# Patient Record
Sex: MALE | Race: WHITE | NOT HISPANIC OR LATINO | Employment: OTHER | URBAN - METROPOLITAN AREA
[De-identification: names, ages, dates, MRNs, and addresses within clinical notes are randomized per-mention and may not be internally consistent; named-entity substitution may affect disease eponyms.]

---

## 2017-04-24 ENCOUNTER — OFFICE VISIT (OUTPATIENT)
Dept: CARDIOLOGY | Facility: CLINIC | Age: 77
End: 2017-04-24

## 2017-04-24 VITALS
BODY MASS INDEX: 30.91 KG/M2 | WEIGHT: 220.8 LBS | SYSTOLIC BLOOD PRESSURE: 140 MMHG | HEART RATE: 81 BPM | DIASTOLIC BLOOD PRESSURE: 80 MMHG | HEIGHT: 71 IN

## 2017-04-24 DIAGNOSIS — I10 ESSENTIAL HYPERTENSION: ICD-10-CM

## 2017-04-24 DIAGNOSIS — I25.10 CVD (CARDIOVASCULAR DISEASE): ICD-10-CM

## 2017-04-24 DIAGNOSIS — E78.5 DYSLIPIDEMIA: ICD-10-CM

## 2017-04-24 DIAGNOSIS — I25.810 CORONARY ARTERY DISEASE INVOLVING CORONARY BYPASS GRAFT OF NATIVE HEART WITHOUT ANGINA PECTORIS: Primary | ICD-10-CM

## 2017-04-24 PROCEDURE — 99213 OFFICE O/P EST LOW 20 MIN: CPT | Performed by: INTERNAL MEDICINE

## 2017-04-24 RX ORDER — ATORVASTATIN CALCIUM 40 MG/1
40 TABLET, FILM COATED ORAL DAILY
COMMUNITY

## 2017-04-24 RX ORDER — LOSARTAN POTASSIUM 100 MG/1
100 TABLET ORAL NIGHTLY
COMMUNITY

## 2017-04-24 RX ORDER — NITROGLYCERIN 0.4 MG/1
0.4 TABLET SUBLINGUAL
COMMUNITY

## 2017-04-24 RX ORDER — ASPIRIN 81 MG/1
81 TABLET ORAL DAILY
COMMUNITY

## 2017-04-24 RX ORDER — OMEPRAZOLE 20 MG/1
20 CAPSULE, DELAYED RELEASE ORAL DAILY
COMMUNITY
End: 2018-05-07

## 2017-04-24 RX ORDER — DILTIAZEM HYDROCHLORIDE 180 MG/1
180 CAPSULE, COATED, EXTENDED RELEASE ORAL DAILY
COMMUNITY

## 2017-04-24 NOTE — PROGRESS NOTES
Subjective:     Encounter Date:04/24/2017      Patient ID: Toño Alcala is a 76 y.o. male.    Chief Complaint: Coronary Artery Disease (follow up )      PROBLEM LIST:  1. CAD:  a. History of CABG x4, 1994 - incomplete database.   b. Cardiac catheterization 02/16/2011 showed 100% native RCA, and severe proximal LAD and left circumflex disease.  Saphenous vein graft to the right PDA is patent.  Left internal mammary artery  to the LAD is widely patent.  Saphenous vein graft to the obtuse marginal is widely patent.    2. CVD:  a. History of TIA, 1989.  3. Hypertension.  a. Normal renal angiography, 02/16/2011.  4. Right lower extremity DVT and pulmonary embolism in 06/2015, idiopathic.  5. Diabetes.  6. Dyslipidemia.  7. Arthritis.   8. GERD/hiatal hernia.   9. History of a left forearm fracture.    10. Right wrist fracture.   11. History of cervical fusion.  12. History of lumbar laminectomy.  13. Arthritis.  14. Surgical history:  a. T and A.  b. Appendectomy.       History of Present Illness  Patient returns today for follow up with a history of CAD, CVD, hypertension, and dyslipidemia. Since last visit patient has been feeling well from a cardiac standpoint. His vitamin D level has been low so he started supplements for it 2 weeks ago but has not yet seen any difference. He has not needed to take his nitroglycerin. He is staying active as is tolerable. Denies any exertional chest pain, shortness of breath, orthopnea, PND, or palpitations.    Allergies   Allergen Reactions   • Penicillins      Edema and hives         Current Outpatient Prescriptions:   •  aspirin 81 MG EC tablet, Take 81 mg by mouth Daily., Disp: , Rfl:   •  atorvastatin (LIPITOR) 40 MG tablet, Take 40 mg by mouth Daily., Disp: , Rfl:   •  diclofenac (VOLTAREN) 1 % gel gel, Apply 4 g topically Daily., Disp: , Rfl:   •  diltiaZEM CD (CARDIZEM CD) 180 MG 24 hr capsule, Take 180 mg by mouth Daily., Disp: , Rfl:   •  losartan (COZAAR) 100 MG  tablet, Take 100 mg by mouth Daily., Disp: , Rfl:   •  Multiple Vitamins-Minerals (MULTIVITAMIN ADULT PO), Take  by mouth Daily., Disp: , Rfl:   •  nitroglycerin (NITROSTAT) 0.4 MG SL tablet, Place 0.4 mg under the tongue Every 5 (Five) Minutes As Needed for Chest Pain. Take no more than 3 doses in 15 minutes., Disp: , Rfl:   •  omeprazole (priLOSEC) 20 MG capsule, Take 20 mg by mouth Daily., Disp: , Rfl:     The following portions of the patient's history were reviewed and updated as appropriate: allergies, current medications, past family history, past medical history, past social history, past surgical history and problem list.    Review of Systems   Constitution: Negative.   Cardiovascular: Negative.    Respiratory: Negative.    Hematologic/Lymphatic: Negative for bleeding problem. Does not bruise/bleed easily.   Skin: Negative for rash.   Musculoskeletal: Negative for muscle weakness and myalgias.   Gastrointestinal: Negative for heartburn, nausea and vomiting.   Neurological: Negative.           Objective:     Vitals:    04/24/17 1333   BP: 140/80   Pulse: 81         Physical Exam   Constitutional: He is oriented to person, place, and time. He appears well-developed and well-nourished.   HENT:   Mouth/Throat: Oropharynx is clear and moist.   Neck: No JVD present. Carotid bruit is not present. No thyromegaly present.   Cardiovascular: Regular rhythm, S1 normal, S2 normal, normal heart sounds and intact distal pulses.  Exam reveals no gallop, no S3 and no S4.    No murmur heard.  Pulses:       Carotid pulses are 2+ on the right side, and 2+ on the left side.       Radial pulses are 2+ on the right side, and 2+ on the left side.   Pulmonary/Chest: Breath sounds normal.   Abdominal: Soft. Bowel sounds are normal. He exhibits no mass. There is no tenderness.   Musculoskeletal: He exhibits no edema.   Neurological: He is alert and oriented to person, place, and time.   Skin: Skin is warm and dry. No rash noted.        Lab Review:    Procedures        Assessment:   Toño was seen today for coronary artery disease.    Diagnoses and all orders for this visit:    Coronary artery disease involving coronary bypass graft of native heart without angina pectoris    CVD (cardiovascular disease)    Essential hypertension    Dyslipidemia        Impression  1. Coronary artery disease, no angina  2. Hypertension well controlled  3. His lipidemia on high-dose statin  4. CVD stable but    Plan:    1. Continue current medications.  2. Revisit in 12 MO, or sooner as needed.    Scribed for Avelino Hernandez MD by Carter Pulido. 4/24/2017  2:20 PM  I, Avelino Hernandez MD, personally performed the services described in this documentation as scribed by the above individual in my presence, and it is both accurate and complete

## 2017-11-14 ENCOUNTER — HOSPITAL ENCOUNTER (OUTPATIENT)
Dept: PHYSICAL THERAPY | Facility: HOSPITAL | Age: 77
Setting detail: THERAPIES SERIES
Discharge: HOME | End: 2017-11-14

## 2017-11-14 ENCOUNTER — TRANSCRIBE ORDERS (OUTPATIENT)
Dept: PHYSICAL THERAPY | Facility: HOSPITAL | Age: 77
End: 2017-11-14

## 2017-11-14 DIAGNOSIS — H81.12 BPPV (BENIGN PAROXYSMAL POSITIONAL VERTIGO), LEFT: Primary | ICD-10-CM

## 2017-11-14 DIAGNOSIS — H81.10 BENIGN PAROXYSMAL POSITIONAL VERTIGO, UNSPECIFIED LATERALITY: Primary | ICD-10-CM

## 2017-11-14 PROCEDURE — 97161 PT EVAL LOW COMPLEX 20 MIN: CPT

## 2017-11-14 PROCEDURE — G8982 BODY POS GOAL STATUS: HCPCS

## 2017-11-14 PROCEDURE — G8981 BODY POS CURRENT STATUS: HCPCS

## 2017-11-14 NOTE — THERAPY EVALUATION
Outpatient Physical Therapy Vestibular Initial Evaluation  [facility]     Patient Name: Toño Alcala  : 1940  MRN: [id number]  Today's Date: 2017      Visit Date: 2017    Patient Active Problem List   Diagnosis   • CAD (coronary artery disease)   • CVD (cardiovascular disease)   • Hypertension   • DVT (deep venous thrombosis)   • Diabetes mellitus   • Dyslipidemia   • Arthritis   • GERD (gastroesophageal reflux disease)        Past Medical History:   Diagnosis Date   • Arthritis    • CAD (coronary artery disease)    • CVD (cardiovascular disease)     History of TIA    • Diabetes mellitus    • DVT (deep venous thrombosis)     Right lower extremity DVT and pulmonary embolism in 2015, idiopathic   • Dyslipidemia    • Fracture     H/o pf left forearm fracture   • GERD (gastroesophageal reflux disease)     with hiatal hernia   • Hypertension     Normal renal angiography 11   • Right wrist fracture         Past Surgical History:   Procedure Laterality Date   • APPENDECTOMY     • CARDIAC CATHETERIZATION      with vein graft   • CERVICAL FUSION     • CORONARY ARTERY BYPASS GRAFT     • LUMBAR LAMINECTOMY     • TONSILLECTOMY AND ADENOIDECTOMY           Visit Dx:     ICD-10-CM ICD-9-CM   1. BPPV (benign paroxysmal positional vertigo), left H81.12 386.11             Patient History       17 0900          History    Chief Complaint Dizziness;Difficulty with daily activities  -MM      Date Current Problem(s) Began 10/24/17  -MM      Brief Description of Current Complaint Client experienced severe dizziness the morning of 2017. He went to his physician and was diagnosed with vertigo. He took Meclizine and symptoms improved some. He followed up with his physician and took Prednisone for 3 days. Symptoms improved quite a bit, but he continues with spinning type vertigo that lasts less than 1 minute when he lays down at night or gets up in the morning.  -MM       Patient/Caregiver Goals Relief from dizziness  -MM      Pain     Difficulties with ADL's? laying supine and transfers  -MM      Daily Activities    Primary Language English  -MM      How does patient learn best? Listening;Reading;Demonstration  -MM      Pt Participated in POC and Goals Yes  -MM        User Key  (r) = Recorded By, (t) = Taken By, (c) = Cosigned By    Initials Name Provider Type    ALMA Day PT Physical Therapist                Vestibular Eval       11/14/17 0900          Symptom Behavior    Type of Dizziness Spinning  -MM      Frequency of Dizziness Once a day  -MM      Duration of Dizziness Seconds  -MM      VAS Intensity (0-10) 6  -MM      Aggravating Factors Lying supine;Supine to sit;Sit to stand  -MM      Relieving Factors Head stationary;Rest  -MM      Positional Testing    Won-Hallpike Left Upbeat, left rotatory nystagmus  -MM      [place]-Hallpike Left Onset Time  5 sec  -MM      Won-Hallpike Left Duration Time  12 sec  -MM        User Key  (r) = Recorded By, (t) = Taken By, (c) = Cosigned By    Initials Name Provider Type    ALMA Day PT Physical Therapist                            Therapy Education       11/14/17 0900          Therapy Education    Education Details Provided educational handout regarding BPPV.  -MM        User Key  (r) = Recorded By, (t) = Taken By, (c) = Cosigned By    Initials Name Provider Type    ALMA Day PT Physical Therapist                  Exercises       11/14/17 0900          Exercise 1    Exercise Name 1 Included modified Epley maneuver x2 for left posterior canal BPPV.   -MM      Cueing 1 Verbal  -MM      Time (Minutes) 1 10  -MM        User Key  (r) = Recorded By, (t) = Taken By, (c) = Cosigned By    Initials Name Provider Type    ALMA Day PT Physical Therapist                            PT OP Goals       11/14/17 0900       PT Short Term Goals    STG Date to Achieve 12/05/17  -MM     STG 1 Symptoms of vertigo and  dizziness will resolve.   -MM     STG 1 Progress New  -MM     STG 2 Client will return to laying in bed without dizziness.  -MM     STG 2 Progress New  -MM     STG 3 Client will improve to transfering sit to stand without dizziness or vertigo.  -MM     STG 3 Progress New  -MM     Long Term Goals    LTG Date to Achieve 12/12/17  -MM     LTG 1 DHI score will improve to 15% or better.  -MM     LTG 1 Progress New  -MM     LTG 2 Left Won Hallpike test will be negative.  -MM     LTG 2 Progress New  -MM     Time Calculation    PT Goal Re-Cert Due Date 02/12/18  -MM       User Key  (r) = Recorded By, (t) = Taken By, (c) = Cosigned By    Initials Name Provider Type    MM Jose Day, PT Physical Therapist                PT Assessment/Plan       11/14/17 0900       PT Assessment    Functional Limitations Limitation in home management;Limitations in community activities;Performance in self-care ADL  -MM     Impairments Coordination;Balance   Dizziness  -MM     Assessment Comments Client presents with evolving symptoms of low complexity.  Signs and symptoms are consistent with left posterior canal BPPV.  Symptoms seem to improve with Epley maneuver.  Further evaluation will be needed next visit.  -MM     Please refer to paper survey for additional self-reported information Yes  -MM     Rehab Potential Good  -MM     Patient/caregiver participated in establishment of treatment plan and goals Yes  -MM     Patient would benefit from skilled therapy intervention Yes  -MM     PT Plan    PT Frequency 1x/week  -MM     Predicted Duration of Therapy Intervention (days/wks) 6 visits  -MM     Planned CPT's? PT EVAL LOW COMPLEXITY: 31653;PT NEUROMUSC RE-EDUCATION EA 15 MIN: 60409   Canalith repositioning  -MM     PT Plan Comments Continue per plan of treatment.  Recheck for BPPV next visit.  Also check for vestibular hypofunction.  -MM       User Key  (r) = Recorded By, (t) = Taken By, (c) = Cosigned By    Initials Name Provider Type     MM Jose Day, PT Physical Therapist                 Outcome Measures       11/14/17 0900          Functional Assessment    Outcome Measure Options Dizziness Handicap Inventory   48%  -MM        User Key  (r) = Recorded By, (t) = Taken By, (c) = Cosigned By    Initials Name Provider Type    MM Jose Day, PT Physical Therapist            Time Calculation:   Start Time: 0900     Therapy Charges for Today     Code Description Service Date Service Provider Modifiers Qty    31502394654  PT EVAL LOW COMPLEXITY 4 11/14/2017 Jose Day, PT GP 1          PT G-Codes  Outcome Measure Options: Dizziness Handicap Inventory (48%)         Jose Day, PT  11/14/2017

## 2017-11-22 ENCOUNTER — HOSPITAL ENCOUNTER (OUTPATIENT)
Dept: PHYSICAL THERAPY | Facility: HOSPITAL | Age: 77
Setting detail: THERAPIES SERIES
Discharge: HOME | End: 2017-11-22

## 2017-11-22 DIAGNOSIS — H81.12 BPPV (BENIGN PAROXYSMAL POSITIONAL VERTIGO), LEFT: Primary | ICD-10-CM

## 2017-11-22 PROCEDURE — 95992 CANALITH REPOSITIONING PROC: CPT

## 2017-11-22 NOTE — THERAPY TREATMENT NOTE
Outpatient Physical Therapy Vestibular Treatment Note  [facility]     Patient Name: Toño Alcala  : 1940  MRN: [id number]  Today's Date: 2017      Visit Date: 2017    Visit Dx:     ICD-10-CM ICD-9-CM   1. BPPV (benign paroxysmal positional vertigo), left H81.12 386.11       Patient Active Problem List   Diagnosis   • CAD (coronary artery disease)   • CVD (cardiovascular disease)   • Hypertension   • DVT (deep venous thrombosis)   • Diabetes mellitus   • Dyslipidemia   • Arthritis   • GERD (gastroesophageal reflux disease)             Vestibular Eval       17 1345          Positional Testing    Won-Hallpike Left Downbeat, left rotatory nystagmus  -MM      [place]-Hallpike Left Onset Time  5  -MM      [place]-Hallpike Left Duration Time  1 minute  -MM        User Key  (r) = Recorded By, (t) = Taken By, (c) = Cosigned By    Initials Name Provider Type    ALMA Day, PT Physical Therapist                PT Assessment/Plan       17 1345       PT Assessment    Assessment Comments Client's history suggests that the posterior canal was cleared for 4 days. He had a return of dizziness this past Saturday. On exam he had signs consistent with left anterior canal BPPV. Deep neck extension maneuver was difficult to perform due to limited neck extension ROM. Modified reverse Semont maneuver was performed and tolerated very well.   -MM     PT Plan    PT Plan Comments Continue per plan of treatment. Check again for BPPV and treat as needed.  -MM       User Key  (r) = Recorded By, (t) = Taken By, (c) = Cosigned By    Initials Name Provider Type    ALMA Day, PT Physical Therapist                     Exercises       17 1345          Subjective Comments    Subjective Comments Client reports that dizziness resolved for 4 days after last treatment. He awoke on Saturday and experienced quite a bit of dizziness again. He has experienced dizziness throughout the day for the past few  days.  -MM      Subjective Pain    Able to rate subjective pain? yes  -MM      Pre-Treatment Pain Level 0  -MM      Post-Treatment Pain Level 0  -MM      Subjective Pain Comment Moderate dizziness  -MM      Exercise 1    Exercise Name 1 Included the deep neck extension maneuver by using a tilted table to clear the anterior canal. Performed modified reverse Semont maneuver x3 to clear the anterior canal. On the last 2 maneuvers he had less nystagmus and less dizziness.  Also included education.  -MM      Cueing 1 Verbal  -MM      Time (Minutes) 1 45  -MM      Additional Comments canalith repositioning  -MM        User Key  (r) = Recorded By, (t) = Taken By, (c) = Cosigned By    Initials Name Provider Type    MM Jose Day, PT Physical Therapist        Time Calculation:   Start Time: 1345  Total Timed Code Minutes- PT: 45 minute(s)     Therapy Charges for Today     Code Description Service Date Service Provider Modifiers Qty    73256084644  PT CANALITH REPOSITIONING PER DAY 11/22/2017 Jose Day, PT GP 1                   Jose Day, PT  11/22/2017

## 2017-11-29 ENCOUNTER — HOSPITAL ENCOUNTER (OUTPATIENT)
Dept: PHYSICAL THERAPY | Facility: HOSPITAL | Age: 77
Setting detail: THERAPIES SERIES
Discharge: HOME | End: 2017-11-29

## 2017-11-29 DIAGNOSIS — H81.12 BPPV (BENIGN PAROXYSMAL POSITIONAL VERTIGO), LEFT: Primary | ICD-10-CM

## 2017-11-29 PROCEDURE — 95992 CANALITH REPOSITIONING PROC: CPT

## 2017-11-29 NOTE — THERAPY TREATMENT NOTE
Outpatient Physical Therapy Vestibular Treatment Note  [facility]     Patient Name: Toño Alcala  : 1940  MRN: [id number]  Today's Date: 2017      Visit Date: 2017    Visit Dx:     ICD-10-CM ICD-9-CM   1. BPPV (benign paroxysmal positional vertigo), left H81.12 386.11       Patient Active Problem List   Diagnosis   • CAD (coronary artery disease)   • CVD (cardiovascular disease)   • Hypertension   • DVT (deep venous thrombosis)   • Diabetes mellitus   • Dyslipidemia   • Arthritis   • GERD (gastroesophageal reflux disease)             PT Assessment/Plan       17 1145 17 1115    PT Assessment    Assessment Comments On Modified Won Hallpike testing today he had left upbeating and rotary nystagmus consitent with left posterior canal BPPV that resolved in less than 30 seconds. Epley maneuver was performed 2x. On the second Won Hallpike test no nystagmus was noted. Client has signs that BPPV is clearing and then returning. Deficiency in vitamin D may be playing a role with the otoliths not reabsorbing well after the maneuvers.   -MM --  -MM    PT Plan    PT Plan Comments Continue to re-exam and treat as needed.  -MM --  -MM      User Key  (r) = Recorded By, (t) = Taken By, (c) = Cosigned By    Initials Name Provider Type    MM Jose Day, PT Physical Therapist                     Exercises       17 1145 17 1115       Subjective Comments    Subjective Comments Client felt okay for a couple of days and then noticed symptoms again when he layed supine.   -MM --  -MM     Subjective Pain    Able to rate subjective pain? yes  -MM --  -MM     Pre-Treatment Pain Level 0  -MM --  -MM     Post-Treatment Pain Level 0  -MM --  -MM     Subjective Pain Comment brief moderate dizziness when laying supine or transferring sit to stand.  -MM --  -MM     Exercise 1    Exercise Name 1 Included epley maneuver x2 for left posterior canal.   -MM --  -MM     Cueing 1 Verbal  -MM --  -MM      Time (Minutes) 1 30  -MM --  -MM     Additional Comments canalith repositioning  -MM --  -MM       User Key  (r) = Recorded By, (t) = Taken By, (c) = Cosigned By    Initials Name Provider Type    MM Jose Day, PT Physical Therapist                                    Time Calculation:   Start Time: 1145  Total Timed Code Minutes- PT: 30 minute(s)     Therapy Charges for Today     Code Description Service Date Service Provider Modifiers Qty    54796642502  PT CANALITH REPOSITIONING PER DAY 11/29/2017 Jose Day, PT GP 1                   Jose Day, PT  11/29/2017

## 2017-12-06 ENCOUNTER — HOSPITAL ENCOUNTER (OUTPATIENT)
Dept: PHYSICAL THERAPY | Facility: HOSPITAL | Age: 77
Setting detail: THERAPIES SERIES
Discharge: HOME | End: 2017-12-06

## 2017-12-06 DIAGNOSIS — H81.12 BPPV (BENIGN PAROXYSMAL POSITIONAL VERTIGO), LEFT: Primary | ICD-10-CM

## 2017-12-06 PROCEDURE — 95992 CANALITH REPOSITIONING PROC: CPT

## 2017-12-13 ENCOUNTER — HOSPITAL ENCOUNTER (OUTPATIENT)
Dept: PHYSICAL THERAPY | Facility: HOSPITAL | Age: 77
Setting detail: THERAPIES SERIES
Discharge: HOME | End: 2017-12-13

## 2017-12-13 DIAGNOSIS — H81.12 BPPV (BENIGN PAROXYSMAL POSITIONAL VERTIGO), LEFT: Primary | ICD-10-CM

## 2017-12-13 DIAGNOSIS — H83.2X2 VESTIBULAR HYPOFUNCTION, LEFT: ICD-10-CM

## 2017-12-13 PROCEDURE — 95992 CANALITH REPOSITIONING PROC: CPT

## 2017-12-13 PROCEDURE — G8981 BODY POS CURRENT STATUS: HCPCS

## 2017-12-13 PROCEDURE — G8982 BODY POS GOAL STATUS: HCPCS

## 2017-12-13 PROCEDURE — 97112 NEUROMUSCULAR REEDUCATION: CPT

## 2017-12-13 NOTE — THERAPY PROGRESS REPORT/RE-CERT
Outpatient Physical Therapy Vestibular Re-Assessment  [facility]     Patient Name: Toño Alcala  : 1940  MRN: [id number]  Today's Date: 2017      Visit Date: 2017    Patient Active Problem List   Diagnosis   • CAD (coronary artery disease)   • CVD (cardiovascular disease)   • Hypertension   • DVT (deep venous thrombosis)   • Diabetes mellitus   • Dyslipidemia   • Arthritis   • GERD (gastroesophageal reflux disease)        Past Medical History:   Diagnosis Date   • Arthritis    • CAD (coronary artery disease)    • CVD (cardiovascular disease)     History of TIA    • Diabetes mellitus    • DVT (deep venous thrombosis)     Right lower extremity DVT and pulmonary embolism in 2015, idiopathic   • Dyslipidemia    • Fracture     H/o pf left forearm fracture   • GERD (gastroesophageal reflux disease)     with hiatal hernia   • Hypertension     Normal renal angiography 11   • Right wrist fracture         Past Surgical History:   Procedure Laterality Date   • APPENDECTOMY     • CARDIAC CATHETERIZATION      with vein graft   • CERVICAL FUSION     • CORONARY ARTERY BYPASS GRAFT     • LUMBAR LAMINECTOMY     • TONSILLECTOMY AND ADENOIDECTOMY           Visit Dx:     ICD-10-CM ICD-9-CM   1. BPPV (benign paroxysmal positional vertigo), left H81.12 386.11   2. Vestibular hypofunction, left H83.2X2 386.50                 Vestibular Eval       17 1130          Vestibulo-Occular Reflex (VOR)    VOR 1 Head Only little to no difficulty with slow movement   More noticeable impairment with fast movement left  -MM      VOR to Fast Head Movement/Head Thrust Test Positive bilaterally   Difficulty performing test, but at times positive each way  -MM      Positional Testing    [place]-Hallpike Left No nystagmus   symptomatic vertigo, onset and duration 1-2 seconds each  -MM      Horizontal Roll Test Right No nystagmus  -MM      Horizontal Roll Test Left No nystagmus  -MM        User Key  (r) = Recorded  By, (t) = Taken By, (c) = Cosigned By    Initials Name Provider Type    ALMA Day, PT Physical Therapist                      Therapy Education  Education Details: Updated home program this visit.            Exercises       12/13/17 1130          Subjective Comments    Subjective Comments Client reports feeling good for a week until yesterday. He noticed some dizziness yesterday and unsteadiness throughout the day. This morning he also noticed some dizziness and unsteadiness lingering.   -MM      Subjective Pain    Able to rate subjective pain? yes  -MM      Pre-Treatment Pain Level 0  -MM      Post-Treatment Pain Level 0  -MM      Subjective Pain Comment Mild dizziness/unsteadiness at the time of treatment.  -MM      Exercise 1    Exercise Name 1 Included Epley maneuver x2 for left posterior canal BPPV.   -MM      Cueing 1 Verbal  -MM      Time (Minutes) 1 30  -MM      Additional Comments canalith repositioning.  -MM      Exercise 2    Exercise Name 2 Included vestibular rehab program with exercises to improve coordination of eye and head movement. EXercises included: VOR-1 training, locating targets, and sweeping head movement left.   -MM      Cueing 2 Verbal;Tactile;Demo  -MM      Time (Minutes) 2 15  -MM      Additional Comments Neuromuscular re-education  -MM        User Key  (r) = Recorded By, (t) = Taken By, (c) = Cosigned By    Initials Name Provider Type    ALMA Day, PT Physical Therapist                PT OP Goals       12/13/17 1130       PT Short Term Goals    STG Date to Achieve 12/05/17  -MM     STG 1 Symptoms of vertigo and dizziness will resolve.   -MM     STG 1 Progress Ongoing  -MM     STG 2 Client will return to laying in bed without dizziness.  -MM     STG 2 Progress Ongoing  -MM     STG 3 Client will improve to transfering sit to stand without dizziness or vertigo.  -MM     STG 3 Progress Ongoing  -MM     Long Term Goals    LTG Date to Achieve 12/12/17  -MM     LTG 1 DHI  score will improve to 15% or better.  -MM     LTG 1 Progress Ongoing  -MM     LTG 1 Progress Comments some progress  -MM     LTG 2 Left [place] Hallpike test will be negative.  -MM     LTG 2 Progress Ongoing  -MM     LTG 2 Progress Comments No nystagmus today, but some symptoms of dizziness.  -MM     Time Calculation    PT Goal Re-Cert Due Date 02/12/18  -MM       User Key  (r) = Recorded By, (t) = Taken By, (c) = Cosigned By    Initials Name Provider Type    ALMA Day, PT Physical Therapist                PT Assessment/Plan       12/13/17 1130       PT Assessment    Assessment Comments Client continues to notice improvement, but this past week symptoms again returned. They did not return this time until 5 days after the last maneuver. On exam he had symptomatic vertigo with Left [place] Hallpike, but no nystagmus. Epley maveuver was tolerated well and negative on follow-up testing. Other positional testing for BPPV in horizontal canals was all negative. Further testing was performed today to look at VOR function. It was difficult to test because he is very guarded with neck movement and has history of cervical fusion. Some impairment was noted with Head impulse testing left and right. Symptoms were more noticeable to the client when quickly moving his head left. Positional vertigo has likely resolved based on testing today. Remaining symptoms are likely related to left vestibular hypofunction. Vestibular rehab exercises were started today and he should notice improvement with practice on his own.  -MM     PT Plan    PT Plan Comments Re-check for BPPV and vestibular hypofunction next visit. Consider testing for head shaking nystagmus with fixation absent.  Continue to update vestibular exercises as needed.  -MM       User Key  (r) = Recorded By, (t) = Taken By, (c) = Cosigned By    Initials Name Provider Type    ALMA Day, PT Physical Therapist           Outcome Measure Options: Dizziness Handicap  Inventory (32%)         Time Calculation:   Start Time: 1130  Total Timed Code Minutes- PT: 45 minute(s)     Therapy Charges for Today     Code Description Service Date Service Provider Modifiers Qty    22594226844 HC PT CHNG MAIN POS CURRENT 12/13/2017 Jose Day, PT GP, CJ 1    64802627115 HC PT CHNG MAIN POS PROJECTED 12/13/2017 Jose Day, PT GP, CI 1    [id number] HC PT CANALITH REPOSITIONING PER DAY 12/13/2017 Jose Day, PT GP 1    14862248000 HC PT NEUROMUSC RE EDUCATION EA 15 MIN 12/13/2017 Jose Day, PT GP 1          PT G-Codes  PT Professional Judgement Used?: Yes  Outcome Measure Options: Dizziness Handicap Inventory (32%)  Score: 32%  Functional Limitation: Changing and maintaining body position  Changing and Maintaining Body Position Current Status (): At least 20 percent but less than 40 percent impaired, limited or restricted  Changing and Maintaining Body Position Goal Status (): At least 1 percent but less than 20 percent impaired, limited or restricted         Jose Day, PT  12/13/2017

## 2018-01-05 ENCOUNTER — DOCUMENTATION (OUTPATIENT)
Dept: PHYSICAL THERAPY | Facility: HOSPITAL | Age: 78
End: 2018-01-05

## 2018-01-05 DIAGNOSIS — H81.12 BPPV (BENIGN PAROXYSMAL POSITIONAL VERTIGO), LEFT: Primary | ICD-10-CM

## 2018-01-05 DIAGNOSIS — H83.2X2 VESTIBULAR HYPOFUNCTION, LEFT: ICD-10-CM

## 2018-05-07 ENCOUNTER — OFFICE VISIT (OUTPATIENT)
Dept: CARDIOLOGY | Facility: CLINIC | Age: 78
End: 2018-05-07

## 2018-05-07 VITALS
HEIGHT: 71 IN | SYSTOLIC BLOOD PRESSURE: 126 MMHG | HEART RATE: 74 BPM | BODY MASS INDEX: 28.56 KG/M2 | DIASTOLIC BLOOD PRESSURE: 64 MMHG | WEIGHT: 204 LBS

## 2018-05-07 DIAGNOSIS — I25.10 CORONARY ARTERY DISEASE INVOLVING NATIVE CORONARY ARTERY OF NATIVE HEART WITHOUT ANGINA PECTORIS: Primary | ICD-10-CM

## 2018-05-07 DIAGNOSIS — E78.5 DYSLIPIDEMIA: ICD-10-CM

## 2018-05-07 DIAGNOSIS — I25.10 CVD (CARDIOVASCULAR DISEASE): ICD-10-CM

## 2018-05-07 DIAGNOSIS — I10 ESSENTIAL HYPERTENSION: ICD-10-CM

## 2018-05-07 PROCEDURE — 99213 OFFICE O/P EST LOW 20 MIN: CPT | Performed by: NURSE PRACTITIONER

## 2018-05-07 RX ORDER — WARFARIN SODIUM 5 MG/1
5 TABLET ORAL
COMMUNITY
End: 2019-05-13

## 2018-05-07 RX ORDER — CALCIPOTRIENE, BETAMETHASONE DIPROPIONATE 50; .643 UG/G; MG/G
1 OINTMENT TOPICAL AS NEEDED
COMMUNITY

## 2018-05-07 RX ORDER — OMEPRAZOLE 20 MG/1
20 CAPSULE, DELAYED RELEASE ORAL DAILY
COMMUNITY

## 2018-05-07 RX ORDER — WARFARIN SODIUM 2.5 MG/1
2.5 TABLET ORAL
COMMUNITY
End: 2019-05-13

## 2018-05-07 RX ORDER — ACETAMINOPHEN 325 MG/1
650 TABLET ORAL AS NEEDED
COMMUNITY

## 2018-05-07 NOTE — PROGRESS NOTES
Subjective:     Encounter Date:05/07/2018      Patient ID: Toño Alcala is a 77 y.o. male.  PCP:Radu Francis MD    Chief Complaint: Coronary Artery Disease; CVD (cardiovascular disease); and Hypertension      PROBLEM LIST:  1. CAD:  a. History of CABG x4, 1994 - incomplete database.   b. Cardiac catheterization 02/16/2011 showed 100% native RCA, and severe proximal LAD and left circumflex disease.  Saphenous vein graft to the right PDA is patent.  Left internal mammary artery  to the LAD is widely patent.  Saphenous vein graft to the obtuse marginal is widely patent.    2. CVD:  a. History of TIA, 1989.  3. Hypertension.  a. Normal renal angiography, 02/16/2011.  4. Right lower extremity DVT and pulmonary embolism in 06/2015, idiopathic.  a. Recurrent idiopathic DVT 2017  b. Started on warfarin per Dr. Salinas and Tito  5. Diabetes.  6. Dyslipidemia.  7. Arthritis.   8. GERD/hiatal hernia.   9. History of a left forearm fracture.    10. Right wrist fracture.   11. History of cervical fusion.  12. History of lumbar laminectomy.  13. Arthritis.  14. BPPV  15. Surgical history:  a. T and A.  b. Appendectomy.     History of Present Illness  Patient presents today for annual follow-up of coronary artery disease.  Since last being seen he notes overall be doing fairly well.  He was diagnosed with a another idiopathic right lower extremity DVT.  He is now on lifelong warfarin.  He notes that he has discussed anticoagulation with Dr. Francis as well as Dr. Andrade.  At this time for further he be on warfarin.  He denies any chest pain, pressure, tightness.  Denies any increasing shortness of breath.  No syncope, near-syncope, or edema.  He did note an irregular heartbeat at one point in time and was noted to have PVCs.  Patient denies any palpitations.  States that he is compliant with his medications.    Allergies   Allergen Reactions   • Penicillins      Edema and hives         Current  Outpatient Prescriptions:   •  acetaminophen (TYLENOL) 325 MG tablet, Take 650 mg by mouth As Needed for Mild Pain ., Disp: , Rfl:   •  aspirin 81 MG EC tablet, Take 81 mg by mouth Daily., Disp: , Rfl:   •  atorvastatin (LIPITOR) 40 MG tablet, Take 40 mg by mouth Daily., Disp: , Rfl:   •  calcipotriene-betamethasone (TACLONEX) 0.005-0.064 % ointment, Apply 1 application topically As Needed., Disp: , Rfl:   •  diclofenac (VOLTAREN) 1 % gel gel, Apply 4 g topically As Needed., Disp: , Rfl:   •  diltiaZEM CD (CARDIZEM CD) 180 MG 24 hr capsule, Take 180 mg by mouth Daily., Disp: , Rfl:   •  losartan (COZAAR) 100 MG tablet, Take 100 mg by mouth Daily., Disp: , Rfl:   •  Multiple Vitamins-Minerals (MULTIVITAMIN ADULT PO), Take 1 tablet by mouth As Needed., Disp: , Rfl:   •  nitroglycerin (NITROSTAT) 0.4 MG SL tablet, Place 0.4 mg under the tongue Every 5 (Five) Minutes As Needed for Chest Pain. Take no more than 3 doses in 15 minutes., Disp: , Rfl:   •  omeprazole (priLOSEC) 40 MG capsule, Take 40 mg by mouth Daily., Disp: , Rfl:   •  warfarin (COUMADIN) 2.5 MG tablet, Take 2.5 mg by mouth Daily. Alternating between 5 mg, Disp: , Rfl:   •  warfarin (COUMADIN) 5 MG tablet, Take 5 mg by mouth Daily. Alternating between 2.5 mg, Disp: , Rfl:     The following portions of the patient's history were reviewed and updated as appropriate: allergies, current medications, past family history, past medical history, past social history, past surgical history and problem list.    Review of Systems   Constitution: Negative.   HENT: Positive for tinnitus.    Cardiovascular: Positive for irregular heartbeat.   Respiratory: Negative.    Hematologic/Lymphatic: Bruises/bleeds easily.   Skin: Negative for rash.   Musculoskeletal: Positive for neck pain. Negative for muscle weakness and myalgias.   Gastrointestinal: Negative for heartburn, nausea and vomiting.   Neurological: Negative.           Objective:   /64 (BP Location: Right arm,  "Patient Position: Sitting)   Pulse 74   Ht 180.3 cm (71\")   Wt 92.5 kg (204 lb)   BMI 28.45 kg/m²        Physical Exam   Constitutional: He is oriented to person, place, and time. He appears well-developed and well-nourished. No distress.   Neck: No JVD present. No tracheal deviation present.   Cardiovascular: Normal rate, regular rhythm and normal heart sounds.  Exam reveals no friction rub.    No murmur heard.  Pulmonary/Chest: Effort normal and breath sounds normal. No respiratory distress.   Abdominal: Soft. Bowel sounds are normal. There is no tenderness.   Musculoskeletal: He exhibits no edema or deformity.   Neurological: He is alert and oriented to person, place, and time.   Skin: Skin is warm and dry.       Lab Review:    Procedures        Assessment:   Toño was seen today for coronary artery disease, cvd (cardiovascular disease) and hypertension.    Diagnoses and all orders for this visit:    Coronary artery disease involving native coronary artery of native heart without angina pectoris    CVD (cardiovascular disease), Stable.    Essential hypertension, controlled.    Dyslipidemia, on statin therapy.      Plan:    1. Continue current medications.  2. Revisit in 12 MO, or sooner as needed.    YARED Mueller     Dictated with Charito.                  "

## 2018-05-23 ENCOUNTER — TRANSCRIBE ORDERS (OUTPATIENT)
Dept: ADMINISTRATIVE | Facility: HOSPITAL | Age: 78
End: 2018-05-23

## 2018-05-23 DIAGNOSIS — N18.30 CKD (CHRONIC KIDNEY DISEASE), STAGE III (HCC): Primary | ICD-10-CM

## 2018-05-25 ENCOUNTER — HOSPITAL ENCOUNTER (OUTPATIENT)
Dept: ULTRASOUND IMAGING | Facility: HOSPITAL | Age: 78
Discharge: HOME | End: 2018-05-25
Attending: INTERNAL MEDICINE | Admitting: INTERNAL MEDICINE

## 2018-05-25 DIAGNOSIS — N18.30 CKD (CHRONIC KIDNEY DISEASE), STAGE III (HCC): ICD-10-CM

## 2018-05-25 PROCEDURE — 76775 US EXAM ABDO BACK WALL LIM: CPT

## 2018-07-10 ENCOUNTER — TRANSCRIBE ORDERS (OUTPATIENT)
Dept: LAB | Facility: HOSPITAL | Age: 78
End: 2018-07-10

## 2018-07-10 ENCOUNTER — LAB (OUTPATIENT)
Dept: LAB | Facility: HOSPITAL | Age: 78
End: 2018-07-10

## 2018-07-10 DIAGNOSIS — I13.10 BENIGN HYPERTENSIVE HEART AND RENAL DISEASE: ICD-10-CM

## 2018-07-10 DIAGNOSIS — I13.10 BENIGN HYPERTENSIVE HEART AND RENAL DISEASE: Primary | ICD-10-CM

## 2018-07-10 LAB
ALBUMIN SERPL-MCNC: 4.28 G/DL (ref 3.2–4.8)
ANION GAP SERPL CALCULATED.3IONS-SCNC: 9 MMOL/L (ref 3–11)
BACTERIA UR QL AUTO: NORMAL /HPF
BASOPHILS # BLD AUTO: 0.04 10*3/MM3 (ref 0–0.2)
BASOPHILS NFR BLD AUTO: 0.6 % (ref 0–1)
BILIRUB UR QL STRIP: NEGATIVE
BUN BLD-MCNC: 16 MG/DL (ref 9–23)
BUN/CREAT SERPL: 9.4 (ref 7–25)
CALCIUM SPEC-SCNC: 9.1 MG/DL (ref 8.7–10.4)
CHLORIDE SERPL-SCNC: 105 MMOL/L (ref 99–109)
CLARITY UR: CLEAR
CO2 SERPL-SCNC: 28 MMOL/L (ref 20–31)
COLOR UR: YELLOW
CREAT BLD-MCNC: 1.71 MG/DL (ref 0.6–1.3)
CREAT UR-MCNC: 88 MG/DL
DEPRECATED RDW RBC AUTO: 51.5 FL (ref 37–54)
EOSINOPHIL # BLD AUTO: 0.54 10*3/MM3 (ref 0–0.3)
EOSINOPHIL NFR BLD AUTO: 7.6 % (ref 0–3)
ERYTHROCYTE [DISTWIDTH] IN BLOOD BY AUTOMATED COUNT: 16.1 % (ref 11.3–14.5)
GFR SERPL CREATININE-BSD FRML MDRD: 39 ML/MIN/1.73
GLUCOSE BLD-MCNC: 114 MG/DL (ref 70–100)
GLUCOSE UR STRIP-MCNC: NEGATIVE MG/DL
HCT VFR BLD AUTO: 42.5 % (ref 38.9–50.9)
HGB BLD-MCNC: 13.9 G/DL (ref 13.1–17.5)
HGB UR QL STRIP.AUTO: NEGATIVE
HYALINE CASTS UR QL AUTO: NORMAL /LPF
IMM GRANULOCYTES # BLD: 0.01 10*3/MM3 (ref 0–0.03)
IMM GRANULOCYTES NFR BLD: 0.1 % (ref 0–0.6)
KETONES UR QL STRIP: NEGATIVE
LEUKOCYTE ESTERASE UR QL STRIP.AUTO: ABNORMAL
LYMPHOCYTES # BLD AUTO: 2.03 10*3/MM3 (ref 0.6–4.8)
LYMPHOCYTES NFR BLD AUTO: 28.7 % (ref 24–44)
MCH RBC QN AUTO: 28.7 PG (ref 27–31)
MCHC RBC AUTO-ENTMCNC: 32.7 G/DL (ref 32–36)
MCV RBC AUTO: 87.6 FL (ref 80–99)
MONOCYTES # BLD AUTO: 0.66 10*3/MM3 (ref 0–1)
MONOCYTES NFR BLD AUTO: 9.3 % (ref 0–12)
NEUTROPHILS # BLD AUTO: 3.8 10*3/MM3 (ref 1.5–8.3)
NEUTROPHILS NFR BLD AUTO: 53.7 % (ref 41–71)
NITRITE UR QL STRIP: NEGATIVE
PH UR STRIP.AUTO: 6.5 [PH] (ref 5–8)
PHOSPHATE SERPL-MCNC: 3.5 MG/DL (ref 2.4–5.1)
PLATELET # BLD AUTO: 217 10*3/MM3 (ref 150–450)
PMV BLD AUTO: 10.6 FL (ref 6–12)
POTASSIUM BLD-SCNC: 4.6 MMOL/L (ref 3.5–5.5)
PROT UR QL STRIP: NEGATIVE
PROT UR-MCNC: 7 MG/DL (ref 1–14)
RBC # BLD AUTO: 4.85 10*6/MM3 (ref 4.2–5.76)
RBC # UR: NORMAL /HPF
REF LAB TEST METHOD: NORMAL
SODIUM BLD-SCNC: 142 MMOL/L (ref 132–146)
SP GR UR STRIP: 1.01 (ref 1–1.03)
SQUAMOUS #/AREA URNS HPF: NORMAL /HPF
UROBILINOGEN UR QL STRIP: ABNORMAL
WBC NRBC COR # BLD: 7.08 10*3/MM3 (ref 3.5–10.8)
WBC UR QL AUTO: NORMAL /HPF

## 2018-07-10 PROCEDURE — 84156 ASSAY OF PROTEIN URINE: CPT

## 2018-07-10 PROCEDURE — 85025 COMPLETE CBC W/AUTO DIFF WBC: CPT

## 2018-07-10 PROCEDURE — 82570 ASSAY OF URINE CREATININE: CPT

## 2018-07-10 PROCEDURE — 36415 COLL VENOUS BLD VENIPUNCTURE: CPT

## 2018-07-10 PROCEDURE — 80069 RENAL FUNCTION PANEL: CPT

## 2018-07-10 PROCEDURE — 81001 URINALYSIS AUTO W/SCOPE: CPT

## 2018-08-01 NOTE — THERAPY TREATMENT NOTE
Outpatient Physical Therapy Vestibular Treatment Note  [facility]     Patient Name: Toño Alcala  : 1940  MRN: [id number]  Today's Date: 2017      Visit Date: 2017    Visit Dx:     ICD-10-CM ICD-9-CM   1. BPPV (benign paroxysmal positional vertigo), left H81.12 386.11       Patient Active Problem List   Diagnosis   • CAD (coronary artery disease)   • CVD (cardiovascular disease)   • Hypertension   • DVT (deep venous thrombosis)   • Diabetes mellitus   • Dyslipidemia   • Arthritis   • GERD (gastroesophageal reflux disease)           PT Assessment/Plan       17 1000       PT Assessment    Assessment Comments BPPV returned again after about 3 days of being clear. He responded well to the maneuvers again. He may need to follow-up with his doctor for further recommendations on vitamin D levels.   -MM     PT Plan    PT Plan Comments Continue per plan of treatment with re-examination and treatment as needed.   -MM       User Key  (r) = Recorded By, (t) = Taken By, (c) = Cosigned By    Initials Name Provider Type    MM Jose Day, PT Physical Therapist                     Exercises       17 1000          Subjective Comments    Subjective Comments Client reports improvement for 3 days and then he had a reoccurance of symptoms again when laying down. He also noticed room spinning vertigo when laying down to put up his [place] tree.  -MM      Subjective Pain    Able to rate subjective pain? yes  -MM      Pre-Treatment Pain Level 0  -MM      Post-Treatment Pain Level 0  -MM      Subjective Pain Comment Moderate positional dizziness reported.  -MM      Exercise 1    Exercise Name 1 Included Epley maneuver x2 for left posterior canal BPPV.  On second modified Won-Hallpike test there wasn't any nystagmus present.  -MM      Cueing 1 Verbal  -MM      Time (Minutes) 1 30  -MM      Additional Comments canalith repositioning  -MM        User Key  (r) = Recorded By, (t) = Taken By, (c) =  Form found. Will complete section 1B and refax.   Cosigned By    Initials Name Provider Type    MM Jose Day, PT Physical Therapist        Time Calculation:   Start Time: 1000  Total Timed Code Minutes- PT: 30 minute(s)     Therapy Charges for Today     Code Description Service Date Service Provider Modifiers Qty    [id number]  PT CANALITH REPOSITIONING PER DAY 12/6/2017 Jose Day, PT GP 1                   Jose Day, PT  12/6/2017

## 2018-08-08 ENCOUNTER — LAB (OUTPATIENT)
Dept: LAB | Facility: HOSPITAL | Age: 78
End: 2018-08-08

## 2018-08-08 ENCOUNTER — TRANSCRIBE ORDERS (OUTPATIENT)
Dept: LAB | Facility: HOSPITAL | Age: 78
End: 2018-08-08

## 2018-08-08 DIAGNOSIS — N18.30 CHRONIC KIDNEY DISEASE, STAGE III (MODERATE) (HCC): Primary | ICD-10-CM

## 2018-08-08 DIAGNOSIS — N18.30 CHRONIC KIDNEY DISEASE, STAGE III (MODERATE) (HCC): ICD-10-CM

## 2018-08-08 LAB
ALBUMIN SERPL-MCNC: 4.35 G/DL (ref 3.2–4.8)
ANION GAP SERPL CALCULATED.3IONS-SCNC: 14 MMOL/L (ref 3–11)
BUN BLD-MCNC: 22 MG/DL (ref 9–23)
BUN/CREAT SERPL: 12.6 (ref 7–25)
CALCIUM SPEC-SCNC: 9.4 MG/DL (ref 8.7–10.4)
CHLORIDE SERPL-SCNC: 103 MMOL/L (ref 99–109)
CO2 SERPL-SCNC: 27 MMOL/L (ref 20–31)
CREAT BLD-MCNC: 1.75 MG/DL (ref 0.6–1.3)
EOSINOPHIL SPEC QL MICRO: 0 % EOS/100 CELLS (ref 0–0)
GFR SERPL CREATININE-BSD FRML MDRD: 38 ML/MIN/1.73
GLUCOSE BLD-MCNC: 110 MG/DL (ref 70–100)
PHOSPHATE SERPL-MCNC: 3.5 MG/DL (ref 2.4–5.1)
POTASSIUM BLD-SCNC: 4.2 MMOL/L (ref 3.5–5.5)
SODIUM BLD-SCNC: 144 MMOL/L (ref 132–146)

## 2018-08-08 PROCEDURE — 36415 COLL VENOUS BLD VENIPUNCTURE: CPT

## 2018-08-08 PROCEDURE — 87205 SMEAR GRAM STAIN: CPT

## 2018-08-08 PROCEDURE — 80069 RENAL FUNCTION PANEL: CPT

## 2018-11-08 ENCOUNTER — LAB (OUTPATIENT)
Dept: LAB | Facility: HOSPITAL | Age: 78
End: 2018-11-08

## 2018-11-08 ENCOUNTER — TRANSCRIBE ORDERS (OUTPATIENT)
Dept: LAB | Facility: HOSPITAL | Age: 78
End: 2018-11-08

## 2018-11-08 DIAGNOSIS — N18.30 CHRONIC KIDNEY DISEASE, STAGE III (MODERATE) (HCC): ICD-10-CM

## 2018-11-08 DIAGNOSIS — N18.30 CHRONIC KIDNEY DISEASE, STAGE III (MODERATE) (HCC): Primary | ICD-10-CM

## 2018-11-08 LAB
ALBUMIN SERPL-MCNC: 4.25 G/DL (ref 3.2–4.8)
ANION GAP SERPL CALCULATED.3IONS-SCNC: 6 MMOL/L (ref 3–11)
BACTERIA UR QL AUTO: NORMAL /HPF
BILIRUB UR QL STRIP: NEGATIVE
BUN BLD-MCNC: 19 MG/DL (ref 9–23)
BUN/CREAT SERPL: 10.9 (ref 7–25)
CALCIUM SPEC-SCNC: 9.4 MG/DL (ref 8.7–10.4)
CHLORIDE SERPL-SCNC: 104 MMOL/L (ref 99–109)
CLARITY UR: CLEAR
CO2 SERPL-SCNC: 29 MMOL/L (ref 20–31)
COLOR UR: YELLOW
CREAT BLD-MCNC: 1.75 MG/DL (ref 0.6–1.3)
GFR SERPL CREATININE-BSD FRML MDRD: 38 ML/MIN/1.73
GLUCOSE BLD-MCNC: 106 MG/DL (ref 70–100)
GLUCOSE UR STRIP-MCNC: NEGATIVE MG/DL
HGB UR QL STRIP.AUTO: NEGATIVE
HYALINE CASTS UR QL AUTO: NORMAL /LPF
KETONES UR QL STRIP: NEGATIVE
LEUKOCYTE ESTERASE UR QL STRIP.AUTO: NEGATIVE
NITRITE UR QL STRIP: NEGATIVE
PH UR STRIP.AUTO: 8 [PH] (ref 5–8)
PHOSPHATE SERPL-MCNC: 3.2 MG/DL (ref 2.4–5.1)
POTASSIUM BLD-SCNC: 4.3 MMOL/L (ref 3.5–5.5)
PROT UR QL STRIP: NEGATIVE
RBC # UR: NORMAL /HPF
REF LAB TEST METHOD: NORMAL
SODIUM BLD-SCNC: 139 MMOL/L (ref 132–146)
SP GR UR STRIP: 1.01 (ref 1–1.03)
SQUAMOUS #/AREA URNS HPF: NORMAL /HPF
UROBILINOGEN UR QL STRIP: NORMAL
WBC UR QL AUTO: NORMAL /HPF

## 2018-11-08 PROCEDURE — 80069 RENAL FUNCTION PANEL: CPT

## 2018-11-08 PROCEDURE — 81001 URINALYSIS AUTO W/SCOPE: CPT | Performed by: INTERNAL MEDICINE

## 2018-11-08 PROCEDURE — 36415 COLL VENOUS BLD VENIPUNCTURE: CPT

## 2018-11-08 PROCEDURE — 86225 DNA ANTIBODY NATIVE: CPT

## 2018-11-08 PROCEDURE — 86038 ANTINUCLEAR ANTIBODIES: CPT

## 2018-11-09 LAB
ANA SER QL IA: NEGATIVE
DSDNA AB SER-ACNC: 1 IU/ML (ref 0–9)

## 2019-03-13 ENCOUNTER — TELEPHONE (OUTPATIENT)
Dept: INTERNAL MEDICINE | Facility: CLINIC | Age: 79
End: 2019-03-13

## 2019-05-09 ENCOUNTER — LAB (OUTPATIENT)
Dept: LAB | Facility: HOSPITAL | Age: 79
End: 2019-05-09

## 2019-05-09 ENCOUNTER — TRANSCRIBE ORDERS (OUTPATIENT)
Dept: LAB | Facility: HOSPITAL | Age: 79
End: 2019-05-09

## 2019-05-09 DIAGNOSIS — N18.30 CHRONIC KIDNEY DISEASE, STAGE III (MODERATE) (HCC): ICD-10-CM

## 2019-05-09 DIAGNOSIS — N18.30 CHRONIC KIDNEY DISEASE, STAGE III (MODERATE) (HCC): Primary | ICD-10-CM

## 2019-05-09 LAB
25(OH)D3 SERPL-MCNC: 33.1 NG/ML (ref 30–100)
ALBUMIN SERPL-MCNC: 4.1 G/DL (ref 3.5–5.2)
ANION GAP SERPL CALCULATED.3IONS-SCNC: 12.7 MMOL/L
BACTERIA UR QL AUTO: NORMAL /HPF
BILIRUB UR QL STRIP: NEGATIVE
BUN BLD-MCNC: 23 MG/DL (ref 8–23)
BUN/CREAT SERPL: 13.1 (ref 7–25)
CALCIUM SPEC-SCNC: 9.3 MG/DL (ref 8.6–10.5)
CHLORIDE SERPL-SCNC: 103 MMOL/L (ref 98–107)
CLARITY UR: CLEAR
CO2 SERPL-SCNC: 25.3 MMOL/L (ref 22–29)
COLOR UR: YELLOW
CREAT BLD-MCNC: 1.76 MG/DL (ref 0.76–1.27)
GFR SERPL CREATININE-BSD FRML MDRD: 38 ML/MIN/1.73
GLUCOSE BLD-MCNC: 107 MG/DL (ref 65–99)
GLUCOSE UR STRIP-MCNC: NEGATIVE MG/DL
HGB UR QL STRIP.AUTO: NEGATIVE
HYALINE CASTS UR QL AUTO: NORMAL /LPF
KETONES UR QL STRIP: NEGATIVE
LEUKOCYTE ESTERASE UR QL STRIP.AUTO: NEGATIVE
NITRITE UR QL STRIP: NEGATIVE
PH UR STRIP.AUTO: 7.5 [PH] (ref 5–8)
PHOSPHATE SERPL-MCNC: 3.4 MG/DL (ref 2.5–4.5)
POTASSIUM BLD-SCNC: 4.7 MMOL/L (ref 3.5–5.2)
PROT UR QL STRIP: NEGATIVE
PTH-INTACT SERPL-MCNC: 24.5 PG/ML (ref 15–65)
RBC # UR: NORMAL /HPF
REF LAB TEST METHOD: NORMAL
SODIUM BLD-SCNC: 141 MMOL/L (ref 136–145)
SP GR UR STRIP: 1.02 (ref 1–1.03)
SQUAMOUS #/AREA URNS HPF: NORMAL /HPF
UROBILINOGEN UR QL STRIP: NORMAL
WBC UR QL AUTO: NORMAL /HPF

## 2019-05-09 PROCEDURE — 83970 ASSAY OF PARATHORMONE: CPT

## 2019-05-09 PROCEDURE — 82306 VITAMIN D 25 HYDROXY: CPT

## 2019-05-09 PROCEDURE — 80069 RENAL FUNCTION PANEL: CPT

## 2019-05-09 PROCEDURE — 36415 COLL VENOUS BLD VENIPUNCTURE: CPT

## 2019-05-09 PROCEDURE — 81001 URINALYSIS AUTO W/SCOPE: CPT | Performed by: INTERNAL MEDICINE

## 2019-05-13 ENCOUNTER — OFFICE VISIT (OUTPATIENT)
Dept: CARDIOLOGY | Facility: CLINIC | Age: 79
End: 2019-05-13

## 2019-05-13 VITALS
SYSTOLIC BLOOD PRESSURE: 140 MMHG | WEIGHT: 207.6 LBS | BODY MASS INDEX: 29.06 KG/M2 | HEART RATE: 87 BPM | OXYGEN SATURATION: 96 % | DIASTOLIC BLOOD PRESSURE: 74 MMHG | HEIGHT: 71 IN

## 2019-05-13 DIAGNOSIS — E78.5 DYSLIPIDEMIA: ICD-10-CM

## 2019-05-13 DIAGNOSIS — I25.10 CVD (CARDIOVASCULAR DISEASE): ICD-10-CM

## 2019-05-13 DIAGNOSIS — I10 ESSENTIAL HYPERTENSION: ICD-10-CM

## 2019-05-13 DIAGNOSIS — I25.10 CORONARY ARTERY DISEASE INVOLVING NATIVE CORONARY ARTERY OF NATIVE HEART WITHOUT ANGINA PECTORIS: Primary | ICD-10-CM

## 2019-05-13 PROCEDURE — 99213 OFFICE O/P EST LOW 20 MIN: CPT | Performed by: INTERNAL MEDICINE

## 2019-05-13 NOTE — PROGRESS NOTES
Subjective:     Encounter Date:05/13/2019      Patient ID: Toño Alcala is a 78 y.o. male.    Chief Complaint: Coronary Artery Disease; Hypertension; and Dyslipidemia      PROBLEM LIST:  1. CAD:  a. History of CABG x4, 1994 - incomplete database.   b. Cardiac catheterization 02/16/2011 showed 100% native RCA, and severe proximal LAD and left circumflex disease.  Saphenous vein graft to the right PDA is patent.  Left internal mammary artery  to the LAD is widely patent.  Saphenous vein graft to the obtuse marginal is widely patent.    2. CVD:  a. History of TIA, 1989.  3. Hypertension.  a. Normal renal angiography, 02/16/2011.  4. Right lower extremity DVT and pulmonary embolism in 06/2015, idiopathic.  a. Recurrent idiopathic DVT 2017  b. Started on warfarin per Dr. Salinas and Tito  5. Diabetes.  6. Dyslipidemia.  7. Arthritis.   8. GERD/hiatal hernia.   9. History of a left forearm fracture.    10. Right wrist fracture.   11. History of cervical fusion.  12. History of lumbar laminectomy.  13. Arthritis.  14. BPPV  15. Surgical history:  a. T and A.  b. Appendectomy.     History of Present Illness  Toño Alcala returns today for a 12 month follow up with a history of coronary artery disease, CVD, hypertension, RLE DVT, and dyslipidemia. Since last visit he has been doing well from a cardiovascular standpoint. Otherwise, he has had his knee drained and injected. Inquired about whether that steroid injection could influence his sugar levels. Had basal cell carcinoma removal from his face. Will be seeing a nephrologist to keep an eye on his kidney function. Denies any exertional chest pain, shortness of breath, orthopnea, PND, or palpitations. Mr. Alcala remains active by walking 1 mile daily and doing yard work when the weather is good.    Dr. Hernandez reviewed labs with pt for 2 minutes.    Allergies   Allergen Reactions   • Penicillins      Edema and hives         Current Outpatient Medications:   •   acetaminophen (TYLENOL) 325 MG tablet, Take 650 mg by mouth As Needed for Mild Pain ., Disp: , Rfl:   •  aspirin 81 MG EC tablet, Take 81 mg by mouth Daily., Disp: , Rfl:   •  atorvastatin (LIPITOR) 40 MG tablet, Take 40 mg by mouth Daily., Disp: , Rfl:   •  calcipotriene-betamethasone (TACLONEX) 0.005-0.064 % ointment, Apply 1 application topically As Needed., Disp: , Rfl:   •  diclofenac (VOLTAREN) 1 % gel gel, Apply 4 g topically As Needed., Disp: , Rfl:   •  diltiaZEM CD (CARDIZEM CD) 180 MG 24 hr capsule, Take 180 mg by mouth Daily., Disp: , Rfl:   •  losartan (COZAAR) 100 MG tablet, Take 100 mg by mouth Daily., Disp: , Rfl:   •  nitroglycerin (NITROSTAT) 0.4 MG SL tablet, Place 0.4 mg under the tongue Every 5 (Five) Minutes As Needed for Chest Pain. Take no more than 3 doses in 15 minutes., Disp: , Rfl:   •  omeprazole (priLOSEC) 40 MG capsule, Take 40 mg by mouth Daily., Disp: , Rfl:     The following portions of the patient's history were reviewed and updated as appropriate: allergies, current medications, past family history, past medical history, past social history, past surgical history and problem list.    Review of Systems   Constitution: Positive for weight gain.   Cardiovascular: Negative.  Negative for chest pain, dyspnea on exertion, irregular heartbeat, leg swelling, near-syncope, orthopnea, palpitations, paroxysmal nocturnal dyspnea and syncope.   Respiratory: Negative.  Negative for cough, shortness of breath and wheezing.    Hematologic/Lymphatic: Negative for bleeding problem. Does not bruise/bleed easily.   Skin: Negative for rash.   Musculoskeletal: Negative for muscle weakness and myalgias.   Gastrointestinal: Negative for heartburn, nausea and vomiting.   Neurological: Negative.  Negative for dizziness, headaches, light-headedness, loss of balance, numbness, tremors and vertigo.   Psychiatric/Behavioral: The patient is not nervous/anxious.           Objective:     Vitals:    05/13/19 1324  "  BP: 140/74   BP Location: Right arm   Patient Position: Sitting   Pulse: 87   SpO2: 96%   Weight: 94.2 kg (207 lb 9.6 oz)   Height: 180.3 cm (71\")         Physical Exam   Constitutional: He is oriented to person, place, and time. He appears well-developed and well-nourished.   HENT:   Mouth/Throat: Oropharynx is clear and moist.   Neck: No JVD present. Carotid bruit is not present. No thyromegaly present.   Cardiovascular: Regular rhythm, S1 normal, S2 normal, normal heart sounds and intact distal pulses. Exam reveals no gallop, no S3 and no S4.   No murmur heard.  Pulses:       Carotid pulses are 2+ on the right side, and 2+ on the left side.       Radial pulses are 2+ on the right side, and 2+ on the left side.   Pulmonary/Chest: Breath sounds normal.   Abdominal: Soft. Bowel sounds are normal. He exhibits no mass. There is no tenderness.   Musculoskeletal: He exhibits no edema.   Neurological: He is alert and oriented to person, place, and time.   Skin: Skin is warm and dry. No rash noted.       Lab Review:    Procedures        Assessment:   Toño was seen today for coronary artery disease, hypertension and dyslipidemia.    Diagnoses and all orders for this visit:    Coronary artery disease involving native coronary artery of native heart without angina pectoris    CVD (cardiovascular disease)    Essential hypertension    Dyslipidemia        Impression:  1. Continue aspirin 81 mg and warfarin for anticoagulation status post CABG in CAD.  2. Continue atorvastatin for dyslipidemia.  3. Continue losartan for hypertension.    Plan:  1. Continue current medications.  2. Revisit in 12 MO, or sooner as needed.    Scribed for Avelino Hernandez MD by Brandon Carbone. 5/13/2019  1:40 PM    I, Avelino Hernandez MD, personally performed the services described in this documentation as scribed by the above individual in my presence, and it is both accurate and complete      Please note that portions of this note may have been " completed with a voice recognition program. Efforts were made to edit the dictations, but occasionally words are mistranscribed.

## 2019-06-03 ENCOUNTER — LAB (OUTPATIENT)
Dept: LAB | Facility: HOSPITAL | Age: 79
End: 2019-06-03

## 2019-06-03 ENCOUNTER — OFFICE VISIT (OUTPATIENT)
Dept: INTERNAL MEDICINE | Facility: CLINIC | Age: 79
End: 2019-06-03

## 2019-06-03 VITALS
DIASTOLIC BLOOD PRESSURE: 82 MMHG | BODY MASS INDEX: 28.7 KG/M2 | HEART RATE: 76 BPM | WEIGHT: 205 LBS | HEIGHT: 71 IN | SYSTOLIC BLOOD PRESSURE: 166 MMHG

## 2019-06-03 DIAGNOSIS — E11.21 DIABETIC NEPHROPATHY ASSOCIATED WITH TYPE 2 DIABETES MELLITUS (HCC): Primary | ICD-10-CM

## 2019-06-03 DIAGNOSIS — I25.10 CORONARY ARTERY DISEASE INVOLVING NATIVE CORONARY ARTERY OF NATIVE HEART WITHOUT ANGINA PECTORIS: ICD-10-CM

## 2019-06-03 DIAGNOSIS — E11.21 DIABETIC NEPHROPATHY ASSOCIATED WITH TYPE 2 DIABETES MELLITUS (HCC): ICD-10-CM

## 2019-06-03 DIAGNOSIS — K21.9 GASTROESOPHAGEAL REFLUX DISEASE WITHOUT ESOPHAGITIS: ICD-10-CM

## 2019-06-03 DIAGNOSIS — N18.30 CHRONIC KIDNEY DISEASE, STAGE III (MODERATE) (HCC): ICD-10-CM

## 2019-06-03 DIAGNOSIS — I10 ESSENTIAL HYPERTENSION: ICD-10-CM

## 2019-06-03 DIAGNOSIS — E78.2 MIXED HYPERLIPIDEMIA: ICD-10-CM

## 2019-06-03 DIAGNOSIS — E11.42 DIABETIC POLYNEUROPATHY ASSOCIATED WITH TYPE 2 DIABETES MELLITUS (HCC): ICD-10-CM

## 2019-06-03 PROBLEM — R73.09 ABNORMAL GLUCOSE LEVEL: Status: ACTIVE | Noted: 2019-06-03

## 2019-06-03 PROBLEM — M54.50 ACUTE RIGHT-SIDED LOW BACK PAIN WITHOUT SCIATICA: Status: ACTIVE | Noted: 2019-06-03

## 2019-06-03 PROBLEM — I20.9 ANGINA PECTORIS: Status: ACTIVE | Noted: 2019-06-03

## 2019-06-03 PROBLEM — I87.009 POSTTHROMBOTIC SYNDROME: Status: ACTIVE | Noted: 2019-06-03

## 2019-06-03 PROBLEM — L98.9 SKIN LESION OF FACE: Status: ACTIVE | Noted: 2019-06-03

## 2019-06-03 PROBLEM — R42 VERTIGO: Status: ACTIVE | Noted: 2019-06-03

## 2019-06-03 PROBLEM — E55.9 VITAMIN D DEFICIENCY: Status: ACTIVE | Noted: 2019-06-03

## 2019-06-03 PROBLEM — M50.90 DISC DISORDER OF CERVICAL REGION: Status: ACTIVE | Noted: 2019-06-03

## 2019-06-03 PROBLEM — M47.817 LUMBOSACRAL SPONDYLOSIS WITHOUT MYELOPATHY: Status: ACTIVE | Noted: 2019-06-03

## 2019-06-03 PROBLEM — I49.3 UNIFOCAL PVCS: Status: ACTIVE | Noted: 2019-06-03

## 2019-06-03 PROBLEM — L40.9 PSORIASIS: Status: ACTIVE | Noted: 2019-06-03

## 2019-06-03 PROBLEM — R49.0 HOARSENESS: Status: ACTIVE | Noted: 2019-06-03

## 2019-06-03 PROBLEM — M79.89 SWELLING OF RIGHT LOWER EXTREMITY: Status: ACTIVE | Noted: 2019-06-03

## 2019-06-03 PROBLEM — M19.029 ARTHRITIS OF ELBOW: Status: ACTIVE | Noted: 2019-06-03

## 2019-06-03 LAB
CHOLEST SERPL-MCNC: 164 MG/DL (ref 0–200)
HBA1C MFR BLD: 6.56 % (ref 4.8–5.6)
HDLC SERPL-MCNC: 58 MG/DL (ref 40–60)
LDLC SERPL CALC-MCNC: 93 MG/DL (ref 0–100)
LDLC/HDLC SERPL: 1.6 {RATIO}
TRIGL SERPL-MCNC: 66 MG/DL (ref 0–150)
VLDLC SERPL-MCNC: 13.2 MG/DL (ref 5–40)

## 2019-06-03 PROCEDURE — 36415 COLL VENOUS BLD VENIPUNCTURE: CPT

## 2019-06-03 PROCEDURE — 99214 OFFICE O/P EST MOD 30 MIN: CPT | Performed by: INTERNAL MEDICINE

## 2019-06-03 PROCEDURE — 80061 LIPID PANEL: CPT

## 2019-06-03 PROCEDURE — 83036 HEMOGLOBIN GLYCOSYLATED A1C: CPT

## 2019-06-03 NOTE — PROGRESS NOTES
[facility]     Toño Alcala  1940   [id number]      Patient Care Team:  Radu Francis MD as PCP - General    Chief Complaint::   Chief Complaint   Patient presents with   • Coronary Artery Disease   • Hypertension   • Diabetes        HPI  Mr. Alcala is now 78.  He comes in for follow-up of his hypertension, diabetes, coronary artery disease, chronic kidney disease, hyperlipidemia.  Overall he feels well.  He was recently seen by nephrology, and his GFR was stable.  He also recently saw cardiology,no changes were made.  He has no chest pain or dyspnea, rare episodes of skipped heartbeats which are well defined PVCs.  Occasionally he has joint pain, but not every day.  He has a log of his blood glucoses and blood pressures over the past month.  His blood pressures range 118/ 9/63 with a mean for the systolic right at 130.  His glucoses range from 96-1 23 but generally around 110.  He admits that he has numbness in both feet.    Chronic Conditions:      Patient Active Problem List   Diagnosis   • CAD (coronary artery disease)   • CVD (cardiovascular disease)   • Hypertension   • DVT (deep venous thrombosis) (CMS/[facility])   • Diabetes mellitus (CMS/[facility])   • Dyslipidemia   • Arthritis   • GERD (gastroesophageal reflux disease)   • Mixed hyperlipidemia   • Diabetic nephropathy associated with type 2 diabetes mellitus (CMS/[facility])   • Diabetic polyneuropathy associated with type 2 diabetes mellitus (CMS/[facility])   • Chronic kidney disease, stage III (moderate) (CMS/[facility])        Past Medical History:   Diagnosis Date   • Arthritis    • CAD (coronary artery disease)    • CVD (cardiovascular disease)     History of TIA 1989   • Diabetes mellitus (CMS/[facility])    • DVT (deep venous thrombosis) (CMS/[facility])     Right lower extremity DVT and pulmonary embolism in 06/2015, idiopathic   • Dyslipidemia    • Fracture     H/o pf left forearm fracture   • GERD (gastroesophageal reflux disease)     with hiatal hernia    • Hypertension     Normal renal angiography 11   • Right wrist fracture    • Vertigo        Past Surgical History:   Procedure Laterality Date   • APPENDECTOMY     • CARDIAC CATHETERIZATION      with vein graft   • CERVICAL FUSION     • CORONARY ARTERY BYPASS GRAFT     • LUMBAR LAMINECTOMY     • TONSILLECTOMY AND ADENOIDECTOMY         Family History   Problem Relation Age of Onset   • Heart disease Father    • Heart disease Brother        Social History     Socioeconomic History   • Marital status:      Spouse name: Not on file   • Number of children: Not on file   • Years of education: Not on file   • Highest education level: Not on file   Tobacco Use   • Smoking status: Former Smoker     Types: Cigarettes     Last attempt to quit: 1997     Years since quittin.1   • Smokeless tobacco: Never Used   Substance and Sexual Activity   • Alcohol use: Yes     Alcohol/week: 0.6 oz     Types: 1 Glasses of wine per week     Comment: rarely   • Drug use: No   • Sexual activity: Defer       Allergies   Allergen Reactions   • Penicillins      Edema and hives         Current Outpatient Medications:   •  acetaminophen (TYLENOL) 325 MG tablet, Take 650 mg by mouth As Needed for Mild Pain ., Disp: , Rfl:   •  aspirin 81 MG EC tablet, Take 81 mg by mouth Daily., Disp: , Rfl:   •  atorvastatin (LIPITOR) 40 MG tablet, Take 40 mg by mouth Daily., Disp: , Rfl:   •  calcipotriene-betamethasone (TACLONEX) 0.005-0.064 % ointment, Apply 1 application topically As Needed., Disp: , Rfl:   •  diclofenac (VOLTAREN) 1 % gel gel, Apply 4 g topically As Needed., Disp: , Rfl:   •  diltiaZEM CD (CARDIZEM CD) 180 MG 24 hr capsule, Take 180 mg by mouth Daily., Disp: , Rfl:   •  losartan (COZAAR) 100 MG tablet, Take 100 mg by mouth Daily., Disp: , Rfl:   •  nitroglycerin (NITROSTAT) 0.4 MG SL tablet, Place 0.4 mg under the tongue Every 5 (Five) Minutes As Needed for Chest Pain. Take no more than 3 doses in 15 minutes., Disp: ,  "Rfl:   •  omeprazole (priLOSEC) 40 MG capsule, Take 20 mg by mouth Daily., Disp: , Rfl:     Review of Systems   Constitutional: Negative for chills, fatigue and fever.   HENT: Negative for congestion, ear pain and sinus pressure.    Respiratory: Negative for cough, chest tightness, shortness of breath and wheezing.    Cardiovascular: Negative for chest pain and palpitations.   Gastrointestinal: Negative for abdominal pain, blood in stool and constipation.   Skin: Negative for color change.   Allergic/Immunologic: Negative for environmental allergies.   Neurological: Negative for dizziness, speech difficulty and headache.   Psychiatric/Behavioral: Negative for decreased concentration. The patient is not nervous/anxious.         Vital Signs  Vitals:    06/03/19 0914   BP: 166/82   BP Location: Left arm   Patient Position: Sitting   Cuff Size: Adult   Pulse: 76   Weight: 93 kg (205 lb)   Height: 180.3 cm (70.98\")       Physical Exam   Constitutional: He is oriented to person, place, and time. He appears well-developed and well-nourished.   HENT:   Head: Normocephalic and atraumatic.   Cardiovascular: Normal rate, regular rhythm and normal heart sounds.   No murmur heard.  Pulmonary/Chest: Effort normal and breath sounds normal.   Neurological: He is alert and oriented to person, place, and time.   Psychiatric: He has a normal mood and affect.   Vitals reviewed.     Procedures    ACE III MINI             Assessment/Plan:    Toño was seen today for coronary artery disease, hypertension and diabetes.    Diagnoses and all orders for this visit:    Diabetic nephropathy associated with type 2 diabetes mellitus (CMS/HCC)  -     Hemoglobin A1c; Future    Mixed hyperlipidemia  -     Lipid Panel; Future    Essential hypertension    Diabetic polyneuropathy associated with type 2 diabetes mellitus (CMS/HCC)    Chronic kidney disease, stage III (moderate) (CMS/[facility])    Coronary artery disease involving native coronary artery of " native heart without angina pectoris    Gastroesophageal reflux disease without esophagitis    Plan    A1c is pending.  Fasting glucoses appear to be well controlled.  Last foot exam was 11/28/2018, he is up-to-date on eye exams.  This remains well controlled with diet.    Lipid panel is pending, continue atorvastatin and healthy diet.    Blood pressure is elevated in the office today but well controlled as documented by his daily log.  He will continue losartan and diltiazem.    GFR was recently 38 which has remained stable for the past 2 years.  He will continue follow-up with nephrology, control of blood pressure and blood glucose, and avoidance of NSAIDs.    Coronary artery disease is asymptomatic, he will continue follow-up with cardiology.    GERD is controlled with once daily omeprazole.  He was unable to reduce the dose further without significant recurrence of symptoms.          Plan of care reviewed with patient at the conclusion of today's visit. Education was provided regarding diagnosis, management, and any prescribed or recommended OTC medications.Patient verbalizes understanding of and agreement with management plan.         Radu Francis MD

## 2019-06-06 PROBLEM — I12.9 STAGE 3 CHRONIC KIDNEY DISEASE DUE TO BENIGN HYPERTENSION (HCC): Status: ACTIVE | Noted: 2019-06-06

## 2019-06-06 PROBLEM — I10 BENIGN ESSENTIAL HYPERTENSION: Status: ACTIVE | Noted: 2019-06-06

## 2019-06-06 PROBLEM — E11.9 TYPE 2 DIABETES MELLITUS WITHOUT COMPLICATION, WITHOUT LONG-TERM CURRENT USE OF INSULIN: Status: ACTIVE | Noted: 2019-06-06

## 2019-06-06 PROBLEM — Z00.00 MEDICARE ANNUAL WELLNESS VISIT, SUBSEQUENT: Status: ACTIVE | Noted: 2019-06-06

## 2019-06-06 PROBLEM — N18.30 STAGE 3 CHRONIC KIDNEY DISEASE DUE TO BENIGN HYPERTENSION: Status: ACTIVE | Noted: 2019-06-06

## 2019-08-06 NOTE — TELEPHONE ENCOUNTER
----- Message from Alvarez Antonio MD sent at 8/6/2019  2:34 PM CDT -----  Regarding: fax labs to her PCP  Fax her recent labs to her PCP.     Dr. Lauren Chen Pt states [employer] [phone] told him they have been trying to send us a form for his lancets but we have not returned it. Advised pt we did send it to be faxed. Called and spoke to pharmacist and they do not have form. They will send it again

## 2019-10-21 ENCOUNTER — TELEPHONE (OUTPATIENT)
Dept: INTERNAL MEDICINE | Facility: CLINIC | Age: 79
End: 2019-10-21

## 2019-10-21 NOTE — TELEPHONE ENCOUNTER
"PT CALLED STATING THAT BACK IN 2011 HE HAD A HERNIA AND HE FEELS THAT ANOTHER ONE HAS \"POPPED UP\" IN THE LEFT SIDE. HE STATES THAT IT IS EXTREMELY PAINFUL. HE STATED THAT HE WANTS TO GO SOMEWHERE TO DIAGNOSE HIM AND TAKE CARE OF THE PROBLEM  "

## 2019-10-23 ENCOUNTER — OFFICE VISIT (OUTPATIENT)
Dept: INTERNAL MEDICINE | Facility: CLINIC | Age: 79
End: 2019-10-23

## 2019-10-23 VITALS
BODY MASS INDEX: 29.12 KG/M2 | DIASTOLIC BLOOD PRESSURE: 76 MMHG | WEIGHT: 208 LBS | HEART RATE: 80 BPM | SYSTOLIC BLOOD PRESSURE: 120 MMHG | HEIGHT: 71 IN

## 2019-10-23 DIAGNOSIS — K40.90 LEFT INGUINAL HERNIA: Primary | ICD-10-CM

## 2019-10-23 PROCEDURE — 99213 OFFICE O/P EST LOW 20 MIN: CPT | Performed by: INTERNAL MEDICINE

## 2019-10-23 RX ORDER — MELATONIN
1000 DAILY
COMMUNITY

## 2019-10-23 NOTE — PROGRESS NOTES
[facility]     Toño Alcala  1940   [id number]      Patient Care Team:  Radu Francis MD as PCP - General    Chief Complaint::   Chief Complaint   Patient presents with   • Hernia     Patient states hes not sure if it is one. Hurts when standing and walking        HPI  Mr. Alcala comes in complaining of pain and fullness in the left groin area.  He has had a hernia on the right and this feels similar.  This time he has much more pain in the left groin.  He did not have pain on the right.    Chronic Conditions:      Patient Active Problem List   Diagnosis   • CAD (coronary artery disease)   • CVD (cardiovascular disease)   • Hypertension   • DVT (deep venous thrombosis) (CMS/[facility])   • Diabetes mellitus (CMS/[facility])   • Dyslipidemia   • Arthritis   • GERD (gastroesophageal reflux disease)   • Mixed hyperlipidemia   • Diabetic nephropathy associated with type 2 diabetes mellitus (CMS/[facility])   • Diabetic polyneuropathy associated with type 2 diabetes mellitus (CMS/[facility])   • Chronic kidney disease, stage III (moderate) (CMS/[facility])   • Vitamin D deficiency   • Disc disorder of cervical region   • Postthrombotic syndrome   • Vertigo   • Angina pectoris (CMS/[facility])   • Lumbosacral spondylosis without myelopathy   • Psoriasis   • Arthritis of elbow   • Swelling of right lower extremity   • Abnormal glucose level   • Acute right-sided low back pain without sciatica   • Hoarseness   • Unifocal PVCs   • Skin lesion of face   • Type 2 diabetes mellitus without complication, without long-term current use of insulin (CMS/[facility])   • Benign essential hypertension   • Medicare annual wellness visit, subsequent   • Stage 3 chronic kidney disease due to benign hypertension (CMS/[facility])   • BMI 30.0-30.9,adult        Past Medical History:   Diagnosis Date   • Arthritis    • Bilateral radial fractures 1962    fracture bilateral radial heads   • CAD (coronary artery disease)    • CVD (cardiovascular disease)     History of  TIA    • Diabetes mellitus (CMS/[facility])    • DVT (deep venous thrombosis) (CMS/[facility])     Right lower extremity DVT and pulmonary embolism in 2015, idiopathic   • DVT of proximal lower limb (CMS/[facility]) 2015    proximal DVT right leg, small PE- anticoagulation   • Dyslipidemia    • Fracture     H/o pf left forearm fracture   • Fracture of right hand    • GERD (gastroesophageal reflux disease)     with hiatal hernia   • Hx of angina pectoris    • Hypertension     Normal renal angiography 11   • Left wrist fracture    • Osteoarthritis    • Right wrist fracture    • Vertigo        Past Surgical History:   Procedure Laterality Date   • APPENDECTOMY     • CARDIAC CATHETERIZATION      with vein graft   • CERVICAL FUSION     • CERVICAL FUSION      C3-4   • CORONARY ARTERY BYPASS GRAFT     • HERNIA REPAIR Right 2011    inguinal   • LUMBAR LAMINECTOMY      laminectomy, lumbar, L3   • TONSILLECTOMY AND ADENOIDECTOMY         Family History   Problem Relation Age of Onset   • Arthritis Mother         OA   • Heart disease Father    • Diabetes Father    • Heart attack Father    • Heart disease Brother    • Heart attack Brother    • Diabetes Brother    • Diabetes Son    • Hypertension Other    • Cancer Other        Social History     Socioeconomic History   • Marital status:      Spouse name: Not on file   • Number of children: Not on file   • Years of education: Not on file   • Highest education level: Not on file   Tobacco Use   • Smoking status: Former Smoker     Packs/day: 1.00     Years: 20.00     Pack years: 20.00     Types: Cigarettes     Last attempt to quit: 1997     Years since quittin.5   • Smokeless tobacco: Never Used   • Tobacco comment: QUIT DATE 1992   Substance and Sexual Activity   • Alcohol use: Yes     Alcohol/week: 0.6 oz     Types: 1 Glasses of wine per week     Comment: rarely   • Drug use: No   • Sexual activity: Defer       Allergies    Allergen Reactions   • Penicillins      Edema and hives         Current Outpatient Medications:   •  acetaminophen (TYLENOL) 325 MG tablet, Take 650 mg by mouth As Needed for Mild Pain ., Disp: , Rfl:   •  aspirin 81 MG EC tablet, Take 81 mg by mouth Daily., Disp: , Rfl:   •  atorvastatin (LIPITOR) 40 MG tablet, Take 40 mg by mouth Daily., Disp: , Rfl:   •  calcipotriene-betamethasone (TACLONEX) 0.005-0.064 % ointment, Apply 1 application topically As Needed., Disp: , Rfl:   •  cholecalciferol (VITAMIN D3) 25 MCG (1000 UT) tablet, Take 1,000 Units by mouth Daily., Disp: , Rfl:   •  diclofenac (VOLTAREN) 1 % gel gel, Apply 4 g topically As Needed., Disp: , Rfl:   •  diltiaZEM CD (CARDIZEM CD) 180 MG 24 hr capsule, Take 180 mg by mouth Daily., Disp: , Rfl:   •  losartan (COZAAR) 100 MG tablet, Take 100 mg by mouth Daily., Disp: , Rfl:   •  nitroglycerin (NITROSTAT) 0.4 MG SL tablet, Place 0.4 mg under the tongue Every 5 (Five) Minutes As Needed for Chest Pain. Take no more than 3 doses in 15 minutes., Disp: , Rfl:   •  omeprazole (priLOSEC) 40 MG capsule, Take 20 mg by mouth Daily., Disp: , Rfl:     Review of Systems   Constitutional: Negative for chills, fatigue and fever.   HENT: Negative for congestion, ear pain and sinus pressure.    Respiratory: Negative for cough, chest tightness, shortness of breath and wheezing.    Cardiovascular: Negative for chest pain and palpitations.   Gastrointestinal: Negative for abdominal pain, blood in stool and constipation.   Skin: Negative for color change.   Allergic/Immunologic: Negative for environmental allergies.   Neurological: Negative for dizziness, speech difficulty and headache.   Psychiatric/Behavioral: Negative for decreased concentration. The patient is not nervous/anxious.         Vital Signs  Vitals:    10/23/19 0752   BP: 120/76   BP Location: Left arm   Patient Position: Sitting   Cuff Size: Adult   Pulse: 80   Weight: 94.3 kg (208 lb)   Height: 180.3 cm  "(70.98\")   PainSc: 0-No pain       Physical Exam   Constitutional: He appears well-developed and well-nourished.   Abdominal: A hernia is present. Hernia confirmed positive in the left inguinal area.   Genitourinary:   Genitourinary Comments: Hernia is reducible but there is considerable discomfort in the left inguinal area.      Procedures    ACE III MINI             Assessment/Plan:      Left inguinal hernia, refer to general surgery.      Plan of care reviewed with patient at the conclusion of today's visit. Education was provided regarding diagnosis, management, and any prescribed or recommended OTC medications.Patient verbalizes understanding of and agreement with management plan.         Radu Francis MD           "

## 2019-10-24 ENCOUNTER — APPOINTMENT (OUTPATIENT)
Dept: PREADMISSION TESTING | Facility: HOSPITAL | Age: 79
End: 2019-10-24

## 2019-10-24 ENCOUNTER — TELEPHONE (OUTPATIENT)
Dept: CARDIOLOGY | Facility: CLINIC | Age: 79
End: 2019-10-24

## 2019-10-24 VITALS — BODY MASS INDEX: 29.57 KG/M2 | WEIGHT: 211.25 LBS | HEIGHT: 71 IN

## 2019-10-24 LAB
ANION GAP SERPL CALCULATED.3IONS-SCNC: 11 MMOL/L (ref 5–15)
BUN BLD-MCNC: 22 MG/DL (ref 8–23)
BUN/CREAT SERPL: 13.8 (ref 7–25)
CALCIUM SPEC-SCNC: 9.6 MG/DL (ref 8.6–10.5)
CHLORIDE SERPL-SCNC: 100 MMOL/L (ref 98–107)
CO2 SERPL-SCNC: 28 MMOL/L (ref 22–29)
CREAT BLD-MCNC: 1.6 MG/DL (ref 0.76–1.27)
DEPRECATED RDW RBC AUTO: 47.5 FL (ref 37–54)
ERYTHROCYTE [DISTWIDTH] IN BLOOD BY AUTOMATED COUNT: 14.1 % (ref 12.3–15.4)
GFR SERPL CREATININE-BSD FRML MDRD: 42 ML/MIN/1.73
GLUCOSE BLD-MCNC: 105 MG/DL (ref 65–99)
HCT VFR BLD AUTO: 43.8 % (ref 37.5–51)
HGB BLD-MCNC: 14.3 G/DL (ref 13–17.7)
MCH RBC QN AUTO: 29.9 PG (ref 26.6–33)
MCHC RBC AUTO-ENTMCNC: 32.6 G/DL (ref 31.5–35.7)
MCV RBC AUTO: 91.4 FL (ref 79–97)
PLATELET # BLD AUTO: 258 10*3/MM3 (ref 140–450)
PMV BLD AUTO: 9.9 FL (ref 6–12)
POTASSIUM BLD-SCNC: 4.8 MMOL/L (ref 3.5–5.2)
RBC # BLD AUTO: 4.79 10*6/MM3 (ref 4.14–5.8)
SODIUM BLD-SCNC: 139 MMOL/L (ref 136–145)
WBC NRBC COR # BLD: 7.62 10*3/MM3 (ref 3.4–10.8)

## 2019-10-24 PROCEDURE — 36415 COLL VENOUS BLD VENIPUNCTURE: CPT

## 2019-10-24 PROCEDURE — 80048 BASIC METABOLIC PNL TOTAL CA: CPT | Performed by: SURGERY

## 2019-10-24 PROCEDURE — 85027 COMPLETE CBC AUTOMATED: CPT | Performed by: SURGERY

## 2019-10-24 PROCEDURE — 93010 ELECTROCARDIOGRAM REPORT: CPT | Performed by: INTERNAL MEDICINE

## 2019-10-24 PROCEDURE — 93005 ELECTROCARDIOGRAM TRACING: CPT

## 2019-10-28 ENCOUNTER — ANESTHESIA EVENT (OUTPATIENT)
Dept: PERIOP | Facility: HOSPITAL | Age: 79
End: 2019-10-28

## 2019-10-28 RX ORDER — FAMOTIDINE 10 MG/ML
20 INJECTION, SOLUTION INTRAVENOUS ONCE
Status: CANCELLED | OUTPATIENT
Start: 2019-10-28 | End: 2019-10-28

## 2019-10-29 ENCOUNTER — HOSPITAL ENCOUNTER (OUTPATIENT)
Facility: HOSPITAL | Age: 79
Setting detail: HOSPITAL OUTPATIENT SURGERY
Discharge: HOME | End: 2019-10-29
Attending: SURGERY | Admitting: SURGERY

## 2019-10-29 ENCOUNTER — ANESTHESIA (OUTPATIENT)
Dept: PERIOP | Facility: HOSPITAL | Age: 79
End: 2019-10-29

## 2019-10-29 VITALS
WEIGHT: 211 LBS | RESPIRATION RATE: 16 BRPM | DIASTOLIC BLOOD PRESSURE: 88 MMHG | HEIGHT: 71 IN | BODY MASS INDEX: 29.54 KG/M2 | OXYGEN SATURATION: 92 % | TEMPERATURE: 98.5 F | HEART RATE: 68 BPM | SYSTOLIC BLOOD PRESSURE: 151 MMHG

## 2019-10-29 DIAGNOSIS — K40.90 INGUINAL HERNIA: ICD-10-CM

## 2019-10-29 LAB — GLUCOSE BLDC GLUCOMTR-MCNC: 128 MG/DL (ref 70–130)

## 2019-10-29 PROCEDURE — 25010000002 DEXAMETHASONE SODIUM PHOSPHATE 10 MG/ML SOLUTION: Performed by: ANESTHESIOLOGY

## 2019-10-29 PROCEDURE — 25010000002 HYDROMORPHONE PER 4 MG: Performed by: NURSE ANESTHETIST, CERTIFIED REGISTERED

## 2019-10-29 PROCEDURE — C1781 MESH (IMPLANTABLE): HCPCS | Performed by: SURGERY

## 2019-10-29 PROCEDURE — 25010000002 PROMETHAZINE PER 50 MG: Performed by: SURGERY

## 2019-10-29 PROCEDURE — 25010000002 PROPOFOL 10 MG/ML EMULSION: Performed by: NURSE ANESTHETIST, CERTIFIED REGISTERED

## 2019-10-29 PROCEDURE — 25010000003 CEFAZOLIN IN DEXTROSE 2-4 GM/100ML-% SOLUTION: Performed by: SURGERY

## 2019-10-29 PROCEDURE — 25010000002 ONDANSETRON PER 1 MG: Performed by: NURSE ANESTHETIST, CERTIFIED REGISTERED

## 2019-10-29 PROCEDURE — 25010000002 BUPRENORPHINE PER 0.1 MG: Performed by: ANESTHESIOLOGY

## 2019-10-29 PROCEDURE — 25010000002 FENTANYL CITRATE (PF) 100 MCG/2ML SOLUTION: Performed by: NURSE ANESTHETIST, CERTIFIED REGISTERED

## 2019-10-29 PROCEDURE — 25010000002 NEOSTIGMINE 10 MG/10ML SOLUTION: Performed by: NURSE ANESTHETIST, CERTIFIED REGISTERED

## 2019-10-29 PROCEDURE — 88302 TISSUE EXAM BY PATHOLOGIST: CPT | Performed by: SURGERY

## 2019-10-29 PROCEDURE — 82962 GLUCOSE BLOOD TEST: CPT

## 2019-10-29 DEVICE — MESH ONFLEX W/PCKT ONLAY 8.6X14.2CM MD: Type: IMPLANTABLE DEVICE | Site: INGUINAL | Status: FUNCTIONAL

## 2019-10-29 RX ORDER — SODIUM CHLORIDE, SODIUM LACTATE, POTASSIUM CHLORIDE, CALCIUM CHLORIDE 600; 310; 30; 20 MG/100ML; MG/100ML; MG/100ML; MG/100ML
9 INJECTION, SOLUTION INTRAVENOUS CONTINUOUS
Status: DISCONTINUED | OUTPATIENT
Start: 2019-10-29 | End: 2019-10-29 | Stop reason: HOSPADM

## 2019-10-29 RX ORDER — BUPRENORPHINE HYDROCHLORIDE 0.32 MG/ML
INJECTION INTRAMUSCULAR; INTRAVENOUS
Status: COMPLETED | OUTPATIENT
Start: 2019-10-29 | End: 2019-10-29

## 2019-10-29 RX ORDER — CEFAZOLIN SODIUM 2 G/100ML
2 INJECTION, SOLUTION INTRAVENOUS ONCE
Status: COMPLETED | OUTPATIENT
Start: 2019-10-29 | End: 2019-10-29

## 2019-10-29 RX ORDER — NEOSTIGMINE METHYLSULFATE 1 MG/ML
INJECTION, SOLUTION INTRAVENOUS AS NEEDED
Status: DISCONTINUED | OUTPATIENT
Start: 2019-10-29 | End: 2019-10-29 | Stop reason: SURG

## 2019-10-29 RX ORDER — ROCURONIUM BROMIDE 10 MG/ML
INJECTION, SOLUTION INTRAVENOUS AS NEEDED
Status: DISCONTINUED | OUTPATIENT
Start: 2019-10-29 | End: 2019-10-29 | Stop reason: SURG

## 2019-10-29 RX ORDER — FENTANYL CITRATE 50 UG/ML
50 INJECTION, SOLUTION INTRAMUSCULAR; INTRAVENOUS
Status: DISCONTINUED | OUTPATIENT
Start: 2019-10-29 | End: 2019-10-29 | Stop reason: HOSPADM

## 2019-10-29 RX ORDER — PROMETHAZINE HYDROCHLORIDE 25 MG/ML
6.25 INJECTION, SOLUTION INTRAMUSCULAR; INTRAVENOUS ONCE
Status: COMPLETED | OUTPATIENT
Start: 2019-10-29 | End: 2019-10-29

## 2019-10-29 RX ORDER — LIDOCAINE HYDROCHLORIDE 10 MG/ML
0.5 INJECTION, SOLUTION EPIDURAL; INFILTRATION; INTRACAUDAL; PERINEURAL ONCE AS NEEDED
Status: COMPLETED | OUTPATIENT
Start: 2019-10-29 | End: 2019-10-29

## 2019-10-29 RX ORDER — HYDROMORPHONE HYDROCHLORIDE 1 MG/ML
0.5 INJECTION, SOLUTION INTRAMUSCULAR; INTRAVENOUS; SUBCUTANEOUS
Status: DISCONTINUED | OUTPATIENT
Start: 2019-10-29 | End: 2019-10-29 | Stop reason: HOSPADM

## 2019-10-29 RX ORDER — FAMOTIDINE 20 MG/1
20 TABLET, FILM COATED ORAL ONCE
Status: COMPLETED | OUTPATIENT
Start: 2019-10-29 | End: 2019-10-29

## 2019-10-29 RX ORDER — GLYCOPYRROLATE 0.2 MG/ML
INJECTION INTRAMUSCULAR; INTRAVENOUS AS NEEDED
Status: DISCONTINUED | OUTPATIENT
Start: 2019-10-29 | End: 2019-10-29 | Stop reason: SURG

## 2019-10-29 RX ORDER — BUPIVACAINE HYDROCHLORIDE 2.5 MG/ML
INJECTION, SOLUTION EPIDURAL; INFILTRATION; INTRACAUDAL
Status: COMPLETED | OUTPATIENT
Start: 2019-10-29 | End: 2019-10-29

## 2019-10-29 RX ORDER — ONDANSETRON 2 MG/ML
INJECTION INTRAMUSCULAR; INTRAVENOUS AS NEEDED
Status: DISCONTINUED | OUTPATIENT
Start: 2019-10-29 | End: 2019-10-29 | Stop reason: SURG

## 2019-10-29 RX ORDER — FENTANYL CITRATE 50 UG/ML
INJECTION, SOLUTION INTRAMUSCULAR; INTRAVENOUS AS NEEDED
Status: DISCONTINUED | OUTPATIENT
Start: 2019-10-29 | End: 2019-10-29 | Stop reason: SURG

## 2019-10-29 RX ORDER — SODIUM CHLORIDE 0.9 % (FLUSH) 0.9 %
3-10 SYRINGE (ML) INJECTION AS NEEDED
Status: DISCONTINUED | OUTPATIENT
Start: 2019-10-29 | End: 2019-10-29 | Stop reason: HOSPADM

## 2019-10-29 RX ORDER — DEXAMETHASONE SODIUM PHOSPHATE 10 MG/ML
INJECTION, SOLUTION INTRAMUSCULAR; INTRAVENOUS
Status: COMPLETED | OUTPATIENT
Start: 2019-10-29 | End: 2019-10-29

## 2019-10-29 RX ORDER — LIDOCAINE HYDROCHLORIDE 10 MG/ML
INJECTION, SOLUTION EPIDURAL; INFILTRATION; INTRACAUDAL; PERINEURAL AS NEEDED
Status: DISCONTINUED | OUTPATIENT
Start: 2019-10-29 | End: 2019-10-29 | Stop reason: SURG

## 2019-10-29 RX ORDER — PROPOFOL 10 MG/ML
VIAL (ML) INTRAVENOUS AS NEEDED
Status: DISCONTINUED | OUTPATIENT
Start: 2019-10-29 | End: 2019-10-29 | Stop reason: SURG

## 2019-10-29 RX ORDER — HYDROCODONE BITARTRATE AND ACETAMINOPHEN 5; 325 MG/1; MG/1
1 TABLET ORAL ONCE AS NEEDED
Status: COMPLETED | OUTPATIENT
Start: 2019-10-29 | End: 2019-10-29

## 2019-10-29 RX ORDER — SODIUM CHLORIDE 0.9 % (FLUSH) 0.9 %
3 SYRINGE (ML) INJECTION EVERY 12 HOURS SCHEDULED
Status: DISCONTINUED | OUTPATIENT
Start: 2019-10-29 | End: 2019-10-29 | Stop reason: HOSPADM

## 2019-10-29 RX ORDER — MAGNESIUM HYDROXIDE 1200 MG/15ML
LIQUID ORAL AS NEEDED
Status: DISCONTINUED | OUTPATIENT
Start: 2019-10-29 | End: 2019-10-29 | Stop reason: HOSPADM

## 2019-10-29 RX ADMIN — HYDROMORPHONE HYDROCHLORIDE 0.5 MG: 1 INJECTION, SOLUTION INTRAMUSCULAR; INTRAVENOUS; SUBCUTANEOUS at 15:36

## 2019-10-29 RX ADMIN — HYDROCODONE BITARTRATE AND ACETAMINOPHEN 1 TABLET: 5; 325 TABLET ORAL at 16:27

## 2019-10-29 RX ADMIN — PROPOFOL 200 MG: 10 INJECTION, EMULSION INTRAVENOUS at 13:53

## 2019-10-29 RX ADMIN — PROMETHAZINE HYDROCHLORIDE 6.25 MG: 25 INJECTION INTRAMUSCULAR; INTRAVENOUS at 17:14

## 2019-10-29 RX ADMIN — FENTANYL CITRATE 50 MCG: 0.05 INJECTION, SOLUTION INTRAMUSCULAR; INTRAVENOUS at 15:30

## 2019-10-29 RX ADMIN — FENTANYL CITRATE 50 MCG: 0.05 INJECTION, SOLUTION INTRAMUSCULAR; INTRAVENOUS at 16:06

## 2019-10-29 RX ADMIN — SODIUM CHLORIDE, POTASSIUM CHLORIDE, SODIUM LACTATE AND CALCIUM CHLORIDE 9 ML/HR: 600; 310; 30; 20 INJECTION, SOLUTION INTRAVENOUS at 10:58

## 2019-10-29 RX ADMIN — ONDANSETRON 4 MG: 2 INJECTION INTRAMUSCULAR; INTRAVENOUS at 14:54

## 2019-10-29 RX ADMIN — FAMOTIDINE 20 MG: 20 TABLET, FILM COATED ORAL at 11:08

## 2019-10-29 RX ADMIN — ROCURONIUM BROMIDE 50 MG: 10 INJECTION INTRAVENOUS at 13:53

## 2019-10-29 RX ADMIN — BUPRENORPHINE HYDROCHLORIDE 0.3 MG: 0.32 INJECTION INTRAMUSCULAR; INTRAVENOUS at 14:13

## 2019-10-29 RX ADMIN — GLYCOPYRROLATE 0.4 MG: 0.2 INJECTION, SOLUTION INTRAMUSCULAR; INTRAVENOUS at 14:54

## 2019-10-29 RX ADMIN — FENTANYL CITRATE 50 MCG: 50 INJECTION, SOLUTION INTRAMUSCULAR; INTRAVENOUS at 13:53

## 2019-10-29 RX ADMIN — FENTANYL CITRATE 50 MCG: 0.05 INJECTION, SOLUTION INTRAMUSCULAR; INTRAVENOUS at 15:51

## 2019-10-29 RX ADMIN — LIDOCAINE HYDROCHLORIDE 0.5 ML: 10 INJECTION, SOLUTION EPIDURAL; INFILTRATION; INTRACAUDAL; PERINEURAL at 10:58

## 2019-10-29 RX ADMIN — DEXAMETHASONE SODIUM PHOSPHATE 8 MG: 10 INJECTION INTRAMUSCULAR; INTRAVENOUS at 13:59

## 2019-10-29 RX ADMIN — DEXAMETHASONE SODIUM PHOSPHATE 2 MG: 10 INJECTION INTRAMUSCULAR; INTRAVENOUS at 14:13

## 2019-10-29 RX ADMIN — FENTANYL CITRATE 50 MCG: 50 INJECTION, SOLUTION INTRAMUSCULAR; INTRAVENOUS at 14:00

## 2019-10-29 RX ADMIN — NEOSTIGMINE METHYLSULFATE 3 MG: 1 INJECTION, SOLUTION INTRAVENOUS at 14:54

## 2019-10-29 RX ADMIN — LIDOCAINE HYDROCHLORIDE 50 MG: 10 INJECTION, SOLUTION EPIDURAL; INFILTRATION; INTRACAUDAL; PERINEURAL at 13:53

## 2019-10-29 RX ADMIN — FENTANYL CITRATE 50 MCG: 0.05 INJECTION, SOLUTION INTRAMUSCULAR; INTRAVENOUS at 15:44

## 2019-10-29 RX ADMIN — CEFAZOLIN SODIUM 2 G: 2 INJECTION, SOLUTION INTRAVENOUS at 13:46

## 2019-10-29 RX ADMIN — BUPIVACAINE HYDROCHLORIDE 30 ML: 2.5 INJECTION, SOLUTION EPIDURAL; INFILTRATION; INTRACAUDAL; PERINEURAL at 14:13

## 2019-10-31 LAB
CYTO UR: NORMAL
LAB AP CASE REPORT: NORMAL
LAB AP CLINICAL INFORMATION: NORMAL
PATH REPORT.FINAL DX SPEC: NORMAL
PATH REPORT.GROSS SPEC: NORMAL

## 2019-11-30 NOTE — ANESTHESIA POSTPROCEDURE EVALUATION
Patient: Toño Alacla    Procedure Summary     Date:  10/29/19 Room / Location:   [facility]    Anesthesia Start:  1345 Anesthesia Stop:  1520    Procedure:  INGUINAL HERNIA REPAIR LEFT (Left Abdomen) Diagnosis:  Inguinal hernia with incarceration    Surgeon:  Charisma Raines MD Provider:  Don Elizabeth MD    Anesthesia Type:  general ASA Status:  3          Anesthesia Type: general  Last vitals  BP   151/88 (10/29/19 1615)   Temp   98.5 °F (36.9 °C) (10/29/19 1600)   Pulse   68 (10/29/19 1615)   Resp   16 (10/29/19 1615)     SpO2   92 % (10/29/19 1615)     Post Anesthesia Care and Evaluation    Patient location during evaluation: PACU  Patient participation: complete - patient participated  Level of consciousness: awake and alert  Pain score: 0  Pain management: adequate  Airway patency: patent  Anesthetic complications: No anesthetic complications  PONV Status: none  Cardiovascular status: hemodynamically stable and acceptable  Respiratory status: nonlabored ventilation, acceptable and nasal cannula  Hydration status: acceptable

## 2019-12-04 ENCOUNTER — OFFICE VISIT (OUTPATIENT)
Dept: INTERNAL MEDICINE | Facility: CLINIC | Age: 79
End: 2019-12-04

## 2019-12-04 ENCOUNTER — TELEPHONE (OUTPATIENT)
Dept: INTERNAL MEDICINE | Facility: CLINIC | Age: 79
End: 2019-12-04

## 2019-12-04 VITALS
HEART RATE: 68 BPM | WEIGHT: 212 LBS | DIASTOLIC BLOOD PRESSURE: 70 MMHG | BODY MASS INDEX: 29.68 KG/M2 | SYSTOLIC BLOOD PRESSURE: 138 MMHG | TEMPERATURE: 97.6 F | HEIGHT: 71 IN

## 2019-12-04 DIAGNOSIS — E11.21 DIABETIC NEPHROPATHY ASSOCIATED WITH TYPE 2 DIABETES MELLITUS (HCC): ICD-10-CM

## 2019-12-04 DIAGNOSIS — E78.2 MIXED HYPERLIPIDEMIA: ICD-10-CM

## 2019-12-04 DIAGNOSIS — Z00.00 MEDICARE ANNUAL WELLNESS VISIT, SUBSEQUENT: Primary | ICD-10-CM

## 2019-12-04 DIAGNOSIS — E55.9 VITAMIN D DEFICIENCY: ICD-10-CM

## 2019-12-04 DIAGNOSIS — I10 ESSENTIAL HYPERTENSION: ICD-10-CM

## 2019-12-04 PROCEDURE — G0439 PPPS, SUBSEQ VISIT: HCPCS | Performed by: NURSE PRACTITIONER

## 2019-12-04 NOTE — PATIENT INSTRUCTIONS
"Check with [facility] about new shingles vaccines (Shingrix).    BMI for Adults    Body mass index (BMI) is a number that is calculated from a person's weight and height. BMI may help to estimate how much of a person's weight is composed of fat. BMI can help identify those who may be at higher risk for certain medical problems.  How is BMI used with adults?  BMI is used as a screening tool to identify possible weight problems. It is used to check whether a person is obese, overweight, healthy weight, or underweight.  How is BMI calculated?  BMI measures your weight and compares it to your height. This can be done either in English ([place] or metric measurements. Note that charts are available to help you find your BMI quickly and easily without having to do these calculations yourself.  To calculate your BMI in English ([place] measurements, your health care provider will:  1. Measure your weight in pounds (lb).  2. Multiply the number of pounds by 703.  ? For example, for a person who weighs 180 lb, multiply that number by 703, which equals 126,540.  3. Measure your height in inches (in). Then multiply that number by itself to get a measurement called \"inches squared.\"  ? For example, for a person who is 70 in tall, the \"inches squared\" measurement is 70 in x 70 in, which equals 4900 inches squared.  4. Divide the total from Step 2 (number of lb x 703) by the total from Step 3 (inches squared): 126,540 ÷ 4900 = 25.8. This is your BMI.  To calculate your BMI in metric measurements, your health care provider will:  1. Measure your weight in kilograms (kg).  2. Measure your height in meters (m). Then multiply that number by itself to get a measurement called \"meters squared.\"  ? For example, for a person who is 1.75 m tall, the \"meters squared\" measurement is 1.75 m x 1.75 m, which is equal to 3.1 meters squared.  3. Divide the number of kilograms (your weight) by the meters squared number. In this example: 70 ÷ 3.1 = 22.6. This " is your BMI.  How is BMI interpreted?  To interpret your results, your health care provider will use BMI charts to identify whether you are underweight, normal weight, overweight, or obese. The following guidelines will be used:  · Underweight: BMI less than 18.5.  · Normal weight: BMI between 18.5 and 24.9.  · Overweight: BMI between 25 and 29.9.  · Obese: BMI of 30 and above.  Please note:  · Weight includes both fat and muscle, so someone with a muscular build, such as an athlete, may have a BMI that is higher than 24.9. In cases like these, BMI is not an accurate measure of body fat.  · To determine if excess body fat is the cause of a BMI of 25 or higher, further assessments may need to be done by a health care provider.  · BMI is usually interpreted in the same way for men and women.  Why is BMI a useful tool?  BMI is useful in two ways:  · Identifying a weight problem that may be related to a medical condition, or that may increase the risk for medical problems.  · Promoting lifestyle and diet changes in order to reach a healthy weight.  Summary  · Body mass index (BMI) is a number that is calculated from a person's weight and height.  · BMI may help to estimate how much of a person's weight is composed of fat. BMI can help identify those who may be at higher risk for certain medical problems.  · BMI can be measured using English measurements or metric measurements.  · To interpret your results, your health care provider will use BMI charts to identify whether you are underweight, normal weight, overweight, or obese.  This information is not intended to replace advice given to you by your health care provider. Make sure you discuss any questions you have with your health care provider.  Document Released: 08/29/2005 Document Revised: 10/31/2018 Document Reviewed: 10/31/2018  "[employer] Interactive Patient Education © 2019 "[employer] Inc[employer]    Medicare Wellness  Personal Prevention Plan of Service     Date of Office  Visit:  2019  Encounter Provider:  YARED Hoang  Place of Service:  [facility]  Patient Name: Toño Alcala  :  1940    As part of the Medicare Wellness portion of your visit today, we are providing you with this personalized preventive plan of services (PPPS). This plan is based upon recommendations of the [place] Preventive Services Task Force (USPSTF) and the Advisory Committee on Immunization Practices (ACIP).    This lists the preventive care services that should be considered, and provides dates of when you are due. Items listed as completed are up-to-date and do not require any further intervention.    Health Maintenance   Topic Date Due   • URINE MICROALBUMIN  1940   • TDAP/TD VACCINES (1 - Tdap) 1959   • ZOSTER VACCINE (2 of 3) 2013   • DIABETIC EYE EXAM  2017   • INFLUENZA VACCINE  2019   • MEDICARE ANNUAL WELLNESS  2019   • HEMOGLOBIN A1C  2019   • LIPID PANEL  2020   • PNEUMOCOCCAL VACCINES (65+ LOW/MEDIUM RISK)  Completed       No orders of the defined types were placed in this encounter.      Return for Annual physical, Labs this visit, Next scheduled follow up.

## 2019-12-04 NOTE — PROGRESS NOTES
QUICK REFERENCE INFORMATION:  The ABCs of the Annual Wellness Visit    Subsequent Medicare Wellness Visit    HEALTH RISK ASSESSMENT    1940    Recent Hospitalizations:  No hospitalization(s) within the last year..        Current Medical Providers:  Patient Care Team:  Radu Francis MD as PCP - General        Smoking Status:  Social History     Tobacco Use   Smoking Status Former Smoker   • Packs/day: 1.00   • Years: 20.00   • Pack years: 20.00   • Types: Cigarettes   • Last attempt to quit: 1997   • Years since quittin.6   Smokeless Tobacco Never Used   Tobacco Comment    QUIT DATE 1992       Alcohol Consumption:  Social History     Substance and Sexual Activity   Alcohol Use Yes   • Alcohol/week: 4.2 oz   • Types: 7 Glasses of wine per week    Comment: glass of wine everyday        Depression Screen:   PHQ-2/PHQ-9 Depression Screening 2019   Little interest or pleasure in doing things 0   Feeling down, depressed, or hopeless 0   Total Score 0       Health Habits and Functional and Cognitive Screening:  Functional & Cognitive Status 2019   Do you have difficulty preparing food and eating? No   Do you have difficulty bathing yourself, getting dressed or grooming yourself? No   Do you have difficulty using the toilet? No   Do you have difficulty moving around from place to place? No   Do you have trouble with steps or getting out of a bed or a chair? No   Current Diet Well Balanced Diet   Dental Exam Up to date   Eye Exam Up to date   Exercise (times per week) 3 times per week   Current Exercise Activities Include Walking   Do you need help using the phone?  No   Are you deaf or do you have serious difficulty hearing?  No   Do you need help with transportation? No   Do you need help shopping? No   Do you need help preparing meals?  No   Do you need help with housework?  No   Do you need help with laundry? No   Do you need help taking your medications? No   Do you need help  managing money? No   Do you ever drive or ride in a car without wearing a seat belt? No   Have you felt unusual stress, anger or loneliness in the last month? No   Who do you live with? Spouse   If you need help, do you have trouble finding someone available to you? No   Have you been bothered in the last four weeks by sexual problems? No   Do you have difficulty concentrating, remembering or making decisions? No       Fall Risk Screen:  RODOLFO Fall Risk Assessment was completed, and patient is at LOW risk for falls.Assessment completed on:2019    ACE III MINI   ATTENTION  What is the year: correct  What is the month of the year: correct  What is the day of the week?: correct  What is the date?: incorrect  MEMORY  Repeat address three times, only score third attempt: T[address], [place]: 7  HOW MANY ANIMALS DID THE PATIENT NAME  Verbal Fluency -- Animal Names (0-25): 14-16  CLOCK DRAWING  Clock Drawing: All Correct  MEMORY RECALL  Tell me what you remember about that name and address we were repeating at the beginnin  ACE TOTAL SCORE  Total ACE Score - <25/30 strongly suggests cognitive impairment; <21/30 almost certainly shows dementia: 25    Does the patient have evidence of cognitive impairment? No    Aspirin use counseling: Taking ASA appropriately as indicated    Recent Lab Results:  CMP:  Lab Results   Component Value Date    BUN 22 10/24/2019    CREATININE 1.60 (H) 10/24/2019    EGFRIFNONA 42 (L) 10/24/2019    BCR 13.8 10/24/2019     10/24/2019    K 4.8 10/24/2019    CO2 28.0 10/24/2019    CALCIUM 9.6 10/24/2019    ALBUMIN 4.10 2019    BILITOT 0.5 2015    ALKPHOS 174 (H) 2015    AST 30 2015    ALT 26 2015     HbA1c:  Lab Results   Component Value Date    HGBA1C 6.56 (H) 2019     Microalbumin:  No results found for: MICROALBUR, POCMALB, POCCREAT  Lipid Panel  Lab Results   Component Value Date    CHOL 164 2019    TRIG 66  06/03/2019    HDL 58 06/03/2019    LDL 93 06/03/2019    AST 30 09/04/2015    ALT 26 09/04/2015       Visual Acuity:  No exam data present    Age-appropriate Screening Schedule:  Refer to the list below for future screening recommendations based on patient's age, sex and/or medical conditions. Orders for these recommended tests are listed in the plan section. The patient has been provided with a written plan.    Health Maintenance   Topic Date Due   • URINE MICROALBUMIN  1940   • TDAP/TD VACCINES (1 - Tdap) 07/05/1959   • ZOSTER VACCINE (2 of 3) 03/21/2013   • DIABETIC EYE EXAM  04/24/2017   • INFLUENZA VACCINE  08/01/2019   • HEMOGLOBIN A1C  12/03/2019   • LIPID PANEL  06/03/2020   • PNEUMOCOCCAL VACCINES (65+ LOW/MEDIUM RISK)  Completed        Subjective   History of Present Illness    Toño Alcala is a 79 y.o. male who presents for a Subsequent Wellness Visit.    CHRONIC CONDITIONS    The following portions of the patient's history were reviewed and updated as appropriate: allergies, current medications, past family history, past medical history, past social history, past surgical history and problem list.    Outpatient Medications Prior to Visit   Medication Sig Dispense Refill   • acetaminophen (TYLENOL) 325 MG tablet Take 650 mg by mouth As Needed for Mild Pain .     • aspirin 81 MG EC tablet Take 81 mg by mouth Daily. Last dose 10-18-19 per patient     • atorvastatin (LIPITOR) 40 MG tablet Take 40 mg by mouth Daily.     • calcipotriene-betamethasone (TACLONEX) 0.005-0.064 % ointment Apply 1 application topically to the appropriate area as directed As Needed (psoriasis).     • cholecalciferol (VITAMIN D3) 25 MCG (1000 UT) tablet Take 1,000 Units by mouth Daily.     • diclofenac (VOLTAREN) 1 % gel gel Apply 4 g topically As Needed.     • diltiaZEM CD (CARDIZEM CD) 180 MG 24 hr capsule Take 180 mg by mouth Daily.     • losartan (COZAAR) 100 MG tablet Take 100 mg by mouth Every Night.     • nitroglycerin  (NITROSTAT) 0.4 MG SL tablet Place 0.4 mg under the tongue Every 5 (Five) Minutes As Needed for Chest Pain. Take no more than 3 doses in 15 minutes.     • omeprazole (priLOSEC) 40 MG capsule Take 20 mg by mouth Daily.       No facility-administered medications prior to visit.        Patient Active Problem List   Diagnosis   • CAD (coronary artery disease)   • CVD (cardiovascular disease)   • Hypertension   • DVT (deep venous thrombosis) (CMS/[facility])   • Diabetes mellitus (CMS/[facility])   • Dyslipidemia   • Arthritis   • GERD (gastroesophageal reflux disease)   • Mixed hyperlipidemia   • Diabetic nephropathy associated with type 2 diabetes mellitus (CMS/[facility])   • Diabetic polyneuropathy associated with type 2 diabetes mellitus (CMS/[facility])   • Chronic kidney disease, stage III (moderate) (CMS/[facility])   • Vitamin D deficiency   • Disc disorder of cervical region   • Postthrombotic syndrome   • Vertigo   • Angina pectoris (CMS/[facility])   • Lumbosacral spondylosis without myelopathy   • Psoriasis   • Arthritis of elbow   • Swelling of right lower extremity   • Abnormal glucose level   • Acute right-sided low back pain without sciatica   • Hoarseness   • Unifocal PVCs   • Skin lesion of face   • Type 2 diabetes mellitus without complication, without long-term current use of insulin (CMS/[facility])   • Benign essential hypertension   • Medicare annual wellness visit, subsequent   • Stage 3 chronic kidney disease due to benign hypertension (CMS/[facility])   • BMI 30.0-30.9,adult       Advance Care Planning:  Patient has an advance directive - a copy has not been provided. Have asked the patient to send this to us to add to record    Identification of Risk Factors:  Risk factors include: Immunizations Discussed/Encouraged (specific immunizations; Shingrix )  Obesity/Overweight .    Review of Systems   Constitutional: Negative for chills, fatigue and fever.   HENT: Negative for congestion, ear pain and sinus pressure.    Respiratory: Negative for cough,  "chest tightness, shortness of breath and wheezing.    Cardiovascular: Negative for chest pain and palpitations.   Gastrointestinal: Negative for abdominal pain and blood in stool.   Skin: Negative for color change.   Allergic/Immunologic: Negative for environmental allergies.   Neurological: Negative for dizziness, speech difficulty and headaches.   Psychiatric/Behavioral: Negative for decreased concentration. The patient is not nervous/anxious.        Compared to one year ago, the patient feels his physical health is the same.  Compared to one year ago, the patient feels his mental health is the same.    Objective     Physical Exam     Procedures     Vitals:    12/04/19 0844   BP: 138/70   BP Location: Left arm   Patient Position: Sitting   Cuff Size: Adult   Pulse: 68   Temp: 97.6 °F (36.4 °C)   TempSrc: Temporal   Weight: 96.2 kg (212 lb)   Height: 180.3 cm (70.98\")   PainSc: 0-No pain       Patient's Body mass index is 29.58 kg/m². BMI is above normal parameters. Recommendations include: exercise counseling and nutrition counseling.      Assessment/Plan   Problem List Items Addressed This Visit        Cardiovascular and Mediastinum    Hypertension    Overview     Normal renal angiography 02/16/11         Relevant Medications    diltiaZEM CD (CARDIZEM CD) 180 MG 24 hr capsule    losartan (COZAAR) 100 MG tablet    Mixed hyperlipidemia    Relevant Medications    atorvastatin (LIPITOR) 40 MG tablet       Digestive    Vitamin D deficiency       Endocrine    Diabetic nephropathy associated with type 2 diabetes mellitus (CMS/[facility])       Other    Medicare annual wellness visit, subsequent - Primary        Patient Self-Management and Personalized Health Advice  The patient has been provided with information about: diet and exercise and preventive services including:   · Annual Wellness Visit (AWV).    Outpatient Encounter Medications as of 12/4/2019   Medication Sig Dispense Refill   • acetaminophen (TYLENOL) 325 MG " tablet Take 650 mg by mouth As Needed for Mild Pain .     • aspirin 81 MG EC tablet Take 81 mg by mouth Daily. Last dose 10-18-19 per patient     • atorvastatin (LIPITOR) 40 MG tablet Take 40 mg by mouth Daily.     • calcipotriene-betamethasone (TACLONEX) 0.005-0.064 % ointment Apply 1 application topically to the appropriate area as directed As Needed (psoriasis).     • cholecalciferol (VITAMIN D3) 25 MCG (1000 UT) tablet Take 1,000 Units by mouth Daily.     • diclofenac (VOLTAREN) 1 % gel gel Apply 4 g topically As Needed.     • diltiaZEM CD (CARDIZEM CD) 180 MG 24 hr capsule Take 180 mg by mouth Daily.     • losartan (COZAAR) 100 MG tablet Take 100 mg by mouth Every Night.     • nitroglycerin (NITROSTAT) 0.4 MG SL tablet Place 0.4 mg under the tongue Every 5 (Five) Minutes As Needed for Chest Pain. Take no more than 3 doses in 15 minutes.     • omeprazole (priLOSEC) 40 MG capsule Take 20 mg by mouth Daily.       No facility-administered encounter medications on file as of 12/4/2019.        Reviewed use of high risk medication in the elderly: yes  Reviewed for potential of harmful drug interactions in the elderly: yes    Follow Up:  Return for Annual physical, Labs this visit, Next scheduled follow up.     Patient Instructions     [facility] about [facility] (Shingrix).    BMI for Adults    Body mass index (BMI) is a number that is calculated from a person's weight and height. BMI may help to estimate how much of a person's weight is composed of fat. BMI can help identify those who may be at higher risk for certain medical problems.  How is BMI used with adults?  BMI is used as a screening tool to identify possible weight problems. It is used to check whether a person is obese, overweight, healthy weight, or underweight.  How is BMI calculated?  BMI measures your weight and compares it to your height. This can be done either in English ([place] or metric measurements. Note that charts are available to  "help you find your BMI quickly and easily without having to do these calculations yourself.  To calculate your BMI in English ([place] measurements, your health care provider will:  1. Measure your weight in pounds (lb).  2. Multiply the number of pounds by 703.  ? For example, for a person who weighs 180 lb, multiply that number by 703, which equals 126,540.  3. Measure your height in inches (in). Then multiply that number by itself to get a measurement called \"inches squared.\"  ? For example, for a person who is 70 in tall, the \"inches squared\" measurement is 70 in x 70 in, which equals 4900 inches squared.  4. Divide the total from Step 2 (number of lb x 703) by the total from Step 3 (inches squared): 126,540 ÷ 4900 = 25.8. This is your BMI.  To calculate your BMI in metric measurements, your health care provider will:  1. Measure your weight in kilograms (kg).  2. Measure your height in meters (m). Then multiply that number by itself to get a measurement called \"meters squared.\"  ? For example, for a person who is 1.75 m tall, the \"meters squared\" measurement is 1.75 m x 1.75 m, which is equal to 3.1 meters squared.  3. Divide the number of kilograms (your weight) by the meters squared number. In this example: 70 ÷ 3.1 = 22.6. This is your BMI.  How is BMI interpreted?  To interpret your results, your health care provider will use BMI charts to identify whether you are underweight, normal weight, overweight, or obese. The following guidelines will be used:  · Underweight: BMI less than 18.5.  · Normal weight: BMI between 18.5 and 24.9.  · Overweight: BMI between 25 and 29.9.  · Obese: BMI of 30 and above.  Please note:  · Weight includes both fat and muscle, so someone with a muscular build, such as an athlete, may have a BMI that is higher than 24.9. In cases like these, BMI is not an accurate measure of body fat.  · To determine if excess body fat is the cause of a BMI of 25 or higher, further assessments may " need to be done by a health care provider.  · BMI is usually interpreted in the same way for men and women.  Why is BMI a useful tool?  BMI is useful in two ways:  · Identifying a weight problem that may be related to a medical condition, or that may increase the risk for medical problems.  · Promoting lifestyle and diet changes in order to reach a healthy weight.  Summary  · Body mass index (BMI) is a number that is calculated from a person's weight and height.  · BMI may help to estimate how much of a person's weight is composed of fat. BMI can help identify those who may be at higher risk for certain medical problems.  · BMI can be measured using English measurements or metric measurements.  · To interpret your results, your health care provider will use BMI charts to identify whether you are underweight, normal weight, overweight, or obese.  This information is not intended to replace advice given to you by your health care provider. Make sure you discuss any questions you have with your health care provider.  Document Released: 2005 Document Revised: 10/31/2018 Document Reviewed: 10/31/2018  [employer] Interactive Patient Education © 2019 [employer]    Medicare Wellness  Personal Prevention Plan of Service     Date of Office Visit:  2019  Encounter Provider:  YARED Hoang  Place of Service:  [facility]  Patient Name: Toño Alcala  :  1940    As part of the Medicare Wellness portion of your visit today, we are providing you with this personalized preventive plan of services (PPPS). This plan is based upon recommendations of the [place] Preventive Services Task Force (USPSTF) and the Advisory Committee on Immunization Practices (ACIP).    This lists the preventive care services that should be considered, and provides dates of when you are due. Items listed as completed are up-to-date and do not require any further intervention.    Health  Maintenance   Topic Date Due   • URINE MICROALBUMIN  1940   • TDAP/TD VACCINES (1 - Tdap) 07/05/1959   • ZOSTER VACCINE (2 of 3) 03/21/2013   • DIABETIC EYE EXAM  04/24/2017   • INFLUENZA VACCINE  08/01/2019   • MEDICARE ANNUAL WELLNESS  11/16/2019   • HEMOGLOBIN A1C  12/03/2019   • LIPID PANEL  06/03/2020   • PNEUMOCOCCAL VACCINES (65+ LOW/MEDIUM RISK)  Completed       No orders of the defined types were placed in this encounter.      Return for Annual physical, Labs this visit, Next scheduled follow up.            An After Visit Summary and PPPS with all of these plans were given to the patient.

## 2019-12-10 ENCOUNTER — OFFICE VISIT (OUTPATIENT)
Dept: INTERNAL MEDICINE | Facility: CLINIC | Age: 79
End: 2019-12-10

## 2019-12-10 VITALS
TEMPERATURE: 98.5 F | HEART RATE: 67 BPM | HEIGHT: 71 IN | DIASTOLIC BLOOD PRESSURE: 78 MMHG | BODY MASS INDEX: 30.1 KG/M2 | SYSTOLIC BLOOD PRESSURE: 140 MMHG | WEIGHT: 215 LBS

## 2019-12-10 DIAGNOSIS — I87.009 POSTTHROMBOTIC SYNDROME: ICD-10-CM

## 2019-12-10 DIAGNOSIS — E78.2 MIXED HYPERLIPIDEMIA: ICD-10-CM

## 2019-12-10 DIAGNOSIS — M50.90 DISC DISORDER OF CERVICAL REGION: ICD-10-CM

## 2019-12-10 DIAGNOSIS — E11.21 DIABETIC NEPHROPATHY ASSOCIATED WITH TYPE 2 DIABETES MELLITUS (HCC): ICD-10-CM

## 2019-12-10 DIAGNOSIS — E11.42 DIABETIC POLYNEUROPATHY ASSOCIATED WITH TYPE 2 DIABETES MELLITUS (HCC): Primary | ICD-10-CM

## 2019-12-10 DIAGNOSIS — K21.9 GASTROESOPHAGEAL REFLUX DISEASE WITHOUT ESOPHAGITIS: ICD-10-CM

## 2019-12-10 DIAGNOSIS — I20.9 ANGINA PECTORIS (HCC): ICD-10-CM

## 2019-12-10 DIAGNOSIS — M47.817 LUMBOSACRAL SPONDYLOSIS WITHOUT MYELOPATHY: ICD-10-CM

## 2019-12-10 DIAGNOSIS — N18.30 CHRONIC KIDNEY DISEASE, STAGE III (MODERATE) (HCC): ICD-10-CM

## 2019-12-10 DIAGNOSIS — I10 BENIGN ESSENTIAL HYPERTENSION: ICD-10-CM

## 2019-12-10 DIAGNOSIS — E55.9 VITAMIN D DEFICIENCY: ICD-10-CM

## 2019-12-10 PROCEDURE — 99214 OFFICE O/P EST MOD 30 MIN: CPT | Performed by: INTERNAL MEDICINE

## 2019-12-10 NOTE — PROGRESS NOTES
[facility]     Toño Alcala  1940   [id number]      Patient Care Team:  Radu Francis MD as PCP - General    Chief Complaint::   Chief Complaint   Patient presents with   • Hypertension   • Hyperlipidemia        HPI  Mr. Alcala is now 79.  He comes in for follow-up of his diabetes with nephropathy and neuropathy, angina, hypertension, hyperlipidemia, lumbar and cervical spondylosis, and chronic kidney disease.  His main complaint today is worsening stiffness in his neck.  He would like referral for physical therapy to work on this.  He is recovering from recent hernia surgery.  He has no chest pain but mild dyspnea on exertion.    Chronic Conditions:      Patient Active Problem List   Diagnosis   • CAD (coronary artery disease)   • CVD (cardiovascular disease)   • Hypertension   • DVT (deep venous thrombosis) (CMS/[facility])   • Diabetes mellitus (CMS/[facility])   • Dyslipidemia   • Arthritis   • GERD (gastroesophageal reflux disease)   • Mixed hyperlipidemia   • Diabetic nephropathy associated with type 2 diabetes mellitus (CMS/[facility])   • Diabetic polyneuropathy associated with type 2 diabetes mellitus (CMS/[facility])   • Chronic kidney disease, stage III (moderate) (CMS/[facility])   • Vitamin D deficiency   • Disc disorder of cervical region   • Postthrombotic syndrome   • Vertigo   • Angina pectoris (CMS/[facility])   • Lumbosacral spondylosis without myelopathy   • Psoriasis   • Arthritis of elbow   • Swelling of right lower extremity   • Acute right-sided low back pain without sciatica   • Hoarseness   • Unifocal PVCs   • Skin lesion of face   • Type 2 diabetes mellitus without complication, without long-term current use of insulin (CMS/[facility])   • Benign essential hypertension   • Medicare annual wellness visit, subsequent   • Stage 3 chronic kidney disease due to benign hypertension (CMS/[facility])   • BMI 30.0-30.9,adult        Past Medical History:   Diagnosis Date   • Arthritis    • Bilateral radial fractures 1962     fracture bilateral radial heads   • CAD (coronary artery disease)    • CVD (cardiovascular disease)     History of TIA 1989   • Diabetes mellitus (CMS/[facility])    • DVT (deep venous thrombosis) (CMS/[facility])     Right lower extremity DVT and pulmonary embolism in 06/2015, idiopathic   • DVT of proximal lower limb (CMS/[facility]) 06/22/2015    proximal DVT right leg, small PE- anticoagulation   • Dyslipidemia    • Fracture 1952    H/o pf left forearm fracture   • Fracture of right hand 1980   • GERD (gastroesophageal reflux disease)     with hiatal hernia   • Hx of angina pectoris    • Hypertension     Normal renal angiography 02/16/11   • Left wrist fracture 1953   • Osteoarthritis    • Right wrist fracture    • Vertigo    • Wears glasses        Past Surgical History:   Procedure Laterality Date   • APPENDECTOMY  1957   • BACK SURGERY     • CARDIAC CATHETERIZATION  2011    with vein graft   • CERVICAL FUSION     • CERVICAL FUSION  1992    C3-4   • COLONOSCOPY  2013   • CORONARY ARTERY BYPASS GRAFT  1994   • HERNIA REPAIR Right 2011    inguinal   • INGUINAL HERNIA REPAIR Left 10/29/2019    Procedure: INGUINAL HERNIA REPAIR LEFT;  Surgeon: Charisma Raines MD;  Location: [facility];  Service: General   • LUMBAR LAMINECTOMY  1996    laminectomy, lumbar, L3   • OTHER SURGICAL HISTORY      wrist surgery   • TONSILLECTOMY AND ADENOIDECTOMY  1945       Family History   Problem Relation Age of Onset   • Arthritis Mother         OA   • Heart disease Father    • Diabetes Father    • Heart attack Father    • Heart disease Brother    • Heart attack Brother    • Diabetes Brother    • Diabetes Son    • Hypertension Other    • Cancer Other        Social History     Socioeconomic History   • Marital status:      Spouse name: Not on file   • Number of children: Not on file   • Years of education: Not on file   • Highest education level: Not on file   Tobacco Use   • Smoking status: Former Smoker     Packs/day: 1.00     Years: 20.00      Pack years: 20.00     Types: Cigarettes     Last attempt to quit: 1997     Years since quittin.6   • Smokeless tobacco: Never Used   • Tobacco comment: QUIT DATE 1992   Substance and Sexual Activity   • Alcohol use: Yes     Alcohol/week: 7.0 standard drinks     Types: 7 Glasses of wine per week     Comment: glass of wine everyday    • Drug use: No   • Sexual activity: Defer       Allergies   Allergen Reactions   • Penicillins Hives and Swelling     Per patient, has tolerated Keflex         Current Outpatient Medications:   •  acetaminophen (TYLENOL) 325 MG tablet, Take 650 mg by mouth As Needed for Mild Pain ., Disp: , Rfl:   •  aspirin 81 MG EC tablet, Take 81 mg by mouth Daily. Last dose 10-18-19 per patient, Disp: , Rfl:   •  atorvastatin (LIPITOR) 40 MG tablet, Take 40 mg by mouth Daily., Disp: , Rfl:   •  calcipotriene-betamethasone (TACLONEX) 0.005-0.064 % ointment, Apply 1 application topically to the appropriate area as directed As Needed (psoriasis)., Disp: , Rfl:   •  cholecalciferol (VITAMIN D3) 25 MCG (1000 UT) tablet, Take 1,000 Units by mouth Daily., Disp: , Rfl:   •  diclofenac (VOLTAREN) 1 % gel gel, Apply 4 g topically As Needed., Disp: , Rfl:   •  diltiaZEM CD (CARDIZEM CD) 180 MG 24 hr capsule, Take 180 mg by mouth Daily., Disp: , Rfl:   •  losartan (COZAAR) 100 MG tablet, Take 100 mg by mouth Every Night., Disp: , Rfl:   •  nitroglycerin (NITROSTAT) 0.4 MG SL tablet, Place 0.4 mg under the tongue Every 5 (Five) Minutes As Needed for Chest Pain. Take no more than 3 doses in 15 minutes., Disp: , Rfl:   •  omeprazole (priLOSEC) 40 MG capsule, Take 20 mg by mouth Daily., Disp: , Rfl:     Review of Systems   Constitutional: Negative for chills, fatigue and fever.   HENT: Negative for congestion, ear pain and sinus pressure.    Respiratory: Negative for cough, chest tightness, shortness of breath and wheezing.    Cardiovascular: Negative for chest pain and palpitations.  "  Gastrointestinal: Negative for abdominal pain, blood in stool and constipation.   Skin: Negative for color change.   Allergic/Immunologic: Negative for environmental allergies.   Neurological: Negative for dizziness, speech difficulty and headache.   Psychiatric/Behavioral: Negative for decreased concentration. The patient is not nervous/anxious.         Vital Signs  Vitals:    12/10/19 1113   BP: 140/78   BP Location: Right arm   Patient Position: Sitting   Cuff Size: Adult   Pulse: 67   Temp: 98.5 °F (36.9 °C)   TempSrc: Temporal   Weight: 97.5 kg (215 lb)   Height: 180.3 cm (70.98\")   PainSc: 0-No pain       Physical Exam   Constitutional: He is oriented to person, place, and time. He appears well-developed and well-nourished.   HENT:   Head: Normocephalic and atraumatic.   Right Ear: External ear normal.   Left Ear: External ear normal.   Nose: Nose normal.   Mouth/Throat: Oropharynx is clear and moist. No oropharyngeal exudate.   Eyes: Pupils are equal, round, and reactive to light. Conjunctivae and EOM are normal.   Neck: Normal range of motion. Neck supple. No JVD present. No thyromegaly present.   Cardiovascular: Normal rate, regular rhythm, normal heart sounds and intact distal pulses. Exam reveals no gallop and no friction rub.   No murmur heard.  Pulmonary/Chest: Effort normal and breath sounds normal. No respiratory distress. He has no wheezes. He has no rales. He exhibits no tenderness.   Abdominal: Soft. Bowel sounds are normal. He exhibits no distension and no mass. There is no tenderness. There is no rebound and no guarding. No hernia.   Musculoskeletal: Normal range of motion. He exhibits no tenderness.    Toño had a diabetic foot exam performed today.   During the foot exam he had a monofilament test performed (Slightly reduced sensation in both feet to monofilament).  Vascular Status -  His right foot exhibits normal foot vasculature  and no edema. His left foot exhibits normal foot " vasculature  and no edema.  Skin Integrity  -  His right foot skin is intact.His left foot skin is intact..  Lymphadenopathy:     He has no cervical adenopathy.   Neurological: He is alert and oriented to person, place, and time. He displays normal reflexes. No cranial nerve deficit or sensory deficit. He exhibits normal muscle tone. Coordination normal.   He has some difficulty climbing onto the exam table due to symmetrical weakness in both legs.   Skin: Skin is warm and dry. No rash noted. No erythema.   Psychiatric: He has a normal mood and affect. His behavior is normal. Judgment and thought content normal.   Nursing note and vitals reviewed.     Procedures    ACE III MINI             Assessment/Plan:    Toño was seen today for hypertension and hyperlipidemia.    Diagnoses and all orders for this visit:    Diabetic polyneuropathy associated with type 2 diabetes mellitus (CMS/HCC)    Disc disorder of cervical region  -     Ambulatory Referral to Physical Therapy Evaluate and treat    Diabetic nephropathy associated with type 2 diabetes mellitus (CMS/HCC)    Angina pectoris (CMS/HCC)    Postthrombotic syndrome    Benign essential hypertension    Mixed hyperlipidemia    Vitamin D deficiency    Gastroesophageal reflux disease without esophagitis    Lumbosacral spondylosis without myelopathy    Chronic kidney disease, stage III (moderate) (CMS/HCC)    Plan    A1c is well controlled at 6.8, treatment remains low-carb diet and avoidance of weight gain.    He is referred to physical therapy for neck pain.  He has known underlying cervical spine osteoarthritis but I think therapy will help the muscle stiffness.    Angina is currently asymptomatic.  He will continue follow-up with Dr. Hernandez.  He will continue aspirin, atorvastatin and losartan.  He will continue wearing compression for his postphlebitic syndrome.    Blood pressure is controlled on diltiazem and losartan.    Lipids are controlled with , HDL 44,  triglycerides 89.  Continue atorvastatin and healthy diet.    GERD is stable on omeprazole.    Back pain is present but not limiting.  He may continue diclofenac topical, stretching, heat and ice.    GFR is 36 which is stable.  The treatment remains control of blood glucose and blood pressure and avoidance of NSAIDs.      Plan of care reviewed with patient at the conclusion of today's visit. Education was provided regarding diagnosis, management, and any prescribed or recommended OTC medications.Patient verbalizes understanding of and agreement with management plan.         Radu Francis MD

## 2019-12-12 ENCOUNTER — HOSPITAL ENCOUNTER (OUTPATIENT)
Dept: PHYSICAL THERAPY | Facility: HOSPITAL | Age: 79
Setting detail: THERAPIES SERIES
Discharge: HOME | End: 2019-12-12

## 2019-12-12 DIAGNOSIS — M54.2 NECK PAIN: Primary | ICD-10-CM

## 2019-12-12 PROCEDURE — 97161 PT EVAL LOW COMPLEX 20 MIN: CPT

## 2019-12-12 NOTE — THERAPY EVALUATION
Outpatient Physical Therapy Ortho Initial Evaluation  [facility]     Patient Name: Toño Alcala  : 1940  MRN: [id number]  Today's Date: 2019      Visit Date: 2019    Patient Active Problem List   Diagnosis   • CAD (coronary artery disease)   • CVD (cardiovascular disease)   • Hypertension   • DVT (deep venous thrombosis) (CMS/[facility])   • Diabetes mellitus (CMS/[facility])   • Dyslipidemia   • Arthritis   • GERD (gastroesophageal reflux disease)   • Mixed hyperlipidemia   • Diabetic nephropathy associated with type 2 diabetes mellitus (CMS/HCC)   • Diabetic polyneuropathy associated with type 2 diabetes mellitus (CMS/HCC)   • Chronic kidney disease, stage III (moderate) (CMS/[facility])   • Vitamin D deficiency   • Disc disorder of cervical region   • Postthrombotic syndrome   • Vertigo   • Angina pectoris (CMS/[facility])   • Lumbosacral spondylosis without myelopathy   • Psoriasis   • Arthritis of elbow   • Swelling of right lower extremity   • Abnormal glucose level   • Acute right-sided low back pain without sciatica   • Hoarseness   • Unifocal PVCs   • Skin lesion of face   • Type 2 diabetes mellitus without complication, without long-term current use of insulin (CMS/[facility])   • Benign essential hypertension   • Medicare annual wellness visit, subsequent   • Stage 3 chronic kidney disease due to benign hypertension (CMS/[facility])   • BMI 30.0-30.9,adult        Past Medical History:   Diagnosis Date   • Arthritis    • Bilateral radial fractures     fracture bilateral radial heads   • CAD (coronary artery disease)    • CVD (cardiovascular disease)     History of TIA    • Diabetes mellitus (CMS/[facility])    • DVT (deep venous thrombosis) (CMS/[facility])     Right lower extremity DVT and pulmonary embolism in 2015, idiopathic   • DVT of proximal lower limb (CMS/[facility]) 2015    proximal DVT right leg, small PE- anticoagulation   • Dyslipidemia    • Fracture     H/o pf left forearm fracture   • Fracture of right  hand 1980   • GERD (gastroesophageal reflux disease)     with hiatal hernia   • Hx of angina pectoris    • Hypertension     Normal renal angiography 02/16/11   • Left wrist fracture 1953   • Osteoarthritis    • Right wrist fracture    • Vertigo    • Wears glasses         Past Surgical History:   Procedure Laterality Date   • APPENDECTOMY  1957   • BACK SURGERY     • CARDIAC CATHETERIZATION  2011    with vein graft   • CERVICAL FUSION     • CERVICAL FUSION  1992    C3-4   • COLONOSCOPY  2013   • CORONARY ARTERY BYPASS GRAFT  1994   • HERNIA REPAIR Right 2011    inguinal   • INGUINAL HERNIA REPAIR Left 10/29/2019    Procedure: INGUINAL HERNIA REPAIR LEFT;  Surgeon: Charisma Raines MD;  Location: [facility] OR;  Service: General   • LUMBAR LAMINECTOMY  1996    laminectomy, lumbar, L3   • OTHER SURGICAL HISTORY      wrist surgery   • TONSILLECTOMY AND ADENOIDECTOMY  1945   Visit Dx:     ICD-10-CM ICD-9-CM   1. Neck pain M54.2 723.1       Patient History     Row Name 12/12/19 1300             History    Chief Complaint  Difficulty with daily activities;Joint stiffness;Pain  -MM      Type of Pain  Neck pain  -MM      Date Current Problem(s) Began  -- Client has had neck pain/stiffness for several years.   -MM      Brief Description of Current Complaint  Client reports significant neck stiffness that has been gradually getting worse over the years. His primary concern is being able to turn his head for driving. He has a history of cervical fusion C3/C4 1992.   -MM      Patient/Caregiver Goals  Improve mobility  -MM         Pain     Pain Location  Neck  -MM      Pain at Present  0  -MM      Pain at Best  0  -MM      Pain at Worst  3  -MM      Pain Frequency  Intermittent  -MM      Pain Description  Aching;Tightness  -MM      Pain Comments  Pain is often mild with movement. Primary concern is stiffness with rotation.  -MM      Difficulties with ADL's?  turning head, looking up and down.  -MM         Fall Risk Assessment     Any falls in the past year:  No  -MM         Daily Activities    Primary Language  English  -MM      Barriers to learning  None  -MM      Pt Participated in POC and Goals  Yes  -MM        User Key  (r) = Recorded By, (t) = Taken By, (c) = Cosigned By    Initials Name Provider Type    Jose Coronel PT Physical Therapist        PT Ortho     Row Name 12/12/19 1300       Precautions and Contraindications    Precautions  cervical fusion in 1992 of C3/C4  -MM       Posture/Observations    Posture/Observations Comments  moderate forward head/thoracic kyphosis  -MM       General ROM    Head/Neck/Trunk  Neck Extension;Neck Flexion;Neck Lt Lateral Flexion;Neck Rt Lateral Flexion;Neck Lt Rotation;Neck Rt Rotation  -MM       Head/Neck/Trunk    Neck Extension AROM  7  -MM    Neck Flexion AROM  25  -MM    Neck Lt Lateral Flexion AROM  10  -MM    Neck Rt Lateral Flexion AROM  9  -MM    Neck Lt Rotation AROM  19  -MM    Neck Rt Rotation AROM  19  -MM    Head/Neck/Trunk Comments  Cervical PA/lateral glide: hypomobility throughout.  No pain with gentle manual traction or repeated movement into rotation or extension.  -MM      User Key  (r) = Recorded By, (t) = Taken By, (c) = Cosigned By    Initials Name Provider Type    Jose Coronel PT Physical Therapist      Therapy Education  Education Details: Provided and reviewed home program. Exercises included: gentle cervical extension with towel, and gentle rotation bilateral with towel assist.   Given: HEP  Program: New  How Provided: Verbal  Provided to: Patient  Level of Understanding: Teach back education performed     PT OP Goals     Row Name 12/12/19 1300          PT Short Term Goals    STG Date to Achieve  01/02/20  -MM     STG 1  Client wll report 50% improvement in neck pain with daily activity.  -MM     STG 1 Progress  New  -MM     STG 2  Neck rotation AROM will improve to 28 degrees bilateral.   -MM     STG 2 Progress  New  -MM     STG 3  Neck extension AROM will  improve to 15 degrees.   -MM     STG 3 Progress  New  -MM        Long Term Goals    LTG Date to Achieve  01/23/20  -MM     LTG 1  Neck Disability score will improve to 8% or better.   -MM     LTG 1 Progress  New  -MM     LTG 2  Client will report 50% improvement in ability to drive and rotate his head.   -MM     LTG 2 Progress  New  -MM        Time Calculation    PT Goal Re-Cert Due Date  03/11/20  -MM       User Key  (r) = Recorded By, (t) = Taken By, (c) = Cosigned By    Initials Name Provider Type    MM Jose Day, PT Physical Therapist        PT Assessment/Plan     Row Name 12/12/19 1300          PT Assessment    Functional Limitations  Limitation in home management;Performance in leisure activities;Performance in self-care ADL;Limitations in community activities  -MM     Impairments  Pain;Joint mobility;Range of motion  -MM     Assessment Comments  Client presents with evolving symptoms of low complexity. signs and symptoms are consistent with neck pain related to degenerative changes and arthritis. He has very limited motion in all cervical directions of movement. Skilled intervention is required to maximize overall mobility.   -MM     Please refer to paper survey for additional self-reported information  Yes  -MM     Rehab Potential  Fair  -MM     Patient/caregiver participated in establishment of treatment plan and goals  Yes  -MM     Patient would benefit from skilled therapy intervention  Yes  -MM        PT Plan    PT Frequency  1x/week;2x/week  -MM     Predicted Duration of Therapy Intervention (Therapy Eval)  4-8 visits  -MM     Planned CPT's?  PT EVAL LOW COMPLEXITY: 74562;PT THER PROC EA 15 MIN: 72061;PT THER ACT EA 15 MIN: 44311;PT MANUAL THERAPY EA 15 MIN: 58040;PT HOT OR COLD PACK TREAT MCARE  -MM     PT Plan Comments  Continue per plan of treatment with exercises and manual therapy.   -MM       User Key  (r) = Recorded By, (t) = Taken By, (c) = Cosigned By    Initials Name Provider Type     MM Jose Day, PT Physical Therapist        Outcome Measure Options: Neck Disability Index (NDI)  Neck Disability Index  Section 1 - Pain Intensity: I have no pain at the moment.  Section 2 - Personal Care: I can look after myself normally without causing extra pain.  Section 3 - Lifting: I can lift heavy weights, but it gives me extra pain.  Section 4 - Work: I can do as much work as I want.  Section 5 - Headaches: I have no headaches at all.  Section 6 - Concentration: I can concentrate fully without difficulty.  Section 7 - Sleeping: My sleep is slightly disturbed for less than 1 hour.  Section 8 - Driving: I can drive as long as I want with moderate neck pain.  Section 9 - Reading: I can read as much as I want with slight neck pain.  Section 10 - Recreation: I have some neck pain with all recreational activities.  Neck Disability Index Score: 6  Neck Disability Index Comments: 12%    Time Calculation:   Start Time: 1300     Therapy Charges for Today     Code Description Service Date Service Provider Modifiers Qty    [id number] HC PT EVAL LOW COMPLEXITY 3 12/12/2019 Jose Day, PT GP 1        PT G-Codes  Outcome Measure Options: Neck Disability Index (NDI)  Neck Disability Index Score: 6     Jose Day, PT  12/12/2019

## 2019-12-17 ENCOUNTER — HOSPITAL ENCOUNTER (OUTPATIENT)
Dept: PHYSICAL THERAPY | Facility: HOSPITAL | Age: 79
Setting detail: THERAPIES SERIES
Discharge: HOME | End: 2019-12-17

## 2019-12-17 DIAGNOSIS — M54.2 NECK PAIN: Primary | ICD-10-CM

## 2019-12-17 PROCEDURE — 97140 MANUAL THERAPY 1/> REGIONS: CPT

## 2019-12-17 NOTE — THERAPY TREATMENT NOTE
Outpatient Physical Therapy Ortho Treatment Note  [facility]     Patient Name: Toño Alcala  : 1940  MRN: [id number]  Today's Date: 2019      Visit Date: 2019    Visit Dx:    ICD-10-CM ICD-9-CM   1. Neck pain M54.2 723.1     Patient Active Problem List   Diagnosis   • CAD (coronary artery disease)   • CVD (cardiovascular disease)   • Hypertension   • DVT (deep venous thrombosis) (CMS/[facility])   • Diabetes mellitus (CMS/[facility])   • Dyslipidemia   • Arthritis   • GERD (gastroesophageal reflux disease)   • Mixed hyperlipidemia   • Diabetic nephropathy associated with type 2 diabetes mellitus (CMS/[facility])   • Diabetic polyneuropathy associated with type 2 diabetes mellitus (CMS/[facility])   • Chronic kidney disease, stage III (moderate) (CMS/[facility])   • Vitamin D deficiency   • Disc disorder of cervical region   • Postthrombotic syndrome   • Vertigo   • Angina pectoris (CMS/[facility])   • Lumbosacral spondylosis without myelopathy   • Psoriasis   • Arthritis of elbow   • Swelling of right lower extremity   • Acute right-sided low back pain without sciatica   • Hoarseness   • Unifocal PVCs   • Skin lesion of face   • Type 2 diabetes mellitus without complication, without long-term current use of insulin (CMS/[facility])   • Benign essential hypertension   • Medicare annual wellness visit, subsequent   • Stage 3 chronic kidney disease due to benign hypertension (CMS/[facility])   • BMI 30.0-30.9,adult     Past Medical History:   Diagnosis Date   • Arthritis    • Bilateral radial fractures     fracture bilateral radial heads   • CAD (coronary artery disease)    • CVD (cardiovascular disease)     History of TIA    • Diabetes mellitus (CMS/[facility])    • DVT (deep venous thrombosis) (CMS/[facility])     Right lower extremity DVT and pulmonary embolism in 2015, idiopathic   • DVT of proximal lower limb (CMS/[facility]) 2015    proximal DVT right leg, small PE- anticoagulation   • Dyslipidemia    • Fracture     H/o pf left forearm  fracture   • Fracture of right hand 1980   • GERD (gastroesophageal reflux disease)     with hiatal hernia   • Hx of angina pectoris    • Hypertension     Normal renal angiography 02/16/11   • Left wrist fracture 1953   • Osteoarthritis    • Right wrist fracture    • Vertigo    • Wears glasses      Past Surgical History:   Procedure Laterality Date   • APPENDECTOMY  1957   • BACK SURGERY     • CARDIAC CATHETERIZATION  2011    with vein graft   • CERVICAL FUSION     • CERVICAL FUSION  1992    C3-4   • COLONOSCOPY  2013   • CORONARY ARTERY BYPASS GRAFT  1994   • HERNIA REPAIR Right 2011    inguinal   • INGUINAL HERNIA REPAIR Left 10/29/2019    Procedure: INGUINAL HERNIA REPAIR LEFT;  Surgeon: Charisma Raines MD;  Location: [facility] OR;  Service: General   • LUMBAR LAMINECTOMY  1996    laminectomy, lumbar, L3   • OTHER SURGICAL HISTORY      wrist surgery   • TONSILLECTOMY AND ADENOIDECTOMY  1945       PT Assessment/Plan     Row Name 12/17/19 1430          PT Assessment    Assessment Comments  Client tolerates manual therapy without complaints. Overall mobility is very limited with firm end feel.  -MM        PT Plan    PT Plan Comments  Continue per plan of treatment working on range as able.   -MM       User Key  (r) = Recorded By, (t) = Taken By, (c) = Cosigned By    Initials Name Provider Type    MM Jose Day, PT Physical Therapist        OP Exercises     Row Name 12/17/19 1515 12/17/19 1425          Subjective Comments    Subjective Comments  --  No new complaints.   -MM        Subjective Pain    Able to rate subjective pain?  --  yes  -MM     Pre-Treatment Pain Level  --  1  -MM     Post-Treatment Pain Level  --  1  -MM        Total Minutes    69418 - PT Therapeutic Exercise Minutes  --  3  -MM     06762 - PT Manual Therapy Minutes  24  -MM  --        Exercise 1    Exercise Name 1  --  reviewed home program and modified to supine neck rotation with towel vs. seated.   -MM     Cueing 1  --  Verbal  -MM      Time 1  --  3  -MM     Additional Comments  --  ther ex  -MM       User Key  (r) = Recorded By, (t) = Taken By, (c) = Cosigned By    Initials Name Provider Type    Jose Coronel, PT Physical Therapist           Manual Rx (last 36 hours)      Manual Treatments     Row Name 12/17/19 1515             Total Minutes    53823 - PT Manual Therapy Minutes  24  -MM         Manual Rx 1    Manual Rx 1 Location  neck  -MM      Manual Rx 1 Type  Included mobilization with movement with client supine. Mobilizations included bilateral rotation, bilateral sidebending. Also included PA and lateral glides grade 3. Included gentle manual traction using towel.   -MM      Manual Rx 1 Duration  24 min  -MM        User Key  (r) = Recorded By, (t) = Taken By, (c) = Cosigned By    Initials Name Provider Type    Jose Coronel, PT Physical Therapist      Time Calculation:   Start Time: 1430  Therapy Charges for Today     Code Description Service Date Service Provider Modifiers Qty    87435251453 HC PT MANUAL THERAPY EA 15 MIN 12/17/2019 Jose Day, PT GP 2        Jose Day, PT  12/17/2019

## 2019-12-23 ENCOUNTER — HOSPITAL ENCOUNTER (OUTPATIENT)
Dept: PHYSICAL THERAPY | Facility: HOSPITAL | Age: 79
Setting detail: THERAPIES SERIES
Discharge: HOME | End: 2019-12-23

## 2019-12-23 DIAGNOSIS — M54.2 NECK PAIN: Primary | ICD-10-CM

## 2019-12-23 PROCEDURE — 97140 MANUAL THERAPY 1/> REGIONS: CPT

## 2019-12-23 NOTE — THERAPY TREATMENT NOTE
Outpatient Physical Therapy Ortho Treatment Note  [facility]     Patient Name: Toño Alcala  : 1940  MRN: [id number]  Today's Date: 2019      Visit Date: 2019    Visit Dx:    ICD-10-CM ICD-9-CM   1. Neck pain M54.2 723.1       Patient Active Problem List   Diagnosis   • CAD (coronary artery disease)   • CVD (cardiovascular disease)   • Hypertension   • DVT (deep venous thrombosis) (CMS/[facility])   • Diabetes mellitus (CMS/[facility])   • Dyslipidemia   • Arthritis   • GERD (gastroesophageal reflux disease)   • Mixed hyperlipidemia   • Diabetic nephropathy associated with type 2 diabetes mellitus (CMS/[facility])   • Diabetic polyneuropathy associated with type 2 diabetes mellitus (CMS/[facility])   • Chronic kidney disease, stage III (moderate) (CMS/[facility])   • Vitamin D deficiency   • Disc disorder of cervical region   • Postthrombotic syndrome   • Vertigo   • Angina pectoris (CMS/[facility])   • Lumbosacral spondylosis without myelopathy   • Psoriasis   • Arthritis of elbow   • Swelling of right lower extremity   • Acute right-sided low back pain without sciatica   • Hoarseness   • Unifocal PVCs   • Skin lesion of face   • Type 2 diabetes mellitus without complication, without long-term current use of insulin (CMS/[facility])   • Benign essential hypertension   • Medicare annual wellness visit, subsequent   • Stage 3 chronic kidney disease due to benign hypertension (CMS/[facility])   • BMI 30.0-30.9,adult        Past Medical History:   Diagnosis Date   • Arthritis    • Bilateral radial fractures     fracture bilateral radial heads   • CAD (coronary artery disease)    • CVD (cardiovascular disease)     History of TIA    • Diabetes mellitus (CMS/[facility])    • DVT (deep venous thrombosis) (CMS/[facility])     Right lower extremity DVT and pulmonary embolism in 2015, idiopathic   • DVT of proximal lower limb (CMS/[facility]) 2015    proximal DVT right leg, small PE- anticoagulation   • Dyslipidemia    • Fracture     H/o pf left forearm  fracture   • Fracture of right hand 1980   • GERD (gastroesophageal reflux disease)     with hiatal hernia   • Hx of angina pectoris    • Hypertension     Normal renal angiography 02/16/11   • Left wrist fracture 1953   • Osteoarthritis    • Right wrist fracture    • Vertigo    • Wears glasses         Past Surgical History:   Procedure Laterality Date   • APPENDECTOMY  1957   • BACK SURGERY     • CARDIAC CATHETERIZATION  2011    with vein graft   • CERVICAL FUSION     • CERVICAL FUSION  1992    C3-4   • COLONOSCOPY  2013   • CORONARY ARTERY BYPASS GRAFT  1994   • HERNIA REPAIR Right 2011    inguinal   • INGUINAL HERNIA REPAIR Left 10/29/2019    Procedure: INGUINAL HERNIA REPAIR LEFT;  Surgeon: Charisma Raines MD;  [facility]  Service: General   • LUMBAR LAMINECTOMY  1996    laminectomy, lumbar, L3   • OTHER SURGICAL HISTORY      wrist surgery   • TONSILLECTOMY AND ADENOIDECTOMY  1945       PT Ortho     Row Name 12/23/19 1544       Head/Neck/Trunk    Neck Extension AROM  20 degrees  -MM    Neck Flexion AROM  25 degrees  -MM    Neck Lt Rotation AROM  21  -MM    Neck Rt Rotation AROM  31  -MM      User Key  (r) = Recorded By, (t) = Taken By, (c) = Cosigned By    Initials Name Provider Type    Jose Coronel, PT Physical Therapist          PT Assessment/Plan     Row Name 12/23/19 154          PT Assessment    Assessment Comments  Client continues with considerable stiffness in his neck some improvement was noted with rotation to the right after manual therapy.  He is still quite limited with left rotation.  -MM        PT Plan    PT Plan Comments  Client plans to continue his home program to maximize range of motion as able.  -MM       User Key  (r) = Recorded By, (t) = Taken By, (c) = Cosigned By    Initials Name Provider Type    Jose Coronel, PT Physical Therapist        OP Exercises     Row Name 12/23/19 8684             Subjective Comments    Subjective Comments  No new complaints.   Client continues with quite a bit of stiffness.  -MM         Subjective Pain    Able to rate subjective pain?  yes  -MM      Pre-Treatment Pain Level  1  -MM      Post-Treatment Pain Level  1  -MM         Total Minutes    33629 - PT Manual Therapy Minutes  30  -MM        User Key  (r) = Recorded By, (t) = Taken By, (c) = Cosigned By    Initials Name Provider Type    Jose Coronel, PT Physical Therapist              Manual Rx (last 36 hours)      Manual Treatments     Row Name 12/23/19 1545             Total Minutes    07303 - PT Manual Therapy Minutes  30  -MM         Manual Rx 1    Manual Rx 1 Location  Neck  -MM      Manual Rx 1 Type  Included mobilization with movement for manual traction, lateral flexion bilateral, and rotation bilateral.  -MM      Manual Rx 1 Duration  30 minutes  -MM        User Key  (r) = Recorded By, (t) = Taken By, (c) = Cosigned By    Initials Name Provider Type    Jose Coronel, PT Physical Therapist        Time Calculation:   Start Time: 1545  Therapy Charges for Today     Code Description Service Date Service Provider Modifiers Qty    84127855166 HC PT MANUAL THERAPY EA 15 MIN 12/23/2019 Jose Day, PT GP 2      Jose Day, PT  12/23/2019

## 2020-01-06 ENCOUNTER — HOSPITAL ENCOUNTER (EMERGENCY)
Facility: HOSPITAL | Age: 80
Discharge: HOME | End: 2020-01-06
Attending: EMERGENCY MEDICINE | Admitting: EMERGENCY MEDICINE

## 2020-01-06 ENCOUNTER — APPOINTMENT (OUTPATIENT)
Dept: GENERAL RADIOLOGY | Facility: HOSPITAL | Age: 80
End: 2020-01-06

## 2020-01-06 VITALS
DIASTOLIC BLOOD PRESSURE: 89 MMHG | SYSTOLIC BLOOD PRESSURE: 141 MMHG | TEMPERATURE: 98 F | WEIGHT: 205 LBS | OXYGEN SATURATION: 96 % | HEIGHT: 71 IN | HEART RATE: 73 BPM | RESPIRATION RATE: 18 BRPM | BODY MASS INDEX: 28.7 KG/M2

## 2020-01-06 DIAGNOSIS — R07.89 OTHER CHEST PAIN: Primary | ICD-10-CM

## 2020-01-06 DIAGNOSIS — N18.30 CHRONIC RENAL INSUFFICIENCY, STAGE 3 (MODERATE) (HCC): ICD-10-CM

## 2020-01-06 DIAGNOSIS — B02.8 HERPES ZOSTER WITH COMPLICATION: ICD-10-CM

## 2020-01-06 LAB
ALBUMIN SERPL-MCNC: 4.7 G/DL (ref 3.5–5.2)
ALBUMIN/GLOB SERPL: 2 G/DL
ALP SERPL-CCNC: 152 U/L (ref 39–117)
ALT SERPL W P-5'-P-CCNC: 31 U/L (ref 1–41)
ANION GAP SERPL CALCULATED.3IONS-SCNC: 14 MMOL/L (ref 5–15)
AST SERPL-CCNC: 33 U/L (ref 1–40)
BASOPHILS # BLD AUTO: 0.04 10*3/MM3 (ref 0–0.2)
BASOPHILS NFR BLD AUTO: 0.5 % (ref 0–1.5)
BILIRUB SERPL-MCNC: 0.6 MG/DL (ref 0.2–1.2)
BUN BLD-MCNC: 14 MG/DL (ref 8–23)
BUN/CREAT SERPL: 8.4 (ref 7–25)
CALCIUM SPEC-SCNC: 9.7 MG/DL (ref 8.6–10.5)
CHLORIDE SERPL-SCNC: 98 MMOL/L (ref 98–107)
CO2 SERPL-SCNC: 25 MMOL/L (ref 22–29)
CREAT BLD-MCNC: 1.66 MG/DL (ref 0.76–1.27)
D DIMER PPP FEU-MCNC: 1.07 MCGFEU/ML (ref 0–0.56)
DEPRECATED RDW RBC AUTO: 50.2 FL (ref 37–54)
EOSINOPHIL # BLD AUTO: 0.11 10*3/MM3 (ref 0–0.4)
EOSINOPHIL NFR BLD AUTO: 1.5 % (ref 0.3–6.2)
ERYTHROCYTE [DISTWIDTH] IN BLOOD BY AUTOMATED COUNT: 14.7 % (ref 12.3–15.4)
GFR SERPL CREATININE-BSD FRML MDRD: 40 ML/MIN/1.73
GLOBULIN UR ELPH-MCNC: 2.4 GM/DL
GLUCOSE BLD-MCNC: 138 MG/DL (ref 65–99)
HCT VFR BLD AUTO: 46.3 % (ref 37.5–51)
HGB BLD-MCNC: 15.2 G/DL (ref 13–17.7)
HOLD SPECIMEN: NORMAL
HOLD SPECIMEN: NORMAL
IMM GRANULOCYTES # BLD AUTO: 0.03 10*3/MM3 (ref 0–0.05)
IMM GRANULOCYTES NFR BLD AUTO: 0.4 % (ref 0–0.5)
LIPASE SERPL-CCNC: 14 U/L (ref 13–60)
LYMPHOCYTES # BLD AUTO: 0.85 10*3/MM3 (ref 0.7–3.1)
LYMPHOCYTES NFR BLD AUTO: 11.6 % (ref 19.6–45.3)
MCH RBC QN AUTO: 30.3 PG (ref 26.6–33)
MCHC RBC AUTO-ENTMCNC: 32.8 G/DL (ref 31.5–35.7)
MCV RBC AUTO: 92.2 FL (ref 79–97)
MONOCYTES # BLD AUTO: 0.89 10*3/MM3 (ref 0.1–0.9)
MONOCYTES NFR BLD AUTO: 12.2 % (ref 5–12)
NEUTROPHILS # BLD AUTO: 5.39 10*3/MM3 (ref 1.7–7)
NEUTROPHILS NFR BLD AUTO: 73.8 % (ref 42.7–76)
NRBC BLD AUTO-RTO: 0 /100 WBC (ref 0–0.2)
NT-PROBNP SERPL-MCNC: 290.4 PG/ML (ref 5–1800)
PLATELET # BLD AUTO: 217 10*3/MM3 (ref 140–450)
PMV BLD AUTO: 9.9 FL (ref 6–12)
POTASSIUM BLD-SCNC: 4.1 MMOL/L (ref 3.5–5.2)
PROT SERPL-MCNC: 7.1 G/DL (ref 6–8.5)
RBC # BLD AUTO: 5.02 10*6/MM3 (ref 4.14–5.8)
SODIUM BLD-SCNC: 137 MMOL/L (ref 136–145)
TROPONIN T SERPL-MCNC: <0.01 NG/ML (ref 0–0.03)
TROPONIN T SERPL-MCNC: <0.01 NG/ML (ref 0–0.03)
WBC NRBC COR # BLD: 7.31 10*3/MM3 (ref 3.4–10.8)
WHOLE BLOOD HOLD SPECIMEN: NORMAL
WHOLE BLOOD HOLD SPECIMEN: NORMAL

## 2020-01-06 PROCEDURE — 25010000002 HYDROMORPHONE PER 4 MG: Performed by: EMERGENCY MEDICINE

## 2020-01-06 PROCEDURE — 96374 THER/PROPH/DIAG INJ IV PUSH: CPT

## 2020-01-06 PROCEDURE — 83690 ASSAY OF LIPASE: CPT | Performed by: EMERGENCY MEDICINE

## 2020-01-06 PROCEDURE — 25010000002 ONDANSETRON PER 1 MG: Performed by: EMERGENCY MEDICINE

## 2020-01-06 PROCEDURE — 85025 COMPLETE CBC W/AUTO DIFF WBC: CPT | Performed by: EMERGENCY MEDICINE

## 2020-01-06 PROCEDURE — 99284 EMERGENCY DEPT VISIT MOD MDM: CPT

## 2020-01-06 PROCEDURE — 93005 ELECTROCARDIOGRAM TRACING: CPT | Performed by: EMERGENCY MEDICINE

## 2020-01-06 PROCEDURE — 96375 TX/PRO/DX INJ NEW DRUG ADDON: CPT

## 2020-01-06 PROCEDURE — 83880 ASSAY OF NATRIURETIC PEPTIDE: CPT | Performed by: EMERGENCY MEDICINE

## 2020-01-06 PROCEDURE — 84484 ASSAY OF TROPONIN QUANT: CPT | Performed by: EMERGENCY MEDICINE

## 2020-01-06 PROCEDURE — 71045 X-RAY EXAM CHEST 1 VIEW: CPT

## 2020-01-06 PROCEDURE — 85379 FIBRIN DEGRADATION QUANT: CPT | Performed by: EMERGENCY MEDICINE

## 2020-01-06 PROCEDURE — 80053 COMPREHEN METABOLIC PANEL: CPT | Performed by: EMERGENCY MEDICINE

## 2020-01-06 RX ORDER — VALACYCLOVIR HYDROCHLORIDE 500 MG/1
1000 TABLET, FILM COATED ORAL ONCE
Status: COMPLETED | OUTPATIENT
Start: 2020-01-06 | End: 2020-01-06

## 2020-01-06 RX ORDER — VALACYCLOVIR HYDROCHLORIDE 1 G/1
1000 TABLET, FILM COATED ORAL 2 TIMES DAILY
Qty: 14 TABLET | Refills: 0 | Status: SHIPPED | OUTPATIENT
Start: 2020-01-06 | End: 2020-01-13

## 2020-01-06 RX ORDER — ONDANSETRON 2 MG/ML
4 INJECTION INTRAMUSCULAR; INTRAVENOUS ONCE
Status: COMPLETED | OUTPATIENT
Start: 2020-01-06 | End: 2020-01-06

## 2020-01-06 RX ORDER — NITROGLYCERIN 0.4 MG/1
0.4 TABLET SUBLINGUAL
Status: DISCONTINUED | OUTPATIENT
Start: 2020-01-06 | End: 2020-01-06 | Stop reason: HOSPADM

## 2020-01-06 RX ORDER — ASPIRIN 81 MG/1
324 TABLET, CHEWABLE ORAL ONCE
Status: DISCONTINUED | OUTPATIENT
Start: 2020-01-06 | End: 2020-01-06

## 2020-01-06 RX ORDER — SODIUM CHLORIDE 0.9 % (FLUSH) 0.9 %
10 SYRINGE (ML) INJECTION AS NEEDED
Status: DISCONTINUED | OUTPATIENT
Start: 2020-01-06 | End: 2020-01-06 | Stop reason: HOSPADM

## 2020-01-06 RX ORDER — GABAPENTIN 300 MG/1
300 CAPSULE ORAL
Qty: 14 CAPSULE | Refills: 0 | Status: SHIPPED | OUTPATIENT
Start: 2020-01-06 | End: 2020-01-09

## 2020-01-06 RX ORDER — HYDROMORPHONE HYDROCHLORIDE 1 MG/ML
0.5 INJECTION, SOLUTION INTRAMUSCULAR; INTRAVENOUS; SUBCUTANEOUS ONCE
Status: COMPLETED | OUTPATIENT
Start: 2020-01-06 | End: 2020-01-06

## 2020-01-06 RX ORDER — HYDROCODONE BITARTRATE AND ACETAMINOPHEN 5; 325 MG/1; MG/1
1 TABLET ORAL EVERY 6 HOURS PRN
Qty: 15 TABLET | Refills: 0 | Status: SHIPPED | OUTPATIENT
Start: 2020-01-06 | End: 2020-01-29

## 2020-01-06 RX ADMIN — ONDANSETRON 4 MG: 2 INJECTION INTRAMUSCULAR; INTRAVENOUS at 10:34

## 2020-01-06 RX ADMIN — NITROGLYCERIN 0.4 MG: 0.4 TABLET SUBLINGUAL at 08:42

## 2020-01-06 RX ADMIN — VALACYCLOVIR HYDROCHLORIDE 1000 MG: 500 TABLET, FILM COATED ORAL at 12:25

## 2020-01-06 RX ADMIN — NITROGLYCERIN 0.4 MG: 0.4 TABLET SUBLINGUAL at 09:20

## 2020-01-06 RX ADMIN — HYDROMORPHONE HYDROCHLORIDE 0.5 MG: 1 INJECTION, SOLUTION INTRAMUSCULAR; INTRAVENOUS; SUBCUTANEOUS at 10:35

## 2020-01-06 NOTE — ED PROVIDER NOTES
Subjective   Toño Alcala is a pleasant, fortunate 79 y.o. male who presents to the ED with c/o chest pain. The patient reports his intermittent right sided chest pain began 3 days ago and he describes the pain as stabbing and a dull ache. The pain is improved with rest and lying down and exacerbated by exertion but he has not tried taking Nitroglycerin to improve his pain. Currently sitting in the ED the pain is a 4/10. He was awoken this morning with his chest pain radiating to his back, prompting him to present to the ED for evaluation. The patient complains of nausea, vomiting, a raspy sore throat, leg swelling, and a rash extending from his right flank to his back but denies abnormal bowel movements, rhinorrhea, cough, and fever. He has a past medical history of CAD, hypertension, DVT, diabetes mellitus, and angina pectoris. His surgical history includes cardiac catheterization, CABG, and lumbar laminectomy. There are no other acute complaints at this time.      History provided by:  Patient and spouse  Chest Pain   Pain location:  R chest and substernal area  Pain quality: aching, dull and stabbing    Pain radiates to:  Upper back  Pain severity:  Moderate  Onset quality:  Sudden  Duration:  3 days  Timing:  Intermittent  Progression:  Worsening  Chronicity:  Recurrent  Context: at rest    Relieved by:  Rest  Worsened by:  Exertion  Ineffective treatments:  Certain positions  Associated symptoms: back pain, lower extremity edema, nausea and vomiting    Associated symptoms: no altered mental status, no cough, no fever, no near-syncope, no numbness and no syncope    Risk factors: coronary artery disease, diabetes mellitus, hypertension, male sex and prior DVT/PE    Risk factors: no smoking        Review of Systems   Constitutional: Negative for chills and fever.   HENT: Positive for sore throat (raspy). Negative for rhinorrhea.    Respiratory: Negative for cough.    Cardiovascular: Positive for chest pain and  leg swelling. Negative for syncope and near-syncope.   Gastrointestinal: Positive for nausea and vomiting. Negative for blood in stool, constipation and diarrhea.   Musculoskeletal: Positive for back pain.   Skin: Positive for rash.   Neurological: Negative for numbness.   All other systems reviewed and are negative.      Past Medical History:   Diagnosis Date   • Arthritis    • Bilateral radial fractures 1962    fracture bilateral radial heads   • CAD (coronary artery disease)    • CVD (cardiovascular disease)     History of TIA 1989   • Diabetes mellitus (CMS/[facility])    • DVT (deep venous thrombosis) (CMS/[facility])     Right lower extremity DVT and pulmonary embolism in 06/2015, idiopathic   • DVT of proximal lower limb (CMS/[facility]) 06/22/2015    proximal DVT right leg, small PE- anticoagulation   • Dyslipidemia    • Fracture 1952    H/o pf left forearm fracture   • Fracture of right hand 1980   • GERD (gastroesophageal reflux disease)     with hiatal hernia   • Hx of angina pectoris    • Hypertension     Normal renal angiography 02/16/11   • Left wrist fracture 1953   • Osteoarthritis    • Right wrist fracture    • Vertigo    • Wears glasses      Problem list from office visit with Dr. Hernandez, cardiology, on 5/13/2019:  Assessment:   Toño was seen today for coronary artery disease, hypertension and dyslipidemia.     Diagnoses and all orders for this visit:     Coronary artery disease involving native coronary artery of native heart without angina pectoris     CVD (cardiovascular disease)     Essential hypertension     Dyslipidemia        ECHO performed on 3/27/2014:  Conclusions  1. Normal stress ECHO.     Findings Rest  Left Ventricle:  Global left ventricular wall motion and contractility are within normal  limits. The estimated ejection fraction is 55-60%.       Stress:  Patient EKG is normal.   Findings Peak  Stress:  Patient followed a Dyllan protocol. The total exercise duration was:  7:00. The study was terminated  because peak was reached.No increase in  chest pain. This is a negative electrocardiographic stress test. This  was a negative echocardiographic stress test. Echocardiographic findings  are consistent with normal left ventricular function.      Electronically signed by: Avelino Hernandez MD on 2014 17:08:27      Allergies   Allergen Reactions   • Penicillins Hives and Swelling     Per patient, has tolerated Keflex       Past Surgical History:   Procedure Laterality Date   • APPENDECTOMY     • BACK SURGERY     • CARDIAC CATHETERIZATION      with vein graft   • CERVICAL FUSION     • CERVICAL FUSION      C3-4   • COLONOSCOPY     • CORONARY ARTERY BYPASS GRAFT     • HERNIA REPAIR Right     inguinal   • INGUINAL HERNIA REPAIR Left 10/29/2019    Procedure: INGUINAL HERNIA REPAIR LEFT;  Surgeon: Charisma Raines MD;  Location: [facility];  Service: General   • LUMBAR LAMINECTOMY      laminectomy, lumbar, L3   • OTHER SURGICAL HISTORY      wrist surgery   • TONSILLECTOMY AND ADENOIDECTOMY         Family History   Problem Relation Age of Onset   • Arthritis Mother         OA   • Heart disease Father    • Diabetes Father    • Heart attack Father    • Heart disease Brother    • Heart attack Brother    • Diabetes Brother    • Diabetes Son    • Hypertension Other    • Cancer Other        Social History     Socioeconomic History   • Marital status:      Spouse name: Not on file   • Number of children: Not on file   • Years of education: Not on file   • Highest education level: Not on file   Tobacco Use   • Smoking status: Former Smoker     Packs/day: 1.00     Years: 20.00     Pack years: 20.00     Types: Cigarettes     Last attempt to quit: 1997     Years since quittin.7   • Smokeless tobacco: Never Used   • Tobacco comment: QUIT DATE 1992   Substance and Sexual Activity   • Alcohol use: Yes     Alcohol/week: 7.0 standard drinks     Types: 7 Glasses of wine per week      Comment: glass of wine everyday    • Drug use: No   • Sexual activity: Defer         Objective   Physical Exam   Constitutional: He is oriented to person, place, and time. He appears well-developed and well-nourished. No distress.   The patient is in no acute distress.   HENT:   Head: Normocephalic and atraumatic.   Uvula midline, normal oropharynx and nasal pharynx.   Eyes: Conjunctivae are normal. No scleral icterus.   Neck: Normal range of motion. Neck supple.   No adenopathy, JVD, bruits or thyromegaly.   Cardiovascular: Normal rate, regular rhythm and normal heart sounds.   No murmur heard.  Pulmonary/Chest: Effort normal and breath sounds normal. No respiratory distress.   Abdominal: Soft. There is no tenderness. There is no guarding.   Positive bowel sounds, soft, non tender. No organomegaly or hepatosplenomegaly. Flanks are without masses or rashes.   Musculoskeletal: Normal range of motion. He exhibits no edema.   No rash, synovitis or edema. Axilla is without rashes or masses.   Neurological: He is alert and oriented to person, place, and time.   Face symmetric, voices strong, tongue midline.  Vision, hearing and speech all preserved.  Motor strength, DTRs are normal.  Negative for sensory deficit.   Skin: Skin is warm and dry. Rash noted. Rash is vesicular.   Right sided T-8 distribution has evidence of shingles with acute vesicles. This rash extends to the abdomen.   Psychiatric: He has a normal mood and affect. His behavior is normal.   Nursing note and vitals reviewed.      Procedures         ED Course  ED Course as of Jan 06 1712   Mon Jan 06, 2020   1028 Dr. Manrique is bedside re-evaluating the patient and updating him on the results of the studies.    [BS]   1219 CAREY query complete. Treatment plan to include limited course of prescribed  controlled substance. Risks including addiction, benefits, and alternatives presented to patient.   Request number: [id number]    [BS]      ED Course User  Index  [BS] Norberto Vazquez       Recent Results (from the past 24 hour(s))   Lavender Top    Collection Time: 01/06/20  7:53 AM   Result Value Ref Range    Extra Tube hold for add-on    CBC Auto Differential    Collection Time: 01/06/20  7:53 AM   Result Value Ref Range    WBC 7.31 3.40 - 10.80 10*3/mm3    RBC 5.02 4.14 - 5.80 10*6/mm3    Hemoglobin 15.2 13.0 - 17.7 g/dL    Hematocrit 46.3 37.5 - 51.0 %    MCV 92.2 79.0 - 97.0 fL    MCH 30.3 26.6 - 33.0 pg    MCHC 32.8 31.5 - 35.7 g/dL    RDW 14.7 12.3 - 15.4 %    RDW-SD 50.2 37.0 - 54.0 fl    MPV 9.9 6.0 - 12.0 fL    Platelets 217 140 - 450 10*3/mm3    Neutrophil % 73.8 42.7 - 76.0 %    Lymphocyte % 11.6 (L) 19.6 - 45.3 %    Monocyte % 12.2 (H) 5.0 - 12.0 %    Eosinophil % 1.5 0.3 - 6.2 %    Basophil % 0.5 0.0 - 1.5 %    Immature Grans % 0.4 0.0 - 0.5 %    Neutrophils, Absolute 5.39 1.70 - 7.00 10*3/mm3    Lymphocytes, Absolute 0.85 0.70 - 3.10 10*3/mm3    Monocytes, Absolute 0.89 0.10 - 0.90 10*3/mm3    Eosinophils, Absolute 0.11 0.00 - 0.40 10*3/mm3    Basophils, Absolute 0.04 0.00 - 0.20 10*3/mm3    Immature Grans, Absolute 0.03 0.00 - 0.05 10*3/mm3    nRBC 0.0 0.0 - 0.2 /100 WBC   Comprehensive Metabolic Panel    Collection Time: 01/06/20  7:54 AM   Result Value Ref Range    Glucose 138 (H) 65 - 99 mg/dL    BUN 14 8 - 23 mg/dL    Creatinine 1.66 (H) 0.76 - 1.27 mg/dL    Sodium 137 136 - 145 mmol/L    Potassium 4.1 3.5 - 5.2 mmol/L    Chloride 98 98 - 107 mmol/L    CO2 25.0 22.0 - 29.0 mmol/L    Calcium 9.7 8.6 - 10.5 mg/dL    Total Protein 7.1 6.0 - 8.5 g/dL    Albumin 4.70 3.50 - 5.20 g/dL    ALT (SGPT) 31 1 - 41 U/L    AST (SGOT) 33 1 - 40 U/L    Alkaline Phosphatase 152 (H) 39 - 117 U/L    Total Bilirubin 0.6 0.2 - 1.2 mg/dL    eGFR Non African Amer 40 (L) >60 mL/min/1.73    Globulin 2.4 gm/dL    A/G Ratio 2.0 g/dL    BUN/Creatinine Ratio 8.4 7.0 - 25.0    Anion Gap 14.0 5.0 - 15.0 mmol/L   Lipase    Collection Time: 01/06/20  7:54 AM   Result Value Ref Range     Lipase 14 13 - 60 U/L   BNP    Collection Time: 01/06/20  7:54 AM   Result Value Ref Range    proBNP 290.4 5.0-1,800.0 pg/mL   Light Blue Top    Collection Time: 01/06/20  7:54 AM   Result Value Ref Range    Extra Tube hold for add-on    Gold Top - SST    Collection Time: 01/06/20  7:54 AM   Result Value Ref Range    Extra Tube Hold for add-ons.    D-dimer, Quantitative    Collection Time: 01/06/20  7:54 AM   Result Value Ref Range    D-Dimer, Quantitative 1.07 (H) 0.00 - 0.56 MCGFEU/mL   Troponin    Collection Time: 01/06/20  8:49 AM   Result Value Ref Range    Troponin T <0.010 0.000 - 0.030 ng/mL   Green Top (Gel)    Collection Time: 01/06/20  8:49 AM   Result Value Ref Range    Extra Tube Hold for add-ons.    Troponin    Collection Time: 01/06/20 11:29 AM   Result Value Ref Range    Troponin T <0.010 0.000 - 0.030 ng/mL     Note: In addition to lab results from this visit, the labs listed above may include labs taken at another facility or during a different encounter within the last 24 hours. Please correlate lab times with ED admission and discharge times for further clarification of the services performed during this visit.    XR Chest 1 View   Final Result   Chronic changes are seen within the lung fields bilaterally.   The heart is enlarged. Degenerative change is seen within the spine.   Slight prominence of the pulmonary vascularity.       D:  01/06/2020   E:  01/06/2020       This report was finalized on 1/6/2020 4:28 PM by Dr. Brenna Ronquillo MD.            Vitals:    01/06/20 0900 01/06/20 0920 01/06/20 1011 01/06/20 1214   BP: 121/82 121/82 116/87 141/89   BP Location:       Patient Position:       Pulse: 80 82 82 73   Resp: 18  18 18   Temp:    98 °F (36.7 °C)   TempSrc:    Oral   SpO2: 93%  93% 96%   Weight:       Height:         Medications   ondansetron (ZOFRAN) injection 4 mg (4 mg Intravenous Given 1/6/20 1034)   HYDROmorphone (DILAUDID) injection 0.5 mg (0.5 mg Intravenous Given 1/6/20  1035)   valACYclovir (VALTREX) tablet 1,000 mg (1,000 mg Oral Given 1/6/20 1225)     ECG/EMG Results (last 24 hours)     Procedure Component Value Units Date/Time    ECG 12 Lead [[id number]] Collected:  01/06/20 0742     Updated:  01/06/20 0750    Narrative:       Test Reason : chest pain  Blood Pressure : **/** mmHG  Vent. Rate : 095 BPM     Atrial Rate : 095 BPM     P-R Int : 178 ms          QRS Dur : 096 ms      QT Int : 372 ms       P-R-T Axes : 067 035 064 degrees     QTc Int : 467 ms    Normal sinus rhythm  Nonspecific ST and T wave abnormality  Abnormal ECG  When compared with ECG of 24-OCT-2019 12:08,  No significant change was found  Confirmed by MICHELLE STYLES MD ([id number]) on 1/6/2020 7:50:18 AM    Referred By:  MICHELE GARVIN           Confirmed By:MICHELLE STYLES MD        ECG 12 Lead   Final Result   Test Reason : 2nd set   Blood Pressure : **/** mmHG   Vent. Rate : 074 BPM     Atrial Rate : 074 BPM      P-R Int : 182 ms          QRS Dur : 092 ms       QT Int : 408 ms       P-R-T Axes : 037 016 057 degrees      QTc Int : 452 ms      Normal sinus rhythm   Normal ECG   When compared with ECG of 06-JAN-2020 07:42,   ST no longer depressed in Anterolateral leads   Confirmed by MICHELLE STYLES MD ([id number]) on 1/6/2020 11:38:21 AM      Referred By:  john           Confirmed By:MICHELLE STYLES MD      ECG 12 Lead   Final Result   Test Reason : chest pain   Blood Pressure : **/** mmHG   Vent. Rate : 095 BPM     Atrial Rate : 095 BPM      P-R Int : 178 ms          QRS Dur : 096 ms       QT Int : 372 ms       P-R-T Axes : 067 035 064 degrees      QTc Int : 467 ms      Normal sinus rhythm   Nonspecific ST and T wave abnormality   Abnormal ECG   When compared with ECG of 24-OCT-2019 12:08,   No significant change was found   Confirmed by MICHELLE STYLES MD ([id number]) on 1/6/2020 7:50:18 AM      Referred By:  MICHELE GARVIN           Confirmed By:MICHELLE STYLES MD                        MDM  Number of Diagnoses or Management Options  Chronic renal  insufficiency, stage 3 (moderate) (CMS/HCC):   Herpes zoster with complication:   Other chest pain:   Diagnosis management comments:       I reviewed all available studies at the bedside with the patient and his family.  His cardiac markers and EKG are quite reassuring.  His d-dimer, that I ordered prior to seeing the patient, was a bit elevated but at this point after discussions with the patient and his wife given his renal insufficiency I would not pursue this aggressively.  I really think his pain is being caused by his outbreak of shingles.  We discussed treatment of this.  He feels much better here after some IV pain medicine and Valtrex.  We will go ahead and treat him with this and we talked about postherpetic neuralgia we will go ahead and start him on a little bit of Neurontin as well to see if this helps prevent increase in pain.  I have asked him to follow-up with his PCP for recheck.  He will return to the ER if worse in any way.    All are agreeable with the plan       Amount and/or Complexity of Data Reviewed  Clinical lab tests: reviewed  Tests in the radiology section of CPT®: reviewed  Tests in the medicine section of CPT®: reviewed  Decide to obtain previous medical records or to obtain history from someone other than the patient: yes        Final diagnoses:   Other chest pain   Herpes zoster with complication   Chronic renal insufficiency, stage 3 (moderate) (CMS/HCC)       Documentation assistance provided by danica RUSSELL [facility].  Information recorded by the danica was done at my direction and has been verified and validated by me.     TaylorNorberto mehta  01/06/20 0754       [facility]  01/06/20 0858       [facility]  01/06/20 1328       hSan Manrique MD  01/06/20 5425

## 2020-01-09 ENCOUNTER — OFFICE VISIT (OUTPATIENT)
Dept: INTERNAL MEDICINE | Facility: CLINIC | Age: 80
End: 2020-01-09

## 2020-01-09 VITALS
DIASTOLIC BLOOD PRESSURE: 72 MMHG | HEIGHT: 71 IN | HEART RATE: 76 BPM | BODY MASS INDEX: 29.68 KG/M2 | WEIGHT: 212 LBS | SYSTOLIC BLOOD PRESSURE: 136 MMHG | TEMPERATURE: 98.2 F

## 2020-01-09 DIAGNOSIS — B02.9 HERPES ZOSTER WITHOUT COMPLICATION: ICD-10-CM

## 2020-01-09 DIAGNOSIS — R11.2 NON-INTRACTABLE VOMITING WITH NAUSEA, UNSPECIFIED VOMITING TYPE: Primary | ICD-10-CM

## 2020-01-09 PROCEDURE — 99213 OFFICE O/P EST LOW 20 MIN: CPT | Performed by: INTERNAL MEDICINE

## 2020-01-09 RX ORDER — ONDANSETRON 4 MG/1
4 TABLET, ORALLY DISINTEGRATING ORAL EVERY 8 HOURS PRN
Qty: 30 TABLET | Refills: 0 | Status: SHIPPED | OUTPATIENT
Start: 2020-01-09 | End: 2020-12-03

## 2020-01-09 NOTE — PROGRESS NOTES
[facility]     Toño Alcala  1940   [id number]      Patient Care Team:  Radu Francis MD as PCP - General    Chief Complaint::   Chief Complaint   Patient presents with   • Follow-up     Patient was seen in the ER on 01/06/19    • Vomiting        HPI  Mr. Alcala presented to the emergency department on the sixth with shingles.  He has a fairly significant rash on the right at the T8 dermatome.  He was treated with hydrocodone, gabapentin and valacyclovir.  I actually saw him the day after during his wife's visit.  Since then however he has developed vomiting.  Yesterday he had one episode and then this morning after eating some crackers he had profuse vomiting then dry heaves.  He currently feels nausea.  He has had a bowel movement today and has been able to keep down fluids.  He has not taken the hydrocodone for several days.    Chronic Conditions:      Patient Active Problem List   Diagnosis   • CAD (coronary artery disease)   • CVD (cardiovascular disease)   • Hypertension   • DVT (deep venous thrombosis) (CMS/[facility])   • Diabetes mellitus (CMS/[facility])   • Dyslipidemia   • Arthritis   • GERD (gastroesophageal reflux disease)   • Mixed hyperlipidemia   • Diabetic nephropathy associated with type 2 diabetes mellitus (CMS/HCC)   • Diabetic polyneuropathy associated with type 2 diabetes mellitus (CMS/HCC)   • Chronic kidney disease, stage III (moderate) (CMS/[facility])   • Vitamin D deficiency   • Disc disorder of cervical region   • Postthrombotic syndrome   • Vertigo   • Angina pectoris (CMS/HCC)   • Lumbosacral spondylosis without myelopathy   • Psoriasis   • Arthritis of elbow   • Swelling of right lower extremity   • Acute right-sided low back pain without sciatica   • Hoarseness   • Unifocal PVCs   • Skin lesion of face   • Type 2 diabetes mellitus without complication, without long-term current use of insulin (CMS/[facility])   • Benign essential hypertension   • Medicare annual wellness visit,  subsequent   • Stage 3 chronic kidney disease due to benign hypertension (CMS/[facility])   • BMI 30.0-30.9,adult        Past Medical History:   Diagnosis Date   • Arthritis    • Bilateral radial fractures 1962    fracture bilateral radial heads   • CAD (coronary artery disease)    • CVD (cardiovascular disease)     History of TIA 1989   • Diabetes mellitus (CMS/[facility])    • DVT (deep venous thrombosis) (CMS/[facility])     Right lower extremity DVT and pulmonary embolism in 06/2015, idiopathic   • DVT of proximal lower limb (CMS/[facility]) 06/22/2015    proximal DVT right leg, small PE- anticoagulation   • Dyslipidemia    • Fracture 1952    H/o pf left forearm fracture   • Fracture of right hand 1980   • GERD (gastroesophageal reflux disease)     with hiatal hernia   • Hx of angina pectoris    • Hypertension     Normal renal angiography 02/16/11   • Left wrist fracture 1953   • Osteoarthritis    • Right wrist fracture    • Vertigo    • Wears glasses        Past Surgical History:   Procedure Laterality Date   • APPENDECTOMY  1957   • BACK SURGERY     • CARDIAC CATHETERIZATION  2011    with vein graft   • CERVICAL FUSION     • CERVICAL FUSION  1992    C3-4   • COLONOSCOPY  2013   • CORONARY ARTERY BYPASS GRAFT  1994   • HERNIA REPAIR Right 2011    inguinal   • INGUINAL HERNIA REPAIR Left 10/29/2019    Procedure: INGUINAL HERNIA REPAIR LEFT;  Surgeon: Charisma Raines MD;  Location: [facility];  Service: General   • LUMBAR LAMINECTOMY  1996    laminectomy, lumbar, L3   • OTHER SURGICAL HISTORY      wrist surgery   • TONSILLECTOMY AND ADENOIDECTOMY  1945       Family History   Problem Relation Age of Onset   • Arthritis Mother         OA   • Heart disease Father    • Diabetes Father    • Heart attack Father    • Heart disease Brother    • Heart attack Brother    • Diabetes Brother    • Diabetes Son    • Hypertension Other    • Cancer Other        Social History     Socioeconomic History   • Marital status:      Spouse name: Not on  file   • Number of children: Not on file   • Years of education: Not on file   • Highest education level: Not on file   Tobacco Use   • Smoking status: Former Smoker     Packs/day: 1.00     Years: 20.00     Pack years: 20.00     Types: Cigarettes     Last attempt to quit: 1997     Years since quittin.7   • Smokeless tobacco: Never Used   • Tobacco comment: QUIT DATE 1992   Substance and Sexual Activity   • Alcohol use: Yes     Alcohol/week: 7.0 standard drinks     Types: 7 Glasses of wine per week     Comment: glass of wine everyday    • Drug use: No   • Sexual activity: Defer       Allergies   Allergen Reactions   • Penicillins Hives and Swelling     Per patient, has tolerated Keflex         Current Outpatient Medications:   •  acetaminophen (TYLENOL) 325 MG tablet, Take 650 mg by mouth As Needed for Mild Pain ., Disp: , Rfl:   •  aspirin 81 MG EC tablet, Take 81 mg by mouth Daily. Last dose 10-18-19 per patient, Disp: , Rfl:   •  atorvastatin (LIPITOR) 40 MG tablet, Take 40 mg by mouth Daily., Disp: , Rfl:   •  calcipotriene-betamethasone (TACLONEX) 0.005-0.064 % ointment, Apply 1 application topically to the appropriate area as directed As Needed (psoriasis)., Disp: , Rfl:   •  cholecalciferol (VITAMIN D3) 25 MCG (1000 UT) tablet, Take 1,000 Units by mouth Daily., Disp: , Rfl:   •  diclofenac (VOLTAREN) 1 % gel gel, Apply 4 g topically As Needed., Disp: , Rfl:   •  diltiaZEM CD (CARDIZEM CD) 180 MG 24 hr capsule, Take 180 mg by mouth Daily., Disp: , Rfl:   •  HYDROcodone-acetaminophen (NORCO) 5-325 MG per tablet, Take 1 tablet by mouth Every 6 (Six) Hours As Needed for Moderate Pain ., Disp: 15 tablet, Rfl: 0  •  losartan (COZAAR) 100 MG tablet, Take 100 mg by mouth Every Night., Disp: , Rfl:   •  nitroglycerin (NITROSTAT) 0.4 MG SL tablet, Place 0.4 mg under the tongue Every 5 (Five) Minutes As Needed for Chest Pain. Take no more than 3 doses in 15 minutes., Disp: , Rfl:   •  omeprazole  "(priLOSEC) 40 MG capsule, Take 20 mg by mouth Daily., Disp: , Rfl:   •  valACYclovir (VALTREX) 1000 MG tablet, Take 1 tablet by mouth 2 (Two) Times a Day for 7 days., Disp: 14 tablet, Rfl: 0  •  ondansetron ODT (ZOFRAN-ODT) 4 MG disintegrating tablet, Take 1 tablet by mouth Every 8 (Eight) Hours As Needed for Nausea or Vomiting., Disp: 30 tablet, Rfl: 0    Review of Systems   Constitutional: Negative for chills, fatigue and fever.   HENT: Negative for congestion, ear pain and sinus pressure.    Respiratory: Negative for cough, chest tightness, shortness of breath and wheezing.    Cardiovascular: Negative for chest pain and palpitations.   Gastrointestinal: Positive for abdominal pain and vomiting. Negative for blood in stool and constipation.   Skin: Negative for color change.   Allergic/Immunologic: Negative for environmental allergies.   Neurological: Negative for dizziness, speech difficulty and headache.   Psychiatric/Behavioral: Negative for decreased concentration. The patient is not nervous/anxious.         Vital Signs  Vitals:    01/09/20 1300   BP: 136/72   BP Location: Left arm   Patient Position: Sitting   Cuff Size: Large Adult   Pulse: 76   Temp: 98.2 °F (36.8 °C)   TempSrc: Temporal   Weight: 96.2 kg (212 lb)   Height: 180.3 cm (70.98\")   PainSc: 0-No pain       Physical Exam   Constitutional: He is oriented to person, place, and time. He appears well-developed and well-nourished.   HENT:   Head: Normocephalic and atraumatic.   Cardiovascular: Normal rate, regular rhythm and normal heart sounds.   No murmur heard.  Pulmonary/Chest: Effort normal and breath sounds normal.   Abdominal: Soft. Bowel sounds are normal. There is no tenderness.   Neurological: He is alert and oriented to person, place, and time.   Skin:   Shingles  rash extends on the right from midline spine to midline abdomen at the T10 8 dermatome.  The rash has faded since I looked at it 2 days ago, there is no ulceration.   Psychiatric: " He has a normal mood and affect.   Vitals reviewed.     Procedures    ACE III MINI             Assessment/Plan:    Toño was seen today for follow-up and vomiting.    Diagnoses and all orders for this visit:    Non-intractable vomiting with nausea, unspecified vomiting type    Herpes zoster without complication    Other orders  -     ondansetron ODT (ZOFRAN-ODT) 4 MG disintegrating tablet; Take 1 tablet by mouth Every 8 (Eight) Hours As Needed for Nausea or Vomiting.    Plan    The most likely of his medications to cause GI symptoms is hydrocodone, which she has not taken for the past 48 hours.  That leaves valacyclovir or gabapentin.  I have suggested that he stop both medications today.  He is given ondansetron for nausea.  He is to resume valacyclovir tomorrow but if he has persistent nausea will change to Famvir.  His abdominal examination is benign, there is no evidence of an acute abdomen.          Plan of care reviewed with patient at the conclusion of today's visit. Education was provided regarding diagnosis, management, and any prescribed or recommended OTC medications.Patient verbalizes understanding of and agreement with management plan.         Radu Francis MD

## 2020-01-14 ENCOUNTER — TELEPHONE (OUTPATIENT)
Dept: INTERNAL MEDICINE | Facility: CLINIC | Age: 80
End: 2020-01-14

## 2020-01-14 DIAGNOSIS — B02.29 POSTHERPETIC NEURALGIA: Primary | ICD-10-CM

## 2020-01-14 RX ORDER — GABAPENTIN 100 MG/1
100 CAPSULE ORAL NIGHTLY
Qty: 30 CAPSULE | Refills: 2 | Status: SHIPPED | OUTPATIENT
Start: 2020-01-14 | End: 2020-01-17 | Stop reason: SDUPTHER

## 2020-01-14 RX ORDER — NORTRIPTYLINE HYDROCHLORIDE 10 MG/1
10 CAPSULE ORAL NIGHTLY
Qty: 30 CAPSULE | Refills: 2 | Status: SHIPPED | OUTPATIENT
Start: 2020-01-14 | End: 2020-12-03

## 2020-01-14 NOTE — TELEPHONE ENCOUNTER
Patient called stating that he has having constant pain from his shingles. He states that the rash is clearing up but he is in constant pain. He rates his pain as a 4. He is currently taking HYDROcodone-acetaminophen (NORCO) 5-325 MG per tablet for the pain but he has not have any relief. He is wanting to know if there is something else that could be called in for him. I confirmed his pharmacy of [facility].

## 2020-01-14 NOTE — TELEPHONE ENCOUNTER
Tell the patient I sent gabapentin 1 capsule every evening and nortriptyline 1 capsule every evening.  He should also continue the hydrocodone and Tylenol as needed.    Make him a follow-up appointment with [facility] next week.

## 2020-01-17 ENCOUNTER — TELEPHONE (OUTPATIENT)
Dept: INTERNAL MEDICINE | Facility: CLINIC | Age: 80
End: 2020-01-17

## 2020-01-17 DIAGNOSIS — B02.29 POSTHERPETIC NEURALGIA: ICD-10-CM

## 2020-01-17 RX ORDER — GABAPENTIN 100 MG/1
100 CAPSULE ORAL 3 TIMES DAILY
Qty: 90 CAPSULE | Refills: 2 | Status: SHIPPED | OUTPATIENT
Start: 2020-01-17 | End: 2020-05-19

## 2020-01-17 NOTE — TELEPHONE ENCOUNTER
Called pt he verbalized understanding and also said he has enough of the Hydrocodone but not the Gabapentin would need more sent in

## 2020-01-17 NOTE — TELEPHONE ENCOUNTER
He may increase his gabapentin to 300 hs.  If he has enough of the 100 mg, take 3 of those.  We can increase further if needed.  He can double hydrocodone to two pills every 6 hours.  Does he have enough to get through weekend?  This will likely cause constipation, so he should take a stool softener or softener stimulant to prevent that.

## 2020-01-17 NOTE — TELEPHONE ENCOUNTER
Pt was diagnosed with shingles about a week ago. He is having extreme pain and not getting any relief. His meds are Gabapentin 100 mg 1 qhs and Hydrocodone 5/325 mg 1 q 6 hrs

## 2020-01-21 ENCOUNTER — APPOINTMENT (OUTPATIENT)
Dept: LAB | Facility: HOSPITAL | Age: 80
End: 2020-01-21

## 2020-01-21 ENCOUNTER — HOSPITAL ENCOUNTER (OUTPATIENT)
Dept: CT IMAGING | Facility: HOSPITAL | Age: 80
Discharge: HOME | End: 2020-01-21
Admitting: PHYSICIAN ASSISTANT

## 2020-01-21 ENCOUNTER — OFFICE VISIT (OUTPATIENT)
Dept: INTERNAL MEDICINE | Facility: CLINIC | Age: 80
End: 2020-01-21

## 2020-01-21 VITALS
DIASTOLIC BLOOD PRESSURE: 74 MMHG | WEIGHT: 206 LBS | OXYGEN SATURATION: 98 % | HEART RATE: 97 BPM | TEMPERATURE: 98.3 F | BODY MASS INDEX: 28.84 KG/M2 | HEIGHT: 71 IN | SYSTOLIC BLOOD PRESSURE: 116 MMHG

## 2020-01-21 DIAGNOSIS — R10.11 RIGHT UPPER QUADRANT ABDOMINAL PAIN: ICD-10-CM

## 2020-01-21 DIAGNOSIS — R11.0 NAUSEA: Primary | ICD-10-CM

## 2020-01-21 DIAGNOSIS — R63.4 WEIGHT LOSS: ICD-10-CM

## 2020-01-21 DIAGNOSIS — R11.0 NAUSEA: ICD-10-CM

## 2020-01-21 PROCEDURE — 99214 OFFICE O/P EST MOD 30 MIN: CPT | Performed by: PHYSICIAN ASSISTANT

## 2020-01-21 PROCEDURE — 74176 CT ABD & PELVIS W/O CONTRAST: CPT

## 2020-01-21 PROCEDURE — 81003 URINALYSIS AUTO W/O SCOPE: CPT | Performed by: PHYSICIAN ASSISTANT

## 2020-01-21 PROCEDURE — 93000 ELECTROCARDIOGRAM COMPLETE: CPT | Performed by: PHYSICIAN ASSISTANT

## 2020-01-21 PROCEDURE — 80053 COMPREHEN METABOLIC PANEL: CPT | Performed by: PHYSICIAN ASSISTANT

## 2020-01-21 PROCEDURE — 83690 ASSAY OF LIPASE: CPT | Performed by: PHYSICIAN ASSISTANT

## 2020-01-21 PROCEDURE — 82150 ASSAY OF AMYLASE: CPT | Performed by: PHYSICIAN ASSISTANT

## 2020-01-21 PROCEDURE — 85025 COMPLETE CBC W/AUTO DIFF WBC: CPT | Performed by: PHYSICIAN ASSISTANT

## 2020-01-21 NOTE — PROGRESS NOTES
Patient Care Team:  Radu Francis MD as PCP - General    Chief Complaint::   Chief Complaint   Patient presents with   • Abdominal Pain     R side        Subjective     HPI  Diagnosed with shingles on right side of chest/abdomen 1/6/2020. Pt was treated with valayclovir and Neurontin.  He has completed course of anti virals.  He currently takes Neurontin 100 mg tid.  He continues to have deep, sharp RUQ pain that radiates to his back. He has increase in constipation.  He has increase in nausea and lost 6 pounds in the past week.  He reports the pain wakes him up from sleep.     Abdominal Pain   The current episode started 1 to 4 weeks ago. The onset quality is sudden. The problem occurs intermittently. The pain is located in the RUQ and generalized abdominal region. The pain is at a severity of 10/10. The pain is severe. The quality of the pain is sharp. The abdominal pain radiates to the right flank. Associated symptoms include anorexia, constipation, nausea and weight loss. Pertinent negatives include no belching or fever. Nothing aggravates the pain. The pain is relieved by nothing.         The following portions of the patient's history were reviewed and updated as appropriate: active problem list, medication list, allergies, family history, social history    Review of Systems:   Review of Systems   Constitutional: Positive for appetite change and unexpected weight loss. Negative for activity change, chills, fatigue and fever.   HENT: Negative for congestion, ear pain and sinus pressure.    Eyes: Negative for blurred vision, double vision and visual disturbance.   Respiratory: Negative for cough, chest tightness, shortness of breath and wheezing.    Cardiovascular: Negative for chest pain and palpitations.   Gastrointestinal: Positive for abdominal pain, anorexia, constipation and nausea. Negative for blood in stool.   Endocrine: Negative for cold intolerance and heat intolerance.   Genitourinary:  "Negative for decreased urine volume and urinary incontinence.   Skin: Negative for color change and dry skin.   Allergic/Immunologic: Negative for environmental allergies, food allergies and immunocompromised state.   Neurological: Negative for dizziness, speech difficulty and headache.   Hematological: Negative for adenopathy. Does not bruise/bleed easily.   Psychiatric/Behavioral: Negative for decreased concentration. The patient is not nervous/anxious.        Vital Signs  Vitals:    01/21/20 1344   BP: 116/74   BP Location: Left arm   Patient Position: Sitting   Cuff Size: Adult   Pulse: 97   Temp: 98.3 °F (36.8 °C)   TempSrc: Temporal   SpO2: 98%   Weight: 93.4 kg (206 lb)   Height: 180.3 cm (70.98\")   PainSc: 0-No pain  Comment: pain flucuates     Body mass index is 28.74 kg/m².    Labs  Admission on 01/06/2020, Discharged on 01/06/2020   Component Date Value Ref Range Status   • Troponin T 01/06/2020 <0.010  0.000 - 0.030 ng/mL Final   • Glucose 01/06/2020 138* 65 - 99 mg/dL Final   • BUN 01/06/2020 14  8 - 23 mg/dL Final   • Creatinine 01/06/2020 1.66* 0.76 - 1.27 mg/dL Final   • Sodium 01/06/2020 137  136 - 145 mmol/L Final   • Potassium 01/06/2020 4.1  3.5 - 5.2 mmol/L Final   • Chloride 01/06/2020 98  98 - 107 mmol/L Final   • CO2 01/06/2020 25.0  22.0 - 29.0 mmol/L Final   • Calcium 01/06/2020 9.7  8.6 - 10.5 mg/dL Final   • Total Protein 01/06/2020 7.1  6.0 - 8.5 g/dL Final   • Albumin 01/06/2020 4.70  3.50 - 5.20 g/dL Final   • ALT (SGPT) 01/06/2020 31  1 - 41 U/L Final   • AST (SGOT) 01/06/2020 33  1 - 40 U/L Final   • Alkaline Phosphatase 01/06/2020 152* 39 - 117 U/L Final   • Total Bilirubin 01/06/2020 0.6  0.2 - 1.2 mg/dL Final   • eGFR Non African Amer 01/06/2020 40* >60 mL/min/1.73 Final   • Globulin 01/06/2020 2.4  gm/dL Final   • A/G Ratio 01/06/2020 2.0  g/dL Final   • BUN/Creatinine Ratio 01/06/2020 8.4  7.0 - 25.0 Final   • Anion Gap 01/06/2020 14.0  5.0 - 15.0 mmol/L Final   • Lipase " 01/06/2020 14  13 - 60 U/L Final   • proBNP 01/06/2020 290.4  5.0-1,800.0 pg/mL Final   • Extra Tube 01/06/2020 hold for add-on   Final    Auto resulted   • Extra Tube 01/06/2020 Hold for add-ons.   Final    Auto resulted.   • Extra Tube 01/06/2020 hold for add-on   Final    Auto resulted   • Extra Tube 01/06/2020 Hold for add-ons.   Final    Auto resulted.   • WBC 01/06/2020 7.31  3.40 - 10.80 10*3/mm3 Final   • RBC 01/06/2020 5.02  4.14 - 5.80 10*6/mm3 Final   • Hemoglobin 01/06/2020 15.2  13.0 - 17.7 g/dL Final   • Hematocrit 01/06/2020 46.3  37.5 - 51.0 % Final   • MCV 01/06/2020 92.2  79.0 - 97.0 fL Final   • MCH 01/06/2020 30.3  26.6 - 33.0 pg Final   • MCHC 01/06/2020 32.8  31.5 - 35.7 g/dL Final   • RDW 01/06/2020 14.7  12.3 - 15.4 % Final   • RDW-SD 01/06/2020 50.2  37.0 - 54.0 fl Final   • MPV 01/06/2020 9.9  6.0 - 12.0 fL Final   • Platelets 01/06/2020 217  140 - 450 10*3/mm3 Final   • Neutrophil % 01/06/2020 73.8  42.7 - 76.0 % Final   • Lymphocyte % 01/06/2020 11.6* 19.6 - 45.3 % Final   • Monocyte % 01/06/2020 12.2* 5.0 - 12.0 % Final   • Eosinophil % 01/06/2020 1.5  0.3 - 6.2 % Final   • Basophil % 01/06/2020 0.5  0.0 - 1.5 % Final   • Immature Grans % 01/06/2020 0.4  0.0 - 0.5 % Final   • Neutrophils, Absolute 01/06/2020 5.39  1.70 - 7.00 10*3/mm3 Final   • Lymphocytes, Absolute 01/06/2020 0.85  0.70 - 3.10 10*3/mm3 Final   • Monocytes, Absolute 01/06/2020 0.89  0.10 - 0.90 10*3/mm3 Final   • Eosinophils, Absolute 01/06/2020 0.11  0.00 - 0.40 10*3/mm3 Final   • Basophils, Absolute 01/06/2020 0.04  0.00 - 0.20 10*3/mm3 Final   • Immature Grans, Absolute 01/06/2020 0.03  0.00 - 0.05 10*3/mm3 Final   • nRBC 01/06/2020 0.0  0.0 - 0.2 /100 WBC Final   • D-Dimer, Quantitative 01/06/2020 1.07* 0.00 - 0.56 MCGFEU/mL Final   • Troponin T 01/06/2020 <0.010  0.000 - 0.030 ng/mL Final   Admission on 10/29/2019, Discharged on 10/29/2019   Component Date Value Ref Range Status   • Glucose 10/29/2019 128  70 - 130  mg/dL Final   • Case Report 10/29/2019    Final                    Value:Surgical Pathology Report                         Case: [id number]                                  Authorizing Provider:  Charisma Raines MD   Collected:           10/29/2019 02:33 PM          Ordering Location:     [facility]   Received:            10/29/2019 03:58 PM                                 OR                                                                           Pathologist:           Stacey Meyer MD                                                        Specimen:    Hernia, Sac, Left Inguinal hernia                                                         • Clinical Information 10/29/2019    Final                    Value:This result contains rich text formatting which cannot be displayed here.   • Final Diagnosis 10/29/2019    Final                    Value:This result contains rich text formatting which cannot be displayed here.   • Gross Description 10/29/2019    Final                    Value:This result contains rich text formatting which cannot be displayed here.   • Microscopic Description 10/29/2019    Final                    Value:This result contains rich text formatting which cannot be displayed here.   Appointment on 10/24/2019   Component Date Value Ref Range Status   • WBC 10/24/2019 7.62  3.40 - 10.80 10*3/mm3 Final   • RBC 10/24/2019 4.79  4.14 - 5.80 10*6/mm3 Final   • Hemoglobin 10/24/2019 14.3  13.0 - 17.7 g/dL Final   • Hematocrit 10/24/2019 43.8  37.5 - 51.0 % Final   • MCV 10/24/2019 91.4  79.0 - 97.0 fL Final   • MCH 10/24/2019 29.9  26.6 - 33.0 pg Final   • MCHC 10/24/2019 32.6  31.5 - 35.7 g/dL Final   • RDW 10/24/2019 14.1  12.3 - 15.4 % Final   • RDW-SD 10/24/2019 47.5  37.0 - 54.0 fl Final   • MPV 10/24/2019 9.9  6.0 - 12.0 fL Final   • Platelets 10/24/2019 258  140 - 450 10*3/mm3 Final   • Glucose 10/24/2019 105* 65 - 99 mg/dL Final   • BUN 10/24/2019 22  8 - 23 mg/dL Final    • Creatinine 10/24/2019 1.60* 0.76 - 1.27 mg/dL Final   • Sodium 10/24/2019 139  136 - 145 mmol/L Final   • Potassium 10/24/2019 4.8  3.5 - 5.2 mmol/L Final   • Chloride 10/24/2019 100  98 - 107 mmol/L Final   • CO2 10/24/2019 28.0  22.0 - 29.0 mmol/L Final   • Calcium 10/24/2019 9.6  8.6 - 10.5 mg/dL Final   • eGFR Non  Amer 10/24/2019 42* >60 mL/min/1.73 Final   • BUN/Creatinine Ratio 10/24/2019 13.8  7.0 - 25.0 Final   • Anion Gap 10/24/2019 11.0  5.0 - 15.0 mmol/L Final       Imaging  Xr Chest 1 View    Result Date: 1/6/2020  Chronic changes are seen within the lung fields bilaterally. The heart is enlarged. Degenerative change is seen within the spine. Slight prominence of the pulmonary vascularity.  D:  01/06/2020 E:  01/06/2020  This report was finalized on 1/6/2020 4:28 PM by Dr. Brenna Ronquillo MD.          Current Outpatient Medications:   •  acetaminophen (TYLENOL) 325 MG tablet, Take 650 mg by mouth As Needed for Mild Pain ., Disp: , Rfl:   •  aspirin 81 MG EC tablet, Take 81 mg by mouth Daily. Last dose 10-18-19 per patient, Disp: , Rfl:   •  atorvastatin (LIPITOR) 40 MG tablet, Take 40 mg by mouth Daily., Disp: , Rfl:   •  calcipotriene-betamethasone (TACLONEX) 0.005-0.064 % ointment, Apply 1 application topically to the appropriate area as directed As Needed (psoriasis)., Disp: , Rfl:   •  cholecalciferol (VITAMIN D3) 25 MCG (1000 UT) tablet, Take 1,000 Units by mouth Daily., Disp: , Rfl:   •  diclofenac (VOLTAREN) 1 % gel gel, Apply 4 g topically As Needed., Disp: , Rfl:   •  diltiaZEM CD (CARDIZEM CD) 180 MG 24 hr capsule, Take 180 mg by mouth Daily., Disp: , Rfl:   •  gabapentin (NEURONTIN) 100 MG capsule, Take 1 capsule by mouth 3 (Three) Times a Day., Disp: 90 capsule, Rfl: 2  •  HYDROcodone-acetaminophen (NORCO) 5-325 MG per tablet, Take 1 tablet by mouth Every 6 (Six) Hours As Needed for Moderate Pain ., Disp: 15 tablet, Rfl: 0  •  losartan (COZAAR) 100 MG tablet, Take 100 mg by  mouth Every Night., Disp: , Rfl:   •  nitroglycerin (NITROSTAT) 0.4 MG SL tablet, Place 0.4 mg under the tongue Every 5 (Five) Minutes As Needed for Chest Pain. Take no more than 3 doses in 15 minutes., Disp: , Rfl:   •  nortriptyline (PAMELOR) 10 MG capsule, Take 1 capsule by mouth Every Night., Disp: 30 capsule, Rfl: 2  •  omeprazole (priLOSEC) 40 MG capsule, Take 20 mg by mouth Daily., Disp: , Rfl:   •  ondansetron ODT (ZOFRAN-ODT) 4 MG disintegrating tablet, Take 1 tablet by mouth Every 8 (Eight) Hours As Needed for Nausea or Vomiting., Disp: 30 tablet, Rfl: 0    Physical Exam:    Physical Exam   Constitutional: He is oriented to person, place, and time. He appears well-developed and well-nourished.   Eyes: Conjunctivae are normal.   Cardiovascular: Normal rate and regular rhythm.   Pulmonary/Chest: Effort normal and breath sounds normal.   Abdominal: Soft. Bowel sounds are normal. He exhibits no distension and no mass. There is tenderness in the right upper quadrant. There is no rebound and no guarding. No hernia.   Neurological: He is alert and oriented to person, place, and time.   Skin: Skin is warm and dry.        Healing vesicular rash extending from mid abdomen around right chest to mid back.     Psychiatric: He has a normal mood and affect. His behavior is normal.   Nursing note and vitals reviewed.        ECG 12 Lead  Date/Time: 1/21/2020 3:03 PM  Performed by: Laila Field PA-C  Authorized by: Laila Field PA-C   Comparison: compared with previous ECG from 1/6/2020  Rhythm: sinus rhythm and sinus arrhythmia    Clinical impression: normal ECG                Assessment/Plan   Problem List Items Addressed This Visit     None      Visit Diagnoses     Nausea    -  Primary    Relevant Orders    CBC & Differential    Weight loss        Relevant Orders    CBC & Differential    Right upper quadrant abdominal pain        Relevant Orders    CT Abdomen Pelvis Without Contrast    Comprehensive  Metabolic Panel    Amylase    Lipase    Urinalysis With Culture If Indicated -          Return for Recheck.    Plan of care reviewed with patient at the conclusion of today's visit. Education was provided regarding diagnosis, management, and any prescribed or recommended OTC medications.Patient verbalizes understanding of and agreement with management plan.       Laila Field PA-C    Please note that portions of this note were completed with a voice recognition program. Efforts were made to edit the dictations, but occasionally words are mistranscribed.

## 2020-01-22 ENCOUNTER — TELEPHONE (OUTPATIENT)
Dept: INTERNAL MEDICINE | Facility: CLINIC | Age: 80
End: 2020-01-22

## 2020-01-22 DIAGNOSIS — K57.92 ACUTE DIVERTICULITIS: Primary | ICD-10-CM

## 2020-01-22 LAB
ALBUMIN SERPL-MCNC: 4.4 G/DL (ref 3.5–5.2)
ALBUMIN/GLOB SERPL: 1.4 G/DL
ALP SERPL-CCNC: 126 U/L (ref 39–117)
ALT SERPL W P-5'-P-CCNC: 24 U/L (ref 1–41)
AMYLASE SERPL-CCNC: 31 U/L (ref 28–100)
ANION GAP SERPL CALCULATED.3IONS-SCNC: 15.8 MMOL/L (ref 5–15)
AST SERPL-CCNC: 27 U/L (ref 1–40)
BASOPHILS # BLD AUTO: 0.03 10*3/MM3 (ref 0–0.2)
BASOPHILS NFR BLD AUTO: 0.3 % (ref 0–1.5)
BILIRUB SERPL-MCNC: 0.7 MG/DL (ref 0.2–1.2)
BILIRUB UR QL STRIP: NEGATIVE
BUN BLD-MCNC: 16 MG/DL (ref 8–23)
BUN/CREAT SERPL: 9.2 (ref 7–25)
CALCIUM SPEC-SCNC: 9.8 MG/DL (ref 8.6–10.5)
CHLORIDE SERPL-SCNC: 97 MMOL/L (ref 98–107)
CLARITY UR: CLEAR
CO2 SERPL-SCNC: 26.2 MMOL/L (ref 22–29)
COLOR UR: YELLOW
CREAT BLD-MCNC: 1.73 MG/DL (ref 0.76–1.27)
DEPRECATED RDW RBC AUTO: 48.4 FL (ref 37–54)
EOSINOPHIL # BLD AUTO: 0.07 10*3/MM3 (ref 0–0.4)
EOSINOPHIL NFR BLD AUTO: 0.8 % (ref 0.3–6.2)
ERYTHROCYTE [DISTWIDTH] IN BLOOD BY AUTOMATED COUNT: 14.4 % (ref 12.3–15.4)
GFR SERPL CREATININE-BSD FRML MDRD: 38 ML/MIN/1.73
GLOBULIN UR ELPH-MCNC: 3.1 GM/DL
GLUCOSE BLD-MCNC: 113 MG/DL (ref 65–99)
GLUCOSE UR STRIP-MCNC: NEGATIVE MG/DL
HCT VFR BLD AUTO: 42.9 % (ref 37.5–51)
HGB BLD-MCNC: 14.4 G/DL (ref 13–17.7)
HGB UR QL STRIP.AUTO: NEGATIVE
IMM GRANULOCYTES # BLD AUTO: 0.03 10*3/MM3 (ref 0–0.05)
IMM GRANULOCYTES NFR BLD AUTO: 0.3 % (ref 0–0.5)
KETONES UR QL STRIP: NEGATIVE
LEUKOCYTE ESTERASE UR QL STRIP.AUTO: NEGATIVE
LIPASE SERPL-CCNC: 8 U/L (ref 13–60)
LYMPHOCYTES # BLD AUTO: 1.45 10*3/MM3 (ref 0.7–3.1)
LYMPHOCYTES NFR BLD AUTO: 16.3 % (ref 19.6–45.3)
MCH RBC QN AUTO: 30.7 PG (ref 26.6–33)
MCHC RBC AUTO-ENTMCNC: 33.6 G/DL (ref 31.5–35.7)
MCV RBC AUTO: 91.5 FL (ref 79–97)
MONOCYTES # BLD AUTO: 0.77 10*3/MM3 (ref 0.1–0.9)
MONOCYTES NFR BLD AUTO: 8.7 % (ref 5–12)
NEUTROPHILS # BLD AUTO: 6.53 10*3/MM3 (ref 1.7–7)
NEUTROPHILS NFR BLD AUTO: 73.6 % (ref 42.7–76)
NITRITE UR QL STRIP: NEGATIVE
NRBC BLD AUTO-RTO: 0 /100 WBC (ref 0–0.2)
PH UR STRIP.AUTO: 7.5 [PH] (ref 5–8)
PLATELET # BLD AUTO: 300 10*3/MM3 (ref 140–450)
PMV BLD AUTO: 10.1 FL (ref 6–12)
POTASSIUM BLD-SCNC: 4.6 MMOL/L (ref 3.5–5.2)
PROT SERPL-MCNC: 7.5 G/DL (ref 6–8.5)
PROT UR QL STRIP: NEGATIVE
RBC # BLD AUTO: 4.69 10*6/MM3 (ref 4.14–5.8)
SODIUM BLD-SCNC: 139 MMOL/L (ref 136–145)
SP GR UR STRIP: 1.02 (ref 1–1.03)
UROBILINOGEN UR QL STRIP: NORMAL
WBC NRBC COR # BLD: 8.88 10*3/MM3 (ref 3.4–10.8)

## 2020-01-22 RX ORDER — CIPROFLOXACIN 500 MG/1
500 TABLET, FILM COATED ORAL 2 TIMES DAILY
Qty: 14 TABLET | Refills: 0 | Status: SHIPPED | OUTPATIENT
Start: 2020-01-22 | End: 2020-05-19

## 2020-01-22 RX ORDER — METRONIDAZOLE 500 MG/1
500 TABLET ORAL 2 TIMES DAILY
Qty: 14 TABLET | Refills: 0 | Status: SHIPPED | OUTPATIENT
Start: 2020-01-22 | End: 2020-05-19

## 2020-01-22 NOTE — TELEPHONE ENCOUNTER
Called and discussed with pt. He states he is having issues with constipation and wants to know what to use. He thinks it is a result of the increased dose of hydrocodone

## 2020-01-22 NOTE — TELEPHONE ENCOUNTER
It is the direct result of using hydrocodone.  He needs to discontinue it.  I advised Haley last night to use Colace 200mg BID.  He used an enema yesterday.  Metamucil daily.

## 2020-01-22 NOTE — TELEPHONE ENCOUNTER
----- Message from Laila Field PA-C sent at 1/22/2020  8:59 AM EST -----  Labs are stable.  Acute diverticulitis/gall stones. Antibiotics will be sent to pharmacy.  He is to take this twice daily for 7 days. Will follow up in 1 week to recheck before referring to surgery.

## 2020-01-27 DIAGNOSIS — R10.11 RUQ PAIN: Primary | ICD-10-CM

## 2020-01-27 DIAGNOSIS — R10.11 RUQ PAIN: ICD-10-CM

## 2020-01-27 DIAGNOSIS — K80.20 CALCULUS OF GALLBLADDER WITHOUT CHOLECYSTITIS WITHOUT OBSTRUCTION: ICD-10-CM

## 2020-01-27 DIAGNOSIS — K80.20 CALCULUS OF GALLBLADDER WITHOUT CHOLECYSTITIS WITHOUT OBSTRUCTION: Primary | ICD-10-CM

## 2020-01-27 RX ORDER — TRAMADOL HYDROCHLORIDE 50 MG/1
50 TABLET ORAL EVERY 6 HOURS PRN
Qty: 60 TABLET | Refills: 0 | Status: SHIPPED | OUTPATIENT
Start: 2020-01-27 | End: 2020-03-02

## 2020-01-29 ENCOUNTER — OFFICE VISIT (OUTPATIENT)
Dept: INTERNAL MEDICINE | Facility: CLINIC | Age: 80
End: 2020-01-29

## 2020-01-29 VITALS
WEIGHT: 204 LBS | HEIGHT: 71 IN | OXYGEN SATURATION: 98 % | DIASTOLIC BLOOD PRESSURE: 48 MMHG | HEART RATE: 79 BPM | BODY MASS INDEX: 28.56 KG/M2 | SYSTOLIC BLOOD PRESSURE: 108 MMHG | TEMPERATURE: 97.7 F

## 2020-01-29 DIAGNOSIS — B02.9 HERPES ZOSTER WITHOUT COMPLICATION: Primary | ICD-10-CM

## 2020-01-29 DIAGNOSIS — K57.92 ACUTE DIVERTICULITIS: ICD-10-CM

## 2020-01-29 DIAGNOSIS — K80.20 CALCULUS OF GALLBLADDER WITHOUT CHOLECYSTITIS WITHOUT OBSTRUCTION: ICD-10-CM

## 2020-01-29 PROCEDURE — 99214 OFFICE O/P EST MOD 30 MIN: CPT | Performed by: PHYSICIAN ASSISTANT

## 2020-01-29 NOTE — PROGRESS NOTES
Patient Care Team:  Radu Francis MD as PCP - General    Chief Complaint::   Chief Complaint   Patient presents with   • Diverticulitis   • Cholelithiasis     Possible removal scheduled next week        Subjective     HPI  Patient is here for one-week follow-up regarding right upper quadrant pain.  He had currently been diagnosed and treated for shingles.  CT scan on 1/21/2020 showed cholelithiasis and acute diverticulitis.  Patient was treated with 7 days of Cipro 500 mg twice daily and Flagyl 500 mg twice daily.  He has been taking tramadol 50 mg twice a day for right upper quadrant pain.  He presents today with regular bowel movements but persistent right upper quadrant pain.  He has an appointment with Dr. MAHAN on Monday to discuss.        The following portions of the patient's history were reviewed and updated as appropriate: active problem list, medication list, allergies, family history, social history    Review of Systems:   Review of Systems   Constitutional: Positive for appetite change. Negative for activity change, chills, diaphoresis, fatigue, fever, unexpected weight gain and unexpected weight loss.   HENT: Negative for congestion, ear pain, hearing loss and sinus pressure.    Eyes: Negative for visual disturbance.   Respiratory: Negative for cough, chest tightness, shortness of breath and wheezing.    Cardiovascular: Negative for chest pain, palpitations and leg swelling.   Gastrointestinal: Positive for abdominal distention and abdominal pain. Negative for blood in stool, constipation, diarrhea, nausea, vomiting, GERD and indigestion.   Endocrine: Negative for cold intolerance and heat intolerance.   Genitourinary: Negative for difficulty urinating, dysuria and hematuria.   Musculoskeletal: Negative for arthralgias, back pain and myalgias.   Skin: Negative for color change, rash and skin lesions.   Allergic/Immunologic: Negative for environmental allergies and food allergies.   Neurological:  "Negative for dizziness, tremors, seizures, syncope, speech difficulty, weakness, headache, memory problem and confusion.   Hematological: Does not bruise/bleed easily.   Psychiatric/Behavioral: Negative for decreased concentration, sleep disturbance and depressed mood. The patient is not nervous/anxious.        Vital Signs  Vitals:    01/29/20 1102   BP: 108/48   BP Location: Left arm   Patient Position: Sitting   Cuff Size: Adult   Pulse: 79   Temp: 97.7 °F (36.5 °C)   TempSrc: Temporal   SpO2: 98%   Weight: 92.5 kg (204 lb)   Height: 180.3 cm (70.98\")   PainSc:   4     Body mass index is 28.47 kg/m².    Labs  Hospital Outpatient Visit on 01/21/2020   Component Date Value Ref Range Status   • Glucose 01/21/2020 113* 65 - 99 mg/dL Final   • BUN 01/21/2020 16  8 - 23 mg/dL Final   • Creatinine 01/21/2020 1.73* 0.76 - 1.27 mg/dL Final   • Sodium 01/21/2020 139  136 - 145 mmol/L Final   • Potassium 01/21/2020 4.6  3.5 - 5.2 mmol/L Final   • Chloride 01/21/2020 97* 98 - 107 mmol/L Final   • CO2 01/21/2020 26.2  22.0 - 29.0 mmol/L Final   • Calcium 01/21/2020 9.8  8.6 - 10.5 mg/dL Final   • Total Protein 01/21/2020 7.5  6.0 - 8.5 g/dL Final   • Albumin 01/21/2020 4.40  3.50 - 5.20 g/dL Final   • ALT (SGPT) 01/21/2020 24  1 - 41 U/L Final   • AST (SGOT) 01/21/2020 27  1 - 40 U/L Final   • Alkaline Phosphatase 01/21/2020 126* 39 - 117 U/L Final   • Total Bilirubin 01/21/2020 0.7  0.2 - 1.2 mg/dL Final   • eGFR Non African Amer 01/21/2020 38* >60 mL/min/1.73 Final   • Globulin 01/21/2020 3.1  gm/dL Final   • A/G Ratio 01/21/2020 1.4  g/dL Final   • BUN/Creatinine Ratio 01/21/2020 9.2  7.0 - 25.0 Final   • Anion Gap 01/21/2020 15.8* 5.0 - 15.0 mmol/L Final   • Amylase 01/21/2020 31  28 - 100 U/L Final   • Lipase 01/21/2020 8* 13 - 60 U/L Final   • Color, UA 01/21/2020 Yellow  Yellow, Straw Final   • Appearance,  01/21/2020 Clear  Clear Final   • pH,  01/21/2020 7.5  5.0 - 8.0 Final   • Specific Gravity, UA 01/21/2020 " 1.016  1.005 - 1.030 Final   • Glucose, UA 01/21/2020 Negative  Negative Final   • Ketones, UA 01/21/2020 Negative  Negative Final   • Bilirubin, UA 01/21/2020 Negative  Negative Final   • Blood, UA 01/21/2020 Negative  Negative Final   • Protein, UA 01/21/2020 Negative  Negative Final   • Leuk Esterase, UA 01/21/2020 Negative  Negative Final   • Nitrite, UA 01/21/2020 Negative  Negative Final   • Urobilinogen, UA 01/21/2020 1.0 E.U./dL  0.2 - 1.0 E.U./dL Final   • WBC 01/21/2020 8.88  3.40 - 10.80 10*3/mm3 Final   • RBC 01/21/2020 4.69  4.14 - 5.80 10*6/mm3 Final   • Hemoglobin 01/21/2020 14.4  13.0 - 17.7 g/dL Final   • Hematocrit 01/21/2020 42.9  37.5 - 51.0 % Final   • MCV 01/21/2020 91.5  79.0 - 97.0 fL Final   • MCH 01/21/2020 30.7  26.6 - 33.0 pg Final   • MCHC 01/21/2020 33.6  31.5 - 35.7 g/dL Final   • RDW 01/21/2020 14.4  12.3 - 15.4 % Final   • RDW-SD 01/21/2020 48.4  37.0 - 54.0 fl Final   • MPV 01/21/2020 10.1  6.0 - 12.0 fL Final   • Platelets 01/21/2020 300  140 - 450 10*3/mm3 Final   • Neutrophil % 01/21/2020 73.6  42.7 - 76.0 % Final   • Lymphocyte % 01/21/2020 16.3* 19.6 - 45.3 % Final   • Monocyte % 01/21/2020 8.7  5.0 - 12.0 % Final   • Eosinophil % 01/21/2020 0.8  0.3 - 6.2 % Final   • Basophil % 01/21/2020 0.3  0.0 - 1.5 % Final   • Immature Grans % 01/21/2020 0.3  0.0 - 0.5 % Final   • Neutrophils, Absolute 01/21/2020 6.53  1.70 - 7.00 10*3/mm3 Final   • Lymphocytes, Absolute 01/21/2020 1.45  0.70 - 3.10 10*3/mm3 Final   • Monocytes, Absolute 01/21/2020 0.77  0.10 - 0.90 10*3/mm3 Final   • Eosinophils, Absolute 01/21/2020 0.07  0.00 - 0.40 10*3/mm3 Final   • Basophils, Absolute 01/21/2020 0.03  0.00 - 0.20 10*3/mm3 Final   • Immature Grans, Absolute 01/21/2020 0.03  0.00 - 0.05 10*3/mm3 Final   • nRBC 01/21/2020 0.0  0.0 - 0.2 /100 WBC Final   Admission on 01/06/2020, Discharged on 01/06/2020   Component Date Value Ref Range Status   • Troponin T 01/06/2020 <0.010  0.000 - 0.030 ng/mL Final    • Glucose 01/06/2020 138* 65 - 99 mg/dL Final   • BUN 01/06/2020 14  8 - 23 mg/dL Final   • Creatinine 01/06/2020 1.66* 0.76 - 1.27 mg/dL Final   • Sodium 01/06/2020 137  136 - 145 mmol/L Final   • Potassium 01/06/2020 4.1  3.5 - 5.2 mmol/L Final   • Chloride 01/06/2020 98  98 - 107 mmol/L Final   • CO2 01/06/2020 25.0  22.0 - 29.0 mmol/L Final   • Calcium 01/06/2020 9.7  8.6 - 10.5 mg/dL Final   • Total Protein 01/06/2020 7.1  6.0 - 8.5 g/dL Final   • Albumin 01/06/2020 4.70  3.50 - 5.20 g/dL Final   • ALT (SGPT) 01/06/2020 31  1 - 41 U/L Final   • AST (SGOT) 01/06/2020 33  1 - 40 U/L Final   • Alkaline Phosphatase 01/06/2020 152* 39 - 117 U/L Final   • Total Bilirubin 01/06/2020 0.6  0.2 - 1.2 mg/dL Final   • eGFR Non African Amer 01/06/2020 40* >60 mL/min/1.73 Final   • Globulin 01/06/2020 2.4  gm/dL Final   • A/G Ratio 01/06/2020 2.0  g/dL Final   • BUN/Creatinine Ratio 01/06/2020 8.4  7.0 - 25.0 Final   • Anion Gap 01/06/2020 14.0  5.0 - 15.0 mmol/L Final   • Lipase 01/06/2020 14  13 - 60 U/L Final   • proBNP 01/06/2020 290.4  5.0-1,800.0 pg/mL Final   • Extra Tube 01/06/2020 hold for add-on   Final    Auto resulted   • Extra Tube 01/06/2020 Hold for add-ons.   Final    Auto resulted.   • Extra Tube 01/06/2020 hold for add-on   Final    Auto resulted   • Extra Tube 01/06/2020 Hold for add-ons.   Final    Auto resulted.   • WBC 01/06/2020 7.31  3.40 - 10.80 10*3/mm3 Final   • RBC 01/06/2020 5.02  4.14 - 5.80 10*6/mm3 Final   • Hemoglobin 01/06/2020 15.2  13.0 - 17.7 g/dL Final   • Hematocrit 01/06/2020 46.3  37.5 - 51.0 % Final   • MCV 01/06/2020 92.2  79.0 - 97.0 fL Final   • MCH 01/06/2020 30.3  26.6 - 33.0 pg Final   • MCHC 01/06/2020 32.8  31.5 - 35.7 g/dL Final   • RDW 01/06/2020 14.7  12.3 - 15.4 % Final   • RDW-SD 01/06/2020 50.2  37.0 - 54.0 fl Final   • MPV 01/06/2020 9.9  6.0 - 12.0 fL Final   • Platelets 01/06/2020 217  140 - 450 10*3/mm3 Final   • Neutrophil % 01/06/2020 73.8  42.7 - 76.0 % Final    • Lymphocyte % 01/06/2020 11.6* 19.6 - 45.3 % Final   • Monocyte % 01/06/2020 12.2* 5.0 - 12.0 % Final   • Eosinophil % 01/06/2020 1.5  0.3 - 6.2 % Final   • Basophil % 01/06/2020 0.5  0.0 - 1.5 % Final   • Immature Grans % 01/06/2020 0.4  0.0 - 0.5 % Final   • Neutrophils, Absolute 01/06/2020 5.39  1.70 - 7.00 10*3/mm3 Final   • Lymphocytes, Absolute 01/06/2020 0.85  0.70 - 3.10 10*3/mm3 Final   • Monocytes, Absolute 01/06/2020 0.89  0.10 - 0.90 10*3/mm3 Final   • Eosinophils, Absolute 01/06/2020 0.11  0.00 - 0.40 10*3/mm3 Final   • Basophils, Absolute 01/06/2020 0.04  0.00 - 0.20 10*3/mm3 Final   • Immature Grans, Absolute 01/06/2020 0.03  0.00 - 0.05 10*3/mm3 Final   • nRBC 01/06/2020 0.0  0.0 - 0.2 /100 WBC Final   • D-Dimer, Quantitative 01/06/2020 1.07* 0.00 - 0.56 MCGFEU/mL Final   • Troponin T 01/06/2020 <0.010  0.000 - 0.030 ng/mL Final       Imaging  Ct Abdomen Pelvis Without Contrast    Result Date: 1/21/2020  1. Mildly distended gallbladder with calcifications in the dependent portion of cholelithiasis. 2. Diverticular disease with pericolonic stranding involving the proximal and mid sigmoid colon concerning for acute diverticulitis without abscess or perforation identified.  D:  01/21/2020 E:  01/21/2020  This report was finalized on 1/21/2020 4:34 PM by Dr. Pete Wynn.      Xr Chest 1 View    Result Date: 1/6/2020  Chronic changes are seen within the lung fields bilaterally. The heart is enlarged. Degenerative change is seen within the spine. Slight prominence of the pulmonary vascularity.  D:  01/06/2020 E:  01/06/2020  This report was finalized on 1/6/2020 4:28 PM by Dr. Brenna Ronquillo MD.          Current Outpatient Medications:   •  acetaminophen (TYLENOL) 325 MG tablet, Take 650 mg by mouth As Needed for Mild Pain ., Disp: , Rfl:   •  aspirin 81 MG EC tablet, Take 81 mg by mouth Daily. Last dose 10-18-19 per patient, Disp: , Rfl:   •  atorvastatin (LIPITOR) 40 MG tablet, Take 40 mg by  mouth Daily., Disp: , Rfl:   •  calcipotriene-betamethasone (TACLONEX) 0.005-0.064 % ointment, Apply 1 application topically to the appropriate area as directed As Needed (psoriasis)., Disp: , Rfl:   •  cholecalciferol (VITAMIN D3) 25 MCG (1000 UT) tablet, Take 1,000 Units by mouth Daily., Disp: , Rfl:   •  ciprofloxacin (CIPRO) 500 MG tablet, Take 1 tablet by mouth 2 (Two) Times a Day., Disp: 14 tablet, Rfl: 0  •  diclofenac (VOLTAREN) 1 % gel gel, Apply 4 g topically As Needed., Disp: , Rfl:   •  diltiaZEM CD (CARDIZEM CD) 180 MG 24 hr capsule, Take 180 mg by mouth Daily., Disp: , Rfl:   •  gabapentin (NEURONTIN) 100 MG capsule, Take 1 capsule by mouth 3 (Three) Times a Day., Disp: 90 capsule, Rfl: 2  •  losartan (COZAAR) 100 MG tablet, Take 100 mg by mouth Every Night., Disp: , Rfl:   •  metroNIDAZOLE (FLAGYL) 500 MG tablet, Take 1 tablet by mouth 2 (Two) Times a Day., Disp: 14 tablet, Rfl: 0  •  nitroglycerin (NITROSTAT) 0.4 MG SL tablet, Place 0.4 mg under the tongue Every 5 (Five) Minutes As Needed for Chest Pain. Take no more than 3 doses in 15 minutes., Disp: , Rfl:   •  nortriptyline (PAMELOR) 10 MG capsule, Take 1 capsule by mouth Every Night., Disp: 30 capsule, Rfl: 2  •  omeprazole (priLOSEC) 40 MG capsule, Take 20 mg by mouth Daily., Disp: , Rfl:   •  ondansetron ODT (ZOFRAN-ODT) 4 MG disintegrating tablet, Take 1 tablet by mouth Every 8 (Eight) Hours As Needed for Nausea or Vomiting., Disp: 30 tablet, Rfl: 0  •  traMADol (ULTRAM) 50 MG tablet, Take 1 tablet by mouth Every 6 (Six) Hours As Needed for Moderate Pain ., Disp: 60 tablet, Rfl: 0    Physical Exam:    Physical Exam   Constitutional: He is oriented to person, place, and time. He appears well-developed and well-nourished.   HENT:   Head: Normocephalic and atraumatic.   Right Ear: Hearing, tympanic membrane, external ear and ear canal normal.   Left Ear: Hearing, tympanic membrane, external ear and ear canal normal.   Nose: Nose normal.    Mouth/Throat: Uvula is midline, oropharynx is clear and moist and mucous membranes are normal.   Eyes: Pupils are equal, round, and reactive to light. Conjunctivae, EOM and lids are normal.   Neck: Normal range of motion and full passive range of motion without pain. Neck supple.   Cardiovascular: Normal rate, regular rhythm, normal heart sounds and intact distal pulses.   Pulmonary/Chest: Effort normal and breath sounds normal.   Abdominal: Soft. Normal appearance and bowel sounds are normal. He exhibits no distension and no mass. There is tenderness in the right upper quadrant. There is no rebound and no guarding. No hernia.   Musculoskeletal: Normal range of motion.   Neurological: He is alert and oriented to person, place, and time. He has normal strength and normal reflexes.   Skin: Skin is warm, dry and intact. No rash noted. No erythema.        Psychiatric: He has a normal mood and affect. His speech is normal and behavior is normal. Judgment and thought content normal. Cognition and memory are normal.   Nursing note and vitals reviewed.      Procedures        Assessment/Plan   Problem List Items Addressed This Visit        Digestive    Calculus of gallbladder without cholecystitis without obstruction    Current Assessment & Plan     Patient is taking tramadol 50 mg in the evening for pain.         Relevant Medications    traMADol (ULTRAM) 50 MG tablet    Acute diverticulitis    Current Assessment & Plan     Patient has completed Cipro and Flagyl.  He reports that his symptoms have resolved.         Relevant Medications    metroNIDAZOLE (FLAGYL) 500 MG tablet    ciprofloxacin (CIPRO) 500 MG tablet       Other    Herpes zoster without complication - Primary    Current Assessment & Plan     Rash is resolving.               Return if symptoms worsen or fail to improve, for Next scheduled follow up.    Plan of care reviewed with patient at the conclusion of today's visit. Education was provided regarding  diagnosis, management, and any prescribed or recommended OTC medications.Patient verbalizes understanding of and agreement with management plan.       Laila Field PA-C    Please note that portions of this note were completed with a voice recognition program. Efforts were made to edit the dictations, but occasionally words are mistranscribed.

## 2020-02-05 ENCOUNTER — TELEPHONE (OUTPATIENT)
Dept: INTERNAL MEDICINE | Facility: CLINIC | Age: 80
End: 2020-02-05

## 2020-02-05 NOTE — TELEPHONE ENCOUNTER
If tramadol is not helping, he should go back to hydrocodone and go ahead and start miralax or a softener/stimulant ( not just a stool softener) every day to avoid constipation.  If he is out, I can call in more.  How much gabapentin is he currently taking?

## 2020-02-05 NOTE — TELEPHONE ENCOUNTER
Pt contacted Laila and told her the dose of gabapentin he is on is not controlling his pain. Please advise

## 2020-02-28 ENCOUNTER — TELEPHONE (OUTPATIENT)
Dept: INTERNAL MEDICINE | Facility: CLINIC | Age: 80
End: 2020-02-28

## 2020-02-28 NOTE — TELEPHONE ENCOUNTER
PT CALLED IN TO REQUEST A RX FOR A LIDOCAINE PATCH PLEASE ADVISE      PT CB NUMBER: [phone]  [facility]: [address]  FAX# [phone]

## 2020-02-28 NOTE — TELEPHONE ENCOUNTER
Called and discussed with pt. He said he is taking tramadol and gabapentin without any relief. A friend told him he uses lidocaine patches and it helps. Advised Dr. Francis is out of the office until Tues. OK to wait

## 2020-03-02 DIAGNOSIS — R10.11 RUQ PAIN: ICD-10-CM

## 2020-03-02 DIAGNOSIS — K80.20 CALCULUS OF GALLBLADDER WITHOUT CHOLECYSTITIS WITHOUT OBSTRUCTION: ICD-10-CM

## 2020-03-02 RX ORDER — TRAMADOL HYDROCHLORIDE 50 MG/1
TABLET ORAL
Qty: 60 TABLET | Refills: 0 | Status: SHIPPED | OUTPATIENT
Start: 2020-03-02 | End: 2020-04-16

## 2020-03-03 RX ORDER — LIDOCAINE 50 MG/G
1 PATCH TOPICAL EVERY 24 HOURS
Qty: 30 PATCH | Refills: 1 | Status: SHIPPED | OUTPATIENT
Start: 2020-03-03 | End: 2020-05-22 | Stop reason: SDUPTHER

## 2020-03-13 ENCOUNTER — TELEPHONE (OUTPATIENT)
Dept: INTERNAL MEDICINE | Facility: CLINIC | Age: 80
End: 2020-03-13

## 2020-03-24 ENCOUNTER — DOCUMENTATION (OUTPATIENT)
Dept: PHYSICAL THERAPY | Facility: HOSPITAL | Age: 80
End: 2020-03-24

## 2020-03-24 DIAGNOSIS — M54.2 NECK PAIN: Primary | ICD-10-CM

## 2020-03-24 NOTE — THERAPY DISCHARGE NOTE
Outpatient Physical Therapy Discharge Summary         Patient Name: Toño Alcala  : 1940  MRN: [id number]    Today's Date: 3/24/2020    Visit Dx:    ICD-10-CM ICD-9-CM   1. Neck pain M54.2 723.1       PT OP Goals     Row Name 20 0800          PT Short Term Goals    STG Date to Achieve  20  -CR     STG 1  Client wll report 50% improvement in neck pain with daily activity.  -CR     STG 2  Neck rotation AROM will improve to 28 degrees bilateral.   -CR     STG 3  Neck extension AROM will improve to 15 degrees.   -CR        Long Term Goals    LTG Date to Achieve  20  -CR     LTG 1  Neck Disability score will improve to 8% or better.   -CR     LTG 2  Client will report 50% improvement in ability to drive and rotate his head.   -CR       User Key  (r) = Recorded By, (t) = Taken By, (c) = Cosigned By    Initials Name Provider Type    Jose Lucas, PT Physical Therapist          OP PT Discharge Summary  Date of Discharge: 20  Discharge Destination: Home without follow-up  Discharge Instructions/Additional Comments: Client last followed for services 2019.  He is appropriate for discharge at this time.            Jose Pratt, PT  3/24/2020

## 2020-04-15 RX ORDER — LANCETS
1 EACH MISCELLANEOUS DAILY
Qty: 100 EACH | Refills: 5 | Status: SHIPPED | OUTPATIENT
Start: 2020-04-15 | End: 2020-04-20 | Stop reason: SDUPTHER

## 2020-04-16 DIAGNOSIS — K80.20 CALCULUS OF GALLBLADDER WITHOUT CHOLECYSTITIS WITHOUT OBSTRUCTION: ICD-10-CM

## 2020-04-16 DIAGNOSIS — R10.11 RUQ PAIN: ICD-10-CM

## 2020-04-16 RX ORDER — TRAMADOL HYDROCHLORIDE 50 MG/1
TABLET ORAL
Qty: 60 TABLET | Refills: 0 | Status: SHIPPED | OUTPATIENT
Start: 2020-04-16 | End: 2021-06-07

## 2020-04-16 NOTE — TELEPHONE ENCOUNTER
I will send it in.  Find out how he was doing.  Initially he had shingles, then there was a question of gallbladder disease.

## 2020-04-17 ENCOUNTER — TELEPHONE (OUTPATIENT)
Dept: INTERNAL MEDICINE | Facility: CLINIC | Age: 80
End: 2020-04-17

## 2020-04-17 NOTE — TELEPHONE ENCOUNTER
PT CALLED STATED THAT HIS PHARMACY IS WAITING FOR A SIGN OFF FROM  IN ORDER TO FILL PT RX Microlet Lancets misc AND STRIPS.    PLEASE ADVISE.  CALL BACK:[phone]       [id number] - [place], [place] - [address] [facility]

## 2020-04-20 ENCOUNTER — TELEPHONE (OUTPATIENT)
Dept: INTERNAL MEDICINE | Facility: CLINIC | Age: 80
End: 2020-04-20

## 2020-04-20 DIAGNOSIS — E11.42 DIABETIC POLYNEUROPATHY ASSOCIATED WITH TYPE 2 DIABETES MELLITUS (HCC): Primary | ICD-10-CM

## 2020-04-20 RX ORDER — LANCETS
EACH MISCELLANEOUS
Qty: 100 EACH | Refills: 5 | Status: SHIPPED | OUTPATIENT
Start: 2020-04-20 | End: 2020-04-24 | Stop reason: SDUPTHER

## 2020-04-20 NOTE — TELEPHONE ENCOUNTER
Pharmacy called and stated they received a prescription for Microlet Lancets misc and then received addiction information and it had different directions on it and now they need a new prescription and it can not say 1-2 times it has to say twice daily.

## 2020-04-24 ENCOUNTER — TELEPHONE (OUTPATIENT)
Dept: INTERNAL MEDICINE | Facility: CLINIC | Age: 80
End: 2020-04-24

## 2020-04-24 DIAGNOSIS — E11.42 DIABETIC POLYNEUROPATHY ASSOCIATED WITH TYPE 2 DIABETES MELLITUS (HCC): ICD-10-CM

## 2020-04-24 RX ORDER — LANCETS
EACH MISCELLANEOUS
Qty: 100 EACH | Refills: 3 | Status: SHIPPED | OUTPATIENT
Start: 2020-04-24 | End: 2021-01-08 | Stop reason: SDUPTHER

## 2020-04-24 NOTE — TELEPHONE ENCOUNTER
PHARMACY CALLED AND SAID THEY NEED A RX FOR THE PATIENT ON HIS STRIPS THAT MATCH TESTING 2 X DAY TO MATCH THE MEDICARE DETAILED REPORT FORM...    PLEASE ADVISE    [phone] J[facility]

## 2020-05-07 ENCOUNTER — TRANSCRIBE ORDERS (OUTPATIENT)
Dept: LAB | Facility: HOSPITAL | Age: 80
End: 2020-05-07

## 2020-05-07 ENCOUNTER — LAB (OUTPATIENT)
Dept: LAB | Facility: HOSPITAL | Age: 80
End: 2020-05-07

## 2020-05-07 DIAGNOSIS — N18.30 CHRONIC KIDNEY DISEASE, STAGE III (MODERATE) (HCC): ICD-10-CM

## 2020-05-07 DIAGNOSIS — N18.30 CHRONIC KIDNEY DISEASE, STAGE III (MODERATE) (HCC): Primary | ICD-10-CM

## 2020-05-07 LAB
ALBUMIN SERPL-MCNC: 4.2 G/DL (ref 3.5–5.2)
ANION GAP SERPL CALCULATED.3IONS-SCNC: 12.5 MMOL/L (ref 5–15)
BACTERIA UR QL AUTO: NORMAL /HPF
BASOPHILS # BLD AUTO: 0.06 10*3/MM3 (ref 0–0.2)
BASOPHILS NFR BLD AUTO: 0.8 % (ref 0–1.5)
BILIRUB UR QL STRIP: NEGATIVE
BUN BLD-MCNC: 18 MG/DL (ref 8–23)
BUN/CREAT SERPL: 10.5 (ref 7–25)
CALCIUM SPEC-SCNC: 9.8 MG/DL (ref 8.6–10.5)
CHLORIDE SERPL-SCNC: 101 MMOL/L (ref 98–107)
CLARITY UR: CLEAR
CO2 SERPL-SCNC: 25.5 MMOL/L (ref 22–29)
COLOR UR: YELLOW
CREAT BLD-MCNC: 1.71 MG/DL (ref 0.76–1.27)
CREAT UR-MCNC: 66.7 MG/DL
DEPRECATED RDW RBC AUTO: 43.3 FL (ref 37–54)
EOSINOPHIL # BLD AUTO: 0.55 10*3/MM3 (ref 0–0.4)
EOSINOPHIL NFR BLD AUTO: 7.1 % (ref 0.3–6.2)
ERYTHROCYTE [DISTWIDTH] IN BLOOD BY AUTOMATED COUNT: 13.6 % (ref 12.3–15.4)
GFR SERPL CREATININE-BSD FRML MDRD: 39 ML/MIN/1.73
GLUCOSE BLD-MCNC: 109 MG/DL (ref 65–99)
GLUCOSE UR STRIP-MCNC: NEGATIVE MG/DL
HCT VFR BLD AUTO: 42 % (ref 37.5–51)
HGB BLD-MCNC: 13.9 G/DL (ref 13–17.7)
HGB UR QL STRIP.AUTO: NEGATIVE
HYALINE CASTS UR QL AUTO: NORMAL /LPF
IMM GRANULOCYTES # BLD AUTO: 0.02 10*3/MM3 (ref 0–0.05)
IMM GRANULOCYTES NFR BLD AUTO: 0.3 % (ref 0–0.5)
KETONES UR QL STRIP: NEGATIVE
LEUKOCYTE ESTERASE UR QL STRIP.AUTO: NEGATIVE
LYMPHOCYTES # BLD AUTO: 1.96 10*3/MM3 (ref 0.7–3.1)
LYMPHOCYTES NFR BLD AUTO: 25.3 % (ref 19.6–45.3)
MCH RBC QN AUTO: 29.3 PG (ref 26.6–33)
MCHC RBC AUTO-ENTMCNC: 33.1 G/DL (ref 31.5–35.7)
MCV RBC AUTO: 88.4 FL (ref 79–97)
MONOCYTES # BLD AUTO: 0.73 10*3/MM3 (ref 0.1–0.9)
MONOCYTES NFR BLD AUTO: 9.4 % (ref 5–12)
NEUTROPHILS # BLD AUTO: 4.42 10*3/MM3 (ref 1.7–7)
NEUTROPHILS NFR BLD AUTO: 57.1 % (ref 42.7–76)
NITRITE UR QL STRIP: NEGATIVE
NRBC BLD AUTO-RTO: 0 /100 WBC (ref 0–0.2)
PH UR STRIP.AUTO: 7.5 [PH] (ref 5–8)
PHOSPHATE SERPL-MCNC: 3.3 MG/DL (ref 2.5–4.5)
PLATELET # BLD AUTO: 244 10*3/MM3 (ref 140–450)
PMV BLD AUTO: 10.6 FL (ref 6–12)
POTASSIUM BLD-SCNC: 4 MMOL/L (ref 3.5–5.2)
PROT UR QL STRIP: NEGATIVE
PROT UR-MCNC: 5 MG/DL
RBC # BLD AUTO: 4.75 10*6/MM3 (ref 4.14–5.8)
RBC # UR: NORMAL /HPF
REF LAB TEST METHOD: NORMAL
SODIUM BLD-SCNC: 139 MMOL/L (ref 136–145)
SP GR UR STRIP: 1.01 (ref 1–1.03)
SQUAMOUS #/AREA URNS HPF: NORMAL /HPF
UROBILINOGEN UR QL STRIP: NORMAL
WBC NRBC COR # BLD: 7.74 10*3/MM3 (ref 3.4–10.8)
WBC UR QL AUTO: NORMAL /HPF

## 2020-05-07 PROCEDURE — 84156 ASSAY OF PROTEIN URINE: CPT

## 2020-05-07 PROCEDURE — 36415 COLL VENOUS BLD VENIPUNCTURE: CPT

## 2020-05-07 PROCEDURE — 80069 RENAL FUNCTION PANEL: CPT

## 2020-05-07 PROCEDURE — 85025 COMPLETE CBC W/AUTO DIFF WBC: CPT

## 2020-05-07 PROCEDURE — 82570 ASSAY OF URINE CREATININE: CPT

## 2020-05-07 PROCEDURE — 81001 URINALYSIS AUTO W/SCOPE: CPT | Performed by: INTERNAL MEDICINE

## 2020-05-18 NOTE — PROGRESS NOTES
[facility] Cardiology  Subjective:     Encounter Date:05/19/2020      Patient ID: Toño Alcala is a 79 y.o. male.    Chief Complaint: Coronary Artery Disease      PROBLEM LIST:  1. Coronary artery disease:  a. History of CABG x4, 1994 - incomplete database.   b. [facility], 02/16/2011: 100% native RCA, and severe proximal LAD and LCx disease. Patent SVG to the right PDA. Patent LIMA to the LAD. Patent SVG to the OM.    c. Stress echo, 03/27/2014: Normal study.  2. Cerebrovascular disease:  a. History of TIA, 1989.  3. Hypertension.  a. Normal renal angiography, 02/16/2011.  4. Right lower extremity DVT and pulmonary embolism in 06/2015, idiopathic.  a. Recurrent idiopathic DVT 2017  b. Started on warfarin per Dr. Salinas and Tito  5. DM.  6. Dyslipidemia.  7. Arthritis.   8. GERD/hiatal hernia.   9. History of a left forearm fracture.    10. Right wrist fracture.   11. History of cervical fusion.  12. History of lumbar laminectomy.  13. Arthritis.  14. BPPV  15. Shingles  16. Surgical history:  a. T and A.  b. Appendectomy.    History of Present Illness  Toño Alcala returns today for an annual follow up with a history of coronary artery disease and cardiac risk factors. Since last visit patient denies any new cardiovascular complaints.  He has not been as active lately as he developed significant shingles and postherpetic neuralgia of his right flank in January of this year.  He has been limited since that time.  He has an intentional weight loss, but he is not been active enough to know if his dyspnea has improved.  Denies any chest pain orthopnea PND.  No peripheral edema as long as he wears his compression stockings.    Allergies   Allergen Reactions   • Penicillins Hives and Swelling     Per patient, has tolerated Keflex         Current Outpatient Medications:   •  acetaminophen (TYLENOL) 325 MG tablet, Take 650 mg by mouth As Needed for Mild Pain ., Disp: , Rfl:   •  aspirin  81 MG EC tablet, Take 81 mg by mouth Daily. Last dose 10-18-19 per patient, Disp: , Rfl:   •  atorvastatin (LIPITOR) 40 MG tablet, Take 40 mg by mouth Daily., Disp: , Rfl:   •  calcipotriene-betamethasone (TACLONEX) 0.005-0.064 % ointment, Apply 1 application topically to the appropriate area as directed As Needed (psoriasis)., Disp: , Rfl:   •  cholecalciferol (VITAMIN D3) 25 MCG (1000 UT) tablet, Take 1,000 Units by mouth Daily., Disp: , Rfl:   •  diclofenac (VOLTAREN) 1 % gel gel, Apply 4 g topically As Needed., Disp: , Rfl:   •  diltiaZEM CD (CARDIZEM CD) 180 MG 24 hr capsule, Take 180 mg by mouth Daily., Disp: , Rfl:   •  glucose blood test strip, Use to check blood sugar twice daily, Disp: 100 each, Rfl: 3  •  lidocaine (LIDODERM) 5 %, Place 1 patch on the skin as directed by provider Daily. Remove & Discard patch within 12 hours or as directed by MD, Disp: 30 patch, Rfl: 1  •  losartan (COZAAR) 100 MG tablet, Take 100 mg by mouth Every Night., Disp: , Rfl:   •  Microlet Lancets misc, Use to check blood sugar twice daily, Disp: 100 each, Rfl: 3  •  nitroglycerin (NITROSTAT) 0.4 MG SL tablet, Place 0.4 mg under the tongue Every 5 (Five) Minutes As Needed for Chest Pain. Take no more than 3 doses in 15 minutes., Disp: , Rfl:   •  nortriptyline (PAMELOR) 10 MG capsule, Take 1 capsule by mouth Every Night., Disp: 30 capsule, Rfl: 2  •  omeprazole (priLOSEC) 40 MG capsule, Take 20 mg by mouth Daily., Disp: , Rfl:   •  ondansetron ODT (ZOFRAN-ODT) 4 MG disintegrating tablet, Take 1 tablet by mouth Every 8 (Eight) Hours As Needed for Nausea or Vomiting., Disp: 30 tablet, Rfl: 0  •  traMADol (ULTRAM) 50 MG tablet, TAKE ONE TABLET BY MOUTH EVERY 6 HOURS AS NEEDED FOR MODERATE PAIN, Disp: 60 tablet, Rfl: 0    The following portions of the patient's history were reviewed and updated as appropriate: allergies, current medications, past family history, past medical history, past social history, past surgical history and  "problem list.    Review of Systems   Constitution: Positive for weight loss.   Cardiovascular: Negative.    Respiratory: Negative.    Hematologic/Lymphatic: Negative for bleeding problem. Does not bruise/bleed easily.   Skin: Negative for rash.   Musculoskeletal: Negative for muscle weakness and myalgias.   Gastrointestinal: Negative for heartburn, nausea and vomiting.   Neurological: Negative.           Objective:     Vitals:    05/19/20 1532   BP: 144/68   BP Location: Right arm   Patient Position: Sitting   Pulse: 77   SpO2: 97%   Weight: 85.7 kg (189 lb)   Height: 180.3 cm (71\")         Physical Exam   Constitutional: He is oriented to person, place, and time. He appears well-developed and well-nourished.   HENT:   Mouth/Throat: Oropharynx is clear and moist.   Neck: No JVD present. Carotid bruit is not present. No thyromegaly present.   Cardiovascular: Regular rhythm, S1 normal, S2 normal, normal heart sounds and intact distal pulses. Exam reveals no gallop, no S3 and no S4.   No murmur heard.  Pulses:       Carotid pulses are 2+ on the right side, and 2+ on the left side.       Radial pulses are 2+ on the right side, and 2+ on the left side.   Pulmonary/Chest: Breath sounds normal.   Abdominal: Soft. Bowel sounds are normal. He exhibits no mass. There is no tenderness.   Musculoskeletal: He exhibits no edema.   Neurological: He is alert and oriented to person, place, and time.   Skin: Skin is warm and dry. No rash noted.       Lab Review:  Lab Results   Component Value Date    GLUCOSE 109 (H) 05/07/2020    BUN 18 05/07/2020    CREATININE 1.71 (H) 05/07/2020    EGFRIFNONA 39 (L) 05/07/2020    BCR 10.5 05/07/2020    K 4.0 05/07/2020    CO2 25.5 05/07/2020    CALCIUM 9.8 05/07/2020    ALBUMIN 4.20 05/07/2020    ALKPHOS 126 (H) 01/21/2020    AST 27 01/21/2020    ALT 24 01/21/2020     Lab Results   Component Value Date    CHOL 164 06/03/2019    TRIG 66 06/03/2019    HDL 58 06/03/2019    LDL 93 06/03/2019      Lab " Results   Component Value Date    WBC 7.74 05/07/2020    RBC 4.75 05/07/2020    HGB 13.9 05/07/2020    HCT 42.0 05/07/2020    MCV 88.4 05/07/2020     05/07/2020       Procedures        Assessment:   Toño was seen today for coronary artery disease.    Diagnoses and all orders for this visit:    Coronary artery disease involving native coronary artery of native heart without angina pectoris    CVD (cardiovascular disease)    Essential hypertension    Mixed hyperlipidemia             Plan:  1. Coronary artery disease status post remote CABG.  No angina.  No ischemic evaluation in quite some time.  Had been active until recently due to postherpetic neuralgia which is slowed him down.  Patient to resume his normal activities over the summer, if he cannot resume to the level he was last year he will contact us for a stress test.  2. Carotid artery disease stable asymptomatic  3. Hypertension stable on current medical therapy  4. Dyslipidemia last LDL of 93.  Will recheck fasting lipids through primary care physician at next visit.  5. DVT with post DVT venous insufficiency.  Continue compression stockings  6. Continue current medications.  7. Revisit in 12 MO, or sooner as needed.      Avelino Hernandez MD      Please note that portions of this note may have been completed with a voice recognition program. Efforts were made to edit the dictations, but occasionally words are mistranscribed.

## 2020-05-19 ENCOUNTER — OFFICE VISIT (OUTPATIENT)
Dept: CARDIOLOGY | Facility: CLINIC | Age: 80
End: 2020-05-19

## 2020-05-19 VITALS
SYSTOLIC BLOOD PRESSURE: 144 MMHG | BODY MASS INDEX: 26.46 KG/M2 | HEART RATE: 77 BPM | OXYGEN SATURATION: 97 % | WEIGHT: 189 LBS | DIASTOLIC BLOOD PRESSURE: 68 MMHG | HEIGHT: 71 IN

## 2020-05-19 DIAGNOSIS — E78.2 MIXED HYPERLIPIDEMIA: ICD-10-CM

## 2020-05-19 DIAGNOSIS — I25.10 CVD (CARDIOVASCULAR DISEASE): ICD-10-CM

## 2020-05-19 DIAGNOSIS — I25.10 CORONARY ARTERY DISEASE INVOLVING NATIVE CORONARY ARTERY OF NATIVE HEART WITHOUT ANGINA PECTORIS: Primary | ICD-10-CM

## 2020-05-19 DIAGNOSIS — I10 ESSENTIAL HYPERTENSION: ICD-10-CM

## 2020-05-19 PROCEDURE — 99213 OFFICE O/P EST LOW 20 MIN: CPT | Performed by: INTERNAL MEDICINE

## 2020-05-22 RX ORDER — LIDOCAINE 50 MG/G
1 PATCH TOPICAL EVERY 24 HOURS
Qty: 30 PATCH | Refills: 1 | Status: SHIPPED | OUTPATIENT
Start: 2020-05-22 | End: 2021-05-19 | Stop reason: SDUPTHER

## 2020-06-04 DIAGNOSIS — B02.29 POST HERPETIC NEURALGIA: Primary | ICD-10-CM

## 2020-06-04 RX ORDER — GABAPENTIN 300 MG/1
300 CAPSULE ORAL 3 TIMES DAILY
Qty: 90 CAPSULE | Refills: 1 | Status: SHIPPED | OUTPATIENT
Start: 2020-06-04 | End: 2020-08-06 | Stop reason: SDUPTHER

## 2020-06-10 ENCOUNTER — OFFICE VISIT (OUTPATIENT)
Dept: INTERNAL MEDICINE | Facility: CLINIC | Age: 80
End: 2020-06-10

## 2020-06-10 VITALS
BODY MASS INDEX: 27.19 KG/M2 | HEIGHT: 71 IN | TEMPERATURE: 96.8 F | WEIGHT: 194.2 LBS | DIASTOLIC BLOOD PRESSURE: 64 MMHG | HEART RATE: 72 BPM | SYSTOLIC BLOOD PRESSURE: 142 MMHG

## 2020-06-10 DIAGNOSIS — I10 ESSENTIAL HYPERTENSION: ICD-10-CM

## 2020-06-10 DIAGNOSIS — E11.21 DIABETIC NEPHROPATHY ASSOCIATED WITH TYPE 2 DIABETES MELLITUS (HCC): Primary | ICD-10-CM

## 2020-06-10 DIAGNOSIS — B02.29 POSTHERPETIC NEURALGIA: ICD-10-CM

## 2020-06-10 DIAGNOSIS — K21.9 GASTROESOPHAGEAL REFLUX DISEASE WITHOUT ESOPHAGITIS: ICD-10-CM

## 2020-06-10 DIAGNOSIS — N18.30 CHRONIC KIDNEY DISEASE, STAGE III (MODERATE) (HCC): ICD-10-CM

## 2020-06-10 LAB — HBA1C MFR BLD: 6.2 %

## 2020-06-10 PROCEDURE — 99214 OFFICE O/P EST MOD 30 MIN: CPT | Performed by: INTERNAL MEDICINE

## 2020-06-10 PROCEDURE — 83036 HEMOGLOBIN GLYCOSYLATED A1C: CPT | Performed by: INTERNAL MEDICINE

## 2020-06-10 NOTE — PROGRESS NOTES
[facility]     Toño Alcala  1940   [id number]      Patient Care Team:  Radu Francis MD as PCP - General    Chief Complaint::   Chief Complaint   Patient presents with   • Hypertension   • Hyperlipidemia   • Coronary Artery Disease   • Diabetes        HPI  Mr. Alcala comes in for follow-up of his diabetes, hypertension, hyperlipidemia, GERD, chronic kidney disease and postherpetic neuralgia.  His main problem remains his neuralgia.  He continues to have significant daily pain.  Recently increased his gabapentin to 300 mg a day which so far has not helped.  He has had no sedation or other side effects.  He recently saw Dr. Hernandez for follow-up of his coronary artery disease, which remains asymptomatic.  He remains very stressed over his wife's memory loss.  He has lost over 20 pounds in the last few months.  He states some of this is because he stopped drinking alcohol and some due to stress but he feels well and his appetite is good.  There is no fever, cough, shortness of breath or chest pain.    Chronic Conditions:      Patient Active Problem List   Diagnosis   • CAD (coronary artery disease)   • CVD (cardiovascular disease)   • Hypertension   • DVT (deep venous thrombosis) (CMS/[facility])   • Diabetes mellitus (CMS/[facility])   • Dyslipidemia   • Arthritis   • GERD (gastroesophageal reflux disease)   • Mixed hyperlipidemia   • Diabetic nephropathy associated with type 2 diabetes mellitus (CMS/[facility])   • Diabetic polyneuropathy associated with type 2 diabetes mellitus (CMS/HCC)   • Chronic kidney disease, stage III (moderate) (CMS/[facility])   • Vitamin D deficiency   • Disc disorder of cervical region   • Postthrombotic syndrome   • Vertigo   • Angina pectoris (CMS/[facility])   • Lumbosacral spondylosis without myelopathy   • Psoriasis   • Arthritis of elbow   • Swelling of right lower extremity   • Acute right-sided low back pain without sciatica   • Hoarseness   • Unifocal PVCs   • Skin lesion of face   •  Type 2 diabetes mellitus without complication, without long-term current use of insulin (CMS/[facility])   • Benign essential hypertension   • Medicare annual wellness visit, subsequent   • Stage 3 chronic kidney disease due to benign hypertension (CMS/[facility])   • BMI 30.0-30.9,adult   • Postherpetic neuralgia   • Herpes zoster without complication   • Calculus of gallbladder without cholecystitis without obstruction   • Acute diverticulitis        Past Medical History:   Diagnosis Date   • Arthritis    • Bilateral radial fractures 1962    fracture bilateral radial heads   • CAD (coronary artery disease)    • CVD (cardiovascular disease)     History of TIA 1989   • Diabetes mellitus (CMS/[facility])    • DVT (deep venous thrombosis) (CMS/[facility])     Right lower extremity DVT and pulmonary embolism in 06/2015, idiopathic   • DVT of proximal lower limb (CMS/[facility]) 06/22/2015    proximal DVT right leg, small PE- anticoagulation   • Dyslipidemia    • Fracture 1952    H/o pf left forearm fracture   • Fracture of right hand 1980   • GERD (gastroesophageal reflux disease)     with hiatal hernia   • Hx of angina pectoris    • Hypertension     Normal renal angiography 02/16/11   • Left wrist fracture 1953   • Osteoarthritis    • Right wrist fracture    • Vertigo    • Wears glasses        Past Surgical History:   Procedure Laterality Date   • APPENDECTOMY  1957   • BACK SURGERY     • CARDIAC CATHETERIZATION  2011    with vein graft   • CERVICAL FUSION     • CERVICAL FUSION  1992    C3-4   • COLONOSCOPY  2013   • CORONARY ARTERY BYPASS GRAFT  1994   • HERNIA REPAIR Right 2011    inguinal   • INGUINAL HERNIA REPAIR Left 10/29/2019    Procedure: INGUINAL HERNIA REPAIR LEFT;  Surgeon: Charisma Raines MD;  [facility]: [facility];  Service: General   • LUMBAR LAMINECTOMY  1996    laminectomy, lumbar, L3   • OTHER SURGICAL HISTORY      wrist surgery   • TONSILLECTOMY AND ADENOIDECTOMY  1945       Family History   Problem Relation Age of Onset   •  Arthritis Mother         OA   • Heart disease Father    • Diabetes Father    • Heart attack Father    • Heart disease Brother    • Heart attack Brother    • Diabetes Brother    • Diabetes Son    • Hypertension Other    • Cancer Other        Social History     Socioeconomic History   • Marital status:      Spouse name: Not on file   • Number of children: Not on file   • Years of education: Not on file   • Highest education level: Not on file   Tobacco Use   • Smoking status: Former Smoker     Packs/day: 1.00     Years: 20.00     Pack years: 20.00     Types: Cigarettes     Last attempt to quit: 1997     Years since quittin.1   • Smokeless tobacco: Never Used   • Tobacco comment: QUIT DATE 1992   Substance and Sexual Activity   • Alcohol use: Yes     Alcohol/week: 7.0 standard drinks     Types: 7 Glasses of wine per week     Comment: glass of wine everyday    • Drug use: No   • Sexual activity: Defer       Allergies   Allergen Reactions   • Penicillins Hives and Swelling     Per patient, has tolerated Keflex         Current Outpatient Medications:   •  acetaminophen (TYLENOL) 325 MG tablet, Take 650 mg by mouth As Needed for Mild Pain ., Disp: , Rfl:   •  aspirin 81 MG EC tablet, Take 81 mg by mouth Daily. Last dose 10-18-19 per patient, Disp: , Rfl:   •  atorvastatin (LIPITOR) 40 MG tablet, Take 40 mg by mouth Daily., Disp: , Rfl:   •  calcipotriene-betamethasone (TACLONEX) 0.005-0.064 % ointment, Apply 1 application topically to the appropriate area as directed As Needed (psoriasis)., Disp: , Rfl:   •  cholecalciferol (VITAMIN D3) 25 MCG (1000 UT) tablet, Take 1,000 Units by mouth Daily., Disp: , Rfl:   •  diclofenac (VOLTAREN) 1 % gel gel, Apply 4 g topically As Needed., Disp: , Rfl:   •  diltiaZEM CD (CARDIZEM CD) 180 MG 24 hr capsule, Take 180 mg by mouth Daily., Disp: , Rfl:   •  gabapentin (NEURONTIN) 300 MG capsule, Take 1 capsule by mouth 3 (Three) Times a Day. (Patient taking  differently: Take 300 mg by mouth Daily.), Disp: 90 capsule, Rfl: 1  •  glucose blood test strip, Use to check blood sugar twice daily, Disp: 100 each, Rfl: 3  •  losartan (COZAAR) 100 MG tablet, Take 100 mg by mouth Every Night., Disp: , Rfl:   •  Microlet Lancets misc, Use to check blood sugar twice daily, Disp: 100 each, Rfl: 3  •  nitroglycerin (NITROSTAT) 0.4 MG SL tablet, Place 0.4 mg under the tongue Every 5 (Five) Minutes As Needed for Chest Pain. Take no more than 3 doses in 15 minutes., Disp: , Rfl:   •  omeprazole (priLOSEC) 40 MG capsule, Take 20 mg by mouth Daily., Disp: , Rfl:   •  traMADol (ULTRAM) 50 MG tablet, TAKE ONE TABLET BY MOUTH EVERY 6 HOURS AS NEEDED FOR MODERATE PAIN, Disp: 60 tablet, Rfl: 0  •  lidocaine (LIDODERM) 5 %, Place 1 patch on the skin as directed by provider Daily. Remove & Discard patch within 12 hours or as directed by MD, Disp: 30 patch, Rfl: 1  •  nortriptyline (PAMELOR) 10 MG capsule, Take 1 capsule by mouth Every Night., Disp: 30 capsule, Rfl: 2  •  ondansetron ODT (ZOFRAN-ODT) 4 MG disintegrating tablet, Take 1 tablet by mouth Every 8 (Eight) Hours As Needed for Nausea or Vomiting., Disp: 30 tablet, Rfl: 0    Review of Systems   Constitutional: Negative for chills, fatigue and fever.   HENT: Negative for congestion, ear pain and sinus pressure.    Respiratory: Negative for cough, chest tightness, shortness of breath and wheezing.    Cardiovascular: Negative for chest pain and palpitations.   Gastrointestinal: Negative for abdominal pain, blood in stool and constipation.   Skin: Negative for color change.   Allergic/Immunologic: Negative for environmental allergies.   Neurological: Negative for dizziness, speech difficulty and headache.   Psychiatric/Behavioral: Negative for decreased concentration. The patient is not nervous/anxious.         Vital Signs  Vitals:    06/10/20 0928   BP: 142/64   BP Location: Right arm   Patient Position: Sitting   Cuff Size: Adult   Pulse:  "72   Temp: 96.8 °F (36 °C)   Weight: 88.1 kg (194 lb 3.2 oz)   Height: 180.3 cm (70.98\")   PainSc:   3   PainLoc: Rib Cage  Comment: shingles       Physical Exam   Constitutional: He is oriented to person, place, and time. He appears well-developed and well-nourished.   HENT:   Head: Normocephalic and atraumatic.   Cardiovascular: Normal rate, regular rhythm and normal heart sounds.   No murmur heard.  Pulmonary/Chest: Effort normal and breath sounds normal.   Neurological: He is alert and oriented to person, place, and time.   Psychiatric: He has a normal mood and affect.   Vitals reviewed.     Procedures    ACE III MINI             Assessment/Plan:    Toño was seen today for hypertension, hyperlipidemia, coronary artery disease and diabetes.    Diagnoses and all orders for this visit:    Diabetic nephropathy associated with type 2 diabetes mellitus (CMS/[facility])  -     POC Glycosylated Hemoglobin (Hb A1C)    Essential hypertension    Gastroesophageal reflux disease without esophagitis    Chronic kidney disease, stage III (moderate) (CMS/[facility])    Postherpetic neuralgia    Plan    A1c remains well controlled at 6.2.  He will continue efforts at healthy diet and weight control.    Blood pressures well controlled on losartan and diltiazem.    GERD is asymptomatic on omeprazole.    Chronic kidney disease remained stable.  He is now seeing nephrology and his last GFR was stable at about 38.  He continues to work on controlling blood pressure, blood glucose and avoiding NSAIDs.    Postherpetic neuralgia at this point remains his most pressing problem.  He will increase gabapentin to 600 mg at bedtime.  I have instructed him to contact me in 1 week if there is no improvement.  At that point would increase to 300 in the morning and 600 in the evening.      Plan of care reviewed with patient at the conclusion of today's visit. Education was provided regarding diagnosis, management, and any prescribed or recommended OTC " medications.Patient verbalizes understanding of and agreement with management plan.         Radu Francis MD

## 2020-06-11 ENCOUNTER — PRIOR AUTHORIZATION (OUTPATIENT)
Dept: INTERNAL MEDICINE | Facility: CLINIC | Age: 80
End: 2020-06-11

## 2020-07-08 ENCOUNTER — TELEPHONE (OUTPATIENT)
Dept: INTERNAL MEDICINE | Facility: CLINIC | Age: 80
End: 2020-07-08

## 2020-07-08 DIAGNOSIS — E11.9 TYPE 2 DIABETES MELLITUS WITHOUT COMPLICATION, WITHOUT LONG-TERM CURRENT USE OF INSULIN (HCC): ICD-10-CM

## 2020-07-08 DIAGNOSIS — I10 ESSENTIAL HYPERTENSION: Primary | ICD-10-CM

## 2020-07-08 DIAGNOSIS — E78.2 MIXED HYPERLIPIDEMIA: ICD-10-CM

## 2020-07-08 DIAGNOSIS — E55.9 VITAMIN D DEFICIENCY: ICD-10-CM

## 2020-07-08 NOTE — TELEPHONE ENCOUNTER
Pt said his appt with the nephrologist is after his appt here, so please include any labs they would need in the lab order

## 2020-07-17 NOTE — TELEPHONE ENCOUNTER
PT CALLED LEONARDA BACK AND I TOLD PT HIS LAB ORDER WAS READY.  PT STATED THAT HIS NEPHROLOGY APPT WASN'T UNTIL MAY 2021 AND ANY LABS DR FLANAGAN WANTS DONE HE NEEDS TO DO THEM BECAUSE THERE WILL BE NO LABS FROM NEPHROLOGY  AT HIS DEC APPT WITH DR FLANAGAN.  SO PLEASE ADD ANY LABS NEED TO THE ORDER ALREADY IN CHART.

## 2020-08-06 DIAGNOSIS — B02.29 POST HERPETIC NEURALGIA: ICD-10-CM

## 2020-08-06 RX ORDER — GABAPENTIN 300 MG/1
CAPSULE ORAL
Qty: 150 CAPSULE | Refills: 1 | Status: SHIPPED | OUTPATIENT
Start: 2020-08-06 | End: 2020-10-12

## 2020-10-12 DIAGNOSIS — B02.29 POST HERPETIC NEURALGIA: ICD-10-CM

## 2020-10-12 RX ORDER — GABAPENTIN 300 MG/1
CAPSULE ORAL
Qty: 150 CAPSULE | Refills: 0 | Status: SHIPPED | OUTPATIENT
Start: 2020-10-12 | End: 2020-11-11

## 2020-11-11 DIAGNOSIS — B02.29 POST HERPETIC NEURALGIA: ICD-10-CM

## 2020-11-11 RX ORDER — GABAPENTIN 300 MG/1
CAPSULE ORAL
Qty: 150 CAPSULE | Refills: 1 | Status: SHIPPED | OUTPATIENT
Start: 2020-11-11 | End: 2020-12-14

## 2020-12-03 ENCOUNTER — TELEPHONE (OUTPATIENT)
Dept: INTERNAL MEDICINE | Facility: CLINIC | Age: 80
End: 2020-12-03

## 2020-12-03 ENCOUNTER — OFFICE VISIT (OUTPATIENT)
Dept: INTERNAL MEDICINE | Facility: CLINIC | Age: 80
End: 2020-12-03

## 2020-12-03 ENCOUNTER — LAB (OUTPATIENT)
Dept: LAB | Facility: HOSPITAL | Age: 80
End: 2020-12-03

## 2020-12-03 VITALS
TEMPERATURE: 97.8 F | BODY MASS INDEX: 31.64 KG/M2 | WEIGHT: 226 LBS | SYSTOLIC BLOOD PRESSURE: 158 MMHG | HEIGHT: 71 IN | HEART RATE: 92 BPM | DIASTOLIC BLOOD PRESSURE: 80 MMHG

## 2020-12-03 DIAGNOSIS — E55.9 VITAMIN D DEFICIENCY: ICD-10-CM

## 2020-12-03 DIAGNOSIS — E11.9 TYPE 2 DIABETES MELLITUS WITHOUT COMPLICATION, WITHOUT LONG-TERM CURRENT USE OF INSULIN (HCC): ICD-10-CM

## 2020-12-03 DIAGNOSIS — I10 BENIGN ESSENTIAL HYPERTENSION: ICD-10-CM

## 2020-12-03 DIAGNOSIS — I82.5Y1 CHRONIC DEEP VEIN THROMBOSIS (DVT) OF PROXIMAL VEIN OF RIGHT LOWER EXTREMITY (HCC): ICD-10-CM

## 2020-12-03 DIAGNOSIS — R60.0 LEG EDEMA, RIGHT: Primary | ICD-10-CM

## 2020-12-03 DIAGNOSIS — R59.0 INGUINAL LYMPHADENOPATHY: Primary | ICD-10-CM

## 2020-12-03 DIAGNOSIS — I10 ESSENTIAL HYPERTENSION: ICD-10-CM

## 2020-12-03 DIAGNOSIS — I25.10 CORONARY ARTERY DISEASE INVOLVING NATIVE CORONARY ARTERY OF NATIVE HEART WITHOUT ANGINA PECTORIS: ICD-10-CM

## 2020-12-03 DIAGNOSIS — E78.2 MIXED HYPERLIPIDEMIA: ICD-10-CM

## 2020-12-03 LAB
25(OH)D3 SERPL-MCNC: 36 NG/ML (ref 30–100)
ALBUMIN SERPL-MCNC: 4.3 G/DL (ref 3.5–5.2)
ALBUMIN/GLOB SERPL: 1.7 G/DL
ALP SERPL-CCNC: 155 U/L (ref 39–117)
ALT SERPL W P-5'-P-CCNC: 18 U/L (ref 1–41)
ANION GAP SERPL CALCULATED.3IONS-SCNC: 7.2 MMOL/L (ref 5–15)
AST SERPL-CCNC: 22 U/L (ref 1–40)
BASOPHILS # BLD AUTO: 0.04 10*3/MM3 (ref 0–0.2)
BASOPHILS NFR BLD AUTO: 0.7 % (ref 0–1.5)
BILIRUB SERPL-MCNC: 0.8 MG/DL (ref 0–1.2)
BUN SERPL-MCNC: 16 MG/DL (ref 8–23)
BUN/CREAT SERPL: 9.2 (ref 7–25)
CALCIUM SPEC-SCNC: 9.3 MG/DL (ref 8.6–10.5)
CHLORIDE SERPL-SCNC: 104 MMOL/L (ref 98–107)
CHOLEST SERPL-MCNC: 133 MG/DL (ref 0–200)
CO2 SERPL-SCNC: 29.8 MMOL/L (ref 22–29)
CREAT SERPL-MCNC: 1.74 MG/DL (ref 0.76–1.27)
DEPRECATED RDW RBC AUTO: 46.5 FL (ref 37–54)
EOSINOPHIL # BLD AUTO: 0.44 10*3/MM3 (ref 0–0.4)
EOSINOPHIL NFR BLD AUTO: 7.2 % (ref 0.3–6.2)
ERYTHROCYTE [DISTWIDTH] IN BLOOD BY AUTOMATED COUNT: 14.1 % (ref 12.3–15.4)
GFR SERPL CREATININE-BSD FRML MDRD: 38 ML/MIN/1.73
GLOBULIN UR ELPH-MCNC: 2.5 GM/DL
GLUCOSE SERPL-MCNC: 110 MG/DL (ref 65–99)
HBA1C MFR BLD: 6.48 % (ref 4.8–5.6)
HCT VFR BLD AUTO: 41.6 % (ref 37.5–51)
HDLC SERPL-MCNC: 41 MG/DL (ref 40–60)
HGB BLD-MCNC: 13.8 G/DL (ref 13–17.7)
IMM GRANULOCYTES # BLD AUTO: 0.03 10*3/MM3 (ref 0–0.05)
IMM GRANULOCYTES NFR BLD AUTO: 0.5 % (ref 0–0.5)
LDLC SERPL CALC-MCNC: 73 MG/DL (ref 0–100)
LDLC/HDLC SERPL: 1.76 {RATIO}
LYMPHOCYTES # BLD AUTO: 1.62 10*3/MM3 (ref 0.7–3.1)
LYMPHOCYTES NFR BLD AUTO: 26.6 % (ref 19.6–45.3)
MCH RBC QN AUTO: 29.9 PG (ref 26.6–33)
MCHC RBC AUTO-ENTMCNC: 33.2 G/DL (ref 31.5–35.7)
MCV RBC AUTO: 90.2 FL (ref 79–97)
MONOCYTES # BLD AUTO: 0.76 10*3/MM3 (ref 0.1–0.9)
MONOCYTES NFR BLD AUTO: 12.5 % (ref 5–12)
NEUTROPHILS NFR BLD AUTO: 3.19 10*3/MM3 (ref 1.7–7)
NEUTROPHILS NFR BLD AUTO: 52.5 % (ref 42.7–76)
NRBC BLD AUTO-RTO: 0 /100 WBC (ref 0–0.2)
PLATELET # BLD AUTO: 214 10*3/MM3 (ref 140–450)
PMV BLD AUTO: 10.2 FL (ref 6–12)
POTASSIUM SERPL-SCNC: 4.4 MMOL/L (ref 3.5–5.2)
PROT SERPL-MCNC: 6.8 G/DL (ref 6–8.5)
RBC # BLD AUTO: 4.61 10*6/MM3 (ref 4.14–5.8)
SODIUM SERPL-SCNC: 141 MMOL/L (ref 136–145)
TRIGL SERPL-MCNC: 100 MG/DL (ref 0–150)
VLDLC SERPL-MCNC: 19 MG/DL (ref 5–40)
WBC # BLD AUTO: 6.08 10*3/MM3 (ref 3.4–10.8)

## 2020-12-03 PROCEDURE — 82306 VITAMIN D 25 HYDROXY: CPT

## 2020-12-03 PROCEDURE — 80053 COMPREHEN METABOLIC PANEL: CPT

## 2020-12-03 PROCEDURE — 80061 LIPID PANEL: CPT

## 2020-12-03 PROCEDURE — 83036 HEMOGLOBIN GLYCOSYLATED A1C: CPT

## 2020-12-03 PROCEDURE — 85025 COMPLETE CBC W/AUTO DIFF WBC: CPT

## 2020-12-03 PROCEDURE — 99214 OFFICE O/P EST MOD 30 MIN: CPT | Performed by: PHYSICIAN ASSISTANT

## 2020-12-03 NOTE — PROGRESS NOTES
Patient Care Team:  Radu Francis MD as PCP - General    Chief Complaint::   Chief Complaint   Patient presents with   • Leg Pain     Right leg pain possible DVT   • Hypertension        Subjective     HPI  Adama is an 80-year-old man with history of hypertension, DVT, and coronary artery disease.  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  Has been using compression stockings regularly.  He takes 81 mg aspirin daily.  Reports waking up this morning with throbbing in his right leg, located behind the knee.  The leg is tender to the touch.  He denies pain with walking.  He does report that the pain improved once he put on his compression stocking.  He is compliant with his blood pressure medications.  He denies chest pain, shortness of breath, cough, or fever.  He denies palpitations.        The following portions of the patient's history were reviewed and updated as appropriate: active problem list, medication list, allergies, family history, social history    Review of Systems:   Review of Systems   Constitutional: Negative for chills, fatigue, fever, unexpected weight gain and unexpected weight loss.   HENT: Negative for congestion, ear pain and sinus pressure.    Respiratory: Negative for cough, chest tightness, shortness of breath and wheezing.    Cardiovascular: Positive for leg swelling. Negative for chest pain and palpitations.   Gastrointestinal: Negative for abdominal distention, abdominal pain, blood in stool, constipation and nausea.   Endocrine: Negative for cold intolerance and heat intolerance.   Skin: Negative for color change and rash.   Allergic/Immunologic: Negative for environmental allergies.   Neurological: Negative for dizziness, syncope, speech difficulty, weakness and headache.   Psychiatric/Behavioral: Negative for decreased concentration. The patient is not nervous/anxious.        Vital Signs  Vitals:    12/03/20 1352   BP: 158/80   BP Location: Left arm   Patient Position: Sitting  "  Cuff Size: Adult   Pulse: 92   Temp: 97.8 °F (36.6 °C)   TempSrc: Temporal   Weight: 103 kg (226 lb)   Height: 180.3 cm (70.98\")   PainSc:   2   PainLoc: Leg     Body mass index is 31.53 kg/m².    Labs  No visits with results within 3 Month(s) from this visit.   Latest known visit with results is:   Office Visit on 06/10/2020   Component Date Value Ref Range Status   • Hemoglobin A1C 06/10/2020 6.2  % Final       Imaging  No radiology results for the last 30 days.      Current Outpatient Medications:   •  acetaminophen (TYLENOL) 325 MG tablet, Take 650 mg by mouth As Needed for Mild Pain ., Disp: , Rfl:   •  aspirin 81 MG EC tablet, Take 81 mg by mouth Daily. Last dose 10-18-19 per patient, Disp: , Rfl:   •  atorvastatin (LIPITOR) 40 MG tablet, Take 40 mg by mouth Daily., Disp: , Rfl:   •  calcipotriene-betamethasone (TACLONEX) 0.005-0.064 % ointment, Apply 1 application topically to the appropriate area as directed As Needed (psoriasis)., Disp: , Rfl:   •  cholecalciferol (VITAMIN D3) 25 MCG (1000 UT) tablet, Take 1,000 Units by mouth Daily., Disp: , Rfl:   •  diclofenac (VOLTAREN) 1 % gel gel, Apply 4 g topically As Needed., Disp: , Rfl:   •  diltiaZEM CD (CARDIZEM CD) 180 MG 24 hr capsule, Take 180 mg by mouth Daily., Disp: , Rfl:   •  gabapentin (NEURONTIN) 300 MG capsule, TAKE TWO CAPSULES BY MOUTH EVERY MORNING AND TAKE THREE CAPSULES BY MOUTH EVERY NIGHT, Disp: 150 capsule, Rfl: 1  •  glucose blood test strip, Use to check blood sugar twice daily, Disp: 100 each, Rfl: 3  •  lidocaine (LIDODERM) 5 %, Place 1 patch on the skin as directed by provider Daily. Remove & Discard patch within 12 hours or as directed by MD, Disp: 30 patch, Rfl: 1  •  losartan (COZAAR) 100 MG tablet, Take 100 mg by mouth Every Night., Disp: , Rfl:   •  Microlet Lancets misc, Use to check blood sugar twice daily, Disp: 100 each, Rfl: 3  •  nitroglycerin (NITROSTAT) 0.4 MG SL tablet, Place 0.4 mg under the tongue Every 5 (Five) Minutes " As Needed for Chest Pain. Take no more than 3 doses in 15 minutes., Disp: , Rfl:   •  omeprazole (priLOSEC) 20 MG capsule, Take 20 mg by mouth 2 (two) times a day., Disp: , Rfl:   •  traMADol (ULTRAM) 50 MG tablet, TAKE ONE TABLET BY MOUTH EVERY 6 HOURS AS NEEDED FOR MODERATE PAIN, Disp: 60 tablet, Rfl: 0    Physical Exam:    Physical Exam  Vitals signs and nursing note reviewed.   Constitutional:       General: He is not in acute distress.     Appearance: He is well-developed. He is not diaphoretic.   Neck:      Vascular: No JVD.   Cardiovascular:      Rate and Rhythm: Normal rate and regular rhythm.      Heart sounds: Normal heart sounds. No murmur.   Pulmonary:      Effort: Pulmonary effort is normal. No respiratory distress.      Breath sounds: Normal breath sounds.   Chest:      Chest wall: No tenderness.   Abdominal:      General: There is no distension.      Palpations: Abdomen is soft.      Tenderness: There is no abdominal tenderness.   Musculoskeletal:         General: Swelling present.      Right knee: He exhibits swelling.        Legs:    Skin:     General: Skin is warm and dry.      Coloration: Skin is not pale.      Findings: No erythema.   Neurological:      General: No focal deficit present.      Mental Status: He is oriented to person, place, and time. Mental status is at baseline.   Psychiatric:         Mood and Affect: Mood normal.         Behavior: Behavior normal.         Thought Content: Thought content normal.         Judgment: Judgment normal.         Procedures        Assessment/Plan   Problem List Items Addressed This Visit        Cardiovascular and Mediastinum    CAD (coronary artery disease)    Overview     a. History of CABG x4, 1994 - incomplete database.   b. Cardiac catheterization 02/16/2011 showed 100% native RCA, and severe proximal LAD and left circumflex disease.  Saphenous vein graft to the right PDA is patent.  Left internal mammary artery to the LAD is widely patent.   Saphenous vein graft to the obtuse marginal is widely patent.         Relevant Medications    diltiaZEM CD (CARDIZEM CD) 180 MG 24 hr capsule    nitroglycerin (NITROSTAT) 0.4 MG SL tablet    DVT (deep venous thrombosis) (CMS/[facility])    Overview     Right lower extremity DVT and pulmonary embolism in 06/2015, idiopathic         Benign essential hypertension    Overview     Continue losartan 100 mg tablets daily         Relevant Medications    diltiaZEM CD (CARDIZEM CD) 180 MG 24 hr capsule    losartan (COZAAR) 100 MG tablet       Other    Leg edema, right - Primary    Relevant Orders    Duplex Venous Lower Extremity - Right CAR      Will call patient with results.  Continue compression as directed.    Return if symptoms worsen or fail to improve, for Next scheduled follow up.    Plan of care reviewed with patient at the conclusion of today's visit. Education was provided regarding diagnosis, management, and any prescribed or recommended OTC medications.Patient verbalizes understanding of and agreement with management plan.       Laila Field PA-C    Please note that portions of this note were completed with a voice recognition program. Efforts were made to edit the dictations, but occasionally words are mistranscribed.

## 2020-12-03 NOTE — TELEPHONE ENCOUNTER
Tj with [facility] and he called with report from [facility]     No DVT but enlarged lymph nodes in the right going. Verbal given to Laila.

## 2020-12-04 ENCOUNTER — HOSPITAL ENCOUNTER (OUTPATIENT)
Dept: CT IMAGING | Facility: HOSPITAL | Age: 80
Discharge: HOME | End: 2020-12-04
Admitting: PHYSICIAN ASSISTANT

## 2020-12-04 DIAGNOSIS — R60.0 LEG EDEMA, RIGHT: ICD-10-CM

## 2020-12-04 DIAGNOSIS — R59.0 INGUINAL LYMPHADENOPATHY: ICD-10-CM

## 2020-12-04 PROCEDURE — 74178 CT ABD&PLV WO CNTR FLWD CNTR: CPT

## 2020-12-04 PROCEDURE — 25010000002 IOPAMIDOL 61 % SOLUTION: Performed by: PHYSICIAN ASSISTANT

## 2020-12-04 RX ADMIN — IOPAMIDOL 85 ML: 612 INJECTION, SOLUTION INTRAVENOUS at 12:05

## 2020-12-07 ENCOUNTER — OFFICE VISIT (OUTPATIENT)
Dept: INTERNAL MEDICINE | Facility: CLINIC | Age: 80
End: 2020-12-07

## 2020-12-07 VITALS
DIASTOLIC BLOOD PRESSURE: 60 MMHG | SYSTOLIC BLOOD PRESSURE: 130 MMHG | WEIGHT: 223.8 LBS | HEART RATE: 96 BPM | HEIGHT: 71 IN | BODY MASS INDEX: 31.33 KG/M2 | TEMPERATURE: 97.1 F

## 2020-12-07 DIAGNOSIS — Z00.00 MEDICARE ANNUAL WELLNESS VISIT, SUBSEQUENT: Primary | ICD-10-CM

## 2020-12-07 PROCEDURE — G0439 PPPS, SUBSEQ VISIT: HCPCS | Performed by: NURSE PRACTITIONER

## 2020-12-07 NOTE — PROGRESS NOTES
The ABCs of the Annual Wellness Visit  Subsequent Medicare Wellness Visit    Chief Complaint   Patient presents with   • Annual Exam       Subjective   History of Present Illness:  Toño Alcala is a 80 y.o. male who presents for a Subsequent Medicare Wellness Visit.    HEALTH RISK ASSESSMENT    Recent Hospitalizations:  No hospitalization(s) within the last year.    Current Medical Providers:  Patient Care Team:  Radu Francis MD as PCP - General    Smoking Status:  Social History     Tobacco Use   Smoking Status Former Smoker   • Packs/day: 1.00   • Years: 20.00   • Pack years: 20.00   • Types: Cigarettes   • Quit date: 1997   • Years since quittin.6   Smokeless Tobacco Never Used   Tobacco Comment    QUIT DATE 1992       Alcohol Consumption:  Social History     Substance and Sexual Activity   Alcohol Use Yes   • Alcohol/week: 7.0 standard drinks   • Types: 7 Glasses of wine per week    Comment: glass of wine everyday        Depression Screen:   PHQ-2/PHQ-9 Depression Screening 2020   Little interest or pleasure in doing things 0   Feeling down, depressed, or hopeless 0   Total Score 0       Fall Risk Screen:  STEADI Fall Risk Assessment was completed, and patient is at LOW risk for falls.Assessment completed on:2020    Health Habits and Functional and Cognitive Screening:  Functional & Cognitive Status 2020   Do you have difficulty preparing food and eating? No   Do you have difficulty bathing yourself, getting dressed or grooming yourself? No   Do you have difficulty using the toilet? No   Do you have difficulty moving around from place to place? No   Do you have trouble with steps or getting out of a bed or a chair? No   Current Diet Well Balanced Diet   Dental Exam Up to date   Eye Exam Up to date   Exercise (times per week) 3 times per week   Current Exercise Activities Include Walking   Do you need help using the phone?  No   Are you deaf or do you have serious  difficulty hearing?  No   Do you need help with transportation? No   Do you need help shopping? No   Do you need help preparing meals?  No   Do you need help with housework?  No   Do you need help with laundry? No   Do you need help taking your medications? No   Do you need help managing money? No   Do you ever drive or ride in a car without wearing a seat belt? No   Have you felt unusual stress, anger or loneliness in the last month? -   Who do you live with? Spouse   If you need help, do you have trouble finding someone available to you? No   Have you been bothered in the last four weeks by sexual problems? No   Do you have difficulty concentrating, remembering or making decisions? No         Does the patient have evidence of cognitive impairment? No    Asprin use counseling:Taking ASA appropriately as indicated    Age-appropriate Screening Schedule:  Refer to the list below for future screening recommendations based on patient's age, sex and/or medical conditions. Orders for these recommended tests are listed in the plan section. The patient has been provided with a written plan.    Health Maintenance   Topic Date Due   • URINE MICROALBUMIN  1940   • TDAP/TD VACCINES (1 - Tdap) 07/05/1959   • ZOSTER VACCINE (2 of 3) 03/21/2013   • HEMOGLOBIN A1C  06/03/2021   • DIABETIC EYE EXAM  08/26/2021   • LIPID PANEL  12/03/2021   • INFLUENZA VACCINE  Completed          The following portions of the patient's history were reviewed and updated as appropriate: allergies, current medications, past family history, past medical history, past social history, past surgical history and problem list.    Outpatient Medications Prior to Visit   Medication Sig Dispense Refill   • acetaminophen (TYLENOL) 325 MG tablet Take 650 mg by mouth As Needed for Mild Pain .     • aspirin 81 MG EC tablet Take 81 mg by mouth Daily. Last dose 10-18-19 per patient     • atorvastatin (LIPITOR) 40 MG tablet Take 40 mg by mouth Daily.     •  calcipotriene-betamethasone (TACLONEX) 0.005-0.064 % ointment Apply 1 application topically to the appropriate area as directed As Needed (psoriasis).     • cholecalciferol (VITAMIN D3) 25 MCG (1000 UT) tablet Take 1,000 Units by mouth Daily.     • diclofenac (VOLTAREN) 1 % gel gel Apply 4 g topically As Needed.     • diltiaZEM CD (CARDIZEM CD) 180 MG 24 hr capsule Take 180 mg by mouth Daily.     • gabapentin (NEURONTIN) 300 MG capsule TAKE TWO CAPSULES BY MOUTH EVERY MORNING AND TAKE THREE CAPSULES BY MOUTH EVERY NIGHT 150 capsule 1   • glucose blood test strip Use to check blood sugar twice daily 100 each 3   • lidocaine (LIDODERM) 5 % Place 1 patch on the skin as directed by provider Daily. Remove & Discard patch within 12 hours or as directed by MD 30 patch 1   • losartan (COZAAR) 100 MG tablet Take 100 mg by mouth Every Night.     • Microlet Lancets misc Use to check blood sugar twice daily 100 each 3   • nitroglycerin (NITROSTAT) 0.4 MG SL tablet Place 0.4 mg under the tongue Every 5 (Five) Minutes As Needed for Chest Pain. Take no more than 3 doses in 15 minutes.     • omeprazole (priLOSEC) 20 MG capsule Take 20 mg by mouth 2 (two) times a day.     • traMADol (ULTRAM) 50 MG tablet TAKE ONE TABLET BY MOUTH EVERY 6 HOURS AS NEEDED FOR MODERATE PAIN 60 tablet 0     No facility-administered medications prior to visit.        Patient Active Problem List   Diagnosis   • CAD (coronary artery disease)   • CVD (cardiovascular disease)   • Hypertension   • DVT (deep venous thrombosis) (CMS/[facility])   • Diabetes mellitus (CMS/[facility])   • Dyslipidemia   • Arthritis   • GERD (gastroesophageal reflux disease)   • Mixed hyperlipidemia   • Diabetic nephropathy associated with type 2 diabetes mellitus (CMS/HCC)   • Diabetic polyneuropathy associated with type 2 diabetes mellitus (CMS/HCC)   • Chronic kidney disease, stage III (moderate)   • Vitamin D deficiency   • Disc disorder of cervical region   • Postthrombotic syndrome   •  Vertigo   • Angina pectoris (CMS/HCC)   • Lumbosacral spondylosis without myelopathy   • Psoriasis   • Arthritis of elbow   • Swelling of right lower extremity   • Acute right-sided low back pain without sciatica   • Hoarseness   • Unifocal PVCs   • Skin lesion of face   • Type 2 diabetes mellitus without complication, without long-term current use of insulin (CMS/HCC)   • Benign essential hypertension   • Medicare annual wellness visit, subsequent   • Stage 3 chronic kidney disease due to benign hypertension   • BMI 30.0-30.9,adult   • Postherpetic neuralgia   • Herpes zoster without complication   • Calculus of gallbladder without cholecystitis without obstruction   • Acute diverticulitis   • Leg edema, right       Advanced Care Planning:  ACP discussion was held with the patient during this visit. Patient has an advance directive (not in EMR), copy requested.    Review of Systems   Constitutional: Negative for chills, fatigue and fever.   HENT: Negative for congestion, ear pain and sinus pressure.    Respiratory: Negative for cough, chest tightness, shortness of breath and wheezing.    Cardiovascular: Negative for chest pain and palpitations.   Gastrointestinal: Positive for abdominal pain. Negative for blood in stool and constipation.   Skin: Negative for color change.   Allergic/Immunologic: Negative for environmental allergies.   Neurological: Negative for dizziness, speech difficulty and headaches.   Psychiatric/Behavioral: Negative for confusion. The patient is not nervous/anxious.        Compared to one year ago, the patient feels his physical health is worse.  Compared to one year ago, the patient feels his mental health is the same.    Reviewed chart for potential of high risk medication in the elderly: yes  Reviewed chart for potential of harmful drug interactions in the elderly:yes    Objective         Vitals:    12/07/20 0820   BP: 130/60   BP Location: Left arm   Patient Position: Sitting   Cuff Size:  "Adult   Pulse: 96   Temp: 97.1 °F (36.2 °C)   TempSrc: Infrared   Weight: 102 kg (223 lb 12.8 oz)   Height: 180.3 cm (71\")   PainSc:   2   PainLoc: Leg  Comment: right       Body mass index is 31.21 kg/m².  Discussed the patient's BMI with him. The BMI is above average; BMI management plan is completed.    Physical Exam    Lab Results   Component Value Date    TRIG 100 12/03/2020    HDL 41 12/03/2020    LDL 73 12/03/2020    VLDL 19 12/03/2020    HGBA1C 6.48 (H) 12/03/2020        Assessment/Plan   Medicare Risks and Personalized Health Plan  CMS Preventative Services Quick Reference  Immunizations Discussed/Encouraged (specific immunizations; Shingrix )    The above risks/problems have been discussed with the patient.  Pertinent information has been shared with the patient in the After Visit Summary.  Follow up plans and orders are seen below in the Assessment/Plan Section.    There are no diagnoses linked to this encounter.  Follow Up:  Return for Next scheduled follow up.     An After Visit Summary and PPPS were given to the patient.             "

## 2020-12-07 NOTE — PATIENT INSTRUCTIONS
"BMI for Adults  What is BMI?  Body mass index (BMI) is a number that is calculated from a person's weight and height. BMI can help estimate how much of a person's weight is composed of fat. BMI does not measure body fat directly. Rather, it is an alternative to procedures that directly measure body fat, which can be difficult and expensive.  BMI can help identify people who may be at higher risk for certain medical problems.  What are BMI measurements used for?  BMI is used as a screening tool to identify possible weight problems. It helps determine whether a person is obese, overweight, a healthy weight, or underweight.  BMI is useful for:  · Identifying a weight problem that may be related to a medical condition or may increase the risk for medical problems.  · Promoting changes, such as changes in diet and exercise, to help reach a healthy weight. BMI screening can be repeated to see if these changes are working.  How is BMI calculated?  BMI involves measuring your weight in relation to your height. Both height and weight are measured, and the BMI is calculated from those numbers. This can be done either in English ([place] or metric measurements. Note that charts and online BMI calculators are available to help you find your BMI quickly and easily without having to do these calculations yourself.  To calculate your BMI in English ([place] measurements:    1. Measure your weight in pounds (lb).  2. Multiply the number of pounds by 703.  ? For example, for a person who weighs 180 lb, multiply that number by 703, which equals 126,540.  3. Measure your height in inches. Then multiply that number by itself to get a measurement called \"inches squared.\"  ? For example, for a person who is 70 inches tall, the \"inches squared\" measurement is 70 inches x 70 inches, which equals 4,900 inches squared.  4. Divide the total from step 2 (number of lb x 703) by the total from step 3 (inches squared): 126,540 ÷ 4,900 = 25.8. This is " "your BMI.  To calculate your BMI in metric measurements:  1. Measure your weight in kilograms (kg).  2. Measure your height in meters (m). Then multiply that number by itself to get a measurement called \"meters squared.\"  ? For example, for a person who is 1.75 m tall, the \"meters squared\" measurement is 1.75 m x 1.75 m, which is equal to 3.1 meters squared.  3. Divide the number of kilograms (your weight) by the meters squared number. In this example: 70 ÷ 3.1 = 22.6. This is your BMI.  What do the results mean?  BMI charts are used to identify whether you are underweight, normal weight, overweight, or obese. The following guidelines will be used:  · Underweight: BMI less than 18.5.  · Normal weight: BMI between 18.5 and 24.9.  · Overweight: BMI between 25 and 29.9.  · Obese: BMI of 30 or above.  Keep these notes in mind:  · Weight includes both fat and muscle, so someone with a muscular build, such as an athlete, may have a BMI that is higher than 24.9. In cases like these, BMI is not an accurate measure of body fat.  · To determine if excess body fat is the cause of a BMI of 25 or higher, further assessments may need to be done by a health care provider.  · BMI is usually interpreted in the same way for men and women.  Where to find more information  For more information about BMI, including tools to quickly calculate your BMI, go to these websites:  · Centers for Disease Control and Prevention: www.cdc.gov  · American Heart Association: www.heart.org  · National Heart, Lung, and [employer]: www.nhlbi.nih.gov  Summary  · Body mass index (BMI) is a number that is calculated from a person's weight and height.  · BMI may help estimate how much of a person's weight is composed of fat. BMI can help identify those who may be at higher risk for certain medical problems.  · BMI can be measured using English measurements or metric measurements.  · BMI charts are used to identify whether you are underweight, normal " weight, overweight, or obese.  This information is not intended to replace advice given to you by your health care provider. Make sure you discuss any questions you have with your health care provider.  Document Revised: 09/09/2020 Document Reviewed: 07/17/2020  [employer]vier Patient Education © 2020 [employer]

## 2020-12-10 DIAGNOSIS — N43.3 HYDROCELE, UNSPECIFIED HYDROCELE TYPE: Primary | ICD-10-CM

## 2020-12-14 ENCOUNTER — OFFICE VISIT (OUTPATIENT)
Dept: INTERNAL MEDICINE | Facility: CLINIC | Age: 80
End: 2020-12-14

## 2020-12-14 VITALS
TEMPERATURE: 98.6 F | SYSTOLIC BLOOD PRESSURE: 148 MMHG | BODY MASS INDEX: 30.66 KG/M2 | WEIGHT: 219 LBS | DIASTOLIC BLOOD PRESSURE: 72 MMHG | HEART RATE: 76 BPM | HEIGHT: 71 IN

## 2020-12-14 DIAGNOSIS — B02.29 POSTHERPETIC NEURALGIA: ICD-10-CM

## 2020-12-14 DIAGNOSIS — E11.21 DIABETIC NEPHROPATHY ASSOCIATED WITH TYPE 2 DIABETES MELLITUS (HCC): ICD-10-CM

## 2020-12-14 DIAGNOSIS — E11.42 DIABETIC POLYNEUROPATHY ASSOCIATED WITH TYPE 2 DIABETES MELLITUS (HCC): ICD-10-CM

## 2020-12-14 DIAGNOSIS — I20.9 ANGINA PECTORIS (HCC): ICD-10-CM

## 2020-12-14 DIAGNOSIS — N18.32 STAGE 3B CHRONIC KIDNEY DISEASE (HCC): ICD-10-CM

## 2020-12-14 DIAGNOSIS — K21.9 GASTROESOPHAGEAL REFLUX DISEASE WITHOUT ESOPHAGITIS: ICD-10-CM

## 2020-12-14 DIAGNOSIS — E78.2 MIXED HYPERLIPIDEMIA: ICD-10-CM

## 2020-12-14 DIAGNOSIS — I10 BENIGN ESSENTIAL HYPERTENSION: Primary | ICD-10-CM

## 2020-12-14 DIAGNOSIS — E55.9 VITAMIN D DEFICIENCY: ICD-10-CM

## 2020-12-14 DIAGNOSIS — I87.009 POSTTHROMBOTIC SYNDROME: ICD-10-CM

## 2020-12-14 PROCEDURE — 99214 OFFICE O/P EST MOD 30 MIN: CPT | Performed by: INTERNAL MEDICINE

## 2020-12-14 NOTE — PROGRESS NOTES
[facility]     Toño Alcala  1940   [id number]      Patient Care Team:  Radu Francis MD as PCP - General    Chief Complaint::   Chief Complaint   Patient presents with   • Diabetes   • Hypertension        HPI  Mr. Alcala is now 80.  He comes in for follow-up of his diabetes, hypertension, hyperlipidemia, postphlebitic syndrome, GERD, vitamin D deficiency, chronic kidney disease and postherpetic neuralgia.  He continues to see Dr. Hernandez for coronary artery disease.  Recently he experienced more edema and some testicular swelling.  He decided to stop gabapentin and noticed that the swelling diminished considerably and also testicular swelling improved.  He has an appointment to see urology regarding a hydrocele that was identified on CT scan.  He still has mild right flank pain from postherpetic neuralgia but is not nearly as severe as it was and he is quite content being off gabapentin.  He also feels the gabapentin caused some short-term memory loss which he has not regained enough.  There is no fever, cough, shortness of breath or chest pain.    Chronic Conditions:      Patient Active Problem List   Diagnosis   • CAD (coronary artery disease)   • CVD (cardiovascular disease)   • Hypertension   • DVT (deep venous thrombosis) (CMS/[facility])   • Diabetes mellitus (CMS/[facility])   • Dyslipidemia   • Arthritis   • GERD (gastroesophageal reflux disease)   • Mixed hyperlipidemia   • Diabetic nephropathy associated with type 2 diabetes mellitus (CMS/[facility])   • Diabetic polyneuropathy associated with type 2 diabetes mellitus (CMS/[facility])   • Chronic kidney disease, stage III (moderate)   • Vitamin D deficiency   • Disc disorder of cervical region   • Postthrombotic syndrome   • Vertigo   • Angina pectoris (CMS/[facility])   • Lumbosacral spondylosis without myelopathy   • Psoriasis   • Arthritis of elbow   • Swelling of right lower extremity   • Acute right-sided low back pain without sciatica   • Hoarseness   •  Unifocal PVCs   • Skin lesion of face   • Type 2 diabetes mellitus without complication, without long-term current use of insulin (CMS/[facility])   • Benign essential hypertension   • Medicare annual wellness visit, subsequent   • Stage 3 chronic kidney disease due to benign hypertension   • BMI 30.0-30.9,adult   • Postherpetic neuralgia   • Herpes zoster without complication   • Calculus of gallbladder without cholecystitis without obstruction   • Acute diverticulitis   • Leg edema, right        Past Medical History:   Diagnosis Date   • Arthritis    • Bilateral radial fractures 1962    fracture bilateral radial heads   • CAD (coronary artery disease)    • CVD (cardiovascular disease)     History of TIA 1989   • Diabetes mellitus (CMS/[facility])    • DVT (deep venous thrombosis) (CMS/[facility])     Right lower extremity DVT and pulmonary embolism in 06/2015, idiopathic   • DVT of proximal lower limb (CMS/[facility]) 06/22/2015    proximal DVT right leg, small PE- anticoagulation   • Dyslipidemia    • Fracture 1952    H/o pf left forearm fracture   • Fracture of right hand 1980   • GERD (gastroesophageal reflux disease)     with hiatal hernia   • Hx of angina pectoris    • Hypertension     Normal renal angiography 02/16/11   • Left wrist fracture 1953   • Osteoarthritis    • Right wrist fracture    • Vertigo    • Wears glasses        Past Surgical History:   Procedure Laterality Date   • APPENDECTOMY  1957   • BACK SURGERY     • CARDIAC CATHETERIZATION  2011    with vein graft   • CERVICAL FUSION     • CERVICAL FUSION  1992    C3-4   • COLONOSCOPY  2013   • CORONARY ARTERY BYPASS GRAFT  1994   • HERNIA REPAIR Right 2011    inguinal   • INGUINAL HERNIA REPAIR Left 10/29/2019    Procedure: INGUINAL HERNIA REPAIR LEFT;  Surgeon: Charisma Raines MD;  Location: [facility];  Service: General   • LUMBAR LAMINECTOMY  1996    laminectomy, lumbar, L3   • OTHER SURGICAL HISTORY      wrist surgery   • TONSILLECTOMY AND ADENOIDECTOMY  1945        Family History   Problem Relation Age of Onset   • Arthritis Mother         OA   • Heart disease Father    • Diabetes Father    • Heart attack Father    • Heart disease Brother    • Heart attack Brother    • Diabetes Brother    • Diabetes Son    • Hypertension Other    • Cancer Other        Social History     Socioeconomic History   • Marital status:      Spouse name: Not on file   • Number of children: Not on file   • Years of education: Not on file   • Highest education level: Not on file   Tobacco Use   • Smoking status: Former Smoker     Packs/day: 1.00     Years: 20.00     Pack years: 20.00     Types: Cigarettes     Quit date: 1997     Years since quittin.6   • Smokeless tobacco: Never Used   • Tobacco comment: QUIT DATE 1992   Substance and Sexual Activity   • Alcohol use: Yes     Alcohol/week: 7.0 standard drinks     Types: 7 Glasses of wine per week     Comment: glass of wine everyday    • Drug use: No   • Sexual activity: Defer       Allergies   Allergen Reactions   • Penicillins Hives and Swelling     Per patient, has tolerated Keflex         Current Outpatient Medications:   •  acetaminophen (TYLENOL) 325 MG tablet, Take 650 mg by mouth As Needed for Mild Pain ., Disp: , Rfl:   •  aspirin 81 MG EC tablet, Take 81 mg by mouth Daily. Last dose 10-18-19 per patient, Disp: , Rfl:   •  atorvastatin (LIPITOR) 40 MG tablet, Take 40 mg by mouth Daily., Disp: , Rfl:   •  calcipotriene-betamethasone (TACLONEX) 0.005-0.064 % ointment, Apply 1 application topically to the appropriate area as directed As Needed (psoriasis)., Disp: , Rfl:   •  cholecalciferol (VITAMIN D3) 25 MCG (1000 UT) tablet, Take 1,000 Units by mouth Daily., Disp: , Rfl:   •  diclofenac (VOLTAREN) 1 % gel gel, Apply 4 g topically As Needed., Disp: , Rfl:   •  diltiaZEM CD (CARDIZEM CD) 180 MG 24 hr capsule, Take 180 mg by mouth Daily., Disp: , Rfl:   •  glucose blood test strip, Use to check blood sugar twice daily,  "Disp: 100 each, Rfl: 3  •  lidocaine (LIDODERM) 5 %, Place 1 patch on the skin as directed by provider Daily. Remove & Discard patch within 12 hours or as directed by MD, Disp: 30 patch, Rfl: 1  •  losartan (COZAAR) 100 MG tablet, Take 100 mg by mouth Every Night., Disp: , Rfl:   •  Microlet Lancets misc, Use to check blood sugar twice daily, Disp: 100 each, Rfl: 3  •  nitroglycerin (NITROSTAT) 0.4 MG SL tablet, Place 0.4 mg under the tongue Every 5 (Five) Minutes As Needed for Chest Pain. Take no more than 3 doses in 15 minutes., Disp: , Rfl:   •  omeprazole (priLOSEC) 20 MG capsule, Take 20 mg by mouth 2 (two) times a day., Disp: , Rfl:   •  traMADol (ULTRAM) 50 MG tablet, TAKE ONE TABLET BY MOUTH EVERY 6 HOURS AS NEEDED FOR MODERATE PAIN, Disp: 60 tablet, Rfl: 0    Review of Systems     Vital Signs  Vitals:    12/14/20 1120   BP: 148/72   BP Location: Right arm   Patient Position: Sitting   Cuff Size: Adult   Pulse: 76   Temp: 98.6 °F (37 °C)   TempSrc: Temporal   Weight: 99.3 kg (219 lb)   Height: 180.3 cm (70.98\")   PainSc:   2   PainLoc: Abdomen       Physical Exam  Vitals signs reviewed.   Constitutional:       Appearance: He is well-developed.   HENT:      Head: Normocephalic and atraumatic.   Cardiovascular:      Rate and Rhythm: Normal rate and regular rhythm.      Heart sounds: Normal heart sounds. No murmur.   Pulmonary:      Effort: Pulmonary effort is normal.      Breath sounds: Normal breath sounds.   Abdominal:      General: Bowel sounds are normal.      Palpations: Abdomen is soft.      Tenderness: There is no abdominal tenderness.   Neurological:      Mental Status: He is alert and oriented to person, place, and time.      Cranial Nerves: No cranial nerve deficit.      Sensory: No sensory deficit.      Coordination: Coordination normal.      Gait: Gait normal.   Psychiatric:         Mood and Affect: Mood normal.         Thought Content: Thought content normal.          Procedures    ACE III MINI    "          Assessment/Plan:    Diagnoses and all orders for this visit:    1. Benign essential hypertension (Primary)    2. Diabetic nephropathy associated with type 2 diabetes mellitus (CMS/HCC)    3. Mixed hyperlipidemia    4. Postthrombotic syndrome    5. Angina pectoris (CMS/HCC)    6. Gastroesophageal reflux disease without esophagitis    7. Vitamin D deficiency    8. Diabetic polyneuropathy associated with type 2 diabetes mellitus (CMS/H[facility])    9. Stage 3b chronic kidney disease    10. Postherpetic neuralgia    Plan    Blood pressure is controlled on diltiazem and losartan.    A1c is 6.48.  He will continue low-carb diet and avoidance of weight gain.    Lipids are well controlled on atorvastatin.  [facility] has substituted 10 mg of rosuvastatin.  I told him either was fine with me.    He will continue compression for posttraumatic syndrome.    Coronary artery disease is asymptomatic on aspirin, atorvastatin, and losartan.    GERD is controlled on omeprazole.    GFR is stable at 38.  Treatment remains control of blood pressure and glucose and avoidance of NSAIDs.    Postherpetic neuralgia pain is now at the point where it does not require treatment.  He notes that gabapentin did help when it was at its worst.    Plan of care reviewed with patient at the conclusion of today's visit. Education was provided regarding diagnosis, management, and any prescribed or recommended OTC medications.Patient verbalizes understanding of and agreement with management plan.         Radu Francis MD

## 2021-01-08 DIAGNOSIS — E11.42 DIABETIC POLYNEUROPATHY ASSOCIATED WITH TYPE 2 DIABETES MELLITUS (HCC): ICD-10-CM

## 2021-01-08 RX ORDER — LANCETS
EACH MISCELLANEOUS
Qty: 100 EACH | Refills: 3 | Status: SHIPPED | OUTPATIENT
Start: 2021-01-08

## 2021-05-06 ENCOUNTER — LAB (OUTPATIENT)
Dept: LAB | Facility: HOSPITAL | Age: 81
End: 2021-05-06

## 2021-05-06 ENCOUNTER — TRANSCRIBE ORDERS (OUTPATIENT)
Dept: LAB | Facility: HOSPITAL | Age: 81
End: 2021-05-06

## 2021-05-06 DIAGNOSIS — N18.30 STAGE 3 CHRONIC KIDNEY DISEASE, UNSPECIFIED WHETHER STAGE 3A OR 3B CKD (HCC): ICD-10-CM

## 2021-05-06 DIAGNOSIS — E55.9 VITAMIN D DEFICIENCY: ICD-10-CM

## 2021-05-06 DIAGNOSIS — D64.9 ANEMIA, UNSPECIFIED TYPE: ICD-10-CM

## 2021-05-06 DIAGNOSIS — N18.30 STAGE 3 CHRONIC KIDNEY DISEASE, UNSPECIFIED WHETHER STAGE 3A OR 3B CKD (HCC): Primary | ICD-10-CM

## 2021-05-06 LAB
25(OH)D3 SERPL-MCNC: 36.4 NG/ML
ALBUMIN SERPL-MCNC: 4.3 G/DL (ref 3.5–5.2)
ANION GAP SERPL CALCULATED.3IONS-SCNC: 9.8 MMOL/L (ref 5–15)
BACTERIA UR QL AUTO: NORMAL /HPF
BASOPHILS # BLD AUTO: 0.03 10*3/MM3 (ref 0–0.2)
BASOPHILS NFR BLD AUTO: 0.3 % (ref 0–1.5)
BILIRUB UR QL STRIP: NEGATIVE
BUN SERPL-MCNC: 26 MG/DL (ref 8–23)
BUN/CREAT SERPL: 17.1 (ref 7–25)
CALCIUM SPEC-SCNC: 9.3 MG/DL (ref 8.6–10.5)
CHLORIDE SERPL-SCNC: 103 MMOL/L (ref 98–107)
CLARITY UR: CLEAR
CO2 SERPL-SCNC: 25.2 MMOL/L (ref 22–29)
COLOR UR: YELLOW
CREAT SERPL-MCNC: 1.52 MG/DL (ref 0.76–1.27)
CREAT UR-MCNC: 84.7 MG/DL
DEPRECATED RDW RBC AUTO: 50.2 FL (ref 37–54)
EOSINOPHIL # BLD AUTO: 0.06 10*3/MM3 (ref 0–0.4)
EOSINOPHIL NFR BLD AUTO: 0.6 % (ref 0.3–6.2)
ERYTHROCYTE [DISTWIDTH] IN BLOOD BY AUTOMATED COUNT: 14.8 % (ref 12.3–15.4)
FERRITIN SERPL-MCNC: 102 NG/ML (ref 30–400)
GFR SERPL CREATININE-BSD FRML MDRD: 44 ML/MIN/1.73
GLUCOSE SERPL-MCNC: 115 MG/DL (ref 65–99)
GLUCOSE UR STRIP-MCNC: NEGATIVE MG/DL
HCT VFR BLD AUTO: 44.8 % (ref 37.5–51)
HGB BLD-MCNC: 14.7 G/DL (ref 13–17.7)
HGB UR QL STRIP.AUTO: NEGATIVE
HYALINE CASTS UR QL AUTO: NORMAL /LPF
IMM GRANULOCYTES # BLD AUTO: 0.1 10*3/MM3 (ref 0–0.05)
IMM GRANULOCYTES NFR BLD AUTO: 1.1 % (ref 0–0.5)
IRON 24H UR-MRATE: 80 MCG/DL (ref 59–158)
IRON SATN MFR SERPL: 21 % (ref 20–50)
KETONES UR QL STRIP: NEGATIVE
LEUKOCYTE ESTERASE UR QL STRIP.AUTO: NEGATIVE
LYMPHOCYTES # BLD AUTO: 1.51 10*3/MM3 (ref 0.7–3.1)
LYMPHOCYTES NFR BLD AUTO: 16 % (ref 19.6–45.3)
MCH RBC QN AUTO: 30.2 PG (ref 26.6–33)
MCHC RBC AUTO-ENTMCNC: 32.8 G/DL (ref 31.5–35.7)
MCV RBC AUTO: 92.2 FL (ref 79–97)
MONOCYTES # BLD AUTO: 0.79 10*3/MM3 (ref 0.1–0.9)
MONOCYTES NFR BLD AUTO: 8.4 % (ref 5–12)
NEUTROPHILS NFR BLD AUTO: 6.96 10*3/MM3 (ref 1.7–7)
NEUTROPHILS NFR BLD AUTO: 73.6 % (ref 42.7–76)
NITRITE UR QL STRIP: NEGATIVE
NRBC BLD AUTO-RTO: 0 /100 WBC (ref 0–0.2)
PH UR STRIP.AUTO: 7 [PH] (ref 5–8)
PHOSPHATE SERPL-MCNC: 4.7 MG/DL (ref 2.5–4.5)
PLATELET # BLD AUTO: 197 10*3/MM3 (ref 140–450)
PMV BLD AUTO: 11 FL (ref 6–12)
POTASSIUM SERPL-SCNC: 5 MMOL/L (ref 3.5–5.2)
PROT UR QL STRIP: NEGATIVE
PROT UR-MCNC: 9 MG/DL
RBC # BLD AUTO: 4.86 10*6/MM3 (ref 4.14–5.8)
RBC # UR: NORMAL /HPF
REF LAB TEST METHOD: NORMAL
SODIUM SERPL-SCNC: 138 MMOL/L (ref 136–145)
SP GR UR STRIP: 1.02 (ref 1–1.03)
SQUAMOUS #/AREA URNS HPF: NORMAL /HPF
TIBC SERPL-MCNC: 389 MCG/DL (ref 298–536)
TRANSFERRIN SERPL-MCNC: 261 MG/DL (ref 200–360)
UROBILINOGEN UR QL STRIP: NORMAL
WBC # BLD AUTO: 9.45 10*3/MM3 (ref 3.4–10.8)
WBC UR QL AUTO: NORMAL /HPF

## 2021-05-06 PROCEDURE — 83540 ASSAY OF IRON: CPT

## 2021-05-06 PROCEDURE — 82306 VITAMIN D 25 HYDROXY: CPT

## 2021-05-06 PROCEDURE — 84466 ASSAY OF TRANSFERRIN: CPT

## 2021-05-06 PROCEDURE — 36415 COLL VENOUS BLD VENIPUNCTURE: CPT

## 2021-05-06 PROCEDURE — 81001 URINALYSIS AUTO W/SCOPE: CPT | Performed by: INTERNAL MEDICINE

## 2021-05-06 PROCEDURE — 85025 COMPLETE CBC W/AUTO DIFF WBC: CPT

## 2021-05-06 PROCEDURE — 84156 ASSAY OF PROTEIN URINE: CPT

## 2021-05-06 PROCEDURE — 80069 RENAL FUNCTION PANEL: CPT

## 2021-05-06 PROCEDURE — 82570 ASSAY OF URINE CREATININE: CPT

## 2021-05-06 PROCEDURE — 82728 ASSAY OF FERRITIN: CPT

## 2021-05-19 ENCOUNTER — OFFICE VISIT (OUTPATIENT)
Dept: INTERNAL MEDICINE | Facility: CLINIC | Age: 81
End: 2021-05-19

## 2021-05-19 VITALS
BODY MASS INDEX: 29.62 KG/M2 | TEMPERATURE: 98.9 F | HEART RATE: 80 BPM | DIASTOLIC BLOOD PRESSURE: 62 MMHG | WEIGHT: 211.6 LBS | HEIGHT: 71 IN | SYSTOLIC BLOOD PRESSURE: 148 MMHG

## 2021-05-19 DIAGNOSIS — S40.812A ABRASION OF LEFT UPPER EXTREMITY, INITIAL ENCOUNTER: Primary | ICD-10-CM

## 2021-05-19 PROCEDURE — 99213 OFFICE O/P EST LOW 20 MIN: CPT | Performed by: INTERNAL MEDICINE

## 2021-05-19 RX ORDER — LIDOCAINE 50 MG/G
1 PATCH TOPICAL EVERY 24 HOURS
Qty: 90 PATCH | Refills: 2 | Status: SHIPPED | OUTPATIENT
Start: 2021-05-19

## 2021-05-19 NOTE — PROGRESS NOTES
[facility]     Toño Alcala  1940   [id number]      Patient Care Team:  Radu Francis MD as PCP - General    Chief Complaint::   Chief Complaint   Patient presents with   • Arm Pain     left        HPI  About a week ago Mr. Alcala lost his balance trying to get out of his lazy boy chair and call both arms on a week or basket.  He has a healing lesion on the right forearm, another on the left forearm but on the extensor surface right below the elbow there is a open skin tear that is not healing.  He has been covering it with Neosporin but has not been cleaning it.  He is concerned because there are now some raised areas within the wound and the margins are slightly erythematous.    Chronic Conditions:      Patient Active Problem List   Diagnosis   • CAD (coronary artery disease)   • CVD (cardiovascular disease)   • Hypertension   • DVT (deep venous thrombosis) (CMS/[facility])   • Diabetes mellitus (CMS/[facility])   • Dyslipidemia   • Arthritis   • GERD (gastroesophageal reflux disease)   • Mixed hyperlipidemia   • Diabetic nephropathy associated with type 2 diabetes mellitus (CMS/[facility])   • Diabetic polyneuropathy associated with type 2 diabetes mellitus (CMS/[facility])   • Chronic kidney disease, stage III (moderate) (CMS/[facility])   • Vitamin D deficiency   • Disc disorder of cervical region   • Postthrombotic syndrome   • Vertigo   • Angina pectoris (CMS/[facility])   • Lumbosacral spondylosis without myelopathy   • Psoriasis   • Arthritis of elbow   • Swelling of right lower extremity   • Acute right-sided low back pain without sciatica   • Hoarseness   • Unifocal PVCs   • Skin lesion of face   • Type 2 diabetes mellitus without complication, without long-term current use of insulin (CMS/[facility])   • Benign essential hypertension   • Medicare annual wellness visit, subsequent   • Stage 3 chronic kidney disease due to benign hypertension (CMS/[facility])   • BMI 30.0-30.9,adult   • Postherpetic neuralgia   • Herpes zoster  without complication   • Calculus of gallbladder without cholecystitis without obstruction   • Acute diverticulitis   • Leg edema, right        Past Medical History:   Diagnosis Date   • Arthritis    • Bilateral radial fractures 1962    fracture bilateral radial heads   • CAD (coronary artery disease)    • CVD (cardiovascular disease)     History of TIA 1989   • Diabetes mellitus (CMS/[facility])    • DVT (deep venous thrombosis) (CMS/[facility])     Right lower extremity DVT and pulmonary embolism in 06/2015, idiopathic   • DVT of proximal lower limb (CMS/[facility]) 06/22/2015    proximal DVT right leg, small PE- anticoagulation   • Dyslipidemia    • Fracture 1952    H/o pf left forearm fracture   • Fracture of right hand 1980   • GERD (gastroesophageal reflux disease)     with hiatal hernia   • Hx of angina pectoris    • Hypertension     Normal renal angiography 02/16/11   • Left wrist fracture 1953   • Osteoarthritis    • Right wrist fracture    • Vertigo    • Wears glasses        Past Surgical History:   Procedure Laterality Date   • APPENDECTOMY  1957   • BACK SURGERY     • CARDIAC CATHETERIZATION  2011    with vein graft   • CERVICAL FUSION     • CERVICAL FUSION  1992    C3-4   • COLONOSCOPY  2013   • CORONARY ARTERY BYPASS GRAFT  1994   • HERNIA REPAIR Right 2011    inguinal   • INGUINAL HERNIA REPAIR Left 10/29/2019    Procedure: INGUINAL HERNIA REPAIR LEFT;  Surgeon: Charisma Raines MD;  Location: [facility];  Service: General   • LUMBAR LAMINECTOMY  1996    laminectomy, lumbar, L3   • OTHER SURGICAL HISTORY      wrist surgery   • TONSILLECTOMY AND ADENOIDECTOMY  1945       Family History   Problem Relation Age of Onset   • Arthritis Mother         OA   • Heart disease Father    • Diabetes Father    • Heart attack Father    • Heart disease Brother    • Heart attack Brother    • Diabetes Brother    • Diabetes Son    • Hypertension Other    • Cancer Other        Social History     Socioeconomic History   • Marital status:       Spouse name: Not on file   • Number of children: Not on file   • Years of education: Not on file   • Highest education level: Not on file   Tobacco Use   • Smoking status: Former Smoker     Packs/day: 1.00     Years: 20.00     Pack years: 20.00     Types: Cigarettes     Quit date: 1997     Years since quittin.0   • Smokeless tobacco: Never Used   • Tobacco comment: QUIT DATE 1992   Substance and Sexual Activity   • Alcohol use: Yes     Alcohol/week: 7.0 standard drinks     Types: 7 Glasses of wine per week     Comment: glass of wine everyday    • Drug use: No   • Sexual activity: Defer       Allergies   Allergen Reactions   • Penicillins Hives and Swelling     Per patient, has tolerated Keflex         Current Outpatient Medications:   •  acetaminophen (TYLENOL) 325 MG tablet, Take 650 mg by mouth As Needed for Mild Pain ., Disp: , Rfl:   •  aspirin 81 MG EC tablet, Take 81 mg by mouth Daily. Last dose 10-18-19 per patient, Disp: , Rfl:   •  atorvastatin (LIPITOR) 40 MG tablet, Take 40 mg by mouth Daily., Disp: , Rfl:   •  calcipotriene-betamethasone (TACLONEX) 0.005-0.064 % ointment, Apply 1 application topically to the appropriate area as directed As Needed (psoriasis)., Disp: , Rfl:   •  cholecalciferol (VITAMIN D3) 25 MCG (1000 UT) tablet, Take 1,000 Units by mouth Daily., Disp: , Rfl:   •  diclofenac (VOLTAREN) 1 % gel gel, Apply 4 g topically As Needed., Disp: , Rfl:   •  diltiaZEM CD (CARDIZEM CD) 180 MG 24 hr capsule, Take 180 mg by mouth Daily., Disp: , Rfl:   •  glucose blood test strip, Use to check blood sugar twice daily, Disp: 100 each, Rfl: 3  •  lidocaine (LIDODERM) 5 %, Place 1 patch on the skin as directed by provider Daily. Remove & Discard patch within 12 hours or as directed by MD, Disp: 30 patch, Rfl: 1  •  losartan (COZAAR) 100 MG tablet, Take 100 mg by mouth Every Night., Disp: , Rfl:   •  Microlet Lancets misc, Use to check blood sugar twice daily, Disp: 100 each,  "Rfl: 3  •  nitroglycerin (NITROSTAT) 0.4 MG SL tablet, Place 0.4 mg under the tongue Every 5 (Five) Minutes As Needed for Chest Pain. Take no more than 3 doses in 15 minutes., Disp: , Rfl:   •  omeprazole (priLOSEC) 20 MG capsule, Take 20 mg by mouth Daily., Disp: , Rfl:   •  traMADol (ULTRAM) 50 MG tablet, TAKE ONE TABLET BY MOUTH EVERY 6 HOURS AS NEEDED FOR MODERATE PAIN, Disp: 60 tablet, Rfl: 0    Review of Systems   Constitutional: Negative.    Respiratory: Negative.  Negative for chest tightness and shortness of breath.    Cardiovascular: Negative.  Negative for chest pain.   Gastrointestinal: Negative for abdominal pain, blood in stool, constipation and diarrhea.        Vital Signs  Vitals:    05/19/21 1559   BP: 148/62   BP Location: Right arm   Patient Position: Sitting   Cuff Size: Adult   Pulse: 80   Temp: 98.9 °F (37.2 °C)   Weight: 96 kg (211 lb 9.6 oz)   Height: 180.3 cm (70.98\")   PainSc:   2   PainLoc: Arm       Physical Exam  Constitutional:       General: He is not in acute distress.  Skin:     Comments: There is an eschar on the right forearm distal to the elbow.  It is healing well.  There is another on the ulnar surface.  On the extensor surface there is a 4 x 3cm area where the skin has evulsed.  The surrounding margins are clean but they are erythematous.  There is no purulent drainage or smell.  There are raised lesions within the area itself.   Neurological:      Mental Status: He is alert.          Procedures    ACE III MINI             Assessment/Plan:    Diagnoses and all orders for this visit:    1. Abrasion of left upper extremity, initial encounter (Primary)    Plan    I think the problem is he is not been cleansing it daily.  We cleaned the area in the office and instructed him to cleanse once a day with soap and water and use Bactroban instead of Neosporin.  After cleansing the wound looks much better there was less erythema at the margins he will use half-strength peroxide because " there was some exudate left debride the the area.      Plan of care reviewed with patient at the conclusion of today's visit. Education was provided regarding diagnosis, management, and any prescribed or recommended OTC medications.Patient verbalizes understanding of and agreement with management plan.         Radu Francis MD

## 2021-05-21 ENCOUNTER — TELEPHONE (OUTPATIENT)
Dept: INTERNAL MEDICINE | Facility: CLINIC | Age: 81
End: 2021-05-21

## 2021-05-21 RX ORDER — DOXYCYCLINE HYCLATE 100 MG/1
100 CAPSULE ORAL 2 TIMES DAILY
Qty: 14 CAPSULE | Refills: 0 | Status: SHIPPED | OUTPATIENT
Start: 2021-05-21 | End: 2021-06-07

## 2021-05-21 NOTE — TELEPHONE ENCOUNTER
Caller: Toño Alcala    Relationship: Self    Best call back number: [phone]    What medication are you requesting: antibiotics    What are your current symptoms: sore wound,    How long have you been experiencing symptoms: a week    Have you had these symptoms before:    [] Yes  [] No    Have you been treated for these symptoms before:   [] Yes  [] No    If a prescription is needed, what is your preferred pharmacy and phone number:   [email or web address] [address] [facility] , [place], [place] [phone]     Additional notes:

## 2021-06-06 NOTE — PROGRESS NOTES
[facility] Cardiology  Subjective:     Encounter Date: 06/07/2021      Patient ID: Toño Alcala is a 80 y.o. male.    Chief Complaint: Coronary Artery Disease, Hypertension, and CVD      PROBLEM LIST:  1. Coronary artery disease:  a. History of CABG x4, 1994 - incomplete database.   b. [facility], 02/16/2011: 100% native RCA, and severe proximal LAD and LCx disease. Patent SVG to the right PDA. Patent LIMA to the LAD. Patent SVG to the OM.    c. Stress echo, 03/27/2014: Normal study.  2. Cerebrovascular disease:  a. History of TIA, 1989.  3. Hypertension.  a. Normal renal angiography, 02/16/2011.  4. Right lower extremity DVT and pulmonary embolism in 06/2015, idiopathic.  a. Recurrent idiopathic DVT 2017  b. Started on warfarin per Dr. Salinas and Bush  5. DM.  6. Dyslipidemia.  7. Arthritis.   8. GERD/hiatal hernia.   9. History of a left forearm fracture.    10. Right wrist fracture.   11. History of cervical fusion.  12. History of lumbar laminectomy.  13. Arthritis.  14. BPPV  15. Shingles  16. Surgical history:  a. T and A.  b. Appendectomy.      History of Present Illness  Toño Alcala returns today for an annual follow up with a history of coronary artery disease and cardiac risk factors. Since last visit, he's had one episode of shortness of breath while walking up a hill with a mask on but other than that he hasn't experienced any cardiovascular issues. He takes care of his wife who's health has been declining recently. He mentions if she has another episode he may find a nursing home* for her to be taken care of around the clock. Patient denies chest pain, palpitations, edema, dizziness, and syncope. Patient has had no interim ER visits, hospitalizations, serious illnesses, or injuries. He reports taking aspirin 81 mg every day, although he hasn't been on anticoagulation therapy, he bruises easily. Both he and his wife have received their Coronavirus immunizations  without experiencing any side effects.        Allergies   Allergen Reactions   • Penicillins Hives and Swelling     Per patient, has tolerated Keflex         Current Outpatient Medications:   •  acetaminophen (TYLENOL) 325 MG tablet, Take 650 mg by mouth As Needed for Mild Pain ., Disp: , Rfl:   •  aspirin 81 MG EC tablet, Take 81 mg by mouth Daily. Last dose 10-18-19 per patient, Disp: , Rfl:   •  atorvastatin (LIPITOR) 40 MG tablet, Take 40 mg by mouth Daily., Disp: , Rfl:   •  calcipotriene-betamethasone (TACLONEX) 0.005-0.064 % ointment, Apply 1 application topically to the appropriate area as directed As Needed (psoriasis)., Disp: , Rfl:   •  cholecalciferol (VITAMIN D3) 25 MCG (1000 UT) tablet, Take 1,000 Units by mouth Daily., Disp: , Rfl:   •  diclofenac (VOLTAREN) 1 % gel gel, Apply 4 g topically As Needed., Disp: , Rfl:   •  diltiaZEM CD (CARDIZEM CD) 180 MG 24 hr capsule, Take 180 mg by mouth Daily., Disp: , Rfl:   •  gabapentin (NEURONTIN) 300 MG capsule, , Disp: , Rfl:   •  glucose blood test strip, Use to check blood sugar twice daily, Disp: 100 each, Rfl: 3  •  lidocaine (LIDODERM) 5 %, Place 1 patch on the skin as directed by provider Daily. Remove & Discard patch within 12 hours or as directed by MD, Disp: 90 patch, Rfl: 2  •  losartan (COZAAR) 100 MG tablet, Take 100 mg by mouth Every Night., Disp: , Rfl:   •  Microlet Lancets misc, Use to check blood sugar twice daily, Disp: 100 each, Rfl: 3  •  nitroglycerin (NITROSTAT) 0.4 MG SL tablet, Place 0.4 mg under the tongue Every 5 (Five) Minutes As Needed for Chest Pain. Take no more than 3 doses in 15 minutes., Disp: , Rfl:   •  omeprazole (priLOSEC) 20 MG capsule, Take 20 mg by mouth Daily., Disp: , Rfl:     The following portions of the patient's history were reviewed and updated as appropriate: allergies, current medications, past family history, past medical history, past social history, past surgical history and problem list.    Review of Systems  "  Constitutional: Negative.   Cardiovascular: Positive for dyspnea on exertion (one episode). Negative for chest pain, leg swelling, palpitations and syncope.   Respiratory: Negative.  Negative for shortness of breath.    Hematologic/Lymphatic: Negative for bleeding problem. Bruises/bleeds easily.   Skin: Negative for rash.   Musculoskeletal: Negative for muscle weakness and myalgias.   Gastrointestinal: Negative for heartburn, nausea and vomiting.   Neurological: Negative for dizziness, light-headedness, loss of balance and numbness.          Objective:     Vitals:    06/07/21 1051   BP: 122/64   Pulse: 86   SpO2: 97%   Weight: 94.8 kg (209 lb)   Height: 180.3 cm (70.98\")         Constitutional:       Appearance: Well-developed.   Neck:      Thyroid: No thyromegaly.      Vascular: No carotid bruit or JVD.   Pulmonary:      Breath sounds: Normal breath sounds.   Cardiovascular:      Regular rhythm.      No gallop. No S3 and S4 gallop.   Edema:     Peripheral edema absent.   Abdominal:      General: Bowel sounds are normal.      Palpations: Abdomen is soft. There is no abdominal mass.      Tenderness: There is no abdominal tenderness.   Skin:     General: Skin is warm and dry.      Findings: No rash.   Neurological:      Mental Status: Alert and oriented to person, place, and time.         Lab Review:  Lab Results   Component Value Date    GLUCOSE 115 (H) 05/06/2021    BUN 26 (H) 05/06/2021    CREATININE 1.52 (H) 05/06/2021    EGFRIFNONA 44 (L) 05/06/2021    BCR 17.1 05/06/2021    K 5.0 05/06/2021    CO2 25.2 05/06/2021    CALCIUM 9.3 05/06/2021    ALBUMIN 4.30 05/06/2021    ALKPHOS 155 (H) 12/03/2020    AST 22 12/03/2020    ALT 18 12/03/2020     Lab Results   Component Value Date    CHOL 133 12/03/2020    TRIG 100 12/03/2020    HDL 41 12/03/2020    LDL 73 12/03/2020      Lab Results   Component Value Date    WBC 9.45 05/06/2021    RBC 4.86 05/06/2021    HGB 14.7 05/06/2021    HCT 44.8 05/06/2021    MCV 92.2 " 05/06/2021     05/06/2021     Lab Results   Component Value Date    HGBA1C 6.48 (H) 12/03/2020        Procedures     Advance Care Planning   ACP discussion was held with the patient during this visit. Patient has an advance directive (not in EMR), copy requested.          Assessment:   Diagnoses and all orders for this visit:    1. Coronary artery disease involving native coronary artery of native heart without angina pectoris (Primary)    2. CVD (cardiovascular disease)    3. Essential hypertension    4. Mixed hyperlipidemia        Impression:  1. Coronary artery disease. Stable without recurrent angina. On aspirin daily and nitroglycerin as needed for angina.  2. Carotid artery disease. Stable and asymptomatic.  3. Hypertension. Well controlled. On diltiazem and losartan.  4. Hyperlipidemia. Well controlled. On atorvastatin.  Last LDL was 70.      Plan:  1. Stable cardiac status.  2. Continue current medications.  3. Revisit in 12 MO, or sooner as needed.    Scribed for Avelino Hernandez MD by Luz Bonilla. 6/7/2021 11:06 EDT      Avelino Hernandez MD      Please note that portions of this note may have been completed with a voice recognition program. Efforts were made to edit the dictations, but occasionally words are mistranscribed.

## 2021-06-07 ENCOUNTER — OFFICE VISIT (OUTPATIENT)
Dept: CARDIOLOGY | Facility: CLINIC | Age: 81
End: 2021-06-07

## 2021-06-07 VITALS
HEIGHT: 71 IN | DIASTOLIC BLOOD PRESSURE: 64 MMHG | WEIGHT: 209 LBS | BODY MASS INDEX: 29.26 KG/M2 | HEART RATE: 86 BPM | OXYGEN SATURATION: 97 % | SYSTOLIC BLOOD PRESSURE: 122 MMHG

## 2021-06-07 DIAGNOSIS — E78.2 MIXED HYPERLIPIDEMIA: ICD-10-CM

## 2021-06-07 DIAGNOSIS — I25.10 CVD (CARDIOVASCULAR DISEASE): ICD-10-CM

## 2021-06-07 DIAGNOSIS — I25.10 CORONARY ARTERY DISEASE INVOLVING NATIVE CORONARY ARTERY OF NATIVE HEART WITHOUT ANGINA PECTORIS: Primary | ICD-10-CM

## 2021-06-07 DIAGNOSIS — I10 ESSENTIAL HYPERTENSION: ICD-10-CM

## 2021-06-07 PROCEDURE — 99214 OFFICE O/P EST MOD 30 MIN: CPT | Performed by: INTERNAL MEDICINE

## 2021-06-07 RX ORDER — GABAPENTIN 300 MG/1
300 CAPSULE ORAL 2 TIMES DAILY
COMMUNITY
Start: 2021-04-20 | End: 2021-07-15 | Stop reason: SDUPTHER

## 2021-06-14 ENCOUNTER — OFFICE VISIT (OUTPATIENT)
Dept: INTERNAL MEDICINE | Facility: CLINIC | Age: 81
End: 2021-06-14

## 2021-06-14 VITALS
DIASTOLIC BLOOD PRESSURE: 70 MMHG | WEIGHT: 210.8 LBS | BODY MASS INDEX: 29.51 KG/M2 | HEART RATE: 76 BPM | TEMPERATURE: 98.2 F | HEIGHT: 71 IN | SYSTOLIC BLOOD PRESSURE: 130 MMHG

## 2021-06-14 DIAGNOSIS — B02.29 POSTHERPETIC NEURALGIA: ICD-10-CM

## 2021-06-14 DIAGNOSIS — I10 ESSENTIAL HYPERTENSION: ICD-10-CM

## 2021-06-14 DIAGNOSIS — E11.21 DIABETIC NEPHROPATHY ASSOCIATED WITH TYPE 2 DIABETES MELLITUS (HCC): ICD-10-CM

## 2021-06-14 DIAGNOSIS — E11.9 TYPE 2 DIABETES MELLITUS WITHOUT COMPLICATION, WITHOUT LONG-TERM CURRENT USE OF INSULIN (HCC): Primary | ICD-10-CM

## 2021-06-14 DIAGNOSIS — N18.30 STAGE 3 CHRONIC KIDNEY DISEASE DUE TO BENIGN HYPERTENSION (HCC): ICD-10-CM

## 2021-06-14 DIAGNOSIS — E78.2 MIXED HYPERLIPIDEMIA: ICD-10-CM

## 2021-06-14 DIAGNOSIS — I12.9 STAGE 3 CHRONIC KIDNEY DISEASE DUE TO BENIGN HYPERTENSION (HCC): ICD-10-CM

## 2021-06-14 LAB — HBA1C MFR BLD: 7 %

## 2021-06-14 PROCEDURE — 99214 OFFICE O/P EST MOD 30 MIN: CPT | Performed by: INTERNAL MEDICINE

## 2021-06-14 PROCEDURE — 83036 HEMOGLOBIN GLYCOSYLATED A1C: CPT | Performed by: INTERNAL MEDICINE

## 2021-06-14 NOTE — PROGRESS NOTES
[facility]     Toño Alcala  1940   [id number]      Patient Care Team:  Radu Francis MD as PCP - General    Chief Complaint::   Chief Complaint   Patient presents with   • Hyperlipidemia   • Hypertension   • Diabetes        HPI  Mr. Alcala comes in for follow-up of his diabetes, hypertension, hyperlipidemia, chronic kidney disease and postherpetic neuralgia.  Overall he feels well.  He continues to have right sided chest wall discomfort.  He has taken up to 900 mg of gabapentin a day without much relief, but lidocaine patches do help.  He is stable from a cardiovascular standpoint.  Walking up a long hill with the mask on caused one brief episode of shortness of breath but that resolved promptly with rest.  He has since seen his cardiologist.  He has occasional left knee pain and has had aspirations in the past.  He will need a new orthopedic surgeon when this occurs again.    Chronic Conditions:      Patient Active Problem List   Diagnosis   • CAD (coronary artery disease)   • CVD (cardiovascular disease)   • Hypertension   • DVT (deep venous thrombosis) (CMS/HCC)   • Diabetes mellitus (CMS/HCC)   • Dyslipidemia   • Arthritis   • GERD (gastroesophageal reflux disease)   • Mixed hyperlipidemia   • Diabetic nephropathy associated with type 2 diabetes mellitus (CMS/HCC)   • Diabetic polyneuropathy associated with type 2 diabetes mellitus (CMS/HCC)   • Chronic kidney disease, stage III (moderate) (CMS/HCC)   • Vitamin D deficiency   • Disc disorder of cervical region   • Postthrombotic syndrome   • Vertigo   • Angina pectoris (CMS/HCC)   • Lumbosacral spondylosis without myelopathy   • Psoriasis   • Arthritis of elbow   • Swelling of right lower extremity   • Acute right-sided low back pain without sciatica   • Hoarseness   • Unifocal PVCs   • Skin lesion of face   • Type 2 diabetes mellitus without complication, without long-term current use of insulin (CMS/HCC)   • Benign essential  hypertension   • Medicare annual wellness visit, subsequent   • Stage 3 chronic kidney disease due to benign hypertension (CMS/[facility])   • BMI 30.0-30.9,adult   • Postherpetic neuralgia   • Herpes zoster without complication   • Calculus of gallbladder without cholecystitis without obstruction   • Acute diverticulitis   • Leg edema, right        Past Medical History:   Diagnosis Date   • Arthritis    • Bilateral radial fractures 1962    fracture bilateral radial heads   • CAD (coronary artery disease)    • CVD (cardiovascular disease)     History of TIA 1989   • Diabetes mellitus (CMS/[facility])    • DVT (deep venous thrombosis) (CMS/[facility])     Right lower extremity DVT and pulmonary embolism in 06/2015, idiopathic   • DVT of proximal lower limb (CMS/[facility]) 06/22/2015    proximal DVT right leg, small PE- anticoagulation   • Dyslipidemia    • Fracture 1952    H/o pf left forearm fracture   • Fracture of right hand 1980   • GERD (gastroesophageal reflux disease)     with hiatal hernia   • Hx of angina pectoris    • Hypertension     Normal renal angiography 02/16/11   • Left wrist fracture 1953   • Osteoarthritis    • Right wrist fracture    • Vertigo    • Wears glasses        Past Surgical History:   Procedure Laterality Date   • APPENDECTOMY  1957   • BACK SURGERY     • CARDIAC CATHETERIZATION  2011    with vein graft   • CERVICAL FUSION     • CERVICAL FUSION  1992    C3-4   • COLONOSCOPY  2013   • CORONARY ARTERY BYPASS GRAFT  1994   • HERNIA REPAIR Right 2011    inguinal   • INGUINAL HERNIA REPAIR Left 10/29/2019    Procedure: INGUINAL HERNIA REPAIR LEFT;  Surgeon: Charisma Raines MD;  [facility];  Service: General   • LUMBAR LAMINECTOMY  1996    laminectomy, lumbar, L3   • OTHER SURGICAL HISTORY      wrist surgery   • TONSILLECTOMY AND ADENOIDECTOMY  1945       Family History   Problem Relation Age of Onset   • Arthritis Mother         OA   • Heart disease Father    • Diabetes Father    • Heart attack Father     • Heart disease Brother    • Heart attack Brother    • Diabetes Brother    • Diabetes Son    • Hypertension Other    • Cancer Other        Social History     Socioeconomic History   • Marital status:      Spouse name: Not on file   • Number of children: Not on file   • Years of education: Not on file   • Highest education level: Not on file   Tobacco Use   • Smoking status: Former Smoker     Packs/day: 1.00     Years: 20.00     Pack years: 20.00     Types: Cigarettes     Quit date: 1997     Years since quittin.1   • Smokeless tobacco: Never Used   • Tobacco comment: QUIT DATE 1992   Vaping Use   • Vaping Use: Never used   Substance and Sexual Activity   • Alcohol use: Yes     Alcohol/week: 7.0 standard drinks     Types: 7 Glasses of wine per week     Comment: glass of wine everyday    • Drug use: No   • Sexual activity: Defer       Allergies   Allergen Reactions   • Penicillins Hives and Swelling     Per patient, has tolerated Keflex         Current Outpatient Medications:   •  acetaminophen (TYLENOL) 325 MG tablet, Take 650 mg by mouth As Needed for Mild Pain ., Disp: , Rfl:   •  aspirin 81 MG EC tablet, Take 81 mg by mouth Daily. Last dose 10-18- per patient, Disp: , Rfl:   •  atorvastatin (LIPITOR) 40 MG tablet, Take 40 mg by mouth Daily., Disp: , Rfl:   •  calcipotriene-betamethasone (TACLONEX) 0.005-0.064 % ointment, Apply 1 application topically to the appropriate area as directed As Needed (psoriasis)., Disp: , Rfl:   •  cholecalciferol (VITAMIN D3) 25 MCG (1000 UT) tablet, Take 1,000 Units by mouth Daily., Disp: , Rfl:   •  diclofenac (VOLTAREN) 1 % gel gel, Apply 4 g topically As Needed., Disp: , Rfl:   •  diltiaZEM CD (CARDIZEM CD) 180 MG 24 hr capsule, Take 180 mg by mouth Daily., Disp: , Rfl:   •  gabapentin (NEURONTIN) 300 MG capsule, Take 300 mg by mouth 2 (two) times a day. 2 caps q am,  3 caps qhs, Disp: , Rfl:   •  glucose blood test strip, Use to check blood sugar twice  "daily, Disp: 100 each, Rfl: 3  •  lidocaine (LIDODERM) 5 %, Place 1 patch on the skin as directed by provider Daily. Remove & Discard patch within 12 hours or as directed by MD, Disp: 90 patch, Rfl: 2  •  losartan (COZAAR) 100 MG tablet, Take 100 mg by mouth Every Night., Disp: , Rfl:   •  Microlet Lancets misc, Use to check blood sugar twice daily, Disp: 100 each, Rfl: 3  •  nitroglycerin (NITROSTAT) 0.4 MG SL tablet, Place 0.4 mg under the tongue Every 5 (Five) Minutes As Needed for Chest Pain. Take no more than 3 doses in 15 minutes., Disp: , Rfl:   •  omeprazole (priLOSEC) 20 MG capsule, Take 20 mg by mouth Daily., Disp: , Rfl:     Review of Systems   Constitutional: Negative.    Respiratory: Negative.  Negative for chest tightness and shortness of breath.    Cardiovascular: Negative.  Negative for chest pain.   Gastrointestinal: Negative for abdominal pain, blood in stool, constipation and diarrhea.        Vital Signs  Vitals:    06/14/21 1055   BP: 130/70   BP Location: Left arm   Patient Position: Sitting   Cuff Size: Adult   Pulse: 76   Temp: 98.2 °F (36.8 °C)   Weight: 95.6 kg (210 lb 12.8 oz)   Height: 180.3 cm (70.98\")   PainSc:   5   PainLoc: Abdomen  Comment: right side from shingles       Physical Exam  Vitals reviewed.   Constitutional:       Appearance: He is well-developed.   HENT:      Head: Normocephalic and atraumatic.   Cardiovascular:      Rate and Rhythm: Normal rate and regular rhythm.      Heart sounds: Normal heart sounds. No murmur heard.     Pulmonary:      Effort: Pulmonary effort is normal.      Breath sounds: Normal breath sounds.   Neurological:      Mental Status: He is alert and oriented to person, place, and time.          Procedures    ACE III MINI             Assessment/Plan:    Diagnoses and all orders for this visit:    1. Type 2 diabetes mellitus without complication, without long-term current use of insulin (CMS/[facility]) (Primary)  -     POC Glycosylated Hemoglobin (Hb A1C)    2. " Essential hypertension    3. Mixed hyperlipidemia    4. Diabetic nephropathy associated with type 2 diabetes mellitus (CMS/HCC)    5. Stage 3 chronic kidney disease due to benign hypertension (CMS/HCC)    6. Postherpetic neuralgia    Plan    A1c is up at 7.0.  Previously he had maintain this around 6 5.  He attributes this to depending mostly on food that is brought in because of his wife's dementia.  He is given some dietary information and will try to reduce carbs.  Should the A1c continue to increase would add Metformin.    Blood pressure is well controlled on losartan and diltiazem.    Lipids remain extremely well controlled on atorvastatin.  Plan to repeat this next visit.    Last GFR was improved at 44.  He will follow-up with nephrology, and continue control of blood pressure, blood glucose and avoidance of NSAIDs.    He will continue using lidocaine patches for his postherpetic neuralgia.  He is not interested in pushing the dose of gabapentin further.    Patient's Body mass index is 29.41 kg/m². indicating that he is overweight (BMI 25-29.9). Obesity-related health conditions include the following: hypertension, coronary heart disease, diabetes mellitus, dyslipidemias and osteoarthritis. Obesity is unchanged. BMI is is above average; BMI management plan is completed. We discussed portion control, increasing exercise and joining a fitness center or start home based exercise program..        Plan of care reviewed with patient at the conclusion of today's visit. Education was provided regarding diagnosis, management, and any prescribed or recommended OTC medications.Patient verbalizes understanding of and agreement with management plan.         Radu Francis MD

## 2021-07-15 DIAGNOSIS — B02.29 POSTHERPETIC NEURALGIA: Primary | ICD-10-CM

## 2021-07-15 RX ORDER — GABAPENTIN 300 MG/1
900 CAPSULE ORAL 2 TIMES DAILY
Qty: 180 CAPSULE | Refills: 1 | Status: SHIPPED | OUTPATIENT
Start: 2021-07-15 | End: 2021-07-16 | Stop reason: SDUPTHER

## 2021-07-16 DIAGNOSIS — B02.29 POSTHERPETIC NEURALGIA: ICD-10-CM

## 2021-07-16 RX ORDER — GABAPENTIN 300 MG/1
900 CAPSULE ORAL 2 TIMES DAILY
Qty: 180 CAPSULE | Refills: 1 | Status: SHIPPED | OUTPATIENT
Start: 2021-07-16 | End: 2021-10-26

## 2021-08-09 ENCOUNTER — HOSPITAL ENCOUNTER (EMERGENCY)
Facility: HOSPITAL | Age: 81
Discharge: HOME | End: 2021-08-09
Attending: EMERGENCY MEDICINE | Admitting: EMERGENCY MEDICINE

## 2021-08-09 ENCOUNTER — TELEPHONE (OUTPATIENT)
Dept: INTERNAL MEDICINE | Facility: CLINIC | Age: 81
End: 2021-08-09

## 2021-08-09 VITALS
DIASTOLIC BLOOD PRESSURE: 77 MMHG | BODY MASS INDEX: 27.77 KG/M2 | TEMPERATURE: 98.2 F | OXYGEN SATURATION: 97 % | WEIGHT: 205 LBS | SYSTOLIC BLOOD PRESSURE: 137 MMHG | HEIGHT: 72 IN | RESPIRATION RATE: 18 BRPM | HEART RATE: 76 BPM

## 2021-08-09 DIAGNOSIS — L03.114 CELLULITIS OF LEFT UPPER EXTREMITY: ICD-10-CM

## 2021-08-09 DIAGNOSIS — S51.812A SKIN TEAR OF LEFT FOREARM WITHOUT COMPLICATION, INITIAL ENCOUNTER: Primary | ICD-10-CM

## 2021-08-09 PROCEDURE — 99283 EMERGENCY DEPT VISIT LOW MDM: CPT

## 2021-08-09 RX ORDER — DOXYCYCLINE 100 MG/1
100 CAPSULE ORAL 2 TIMES DAILY
Qty: 20 CAPSULE | Refills: 0 | Status: SHIPPED | OUTPATIENT
Start: 2021-08-09 | End: 2021-12-15

## 2021-08-09 RX ORDER — DOXYCYCLINE 100 MG/1
100 CAPSULE ORAL ONCE
Status: COMPLETED | OUTPATIENT
Start: 2021-08-09 | End: 2021-08-09

## 2021-08-09 RX ADMIN — DOXYCYCLINE 100 MG: 100 CAPSULE ORAL at 16:35

## 2021-08-09 NOTE — TELEPHONE ENCOUNTER
Patient is calling to state that since he hasn't heard back from anyone   He went to [facility] to the ER.

## 2021-08-09 NOTE — ED PROVIDER NOTES
Subjective   81-year-old male that had a skin tear about 1 week ago.  He stopped reports is been try to take care of this at home but it seems to be getting a bit worse.  Skin very thin skin states is unable to get into the pulled back down.  It has been healing is, but or redness to it now.  He had no fevers no chills.  He reports that he has no other complaints.  Immunization are up-to-date      History provided by:  Patient and significant other   used: No    Laceration  Location:  Shoulder/arm  Shoulder/arm laceration location:  L upper arm  Length:  4 cm  Depth:  Cutaneous  Quality comment:  Skin tear  Bleeding: controlled    Time since incident:  7 days  Injury mechanism: Son grabbed her arm when he thought he was falling into her skin.  Pain details:     Quality:  Burning    Severity:  Mild    Timing:  Constant    Progression:  Unchanged  Foreign body present:  No foreign bodies  Relieved by:  Nothing  Worsened by:  Movement and pressure  Ineffective treatments:  None tried  Tetanus status:  Up to date  Associated symptoms: rash and redness    Associated symptoms: no fever, no focal weakness, no swelling and no streaking        Review of Systems   Constitutional: Negative for chills and fever.   Respiratory: Negative for chest tightness, shortness of breath and wheezing.    Cardiovascular: Negative for chest pain and palpitations.   Musculoskeletal: Negative for back pain and neck pain.   Skin: Positive for rash.   Neurological: Negative for focal weakness.   Psychiatric/Behavioral: Negative.    All other systems reviewed and are negative.      Past Medical History:   Diagnosis Date   • Arthritis    • Bilateral radial fractures 1962    fracture bilateral radial heads   • CAD (coronary artery disease)    • CVD (cardiovascular disease)     History of TIA 1989   • Diabetes mellitus (CMS/[facility])    • DVT (deep venous thrombosis) (CMS/[facility])     Right lower extremity DVT and pulmonary embolism in  2015, idiopathic   • DVT of proximal lower limb (CMS/HCC) 2015    proximal DVT right leg, small PE- anticoagulation   • Dyslipidemia    • Fracture     H/o pf left forearm fracture   • Fracture of right hand    • GERD (gastroesophageal reflux disease)     with hiatal hernia   • Hx of angina pectoris    • Hypertension     Normal renal angiography 11   • Left wrist fracture    • Osteoarthritis    • Right wrist fracture    • Vertigo    • Wears glasses        Allergies   Allergen Reactions   • Penicillins Hives and Swelling     Per patient, has tolerated Keflex       Past Surgical History:   Procedure Laterality Date   • APPENDECTOMY     • BACK SURGERY     • CARDIAC CATHETERIZATION      with vein graft   • CERVICAL FUSION     • CERVICAL FUSION      C3-4   • COLONOSCOPY     • CORONARY ARTERY BYPASS GRAFT     • HERNIA REPAIR Right     inguinal   • INGUINAL HERNIA REPAIR Left 10/29/2019    Procedure: INGUINAL HERNIA REPAIR LEFT;  Surgeon: Charisma Raines MD;  Location: [facility];  Service: General   • LUMBAR LAMINECTOMY      laminectomy, lumbar, L3   • OTHER SURGICAL HISTORY      wrist surgery   • TONSILLECTOMY AND ADENOIDECTOMY         Family History   Problem Relation Age of Onset   • Arthritis Mother         OA   • Heart disease Father    • Diabetes Father    • Heart attack Father    • Heart disease Brother    • Heart attack Brother    • Diabetes Brother    • Diabetes Son    • Hypertension Other    • Cancer Other        Social History     Socioeconomic History   • Marital status:      Spouse name: Not on file   • Number of children: Not on file   • Years of education: Not on file   • Highest education level: Not on file   Tobacco Use   • Smoking status: Former Smoker     Packs/day: 1.00     Years: 20.00     Pack years: 20.00     Types: Cigarettes     Quit date: 1997     Years since quittin.3   • Smokeless tobacco: Never Used   • Tobacco  comment: QUIT DATE 01/01/1992   Vaping Use   • Vaping Use: Never used   Substance and Sexual Activity   • Alcohol use: Yes     Alcohol/week: 7.0 standard drinks     Types: 7 Glasses of wine per week     Comment: glass of wine everyday    • Drug use: No   • Sexual activity: Defer           Objective   Physical Exam  Vitals and nursing note reviewed.   Constitutional:       Appearance: He is well-developed.   HENT:      Head: Normocephalic and atraumatic.      Right Ear: External ear normal.      Left Ear: External ear normal.      Nose: Nose normal.   Eyes:      General: No scleral icterus.     Conjunctiva/sclera: Conjunctivae normal.      Pupils: Pupils are equal, round, and reactive to light.   Neck:      Thyroid: No thyromegaly.   Musculoskeletal:         General: Normal range of motion.   Lymphadenopathy:      Cervical: No cervical adenopathy.   Skin:     General: Skin is warm and dry.      Comments: Superficial large skin flap that is secondarily healing in.  There are some surrounding redness no induration increased warmth to touch.   Neurological:      Mental Status: He is alert and oriented to person, place, and time.      Cranial Nerves: No cranial nerve deficit.      Coordination: Coordination normal.      Deep Tendon Reflexes: Reflexes are normal and symmetric. Reflexes normal.   Psychiatric:         Behavior: Behavior normal.         Thought Content: Thought content normal.         Judgment: Judgment normal.         Procedures           ED Course  ED Course as of Aug 10 0746   Mon Aug 09, 2021   1625 Wound was cleansed with Hibiclens.  I circled the small amount of redness that is getting a secondary cellulitis very early.  The call in some doxycycline given the first dose here.  I dressed this with Silvadene and nonadherent dressing.    [RYNE]      ED Course User Index  [RYNE] Juancho Scherer PA                                           MDM  Number of Diagnoses or Management Options  Cellulitis of left  upper extremity: new and requires workup  Skin tear of left forearm without complication, initial encounter: new and requires workup     Amount and/or Complexity of Data Reviewed  Discuss the patient with other providers: yes    Patient Progress  Patient progress: stable      Final diagnoses:   Skin tear of left forearm without complication, initial encounter   Cellulitis of left upper extremity       ED Disposition  ED Disposition     ED Disposition Condition Comment    Discharge Stable           [facility] OUTPATIENT PHYSICAL THERAPY  [address] [id number] [phone]        Radu Francis MD  [address] [place] [address] [address] [address]  [phone]      Call for appointment         Medication List      New Prescriptions    doxycycline 100 MG capsule  Commonly known as: MONODOX  Take 1 capsule by mouth 2 (Two) Times a Day.     silver sulfadiazine 1 % cream  Commonly known as: SILVADENE, SSD  Apply  topically to the appropriate area as directed 2 (Two) Times a Day.           Where to Get Your Medications      [facility] [place] [address] [facility] [address] - [phone]  - [phone] [address] [address]    Phone: [phone]   · doxycycline 100 MG capsule  · silver sulfadiazine 1 % cream          Juancho Scherer PA  08/10/21 0661

## 2021-08-09 NOTE — TELEPHONE ENCOUNTER
Caller: Toño Alcala    Relationship: Self    Best call back number: [phone]    What is the best time to reach you: ANYTIME    Who are you requesting to speak with (clinical staff, provider,  specific staff member): PROVIDER    What was the call regarding:PATIENT WOULD LIKE TO BE SEEN TODAY ASAP. PATIENT HAS THE SAME INJURY HE HAD BEFORE AND DR. FLANAGAN IS AWARE    Do you require a callback: YES

## 2021-08-17 ENCOUNTER — HOSPITAL ENCOUNTER (OUTPATIENT)
Dept: PHYSICAL THERAPY | Facility: HOSPITAL | Age: 81
Setting detail: THERAPIES SERIES
Discharge: HOME | End: 2021-08-17

## 2021-08-17 DIAGNOSIS — S51.812D SKIN TEAR OF LEFT FOREARM WITHOUT COMPLICATION, SUBSEQUENT ENCOUNTER: Primary | ICD-10-CM

## 2021-08-17 PROCEDURE — 97161 PT EVAL LOW COMPLEX 20 MIN: CPT

## 2021-08-17 PROCEDURE — 97597 DBRDMT OPN WND 1ST 20 CM/<: CPT

## 2021-08-18 NOTE — THERAPY EVALUATION
Outpatient Rehabilitation - Wound/Debridement Initial Eval  [facility]     Patient Name: Toño Alcala  : 1940  MRN: [id number]  Today's Date: 2021                  Admit Date: 2021    Visit Dx:    ICD-10-CM ICD-9-CM   1. Skin tear of left forearm without complication, subsequent encounter  S51.812D V58.89     881.00       Patient Active Problem List   Diagnosis   • CAD (coronary artery disease)   • CVD (cardiovascular disease)   • Hypertension   • DVT (deep venous thrombosis) (CMS/[facility])   • Diabetes mellitus (CMS/[facility])   • Dyslipidemia   • Arthritis   • GERD (gastroesophageal reflux disease)   • Mixed hyperlipidemia   • Diabetic nephropathy associated with type 2 diabetes mellitus (CMS/[facility])   • Diabetic polyneuropathy associated with type 2 diabetes mellitus (CMS/[facility])   • Chronic kidney disease, stage III (moderate) (CMS/[facility])   • Vitamin D deficiency   • Disc disorder of cervical region   • Postthrombotic syndrome   • Vertigo   • Angina pectoris (CMS/[facility])   • Lumbosacral spondylosis without myelopathy   • Psoriasis   • Arthritis of elbow   • Swelling of right lower extremity   • Acute right-sided low back pain without sciatica   • Hoarseness   • Unifocal PVCs   • Skin lesion of face   • Type 2 diabetes mellitus without complication, without long-term current use of insulin (CMS/[facility])   • Benign essential hypertension   • Medicare annual wellness visit, subsequent   • Stage 3 chronic kidney disease due to benign hypertension (CMS/[facility])   • BMI 30.0-30.9,adult   • Postherpetic neuralgia   • Herpes zoster without complication   • Calculus of gallbladder without cholecystitis without obstruction   • Acute diverticulitis   • Leg edema, right        Past Medical History:   Diagnosis Date   • Arthritis    • Bilateral radial fractures     fracture bilateral radial heads   • CAD (coronary artery disease)    • CVD (cardiovascular disease)     History of TIA    • Diabetes mellitus (CMS/[facility])    •  DVT (deep venous thrombosis) (CMS/HCC)     Right lower extremity DVT and pulmonary embolism in 06/2015, idiopathic   • DVT of proximal lower limb (CMS/HCC) 06/22/2015    proximal DVT right leg, small PE- anticoagulation   • Dyslipidemia    • Fracture 1952    H/o pf left forearm fracture   • Fracture of right hand 1980   • GERD (gastroesophageal reflux disease)     with hiatal hernia   • Hx of angina pectoris    • Hypertension     Normal renal angiography 02/16/11   • Left wrist fracture 1953   • Osteoarthritis    • Right wrist fracture    • Vertigo    • Wears glasses         Past Surgical History:   Procedure Laterality Date   • APPENDECTOMY  1957   • BACK SURGERY     • CARDIAC CATHETERIZATION  2011    with vein graft   • CERVICAL FUSION     • CERVICAL FUSION  1992    C3-4   • COLONOSCOPY  2013   • CORONARY ARTERY BYPASS GRAFT  1994   • HERNIA REPAIR Right 2011    inguinal   • INGUINAL HERNIA REPAIR Left 10/29/2019    Procedure: INGUINAL HERNIA REPAIR LEFT;  Surgeon: Charisma Raines MD;  Location: [facility];  Service: General   • LUMBAR LAMINECTOMY  1996    laminectomy, lumbar, L3   • OTHER SURGICAL HISTORY      wrist surgery   • TONSILLECTOMY AND ADENOIDECTOMY  1945       Patient History     Row Name 08/17/21 1400             History    Chief Complaint  Ulcer, wound or other skin conditions  -MP      Brief Description of Current Complaint  Patient presents to OP wound care with skin tear to L UE that occured at the beginning of August. Pt states he lost his balance and his son was nearby and grabbed his arm causing a skin tear.   -MP      Previous treatment for THIS PROBLEM  Other (comment) Silvadene cream  -MP      Patient/Caregiver Goals  Heal wound;Know what to do to help the symptoms  -MP      Patient's Rating of General Health  Good  -MP      Patient seeing anyone else for problem(s)?  Referred to OP wound care from ER  -MP      How has patient tried to help current problem?  Applying silvadene cream as  "instructed  -MP         Pain     Pain at Present  0  -MP         Fall Risk Assessment    Any falls in the past year:  -- Pt reports he \"stumbled\"  -MP      Factors that contributed to the fall:  Lost balance  -MP         Services    Are you currently receiving Home Health services  No  -MP      Do you plan to receive Home Health services in the near future  No  -MP         Daily Activities    Primary Language  English  -MP      How does patient learn best?  Listening;Demonstration  -MP      Pt Participated in POC and Goals  Yes  -MP        User Key  (r) = Recorded By, (t) = Taken By, (c) = Cosigned By    Initials Name Provider Type    Enrique Ledezma PT Physical Therapist          EVALUATION  PT Ortho     Row Name 08/17/21 1400       Subjective Comments    Subjective Comments  Pt reports he has been using silvadene cream on L UE. However, pt reports cream created large rash so he thinks he may be allergic to silver products. States wife can assist with home dressing management.  -MP       Subjective Pain    Able to rate subjective pain?  yes  -MP    Pre-Treatment Pain Level  0  -MP    Post-Treatment Pain Level  0  -MP       Transfers    Sit-Stand Arthur (Transfers)  supervision  -MP    Stand-Sit Arthur (Transfers)  supervision  -MP    Comment (Transfers)  seated for tx  -MP       Gait/Stairs (Locomotion)    Arthur Level (Gait)  supervision  -MP      User Key  (r) = Recorded By, (t) = Taken By, (c) = Cosigned By    Initials Name Provider Type    Enrique Ledezma PT Physical Therapist        LDA Wound     Row Name 08/17/21 1400             Wound 08/17/21 1400 Left arm Skin Tear    Wound - Properties Group Placement Date: 08/17/21  -MP Placement Time: 1400  -MP Present on Hospital Admission: Y  -MP Side: Left  -MP Location: arm  -MP Primary Wound Type: Skin tear  -MP    Wound Image  Images linked: 1  -MP      Dressing Appearance  open to air  -MP      Base  moist;pink;necrotic;other (see " comments) ridge of skin covered with necrotic tissue  -MP      Periwound  intact;pink;other (see comments) fragile, thin skin  -MP      Periwound Temperature  warm  -MP      Periwound Skin Turgor  soft  -MP      Edges  irregular  -MP      Wound Length (cm)  -- indistinct wound margins  -MP      Drainage Characteristics/Odor  serous  -MP      Drainage Amount  scant  -MP      Care, Wound  cleansed with;wound cleanser;debrided  -MP      Dressing Care  dressing applied;petroleum-based;gauze Xeroform, kerlix, spandage  -MP      Periwound Care  cleansed with pH balanced cleanser;dry periwound area maintained  -MP      Retired Wound - Properties Group Date first assessed: 08/17/21  -MP Time first assessed: 1400  -MP Present on Hospital Admission: Y  -MP Side: Left  -MP Location: arm  -MP Primary Wound Type: Skin tear  -MP      User Key  (r) = Recorded By, (t) = Taken By, (c) = Cosigned By    Initials Name Provider Type    MP Enrique Monroy, PT Physical Therapist            WOUND DEBRIDEMENT  Total area of Debridement: 1 cm2  Debridement Site 1  Location- Site 1: L UE   Selective Debridement- Site 1: Wound Surface <20cmsq  Instruments- Site 1: tweezers  Excised Tissue Description- Site 1: minimum, other (comment) (necrotic skin flap)  Bleeding- Site 1: none             Therapy Education     Row Name 08/17/21 1400             Therapy Education    Education Details  PT provided rationale for dressing choice, importance of keeping wound dressing C/D/I and PT role in POC. Instructed pt to clean wound, pat dry with gauze and apply atleast 3 strips of xeroform to open wound. Reinforced multiple times to avoid placing only one single layer of xeroform to prevent layer from becoming too dry and sticking to wound. Instructed to change dressing every 2-3 days or PRN if soiled.  -MP      Given  Symptoms/condition management;Bandaging/dressing change  -MP      Program  New  -MP      How Provided  Verbal;Demonstration  -MP      Provided  to  Patient;Other (comment) spouse  -MP        User Key  (r) = Recorded By, (t) = Taken By, (c) = Cosigned By    Initials Name Provider Type    MP Enrique Monroy, PT Physical Therapist          Recommendation and Plan  PT Assessment/Plan     Row Name 08/17/21 1400          PT Assessment    Functional Limitations  Other (comment) wound management  -MP     Impairments  Integumentary integrity  -MP     Assessment Comments  Patient presents to OP wound care with skin tear to L UE that occurred after he stumbled causing his son to grab his arm. Wound bed mostly reepithelialized with areas of fragile skin. PT able to lightly debride necrotic tissue from ridge of skin where skin flap was replaced revealing moist pink wound bed. Pt reports he has allergic reaction to Silvadene cream but unsure if related to Ag in product or other components of cream. PT dressed wound with xeroform but may consider switching to Mepilex Ag if xeroform keeps wound bed too moist or becomes adherent. PT reinforced importance of applying more than one single sheet to avoid dressing becoming too dry. Anticipate wound closure within 1-2 weeks. Patient would continue to benefit from skilled PT wound care to promote increased wound healing potential.  -MP     Rehab Potential  Good  -MP     Patient/caregiver participated in establishment of treatment plan and goals  Yes  -MP     Patient would benefit from skilled therapy intervention  Yes  -MP        PT Plan    PT Frequency  1x/week  -MP     Predicted Duration of Therapy Intervention (PT)  3 treatments  -MP     Planned CPT's?  PT EVAL LOW COMPLEXITY: 01145;PT SELF CARE/MGMT/TRAIN 15 MIN: 78447;PT NONSELECT DEBRIDE 15 MIN: 74357;PT EDWIN DEBRIDE OPEN WOUND UP TO 20 CM: 32762;PT NLFU MIST: 92843  -MP     Physical Therapy Interventions (Optional Details)  patient/family education;wound care  -MP     PT Plan Comments  debridement PRN, dressing management, education  -MP       User Key  (r) = Recorded By,  (t) = Taken By, (c) = Cosigned By    Initials Name Provider Type    Enrique Ledezma, PT Physical Therapist            Goals  PT OP Goals     Row Name 08/18/21 0700 08/17/21 1400       PT Short Term Goals    STG 1  --  Patient will present without necrotic tissue to wound bed.  -MP    STG 1 Progress  --  New  -MP    STG 2  --  Patient will verbalize s/sx of infection.  -MP    STG 2 Progress  --  New  -MP       Long Term Goals    LTG 1  --  Patient will demonstrate independence with clean home dressing management.  -MP    LTG 1 Progress  --  New  -MP    LTG 2  --  Patient will present with 100% reepithelialization as evidence of wound healing.  -MP    LTG 2 Progress  --  New  -MP       Time Calculation    PT Goal Re-Cert Due Date  08/17/21  -MP  11/16/21  -MP      User Key  (r) = Recorded By, (t) = Taken By, (c) = Cosigned By    Initials Name Provider Type    Enrique Ledezma, PT Physical Therapist          Time Calculation: Start Time: 1400  Untimed Charges  PT Eval/Re-eval Minutes: 50  Wound Care: 51360 Selective debridement  90481-Yphjnmikg debridement: 10  Total Minutes  Untimed Charges Total Minutes: 60   Total Minutes: 60  Therapy Charges for Today     Code Description Service Date Service Provider Modifiers Qty    34490788706 HC PT EVAL LOW COMPLEXITY 4 8/17/2021 Enrique Monroy, PT GP 1    90670666063 HC EDWIN DEBRIDE OPEN WOUND UP TO 20CM 8/17/2021 Enrique Monroy, PT GP 1                Enrique Monroy PT  8/18/2021

## 2021-08-25 ENCOUNTER — HOSPITAL ENCOUNTER (OUTPATIENT)
Dept: PHYSICAL THERAPY | Facility: HOSPITAL | Age: 81
Setting detail: THERAPIES SERIES
Discharge: HOME | End: 2021-08-25

## 2021-08-25 DIAGNOSIS — S51.812D SKIN TEAR OF LEFT FOREARM WITHOUT COMPLICATION, SUBSEQUENT ENCOUNTER: Primary | ICD-10-CM

## 2021-08-25 PROCEDURE — 97597 DBRDMT OPN WND 1ST 20 CM/<: CPT

## 2021-08-25 NOTE — THERAPY WOUND CARE TREATMENT
Outpatient Rehabilitation - Wound/Debridement Treatment Note   Jacob     Patient Name: Toño Alcala  : 1940  MRN: [id number]  Today's Date: 2021                 Admit Date: 2021    Visit Dx:    ICD-10-CM ICD-9-CM   1. Skin tear of left forearm without complication, subsequent encounter  S51.812D V58.89     881.00       Patient Active Problem List   Diagnosis   • CAD (coronary artery disease)   • CVD (cardiovascular disease)   • Hypertension   • DVT (deep venous thrombosis) (CMS/[facility])   • Diabetes mellitus (CMS/[facility])   • Dyslipidemia   • Arthritis   • GERD (gastroesophageal reflux disease)   • Mixed hyperlipidemia   • Diabetic nephropathy associated with type 2 diabetes mellitus (CMS/[facility])   • Diabetic polyneuropathy associated with type 2 diabetes mellitus (CMS/[facility])   • Chronic kidney disease, stage III (moderate) (CMS/[facility])   • Vitamin D deficiency   • Disc disorder of cervical region   • Postthrombotic syndrome   • Vertigo   • Angina pectoris (CMS/[facility])   • Lumbosacral spondylosis without myelopathy   • Psoriasis   • Arthritis of elbow   • Swelling of right lower extremity   • Acute right-sided low back pain without sciatica   • Hoarseness   • Unifocal PVCs   • Skin lesion of face   • Type 2 diabetes mellitus without complication, without long-term current use of insulin (CMS/[facility])   • Benign essential hypertension   • Medicare annual wellness visit, subsequent   • Stage 3 chronic kidney disease due to benign hypertension (CMS/[facility])   • BMI 30.0-30.9,adult   • Postherpetic neuralgia   • Herpes zoster without complication   • Calculus of gallbladder without cholecystitis without obstruction   • Acute diverticulitis   • Leg edema, right        Past Medical History:   Diagnosis Date   • Arthritis    • Bilateral radial fractures     fracture bilateral radial heads   • CAD (coronary artery disease)    • CVD (cardiovascular disease)     History of TIA    • Diabetes mellitus (CMS/[facility])    •  DVT (deep venous thrombosis) (CMS/[facility])     Right lower extremity DVT and pulmonary embolism in 06/2015, idiopathic   • DVT of proximal lower limb (CMS/HCC) 06/22/2015    proximal DVT right leg, small PE- anticoagulation   • Dyslipidemia    • Fracture 1952    H/o pf left forearm fracture   • Fracture of right hand 1980   • GERD (gastroesophageal reflux disease)     with hiatal hernia   • Hx of angina pectoris    • Hypertension     Normal renal angiography 02/16/11   • Left wrist fracture 1953   • Osteoarthritis    • Right wrist fracture    • Vertigo    • Wears glasses         Past Surgical History:   Procedure Laterality Date   • APPENDECTOMY  1957   • BACK SURGERY     • CARDIAC CATHETERIZATION  2011    with vein graft   • CERVICAL FUSION     • CERVICAL FUSION  1992    C3-4   • COLONOSCOPY  2013   • CORONARY ARTERY BYPASS GRAFT  1994   • HERNIA REPAIR Right 2011    inguinal   • INGUINAL HERNIA REPAIR Left 10/29/2019    Procedure: INGUINAL HERNIA REPAIR LEFT;  Surgeon: Charisma Raines MD;  Location: [facility];  Service: General   • LUMBAR LAMINECTOMY  1996    laminectomy, lumbar, L3   • OTHER SURGICAL HISTORY      wrist surgery   • TONSILLECTOMY AND ADENOIDECTOMY  1945         EVALUATION  PT Ortho     Row Name 08/25/21 1320       Subjective Comments    Subjective Comments  Pt reports the dressing became very itchy, so he removed it a couple days ago and hasn't had any issues since.  -MC       Subjective Pain    Able to rate subjective pain?  yes  -    Pre-Treatment Pain Level  0  -MC    Post-Treatment Pain Level  0  -MC       Transfers    Sit-Stand [place] (Transfers)  supervision  -MC    Stand-Sit [place] (Transfers)  supervision  -MC    Comment (Transfers)  seated for tx  -       Gait/Stairs (Locomotion)    [place] Level (Gait)  supervision  -      User Key  (r) = Recorded By, (t) = Taken By, (c) = Cosigned By    Initials Name Provider Type    Marlene Bethea, PT Physical Therapist           LDA Wound     Row Name 08/25/21 1320             Wound 08/17/21 1400 Left arm Skin Tear    Wound - Properties Group Placement Date: 08/17/21  -MP Placement Time: 1400  -MP Present on Hospital Admission: Y  -MP Side: Left  -MP Location: arm  -MP Primary Wound Type: Skin tear  -MP    Dressing Appearance  open to air  -      Base  dry;epithelialization;pink;scab scant scabbing remaining, fully epithelialized  -      Periwound  intact;pink;other (see comments) fragile, thin skin  -      Periwound Temperature  warm  -      Periwound Skin Turgor  soft  -      Drainage Amount  none  -      Care, Wound  cleansed with;wound cleanser;debrided  -      Dressing Care  open to air  -      Periwound Care  moisturizer applied  -      Retired Wound - Properties Group Date first assessed: 08/17/21  -MP Time first assessed: 1400  -MP Present on Hospital Admission: Y  -MP Side: Left  -MP Location: arm  -MP Primary Wound Type: Skin tear  -MP      User Key  (r) = Recorded By, (t) = Taken By, (c) = Cosigned By    Initials Name Provider Type    Marlene Bethea, PT Physical Therapist    MP Enrique Monroy, PT Physical Therapist            WOUND DEBRIDEMENT  Total area of Debridement: 6 cm2  Debridement Site 1  Location- Site 1: L UE   Selective Debridement- Site 1: Wound Surface <20cmsq  Instruments- Site 1: tweezers  Excised Tissue Description- Site 1: maximum, other (comment) (scabbing/crusting)  Bleeding- Site 1: none             Therapy Education     Row Name 08/25/21 1320             Therapy Education    Education Details  May leave LUE SUSAN and apply moisturizing lotion of your choice. Do not pull or scrape at remaining crust/scabs, but let them fall off naturally. Call for additional follow up within 30 days.  -MC      Given  Symptoms/condition management;Bandaging/dressing change  -MC      Program  Progressed  -MC      How Provided  Verbal;Demonstration  -MC      Provided to  Patient;Other (comment)  spouse, retired RN  -      Level of Understanding  Teach back education performed;Verbalized  -        User Key  (r) = Recorded By, (t) = Taken By, (c) = Cosigned By    Initials Name Provider Type    Marlene Bethea PT Physical Therapist          Recommendation and Plan  PT Assessment/Plan     Row Name 08/25/21 1320          PT Assessment    Functional Limitations  Other (comment) wound mgmt  -     Impairments  Integumentary integrity  -     Assessment Comments  LUE is fully closed today, with minimal crusting/scabbing remaining along the distal periwound, which PT anticipates will resolve within 1-2 weeks with regular skin care. Pt has met his goals for PT wound care and is appropriate for tentative d/c today.  -     Rehab Potential  Good  -     Patient/caregiver participated in establishment of treatment plan and goals  Yes  -     Patient would benefit from skilled therapy intervention  Yes  -        PT Plan    Physical Therapy Interventions (Optional Details)  wound care;patient/family education  -     PT Plan Comments  tentative d/c  -       User Key  (r) = Recorded By, (t) = Taken By, (c) = Cosigned By    Initials Name Provider Type    Marlene Bethea PT Physical Therapist          Goals  PT OP Goals     Row Name 08/25/21 1320          PT Short Term Goals    STG 1  Patient will present without necrotic tissue to wound bed.  -     STG 1 Progress  Met  -     STG 2  Patient will verbalize s/sx of infection.  -     STG 2 Progress  Met  -        Long Term Goals    LTG 1  Patient will demonstrate independence with clean home dressing management.  -     LTG 1 Progress  Met  -     LTG 2  Patient will present with 100% reepithelialization as evidence of wound healing.  -     LTG 2 Progress  Met  -        Time Calculation    PT Goal Re-Cert Due Date  11/17/21  -       User Key  (r) = Recorded By, (t) = Taken By, (c) = Cosigned By    Initials Name Provider Type    ELISHA  Marlene Levy, PT Physical Therapist          PT Goal Re-Cert Due Date: 11/17/21  PT Short Term Goals  STG 1: Patient will present without necrotic tissue to wound bed.  STG 1 Progress: Met  STG 2: Patient will verbalize s/sx of infection.  STG 2 Progress: Met  Long Term Goals  LTG 1: Patient will demonstrate independence with clean home dressing management.  LTG 1 Progress: Met  LTG 2: Patient will present with 100% reepithelialization as evidence of wound healing.  LTG 2 Progress: Met      Time Calculation: Start Time: 1320  Therapy Charges for Today     Code Description Service Date Service Provider Modifiers Qty    [id number] HC EDWIN DEBRIDE OPEN WOUND UP TO 20CM 8/25/2021 Marlene Levy, PT GP 1                  Marlene Levy, PT  8/25/2021

## 2021-09-28 ENCOUNTER — DOCUMENTATION (OUTPATIENT)
Dept: PHYSICAL THERAPY | Facility: HOSPITAL | Age: 81
End: 2021-09-28

## 2021-09-28 NOTE — THERAPY DISCHARGE NOTE
Outpatient Rehabilitation - Wound/Debridement D/C Summary        Patient Name: Toño Alcala  : 1940  MRN: [id number]  Today's Date: 2021                  Admit Date: (Not on file)    Visit Dx:  No diagnosis found.    Patient Active Problem List   Diagnosis   • CAD (coronary artery disease)   • CVD (cardiovascular disease)   • Hypertension   • DVT (deep venous thrombosis) (CMS/[facility])   • Diabetes mellitus (CMS/[facility])   • Dyslipidemia   • Arthritis   • GERD (gastroesophageal reflux disease)   • Mixed hyperlipidemia   • Diabetic nephropathy associated with type 2 diabetes mellitus (CMS/[facility])   • Diabetic polyneuropathy associated with type 2 diabetes mellitus (CMS/[facility])   • Chronic kidney disease, stage III (moderate) (CMS/[facility])   • Vitamin D deficiency   • Disc disorder of cervical region   • Postthrombotic syndrome   • Vertigo   • Angina pectoris (CMS/[facility])   • Lumbosacral spondylosis without myelopathy   • Psoriasis   • Arthritis of elbow   • Swelling of right lower extremity   • Acute right-sided low back pain without sciatica   • Hoarseness   • Unifocal PVCs   • Skin lesion of face   • Type 2 diabetes mellitus without complication, without long-term current use of insulin (CMS/[facility])   • Benign essential hypertension   • Medicare annual wellness visit, subsequent   • Stage 3 chronic kidney disease due to benign hypertension (CMS/[facility])   • BMI 30.0-30.9,adult   • Postherpetic neuralgia   • Herpes zoster without complication   • Calculus of gallbladder without cholecystitis without obstruction   • Acute diverticulitis   • Leg edema, right        Past Medical History:   Diagnosis Date   • Arthritis    • Bilateral radial fractures     fracture bilateral radial heads   • CAD (coronary artery disease)    • CVD (cardiovascular disease)     History of TIA    • Diabetes mellitus (CMS/[facility])    • DVT (deep venous thrombosis) (CMS/[facility])     Right lower extremity DVT and pulmonary embolism in 2015, idiopathic    • DVT of proximal lower limb (CMS/HCC) 06/22/2015    proximal DVT right leg, small PE- anticoagulation   • Dyslipidemia    • Fracture 1952    H/o pf left forearm fracture   • Fracture of right hand 1980   • GERD (gastroesophageal reflux disease)     with hiatal hernia   • Hx of angina pectoris    • Hypertension     Normal renal angiography 02/16/11   • Left wrist fracture 1953   • Osteoarthritis    • Right wrist fracture    • Vertigo    • Wears glasses         Past Surgical History:   Procedure Laterality Date   • APPENDECTOMY  1957   • BACK SURGERY     • CARDIAC CATHETERIZATION  2011    with vein graft   • CERVICAL FUSION     • CERVICAL FUSION  1992    C3-4   • COLONOSCOPY  2013   • CORONARY ARTERY BYPASS GRAFT  1994   • HERNIA REPAIR Right 2011    inguinal   • INGUINAL HERNIA REPAIR Left 10/29/2019    Procedure: INGUINAL HERNIA REPAIR LEFT;  Surgeon: Charisma Raines MD;  Location: [facility];  Service: General   • LUMBAR LAMINECTOMY  1996    laminectomy, lumbar, L3   • OTHER SURGICAL HISTORY      wrist surgery   • TONSILLECTOMY AND ADENOIDECTOMY  1945         EVALUATION                WOUND DEBRIDEMENT                            Recommendation and Plan      Goals  PT OP Goals     Row Name 09/28/21 1405          PT Short Term Goals    STG Date to Achieve  01/02/20  -     STG 1  Patient will present without necrotic tissue to wound bed.  -     STG 1 Progress  Met  -     STG 2  Patient will verbalize s/sx of infection.  -     STG 2 Progress  Met  -        Long Term Goals    LTG Date to Achieve  01/23/20  -     LTG 1  Patient will demonstrate independence with clean home dressing management.  -     LTG 1 Progress  Met  -     LTG 2  Patient will present with 100% reepithelialization as evidence of wound healing.  -     LTG 2 Progress  Met  -       User Key  (r) = Recorded By, (t) = Taken By, (c) = Cosigned By    Initials Name Provider Type    Marlene Bethea, PT Physical Therapist           Time Calculation:              OP Discharge Summary     Row Name 09/28/21 1406             OP PT Discharge Summary    Date of Discharge  09/28/21  -      Reason for Discharge  All goals achieved  -MC      Outcomes Achieved  Able to achieve all goals within established timeline  -      Discharge Destination  Home without follow-up  -      Discharge Instructions/Additional Comments  Tentative d/c over 30 days ago  -        User Key  (r) = Recorded By, (t) = Taken By, (c) = Cosigned By    Initials Name Provider Type    Marlene Bethea, PT Physical Therapist          Marlene Levy, PT  9/28/2021

## 2021-10-04 DIAGNOSIS — E11.42 DIABETIC POLYNEUROPATHY ASSOCIATED WITH TYPE 2 DIABETES MELLITUS (HCC): ICD-10-CM

## 2021-10-15 ENCOUNTER — FLU SHOT (OUTPATIENT)
Dept: INTERNAL MEDICINE | Facility: CLINIC | Age: 81
End: 2021-10-15

## 2021-10-15 DIAGNOSIS — Z23 NEED FOR INFLUENZA VACCINATION: Primary | ICD-10-CM

## 2021-10-15 PROCEDURE — G0008 ADMIN INFLUENZA VIRUS VAC: HCPCS | Performed by: INTERNAL MEDICINE

## 2021-10-15 PROCEDURE — 90662 IIV NO PRSV INCREASED AG IM: CPT | Performed by: INTERNAL MEDICINE

## 2021-10-26 DIAGNOSIS — B02.29 POSTHERPETIC NEURALGIA: ICD-10-CM

## 2021-10-26 RX ORDER — GABAPENTIN 300 MG/1
CAPSULE ORAL
Qty: 180 CAPSULE | Refills: 2 | Status: SHIPPED | OUTPATIENT
Start: 2021-10-26 | End: 2022-09-27

## 2021-12-07 ENCOUNTER — TELEPHONE (OUTPATIENT)
Dept: INTERNAL MEDICINE | Facility: CLINIC | Age: 81
End: 2021-12-07

## 2021-12-15 ENCOUNTER — OFFICE VISIT (OUTPATIENT)
Dept: INTERNAL MEDICINE | Facility: CLINIC | Age: 81
End: 2021-12-15

## 2021-12-15 VITALS
TEMPERATURE: 97.8 F | SYSTOLIC BLOOD PRESSURE: 138 MMHG | HEIGHT: 71 IN | WEIGHT: 215.8 LBS | HEART RATE: 68 BPM | BODY MASS INDEX: 30.21 KG/M2 | DIASTOLIC BLOOD PRESSURE: 72 MMHG

## 2021-12-15 DIAGNOSIS — I10 BENIGN ESSENTIAL HYPERTENSION: ICD-10-CM

## 2021-12-15 DIAGNOSIS — E78.5 DYSLIPIDEMIA: ICD-10-CM

## 2021-12-15 DIAGNOSIS — N18.32 STAGE 3B CHRONIC KIDNEY DISEASE (HCC): ICD-10-CM

## 2021-12-15 DIAGNOSIS — Z00.00 MEDICARE ANNUAL WELLNESS VISIT, SUBSEQUENT: Primary | ICD-10-CM

## 2021-12-15 DIAGNOSIS — E11.42 DIABETIC POLYNEUROPATHY ASSOCIATED WITH TYPE 2 DIABETES MELLITUS (HCC): ICD-10-CM

## 2021-12-15 DIAGNOSIS — E11.21 DIABETIC NEPHROPATHY ASSOCIATED WITH TYPE 2 DIABETES MELLITUS (HCC): ICD-10-CM

## 2021-12-15 DIAGNOSIS — I25.10 CORONARY ARTERY DISEASE INVOLVING NATIVE CORONARY ARTERY OF NATIVE HEART WITHOUT ANGINA PECTORIS: ICD-10-CM

## 2021-12-15 PROCEDURE — 1125F AMNT PAIN NOTED PAIN PRSNT: CPT | Performed by: INTERNAL MEDICINE

## 2021-12-15 PROCEDURE — 1170F FXNL STATUS ASSESSED: CPT | Performed by: INTERNAL MEDICINE

## 2021-12-15 PROCEDURE — 1159F MED LIST DOCD IN RCRD: CPT | Performed by: INTERNAL MEDICINE

## 2021-12-15 PROCEDURE — G0439 PPPS, SUBSEQ VISIT: HCPCS | Performed by: INTERNAL MEDICINE

## 2021-12-15 NOTE — PROGRESS NOTES
QUICK REFERENCE INFORMATION:  The ABCs of the Annual Wellness Visit    Subsequent Medicare Wellness Visit    HEALTH RISK ASSESSMENT    1940    Recent Hospitalizations:  No hospitalization(s) within the last year..        Current Medical Providers:  Patient Care Team:  Radu Francis MD as PCP - General        Smoking Status:  Social History     Tobacco Use   Smoking Status Former Smoker   • Packs/day: 1.00   • Years: 20.00   • Pack years: 20.00   • Types: Cigarettes   • Quit date: 1997   • Years since quittin.6   Smokeless Tobacco Never Used   Tobacco Comment    QUIT DATE 1992       Alcohol Consumption:  Social History     Substance and Sexual Activity   Alcohol Use Yes   • Alcohol/week: 7.0 standard drinks   • Types: 7 Glasses of wine per week    Comment: glass of wine everyday        Depression Screen:   PHQ-2/PHQ-9 Depression Screening 12/15/2021   Little interest or pleasure in doing things 0   Feeling down, depressed, or hopeless 0   Total Score 0       Health Habits and Functional and Cognitive Screening:  Functional & Cognitive Status 12/15/2021   Do you have difficulty preparing food and eating? No   Do you have difficulty bathing yourself, getting dressed or grooming yourself? No   Do you have difficulty using the toilet? No   Do you have difficulty moving around from place to place? No   Do you have trouble with steps or getting out of a bed or a chair? No   Current Diet Well Balanced Diet   Dental Exam Up to date   Eye Exam Up to date   Exercise (times per week) 2 times per week   Current Exercises Include Walking   Current Exercise Activities Include -   Do you need help using the phone?  No   Are you deaf or do you have serious difficulty hearing?  No   Do you need help with transportation? No   Do you need help shopping? No   Do you need help preparing meals?  No   Do you need help with housework?  No   Do you need help with laundry? No   Do you need help taking your  medications? No   Do you need help managing money? No   Do you ever drive or ride in a car without wearing a seat belt? No   Have you felt unusual stress, anger or loneliness in the last month? No   Who do you live with? Spouse   If you need help, do you have trouble finding someone available to you? No   Have you been bothered in the last four weeks by sexual problems? Yes   Do you have difficulty concentrating, remembering or making decisions? No       Fall Risk Screen:  STEADI Fall Risk Assessment was completed, and patient is at LOW risk for falls.Assessment completed on:12/15/2021    ACE III MINI        Does the patient have evidence of cognitive impairment? No    Aspirin use counseling: Taking ASA appropriately as indicated    Recent Lab Results:  CMP:  Lab Results   Component Value Date    BUN 26 (H) 05/06/2021    CREATININE 1.52 (H) 05/06/2021    EGFRIFNONA 44 (L) 05/06/2021    BCR 17.1 05/06/2021     05/06/2021    K 5.0 05/06/2021    CO2 25.2 05/06/2021    CALCIUM 9.3 05/06/2021    ALBUMIN 4.30 05/06/2021    BILITOT 0.8 12/03/2020    ALKPHOS 155 (H) 12/03/2020    AST 22 12/03/2020    ALT 18 12/03/2020     HbA1c:  Lab Results   Component Value Date    HGBA1C 7.0 06/14/2021    HGBA1C 6.48 (H) 12/03/2020     Microalbumin:  No results found for: MICROALBUR, POCMALB, POCCREAT  Lipid Panel  Lab Results   Component Value Date    CHOL 133 12/03/2020    TRIG 100 12/03/2020    HDL 41 12/03/2020    LDL 73 12/03/2020    AST 22 12/03/2020    ALT 18 12/03/2020       Visual Acuity:  No exam data present    Age-appropriate Screening Schedule:  Refer to the list below for future screening recommendations based on patient's age, sex and/or medical conditions. Orders for these recommended tests are listed in the plan section. The patient has been provided with a written plan.    Health Maintenance   Topic Date Due   • TDAP/TD VACCINES (1 - Tdap) Never done   • LIPID PANEL  12/03/2021   • HEMOGLOBIN A1C  12/14/2021   •  "ZOSTER VACCINE (3 of 3) 12/15/2021   • DIABETIC EYE EXAM  12/20/2021   • URINE MICROALBUMIN  05/06/2022   • INFLUENZA VACCINE  Completed        Subjective   History of Present Illness    Toño Alcala is a 81 y.o. male who presents for subsequent Medicare annual wellness visit and for follow-up of his diabetes, chronic kidney disease, hypertension, coronary artery disease, and dyslipidemia.     Diabetic polyneuropathy  Foot exam done regularly by DR Kan. The patient wears compression socks for edema to his bilateral lower extremities. The patient reports numbness to the plantar aspect of his feet bilaterally. The patient states he has discomfort in his bilateral lower extremities.     Postherpetic neuralgia  He continues to have discomfort to his right flank area. He states he reduced the Gabapentin from 3 tablets to 2 tablets due to dizziness. This discomfort has been ongoing for the past 2 years after having shingles.     Breathing  The patient denies chest pain or shortness of breath. He  reports not having any episodes of shortness of breath since talking to \"Sanket\" about it. He was wearing the bigger face masks which was making it hard for him to breathe. He reports having an episode of dyspnea on exertion while walking up a hill but this improved after resting.     Stress  The patient states his stress is increased as he is the main caregiver of his wife who has dementia.    CHRONIC CONDITIONS    The following portions of the patient's history were reviewed and updated as appropriate: allergies, current medications, past family history, past medical history, past social history, past surgical history and problem list.    Outpatient Medications Prior to Visit   Medication Sig Dispense Refill   • acetaminophen (TYLENOL) 325 MG tablet Take 650 mg by mouth As Needed for Mild Pain .     • aspirin 81 MG EC tablet Take 81 mg by mouth Daily. Last dose 10-18-19 per patient     • atorvastatin (LIPITOR) 40 MG tablet " Take 40 mg by mouth Daily.     • calcipotriene-betamethasone (TACLONEX) 0.005-0.064 % ointment Apply 1 application topically to the appropriate area as directed As Needed (psoriasis).     • cholecalciferol (VITAMIN D3) 25 MCG (1000 UT) tablet Take 1,000 Units by mouth Daily.     • diclofenac (VOLTAREN) 1 % gel gel Apply 4 g topically As Needed.     • diltiaZEM CD (CARDIZEM CD) 180 MG 24 hr capsule Take 180 mg by mouth Daily.     • gabapentin (NEURONTIN) 300 MG capsule TAKE THREE CAPSULES BY MOUTH TWICE A DAY (Patient taking differently: Take 600 mg by mouth 2 (Two) Times a Day.) 180 capsule 2   • glucose blood test strip Use to check blood sugar twice daily 100 each 3   • lidocaine (LIDODERM) 5 % Place 1 patch on the skin as directed by provider Daily. Remove & Discard patch within 12 hours or as directed by MD 90 patch 2   • losartan (COZAAR) 100 MG tablet Take 100 mg by mouth Every Night.     • Microlet Lancets misc Use to check blood sugar twice daily 100 each 3   • nitroglycerin (NITROSTAT) 0.4 MG SL tablet Place 0.4 mg under the tongue Every 5 (Five) Minutes As Needed for Chest Pain. Take no more than 3 doses in 15 minutes.     • omeprazole (priLOSEC) 20 MG capsule Take 20 mg by mouth Daily.     • silver sulfadiazine (SILVADENE, SSD) 1 % cream Apply  topically to the appropriate area as directed 2 (Two) Times a Day. 15 g 0   • doxycycline (MONODOX) 100 MG capsule Take 1 capsule by mouth 2 (Two) Times a Day. 20 capsule 0     No facility-administered medications prior to visit.       Patient Active Problem List   Diagnosis   • CAD (coronary artery disease)   • CVD (cardiovascular disease)   • Hypertension   • DVT (deep venous thrombosis) ([facility])   • Diabetes mellitus ([facility])   • Dyslipidemia   • Arthritis   • GERD (gastroesophageal reflux disease)   • Mixed hyperlipidemia   • Diabetic nephropathy associated with type 2 diabetes mellitus (HCC)   • Diabetic polyneuropathy associated with type 2 diabetes mellitus (HCC)    • Chronic kidney disease, stage III (moderate) ([facility])   • Vitamin D deficiency   • Disc disorder of cervical region   • Postthrombotic syndrome   • Vertigo   • Angina pectoris ([facility])   • Lumbosacral spondylosis without myelopathy   • Psoriasis   • Arthritis of elbow   • Swelling of right lower extremity   • Acute right-sided low back pain without sciatica   • Hoarseness   • Unifocal PVCs   • Skin lesion of face   • Type 2 diabetes mellitus without complication, without long-term current use of insulin ([facility])   • Benign essential hypertension   • Medicare annual wellness visit, subsequent   • Stage 3 chronic kidney disease due to benign hypertension ([facility])   • BMI 30.0-30.9,adult   • Postherpetic neuralgia   • Herpes zoster without complication   • Calculus of gallbladder without cholecystitis without obstruction   • Acute diverticulitis   • Leg edema, right       Advance Care Planning:  ACP discussion was held with the patient during this visit. Patient has an advance directive in EMR which is still valid.     Identification of Risk Factors:  Risk factors include: Advance Directive Discussion.    Review of Systems   Constitutional: Negative.    HENT: Negative.    Respiratory: Negative.    Cardiovascular: Negative.    Gastrointestinal:        Ongoing right flank pain from postherpetic neuropathy.    Genitourinary: Negative.    Musculoskeletal: Negative.    Skin: Negative.    Neurological:        Decreased sensation in both feet       Compared to one year ago, the patient feels his physical health is the same.  Compared to one year ago, the patient feels his mental health is the same.    Objective     Physical Exam  Vitals and nursing note reviewed.   Constitutional:       Appearance: Normal appearance. He is well-developed.   HENT:      Head: Normocephalic and atraumatic.      Right Ear: External ear normal.      Left Ear: External ear normal.      Mouth/Throat:      Pharynx: No oropharyngeal exudate.   Eyes:       "Conjunctiva/sclera: Conjunctivae normal.      Pupils: Pupils are equal, round, and reactive to light.   Cardiovascular:      Rate and Rhythm: Normal rate and regular rhythm.      Pulses: Normal pulses.      Heart sounds: No murmur heard.  No friction rub. No gallop.    Pulmonary:      Effort: Pulmonary effort is normal.      Breath sounds: Normal breath sounds. No wheezing or rhonchi.   Abdominal:      General: Bowel sounds are normal. There is no distension.      Palpations: Abdomen is soft.      Tenderness: There is no abdominal tenderness.   Musculoskeletal:      Right foot: No deformity.      Left foot: No deformity.      Comments: callus formation on the plantar aspect of the second and third metatarsals.   Feet:      Right foot:      Protective Sensation: 5 sites tested. 5 sites sensed.      Skin integrity: Skin integrity normal.      Left foot:      Protective Sensation: 5 sites tested. 5 sites sensed.      Skin integrity: Skin integrity normal.      Comments: Sensation in both feet reduced to monofilament.     Diabetic Foot Exam Performed and Monofilament Test Performed    Skin:     General: Skin is warm and dry.      Findings: No rash.   Neurological:      Mental Status: He is alert and oriented to person, place, and time.      Cranial Nerves: No cranial nerve deficit.      Comments: decreased sensation in both feet   Psychiatric:         Mood and Affect: Mood and affect normal.         Behavior: Behavior normal.         Thought Content: Thought content normal.         Judgment: Judgment normal.          Procedures     Vitals:    12/15/21 1425   BP: 138/72   BP Location: Left arm   Patient Position: Sitting   Cuff Size: Large Adult   Pulse: 68   Temp: 97.8 °F (36.6 °C)   Weight: 97.9 kg (215 lb 12.8 oz)   Height: 179.1 cm (70.5\")   PainSc: 2  Comment: 10 at worst   PainLoc: Abdomen  Comment: right side       Patient's Body mass index is 30.53 kg/m². indicating that he is obese (BMI >30). Obesity-related " health conditions include the following: hypertension, coronary heart disease, diabetes mellitus and dyslipidemias. Obesity is unchanged. BMI is is above average; BMI management plan is completed. We discussed portion control, increasing exercise and joining a fitness center or start home based exercise program..      Assessment/Plan   Problem List Items Addressed This Visit        Cardiac and Vasculature    CAD (coronary artery disease)  - Coronary artery disease is asymptomatic on losartan, atorvastatin and aspirin.     Overview     a. History of CABG x4, 1994 - incomplete database.   b. Cardiac catheterization 02/16/2011 showed 100% native RCA, and severe proximal LAD and left circumflex disease.  Saphenous vein graft to the right PDA is patent.  Left internal mammary artery to the LAD is widely patent.  Saphenous vein graft to the obtuse marginal is widely patent.         Relevant Medications    diltiaZEM CD (CARDIZEM CD) 180 MG 24 hr capsule    nitroglycerin (NITROSTAT) 0.4 MG SL tablet    Dyslipidemia  - Lipids are well controlled on atorvastatin and healthy diet.    Benign essential hypertension    Overview     Continue losartan 100 mg tablets daily         Relevant Medications    diltiaZEM CD (CARDIZEM CD) 180 MG 24 hr capsule    losartan (COZAAR) 100 MG tablet       Genitourinary and Reproductive     Diabetic nephropathy associated with type 2 diabetes mellitus (HCC)  - A1c was 7.0, which is well controlled.   - He will continue managing this with healthy diet and avoidance of weight gain.  - Foot exam was performed today.  - He is up to date on eye examination.    Chronic kidney disease, stage III (moderate) (HCC)  - GFR is well controlled.   - The treatment remains control of blood glucose, and blood pressure avoidance of NSAIDs.       Health Encounters    Medicare annual wellness visit, subsequent - Primary  - He is fully vaccinated against COVID-19 and influenza.   - His main source of stress is as  caregiver of his wife who has dementia. We discussed caregiver fatigue and other issues.       Neuro    Diabetic polyneuropathy associated with type 2 diabetes mellitus (HCC)    Relevant Medications    Microlet Lancets misc    glucose blood test strip        Patient Self-Management and Personalized Health Advice  The patient has been provided with information about: diet, exercise and weight management and preventive services including:   · Annual Wellness Visit ([facility]).    Outpatient Encounter Medications as of 12/15/2021   Medication Sig Dispense Refill   • acetaminophen (TYLENOL) 325 MG tablet Take 650 mg by mouth As Needed for Mild Pain .     • aspirin 81 MG EC tablet Take 81 mg by mouth Daily. Last dose 10-18-19 per patient     • atorvastatin (LIPITOR) 40 MG tablet Take 40 mg by mouth Daily.     • calcipotriene-betamethasone (TACLONEX) 0.005-0.064 % ointment Apply 1 application topically to the appropriate area as directed As Needed (psoriasis).     • cholecalciferol (VITAMIN D3) 25 MCG (1000 UT) tablet Take 1,000 Units by mouth Daily.     • diclofenac (VOLTAREN) 1 % gel gel Apply 4 g topically As Needed.     • diltiaZEM CD (CARDIZEM CD) 180 MG 24 hr capsule Take 180 mg by mouth Daily.     • gabapentin (NEURONTIN) 300 MG capsule TAKE THREE CAPSULES BY MOUTH TWICE A DAY (Patient taking differently: Take 600 mg by mouth 2 (Two) Times a Day.) 180 capsule 2   • glucose blood test strip Use to check blood sugar twice daily 100 each 3   • lidocaine (LIDODERM) 5 % Place 1 patch on the skin as directed by provider Daily. Remove & Discard patch within 12 hours or as directed by MD 90 patch 2   • losartan (COZAAR) 100 MG tablet Take 100 mg by mouth Every Night.     • Microlet Lancets misc Use to check blood sugar twice daily 100 each 3   • nitroglycerin (NITROSTAT) 0.4 MG SL tablet Place 0.4 mg under the tongue Every 5 (Five) Minutes As Needed for Chest Pain. Take no more than 3 doses in 15 minutes.     • omeprazole  (priLOSEC) 20 MG capsule Take 20 mg by mouth Daily.     • silver sulfadiazine (SILVADENE, SSD) 1 % cream Apply  topically to the appropriate area as directed 2 (Two) Times a Day. 15 g 0   • [DISCONTINUED] doxycycline (MONODOX) 100 MG capsule Take 1 capsule by mouth 2 (Two) Times a Day. 20 capsule 0     No facility-administered encounter medications on file as of 12/15/2021.       Reviewed use of high risk medication in the elderly: yes  Reviewed for potential of harmful drug interactions in the elderly: yes    Follow Up:  Return in about 6 months (around 6/15/2022) for follow up.     There are no Patient Instructions on file for this visit.    An After Visit Summary and PPPS with all of these plans were given to the patient.      Transcribed from ambient dictation for Radu Francis MD by Duncan WEISS Rep.  12/16/21   11:03 EST    Patient verbalized consent to the visit recording.

## 2021-12-16 ENCOUNTER — TELEPHONE (OUTPATIENT)
Dept: INTERNAL MEDICINE | Facility: CLINIC | Age: 81
End: 2021-12-16

## 2022-02-07 ENCOUNTER — TELEPHONE (OUTPATIENT)
Dept: INTERNAL MEDICINE | Facility: CLINIC | Age: 82
End: 2022-02-07

## 2022-02-07 ENCOUNTER — HOSPITAL ENCOUNTER (OUTPATIENT)
Dept: GENERAL RADIOLOGY | Facility: HOSPITAL | Age: 82
Discharge: HOME | End: 2022-02-07
Admitting: INTERNAL MEDICINE

## 2022-02-07 DIAGNOSIS — M25.562 ACUTE PAIN OF LEFT KNEE: ICD-10-CM

## 2022-02-07 DIAGNOSIS — M25.562 ACUTE PAIN OF LEFT KNEE: Primary | ICD-10-CM

## 2022-02-07 PROCEDURE — 73560 X-RAY EXAM OF KNEE 1 OR 2: CPT

## 2022-02-07 NOTE — TELEPHONE ENCOUNTER
Caller: Toño Alcala    Relationship: Self    Best call back number: [phone]    What orders are you requesting (i.e. lab or imaging): MRI OR XRAY     In what timeframe would the patient need to come in: ASAP     [facility]     Additional notes: PATIENT STATES ABOUT TWO WEEKS AGO HE HAD A AN INJECTION IN HIS LEFT KNEE AND HAS BEEN USING A CANE TO GET A ROUND TO KEEP PRESSURE OFF OF HIS KNEE. PATIENT STATES AROUND 7 PM LAST NIGHT HE POPPED HIS KNEE AND THE KNEE IS NOT WANTING TO TAKE ANY PRESSURE AND IS SORE AND VERY PAINFUL. PATIENT STATES HE HAS BEEN USING HIS WIFE'S WALKER TO GET AROUND.

## 2022-02-08 ENCOUNTER — TELEPHONE (OUTPATIENT)
Dept: INTERNAL MEDICINE | Facility: CLINIC | Age: 82
End: 2022-02-08

## 2022-02-10 ENCOUNTER — OFFICE VISIT (OUTPATIENT)
Dept: INTERNAL MEDICINE | Facility: CLINIC | Age: 82
End: 2022-02-10

## 2022-02-10 VITALS
BODY MASS INDEX: 29.31 KG/M2 | DIASTOLIC BLOOD PRESSURE: 68 MMHG | TEMPERATURE: 98 F | WEIGHT: 209.4 LBS | HEART RATE: 88 BPM | SYSTOLIC BLOOD PRESSURE: 150 MMHG | HEIGHT: 71 IN

## 2022-02-10 DIAGNOSIS — M17.32 POST-TRAUMATIC OSTEOARTHRITIS OF LEFT KNEE: Primary | ICD-10-CM

## 2022-02-10 PROCEDURE — 99213 OFFICE O/P EST LOW 20 MIN: CPT | Performed by: INTERNAL MEDICINE

## 2022-02-10 NOTE — PROGRESS NOTES
[facility]     Toño Alcala  1940   [id number]      Patient Care Team:  Radu Francis MD as PCP - General    Chief Complaint::   Chief Complaint   Patient presents with   • Knee Pain     left        HPI  The patient presents today complaining of left knee pain.    Left knee pain  The patient reports intermittent his left knee pain which is improved with walking, and worsened with long periods of sitting. The patient reports [facility], last injection 3 weeks ago. Once a year, he aspirates his left knee and attributes the pain to not having it aspirated. He reports walking causes his knee to pop.     Chronic Conditions: Osteoarthritis    Patient Active Problem List   Diagnosis   • CAD (coronary artery disease)   • CVD (cardiovascular disease)   • Hypertension   • DVT (deep venous thrombosis) ([facility])   • Diabetes mellitus ([facility])   • Dyslipidemia   • Arthritis   • GERD (gastroesophageal reflux disease)   • Mixed hyperlipidemia   • Diabetic nephropathy associated with type 2 diabetes mellitus ([facility])   • Diabetic polyneuropathy associated with type 2 diabetes mellitus ([facility])   • Chronic kidney disease, stage III (moderate) ([facility])   • Vitamin D deficiency   • Disc disorder of cervical region   • Postthrombotic syndrome   • Vertigo   • Angina pectoris ([facility])   • Lumbosacral spondylosis without myelopathy   • Psoriasis   • Arthritis of elbow   • Swelling of right lower extremity   • Acute right-sided low back pain without sciatica   • Hoarseness   • Unifocal PVCs   • Skin lesion of face   • Type 2 diabetes mellitus without complication, without long-term current use of insulin ([facility])   • Benign essential hypertension   • Medicare annual wellness visit, subsequent   • Stage 3 chronic kidney disease due to benign hypertension ([facility])   • BMI 30.0-30.9,adult   • Postherpetic neuralgia   • Herpes zoster without complication   •  Calculus of gallbladder without cholecystitis without obstruction   • Acute diverticulitis   • Leg edema, right        Past Medical History:   Diagnosis Date   • Arthritis    • Bilateral radial fractures 1962    fracture bilateral radial heads   • CAD (coronary artery disease)    • CVD (cardiovascular disease)     History of TIA 1989   • Diabetes mellitus ([facility])    • DVT (deep venous thrombosis) ([facility])     Right lower extremity DVT and pulmonary embolism in 06/2015, idiopathic   • DVT of proximal lower limb ([facility]) 06/22/2015    proximal DVT right leg, small PE- anticoagulation   • Dyslipidemia    • Fracture 1952    H/o pf left forearm fracture   • Fracture of right hand 1980   • GERD (gastroesophageal reflux disease)     with hiatal hernia   • Hx of angina pectoris    • Hypertension     Normal renal angiography 02/16/11   • Left wrist fracture 1953   • Osteoarthritis    • Right wrist fracture    • Vertigo    • Wears glasses        Past Surgical History:   Procedure Laterality Date   • APPENDECTOMY  1957   • BACK SURGERY     • CARDIAC CATHETERIZATION  2011    with vein graft   • CERVICAL FUSION     • CERVICAL FUSION  1992    C3-4   • COLONOSCOPY  2013   • CORONARY ARTERY BYPASS GRAFT  1994   • HERNIA REPAIR Right 2011    inguinal   • INGUINAL HERNIA REPAIR Left 10/29/2019    Procedure: INGUINAL HERNIA REPAIR LEFT;  Surgeon: Charisma Raines MD;  Location: [facility];  Service: General   • LUMBAR LAMINECTOMY  1996    laminectomy, lumbar, L3   • OTHER SURGICAL HISTORY      wrist surgery   • TONSILLECTOMY AND ADENOIDECTOMY  1945       Family History   Problem Relation Age of Onset   • Arthritis Mother         OA   • Heart disease Father    • Diabetes Father    • Heart attack Father    • Heart disease Brother    • Heart attack Brother    • Diabetes Brother    • Diabetes Son    • Hypertension Other    • Cancer Other        Social History     Socioeconomic History   • Marital status:    Tobacco Use   • Smoking  status: Former Smoker     Packs/day: 1.00     Years: 20.00     Pack years: 20.00     Types: Cigarettes     Quit date: 1997     Years since quittin.8   • Smokeless tobacco: Never Used   • Tobacco comment: QUIT DATE 1992   Vaping Use   • Vaping Use: Never used   Substance and Sexual Activity   • Alcohol use: Yes     Alcohol/week: 7.0 standard drinks     Types: 7 Glasses of wine per week     Comment: glass of wine everyday    • Drug use: No   • Sexual activity: Defer       Allergies   Allergen Reactions   • Penicillins Hives and Swelling     Per patient, has tolerated Keflex         Current Outpatient Medications:   •  acetaminophen (TYLENOL) 325 MG tablet, Take 650 mg by mouth As Needed for Mild Pain ., Disp: , Rfl:   •  aspirin 81 MG EC tablet, Take 81 mg by mouth Daily. Last dose 10-18-19 per patient, Disp: , Rfl:   •  atorvastatin (LIPITOR) 40 MG tablet, Take 40 mg by mouth Daily., Disp: , Rfl:   •  calcipotriene-betamethasone (TACLONEX) 0.005-0.064 % ointment, Apply 1 application topically to the appropriate area as directed As Needed (psoriasis)., Disp: , Rfl:   •  cholecalciferol (VITAMIN D3) 25 MCG (1000 UT) tablet, Take 1,000 Units by mouth Daily., Disp: , Rfl:   •  diclofenac (VOLTAREN) 1 % gel gel, Apply 4 g topically As Needed., Disp: , Rfl:   •  diltiaZEM CD (CARDIZEM CD) 180 MG 24 hr capsule, Take 180 mg by mouth Daily., Disp: , Rfl:   •  gabapentin (NEURONTIN) 300 MG capsule, TAKE THREE CAPSULES BY MOUTH TWICE A DAY (Patient taking differently: Take 600 mg by mouth 2 (Two) Times a Day.), Disp: 180 capsule, Rfl: 2  •  glucose blood test strip, Use to check blood sugar twice daily, Disp: 100 each, Rfl: 3  •  lidocaine (LIDODERM) 5 %, Place 1 patch on the skin as directed by provider Daily. Remove & Discard patch within 12 hours or as directed by MD, Disp: 90 patch, Rfl: 2  •  losartan (COZAAR) 100 MG tablet, Take 100 mg by mouth Every Night., Disp: , Rfl:   •  Microlet Lancets misc, Use to  "check blood sugar twice daily, Disp: 100 each, Rfl: 3  •  nitroglycerin (NITROSTAT) 0.4 MG SL tablet, Place 0.4 mg under the tongue Every 5 (Five) Minutes As Needed for Chest Pain. Take no more than 3 doses in 15 minutes., Disp: , Rfl:   •  omeprazole (priLOSEC) 20 MG capsule, Take 20 mg by mouth Daily., Disp: , Rfl:   •  silver sulfadiazine (SILVADENE, SSD) 1 % cream, Apply  topically to the appropriate area as directed 2 (Two) Times a Day., Disp: 15 g, Rfl: 0    Review of Systems   Review of systems is otherwise unremarkable.    Vital Signs  Vitals:    02/10/22 1305   BP: 150/68   BP Location: Right arm   Patient Position: Sitting   Cuff Size: Large Adult   Pulse: 88   Temp: 98 °F (36.7 °C)   Weight: 95 kg (209 lb 6.4 oz)   Height: 179.1 cm (70.51\")   PainSc:   8   PainLoc: Knee  Comment: left       Physical Exam  Musculoskeletal:      Comments: Chronic synovitis of the left knee. There is a mild effusion. No warmth or erythema.          Procedures    ACE III MINI             Assessment/Plan:    Diagnoses and all orders for this visit:    1. Post-traumatic osteoarthritis of left knee (Primary)  - He was getting monthly  injections by orthopedic PA at the [facility]. At the last visit, fluid was not removed as it usually is, but triamcinolone was injected. It is not clear what is causing the acute pain. In any case, he would like to consult with a different orthopedic surgeon. He is referred to Dr. Renteria.  -     Ambulatory Referral to Orthopedic Surgery          Plan of care reviewed with patient at the conclusion of today's visit. Education was provided regarding diagnosis, management, and any prescribed or recommended OTC medications.Patient verbalizes understanding of and agreement with management plan.         Radu Francis MD       Transcribed from ambient dictation for Radu Francis MD by Reyes Noland.  02/11/22   14:34 EST    Patient verbalized consent to the visit recording.        "

## 2022-02-14 ENCOUNTER — HOSPITAL ENCOUNTER (EMERGENCY)
Facility: HOSPITAL | Age: 82
Discharge: HOME | End: 2022-02-15
Attending: STUDENT IN AN ORGANIZED HEALTH CARE EDUCATION/TRAINING PROGRAM

## 2022-02-14 DIAGNOSIS — S00.83XA CONTUSION OF FACE, INITIAL ENCOUNTER: ICD-10-CM

## 2022-02-14 DIAGNOSIS — S00.12XA TRAUMATIC HEMATOMA OF LEFT EYEBROW, INITIAL ENCOUNTER: ICD-10-CM

## 2022-02-14 DIAGNOSIS — S41.112A SKIN TEAR OF LEFT UPPER ARM WITHOUT COMPLICATION, INITIAL ENCOUNTER: ICD-10-CM

## 2022-02-14 DIAGNOSIS — S01.112A LACERATION OF LEFT EYEBROW, INITIAL ENCOUNTER: ICD-10-CM
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
## 2022-02-14 LAB
ALBUMIN SERPL-MCNC: 4.5 G/DL (ref 3.5–5.2)
ALBUMIN/GLOB SERPL: 1.6 G/DL
ALP SERPL-CCNC: 146 U/L (ref 39–117)
ALT SERPL W P-5'-P-CCNC: 49 U/L (ref 1–41)
ANION GAP SERPL CALCULATED.3IONS-SCNC: 15 MMOL/L (ref 5–15)
AST SERPL-CCNC: 39 U/L (ref 1–40)
BASOPHILS # BLD AUTO: 0.04 10*3/MM3 (ref 0–0.2)
BASOPHILS NFR BLD AUTO: 0.4 % (ref 0–1.5)
BILIRUB SERPL-MCNC: 0.5 MG/DL (ref 0–1.2)
BUN SERPL-MCNC: 30 MG/DL (ref 8–23)
BUN/CREAT SERPL: 18.8 (ref 7–25)
CALCIUM SPEC-SCNC: 9.4 MG/DL (ref 8.6–10.5)
CHLORIDE SERPL-SCNC: 99 MMOL/L (ref 98–107)
CO2 SERPL-SCNC: 21 MMOL/L (ref 22–29)
CREAT SERPL-MCNC: 1.6 MG/DL (ref 0.76–1.27)
DEPRECATED RDW RBC AUTO: 51.5 FL (ref 37–54)
EOSINOPHIL # BLD AUTO: 0.05 10*3/MM3 (ref 0–0.4)
EOSINOPHIL NFR BLD AUTO: 0.5 % (ref 0.3–6.2)
ERYTHROCYTE [DISTWIDTH] IN BLOOD BY AUTOMATED COUNT: 15.8 % (ref 12.3–15.4)
GFR SERPL CREATININE-BSD FRML MDRD: 42 ML/MIN/1.73
GLOBULIN UR ELPH-MCNC: 2.9 GM/DL
GLUCOSE SERPL-MCNC: 190 MG/DL (ref 65–99)
HCT VFR BLD AUTO: 47.1 % (ref 37.5–51)
HGB BLD-MCNC: 15.4 G/DL (ref 13–17.7)
IMM GRANULOCYTES # BLD AUTO: 0.12 10*3/MM3 (ref 0–0.05)
IMM GRANULOCYTES NFR BLD AUTO: 1.1 % (ref 0–0.5)
LYMPHOCYTES # BLD AUTO: 1.75 10*3/MM3 (ref 0.7–3.1)
LYMPHOCYTES NFR BLD AUTO: 16.3 % (ref 19.6–45.3)
MAGNESIUM SERPL-MCNC: 2.2 MG/DL (ref 1.6–2.4)
MCH RBC QN AUTO: 29.1 PG (ref 26.6–33)
MCHC RBC AUTO-ENTMCNC: 32.7 G/DL (ref 31.5–35.7)
MCV RBC AUTO: 89 FL (ref 79–97)
MONOCYTES # BLD AUTO: 0.73 10*3/MM3 (ref 0.1–0.9)
MONOCYTES NFR BLD AUTO: 6.8 % (ref 5–12)
NEUTROPHILS NFR BLD AUTO: 74.9 % (ref 42.7–76)
NEUTROPHILS NFR BLD AUTO: 8.03 10*3/MM3 (ref 1.7–7)
NRBC BLD AUTO-RTO: 0 /100 WBC (ref 0–0.2)
PLATELET # BLD AUTO: 141 10*3/MM3 (ref 140–450)
PMV BLD AUTO: 10.1 FL (ref 6–12)
POTASSIUM SERPL-SCNC: 4.4 MMOL/L (ref 3.5–5.2)
PROT SERPL-MCNC: 7.4 G/DL (ref 6–8.5)
RBC # BLD AUTO: 5.29 10*6/MM3 (ref 4.14–5.8)
SODIUM SERPL-SCNC: 135 MMOL/L (ref 136–145)
TROPONIN T SERPL-MCNC: <0.01 NG/ML (ref 0–0.03)
WBC NRBC COR # BLD: 10.72 10*3/MM3 (ref 3.4–10.8)

## 2022-02-14 PROCEDURE — 99284 EMERGENCY DEPT VISIT MOD MDM: CPT

## 2022-02-14 PROCEDURE — 80053 COMPREHEN METABOLIC PANEL: CPT | Performed by: STUDENT IN AN ORGANIZED HEALTH CARE EDUCATION/TRAINING PROGRAM

## 2022-02-14 PROCEDURE — 84484 ASSAY OF TROPONIN QUANT: CPT | Performed by: STUDENT IN AN ORGANIZED HEALTH CARE EDUCATION/TRAINING PROGRAM

## 2022-02-14 PROCEDURE — 83735 ASSAY OF MAGNESIUM: CPT | Performed by: STUDENT IN AN ORGANIZED HEALTH CARE EDUCATION/TRAINING PROGRAM

## 2022-02-14 PROCEDURE — 93005 ELECTROCARDIOGRAM TRACING: CPT | Performed by: STUDENT IN AN ORGANIZED HEALTH CARE EDUCATION/TRAINING PROGRAM

## 2022-02-14 PROCEDURE — 93005 ELECTROCARDIOGRAM TRACING: CPT

## 2022-02-14 PROCEDURE — 85025 COMPLETE CBC W/AUTO DIFF WBC: CPT | Performed by: STUDENT IN AN ORGANIZED HEALTH CARE EDUCATION/TRAINING PROGRAM

## 2022-02-14 RX ORDER — SODIUM CHLORIDE 0.9 % (FLUSH) 0.9 %
10 SYRINGE (ML) INJECTION AS NEEDED
Status: DISCONTINUED | OUTPATIENT
Start: 2022-02-14 | End: 2022-02-15 | Stop reason: HOSPADM

## 2022-02-14 RX ORDER — LIDOCAINE HYDROCHLORIDE AND EPINEPHRINE 10; 10 MG/ML; UG/ML
10 INJECTION, SOLUTION INFILTRATION; PERINEURAL ONCE
Status: COMPLETED | OUTPATIENT
Start: 2022-02-14 | End: 2022-02-15

## 2022-02-15 ENCOUNTER — PATIENT OUTREACH (OUTPATIENT)
Dept: CASE MANAGEMENT | Facility: OTHER | Age: 82
End: 2022-02-15

## 2022-02-15 ENCOUNTER — APPOINTMENT (OUTPATIENT)
Dept: CT IMAGING | Facility: HOSPITAL | Age: 82
End: 2022-02-15

## 2022-02-15 VITALS
BODY MASS INDEX: 28.44 KG/M2 | HEART RATE: 77 BPM | OXYGEN SATURATION: 92 % | HEIGHT: 72 IN | RESPIRATION RATE: 20 BRPM | WEIGHT: 210 LBS | DIASTOLIC BLOOD PRESSURE: 87 MMHG | SYSTOLIC BLOOD PRESSURE: 136 MMHG | TEMPERATURE: 98.9 F

## 2022-02-15 LAB
HOLD SPECIMEN: NORMAL
WHOLE BLOOD HOLD SPECIMEN: NORMAL
WHOLE BLOOD HOLD SPECIMEN: NORMAL

## 2022-02-15 PROCEDURE — 90471 IMMUNIZATION ADMIN: CPT | Performed by: PHYSICIAN ASSISTANT

## 2022-02-15 PROCEDURE — 72125 CT NECK SPINE W/O DYE: CPT

## 2022-02-15 PROCEDURE — 90715 TDAP VACCINE 7 YRS/> IM: CPT | Performed by: PHYSICIAN ASSISTANT

## 2022-02-15 PROCEDURE — 70450 CT HEAD/BRAIN W/O DYE: CPT

## 2022-02-15 PROCEDURE — 25010000002 TETANUS-DIPHTH-ACELL PERTUSSIS 5-2.5-18.5 LF-MCG/0.5 SUSPENSION PREFILLED SYRINGE: Performed by: PHYSICIAN ASSISTANT

## 2022-02-15 RX ADMIN — TETANUS TOXOID, REDUCED DIPHTHERIA TOXOID AND ACELLULAR PERTUSSIS VACCINE, ADSORBED 0.5 ML: 5; 2.5; 8; 8; 2.5 SUSPENSION INTRAMUSCULAR at 01:21

## 2022-02-15 RX ADMIN — LIDOCAINE HYDROCHLORIDE AND EPINEPHRINE 10 ML: 10; 10 INJECTION, SOLUTION INFILTRATION; PERINEURAL at 01:27

## 2022-02-15 NOTE — OUTREACH NOTE
"Ambulatory Case Management Note    Patient Outreach    Pt contacted regarding [facility] ED visit 2/14-2/15/22 with chief c/o listed as fall.  Role of Ambulatory Nurse  explained and number provided.  States he is \"very sore today.\" Was not sure what caused fall, but denies any past history of lightheadedness/dizziness, but states he does have knee issue.  Fall prevention discussed.  He does have cane he uses when needed for ambulation.  He is to f/u with PCP for suture removal.  Offered to assist in scheduling f/u appt, which he accepted.  He does monitor his bp and bs at home and states both have been fine.  Care gaps reviewed and is UTD.  He reports he does have living will.  [facility] 24/7 Nurse Call Center explained and referred to his AVS for number.     Care Coordination    Contacted PCP scheduling and appt made for 2/21/22 at 10:00 with Laila Field PA-C.     Pt notified of appt.  He will either drive himself or his son will drive.  He denies any other needs and voiced his appreciation for the call.     General & Health Literacy Assessment    Questions/Answers      Most Recent Value   Assessment Completed With Patient   Living Arrangement Spouse   Type of Residence Private Residence   Equiptment Used at Home Cane  [glucometer    bp monitor]   Communication Device No   Bed or Wheelchair Confined No   Difficulty Keeping Appointments No   Health Literacy Good        Care Evaluation    Questions/Answers      Most Recent Value   Annual Wellness Visit:  Patient Has Completed   Care Gaps Addressed Diabetic A1C,  Diabetic Eye Exam,  Flu Shot,  Pneumonia Vaccine   HbA1c Status --  [6/14/21   7.0]   Diabetic Eye Exam Status --  [states had eye exam approx 3 weeks ago]   Flu Shot Status Up to Date   Pneumonia Vaccine Status Up to Date   Other Patient Education/Resources  24/7 [facility] Nurse Call Line   24/7 Nurse Call Line Education Method Verbal   Advanced Directives: Patient Has   Medication Adherence " Medications understood   Healthy Lifestyle (Self-Efficacy) self-monitors vital signs appropriately,  recognizes when to contact medical assistance        SDOH updated and reviewed with the patient during this program:     Financial Resource Strain: Low Risk    • Difficulty of Paying Living Expenses: Not hard at all       Food Insecurity: No Food Insecurity   • Worried About Running Out of Food in the Last Year: Never true   • Ran Out of Food in the Last Year: Never true       Transportation Needs: No Transportation Needs   • Lack of Transportation (Medical): No   • Lack of Transportation (Non-Medical): No       Housing Stability: Low Risk    • Unable to Pay for Housing in the Last Year: No   • Number of Places Lived in the Last Year: 1   • Unstable Housing in the Last Year: No     Manda Ivey RN  Ambulatory Case Management    2/15/2022, 13:59 EST

## 2022-02-15 NOTE — DISCHARGE INSTRUCTIONS
Symptomatic care is recommended. Take all medications as prescribed and instructed. Follow up with your primary care for suture removal in 5 to 7 days as directed or return to Emergency Department with worsening of symptoms.

## 2022-02-16 LAB
QT INTERVAL: 342 MS
QTC INTERVAL: 432 MS

## 2022-02-17 NOTE — ED PROVIDER NOTES
[place]    EMERGENCY DEPARTMENT ENCOUNTER      Pt Name: Toño Alcala  MRN: [id number]  YOB: 1940  Date of evaluation: 2/14/2022  Provider: DENYS Zaman    CHIEF COMPLAINT       Chief Complaint   Patient presents with   • Fall         HISTORY OF PRESENT ILLNESS  (Location/Symptom, Timing/Onset, Context/Setting, Quality, Duration, Modifying Factors, Severity.)   Toño Alcala is a 81 y.o. male who presents to the emergency department for evaluation following a fall. Patient reports that his family was in town for a wedding and they were all hanging out together. He reports having a few glasses of wine. While walking back into the house through the garage, patient stumbled and fell hitting his head on floor. Patient was wearing his glasses and now has an eyebrow laceration where his glasses cut him when he fell. Patient denies any symptoms proceeding the fall. He notes he was able to walk without any pain after the fall. Denies any additional associated symptoms on exam.        Fall  Mechanism of injury: fall    Injury location:  Face  Facial injury location:  L eyebrow  Incident location:  Home  Time since incident:  1 hour  Arrived directly from scene: yes    Fall:     Fall occurred:  Walking    Impact surface:  Hard floor    Point of impact:  Face    Entrapped after fall: no    Suspicion of alcohol use: yes    Suspicion of drug use: no    Tetanus status:  Out of date  Prior to arrival data:     Bystander interventions:  None    Patient ambulatory at scene: yes      Blood loss:  Minimal    Responsiveness at scene:  Alert    Orientation at scene:  Person, place, situation and time    Loss of consciousness: no      Amnesic to event: no      Airway interventions:  None    Breathing interventions:  None    IV access status:  Established    IO access:  None    Fluids administered:  None    Cardiac interventions:  None    Medications administered:  None    Immobilization:  None  Risk factors: CAD        Nursing notes were reviewed.    REVIEW OF SYSTEMS    (2-9 systems for level 4, 10 or more for level 5)   Review of Systems   Constitutional: Negative.    Respiratory: Negative.    Cardiovascular: Negative.    Gastrointestinal: Negative.    Musculoskeletal: Negative.    Skin: Positive for wound.   Neurological: Negative.         All systems reviewed and negative except for those discussed in HPI.   PAST MEDICAL HISTORY     Past Medical History:   Diagnosis Date   • Arthritis    • Bilateral radial fractures 1962    fracture bilateral radial heads   • CAD (coronary artery disease)    • CVD (cardiovascular disease)     History of TIA 1989   • Diabetes mellitus ([facility])    • DVT (deep venous thrombosis) ([facility])     Right lower extremity DVT and pulmonary embolism in 06/2015, idiopathic   • DVT of proximal lower limb ([facility]) 06/22/2015    proximal DVT right leg, small PE- anticoagulation   • Dyslipidemia    • Fracture 1952    H/o pf left forearm fracture   • Fracture of right hand 1980   • GERD (gastroesophageal reflux disease)     with hiatal hernia   • Hx of angina pectoris    • Hypertension     Normal renal angiography 02/16/11   • Left wrist fracture 1953   • Osteoarthritis    • Right wrist fracture    • Vertigo    • Wears glasses          SURGICAL HISTORY       Past Surgical History:   Procedure Laterality Date   • APPENDECTOMY  1957   • BACK SURGERY     • CARDIAC CATHETERIZATION  2011    with vein graft   • CERVICAL FUSION     • CERVICAL FUSION  1992    C3-4   • COLONOSCOPY  2013   • CORONARY ARTERY BYPASS GRAFT  1994   • HERNIA REPAIR Right 2011    inguinal   • INGUINAL HERNIA REPAIR Left 10/29/2019    Procedure: INGUINAL HERNIA REPAIR LEFT;  Surgeon: Charisma Raines MD;  Location: [facility];  Service: General   • LUMBAR LAMINECTOMY  1996    laminectomy, lumbar, L3   • OTHER SURGICAL HISTORY      wrist surgery   • TONSILLECTOMY AND ADENOIDECTOMY  1945         CURRENT MEDICATIONS     No current  facility-administered medications for this encounter.    Current Outpatient Medications:   •  acetaminophen (TYLENOL) 325 MG tablet, Take 650 mg by mouth As Needed for Mild Pain ., Disp: , Rfl:   •  aspirin 81 MG EC tablet, Take 81 mg by mouth Daily. Last dose 10-18-19 per patient, Disp: , Rfl:   •  atorvastatin (LIPITOR) 40 MG tablet, Take 40 mg by mouth Daily., Disp: , Rfl:   •  calcipotriene-betamethasone (TACLONEX) 0.005-0.064 % ointment, Apply 1 application topically to the appropriate area as directed As Needed (psoriasis)., Disp: , Rfl:   •  cholecalciferol (VITAMIN D3) 25 MCG (1000 UT) tablet, Take 1,000 Units by mouth Daily., Disp: , Rfl:   •  diclofenac (VOLTAREN) 1 % gel gel, Apply 4 g topically As Needed., Disp: , Rfl:   •  diltiaZEM CD (CARDIZEM CD) 180 MG 24 hr capsule, Take 180 mg by mouth Daily., Disp: , Rfl:   •  gabapentin (NEURONTIN) 300 MG capsule, TAKE THREE CAPSULES BY MOUTH TWICE A DAY (Patient taking differently: Take 600 mg by mouth 2 (Two) Times a Day.), Disp: 180 capsule, Rfl: 2  •  glucose blood test strip, Use to check blood sugar twice daily, Disp: 100 each, Rfl: 3  •  lidocaine (LIDODERM) 5 %, Place 1 patch on the skin as directed by provider Daily. Remove & Discard patch within 12 hours or as directed by MD, Disp: 90 patch, Rfl: 2  •  losartan (COZAAR) 100 MG tablet, Take 100 mg by mouth Every Night., Disp: , Rfl:   •  Microlet Lancets misc, Use to check blood sugar twice daily, Disp: 100 each, Rfl: 3  •  nitroglycerin (NITROSTAT) 0.4 MG SL tablet, Place 0.4 mg under the tongue Every 5 (Five) Minutes As Needed for Chest Pain. Take no more than 3 doses in 15 minutes., Disp: , Rfl:   •  omeprazole (priLOSEC) 20 MG capsule, Take 20 mg by mouth Daily., Disp: , Rfl:   •  silver sulfadiazine (SILVADENE, SSD) 1 % cream, Apply  topically to the appropriate area as directed 2 (Two) Times a Day., Disp: 15 g, Rfl: 0    ALLERGIES     Penicillins    FAMILY HISTORY       Family History   Problem  Relation Age of Onset   • Arthritis Mother         OA   • Heart disease Father    • Diabetes Father    • Heart attack Father    • Heart disease Brother    • Heart attack Brother    • Diabetes Brother    • Diabetes Son    • Hypertension Other    • Cancer Other           SOCIAL HISTORY       Social History     Socioeconomic History   • Marital status:    Tobacco Use   • Smoking status: Former Smoker     Packs/day: 1.00     Years: 20.00     Pack years: 20.00     Types: Cigarettes     Quit date: 1997     Years since quittin.8   • Smokeless tobacco: Never Used   • Tobacco comment: QUIT DATE 1992   Vaping Use   • Vaping Use: Never used   Substance and Sexual Activity   • Alcohol use: Yes     Alcohol/week: 7.0 standard drinks     Types: 7 Glasses of wine per week     Comment: glass of wine everyday    • Drug use: No   • Sexual activity: Defer         PHYSICAL EXAM    (up to 7 for level 4, 8 or more for level 5)   Physical Exam  Vitals and nursing note reviewed.   Constitutional:       General: He is not in acute distress.     Appearance: Normal appearance. He is well-developed. He is not toxic-appearing.   HENT:      Head: Contusion and laceration present.        Comments: 2 cm laceration to left eyebrow with surrounding hematoma. Bleeding controlled.      Nose: Nose normal.      Mouth/Throat:      Mouth: Mucous membranes are moist.   Eyes:      Extraocular Movements: Extraocular movements intact.   Cardiovascular:      Rate and Rhythm: Normal rate and regular rhythm.      Pulses: Normal pulses.   Pulmonary:      Effort: Pulmonary effort is normal. No respiratory distress.      Breath sounds: Normal breath sounds.   Abdominal:      General: There is no distension.   Musculoskeletal:         General: No deformity. Normal range of motion.      Cervical back: Normal range of motion and neck supple.   Skin:     General: Skin is warm and dry.             Comments: 4cm superficial skin tear to left  forearm, bleeding controlled   Neurological:      General: No focal deficit present.      Mental Status: He is alert.   Psychiatric:         Behavior: Behavior normal.         Thought Content: Thought content normal.         Judgment: Judgment normal.          DIAGNOSTIC RESULTS     EKG: All EKGs are interpreted by the Emergency Department Physician who either signs or Co-signs this chart in the absence of a cardiologist.    ECG 12 Lead   Final Result   Test Reason : Syncope triage protocol   Blood Pressure :   */*   mmHG   Vent. Rate :  96 BPM     Atrial Rate :  96 BPM      P-R Int : 160 ms          QRS Dur :  86 ms       QT Int : 342 ms       P-R-T Axes :  77  57  75 degrees      QTc Int : 432 ms      Normal sinus rhythm with sinus arrhythmia   Nonspecific ST and T wave abnormality   Abnormal ECG   When compared with ECG of 06-JAN-2020 11:13,   No significant change was found   Confirmed by IRISH ARCOS ([id number]) on 2/16/2022 7:22:58 AM      Referred By: EDMD           Confirmed By: IRISH ARCOS          RADIOLOGY:   Non-plain film images such as CT, Ultrasound and MRI are read by the radiologist. Plain radiographic images are visualized and preliminarily interpreted by the emergency physician with the below findings:      [x] Radiologist's Report Reviewed:  CT Head Without Contrast   Final Result      Head CT:       1.  No acute intracranial abnormality.    2.  Left periorbital soft tissue swelling. Negative for calvarial fracture.   3.  Age-related volume loss. Mild chronic small vessel ischemic changes. 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         Cervical spine CT:      1.  No acute fracture or malalignment in the cervical spine.    2.  Bony ankylosis of the vertebral bodies from C5 to C7 as well as multilevel bridging osteophytes suggestive of diffuse idiopathic skeletal hyperostosis. Findings place patient at overall increased fracture risk. MRI is more sensitive for evaluation of    occult  fracture or ligamentous injury.   3.  Multilevel degenerative changes of the cervical spine as discussed above.            Electronically signed by:  Alfie Jacobo     2/14/2022 10:55 PM [place] Time      CT Cervical Spine Without Contrast   Final Result      Head CT:       1.  No acute intracranial abnormality.    2.  Left periorbital soft tissue swelling. Negative for calvarial fracture.   3.  Age-related volume loss. Mild chronic small vessel ischemic changes. 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         Cervical spine CT:      1.  No acute fracture or malalignment in the cervical spine.    2.  Bony ankylosis of the vertebral bodies from C5 to C7 as well as multilevel bridging osteophytes suggestive of diffuse idiopathic skeletal hyperostosis. Findings place patient at overall increased fracture risk. MRI is more sensitive for evaluation of    occult fracture or ligamentous injury.   3.  Multilevel degenerative changes of the cervical spine as discussed above.            Electronically signed by:  Alfie Jacobo     2/14/2022 10:55 PM [place] Time            ED BEDSIDE ULTRASOUND:   Performed by ED Physician - none    LABS:    I have reviewed and interpreted all of the currently available lab results from this visit (if applicable):  Results for orders placed or performed during the hospital encounter of 02/14/22   Comprehensive Metabolic Panel    Specimen: Blood   Result Value Ref Range    Glucose 190 (H) 65 - 99 mg/dL    BUN 30 (H) 8 - 23 mg/dL    Creatinine 1.60 (H) 0.76 - 1.27 mg/dL    Sodium 135 (L) 136 - 145 mmol/L    Potassium 4.4 3.5 - 5.2 mmol/L    Chloride 99 98 - 107 mmol/L    CO2 21.0 (L) 22.0 - 29.0 mmol/L    Calcium 9.4 8.6 - 10.5 mg/dL    Total Protein 7.4 6.0 - 8.5 g/dL    Albumin 4.50 3.50 - 5.20 g/dL    ALT (SGPT) 49 (H) 1 - 41 U/L    AST (SGOT) 39 1 - 40 U/L    Alkaline Phosphatase 146 (H) 39 - 117 U/L    Total Bilirubin 0.5 0.0 - 1.2 mg/dL    eGFR Non African Amer 42 (L) >60 mL/min/1.73     Globulin 2.9 gm/dL    A/G Ratio 1.6 g/dL    BUN/Creatinine Ratio 18.8 7.0 - 25.0    Anion Gap 15.0 5.0 - 15.0 mmol/L   Magnesium    Specimen: Blood   Result Value Ref Range    Magnesium 2.2 1.6 - 2.4 mg/dL   Troponin    Specimen: Blood   Result Value Ref Range    Troponin T <0.010 0.000 - 0.030 ng/mL   CBC Auto Differential    Specimen: Blood   Result Value Ref Range    WBC 10.72 3.40 - 10.80 10*3/mm3    RBC 5.29 4.14 - 5.80 10*6/mm3    Hemoglobin 15.4 13.0 - 17.7 g/dL    Hematocrit 47.1 37.5 - 51.0 %    MCV 89.0 79.0 - 97.0 fL    MCH 29.1 26.6 - 33.0 pg    MCHC 32.7 31.5 - 35.7 g/dL    RDW 15.8 (H) 12.3 - 15.4 %    RDW-SD 51.5 37.0 - 54.0 fl    MPV 10.1 6.0 - 12.0 fL    Platelets 141 140 - 450 10*3/mm3    Neutrophil % 74.9 42.7 - 76.0 %    Lymphocyte % 16.3 (L) 19.6 - 45.3 %    Monocyte % 6.8 5.0 - 12.0 %    Eosinophil % 0.5 0.3 - 6.2 %    Basophil % 0.4 0.0 - 1.5 %    Immature Grans % 1.1 (H) 0.0 - 0.5 %    Neutrophils, Absolute 8.03 (H) 1.70 - 7.00 10*3/mm3    Lymphocytes, Absolute 1.75 0.70 - 3.10 10*3/mm3    Monocytes, Absolute 0.73 0.10 - 0.90 10*3/mm3    Eosinophils, Absolute 0.05 0.00 - 0.40 10*3/mm3    Basophils, Absolute 0.04 0.00 - 0.20 10*3/mm3    Immature Grans, Absolute 0.12 (H) 0.00 - 0.05 10*3/mm3    nRBC 0.0 0.0 - 0.2 /100 WBC   ECG 12 Lead   Result Value Ref Range    QT Interval 342 ms    QTC Interval 432 ms   Green Top (Gel)   Result Value Ref Range    Extra Tube Hold for add-ons.    Lavender Top   Result Value Ref Range    Extra Tube hold for add-on    Gold Top - SST   Result Value Ref Range    Extra Tube Hold for add-ons.    Gray Top   Result Value Ref Range    Extra Tube Hold for add-ons.    Light Blue Top   Result Value Ref Range    Extra Tube hold for add-on         All other labs were within normal range or not returned as of this dictation.      EMERGENCY DEPARTMENT COURSE and DIFFERENTIAL DIAGNOSIS/MDM:   Vitals:    Vitals:    02/14/22 2334 02/14/22 2340 02/15/22 0000 02/15/22 0120   BP:  136/92 147/83 143/82 136/87   BP Location:       Patient Position: Standing      Pulse: 101 84 76 77   Resp:       Temp:       TempSrc:       SpO2: 92% 94% 93% 92%   Weight:       Height:           ED Course as of 02/17/22 0936   Thu Feb 17, 2022   0932 Patient presents to ED for evaluation following a fall. Left eyebrow laceration and contusion with surrounding hematoma in addition to skin tear on left arm on physical exam, no additional acute or emergent findings. Labs consistent with previous results without acute abnormalities. EKG without evidence of acute ischemic changes. CT head and cervical spine with no acute bony abnormalities. Patient is afebrile, nontoxic appearing, vital signs stable and able to maintain O2 sats of 95% on room air. Laceration repaired as documented in procedural note and left arm skin tear bandaged by nursing staff. Patient instructed on sutured wound care and timing for suture removal. Patient will be discharged home with symptomatic care and outpatient follow up.  [JG]      ED Course User Index  [JG] Oumar Medina PA       MDM  Number of Diagnoses or Management Options  Contusion of face, initial encounter: new, needed workup  Fall, initial encounter: new, needed workup  Laceration of left eyebrow, initial encounter: new, needed workup  Skin tear of left upper arm without complication, initial encounter: new, needed workup  Traumatic hematoma of left eyebrow, initial encounter: new, needed workup     Amount and/or Complexity of Data Reviewed  Clinical lab tests: reviewed  Tests in the radiology section of CPT®: reviewed         I had a discussion with the patient/family regarding diagnosis, diagnostic results, treatment plan, and medications.  The patient/family indicated understanding of these instructions.  I spent adequate time at the bedside preceding discharge necessary to personally discuss the aftercare instructions, giving patient education, providing explanations of the  results of our evaluations/findings, and my decision making to assure that the patient/family understand the plan of care.  Time was allotted to answer questions at that time and throughout the ED course.  Emphasis was placed on timely follow-up after discharge.  I also discussed the potential for the development of an acute emergent condition requiring further evaluation, admission, or even surgical intervention. I discussed that we found nothing during the visit today indicating the need for further workup, admission, or the presence of an unstable medical condition.  I encouraged the patient to return to the emergency department immediately for ANY concerns, worsening, new complaints, or if symptoms persist and unable to seek follow-up in a timely fashion.  The patient/family expressed understanding and agreement with this plan.  The patient will follow-up with PCP for reevaluation and suture removal.       MEDICATIONS ADMINISTERED IN ED:  Medications   Tetanus-Diphth-Acell Pertussis (BOOSTRIX) injection 0.5 mL (0.5 mL Intramuscular Given 2/15/22 0121)   lidocaine 1% - EPINEPHrine [id number] (XYLOCAINE W/EPI) 1 %-[id number] injection 10 mL (10 mL Injection Given 2/15/22 0127)       PROCEDURES:  Laceration Repair    Date/Time: 2/17/2022 9:25 AM  Performed by: Oumar Medina PA  Authorized by: Jose Bernal MD     Consent:     Consent obtained:  Verbal    Consent given by:  Patient    Risks discussed:  Infection and poor cosmetic result  Anesthesia (see MAR for exact dosages):     Anesthesia method:  Local infiltration    Local anesthetic:  Lidocaine 1% WITH epi  Laceration details:     Location:  Face    Face location:  L eyebrow    Length (cm):  2    Depth (mm):  2  Repair type:     Repair type:  Simple  Pre-procedure details:     Preparation:  Patient was prepped and draped in usual sterile fashion  Exploration:     Hemostasis achieved with:  Direct pressure    Contaminated: no    Treatment:     Area  cleansed with:  Betty    Amount of cleaning:  Standard    Irrigation solution:  Sterile saline    Irrigation volume:  30ml    Irrigation method:  Pressure wash  Skin repair:     Repair method:  Sutures    Suture size:  5-0    Suture material:  Nylon    Suture technique:  Simple interrupted    Number of sutures:  5  Approximation:     Approximation:  Close  Post-procedure details:     Dressing:  Open (no dressing)    Patient tolerance of procedure:  Tolerated well, no immediate complications              CRITICAL CARE TIME    Total Critical Care time was 0 minutes, excluding separately reportable procedures.   There was a high probability of clinically significant/life threatening deterioration in the patient's condition which required my urgent intervention.      FINAL IMPRESSION      1. Fall, initial encounter    2. Laceration of left eyebrow, initial encounter    3. Contusion of face, initial encounter    4. Traumatic hematoma of left eyebrow, initial encounter    5. Skin tear of left upper arm without complication, initial encounter          DISPOSITION/PLAN     ED Disposition     ED Disposition Condition Comment    Discharge Stable           PATIENT REFERRED TO:  Radu Francis MD  [address] [place] [address]  [phone]      For suture removal in 5-7 days    [facility] Emergency Department  [address] [facility] [address] [phone]  Go to   If symptoms worsen      DISCHARGE MEDICATIONS:     Medication List      CHANGE how you take these medications    gabapentin 300 MG capsule  Commonly known as: NEURONTIN  TAKE THREE CAPSULES BY MOUTH TWICE A DAY  What changed: how much to take        CONTINUE taking these medications    acetaminophen 325 MG tablet  Commonly known as: TYLENOL     aspirin 81 MG EC tablet     atorvastatin 40 MG tablet  Commonly known as: LIPITOR     calcipotriene-betamethasone 0.005-0.064 % ointment  Commonly known  as: TACLONEX     cholecalciferol 25 MCG (1000 UT) tablet  Commonly known as: VITAMIN D3     diclofenac 1 % gel gel  Commonly known as: VOLTAREN     dilTIAZem  MG 24 hr capsule  Commonly known as: CARDIZEM CD     glucose blood test strip  Use to check blood sugar twice daily     lidocaine 5 %  Commonly known as: LIDODERM  Place 1 patch on the skin as directed by provider Daily. Remove & Discard patch within 12 hours or as directed by MD     losartan 100 MG tablet  Commonly known as: COZAAR     Microlet Lancets misc  Use to check blood sugar twice daily     nitroglycerin 0.4 MG SL tablet  Commonly known as: NITROSTAT     omeprazole 20 MG capsule  Commonly known as: priLOSEC     silver sulfadiazine 1 % cream  Commonly known as: SILVADENE, SSD  Apply  topically to the appropriate area as directed 2 (Two) Times a Day.                Comment: Please note this report has been produced using speech recognition software.      DENYS Zaman Jason C, PA  02/17/22 0983

## 2022-02-21 ENCOUNTER — HOSPITAL ENCOUNTER (OUTPATIENT)
Dept: GENERAL RADIOLOGY | Facility: HOSPITAL | Age: 82
Discharge: HOME | End: 2022-02-21
Admitting: PHYSICIAN ASSISTANT

## 2022-02-21 ENCOUNTER — TELEPHONE (OUTPATIENT)
Dept: ORTHOPEDIC SURGERY | Facility: CLINIC | Age: 82
End: 2022-02-21

## 2022-02-21 ENCOUNTER — OFFICE VISIT (OUTPATIENT)
Dept: INTERNAL MEDICINE | Facility: CLINIC | Age: 82
End: 2022-02-21

## 2022-02-21 VITALS
OXYGEN SATURATION: 98 % | WEIGHT: 207 LBS | HEIGHT: 72 IN | DIASTOLIC BLOOD PRESSURE: 80 MMHG | BODY MASS INDEX: 28.04 KG/M2 | HEART RATE: 113 BPM | SYSTOLIC BLOOD PRESSURE: 130 MMHG | TEMPERATURE: 98.2 F

## 2022-02-21 DIAGNOSIS — E78.2 MIXED HYPERLIPIDEMIA: ICD-10-CM

## 2022-02-21 DIAGNOSIS — E11.9 TYPE 2 DIABETES MELLITUS WITHOUT COMPLICATION, WITHOUT LONG-TERM CURRENT USE OF INSULIN: ICD-10-CM

## 2022-02-21 DIAGNOSIS — R07.9 CHEST PAIN, UNSPECIFIED TYPE: ICD-10-CM

## 2022-02-21 DIAGNOSIS — R07.9 CHEST PAIN, UNSPECIFIED TYPE: Primary | ICD-10-CM
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
## 2022-02-21 DIAGNOSIS — I10 BENIGN ESSENTIAL HYPERTENSION: ICD-10-CM

## 2022-02-21 DIAGNOSIS — Z48.02 ENCOUNTER FOR REMOVAL OF SUTURES: ICD-10-CM

## 2022-02-21 PROBLEM — G89.29 CHRONIC PAIN OF LEFT KNEE: Status: ACTIVE | Noted: 2022-02-21

## 2022-02-21 PROBLEM — M25.562 CHRONIC PAIN OF LEFT KNEE: Status: ACTIVE | Noted: 2022-02-21
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
## 2022-02-21 PROCEDURE — 71046 X-RAY EXAM CHEST 2 VIEWS: CPT

## 2022-02-21 PROCEDURE — 99214 OFFICE O/P EST MOD 30 MIN: CPT | Performed by: PHYSICIAN ASSISTANT

## 2022-02-21 NOTE — TELEPHONE ENCOUNTER
LVM to inform Pt that he would have to come by our office to sign the JOSIE in order for us to fax it to [facility]

## 2022-02-21 NOTE — PROGRESS NOTES
Transitional Care Follow Up Visit  Subjective     Patient verbalized consent to the visit recording.    Toño Alcala is a 81 y.o. male who presents for a transitional care management visit.    Toño Alcala is a 81-year-old male who presents for hospital follow-up status post ground level fall. The patient has been experiencing frequent falls in his home and he is unsure as what are the causes of the falls. The last fall was experienced during the evening in the garage. He did lose consciousness briefly after striking the left of his face for a few seconds and was able to call for help. He is unsure if she struck his head on an object or the ground. He sustained bruising along the left trunk, left upper extremity, and left knee. Prior to the fall, he admits he had a couple of glasses of wine; however, nothing more than his usual. Since the hospital visit,  he denies any headaches, double vision, or brodie chest pain. He has has been experiencing pain in the left ribcage, concerned he may have fractured a rib. He also continues to experience pain in the medial aspect of the left knee.     Within 48 business hours after discharge our office contacted him via telephone to coordinate his care and needs.      I reviewed and discussed the details of that call along with the discharge summary, hospital problems, inpatient lab results, inpatient diagnostic studies, and consultation reports with Toño.     Current outpatient and discharge medications have been reconciled for the patient.  Reviewed by: Laila Field PA-C      No flowsheet data found.  Risk for Readmission (LACE) No data recorded       The following portions of the patient's history were reviewed and updated as appropriate: allergies, current medications, past family history, past medical history, past social history, past surgical history and problem list.    Vitals:    02/21/22 1012   BP: 130/80   BP Location: Right arm   Patient Position: Sitting  "  Cuff Size: Large Adult   Pulse: 113   Temp: 98.2 °F (36.8 °C)   TempSrc: Temporal   SpO2: 98%   Weight: 93.9 kg (207 lb)   Height: 182.9 cm (72.01\")   PainSc:   5   PainLoc: Knee  Comment: right side     Body mass index is 28.07 kg/m².  Patient's Body mass index is 28.07 kg/m². indicating that he is overweight (BMI 25-29.9). Patient's (Body mass index is 28.07 kg/m².) indicates that they are overweight with health conditions that include hypertension, coronary heart disease, peripheral vascular disease and lower extremity venous stasis disease . Weight is unchanged. BMI is is above average; BMI management plan is completed. We discussed portion control and increasing exercise. .     Advance Care Planning   ACP discussion was held with the patient during this visit. Patient has an advance directive in EMR which is still valid.      Review of Systems   Constitutional: Negative for activity change, appetite change, chills, fatigue and fever.   HENT: Negative for congestion and hearing loss.    Eyes: Negative for visual disturbance.   Respiratory: Negative for cough, shortness of breath and wheezing.    Cardiovascular: Negative for chest pain, palpitations and leg swelling.   Gastrointestinal: Negative for abdominal pain, constipation, diarrhea, nausea and vomiting.   Musculoskeletal: Negative for arthralgias and myalgias.        +Left knee and left ribcage pain.   Skin: Negative for rash.   Neurological: Negative for dizziness, weakness and numbness.   Psychiatric/Behavioral: Negative for confusion, decreased concentration, dysphoric mood, sleep disturbance and suicidal ideas. The patient is not nervous/anxious.        Social History     Socioeconomic History   • Marital status:    Tobacco Use   • Smoking status: Former Smoker     Packs/day: 1.00     Years: 20.00     Pack years: 20.00     Types: Cigarettes     Quit date: 1997     Years since quittin.8   • Smokeless tobacco: Never Used   • Tobacco " comment: QUIT DATE 01/01/1992   Vaping Use   • Vaping Use: Never used   Substance and Sexual Activity   • Alcohol use: Yes     Alcohol/week: 7.0 standard drinks     Types: 7 Glasses of wine per week     Comment: glass of wine everyday    • Drug use: No   • Sexual activity: Defer       Objective   Physical Exam  Constitutional:       Appearance: He is well-developed.   HENT:      Head: Normocephalic and atraumatic.      Right Ear: Tympanic membrane, ear canal and external ear normal. No decreased hearing noted.      Left Ear: Tympanic membrane, ear canal and external ear normal. No decreased hearing noted.      Nose: Nose normal.      Mouth/Throat:      Dentition: Normal dentition.   Eyes:      General: Lids are normal.      Conjunctiva/sclera: Conjunctivae normal.      Pupils: Pupils are equal, round, and reactive to light.   Neck:      Thyroid: No thyroid mass or thyromegaly.      Vascular: No carotid bruit or JVD.   Cardiovascular:      Rate and Rhythm: Normal rate and regular rhythm.      Heart sounds: Normal heart sounds, S1 normal and S2 normal. No murmur heard.      Pulmonary:      Effort: Pulmonary effort is normal.      Breath sounds: Normal breath sounds.   Chest:   Breasts:      Right: No supraclavicular adenopathy.      Left: No supraclavicular adenopathy.       Abdominal:      General: Bowel sounds are normal. There is no abdominal bruit.      Palpations: Abdomen is soft. There is no mass.      Tenderness: There is no abdominal tenderness.   Genitourinary:     Penis: Normal and circumcised.       Testes: Normal.         Right: Mass or tenderness not present.         Left: Mass or tenderness not present.      Prostate: Normal. Not tender.      Rectum: Normal.   Musculoskeletal:         General: Tenderness present.      Cervical back: Normal range of motion and neck supple.      Left knee: Decreased range of motion. Tenderness present.   Lymphadenopathy:      Cervical: No cervical adenopathy.      Upper  Body:      Right upper body: No supraclavicular adenopathy.      Left upper body: No supraclavicular adenopathy.   Skin:     General: Skin is warm and dry.      Findings: Bruising present. No rash.      Nails: There is no clubbing.          Neurological:      Mental Status: He is alert and oriented to person, place, and time.      Cranial Nerves: No cranial nerve deficit.      Sensory: No sensory deficit.      Gait: Gait normal.      Deep Tendon Reflexes: Reflexes normal.      Comments: CN II-XII grossly intact   Psychiatric:         Mood and Affect: Mood normal.         Speech: Speech normal.         Behavior: Behavior normal.         Thought Content: Thought content normal.         Judgment: Judgment normal.     Suture Removal    Date/Time: 2/21/2022 8:30 PM  Performed by: Laila Field PA-C  Authorized by: Laila Field PA-C   Body area: head/neck  Location details: left eyebrow  Wound Appearance: clean  Sutures Removed: 5  Post-removal: dressing applied  Patient tolerance: patient tolerated the procedure well with no immediate complications          Assessment/Plan   Problem List Items Addressed This Visit        Cardiac and Vasculature    Mixed hyperlipidemia    Relevant Medications    atorvastatin (LIPITOR) 40 MG tablet    Benign essential hypertension    Overview     Continue losartan 100 mg tablets daily         Relevant Medications    diltiaZEM CD (CARDIZEM CD) 180 MG 24 hr capsule    losartan (COZAAR) 100 MG tablet    Chest pain - Primary    Current Assessment & Plan     We will obtain a chest x-ray today to rule out fractures.         Relevant Orders    XR Chest PA & Lateral (Completed)       Endocrine and Metabolic    Type 2 diabetes mellitus without complication, without long-term current use of insulin ([facility])       Health Encounters    Encounter for removal of sutures    Overview     5 sutures removed without difficulty. Hemostasis achieved with pressure.            Musculoskeletal and  Injuries    Fall    Overview     Long discussion with Adama and Haley.  Between the two, they have had multiple health issues over the past year.  Son lives nearby and checks on them every evening.  Adama does not have life Alert, but carries cell phone with him.  He is unstable walking on a cane.  Will consider referral to physical therapy, but he needs to see Dr. Renteria first.                  There are no Patient Instructions on file for this visit.    Laila Field PA-C    I spent 30 minutes caring for Toño on this date of service. This time includes time spent by me in the following activities:preparing for the visit, reviewing tests, obtaining and/or reviewing a separately obtained history, performing a medically appropriate examination and/or evaluation , counseling and educating the patient/family/caregiver, ordering medications, tests, or procedures, referring and communicating with other health care professionals  and documenting information in the medical record     Transcribed from ambient dictation for Laila Field PA-C by Gianluca Gann.  02/21/22   12:33 EST

## 2022-02-21 NOTE — TELEPHONE ENCOUNTER
Caller: MIGDALIA AREVALO    Relationship to patient: SELF    Best call back number: [phone]    Patient is needing: THE PATIENT STATED HE WAS TRYING TO GET RECORD FROM [facility] FOR HIS APPOINTMENT WITH DR KABA BUT THEY INFORMED HIM THE OFFICE WOULD NEED TO FAX OVER A JOSIE. AND THEY WOULD FAX OVER HIS RECORDS.      [facility] ORTHO  FAX: [phone]

## 2022-02-28 ENCOUNTER — OFFICE VISIT (OUTPATIENT)
Dept: ORTHOPEDIC SURGERY | Facility: CLINIC | Age: 82
End: 2022-02-28

## 2022-02-28 VITALS
WEIGHT: 207 LBS | BODY MASS INDEX: 28.04 KG/M2 | HEIGHT: 72 IN | DIASTOLIC BLOOD PRESSURE: 80 MMHG | SYSTOLIC BLOOD PRESSURE: 142 MMHG

## 2022-02-28 DIAGNOSIS — M17.12 PRIMARY OSTEOARTHRITIS OF LEFT KNEE: Primary | ICD-10-CM

## 2022-02-28 PROCEDURE — 20610 DRAIN/INJ JOINT/BURSA W/O US: CPT | Performed by: ORTHOPAEDIC SURGERY

## 2022-02-28 PROCEDURE — 99204 OFFICE O/P NEW MOD 45 MIN: CPT | Performed by: ORTHOPAEDIC SURGERY

## 2022-02-28 RX ORDER — TRIAMCINOLONE ACETONIDE 40 MG/ML
40 INJECTION, SUSPENSION INTRA-ARTICULAR; INTRAMUSCULAR
Status: COMPLETED | OUTPATIENT
Start: 2022-02-28 | End: 2022-02-28

## 2022-02-28 RX ORDER — ROPIVACAINE HYDROCHLORIDE 5 MG/ML
4 INJECTION, SOLUTION EPIDURAL; INFILTRATION; PERINEURAL
Status: COMPLETED | OUTPATIENT
Start: 2022-02-28 | End: 2022-02-28

## 2022-02-28 RX ADMIN — ROPIVACAINE HYDROCHLORIDE 4 ML: 5 INJECTION, SOLUTION EPIDURAL; INFILTRATION; PERINEURAL at 09:50

## 2022-02-28 RX ADMIN — TRIAMCINOLONE ACETONIDE 40 MG: 40 INJECTION, SUSPENSION INTRA-ARTICULAR; INTRAMUSCULAR at 09:50

## 2022-02-28 NOTE — PROGRESS NOTES
[facility] Visit - Rey Badillo MD    Office Visit       Patient Name: Toño Alcala    Chief Complaint:   Chief Complaint   Patient presents with   • Establish Care     LEFT KNEE PAIN       Referring Physician: Radu Francis, *    History of Present Illness:   Toño Alcala is a 81 y.o. male who presents with left body part: knee Reason: pain.  Onset:Onset: atraumatic and gradual in nature. The issue has been ongoing for  week(s). Pain is a 5/10 on the pain scale. Pain is described as Pain Characterization: aching, throbbing and ANY PRESSURE. Associated symptoms include Symptoms: pain. The pain is worse with walking and standing; resting and ice improve the pain. Previous treatments have included: NSAIDS, and oral steroids. . ***    ***      Subjective   Subjective      Review of Systems   Constitutional: Negative.    HENT: Negative.    Eyes: Negative.    Respiratory: Negative.    Cardiovascular: Negative.    Gastrointestinal: Negative.    Endocrine: Negative.    Genitourinary: Negative.    Musculoskeletal: Positive for arthralgias.   Skin: Negative.    Allergic/Immunologic: Negative.    Neurological: Negative.    Hematological: Negative.    Psychiatric/Behavioral: Negative.         Past Medical History:   Past Medical History:   Diagnosis Date   • Arthritis    • Bilateral radial fractures 1962    fracture bilateral radial heads   • CAD (coronary artery disease)    • CVD (cardiovascular disease)     History of TIA 1989   • Diabetes mellitus ([facility])    • DVT (deep venous thrombosis) ([facility])     Right lower extremity DVT and pulmonary embolism in 06/2015, idiopathic   • DVT of proximal lower limb ([facility]) 06/22/2015    proximal DVT right leg, small PE- anticoagulation   • Dyslipidemia    • Fracture 1952    H/o pf left forearm fracture   • Fracture of right hand 1980   • GERD (gastroesophageal reflux disease)     with  hiatal hernia   • Hx of angina pectoris    • Hypertension     Normal renal angiography 11   • Left wrist fracture    • Osteoarthritis    • Right wrist fracture    • Vertigo    • Wears glasses        Past Surgical History:   Past Surgical History:   Procedure Laterality Date   • APPENDECTOMY     • BACK SURGERY     • CARDIAC CATHETERIZATION      with vein graft   • CERVICAL FUSION     • CERVICAL FUSION      C3-4   • COLONOSCOPY     • CORONARY ARTERY BYPASS GRAFT     • HERNIA REPAIR Right     inguinal   • INGUINAL HERNIA REPAIR Left 10/29/2019    Procedure: INGUINAL HERNIA REPAIR LEFT;  Surgeon: Charisma Raines MD;  Location: [facility];  Service: General   • LUMBAR LAMINECTOMY      laminectomy, lumbar, L3   • OTHER SURGICAL HISTORY      wrist surgery   • TONSILLECTOMY AND ADENOIDECTOMY         Family History:   Family History   Problem Relation Age of Onset   • Arthritis Mother         OA   • Heart disease Father    • Diabetes Father    • Heart attack Father    • Heart disease Brother    • Heart attack Brother    • Diabetes Brother    • Diabetes Son    • Hypertension Other    • Cancer Other        Social History:   Social History     Socioeconomic History   • Marital status:    Tobacco Use   • Smoking status: Former Smoker     Packs/day: 1.00     Years: 20.00     Pack years: 20.00     Types: Cigarettes     Quit date: 1997     Years since quittin.8   • Smokeless tobacco: Never Used   • Tobacco comment: QUIT DATE 1992   Vaping Use   • Vaping Use: Never used   Substance and Sexual Activity   • Alcohol use: Yes     Alcohol/week: 7.0 standard drinks     Types: 7 Glasses of wine per week     Comment: glass of wine everyday    • Drug use: No   • Sexual activity: Defer       Medications:   Current Outpatient Medications:   •  acetaminophen (TYLENOL) 325 MG tablet, Take 650 mg by mouth As Needed for Mild Pain ., Disp: , Rfl:   •  aspirin 81 MG EC tablet,  Take 81 mg by mouth Daily. Last dose 10-18-19 per patient, Disp: , Rfl:   •  atorvastatin (LIPITOR) 40 MG tablet, Take 40 mg by mouth Daily., Disp: , Rfl:   •  calcipotriene-betamethasone (TACLONEX) 0.005-0.064 % ointment, Apply 1 application topically to the appropriate area as directed As Needed (psoriasis)., Disp: , Rfl:   •  cholecalciferol (VITAMIN D3) 25 MCG (1000 UT) tablet, Take 1,000 Units by mouth Daily., Disp: , Rfl:   •  diclofenac (VOLTAREN) 1 % gel gel, Apply 4 g topically As Needed., Disp: , Rfl:   •  diltiaZEM CD (CARDIZEM CD) 180 MG 24 hr capsule, Take 180 mg by mouth Daily., Disp: , Rfl:   •  gabapentin (NEURONTIN) 300 MG capsule, TAKE THREE CAPSULES BY MOUTH TWICE A DAY (Patient taking differently: Take 600 mg by mouth 2 (Two) Times a Day.), Disp: 180 capsule, Rfl: 2  •  glucose blood test strip, Use to check blood sugar twice daily, Disp: 100 each, Rfl: 3  •  lidocaine (LIDODERM) 5 %, Place 1 patch on the skin as directed by provider Daily. Remove & Discard patch within 12 hours or as directed by MD, Disp: 90 patch, Rfl: 2  •  losartan (COZAAR) 100 MG tablet, Take 100 mg by mouth Every Night., Disp: , Rfl:   •  Microlet Lancets misc, Use to check blood sugar twice daily, Disp: 100 each, Rfl: 3  •  nitroglycerin (NITROSTAT) 0.4 MG SL tablet, Place 0.4 mg under the tongue Every 5 (Five) Minutes As Needed for Chest Pain. Take no more than 3 doses in 15 minutes., Disp: , Rfl:   •  omeprazole (priLOSEC) 20 MG capsule, Take 20 mg by mouth Daily., Disp: , Rfl:   •  silver sulfadiazine (SILVADENE, SSD) 1 % cream, Apply  topically to the appropriate area as directed 2 (Two) Times a Day., Disp: 15 g, Rfl: 0    Allergies:   Allergies   Allergen Reactions   • Penicillins Hives and Swelling     Per patient, has tolerated Keflex       The following portions of the patient's history were reviewed and updated as appropriate: allergies, current medications, past family history, past medical history, past social  "history, past surgical history and problem list.        Objective    Objective      Vital Signs:   Vitals:    02/28/22 0842   BP: 142/80   Weight: 93.9 kg (207 lb)   Height: 182.9 cm (72.01\")       Ortho Exam:  ***    Results Review:   Imaging Results (Last 24 Hours)     ** No results found for the last 24 hours. **        XR Knee 1 or 2 View Left    Result Date: 2/7/2022  Mild degenerative changes in the anterior and lateral compartment  Small joint effusion.  This report was finalized on 2/7/2022 3:37 PM by Scot Noriega.      CT Head Without Contrast    Result Date: 2/15/2022  Head CT: 1.  No acute intracranial abnormality. 2.  Left periorbital soft tissue swelling. Negative for calvarial fracture. 3.  Age-related volume loss. Mild chronic small vessel ischemic changes. 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 Cervical spine CT: 1.  No acute fracture or malalignment in the cervical spine. 2.  Bony ankylosis of the vertebral bodies from C5 to C7 as well as multilevel bridging osteophytes suggestive of diffuse idiopathic skeletal hyperostosis. Findings place patient at overall increased fracture risk. MRI is more sensitive for evaluation of  occult fracture or ligamentous injury. 3.  Multilevel degenerative changes of the cervical spine as discussed above. Electronically signed by:  Alfie Jacobo  2/14/2022 10:55 PM Mountain Time    CT Cervical Spine Without Contrast    Result Date: 2/15/2022  Head CT: 1.  No acute intracranial abnormality. 2.  Left periorbital soft tissue swelling. Negative for calvarial fracture. 3.  Age-related volume loss. Mild chronic small vessel ischemic changes. 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 Cervical spine CT: 1.  No acute fracture or malalignment in the cervical spine. 2.  Bony ankylosis of the vertebral bodies from C5 to C7 as well as multilevel bridging osteophytes suggestive of diffuse idiopathic skeletal hyperostosis. Findings place patient at overall increased fracture " risk. MRI is more sensitive for evaluation of  occult fracture or ligamentous injury. 3.  Multilevel degenerative changes of the cervical spine as discussed above. Electronically signed by:  Alfie Jacobo  2/14/2022 10:55 PM Mountain Time    XR Chest PA & Lateral    Result Date: 2/21/2022  No evidence of acute displaced left-sided rib fracture on two-view chest x-ray. Mild chronic interstitial changes are present without evidence of acute disease in the chest.  This report was finalized on 2/21/2022 2:01 PM by Antonio Hill.        ***    Procedures     ***        Assessment / Plan      Assessment/Plan:   Problem List Items Addressed This Visit     None      Visit Diagnoses     Left knee pain, unspecified chronicity    -  Primary          ***    Follow Up: ***        Rey Badillo MD, FAAOS  Orthopedic Surgeon  Fellowship Trained Shoulder and Elbow Surgeon  [facility]  Orthopedics and Sports Medicine  [address] [place] [address], [address]  [place] [address]    02/28/22  09:03 EST

## 2022-02-28 NOTE — PROGRESS NOTES
Procedure   Large Joint Arthrocentesis: L knee  Date/Time: 2/28/2022 9:50 AM  Consent given by: patient  Site marked: site marked  Timeout: Immediately prior to procedure a time out was called to verify the correct patient, procedure, equipment, support staff and site/side marked as required   Supporting Documentation  Indications: pain   Procedure Details  Location: knee - L knee  Preparation: Patient was prepped and draped in the usual sterile fashion  Needle size: 22 G  Approach: superior  Medications administered: 4 mL ropivacaine 0.5 %; 40 mg triamcinolone acetonide 40 MG/ML  Aspirate amount: 16 mL  Aspirate: clear and yellow  Patient tolerance: patient tolerated the procedure well with no immediate complications

## 2022-02-28 NOTE — PROGRESS NOTES
[facility]    Subjective     Chief Complaint   Patient presents with   • Establish Care     LEFT KNEE PAIN        HPI    Toño Alcala is a 81 y.o. male who presents with new problem of: left knee pain.  Onset: atraumatic and gradual in nature. The issue has been ongoing for 4 week(s). Pain is a 5/10 on the pain scale. Pain is described as aching and throbbing. Associated symptoms include pain. The pain is worse with walking, standing and any pressure; resting and ice improve the pain. Previous treatments have included NSAIDS, and oral steroids.  He has had multiple injections over the years, most recently about a month ago, with no significant relief.    I have reviewed the following portions of the patient's history and agree with: History of Present Illness and Review of Systems    Patient Active Problem List   Diagnosis   • CAD (coronary artery disease)   • CVD (cardiovascular disease)   • Hypertension   • DVT (deep venous thrombosis) ([facility])   • Diabetes mellitus ([facility])   • Dyslipidemia   • Arthritis   • GERD (gastroesophageal reflux disease)   • Mixed hyperlipidemia   • Diabetic nephropathy associated with type 2 diabetes mellitus ([facility])   • Diabetic polyneuropathy associated with type 2 diabetes mellitus ([facility])   • Chronic kidney disease, stage III (moderate) ([facility])   • Vitamin D deficiency   • Disc disorder of cervical region   • Postthrombotic syndrome   • Vertigo   • Angina pectoris ([facility])   • Lumbosacral spondylosis without myelopathy   • Psoriasis   • Arthritis of elbow   • Swelling of right lower extremity   • Acute right-sided low back pain without sciatica   • Hoarseness   • Unifocal PVCs   • Skin lesion of face   • Type 2 diabetes mellitus without complication, without long-term current use of insulin ([facility])   • Benign essential hypertension   • Medicare annual wellness visit, subsequent   • Stage 3 chronic kidney disease due to benign hypertension ([facility])   • BMI  30.0-30.9,adult   • Postherpetic neuralgia   • Herpes zoster without complication   • Calculus of gallbladder without cholecystitis without obstruction   • Acute diverticulitis   • Leg edema, right   • Chest pain   • Encounter for removal of sutures   • Chronic pain of left knee   • Fall     Past Medical History:   Diagnosis Date   • Arthritis    • Bilateral radial fractures 1962    fracture bilateral radial heads   • CAD (coronary artery disease)    • CVD (cardiovascular disease)     History of TIA 1989   • Diabetes mellitus ([facility])    • DVT (deep venous thrombosis) ([facility])     Right lower extremity DVT and pulmonary embolism in 06/2015, idiopathic   • DVT of proximal lower limb ([facility]) 06/22/2015    proximal DVT right leg, small PE- anticoagulation   • Dyslipidemia    • Fracture 1952    H/o pf left forearm fracture   • Fracture of right hand 1980   • GERD (gastroesophageal reflux disease)     with hiatal hernia   • Hx of angina pectoris    • Hypertension     Normal renal angiography 02/16/11   • Left wrist fracture 1953   • Osteoarthritis    • Right wrist fracture    • Vertigo    • Wears glasses       Past Surgical History:   Procedure Laterality Date   • APPENDECTOMY  1957   • BACK SURGERY     • CARDIAC CATHETERIZATION  2011    with vein graft   • CERVICAL FUSION     • CERVICAL FUSION  1992    C3-4   • COLONOSCOPY  2013   • CORONARY ARTERY BYPASS GRAFT  1994   • HERNIA REPAIR Right 2011    inguinal   • INGUINAL HERNIA REPAIR Left 10/29/2019    Procedure: INGUINAL HERNIA REPAIR LEFT;  Surgeon: Charisma Raines MD;  [facility];  Service: General   • LUMBAR LAMINECTOMY  1996    laminectomy, lumbar, L3   • OTHER SURGICAL HISTORY      wrist surgery   • TONSILLECTOMY AND ADENOIDECTOMY  1945      Family History   Problem Relation Age of Onset   • Arthritis Mother         OA   • Heart disease Father    • Diabetes Father    • Heart attack Father    • Heart disease Brother    • Heart attack Brother    • Diabetes  Brother    • Diabetes Son    • Hypertension Other    • Cancer Other      Social History     Socioeconomic History   • Marital status:    Tobacco Use   • Smoking status: Former Smoker     Packs/day: 1.00     Years: 20.00     Pack years: 20.00     Types: Cigarettes     Quit date: 1997     Years since quittin.8   • Smokeless tobacco: Never Used   • Tobacco comment: QUIT DATE 1992   Vaping Use   • Vaping Use: Never used   Substance and Sexual Activity   • Alcohol use: Yes     Alcohol/week: 7.0 standard drinks     Types: 7 Glasses of wine per week     Comment: glass of wine everyday    • Drug use: No   • Sexual activity: Defer      Current Outpatient Medications on File Prior to Visit   Medication Sig Dispense Refill   • acetaminophen (TYLENOL) 325 MG tablet Take 650 mg by mouth As Needed for Mild Pain .     • aspirin 81 MG EC tablet Take 81 mg by mouth Daily. Last dose 10-18- per patient     • atorvastatin (LIPITOR) 40 MG tablet Take 40 mg by mouth Daily.     • calcipotriene-betamethasone (TACLONEX) 0.005-0.064 % ointment Apply 1 application topically to the appropriate area as directed As Needed (psoriasis).     • cholecalciferol (VITAMIN D3) 25 MCG (1000 UT) tablet Take 1,000 Units by mouth Daily.     • diclofenac (VOLTAREN) 1 % gel gel Apply 4 g topically As Needed.     • diltiaZEM CD (CARDIZEM CD) 180 MG 24 hr capsule Take 180 mg by mouth Daily.     • gabapentin (NEURONTIN) 300 MG capsule TAKE THREE CAPSULES BY MOUTH TWICE A DAY (Patient taking differently: Take 600 mg by mouth 2 (Two) Times a Day.) 180 capsule 2   • glucose blood test strip Use to check blood sugar twice daily 100 each 3   • lidocaine (LIDODERM) 5 % Place 1 patch on the skin as directed by provider Daily. Remove & Discard patch within 12 hours or as directed by MD 90 patch 2   • losartan (COZAAR) 100 MG tablet Take 100 mg by mouth Every Night.     • Microlet Lancets misc Use to check blood sugar twice daily 100 each 3   •  "nitroglycerin (NITROSTAT) 0.4 MG SL tablet Place 0.4 mg under the tongue Every 5 (Five) Minutes As Needed for Chest Pain. Take no more than 3 doses in 15 minutes.     • omeprazole (priLOSEC) 20 MG capsule Take 20 mg by mouth Daily.     • silver sulfadiazine (SILVADENE, SSD) 1 % cream Apply  topically to the appropriate area as directed 2 (Two) Times a Day. 15 g 0     No current facility-administered medications on file prior to visit.      Allergies   Allergen Reactions   • Penicillins Hives and Swelling     Per patient, has tolerated Keflex        Review of Systems   Constitutional: Negative.    HENT: Negative.    Eyes: Negative.    Respiratory: Negative.    Cardiovascular: Negative.    Gastrointestinal: Negative.    Endocrine: Negative.    Genitourinary: Negative.    Musculoskeletal: Positive for arthralgias.   Skin: Negative.    Allergic/Immunologic: Negative.    Neurological: Negative.    Hematological: Negative.    Psychiatric/Behavioral: Negative.         Objective      Physical Exam  /80   Ht 182.9 cm (72.01\")   Wt 93.9 kg (207 lb)   BMI 28.07 kg/m²     Body mass index is 28.07 kg/m².    General:   Mental Status:  Alert   Appearance: Cooperative, in no acute distress   Build and Nutrition: Well-nourished well-developed male   Orientation: Alert and oriented to person, place and time   Posture: Normal   Gait: Limping on the left with a cane    Integument:   Left knee: No skin lesions, no rash, no ecchymosis    Neurologic:   Sensation:    Left foot: Intact to light touch on the dorsal and plantar aspect   Motor:  Left lower extremity: 5/5 quadriceps, hamstrings, ankle dorsiflexors, and ankle plantar flexors  Vascular:   Left lower extremity: 2+ dorsalis pedis pulse    Lower Extremities:   Left Knee:    Tenderness:  Medial joint line tenderness    Effusion:  1+    Swelling:  None    Crepitus:  Positive    Atrophy:  None    Range of motion:  Extension: 0°       Flexion: 120°  Instability:  No varus " laxity, no valgus laxity, negative anterior drawer  Deformities:  None      Imaging/Studies      Imaging Results (Last 24 Hours)     Procedure Component Value Units Date/Time    XR Knee 4+ View Left [[id number]] Resulted: 02/28/22 0944     Updated: 02/28/22 0945    Narrative:      Left Knee Radiographs  Indication: left knee pain  Views: Standing AP's and skiers of both knees, with lateral and sunrise   views of the left knee    Comparison: Nonweightbearing imaging from 2/7/2022    Findings:   Patellofemoral degeneration, with medial joint space narrowing, no acute   bony abnormalities.  Diffuse osteopenia, with calcification of the   vasculature.        DATE OF EXAM:  2/7/2022 3:17 PM     PROCEDURE:  XR KNEE 1 OR 2 VW LEFT-     INDICATIONS:  pain; M25.562-Pain in left knee     COMPARISON:  No Comparisons Available     TECHNIQUE:      One to two radiologic views of left knee were obtained.     FINDINGS:  No acute fracture or dislocation noted. Mild degenerative changes in the  lateral compartment and anterior compartment. Small amount of fluid  within the joint space. Peripheral vascular calcifications noted.      IMPRESSION:  Mild degenerative changes in the anterior and lateral compartment     Small joint effusion.     This report was finalized on 2/7/2022 3:37 PM by Scot Noriega.    Above images were reviewed, and agree with findings.    Assessment and Plan     Diagnoses and all orders for this visit:    1. Primary osteoarthritis of left knee (Primary)  -     Cancel: XR Knee 4+ View Left  -     XR Knee 4+ View Left  -     Large Joint Arthrocentesis: L knee        1. Primary osteoarthritis of left knee        I reviewed my findings with the patient.  He has longstanding left knee arthritis, we discussed treatment options today.  He would like to continue with nonoperative management for now, and opted for an aspiration and injection.  I will see him back in 3 months, but sooner for any problems.  Limitations of  the injections have been discussed.    Procedure Note:  The potential benefits of performing a therapeutic left knee joint aspiration and injection, as well as potential risks (including, but not limited to infection, swelling, pain, bleeding, bruising, nerve/blood vessel damage, skin color changes, transient elevation in blood glucose levels, and fat atrophy) were discussed with the patient.  After informed consent, timeout procedure was performed, and the skin on the left knee was prepped with chlorhexidine soap and alcohol, after which ethyl chloride was applied to the skin at the injection site. Via the superior lateral approach, 16 cc of clear straw-colored fluid was aspirated, then 1ml of Kenalog 40mg/ml mixed with 4ml 0.5% ropivacaine plain was injected into the knee joint.  The patient tolerated the procedure well, experiencing 70% improvement a few minutes following the injection. There were no complications.  Band-Aid was applied to the injection site. Post-procedural instructions were given to the patient and/or their caregiver.      Return in about 3 months (around 5/28/2022).      Jaya Renteria MD  02/28/22  12:24 EST

## 2022-05-04 ENCOUNTER — LAB (OUTPATIENT)
Dept: LAB | Facility: HOSPITAL | Age: 82
End: 2022-05-04

## 2022-05-04 DIAGNOSIS — N18.30 STAGE 3 CHRONIC KIDNEY DISEASE, UNSPECIFIED WHETHER STAGE 3A OR 3B CKD: Primary | ICD-10-CM

## 2022-05-04 LAB
25(OH)D3 SERPL-MCNC: 37.3 NG/ML (ref 30–100)
ALBUMIN SERPL-MCNC: 4.4 G/DL (ref 3.5–5.2)
ANION GAP SERPL CALCULATED.3IONS-SCNC: 9.8 MMOL/L (ref 5–15)
BACTERIA UR QL AUTO: NORMAL /HPF
BASOPHILS # BLD AUTO: 0.05 10*3/MM3 (ref 0–0.2)
BASOPHILS NFR BLD AUTO: 0.8 % (ref 0–1.5)
BILIRUB UR QL STRIP: NEGATIVE
BUN SERPL-MCNC: 19 MG/DL (ref 8–23)
BUN/CREAT SERPL: 11.1 (ref 7–25)
CALCIUM SPEC-SCNC: 9.4 MG/DL (ref 8.6–10.5)
CHLORIDE SERPL-SCNC: 105 MMOL/L (ref 98–107)
CLARITY UR: CLEAR
CO2 SERPL-SCNC: 25.2 MMOL/L (ref 22–29)
COLOR UR: YELLOW
CREAT SERPL-MCNC: 1.71 MG/DL (ref 0.76–1.27)
CREAT UR-MCNC: 119.1 MG/DL
DEPRECATED RDW RBC AUTO: 53.8 FL (ref 37–54)
EGFRCR SERPLBLD CKD-EPI 2021: 39.7 ML/MIN/1.73
EOSINOPHIL # BLD AUTO: 0.17 10*3/MM3 (ref 0–0.4)
EOSINOPHIL NFR BLD AUTO: 2.6 % (ref 0.3–6.2)
ERYTHROCYTE [DISTWIDTH] IN BLOOD BY AUTOMATED COUNT: 15.8 % (ref 12.3–15.4)
GLUCOSE SERPL-MCNC: 108 MG/DL (ref 65–99)
GLUCOSE UR STRIP-MCNC: NEGATIVE MG/DL
HCT VFR BLD AUTO: 43.7 % (ref 37.5–51)
HGB BLD-MCNC: 14.4 G/DL (ref 13–17.7)
HGB UR QL STRIP.AUTO: NEGATIVE
HYALINE CASTS UR QL AUTO: NORMAL /LPF
IMM GRANULOCYTES # BLD AUTO: 0.04 10*3/MM3 (ref 0–0.05)
IMM GRANULOCYTES NFR BLD AUTO: 0.6 % (ref 0–0.5)
KETONES UR QL STRIP: NEGATIVE
LEUKOCYTE ESTERASE UR QL STRIP.AUTO: NEGATIVE
LYMPHOCYTES # BLD AUTO: 1.94 10*3/MM3 (ref 0.7–3.1)
LYMPHOCYTES NFR BLD AUTO: 30.2 % (ref 19.6–45.3)
MCH RBC QN AUTO: 30.3 PG (ref 26.6–33)
MCHC RBC AUTO-ENTMCNC: 33 G/DL (ref 31.5–35.7)
MCV RBC AUTO: 92 FL (ref 79–97)
MONOCYTES # BLD AUTO: 0.7 10*3/MM3 (ref 0.1–0.9)
MONOCYTES NFR BLD AUTO: 10.9 % (ref 5–12)
NEUTROPHILS NFR BLD AUTO: 3.53 10*3/MM3 (ref 1.7–7)
NEUTROPHILS NFR BLD AUTO: 54.9 % (ref 42.7–76)
NITRITE UR QL STRIP: NEGATIVE
NRBC BLD AUTO-RTO: 0 /100 WBC (ref 0–0.2)
PH UR STRIP.AUTO: 6 [PH] (ref 5–8)
PHOSPHATE SERPL-MCNC: 3.3 MG/DL (ref 2.5–4.5)
PLATELET # BLD AUTO: 220 10*3/MM3 (ref 140–450)
PMV BLD AUTO: 10.5 FL (ref 6–12)
POTASSIUM SERPL-SCNC: 4.6 MMOL/L (ref 3.5–5.2)
PROT ?TM UR-MCNC: 10.9 MG/DL
PROT UR QL STRIP: NEGATIVE
PTH-INTACT SERPL-MCNC: 31.1 PG/ML (ref 15–65)
RBC # BLD AUTO: 4.75 10*6/MM3 (ref 4.14–5.8)
RBC # UR STRIP: NORMAL /HPF
REF LAB TEST METHOD: NORMAL
SODIUM SERPL-SCNC: 140 MMOL/L (ref 136–145)
SP GR UR STRIP: 1.01 (ref 1–1.03)
SQUAMOUS #/AREA URNS HPF: NORMAL /HPF
UROBILINOGEN UR QL STRIP: NORMAL
WBC # UR STRIP: NORMAL /HPF
WBC NRBC COR # BLD: 6.43 10*3/MM3 (ref 3.4–10.8)

## 2022-05-04 PROCEDURE — 82306 VITAMIN D 25 HYDROXY: CPT

## 2022-05-04 PROCEDURE — 36415 COLL VENOUS BLD VENIPUNCTURE: CPT

## 2022-05-04 PROCEDURE — 80069 RENAL FUNCTION PANEL: CPT

## 2022-05-04 PROCEDURE — 82570 ASSAY OF URINE CREATININE: CPT

## 2022-05-04 PROCEDURE — 85025 COMPLETE CBC W/AUTO DIFF WBC: CPT

## 2022-05-04 PROCEDURE — 83970 ASSAY OF PARATHORMONE: CPT

## 2022-05-04 PROCEDURE — 84156 ASSAY OF PROTEIN URINE: CPT

## 2022-05-04 PROCEDURE — 81001 URINALYSIS AUTO W/SCOPE: CPT

## 2022-06-01 ENCOUNTER — OFFICE VISIT (OUTPATIENT)
Dept: ORTHOPEDIC SURGERY | Facility: CLINIC | Age: 82
End: 2022-06-01

## 2022-06-01 VITALS
BODY MASS INDEX: 28.17 KG/M2 | SYSTOLIC BLOOD PRESSURE: 128 MMHG | HEIGHT: 72 IN | DIASTOLIC BLOOD PRESSURE: 86 MMHG | WEIGHT: 208 LBS

## 2022-06-01 DIAGNOSIS — M17.12 PRIMARY OSTEOARTHRITIS OF LEFT KNEE: Primary | ICD-10-CM

## 2022-06-01 PROCEDURE — 20610 DRAIN/INJ JOINT/BURSA W/O US: CPT | Performed by: ORTHOPAEDIC SURGERY

## 2022-06-01 NOTE — PROGRESS NOTES
Procedure   Large Joint Arthrocentesis: L knee  Date/Time: 6/1/2022 2:31 PM  Consent given by: patient  Site marked: site marked  Timeout: Immediately prior to procedure a time out was called to verify the correct patient, procedure, equipment, support staff and site/side marked as required   Supporting Documentation  Indications: pain   Procedure Details  Location: knee - L knee  Preparation: Patient was prepped and draped in the usual sterile fashion  Needle size: 22 G  Approach: anterolateral  Medications administered: 30 mg Hyaluronan 30 MG/2ML  Patient tolerance: patient tolerated the procedure well with no immediate complications

## 2022-06-01 NOTE — PROGRESS NOTES
[facility] Note    Subjective     Chief Complaint   Patient presents with   • Follow-up     3 months- Primary osteoarthritis of left knee        HPI    It has been 3  month(s) since Mr. Alcala's last visit. He returns to clinic today for follow-up of left knee arthritis. The issue has been ongoing for 4 month(s). He rates his pain a 3/10 on the pain scale. Previous/current treatments: cane/walker and NSAIDS. Current symptoms: same as prior visit. The pain is worse with walking and climbing stairs; resting, sitting, ice and assistive device (cane/walker) improve the pain. Overall, he is doing better.  Steroid injection helped briefly.  He is inquiring about possible viscosupplementation injections.    I have reviewed the following portions of the patient's history and agree with: History of Present Illness and Review of Systems    Patient Active Problem List   Diagnosis   • CAD (coronary artery disease)   • CVD (cardiovascular disease)   • Hypertension   • DVT (deep venous thrombosis) ([facility])   • Diabetes mellitus ([facility])   • Dyslipidemia   • Arthritis   • GERD (gastroesophageal reflux disease)   • Mixed hyperlipidemia   • Diabetic nephropathy associated with type 2 diabetes mellitus ([facility])   • Diabetic polyneuropathy associated with type 2 diabetes mellitus ([facility])   • Chronic kidney disease, stage III (moderate) ([facility])   • Vitamin D deficiency   • Disc disorder of cervical region   • Postthrombotic syndrome   • Vertigo   • Angina pectoris ([facility])   • Lumbosacral spondylosis without myelopathy   • Psoriasis   • Arthritis of elbow   • Swelling of right lower extremity   • Acute right-sided low back pain without sciatica   • Hoarseness   • Unifocal PVCs   • Skin lesion of face   • Type 2 diabetes mellitus without complication, without long-term current use of insulin ([facility])   • Benign essential hypertension   • Medicare annual wellness visit, subsequent   • Stage 3 chronic kidney disease due to benign  hypertension ([facility])   • BMI 30.0-30.9,adult   • Postherpetic neuralgia   • Herpes zoster without complication   • Calculus of gallbladder without cholecystitis without obstruction   • Acute diverticulitis   • Leg edema, right   • Chest pain   • Encounter for removal of sutures   • Chronic pain of left knee   • Fall     Past Medical History:   Diagnosis Date   • Arthritis    • Bilateral radial fractures 1962    fracture bilateral radial heads   • CAD (coronary artery disease)    • CVD (cardiovascular disease)     History of TIA 1989   • Diabetes mellitus ([facility])    • DVT (deep venous thrombosis) ([facility])     Right lower extremity DVT and pulmonary embolism in 06/2015, idiopathic   • DVT of proximal lower limb ([facility]) 06/22/2015    proximal DVT right leg, small PE- anticoagulation   • Dyslipidemia    • Fracture 1952    H/o pf left forearm fracture   • Fracture of right hand 1980   • GERD (gastroesophageal reflux disease)     with hiatal hernia   • Hx of angina pectoris    • Hypertension     Normal renal angiography 02/16/11   • Left wrist fracture 1953   • Osteoarthritis    • Right wrist fracture    • Vertigo    • Wears glasses       Past Surgical History:   Procedure Laterality Date   • APPENDECTOMY  1957   • BACK SURGERY     • CARDIAC CATHETERIZATION  2011    with vein graft   • CERVICAL FUSION     • CERVICAL FUSION  1992    C3-4   • COLONOSCOPY  2013   • CORONARY ARTERY BYPASS GRAFT  1994   • HERNIA REPAIR Right 2011    inguinal   • INGUINAL HERNIA REPAIR Left 10/29/2019    Procedure: INGUINAL HERNIA REPAIR LEFT;  Surgeon: Charisma Raines MD;  Location: [facility];  Service: General   • LUMBAR LAMINECTOMY  1996    laminectomy, lumbar, L3   • OTHER SURGICAL HISTORY      wrist surgery   • TONSILLECTOMY AND ADENOIDECTOMY  1945      Family History   Problem Relation Age of Onset   • Arthritis Mother         OA   • Heart disease Father    • Diabetes Father    • Heart attack Father    • Heart disease Brother    • Heart  attack Brother    • Diabetes Brother    • Diabetes Son    • Hypertension Other    • Cancer Other      Social History     Socioeconomic History   • Marital status:    Tobacco Use   • Smoking status: Former Smoker     Packs/day: 1.00     Years: 20.00     Pack years: 20.00     Types: Cigarettes     Quit date: 1997     Years since quittin.1   • Smokeless tobacco: Never Used   • Tobacco comment: QUIT DATE 1992   Vaping Use   • Vaping Use: Never used   Substance and Sexual Activity   • Alcohol use: Yes     Alcohol/week: 7.0 standard drinks     Types: 7 Glasses of wine per week     Comment: glass of wine everyday    • Drug use: No   • Sexual activity: Defer      Current Outpatient Medications on File Prior to Visit   Medication Sig Dispense Refill   • acetaminophen (TYLENOL) 325 MG tablet Take 650 mg by mouth As Needed for Mild Pain .     • aspirin 81 MG EC tablet Take 81 mg by mouth Daily. Last dose 10-18-19 per patient     • atorvastatin (LIPITOR) 40 MG tablet Take 40 mg by mouth Daily.     • calcipotriene-betamethasone (TACLONEX) 0.005-0.064 % ointment Apply 1 application topically to the appropriate area as directed As Needed (psoriasis).     • cholecalciferol (VITAMIN D3) 25 MCG (1000 UT) tablet Take 1,000 Units by mouth Daily.     • diclofenac (VOLTAREN) 1 % gel gel Apply 4 g topically As Needed.     • diltiaZEM CD (CARDIZEM CD) 180 MG 24 hr capsule Take 180 mg by mouth Daily.     • gabapentin (NEURONTIN) 300 MG capsule TAKE THREE CAPSULES BY MOUTH TWICE A DAY (Patient taking differently: Take 600 mg by mouth 2 (Two) Times a Day.) 180 capsule 2   • glucose blood test strip Use to check blood sugar twice daily 100 each 3   • lidocaine (LIDODERM) 5 % Place 1 patch on the skin as directed by provider Daily. Remove & Discard patch within 12 hours or as directed by MD 90 patch 2   • losartan (COZAAR) 100 MG tablet Take 100 mg by mouth Every Night.     • Microlet Lancets misc Use to check blood sugar  twice daily 100 each 3   • nitroglycerin (NITROSTAT) 0.4 MG SL tablet Place 0.4 mg under the tongue Every 5 (Five) Minutes As Needed for Chest Pain. Take no more than 3 doses in 15 minutes.     • omeprazole (priLOSEC) 20 MG capsule Take 20 mg by mouth Daily.     • silver sulfadiazine (SILVADENE, SSD) 1 % cream Apply  topically to the appropriate area as directed 2 (Two) Times a Day. 15 g 0     No current facility-administered medications on file prior to visit.      Allergies   Allergen Reactions   • Penicillins Hives and Swelling     Per patient, has tolerated Keflex        Review of Systems   Constitutional: Negative for activity change, appetite change, chills, diaphoresis, fatigue, fever and unexpected weight change.   HENT: Negative for congestion, dental problem, drooling, ear discharge, ear pain, facial swelling, hearing loss, mouth sores, nosebleeds, postnasal drip, rhinorrhea, sinus pressure, sneezing, sore throat, tinnitus, trouble swallowing and voice change.    Eyes: Negative for photophobia, pain, discharge, redness, itching and visual disturbance.   Respiratory: Negative for apnea, cough, choking, chest tightness, shortness of breath, wheezing and stridor.    Cardiovascular: Negative for chest pain, palpitations and leg swelling.   Gastrointestinal: Negative for abdominal distention, abdominal pain, anal bleeding, blood in stool, constipation, diarrhea, nausea, rectal pain and vomiting.   Endocrine: Negative for cold intolerance, heat intolerance, polydipsia, polyphagia and polyuria.   Genitourinary: Negative for decreased urine volume, difficulty urinating, dysuria, enuresis, flank pain, frequency, genital sores, hematuria and urgency.   Musculoskeletal: Positive for arthralgias. Negative for back pain, gait problem, joint swelling, myalgias, neck pain and neck stiffness.   Skin: Negative for color change, pallor, rash and wound.   Allergic/Immunologic: Negative for environmental allergies, food  "allergies and immunocompromised state.   Neurological: Negative for dizziness, tremors, seizures, syncope, facial asymmetry, speech difficulty, weakness, light-headedness, numbness and headaches.   Hematological: Negative for adenopathy. Does not bruise/bleed easily.   Psychiatric/Behavioral: Negative for agitation, behavioral problems, confusion, decreased concentration, dysphoric mood, hallucinations, self-injury, sleep disturbance and suicidal ideas. The patient is not nervous/anxious and is not hyperactive.         Objective      Physical Exam  /86   Ht 182.9 cm (72.01\")   Wt 94.3 kg (208 lb)   BMI 28.20 kg/m²     Body mass index is 28.2 kg/m².    General:   Mental Status:  Alert   Appearance: Cooperative, in no acute distress   Build and Nutrition: Well-nourished well-developed male   Orientation: Alert and oriented to person, place and time   Posture: Normal   Gait: Limp on the left    Integument:              Left knee: No skin lesions, no rash, no ecchymosis     Lower Extremities:              Left Knee:                          Effusion:          1+                          Swelling:          None                          Crepitus:          Positive                          Atrophy:           None                          Range of motion:        Extension:       0°                                                              Flexion:           120°  Instability:        No varus laxity, no valgus laxity, negative anterior drawer  Deformities:     None    Imaging/Studies  Imaging Results (Last 24 Hours)     ** No results found for the last 24 hours. **            Assessment and Plan     Diagnoses and all orders for this visit:    1. Primary osteoarthritis of left knee (Primary)  -     Large Joint Arthrocentesis: L knee        1. Primary osteoarthritis of left knee        I reviewed my findings with the patient.  We discussed treatment options going forward, and he would like to try " viscosupplementation injection series.  He is not interested in surgical intervention.    Procedure Note:  The potential benefits of performing a therapeutic knee joint visco supplementation injection, as well as potential risks (including, but not limited to infection, swelling, pain, bleeding, bruising, nerve/blood vessel damage, and pseudoseptic reaction) have been discussed with the patient.  After informed consent, timeout procedure was performed, and the skin on the left knee was prepped with chlorhexidine soap and alcohol, after which ethyl chloride was applied to the skin at the injection site. Via the anterolateral approach, Orthovisc was injected into the knee joint.  The patient tolerated the procedure well. There were no complications.  Band-Aid was applied to the injection site. Post-procedural instructions discussed with the patient and/or their caregiver.      Return in about 1 week (around 6/8/2022) for Injection.      Jaya Renteria MD  06/01/22  14:57 EDT

## 2022-06-08 ENCOUNTER — CLINICAL SUPPORT (OUTPATIENT)
Dept: ORTHOPEDIC SURGERY | Facility: CLINIC | Age: 82
End: 2022-06-08

## 2022-06-08 DIAGNOSIS — M17.12 PRIMARY OSTEOARTHRITIS OF LEFT KNEE: Primary | ICD-10-CM

## 2022-06-08 PROCEDURE — 20610 DRAIN/INJ JOINT/BURSA W/O US: CPT | Performed by: ORTHOPAEDIC SURGERY

## 2022-06-08 NOTE — PROGRESS NOTES
[facility]    Subjective     Chief Complaint   Patient presents with   • Injections     Left knee orthovisc injection #2         HPI    Toño Alcala is a 81 y.o. male who presents for the second Orthovisc injection in the left knee.  Of note, he has seen some improvements so far.    Patient Active Problem List   Diagnosis   • CAD (coronary artery disease)   • CVD (cardiovascular disease)   • Hypertension   • DVT (deep venous thrombosis) ([facility])   • Diabetes mellitus ([facility])   • Dyslipidemia   • Arthritis   • GERD (gastroesophageal reflux disease)   • Mixed hyperlipidemia   • Diabetic nephropathy associated with type 2 diabetes mellitus ([facility])   • Diabetic polyneuropathy associated with type 2 diabetes mellitus ([facility])   • Chronic kidney disease, stage III (moderate) ([facility])   • Vitamin D deficiency   • Disc disorder of cervical region   • Postthrombotic syndrome   • Vertigo   • Angina pectoris ([facility])   • Lumbosacral spondylosis without myelopathy   • Psoriasis   • Arthritis of elbow   • Swelling of right lower extremity   • Acute right-sided low back pain without sciatica   • Hoarseness   • Unifocal PVCs   • Skin lesion of face   • Type 2 diabetes mellitus without complication, without long-term current use of insulin ([facility])   • Benign essential hypertension   • Medicare annual wellness visit, subsequent   • Stage 3 chronic kidney disease due to benign hypertension ([facility])   • BMI 30.0-30.9,adult   • Postherpetic neuralgia   • Herpes zoster without complication   • Calculus of gallbladder without cholecystitis without obstruction   • Acute diverticulitis   • Leg edema, right   • Chest pain   • Encounter for removal of sutures   • Chronic pain of left knee   • Fall     Past Medical History:   Diagnosis Date   • Arthritis    • Bilateral radial fractures 1962    fracture bilateral radial heads   • CAD (coronary artery disease)    • CVD (cardiovascular disease)     History of TIA 1989   • Diabetes  mellitus (H[facility])    • DVT (deep venous thrombosis) ([facility])     Right lower extremity DVT and pulmonary embolism in 2015, idiopathic   • DVT of proximal lower limb ([facility]) 2015    proximal DVT right leg, small PE- anticoagulation   • Dyslipidemia    • Fracture     H/o pf left forearm fracture   • Fracture of right hand    • GERD (gastroesophageal reflux disease)     with hiatal hernia   • Hx of angina pectoris    • Hypertension     Normal renal angiography 11   • Left wrist fracture    • Osteoarthritis    • Right wrist fracture    • Vertigo    • Wears glasses       Past Surgical History:   Procedure Laterality Date   • APPENDECTOMY     • BACK SURGERY     • CARDIAC CATHETERIZATION      with vein graft   • CERVICAL FUSION     • CERVICAL FUSION      C3-4   • COLONOSCOPY     • CORONARY ARTERY BYPASS GRAFT     • HERNIA REPAIR Right     inguinal   • INGUINAL HERNIA REPAIR Left 10/29/2019    Procedure: INGUINAL HERNIA REPAIR LEFT;  Surgeon: Charisma Raines MD;  Location: [facility];  Service: General   • LUMBAR LAMINECTOMY      laminectomy, lumbar, L3   • OTHER SURGICAL HISTORY      wrist surgery   • TONSILLECTOMY AND ADENOIDECTOMY        Family History   Problem Relation Age of Onset   • Arthritis Mother         OA   • Heart disease Father    • Diabetes Father    • Heart attack Father    • Heart disease Brother    • Heart attack Brother    • Diabetes Brother    • Diabetes Son    • Hypertension Other    • Cancer Other      Social History     Socioeconomic History   • Marital status:    Tobacco Use   • Smoking status: Former Smoker     Packs/day: 1.00     Years: 20.00     Pack years: 20.00     Types: Cigarettes     Quit date: 1997     Years since quittin.1   • Smokeless tobacco: Never Used   • Tobacco comment: QUIT DATE 1992   Vaping Use   • Vaping Use: Never used   Substance and Sexual Activity   • Alcohol use: Yes     Alcohol/week: 7.0  standard drinks     Types: 7 Glasses of wine per week     Comment: glass of wine everyday    • Drug use: No   • Sexual activity: Defer      Current Outpatient Medications on File Prior to Visit   Medication Sig Dispense Refill   • acetaminophen (TYLENOL) 325 MG tablet Take 650 mg by mouth As Needed for Mild Pain .     • aspirin 81 MG EC tablet Take 81 mg by mouth Daily. Last dose 10-18-19 per patient     • atorvastatin (LIPITOR) 40 MG tablet Take 40 mg by mouth Daily.     • calcipotriene-betamethasone (TACLONEX) 0.005-0.064 % ointment Apply 1 application topically to the appropriate area as directed As Needed (psoriasis).     • cholecalciferol (VITAMIN D3) 25 MCG (1000 UT) tablet Take 1,000 Units by mouth Daily.     • diclofenac (VOLTAREN) 1 % gel gel Apply 4 g topically As Needed.     • diltiaZEM CD (CARDIZEM CD) 180 MG 24 hr capsule Take 180 mg by mouth Daily.     • gabapentin (NEURONTIN) 300 MG capsule TAKE THREE CAPSULES BY MOUTH TWICE A DAY (Patient taking differently: Take 600 mg by mouth 2 (Two) Times a Day.) 180 capsule 2   • glucose blood test strip Use to check blood sugar twice daily 100 each 3   • lidocaine (LIDODERM) 5 % Place 1 patch on the skin as directed by provider Daily. Remove & Discard patch within 12 hours or as directed by MD 90 patch 2   • losartan (COZAAR) 100 MG tablet Take 100 mg by mouth Every Night.     • Microlet Lancets misc Use to check blood sugar twice daily 100 each 3   • nitroglycerin (NITROSTAT) 0.4 MG SL tablet Place 0.4 mg under the tongue Every 5 (Five) Minutes As Needed for Chest Pain. Take no more than 3 doses in 15 minutes.     • omeprazole (priLOSEC) 20 MG capsule Take 20 mg by mouth Daily.     • silver sulfadiazine (SILVADENE, SSD) 1 % cream Apply  topically to the appropriate area as directed 2 (Two) Times a Day. 15 g 0     No current facility-administered medications on file prior to visit.      Allergies   Allergen Reactions   • Penicillins Hives and Swelling     Per  patient, has tolerated Keflex        Review of Systems   Constitutional: Negative for activity change, appetite change, chills, diaphoresis, fatigue, fever and unexpected weight change.   HENT: Negative for congestion, dental problem, drooling, ear discharge, ear pain, facial swelling, hearing loss, mouth sores, nosebleeds, postnasal drip, rhinorrhea, sinus pressure, sneezing, sore throat, tinnitus, trouble swallowing and voice change.    Eyes: Negative for photophobia, pain, discharge, redness, itching and visual disturbance.   Respiratory: Negative for apnea, cough, choking, chest tightness, shortness of breath, wheezing and stridor.    Cardiovascular: Negative for chest pain, palpitations and leg swelling.   Gastrointestinal: Negative for abdominal distention, abdominal pain, anal bleeding, blood in stool, constipation, diarrhea, nausea, rectal pain and vomiting.   Endocrine: Negative for cold intolerance, heat intolerance, polydipsia, polyphagia and polyuria.   Genitourinary: Negative for decreased urine volume, difficulty urinating, dysuria, enuresis, flank pain, frequency, genital sores, hematuria and urgency.   Musculoskeletal: Positive for arthralgias. Negative for back pain, gait problem, joint swelling, myalgias, neck pain and neck stiffness.   Skin: Negative for color change, pallor, rash and wound.   Allergic/Immunologic: Negative for environmental allergies, food allergies and immunocompromised state.   Neurological: Negative for dizziness, tremors, seizures, syncope, facial asymmetry, speech difficulty, weakness, light-headedness, numbness and headaches.   Hematological: Negative for adenopathy. Does not bruise/bleed easily.   Psychiatric/Behavioral: Negative for agitation, behavioral problems, confusion, decreased concentration, dysphoric mood, hallucinations, self-injury, sleep disturbance and suicidal ideas. The patient is not nervous/anxious and is not hyperactive.         Objective      Physical  Exam  There were no vitals taken for this visit.    There is no height or weight on file to calculate BMI.    General:   Mental Status:  Alert   Appearance: Cooperative, in no acute distress   Posture: Normal    Assessment and Plan     Diagnoses and all orders for this visit:    1. Primary osteoarthritis of left knee (Primary)  -     - Large Joint Arthrocentesis: L knee  -     Hyaluronan (ORTHOVISC) injection 30 mg        1. Primary osteoarthritis of left knee        Procedure Note:  The potential benefits of performing a therapeutic knee joint visco supplementation injection, as well as potential risks (including, but not limited to infection, swelling, pain, bleeding, bruising, nerve/blood vessel damage, and pseudoseptic reaction) have been discussed with the patient.  After informed consent, timeout procedure was performed, and the skin on the left knee was prepped with chlorhexidine soap and alcohol, after which ethyl chloride was applied to the skin at the injection site. Via the anterolateral approach, Orthovisc was injected into the knee joint.  The patient tolerated the procedure well. There were no complications.  Band-Aid was applied to the injection site. Post-procedural instructions discussed with the patient and/or their caregiver.    Return in about 1 week (around 6/15/2022) for Injection.    Jaya Renteria MD

## 2022-06-08 NOTE — PROGRESS NOTES
Procedure   - Large Joint Arthrocentesis: L knee on 6/8/2022 1:06 PM  Indications: pain  Details: 22 G needle, anterolateral approach  Medications: 30 mg Hyaluronan 30 MG/2ML  Outcome: tolerated well, no immediate complications  Procedure, treatment alternatives, risks and benefits explained, specific risks discussed. Consent was given by the patient. Immediately prior to procedure a time out was called to verify the correct patient, procedure, equipment, support staff and site/side marked as required. Patient was prepped and draped in the usual sterile fashion.

## 2022-06-13 ENCOUNTER — OFFICE VISIT (OUTPATIENT)
Dept: CARDIOLOGY | Facility: CLINIC | Age: 82
End: 2022-06-13

## 2022-06-13 VITALS
HEIGHT: 71 IN | DIASTOLIC BLOOD PRESSURE: 74 MMHG | WEIGHT: 213 LBS | OXYGEN SATURATION: 98 % | HEART RATE: 84 BPM | BODY MASS INDEX: 29.82 KG/M2 | SYSTOLIC BLOOD PRESSURE: 128 MMHG

## 2022-06-13 DIAGNOSIS — I10 ESSENTIAL HYPERTENSION: ICD-10-CM

## 2022-06-13 DIAGNOSIS — I25.10 CVD (CARDIOVASCULAR DISEASE): ICD-10-CM

## 2022-06-13 DIAGNOSIS — E78.2 MIXED HYPERLIPIDEMIA: ICD-10-CM

## 2022-06-13 DIAGNOSIS — I25.10 CORONARY ARTERY DISEASE INVOLVING NATIVE CORONARY ARTERY OF NATIVE HEART WITHOUT ANGINA PECTORIS: Primary | ICD-10-CM

## 2022-06-13 PROCEDURE — 99213 OFFICE O/P EST LOW 20 MIN: CPT | Performed by: INTERNAL MEDICINE

## 2022-06-13 NOTE — PROGRESS NOTES
[facility]iology  Subjective:     Encounter Date: 06/13/2022      Patient ID: Toño Alcala is a 81 y.o. male.    Chief Complaint: Coronary Artery Disease      PROBLEM LIST:  1. Coronary artery disease:  a. History of CABG x4, 1994 - incomplete database.   b. [facility], 02/16/2011: 100% native RCA, and severe proximal LAD and LCx disease. Patent SVG to the right PDA. Patent LIMA to the LAD. Patent SVG to the OM.    c. Stress echo, 03/27/2014: Normal study.   2. Cerebrovascular disease:  a. History of TIA, 1989.  3. Hypertension:  a. Normal renal angiography, 02/16/2011.  4. Right lower extremity DVT and pulmonary embolism in 06/2015, idiopathic:  a. Recurrent idiopathic DVT, 2017.  b. Started on warfarin per Dr. Salinas and Clyman.  5. DM.  6. Chronic kidney disease, stage 3:  a. Bilateral renal US, 05/25/2018: Enlargement of the prostate with calcifications.   7. Dyslipidemia.  8. Arthritis.   9. GERD/hiatal hernia.   10. History of a left forearm fracture.    11. Right wrist fracture.   12. History of cervical fusion.  13. History of lumbar laminectomy.  14. Arthritis.  15. BPPV.  16. Shingles.  17. Surgical history:  a. T and A.  b. Appendectomy.    History of Present Illness  Toño Alcala returns today for a 1 year follow up with a history of coronary artery disease, CVD, and cardiac risk factors. Since last visit, patient has been doing well overall from a cardiovascular standpoint. Patient does not maintain a regular physical activity level because he is limited by his knee. He denied the option for a knee replacement surgery.  He had one episode of syncope in which he was taken to [facility] [place] that was potentially due to medication that has since been discontinued. He is currently living with his wife with dementia. Patient denies chest pain, shortness of breath, orthopnea, palpitations, edema, and dizziness.      Allergies   Allergen Reactions   • Penicillins Hives and  Swelling     Per patient, has tolerated Keflex         Current Outpatient Medications:   •  acetaminophen (TYLENOL) 325 MG tablet, Take 650 mg by mouth As Needed for Mild Pain ., Disp: , Rfl:   •  aspirin 81 MG EC tablet, Take 81 mg by mouth Daily. Last dose 10-18-19 per patient, Disp: , Rfl:   •  atorvastatin (LIPITOR) 40 MG tablet, Take 40 mg by mouth Daily., Disp: , Rfl:   •  calcipotriene-betamethasone (TACLONEX) 0.005-0.064 % ointment, Apply 1 application topically to the appropriate area as directed As Needed (psoriasis)., Disp: , Rfl:   •  cholecalciferol (VITAMIN D3) 25 MCG (1000 UT) tablet, Take 1,000 Units by mouth Daily., Disp: , Rfl:   •  diclofenac (VOLTAREN) 1 % gel gel, Apply 4 g topically As Needed., Disp: , Rfl:   •  diltiaZEM CD (CARDIZEM CD) 180 MG 24 hr capsule, Take 180 mg by mouth Daily., Disp: , Rfl:   •  gabapentin (NEURONTIN) 300 MG capsule, TAKE THREE CAPSULES BY MOUTH TWICE A DAY (Patient taking differently: Take 600 mg by mouth 2 (Two) Times a Day.), Disp: 180 capsule, Rfl: 2  •  glucose blood test strip, Use to check blood sugar twice daily, Disp: 100 each, Rfl: 3  •  lidocaine (LIDODERM) 5 %, Place 1 patch on the skin as directed by provider Daily. Remove & Discard patch within 12 hours or as directed by MD, Disp: 90 patch, Rfl: 2  •  losartan (COZAAR) 100 MG tablet, Take 100 mg by mouth Every Night., Disp: , Rfl:   •  Microlet Lancets misc, Use to check blood sugar twice daily, Disp: 100 each, Rfl: 3  •  nitroglycerin (NITROSTAT) 0.4 MG SL tablet, Place 0.4 mg under the tongue Every 5 (Five) Minutes As Needed for Chest Pain. Take no more than 3 doses in 15 minutes., Disp: , Rfl:   •  omeprazole (priLOSEC) 20 MG capsule, Take 20 mg by mouth Daily., Disp: , Rfl:   •  silver sulfadiazine (SILVADENE, SSD) 1 % cream, Apply  topically to the appropriate area as directed 2 (Two) Times a Day., Disp: 15 g, Rfl: 0    The following portions of the patient's history were reviewed and updated as  "appropriate: allergies, current medications, past family history, past medical history, past social history, past surgical history and problem list.    ROS       Objective:     Vitals:    06/13/22 1334   BP: 128/74   BP Location: Left arm   Patient Position: Sitting   Pulse: 84   SpO2: 98%   Weight: 96.6 kg (213 lb)   Height: 180.3 cm (71\")         Vitals reviewed.   Constitutional:       Appearance: Well-developed and not in distress.   Neck:      Thyroid: No thyromegaly.      Vascular: No carotid bruit or JVD.   Pulmonary:      Breath sounds: Normal breath sounds.   Cardiovascular:      Regular rhythm.      No gallop. No S3 and S4 gallop.   Abdominal:      General: Bowel sounds are normal.      Palpations: Abdomen is soft. There is no abdominal mass.      Tenderness: There is no abdominal tenderness.   Musculoskeletal:         General: No deformity.      Extremities: No clubbing present.Skin:     General: Skin is warm and dry.      Findings: No rash.   Neurological:      Mental Status: Alert and oriented to person, place, and time.         Lab Review:  Lab Results   Component Value Date    GLUCOSE 108 (H) 05/04/2022    BUN 19 05/04/2022    CREATININE 1.71 (H) 05/04/2022    EGFRIFNONA 42 (L) 02/14/2022    BCR 11.1 05/04/2022    K 4.6 05/04/2022    CO2 25.2 05/04/2022    CALCIUM 9.4 05/04/2022    ALBUMIN 4.40 05/04/2022    ALKPHOS 146 (H) 02/14/2022    AST 39 02/14/2022    ALT 49 (H) 02/14/2022     Lab Results   Component Value Date    CHOL 133 12/03/2020    TRIG 100 12/03/2020    HDL 41 12/03/2020    LDL 73 12/03/2020      Lab Results   Component Value Date    WBC 6.43 05/04/2022    RBC 4.75 05/04/2022    HGB 14.4 05/04/2022    HCT 43.7 05/04/2022    MCV 92.0 05/04/2022     05/04/2022     Lab Results   Component Value Date    HGBA1C 7.0 06/14/2021      Procedures        Assessment:   Diagnoses and all orders for this visit:    1. Coronary artery disease involving native coronary artery of native heart " without angina pectoris (Primary)    2. CVD (cardiovascular disease)    3. Essential hypertension    4. Mixed hyperlipidemia        Impression:  1. Coronary artery disease. Stable without angina on current activity. Continue on aspirin 81 mg daily and nitroglycerin 0.4 mg PRN.  2. CVD. Stable and asymptomatic.  3. Essential hypertension. Well controlled. Continue on diltiazem 180 mg daily and losartan 100 mg nightly.   4. Mixed hyperlipidemia. Well controlled.  Continue on atorvastatin 40 mg daily.     Plan:  1. Stable cardiac status.   2. Continue current medications.  3. Revisit in 12 MO, or sooner as needed.    Scribed for Avelino Hernandez MD by Denisse Black. 6/13/2022  16:53 EDT    Avelino Hernandez MD      Please note that portions of this note may have been completed with a voice recognition program. Efforts were made to edit the dictations, but occasionally words are mistranscribed.

## 2022-06-15 ENCOUNTER — CLINICAL SUPPORT (OUTPATIENT)
Dept: ORTHOPEDIC SURGERY | Facility: CLINIC | Age: 82
End: 2022-06-15

## 2022-06-15 DIAGNOSIS — M17.12 PRIMARY OSTEOARTHRITIS OF LEFT KNEE: Primary | ICD-10-CM

## 2022-06-15 PROCEDURE — 20610 DRAIN/INJ JOINT/BURSA W/O US: CPT | Performed by: ORTHOPAEDIC SURGERY

## 2022-06-15 NOTE — PROGRESS NOTES

## 2022-06-15 NOTE — PROGRESS NOTES
[facility] Note    Subjective     Chief Complaint   Patient presents with   • Follow-up     Left knee orthovisc injection #3        HPI    Toño Alcala is a 81 y.o. male who presents for the third Orthovisc injection into the left knee.  Of note, he has seen some improvement so far.    Patient Active Problem List   Diagnosis   • CAD (coronary artery disease)   • CVD (cardiovascular disease)   • Hypertension   • DVT (deep venous thrombosis) ([facility])   • Diabetes mellitus ([facility])   • Dyslipidemia   • Arthritis   • GERD (gastroesophageal reflux disease)   • Mixed hyperlipidemia   • Diabetic nephropathy associated with type 2 diabetes mellitus ([facility])   • Diabetic polyneuropathy associated with type 2 diabetes mellitus ([facility])   • Chronic kidney disease, stage III (moderate) ([facility])   • Vitamin D deficiency   • Disc disorder of cervical region   • Postthrombotic syndrome   • Vertigo   • Angina pectoris ([facility])   • Lumbosacral spondylosis without myelopathy   • Psoriasis   • Arthritis of elbow   • Swelling of right lower extremity   • Acute right-sided low back pain without sciatica   • Hoarseness   • Unifocal PVCs   • Skin lesion of face   • Type 2 diabetes mellitus without complication, without long-term current use of insulin ([facility])   • Benign essential hypertension   • Medicare annual wellness visit, subsequent   • Stage 3 chronic kidney disease due to benign hypertension ([facility])   • BMI 30.0-30.9,adult   • Postherpetic neuralgia   • Herpes zoster without complication   • Calculus of gallbladder without cholecystitis without obstruction   • Acute diverticulitis   • Leg edema, right   • Chest pain   • Encounter for removal of sutures   • Chronic pain of left knee   • Fall     Past Medical History:   Diagnosis Date   • Arthritis    • Bilateral radial fractures 1962    fracture bilateral radial heads   • CAD (coronary artery disease)    • CVD (cardiovascular disease)     History of TIA 1989   • Diabetes  mellitus (HCC)    • DVT (deep venous thrombosis) ([facility])     Right lower extremity DVT and pulmonary embolism in 2015, idiopathic   • DVT of proximal lower limb ([facility]) 2015    proximal DVT right leg, small PE- anticoagulation   • Dyslipidemia    • Fracture     H/o pf left forearm fracture   • Fracture of right hand    • GERD (gastroesophageal reflux disease)     with hiatal hernia   • Hx of angina pectoris    • Hypertension     Normal renal angiography 11   • Left wrist fracture    • Osteoarthritis    • Right wrist fracture    • Vertigo    • Wears glasses       Past Surgical History:   Procedure Laterality Date   • APPENDECTOMY     • BACK SURGERY     • CARDIAC CATHETERIZATION      with vein graft   • CERVICAL FUSION     • CERVICAL FUSION      C3-4   • COLONOSCOPY     • CORONARY ARTERY BYPASS GRAFT     • HERNIA REPAIR Right     inguinal   • INGUINAL HERNIA REPAIR Left 10/29/2019    Procedure: INGUINAL HERNIA REPAIR LEFT;  Surgeon: Charisma Raines MD;  Location: [facility];  Service: General   • LUMBAR LAMINECTOMY      laminectomy, lumbar, L3   • OTHER SURGICAL HISTORY      wrist surgery   • TONSILLECTOMY AND ADENOIDECTOMY        Family History   Problem Relation Age of Onset   • Arthritis Mother         OA   • Heart disease Father    • Diabetes Father    • Heart attack Father    • Heart disease Brother    • Heart attack Brother    • Diabetes Brother    • Diabetes Son    • Hypertension Other    • Cancer Other      Social History     Socioeconomic History   • Marital status:    Tobacco Use   • Smoking status: Former Smoker     Packs/day: 1.00     Years: 20.00     Pack years: 20.00     Types: Cigarettes     Quit date: 1997     Years since quittin.1   • Smokeless tobacco: Never Used   • Tobacco comment: QUIT DATE 1992   Vaping Use   • Vaping Use: Never used   Substance and Sexual Activity   • Alcohol use: Yes     Alcohol/week: 7.0  standard drinks     Types: 7 Glasses of wine per week     Comment: glass of wine everyday    • Drug use: No   • Sexual activity: Defer      Current Outpatient Medications on File Prior to Visit   Medication Sig Dispense Refill   • acetaminophen (TYLENOL) 325 MG tablet Take 650 mg by mouth As Needed for Mild Pain .     • aspirin 81 MG EC tablet Take 81 mg by mouth Daily. Last dose 10-18-19 per patient     • atorvastatin (LIPITOR) 40 MG tablet Take 40 mg by mouth Daily.     • calcipotriene-betamethasone (TACLONEX) 0.005-0.064 % ointment Apply 1 application topically to the appropriate area as directed As Needed (psoriasis).     • cholecalciferol (VITAMIN D3) 25 MCG (1000 UT) tablet Take 1,000 Units by mouth Daily.     • diclofenac (VOLTAREN) 1 % gel gel Apply 4 g topically As Needed.     • diltiaZEM CD (CARDIZEM CD) 180 MG 24 hr capsule Take 180 mg by mouth Daily.     • gabapentin (NEURONTIN) 300 MG capsule TAKE THREE CAPSULES BY MOUTH TWICE A DAY (Patient taking differently: Take 600 mg by mouth 2 (Two) Times a Day.) 180 capsule 2   • glucose blood test strip Use to check blood sugar twice daily 100 each 3   • lidocaine (LIDODERM) 5 % Place 1 patch on the skin as directed by provider Daily. Remove & Discard patch within 12 hours or as directed by MD 90 patch 2   • losartan (COZAAR) 100 MG tablet Take 100 mg by mouth Every Night.     • Microlet Lancets misc Use to check blood sugar twice daily 100 each 3   • nitroglycerin (NITROSTAT) 0.4 MG SL tablet Place 0.4 mg under the tongue Every 5 (Five) Minutes As Needed for Chest Pain. Take no more than 3 doses in 15 minutes.     • omeprazole (priLOSEC) 20 MG capsule Take 20 mg by mouth Daily.     • silver sulfadiazine (SILVADENE, SSD) 1 % cream Apply  topically to the appropriate area as directed 2 (Two) Times a Day. 15 g 0     No current facility-administered medications on file prior to visit.      Allergies   Allergen Reactions   • Penicillins Hives and Swelling     Per  patient, has tolerated Keflex        Review of Systems     Objective      Physical Exam  There were no vitals taken for this visit.    There is no height or weight on file to calculate BMI.    General:   Mental Status:  Alert   Appearance: Cooperative, in no acute distress   Posture: Normal    Assessment and Plan     Diagnoses and all orders for this visit:    1. Primary osteoarthritis of left knee (Primary)  -     Large Joint Arthrocentesis: L knee        1. Primary osteoarthritis of left knee        Procedure Note:  The potential benefits of performing a therapeutic knee joint visco supplementation injection, as well as potential risks (including, but not limited to infection, swelling, pain, bleeding, bruising, nerve/blood vessel damage, and pseudoseptic reaction) have been discussed with the patient.  After informed consent, timeout procedure was performed, and the skin on the left knee was prepped with chlorhexidine soap and alcohol, after which ethyl chloride was applied to the skin at the injection site. Via the anterolateral approach, Orthovisc was injected into the knee joint.  The patient tolerated the procedure well. There were no complications.  Band-Aid was applied to the injection site. Post-procedural instructions discussed with the patient and/or their caregiver.    Return in about 6 months (around 12/15/2022).    Jaya Renteria MD  06/15/22  13:19 EDT

## 2022-06-16 ENCOUNTER — OFFICE VISIT (OUTPATIENT)
Dept: INTERNAL MEDICINE | Facility: CLINIC | Age: 82
End: 2022-06-16

## 2022-06-16 VITALS
HEART RATE: 68 BPM | TEMPERATURE: 98.7 F | DIASTOLIC BLOOD PRESSURE: 64 MMHG | HEIGHT: 71 IN | BODY MASS INDEX: 29.73 KG/M2 | SYSTOLIC BLOOD PRESSURE: 118 MMHG | WEIGHT: 212.4 LBS

## 2022-06-16 DIAGNOSIS — N18.32 STAGE 3B CHRONIC KIDNEY DISEASE: ICD-10-CM

## 2022-06-16 DIAGNOSIS — E11.21 DIABETIC NEPHROPATHY ASSOCIATED WITH TYPE 2 DIABETES MELLITUS: ICD-10-CM

## 2022-06-16 DIAGNOSIS — I10 PRIMARY HYPERTENSION: Primary | ICD-10-CM

## 2022-06-16 DIAGNOSIS — E78.2 MIXED HYPERLIPIDEMIA: ICD-10-CM

## 2022-06-16 LAB
EXPIRATION DATE: NORMAL
HBA1C MFR BLD: 6.2 %
Lab: NORMAL

## 2022-06-16 PROCEDURE — 99214 OFFICE O/P EST MOD 30 MIN: CPT | Performed by: INTERNAL MEDICINE

## 2022-06-16 PROCEDURE — 3044F HG A1C LEVEL LT 7.0%: CPT | Performed by: INTERNAL MEDICINE

## 2022-06-16 PROCEDURE — 83036 HEMOGLOBIN GLYCOSYLATED A1C: CPT | Performed by: INTERNAL MEDICINE

## 2022-06-16 NOTE — PROGRESS NOTES
Capillary Blood Specimen Collection  Capillary blood collection performed in right hand by Bebe Sykes MA. Patient tolerated the procedure well without complications.   06/16/22   Bebe Sykes MA

## 2022-06-16 NOTE — PROGRESS NOTES
[facility]     Toño Alcala  1940   [id number]      Patient Care Team:  Radu Francis MD as PCP - General    Chief Complaint::   Chief Complaint   Patient presents with   • Hypertension   • Hyperlipidemia   • Diabetes        HPI  The patient presents today for follow up of diabetes mellitus, hypertension, hyperlipidemia, and chronic kidney disease.    Post-herpetic neuralgia.  The patient states that the post-herpetic neuralgia of his side persists. He states that he is taking gabapentin 300 mg  at night and notes that his post-herpetic neuralgia is lessened in the morning following taking this dose of gabapentin at night. The patient reports that when he was taking gabapentin 300 mg in the morning and 3 in the evening, he suffered an episode of syncope. He states that he awakened on the floor, which had never happened before. He suspected gabapentin caused this, so he lessened his dosage to gabapentin 300 mg per night. The patient has had falls in the past.     knee pain.  The patient reports that he underwent a third  injection yesterday, 06/15/2022, and is attempting to forgo use of his cane today.        Chronic Conditions:  Include diabetes mellitus, hypertension, hyperlipidemia, and chronic kidney disease.    Patient Active Problem List   Diagnosis   • CAD (coronary artery disease)   • CVD (cardiovascular disease)   • DVT (deep venous thrombosis) ([facility])   • Diabetes mellitus ([facility])   • Dyslipidemia   • Arthritis   • GERD (gastroesophageal reflux disease)   • Mixed hyperlipidemia   • Diabetic nephropathy associated with type 2 diabetes mellitus ([facility])   • Diabetic polyneuropathy associated with type 2 diabetes mellitus ([facility])   • Chronic kidney disease, stage III (moderate) ([facility])   • Vitamin D deficiency   • Disc disorder of cervical region   • Postthrombotic syndrome   • Vertigo   • Angina pectoris ([facility])   • Lumbosacral spondylosis without myelopathy   • Psoriasis   • Arthritis of  elbow   • Swelling of right lower extremity   • Acute right-sided low back pain without sciatica   • Hoarseness   • Unifocal PVCs   • Skin lesion of face   • Primary hypertension   • Medicare annual wellness visit, subsequent   • Stage 3 chronic kidney disease due to benign hypertension (HCC)   • BMI 30.0-30.9,adult   • Postherpetic neuralgia   • Herpes zoster without complication   • Calculus of gallbladder without cholecystitis without obstruction   • Acute diverticulitis   • Leg edema, right   • Chest pain   • Encounter for removal of sutures   • Chronic pain of left knee   • Fall        Past Medical History:   Diagnosis Date   • Arthritis    • Bilateral radial fractures 1962    fracture bilateral radial heads   • CAD (coronary artery disease)    • CVD (cardiovascular disease)     History of TIA 1989   • Diabetes mellitus ([facility])    • DVT (deep venous thrombosis) ([facility])     Right lower extremity DVT and pulmonary embolism in 06/2015, idiopathic   • DVT of proximal lower limb ([facility]) 06/22/2015    proximal DVT right leg, small PE- anticoagulation   • Dyslipidemia    • Fracture 1952    H/o pf left forearm fracture   • Fracture of right hand 1980   • GERD (gastroesophageal reflux disease)     with hiatal hernia   • Hx of angina pectoris    • Hypertension     Normal renal angiography 02/16/11   • Left wrist fracture 1953   • Osteoarthritis    • Right wrist fracture    • Vertigo    • Wears glasses        Past Surgical History:   Procedure Laterality Date   • APPENDECTOMY  1957   • BACK SURGERY     • CARDIAC CATHETERIZATION  2011    with vein graft   • CERVICAL FUSION     • CERVICAL FUSION  1992    C3-4   • COLONOSCOPY  2013   • CORONARY ARTERY BYPASS GRAFT  1994   • HERNIA REPAIR Right 2011    inguinal   • INGUINAL HERNIA REPAIR Left 10/29/2019    Procedure: INGUINAL HERNIA REPAIR LEFT;  Surgeon: Charisma Raines MD;  Location: [facility];  Service: General   • LUMBAR LAMINECTOMY  1996    laminectomy, lumbar, L3   •  OTHER SURGICAL HISTORY      wrist surgery   • TONSILLECTOMY AND ADENOIDECTOMY         Family History   Problem Relation Age of Onset   • Arthritis Mother         OA   • Heart disease Father    • Diabetes Father    • Heart attack Father    • Heart disease Brother    • Heart attack Brother    • Diabetes Brother    • Diabetes Son    • Hypertension Other    • Cancer Other        Social History     Socioeconomic History   • Marital status:    Tobacco Use   • Smoking status: Former Smoker     Packs/day: 1.00     Years: 20.00     Pack years: 20.00     Types: Cigarettes     Quit date: 1997     Years since quittin.1   • Smokeless tobacco: Never Used   • Tobacco comment: QUIT DATE 1992   Vaping Use   • Vaping Use: Never used   Substance and Sexual Activity   • Alcohol use: Yes     Alcohol/week: 7.0 standard drinks     Types: 7 Glasses of wine per week     Comment: glass of wine everyday    • Drug use: No   • Sexual activity: Defer       Allergies   Allergen Reactions   • Penicillins Hives and Swelling     Per patient, has tolerated Keflex         Current Outpatient Medications:   •  acetaminophen (TYLENOL) 325 MG tablet, Take 650 mg by mouth As Needed for Mild Pain ., Disp: , Rfl:   •  aspirin 81 MG EC tablet, Take 81 mg by mouth Daily., Disp: , Rfl:   •  atorvastatin (LIPITOR) 40 MG tablet, Take 40 mg by mouth Daily., Disp: , Rfl:   •  calcipotriene-betamethasone (TACLONEX) 0.005-0.064 % ointment, Apply 1 application topically to the appropriate area as directed As Needed (psoriasis)., Disp: , Rfl:   •  cholecalciferol (VITAMIN D3) 25 MCG (1000 UT) tablet, Take 1,000 Units by mouth Daily., Disp: , Rfl:   •  diclofenac (VOLTAREN) 1 % gel gel, Apply 4 g topically As Needed., Disp: , Rfl:   •  diltiaZEM CD (CARDIZEM CD) 180 MG 24 hr capsule, Take 180 mg by mouth Daily., Disp: , Rfl:   •  gabapentin (NEURONTIN) 300 MG capsule, TAKE THREE CAPSULES BY MOUTH TWICE A DAY (Patient taking differently: Take  "300 mg by mouth Daily.), Disp: 180 capsule, Rfl: 2  •  glucose blood test strip, Use to check blood sugar twice daily, Disp: 100 each, Rfl: 3  •  lidocaine (LIDODERM) 5 %, Place 1 patch on the skin as directed by provider Daily. Remove & Discard patch within 12 hours or as directed by MD, Disp: 90 patch, Rfl: 2  •  losartan (COZAAR) 100 MG tablet, Take 100 mg by mouth Every Night., Disp: , Rfl:   •  Microlet Lancets misc, Use to check blood sugar twice daily, Disp: 100 each, Rfl: 3  •  nitroglycerin (NITROSTAT) 0.4 MG SL tablet, Place 0.4 mg under the tongue Every 5 (Five) Minutes As Needed for Chest Pain. Take no more than 3 doses in 15 minutes., Disp: , Rfl:   •  omeprazole (priLOSEC) 20 MG capsule, Take 20 mg by mouth Daily., Disp: , Rfl:     Review of Systems   Constitutional: Negative.    Respiratory: Negative.  Negative for chest tightness and shortness of breath.    Cardiovascular: Negative.  Negative for chest pain.   Gastrointestinal: Negative for abdominal pain, blood in stool, constipation and diarrhea.   Musculoskeletal: Positive for arthralgias.          Vital Signs  Vitals:    06/16/22 1241   BP: 118/64   BP Location: Left arm   Patient Position: Sitting   Cuff Size: Large Adult   Pulse: 68   Temp: 98.7 °F (37.1 °C)   Weight: 96.3 kg (212 lb 6.4 oz)   Height: 180.3 cm (70.98\")   PainSc:   3   PainLoc: Abdomen  Comment: right side post shingles       Physical Exam  Vitals reviewed.   Constitutional:       Appearance: He is well-developed.   HENT:      Head: Normocephalic and atraumatic.   Cardiovascular:      Rate and Rhythm: Normal rate and regular rhythm.      Heart sounds: Normal heart sounds. No murmur heard.  Pulmonary:      Effort: Pulmonary effort is normal.      Breath sounds: Normal breath sounds.   Musculoskeletal:      Comments: Chronic synovitis of bilateral knees.   Neurological:      Mental Status: He is alert and oriented to person, place, and time.          Procedures    ACE III MINI    "   Results  The patient underwent laboratory work with a neurologist in 05/2022, including a CBC, electrolytes, and vitamin D, which was unremarkable.       Assessment/Plan:    Diagnoses and all orders for this visit:    1. Primary hypertension (Primary)    2. Mixed hyperlipidemia    3. Diabetic nephropathy associated with type 2 diabetes mellitus (HCC)  -     POC Glycosylated Hemoglobin (Hb A1C)    4. Stage 3b chronic kidney disease (HCC)      1. Diabetes mellitus.  - Hemoglobin A1c level performed today is pending. He has managed to keep his diabetes under control with diet alone.    2. Hypertension.  - Blood pressure is well controlled on diltiazem and losartan.    3. Dyslipidemia.  - He will continue efforts at a healthy diet and taking atorvastatin. We will plan to perform a lipid panel at his next visit.    4. Chronic kidney disease.  - GFR is stable at 39.7 mL/min/1.73 m2. The treatment remains control of blood pressure, blood glucose, and avoidance of NSAIDs.    5. Post-herpetic neuralgia.  - I informed that patient that he may be able to tolerate gabapentin 300 mg 2  per night but can continue with gabapentin 300 mg 1 per night if he prefers. The patient plans to continue with the current dose for the time being.    6. knee pain.    - The patient will follow up in 6 months.      Plan of care reviewed with patient at the conclusion of today's visit. Education was provided regarding diagnosis, management, and any prescribed or recommended OTC medications.Patient verbalizes understanding of and agreement with management plan.         Radu Francis MD     Transcribed from ambient dictation for Radu Francis MD by CHELY TUCKER.  06/16/22   13:33 EDT    Patient verbalized consent to the visit recording.

## 2022-07-08 ENCOUNTER — OFFICE VISIT (OUTPATIENT)
Dept: INTERNAL MEDICINE | Facility: CLINIC | Age: 82
End: 2022-07-08

## 2022-07-08 ENCOUNTER — TELEPHONE (OUTPATIENT)
Dept: INTERNAL MEDICINE | Facility: CLINIC | Age: 82
End: 2022-07-08

## 2022-07-08 ENCOUNTER — HOSPITAL ENCOUNTER (OUTPATIENT)
Dept: CARDIOLOGY | Facility: HOSPITAL | Age: 82
Discharge: HOME | End: 2022-07-08
Admitting: NURSE PRACTITIONER

## 2022-07-08 VITALS
BODY MASS INDEX: 29.96 KG/M2 | TEMPERATURE: 98.2 F | HEIGHT: 71 IN | SYSTOLIC BLOOD PRESSURE: 138 MMHG | WEIGHT: 214 LBS | HEART RATE: 76 BPM | DIASTOLIC BLOOD PRESSURE: 80 MMHG

## 2022-07-08 DIAGNOSIS — I10 PRIMARY HYPERTENSION: ICD-10-CM

## 2022-07-08 DIAGNOSIS — M79.89 LEFT LEG SWELLING: ICD-10-CM

## 2022-07-08 DIAGNOSIS — M79.89 LEFT LEG SWELLING: Primary | ICD-10-CM

## 2022-07-08 PROCEDURE — 99214 OFFICE O/P EST MOD 30 MIN: CPT | Performed by: NURSE PRACTITIONER

## 2022-07-08 PROCEDURE — 93971 EXTREMITY STUDY: CPT

## 2022-07-08 PROCEDURE — 93971 EXTREMITY STUDY: CPT | Performed by: INTERNAL MEDICINE

## 2022-07-08 NOTE — PROGRESS NOTES
"Toño Alcala  1940  [id number]  Patient Care Team:  Radu Francis MD as PCP - General    Toño Alcala is a pleasant 82 y.o. male who presents for evaluation of Leg Swelling (Left   also red spots)    Chief Complaint   Patient presents with   • Leg Swelling     Left   also red spots       HPI:   He presents for evaluation of LLE swelling.    The patient reports that on 06/15/2022, he received his third knee injection for OA. He states that shortly thereafter, he began breaking out with what looked to him like psoriasis spots. Yesterday, 07/07/2022, his leg began to swell excessively and caused contusions on the back of his leg, which has not happened before. The swelling has improved some today. He denies any pain. The patient notes it feels \"funny\" to walk on the leg but denies any pain when walking. He states that he is beginning to wonder if it is a reaction from his knee injection. He has a history of blood clot in his leg in 2015 with no known etiology. The patient states there was edema in the leg following the blood clot. He reports his blood pressure is elevated today in the office but is not usually elevated. He monitors his blood pressure at home, noting his systolic readings are 125 to 130 mmHg. Last night, his blood pressure was 138/80 mmHg. The patient was on Coumadin for a while following his blood clot. He takes low dose aspirin currently.     Past Medical History:   Diagnosis Date   • Arthritis    • Bilateral radial fractures 1962    fracture bilateral radial heads   • CAD (coronary artery disease)    • CVD (cardiovascular disease)     History of TIA 1989   • Diabetes mellitus ([facility])    • DVT (deep venous thrombosis) ([facility])     Right lower extremity DVT and pulmonary embolism in 06/2015, idiopathic   • DVT of proximal lower limb ([facility]) 06/22/2015    proximal DVT right leg, small PE- anticoagulation   • Dyslipidemia    • Fracture 1952    H/o pf left forearm fracture   • Fracture of " right hand    • GERD (gastroesophageal reflux disease)     with hiatal hernia   • Hx of angina pectoris    • Hypertension     Normal renal angiography 11   • Left wrist fracture    • Osteoarthritis    • Right wrist fracture    • Vertigo    • Wears glasses      Past Surgical History:   Procedure Laterality Date   • APPENDECTOMY     • BACK SURGERY     • CARDIAC CATHETERIZATION      with vein graft   • CERVICAL FUSION     • CERVICAL FUSION      C3-4   • COLONOSCOPY     • CORONARY ARTERY BYPASS GRAFT     • HERNIA REPAIR Right     inguinal   • INGUINAL HERNIA REPAIR Left 10/29/2019    Procedure: INGUINAL HERNIA REPAIR LEFT;  Surgeon: Charisma Raines MD;  Location: [facility] OR;  Service: General   • LUMBAR LAMINECTOMY      laminectomy, lumbar, L3   • OTHER SURGICAL HISTORY      wrist surgery   • TONSILLECTOMY AND ADENOIDECTOMY       Family History   Problem Relation Age of Onset   • Arthritis Mother         OA   • Heart disease Father    • Diabetes Father    • Heart attack Father    • Heart disease Brother    • Heart attack Brother    • Diabetes Brother    • Diabetes Son    • Hypertension Other    • Cancer Other      Social History     Tobacco Use   Smoking Status Former Smoker   • Packs/day: 1.00   • Years: 20.00   • Pack years: 20.00   • Types: Cigarettes   • Quit date: 1997   • Years since quittin.2   Smokeless Tobacco Never Used   Tobacco Comment    QUIT DATE 1992     Allergies   Allergen Reactions   • Penicillins Hives and Swelling     Per patient, has tolerated Keflex       Current Outpatient Medications:   •  acetaminophen (TYLENOL) 325 MG tablet, Take 650 mg by mouth As Needed for Mild Pain ., Disp: , Rfl:   •  aspirin 81 MG EC tablet, Take 81 mg by mouth Daily., Disp: , Rfl:   •  atorvastatin (LIPITOR) 40 MG tablet, Take 40 mg by mouth Daily., Disp: , Rfl:   •  calcipotriene-betamethasone (TACLONEX) 0.005-0.064 % ointment, Apply 1 application  "topically to the appropriate area as directed As Needed (psoriasis)., Disp: , Rfl:   •  cholecalciferol (VITAMIN D3) 25 MCG (1000 UT) tablet, Take 1,000 Units by mouth Daily., Disp: , Rfl:   •  diclofenac (VOLTAREN) 1 % gel gel, Apply 4 g topically As Needed., Disp: , Rfl:   •  diltiaZEM CD (CARDIZEM CD) 180 MG 24 hr capsule, Take 180 mg by mouth Daily., Disp: , Rfl:   •  gabapentin (NEURONTIN) 300 MG capsule, TAKE THREE CAPSULES BY MOUTH TWICE A DAY (Patient taking differently: Take 300 mg by mouth Daily.), Disp: 180 capsule, Rfl: 2  •  glucose blood test strip, Use to check blood sugar twice daily, Disp: 100 each, Rfl: 3  •  lidocaine (LIDODERM) 5 %, Place 1 patch on the skin as directed by provider Daily. Remove & Discard patch within 12 hours or as directed by MD, Disp: 90 patch, Rfl: 2  •  losartan (COZAAR) 100 MG tablet, Take 100 mg by mouth Every Night., Disp: , Rfl:   •  Microlet Lancets misc, Use to check blood sugar twice daily, Disp: 100 each, Rfl: 3  •  nitroglycerin (NITROSTAT) 0.4 MG SL tablet, Place 0.4 mg under the tongue Every 5 (Five) Minutes As Needed for Chest Pain. Take no more than 3 doses in 15 minutes., Disp: , Rfl:   •  omeprazole (priLOSEC) 20 MG capsule, Take 20 mg by mouth Daily., Disp: , Rfl:     Review of Systems  Review of systems was completed, and pertinent findings are noted in the HPI.  /80   Pulse 76   Temp 98.2 °F (36.8 °C) (Temporal)   Ht 180.3 cm (70.98\")   Wt 97.1 kg (214 lb)   BMI 29.86 kg/m²     Physical Exam  Constitutional:       Appearance: He is well-developed.   HENT:      Head: Normocephalic and atraumatic.      Comments: *wearing mask  Eyes:      Conjunctiva/sclera: Conjunctivae normal.      Pupils: Pupils are equal, round, and reactive to light.   Cardiovascular:      Rate and Rhythm: Normal rate and regular rhythm.      Pulses: Normal pulses.      Heart sounds: Normal heart sounds.   Pulmonary:      Effort: Pulmonary effort is normal.      Breath sounds: " Normal breath sounds.   Musculoskeletal:         General: Normal range of motion.      Cervical back: Normal range of motion and neck supple.      Comments: Right calf measures 33 cm.   Left calf measures 38.5 cm.    Skin:     General: Skin is warm and dry.   Neurological:      Mental Status: He is alert and oriented to person, place, and time.   Psychiatric:         Mood and Affect: Mood normal.         Behavior: Behavior normal.         Thought Content: Thought content normal.         Judgment: Judgment normal.         Procedures    PHQ-9 Total Score:      Assessment/Plan:  Diagnoses and all orders for this visit:    1. Left leg swelling (Primary)  -     Duplex Venous Lower Extremity - Left CAR; Future    2. Primary hypertension  Comments:  continue to monitor at home, continue current meds       There are no Patient Instructions on file for this visit.     1. Right calf swelling  His blood pressure recheck was 138/80 mmHg. I will order a Doppler ultrasound of the right calf. If there is no blood clot, we will make a plan on what to do about the swelling.    Plan of care reviewed with patient at the conclusion of today's visit. Education was provided regarding diagnosis, management and any prescribed or recommended OTC medications.  Patient verbalizes understanding of and agreement with management plan.    No follow-ups on file.    Dictated Utilizing Dragon Dictation.    YARED Hoang        Transcribed from ambient dictation for YARED Hoang by LIZ CHAUDHARI.  07/08/22   19:43 EDT    Patient verbalized consent to the visit recording.

## 2022-07-08 NOTE — TELEPHONE ENCOUNTER
See note below  Called patient he stated he has gotten 3 gel injections in his knee 1 weekly and the last one was 6/15/22. I scheduled him an appt for today at 2:00

## 2022-07-08 NOTE — TELEPHONE ENCOUNTER
Caller: Toño Alcala    Relationship: Self    Best call back number: [phone]    What is the best time to reach you: anytime    Who are you requesting to speak with (clinical staff, provider,  specific staff member): Dr. Francis    Do you know the name of the person who called:     What was the call regarding: patient is calling about left leg is swelling pretty good and when he took off his compression sock the swelling was down a little bit and there is some break out, patient is wondering if it is a reaction from an injection that they had    Do you require a callback: YES

## 2022-07-09 ENCOUNTER — DOCUMENTATION (OUTPATIENT)
Dept: INTERNAL MEDICINE | Facility: CLINIC | Age: 82
End: 2022-07-09

## 2022-07-09 LAB
BH CV LOW VAS RIGHT COMMON FEMORAL SPONT: 1
BH CV LOWER VASCULAR LEFT COMMON FEMORAL AUGMENT: NORMAL
BH CV LOWER VASCULAR LEFT COMMON FEMORAL COMPRESS: NORMAL
BH CV LOWER VASCULAR LEFT COMMON FEMORAL PHASIC: NORMAL
BH CV LOWER VASCULAR LEFT COMMON FEMORAL SPONT: NORMAL
BH CV LOWER VASCULAR LEFT DISTAL FEMORAL AUGMENT: NORMAL
BH CV LOWER VASCULAR LEFT DISTAL FEMORAL COMPRESS: NORMAL
BH CV LOWER VASCULAR LEFT DISTAL FEMORAL PHASIC: NORMAL
BH CV LOWER VASCULAR LEFT DISTAL FEMORAL SPONT: NORMAL
BH CV LOWER VASCULAR LEFT GASTRONEMIUS COMPRESS: NORMAL
BH CV LOWER VASCULAR LEFT GREATER SAPH AK COMPRESS: NORMAL
BH CV LOWER VASCULAR LEFT GREATER SAPH BK COMPRESS: NORMAL
BH CV LOWER VASCULAR LEFT LESSER SAPH COMPRESS: NORMAL
BH CV LOWER VASCULAR LEFT MID FEMORAL AUGMENT: NORMAL
BH CV LOWER VASCULAR LEFT MID FEMORAL COMPRESS: NORMAL
BH CV LOWER VASCULAR LEFT MID FEMORAL PHASIC: NORMAL
BH CV LOWER VASCULAR LEFT MID FEMORAL SPONT: NORMAL
BH CV LOWER VASCULAR LEFT PERONEAL AUGMENT: NORMAL
BH CV LOWER VASCULAR LEFT PERONEAL COMPRESS: NORMAL
BH CV LOWER VASCULAR LEFT POPLITEAL AUGMENT: NORMAL
BH CV LOWER VASCULAR LEFT POPLITEAL COMPRESS: NORMAL
BH CV LOWER VASCULAR LEFT POPLITEAL PHASIC: NORMAL
BH CV LOWER VASCULAR LEFT POPLITEAL SPONT: NORMAL
BH CV LOWER VASCULAR LEFT POSTERIOR TIBIAL AUGMENT: NORMAL
BH CV LOWER VASCULAR LEFT POSTERIOR TIBIAL COMPRESS: NORMAL
BH CV LOWER VASCULAR LEFT PROFUNDA FEMORAL AUGMENT: NORMAL
BH CV LOWER VASCULAR LEFT PROFUNDA FEMORAL PHASIC: NORMAL
BH CV LOWER VASCULAR LEFT PROFUNDA FEMORAL SPONT: NORMAL
BH CV LOWER VASCULAR LEFT PROXIMAL FEMORAL AUGMENT: NORMAL
BH CV LOWER VASCULAR LEFT PROXIMAL FEMORAL COMPRESS: NORMAL
BH CV LOWER VASCULAR LEFT PROXIMAL FEMORAL PHASIC: NORMAL
BH CV LOWER VASCULAR LEFT PROXIMAL FEMORAL SPONT: NORMAL
BH CV LOWER VASCULAR LEFT SAPHENOFEMORAL JUNCTION AUGMENT: NORMAL
BH CV LOWER VASCULAR LEFT SAPHENOFEMORAL JUNCTION COMPRESS: NORMAL
BH CV LOWER VASCULAR LEFT SAPHENOFEMORAL JUNCTION PHASIC: NORMAL
BH CV LOWER VASCULAR LEFT SAPHENOFEMORAL JUNCTION SPONT: NORMAL
BH CV LOWER VASCULAR RIGHT COMMON FEMORAL AUGMENT: NORMAL
BH CV LOWER VASCULAR RIGHT COMMON FEMORAL COMPRESS: NORMAL
BH CV LOWER VASCULAR RIGHT COMMON FEMORAL PHASIC: NORMAL
BH CV LOWER VASCULAR RIGHT COMMON FEMORAL SPONT: NORMAL
BH CV LOWER VASCULAR RIGHT COMMON FEMORAL THROMBUS: NORMAL
MAXIMAL PREDICTED HEART RATE: 138 BPM
STRESS TARGET HR: 117 BPM

## 2022-07-09 NOTE — PROGRESS NOTES
Called patient sat 7/9/22 at 1530 and discussed prelim findings of normal doppler.  Swelling is same or slighly improved, certainly no worse today.  He has been wearing compression socks today.  Offered furosemide for a few days but we will wait and see if this improves over the weekend with compression and elevation.  Encourage good hydration and low sodium intake.

## 2022-07-23 ENCOUNTER — HOSPITAL ENCOUNTER (EMERGENCY)
Facility: HOSPITAL | Age: 82
Discharge: HOME | End: 2022-07-23
Attending: EMERGENCY MEDICINE | Admitting: EMERGENCY MEDICINE

## 2022-07-23 VITALS
HEART RATE: 84 BPM | WEIGHT: 198 LBS | SYSTOLIC BLOOD PRESSURE: 166 MMHG | RESPIRATION RATE: 22 BRPM | DIASTOLIC BLOOD PRESSURE: 91 MMHG | TEMPERATURE: 97.6 F | OXYGEN SATURATION: 96 % | HEIGHT: 71 IN | BODY MASS INDEX: 27.72 KG/M2

## 2022-07-23 DIAGNOSIS — R60.0 EDEMA OF LEFT LOWER EXTREMITY: ICD-10-CM

## 2022-07-23 DIAGNOSIS — L03.116 CELLULITIS OF LEFT LOWER EXTREMITY: Primary | ICD-10-CM

## 2022-07-23 PROCEDURE — 99283 EMERGENCY DEPT VISIT LOW MDM: CPT

## 2022-07-23 RX ORDER — DOXYCYCLINE 100 MG/1
100 CAPSULE ORAL ONCE
Status: COMPLETED | OUTPATIENT
Start: 2022-07-23 | End: 2022-07-23

## 2022-07-23 RX ADMIN — APIXABAN 10 MG: 5 TABLET, FILM COATED ORAL at 19:21

## 2022-07-23 RX ADMIN — DOXYCYCLINE 100 MG: 100 CAPSULE ORAL at 19:22

## 2022-07-23 NOTE — ED PROVIDER NOTES
EMERGENCY DEPARTMENT ENCOUNTER    Pt Name: Toño Alcala  MRN: [id number]  Pt :   1940  Room Number:  RW1/R1  Date of encounter:  2022  PCP: Radu Francis MD  ED Provider: Abdoul Moise PA-C    Historian: Patient      HPI:  Chief Complaint: Left leg swelling and redness        Context: Toño Alcala is a 82 y.o. male who presents to the ED c/o 8-day history of pain swelling and redness to the left lower leg.  Patient states he was attempting to scoot a chair backwards to stand on 7/15.  As he did so the chair tipped backwards, causing him to fall onto his back, his left lower leg hit the bottom edge of a table he was sitting at.  Since then he has had mild pain with some swelling and redness to the lower leg.  No fevers chills or sweats.  He does have a prior history of DVT and pulmonary embolism in .  He is not anticoagulated.  He takes an 81 mg aspirin daily.  He denies shortness of breath pleuritic chest or back pain sputum or hemoptysis.  He denies other symptoms or injuries at this time.      PAST MEDICAL HISTORY  Past Medical History:   Diagnosis Date   • Arthritis    • Bilateral radial fractures     fracture bilateral radial heads   • CAD (coronary artery disease)    • CVD (cardiovascular disease)     History of TIA    • Diabetes mellitus ([facility])    • DVT (deep venous thrombosis) ([facility])     Right lower extremity DVT and pulmonary embolism in 2015, idiopathic   • DVT of proximal lower limb ([facility]) 2015    proximal DVT right leg, small PE- anticoagulation   • Dyslipidemia    • Fracture     H/o pf left forearm fracture   • Fracture of right hand    • GERD (gastroesophageal reflux disease)     with hiatal hernia   • Hx of angina pectoris    • Hypertension     Normal renal angiography 11   • Left wrist fracture    • Osteoarthritis    • Right wrist fracture    • Vertigo    • Wears glasses          PAST SURGICAL HISTORY  Past Surgical History:    Procedure Laterality Date   • APPENDECTOMY     • BACK SURGERY     • CARDIAC CATHETERIZATION      with vein graft   • CERVICAL FUSION     • CERVICAL FUSION      C3-4   • COLONOSCOPY     • CORONARY ARTERY BYPASS GRAFT     • HERNIA REPAIR Right     inguinal   • INGUINAL HERNIA REPAIR Left 10/29/2019    Procedure: INGUINAL HERNIA REPAIR LEFT;  Surgeon: Charisma Raines MD;  Location: [facility];  Service: General   • LUMBAR LAMINECTOMY      laminectomy, lumbar, L3   • OTHER SURGICAL HISTORY      wrist surgery   • TONSILLECTOMY AND ADENOIDECTOMY           FAMILY HISTORY  Family History   Problem Relation Age of Onset   • Arthritis Mother         OA   • Heart disease Father    • Diabetes Father    • Heart attack Father    • Heart disease Brother    • Heart attack Brother    • Diabetes Brother    • Diabetes Son    • Hypertension Other    • Cancer Other          SOCIAL HISTORY  Social History     Socioeconomic History   • Marital status:    Tobacco Use   • Smoking status: Former Smoker     Packs/day: 1.00     Years: 20.00     Pack years: 20.00     Types: Cigarettes     Quit date: 1997     Years since quittin.2   • Smokeless tobacco: Never Used   • Tobacco comment: QUIT DATE 1992   Vaping Use   • Vaping Use: Never used   Substance and Sexual Activity   • Alcohol use: Yes     Alcohol/week: 7.0 standard drinks     Types: 7 Glasses of wine per week     Comment: glass of wine everyday    • Drug use: No   • Sexual activity: Defer         ALLERGIES  Penicillins        REVIEW OF SYSTEMS  Review of Systems   Constitutional: Positive for activity change. Negative for chills, fatigue and fever.   HENT: Negative for congestion, rhinorrhea and sore throat.    Respiratory: Negative for cough, shortness of breath and wheezing.    Cardiovascular: Negative for chest pain and palpitations.   Gastrointestinal: Negative for abdominal pain, nausea and vomiting.   Genitourinary:  Negative for dysuria, flank pain and hematuria.   Musculoskeletal: Negative for arthralgias, back pain and myalgias.   Skin: Positive for color change. Negative for pallor, rash and wound.          All systems reviewed and negative except for those discussed in HPI.       PHYSICAL EXAM    I have reviewed the triage vital signs and nursing notes.    ED Triage Vitals [07/23/22 1714]   Temp Heart Rate Resp BP SpO2   97.6 °F (36.4 °C) 84 22 166/91 96 %      Temp src Heart Rate Source Patient Position BP Location FiO2 (%)   Oral Monitor Sitting -- --       Physical Exam  GENERAL:   Pleasant awake alert and oriented nontoxic-appearing afebrile hemodynamically stable, not in acute distress.  HENT: Nares patent.  EYES: No scleral icterus.  CV: Regular rhythm, regular rate.  RESPIRATORY: Normal effort.  No audible wheezes, rales or rhonchi.  ABDOMEN: Soft, nontender  MUSCULOSKELETAL: Left lower extremity with what appears to be chronic venous stasis changes, but he does have some erythema and induration to the lower third of his calf anteriorly that wraps around circumferentially.  No palpable tender cords, no venous distention.  Homans' sign absent.  No calf tenderness.  No popliteal tenderness.  No posterior thigh tenderness.  No lymphangitic streaking.  Neurovascular status is intact distally.  NEURO: Alert, moves all extremities, follows commands.  SKIN: Warm, dry, no rash visualized.        LAB RESULTS  Recent Results (from the past 24 hour(s))   Duplex Venous Lower Extremity - Left CAR    Collection Time: 07/24/22  9:37 AM   Result Value Ref Range    Target HR (85%) 117 bpm    Max. Pred. HR (100%) 138 bpm    Right Common Femoral Spont 1     Right Common Femoral Spont D     Right Common Femoral Phasic D     Right Common Femoral Augment D     Right Common Femoral Compress P     Right Common Femoral Thrombus C     Left Common Femoral Spont Y     Left Common Femoral Phasic Y     Left Common Femoral Augment Y     Left  Common Femoral Compress C     Left Saphenofemoral Junction Spont Y     Left Saphenofemoral Junction Phasic Y     Left Saphenofemoral Junction Augment Y     Left Saphenofemoral Junction Compress C     Left Profunda Femoral Spont Y     Left Profunda Femoral Phasic Y     Left Profunda Femoral Augment Y     Left Profunda Femoral Compress C     Left Proximal Femoral Spont Y     Left Proximal Femoral Phasic Y     Left Proximal Femoral Augment Y     Left Proximal Femoral Compress C     Left Mid Femoral Spont Y     Left Mid Femoral Phasic Y     Left Mid Femoral Augment Y     Left Mid Femoral Compress C     Left Distal Femoral Spont Y     Left Distal Femoral Phasic Y     Left Distal Femoral Augment Y     Left Distal Femoral Compress C     Left Popliteal Spont Y     Left Popliteal Phasic Y     Left Popliteal Augment Y     Left Popliteal Compress C     Left Posterior Tibial Compress C     Left Peroneal Compress C     Left Gastronemius Compress C     Left Greater Saph AK Compress C     Left Greater Saph BK Compress C     Left Lesser Saph Compress C        If labs were ordered, I independently reviewed the results.        RADIOLOGY  No Radiology Exams Resulted Within Past 24 Hours        PROCEDURES    Procedures    No orders to display       MEDICATIONS GIVEN IN ER    Medications   apixaban (ELIQUIS) tablet 10 mg (10 mg Oral Given 7/23/22 1921)   doxycycline (MONODOX) capsule 100 mg (100 mg Oral Given 7/23/22 1922)         PROGRESS, DATA ANALYSIS, CONSULTS, AND MEDICAL DECISION MAKING    All labs have been independently reviewed by me.  All radiology studies have been reviewed by me and the radiologist dictating the report.   EKG's have been independently viewed and interpreted by me.      Differential diagnoses: Cellulitis, peripheral vascular disease, DVT, SVT           Echo lab has left for the day, we will provide him with his first dose of Eliquis and have him return tomorrow morning for a duplex.  We will also cover him  for Keflex with doxycycline twice daily x10 days.  Keep leg elevated observe for any change or worsening and return if any change or worsening of symptoms.  He verbalizes understanding and is agreeable to plan.      AS OF 11:24 EDT VITALS:    BP - 166/91  HR - 84  TEMP - 97.6 °F (36.4 °C) (Oral)  O2 SATS - 96%                  DIAGNOSIS  Final diagnoses:   Cellulitis of left lower extremity   Edema of left lower extremity         DISPOSITION  DISCHARGE    Patient discharged in stable condition.    Reviewed implications of results, diagnosis, meds, responsibility to follow up, warning signs and symptoms of possible worsening, potential complications and reasons to return to ER.    Patient/Family voiced understanding of above instructions.    Discussed plan for discharge, as there is no emergent indication for admission.  Pt/family is agreeable and understands need for follow up and possible repeat testing.  Pt/family is aware that discharge does not mean that nothing is wrong but that it indicates no emergency is currently present that requires admission and they must continue care with follow-up as given below or with a physician of their choice.     FOLLOW-UP  No follow-up provider specified.       Medication List      Changed    gabapentin 300 MG capsule  Commonly known as: NEURONTIN  TAKE THREE CAPSULES BY MOUTH TWICE A DAY  What changed:   · how much to take  · when to take this                     Abdoul Moise PA-C  07/24/22 [id number]

## 2022-07-23 NOTE — DISCHARGE INSTRUCTIONS
When you return tomorrow morning at your convenience, go to registration and he will then be sent upstairs.  If no one is in registration, present to the emergency department, and we can register you here.  Return to the emergency department in the meantime if you have any change or worsening of symptoms.

## 2022-07-24 ENCOUNTER — HOSPITAL ENCOUNTER (OUTPATIENT)
Dept: CARDIOLOGY | Facility: HOSPITAL | Age: 82
Discharge: HOME | End: 2022-07-24
Admitting: PHYSICIAN ASSISTANT

## 2022-07-24 VITALS — HEIGHT: 71 IN | BODY MASS INDEX: 27.72 KG/M2 | WEIGHT: 198 LBS

## 2022-07-24 DIAGNOSIS — R60.0 EDEMA OF LEFT LOWER EXTREMITY: ICD-10-CM

## 2022-07-24 DIAGNOSIS — L03.116 CELLULITIS OF LEFT LOWER EXTREMITY: ICD-10-CM

## 2022-07-24 LAB
BH CV LOW VAS RIGHT COMMON FEMORAL SPONT: 1
BH CV LOWER VASCULAR LEFT COMMON FEMORAL AUGMENT: NORMAL
BH CV LOWER VASCULAR LEFT COMMON FEMORAL COMPRESS: NORMAL
BH CV LOWER VASCULAR LEFT COMMON FEMORAL PHASIC: NORMAL
BH CV LOWER VASCULAR LEFT COMMON FEMORAL SPONT: NORMAL
BH CV LOWER VASCULAR LEFT DISTAL FEMORAL AUGMENT: NORMAL
BH CV LOWER VASCULAR LEFT DISTAL FEMORAL COMPRESS: NORMAL
BH CV LOWER VASCULAR LEFT DISTAL FEMORAL PHASIC: NORMAL
BH CV LOWER VASCULAR LEFT DISTAL FEMORAL SPONT: NORMAL
BH CV LOWER VASCULAR LEFT GASTRONEMIUS COMPRESS: NORMAL
BH CV LOWER VASCULAR LEFT GREATER SAPH AK COMPRESS: NORMAL
BH CV LOWER VASCULAR LEFT GREATER SAPH BK COMPRESS: NORMAL
BH CV LOWER VASCULAR LEFT LESSER SAPH COMPRESS: NORMAL
BH CV LOWER VASCULAR LEFT MID FEMORAL AUGMENT: NORMAL
BH CV LOWER VASCULAR LEFT MID FEMORAL COMPRESS: NORMAL
BH CV LOWER VASCULAR LEFT MID FEMORAL PHASIC: NORMAL
BH CV LOWER VASCULAR LEFT MID FEMORAL SPONT: NORMAL
BH CV LOWER VASCULAR LEFT PERONEAL COMPRESS: NORMAL
BH CV LOWER VASCULAR LEFT POPLITEAL AUGMENT: NORMAL
BH CV LOWER VASCULAR LEFT POPLITEAL COMPRESS: NORMAL
BH CV LOWER VASCULAR LEFT POPLITEAL PHASIC: NORMAL
BH CV LOWER VASCULAR LEFT POPLITEAL SPONT: NORMAL
BH CV LOWER VASCULAR LEFT POSTERIOR TIBIAL COMPRESS: NORMAL
BH CV LOWER VASCULAR LEFT PROFUNDA FEMORAL AUGMENT: NORMAL
BH CV LOWER VASCULAR LEFT PROFUNDA FEMORAL COMPRESS: NORMAL
BH CV LOWER VASCULAR LEFT PROFUNDA FEMORAL PHASIC: NORMAL
BH CV LOWER VASCULAR LEFT PROFUNDA FEMORAL SPONT: NORMAL
BH CV LOWER VASCULAR LEFT PROXIMAL FEMORAL AUGMENT: NORMAL
BH CV LOWER VASCULAR LEFT PROXIMAL FEMORAL COMPRESS: NORMAL
BH CV LOWER VASCULAR LEFT PROXIMAL FEMORAL PHASIC: NORMAL
BH CV LOWER VASCULAR LEFT PROXIMAL FEMORAL SPONT: NORMAL
BH CV LOWER VASCULAR LEFT SAPHENOFEMORAL JUNCTION AUGMENT: NORMAL
BH CV LOWER VASCULAR LEFT SAPHENOFEMORAL JUNCTION COMPRESS: NORMAL
BH CV LOWER VASCULAR LEFT SAPHENOFEMORAL JUNCTION PHASIC: NORMAL
BH CV LOWER VASCULAR LEFT SAPHENOFEMORAL JUNCTION SPONT: NORMAL
BH CV LOWER VASCULAR RIGHT COMMON FEMORAL AUGMENT: NORMAL
BH CV LOWER VASCULAR RIGHT COMMON FEMORAL COMPRESS: NORMAL
BH CV LOWER VASCULAR RIGHT COMMON FEMORAL PHASIC: NORMAL
BH CV LOWER VASCULAR RIGHT COMMON FEMORAL SPONT: NORMAL
BH CV LOWER VASCULAR RIGHT COMMON FEMORAL THROMBUS: NORMAL
MAXIMAL PREDICTED HEART RATE: 138 BPM
STRESS TARGET HR: 117 BPM

## 2022-07-24 PROCEDURE — 93971 EXTREMITY STUDY: CPT

## 2022-07-24 PROCEDURE — 93971 EXTREMITY STUDY: CPT | Performed by: INTERNAL MEDICINE

## 2022-07-24 RX ORDER — DOXYCYCLINE 100 MG/1
100 CAPSULE ORAL 2 TIMES DAILY
Qty: 20 CAPSULE | Refills: 0 | Status: SHIPPED | OUTPATIENT
Start: 2022-07-24 | End: 2022-08-03

## 2022-07-27 ENCOUNTER — PATIENT OUTREACH (OUTPATIENT)
Dept: CASE MANAGEMENT | Facility: OTHER | Age: 82
End: 2022-07-27

## 2022-07-27 NOTE — OUTREACH NOTE
"AMBULATORY CASE MANAGEMENT NOTE    Name and Relationship of Patient/Support Person: Toño Alcala A - Self    Patient Outreach    Pt contacted regarding [facility] ED visit 7/23/22 with chief c/o listed as leg pain.  Role of Ambulatory Nurse  explained and number provided.  States he did obtain the antibiotic and knows to complete entire course as directed.  States leg is still swollen.  Encouraged to keep elevated as much as possible.  Offered to assist in scheduling ED f/u visit with PCP, however he states he will call. [facility] 24/7 Nurse Call Center explained and states he does have the number. States also, his wife is a nurse.  He does have cane that he was using b/c of knee, but after having gel injections was going to be able to ambulate without, however he will continue to use for now.  He does monitor his bp and bs at home.  States bp has been up a little \"I guess because of the leg pain.\"  Encouraged to monitor, log and to take log to PCP appt.  He denies any needs that RN-ACM could assist him with today and voiced his appreciation for the call.     Adult Patient Profile  Questions/Answers    Flowsheet Row Most Recent Value   Symptoms/Conditions Managed at Home cardiovascular   Cardiovascular Symptoms/Conditions other (see comments)  [hx DVT ]   Cardiovascular Management Strategies medication therapy   Hearing Difficulty or Deaf yes   Hearing Management has hearing aids   Wear Glasses or Blind yes   Concentrating, Remembering or Making Decisions Difficulty no   Difficulty Communicating no   Difficulty Eating/Swallowing no   Walking or Climbing Stairs Difficulty no   Dressing/Bathing Difficulty no   Doing Errands Independently Difficulty (such as shopping) no   Equipment Currently Used at Home cane, straight, bp cuff, glucometer   People in Home spouse   Current Living Arrangements home          Social Work Assessment  Questions/Answers    Flowsheet Row Most Recent Value   People in Home spouse "   Current Living Arrangements home   Primary Care Provided by self   Quality of Family Relationships supportive  [reports wife is nurse ]   Equipment Currently Used at Home cane, straight, bp cuff, glucometer          Send Education  Questions/Answers    Flowsheet Row Most Recent Value   Annual Wellness Visit:  Patient Has Completed   Other Patient Education/Resources  24/7 [facility] Nurse Call Line   24/7 Nurse Call Line Education Method Verbal   Advanced Directives: Patient Has          SDOH updated and reviewed with the patient during this program:  Financial Resource Strain: Low Risk    • Difficulty of Paying Living Expenses: Not hard at all      Food Insecurity: No Food Insecurity   • Worried About Running Out of Food in the Last Year: Never true   • Ran Out of Food in the Last Year: Never true      Transportation Needs: No Transportation Needs   • Lack of Transportation (Medical): No   • Lack of Transportation (Non-Medical): No      Housing Stability: Low Risk    • Unable to Pay for Housing in the Last Year: No   • Number of Places Lived in the Last Year: 1   • Unstable Housing in the Last Year: No      HERMANN MANN  Ambulatory Case Management    7/27/2022, 10:13 EDT

## 2022-08-03 ENCOUNTER — HOSPITAL ENCOUNTER (OUTPATIENT)
Dept: GENERAL RADIOLOGY | Facility: HOSPITAL | Age: 82
Discharge: HOME | End: 2022-08-03
Admitting: INTERNAL MEDICINE

## 2022-08-03 ENCOUNTER — OFFICE VISIT (OUTPATIENT)
Dept: INTERNAL MEDICINE | Facility: CLINIC | Age: 82
End: 2022-08-03

## 2022-08-03 VITALS
HEIGHT: 71 IN | SYSTOLIC BLOOD PRESSURE: 112 MMHG | HEART RATE: 88 BPM | DIASTOLIC BLOOD PRESSURE: 62 MMHG | BODY MASS INDEX: 29.82 KG/M2 | TEMPERATURE: 98 F | WEIGHT: 213 LBS

## 2022-08-03 DIAGNOSIS — M79.605 LEFT LEG PAIN: ICD-10-CM

## 2022-08-03 DIAGNOSIS — M79.605 LEFT LEG PAIN: Primary | ICD-10-CM

## 2022-08-03 PROCEDURE — 73590 X-RAY EXAM OF LOWER LEG: CPT

## 2022-08-03 PROCEDURE — 99213 OFFICE O/P EST LOW 20 MIN: CPT | Performed by: INTERNAL MEDICINE

## 2022-08-03 NOTE — PROGRESS NOTES
[facility]     Toño Alcala  1940   [id number]      Patient Care Team:  Radu Francis MD as PCP - General  Manda Ivey RN as Ambulatory  ([facility])    Chief Complaint::   Chief Complaint   Patient presents with   • Leg Pain     Left  - ED 7/23/22        HPI    The patient presents today with complaints of left leg pain.    HPI  The patient comes in complaining of left leg pain. He was initially seen here in the office on 07/08/2022 with swelling in the left leg. He has a history of DVT which showed a chronic right lower extremity DVT in the common femoral artery, but the left was negative. I spoke at the time to his cardiologist about this who felt that there was no need for further anticoagulation. However, he had persistent pain and presented to the emergency department on 07/23/2022. Again, venous doppler showed no evidence of DVT on the left and a chronic DVT on the right. He comes in today complaining of persistent pain in the left leg.    Left leg pain  The patient states that the swelling and pain in the left lower extremity began initially after a traumatic injury to the left pretibial area. This apparently occurred in early 07/2022 prior to his 07/07/2022 visit. He struck his left pretibial area fairly hard on a heavy chair. Apparently, there was a hematoma initially, but then after that, he has had increased swelling and pain in the pretibial. The patient reports he has been wearing compression socks. He states the socks help with the pain.    Chronic Conditions:      Patient Active Problem List   Diagnosis   • CAD (coronary artery disease)   • CVD (cardiovascular disease)   • DVT (deep venous thrombosis) ([facility])   • Diabetes mellitus (HCC)   • Dyslipidemia   • Arthritis   • GERD (gastroesophageal reflux disease)   • Mixed hyperlipidemia   • Diabetic nephropathy associated with type 2 diabetes mellitus (HCC)   • Diabetic polyneuropathy associated  with type 2 diabetes mellitus ([facility])   • Chronic kidney disease, stage III (moderate) ([facility])   • Vitamin D deficiency   • Disc disorder of cervical region   • Postthrombotic syndrome   • Vertigo   • Angina pectoris ([facility])   • Lumbosacral spondylosis without myelopathy   • Psoriasis   • Arthritis of elbow   • Swelling of right lower extremity   • Acute right-sided low back pain without sciatica   • Hoarseness   • Unifocal PVCs   • Skin lesion of face   • Primary hypertension   • Medicare annual wellness visit, subsequent   • Stage 3 chronic kidney disease due to benign hypertension ([facility])   • BMI 30.0-30.9,adult   • Postherpetic neuralgia   • Herpes zoster without complication   • Calculus of gallbladder without cholecystitis without obstruction   • Acute diverticulitis   • Leg edema, right   • Chest pain   • Encounter for removal of sutures   • Chronic pain of left knee   • Fall        Past Medical History:   Diagnosis Date   • Arthritis    • Bilateral radial fractures 1962    fracture bilateral radial heads   • CAD (coronary artery disease)    • CVD (cardiovascular disease)     History of TIA 1989   • Diabetes mellitus ([facility])    • DVT (deep venous thrombosis) ([facility])     Right lower extremity DVT and pulmonary embolism in 06/2015, idiopathic   • DVT of proximal lower limb ([facility]) 06/22/2015    proximal DVT right leg, small PE- anticoagulation   • Dyslipidemia    • Fracture 1952    H/o pf left forearm fracture   • Fracture of right hand 1980   • GERD (gastroesophageal reflux disease)     with hiatal hernia   • Hx of angina pectoris    • Hypertension     Normal renal angiography 02/16/11   • Left wrist fracture 1953   • Osteoarthritis    • Right wrist fracture    • Vertigo    • Wears glasses        Past Surgical History:   Procedure Laterality Date   • APPENDECTOMY  1957   • BACK SURGERY     • CARDIAC CATHETERIZATION  2011    with vein graft   • CERVICAL FUSION     • CERVICAL FUSION  1992    C3-4   • COLONOSCOPY  2013   •  CORONARY ARTERY BYPASS GRAFT     • HERNIA REPAIR Right     inguinal   • INGUINAL HERNIA REPAIR Left 10/29/2019    Procedure: INGUINAL HERNIA REPAIR LEFT;  Surgeon: Charisma Raines MD;  Location: [facility];  Service: General   • LUMBAR LAMINECTOMY      laminectomy, lumbar, L3   • OTHER SURGICAL HISTORY      wrist surgery   • TONSILLECTOMY AND ADENOIDECTOMY         Family History   Problem Relation Age of Onset   • Arthritis Mother         OA   • Heart disease Father    • Diabetes Father    • Heart attack Father    • Heart disease Brother    • Heart attack Brother    • Diabetes Brother    • Diabetes Son    • Hypertension Other    • Cancer Other        Social History     Socioeconomic History   • Marital status:    Tobacco Use   • Smoking status: Former Smoker     Packs/day: 1.00     Years: 20.00     Pack years: 20.00     Types: Cigarettes     Quit date: 1997     Years since quittin.2   • Smokeless tobacco: Never Used   • Tobacco comment: QUIT DATE 1992   Vaping Use   • Vaping Use: Never used   Substance and Sexual Activity   • Alcohol use: Yes     Alcohol/week: 7.0 standard drinks     Types: 7 Glasses of wine per week     Comment: glass of wine everyday    • Drug use: No   • Sexual activity: Defer       Allergies   Allergen Reactions   • Penicillins Hives and Swelling     Per patient, has tolerated Keflex         Current Outpatient Medications:   •  acetaminophen (TYLENOL) 325 MG tablet, Take 650 mg by mouth As Needed for Mild Pain ., Disp: , Rfl:   •  aspirin 81 MG EC tablet, Take 81 mg by mouth Daily., Disp: , Rfl:   •  atorvastatin (LIPITOR) 40 MG tablet, Take 40 mg by mouth Daily., Disp: , Rfl:   •  calcipotriene-betamethasone (TACLONEX) 0.005-0.064 % ointment, Apply 1 application topically to the appropriate area as directed As Needed (psoriasis)., Disp: , Rfl:   •  cholecalciferol (VITAMIN D3) 25 MCG (1000 UT) tablet, Take 1,000 Units by mouth Daily., Disp: , Rfl:   •  " diclofenac (VOLTAREN) 1 % gel gel, Apply 4 g topically As Needed., Disp: , Rfl:   •  diltiaZEM CD (CARDIZEM CD) 180 MG 24 hr capsule, Take 180 mg by mouth Daily., Disp: , Rfl:   •  gabapentin (NEURONTIN) 300 MG capsule, TAKE THREE CAPSULES BY MOUTH TWICE A DAY (Patient taking differently: Take 300 mg by mouth Daily.), Disp: 180 capsule, Rfl: 2  •  glucose blood test strip, Use to check blood sugar twice daily, Disp: 100 each, Rfl: 3  •  lidocaine (LIDODERM) 5 %, Place 1 patch on the skin as directed by provider Daily. Remove & Discard patch within 12 hours or as directed by MD, Disp: 90 patch, Rfl: 2  •  losartan (COZAAR) 100 MG tablet, Take 100 mg by mouth Every Night., Disp: , Rfl:   •  Microlet Lancets misc, Use to check blood sugar twice daily, Disp: 100 each, Rfl: 3  •  nitroglycerin (NITROSTAT) 0.4 MG SL tablet, Place 0.4 mg under the tongue Every 5 (Five) Minutes As Needed for Chest Pain. Take no more than 3 doses in 15 minutes., Disp: , Rfl:   •  omeprazole (priLOSEC) 20 MG capsule, Take 20 mg by mouth Daily., Disp: , Rfl:     Review of Systems   Constitutional: Negative for chills, fatigue and fever.   HENT: Negative for congestion, ear pain and sinus pressure.    Respiratory: Negative for cough, chest tightness, shortness of breath and wheezing.    Cardiovascular: Negative for chest pain and palpitations.   Gastrointestinal: Negative for abdominal pain, blood in stool and constipation.   Skin: Negative for color change.   Allergic/Immunologic: Negative for environmental allergies.   Neurological: Negative for dizziness, speech difficulty and headache.   Psychiatric/Behavioral: Negative for decreased concentration. The patient is not nervous/anxious.         Vital Signs  Vitals:    08/03/22 0856   BP: 112/62   BP Location: Left arm   Patient Position: Sitting   Cuff Size: Large Adult   Pulse: 88   Temp: 98 °F (36.7 °C)   Weight: 96.6 kg (213 lb)   Height: 180.3 cm (70.98\")   PainSc:   8   PainLoc: " Leg  Comment: left       Physical Exam  Vitals reviewed.   Constitutional:       Appearance: He is well-developed.   HENT:      Head: Normocephalic and atraumatic.   Cardiovascular:      Rate and Rhythm: Normal rate and regular rhythm.      Heart sounds: Normal heart sounds. No murmur heard.  Pulmonary:      Effort: Pulmonary effort is normal.      Breath sounds: Normal breath sounds.   Musculoskeletal:      Right lower leg: Edema present.      Left lower leg: Tenderness present. 2+ Pitting Edema present.      Comments: 2+ pitting edema on the left lower extremity from the knee down  venous stasis changes anteriorly from about mid calf to the ankle  significant tenderness in the soft tissue on palpation   Neurological:      Mental Status: He is alert and oriented to person, place, and time.          Procedures    ACE III MINI             Assessment/Plan:    Diagnoses and all orders for this visit:    1. Left leg pain (Primary)  -     XR Tibia Fibula 2 View Left; Future        1. Persistent left lower extremity pain and swelling following a traumatic injury       -     He states that compression helps. This does appear to be venous insufficiency, but because of the trauma, we will obtain an x-ray of the tibia to make sure there is no underlying bony abnormality. As long as the x-ray is negative, the next step would be referral to vascular surgery for further work-up.    Follow up as previously scheduled.    Transcribed from ambient dictation for Radu Francis MD by EDWIN KULKARNI.  08/03/22   10:40 EDT    Patient verbalized consent to the visit recording.      Plan of care reviewed with patient at the conclusion of today's visit. Education was provided regarding diagnosis, management, and any prescribed or recommended OTC medications.Patient verbalizes understanding of and agreement with management plan.         Radu Francis MD

## 2022-08-04 DIAGNOSIS — I87.2 VENOUS INSUFFICIENCY: Primary | ICD-10-CM

## 2022-08-12 DIAGNOSIS — M79.605 LEFT LEG PAIN: Primary | ICD-10-CM

## 2022-08-25 ENCOUNTER — TELEPHONE (OUTPATIENT)
Dept: ORTHOPEDIC SURGERY | Facility: CLINIC | Age: 82
End: 2022-08-25

## 2022-08-25 NOTE — TELEPHONE ENCOUNTER
Provider: DR KABA    Caller: MIGDALIA AREVALO    Relationship to Patient: SELF    Phone Number: [phone]    Reason for Call: PT HAS A LOT OF FLUID ON HIS LEFT KNEE AND WAS ADVISED TO ASK DR KABA IF HE COULD HAVE HIS KNEE ASPIRATED BEFORE THE APPT HE HAS IN December. PLEASE CALL HIM BACK AT THE NUMBER ABOVE.

## 2022-08-31 ENCOUNTER — TELEPHONE (OUTPATIENT)
Dept: ORTHOPEDIC SURGERY | Facility: CLINIC | Age: 82
End: 2022-08-31

## 2022-09-01 NOTE — TELEPHONE ENCOUNTER
Tried to reach patient on both numbers with no success.  If he calls back, please transfer him to the clinic.    Thank you,    Kevan ROJAS(R)

## 2022-09-02 ENCOUNTER — TELEPHONE (OUTPATIENT)
Dept: ORTHOPEDIC SURGERY | Facility: CLINIC | Age: 82
End: 2022-09-02

## 2022-09-02 NOTE — TELEPHONE ENCOUNTER
Patient's vein doctor thinks the patient has extra fluid on his knee and this is preventing his lower leg area from healing.    I scheduled patient for an evaluation on Dr. Renteria's next available 09.12.2022 at 0930.    Kevan ROJAS(R)

## 2022-09-02 NOTE — TELEPHONE ENCOUNTER
Patient called back when I was unavailable.  I left a voicemail for him to return my call.    Kevan Rodney RT(R)

## 2022-09-12 ENCOUNTER — OFFICE VISIT (OUTPATIENT)
Dept: ORTHOPEDIC SURGERY | Facility: CLINIC | Age: 82
End: 2022-09-12

## 2022-09-12 VITALS
DIASTOLIC BLOOD PRESSURE: 72 MMHG | BODY MASS INDEX: 29.34 KG/M2 | WEIGHT: 209.6 LBS | HEIGHT: 71 IN | SYSTOLIC BLOOD PRESSURE: 138 MMHG

## 2022-09-12 DIAGNOSIS — M17.12 PRIMARY OSTEOARTHRITIS OF LEFT KNEE: Primary | ICD-10-CM

## 2022-09-12 PROCEDURE — 20610 DRAIN/INJ JOINT/BURSA W/O US: CPT | Performed by: ORTHOPAEDIC SURGERY

## 2022-09-12 RX ORDER — TRIAMCINOLONE ACETONIDE 40 MG/ML
40 INJECTION, SUSPENSION INTRA-ARTICULAR; INTRAMUSCULAR
Status: COMPLETED | OUTPATIENT
Start: 2022-09-12 | End: 2022-09-12

## 2022-09-12 RX ORDER — ROPIVACAINE HYDROCHLORIDE 5 MG/ML
4 INJECTION, SOLUTION EPIDURAL; INFILTRATION; PERINEURAL
Status: COMPLETED | OUTPATIENT
Start: 2022-09-12 | End: 2022-09-12

## 2022-09-12 RX ADMIN — TRIAMCINOLONE ACETONIDE 40 MG: 40 INJECTION, SUSPENSION INTRA-ARTICULAR; INTRAMUSCULAR at 09:51

## 2022-09-12 RX ADMIN — ROPIVACAINE HYDROCHLORIDE 4 ML: 5 INJECTION, SOLUTION EPIDURAL; INFILTRATION; PERINEURAL at 09:51

## 2022-09-12 NOTE — PROGRESS NOTES
Procedure   Large Joint Arthrocentesis: L knee  Date/Time: 9/12/2022 9:51 AM  Consent given by: patient  Site marked: site marked  Timeout: Immediately prior to procedure a time out was called to verify the correct patient, procedure, equipment, support staff and site/side marked as required   Supporting Documentation  Indications: pain   Procedure Details  Location: knee - L knee  Preparation: Patient was prepped and draped in the usual sterile fashion  Needle size: 18 G  Approach: anterolateral  Medications administered: 4 mL ropivacaine 0.5 %; 40 mg triamcinolone acetonide 40 MG/ML  Aspirate amount: 50 mL  Aspirate: clear and yellow  Patient tolerance: patient tolerated the procedure well with no immediate complications

## 2022-09-12 NOTE — PROGRESS NOTES
[facility] Note    Subjective     Chief Complaint   Patient presents with   • Follow-up     3 month f/u; Primary osteoarthritis of left knee (Orthovisc series completed 6/15/22)        HPI    It has been 3  month(s) since Mr. Alcala's last visit. He returns to clinic today for follow-up of left knee arthritis. The issue has been ongoing for 2 month(s). He rates his pain a 3/10 on the pain scale. Previous/current treatments: cane/walker. Current symptoms: swelling. The pain is worse with standing and climbing stairs; resting, sitting, ice and elevating the extremity improve the pain. Overall, he is doing better.  The viscosupplementation injections provided relief.  He is having edema distally, and his vascular surgeon recommended a trial of an aspiration of his knee to see if we can help with some of the swelling in his leg.    I have reviewed the following portions of the patient's history and agree with: History of Present Illness and Review of Systems    Patient Active Problem List   Diagnosis   • CAD (coronary artery disease)   • CVD (cardiovascular disease)   • DVT (deep venous thrombosis) ([facility])   • Diabetes mellitus ([facility])   • Dyslipidemia   • Arthritis   • GERD (gastroesophageal reflux disease)   • Mixed hyperlipidemia   • Diabetic nephropathy associated with type 2 diabetes mellitus ([facility])   • Diabetic polyneuropathy associated with type 2 diabetes mellitus ([facility])   • Chronic kidney disease, stage III (moderate) ([facility])   • Vitamin D deficiency   • Disc disorder of cervical region   • Postthrombotic syndrome   • Vertigo   • Angina pectoris ([facility])   • Lumbosacral spondylosis without myelopathy   • Psoriasis   • Arthritis of elbow   • Swelling of right lower extremity   • Acute right-sided low back pain without sciatica   • Hoarseness   • Unifocal PVCs   • Skin lesion of face   • Primary hypertension   • Medicare annual wellness visit, subsequent   • Stage 3 chronic kidney disease due to  benign hypertension (HCC)   • BMI 30.0-30.9,adult   • Postherpetic neuralgia   • Herpes zoster without complication   • Calculus of gallbladder without cholecystitis without obstruction   • Acute diverticulitis   • Leg edema, right   • Chest pain   • Encounter for removal of sutures   • Chronic pain of left knee   • Fall     Past Medical History:   Diagnosis Date   • Arthritis    • Bilateral radial fractures 1962    fracture bilateral radial heads   • CAD (coronary artery disease)    • CVD (cardiovascular disease)     History of TIA 1989   • Diabetes mellitus ([facility])    • DVT (deep venous thrombosis) ([facility])     Right lower extremity DVT and pulmonary embolism in 06/2015, idiopathic   • DVT of proximal lower limb ([facility]) 06/22/2015    proximal DVT right leg, small PE- anticoagulation   • Dyslipidemia    • Fracture 1952    H/o pf left forearm fracture   • Fracture of right hand 1980   • GERD (gastroesophageal reflux disease)     with hiatal hernia   • Hx of angina pectoris    • Hypertension     Normal renal angiography 02/16/11   • Left wrist fracture 1953   • Osteoarthritis    • Right wrist fracture    • Vertigo    • Wears glasses       Past Surgical History:   Procedure Laterality Date   • APPENDECTOMY  1957   • BACK SURGERY     • CARDIAC CATHETERIZATION  2011    with vein graft   • CERVICAL FUSION     • CERVICAL FUSION  1992    C3-4   • COLONOSCOPY  2013   • CORONARY ARTERY BYPASS GRAFT  1994   • HERNIA REPAIR Right 2011    inguinal   • INGUINAL HERNIA REPAIR Left 10/29/2019    Procedure: INGUINAL HERNIA REPAIR LEFT;  Surgeon: Charisma Raines MD;  Location: [facility];  Service: General   • LUMBAR LAMINECTOMY  1996    laminectomy, lumbar, L3   • OTHER SURGICAL HISTORY      wrist surgery   • TONSILLECTOMY AND ADENOIDECTOMY  1945      Family History   Problem Relation Age of Onset   • Arthritis Mother         OA   • Heart disease Father    • Diabetes Father    • Heart attack Father    • Heart disease Brother    •  Heart attack Brother    • Diabetes Brother    • Diabetes Son    • Hypertension Other    • Cancer Other      Social History     Socioeconomic History   • Marital status:    Tobacco Use   • Smoking status: Former Smoker     Packs/day: 1.00     Years: 20.00     Pack years: 20.00     Types: Cigarettes     Quit date: 1997     Years since quittin.4   • Smokeless tobacco: Never Used   • Tobacco comment: QUIT DATE 1992   Vaping Use   • Vaping Use: Never used   Substance and Sexual Activity   • Alcohol use: Yes     Alcohol/week: 7.0 standard drinks     Types: 7 Glasses of wine per week     Comment: glass of wine everyday    • Drug use: No   • Sexual activity: Defer      Current Outpatient Medications on File Prior to Visit   Medication Sig Dispense Refill   • acetaminophen (TYLENOL) 325 MG tablet Take 650 mg by mouth As Needed for Mild Pain .     • aspirin 81 MG EC tablet Take 81 mg by mouth Daily.     • atorvastatin (LIPITOR) 40 MG tablet Take 40 mg by mouth Daily.     • calcipotriene-betamethasone (TACLONEX) 0.005-0.064 % ointment Apply 1 application topically to the appropriate area as directed As Needed (psoriasis).     • cholecalciferol (VITAMIN D3) 25 MCG (1000 UT) tablet Take 1,000 Units by mouth Daily.     • diclofenac (VOLTAREN) 1 % gel gel Apply 4 g topically As Needed.     • diltiaZEM CD (CARDIZEM CD) 180 MG 24 hr capsule Take 180 mg by mouth Daily.     • gabapentin (NEURONTIN) 300 MG capsule TAKE THREE CAPSULES BY MOUTH TWICE A DAY (Patient taking differently: Take 300 mg by mouth Daily.) 180 capsule 2   • glucose blood test strip Use to check blood sugar twice daily 100 each 3   • lidocaine (LIDODERM) 5 % Place 1 patch on the skin as directed by provider Daily. Remove & Discard patch within 12 hours or as directed by MD 90 patch 2   • losartan (COZAAR) 100 MG tablet Take 100 mg by mouth Every Night.     • Microlet Lancets misc Use to check blood sugar twice daily 100 each 3   •  nitroglycerin (NITROSTAT) 0.4 MG SL tablet Place 0.4 mg under the tongue Every 5 (Five) Minutes As Needed for Chest Pain. Take no more than 3 doses in 15 minutes.     • omeprazole (priLOSEC) 20 MG capsule Take 20 mg by mouth Daily.       No current facility-administered medications on file prior to visit.      Allergies   Allergen Reactions   • Penicillins Hives and Swelling     Per patient, has tolerated Keflex        Review of Systems   Constitutional: Negative for activity change, appetite change, chills, diaphoresis, fatigue, fever and unexpected weight change.   HENT: Positive for tinnitus. Negative for congestion, dental problem, drooling, ear discharge, ear pain, facial swelling, hearing loss, mouth sores, nosebleeds, postnasal drip, rhinorrhea, sinus pressure, sneezing, sore throat, trouble swallowing and voice change.    Eyes: Negative for photophobia, pain, discharge, redness, itching and visual disturbance.   Respiratory: Negative for apnea, cough, choking, chest tightness, shortness of breath, wheezing and stridor.    Cardiovascular: Negative for chest pain, palpitations and leg swelling.   Gastrointestinal: Negative for abdominal distention, abdominal pain, anal bleeding, blood in stool, constipation, diarrhea, nausea, rectal pain and vomiting.   Endocrine: Negative for cold intolerance, heat intolerance, polydipsia, polyphagia and polyuria.   Genitourinary: Negative for decreased urine volume, difficulty urinating, dysuria, enuresis, flank pain, frequency, genital sores, hematuria and urgency.   Musculoskeletal: Positive for arthralgias and joint swelling. Negative for back pain, gait problem, myalgias, neck pain and neck stiffness.   Skin: Negative for color change, pallor, rash and wound.   Allergic/Immunologic: Negative for environmental allergies, food allergies and immunocompromised state.   Neurological: Negative for dizziness, tremors, seizures, syncope, facial asymmetry, speech difficulty,  "weakness, light-headedness, numbness and headaches.   Hematological: Negative for adenopathy. Bruises/bleeds easily.   Psychiatric/Behavioral: Negative for agitation, behavioral problems, confusion, decreased concentration, dysphoric mood, hallucinations, self-injury, sleep disturbance and suicidal ideas. The patient is not nervous/anxious and is not hyperactive.         Objective      Physical Exam  /72   Ht 180.3 cm (70.98\")   Wt 95.1 kg (209 lb 9.6 oz)   BMI 29.25 kg/m²     Body mass index is 29.25 kg/m².    General:   Mental Status:  Alert   Appearance: Cooperative, in no acute distress   Build and Nutrition: Well-nourished well-developed male   Orientation: Alert and oriented to person, place and time   Posture: Normal    Integument:   Left knee: No skin lesions, no rash, no ecchymosis    Lower Extremities:   Left Knee:    Tenderness:  None    Effusion:  2+    Swelling:  None    Crepitus: Soft    Range of motion:  Extension: 0°       Flexion: 100°  Instability: No varus laxity, no valgus laxity, negative anterior drawer      Imaging/Studies  Imaging Results (Last 24 Hours)     ** No results found for the last 24 hours. **            Assessment and Plan     Diagnoses and all orders for this visit:    1. Primary osteoarthritis of left knee (Primary)  -     Large Joint Arthrocentesis: L knee        1. Primary osteoarthritis of left knee        I reviewed my findings with the patient.  I offered the patient an aspiration and injection, and he wished to proceed.  I will see him back in 3 months, but sooner for any problems.    Procedure Note:  The potential benefits of performing a therapeutic left knee joint aspiration and injection, as well as potential risks (including, but not limited to infection, swelling, pain, bleeding, bruising, nerve/blood vessel damage, skin color changes, transient elevation in blood glucose levels, and fat atrophy) were discussed with the patient.  After informed consent, " timeout procedure was performed, and the skin on the left knee was prepped with chlorhexidine soap and alcohol, after which ethyl chloride was applied to the skin at the injection site. Via the superior lateral approach, 55 cc of clear straw-colored fluid was aspirated then 1ml of Kenalog 40mg/ml mixed with 4ml 0.5% ropivacaine plain was injected into the knee joint.  The patient tolerated the procedure well, experiencing 100% improvement a few minutes following the injection. There were no complications.  Band-Aid was applied to the injection site. Post-procedural instructions were given to the patient and/or their caregiver.      Return for At regularly scheduled appointment.      Jaya Renteria MD  09/12/22  10:32 EDT

## 2022-09-27 DIAGNOSIS — B02.29 POSTHERPETIC NEURALGIA: ICD-10-CM

## 2022-09-27 RX ORDER — GABAPENTIN 300 MG/1
CAPSULE ORAL
Qty: 180 CAPSULE | Refills: 0 | Status: SHIPPED | OUTPATIENT
Start: 2022-09-27

## 2022-10-27 ENCOUNTER — IMMUNIZATION (OUTPATIENT)
Dept: INTERNAL MEDICINE | Facility: CLINIC | Age: 82
End: 2022-10-27

## 2022-10-27 DIAGNOSIS — Z23 NEED FOR VACCINATION: Primary | ICD-10-CM

## 2022-10-27 PROCEDURE — 91312 COVID-19 (PFIZER) BIVALENT BOOSTER 12+YRS: CPT | Performed by: INTERNAL MEDICINE

## 2022-10-27 PROCEDURE — 0124A COVID-19 (PFIZER) BIVALENT BOOSTER 12+YRS: CPT | Performed by: INTERNAL MEDICINE

## 2022-10-31 ENCOUNTER — CLINICAL SUPPORT (OUTPATIENT)
Dept: INTERNAL MEDICINE | Facility: CLINIC | Age: 82
End: 2022-10-31

## 2022-10-31 DIAGNOSIS — Z23 NEED FOR INFLUENZA VACCINATION: Primary | ICD-10-CM

## 2022-10-31 PROCEDURE — G0008 ADMIN INFLUENZA VIRUS VAC: HCPCS | Performed by: INTERNAL MEDICINE

## 2022-10-31 PROCEDURE — 90662 IIV NO PRSV INCREASED AG IM: CPT | Performed by: INTERNAL MEDICINE

## 2022-12-09 ENCOUNTER — TELEPHONE (OUTPATIENT)
Dept: INTERNAL MEDICINE | Facility: CLINIC | Age: 82
End: 2022-12-09

## 2022-12-09 NOTE — TELEPHONE ENCOUNTER
Caller: Toño Alcala    Relationship: Self    Best call back number: [phone]        What was the call regarding: PATIENT NEEDS TO KNOW HIS LAST CREATINE LEVELS BEFORE HE CAN START TAKING ELIQUIS.     Do you require a callback: PLEASE

## 2022-12-09 NOTE — TELEPHONE ENCOUNTER
I spoke to patient informed of his last Creatinine level of 1.71 in May he verbalized understanding

## 2022-12-09 NOTE — TELEPHONE ENCOUNTER
Patient returned phone call to Bebe who was unavailable.  Please call patient back as soon as possible!

## 2022-12-12 ENCOUNTER — TELEPHONE (OUTPATIENT)
Dept: INTERNAL MEDICINE | Facility: CLINIC | Age: 82
End: 2022-12-12

## 2022-12-12 DIAGNOSIS — E11.9 TYPE 2 DIABETES MELLITUS WITHOUT COMPLICATION, WITHOUT LONG-TERM CURRENT USE OF INSULIN: Primary | ICD-10-CM

## 2022-12-12 DIAGNOSIS — E78.5 DYSLIPIDEMIA: ICD-10-CM

## 2022-12-12 NOTE — TELEPHONE ENCOUNTER
Caller: Toño Alcala    Relationship: Self    Best call back number:    [phone]        What orders are you requesting (i.e. lab or imaging):LAB WORK  In what timeframe would the patient need to come in:ASAPWhere will you receive your lab/imaging services: IN OFFICE  Additional notes: CALL WHEN READY

## 2022-12-14 ENCOUNTER — LAB (OUTPATIENT)
Dept: LAB | Facility: HOSPITAL | Age: 82
End: 2022-12-14

## 2022-12-14 DIAGNOSIS — E11.9 TYPE 2 DIABETES MELLITUS WITHOUT COMPLICATION, WITHOUT LONG-TERM CURRENT USE OF INSULIN: ICD-10-CM

## 2022-12-14 DIAGNOSIS — E78.5 DYSLIPIDEMIA: ICD-10-CM

## 2022-12-14 LAB — HBA1C MFR BLD: 6.3 % (ref 4.8–5.6)

## 2022-12-14 PROCEDURE — 82043 UR ALBUMIN QUANTITATIVE: CPT

## 2022-12-14 PROCEDURE — 83036 HEMOGLOBIN GLYCOSYLATED A1C: CPT

## 2022-12-14 PROCEDURE — 80053 COMPREHEN METABOLIC PANEL: CPT

## 2022-12-14 PROCEDURE — 80061 LIPID PANEL: CPT

## 2022-12-15 DIAGNOSIS — I12.9 STAGE 3 CHRONIC KIDNEY DISEASE DUE TO BENIGN HYPERTENSION: Primary | ICD-10-CM

## 2022-12-15 DIAGNOSIS — N18.30 STAGE 3 CHRONIC KIDNEY DISEASE DUE TO BENIGN HYPERTENSION: Primary | ICD-10-CM

## 2022-12-15 LAB
ALBUMIN SERPL-MCNC: 4.6 G/DL (ref 3.5–5.2)
ALBUMIN UR-MCNC: <1.2 MG/DL
ALBUMIN/GLOB SERPL: 2 G/DL
ALP SERPL-CCNC: 150 U/L (ref 39–117)
ALT SERPL W P-5'-P-CCNC: 18 U/L (ref 1–41)
ANION GAP SERPL CALCULATED.3IONS-SCNC: 10.1 MMOL/L (ref 5–15)
AST SERPL-CCNC: 33 U/L (ref 1–40)
BILIRUB SERPL-MCNC: 0.6 MG/DL (ref 0–1.2)
BUN SERPL-MCNC: 18 MG/DL (ref 8–23)
BUN/CREAT SERPL: 9 (ref 7–25)
CALCIUM SPEC-SCNC: 9.2 MG/DL (ref 8.6–10.5)
CHLORIDE SERPL-SCNC: 102 MMOL/L (ref 98–107)
CHOLEST SERPL-MCNC: 131 MG/DL (ref 0–200)
CO2 SERPL-SCNC: 26.9 MMOL/L (ref 22–29)
CREAT SERPL-MCNC: 2 MG/DL (ref 0.76–1.27)
EGFRCR SERPLBLD CKD-EPI 2021: 32.7 ML/MIN/1.73
GLOBULIN UR ELPH-MCNC: 2.3 GM/DL
GLUCOSE SERPL-MCNC: 110 MG/DL (ref 65–99)
HDLC SERPL-MCNC: 48 MG/DL (ref 40–60)
LDLC SERPL CALC-MCNC: 70 MG/DL (ref 0–100)
LDLC/HDLC SERPL: 1.48 {RATIO}
POTASSIUM SERPL-SCNC: 4.3 MMOL/L (ref 3.5–5.2)
PROT SERPL-MCNC: 6.9 G/DL (ref 6–8.5)
SODIUM SERPL-SCNC: 139 MMOL/L (ref 136–145)
TRIGL SERPL-MCNC: 59 MG/DL (ref 0–150)
VLDLC SERPL-MCNC: 13 MG/DL (ref 5–40)

## 2022-12-19 ENCOUNTER — OFFICE VISIT (OUTPATIENT)
Dept: ORTHOPEDIC SURGERY | Facility: CLINIC | Age: 82
End: 2022-12-19

## 2022-12-19 VITALS
HEIGHT: 71 IN | DIASTOLIC BLOOD PRESSURE: 67 MMHG | WEIGHT: 203 LBS | SYSTOLIC BLOOD PRESSURE: 110 MMHG | BODY MASS INDEX: 28.42 KG/M2

## 2022-12-19 DIAGNOSIS — M17.12 PRIMARY OSTEOARTHRITIS OF LEFT KNEE: Primary | ICD-10-CM

## 2022-12-19 PROCEDURE — 99212 OFFICE O/P EST SF 10 MIN: CPT | Performed by: ORTHOPAEDIC SURGERY

## 2022-12-19 NOTE — PROGRESS NOTES
[facility] [facility] Note    Subjective     Chief Complaint   Patient presents with   • Follow-up     3 month f/u, Primary osteoarthritis of left knee, last visco 6/15/22, CSI 9/12/22        HPI    It has been 3  month(s) since Mr. Alcala's last visit. He returns to clinic today for follow-up of left knee arthritis. The issue has been ongoing for 11 month(s). He rates his pain a 0/10 on the pain scale. Previous/current treatments: viscosupplementation (last injection 6/15/22), steroid injection (last injection 9/12/22) and topical gel. Current symptoms: pain. The pain is worse with walking on hard surfaces; topical gel improve the pain. Overall, he is doing better.  Knee is doing well today.    I have reviewed the following portions of the patient's history and agree with: History of Present Illness and Review of Systems    Patient Active Problem List   Diagnosis   • CAD (coronary artery disease)   • CVD (cardiovascular disease)   • DVT (deep venous thrombosis) ([facility])   • Diabetes mellitus ([facility])   • Dyslipidemia   • Arthritis   • GERD (gastroesophageal reflux disease)   • Mixed hyperlipidemia   • Diabetic nephropathy associated with type 2 diabetes mellitus ([facility])   • Diabetic polyneuropathy associated with type 2 diabetes mellitus ([facility])   • Chronic kidney disease, stage III (moderate) ([facility])   • Vitamin D deficiency   • Disc disorder of cervical region   • Postthrombotic syndrome   • Vertigo   • Angina pectoris ([facility])   • Lumbosacral spondylosis without myelopathy   • Psoriasis   • Arthritis of elbow   • Swelling of right lower extremity   • Acute right-sided low back pain without sciatica   • Hoarseness   • Unifocal PVCs   • Skin lesion of face   • Primary hypertension   • Medicare annual wellness visit, subsequent   • Stage 3 chronic kidney disease due to benign hypertension ([facility])   • BMI 30.0-30.9,adult   • Postherpetic neuralgia   • Herpes zoster without complication   • Calculus of gallbladder  without cholecystitis without obstruction   • Acute diverticulitis   • Leg edema, right   • Chest pain   • Encounter for removal of sutures   • Chronic pain of left knee   • Fall     Past Medical History:   Diagnosis Date   • Arthritis    • Bilateral radial fractures 1962    fracture bilateral radial heads   • CAD (coronary artery disease)    • CVD (cardiovascular disease)     History of TIA 1989   • Diabetes mellitus ([facility])    • DVT (deep venous thrombosis) ([facility])     Right lower extremity DVT and pulmonary embolism in 06/2015, idiopathic   • DVT of proximal lower limb ([facility]) 06/22/2015    proximal DVT right leg, small PE- anticoagulation   • Dyslipidemia    • Fracture 1952    H/o pf left forearm fracture   • Fracture of right hand 1980   • GERD (gastroesophageal reflux disease)     with hiatal hernia   • Hx of angina pectoris    • Hypertension     Normal renal angiography 02/16/11   • Left wrist fracture 1953   • Osteoarthritis    • Right wrist fracture    • Vertigo    • Wears glasses       Past Surgical History:   Procedure Laterality Date   • APPENDECTOMY  1957   • BACK SURGERY     • CARDIAC CATHETERIZATION  2011    with vein graft   • CERVICAL FUSION     • CERVICAL FUSION  1992    C3-4   • COLONOSCOPY  2013   • CORONARY ARTERY BYPASS GRAFT  1994   • HERNIA REPAIR Right 2011    inguinal   • INGUINAL HERNIA REPAIR Left 10/29/2019    Procedure: INGUINAL HERNIA REPAIR LEFT;  Surgeon: Charisma Raines MD;  Location: [facility];  Service: General   • LUMBAR LAMINECTOMY  1996    laminectomy, lumbar, L3   • OTHER SURGICAL HISTORY      wrist surgery   • TONSILLECTOMY AND ADENOIDECTOMY  1945      Family History   Problem Relation Age of Onset   • Arthritis Mother         OA   • Heart disease Father    • Diabetes Father    • Heart attack Father    • Heart disease Brother    • Heart attack Brother    • Diabetes Brother    • Diabetes Son    • Hypertension Other    • Cancer Other      Social History     Socioeconomic  History   • Marital status:    Tobacco Use   • Smoking status: Former     Packs/day: 1.00     Years: 20.00     Pack years: 20.00     Types: Cigarettes     Quit date: 1997     Years since quittin.6   • Smokeless tobacco: Never   • Tobacco comments:     QUIT DATE 1992   Vaping Use   • Vaping Use: Never used   Substance and Sexual Activity   • Alcohol use: Yes     Alcohol/week: 7.0 standard drinks     Types: 7 Glasses of wine per week     Comment: glass of wine everyday    • Drug use: No   • Sexual activity: Defer      Current Outpatient Medications on File Prior to Visit   Medication Sig Dispense Refill   • acetaminophen (TYLENOL) 325 MG tablet Take 650 mg by mouth As Needed for Mild Pain .     • apixaban (ELIQUIS) 5 MG tablet tablet Take  by mouth Every 12 (Twelve) Hours.     • aspirin 81 MG EC tablet Take 81 mg by mouth Daily.     • atorvastatin (LIPITOR) 40 MG tablet Take 40 mg by mouth Daily.     • calcipotriene-betamethasone (TACLONEX) 0.005-0.064 % ointment Apply 1 application topically to the appropriate area as directed As Needed (psoriasis).     • cholecalciferol (VITAMIN D3) 25 MCG (1000 UT) tablet Take 1,000 Units by mouth Daily.     • diclofenac (VOLTAREN) 1 % gel gel Apply 4 g topically As Needed.     • diltiaZEM CD (CARDIZEM CD) 180 MG 24 hr capsule Take 180 mg by mouth Daily.     • gabapentin (NEURONTIN) 300 MG capsule TAKE THREE CAPSULES BY MOUTH TWICE A  capsule 0   • glucose blood test strip Use to check blood sugar twice daily 100 each 3   • lidocaine (LIDODERM) 5 % Place 1 patch on the skin as directed by provider Daily. Remove & Discard patch within 12 hours or as directed by MD 90 patch 2   • losartan (COZAAR) 100 MG tablet Take 100 mg by mouth Every Night.     • Microlet Lancets misc Use to check blood sugar twice daily 100 each 3   • nitroglycerin (NITROSTAT) 0.4 MG SL tablet Place 0.4 mg under the tongue Every 5 (Five) Minutes As Needed for Chest Pain. Take no  more than 3 doses in 15 minutes.     • omeprazole (priLOSEC) 20 MG capsule Take 20 mg by mouth Daily.     • [DISCONTINUED] Diclofenac Sodium (VOLTAREN) 1 % gel gel (1 %)       No current facility-administered medications on file prior to visit.      Allergies   Allergen Reactions   • Penicillins Hives and Swelling     Per patient, has tolerated Keflex        Review of Systems   Constitutional: Negative for activity change, appetite change, chills, diaphoresis, fatigue, fever and unexpected weight change.   HENT: Negative for congestion, dental problem, drooling, ear discharge, ear pain, facial swelling, hearing loss, mouth sores, nosebleeds, postnasal drip, rhinorrhea, sinus pressure, sneezing, sore throat, tinnitus, trouble swallowing and voice change.    Eyes: Negative for photophobia, pain, discharge, redness, itching and visual disturbance.   Respiratory: Negative for apnea, cough, choking, chest tightness, shortness of breath, wheezing and stridor.    Cardiovascular: Negative for chest pain, palpitations and leg swelling.   Gastrointestinal: Negative for abdominal distention, abdominal pain, anal bleeding, blood in stool, constipation, diarrhea, nausea, rectal pain and vomiting.   Endocrine: Negative for cold intolerance, heat intolerance, polydipsia, polyphagia and polyuria.   Genitourinary: Negative for decreased urine volume, difficulty urinating, dysuria, enuresis, flank pain, frequency, genital sores, hematuria and urgency.   Musculoskeletal: Positive for arthralgias. Negative for back pain, gait problem, joint swelling, myalgias, neck pain and neck stiffness.   Skin: Negative for color change, pallor, rash and wound.   Allergic/Immunologic: Negative for environmental allergies, food allergies and immunocompromised state.   Neurological: Negative for dizziness, tremors, seizures, syncope, facial asymmetry, speech difficulty, weakness, light-headedness, numbness and headaches.   Hematological: Negative for  "adenopathy. Does not bruise/bleed easily.   Psychiatric/Behavioral: Negative for agitation, behavioral problems, confusion, decreased concentration, dysphoric mood, hallucinations, self-injury, sleep disturbance and suicidal ideas. The patient is not nervous/anxious and is not hyperactive.         Objective      Physical Exam  /67   Ht 180.3 cm (70.98\")   Wt 92.1 kg (203 lb)   BMI 28.33 kg/m²     Body mass index is 28.33 kg/m².    General:   Mental Status:  Alert   Appearance: Cooperative, in no acute distress   Build and Nutrition: Well-nourished well-developed male   Orientation: Alert and oriented to person, place and time   Posture: Normal   Gait: Nonantalgic    Integument:              Left knee: No skin lesions, no rash, no ecchymosis     Lower Extremities:              Left Knee:                          Tenderness:    None                          Effusion:           1+                          Swelling:          None                          Crepitus:          Soft                          Range of motion:        Extension:       0°                                                              Flexion:           125°    Imaging/Studies  Imaging Results (Last 24 Hours)     ** No results found for the last 24 hours. **        No new imaging today.    Assessment and Plan     Diagnoses and all orders for this visit:    1. Primary osteoarthritis of left knee (Primary)        1. Primary osteoarthritis of left knee        I reviewed my findings with the patient.  His knee is doing well today.  I will see him back in 6 months, but sooner for any problems.    Return in about 6 months (around 6/19/2023).      Jaya Renteria MD  12/19/22  16:24 EST    "

## 2022-12-20 ENCOUNTER — OFFICE VISIT (OUTPATIENT)
Dept: INTERNAL MEDICINE | Facility: CLINIC | Age: 82
End: 2022-12-20

## 2022-12-20 VITALS
HEART RATE: 100 BPM | WEIGHT: 208.2 LBS | DIASTOLIC BLOOD PRESSURE: 60 MMHG | HEIGHT: 71 IN | SYSTOLIC BLOOD PRESSURE: 108 MMHG | TEMPERATURE: 98.4 F | BODY MASS INDEX: 29.15 KG/M2

## 2022-12-20 DIAGNOSIS — E78.2 MIXED HYPERLIPIDEMIA: ICD-10-CM

## 2022-12-20 DIAGNOSIS — I10 PRIMARY HYPERTENSION: ICD-10-CM

## 2022-12-20 DIAGNOSIS — Z00.00 PREVENTATIVE HEALTH CARE: Primary | ICD-10-CM

## 2022-12-20 DIAGNOSIS — E55.9 VITAMIN D DEFICIENCY: ICD-10-CM

## 2022-12-20 DIAGNOSIS — N18.32 STAGE 3B CHRONIC KIDNEY DISEASE: ICD-10-CM

## 2022-12-20 DIAGNOSIS — E11.21 DIABETIC NEPHROPATHY ASSOCIATED WITH TYPE 2 DIABETES MELLITUS: ICD-10-CM

## 2022-12-20 DIAGNOSIS — I87.009 POSTTHROMBOTIC SYNDROME: ICD-10-CM

## 2022-12-20 PROCEDURE — 1170F FXNL STATUS ASSESSED: CPT | Performed by: INTERNAL MEDICINE

## 2022-12-20 PROCEDURE — 1125F AMNT PAIN NOTED PAIN PRSNT: CPT | Performed by: INTERNAL MEDICINE

## 2022-12-20 PROCEDURE — 1160F RVW MEDS BY RX/DR IN RCRD: CPT | Performed by: INTERNAL MEDICINE

## 2022-12-20 PROCEDURE — G0439 PPPS, SUBSEQ VISIT: HCPCS | Performed by: INTERNAL MEDICINE

## 2022-12-20 NOTE — PROGRESS NOTES
QUICK REFERENCE INFORMATION:  The ABCs of the Annual Wellness Visit    Subsequent Medicare Wellness Visit    HEALTH RISK ASSESSMENT    1940    Recent Hospitalizations:  No hospitalization(s) within the last year..        Current Medical Providers:  Patient Care Team:  Radu Francis MD as PCP - General        Smoking Status:  Social History     Tobacco Use   Smoking Status Former   • Packs/day: 1.00   • Years: 20.00   • Pack years: 20.00   • Types: Cigarettes   • Quit date: 1997   • Years since quittin.6   Smokeless Tobacco Never   Tobacco Comments    QUIT DATE 1992       Alcohol Consumption:  Social History     Substance and Sexual Activity   Alcohol Use Yes   • Alcohol/week: 7.0 standard drinks   • Types: 7 Glasses of wine per week    Comment: glass of wine everyday        Depression Screen:   PHQ-2/PHQ-9 Depression Screening 2022   Retired PHQ-9 Total Score -   Retired Total Score -   Little Interest or Pleasure in Doing Things 0-->not at all   Feeling Down, Depressed or Hopeless 0-->not at all   PHQ-9: Brief Depression Severity Measure Score 0       Health Habits and Functional and Cognitive Screening:  Functional & Cognitive Status 2022   Do you have difficulty preparing food and eating? No   Do you have difficulty bathing yourself, getting dressed or grooming yourself? No   Do you have difficulty using the toilet? No   Do you have difficulty moving around from place to place? No   Do you have trouble with steps or getting out of a bed or a chair? Yes   Current Diet Well Balanced Diet   Dental Exam Up to date   Eye Exam Up to date   Exercise (times per week) 0 times per week   Current Exercises Include No Regular Exercise   Current Exercise Activities Include -   Do you need help using the phone?  No   Are you deaf or do you have serious difficulty hearing?  No   Do you need help with transportation? No   Do you need help shopping? No   Do you need help preparing meals?   No   Do you need help with housework?  No   Do you need help with laundry? No   Do you need help taking your medications? No   Do you need help managing money? No   Do you ever drive or ride in a car without wearing a seat belt? No   Have you felt unusual stress, anger or loneliness in the last month? No   Who do you live with? Spouse   If you need help, do you have trouble finding someone available to you? No   Have you been bothered in the last four weeks by sexual problems? No   Do you have difficulty concentrating, remembering or making decisions? No       Fall Risk Screen:  [facility], and patient is at MODERATE risk for falls. Assessment completed on:12/20/2022    ACE III MINI        Does the patient have evidence of cognitive impairment? No    Aspirin use counseling: Taking ASA appropriately as indicated    Recent Lab Results:  CMP:  Lab Results   Component Value Date    BUN 17 12/22/2022    CREATININE 1.80 (H) 12/22/2022    EGFRIFNONA 42 (L) 02/14/2022    BCR 9.4 12/22/2022     12/22/2022    K 4.9 12/22/2022    CO2 25.5 12/22/2022    CALCIUM 9.2 12/22/2022    ALBUMIN 4.60 12/14/2022    BILITOT 0.6 12/14/2022    ALKPHOS 150 (H) 12/14/2022    AST 33 12/14/2022    ALT 18 12/14/2022     HbA1c:  Lab Results   Component Value Date    HGBA1C 6.30 (H) 12/14/2022    HGBA1C 6.2 06/16/2022     Microalbumin:  Lab Results   Component Value Date    MICROALBUR <1.2 12/14/2022     Lipid Panel  Lab Results   Component Value Date    CHOL 131 12/14/2022    TRIG 59 12/14/2022    HDL 48 12/14/2022    LDL 70 12/14/2022    AST 33 12/14/2022    ALT 18 12/14/2022       Visual Acuity:  No results found.    Age-appropriate Screening Schedule:  Refer to the list below for future screening recommendations based on patient's age, sex and/or medical conditions. Orders for these recommended tests are listed in the plan section. The patient has been provided with a written plan.    Health Maintenance    Topic Date Due   • ZOSTER VACCINE (3 of 3) 12/15/2021   • DIABETIC EYE EXAM  12/17/2022   • HEMOGLOBIN A1C  06/14/2023   • LIPID PANEL  12/14/2023   • URINE MICROALBUMIN  12/14/2023   • TDAP/TD VACCINES (2 - Td or Tdap) 02/15/2032   • INFLUENZA VACCINE  Completed        Subjective   History of Present Illness    Toño Alcala is a 82 y.o. male who presents for a Subsequent Wellness Visit and for follow-up of diabetes mellitus, chronic kidney disease, hypertension, hyperlipidemia, vitamin D deficiency, and post thrombotic syndrome.    Post thrombotic syndrome  The patient states that he has been seeing Dr. Blu Douglass for the bump on his leg and was told that the only treatment would be compression stockings, which he is wearing daily. He adds that Dr. Douglass also prescribed the patient Eliquis. He states that his insurance company does not cover Eliquis at all and he would like some samples or coupons for it due to its expense.    Diabetes mellitus  The patients most recent laboratory results show him hemoglobin A1c is at 6.3 percent.    Chronic kidney disease  The patients most recent laboratory results show his glomerular filtration rate is in the 30 mL/min range.    Hypertension  The patient states that he has been doing well and he is scheduled to see Dr. Hernandez in 04/20223 or 05/2023. The patients blood pressure is within a normal range at this time.    CHRONIC CONDITIONS: Diabetes mellitus, chronic kidney disease, hypertension, hyperlipidemia, vitamin D deficiency, and post thrombotic syndrome.    The following portions of the patient's history were reviewed and updated as appropriate: allergies, current medications, past family history, past medical history, past social history, past surgical history and problem list.    Outpatient Medications Prior to Visit   Medication Sig Dispense Refill   • acetaminophen (TYLENOL) 325 MG tablet Take 650 mg by mouth As Needed for Mild Pain .     • apixaban  (ELIQUIS) 5 MG tablet tablet Take  by mouth Every 12 (Twelve) Hours.     • aspirin 81 MG EC tablet Take 81 mg by mouth Daily.     • atorvastatin (LIPITOR) 40 MG tablet Take 40 mg by mouth Daily.     • calcipotriene-betamethasone (TACLONEX) 0.005-0.064 % ointment Apply 1 application topically to the appropriate area as directed As Needed (psoriasis).     • cholecalciferol (VITAMIN D3) 25 MCG (1000 UT) tablet Take 1,000 Units by mouth Daily.     • diclofenac (VOLTAREN) 1 % gel gel Apply 4 g topically As Needed.     • diltiaZEM CD (CARDIZEM CD) 180 MG 24 hr capsule Take 180 mg by mouth Daily.     • gabapentin (NEURONTIN) 300 MG capsule TAKE THREE CAPSULES BY MOUTH TWICE A DAY (Patient taking differently: Take 900 mg by mouth Daily.) 180 capsule 0   • glucose blood test strip Use to check blood sugar twice daily 100 each 3   • lidocaine (LIDODERM) 5 % Place 1 patch on the skin as directed by provider Daily. Remove & Discard patch within 12 hours or as directed by MD 90 patch 2   • losartan (COZAAR) 100 MG tablet Take 100 mg by mouth Every Night.     • Microlet Lancets misc Use to check blood sugar twice daily 100 each 3   • nitroglycerin (NITROSTAT) 0.4 MG SL tablet Place 0.4 mg under the tongue Every 5 (Five) Minutes As Needed for Chest Pain. Take no more than 3 doses in 15 minutes.     • omeprazole (priLOSEC) 20 MG capsule Take 20 mg by mouth Daily.       No facility-administered medications prior to visit.       Patient Active Problem List   Diagnosis   • CAD (coronary artery disease)   • CVD (cardiovascular disease)   • DVT (deep venous thrombosis) ([facility])   • Diabetes mellitus ([facility])   • Dyslipidemia   • Arthritis   • GERD (gastroesophageal reflux disease)   • Mixed hyperlipidemia   • Diabetic nephropathy associated with type 2 diabetes mellitus (HCC)   • Diabetic polyneuropathy associated with type 2 diabetes mellitus ([facility])   • Chronic kidney disease, stage III (moderate) ([facility])   • Vitamin D deficiency   • Disc  disorder of cervical region   • Postthrombotic syndrome   • Vertigo   • Angina pectoris (HCC)   • Lumbosacral spondylosis without myelopathy   • Psoriasis   • Arthritis of elbow   • Swelling of right lower extremity   • Acute right-sided low back pain without sciatica   • Hoarseness   • Unifocal PVCs   • Skin lesion of face   • Primary hypertension   • Medicare annual wellness visit, subsequent   • Stage 3 chronic kidney disease due to benign hypertension (HCC)   • BMI 30.0-30.9,adult   • Postherpetic neuralgia   • Herpes zoster without complication   • Calculus of gallbladder without cholecystitis without obstruction   • Acute diverticulitis   • Leg edema, right   • Chest pain   • Encounter for removal of sutures   • Chronic pain of left knee   • Fall       Advance Care Planning:  ACP discussion was held with the patient during this visit. Patient has an advance directive in EMR which is still valid.     Identification of Risk Factors:  Risk factors include: Advance Directive Discussion.    Review of Systems   Constitutional: Negative for chills, fatigue and fever.   HENT: Negative for congestion, ear pain and sinus pressure.    Respiratory: Negative for cough, chest tightness, shortness of breath and wheezing.    Cardiovascular: Negative for chest pain and palpitations.   Gastrointestinal: Negative for abdominal pain, blood in stool and constipation.   Skin: Negative for color change.   Allergic/Immunologic: Negative for environmental allergies.   Neurological: Negative for dizziness, speech difficulty and headaches.   Psychiatric/Behavioral: Negative for confusion. The patient is not nervous/anxious.        Compared to one year ago, the patient feels his physical health is the same.  Compared to one year ago, the patient feels his mental health is the same.    Objective     Physical Exam  Vitals and nursing note reviewed.   Constitutional:       General: He is not in acute distress.     Appearance: He is  "well-developed.      Comments: Wearing compression   HENT:      Head: Normocephalic and atraumatic.      Right Ear: External ear normal.      Left Ear: External ear normal.   Eyes:      Conjunctiva/sclera: Conjunctivae normal.      Pupils: Pupils are equal, round, and reactive to light.   Neck:      Thyroid: No thyromegaly.      Vascular: No JVD.      Trachea: No tracheal deviation.   Cardiovascular:      Rate and Rhythm: Normal rate and regular rhythm.      Heart sounds: Normal heart sounds. No murmur heard.  Pulmonary:      Effort: Pulmonary effort is normal.      Breath sounds: Normal breath sounds.   Abdominal:      General: Bowel sounds are normal. There is no distension.      Palpations: Abdomen is soft. There is no mass.      Tenderness: There is no abdominal tenderness. There is no guarding or rebound.   Musculoskeletal:      Cervical back: Normal range of motion and neck supple.   Lymphadenopathy:      Cervical: No cervical adenopathy.   Skin:     General: Skin is warm and dry.      Capillary Refill: Capillary refill takes less than 2 seconds.   Neurological:      Mental Status: He is alert and oriented to person, place, and time.      Cranial Nerves: No cranial nerve deficit.   Psychiatric:         Behavior: Behavior normal.          Procedures     Vitals:    12/20/22 1133   BP: 108/60   BP Location: Left arm   Patient Position: Sitting   Cuff Size: Large Adult   Pulse: 100   Temp: 98.4 °F (36.9 °C)   Weight: 94.4 kg (208 lb 3.2 oz)   Height: 181 cm (71.26\")   PainSc:   3   PainLoc: Abdomen  Comment: side       BMI is >= 25 and <30. (Overweight) The following options were offered after discussion;: exercise counseling/recommendations and nutrition counseling/recommendations      Assessment & Plan   Problem List Items Addressed This Visit        Cardiac and Vasculature    Mixed hyperlipidemia    Relevant Medications    atorvastatin (LIPITOR) 40 MG tablet    Primary hypertension    Overview     Continue " losartan 100 mg tablets daily         Relevant Medications    diltiaZEM CD (CARDIZEM CD) 180 MG 24 hr capsule    losartan (COZAAR) 100 MG tablet       Coag and Thromboembolic    Postthrombotic syndrome       Endocrine and Metabolic    Vitamin D deficiency       Genitourinary and Reproductive     Diabetic nephropathy associated with type 2 diabetes mellitus (HCC)    Chronic kidney disease, stage III (moderate) (HCC)   Other Visit Diagnoses     Preventative health care    -  Primary        Patient Self-Management and Personalized Health Advice  The patient has been provided with information about: diet and fall prevention and preventive services including:   · Annual Wellness Visit (AWV).    Outpatient Encounter Medications as of 12/20/2022   Medication Sig Dispense Refill   • acetaminophen (TYLENOL) 325 MG tablet Take 650 mg by mouth As Needed for Mild Pain .     • apixaban (ELIQUIS) 5 MG tablet tablet Take  by mouth Every 12 (Twelve) Hours.     • aspirin 81 MG EC tablet Take 81 mg by mouth Daily.     • atorvastatin (LIPITOR) 40 MG tablet Take 40 mg by mouth Daily.     • calcipotriene-betamethasone (TACLONEX) 0.005-0.064 % ointment Apply 1 application topically to the appropriate area as directed As Needed (psoriasis).     • cholecalciferol (VITAMIN D3) 25 MCG (1000 UT) tablet Take 1,000 Units by mouth Daily.     • diclofenac (VOLTAREN) 1 % gel gel Apply 4 g topically As Needed.     • diltiaZEM CD (CARDIZEM CD) 180 MG 24 hr capsule Take 180 mg by mouth Daily.     • gabapentin (NEURONTIN) 300 MG capsule TAKE THREE CAPSULES BY MOUTH TWICE A DAY (Patient taking differently: Take 900 mg by mouth Daily.) 180 capsule 0   • glucose blood test strip Use to check blood sugar twice daily 100 each 3   • lidocaine (LIDODERM) 5 % Place 1 patch on the skin as directed by provider Daily. Remove & Discard patch within 12 hours or as directed by MD 90 patch 2   • losartan (COZAAR) 100 MG tablet Take 100 mg by mouth Every Night.     •  Microlet Lancets misc Use to check blood sugar twice daily 100 each 3   • nitroglycerin (NITROSTAT) 0.4 MG SL tablet Place 0.4 mg under the tongue Every 5 (Five) Minutes As Needed for Chest Pain. Take no more than 3 doses in 15 minutes.     • omeprazole (priLOSEC) 20 MG capsule Take 20 mg by mouth Daily.       No facility-administered encounter medications on file as of 12/20/2022.     Plan:    1. Prevention  - He is fully vaccinated against COVID-19 and influenza.    2. Diabetes  - Hemoglobin A1c is well controlled at 6.3 percent. He continues to control this with healthy diet.    3. Chronic kidney disease  - Glomerular filtration rate is down considerably. He admits he was probably dehydrated. I have asked him to repeat the kidney function, well hydrated in the next few days.    4. Vitamin D deficiency  - Vitamin D level is normal. He will continue supplemental vitamin D.    5. Hyperlipidemia  - Lipids are normal. Continue atorvastatin and healthy diet.    6. Hypertension  - Blood pressure is very well controlled on losartan and diltiazem.    7. Post thrombotic syndrome  - Now with a new clot, he is on Eliquis. Again, followed by vascular surgery. He is wearing compression stockings daily.    Follow up in 6 months.      Reviewed use of high risk medication in the elderly: yes  Reviewed for potential of harmful drug interactions in the elderly: yes    Follow Up:  No follow-ups on file.     There are no Patient Instructions on file for this visit.    An After Visit Summary and PPPS with all of these plans were given to the patient.          Transcribed from ambient dictation for Radu Francis MD by Sandrita Wilson.  12/20/22   13:49 EST    Patient or patient representative verbalized consent to the visit recording.  I have personally performed the services described in this document as transcribed by the above individual, and it is both accurate and complete.

## 2022-12-22 ENCOUNTER — LAB (OUTPATIENT)
Dept: LAB | Facility: HOSPITAL | Age: 82
End: 2022-12-22

## 2022-12-22 DIAGNOSIS — N18.30 STAGE 3 CHRONIC KIDNEY DISEASE DUE TO BENIGN HYPERTENSION: ICD-10-CM

## 2022-12-22 DIAGNOSIS — I12.9 STAGE 3 CHRONIC KIDNEY DISEASE DUE TO BENIGN HYPERTENSION: ICD-10-CM

## 2022-12-22 LAB
ANION GAP SERPL CALCULATED.3IONS-SCNC: 9.5 MMOL/L (ref 5–15)
BUN SERPL-MCNC: 17 MG/DL (ref 8–23)
BUN/CREAT SERPL: 9.4 (ref 7–25)
CALCIUM SPEC-SCNC: 9.2 MG/DL (ref 8.6–10.5)
CHLORIDE SERPL-SCNC: 104 MMOL/L (ref 98–107)
CO2 SERPL-SCNC: 25.5 MMOL/L (ref 22–29)
CREAT SERPL-MCNC: 1.8 MG/DL (ref 0.76–1.27)
EGFRCR SERPLBLD CKD-EPI 2021: 37.1 ML/MIN/1.73
GLUCOSE SERPL-MCNC: 105 MG/DL (ref 65–99)
POTASSIUM SERPL-SCNC: 4.9 MMOL/L (ref 3.5–5.2)
SODIUM SERPL-SCNC: 139 MMOL/L (ref 136–145)

## 2022-12-22 PROCEDURE — 80048 BASIC METABOLIC PNL TOTAL CA: CPT

## 2023-02-03 ENCOUNTER — TELEPHONE (OUTPATIENT)
Dept: ORTHOPEDIC SURGERY | Facility: CLINIC | Age: 83
End: 2023-02-03

## 2023-02-03 NOTE — TELEPHONE ENCOUNTER
Caller: MIGDALIA AREVALO    Relationship to patient: SELF    Best call back number: [phone]    Chief complaint: LEFT KNEE PAIN    Type of visit: INJECTION    Additional notes:  PT WOULD LIKE A CALL BACK TO DISCUSS SCHEDULING LEFT KNEE INJECTION

## 2023-02-13 ENCOUNTER — OFFICE VISIT (OUTPATIENT)
Dept: ORTHOPEDIC SURGERY | Facility: CLINIC | Age: 83
End: 2023-02-13
Payer: MEDICARE

## 2023-02-13 VITALS
HEIGHT: 71 IN | BODY MASS INDEX: 28.28 KG/M2 | DIASTOLIC BLOOD PRESSURE: 70 MMHG | SYSTOLIC BLOOD PRESSURE: 146 MMHG | WEIGHT: 202 LBS

## 2023-02-13 DIAGNOSIS — M17.12 PRIMARY OSTEOARTHRITIS OF LEFT KNEE: Primary | ICD-10-CM

## 2023-02-13 PROCEDURE — 99213 OFFICE O/P EST LOW 20 MIN: CPT | Performed by: ORTHOPAEDIC SURGERY

## 2023-02-13 PROCEDURE — 20610 DRAIN/INJ JOINT/BURSA W/O US: CPT | Performed by: ORTHOPAEDIC SURGERY

## 2023-02-13 RX ORDER — ROPIVACAINE HYDROCHLORIDE 5 MG/ML
4 INJECTION, SOLUTION EPIDURAL; INFILTRATION; PERINEURAL
Status: COMPLETED | OUTPATIENT
Start: 2023-02-13 | End: 2023-02-13

## 2023-02-13 RX ORDER — TRIAMCINOLONE ACETONIDE 40 MG/ML
40 INJECTION, SUSPENSION INTRA-ARTICULAR; INTRAMUSCULAR
Status: COMPLETED | OUTPATIENT
Start: 2023-02-13 | End: 2023-02-13

## 2023-02-13 RX ADMIN — TRIAMCINOLONE ACETONIDE 40 MG: 40 INJECTION, SUSPENSION INTRA-ARTICULAR; INTRAMUSCULAR at 16:01

## 2023-02-13 RX ADMIN — ROPIVACAINE HYDROCHLORIDE 4 ML: 5 INJECTION, SOLUTION EPIDURAL; INFILTRATION; PERINEURAL at 16:01

## 2023-02-13 NOTE — PROGRESS NOTES
Procedure   - Large Joint Arthrocentesis: L knee on 2/13/2023 4:01 PM  Indications: pain  Details: 18 G needle, anterolateral approach  Medications: 4 mL ropivacaine 0.5 %; 40 mg triamcinolone acetonide 40 MG/ML  Aspirate: 51 mL yellow  Outcome: tolerated well, no immediate complications  Procedure, treatment alternatives, risks and benefits explained, specific risks discussed. Consent was given by the patient. Immediately prior to procedure a time out was called to verify the correct patient, procedure, equipment, support staff and site/side marked as required. Patient was prepped and draped in the usual sterile fashion.

## 2023-02-13 NOTE — PROGRESS NOTES
[facility] Orthopaedic Surgery Clinic Note    Subjective     Chief Complaint   Patient presents with   • Follow-up     8 week follow up -- primary osteoarthritis of left knee        HPI    It has been 8  week(s) since Mr. Alcala's last visit. He returns to clinic today for follow-up of left knee arthritis. The issue has been ongoing for 1 year(s). He rates his pain a 2.5/10 on the pain scale. Previous/current treatments: cane/walker, NSAIDS, physical therapy, viscosupplementation (last injection 6/15/2022) and steroid injection (last injection 09/12/22). Current symptoms: stiffness. The pain is worse with climbing stairs; resting, assistive device (cane/walker) and lying down improve the pain. Overall, he is doing better.  He would like an injection for his knee today.    I have reviewed the following portions of the patient's history and agree with: History of Present Illness and Review of Systems    Patient Active Problem List   Diagnosis   • CAD (coronary artery disease)   • CVD (cardiovascular disease)   • DVT (deep venous thrombosis) ([facility])   • Diabetes mellitus ([facility])   • Dyslipidemia   • Arthritis   • GERD (gastroesophageal reflux disease)   • Mixed hyperlipidemia   • Diabetic nephropathy associated with type 2 diabetes mellitus ([facility])   • Diabetic polyneuropathy associated with type 2 diabetes mellitus ([facility])   • Chronic kidney disease, stage III (moderate) ([facility])   • Vitamin D deficiency   • Disc disorder of cervical region   • Postthrombotic syndrome   • Vertigo   • Angina pectoris ([facility])   • Lumbosacral spondylosis without myelopathy   • Psoriasis   • Arthritis of elbow   • Swelling of right lower extremity   • Acute right-sided low back pain without sciatica   • Hoarseness   • Unifocal PVCs   • Skin lesion of face   • Primary hypertension   • Medicare annual wellness visit, subsequent   • Stage 3 chronic kidney disease due to benign hypertension ([facility])   • BMI 30.0-30.9,adult   • Postherpetic neuralgia   • Herpes  zoster without complication   • Calculus of gallbladder without cholecystitis without obstruction   • Acute diverticulitis   • Leg edema, right   • Chest pain   • Encounter for removal of sutures   • Chronic pain of left knee   • Fall     Past Medical History:   Diagnosis Date   • Arthritis    • Bilateral radial fractures 1962    fracture bilateral radial heads   • CAD (coronary artery disease)    • CVD (cardiovascular disease)     History of TIA 1989   • Diabetes mellitus ([facility])    • DVT (deep venous thrombosis) ([facility])     Right lower extremity DVT and pulmonary embolism in 06/2015, idiopathic   • DVT of proximal lower limb ([facility]) 06/22/2015    proximal DVT right leg, small PE- anticoagulation   • Dyslipidemia    • Fracture 1952    H/o pf left forearm fracture   • Fracture of right hand 1980   • GERD (gastroesophageal reflux disease)     with hiatal hernia   • Hx of angina pectoris    • Hypertension     Normal renal angiography 02/16/11   • Left wrist fracture 1953   • Osteoarthritis    • Right wrist fracture    • Vertigo    • Wears glasses       Past Surgical History:   Procedure Laterality Date   • APPENDECTOMY  1957   • BACK SURGERY     • CARDIAC CATHETERIZATION  2011    with vein graft   • CERVICAL FUSION     • CERVICAL FUSION  1992    C3-4   • COLONOSCOPY  2013   • CORONARY ARTERY BYPASS GRAFT  1994   • HERNIA REPAIR Right 2011    inguinal   • INGUINAL HERNIA REPAIR Left 10/29/2019    Procedure: INGUINAL HERNIA REPAIR LEFT;  Surgeon: Charisma Raines MD;  [facility];  Service: General   • LUMBAR LAMINECTOMY  1996    laminectomy, lumbar, L3   • OTHER SURGICAL HISTORY      wrist surgery   • TONSILLECTOMY AND ADENOIDECTOMY  1945      Family History   Problem Relation Age of Onset   • Arthritis Mother         OA   • Heart disease Father    • Diabetes Father    • Heart attack Father    • Heart disease Brother    • Heart attack Brother    • Diabetes Brother    • Diabetes Son    • Hypertension Other    •  Cancer Other      Social History     Socioeconomic History   • Marital status:    Tobacco Use   • Smoking status: Former     Packs/day: 1.00     Years: 20.00     Pack years: 20.00     Types: Cigarettes     Quit date: 1997     Years since quittin.8   • Smokeless tobacco: Never   • Tobacco comments:     QUIT DATE 1992   Vaping Use   • Vaping Use: Never used   Substance and Sexual Activity   • Alcohol use: Yes     Alcohol/week: 7.0 standard drinks     Types: 7 Glasses of wine per week     Comment: glass of wine everyday    • Drug use: No   • Sexual activity: Defer      Current Outpatient Medications on File Prior to Visit   Medication Sig Dispense Refill   • acetaminophen (TYLENOL) 325 MG tablet Take 650 mg by mouth As Needed for Mild Pain .     • apixaban (ELIQUIS) 5 MG tablet tablet Take 5 mg by mouth Daily.     • aspirin 81 MG EC tablet Take 81 mg by mouth Daily.     • atorvastatin (LIPITOR) 40 MG tablet Take 40 mg by mouth Daily.     • calcipotriene-betamethasone (TACLONEX) 0.005-0.064 % ointment Apply 1 application topically to the appropriate area as directed As Needed (psoriasis).     • cholecalciferol (VITAMIN D3) 25 MCG (1000 UT) tablet Take 1,000 Units by mouth Daily.     • diclofenac (VOLTAREN) 1 % gel gel Apply 4 g topically As Needed.     • diltiaZEM CD (CARDIZEM CD) 180 MG 24 hr capsule Take 180 mg by mouth Daily.     • gabapentin (NEURONTIN) 300 MG capsule TAKE THREE CAPSULES BY MOUTH TWICE A DAY (Patient taking differently: Take 900 mg by mouth Daily.) 180 capsule 0   • glucose blood test strip Use to check blood sugar twice daily 100 each 3   • lidocaine (LIDODERM) 5 % Place 1 patch on the skin as directed by provider Daily. Remove & Discard patch within 12 hours or as directed by MD 90 patch 2   • losartan (COZAAR) 100 MG tablet Take 100 mg by mouth Every Night.     • Microlet Lancets misc Use to check blood sugar twice daily 100 each 3   • nitroglycerin (NITROSTAT) 0.4 MG SL  tablet Place 0.4 mg under the tongue Every 5 (Five) Minutes As Needed for Chest Pain. Take no more than 3 doses in 15 minutes.     • omeprazole (priLOSEC) 20 MG capsule Take 20 mg by mouth Daily.     • rivaroxaban (Xarelto) 20 MG tablet Take 1 tablet by mouth Daily. 30 tablet 5     No current facility-administered medications on file prior to visit.      Allergies   Allergen Reactions   • Penicillins Hives and Swelling     Per patient, has tolerated Keflex        Review of Systems   Constitutional: Negative for activity change, appetite change, chills, diaphoresis, fatigue, fever and unexpected weight change.   HENT: Negative for congestion, dental problem, drooling, ear discharge, ear pain, facial swelling, hearing loss, mouth sores, nosebleeds, postnasal drip, rhinorrhea, sinus pressure, sneezing, sore throat, tinnitus, trouble swallowing and voice change.    Eyes: Negative for photophobia, pain, discharge, redness, itching and visual disturbance.   Respiratory: Negative for apnea, cough, choking, chest tightness, shortness of breath, wheezing and stridor.    Cardiovascular: Negative for chest pain, palpitations and leg swelling.   Gastrointestinal: Negative for abdominal distention, abdominal pain, anal bleeding, blood in stool, constipation, diarrhea, nausea, rectal pain and vomiting.   Endocrine: Negative for cold intolerance, heat intolerance, polydipsia, polyphagia and polyuria.   Genitourinary: Negative for decreased urine volume, difficulty urinating, dysuria, enuresis, flank pain, frequency, genital sores, hematuria and urgency.   Musculoskeletal: Positive for arthralgias. Negative for back pain, gait problem, joint swelling, myalgias, neck pain and neck stiffness.   Skin: Negative for color change, pallor, rash and wound.   Allergic/Immunologic: Negative for environmental allergies, food allergies and immunocompromised state.   Neurological: Negative for dizziness, tremors, seizures, syncope, facial  "asymmetry, speech difficulty, weakness, light-headedness, numbness and headaches.   Hematological: Negative for adenopathy. Does not bruise/bleed easily.   Psychiatric/Behavioral: Negative for agitation, behavioral problems, confusion, decreased concentration, dysphoric mood, hallucinations, self-injury, sleep disturbance and suicidal ideas. The patient is not nervous/anxious and is not hyperactive.         Objective      Physical Exam  /70   Ht 181 cm (71.26\")   Wt 91.6 kg (202 lb)   BMI 27.97 kg/m²     Body mass index is 27.97 kg/m².    General:   Mental Status:  Alert   Appearance: Cooperative, in no acute distress   Build and Nutrition: Well-nourished well-developed male   Orientation: Alert and oriented to person, place and time   Posture: Normal   Gait: Nonantalgic    Integument:              Left knee: No skin lesions, no rash, no ecchymosis     Lower Extremities:              Left Knee:                          Tenderness:    None                          Effusion:           2+                          Swelling:          None                          Crepitus:          Soft                          Range of motion:        Extension:       15°                                                              Flexion:           95°    Imaging/Studies  Imaging Results (Last 24 Hours)     ** No results found for the last 24 hours. **        No new imaging today.    Assessment and Plan     Diagnoses and all orders for this visit:    1. Primary osteoarthritis of left knee (Primary)  -     - Large Joint Arthrocentesis: L knee        1. Primary osteoarthritis of left knee        I reviewed my findings with the patient.  He does have a effusion and would like an aspiration and injection today.  I will see him back in 4 months, but sooner for any problems.    Procedure Note:  The potential benefits of performing a therapeutic left knee joint aspiration and injection, as well as potential risks (including, but not " limited to infection, swelling, pain, bleeding, bruising, nerve/blood vessel damage, skin color changes, transient elevation in blood glucose levels, and fat atrophy) were discussed with the patient.  After informed consent, timeout procedure was performed, and the skin on the left knee was prepped with chlorhexidine soap and alcohol, after which ethyl chloride was applied to the skin at the injection site. Via the superior lateral approach, 51 cc of clear straw-colored fluid was aspirated then 1ml of Kenalog 40mg/ml mixed with 4ml 0.5% ropivacaine plain was injected into the knee joint.  The patient tolerated the procedure well, experiencing 65% improvement a few minutes following the injection. There were no complications.  Band-Aid was applied to the injection site. Post-procedural instructions were given to the patient and/or their caregiver.      Return in about 4 months (around 6/13/2023).      Jaya Renteria MD  02/13/23  16:25 EST

## 2023-04-27 DIAGNOSIS — L29.9 ITCHING: Primary | ICD-10-CM

## 2023-04-27 DIAGNOSIS — R74.8 ELEVATED ALKALINE PHOSPHATASE LEVEL: ICD-10-CM

## 2023-04-27 RX ORDER — HYDROXYZINE HYDROCHLORIDE 25 MG/1
25 TABLET, FILM COATED ORAL NIGHTLY
Qty: 30 TABLET | Refills: 1 | Status: SHIPPED | OUTPATIENT
Start: 2023-04-27

## 2023-05-04 ENCOUNTER — LAB (OUTPATIENT)
Dept: LAB | Facility: HOSPITAL | Age: 83
End: 2023-05-04
Payer: MEDICARE

## 2023-05-04 DIAGNOSIS — R74.8 ELEVATED ALKALINE PHOSPHATASE LEVEL: ICD-10-CM

## 2023-05-04 DIAGNOSIS — L29.9 ITCHING: ICD-10-CM

## 2023-05-04 DIAGNOSIS — N18.32 CHRONIC KIDNEY DISEASE (CKD) STAGE G3B/A1, MODERATELY DECREASED GLOMERULAR FILTRATION RATE (GFR) BETWEEN 30-44 ML/MIN/1.73 SQUARE METER AND ALBUMINURIA CREATININE RATIO LESS THAN 30 MG/G (CMS/H*: Primary | ICD-10-CM

## 2023-05-04 LAB
ALBUMIN SERPL-MCNC: 4.1 G/DL (ref 3.5–5.2)
ALBUMIN/GLOB SERPL: 1.5 G/DL
ALP SERPL-CCNC: 152 U/L (ref 39–117)
ALT SERPL W P-5'-P-CCNC: 33 U/L (ref 1–41)
ANION GAP SERPL CALCULATED.3IONS-SCNC: 10.7 MMOL/L (ref 5–15)
AST SERPL-CCNC: 35 U/L (ref 1–40)
BACTERIA UR QL AUTO: NORMAL /HPF
BASOPHILS # BLD AUTO: 0.05 10*3/MM3 (ref 0–0.2)
BASOPHILS NFR BLD AUTO: 0.6 % (ref 0–1.5)
BILIRUB SERPL-MCNC: 0.6 MG/DL (ref 0–1.2)
BILIRUB UR QL STRIP: NEGATIVE
BUN SERPL-MCNC: 33 MG/DL (ref 8–23)
BUN/CREAT SERPL: 9.5 (ref 7–25)
CALCIUM SPEC-SCNC: 9.8 MG/DL (ref 8.6–10.5)
CHLORIDE SERPL-SCNC: 102 MMOL/L (ref 98–107)
CLARITY UR: CLEAR
CO2 SERPL-SCNC: 26.3 MMOL/L (ref 22–29)
COLOR UR: YELLOW
CREAT SERPL-MCNC: 3.48 MG/DL (ref 0.76–1.27)
CREAT UR-MCNC: 57.2 MG/DL
DEPRECATED RDW RBC AUTO: 47.3 FL (ref 37–54)
EGFRCR SERPLBLD CKD-EPI 2021: 16.8 ML/MIN/1.73
EOSINOPHIL # BLD AUTO: 0.65 10*3/MM3 (ref 0–0.4)
EOSINOPHIL NFR BLD AUTO: 8.1 % (ref 0.3–6.2)
ERYTHROCYTE [DISTWIDTH] IN BLOOD BY AUTOMATED COUNT: 14.5 % (ref 12.3–15.4)
GGT SERPL-CCNC: 89 U/L (ref 8–61)
GLOBULIN UR ELPH-MCNC: 2.7 GM/DL
GLUCOSE SERPL-MCNC: 109 MG/DL (ref 65–99)
GLUCOSE UR STRIP-MCNC: NEGATIVE MG/DL
HCT VFR BLD AUTO: 38.1 % (ref 37.5–51)
HGB BLD-MCNC: 12.7 G/DL (ref 13–17.7)
HGB UR QL STRIP.AUTO: NEGATIVE
HYALINE CASTS UR QL AUTO: NORMAL /LPF
IMM GRANULOCYTES # BLD AUTO: 0.02 10*3/MM3 (ref 0–0.05)
IMM GRANULOCYTES NFR BLD AUTO: 0.3 % (ref 0–0.5)
KETONES UR QL STRIP: NEGATIVE
LEUKOCYTE ESTERASE UR QL STRIP.AUTO: NEGATIVE
LYMPHOCYTES # BLD AUTO: 2.21 10*3/MM3 (ref 0.7–3.1)
LYMPHOCYTES NFR BLD AUTO: 27.7 % (ref 19.6–45.3)
MCH RBC QN AUTO: 30 PG (ref 26.6–33)
MCHC RBC AUTO-ENTMCNC: 33.3 G/DL (ref 31.5–35.7)
MCV RBC AUTO: 90.1 FL (ref 79–97)
MONOCYTES # BLD AUTO: 0.95 10*3/MM3 (ref 0.1–0.9)
MONOCYTES NFR BLD AUTO: 11.9 % (ref 5–12)
NEUTROPHILS NFR BLD AUTO: 4.11 10*3/MM3 (ref 1.7–7)
NEUTROPHILS NFR BLD AUTO: 51.4 % (ref 42.7–76)
NITRITE UR QL STRIP: NEGATIVE
NRBC BLD AUTO-RTO: 0 /100 WBC (ref 0–0.2)
PH UR STRIP.AUTO: 7 [PH] (ref 5–8)
PHOSPHATE SERPL-MCNC: 3.7 MG/DL (ref 2.5–4.5)
PLATELET # BLD AUTO: 211 10*3/MM3 (ref 140–450)
PMV BLD AUTO: 10.4 FL (ref 6–12)
POTASSIUM SERPL-SCNC: 4.4 MMOL/L (ref 3.5–5.2)
PROT ?TM UR-MCNC: 7.8 MG/DL
PROT SERPL-MCNC: 6.8 G/DL (ref 6–8.5)
PROT UR QL STRIP: NEGATIVE
RBC # BLD AUTO: 4.23 10*6/MM3 (ref 4.14–5.8)
RBC # UR STRIP: NORMAL /HPF
REF LAB TEST METHOD: NORMAL
SODIUM SERPL-SCNC: 139 MMOL/L (ref 136–145)
SP GR UR STRIP: 1.01 (ref 1–1.03)
SQUAMOUS #/AREA URNS HPF: NORMAL /HPF
UROBILINOGEN UR QL STRIP: NORMAL
WBC # UR STRIP: NORMAL /HPF
WBC NRBC COR # BLD: 7.99 10*3/MM3 (ref 3.4–10.8)

## 2023-05-04 PROCEDURE — 82570 ASSAY OF URINE CREATININE: CPT

## 2023-05-04 PROCEDURE — 81001 URINALYSIS AUTO W/SCOPE: CPT

## 2023-05-04 PROCEDURE — 80053 COMPREHEN METABOLIC PANEL: CPT

## 2023-05-04 PROCEDURE — 82977 ASSAY OF GGT: CPT

## 2023-05-04 PROCEDURE — 84100 ASSAY OF PHOSPHORUS: CPT

## 2023-05-04 PROCEDURE — 36415 COLL VENOUS BLD VENIPUNCTURE: CPT

## 2023-05-04 PROCEDURE — 84156 ASSAY OF PROTEIN URINE: CPT

## 2023-05-04 PROCEDURE — 85025 COMPLETE CBC W/AUTO DIFF WBC: CPT

## 2023-05-08 ENCOUNTER — TELEPHONE (OUTPATIENT)
Dept: INTERNAL MEDICINE | Facility: CLINIC | Age: 83
End: 2023-05-08
Payer: MEDICARE

## 2023-05-08 NOTE — TELEPHONE ENCOUNTER
----- Message from Verito Schaffer LPN sent at 5/8/2023  1:05 PM EDT -----  Regarding: FW: Long Term Care Insurance  Contact: [phone]    ----- Message -----  From: Toño Alcala  Sent: 5/8/2023  12:38 PM EDT  To: [facility] [address] Clinical Pool  Subject: Long Term Care Insurance                         Have you received a request from [facility] on [address].  They tell me they have not received a response to their request.

## 2023-06-06 ENCOUNTER — TRANSCRIBE ORDERS (OUTPATIENT)
Dept: LAB | Facility: HOSPITAL | Age: 83
End: 2023-06-06
Payer: MEDICARE

## 2023-06-06 ENCOUNTER — LAB (OUTPATIENT)
Dept: LAB | Facility: HOSPITAL | Age: 83
End: 2023-06-06
Payer: MEDICARE

## 2023-06-06 DIAGNOSIS — N18.32 CHRONIC KIDNEY DISEASE (CKD) STAGE G3B/A1, MODERATELY DECREASED GLOMERULAR FILTRATION RATE (GFR) BETWEEN 30-44 ML/MIN/1.73 SQUARE METER AND ALBUMINURIA CREATININE RATIO LESS THAN 30 MG/G (CMS/H*: Primary | ICD-10-CM

## 2023-06-06 DIAGNOSIS — N18.32 CHRONIC KIDNEY DISEASE (CKD) STAGE G3B/A1, MODERATELY DECREASED GLOMERULAR FILTRATION RATE (GFR) BETWEEN 30-44 ML/MIN/1.73 SQUARE METER AND ALBUMINURIA CREATININE RATIO LESS THAN 30 MG/G (CMS/H*: ICD-10-CM

## 2023-06-06 LAB
ALBUMIN SERPL-MCNC: 4.3 G/DL (ref 3.5–5.2)
ANION GAP SERPL CALCULATED.3IONS-SCNC: 12.2 MMOL/L (ref 5–15)
BACTERIA UR QL AUTO: NORMAL /HPF
BASOPHILS # BLD AUTO: 0.05 10*3/MM3 (ref 0–0.2)
BASOPHILS NFR BLD AUTO: 0.7 % (ref 0–1.5)
BILIRUB UR QL STRIP: NEGATIVE
BUN SERPL-MCNC: 36 MG/DL (ref 8–23)
BUN/CREAT SERPL: 11.7 (ref 7–25)
CALCIUM SPEC-SCNC: 9.6 MG/DL (ref 8.6–10.5)
CHLORIDE SERPL-SCNC: 100 MMOL/L (ref 98–107)
CLARITY UR: CLEAR
CO2 SERPL-SCNC: 25.8 MMOL/L (ref 22–29)
COLOR UR: YELLOW
CREAT SERPL-MCNC: 3.07 MG/DL (ref 0.76–1.27)
CREAT UR-MCNC: 59.1 MG/DL
DEPRECATED RDW RBC AUTO: 44.8 FL (ref 37–54)
EGFRCR SERPLBLD CKD-EPI 2021: 19.6 ML/MIN/1.73
EOSINOPHIL # BLD AUTO: 0.59 10*3/MM3 (ref 0–0.4)
EOSINOPHIL NFR BLD AUTO: 7.7 % (ref 0.3–6.2)
ERYTHROCYTE [DISTWIDTH] IN BLOOD BY AUTOMATED COUNT: 13.5 % (ref 12.3–15.4)
GLUCOSE SERPL-MCNC: 110 MG/DL (ref 65–99)
GLUCOSE UR STRIP-MCNC: NEGATIVE MG/DL
HCT VFR BLD AUTO: 39.7 % (ref 37.5–51)
HGB BLD-MCNC: 13.1 G/DL (ref 13–17.7)
HGB UR QL STRIP.AUTO: NEGATIVE
HYALINE CASTS UR QL AUTO: NORMAL /LPF
IMM GRANULOCYTES # BLD AUTO: 0.02 10*3/MM3 (ref 0–0.05)
IMM GRANULOCYTES NFR BLD AUTO: 0.3 % (ref 0–0.5)
KETONES UR QL STRIP: NEGATIVE
LEUKOCYTE ESTERASE UR QL STRIP.AUTO: NEGATIVE
LYMPHOCYTES # BLD AUTO: 2.04 10*3/MM3 (ref 0.7–3.1)
LYMPHOCYTES NFR BLD AUTO: 26.6 % (ref 19.6–45.3)
MCH RBC QN AUTO: 29.8 PG (ref 26.6–33)
MCHC RBC AUTO-ENTMCNC: 33 G/DL (ref 31.5–35.7)
MCV RBC AUTO: 90.2 FL (ref 79–97)
MONOCYTES # BLD AUTO: 0.83 10*3/MM3 (ref 0.1–0.9)
MONOCYTES NFR BLD AUTO: 10.8 % (ref 5–12)
NEUTROPHILS NFR BLD AUTO: 4.13 10*3/MM3 (ref 1.7–7)
NEUTROPHILS NFR BLD AUTO: 53.9 % (ref 42.7–76)
NITRITE UR QL STRIP: NEGATIVE
NRBC BLD AUTO-RTO: 0 /100 WBC (ref 0–0.2)
PH UR STRIP.AUTO: 7 [PH] (ref 5–8)
PHOSPHATE SERPL-MCNC: 4.1 MG/DL (ref 2.5–4.5)
PLATELET # BLD AUTO: 219 10*3/MM3 (ref 140–450)
PMV BLD AUTO: 10.5 FL (ref 6–12)
POTASSIUM SERPL-SCNC: 4.6 MMOL/L (ref 3.5–5.2)
PROT ?TM UR-MCNC: 9.6 MG/DL
PROT UR QL STRIP: ABNORMAL
RBC # BLD AUTO: 4.4 10*6/MM3 (ref 4.14–5.8)
RBC # UR STRIP: NORMAL /HPF
REF LAB TEST METHOD: NORMAL
SODIUM SERPL-SCNC: 138 MMOL/L (ref 136–145)
SP GR UR STRIP: 1.01 (ref 1–1.03)
SQUAMOUS #/AREA URNS HPF: NORMAL /HPF
UROBILINOGEN UR QL STRIP: ABNORMAL
WBC # UR STRIP: NORMAL /HPF
WBC NRBC COR # BLD: 7.66 10*3/MM3 (ref 3.4–10.8)

## 2023-06-06 PROCEDURE — 82570 ASSAY OF URINE CREATININE: CPT

## 2023-06-06 PROCEDURE — 80069 RENAL FUNCTION PANEL: CPT

## 2023-06-06 PROCEDURE — 81001 URINALYSIS AUTO W/SCOPE: CPT

## 2023-06-06 PROCEDURE — 85025 COMPLETE CBC W/AUTO DIFF WBC: CPT

## 2023-06-06 PROCEDURE — 84156 ASSAY OF PROTEIN URINE: CPT

## 2023-06-06 PROCEDURE — 36415 COLL VENOUS BLD VENIPUNCTURE: CPT

## 2023-06-19 ENCOUNTER — OFFICE VISIT (OUTPATIENT)
Dept: ORTHOPEDIC SURGERY | Facility: CLINIC | Age: 83
End: 2023-06-19
Payer: MEDICARE

## 2023-06-19 VITALS
DIASTOLIC BLOOD PRESSURE: 70 MMHG | SYSTOLIC BLOOD PRESSURE: 130 MMHG | WEIGHT: 188 LBS | BODY MASS INDEX: 26.32 KG/M2 | HEIGHT: 71 IN

## 2023-06-19 DIAGNOSIS — M17.12 PRIMARY OSTEOARTHRITIS OF LEFT KNEE: Primary | ICD-10-CM

## 2023-06-19 RX ORDER — TRIAMCINOLONE ACETONIDE 40 MG/ML
40 INJECTION, SUSPENSION INTRA-ARTICULAR; INTRAMUSCULAR
Status: COMPLETED | OUTPATIENT
Start: 2023-06-19 | End: 2023-06-19

## 2023-06-19 RX ORDER — ROPIVACAINE HYDROCHLORIDE 5 MG/ML
4 INJECTION, SOLUTION EPIDURAL; INFILTRATION; PERINEURAL
Status: COMPLETED | OUTPATIENT
Start: 2023-06-19 | End: 2023-06-19

## 2023-06-19 RX ADMIN — ROPIVACAINE HYDROCHLORIDE 4 ML: 5 INJECTION, SOLUTION EPIDURAL; INFILTRATION; PERINEURAL at 16:20

## 2023-06-19 RX ADMIN — TRIAMCINOLONE ACETONIDE 40 MG: 40 INJECTION, SUSPENSION INTRA-ARTICULAR; INTRAMUSCULAR at 16:20

## 2023-06-19 NOTE — PROGRESS NOTES
Procedure   Large Joint Arthrocentesis: L knee  Date/Time: 6/19/2023 4:20 PM  Consent given by: patient  Site marked: site marked  Timeout: Immediately prior to procedure a time out was called to verify the correct patient, procedure, equipment, support staff and site/side marked as required   Supporting Documentation  Indications: pain   Procedure Details  Location: knee - L knee  Preparation: Patient was prepped and draped in the usual sterile fashion  Needle size: 18 G  Approach: anterolateral  Medications administered: 4 mL ropivacaine 0.5 %; 40 mg triamcinolone acetonide 40 MG/ML  Aspirate amount: 40 mL  Aspirate: clear and yellow  Patient tolerance: patient tolerated the procedure well with no immediate complications

## 2023-06-19 NOTE — PROGRESS NOTES
[facility] Orthopaedic Surgery Clinic Note    Subjective     Chief Complaint   Patient presents with   • Follow-up     4 month follow up -- Primary osteoarthritis of left knee        HPI    It has been 4  month(s) since Mr. Alcala's last visit. He returns to clinic today for follow-up of left knee arthritis. The issue has been ongoing for several year(s). He rates his pain a 3/10 on the pain scale. Previous/current treatments: NSAIDS, physical therapy, and oral steroids. Current symptoms: pain and giving way/buckling. The pain is worse with walking, climbing stairs, and rising from seated position; resting, sitting, and assistive device (cane/walker) improve the pain. Overall, he is doing better.  He would like an aspiration and injection for his knee today.  He is not interested in surgical intervention.    I have reviewed the following portions of the patient's history and agree with: History of Present Illness and Review of Systems    Patient Active Problem List   Diagnosis   • CAD (coronary artery disease)   • CVD (cardiovascular disease)   • DVT (deep venous thrombosis)   • Diabetes mellitus   • Dyslipidemia   • Arthritis   • GERD (gastroesophageal reflux disease)   • Mixed hyperlipidemia   • Diabetic nephropathy associated with type 2 diabetes mellitus   • Diabetic polyneuropathy associated with type 2 diabetes mellitus   • Chronic kidney disease, stage III (moderate)   • Vitamin D deficiency   • Disc disorder of cervical region   • Postthrombotic syndrome   • Vertigo   • Angina pectoris   • Lumbosacral spondylosis without myelopathy   • Psoriasis   • Arthritis of elbow   • Swelling of right lower extremity   • Acute right-sided low back pain without sciatica   • Hoarseness   • Unifocal PVCs   • Skin lesion of face   • Primary hypertension   • Medicare annual wellness visit, subsequent   • Stage 3 chronic kidney disease due to benign hypertension   • BMI 30.0-30.9,adult   • Postherpetic neuralgia   • Herpes  zoster without complication   • Calculus of gallbladder without cholecystitis without obstruction   • Acute diverticulitis   • Leg edema, right   • Chest pain   • Encounter for removal of sutures   • Chronic pain of left knee   • Fall     Past Medical History:   Diagnosis Date   • Arthritis    • Bilateral radial fractures 1962    fracture bilateral radial heads   • CAD (coronary artery disease)    • CVD (cardiovascular disease)     History of TIA 1989   • Diabetes mellitus    • DVT (deep venous thrombosis)     Right lower extremity DVT and pulmonary embolism in 06/2015, idiopathic   • DVT of proximal lower limb 06/22/2015    proximal DVT right leg, small PE- anticoagulation   • Dyslipidemia    • Fracture 1952    H/o pf left forearm fracture   • Fracture of right hand 1980   • GERD (gastroesophageal reflux disease)     with hiatal hernia   • Hx of angina pectoris    • Hypertension     Normal renal angiography 02/16/11   • Left wrist fracture 1953   • Osteoarthritis    • Right wrist fracture    • Vertigo    • Wears glasses       Past Surgical History:   Procedure Laterality Date   • APPENDECTOMY  1957   • BACK SURGERY     • CARDIAC CATHETERIZATION  2011    with vein graft   • CERVICAL FUSION     • CERVICAL FUSION  1992    C3-4   • COLONOSCOPY  2013   • CORONARY ARTERY BYPASS GRAFT  1994   • HERNIA REPAIR Right 2011    inguinal   • INGUINAL HERNIA REPAIR Left 10/29/2019    Procedure: INGUINAL HERNIA REPAIR LEFT;  Surgeon: Charisma Raines MD;  Location: [facility];  Service: General   • LUMBAR LAMINECTOMY  1996    laminectomy, lumbar, L3   • OTHER SURGICAL HISTORY      wrist surgery   • TONSILLECTOMY AND ADENOIDECTOMY  1945      Family History   Problem Relation Age of Onset   • Arthritis Mother         OA   • Heart disease Father    • Diabetes Father    • Heart attack Father    • Heart disease Brother    • Heart attack Brother    • Diabetes Brother    • Diabetes Son    • Hypertension Other    • Cancer Other       Social History     Socioeconomic History   • Marital status:    Tobacco Use   • Smoking status: Former     Packs/day: 1.00     Years: 20.00     Pack years: 20.00     Types: Cigarettes     Quit date: 1997     Years since quittin.1   • Smokeless tobacco: Never   • Tobacco comments:     QUIT DATE 1992   Vaping Use   • Vaping Use: Never used   Substance and Sexual Activity   • Alcohol use: Yes     Alcohol/week: 7.0 standard drinks     Types: 7 Glasses of wine per week     Comment: glass of wine everyday    • Drug use: No   • Sexual activity: Defer      Current Outpatient Medications on File Prior to Visit   Medication Sig Dispense Refill   • acetaminophen (TYLENOL) 325 MG tablet Take 2 tablets by mouth As Needed for Mild Pain.     • apixaban (ELIQUIS) 5 MG tablet tablet Take 1 tablet by mouth Daily.     • aspirin 81 MG EC tablet Take 1 tablet by mouth Daily.     • atorvastatin (LIPITOR) 40 MG tablet Take 1 tablet by mouth Daily.     • calcipotriene-betamethasone (TACLONEX) 0.005-0.064 % ointment Apply 1 application topically to the appropriate area as directed As Needed (psoriasis).     • cholecalciferol (VITAMIN D3) 25 MCG (1000 UT) tablet Take 1 tablet by mouth Daily.     • diclofenac (VOLTAREN) 1 % gel gel Apply 4 g topically As Needed.     • diltiaZEM CD (CARDIZEM CD) 180 MG 24 hr capsule Take 1 capsule by mouth Daily.     • gabapentin (NEURONTIN) 300 MG capsule TAKE THREE CAPSULES BY MOUTH TWICE A DAY (Patient taking differently: Take 3 capsules by mouth Daily.) 180 capsule 0   • glucose blood test strip Use to check blood sugar twice daily 100 each 3   • hydrOXYzine (ATARAX) 25 MG tablet Take 1 tablet by mouth Every Night. 30 tablet 1   • lidocaine (LIDODERM) 5 % Place 1 patch on the skin as directed by provider Daily. Remove & Discard patch within 12 hours or as directed by MD 90 patch 2   • losartan (COZAAR) 100 MG tablet Take 1 tablet by mouth Every Night.     • Microlet Lancets misc  Use to check blood sugar twice daily 100 each 3   • nitroglycerin (NITROSTAT) 0.4 MG SL tablet Place 1 tablet under the tongue Every 5 (Five) Minutes As Needed for Chest Pain. Take no more than 3 doses in 15 minutes.     • omeprazole (priLOSEC) 20 MG capsule Take 1 capsule by mouth Daily.     • rivaroxaban (Xarelto) 20 MG tablet Take 1 tablet by mouth Daily. 30 tablet 5     No current facility-administered medications on file prior to visit.      Allergies   Allergen Reactions   • Penicillins Hives and Swelling     Per patient, has tolerated Keflex        Review of Systems   Constitutional:  Negative for activity change, appetite change, chills, diaphoresis, fatigue, fever and unexpected weight change.   HENT:  Negative for congestion, dental problem, drooling, ear discharge, ear pain, facial swelling, hearing loss, mouth sores, nosebleeds, postnasal drip, rhinorrhea, sinus pressure, sneezing, sore throat, tinnitus, trouble swallowing and voice change.    Eyes:  Negative for photophobia, pain, discharge, redness, itching and visual disturbance.   Respiratory:  Negative for apnea, cough, choking, chest tightness, shortness of breath, wheezing and stridor.    Cardiovascular:  Negative for chest pain, palpitations and leg swelling.   Gastrointestinal:  Negative for abdominal distention, abdominal pain, anal bleeding, blood in stool, constipation, diarrhea, nausea, rectal pain and vomiting.   Endocrine: Negative for cold intolerance, heat intolerance, polydipsia, polyphagia and polyuria.   Genitourinary:  Negative for decreased urine volume, difficulty urinating, dysuria, enuresis, flank pain, frequency, genital sores, hematuria and urgency.   Musculoskeletal:  Positive for arthralgias. Negative for back pain, gait problem, joint swelling, myalgias, neck pain and neck stiffness.   Skin:  Negative for color change, pallor, rash and wound.   Allergic/Immunologic: Negative for environmental allergies, food allergies and  "immunocompromised state.   Neurological:  Negative for dizziness, tremors, seizures, syncope, facial asymmetry, speech difficulty, weakness, light-headedness, numbness and headaches.   Hematological:  Negative for adenopathy. Does not bruise/bleed easily.   Psychiatric/Behavioral:  Negative for agitation, behavioral problems, confusion, decreased concentration, dysphoric mood, hallucinations, self-injury, sleep disturbance and suicidal ideas. The patient is not nervous/anxious and is not hyperactive.       Objective      Physical Exam  /70   Ht 181 cm (71.26\")   Wt 85.3 kg (188 lb)   BMI 26.03 kg/m²     Body mass index is 26.03 kg/m².    General:   Mental Status:  Alert   Appearance: Cooperative, in no acute distress   Build and Nutrition: Well-nourished well-developed male   Orientation: Alert and oriented to person, place and time   Posture: Normal   Gait: With a cane    Integument:              Left knee: No skin lesions, no rash, no ecchymosis     Lower Extremities:              Left Knee:                          Tenderness:    None                          Effusion:          1-2+                          Swelling:          None                          Crepitus:          Soft                          Range of motion:        Extension:       10°                                                              Flexion:           100°    Imaging/Studies  Imaging Results (Last 24 Hours)     ** No results found for the last 24 hours. **        No new imaging today.    Assessment and Plan     Diagnoses and all orders for this visit:    1. Primary osteoarthritis of left knee (Primary)  -     Large Joint Arthrocentesis: L knee        1. Primary osteoarthritis of left knee        Reviewed my findings with the patient.  He would like to have an aspiration injection for his knee today, and this was performed.  He is not interested in surgical intervention.  I will see him back in 4 months, but sooner for any " problems.    Procedure Note:  The potential benefits of performing a therapeutic left knee joint aspiration and injection, as well as potential risks (including, but not limited to infection, swelling, pain, bleeding, bruising, nerve/blood vessel damage, skin color changes, transient elevation in blood glucose levels, and fat atrophy) were discussed with the patient.  After informed consent, timeout procedure was performed, and the skin on the left knee was prepped with chlorhexidine soap and alcohol, after which ethyl chloride was applied to the skin at the injection site. Via the superior lateral approach, 40 cc of clear straw-colored fluid was aspirated then 1ml of Kenalog 40mg/ml mixed with 4ml 0.5% ropivacaine plain was injected into the knee joint.  The patient tolerated the procedure well, experiencing 75% improvement a few minutes following the injection. There were no complications.  Band-Aid was applied to the injection site. Post-procedural instructions were given to the patient and/or their caregiver.      Return in about 4 months (around 10/19/2023).      Jaya Renteria MD  06/19/23  16:28 EDT

## 2023-08-04 DIAGNOSIS — I87.009 POSTTHROMBOTIC SYNDROME: Primary | ICD-10-CM

## 2023-08-04 DIAGNOSIS — D68.59 HYPERCOAGULABLE STATE: ICD-10-CM

## 2023-08-04 RX ORDER — WARFARIN SODIUM 5 MG/1
TABLET ORAL
Qty: 30 TABLET | Refills: 5 | Status: SHIPPED | OUTPATIENT
Start: 2023-08-04

## 2023-08-04 RX ORDER — NORTRIPTYLINE HYDROCHLORIDE 10 MG/1
10 CAPSULE ORAL NIGHTLY
Qty: 30 CAPSULE | Refills: 5 | Status: SHIPPED | OUTPATIENT
Start: 2023-08-04

## 2023-08-10 ENCOUNTER — LAB (OUTPATIENT)
Dept: LAB | Facility: HOSPITAL | Age: 83
End: 2023-08-10
Payer: MEDICARE

## 2023-08-10 DIAGNOSIS — D68.59 HYPERCOAGULABLE STATE: ICD-10-CM

## 2023-08-10 LAB
INR PPP: 1.93 (ref 0.89–1.12)
PROTHROMBIN TIME: 22.3 SECONDS (ref 12.2–14.5)

## 2023-08-10 PROCEDURE — 85610 PROTHROMBIN TIME: CPT

## 2023-08-24 ENCOUNTER — CLINICAL SUPPORT (OUTPATIENT)
Dept: INTERNAL MEDICINE | Facility: CLINIC | Age: 83
End: 2023-08-24
Payer: MEDICARE

## 2023-08-24 DIAGNOSIS — Z79.01 ANTICOAGULATED ON WARFARIN: ICD-10-CM

## 2023-08-24 DIAGNOSIS — Z51.81 THERAPEUTIC DRUG MONITORING: Primary | ICD-10-CM

## 2023-08-24 LAB
INR PPP: 4.8 (ref 0.9–1.1)
PROTHROMBIN TIME: 57.3 SECONDS

## 2023-08-24 PROCEDURE — 36416 COLLJ CAPILLARY BLOOD SPEC: CPT | Performed by: INTERNAL MEDICINE

## 2023-08-24 PROCEDURE — 85610 PROTHROMBIN TIME: CPT | Performed by: INTERNAL MEDICINE

## 2023-09-01 ENCOUNTER — DOCUMENTATION (OUTPATIENT)
Dept: INTERNAL MEDICINE | Facility: CLINIC | Age: 83
End: 2023-09-01
Payer: MEDICARE

## 2023-09-04 ENCOUNTER — TELEPHONE (OUTPATIENT)
Dept: INTERNAL MEDICINE | Facility: CLINIC | Age: 83
End: 2023-09-04
Payer: MEDICARE

## 2023-09-04 NOTE — TELEPHONE ENCOUNTER
Patient asked for alternative to gabapentin for persistent nerve pain along right side.  He has home prescription of tramadol. He has also started nortriptyline 10mg nightly. For now, I told him he could increase nortriptyline to 20mg nightly.

## 2023-09-05 ENCOUNTER — CLINICAL SUPPORT (OUTPATIENT)
Dept: INTERNAL MEDICINE | Facility: CLINIC | Age: 83
End: 2023-09-05
Payer: MEDICARE

## 2023-09-05 DIAGNOSIS — Z79.01 ANTICOAGULATED ON WARFARIN: ICD-10-CM

## 2023-09-05 DIAGNOSIS — Z51.81 THERAPEUTIC DRUG MONITORING: Primary | ICD-10-CM

## 2023-09-05 LAB
INR PPP: 1.4 (ref 0.9–1.1)
PROTHROMBIN TIME: 17.2 SECONDS

## 2023-09-05 PROCEDURE — 36416 COLLJ CAPILLARY BLOOD SPEC: CPT | Performed by: INTERNAL MEDICINE

## 2023-09-05 PROCEDURE — 85610 PROTHROMBIN TIME: CPT | Performed by: INTERNAL MEDICINE

## 2023-09-06 ENCOUNTER — LAB (OUTPATIENT)
Dept: LAB | Facility: HOSPITAL | Age: 83
End: 2023-09-06
Payer: MEDICARE

## 2023-09-06 ENCOUNTER — TRANSCRIBE ORDERS (OUTPATIENT)
Dept: LAB | Facility: HOSPITAL | Age: 83
End: 2023-09-06
Payer: MEDICARE

## 2023-09-06 DIAGNOSIS — N17.9 ACUTE RENAL FAILURE, UNSPECIFIED ACUTE RENAL FAILURE TYPE: ICD-10-CM

## 2023-09-06 DIAGNOSIS — N17.9 ACUTE RENAL FAILURE, UNSPECIFIED ACUTE RENAL FAILURE TYPE: Primary | ICD-10-CM

## 2023-09-06 LAB
ALBUMIN SERPL-MCNC: 4.4 G/DL (ref 3.5–5.2)
ANION GAP SERPL CALCULATED.3IONS-SCNC: 12 MMOL/L (ref 5–15)
BACTERIA UR QL AUTO: NORMAL /HPF
BILIRUB UR QL STRIP: NEGATIVE
BUN SERPL-MCNC: 30 MG/DL (ref 8–23)
BUN/CREAT SERPL: 10.9 (ref 7–25)
CALCIUM SPEC-SCNC: 9.5 MG/DL (ref 8.6–10.5)
CHLORIDE SERPL-SCNC: 103 MMOL/L (ref 98–107)
CK SERPL-CCNC: 70 U/L (ref 20–200)
CLARITY UR: CLEAR
CO2 SERPL-SCNC: 25 MMOL/L (ref 22–29)
COLOR UR: YELLOW
CREAT SERPL-MCNC: 2.75 MG/DL (ref 0.76–1.27)
CREAT UR-MCNC: 126.4 MG/DL
EGFRCR SERPLBLD CKD-EPI 2021: 22.2 ML/MIN/1.73
GLUCOSE SERPL-MCNC: 106 MG/DL (ref 65–99)
GLUCOSE UR STRIP-MCNC: NEGATIVE MG/DL
HGB UR QL STRIP.AUTO: NEGATIVE
HYALINE CASTS UR QL AUTO: NORMAL /LPF
KETONES UR QL STRIP: NEGATIVE
LEUKOCYTE ESTERASE UR QL STRIP.AUTO: NEGATIVE
NITRITE UR QL STRIP: NEGATIVE
PH UR STRIP.AUTO: 6 [PH] (ref 5–8)
PHOSPHATE SERPL-MCNC: 3.4 MG/DL (ref 2.5–4.5)
POTASSIUM SERPL-SCNC: 4.6 MMOL/L (ref 3.5–5.2)
PROT ?TM UR-MCNC: 18.9 MG/DL
PROT UR QL STRIP: ABNORMAL
RBC # UR STRIP: NORMAL /HPF
REF LAB TEST METHOD: NORMAL
SODIUM SERPL-SCNC: 140 MMOL/L (ref 136–145)
SP GR UR STRIP: 1.02 (ref 1–1.03)
SQUAMOUS #/AREA URNS HPF: NORMAL /HPF
UROBILINOGEN UR QL STRIP: ABNORMAL
WBC # UR STRIP: NORMAL /HPF

## 2023-09-06 PROCEDURE — 36415 COLL VENOUS BLD VENIPUNCTURE: CPT

## 2023-09-06 PROCEDURE — 80069 RENAL FUNCTION PANEL: CPT

## 2023-09-06 PROCEDURE — 81001 URINALYSIS AUTO W/SCOPE: CPT | Performed by: STUDENT IN AN ORGANIZED HEALTH CARE EDUCATION/TRAINING PROGRAM

## 2023-09-06 PROCEDURE — 82570 ASSAY OF URINE CREATININE: CPT

## 2023-09-06 PROCEDURE — 84156 ASSAY OF PROTEIN URINE: CPT

## 2023-09-06 PROCEDURE — 82550 ASSAY OF CK (CPK): CPT

## 2023-10-11 ENCOUNTER — CLINICAL SUPPORT (OUTPATIENT)
Dept: INTERNAL MEDICINE | Facility: CLINIC | Age: 83
End: 2023-10-11
Payer: MEDICARE

## 2023-10-11 DIAGNOSIS — Z79.01 LONG TERM (CURRENT) USE OF ANTICOAGULANTS: ICD-10-CM

## 2023-10-11 DIAGNOSIS — Z51.81 ENCOUNTER FOR THERAPEUTIC DRUG MONITORING: ICD-10-CM

## 2023-10-11 DIAGNOSIS — I82.5Y1 CHRONIC DEEP VEIN THROMBOSIS (DVT) OF PROXIMAL VEIN OF RIGHT LOWER EXTREMITY: Primary | ICD-10-CM

## 2023-10-11 LAB
INR PPP: 2.5 (ref 0.9–1.1)
PROTHROMBIN TIME: 30.4 SECONDS

## 2023-10-11 PROCEDURE — 36416 COLLJ CAPILLARY BLOOD SPEC: CPT | Performed by: INTERNAL MEDICINE

## 2023-10-11 PROCEDURE — 85610 PROTHROMBIN TIME: CPT | Performed by: INTERNAL MEDICINE

## 2023-10-12 ENCOUNTER — TELEPHONE (OUTPATIENT)
Dept: INTERNAL MEDICINE | Facility: CLINIC | Age: 83
End: 2023-10-12
Payer: MEDICARE

## 2023-10-12 NOTE — TELEPHONE ENCOUNTER
----- Message from Radu Francis MD sent at 10/12/2023 11:30 AM EDT -----  Same dose, repeat 1 month

## 2023-10-23 ENCOUNTER — OFFICE VISIT (OUTPATIENT)
Dept: ORTHOPEDIC SURGERY | Facility: CLINIC | Age: 83
End: 2023-10-23
Payer: MEDICARE

## 2023-10-23 VITALS
SYSTOLIC BLOOD PRESSURE: 158 MMHG | HEIGHT: 72 IN | BODY MASS INDEX: 26.63 KG/M2 | DIASTOLIC BLOOD PRESSURE: 82 MMHG | WEIGHT: 196.6 LBS

## 2023-10-23 DIAGNOSIS — M17.12 PRIMARY OSTEOARTHRITIS OF LEFT KNEE: Primary | ICD-10-CM

## 2023-10-23 PROCEDURE — 1159F MED LIST DOCD IN RCRD: CPT | Performed by: ORTHOPAEDIC SURGERY

## 2023-10-23 PROCEDURE — 20610 DRAIN/INJ JOINT/BURSA W/O US: CPT | Performed by: ORTHOPAEDIC SURGERY

## 2023-10-23 PROCEDURE — 3077F SYST BP >= 140 MM HG: CPT | Performed by: ORTHOPAEDIC SURGERY

## 2023-10-23 PROCEDURE — 3079F DIAST BP 80-89 MM HG: CPT | Performed by: ORTHOPAEDIC SURGERY

## 2023-10-23 PROCEDURE — 99212 OFFICE O/P EST SF 10 MIN: CPT | Performed by: ORTHOPAEDIC SURGERY

## 2023-10-23 PROCEDURE — 1160F RVW MEDS BY RX/DR IN RCRD: CPT | Performed by: ORTHOPAEDIC SURGERY

## 2023-10-23 RX ORDER — TRIAMCINOLONE ACETONIDE 40 MG/ML
40 INJECTION, SUSPENSION INTRA-ARTICULAR; INTRAMUSCULAR
Status: COMPLETED | OUTPATIENT
Start: 2023-10-23 | End: 2023-10-23

## 2023-10-23 RX ORDER — BUPIVACAINE HYDROCHLORIDE 5 MG/ML
4 INJECTION, SOLUTION EPIDURAL; INTRACAUDAL
Status: COMPLETED | OUTPATIENT
Start: 2023-10-23 | End: 2023-10-23

## 2023-10-23 RX ADMIN — TRIAMCINOLONE ACETONIDE 40 MG: 40 INJECTION, SUSPENSION INTRA-ARTICULAR; INTRAMUSCULAR at 13:58

## 2023-10-23 RX ADMIN — BUPIVACAINE HYDROCHLORIDE 4 ML: 5 INJECTION, SOLUTION EPIDURAL; INTRACAUDAL at 13:58

## 2023-10-23 NOTE — PROGRESS NOTES
Procedure   - Large Joint Arthrocentesis: L knee on 10/23/2023 1:58 PM  Indications: pain  Details: 18 G needle, superolateral approach  Medications: 40 mg triamcinolone acetonide 40 MG/ML; 4 mL bupivacaine (PF) 0.5 %  Aspirate: 32 mL yellow and clear  Outcome: tolerated well, no immediate complications  Procedure, treatment alternatives, risks and benefits explained, specific risks discussed. Consent was given by the patient. Immediately prior to procedure a time out was called to verify the correct patient, procedure, equipment, support staff and site/side marked as required. Patient was prepped and draped in the usual sterile fashion.

## 2023-10-23 NOTE — PROGRESS NOTES
[facility] [place] [facility] Note    Subjective     Chief Complaint   Patient presents with    Follow-up     4 month follow up -- primary osteoarthritis of left knee        HPI    It has been 4  month(s) since Mr. Alcala's last visit. He returns to clinic today for follow-up of left knee arthritis. The issue has been ongoing for several year(s). He rates his pain a 2/10 on the pain scale. Previous/current treatments: cane/walker, NSAIDS and steroid injection (last injection 6/19/23). Current symptoms: same as prior visit. The pain is worse with climbing stairs and rising from seated position; resting, assistive device (cane/walker), lying down, and elevating the extremity improve the pain. Overall, he is doing better.  He would like an injection today.  He is not interested in surgical intervention.    I have reviewed the following portions of the patient's history and agree with: History of Present Illness and Review of Systems    Patient Active Problem List   Diagnosis    CAD (coronary artery disease)    CVD (cardiovascular disease)    DVT (deep venous thrombosis)    Diabetes mellitus    Dyslipidemia    Arthritis    GERD (gastroesophageal reflux disease)    Mixed hyperlipidemia    Diabetic nephropathy associated with type 2 diabetes mellitus    Diabetic polyneuropathy associated with type 2 diabetes mellitus    Chronic kidney disease, stage III (moderate)    Vitamin D deficiency    Disc disorder of cervical region    Postthrombotic syndrome    Vertigo    Angina pectoris    Lumbosacral spondylosis without myelopathy    Psoriasis    Arthritis of elbow    Swelling of right lower extremity    Acute right-sided low back pain without sciatica    Hoarseness    Unifocal PVCs    Skin lesion of face    Primary hypertension    Medicare annual wellness visit, subsequent    Stage 3 chronic kidney disease due to benign hypertension    BMI 30.0-30.9,adult    Postherpetic neuralgia    Herpes zoster without complication     Calculus of gallbladder without cholecystitis without obstruction    Acute diverticulitis    Leg edema, right    Chest pain    Encounter for removal of sutures    Chronic pain of left knee    Fall     Past Medical History:   Diagnosis Date    Arthritis     Arthritis of neck Fusion 1992    Bilateral radial fractures 1962    fracture bilateral radial heads    CAD (coronary artery disease)     Cataract     Cervical disc disorder Fusion 1992    Chronic kidney disease 2020    CVD (cardiovascular disease)     History of TIA 1989    Diabetes mellitus     DVT (deep venous thrombosis)     Right lower extremity DVT and pulmonary embolism in 06/2015, idiopathic    DVT of proximal lower limb 06/22/2015    proximal DVT right leg, small PE- anticoagulation    Dyslipidemia     Fracture 1952    H/o pf left forearm fracture    Fracture of right hand 1980    Fracture of wrist 1980    GERD (gastroesophageal reflux disease)     with hiatal hernia    HL (hearing loss)     Hx of angina pectoris     Hypertension     Normal renal angiography 02/16/11    Knee swelling Several years ago    Left wrist fracture 1953    Lumbosacral disc disease 1996    Osgood-Schlatter's disease 1955    Osteoarthritis     Right wrist fracture     Vertigo     Wears glasses       Past Surgical History:   Procedure Laterality Date    APPENDECTOMY  1957    BACK SURGERY      CARDIAC CATHETERIZATION  2011    with vein graft    CERVICAL FUSION      CERVICAL FUSION  1992    C3-4    COLONOSCOPY  2013    CORONARY ARTERY BYPASS GRAFT  1994    HERNIA REPAIR Right 2011    inguinal    INGUINAL HERNIA REPAIR Left 10/29/2019    Procedure: INGUINAL HERNIA REPAIR LEFT;  Surgeon: Charisma Raines MD;  Location: [facility] OR;  Service: General    LUMBAR LAMINECTOMY  1996    laminectomy, lumbar, L3    NECK SURGERY  1992    OTHER SURGICAL HISTORY      wrist surgery    SPINE SURGERY  1996    TONSILLECTOMY AND ADENOIDECTOMY  1945    TRIGGER POINT INJECTION  2001 - 2007    VASECTOMY   1972      Family History   Problem Relation Age of Onset    Arthritis Mother         OA    Heart disease Father     Diabetes Father     Heart attack Father     Heart disease Brother     Heart attack Brother     Diabetes Brother     Diabetes Son     Hypertension Other     Cancer Other      Social History     Socioeconomic History    Marital status:    Tobacco Use    Smoking status: Former     Packs/day: 1.50     Years: 20.00     Additional pack years: 0.00     Total pack years: 30.00     Types: Cigarettes, Pipe, Cigars     Start date: 1962     Quit date: 1997     Years since quittin.5     Passive exposure: Past    Smokeless tobacco: Never    Tobacco comments:     QUIT DATE 1992   Vaping Use    Vaping Use: Never used   Substance and Sexual Activity    Alcohol use: Yes     Alcohol/week: 11.0 - 13.0 standard drinks of alcohol     Types: 7 Glasses of wine, 2 Cans of beer, 2 - 4 Shots of liquor per week     Comment: glass of wine everyday     Drug use: No    Sexual activity: Not Currently     Partners: Female     Birth control/protection: Surgical, Vasectomy      Current Outpatient Medications on File Prior to Visit   Medication Sig Dispense Refill    acetaminophen (TYLENOL) 325 MG tablet Take 2 tablets by mouth As Needed for Mild Pain.      atorvastatin (LIPITOR) 40 MG tablet Take 1 tablet by mouth Daily.      calcipotriene-betamethasone (TACLONEX) 0.005-0.064 % ointment Apply 1 application  topically to the appropriate area as directed As Needed (psoriasis).      cholecalciferol (VITAMIN D3) 25 MCG (1000 UT) tablet Take 1 tablet by mouth Daily.      diclofenac (VOLTAREN) 1 % gel gel Apply 4 g topically As Needed.      diltiaZEM CD (CARDIZEM CD) 180 MG 24 hr capsule Take 1 capsule by mouth Daily.      glucose blood test strip Use to check blood sugar twice daily 100 each 3    lidocaine (LIDODERM) 5 % Place 1 patch on the skin as directed by provider Daily. Remove & Discard patch within 12 hours  or as directed by MD 90 patch 2    losartan (COZAAR) 100 MG tablet Take 0.5 tablets by mouth Every Night.      Microlet Lancets misc Use to check blood sugar twice daily 100 each 3    nitroglycerin (NITROSTAT) 0.4 MG SL tablet Place 1 tablet under the tongue Every 5 (Five) Minutes As Needed for Chest Pain. Take no more than 3 doses in 15 minutes.      nortriptyline (PAMELOR) 10 MG capsule Take 1 capsule by mouth Every Night. 30 capsule 5    omeprazole (priLOSEC) 20 MG capsule Take 1 capsule by mouth Daily.      warfarin (Coumadin) 5 MG tablet Take 1 tablet daily 30 tablet 5     No current facility-administered medications on file prior to visit.      Allergies   Allergen Reactions    Penicillins Hives and Swelling     Per patient, has tolerated Keflex        Review of Systems   Constitutional:  Negative for activity change, appetite change, chills, diaphoresis, fatigue, fever and unexpected weight change.   HENT:  Negative for congestion, dental problem, drooling, ear discharge, ear pain, facial swelling, hearing loss, mouth sores, nosebleeds, postnasal drip, rhinorrhea, sinus pressure, sneezing, sore throat, tinnitus, trouble swallowing and voice change.    Eyes:  Negative for photophobia, pain, discharge, redness, itching and visual disturbance.   Respiratory:  Negative for apnea, cough, choking, chest tightness, shortness of breath, wheezing and stridor.    Cardiovascular:  Negative for chest pain, palpitations and leg swelling.   Gastrointestinal:  Negative for abdominal distention, abdominal pain, anal bleeding, blood in stool, constipation, diarrhea, nausea, rectal pain and vomiting.   Endocrine: Negative for cold intolerance, heat intolerance, polydipsia, polyphagia and polyuria.   Genitourinary:  Negative for decreased urine volume, difficulty urinating, dysuria, enuresis, flank pain, frequency, genital sores, hematuria and urgency.   Musculoskeletal:  Positive for arthralgias. Negative for back pain, gait  "problem, joint swelling, myalgias, neck pain and neck stiffness.   Skin:  Negative for color change, pallor, rash and wound.   Allergic/Immunologic: Negative for environmental allergies, food allergies and immunocompromised state.   Neurological:  Negative for dizziness, tremors, seizures, syncope, facial asymmetry, speech difficulty, weakness, light-headedness, numbness and headaches.   Hematological:  Negative for adenopathy. Does not bruise/bleed easily.   Psychiatric/Behavioral:  Negative for agitation, behavioral problems, confusion, decreased concentration, dysphoric mood, hallucinations, self-injury, sleep disturbance and suicidal ideas. The patient is not nervous/anxious and is not hyperactive.         Objective      Physical Exam  /82   Ht 182.9 cm (72\")   Wt 89.2 kg (196 lb 9.6 oz)   BMI 26.66 kg/m²     Body mass index is 26.66 kg/m².    General:   Mental Status:  Alert   Appearance: Cooperative, in no acute distress   Build and Nutrition: Well-nourished well-developed male   Orientation: Alert and oriented to person, place and time   Posture: Normal    Integument:              Left knee: No skin lesions, no rash, no ecchymosis     Lower Extremities:              Left Knee:                          Tenderness:    None                          Effusion:          1-2+                          Swelling:          None                          Crepitus:          Soft                          Range of motion:        Extension:       10°                                                              Flexion:           100°    Imaging/Studies  Imaging Results (Last 24 Hours)       Procedure Component Value Units Date/Time    XR Knee 4+ View Left [[id number]] Resulted: 10/23/23 1357     Updated: 10/23/23 1358    Narrative:      Left Knee Radiographs  Indication: left knee pain  Views: Standing AP's and skiers of both knees, with lateral and sunrise   views of the left knee    Comparison: " 2/28/2022    Findings:    Near bone-on-bone contact medial compartment, tricompartmental   osteophytes, varus alignment, vascular calcifications, diffuse osteopenia.    Knee arthritis.  No significant change compared to the previous imaging.                Assessment and Plan     Diagnoses and all orders for this visit:    1. Primary osteoarthritis of left knee (Primary)  -     XR Knee 4+ View Left  -     - Large Joint Arthrocentesis: L knee        1. Primary osteoarthritis of left knee        I reviewed my findings with the patient.  We discussed options again for his left knee today, and he is not interested in surgical intervention.  He prefers an aspiration and injection today, and this was provided.  I will see him back in 4 months, but sooner for any problems.    Procedure Note:  The potential benefits of performing a therapeutic left knee joint aspiration and injection, as well as potential risks (including, but not limited to infection, swelling, pain, bleeding, bruising, nerve/blood vessel damage, skin color changes, transient elevation in blood glucose levels, and fat atrophy) were discussed with the patient.  After informed consent, timeout procedure was performed, and the skin on the left knee was prepped with chlorhexidine soap and alcohol, after which ethyl chloride was applied to the skin at the injection site. Via the superior lateral approach, 32 cc of clear straw-colored fluid was aspirated then 1ml of Kenalog 40mg/ml mixed with 4ml 0.5% ropivacaine plain was injected into the knee joint.  The patient tolerated the procedure well, experiencing 80% improvement a few minutes following the injection. There were no complications.  Band-Aid was applied to the injection site. Post-procedural instructions were given to the patient and/or their caregiver.      Return in about 4 months (around 2/23/2024).      Jaya Renteria MD  10/23/23  14:10 EDT

## 2023-11-20 ENCOUNTER — TELEPHONE (OUTPATIENT)
Dept: INTERNAL MEDICINE | Facility: CLINIC | Age: 83
End: 2023-11-20
Payer: MEDICARE

## 2023-12-08 ENCOUNTER — CLINICAL SUPPORT (OUTPATIENT)
Dept: INTERNAL MEDICINE | Facility: CLINIC | Age: 83
End: 2023-12-08
Payer: MEDICARE

## 2023-12-08 DIAGNOSIS — Z51.81 THERAPEUTIC DRUG MONITORING: Primary | ICD-10-CM

## 2023-12-08 DIAGNOSIS — Z79.01 ANTICOAGULATED ON WARFARIN: ICD-10-CM

## 2023-12-08 LAB
INR PPP: 3.2 (ref 0.9–1.1)
PROTHROMBIN TIME: 38 SECONDS

## 2023-12-08 PROCEDURE — 36416 COLLJ CAPILLARY BLOOD SPEC: CPT | Performed by: INTERNAL MEDICINE

## 2023-12-08 PROCEDURE — 85610 PROTHROMBIN TIME: CPT | Performed by: INTERNAL MEDICINE

## 2023-12-11 ENCOUNTER — TELEPHONE (OUTPATIENT)
Dept: INTERNAL MEDICINE | Facility: CLINIC | Age: 83
End: 2023-12-11
Payer: MEDICARE

## 2023-12-11 ENCOUNTER — LAB (OUTPATIENT)
Dept: LAB | Facility: HOSPITAL | Age: 83
End: 2023-12-11
Payer: MEDICARE

## 2023-12-11 DIAGNOSIS — N17.9 ACUTE RENAL FAILURE, UNSPECIFIED ACUTE RENAL FAILURE TYPE: Primary | ICD-10-CM

## 2023-12-11 PROCEDURE — 84156 ASSAY OF PROTEIN URINE: CPT

## 2023-12-11 PROCEDURE — 85025 COMPLETE CBC W/AUTO DIFF WBC: CPT

## 2023-12-11 PROCEDURE — 80069 RENAL FUNCTION PANEL: CPT

## 2023-12-11 PROCEDURE — 36415 COLL VENOUS BLD VENIPUNCTURE: CPT

## 2023-12-11 PROCEDURE — 82570 ASSAY OF URINE CREATININE: CPT

## 2023-12-11 PROCEDURE — 81001 URINALYSIS AUTO W/SCOPE: CPT

## 2023-12-11 NOTE — TELEPHONE ENCOUNTER
"Called and left a voicemail asking to callback to go over the following results from Dr. Francis on his protime/inr test from Friday :    \"Continue same dose, repeat 1 month.\"  "

## 2023-12-11 NOTE — TELEPHONE ENCOUNTER
Read patient message to continue same dosage and repeat pt-inr once a month. He verbally understood.

## 2023-12-12 LAB
ALBUMIN SERPL-MCNC: 4.5 G/DL (ref 3.5–5.2)
ANION GAP SERPL CALCULATED.3IONS-SCNC: 11 MMOL/L (ref 5–15)
BACTERIA UR QL AUTO: NORMAL /HPF
BASOPHILS # BLD AUTO: 0.05 10*3/MM3 (ref 0–0.2)
BASOPHILS NFR BLD AUTO: 0.8 % (ref 0–1.5)
BILIRUB UR QL STRIP: NEGATIVE
BUN SERPL-MCNC: 22 MG/DL (ref 8–23)
BUN/CREAT SERPL: 9.4 (ref 7–25)
CALCIUM SPEC-SCNC: 9.6 MG/DL (ref 8.6–10.5)
CHLORIDE SERPL-SCNC: 101 MMOL/L (ref 98–107)
CLARITY UR: CLEAR
CO2 SERPL-SCNC: 26 MMOL/L (ref 22–29)
COLOR UR: YELLOW
CREAT SERPL-MCNC: 2.33 MG/DL (ref 0.76–1.27)
CREAT UR-MCNC: 77.5 MG/DL
DEPRECATED RDW RBC AUTO: 45.6 FL (ref 37–54)
EGFRCR SERPLBLD CKD-EPI 2021: 27.1 ML/MIN/1.73
EOSINOPHIL # BLD AUTO: 0.25 10*3/MM3 (ref 0–0.4)
EOSINOPHIL NFR BLD AUTO: 4.2 % (ref 0.3–6.2)
ERYTHROCYTE [DISTWIDTH] IN BLOOD BY AUTOMATED COUNT: 14.3 % (ref 12.3–15.4)
GLUCOSE SERPL-MCNC: 111 MG/DL (ref 65–99)
GLUCOSE UR STRIP-MCNC: NEGATIVE MG/DL
HCT VFR BLD AUTO: 40.4 % (ref 37.5–51)
HGB BLD-MCNC: 13.5 G/DL (ref 13–17.7)
HGB UR QL STRIP.AUTO: NEGATIVE
HYALINE CASTS UR QL AUTO: NORMAL /LPF
IMM GRANULOCYTES # BLD AUTO: 0.02 10*3/MM3 (ref 0–0.05)
IMM GRANULOCYTES NFR BLD AUTO: 0.3 % (ref 0–0.5)
KETONES UR QL STRIP: NEGATIVE
LEUKOCYTE ESTERASE UR QL STRIP.AUTO: NEGATIVE
LYMPHOCYTES # BLD AUTO: 1.63 10*3/MM3 (ref 0.7–3.1)
LYMPHOCYTES NFR BLD AUTO: 27.1 % (ref 19.6–45.3)
MCH RBC QN AUTO: 29.7 PG (ref 26.6–33)
MCHC RBC AUTO-ENTMCNC: 33.4 G/DL (ref 31.5–35.7)
MCV RBC AUTO: 88.8 FL (ref 79–97)
MONOCYTES # BLD AUTO: 0.73 10*3/MM3 (ref 0.1–0.9)
MONOCYTES NFR BLD AUTO: 12.1 % (ref 5–12)
NEUTROPHILS NFR BLD AUTO: 3.33 10*3/MM3 (ref 1.7–7)
NEUTROPHILS NFR BLD AUTO: 55.5 % (ref 42.7–76)
NITRITE UR QL STRIP: NEGATIVE
NRBC BLD AUTO-RTO: 0 /100 WBC (ref 0–0.2)
PH UR STRIP.AUTO: 7 [PH] (ref 5–8)
PHOSPHATE SERPL-MCNC: 2.9 MG/DL (ref 2.5–4.5)
PLATELET # BLD AUTO: 222 10*3/MM3 (ref 140–450)
PMV BLD AUTO: 10.1 FL (ref 6–12)
POTASSIUM SERPL-SCNC: 3.9 MMOL/L (ref 3.5–5.2)
PROT ?TM UR-MCNC: 31.6 MG/DL
PROT UR QL STRIP: ABNORMAL
RBC # BLD AUTO: 4.55 10*6/MM3 (ref 4.14–5.8)
RBC # UR STRIP: NORMAL /HPF
REF LAB TEST METHOD: NORMAL
SODIUM SERPL-SCNC: 138 MMOL/L (ref 136–145)
SP GR UR STRIP: 1.01 (ref 1–1.03)
SQUAMOUS #/AREA URNS HPF: NORMAL /HPF
UROBILINOGEN UR QL STRIP: ABNORMAL
WBC # UR STRIP: NORMAL /HPF
WBC NRBC COR # BLD AUTO: 6.01 10*3/MM3 (ref 3.4–10.8)

## 2024-01-09 ENCOUNTER — OFFICE VISIT (OUTPATIENT)
Dept: INTERNAL MEDICINE | Facility: CLINIC | Age: 84
End: 2024-01-09
Payer: MEDICARE

## 2024-01-09 VITALS
BODY MASS INDEX: 28.36 KG/M2 | HEART RATE: 68 BPM | WEIGHT: 202.6 LBS | DIASTOLIC BLOOD PRESSURE: 68 MMHG | HEIGHT: 71 IN | SYSTOLIC BLOOD PRESSURE: 144 MMHG | TEMPERATURE: 98.2 F

## 2024-01-09 DIAGNOSIS — I10 PRIMARY HYPERTENSION: ICD-10-CM

## 2024-01-09 DIAGNOSIS — I87.009 POSTTHROMBOTIC SYNDROME: ICD-10-CM

## 2024-01-09 DIAGNOSIS — Z00.00 MEDICARE ANNUAL WELLNESS VISIT, SUBSEQUENT: Primary | ICD-10-CM

## 2024-01-09 DIAGNOSIS — K21.9 GASTROESOPHAGEAL REFLUX DISEASE WITHOUT ESOPHAGITIS: ICD-10-CM

## 2024-01-09 DIAGNOSIS — E11.21 DIABETIC NEPHROPATHY ASSOCIATED WITH TYPE 2 DIABETES MELLITUS: ICD-10-CM

## 2024-01-09 DIAGNOSIS — E78.2 MIXED HYPERLIPIDEMIA: ICD-10-CM

## 2024-01-09 DIAGNOSIS — N18.32 STAGE 3B CHRONIC KIDNEY DISEASE: ICD-10-CM

## 2024-01-09 LAB
EXPIRATION DATE: ABNORMAL
HBA1C MFR BLD: 6.4 % (ref 4.5–5.7)
INR PPP: 3.9 (ref 0.9–1.1)
Lab: ABNORMAL
PROTHROMBIN TIME: 47.3 SECONDS

## 2024-01-09 RX ORDER — DOXAZOSIN 2 MG/1
2 TABLET ORAL 2 TIMES DAILY
COMMUNITY
Start: 2023-12-15

## 2024-01-09 RX ORDER — CARVEDILOL 6.25 MG/1
6.25 TABLET ORAL 2 TIMES DAILY WITH MEALS
COMMUNITY
Start: 2023-12-19

## 2024-01-09 NOTE — PROGRESS NOTES
QUICK REFERENCE INFORMATION:  The ABCs of the Annual Wellness Visit    Subsequent Medicare Wellness Visit    HEALTH RISK ASSESSMENT    1940    Recent Hospitalizations:  No hospitalization(s) within the last year..        Current Medical Providers:  Patient Care Team:  Radu Francis MD as PCP - General        Smoking Status:  Social History     Tobacco Use   Smoking Status Former    Packs/day: 1.50    Years: 20.00    Additional pack years: 0.00    Total pack years: 30.00    Types: Cigarettes, Pipe, Cigars    Start date: 1962    Quit date: 1997    Years since quittin.7    Passive exposure: Past   Smokeless Tobacco Never   Tobacco Comments    QUIT DATE 1992       Alcohol Consumption:  Social History     Substance and Sexual Activity   Alcohol Use Yes    Alcohol/week: 11.0 - 13.0 standard drinks of alcohol    Types: 7 Glasses of wine, 2 Cans of beer, 2 - 4 Shots of liquor per week    Comment: glass of wine everyday        Depression Screen:       2024    11:36 AM   PHQ-2/PHQ-9 Depression Screening   Little Interest or Pleasure in Doing Things 1-->several days   Feeling Down, Depressed or Hopeless 1-->several days   Trouble Falling or Staying Asleep, or Sleeping Too Much 1-->several days   Feeling Tired or Having Little Energy 1-->several days   Poor Appetite or Overeating 3-->nearly every day   Feeling Bad about Yourself - or that You are a Failure or Have Let Yourself or Your Family Down 1-->several days   Trouble Concentrating on Things, Such as Reading the Newspaper or Watching Television 0-->not at all   Moving or Speaking So Slowly that Other People Could Have Noticed? Or the Opposite - Being So Fidgety 2-->more than half the days   Thoughts that You Would be Better Off Dead or of Hurting Yourself in Some Way 0-->not at all   PHQ-9: Brief Depression Severity Measure Score 10   If You Checked Off Any Problems, How Difficult Have These Problems Made It For You to Do Your Work,  Take Care of Things at Home, or Get Along with Other People? not difficult at all       Health Habits and Functional and Cognitive Screenin/9/2024    11:31 AM   Functional & Cognitive Status   Do you have difficulty preparing food and eating? No   Do you have difficulty bathing yourself, getting dressed or grooming yourself? No   Do you have difficulty using the toilet? No   Do you have difficulty moving around from place to place? No   Do you have trouble with steps or getting out of a bed or a chair? Yes   Current Diet Well Balanced Diet   Dental Exam Up to date   Eye Exam Up to date   Exercise (times per week) 0 times per week   Current Exercises Include No Regular Exercise   Do you need help using the phone?  No   Are you deaf or do you have serious difficulty hearing?  No   Do you need help to go to places out of walking distance? No   Do you need help shopping? No   Do you need help preparing meals?  No   Do you need help with housework?  No   Do you need help with laundry? No   Do you need help taking your medications? No   Do you need help managing money? No   Do you ever drive or ride in a car without wearing a seat belt? No   Have you felt unusual stress, anger or loneliness in the last month? Yes   Who do you live with? Alone   If you need help, do you have trouble finding someone available to you? No   Have you been bothered in the last four weeks by sexual problems? Yes   Do you have difficulty concentrating, remembering or making decisions? No       Fall Risk Screen:  RODOLFO Fall Risk Assessment was completed, and patient is at MODERATE risk for falls. Assessment completed on:2024    ACE III MINI        Does the patient have evidence of cognitive impairment? No    Aspirin use counseling: Does not need ASA (and currently is not on it)    Recent Lab Results:  CMP:  Lab Results   Component Value Date    BUN 22 2023    CREATININE 2.33 (H) 2023    EGFRIFNONA 42 (L) 2022    BCR  9.4 12/11/2023     12/11/2023    K 3.9 12/11/2023    CO2 26.0 12/11/2023    CALCIUM 9.6 12/11/2023    ALBUMIN 4.5 12/11/2023    BILITOT 0.6 05/04/2023    ALKPHOS 152 (H) 05/04/2023    AST 35 05/04/2023    ALT 33 05/04/2023     HbA1c:  Lab Results   Component Value Date    HGBA1C 6.4 (A) 01/09/2024    HGBA1C 6.3 06/21/2023     Microalbumin:  Lab Results   Component Value Date    MICROALBUR <1.2 12/14/2022     Lipid Panel  Lab Results   Component Value Date    CHOL 131 12/14/2022    TRIG 59 12/14/2022    HDL 48 12/14/2022    LDL 70 12/14/2022    AST 35 05/04/2023    ALT 33 05/04/2023       Visual Acuity:  No results found.    Age-appropriate Screening Schedule:  Refer to the list below for future screening recommendations based on patient's age, sex and/or medical conditions. Orders for these recommended tests are listed in the plan section. The patient has been provided with a written plan.    Health Maintenance   Topic Date Due    ZOSTER VACCINE (3 of 3) 12/15/2021    LIPID PANEL  12/14/2023    URINE MICROALBUMIN  12/14/2023    ANNUAL WELLNESS VISIT  12/20/2023    BMI FOLLOWUP  12/20/2023    HEMOGLOBIN A1C  07/09/2024    DIABETIC EYE EXAM  11/15/2024    TDAP/TD VACCINES (2 - Td or Tdap) 02/15/2032    COVID-19 Vaccine  Completed    INFLUENZA VACCINE  Completed    Pneumococcal Vaccine 65+  Completed        Subjective   History of Present Illness    Toño Alcala is a 83 y.o. male who presents for a Subsequent Wellness Visit and for follow-up of diabetes, hypertension, hyperlipidemia, post thrombotic syndrome, and GERD. He sees nephrology for chronic kidney disease and cardiology for cardiovascular disease.    Health maintenance  He is currently grieving the loss of his wife and is having trouble falling asleep.     CHRONIC CONDITIONS: Diabetes, hypertension, hyperlipidemia, post thrombotic syndrome and GERD.    The following portions of the patient's history were reviewed and updated as appropriate: allergies,  current medications, past family history, past medical history, past social history, past surgical history, and problem list.    Outpatient Medications Prior to Visit   Medication Sig Dispense Refill    acetaminophen (TYLENOL) 325 MG tablet Take 2 tablets by mouth As Needed for Mild Pain.      atorvastatin (LIPITOR) 40 MG tablet Take 1 tablet by mouth Daily.      calcipotriene-betamethasone (TACLONEX) 0.005-0.064 % ointment Apply 1 application  topically to the appropriate area as directed As Needed (psoriasis).      carvedilol (COREG) 6.25 MG tablet Take 1 tablet by mouth 2 (Two) Times a Day With Meals.      cholecalciferol (VITAMIN D3) 25 MCG (1000 UT) tablet Take 1 tablet by mouth Daily.      diclofenac (VOLTAREN) 1 % gel gel Apply 4 g topically As Needed.      diltiaZEM CD (CARDIZEM CD) 180 MG 24 hr capsule Take 1 capsule by mouth Daily.      doxazosin (CARDURA) 2 MG tablet Take 1 tablet by mouth 2 (Two) Times a Day.      glucose blood test strip Use to check blood sugar twice daily 100 each 3    lidocaine (LIDODERM) 5 % Place 1 patch on the skin as directed by provider Daily. Remove & Discard patch within 12 hours or as directed by MD 90 patch 2    Microlet Lancets misc Use to check blood sugar twice daily 100 each 3    nitroglycerin (NITROSTAT) 0.4 MG SL tablet Place 1 tablet under the tongue Every 5 (Five) Minutes As Needed for Chest Pain. Take no more than 3 doses in 15 minutes.      nortriptyline (PAMELOR) 10 MG capsule Take 1 capsule by mouth Every Night. 30 capsule 5    omeprazole (priLOSEC) 20 MG capsule Take 1 capsule by mouth Daily.      warfarin (Coumadin) 5 MG tablet Take 1 tablet daily (Patient taking differently: Take 1 tablet by mouth Every Night. Alternates 5 mg with 2.5 mg qod) 30 tablet 5    losartan (COZAAR) 100 MG tablet Take 0.5 tablets by mouth Every Night. (Patient not taking: Reported on 1/9/2024)       No facility-administered medications prior to visit.       Patient Active Problem List    Diagnosis    CAD (coronary artery disease)    CVD (cardiovascular disease)    DVT (deep venous thrombosis)    Diabetes mellitus    Dyslipidemia    Arthritis    GERD (gastroesophageal reflux disease)    Mixed hyperlipidemia    Diabetic nephropathy associated with type 2 diabetes mellitus    Diabetic polyneuropathy associated with type 2 diabetes mellitus    Chronic kidney disease, stage III (moderate)    Vitamin D deficiency    Disc disorder of cervical region    Postthrombotic syndrome    Vertigo    Angina pectoris    Lumbosacral spondylosis without myelopathy    Psoriasis    Arthritis of elbow    Swelling of right lower extremity    Acute right-sided low back pain without sciatica    Hoarseness    Unifocal PVCs    Skin lesion of face    Primary hypertension    Medicare annual wellness visit, subsequent    Stage 3 chronic kidney disease due to benign hypertension    BMI 30.0-30.9,adult    Postherpetic neuralgia    Herpes zoster without complication    Calculus of gallbladder without cholecystitis without obstruction    Acute diverticulitis    Leg edema, right    Chest pain    Encounter for removal of sutures    Chronic pain of left knee    Fall       Advance Care Planning:  ACP discussion was held with the patient during this visit. Patient has an advance directive (not in EMR), copy requested.    Identification of Risk Factors:  Risk factors include: Advance Directive Discussion.    Review of Systems   Constitutional:  Negative for chills, fatigue and fever.   HENT:  Negative for congestion, ear pain and sinus pressure.    Respiratory:  Negative for cough, chest tightness, shortness of breath and wheezing.    Cardiovascular:  Negative for chest pain and palpitations.   Gastrointestinal:  Negative for abdominal pain, blood in stool and constipation.   Skin:  Negative for color change.   Allergic/Immunologic: Negative for environmental allergies.   Neurological:  Negative for dizziness, speech difficulty and  "headaches.   Psychiatric/Behavioral:  Negative for confusion. The patient is not nervous/anxious.        Compared to one year ago, the patient feels his physical health is the same.  Compared to one year ago, the patient feels his mental health is worse.    Objective     Physical Exam  Constitutional:       Appearance: Normal appearance.   HENT:      Head: Normocephalic and atraumatic.      Mouth/Throat:      Pharynx: Oropharynx is clear. No posterior oropharyngeal erythema.   Eyes:      Pupils: Pupils are equal, round, and reactive to light.   Neck:      Vascular: No carotid bruit.   Cardiovascular:      Rate and Rhythm: Normal rate and regular rhythm.   Pulmonary:      Effort: Pulmonary effort is normal.      Breath sounds: Normal breath sounds.   Abdominal:      General: Bowel sounds are normal.      Palpations: Abdomen is soft.      Tenderness: There is no abdominal tenderness.   Musculoskeletal:      Cervical back: Normal range of motion and neck supple.      Right lower leg: No edema.      Left lower leg: No edema.      Comments: The right lower extremity is 2 to 3+ edema from the knee down. He is wearing compression at the right lateral malleolar area. The skin is slightly crusty, but there is no skin breakdown.   Skin:     General: Skin is warm and dry.      Comments: He does have venous stasis dermatitis.   Neurological:      Mental Status: He is alert and oriented to person, place, and time.      Cranial Nerves: No cranial nerve deficit.   Psychiatric:         Mood and Affect: Mood normal.         Behavior: Behavior normal.          Procedures     Vitals:    01/09/24 1138   BP: 144/68   BP Location: Left arm   Patient Position: Sitting   Cuff Size: Adult   Pulse: 68   Temp: 98.2 °F (36.8 °C)   Weight: 91.9 kg (202 lb 9.6 oz)   Height: 181 cm (71.26\")   PainSc:   7   PainLoc: Back  Comment: residual nerve pain from shingles       BMI is >= 25 and <30. (Overweight) The following options were offered after " discussion;: exercise counseling/recommendations and nutrition counseling/recommendations      Assessment & Plan   Problem List Items Addressed This Visit       GERD (gastroesophageal reflux disease)    Overview     with hiatal hernia         Relevant Medications    omeprazole (priLOSEC) 20 MG capsule    Mixed hyperlipidemia    Relevant Medications    atorvastatin (LIPITOR) 40 MG tablet    Other Relevant Orders    Lipid Panel    Diabetic nephropathy associated with type 2 diabetes mellitus    Relevant Orders    POC Glycosylated Hemoglobin (Hb A1C) (Completed)    Chronic kidney disease, stage III (moderate)    Postthrombotic syndrome    Relevant Orders    POC Protime / INR (Completed)    Primary hypertension    Overview     Continue losartan 100 mg tablets daily         Relevant Medications    diltiaZEM CD (CARDIZEM CD) 180 MG 24 hr capsule    carvedilol (COREG) 6.25 MG tablet    doxazosin (CARDURA) 2 MG tablet    Medicare annual wellness visit, subsequent - Primary     1. Prevention  - Overall, he is doing reasonably well. He still is grieving for the loss of his wife, but appears to be functional and there is no evidence of underlying depression. He is fully vaccinated against influenza and COVID-19.    2. Diabetes  - Hemoglobin A1c is pending. He has been able to control this with healthy diet. He will continue follow-up with nephrology for chronic kidney disease.    3. Hypertension  - Blood pressure is reasonably well controlled and currently managed by nephrology on doxazosin and diltiazem.    4. Hyperlipidemia  - Lipids are well controlled on atorvastatin.    5. Post thrombotic syndrome  - He faithfully wears the compression stockings bilaterally, although the problem is on the right, the preulcerative area at the right lateral malleolus is of concern, but the treatment remains wearing compression. He will also continue to use a moisturizer.    He will follow up in 6 months.    Patient Self-Management and  Personalized Health Advice  The patient has been provided with information about: diet and exercise and preventive services including:   Annual Wellness Visit (AWV).    Outpatient Encounter Medications as of 1/9/2024   Medication Sig Dispense Refill    acetaminophen (TYLENOL) 325 MG tablet Take 2 tablets by mouth As Needed for Mild Pain.      atorvastatin (LIPITOR) 40 MG tablet Take 1 tablet by mouth Daily.      calcipotriene-betamethasone (TACLONEX) 0.005-0.064 % ointment Apply 1 application  topically to the appropriate area as directed As Needed (psoriasis).      carvedilol (COREG) 6.25 MG tablet Take 1 tablet by mouth 2 (Two) Times a Day With Meals.      cholecalciferol (VITAMIN D3) 25 MCG (1000 UT) tablet Take 1 tablet by mouth Daily.      diclofenac (VOLTAREN) 1 % gel gel Apply 4 g topically As Needed.      diltiaZEM CD (CARDIZEM CD) 180 MG 24 hr capsule Take 1 capsule by mouth Daily.      doxazosin (CARDURA) 2 MG tablet Take 1 tablet by mouth 2 (Two) Times a Day.      glucose blood test strip Use to check blood sugar twice daily 100 each 3    lidocaine (LIDODERM) 5 % Place 1 patch on the skin as directed by provider Daily. Remove & Discard patch within 12 hours or as directed by MD 90 patch 2    Microlet Lancets misc Use to check blood sugar twice daily 100 each 3    nitroglycerin (NITROSTAT) 0.4 MG SL tablet Place 1 tablet under the tongue Every 5 (Five) Minutes As Needed for Chest Pain. Take no more than 3 doses in 15 minutes.      nortriptyline (PAMELOR) 10 MG capsule Take 1 capsule by mouth Every Night. 30 capsule 5    omeprazole (priLOSEC) 20 MG capsule Take 1 capsule by mouth Daily.      warfarin (Coumadin) 5 MG tablet Take 1 tablet daily (Patient taking differently: Take 1 tablet by mouth Every Night. Alternates 5 mg with 2.5 mg qod) 30 tablet 5    [DISCONTINUED] losartan (COZAAR) 100 MG tablet Take 0.5 tablets by mouth Every Night. (Patient not taking: Reported on 1/9/2024)       No  facility-administered encounter medications on file as of 1/9/2024.       Reviewed use of high risk medication in the elderly: yes  Reviewed for potential of harmful drug interactions in the elderly: yes    Follow Up:  Return in about 6 months (around 7/9/2024) for follow up.     There are no Patient Instructions on file for this visit.    An After Visit Summary and PPPS with all of these plans were given to the patient.       Transcribed from ambient dictation for Radu Francis MD by Lucy Conrad.  01/09/24   13:38 EST    Patient or patient representative verbalized consent to the visit recording.  I have personally performed the services described in this document as transcribed by the above individual, and it is both accurate and complete.

## 2024-01-22 ENCOUNTER — TELEPHONE (OUTPATIENT)
Dept: INTERNAL MEDICINE | Facility: CLINIC | Age: 84
End: 2024-01-22

## 2024-01-22 NOTE — TELEPHONE ENCOUNTER
"Caller: Toño Alcala \"Adama\"    Relationship: Self    Best call back number: [phone]     Which medication are you concerned about:   -DOXAZOSIN (CARDURA) 2MG TABLET  -CARVEDILOL (COREG) 6.25MG TABLET    Who prescribed you this medication: PATIENT'S NEPHROLOGIST    When did you start taking this medication: IN THE LAST MONTH OR SO    What are your concerns: PATIENT STATED THAT HIS NEPHROLOGIST STARTED HIM ON CARVEDILOL AND INCREASED HIS DOXAZOSIN FROM 1MG TO 2MG IN DECEMBER.    AROUND THE TIME THAT HE STARTED THESE MEDICATIONS, HE STARTED EXPERIENCING SWELLING IN BOTH LEGS, BUT NOW HIS SYMPTOMS HAVE GOTTEN WORSE AND IS CAUSING A LOT OF DISCOMFORT.    PATIENT HAS TRIED TO REACH THE NEPHROLOGIST FOR HELP, BUT HAS NOT BEEN ABLE TO REACH THEM.    PLEASE ADVISE PATIENT  "

## 2024-01-23 NOTE — TELEPHONE ENCOUNTER
Patient understood and verbalized understanding.    He states the spot on his skin that was checked at his most recent visit is not any better as of today, to inform you. He asked if he should try another medicine?

## 2024-01-23 NOTE — TELEPHONE ENCOUNTER
Pt advised and verbalized understanding. He is going to [facility] and schedule an appt.

## 2024-01-23 NOTE — TELEPHONE ENCOUNTER
Patient understood and verbalized understanding.    He states he has the 2mg of doxazosin. Could he break it in half, or if you could please send in the 1mg strength?

## 2024-01-25 ENCOUNTER — HOSPITAL ENCOUNTER (EMERGENCY)
Facility: HOSPITAL | Age: 84
Discharge: HOME | End: 2024-01-25
Attending: STUDENT IN AN ORGANIZED HEALTH CARE EDUCATION/TRAINING PROGRAM
Payer: MEDICARE

## 2024-01-25 VITALS
HEART RATE: 86 BPM | OXYGEN SATURATION: 94 % | DIASTOLIC BLOOD PRESSURE: 91 MMHG | TEMPERATURE: 98.2 F | SYSTOLIC BLOOD PRESSURE: 158 MMHG | HEIGHT: 72 IN | RESPIRATION RATE: 17 BRPM | BODY MASS INDEX: 27.09 KG/M2 | WEIGHT: 200 LBS

## 2024-01-25 DIAGNOSIS — N18.30 STAGE 3 CHRONIC KIDNEY DISEASE, UNSPECIFIED WHETHER STAGE 3A OR 3B CKD: ICD-10-CM

## 2024-01-25 DIAGNOSIS — E11.9 TYPE 2 DIABETES MELLITUS WITHOUT COMPLICATION, WITHOUT LONG-TERM CURRENT USE OF INSULIN: ICD-10-CM

## 2024-01-25 DIAGNOSIS — M79.604 PAIN OF RIGHT LOWER EXTREMITY: ICD-10-CM

## 2024-01-25 DIAGNOSIS — L03.115 CELLULITIS OF RIGHT LOWER EXTREMITY: Primary | ICD-10-CM

## 2024-01-25 LAB
ALBUMIN SERPL-MCNC: 4.2 G/DL (ref 3.5–5.2)
ALBUMIN/GLOB SERPL: 1.6 G/DL
ALP SERPL-CCNC: 137 U/L (ref 39–117)
ALT SERPL W P-5'-P-CCNC: 19 U/L (ref 1–41)
ANION GAP SERPL CALCULATED.3IONS-SCNC: 10 MMOL/L (ref 5–15)
APTT PPP: 44.2 SECONDS (ref 60–90)
AST SERPL-CCNC: 40 U/L (ref 1–40)
BACTERIA UR QL AUTO: NORMAL /HPF
BASOPHILS # BLD AUTO: 0.03 10*3/MM3 (ref 0–0.2)
BASOPHILS NFR BLD AUTO: 0.3 % (ref 0–1.5)
BILIRUB SERPL-MCNC: 0.7 MG/DL (ref 0–1.2)
BILIRUB UR QL STRIP: NEGATIVE
BUN SERPL-MCNC: 30 MG/DL (ref 8–23)
BUN/CREAT SERPL: 10.9 (ref 7–25)
CALCIUM SPEC-SCNC: 8.9 MG/DL (ref 8.6–10.5)
CHLORIDE SERPL-SCNC: 100 MMOL/L (ref 98–107)
CLARITY UR: CLEAR
CO2 SERPL-SCNC: 26 MMOL/L (ref 22–29)
COLOR UR: YELLOW
CREAT SERPL-MCNC: 2.75 MG/DL (ref 0.76–1.27)
CRP SERPL-MCNC: 13.16 MG/DL (ref 0–0.5)
D DIMER PPP FEU-MCNC: 0.69 MCGFEU/ML (ref 0–0.83)
D-LACTATE SERPL-SCNC: 1.3 MMOL/L (ref 0.5–2)
DEPRECATED RDW RBC AUTO: 47.8 FL (ref 37–54)
EGFRCR SERPLBLD CKD-EPI 2021: 22.2 ML/MIN/1.73
EOSINOPHIL # BLD AUTO: 0.46 10*3/MM3 (ref 0–0.4)
EOSINOPHIL NFR BLD AUTO: 4.9 % (ref 0.3–6.2)
ERYTHROCYTE [DISTWIDTH] IN BLOOD BY AUTOMATED COUNT: 14.8 % (ref 12.3–15.4)
GLOBULIN UR ELPH-MCNC: 2.7 GM/DL
GLUCOSE SERPL-MCNC: 114 MG/DL (ref 65–99)
GLUCOSE UR STRIP-MCNC: NEGATIVE MG/DL
HCT VFR BLD AUTO: 37.7 % (ref 37.5–51)
HGB BLD-MCNC: 12.3 G/DL (ref 13–17.7)
HGB UR QL STRIP.AUTO: ABNORMAL
HYALINE CASTS UR QL AUTO: NORMAL /LPF
IMM GRANULOCYTES # BLD AUTO: 0.04 10*3/MM3 (ref 0–0.05)
IMM GRANULOCYTES NFR BLD AUTO: 0.4 % (ref 0–0.5)
INR PPP: 2.51 (ref 0.89–1.12)
KETONES UR QL STRIP: NEGATIVE
LEUKOCYTE ESTERASE UR QL STRIP.AUTO: NEGATIVE
LYMPHOCYTES # BLD AUTO: 1.14 10*3/MM3 (ref 0.7–3.1)
LYMPHOCYTES NFR BLD AUTO: 12.3 % (ref 19.6–45.3)
MAGNESIUM SERPL-MCNC: 2 MG/DL (ref 1.6–2.4)
MCH RBC QN AUTO: 28.7 PG (ref 26.6–33)
MCHC RBC AUTO-ENTMCNC: 32.6 G/DL (ref 31.5–35.7)
MCV RBC AUTO: 88.1 FL (ref 79–97)
MONOCYTES # BLD AUTO: 1.23 10*3/MM3 (ref 0.1–0.9)
MONOCYTES NFR BLD AUTO: 13.2 % (ref 5–12)
NEUTROPHILS NFR BLD AUTO: 6.4 10*3/MM3 (ref 1.7–7)
NEUTROPHILS NFR BLD AUTO: 68.9 % (ref 42.7–76)
NITRITE UR QL STRIP: NEGATIVE
NRBC BLD AUTO-RTO: 0 /100 WBC (ref 0–0.2)
PH UR STRIP.AUTO: 6 [PH] (ref 5–8)
PLATELET # BLD AUTO: 186 10*3/MM3 (ref 140–450)
PMV BLD AUTO: 10.6 FL (ref 6–12)
POTASSIUM SERPL-SCNC: 3.7 MMOL/L (ref 3.5–5.2)
PROCALCITONIN SERPL-MCNC: 1.22 NG/ML (ref 0–0.25)
PROT SERPL-MCNC: 6.9 G/DL (ref 6–8.5)
PROT UR QL STRIP: ABNORMAL
PROTHROMBIN TIME: 27.3 SECONDS (ref 12.2–14.5)
RBC # BLD AUTO: 4.28 10*6/MM3 (ref 4.14–5.8)
RBC # UR STRIP: NORMAL /HPF
REF LAB TEST METHOD: NORMAL
SODIUM SERPL-SCNC: 136 MMOL/L (ref 136–145)
SP GR UR STRIP: 1.01 (ref 1–1.03)
SQUAMOUS #/AREA URNS HPF: NORMAL /HPF
UROBILINOGEN UR QL STRIP: ABNORMAL
WBC # UR STRIP: NORMAL /HPF
WBC NRBC COR # BLD AUTO: 9.3 10*3/MM3 (ref 3.4–10.8)

## 2024-01-25 PROCEDURE — 83605 ASSAY OF LACTIC ACID: CPT | Performed by: STUDENT IN AN ORGANIZED HEALTH CARE EDUCATION/TRAINING PROGRAM

## 2024-01-25 PROCEDURE — 86140 C-REACTIVE PROTEIN: CPT | Performed by: STUDENT IN AN ORGANIZED HEALTH CARE EDUCATION/TRAINING PROGRAM

## 2024-01-25 PROCEDURE — 85730 THROMBOPLASTIN TIME PARTIAL: CPT | Performed by: STUDENT IN AN ORGANIZED HEALTH CARE EDUCATION/TRAINING PROGRAM

## 2024-01-25 PROCEDURE — 36415 COLL VENOUS BLD VENIPUNCTURE: CPT

## 2024-01-25 PROCEDURE — 85610 PROTHROMBIN TIME: CPT | Performed by: STUDENT IN AN ORGANIZED HEALTH CARE EDUCATION/TRAINING PROGRAM

## 2024-01-25 PROCEDURE — 99283 EMERGENCY DEPT VISIT LOW MDM: CPT

## 2024-01-25 PROCEDURE — 81001 URINALYSIS AUTO W/SCOPE: CPT | Performed by: STUDENT IN AN ORGANIZED HEALTH CARE EDUCATION/TRAINING PROGRAM

## 2024-01-25 PROCEDURE — 84145 PROCALCITONIN (PCT): CPT | Performed by: STUDENT IN AN ORGANIZED HEALTH CARE EDUCATION/TRAINING PROGRAM

## 2024-01-25 PROCEDURE — 80053 COMPREHEN METABOLIC PANEL: CPT | Performed by: STUDENT IN AN ORGANIZED HEALTH CARE EDUCATION/TRAINING PROGRAM

## 2024-01-25 PROCEDURE — 85025 COMPLETE CBC W/AUTO DIFF WBC: CPT | Performed by: STUDENT IN AN ORGANIZED HEALTH CARE EDUCATION/TRAINING PROGRAM

## 2024-01-25 PROCEDURE — 96365 THER/PROPH/DIAG IV INF INIT: CPT

## 2024-01-25 PROCEDURE — 25010000002 CEFTRIAXONE PER 250 MG: Performed by: STUDENT IN AN ORGANIZED HEALTH CARE EDUCATION/TRAINING PROGRAM

## 2024-01-25 PROCEDURE — 83735 ASSAY OF MAGNESIUM: CPT | Performed by: STUDENT IN AN ORGANIZED HEALTH CARE EDUCATION/TRAINING PROGRAM

## 2024-01-25 PROCEDURE — 87040 BLOOD CULTURE FOR BACTERIA: CPT | Performed by: STUDENT IN AN ORGANIZED HEALTH CARE EDUCATION/TRAINING PROGRAM

## 2024-01-25 PROCEDURE — 85379 FIBRIN DEGRADATION QUANT: CPT | Performed by: STUDENT IN AN ORGANIZED HEALTH CARE EDUCATION/TRAINING PROGRAM

## 2024-01-25 RX ORDER — DOXYCYCLINE 100 MG/1
100 CAPSULE ORAL 2 TIMES DAILY
Qty: 20 CAPSULE | Refills: 0 | Status: SHIPPED | OUTPATIENT
Start: 2024-01-25 | End: 2024-02-04

## 2024-01-25 RX ORDER — DOXYCYCLINE 100 MG/1
100 CAPSULE ORAL 2 TIMES DAILY
Qty: 20 CAPSULE | Refills: 0 | Status: SHIPPED | OUTPATIENT
Start: 2024-01-25 | End: 2024-01-25 | Stop reason: SDUPTHER

## 2024-01-25 RX ORDER — WARFARIN SODIUM 5 MG/1
5 TABLET ORAL NIGHTLY
Qty: 30 TABLET | Refills: 2 | Status: SHIPPED | OUTPATIENT
Start: 2024-01-25

## 2024-01-25 RX ADMIN — SODIUM CHLORIDE 1000 MG: 900 INJECTION INTRAVENOUS at 19:01

## 2024-01-25 NOTE — TELEPHONE ENCOUNTER
Rx Refill Note  Requested Prescriptions     Pending Prescriptions Disp Refills    warfarin (COUMADIN) 5 MG tablet [Pharmacy Med Name: WARFARIN SODIUM 5 MG TABLET] 90 tablet 1     Sig: TAKE 1 TAB BY MOUTH DAILY\      Last office visit with prescribing clinician: 1/9/2024   Last telemedicine visit with prescribing clinician: Visit date not found   Next office visit with prescribing clinician: 7/9/2024                         Would you like a call back once the refill request has been completed: [] Yes [] No    If the office needs to give you a call back, can they leave a voicemail: [] Yes [] No    Ramya Bateman LPN  01/25/24, 08:14 EST

## 2024-01-26 NOTE — ED PROVIDER NOTES
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  He denies fevers or systemic symptoms.  He has been trying symptomatic care with his PCP but has not tried any, of antibiotics yet.  He has history of DVT but does not feel like this is like that and he is concerned that there is infection.  No other complaints at this time.      PAST MEDICAL HISTORY  Past Medical History:   Diagnosis Date    Arthritis     Arthritis of neck Fusion     Bilateral radial fractures     fracture bilateral radial heads    CAD (coronary artery disease)     Cataract     Cervical disc disorder Fusion     Chronic kidney disease     CVD (cardiovascular disease)     History of TIA     Diabetes mellitus     DVT (deep venous thrombosis)     Right lower extremity DVT and pulmonary embolism in 2015, idiopathic    DVT of proximal lower limb 2015    proximal DVT right leg, small PE- anticoagulation    Dyslipidemia     Fracture     H/o pf left forearm fracture    Fracture of right hand     Fracture of wrist     GERD (gastroesophageal reflux disease)     with hiatal hernia    HL (hearing loss)     Hx of angina pectoris     Hypertension     Normal renal angiography 11    Knee swelling Several years ago    Left wrist fracture     Lumbosacral disc disease     Osgood-Schlatter's disease     Osteoarthritis     Right wrist fracture     Vertigo     Wears glasses          PAST SURGICAL HISTORY  Past Surgical History:   Procedure Laterality Date    APPENDECTOMY      BACK SURGERY      CARDIAC CATHETERIZATION       with vein graft    CERVICAL FUSION      CERVICAL FUSION      C3-4    COLONOSCOPY  2013    CORONARY ARTERY BYPASS GRAFT  1994    HERNIA REPAIR Right 2011    inguinal    INGUINAL HERNIA REPAIR Left 10/29/2019    Procedure: INGUINAL HERNIA REPAIR LEFT;  Surgeon: Charisma Raines MD;  Location: [facility] OR;  Service: General    LUMBAR LAMINECTOMY      laminectomy, lumbar, L3    NECK SURGERY      OTHER SURGICAL HISTORY      wrist surgery    SPINE SURGERY      TONSILLECTOMY AND ADENOIDECTOMY  194    TRIGGER POINT INJECTION   -     VASECTOMY  1972         FAMILY HISTORY  Family History   Problem Relation Age of Onset    Arthritis Mother         OA    Heart disease Father     Diabetes Father     Heart attack Father     Heart disease Brother     Heart attack Brother     Diabetes Brother     Diabetes Son     Hypertension Other     Cancer Other          SOCIAL HISTORY  Social History     Socioeconomic History    Marital status:    Tobacco Use    Smoking status: Former     Packs/day: 1.50     Years: 20.00     Additional pack years: 0.00     Total pack years: 30.00     Types: Cigarettes, Pipe, Cigars     Start date: 1962     Quit date: 1997     Years since quittin.7     Passive exposure: Past    Smokeless tobacco: Never    Tobacco comments:     QUIT DATE 1992   Vaping Use    Vaping Use: Never used   Substance and Sexual Activity    Alcohol use: Yes     Alcohol/week: 11.0 - 13.0 standard drinks of alcohol     Types: 7 Glasses of wine, 2 Cans of beer, 2 - 4 Shots of liquor per week     Comment: glass of wine everyday     Drug use: No    Sexual activity: Not Currently     Partners: Female     Birth control/protection: Surgical, Vasectomy         ALLERGIES  Penicillins        REVIEW OF SYSTEMS  Review of Systems       All systems reviewed and negative except for those discussed in HPI.       PHYSICAL EXAM    I have reviewed the triage vital signs and nursing notes.    ED  Triage Vitals [01/25/24 1704]   Temp Heart Rate Resp BP SpO2   98.2 °F (36.8 °C) 86 17 171/93 94 %      Temp src Heart Rate Source Patient Position BP Location FiO2 (%)   Oral Monitor -- -- --       Physical Exam  GENERAL:   Appears in no acute distress.   HENT: Nares patent.  EYES: No scleral icterus.  CV: Regular rhythm, regular rate.  RESPIRATORY: Normal effort.  No audible wheezes, rales or rhonchi.  ABDOMEN: Soft, nontender  MUSCULOSKELETAL: No deformities.   NEURO: Alert, moves all extremities, follows commands.  SKIN: Warm, dry, erythema and warmth consistent with cellulitis of the right foot ankle and up to about mid calf, no crepitus or areas of fluctuance.      LAB RESULTS  Recent Results (from the past 24 hour(s))   Comprehensive Metabolic Panel    Collection Time: 01/25/24  5:27 PM    Specimen: Blood   Result Value Ref Range    Glucose 114 (H) 65 - 99 mg/dL    BUN 30 (H) 8 - 23 mg/dL    Creatinine 2.75 (H) 0.76 - 1.27 mg/dL    Sodium 136 136 - 145 mmol/L    Potassium 3.7 3.5 - 5.2 mmol/L    Chloride 100 98 - 107 mmol/L    CO2 26.0 22.0 - 29.0 mmol/L    Calcium 8.9 8.6 - 10.5 mg/dL    Total Protein 6.9 6.0 - 8.5 g/dL    Albumin 4.2 3.5 - 5.2 g/dL    ALT (SGPT) 19 1 - 41 U/L    AST (SGOT) 40 1 - 40 U/L    Alkaline Phosphatase 137 (H) 39 - 117 U/L    Total Bilirubin 0.7 0.0 - 1.2 mg/dL    Globulin 2.7 gm/dL    A/G Ratio 1.6 g/dL    BUN/Creatinine Ratio 10.9 7.0 - 25.0    Anion Gap 10.0 5.0 - 15.0 mmol/L    eGFR 22.2 (L) >60.0 mL/min/1.73   Protime-INR    Collection Time: 01/25/24  5:27 PM    Specimen: Blood   Result Value Ref Range    Protime 27.3 (H) 12.2 - 14.5 Seconds    INR 2.51 (H) 0.89 - 1.12   aPTT    Collection Time: 01/25/24  5:27 PM    Specimen: Blood   Result Value Ref Range    PTT 44.2 (L) 60.0 - 90.0 seconds   Lactic Acid, Plasma    Collection Time: 01/25/24  5:27 PM    Specimen: Blood   Result Value Ref Range    Lactate 1.3 0.5 - 2.0 mmol/L   Procalcitonin    Collection Time: 01/25/24  5:27 PM     Specimen: Blood   Result Value Ref Range    Procalcitonin 1.22 (H) 0.00 - 0.25 ng/mL   C-reactive Protein    Collection Time: 01/25/24  5:27 PM    Specimen: Blood   Result Value Ref Range    C-Reactive Protein 13.16 (H) 0.00 - 0.50 mg/dL   Magnesium    Collection Time: 01/25/24  5:27 PM    Specimen: Blood   Result Value Ref Range    Magnesium 2.0 1.6 - 2.4 mg/dL   D-dimer, Quantitative    Collection Time: 01/25/24  5:27 PM    Specimen: Blood   Result Value Ref Range    D-Dimer, Quantitative 0.69 0.00 - 0.83 MCGFEU/mL   CBC Auto Differential    Collection Time: 01/25/24  5:27 PM    Specimen: Blood   Result Value Ref Range    WBC 9.30 3.40 - 10.80 10*3/mm3    RBC 4.28 4.14 - 5.80 10*6/mm3    Hemoglobin 12.3 (L) 13.0 - 17.7 g/dL    Hematocrit 37.7 37.5 - 51.0 %    MCV 88.1 79.0 - 97.0 fL    MCH 28.7 26.6 - 33.0 pg    MCHC 32.6 31.5 - 35.7 g/dL    RDW 14.8 12.3 - 15.4 %    RDW-SD 47.8 37.0 - 54.0 fl    MPV 10.6 6.0 - 12.0 fL    Platelets 186 140 - 450 10*3/mm3    Neutrophil % 68.9 42.7 - 76.0 %    Lymphocyte % 12.3 (L) 19.6 - 45.3 %    Monocyte % 13.2 (H) 5.0 - 12.0 %    Eosinophil % 4.9 0.3 - 6.2 %    Basophil % 0.3 0.0 - 1.5 %    Immature Grans % 0.4 0.0 - 0.5 %    Neutrophils, Absolute 6.40 1.70 - 7.00 10*3/mm3    Lymphocytes, Absolute 1.14 0.70 - 3.10 10*3/mm3    Monocytes, Absolute 1.23 (H) 0.10 - 0.90 10*3/mm3    Eosinophils, Absolute 0.46 (H) 0.00 - 0.40 10*3/mm3    Basophils, Absolute 0.03 0.00 - 0.20 10*3/mm3    Immature Grans, Absolute 0.04 0.00 - 0.05 10*3/mm3    nRBC 0.0 0.0 - 0.2 /100 WBC   Urinalysis With Microscopic If Indicated (No Culture) - Urine, Clean Catch    Collection Time: 01/25/24  5:30 PM    Specimen: Urine, Clean Catch   Result Value Ref Range    Color, UA Yellow Yellow, Straw    Appearance, UA Clear Clear    pH, UA 6.0 5.0 - 8.0    Specific Gravity, UA 1.013 1.001 - 1.030    Glucose, UA Negative Negative    Ketones, UA Negative Negative    Bilirubin, UA Negative Negative    Blood, UA Small  (1+) (A) Negative    Protein, UA 30 mg/dL (1+) (A) Negative    Leuk Esterase, UA Negative Negative    Nitrite, UA Negative Negative    Urobilinogen, UA 1.0 E.U./dL 0.2 - 1.0 E.U./dL   Urinalysis, Microscopic Only - Urine, Clean Catch    Collection Time: 01/25/24  5:30 PM    Specimen: Urine, Clean Catch   Result Value Ref Range    RBC, UA 0-2 None Seen, 0-2 /HPF    WBC, UA 0-2 None Seen, 0-2 /HPF    Bacteria, UA None Seen None Seen, Trace /HPF    Squamous Epithelial Cells, UA 0-2 None Seen, 0-2 /HPF    Hyaline Casts, UA None Seen 0 - 6 /LPF    Methodology Automated Microscopy        If labs were ordered, I independently reviewed the results and considered them in treating the patient.        RADIOLOGY  No Radiology Exams Resulted Within Past 24 Hours    I ordered and independently reviewed the above noted radiographic studies.        See radiologist's dictation for official interpretation.        PROCEDURES    Procedures    No orders to display       MEDICATIONS GIVEN IN ER    Medications   cefTRIAXone (ROCEPHIN) 1,000 mg in sodium chloride 0.9 % 100 mL IVPB (0 mg Intravenous Stopped 1/25/24 1925)         MEDICAL DECISION MAKING, PROGRESS, and CONSULTS    All labs, if obtained, have been independently reviewed by me.  All radiology studies, if obtained, have been reviewed by me and the radiologist dictating the report.  All EKG's, if obtained, have been independently viewed and interpreted by me/my attending physician.      Discussion below represents my analysis of pertinent findings related to patient's condition, differential diagnosis, treatment plan and final disposition.                         Differential diagnosis:    Sepsis, cellulitis, abscess, deep space infection, DVT, anemia, electrolyte abnormality, renal injury      Additional sources:    - Discussed/ obtained information from independent historians:      - External (non-ED) record review: Nephrology notes for kidney failure with Dr. Rincon, PCP notes  for postthrombotic syndrome, previous ED visits for lower extremity cellulitis    - Chronic or social conditions impacting care: CAD, diabetes, history of DVT on chronic anticoagulation, chronic kidney disease    - Shared decision making: Agreeable to discharge with PCP follow-up and strict return precautions      Orders placed during this visit:  Orders Placed This Encounter   Procedures    Blood Culture - Blood,    Blood Culture - Blood,    Comprehensive Metabolic Panel    Protime-INR    aPTT    Urinalysis With Microscopic If Indicated (No Culture) - Urine, Clean Catch    Lactic Acid, Plasma    Procalcitonin    C-reactive Protein    Magnesium    D-dimer, Quantitative    CBC Auto Differential    Urinalysis, Microscopic Only - Urine, Clean Catch    CBC & Differential         Additional orders considered but not ordered:  CT or MRI of the lower extremity right now his exam is consistent with simple cellulitis and labs are reassuring there is no crepitus or fluctuance I think right now a course of outpatient antibiotics is worth a trial prior to further unnecessary workup at this time.    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  He denies fevers or systemic symptoms.  He has been trying symptomatic care with his PCP but has not tried any, of antibiotics yet.  He has history of DVT but does not feel like this is like that and he is concerned that there is infection.  No other complaints at this time. [CC]   1857 He arrived awake and alert afebrile and vitals are within normal limits besides mild hypertension generally well-appearing.  He has mildly edematous right lower extremity with warmth and erythema extending from the foot up to mid calf consistent with cellulitis I do not appreciate any fluctuance or crepitus.  This seems like  relatively clear-cut cellulitis I am starting on Rocephin and obtaining basic laboratory workup including D-dimer because of his history of blood clots he may need vascular ultrasound in the morning obtaining coags and basic infectious labs.  Will reevaluate pending initial workup but if everything looks okay I think he can safely discharge on oral antibiotics which is his desire he says he does not want to be hospitalized. [CC]   1907 Labs generally reassuring he does not have a white count.  He does have mild anemia.  Metabolic panel shows significantly elevated creatinine however this is stable even improved from some prior values he says he is following with nephrology for this.  He does have elevated C-reactive protein and procalcitonin pointing towards this being bacterial  No elevation in lactic acid.  INR is therapeutic and his D-dimer is negative I think this is cellulitis no blood clot.  He remains well upon reevaluation I think he can safely discharge on oral antibiotics with strict return precaution and follow-up.  Follow-up with his PCP and nephrologist.  Provided prescription for doxycycline.  Counseled return precautions verbally expressed understanding of these. [CC]      ED Course User Index  [CC] Jose Bernal MD              Shared Decision Making:  After my consideration of clinical presentation and any laboratory/radiology studies obtained, I discussed the findings with the patient/patient representative who is in agreement with the treatment plan and the final disposition.   Risks and benefits of discharge and/or observation/admission were discussed.       AS OF 14:42 EST VITALS:    BP - 158/91  HR - 86  TEMP - 98.2 °F (36.8 °C) (Oral)  O2 SATS - 94%                  DIAGNOSIS  Final diagnoses:   Cellulitis of right lower extremity   Pain of right lower extremity   Type 2 diabetes mellitus without complication, without long-term current use of insulin   Stage 3 chronic kidney disease,  unspecified whether stage 3a or 3b CKD         DISPOSITION        Please note that portions of this document were completed with voice recognition software.        Jose Bernal MD  01/26/24 4021

## 2024-01-30 LAB
BACTERIA SPEC AEROBE CULT: NORMAL
BACTERIA SPEC AEROBE CULT: NORMAL

## 2024-01-31 ENCOUNTER — OFFICE VISIT (OUTPATIENT)
Dept: INTERNAL MEDICINE | Facility: CLINIC | Age: 84
End: 2024-01-31
Payer: MEDICARE

## 2024-01-31 ENCOUNTER — HOSPITAL ENCOUNTER (OUTPATIENT)
Dept: PHYSICAL THERAPY | Facility: HOSPITAL | Age: 84
Setting detail: THERAPIES SERIES
Discharge: HOME | End: 2024-01-31
Payer: MEDICARE

## 2024-01-31 VITALS
BODY MASS INDEX: 27.09 KG/M2 | DIASTOLIC BLOOD PRESSURE: 74 MMHG | OXYGEN SATURATION: 98 % | TEMPERATURE: 98 F | HEIGHT: 72 IN | HEART RATE: 76 BPM | WEIGHT: 200 LBS | SYSTOLIC BLOOD PRESSURE: 152 MMHG

## 2024-01-31 DIAGNOSIS — R60.0 EDEMA OF RIGHT LOWER LEG: ICD-10-CM

## 2024-01-31 DIAGNOSIS — L03.115 CELLULITIS OF RIGHT LOWER EXTREMITY: Primary | ICD-10-CM

## 2024-01-31 DIAGNOSIS — Z87.2 HISTORY OF CELLULITIS: ICD-10-CM

## 2024-01-31 DIAGNOSIS — S81.801D OPEN WOUND OF RIGHT LOWER LEG, SUBSEQUENT ENCOUNTER: Primary | ICD-10-CM

## 2024-01-31 PROCEDURE — 97161 PT EVAL LOW COMPLEX 20 MIN: CPT

## 2024-01-31 PROCEDURE — 29580 STRAPPING UNNA BOOT: CPT

## 2024-01-31 PROCEDURE — 99213 OFFICE O/P EST LOW 20 MIN: CPT

## 2024-01-31 PROCEDURE — 97597 DBRDMT OPN WND 1ST 20 CM/<: CPT

## 2024-01-31 PROCEDURE — 1160F RVW MEDS BY RX/DR IN RCRD: CPT

## 2024-01-31 PROCEDURE — 3077F SYST BP >= 140 MM HG: CPT

## 2024-01-31 PROCEDURE — 1159F MED LIST DOCD IN RCRD: CPT

## 2024-01-31 PROCEDURE — 3078F DIAST BP <80 MM HG: CPT

## 2024-01-31 NOTE — THERAPY EVALUATION
Outpatient Rehabilitation - Wound/Debridement Initial Eval  [facility]     Patient Name: Toño Alcala  : 1940  MRN: [id number]  Today's Date: 2024              RLE      R lateral leg wound      Admit Date: 2024    Visit Dx:    ICD-10-CM ICD-9-CM   1. Open wound of right lower leg, subsequent encounter  S81.801D V58.89     891.0   2. Edema of right lower leg  R60.0 782.3   3. History of cellulitis  Z87.2 V13.3       Patient Active Problem List   Diagnosis    CAD (coronary artery disease)    CVD (cardiovascular disease)    DVT (deep venous thrombosis)    Diabetes mellitus    Dyslipidemia    Arthritis    GERD (gastroesophageal reflux disease)    Mixed hyperlipidemia    Diabetic nephropathy associated with type 2 diabetes mellitus    Diabetic polyneuropathy associated with type 2 diabetes mellitus    Chronic kidney disease, stage III (moderate)    Vitamin D deficiency    Disc disorder of cervical region    Postthrombotic syndrome    Vertigo    Angina pectoris    Lumbosacral spondylosis without myelopathy    Psoriasis    Arthritis of elbow    Swelling of right lower extremity    Acute right-sided low back pain without sciatica    Hoarseness    Unifocal PVCs    Skin lesion of face    Primary hypertension    Medicare annual wellness visit, subsequent    Stage 3 chronic kidney disease due to benign hypertension    BMI 30.0-30.9,adult    Postherpetic neuralgia    Herpes zoster without complication    Calculus of gallbladder without cholecystitis without obstruction    Acute diverticulitis    Leg edema, right    Chest pain    Encounter for removal of sutures    Chronic pain of left knee    Fall        Past Medical History:   Diagnosis Date    Arthritis     Arthritis of neck Fusion     Bilateral radial fractures     fracture bilateral radial heads    CAD (coronary artery disease)     Cataract     Cervical disc disorder Fusion     Chronic kidney disease     CVD (cardiovascular disease)      History of TIA 1989    Diabetes mellitus     DVT (deep venous thrombosis)     Right lower extremity DVT and pulmonary embolism in 06/2015, idiopathic    DVT of proximal lower limb 06/22/2015    proximal DVT right leg, small PE- anticoagulation    Dyslipidemia     Fracture 1952    H/o pf left forearm fracture    Fracture of right hand 1980    Fracture of wrist 1980    GERD (gastroesophageal reflux disease)     with hiatal hernia    HL (hearing loss)     Hx of angina pectoris     Hypertension     Normal renal angiography 02/16/11    Knee swelling Several years ago    Left wrist fracture 1953    Lumbosacral disc disease 1996    Osgood-Schlatter's disease 1955    Osteoarthritis     Right wrist fracture     Vertigo     Wears glasses         Past Surgical History:   Procedure Laterality Date    APPENDECTOMY  1957    BACK SURGERY      CARDIAC CATHETERIZATION  2011    with vein graft    CERVICAL FUSION      CERVICAL FUSION  1992    C3-4    COLONOSCOPY  2013    CORONARY ARTERY BYPASS GRAFT  1994    HERNIA REPAIR Right 2011    inguinal    INGUINAL HERNIA REPAIR Left 10/29/2019    Procedure: INGUINAL HERNIA REPAIR LEFT;  Surgeon: Charisma Raines MD;  Location: [facility];  Service: General    LUMBAR LAMINECTOMY  1996    laminectomy, lumbar, L3    NECK SURGERY  1992    OTHER SURGICAL HISTORY      wrist surgery    SPINE SURGERY  1996    TONSILLECTOMY AND ADENOIDECTOMY  1945    TRIGGER POINT INJECTION  2001 - 2007    VASECTOMY  1972        Patient History       Row Name 01/31/24 1200             History    Chief Complaint Ulcer, wound or other skin conditions;Swelling;Pain  -MC      Type of Pain Lower Extremity / Leg  -MC      Brief Description of Current Complaint Pt presents with ~1 month history of swelling and redness to the RLE with a small open area that has worsened over the last several weeks despite abx therapy. Due to h/o DVT, pt usually wears compression to the RLE but has not been able to for the last 2 weeks  due to increased edema in the leg. He presents here for follow up.  -      Patient/Caregiver Goals Heal wound;Decrease swelling;Know what to do to help the symptoms;Relieve pain  -      Patient's Rating of General Health Good  -      Patient seeing anyone else for problem(s)? PCP  -      How has patient tried to help current problem? telfa pads, coban  -      Related/Recent Hospitalizations No  -         Pain     Pain Location Leg  -      Pain at Present 0  -         Services    Are you currently receiving Home Health services No  -      Do you plan to receive Home Health services in the near future No  -         Daily Activities    Primary Language English  -      Are you able to read Yes  -      Are you able to write Yes  -      How does patient learn best? Listening;Demonstration  -      Teaching needs identified Management of Condition  -      Patient is concerned about/has problems with Difficulty with self care (i.e. bathing, dressing, toileting:;Other (comment)  wound mgmt, edema management  -      Barriers to learning None  -      Pt Participated in POC and Goals Yes  -         Safety    Are you being hurt, hit, or frightened by anyone at home or in your life? No  -MC      Are you being neglected by a caregiver No  -                User Key  (r) = Recorded By, (t) = Taken By, (c) = Cosigned By      Initials Name Provider Type    Marlene Bethea PT Physical Therapist                    EVALUATION   PT Ortho       Row Name 01/31/24 1200       Subjective Pain    Able to rate subjective pain? yes  -    Pre-Treatment Pain Level 0  -MC    Post-Treatment Pain Level 2  -       Transfers    Sit-Stand War[place] (Transfers) independent  -    Stand-Sit [place] (Transfers) independent  -    Comment, (Transfers) seated for tx  -       Gait/Stairs (Locomotion)    Warrick Level (Gait) modified independence  -    Assistive Device (Gait) cane, straight  -               User Key  (r) = Recorded By, (t) = Taken By, (c) = Cosigned By      Initials Name Provider Type    Marlene Bethea PT Physical Therapist                   LDA Wound       Row Name 01/31/24 1200             Wound 01/31/24 1115 Right lateral leg Venous Ulcer    Wound - Properties Group Placement Date: 01/31/24  - Placement Time: 1115  - Present on Original Admission: Y  -MC Side: Right  - Orientation: lateral  - Location: leg  - Primary Wound Type: Venous ulcer  -    Wound Image Images linked: 2  -MC      Dressing Appearance intact;moist drainage  -      Base moist;necrotic;yellow;slough;red;pink  -      Periwound redness;swelling;warm  -      Periwound Temperature warm  -      Periwound Skin Turgor firm  -      Edges irregular;open  -      Wound Length (cm) 4.2 cm  -      Wound Width (cm) 5.9 cm  -      Wound Depth (cm) --  obscured  -      Wound Surface Area (cm^2) 24.78 cm^2  -      Drainage Characteristics/Odor serous  -      Drainage Amount small  -      Care, Wound cleansed with;wound cleanser;debrided;yaa boot  -      Dressing Care dressing applied;antimicrobial agent applied;foam  HFBt, unna boot, qwick, cast padding  -      Periwound Care cleansed with pH balanced cleanser;barrier ointment applied  zguard  -      Retired Wound - Properties Group Placement Date: 01/31/24  - Placement Time: 1115  -MC Present on Original Admission: Y  - Side: Right  - Orientation: lateral  - Location: leg  -MC Primary Wound Type: Venous ulcer  -MC    Retired Wound - Properties Group Date first assessed: 01/31/24  - Time first assessed: 1115  - Present on Original Admission: Y  - Side: Right  - Location: leg  - Primary Wound Type: Venous ulcer  -              User Key  (r) = Recorded By, (t) = Taken By, (c) = Cosigned By      Initials Name Provider Type    Marlene Beteha PT Physical Therapist                   Lymphedema       Row Name  "01/31/24 1200             Lymphedema Edema Assessment    Ptting Edema Category By severity  -      Pitting Edema Moderate;Severe  -         Skin Changes/Observations    Location/Assessment Lower Extremity  -      Lower Extremity Conditions right:;clean;dry;shiny;hairless;inflamed  -      Lower Extremity Color/Pigment right:;erythema;red  -         Lymphedema Pulses/Capillary Refill    Lymphedema Pulses/Capillary Refill lower extremity pulses;capillary refill  -      Dorsalis Pedis Pulse right:;+2 normal  -      Posterior Tibialis Pulse right:;+2 normal  -      Capillary Refill lower extremity capillary refill  -      Lower Extremity Capillary Refill right:;less than 3 seconds  -         Compression/Skin Care    Compression/Skin Care skin care;wrapping location;bandaging  -      Skin Care washed/dried  -      Wrapping Location lower extremity  -      Wrapping Location LE right:;foot to knee  -      Wrapping Comments wound dressings, unna boot in clamshell application, qwick, cast padding, 4\" coban, size 5 coban  -                User Key  (r) = Recorded By, (t) = Taken By, (c) = Cosigned By      Initials Name Provider Type    Marlene Bethea, PT Physical Therapist                    WOUND DEBRIDEMENT  Total area of Debridement: 4 cm2  Debridement Site 1  Location- Site 1: R lateral leg wound  Selective Debridement- Site 1: Wound Surface <20cmsq  Instruments- Site 1: #15, scapel, tweezers  Excised Tissue Description- Site 1: moderate, slough (additional debridement limited by pain)  Bleeding- Site 1: none              Therapy Education       Row Name 01/31/24 1200             Therapy Education    Education Details Reviewed s/sx of infection and PT role in wound care. Discussed etiology of venous ulcers and importance of debridement and compression to address. Keep unna boot dry and intact between treatments  -      Given Bandaging/dressing change;Edema " management;Symptoms/condition management  -      Program New  -      How Provided Verbal;Demonstration  -      Provided to Patient  -      Level of Understanding Teach back education performed;Verbalized  -                User Key  (r) = Recorded By, (t) = Taken By, (c) = Cosigned By      Initials Name Provider Type    Marlene Bethea, PT Physical Therapist                    Recommendation and Plan   PT Assessment/Plan       Row Name 24 1200          PT Assessment    Functional Limitations Performance in self-care ADL;Other (comment)  wound, edema management  -     Impairments Integumentary integrity;Impaired venous circulation;Edema  -     Assessment Comments Pt presents with an open wound to the lateral R leg consistent with venous stasis ulceration. Pt has surrounding redness and edema with areas of hypopigmentation consistent with venous stasis. PT able to debride some slough from the area, with debridement limited by c/o pain. Pt will greatly benefit from skilled PT wound care for debridement, edema management, advanced dressings management, and other appropriate interventions to promote healing.  -     Rehab Potential Good  -     Patient/caregiver participated in establishment of treatment plan and goals Yes  -     Patient would benefit from skilled therapy intervention Yes  -        PT Plan    PT Frequency 2x/week  -     Predicted Duration of Therapy Intervention (PT) 16 visits  -     Planned CPT's? PT EVAL LOW COMPLEXITY: 55861;PT SELF CARE/MGMT/TRAIN 15 MIN: 84804;PT NONSELECT DEBRIDE 15 MIN: 76918;PT EDWIN DEBRIDE OPEN WOUND UP TO 20 CM: 59245;PT EDWIN DEBRIDE OPEN WOUND EA ADD 20 CM: 41241;PT UNNA BOOT: 12971;PT MULTI LAYER COMP SYS LE;PT THER SUPP EA 15 MIN  -     Physical Therapy Interventions (Optional Details) wound care;patient/family education  -     PT Plan Comments debridement, unna boot  -               User Key  (r) = Recorded By, (t) = Taken By,  (c) = Cosigned By      Initials Name Provider Type    Marlene Bethea, PT Physical Therapist                      Goals   PT OP Goals       Row Name 01/31/24 1200          PT Short Term Goals    STG 1 Pt will verbalize s/sx of infection and when to seek immediate medical care.  -     STG 1 Progress New  -     STG 2 Pt will demonstrate 25% reduction in wound area to indicate healing progress.  -     STG 2 Progress New  -     STG 3 Pt will demonstrate only mild RLE edema to indicate appropriate edema management.  -     STG 3 Progress New  -        Long Term Goals    LTG 1 Pt will demonstrate independence with clean home dressing changes as appropriate.  -     LTG 1 Progress New  -     LTG 2 Pt will demonstrate 90% reduction in wound area to indicate healing progress.  -     LTG 2 Progress New  -     LTG 3 Pt will demonstrate only trace edema to the RLE to allow for transition to long term compression system.  -     LTG 3 Progress New  -        Time Calculation    PT Goal Re-Cert Due Date 04/30/24  -               User Key  (r) = Recorded By, (t) = Taken By, (c) = Cosigned By      Initials Name Provider Type    Marlene Bethea, PT Physical Therapist                    Time Calculation: Start Time: 1115  Untimed Charges  PT Eval/Re-eval Minutes: 45  Wound Care: 56986 Unna boot, 47061 Selective debridement  41019-Lpcx Boot: 20  72130-Kuucopuxf debridement: 15  Total Minutes  Untimed Charges Total Minutes: 80   Total Minutes: 80            Marlene Levy PT  1/31/2024

## 2024-01-31 NOTE — PROGRESS NOTES
Acute Office Visit      Name: Toño Alcala    : 1940     MRN: [id number]   Care Team: Patient Care Team:  Radu Francis MD as PCP - General    Chief Complaint  Skin Redness (Of right leg. Pt went to ER 24 for cellulitis of right lower extremity. Has worsened)    Subjective     History of Present Illness:  Toño Alcala is an 83-year-old male who presents for an office visit related to right leg pain.    Rash  He has severe pain in his right lower extremity with erythema, swelling and mild drainage. His pain has worsened. This has been going on since he was here for his physical. He took an antibiotic after he went to the emergency room. They did an ultrasound, labs, and gave him doxycycline. He has 2 days left of the doxycycline. He had swelling prior to the visit. It was draining some yesterday, 2024. He has a history of thrombi, but this does not feel like blood clots. He has been taking 100 mg doxycycline twice a day for 7 to 8 days. However, the pain has not improved. He has difficulty ambulating and moving around. [facility] but the earliest he could get in is 2024.      Review of Systems:  Positive for pain in his right lower extremity with redness, swelling and mild drainage.    Past Medical History:   Diagnosis Date    Arthritis     Arthritis of neck Fusion     Bilateral radial fractures     fracture bilateral radial heads    CAD (coronary artery disease)     Cataract     Cervical disc disorder Fusion     Chronic kidney disease     CVD (cardiovascular disease)     History of TIA     Diabetes mellitus     DVT (deep venous thrombosis)     Right lower extremity DVT and pulmonary embolism in 2015, idiopathic    DVT of proximal lower limb 2015    proximal DVT right leg, small PE- anticoagulation    Dyslipidemia     Fracture     H/o pf left forearm fracture    Fracture of right hand     Fracture  of wrist     GERD (gastroesophageal reflux disease)     with hiatal hernia    HL (hearing loss)     Hx of angina pectoris     Hypertension     Normal renal angiography 11    Knee swelling Several years ago    Left wrist fracture     Lumbosacral disc disease     Osgood-Schlatter's disease     Osteoarthritis     Right wrist fracture     Vertigo     Wears glasses        Past Surgical History:   Procedure Laterality Date    APPENDECTOMY      BACK SURGERY      CARDIAC CATHETERIZATION      with vein graft    CERVICAL FUSION      CERVICAL FUSION  1992    C3-4    COLONOSCOPY      CORONARY ARTERY BYPASS GRAFT  1994    HERNIA REPAIR Right 2011    inguinal    INGUINAL HERNIA REPAIR Left 10/29/2019    Procedure: INGUINAL HERNIA REPAIR LEFT;  Surgeon: Charisma Raines MD;  Location: [facility];  Service: General    LUMBAR LAMINECTOMY      laminectomy, lumbar, L3    NECK SURGERY      OTHER SURGICAL HISTORY      wrist surgery    SPINE SURGERY      TONSILLECTOMY AND ADENOIDECTOMY      TRIGGER POINT INJECTION   -     VASECTOMY  1972       Social History     Socioeconomic History    Marital status:    Tobacco Use    Smoking status: Former     Packs/day: 1.50     Years: 20.00     Additional pack years: 0.00     Total pack years: 30.00     Types: Cigarettes, Pipe, Cigars     Start date: 1962     Quit date: 1997     Years since quittin.7     Passive exposure: Past    Smokeless tobacco: Never    Tobacco comments:     QUIT DATE 1992   Vaping Use    Vaping Use: Never used   Substance and Sexual Activity    Alcohol use: Yes     Alcohol/week: 11.0 - 13.0 standard drinks of alcohol     Types: 7 Glasses of wine, 2 Cans of beer, 2 - 4 Shots of liquor per week     Comment: glass of wine everyday     Drug use: No    Sexual activity: Not Currently     Partners: Female     Birth control/protection: Surgical, Vasectomy         Current Outpatient Medications:      acetaminophen (TYLENOL) 325 MG tablet, Take 2 tablets by mouth As Needed for Mild Pain., Disp: , Rfl:     atorvastatin (LIPITOR) 40 MG tablet, Take 1 tablet by mouth Daily., Disp: , Rfl:     calcipotriene-betamethasone (TACLONEX) 0.005-0.064 % ointment, Apply 1 Application topically to the appropriate area as directed As Needed (psoriasis)., Disp: , Rfl:     carvedilol (COREG) 6.25 MG tablet, Take 1 tablet by mouth 2 (Two) Times a Day With Meals., Disp: , Rfl:     cholecalciferol (VITAMIN D3) 25 MCG (1000 UT) tablet, Take 1 tablet by mouth Daily., Disp: , Rfl:     diclofenac (VOLTAREN) 1 % gel gel, Apply 4 g topically As Needed., Disp: , Rfl:     diltiaZEM CD (CARDIZEM CD) 180 MG 24 hr capsule, Take 1 capsule by mouth Daily., Disp: , Rfl:     doxazosin (CARDURA) 2 MG tablet, Take 1 tablet by mouth 2 (Two) Times a Day., Disp: , Rfl:     doxycycline (MONODOX) 100 MG capsule, Take 1 capsule by mouth 2 (Two) Times a Day for 10 days., Disp: 20 capsule, Rfl: 0    glucose blood test strip, Use to check blood sugar twice daily, Disp: 100 each, Rfl: 3    lidocaine (LIDODERM) 5 %, Place 1 patch on the skin as directed by provider Daily. Remove & Discard patch within 12 hours or as directed by MD, Disp: 90 patch, Rfl: 2    Microlet Lancets misc, Use to check blood sugar twice daily, Disp: 100 each, Rfl: 3    nitroglycerin (NITROSTAT) 0.4 MG SL tablet, Place 1 tablet under the tongue Every 5 (Five) Minutes As Needed for Chest Pain. Take no more than 3 doses in 15 minutes., Disp: , Rfl:     nortriptyline (PAMELOR) 10 MG capsule, Take 1 capsule by mouth Every Night., Disp: 30 capsule, Rfl: 5    omeprazole (priLOSEC) 20 MG capsule, Take 1 capsule by mouth Daily., Disp: , Rfl:     warfarin (COUMADIN) 5 MG tablet, Take 1 tablet by mouth Every Night. Alternates 5 mg with 2.5 mg qod, Disp: 30 tablet, Rfl: 2    Procedures    PHQ-9 Total Score:      Objective     Vital Signs  /74 (BP Location: Left arm, Patient Position: Sitting,  "Cuff Size: Adult)   Pulse 76   Temp 98 °F (36.7 °C) (Infrared)   Ht 182.9 cm (72.01\")   Wt 90.7 kg (200 lb)   SpO2 98%   BMI 27.12 kg/m²   Estimated body mass index is 27.12 kg/m² as calculated from the following:    Height as of this encounter: 182.9 cm (72.01\").    Weight as of this encounter: 90.7 kg (200 lb).            Physical Exam  Vitals and nursing note reviewed.   HENT:      Head: Normocephalic.   Cardiovascular:      Rate and Rhythm: Normal rate.   Pulmonary:      Effort: Pulmonary effort is normal.      Breath sounds: Normal breath sounds.   Musculoskeletal:         General: Swelling present.      Right lower leg: Edema present.      Comments: Erythema of right lower leg.   Skin:     General: Skin is warm and dry.   Neurological:      General: No focal deficit present.      Mental Status: He is alert and oriented to person, place, and time.   Psychiatric:         Mood and Affect: Mood normal.         Behavior: Behavior normal.         Thought Content: Thought content normal.         Judgment: Judgment normal.          No results were interpreted during this visit.  [unfilled]    Assessment and Plan     Assessment/Plan:  Diagnoses and all orders for this visit:    1. Cellulitis of right lower extremity (Primary)  -     Ambulatory Referral to Wound Clinic  -Continue doxycycline until completed.  -Schedule same-day appointment with wound clinic for evaluation of wound and treatment.  -May need to repeat RLE ultrasound.    There are no Patient Instructions on file for this visit.  Plan of care reviewed with patient at the conclusion of today's visit. Education was provided regarding diagnosis, management and any prescribed or recommended OTC medications.  Patient verbalizes understanding of and agreement with management plan.    Follow Up  Return for Next Scheduled Follow up.    YARED Huff     Transcribed from ambient dictation for YARED Huff by Hernán Leblanc.  01/31/24   10:43 " EST    Patient or patient representative verbalized consent to the visit recording.  I have personally performed the services described in this document as transcribed by the above individual, and it is both accurate and complete.

## 2024-02-05 ENCOUNTER — HOSPITAL ENCOUNTER (OUTPATIENT)
Dept: PHYSICAL THERAPY | Facility: HOSPITAL | Age: 84
Setting detail: THERAPIES SERIES
Discharge: HOME | End: 2024-02-05
Payer: MEDICARE

## 2024-02-05 DIAGNOSIS — R60.0 EDEMA OF RIGHT LOWER LEG: ICD-10-CM

## 2024-02-05 DIAGNOSIS — Z87.2 HISTORY OF CELLULITIS: ICD-10-CM

## 2024-02-05 DIAGNOSIS — S81.801D OPEN WOUND OF RIGHT LOWER LEG, SUBSEQUENT ENCOUNTER: Primary | ICD-10-CM

## 2024-02-05 PROCEDURE — 97597 DBRDMT OPN WND 1ST 20 CM/<: CPT

## 2024-02-05 PROCEDURE — 29580 STRAPPING UNNA BOOT: CPT

## 2024-02-05 NOTE — THERAPY WOUND CARE TREATMENT
Outpatient Rehabilitation - Wound/Debridement Treatment Note  [facility]     Patient Name: Toño Alcala  : 1940  MRN: [id number]  Today's Date: 2024                 Admit Date: 2024    Visit Dx:    ICD-10-CM ICD-9-CM   1. Open wound of right lower leg, subsequent encounter  S81.801D V58.89     891.0   2. Edema of right lower leg  R60.0 782.3   3. History of cellulitis  Z87.2 V13.3   Lateral R ankle:    Medial R ankle:      Patient Active Problem List   Diagnosis    CAD (coronary artery disease)    CVD (cardiovascular disease)    DVT (deep venous thrombosis)    Diabetes mellitus    Dyslipidemia    Arthritis    GERD (gastroesophageal reflux disease)    Mixed hyperlipidemia    Diabetic nephropathy associated with type 2 diabetes mellitus    Diabetic polyneuropathy associated with type 2 diabetes mellitus    Chronic kidney disease, stage III (moderate)    Vitamin D deficiency    Disc disorder of cervical region    Postthrombotic syndrome    Vertigo    Angina pectoris    Lumbosacral spondylosis without myelopathy    Psoriasis    Arthritis of elbow    Swelling of right lower extremity    Acute right-sided low back pain without sciatica    Hoarseness    Unifocal PVCs    Skin lesion of face    Primary hypertension    Medicare annual wellness visit, subsequent    Stage 3 chronic kidney disease due to benign hypertension    BMI 30.0-30.9,adult    Postherpetic neuralgia    Herpes zoster without complication    Calculus of gallbladder without cholecystitis without obstruction    Acute diverticulitis    Leg edema, right    Chest pain    Encounter for removal of sutures    Chronic pain of left knee    Fall        Past Medical History:   Diagnosis Date    Arthritis     Arthritis of neck Fusion     Bilateral radial fractures     fracture bilateral radial heads    CAD (coronary artery disease)     Cataract     Cervical disc disorder Fusion     Chronic kidney disease     CVD (cardiovascular  disease)     History of TIA 1989    Diabetes mellitus     DVT (deep venous thrombosis)     Right lower extremity DVT and pulmonary embolism in 06/2015, idiopathic    DVT of proximal lower limb 06/22/2015    proximal DVT right leg, small PE- anticoagulation    Dyslipidemia     Fracture 1952    H/o pf left forearm fracture    Fracture of right hand 1980    Fracture of wrist 1980    GERD (gastroesophageal reflux disease)     with hiatal hernia    HL (hearing loss)     Hx of angina pectoris     Hypertension     Normal renal angiography 02/16/11    Knee swelling Several years ago    Left wrist fracture 1953    Lumbosacral disc disease 1996    Osgood-Schlatter's disease 1955    Osteoarthritis     Right wrist fracture     Vertigo     Wears glasses         Past Surgical History:   Procedure Laterality Date    APPENDECTOMY  1957    BACK SURGERY      CARDIAC CATHETERIZATION  2011    with vein graft    CERVICAL FUSION      CERVICAL FUSION  1992    C3-4    COLONOSCOPY  2013    CORONARY ARTERY BYPASS GRAFT  1994    HERNIA REPAIR Right 2011    inguinal    INGUINAL HERNIA REPAIR Left 10/29/2019    Procedure: INGUINAL HERNIA REPAIR LEFT;  Surgeon: Charisma Raines MD;  Location: [facility];  Service: General    LUMBAR LAMINECTOMY  1996    laminectomy, lumbar, L3    NECK SURGERY  1992    OTHER SURGICAL HISTORY      wrist surgery    SPINE SURGERY  1996    TONSILLECTOMY AND ADENOIDECTOMY  1945    TRIGGER POINT INJECTION  2001 - 2007    VASECTOMY  1972         EVALUATION   PT Ortho       Row Name 02/05/24 1345       Subjective    Subjective Comments Reports pain is improving, c/o itching under unna boot  -       Subjective Pain    Able to rate subjective pain? yes  -    Pre-Treatment Pain Level 0  -    Post-Treatment Pain Level 2  -    Subjective Pain Comment min pain with debridement  -       Transfers    Sit-Stand [place] (Transfers) independent  -    Stand-Sit [place] (Transfers) independent  -     Comment, (Transfers) seated for tx  -       Gait/Stairs (Locomotion)    [place] Level (Gait) modified independence  -    Assistive Device (Gait) cane, straight  -              User Key  (r) = Recorded By, (t) = Taken By, (c) = Cosigned By      Initials Name Provider Type    Alesha Jason PT Physical Therapist                     LDA Wound       Row Name 02/05/24 1345             Wound 01/31/24 1115 Right lateral leg Venous Ulcer    Wound - Properties Group Placement Date: 01/31/24  [facility] Placement Time: 1115  - Present on Original Admission: Y  - Side: Right  - Orientation: lateral  - Location: leg  - Primary Wound Type: Venous ulcer  -    Wound Image Images linked: 2  -      Dressing Appearance intact;moist drainage  -      Base moist;necrotic;yellow;slough;red;pink;white;other (see comments)  white fibrous base  -      Periwound redness;swelling;warm  -      Periwound Temperature warm  -      Periwound Skin Turgor firm  -      Edges irregular;open  -      Drainage Characteristics/Odor serous  -      Drainage Amount small  min drainage from medial ankle also  -      Care, Wound cleansed with;wound cleanser;debrided;yaa boot  -      Dressing Care dressing applied;antimicrobial agent applied;foam  HFBt, unna boot, qwick, cast padding  -      Periwound Care barrier ointment applied;cleansed with pH balanced cleanser;dry periwound area maintained  zguard  -      Retired Wound - Properties Group Placement Date: 01/31/24  - Placement Time: 1115  - Present on Original Admission: Y  - Side: Right  - Orientation: lateral  - Location: leg  - Primary Wound Type: Venous ulcer  -    Retired Wound - Properties Group Date first assessed: 01/31/24  - Time first assessed: 1115  - Present on Original Admission: Y  - Side: Right  - Location: leg  - Primary Wound Type: Venous ulcer  -MC              User Key  (r) = Recorded By, (t) = Taken By, (c) = Cosigned  "By      Initials Name Provider Type    Marlene Bethea, PT Physical Therapist    Alesha Jason, PT Physical Therapist                   Lymphedema       Row Name 02/05/24 1341             Lymphedema Edema Assessment    Pitting Edema Moderate  -         Skin Changes/Observations    Lower Extremity Conditions right:;clean;dry;hairless;inflamed;fragile  -      Lower Extremity Color/Pigment right:;erythema;hypopigmented;hyperpigmented  -      Skin Observations Comment atrophie evette  -         Lymphedema Pulses/Capillary Refill    Lower Extremity Capillary Refill right:;less than 3 seconds  -         Compression/Skin Care    Skin Care washed/dried;lotion applied  -      Wrapping Location lower extremity  -      Wrapping Location LE right:;foot to knee  -      Wrapping Comments wound dressings, unna boot in clamshell application, qwick, cast padding, 4\" coban, size 5 coban  -                User Key  (r) = Recorded By, (t) = Taken By, (c) = Cosigned By      Initials Name Provider Type    Alesha Jason, PT Physical Therapist                    WOUND DEBRIDEMENT  Total area of Debridement: 3cmsq  Debridement Site 1  Location- Site 1: R lateral leg wound  Selective Debridement- Site 1: Wound Surface <20cmsq  Instruments- Site 1: tweezers  Excised Tissue Description- Site 1: minimum, slough  Bleeding- Site 1: scant              Therapy Education       Row Name 02/05/24 9954             Therapy Education    Education Details Continuation of POC, pt to elevate leg above level of heart as much as possible.  Recommended bactine or regenecare lidocaine spray for pain, can bring to tx for PT to use prior to debridement.  -JM      Given Bandaging/dressing change;Edema management;Symptoms/condition management  -      Program Reinforced  -KACIE      How Provided Verbal;Demonstration  -JM      Provided to Patient  -KACIE      Level of Understanding Teach back education performed;Verbalized  -KACIE   "              User Key  (r) = Recorded By, (t) = Taken By, (c) = Cosigned By      Initials Name Provider Type    Alesha Jason, PT Physical Therapist                    Recommendation and Plan   PT Assessment/Plan       Row Name 02/05/24 1345          PT Assessment    Functional Limitations Performance in self-care ADL;Other (comment)  wound, edema management  -     Impairments Integumentary integrity;Impaired venous circulation;Edema  -     Assessment Comments RLE with less erythema, pt reporting improved pain.  Open area lateral R ankle still with min slough requiring debridement.  Small areas of drainage noted from medial areas of atrophie evette that PT will monitor.  Pt will continue to benefit from unna boot and debridement.  -        PT Plan    PT Frequency 2x/week  -     Physical Therapy Interventions (Optional Details) patient/family education;wound care  -     PT Plan Comments debridement, unna boot  -               User Key  (r) = Recorded By, (t) = Taken By, (c) = Cosigned By      Initials Name Provider Type    Alesha Jason, PT Physical Therapist                    Goals   PT OP Goals       Row Name 02/05/24 1345          Time Calculation    PT Goal Re-Cert Due Date 04/30/24  -               User Key  (r) = Recorded By, (t) = Taken By, (c) = Cosigned By      Initials Name Provider Type    Alesha Jason, PT Physical Therapist                    PT Goal Re-Cert Due Date: 04/30/24            Time Calculation: Start Time: 1345  Untimed Charges  42991-Ervw Boot: 20  29802-Ryfpltvfm debridement: 15  Total Minutes  Untimed Charges Total Minutes: 35   Total Minutes: 35  Therapy Charges for Today       Code Description Service Date Service Provider Modifiers Qty    38983994583 HC EDWIN DEBRIDE OPEN WOUND UP TO 20CM 2/5/2024 Alesha Nunez, PT GP 1    32350359986 HC PT STAPPING UNNA BOOT 2/5/2024 Alesha Nunez, PT GP 1                    Alesha Nunez  PT  2/5/2024

## 2024-02-08 ENCOUNTER — HOSPITAL ENCOUNTER (OUTPATIENT)
Dept: PHYSICAL THERAPY | Facility: HOSPITAL | Age: 84
Setting detail: THERAPIES SERIES
Discharge: HOME | End: 2024-02-08
Payer: MEDICARE

## 2024-02-08 DIAGNOSIS — Z87.2 HISTORY OF CELLULITIS: ICD-10-CM

## 2024-02-08 DIAGNOSIS — S81.801D OPEN WOUND OF RIGHT LOWER LEG, SUBSEQUENT ENCOUNTER: Primary | ICD-10-CM

## 2024-02-08 DIAGNOSIS — R60.0 EDEMA OF RIGHT LOWER LEG: ICD-10-CM

## 2024-02-08 PROCEDURE — 97597 DBRDMT OPN WND 1ST 20 CM/<: CPT

## 2024-02-08 PROCEDURE — 29580 STRAPPING UNNA BOOT: CPT

## 2024-02-08 NOTE — THERAPY WOUND CARE TREATMENT
Outpatient Rehabilitation - Wound/Debridement Treatment Note  [facility]     Patient Name: Toño Alcala  : 1940  MRN: [id number]  Today's Date: 2024                 Admit Date: 2024    Visit Dx:    ICD-10-CM ICD-9-CM   1. Open wound of right lower leg, subsequent encounter  S81.801D V58.89     891.0   2. Edema of right lower leg  R60.0 782.3   3. History of cellulitis  Z87.2 V13.3     R lateral leg      R medial leg        Patient Active Problem List   Diagnosis    CAD (coronary artery disease)    CVD (cardiovascular disease)    DVT (deep venous thrombosis)    Diabetes mellitus    Dyslipidemia    Arthritis    GERD (gastroesophageal reflux disease)    Mixed hyperlipidemia    Diabetic nephropathy associated with type 2 diabetes mellitus    Diabetic polyneuropathy associated with type 2 diabetes mellitus    Chronic kidney disease, stage III (moderate)    Vitamin D deficiency    Disc disorder of cervical region    Postthrombotic syndrome    Vertigo    Angina pectoris    Lumbosacral spondylosis without myelopathy    Psoriasis    Arthritis of elbow    Swelling of right lower extremity    Acute right-sided low back pain without sciatica    Hoarseness    Unifocal PVCs    Skin lesion of face    Primary hypertension    Medicare annual wellness visit, subsequent    Stage 3 chronic kidney disease due to benign hypertension    BMI 30.0-30.9,adult    Postherpetic neuralgia    Herpes zoster without complication    Calculus of gallbladder without cholecystitis without obstruction    Acute diverticulitis    Leg edema, right    Chest pain    Encounter for removal of sutures    Chronic pain of left knee    Fall        Past Medical History:   Diagnosis Date    Arthritis     Arthritis of neck Fusion     Bilateral radial fractures     fracture bilateral radial heads    CAD (coronary artery disease)     Cataract     Cervical disc disorder Fusion     Chronic kidney disease     CVD (cardiovascular  disease)     History of TIA 1989    Diabetes mellitus     DVT (deep venous thrombosis)     Right lower extremity DVT and pulmonary embolism in 06/2015, idiopathic    DVT of proximal lower limb 06/22/2015    proximal DVT right leg, small PE- anticoagulation    Dyslipidemia     Fracture 1952    H/o pf left forearm fracture    Fracture of right hand 1980    Fracture of wrist 1980    GERD (gastroesophageal reflux disease)     with hiatal hernia    HL (hearing loss)     Hx of angina pectoris     Hypertension     Normal renal angiography 02/16/11    Knee swelling Several years ago    Left wrist fracture 1953    Lumbosacral disc disease 1996    Osgood-Schlatter's disease 1955    Osteoarthritis     Right wrist fracture     Vertigo     Wears glasses         Past Surgical History:   Procedure Laterality Date    APPENDECTOMY  1957    BACK SURGERY      CARDIAC CATHETERIZATION  2011    with vein graft    CERVICAL FUSION      CERVICAL FUSION  1992    C3-4    COLONOSCOPY  2013    CORONARY ARTERY BYPASS GRAFT  1994    HERNIA REPAIR Right 2011    inguinal    INGUINAL HERNIA REPAIR Left 10/29/2019    Procedure: INGUINAL HERNIA REPAIR LEFT;  Surgeon: Charisma Raines MD;  Location: [facility];  Service: General    LUMBAR LAMINECTOMY  1996    laminectomy, lumbar, L3    NECK SURGERY  1992    OTHER SURGICAL HISTORY      wrist surgery    SPINE SURGERY  1996    TONSILLECTOMY AND ADENOIDECTOMY  1945    TRIGGER POINT INJECTION  2001 - 2007    VASECTOMY  1972         EVALUATION   PT Ortho       Row Name 02/08/24 1600       Subjective    Subjective Comments No new issues/complaints. Brought OTC lidocaine spray this date.  -LH       Subjective Pain    Able to rate subjective pain? yes  -LH    Pre-Treatment Pain Level 0  -LH    Post-Treatment Pain Level 0  -LH    Subjective Pain Comment OTC lidocaine spray prior to debridement  -       Transfers    Sit-Stand [place] (Transfers) independent  -    Stand-Sit [place] (Transfers)  independent  -    Comment, (Transfers) seated for tx  -       Gait/Stairs (Locomotion)    [place] Level (Gait) modified independence  -    Assistive Device (Gait) cane, straight  -              User Key  (r) = Recorded By, (t) = Taken By, (c) = Cosigned By      Initials Name Provider Type     Dejan Zabala, PT Physical Therapist                     LDA Wound       Row Name 02/08/24 1600             Wound 01/31/24 1115 Right lateral leg Venous Ulcer    Wound - Properties Group Placement Date: 01/31/24  [facility] Placement Time: 1115  - Present on Original Admission: Y  - Side: Right  - Orientation: lateral  - Location: leg  - Primary Wound Type: Venous ulcer  -    Wound Image Images linked: 2  -LH      Dressing Appearance intact;moist drainage  -      Base moist;necrotic;yellow;slough;red;pink;white;other (see comments)  white fibrous base  -      Periwound redness;swelling;warm  -      Periwound Temperature warm  -      Periwound Skin Turgor firm  -      Edges irregular;open  -      Drainage Characteristics/Odor serous  -      Drainage Amount small  min drainage from both medial and lateral ankle  -      Care, Wound cleansed with;wound cleanser;debrided;yaa boot  -      Dressing Care dressing applied;antimicrobial agent applied;foam  HFBt, unna boot, qwick, cast padding  -      Periwound Care barrier ointment applied;cleansed with pH balanced cleanser;dry periwound area maintained  zguard  -      Retired Wound - Properties Group Placement Date: 01/31/24  - Placement Time: 1115  - Present on Original Admission: Y  - Side: Right  - Orientation: lateral  - Location: leg  - Primary Wound Type: Venous ulcer  -MC    Retired Wound - Properties Group Date first assessed: 01/31/24  - Time first assessed: 1115  - Present on Original Admission: Y  - Side: Right  - Location: leg  - Primary Wound Type: Venous ulcer  -MC              User Key  (r) = Recorded By,  "(t) = Taken By, (c) = Cosigned By      Initials Name Provider Type    Marlene Bethea, PT Physical Therapist     Dejan Zabala, PT Physical Therapist                   Lymphedema       Row Name 02/08/24 1600             Lymphedema Edema Assessment    Ptting Edema Category By severity  -      Pitting Edema Moderate  -         Skin Changes/Observations    Lower Extremity Conditions right:;clean;dry;hairless;inflamed;fragile  -      Lower Extremity Color/Pigment right:;erythema;hypopigmented;hyperpigmented  -      Skin Observations Comment atrophie evette  -         Lymphedema Pulses/Capillary Refill    Capillary Refill lower extremity capillary refill  -      Lower Extremity Capillary Refill right:;less than 3 seconds  -         Compression/Skin Care    Skin Care washed/dried;lotion applied  -      Wrapping Location lower extremity  -      Wrapping Location LE right:;foot to knee  -      Wrapping Comments wound dressings, unna boot in clamshell application, qwick, cast padding, 4\" coban, size 5 coban  -                User Key  (r) = Recorded By, (t) = Taken By, (c) = Cosigned By      Initials Name Provider Type     Dejan Zabala, PT Physical Therapist                    WOUND DEBRIDEMENT  Total area of Debridement: 2cm2  Debridement Site 1  Location- Site 1: R lateral leg wound  Selective Debridement- Site 1: Wound Surface <20cmsq  Instruments- Site 1: tweezers  Excised Tissue Description- Site 1: minimum, slough  Bleeding- Site 1: scant              Therapy Education       Row Name 02/08/24 1600             Therapy Education    Education Details Continue current POC.  -      Given Bandaging/dressing change;Edema management;Symptoms/condition management  -      Program Reinforced  -      How Provided Verbal;Demonstration  -      Provided to Patient  -      Level of Understanding Teach back education performed;Verbalized  -                User Key  (r) = Recorded By, " (t) = Taken By, (c) = Cosigned By      Initials Name Provider Type     Dejan Zabala, PT Physical Therapist                    Recommendation and Plan   PT Assessment/Plan       Row Name 02/08/24 1600          PT Assessment    Functional Limitations Performance in self-care ADL;Other (comment)  wound, edema management  -     Impairments Integumentary integrity;Impaired venous circulation;Edema  -     Assessment Comments Pt continuing with mild drainage from both the medial and lateral ankle. Pt with slighly less necrotic tissues/crusts at wound base. Remains with moderate erythema. Pt with good pain response to use of OTC lidocaine spray this date. Pt would continue to benefit from current POC to promote wound healing.  -     Rehab Potential Good  -     Patient/caregiver participated in establishment of treatment plan and goals Yes  -     Patient would benefit from skilled therapy intervention Yes  -        PT Plan    PT Frequency 2x/week  -     Physical Therapy Interventions (Optional Details) patient/family education;wound care  -     PT Plan Comments debridement, unna boot  -               User Key  (r) = Recorded By, (t) = Taken By, (c) = Cosigned By      Initials Name Provider Type     Dejan Zabala, PT Physical Therapist                    Goals   PT OP Goals       Row Name 02/08/24 1605          Time Calculation    PT Goal Re-Cert Due Date 04/30/24  -               User Key  (r) = Recorded By, (t) = Taken By, (c) = Cosigned By      Initials Name Provider Type     Dejan Zabala, PT Physical Therapist                    PT Goal Re-Cert Due Date: 04/30/24            Time Calculation: Start Time: 1300  Untimed Charges  32079-[place] Boot: 20  99299-Ghtflmzin debridement: 15  Total Minutes  Untimed Charges Total Minutes: 35   Total Minutes: 35  Therapy Charges for Today       Code Description Service Date Service Provider Modifiers Qty    [id number] [facility] DEBRIDE OPEN WOUND UP TO  20CM 2/8/2024 Dejna Zabala, PT GP 1    40799110521 HC PT STAPPING UNNA BOOT 2/8/2024 Dejan Zabala, PT GP 1                    Dejan Zabala, PT  2/8/2024

## 2024-02-12 ENCOUNTER — HOSPITAL ENCOUNTER (OUTPATIENT)
Dept: PHYSICAL THERAPY | Facility: HOSPITAL | Age: 84
Setting detail: THERAPIES SERIES
Discharge: HOME | End: 2024-02-12
Payer: MEDICARE

## 2024-02-12 DIAGNOSIS — S81.801D OPEN WOUND OF RIGHT LOWER LEG, SUBSEQUENT ENCOUNTER: Primary | ICD-10-CM

## 2024-02-12 DIAGNOSIS — Z87.2 HISTORY OF CELLULITIS: ICD-10-CM

## 2024-02-12 DIAGNOSIS — R60.0 EDEMA OF RIGHT LOWER LEG: ICD-10-CM

## 2024-02-12 PROCEDURE — 97597 DBRDMT OPN WND 1ST 20 CM/<: CPT

## 2024-02-12 PROCEDURE — 29580 STRAPPING UNNA BOOT: CPT

## 2024-02-15 ENCOUNTER — HOSPITAL ENCOUNTER (OUTPATIENT)
Dept: PHYSICAL THERAPY | Facility: HOSPITAL | Age: 84
Setting detail: THERAPIES SERIES
Discharge: HOME | End: 2024-02-15
Payer: MEDICARE

## 2024-02-15 DIAGNOSIS — R60.0 EDEMA OF RIGHT LOWER LEG: ICD-10-CM

## 2024-02-15 DIAGNOSIS — Z87.2 HISTORY OF CELLULITIS: ICD-10-CM

## 2024-02-15 DIAGNOSIS — S81.801D OPEN WOUND OF RIGHT LOWER LEG, SUBSEQUENT ENCOUNTER: Primary | ICD-10-CM

## 2024-02-15 PROCEDURE — 97597 DBRDMT OPN WND 1ST 20 CM/<: CPT

## 2024-02-15 PROCEDURE — 29580 STRAPPING UNNA BOOT: CPT

## 2024-02-19 ENCOUNTER — HOSPITAL ENCOUNTER (OUTPATIENT)
Dept: PHYSICAL THERAPY | Facility: HOSPITAL | Age: 84
Setting detail: THERAPIES SERIES
Discharge: HOME | End: 2024-02-19
Payer: MEDICARE

## 2024-02-19 DIAGNOSIS — R60.0 EDEMA OF RIGHT LOWER LEG: ICD-10-CM

## 2024-02-19 DIAGNOSIS — S81.801D OPEN WOUND OF RIGHT LOWER LEG, SUBSEQUENT ENCOUNTER: Primary | ICD-10-CM

## 2024-02-19 DIAGNOSIS — Z87.2 HISTORY OF CELLULITIS: ICD-10-CM

## 2024-02-19 PROCEDURE — 29580 STRAPPING UNNA BOOT: CPT

## 2024-02-19 PROCEDURE — 97597 DBRDMT OPN WND 1ST 20 CM/<: CPT

## 2024-02-19 NOTE — THERAPY WOUND CARE TREATMENT
Outpatient Rehabilitation - Wound/Debridement Treatment Note  [facility]     Patient Name: Toño Alcala  : 1940  MRN: [id number]  Today's Date: 2024                 Admit Date: 2024    Visit Dx:    ICD-10-CM ICD-9-CM   1. Open wound of right lower leg, subsequent encounter  S81.801D V58.89     891.0   2. Edema of right lower leg  R60.0 782.3   3. History of cellulitis  Z87.2 V13.3     R lateral leg      R anterior leg      R medial leg        Patient Active Problem List   Diagnosis    CAD (coronary artery disease)    CVD (cardiovascular disease)    DVT (deep venous thrombosis)    Diabetes mellitus    Dyslipidemia    Arthritis    GERD (gastroesophageal reflux disease)    Mixed hyperlipidemia    Diabetic nephropathy associated with type 2 diabetes mellitus    Diabetic polyneuropathy associated with type 2 diabetes mellitus    Chronic kidney disease, stage III (moderate)    Vitamin D deficiency    Disc disorder of cervical region    Postthrombotic syndrome    Vertigo    Angina pectoris    Lumbosacral spondylosis without myelopathy    Psoriasis    Arthritis of elbow    Swelling of right lower extremity    Acute right-sided low back pain without sciatica    Hoarseness    Unifocal PVCs    Skin lesion of face    Primary hypertension    Medicare annual wellness visit, subsequent    Stage 3 chronic kidney disease due to benign hypertension    BMI 30.0-30.9,adult    Postherpetic neuralgia    Herpes zoster without complication    Calculus of gallbladder without cholecystitis without obstruction    Acute diverticulitis    Leg edema, right    Chest pain    Encounter for removal of sutures    Chronic pain of left knee    Fall        Past Medical History:   Diagnosis Date    Arthritis     Arthritis of neck Fusion     Bilateral radial fractures     fracture bilateral radial heads    CAD (coronary artery disease)     Cataract     Cervical disc disorder Fusion     Chronic kidney disease 2020    CVD  (cardiovascular disease)     History of TIA 1989    Diabetes mellitus     DVT (deep venous thrombosis)     Right lower extremity DVT and pulmonary embolism in 06/2015, idiopathic    DVT of proximal lower limb 06/22/2015    proximal DVT right leg, small PE- anticoagulation    Dyslipidemia     Fracture 1952    H/o pf left forearm fracture    Fracture of right hand 1980    Fracture of wrist 1980    GERD (gastroesophageal reflux disease)     with hiatal hernia    HL (hearing loss)     Hx of angina pectoris     Hypertension     Normal renal angiography 02/16/11    Knee swelling Several years ago    Left wrist fracture 1953    Lumbosacral disc disease 1996    Osgood-Schlatter's disease 1955    Osteoarthritis     Right wrist fracture     Vertigo     Wears glasses         Past Surgical History:   Procedure Laterality Date    APPENDECTOMY  1957    BACK SURGERY      CARDIAC CATHETERIZATION  2011    with vein graft    CERVICAL FUSION      CERVICAL FUSION  1992    C3-4    COLONOSCOPY  2013    CORONARY ARTERY BYPASS GRAFT  1994    HERNIA REPAIR Right 2011    inguinal    INGUINAL HERNIA REPAIR Left 10/29/2019    Procedure: INGUINAL HERNIA REPAIR LEFT;  Surgeon: Charisma Raines MD;  Location: [facility];  Service: General    LUMBAR LAMINECTOMY  1996    laminectomy, lumbar, L3    NECK SURGERY  1992    OTHER SURGICAL HISTORY      wrist surgery    SPINE SURGERY  1996    TONSILLECTOMY AND ADENOIDECTOMY  1945    TRIGGER POINT INJECTION  2001 - 2007    VASECTOMY  1972         EVALUATION   PT Ortho       Row Name 02/19/24 1400       Subjective    Subjective Comments Pt reports episode of moderate throbbing pain on Saturday, however otherwise no new issues/complaints. Somewhat frustrated with lack of wound healing progress.  -LH       Subjective Pain    Able to rate subjective pain? yes  -LH    Pre-Treatment Pain Level 2  -LH    Post-Treatment Pain Level 2  -LH    Subjective Pain Comment OTC lidocaine spray  -LH       Transfers     Sit-Stand [place] (Transfers) independent  -    Stand-Sit [place] (Transfers) independent  -    Comment, (Transfers) seated for tx  -       Gait/Stairs (Locomotion)    [place] Level (Gait) modified independence  -    Assistive Device (Gait) cane, straight  -              User Key  (r) = Recorded By, (t) = Taken By, (c) = Cosigned By      Initials Name Provider Type     Dejan Zabala, PT Physical Therapist                     LDA Wound       Row Name 02/19/24 1400             Wound 01/31/24 1115 Right lateral leg Venous Ulcer    Wound - Properties Group Placement Date: 01/31/24  [facility] Placement Time: 1115  - Present on Original Admission: Y  - Side: Right  - Orientation: lateral  - Location: leg  -MC Primary Wound Type: Venous ulcer  -MC    Wound Image Images linked: 3  -LH      Dressing Appearance intact;moist drainage  -      Base moist;pink;red  NEW small opening at anterior leg  -      Periwound intact;dry  -      Periwound Temperature warm  -      Periwound Skin Turgor soft  -      Edges irregular  -      Drainage Characteristics/Odor serosanguineous  -      Drainage Amount small  -      Care, Wound cleansed with;soap and water;debrided  -      Dressing Care foam;low-adherent  HFBt, Unna boot, cast padding  -      Periwound Care cleansed with pH balanced cleanser;barrier ointment applied  zguard  -      Retired Wound - Properties Group Placement Date: 01/31/24  - Placement Time: 1115  -MC Present on Original Admission: Y  - Side: Right  - Orientation: lateral  - Location: leg  -MC Primary Wound Type: Venous ulcer  -MC    Retired Wound - Properties Group Date first assessed: 01/31/24  - Time first assessed: 1115  - Present on Original Admission: Y  - Side: Right  - Location: leg  - Primary Wound Type: Venous ulcer  -MC              User Key  (r) = Recorded By, (t) = Taken By, (c) = Cosigned By      Initials Name Provider Type      "Marlene Levy, PT Physical Therapist     Dejan Zabala, PT Physical Therapist                   Lymphedema       Row Name 02/19/24 1400             Lymphedema Edema Assessment    Ptting Edema Category By severity  -      Pitting Edema Moderate  -         Skin Changes/Observations    Lower Extremity Conditions right:;clean;dry;hairless;inflamed;fragile  -      Lower Extremity Color/Pigment right:;erythema;hypopigmented;hyperpigmented  -         Lymphedema Pulses/Capillary Refill    Capillary Refill lower extremity capillary refill  -      Lower Extremity Capillary Refill right:;less than 3 seconds  -         Compression/Skin Care    Compression/Skin Care skin care;wrapping location;bandaging  -      Skin Care washed/dried;lotion applied  -      Wrapping Location lower extremity  -      Wrapping Location LE right:;foot to knee  -      Wrapping Comments wound dressings, unna boot applied in clamshell pattern, cast padding, 4\" coban, size 5 spandage to secure  -                User Key  (r) = Recorded By, (t) = Taken By, (c) = Cosigned By      Initials Name Provider Type     Dejan Zabala, PT Physical Therapist                    WOUND DEBRIDEMENT  Total area of Debridement: 6cm2  Debridement Site 1  Location- Site 1: R lateral leg wound  Selective Debridement- Site 1: Wound Surface <20cmsq  Instruments- Site 1: tweezers  Excised Tissue Description- Site 1: minimum, slough, other (comment) (nonviable crusted tissue)  Bleeding- Site 1: none   Debridement Site 2  Location- Site 2: R medial ankle  Selective Debridement- Site 2: Wound Surface <20cmsq  Instruments- Site 2: tweezers  Excised Tissue Description- Site 2: minimum, slough, other (comment) (crusted, nonviable tissue)  Bleeding- Site 2: none          Therapy Education       Row Name 02/19/24 1400             Therapy Education    Education Details Continue current POC, reviewed importance of RLE elevation.  -      Given " Bandaging/dressing change;Edema management;Symptoms/condition management  -      Program Reinforced  -      How Provided Verbal;Demonstration  -      Provided to Patient  -      Level of Understanding Teach back education performed;Verbalized  -                User Key  (r) = Recorded By, (t) = Taken By, (c) = Cosigned By      Initials Name Provider Type     Dejan Zabala, PT Physical Therapist                    Recommendation and Plan   PT Assessment/Plan       Row Name 02/19/24 1400          PT Assessment    Functional Limitations Performance in self-care ADL;Other (comment)  -     Impairments Integumentary integrity;Impaired venous circulation;Edema  -     Assessment Comments Pt appears to be demonstrating small improvements in viable epithelializatoin of R medial ankle wound area however pt continuing with scattered shallow ulcerations. Several areas of nonviable crusts cover areas of yellow drainage requiring debridement. Pt with one new open area to the R anterior leg. Pt would continue to benefit from current POC to promote wound healing.  -     Rehab Potential Good  -     Patient/caregiver participated in establishment of treatment plan and goals Yes  -     Patient would benefit from skilled therapy intervention Yes  -        PT Plan    PT Frequency 2x/week  -     Physical Therapy Interventions (Optional Details) wound care;patient/family education  -     PT Plan Comments debridement, unna boot, ?wound culture?  -               User Key  (r) = Recorded By, (t) = Taken By, (c) = Cosigned By      Initials Name Provider Type     Dejan Zabala, PT Physical Therapist                    Goals   PT OP Goals       Row Name 02/19/24 1427          Time Calculation    PT Goal Re-Cert Due Date 04/30/24  -               User Key  (r) = Recorded By, (t) = Taken By, (c) = Cosigned By      Initials Name Provider Type     Dejan Zabala, PT Physical Therapist                    PT  Goal Re-Cert Due Date: 04/30/24            Time Calculation: Start Time: 1345  Untimed Charges  55675-Egjn Boot: 15  26629-Xzdyfexag debridement: 15  Total Minutes  Untimed Charges Total Minutes: 30   Total Minutes: 30  Therapy Charges for Today       Code Description Service Date Service Provider Modifiers Qty    48642857545 HC EDWIN DEBRIDE OPEN WOUND UP TO 20CM 2/19/2024 Dejan Zabala, PT GP 1    73369229255 HC PT STAPPING UNNA BOOT 2/19/2024 Dejan Zabala, PT GP 1                    Dejan Zabala, PT  2/19/2024

## 2024-02-22 ENCOUNTER — HOSPITAL ENCOUNTER (OUTPATIENT)
Dept: PHYSICAL THERAPY | Facility: HOSPITAL | Age: 84
Setting detail: THERAPIES SERIES
Discharge: HOME | End: 2024-02-22
Payer: MEDICARE

## 2024-02-22 DIAGNOSIS — R60.0 EDEMA OF RIGHT LOWER LEG: ICD-10-CM

## 2024-02-22 DIAGNOSIS — Z87.2 HISTORY OF CELLULITIS: ICD-10-CM

## 2024-02-22 DIAGNOSIS — S81.801D OPEN WOUND OF RIGHT LOWER LEG, SUBSEQUENT ENCOUNTER: Primary | ICD-10-CM

## 2024-02-22 PROCEDURE — 29580 STRAPPING UNNA BOOT: CPT

## 2024-02-22 PROCEDURE — 97597 DBRDMT OPN WND 1ST 20 CM/<: CPT

## 2024-02-22 NOTE — THERAPY WOUND CARE TREATMENT
Outpatient Rehabilitation - Wound/Debridement Treatment Note  [facility]     Patient Name: Toño Alcala  : 1940  MRN: [id number]  Today's Date: 2024                 Admit Date: 2024    Visit Dx:    ICD-10-CM ICD-9-CM   1. Open wound of right lower leg, subsequent encounter  S81.801D V58.89     891.0   2. Edema of right lower leg  R60.0 782.3   3. History of cellulitis  Z87.2 V13.3   Lat RLE:    Med RLE:      Patient Active Problem List   Diagnosis    CAD (coronary artery disease)    CVD (cardiovascular disease)    DVT (deep venous thrombosis)    Diabetes mellitus    Dyslipidemia    Arthritis    GERD (gastroesophageal reflux disease)    Mixed hyperlipidemia    Diabetic nephropathy associated with type 2 diabetes mellitus    Diabetic polyneuropathy associated with type 2 diabetes mellitus    Chronic kidney disease, stage III (moderate)    Vitamin D deficiency    Disc disorder of cervical region    Postthrombotic syndrome    Vertigo    Angina pectoris    Lumbosacral spondylosis without myelopathy    Psoriasis    Arthritis of elbow    Swelling of right lower extremity    Acute right-sided low back pain without sciatica    Hoarseness    Unifocal PVCs    Skin lesion of face    Primary hypertension    Medicare annual wellness visit, subsequent    Stage 3 chronic kidney disease due to benign hypertension    BMI 30.0-30.9,adult    Postherpetic neuralgia    Herpes zoster without complication    Calculus of gallbladder without cholecystitis without obstruction    Acute diverticulitis    Leg edema, right    Chest pain    Encounter for removal of sutures    Chronic pain of left knee    Fall        Past Medical History:   Diagnosis Date    Arthritis     Arthritis of neck Fusion     Bilateral radial fractures     fracture bilateral radial heads    CAD (coronary artery disease)     Cataract     Cervical disc disorder Fusion     Chronic kidney disease     CVD (cardiovascular disease)      History of TIA 1989    Diabetes mellitus     DVT (deep venous thrombosis)     Right lower extremity DVT and pulmonary embolism in 06/2015, idiopathic    DVT of proximal lower limb 06/22/2015    proximal DVT right leg, small PE- anticoagulation    Dyslipidemia     Fracture 1952    H/o pf left forearm fracture    Fracture of right hand 1980    Fracture of wrist 1980    GERD (gastroesophageal reflux disease)     with hiatal hernia    HL (hearing loss)     Hx of angina pectoris     Hypertension     Normal renal angiography 02/16/11    Knee swelling Several years ago    Left wrist fracture 1953    Lumbosacral disc disease 1996    Osgood-Schlatter's disease 1955    Osteoarthritis     Right wrist fracture     Vertigo     Wears glasses         Past Surgical History:   Procedure Laterality Date    APPENDECTOMY  1957    BACK SURGERY      CARDIAC CATHETERIZATION  2011    with vein graft    CERVICAL FUSION      CERVICAL FUSION  1992    C3-4    COLONOSCOPY  2013    CORONARY ARTERY BYPASS GRAFT  1994    HERNIA REPAIR Right 2011    inguinal    INGUINAL HERNIA REPAIR Left 10/29/2019    Procedure: INGUINAL HERNIA REPAIR LEFT;  Surgeon: Charisma Raines MD;  Location: [facility];  Service: General    LUMBAR LAMINECTOMY  1996    laminectomy, lumbar, L3    NECK SURGERY  1992    OTHER SURGICAL HISTORY      wrist surgery    SPINE SURGERY  1996    TONSILLECTOMY AND ADENOIDECTOMY  1945    TRIGGER POINT INJECTION  2001 - 2007    VASECTOMY  1972         EVALUATION   PT Ortho       Row Name 02/22/24 1515       Subjective    Subjective Comments Pt forgot his lidocaine at home today.  Requesting PT look at his left leg to see if he is getting another wound vs just psoriasis plaque.  -       Subjective Pain    Able to rate subjective pain? yes  -    Pre-Treatment Pain Level 2  -    Post-Treatment Pain Level 2  -    Subjective Pain Comment facial scale  -       Transfers    Sit-Stand [place] (Transfers) independent  -     Stand-Sit [place] (Transfers) independent  -JM    Comment, (Transfers) seated for tx  -       Gait/Stairs (Locomotion)    [place] Level (Gait) modified independence  -    Assistive Device (Gait) cane, straight  -JM              User Key  (r) = Recorded By, (t) = Taken By, (c) = Cosigned By      Initials Name Provider Type    Alesha Jason PT Physical Therapist                     [facility] Wound       Row Name 02/22/24 1515             Wound 01/31/24 1115 Right lateral leg Venous Ulcer    Wound - Properties Group Placement Date: 01/31/24  [facility] Placement Time: 1115  - Present on Original Admission: Y  - Side: Right  - Orientation: lateral  - Location: leg  - Primary Wound Type: Venous ulcer  -    Wound Image Images linked: 2  -JM      Dressing Appearance intact;dried drainage;other (see comments)  HFB adherent to wounds, moistened with saline to remove  -JM      Base dry;pink;red;yellow;slough  pinpoint open areas med/ant, lat RLE more dry/crusted  -      Periwound intact;dry;redness;swelling  min erythema lat distal  -      Periwound Temperature warm  -      Periwound Skin Turgor soft  -      Edges irregular  -      Drainage Characteristics/Odor serosanguineous  -      Drainage Amount small  -JM      Care, Wound cleansed with;wound cleanser;debrided;yaa boot  -      Dressing Care dressing applied;silver impregnated;foam  mepilex ag to all areas, unna boot  -      Periwound Care cleansed with pH balanced cleanser;dry periwound area maintained  -      Retired Wound - Properties Group Placement Date: 01/31/24  - Placement Time: 1115  -MC Present on Original Admission: Y  -MC Side: Right  - Orientation: lateral  - Location: leg  - Primary Wound Type: Venous ulcer  -    Retired Wound - Properties Group Date first assessed: 01/31/24  - Time first assessed: 1115  - Present on Original Admission: Y  - Side: Right  - Location: leg  - Primary Wound Type: Venous  "ulcer  -              User Key  (r) = Recorded By, (t) = Taken By, (c) = Cosigned By      Initials Name Provider Type    Marlene Bethea, PT Physical Therapist    Alesha Jason, PT Physical Therapist                   Lymphedema       Row Name 02/22/24 1515             Lymphedema Edema Assessment    Ptting Edema Category By severity  -      Pitting Edema Mild;Moderate  moderate distal and prox to unna boot  -         Skin Changes/Observations    Lower Extremity Conditions right:;clean;dry;hairless;inflamed;fragile  -      Lower Extremity Color/Pigment right:;erythema;hypopigmented;hyperpigmented  -         Lymphedema Pulses/Capillary Refill    Capillary Refill lower extremity capillary refill  -      Lower Extremity Capillary Refill right:;less than 3 seconds  -         Compression/Skin Care    Compression/Skin Care skin care;wrapping location;bandaging  -      Skin Care washed/dried;lotion applied  -      Wrapping Location lower extremity  -      Wrapping Location LE right:;foot to knee  -      Wrapping Comments unna boot in [facility], 4\" coban, size 6 spandage  -                User Key  (r) = Recorded By, (t) = Taken By, (c) = Cosigned By      Initials Name Provider Type    Alesha Jason, PT Physical Therapist                    WOUND DEBRIDEMENT  Total area of Debridement: 4cmsq  Debridement Site 1  Location- Site 1: R lateral leg wound  Selective Debridement- Site 1: Wound Surface <20cmsq  Instruments- Site 1: tweezers  Excised Tissue Description- Site 1: minimum, slough, other (comment) (crusts)  Bleeding- Site 1: none   Debridement Site 2  Location- Site 2: R medial ankle  Selective Debridement- Site 2: Wound Surface <20cmsq  Instruments- Site 2: tweezers  Excised Tissue Description- Site 2: minimum, slough, other (comment) (crusts)  Bleeding- Site 2: none          Therapy Education       Row Name 02/22/24 0370             Therapy Education    Education Details " Continue leg elevation.  Monitor lat LLE, appears to be psoriasis plaque, but PT will monitor when pt comes for tx.  -      Given Bandaging/dressing change;Edema management;Symptoms/condition management  -      Program Reinforced  -      How Provided Verbal;Demonstration  -      Provided to Patient  -      Level of Understanding Teach back education performed;Verbalized  -                User Key  (r) = Recorded By, (t) = Taken By, (c) = Cosigned By      Initials Name Provider Type    Alesha Jason, PT Physical Therapist                    Recommendation and Plan   PT Assessment/Plan       Row Name 02/22/24 1515          PT Assessment    Functional Limitations Performance in self-care ADL;Other (comment)  -     Impairments Integumentary integrity;Impaired venous circulation;Edema  -     Assessment Comments RLE edema and erythema improving.  HFB adherent to dry crusts and wounds, so PT changed to mepilex ag.  Continued unna boot to increase venous return and skin integrity.  Pt with small red crusted area to lat L ankle, appears c/w psoriasis plaque, but PT can monitor when pt returns for tx.  Continue with POC.  -     Rehab Potential Good  -     Patient/caregiver participated in establishment of treatment plan and goals Yes  -     Patient would benefit from skilled therapy intervention Yes  -        PT Plan    PT Frequency 2x/week  -     Physical Therapy Interventions (Optional Details) patient/family education;wound care  -     PT Plan Comments debridement, unna boot  -               User Key  (r) = Recorded By, (t) = Taken By, (c) = Cosigned By      Initials Name Provider Type    Alesha Jason, PT Physical Therapist                    Goals   PT OP Goals       Row Name 02/22/24 1515          Time Calculation    PT Goal Re-Cert Due Date 04/30/24  -               User Key  (r) = Recorded By, (t) = Taken By, (c) = Cosigned By      Initials Name Provider Type    KACIE  Alesha Nunez, PT Physical Therapist                    PT Goal Re-Cert Due Date: 04/30/24            Time Calculation: Start Time: 1515  Untimed Charges  67043-Ozwx Boot: 15  76992-Vhkpddfen debridement: 15  Total Minutes  Untimed Charges Total Minutes: 30   Total Minutes: 30  Therapy Charges for Today       Code Description Service Date Service Provider Modifiers Qty    95695909843 HC EDWIN DEBRIDE OPEN WOUND UP TO 20CM 2/22/2024 Alesha Nunez, PT GP 1    29172377098 HC PT STAPPING UNNA BOOT 2/22/2024 Alesha Nunez, PT GP 1                    Alesha Nunez, PT  2/22/2024

## 2024-02-26 ENCOUNTER — OFFICE VISIT (OUTPATIENT)
Dept: ORTHOPEDIC SURGERY | Facility: CLINIC | Age: 84
End: 2024-02-26
Payer: MEDICARE

## 2024-02-26 ENCOUNTER — HOSPITAL ENCOUNTER (OUTPATIENT)
Dept: PHYSICAL THERAPY | Facility: HOSPITAL | Age: 84
Setting detail: THERAPIES SERIES
Discharge: HOME | End: 2024-02-26
Payer: MEDICARE

## 2024-02-26 VITALS
SYSTOLIC BLOOD PRESSURE: 142 MMHG | WEIGHT: 195.6 LBS | DIASTOLIC BLOOD PRESSURE: 80 MMHG | HEIGHT: 70 IN | BODY MASS INDEX: 28 KG/M2

## 2024-02-26 DIAGNOSIS — M17.12 PRIMARY OSTEOARTHRITIS OF LEFT KNEE: Primary | ICD-10-CM

## 2024-02-26 DIAGNOSIS — Z87.2 HISTORY OF CELLULITIS: ICD-10-CM

## 2024-02-26 DIAGNOSIS — R60.0 EDEMA OF RIGHT LOWER LEG: ICD-10-CM

## 2024-02-26 DIAGNOSIS — S81.801D OPEN WOUND OF RIGHT LOWER LEG, SUBSEQUENT ENCOUNTER: Primary | ICD-10-CM

## 2024-02-26 PROCEDURE — 29580 STRAPPING UNNA BOOT: CPT

## 2024-02-26 PROCEDURE — 3077F SYST BP >= 140 MM HG: CPT | Performed by: ORTHOPAEDIC SURGERY

## 2024-02-26 PROCEDURE — 1160F RVW MEDS BY RX/DR IN RCRD: CPT | Performed by: ORTHOPAEDIC SURGERY

## 2024-02-26 PROCEDURE — 3079F DIAST BP 80-89 MM HG: CPT | Performed by: ORTHOPAEDIC SURGERY

## 2024-02-26 PROCEDURE — 97597 DBRDMT OPN WND 1ST 20 CM/<: CPT

## 2024-02-26 PROCEDURE — 99212 OFFICE O/P EST SF 10 MIN: CPT | Performed by: ORTHOPAEDIC SURGERY

## 2024-02-26 PROCEDURE — 1159F MED LIST DOCD IN RCRD: CPT | Performed by: ORTHOPAEDIC SURGERY

## 2024-02-26 NOTE — THERAPY WOUND CARE TREATMENT
Outpatient Rehabilitation - Wound/Debridement Treatment Note   [place]     Patient Name: Toño Alcala  : 1940  MRN: [id number]  Today's Date: 2024                 Admit Date: 2024    Visit Dx:    ICD-10-CM ICD-9-CM   1. Open wound of right lower leg, subsequent encounter  S81.801D V58.89     891.0   2. Edema of right lower leg  R60.0 782.3   3. History of cellulitis  Z87.2 V13.3       Patient Active Problem List   Diagnosis    CAD (coronary artery disease)    CVD (cardiovascular disease)    DVT (deep venous thrombosis)    Diabetes mellitus    Dyslipidemia    Arthritis    GERD (gastroesophageal reflux disease)    Mixed hyperlipidemia    Diabetic nephropathy associated with type 2 diabetes mellitus    Diabetic polyneuropathy associated with type 2 diabetes mellitus    Chronic kidney disease, stage III (moderate)    Vitamin D deficiency    Disc disorder of cervical region    Postthrombotic syndrome    Vertigo    Angina pectoris    Lumbosacral spondylosis without myelopathy    Psoriasis    Arthritis of elbow    Swelling of right lower extremity    Acute right-sided low back pain without sciatica    Hoarseness    Unifocal PVCs    Skin lesion of face    Primary hypertension    Medicare annual wellness visit, subsequent    Stage 3 chronic kidney disease due to benign hypertension    BMI 30.0-30.9,adult    Postherpetic neuralgia    Herpes zoster without complication    Calculus of gallbladder without cholecystitis without obstruction    Acute diverticulitis    Leg edema, right    Chest pain    Encounter for removal of sutures    Chronic pain of left knee    Fall        Past Medical History:   Diagnosis Date    Arthritis     Arthritis of neck Fusion     Bilateral radial fractures     fracture bilateral radial heads    CAD (coronary artery disease)     Cataract     Cervical disc disorder Fusion     Chronic kidney disease     CVD (cardiovascular disease)     History of TIA      Diabetes mellitus     DVT (deep venous thrombosis)     Right lower extremity DVT and pulmonary embolism in 06/2015, idiopathic    DVT of proximal lower limb 06/22/2015    proximal DVT right leg, small PE- anticoagulation    Dyslipidemia     Fracture 1952    H/o pf left forearm fracture    Fracture of right hand 1980    Fracture of wrist 1980    GERD (gastroesophageal reflux disease)     with hiatal hernia    HL (hearing loss)     Hx of angina pectoris     Hypertension     Normal renal angiography 02/16/11    Knee swelling Several years ago    Left wrist fracture 1953    Lumbosacral disc disease 1996    Osgood-Schlatter's disease 1955    Osteoarthritis     Right wrist fracture     Vertigo     Wears glasses         Past Surgical History:   Procedure Laterality Date    APPENDECTOMY  1957    BACK SURGERY      CARDIAC CATHETERIZATION  2011    with vein graft    CERVICAL FUSION      CERVICAL FUSION  1992    C3-4    COLONOSCOPY  2013    CORONARY ARTERY BYPASS GRAFT  1994    HERNIA REPAIR Right 2011    inguinal    INGUINAL HERNIA REPAIR Left 10/29/2019    Procedure: INGUINAL HERNIA REPAIR LEFT;  Surgeon: Charisma Raines MD;  Location: [facility];  Service: General    LUMBAR LAMINECTOMY  1996    laminectomy, lumbar, L3    NECK SURGERY  1992    OTHER SURGICAL HISTORY      wrist surgery    SPINE SURGERY  1996    TONSILLECTOMY AND ADENOIDECTOMY  1945    TRIGGER POINT INJECTION  2001 - 2007    VASECTOMY  1972         EVALUATION   PT Ortho       Row Name 02/26/24 1100       Subjective    Subjective Comments Reports the LLE area cleared up with use of his psoriasis medication. No other complaints or changes.  -MC       Subjective Pain    Able to rate subjective pain? yes  -MC    Pre-Treatment Pain Level 0  -MC    Post-Treatment Pain Level 0  -MC    Subjective Pain Comment increased with debridement, up to 6/10 FACES. OTC lidocaine spray used.  -MC       Transfers    Sit-Stand [place] (Transfers) independent  -     Stand-Sit [place] (Transfers) independent  -    Comment, (Transfers) seated for tx  -       Gait/Stairs (Locomotion)    [place] Level (Gait) modified independence  -    Assistive Device (Gait) cane, straight  -              User Key  (r) = Recorded By, (t) = Taken By, (c) = Cosigned By      Initials Name Provider Type     Marlene Levy PT Physical Therapist                     LDA Wound       Row Name 02/26/24 1100             Wound 01/31/24 1115 Right lateral leg Venous Ulcer    Wound - Properties Group Placement Date: 01/31/24  - Placement Time: 1115  - Present on Original Admission: Y  -MC Side: Right  - Orientation: lateral  -MC Location: leg  - Primary Wound Type: Venous ulcer  -    Dressing Appearance intact;moist drainage  -MC      Base dry;pink;red;yellow;slough  medial area appears closed. Lateral area now with scattered pinpoint areas  -      Periwound intact;dry;redness;swelling  min erythema lat distal  -      Periwound Temperature warm  -      Periwound Skin Turgor soft  -      Edges irregular  -      Drainage Characteristics/Odor serosanguineous  -      Drainage Amount small  -      Care, Wound cleansed with;wound cleanser;debrided;yaa boot  -      Dressing Care dressing applied;silver impregnated;low-adherent;foam  mepilex Ag, unna boot  -      Periwound Care cleansed with pH balanced cleanser;barrier ointment applied  zguard  -      Retired Wound - Properties Group Placement Date: 01/31/24  - Placement Time: 1115  - Present on Original Admission: Y  -MC Side: Right  - Orientation: lateral  - Location: leg  -MC Primary Wound Type: Venous ulcer  -MC    Retired Wound - Properties Group Date first assessed: 01/31/24  - Time first assessed: 1115  - Present on Original Admission: Y  - Side: Right  - Location: leg  -MC Primary Wound Type: Venous ulcer  -MC              User Key  (r) = Recorded By, (t) = Taken By, (c) = Cosigned By       "Initials Name Provider Type    Marlene Bethea, PT Physical Therapist                   Lymphedema       Row Name 02/26/24 1100             Lymphedema Edema Assessment    Ptting Edema Category By severity  -      Pitting Edema Mild;Moderate  moderate distal and prox to unna boot  -         Skin Changes/Observations    Lower Extremity Conditions right:;clean;dry;hairless;inflamed;fragile  -      Lower Extremity Color/Pigment right:;erythema;hypopigmented;hyperpigmented  -         Lymphedema Pulses/Capillary Refill    Capillary Refill lower extremity capillary refill  -      Lower Extremity Capillary Refill right:;less than 3 seconds  -         Compression/Skin Care    Compression/Skin Care skin care;wrapping location;bandaging  -      Skin Care washed/dried;lotion applied  -      Wrapping Location lower extremity  -      Wrapping Location LE right:;foot to knee  -      Wrapping Comments unna boot in [facility], 4\" coban, size 6 spandage  -                User Key  (r) = Recorded By, (t) = Taken By, (c) = Cosigned By      Initials Name Provider Type    Marlene Bethea, PT Physical Therapist                    WOUND DEBRIDEMENT  Total area of Debridement: 4 cm2  Debridement Site 1  Location- Site 1: R lateral leg wound  Selective Debridement- Site 1: Wound Surface <20cmsq  Instruments- Site 1: tweezers, #15, scapel  Excised Tissue Description- Site 1: moderate, slough, other (comment) (scabbing)  Bleeding- Site 1: none   Debridement Site 2  Location- Site 2: R medial ankle  Selective Debridement- Site 2: Wound Surface <20cmsq  Instruments- Site 2: tweezers, #15, scapel  Excised Tissue Description- Site 2: maximum, other (comment) (crusting)  Bleeding- Site 2: none          Therapy Education       Row Name 02/26/24 1300             Therapy Education    Education Details Continue current POC  -      Given Bandaging/dressing change;Edema management;Symptoms/condition management  -      " Program Reinforced  -      How Provided Verbal;Demonstration  -      Provided to Patient  -      Level of Understanding Teach back education performed;Verbalized  -                User Key  (r) = Recorded By, (t) = Taken By, (c) = Cosigned By      Initials Name Provider Type    Marlene Bethea, PT Physical Therapist                    Recommendation and Plan   PT Assessment/Plan       Row Name 02/26/24 1300          PT Assessment    Functional Limitations Performance in self-care ADL;Other (comment)  -     Impairments Integumentary integrity;Impaired venous circulation;Edema  -     Assessment Comments The R medial ankle areas appear closed today. PT able to debride much of the crust from the lateral ankle, revealing several scattered, pinpoint areas remaining. Pt will continue to benefit from skilled PT wound care to promote healing.  -     Rehab Potential Good  -     Patient/caregiver participated in establishment of treatment plan and goals Yes  -     Patient would benefit from skilled therapy intervention Yes  -        PT Plan    PT Frequency 2x/week  -     Physical Therapy Interventions (Optional Details) wound care;patient/family education  -     PT Plan Comments debridement, unna boot  -               User Key  (r) = Recorded By, (t) = Taken By, (c) = Cosigned By      Initials Name Provider Type    Marlene Bethea, PT Physical Therapist                    Goals   PT OP Goals       Row Name 02/26/24 1343          Time Calculation    PT Goal Re-Cert Due Date 04/30/24  -               User Key  (r) = Recorded By, (t) = Taken By, (c) = Cosigned By      Initials Name Provider Type    Marlene Bethea, PT Physical Therapist                    PT Goal Re-Cert Due Date: 04/30/24            Time Calculation: Start Time: 1115  Untimed Charges  51152-Thxv Boot: 15  95789-[place] debridement: 15  Total Minutes  Untimed Charges Total Minutes: 30   Total Minutes: 30               aMrlene Levy, PT  2/26/2024

## 2024-02-26 NOTE — PROGRESS NOTES
[facility]    Subjective     Chief Complaint   Patient presents with    Follow-up     4 month follow up -- primary osteoarthritis of left knee        HPI    It has been 4  month(s) since Mr. Alcala's last visit. He returns to clinic today for follow-up of left knee arthritis. The issue has been ongoing for several year(s). He rates his pain a 1/10 on the pain scale. Previous/current treatments: cane/walker, NSAIDS, and steroid injection (last injection 10/23/32). Current symptoms: swelling. The pain is worse with walking long distances; resting improve the pain. Overall, he is doing better.  Last injection helped, with no significant pain today.    I have reviewed the following portions of the patient's history and agree with: History of Present Illness and Review of Systems    Patient Active Problem List   Diagnosis    CAD (coronary artery disease)    CVD (cardiovascular disease)    DVT (deep venous thrombosis)    Diabetes mellitus    Dyslipidemia    Arthritis    GERD (gastroesophageal reflux disease)    Mixed hyperlipidemia    Diabetic nephropathy associated with type 2 diabetes mellitus    Diabetic polyneuropathy associated with type 2 diabetes mellitus    Chronic kidney disease, stage III (moderate)    Vitamin D deficiency    Disc disorder of cervical region    Postthrombotic syndrome    Vertigo    Angina pectoris    Lumbosacral spondylosis without myelopathy    Psoriasis    Arthritis of elbow    Swelling of right lower extremity    Acute right-sided low back pain without sciatica    Hoarseness    Unifocal PVCs    Skin lesion of face    Primary hypertension    Medicare annual wellness visit, subsequent    Stage 3 chronic kidney disease due to benign hypertension    BMI 30.0-30.9,adult    Postherpetic neuralgia    Herpes zoster without complication    Calculus of gallbladder without cholecystitis without obstruction    Acute diverticulitis    Leg edema, right    Chest pain     Encounter for removal of sutures    Chronic pain of left knee    Fall     Past Medical History:   Diagnosis Date    Arthritis     Arthritis of neck Fusion 1992    Bilateral radial fractures 1962    fracture bilateral radial heads    CAD (coronary artery disease)     Cataract     Cervical disc disorder Fusion 1992    Chronic kidney disease 2020    CVD (cardiovascular disease)     History of TIA 1989    Diabetes mellitus     DVT (deep venous thrombosis)     Right lower extremity DVT and pulmonary embolism in 06/2015, idiopathic    DVT of proximal lower limb 06/22/2015    proximal DVT right leg, small PE- anticoagulation    Dyslipidemia     Fracture 1952    H/o pf left forearm fracture    Fracture of right hand 1980    Fracture of wrist 1980    GERD (gastroesophageal reflux disease)     with hiatal hernia    HL (hearing loss)     Hx of angina pectoris     Hypertension     Normal renal angiography 02/16/11    Knee swelling Several years ago    Left wrist fracture 1953    Lumbosacral disc disease 1996    Osgood-Schlatter's disease 1955    Osteoarthritis     Right wrist fracture     Vertigo     Wears glasses       Past Surgical History:   Procedure Laterality Date    APPENDECTOMY  1957    BACK SURGERY      CARDIAC CATHETERIZATION  2011    with vein graft    CERVICAL FUSION      CERVICAL FUSION  1992    C3-4    COLONOSCOPY  2013    CORONARY ARTERY BYPASS GRAFT  1994    HERNIA REPAIR Right 2011    inguinal    INGUINAL HERNIA REPAIR Left 10/29/2019    Procedure: INGUINAL HERNIA REPAIR LEFT;  Surgeon: Charisma Raines MD;  Location: [facility] OR;  Service: General    LUMBAR LAMINECTOMY  1996    laminectomy, lumbar, L3    NECK SURGERY  1992    OTHER SURGICAL HISTORY      wrist surgery    SPINE SURGERY  1996    TONSILLECTOMY AND ADENOIDECTOMY  1945    TRIGGER POINT INJECTION  2001 - 2007    VASECTOMY  1972      Family History   Problem Relation Age of Onset    Arthritis Mother         OA    Heart disease Father     Diabetes  Father     Heart attack Father     Heart disease Brother     Heart attack Brother     Diabetes Brother     Diabetes Son     Hypertension Other     Cancer Other      Social History     Socioeconomic History    Marital status:    Tobacco Use    Smoking status: Former     Packs/day: 1.50     Years: 20.00     Additional pack years: 0.00     Total pack years: 30.00     Types: Cigarettes, Pipe, Cigars     Start date: 1962     Quit date: 1997     Years since quittin.8     Passive exposure: Past    Smokeless tobacco: Never    Tobacco comments:     QUIT DATE 1992   Vaping Use    Vaping Use: Never used   Substance and Sexual Activity    Alcohol use: Yes     Alcohol/week: 11.0 - 13.0 standard drinks of alcohol     Types: 7 Glasses of wine, 2 Cans of beer, 2 - 4 Shots of liquor per week     Comment: glass of wine everyday     Drug use: No    Sexual activity: Not Currently     Partners: Female     Birth control/protection: Surgical, Vasectomy      Current Outpatient Medications on File Prior to Visit   Medication Sig Dispense Refill    acetaminophen (TYLENOL) 325 MG tablet Take 2 tablets by mouth As Needed for Mild Pain.      atorvastatin (LIPITOR) 40 MG tablet Take 1 tablet by mouth Daily.      calcipotriene-betamethasone (TACLONEX) 0.005-0.064 % ointment Apply 1 Application topically to the appropriate area as directed As Needed (psoriasis).      carvedilol (COREG) 6.25 MG tablet Take 1 tablet by mouth 2 (Two) Times a Day With Meals.      cholecalciferol (VITAMIN D3) 25 MCG (1000 UT) tablet Take 1 tablet by mouth Daily.      diclofenac (VOLTAREN) 1 % gel gel Apply 4 g topically As Needed.      diltiaZEM CD (CARDIZEM CD) 180 MG 24 hr capsule Take 1 capsule by mouth Daily.      doxazosin (CARDURA) 2 MG tablet Take 1 tablet by mouth 2 (Two) Times a Day.      glucose blood test strip Use to check blood sugar twice daily 100 each 3    lidocaine (LIDODERM) 5 % Place 1 patch on the skin as directed by  provider Daily. Remove & Discard patch within 12 hours or as directed by MD Ocampo patch 2    Microlet Lancets misc Use to check blood sugar twice daily 100 each 3    nitroglycerin (NITROSTAT) 0.4 MG SL tablet Place 1 tablet under the tongue Every 5 (Five) Minutes As Needed for Chest Pain. Take no more than 3 doses in 15 minutes.      nortriptyline (PAMELOR) 10 MG capsule Take 1 capsule by mouth Every Night. 30 capsule 5    omeprazole (priLOSEC) 20 MG capsule Take 1 capsule by mouth Daily.      warfarin (COUMADIN) 5 MG tablet Take 1 tablet by mouth Every Night. Alternates 5 mg with 2.5 mg qod 30 tablet 2     No current facility-administered medications on file prior to visit.      Allergies   Allergen Reactions    Penicillins Hives and Swelling     Per patient, has tolerated Keflex        Review of Systems   Constitutional:  Negative for activity change, appetite change, chills, diaphoresis, fatigue, fever and unexpected weight change.   HENT:  Negative for congestion, dental problem, drooling, ear discharge, ear pain, facial swelling, hearing loss, mouth sores, nosebleeds, postnasal drip, rhinorrhea, sinus pressure, sneezing, sore throat, tinnitus, trouble swallowing and voice change.    Eyes:  Negative for photophobia, pain, discharge, redness, itching and visual disturbance.   Respiratory:  Negative for apnea, cough, choking, chest tightness, shortness of breath, wheezing and stridor.    Cardiovascular:  Negative for chest pain, palpitations and leg swelling.   Gastrointestinal:  Negative for abdominal distention, abdominal pain, anal bleeding, blood in stool, constipation, diarrhea, nausea, rectal pain and vomiting.   Endocrine: Negative for cold intolerance, heat intolerance, polydipsia, polyphagia and polyuria.   Genitourinary:  Negative for decreased urine volume, difficulty urinating, dysuria, enuresis, flank pain, frequency, genital sores, hematuria and urgency.   Musculoskeletal:  Positive for arthralgias.  "Negative for back pain, gait problem, joint swelling, myalgias, neck pain and neck stiffness.   Skin:  Negative for color change, pallor, rash and wound.   Allergic/Immunologic: Negative for environmental allergies, food allergies and immunocompromised state.   Neurological:  Negative for dizziness, tremors, seizures, syncope, facial asymmetry, speech difficulty, weakness, light-headedness, numbness and headaches.   Hematological:  Negative for adenopathy. Does not bruise/bleed easily.   Psychiatric/Behavioral:  Negative for agitation, behavioral problems, confusion, decreased concentration, dysphoric mood, hallucinations, self-injury, sleep disturbance and suicidal ideas. The patient is not nervous/anxious and is not hyperactive.         Objective      Physical Exam  /80   Ht 177.8 cm (70\")   Wt 88.7 kg (195 lb 9.6 oz)   BMI 28.07 kg/m²     Body mass index is 28.07 kg/m².    General:   Mental Status:  Alert   Appearance: Cooperative, in no acute distress   Build and Nutrition: Well-nourished well-developed male   Orientation: Alert and oriented to person, place and time   Posture: Normal   Gait: With a cane    Integument:              Left knee: No skin lesions, no rash, no ecchymosis     Lower Extremities:              Left Knee:                          Tenderness:    Mild medial joint line tenderness                          Effusion:          1-2+                          Swelling:          None                          Crepitus:          Soft                          Range of motion:        Extension:       10°                                                              Flexion:           100°    Imaging/Studies  Imaging Results (Last 24 Hours)       ** No results found for the last 24 hours. **          No new imaging today.      Assessment and Plan     Diagnoses and all orders for this visit:    1. Primary osteoarthritis of left knee (Primary)        1. Primary osteoarthritis of left knee        I " reviewed my findings with the patient.  His left knee is doing very well currently, and I will see him back for any worsening or problems in the future.  Repeat injection can be considered if he has a flareup in the future.    Return if symptoms worsen or fail to improve.      Jaya Renteria MD  02/26/24  14:00 EST    Dictated Utilizing [employer]

## 2024-02-29 ENCOUNTER — HOSPITAL ENCOUNTER (OUTPATIENT)
Dept: PHYSICAL THERAPY | Facility: HOSPITAL | Age: 84
Setting detail: THERAPIES SERIES
Discharge: HOME | End: 2024-02-29
Payer: MEDICARE

## 2024-02-29 DIAGNOSIS — Z87.2 HISTORY OF CELLULITIS: ICD-10-CM

## 2024-02-29 DIAGNOSIS — R60.0 EDEMA OF RIGHT LOWER LEG: ICD-10-CM

## 2024-02-29 DIAGNOSIS — S81.801D OPEN WOUND OF RIGHT LOWER LEG, SUBSEQUENT ENCOUNTER: Primary | ICD-10-CM

## 2024-02-29 PROCEDURE — 29580 STRAPPING UNNA BOOT: CPT

## 2024-02-29 PROCEDURE — 97597 DBRDMT OPN WND 1ST 20 CM/<: CPT

## 2024-02-29 NOTE — THERAPY PROGRESS REPORT/RE-CERT
Outpatient Rehabilitation - Wound/Debridement Progress Note  [facility]     Patient Name: Toño Alcala  : 1940  MRN: [id number]  Today's Date: 2024                 Admit Date: 2024    Visit Dx:    ICD-10-CM ICD-9-CM   1. Open wound of right lower leg, subsequent encounter  S81.801D V58.89     891.0   2. Edema of right lower leg  R60.0 782.3   3. History of cellulitis  Z87.2 V13.3     R lateral leg      R medial leg      Patient Active Problem List   Diagnosis    CAD (coronary artery disease)    CVD (cardiovascular disease)    DVT (deep venous thrombosis)    Diabetes mellitus    Dyslipidemia    Arthritis    GERD (gastroesophageal reflux disease)    Mixed hyperlipidemia    Diabetic nephropathy associated with type 2 diabetes mellitus    Diabetic polyneuropathy associated with type 2 diabetes mellitus    Chronic kidney disease, stage III (moderate)    Vitamin D deficiency    Disc disorder of cervical region    Postthrombotic syndrome    Vertigo    Angina pectoris    Lumbosacral spondylosis without myelopathy    Psoriasis    Arthritis of elbow    Swelling of right lower extremity    Acute right-sided low back pain without sciatica    Hoarseness    Unifocal PVCs    Skin lesion of face    Primary hypertension    Medicare annual wellness visit, subsequent    Stage 3 chronic kidney disease due to benign hypertension    BMI 30.0-30.9,adult    Postherpetic neuralgia    Herpes zoster without complication    Calculus of gallbladder without cholecystitis without obstruction    Acute diverticulitis    Leg edema, right    Chest pain    Encounter for removal of sutures    Chronic pain of left knee    Fall        Past Medical History:   Diagnosis Date    Arthritis     Arthritis of neck Fusion     Bilateral radial fractures     fracture bilateral radial heads    CAD (coronary artery disease)     Cataract     Cervical disc disorder Fusion     Chronic kidney disease     CVD (cardiovascular  disease)     History of TIA 1989    Diabetes mellitus     DVT (deep venous thrombosis)     Right lower extremity DVT and pulmonary embolism in 06/2015, idiopathic    DVT of proximal lower limb 06/22/2015    proximal DVT right leg, small PE- anticoagulation    Dyslipidemia     Fracture 1952    H/o pf left forearm fracture    Fracture of right hand 1980    Fracture of wrist 1980    GERD (gastroesophageal reflux disease)     with hiatal hernia    HL (hearing loss)     Hx of angina pectoris     Hypertension     Normal renal angiography 02/16/11    Knee swelling Several years ago    Left wrist fracture 1953    Lumbosacral disc disease 1996    Osgood-Schlatter's disease 1955    Osteoarthritis     Right wrist fracture     Vertigo     Wears glasses         Past Surgical History:   Procedure Laterality Date    APPENDECTOMY  1957    BACK SURGERY      CARDIAC CATHETERIZATION  2011    with vein graft    CERVICAL FUSION      CERVICAL FUSION  1992    C3-4    COLONOSCOPY  2013    CORONARY ARTERY BYPASS GRAFT  1994    HERNIA REPAIR Right 2011    inguinal    INGUINAL HERNIA REPAIR Left 10/29/2019    Procedure: INGUINAL HERNIA REPAIR LEFT;  Surgeon: Charisma Raines MD;  Location: [facility];  Service: General    LUMBAR LAMINECTOMY  1996    laminectomy, lumbar, L3    NECK SURGERY  1992    OTHER SURGICAL HISTORY      wrist surgery    SPINE SURGERY  1996    TONSILLECTOMY AND ADENOIDECTOMY  1945    TRIGGER POINT INJECTION  2001 - 2007    VASECTOMY  1972         EVALUATION   PT Ortho       Row Name 02/29/24 1500       Subjective    Subjective Comments Pt with no new issues/complaints other than some discomfort in the R popliteal area where pt reports he has an area of psoriasis.  -LH       Subjective Pain    Able to rate subjective pain? yes  -LH    Pre-Treatment Pain Level 0  -LH    Post-Treatment Pain Level 0  -LH       Transfers    Sit-Stand [place] (Transfers) independent  -LH    Stand-Sit [place] (Transfers)  independent  -    Comment, (Transfers) seated for tx  -       Gait/Stairs (Locomotion)    [place] Level (Gait) modified independence  -    Assistive Device (Gait) cane, straight  -              User Key  (r) = Recorded By, (t) = Taken By, (c) = Cosigned By      Initials Name Provider Type     Dejan Zabala, PT Physical Therapist                     LDA Wound       Row Name 02/29/24 1500             Wound 01/31/24 1115 Right lateral leg Venous Ulcer    Wound - Properties Group Placement Date: 01/31/24  [facility] [place] Time: 1115  - Present on Original Admission: Y  -MC Side: Right  - Orientation: lateral  -MC Location: leg  -MC Primary Wound Type: Venous ulcer  -MC    Wound Image Images linked: 2  -LH      Dressing Appearance intact;moist drainage  scant drainage medially, small drainage laterally  -      Base dry;pink;red;yellow;slough  medial area with scant opening. Lateral area now with scattered shallow pinpoint areas  -      Periwound intact;dry;redness;swelling  min erythema lat distal  -      Periwound Temperature warm  -      Periwound Skin Turgor soft  -      Edges irregular  -      Drainage Characteristics/Odor serosanguineous  -      Drainage Amount small  -LH      Care, Wound cleansed with;wound cleanser;debrided;yaa boot  -      Dressing Care dressing applied;silver impregnated;low-adherent;foam  mepilex Ag, unna boot  -      Periwound Care cleansed with pH balanced cleanser;barrier ointment applied  zguard  -      Retired Wound - Properties Group Placement Date: 01/31/24  - Placement Time: 1115  - Present on Original Admission: Y  -MC Side: Right  - Orientation: lateral  - Location: leg  -MC Primary Wound Type: Venous ulcer  -MC    Retired Wound - Properties Group Date first assessed: 01/31/24  - Time first assessed: 1115  - Present on Original Admission: Y  - Side: Right  - Location: leg  -MC Primary Wound Type: Venous ulcer  -MC               "User Key  (r) = Recorded By, (t) = Taken By, (c) = Cosigned By      Initials Name Provider Type    Marlene Bethea, PT Physical Therapist     Dejan Zabala, PT Physical Therapist                   Lymphedema       Row Name 02/29/24 1500             Lymphedema Edema Assessment    Ptting Edema Category By severity  -      Pitting Edema Mild;Moderate  moderate distal and prox to unna boot  -         Skin Changes/Observations    Lower Extremity Conditions right:;clean;dry;hairless;inflamed;fragile  -      Lower Extremity Color/Pigment right:;erythema;hypopigmented;hyperpigmented  -         Lymphedema Pulses/Capillary Refill    Capillary Refill lower extremity capillary refill  -      Lower Extremity Capillary Refill right:;less than 3 seconds  -         Compression/Skin Care    Compression/Skin Care skin care;wrapping location;bandaging  -      Skin Care washed/dried;lotion applied  -      Wrapping Location lower extremity  -      Wrapping Location LE right:;foot to knee  -      Wrapping Comments Wound dressings, unna boot applied in clamshell pattern, 4\" coban, size 5 spandage to secure  -                User Key  (r) = Recorded By, (t) = Taken By, (c) = Cosigned By      Initials Name Provider Type     Dejan Zabala, PT Physical Therapist                    WOUND DEBRIDEMENT  Total area of Debridement: 2cm2  Debridement Site 1  Location- Site 1: R lateral leg wound  Selective Debridement- Site 1: Wound Surface <20cmsq  Instruments- Site 1: scapel  Excised Tissue Description- Site 1: minimum, slough, other (comment) (thin crusts, scabbing)  Bleeding- Site 1: none   Debridement Site 2  Location- Site 2: R medial ankle  Selective Debridement- Site 2: Wound Surface <20cmsq  Instruments- Site 2: tweezers  Excised Tissue Description- Site 2: minimum, other (comment) (crusting)  Bleeding- Site 2: none          Therapy Education       Row Name 02/29/24 1500             Therapy Education    " Education Details Continue current POC. Will nancy remain in UB until wounds are no longer draining and appear resurfaced.  -      Given Bandaging/dressing change;Edema management;Symptoms/condition management  -      Program Reinforced  -      How Provided Verbal;Demonstration  -      Provided to Patient  -      Level of Understanding Teach back education performed;Verbalized  -                User Key  (r) = Recorded By, (t) = Taken By, (c) = Cosigned By      Initials Name Provider Type     Dejan Zabala, PT Physical Therapist                    Recommendation and Plan   PT Assessment/Plan       Row Name 24 1500          PT Assessment    Functional Limitations Performance in self-care ADL;Other (comment)  -     Impairments Integumentary integrity;Impaired venous circulation;Edema  -     Assessment Comments Pt has met 2 of his wound care STGs this date and is demonstrating slow/steady progress toward remaining goals. Pt with scant drainage from the R medial ankle area, no visible open area noted this date however 2 questionable/fragile yellow areas noted. Pt's R lateral ankle area with a few scattered/shallow open areas with minimal drainage. Pt would continue to benefit from further debridement, advanced dressing changes, and Unna boots to promote wound healing.  -     Rehab Potential Good  -     Patient/caregiver participated in establishment of treatment plan and goals Yes  -     Patient would benefit from skilled therapy intervention Yes  -        PT Plan    PT Frequency 2x/week  -     Predicted Duration of Therapy Intervention (PT) 12 visits  -     Planned CPT's? PT SELF CARE/MGMT/TRAIN 15 MIN: 59312;PT NONSELECT DEBRIDE 15 MIN: 43395;PT EDWIN DEBRIDE OPEN WOUND UP TO 20 CM: 86179;PT UNNA BOOT: 54304;PT MULTI LAYER COMP SYS LE  -     Physical Therapy Interventions (Optional Details) wound care;patient/family education  -     PT Plan Comments debridement, unna  boot  -               User Key  (r) = Recorded By, (t) = Taken By, (c) = Cosigned By      Initials Name Provider Type     Dejan Zabala, PT Physical Therapist                    Goals   PT OP Goals       Row Name 02/29/24 1520 02/29/24 1500       PT Short Term Goals    STG 1 -- Pt will verbalize s/sx of infection and when to seek immediate medical care.  -    STG 1 Progress -- Met  -    STG 2 -- Pt will demonstrate 25% reduction in wound area to indicate healing progress.  -    STG 2 Progress -- Met  -    STG 3 -- Pt will demonstrate only mild RLE edema to indicate appropriate edema management.  -    STG 3 Progress -- Ongoing;Progressing  -       Long Term Goals    LTG 1 -- Pt will demonstrate independence with clean home dressing changes as appropriate.  -    LTG 1 Progress -- Ongoing;Progressing  -    LTG 2 -- Pt will demonstrate 90% reduction in wound area to indicate healing progress.  -    LTG 2 Progress -- Ongoing;Progressing  -    LTG 3 -- Pt will demonstrate only trace edema to the RLE to allow for transition to long term compression system.  -    LTG 3 Progress -- Ongoing;Progressing  -       Time Calculation    PT Goal Re-Cert Due Date 04/30/24  - 04/30/24  -              User Key  (r) = Recorded By, (t) = Taken By, (c) = Cosigned By      Initials Name Provider Type     Dejan Zabala, PT Physical Therapist                    PT Goal Re-Cert Due Date: 04/30/24  PT Short Term Goals  STG 1: Pt will verbalize s/sx of infection and when to seek immediate medical care.  STG 1 Progress: Met  STG 2: Pt will demonstrate 25% reduction in wound area to indicate healing progress.  STG 2 Progress: Met  STG 3: Pt will demonstrate only mild RLE edema to indicate appropriate edema management.  STG 3 Progress: Ongoing, Progressing  Long Term Goals  LTG 1: Pt will demonstrate independence with clean home dressing changes as appropriate.  LTG 1 Progress: Ongoing, Progressing  LTG 2: Pt  will demonstrate 90% reduction in wound area to indicate healing progress.  LTG 2 Progress: Ongoing, Progressing  LTG 3: Pt will demonstrate only trace edema to the RLE to allow for transition to long term compression system.  LTG 3 Progress: Ongoing, Progressing      Time Calculation: Start Time: 1345  Untimed Charges  46103-Czhk Boot: 15  59015-Jpmwsuxes debridement: 15  Total Minutes  Untimed Charges Total Minutes: 30   Total Minutes: 30  Therapy Charges for Today       Code Description Service Date Service Provider Modifiers Qty    13303187791 HC EDWIN DEBRIDE OPEN WOUND UP TO 20CM 2/29/2024 Dejan Zabala, PT GP 1    32977090150 HC PT STAPPING UNNA BOOT 2/29/2024 Dejan Zabala, PT GP 1                    Dejan Zabala PT  2/29/2024

## 2024-03-04 ENCOUNTER — HOSPITAL ENCOUNTER (OUTPATIENT)
Dept: PHYSICAL THERAPY | Facility: HOSPITAL | Age: 84
Setting detail: THERAPIES SERIES
Discharge: HOME | End: 2024-03-04
Payer: MEDICARE

## 2024-03-04 DIAGNOSIS — S81.801D OPEN WOUND OF RIGHT LOWER LEG, SUBSEQUENT ENCOUNTER: Primary | ICD-10-CM

## 2024-03-04 DIAGNOSIS — R60.0 EDEMA OF RIGHT LOWER LEG: ICD-10-CM

## 2024-03-04 DIAGNOSIS — Z87.2 HISTORY OF CELLULITIS: ICD-10-CM

## 2024-03-04 PROCEDURE — 29580 STRAPPING UNNA BOOT: CPT

## 2024-03-04 PROCEDURE — 97597 DBRDMT OPN WND 1ST 20 CM/<: CPT

## 2024-03-04 NOTE — THERAPY WOUND CARE TREATMENT
Outpatient Rehabilitation - Wound/Debridement Treatment Note  [facility]     Patient Name: Toño Alcala  : 1940  MRN: [id number]  Today's Date: 3/4/2024                 Admit Date: 3/4/2024    Visit Dx:    ICD-10-CM ICD-9-CM   1. Open wound of right lower leg, subsequent encounter  S81.801D V58.89     891.0   2. Edema of right lower leg  R60.0 782.3   3. History of cellulitis  Z87.2 V13.3       Patient Active Problem List   Diagnosis    CAD (coronary artery disease)    CVD (cardiovascular disease)    DVT (deep venous thrombosis)    Diabetes mellitus    Dyslipidemia    Arthritis    GERD (gastroesophageal reflux disease)    Mixed hyperlipidemia    Diabetic nephropathy associated with type 2 diabetes mellitus    Diabetic polyneuropathy associated with type 2 diabetes mellitus    Chronic kidney disease, stage III (moderate)    Vitamin D deficiency    Disc disorder of cervical region    Postthrombotic syndrome    Vertigo    Angina pectoris    Lumbosacral spondylosis without myelopathy    Psoriasis    Arthritis of elbow    Swelling of right lower extremity    Acute right-sided low back pain without sciatica    Hoarseness    Unifocal PVCs    Skin lesion of face    Primary hypertension    Medicare annual wellness visit, subsequent    Stage 3 chronic kidney disease due to benign hypertension    BMI 30.0-30.9,adult    Postherpetic neuralgia    Herpes zoster without complication    Calculus of gallbladder without cholecystitis without obstruction    Acute diverticulitis    Leg edema, right    Chest pain    Encounter for removal of sutures    Chronic pain of left knee    Fall        Past Medical History:   Diagnosis Date    Arthritis     Arthritis of neck Fusion     Bilateral radial fractures     fracture bilateral radial heads    CAD (coronary artery disease)     Cataract     Cervical disc disorder Fusion     Chronic kidney disease     CVD (cardiovascular disease)     History of TIA      "Diabetes mellitus     DVT (deep venous thrombosis)     Right lower extremity DVT and pulmonary embolism in 06/2015, idiopathic    DVT of proximal lower limb 06/22/2015    proximal DVT right leg, small PE- anticoagulation    Dyslipidemia     Fracture 1952    H/o pf left forearm fracture    Fracture of right hand 1980    Fracture of wrist 1980    GERD (gastroesophageal reflux disease)     with hiatal hernia    HL (hearing loss)     Hx of angina pectoris     Hypertension     Normal renal angiography 02/16/11    Knee swelling Several years ago    Left wrist fracture 1953    Lumbosacral disc disease 1996    Osgood-Schlatter's disease 1955    Osteoarthritis     Right wrist fracture     Vertigo     Wears glasses         Past Surgical History:   Procedure Laterality Date    APPENDECTOMY  1957    BACK SURGERY      CARDIAC CATHETERIZATION  2011    with vein graft    CERVICAL FUSION      CERVICAL FUSION  1992    C3-4    COLONOSCOPY  2013    CORONARY ARTERY BYPASS GRAFT  1994    HERNIA REPAIR Right 2011    inguinal    INGUINAL HERNIA REPAIR Left 10/29/2019    Procedure: INGUINAL HERNIA REPAIR LEFT;  Surgeon: Charisma Raines MD;  Location: [facility];  Service: General    LUMBAR LAMINECTOMY  1996    laminectomy, lumbar, L3    NECK SURGERY  1992    OTHER SURGICAL HISTORY      wrist surgery    SPINE SURGERY  1996    TONSILLECTOMY AND ADENOIDECTOMY  1945    TRIGGER POINT INJECTION  2001 - 2007    VASECTOMY  1972         EVALUATION   PT Ortho       Row Name 03/04/24 1346       Subjective    Subjective Comments Pt with increased calf edema above unna boot, \"may have been on my feet too much\".  Otherwise, no new complaints.  -       Subjective Pain    Able to rate subjective pain? yes  -    Pre-Treatment Pain Level 0  -    Post-Treatment Pain Level 0  -    Subjective Pain Comment lidocaine spray to wounds prior to debridement  -JM       Transfers    Sit-Stand [place] (Transfers) independent  -    Stand-Sit " [place] (Transfers) independent  -    Comment, (Transfers) seated for tx  -       Gait/Stairs (Locomotion)    [place] Level (Gait) modified independence  -    Assistive Device (Gait) cane, straight  -              User Key  (r) = Recorded By, (t) = Taken By, (c) = Cosigned By      Initials Name Provider Type    Alesha Jason PT Physical Therapist                     LDA Wound       Row Name 03/04/24 1345             Wound 01/31/24 1115 Right lateral leg Venous Ulcer    Wound - Properties Group Placement Date: 01/31/24  [facility] [place] Placement Time: 1115  - Present on Original Admission: Y  - Side: Right  - Orientation: lateral  - Location: leg  - Primary Wound Type: Venous ulcer  -    Dressing Appearance intact;moist drainage  scant drainage from lateral leg only  -      Base dry;pink;red;yellow;slough  scattered shallow pinpoint areas laterally, medial dry and crusted  -      Periwound intact;dry;redness;swelling  min erythema lat distal  -      Periwound Temperature warm  -      Periwound Skin Turgor soft  -      Edges irregular  -      Drainage Characteristics/Odor serosanguineous  -      Drainage Amount scant  -      Care, Wound cleansed with;wound cleanser;debrided;yaa boot  -      Dressing Care dressing applied;silver impregnated;foam  mepilex ag, unna boot  -      Periwound Care barrier ointment applied;cleansed with pH balanced cleanser;dry periwound area maintained  zguard  -      Retired Wound - Properties Group Placement Date: 01/31/24  - Placement Time: 1115  - Present on Original Admission: Y  - Side: Right  - Orientation: lateral  - Location: leg  -MC Primary Wound Type: Venous ulcer  -MC    Retired Wound - Properties Group Date first assessed: 01/31/24  - Time first assessed: 1115  - Present on Original Admission: Y  - Side: Right  - Location: leg  - Primary Wound Type: Venous ulcer  -MC              User Key  (r) = Recorded By,  "(t) = Taken By, (c) = Cosigned By      Initials Name Provider Type    Marlene Bethea, PT Physical Therapist    Alesha Jason, PT Physical Therapist                   Lymphedema       Row Name 03/04/24 1340             Lymphedema Edema Assessment    Ptting Edema Category By severity  -      Pitting Edema Mild;Moderate  moderate prox to unna boot  -         Skin Changes/Observations    Lower Extremity Conditions right:;clean;dry;hairless;inflamed;fragile  -      Lower Extremity Color/Pigment right:;erythema;hypopigmented;hyperpigmented  -         Lymphedema Pulses/Capillary Refill    Capillary Refill lower extremity capillary refill  -      Lower Extremity Capillary Refill right:;less than 3 seconds  -         Compression/Skin Care    Compression/Skin Care skin care;wrapping location;bandaging  -      Skin Care washed/dried;lotion applied  -      Wrapping Location lower extremity  -      Wrapping Location LE right:;foot to knee  -      Wrapping Comments Wound dressings, unna boot applied in clamshell pattern, 4\" coban, size 5 spandage to secure  -                User Key  (r) = Recorded By, (t) = Taken By, (c) = Cosigned By      Initials Name Provider Type    Alesha Jason, PT Physical Therapist                    WOUND DEBRIDEMENT  Total area of Debridement: 4cmsq  Debridement Site 1  Location- Site 1: R lateral leg wound  Selective Debridement- Site 1: Wound Surface <20cmsq  Instruments- Site 1: tweezers  Excised Tissue Description- Site 1: minimum, slough, other (comment) (loose crusts)  Bleeding- Site 1: none   Debridement Site 2  Location- Site 2: R medial ankle  Selective Debridement- Site 2: Wound Surface <20cmsq  Instruments- Site 2: tweezers  Excised Tissue Description- Site 2: scant, other (comment) (loose crusts)  Bleeding- Site 2: none          Therapy Education       Row Name 03/04/24 0386             Therapy Education    Education Details Continuation of POC.  " -      Given Bandaging/dressing change;Edema management;Symptoms/condition management  -      Program Reinforced  -      How Provided Verbal;Demonstration  -      Provided to Patient  -      Level of Understanding Teach back education performed;Verbalized  -                User Key  (r) = Recorded By, (t) = Taken By, (c) = Cosigned By      Initials Name Provider Type    Alesha Jason, PT Physical Therapist                    Recommendation and Plan   PT Assessment/Plan       Row Name 03/04/24 1345          PT Assessment    Functional Limitations Performance in self-care ADL;Other (comment)  -     Impairments Integumentary integrity;Impaired venous circulation;Edema  -     Assessment Comments Pt with improving skin integrity of med/lat R ankle with only scant pinpoint areas of drainage from lateral leg today, medial leg without drainage or visible openings.  Pt continues to benefit from current POC.  -        PT Plan    PT Frequency 2x/week  -     Physical Therapy Interventions (Optional Details) patient/family education;wound care  -     PT Plan Comments debridement, unna boot  -               User Key  (r) = Recorded By, (t) = Taken By, (c) = Cosigned By      Initials Name Provider Type    Alesha Jason, PT Physical Therapist                    Goals   PT OP Goals       Row Name 03/04/24 1345          Time Calculation    PT Goal Re-Cert Due Date 04/30/24  -               User Key  (r) = Recorded By, (t) = Taken By, (c) = Cosigned By      Initials Name Provider Type    Alesha Jason, PT Physical Therapist                    PT Goal Re-Cert Due Date: 04/30/24            Time Calculation: Start Time: 1345  Untimed Charges  33144-Qesx Boot: 20  27221-Rebwnmxyo debridement: 10  Total Minutes  Untimed Charges Total Minutes: 30   Total Minutes: 30  Therapy Charges for Today       Code Description Service Date Service Provider Modifiers Qty    53918083173 HC EDWIN DEBRIDE  OPEN WOUND UP TO 20CM 3/4/2024 Alesha Nunez, PT GP 1    91523393637 HC PT STAPPING UNNA BOOT 3/4/2024 Alesha Nunez, PT GP 1                    Alesha Nunez, PT  3/4/2024

## 2024-03-07 ENCOUNTER — HOSPITAL ENCOUNTER (OUTPATIENT)
Dept: PHYSICAL THERAPY | Facility: HOSPITAL | Age: 84
Setting detail: THERAPIES SERIES
Discharge: HOME | End: 2024-03-07
Payer: MEDICARE

## 2024-03-07 ENCOUNTER — CLINICAL SUPPORT (OUTPATIENT)
Dept: INTERNAL MEDICINE | Facility: CLINIC | Age: 84
End: 2024-03-07
Payer: MEDICARE

## 2024-03-07 ENCOUNTER — LAB (OUTPATIENT)
Dept: LAB | Facility: HOSPITAL | Age: 84
End: 2024-03-07
Payer: MEDICARE

## 2024-03-07 ENCOUNTER — TRANSCRIBE ORDERS (OUTPATIENT)
Dept: LAB | Facility: HOSPITAL | Age: 84
End: 2024-03-07
Payer: MEDICARE

## 2024-03-07 DIAGNOSIS — E78.2 MIXED HYPERLIPIDEMIA: ICD-10-CM

## 2024-03-07 DIAGNOSIS — Z79.01 ANTICOAGULATED ON WARFARIN: ICD-10-CM

## 2024-03-07 DIAGNOSIS — R60.0 EDEMA OF RIGHT LOWER LEG: ICD-10-CM

## 2024-03-07 DIAGNOSIS — R80.9 PROTEINURIA, UNSPECIFIED TYPE: Primary | ICD-10-CM

## 2024-03-07 DIAGNOSIS — S81.801D OPEN WOUND OF RIGHT LOWER LEG, SUBSEQUENT ENCOUNTER: Primary | ICD-10-CM

## 2024-03-07 DIAGNOSIS — Z51.81 THERAPEUTIC DRUG MONITORING: Primary | ICD-10-CM

## 2024-03-07 DIAGNOSIS — Z87.2 HISTORY OF CELLULITIS: ICD-10-CM

## 2024-03-07 DIAGNOSIS — I87.2 VENOUS STASIS DERMATITIS OF RIGHT LOWER EXTREMITY: ICD-10-CM

## 2024-03-07 DIAGNOSIS — R80.9 PROTEINURIA, UNSPECIFIED TYPE: ICD-10-CM

## 2024-03-07 LAB
ANION GAP SERPL CALCULATED.3IONS-SCNC: 11.2 MMOL/L (ref 5–15)
BILIRUB UR QL STRIP: NEGATIVE
BUN SERPL-MCNC: 24 MG/DL (ref 8–23)
BUN/CREAT SERPL: 10 (ref 7–25)
CALCIUM SPEC-SCNC: 9.2 MG/DL (ref 8.6–10.5)
CHLORIDE SERPL-SCNC: 103 MMOL/L (ref 98–107)
CHOLEST SERPL-MCNC: 128 MG/DL (ref 0–200)
CLARITY UR: CLEAR
CO2 SERPL-SCNC: 24.8 MMOL/L (ref 22–29)
COLOR UR: YELLOW
CREAT SERPL-MCNC: 2.4 MG/DL (ref 0.76–1.27)
EGFRCR SERPLBLD CKD-EPI 2021: 26.1 ML/MIN/1.73
GLUCOSE SERPL-MCNC: 101 MG/DL (ref 65–99)
GLUCOSE UR STRIP-MCNC: NEGATIVE MG/DL
HDLC SERPL-MCNC: 41 MG/DL (ref 40–60)
HGB UR QL STRIP.AUTO: NEGATIVE
INR PPP: 3.7 (ref 0.9–1.1)
KETONES UR QL STRIP: NEGATIVE
LDLC SERPL CALC-MCNC: 70 MG/DL (ref 0–100)
LDLC/HDLC SERPL: 1.7 {RATIO}
LEUKOCYTE ESTERASE UR QL STRIP.AUTO: NEGATIVE
NITRITE UR QL STRIP: NEGATIVE
PH UR STRIP.AUTO: 6.5 [PH] (ref 5–8)
POTASSIUM SERPL-SCNC: 4.4 MMOL/L (ref 3.5–5.2)
PROT UR QL STRIP: ABNORMAL
PROTHROMBIN TIME: 44.8 SECONDS
SODIUM SERPL-SCNC: 139 MMOL/L (ref 136–145)
SP GR UR STRIP: 1.01 (ref 1–1.03)
TRIGL SERPL-MCNC: 86 MG/DL (ref 0–150)
UROBILINOGEN UR QL STRIP: ABNORMAL
VLDLC SERPL-MCNC: 17 MG/DL (ref 5–40)

## 2024-03-07 PROCEDURE — 81001 URINALYSIS AUTO W/SCOPE: CPT | Performed by: INTERNAL MEDICINE

## 2024-03-07 PROCEDURE — 80061 LIPID PANEL: CPT

## 2024-03-07 PROCEDURE — 36415 COLL VENOUS BLD VENIPUNCTURE: CPT

## 2024-03-07 PROCEDURE — 97597 DBRDMT OPN WND 1ST 20 CM/<: CPT

## 2024-03-07 PROCEDURE — 29580 STRAPPING UNNA BOOT: CPT

## 2024-03-07 PROCEDURE — 85610 PROTHROMBIN TIME: CPT | Performed by: INTERNAL MEDICINE

## 2024-03-07 PROCEDURE — 84156 ASSAY OF PROTEIN URINE: CPT

## 2024-03-07 PROCEDURE — 36416 COLLJ CAPILLARY BLOOD SPEC: CPT | Performed by: INTERNAL MEDICINE

## 2024-03-07 PROCEDURE — 82570 ASSAY OF URINE CREATININE: CPT

## 2024-03-07 PROCEDURE — 80048 BASIC METABOLIC PNL TOTAL CA: CPT

## 2024-03-07 NOTE — THERAPY WOUND CARE TREATMENT
Outpatient Rehabilitation - Wound/Debridement Treatment Note  [facility]     Patient Name: Toño Alcala  : 1940  MRN: [id number]  Today's Date: 3/7/2024                 Admit Date: 3/7/2024    Visit Dx:    ICD-10-CM ICD-9-CM   1. Open wound of right lower leg, subsequent encounter  S81.801D V58.89     891.0   2. Edema of right lower leg  R60.0 782.3   3. History of cellulitis  Z87.2 V13.3   4. Venous stasis dermatitis of right lower extremity  I87.2 454.1   Lateral RLE:    Medial RLE:      Patient Active Problem List   Diagnosis    CAD (coronary artery disease)    CVD (cardiovascular disease)    DVT (deep venous thrombosis)    Diabetes mellitus    Dyslipidemia    Arthritis    GERD (gastroesophageal reflux disease)    Mixed hyperlipidemia    Diabetic nephropathy associated with type 2 diabetes mellitus    Diabetic polyneuropathy associated with type 2 diabetes mellitus    Chronic kidney disease, stage III (moderate)    Vitamin D deficiency    Disc disorder of cervical region    Postthrombotic syndrome    Vertigo    Angina pectoris    Lumbosacral spondylosis without myelopathy    Psoriasis    Arthritis of elbow    Swelling of right lower extremity    Acute right-sided low back pain without sciatica    Hoarseness    Unifocal PVCs    Skin lesion of face    Primary hypertension    Medicare annual wellness visit, subsequent    Stage 3 chronic kidney disease due to benign hypertension    BMI 30.0-30.9,adult    Postherpetic neuralgia    Herpes zoster without complication    Calculus of gallbladder without cholecystitis without obstruction    Acute diverticulitis    Leg edema, right    Chest pain    Encounter for removal of sutures    Chronic pain of left knee    Fall        Past Medical History:   Diagnosis Date    Arthritis     Arthritis of neck Fusion     Bilateral radial fractures     fracture bilateral radial heads    CAD (coronary artery disease)     Cataract     Cervical disc disorder Fusion      Chronic kidney disease 2020    CVD (cardiovascular disease)     History of TIA 1989    Diabetes mellitus     DVT (deep venous thrombosis)     Right lower extremity DVT and pulmonary embolism in 06/2015, idiopathic    DVT of proximal lower limb 06/22/2015    proximal DVT right leg, small PE- anticoagulation    Dyslipidemia     Fracture 1952    H/o pf left forearm fracture    Fracture of right hand 1980    Fracture of wrist 1980    GERD (gastroesophageal reflux disease)     with hiatal hernia    HL (hearing loss)     Hx of angina pectoris     Hypertension     Normal renal angiography 02/16/11    Knee swelling Several years ago    Left wrist fracture 1953    Lumbosacral disc disease 1996    Osgood-Schlatter's disease 1955    Osteoarthritis     Right wrist fracture     Vertigo     Wears glasses         Past Surgical History:   Procedure Laterality Date    APPENDECTOMY  1957    BACK SURGERY      CARDIAC CATHETERIZATION  2011    with vein graft    CERVICAL FUSION      CERVICAL FUSION  1992    C3-4    COLONOSCOPY  2013    CORONARY ARTERY BYPASS GRAFT  1994    HERNIA REPAIR Right 2011    inguinal    INGUINAL HERNIA REPAIR Left 10/29/2019    Procedure: INGUINAL HERNIA REPAIR LEFT;  Surgeon: Charisma Raines MD;  Location: [facility];  Service: General    LUMBAR LAMINECTOMY  1996    laminectomy, lumbar, L3    NECK SURGERY  1992    OTHER SURGICAL HISTORY      wrist surgery    SPINE SURGERY  1996    TONSILLECTOMY AND ADENOIDECTOMY  1945    TRIGGER POINT INJECTION  2001 - 2007    VASECTOMY  1972         EVALUATION   PT Ortho       Row Name 03/07/24 1345       Subjective    Subjective Comments Pt without complaints, just wants to be able to shower again, has been taking a sink bath to keep his unna boot from getting wet.  -JM       Subjective Pain    Able to rate subjective pain? yes  -JM    Pre-Treatment Pain Level 0  -JM    Post-Treatment Pain Level 0  -JM       Transfers    Sit-Stand [place] (Transfers) independent   -    Stand-Sit [place] (Transfers) independent  -    Comment, (Transfers) seated for tx  -       Gait/Stairs (Locomotion)    [place] Level (Gait) modified independence  -    Assistive Device (Gait) cane, straight  -              User Key  (r) = Recorded By, (t) = Taken By, (c) = Cosigned By      Initials Name Provider Type    Alesha Jason PT Physical Therapist                     LDA Wound       Row Name 03/07/24 1345             Wound 01/31/24 1115 Right lateral leg Venous Ulcer    Wound - Properties Group Placement Date: 01/31/24  - Placement Time: 1115  - Present on Original Admission: Y  -MC Side: Right  - Orientation: lateral  - Location: leg  - Primary Wound Type: Venous ulcer  -    Wound Image Images linked: 2  -      Dressing Appearance intact;moist drainage  scattered drainage from lat RLE only  -      Base dry;pink;red;yellow;slough  scattered shallow pinpoint areas laterally, medial dry and crusted  -      Periwound intact;dry;redness;swelling  min erythema lat distal  -      Periwound Temperature warm  -      Periwound Skin Turgor soft  -      Edges irregular  -      Drainage Characteristics/Odor serosanguineous  -      Drainage Amount scant  -      Care, Wound cleansed with;wound cleanser;debrided;yaa boot  -      Dressing Care dressing applied;silver impregnated;foam  mepilex ag, unna boot  -      Periwound Care barrier ointment applied;cleansed with pH balanced cleanser;dry periwound area maintained  zgaurd  -      Retired Wound - Properties Group Placement Date: 01/31/24  - Placement Time: 1115  - Present on Original Admission: Y  -MC Side: Right  - Orientation: lateral  - Location: leg  -MC Primary Wound Type: Venous ulcer  -    Retired Wound - Properties Group Date first assessed: 01/31/24  - Time first assessed: 1115  - Present on Original Admission: Y  - Side: Right  - Location: leg  - Primary Wound Type:  "Venous ulcer  -              User Key  (r) = Recorded By, (t) = Taken By, (c) = Cosigned By      Initials Name Provider Type    Marlene Bethea, PT Physical Therapist    Alesha Jason, PT Physical Therapist                   Lymphedema       Row Name 03/07/24 7477             Lymphedema Edema Assessment    Ptting Edema Category By severity  -      Pitting Edema Mild;Moderate  moderate prox to unna boot  -         Skin Changes/Observations    Lower Extremity Conditions right:;clean;dry;hairless;inflamed;fragile  -      Lower Extremity Color/Pigment right:;erythema;hypopigmented;hyperpigmented  -         Lymphedema Pulses/Capillary Refill    Capillary Refill lower extremity capillary refill  -      Lower Extremity Capillary Refill right:;less than 3 seconds  -         Compression/Skin Care    Compression/Skin Care skin care;wrapping location;bandaging  -      Skin Care washed/dried;lotion applied  -      Wrapping Location lower extremity  -      Wrapping Location LE right:;foot to knee  -      Wrapping Comments Wound dressings, unna boot applied in clamshell pattern, 4\" coban, size 5 spandage to secure  -                User Key  (r) = Recorded By, (t) = Taken By, (c) = Cosigned By      Initials Name Provider Type    Alesha Jason, PT Physical Therapist                    WOUND DEBRIDEMENT  Total area of Debridement: 4cmsq  Debridement Site 1  Location- Site 1: RLE wounds  Selective Debridement- Site 1: Wound Surface <20cmsq  Instruments- Site 1: tweezers  Excised Tissue Description- Site 1: minimum, slough, moderate, other (comment) (loose hypertrohpic crust)  Bleeding- Site 1: none              Therapy Education       Row Name 03/07/24 3568             Therapy Education    Given Bandaging/dressing change;Edema management;Symptoms/condition management  -      Program Reinforced  -      How Provided Verbal;Demonstration  -JM      Provided to Patient  -      Level of " Understanding Teach back education performed;Verbalized  -                User Key  (r) = Recorded By, (t) = Taken By, (c) = Cosigned By      Initials Name Provider Type    Alesha Jason, PT Physical Therapist                    Recommendation and Plan   PT Assessment/Plan       Row Name 03/07/24 1345          PT Assessment    Functional Limitations Performance in self-care ADL;Other (comment)  -     Impairments Integumentary integrity;Impaired venous circulation;Edema  -     Assessment Comments Pt with continued improvement in skin integrity and erythema, with medial R ankle area remaining closed and crusted without drainage.  Lateral RLE with scant residual erythema and a few scattered pinpoint open ulcerations with serous drainage.  Pt continues to benefit from current tx with debridement and unna boot.  -        PT Plan    PT Frequency 2x/week  -     Physical Therapy Interventions (Optional Details) patient/family education;wound care  -     PT Plan Comments debridement, unna boot  -               User Key  (r) = Recorded By, (t) = Taken By, (c) = Cosigned By      Initials Name Provider Type    Alesha Jaosn, PT Physical Therapist                    Goals   PT OP Goals       Row Name 03/07/24 1345          Time Calculation    PT Goal Re-Cert Due Date 04/30/24  -               User Key  (r) = Recorded By, (t) = Taken By, (c) = Cosigned By      Initials Name Provider Type    Alesha Jason, PT Physical Therapist                    PT Goal Re-Cert Due Date: 04/30/24            Time Calculation: Start Time: 1345  Untimed Charges  61098-Goqd Boot: 20  78572-Xahbphaeu debridement: 10  Total Minutes  Untimed Charges Total Minutes: 30   Total Minutes: 30  Therapy Charges for Today       Code Description Service Date Service Provider Modifiers Qty    63962415490 HC EDWIN DEBRIDE OPEN WOUND UP TO 20CM 3/7/2024 Alesha Nunez, PT GP 1    49293594246 HC PT STAPPING UNNA BOOT 3/7/2024  Alesha Nunez, PT GP 1                    Alesha Nunez, PT  3/7/2024

## 2024-03-08 LAB
BACTERIA UR QL AUTO: NORMAL /HPF
CREAT UR-MCNC: 85 MG/DL
HYALINE CASTS UR QL AUTO: NORMAL /LPF
PROT ?TM UR-MCNC: 28.2 MG/DL
RBC # UR STRIP: NORMAL /HPF
REF LAB TEST METHOD: NORMAL
SQUAMOUS #/AREA URNS HPF: NORMAL /HPF
WBC # UR STRIP: NORMAL /HPF

## 2024-03-11 ENCOUNTER — HOSPITAL ENCOUNTER (OUTPATIENT)
Dept: PHYSICAL THERAPY | Facility: HOSPITAL | Age: 84
Setting detail: THERAPIES SERIES
Discharge: HOME | End: 2024-03-11
Payer: MEDICARE

## 2024-03-11 DIAGNOSIS — Z87.2 HISTORY OF CELLULITIS: ICD-10-CM

## 2024-03-11 DIAGNOSIS — I87.2 VENOUS STASIS DERMATITIS OF RIGHT LOWER EXTREMITY: ICD-10-CM

## 2024-03-11 DIAGNOSIS — R60.0 EDEMA OF RIGHT LOWER LEG: ICD-10-CM

## 2024-03-11 DIAGNOSIS — S81.801D OPEN WOUND OF RIGHT LOWER LEG, SUBSEQUENT ENCOUNTER: Primary | ICD-10-CM

## 2024-03-11 PROCEDURE — 29580 STRAPPING UNNA BOOT: CPT

## 2024-03-11 PROCEDURE — 97597 DBRDMT OPN WND 1ST 20 CM/<: CPT

## 2024-03-11 NOTE — THERAPY WOUND CARE TREATMENT
Outpatient Rehabilitation - Wound/Debridement Treatment Note  [facility]     Patient Name: Toño Alcala  : 1940  MRN: [id number]  Today's Date: 3/11/2024                 Admit Date: 3/11/2024    Visit Dx:    ICD-10-CM ICD-9-CM   1. Open wound of right lower leg, subsequent encounter  S81.801D V58.89     891.0   2. Edema of right lower leg  R60.0 782.3   3. History of cellulitis  Z87.2 V13.3   4. Venous stasis dermatitis of right lower extremity  I87.2 454.1       Patient Active Problem List   Diagnosis    CAD (coronary artery disease)    CVD (cardiovascular disease)    DVT (deep venous thrombosis)    Diabetes mellitus    Dyslipidemia    Arthritis    GERD (gastroesophageal reflux disease)    Mixed hyperlipidemia    Diabetic nephropathy associated with type 2 diabetes mellitus    Diabetic polyneuropathy associated with type 2 diabetes mellitus    Chronic kidney disease, stage III (moderate)    Vitamin D deficiency    Disc disorder of cervical region    Postthrombotic syndrome    Vertigo    Angina pectoris    Lumbosacral spondylosis without myelopathy    Psoriasis    Arthritis of elbow    Swelling of right lower extremity    Acute right-sided low back pain without sciatica    Hoarseness    Unifocal PVCs    Skin lesion of face    Primary hypertension    Medicare annual wellness visit, subsequent    Stage 3 chronic kidney disease due to benign hypertension    BMI 30.0-30.9,adult    Postherpetic neuralgia    Herpes zoster without complication    Calculus of gallbladder without cholecystitis without obstruction    Acute diverticulitis    Leg edema, right    Chest pain    Encounter for removal of sutures    Chronic pain of left knee    Fall        Past Medical History:   Diagnosis Date    Arthritis     Arthritis of neck Fusion     Bilateral radial fractures     fracture bilateral radial heads    CAD (coronary artery disease)     Cataract     Cervical disc disorder Fusion     Chronic kidney disease  2020    CVD (cardiovascular disease)     History of TIA 1989    Diabetes mellitus     DVT (deep venous thrombosis)     Right lower extremity DVT and pulmonary embolism in 06/2015, idiopathic    DVT of proximal lower limb 06/22/2015    proximal DVT right leg, small PE- anticoagulation    Dyslipidemia     Fracture 1952    H/o pf left forearm fracture    Fracture of right hand 1980    Fracture of wrist 1980    GERD (gastroesophageal reflux disease)     with hiatal hernia    HL (hearing loss)     Hx of angina pectoris     Hypertension     Normal renal angiography 02/16/11    Knee swelling Several years ago    Left wrist fracture 1953    Lumbosacral disc disease 1996    Osgood-Schlatter's disease 1955    Osteoarthritis     Right wrist fracture     Vertigo     Wears glasses         Past Surgical History:   Procedure Laterality Date    APPENDECTOMY  1957    BACK SURGERY      CARDIAC CATHETERIZATION  2011    with vein graft    CERVICAL FUSION      CERVICAL FUSION  1992    C3-4    COLONOSCOPY  2013    CORONARY ARTERY BYPASS GRAFT  1994    HERNIA REPAIR Right 2011    inguinal    INGUINAL HERNIA REPAIR Left 10/29/2019    Procedure: INGUINAL HERNIA REPAIR LEFT;  Surgeon: Charisma Raines MD;  Location: [facility];  Service: General    LUMBAR LAMINECTOMY  1996    laminectomy, lumbar, L3    NECK SURGERY  1992    OTHER SURGICAL HISTORY      wrist surgery    SPINE SURGERY  1996    TONSILLECTOMY AND ADENOIDECTOMY  1945    TRIGGER POINT INJECTION  2001 - 2007    VASECTOMY  1972         EVALUATION   PT Ortho       Row Name 03/11/24 1400       Subjective    Subjective Comments No complaints or changes since last tx.  -MC       Subjective Pain    Able to rate subjective pain? yes  -MC    Pre-Treatment Pain Level 0  -MC    Post-Treatment Pain Level 0  -MC    Subjective Pain Comment lidocaine spray prior to debridement  -MC       Transfers    Sit-Stand [place] (Transfers) independent  -MC    Stand-Sit [place] (Transfers)  independent  -    Comment, (Transfers) seated for tx  -       Gait/Stairs (Locomotion)    [place] Level (Gait) modified independence  -    Assistive Device (Gait) cane, straight  -              User Key  (r) = Recorded By, (t) = Taken By, (c) = Cosigned By      Initials Name Provider Type     Marlene Levy PT Physical Therapist                     LDA Wound       Row Name 03/11/24 1400             Wound 01/31/24 1115 Right lateral leg Venous Ulcer    Wound - Properties Group Placement Date: 01/31/24  - Placement Time: 1115  - Present on Original Admission: Y  - Side: Right  - Orientation: lateral  - Location: leg  - Primary Wound Type: Venous ulcer  -    Dressing Appearance intact;moist drainage  -      Base moist;pink;yellow;slough  medial area appears completely closed. Lateral area with one, pinpoint area with mixed slough and pink tissue  -      Periwound intact;dry;redness;swelling  min erythema lat distal  -      Periwound Temperature warm  -      Periwound Skin Turgor soft  -      Edges irregular  -      Wound Length (cm) 0.2 cm  -      Wound Width (cm) 0.2 cm  -      Wound Depth (cm) 0.1 cm  -MC      Wound Surface Area (cm^2) 0.04 cm^2  -MC      Wound Volume (cm^3) 0.004 cm^3  -      Drainage Characteristics/Odor serosanguineous  -      Drainage Amount scant  -      Care, Wound cleansed with;wound cleanser;debrided;silver agent applied;yaa boot  -      Dressing Care dressing applied;silver impregnated;low-adherent;foam  mepilex Ag, unna boot  -      Periwound Care cleansed with pH balanced cleanser;barrier ointment applied  zguard  -      Retired Wound - Properties Group Placement Date: 01/31/24  - Placement Time: 1115  - Present on Original Admission: Y  - Side: Right  - Orientation: lateral  - Location: leg  - Primary Wound Type: Venous ulcer  -MC    Retired Wound - Properties Group Date first assessed: 01/31/24  - Time first  "assessed: 1115  - Present on Original Admission: Y  - Side: Right  - Location: leg  - Primary Wound Type: Venous ulcer  -              User Key  (r) = Recorded By, (t) = Taken By, (c) = Cosigned By      Initials Name Provider Type    Marlene Bethea, PT Physical Therapist                   Lymphedema       Row Name 03/11/24 1400             Lymphedema Edema Assessment    Ptting Edema Category By severity  -      Pitting Edema Mild;Moderate  moderate prox to unna boot  -         Skin Changes/Observations    Lower Extremity Conditions right:;clean;dry;hairless;inflamed;fragile  -      Lower Extremity Color/Pigment right:;erythema;hypopigmented;hyperpigmented  -         Lymphedema Pulses/Capillary Refill    Capillary Refill lower extremity capillary refill  -      Lower Extremity Capillary Refill right:;less than 3 seconds  -         Compression/Skin Care    Compression/Skin Care skin care;wrapping location;bandaging  -      Skin Care washed/dried;lotion applied  -      Wrapping Location lower extremity  -      Wrapping Location LE right:;foot to knee  -      Wrapping Comments Wound dressings, unna boot applied in clamshell pattern, 4\" coban, size 6 spandage to secure  -                User Key  (r) = Recorded By, (t) = Taken By, (c) = Cosigned By      Initials Name Provider Type    Marlene Bethea, PT Physical Therapist                    WOUND DEBRIDEMENT  Total area of Debridement: 4 cm2  Debridement Site 1  Location- Site 1: RLE wounds  Selective Debridement- Site 1: Wound Surface <20cmsq  Instruments- Site 1: #15, scapel, tweezers  Excised Tissue Description- Site 1: moderate, other (comment), scant, slough (crust)  Bleeding- Site 1: none              Therapy Education       Row Name 03/11/24 1400             Therapy Education    Education Details Continue current POC  -      Given Bandaging/dressing change;Edema management;Symptoms/condition management  -      " Program Reinforced  -      How Provided Verbal;Demonstration  -      Provided to Patient  -      Level of Understanding Teach back education performed;Verbalized  -                User Key  (r) = Recorded By, (t) = Taken By, (c) = Cosigned By      Initials Name Provider Type    Marlene Bethea, PT Physical Therapist                    Recommendation and Plan   PT Assessment/Plan       Row Name 03/11/24 1400          PT Assessment    Functional Limitations Performance in self-care ADL;Other (comment)  -     Impairments Integumentary integrity;Impaired venous circulation;Edema  -     Assessment Comments Pt with no open areas remaining to the medial RLE. The lateral RLE has one pinpoint open area remaining after thorough debridement. Pt will continue to benefit from skilled PT wound care to promote healing.  -     Rehab Potential Good  -     Patient/caregiver participated in establishment of treatment plan and goals Yes  -     Patient would benefit from skilled therapy intervention Yes  -        PT Plan    PT Frequency 2x/week  -     Physical Therapy Interventions (Optional Details) wound care;patient/family education  -     PT Plan Comments debridement, unna boot  -               User Key  (r) = Recorded By, (t) = Taken By, (c) = Cosigned By      Initials Name Provider Type    Marlene Bethea, PT Physical Therapist                    Goals   PT OP Goals       Row Name 03/11/24 1404          Time Calculation    PT Goal Re-Cert Due Date 04/30/24  -               User Key  (r) = Recorded By, (t) = Taken By, (c) = Cosigned By      Initials Name Provider Type    Marlene Bethea, PT Physical Therapist                    PT Goal Re-Cert Due Date: 04/30/24            Time Calculation: Start Time: 1330  Untimed Charges  73389-Novt Boot: 15  88364-Fajyfclpn debridement: 15  Total Minutes  Untimed Charges Total Minutes: 30   Total Minutes: 30              Marlene Levy  PT  3/11/2024

## 2024-03-14 ENCOUNTER — CLINICAL SUPPORT (OUTPATIENT)
Dept: INTERNAL MEDICINE | Facility: CLINIC | Age: 84
End: 2024-03-14
Payer: MEDICARE

## 2024-03-14 ENCOUNTER — HOSPITAL ENCOUNTER (OUTPATIENT)
Dept: PHYSICAL THERAPY | Facility: HOSPITAL | Age: 84
Setting detail: THERAPIES SERIES
Discharge: HOME | End: 2024-03-14
Payer: MEDICARE

## 2024-03-14 DIAGNOSIS — S81.801D OPEN WOUND OF RIGHT LOWER LEG, SUBSEQUENT ENCOUNTER: Primary | ICD-10-CM

## 2024-03-14 DIAGNOSIS — Z79.01 ANTICOAGULATED ON WARFARIN: Primary | ICD-10-CM

## 2024-03-14 DIAGNOSIS — R60.0 EDEMA OF RIGHT LOWER LEG: ICD-10-CM

## 2024-03-14 DIAGNOSIS — Z51.81 THERAPEUTIC DRUG MONITORING: ICD-10-CM

## 2024-03-14 DIAGNOSIS — I87.2 VENOUS STASIS DERMATITIS OF RIGHT LOWER EXTREMITY: ICD-10-CM

## 2024-03-14 DIAGNOSIS — Z87.2 HISTORY OF CELLULITIS: ICD-10-CM

## 2024-03-14 LAB
INR PPP: 2.7 (ref 0.9–1.1)
PROTHROMBIN TIME: 32.6 SECONDS

## 2024-03-14 PROCEDURE — 85610 PROTHROMBIN TIME: CPT | Performed by: INTERNAL MEDICINE

## 2024-03-14 PROCEDURE — 97597 DBRDMT OPN WND 1ST 20 CM/<: CPT

## 2024-03-14 PROCEDURE — 29580 STRAPPING UNNA BOOT: CPT

## 2024-03-14 PROCEDURE — 36416 COLLJ CAPILLARY BLOOD SPEC: CPT | Performed by: INTERNAL MEDICINE

## 2024-03-14 NOTE — THERAPY WOUND CARE TREATMENT
Outpatient Rehabilitation - Wound/Debridement Treatment Note  [facility]     Patient Name: Toño Alcala  : 1940  MRN: [id number]  Today's Date: 3/14/2024                 Admit Date: 3/14/2024    Visit Dx:    ICD-10-CM ICD-9-CM   1. Open wound of right lower leg, subsequent encounter  S81.801D V58.89     891.0   2. Edema of right lower leg  R60.0 782.3   3. History of cellulitis  Z87.2 V13.3   4. Venous stasis dermatitis of right lower extremity  I87.2 454.1     R lateral leg        Patient Active Problem List   Diagnosis    CAD (coronary artery disease)    CVD (cardiovascular disease)    DVT (deep venous thrombosis)    Diabetes mellitus    Dyslipidemia    Arthritis    GERD (gastroesophageal reflux disease)    Mixed hyperlipidemia    Diabetic nephropathy associated with type 2 diabetes mellitus    Diabetic polyneuropathy associated with type 2 diabetes mellitus    Chronic kidney disease, stage III (moderate)    Vitamin D deficiency    Disc disorder of cervical region    Postthrombotic syndrome    Vertigo    Angina pectoris    Lumbosacral spondylosis without myelopathy    Psoriasis    Arthritis of elbow    Swelling of right lower extremity    Acute right-sided low back pain without sciatica    Hoarseness    Unifocal PVCs    Skin lesion of face    Primary hypertension    Medicare annual wellness visit, subsequent    Stage 3 chronic kidney disease due to benign hypertension    BMI 30.0-30.9,adult    Postherpetic neuralgia    Herpes zoster without complication    Calculus of gallbladder without cholecystitis without obstruction    Acute diverticulitis    Leg edema, right    Chest pain    Encounter for removal of sutures    Chronic pain of left knee    Fall        Past Medical History:   Diagnosis Date    Arthritis     Arthritis of neck Fusion     Bilateral radial fractures     fracture bilateral radial heads    CAD (coronary artery disease)     Cataract     Cervical disc disorder Fusion      Chronic kidney disease 2020    CVD (cardiovascular disease)     History of TIA 1989    Diabetes mellitus     DVT (deep venous thrombosis)     Right lower extremity DVT and pulmonary embolism in 06/2015, idiopathic    DVT of proximal lower limb 06/22/2015    proximal DVT right leg, small PE- anticoagulation    Dyslipidemia     Fracture 1952    H/o pf left forearm fracture    Fracture of right hand 1980    Fracture of wrist 1980    GERD (gastroesophageal reflux disease)     with hiatal hernia    HL (hearing loss)     Hx of angina pectoris     Hypertension     Normal renal angiography 02/16/11    Knee swelling Several years ago    Left wrist fracture 1953    Lumbosacral disc disease 1996    Osgood-Schlatter's disease 1955    Osteoarthritis     Right wrist fracture     Vertigo     Wears glasses         Past Surgical History:   Procedure Laterality Date    APPENDECTOMY  1957    BACK SURGERY      CARDIAC CATHETERIZATION  2011    with vein graft    CERVICAL FUSION      CERVICAL FUSION  1992    C3-4    COLONOSCOPY  2013    CORONARY ARTERY BYPASS GRAFT  1994    HERNIA REPAIR Right 2011    inguinal    INGUINAL HERNIA REPAIR Left 10/29/2019    Procedure: INGUINAL HERNIA REPAIR LEFT;  Surgeon: Charisma Raines MD;  [facility];  Service: General    LUMBAR LAMINECTOMY  1996    laminectomy, lumbar, L3    NECK SURGERY  1992    OTHER SURGICAL HISTORY      wrist surgery    SPINE SURGERY  1996    TONSILLECTOMY AND ADENOIDECTOMY  1945    TRIGGER POINT INJECTION  2001 - 2007    VASECTOMY  1972         EVALUATION   PT Ortho       Row Name 03/14/24 1400       Subjective    Subjective Comments No new issues/complaints. Slightly frustrated that his leg is not healed yet.  -LH       Subjective Pain    Able to rate subjective pain? yes  -LH    Pre-Treatment Pain Level 0  -LH    Post-Treatment Pain Level 0  -LH    Subjective Pain Comment lidocaine spray prior to debridement  -LH       Transfers    Sit-Stand [place]  (Transfers) independent  -    Stand-Sit [place] (Transfers) independent  -    Comment, (Transfers) seated for tx  -       Gait/Stairs (Locomotion)    [place] Level (Gait) modified independence  -    Assistive Device (Gait) cane, straight  -              User Key  (r) = Recorded By, (t) = Taken By, (c) = Cosigned By      Initials Name Provider Type     Dejan Zabala, PT Physical Therapist                     LDA Wound       Row Name 03/14/24 1400             Wound 01/31/24 1115 Right lateral leg Venous Ulcer    Wound - Properties Group Placement Date: 01/31/24  [facility] [place] Placement Time: 1115  - Present on Original Admission: Y  -MC Side: Right  - Orientation: lateral  - Location: leg  -MC Primary Wound Type: Venous ulcer  -    Wound Image Images linked: 1  -LH      Dressing Appearance intact;moist drainage  -      Base moist;pink;yellow;slough  medial area closed. Lateral area with one, sliver of open area with mixed slough and pink tissue  -      Periwound intact;dry;redness;swelling  min erythema lat distal  -      Periwound Temperature warm  -      Periwound Skin Turgor soft  -      Edges irregular  -      Drainage Characteristics/Odor serosanguineous  -      Drainage Amount scant  -      Care, Wound cleansed with;wound cleanser;debrided;silver agent applied;yaa boot  -      Dressing Care dressing applied;silver impregnated;low-adherent;foam  mepilex Ag, unna boot  -      Periwound Care cleansed with pH balanced cleanser;barrier ointment applied  zguard  -      Retired Wound - Properties Group Placement Date: 01/31/24  - Placement Time: 1115  -MC Present on Original Admission: Y  -MC Side: Right  - Orientation: lateral  - Location: leg  -MC Primary Wound Type: Venous ulcer  -MC    Retired Wound - Properties Group Date first assessed: 01/31/24  - Time first assessed: 1115  - Present on Original Admission: Y  - Side: Right  - Location: leg  -MC Primary  "Wound Type: Venous ulcer  -              User Key  (r) = Recorded By, (t) = Taken By, (c) = Cosigned By      Initials Name Provider Type    Marlene Bethea, PT Physical Therapist     Dejan Zabala, PT Physical Therapist                   Lymphedema       Row Name 03/14/24 1400             Lymphedema Edema Assessment    Ptting Edema Category By severity  -      Pitting Edema Mild;Moderate  moderate prox to unna boot  -         Skin Changes/Observations    Lower Extremity Conditions right:;clean;dry;hairless;inflamed;fragile  -      Lower Extremity Color/Pigment right:;erythema;hypopigmented;hyperpigmented  -         Lymphedema Pulses/Capillary Refill    Capillary Refill lower extremity capillary refill  -      Lower Extremity Capillary Refill right:;less than 3 seconds  -         Compression/Skin Care    Compression/Skin Care skin care;wrapping location;bandaging  -      Skin Care washed/dried;lotion applied  -      Wrapping Location lower extremity  -      Wrapping Location LE right:;foot to knee  -      Wrapping Comments Wound dressings, unna boot applied in clamshell pattern, 4\" coban, size 6 spandage to secure  -                User Key  (r) = Recorded By, (t) = Taken By, (c) = Cosigned By      Initials Name Provider Type     Dejan Zabala, PT Physical Therapist                    WOUND DEBRIDEMENT  Total area of Debridement: 2cm2  Debridement Site 1  Location- Site 1: RLE wounds  Selective Debridement- Site 1: Wound Surface <20cmsq  Instruments- Site 1: tweezers  Excised Tissue Description- Site 1: moderate, slough  Bleeding- Site 1: none              Therapy Education       Row Name 03/14/24 1400             Therapy Education    Education Details Continue current POC until wound is fully resurfaced and then likely 1-2 more treatments of Unna boot to allow for maturation of fragile new tissue. Educated pt that there is still an open, draining wound so removal of Unna boot " would not be recommended at this time as the wound would likely worsen. Reviewed techniques for showering to keep Unna boot dry.  -      Given Bandaging/dressing change;Edema management;Symptoms/condition management  -      Program Reinforced  -      How Provided Verbal;Demonstration  -      Provided to Patient  -      Level of Understanding Teach back education performed;Verbalized  -                User Key  (r) = Recorded By, (t) = Taken By, (c) = Cosigned By      Initials Name Provider Type     Dejan Zabala, PT Physical Therapist                    Recommendation and Plan   PT Assessment/Plan       Row Name 03/14/24 1400          PT Assessment    Functional Limitations Performance in self-care ADL;Other (comment)  -     Impairments Integumentary integrity;Impaired venous circulation;Edema  -     Assessment Comments Pt continuing with no open areas to the R medial leg this date. Pt continuing with one small sliver of open area to the R lateral leg with scant drainage on the mepilex ag foam pad today. Pt would continue to benefit from current POC to promote wound healing.  -     Rehab Potential Good  -     Patient/caregiver participated in establishment of treatment plan and goals Yes  -     Patient would benefit from skilled therapy intervention Yes  -        PT Plan    PT Frequency 2x/week  -     Physical Therapy Interventions (Optional Details) wound care;patient/family education  -     PT Plan Comments debridement, unna boot  -               User Key  (r) = Recorded By, (t) = Taken By, (c) = Cosigned By      Initials Name Provider Type     Dejan Zabala, PT Physical Therapist                    Goals   PT OP Goals       Row Name 03/14/24 1448          Time Calculation    PT Goal Re-Cert Due Date 04/30/24  -               User Key  (r) = Recorded By, (t) = Taken By, (c) = Cosigned By      Initials Name Provider Type     Dejan Zabala, PT Physical Therapist                     PT Goal Re-Cert Due Date: 04/30/24            Time Calculation: Start Time: 1430  Untimed Charges  69740-Rxho Boot: 15  25053-Wjyiyrsua debridement: 15  Total Minutes  Untimed Charges Total Minutes: 30   Total Minutes: 30  Therapy Charges for Today       Code Description Service Date Service Provider Modifiers Qty    37108903938 HC EDWIN DEBRIDE OPEN WOUND UP TO 20CM 3/14/2024 Dejan Zabala, PT GP 1    32801369894 HC PT STAPPING UNNA BOOT 3/14/2024 Dejan Zabala, PT GP 1                    Dejan Zabala, PT  3/14/2024

## 2024-03-18 ENCOUNTER — HOSPITAL ENCOUNTER (OUTPATIENT)
Dept: PHYSICAL THERAPY | Facility: HOSPITAL | Age: 84
Setting detail: THERAPIES SERIES
Discharge: HOME | End: 2024-03-18
Payer: MEDICARE

## 2024-03-18 DIAGNOSIS — R60.0 EDEMA OF RIGHT LOWER LEG: ICD-10-CM

## 2024-03-18 DIAGNOSIS — Z87.2 HISTORY OF CELLULITIS: ICD-10-CM

## 2024-03-18 DIAGNOSIS — S81.801D OPEN WOUND OF RIGHT LOWER LEG, SUBSEQUENT ENCOUNTER: Primary | ICD-10-CM

## 2024-03-18 DIAGNOSIS — I87.2 VENOUS STASIS DERMATITIS OF RIGHT LOWER EXTREMITY: ICD-10-CM

## 2024-03-18 PROCEDURE — 97597 DBRDMT OPN WND 1ST 20 CM/<: CPT

## 2024-03-18 PROCEDURE — 29580 STRAPPING UNNA BOOT: CPT

## 2024-03-19 NOTE — THERAPY WOUND CARE TREATMENT
Outpatient Rehabilitation - Wound/Debridement Treatment Note  [facility]     Patient Name: Toño Alcala  : 1940  MRN: [id number]  Today's Date: 3/19/2024                 Admit Date: 3/18/2024    Visit Dx:    ICD-10-CM ICD-9-CM   1. Open wound of right lower leg, subsequent encounter  S81.801D V58.89     891.0   2. Edema of right lower leg  R60.0 782.3   3. History of cellulitis  Z87.2 V13.3   4. Venous stasis dermatitis of right lower extremity  I87.2 454.1       Patient Active Problem List   Diagnosis    CAD (coronary artery disease)    CVD (cardiovascular disease)    DVT (deep venous thrombosis)    Diabetes mellitus    Dyslipidemia    Arthritis    GERD (gastroesophageal reflux disease)    Mixed hyperlipidemia    Diabetic nephropathy associated with type 2 diabetes mellitus    Diabetic polyneuropathy associated with type 2 diabetes mellitus    Chronic kidney disease, stage III (moderate)    Vitamin D deficiency    Disc disorder of cervical region    Postthrombotic syndrome    Vertigo    Angina pectoris    Lumbosacral spondylosis without myelopathy    Psoriasis    Arthritis of elbow    Swelling of right lower extremity    Acute right-sided low back pain without sciatica    Hoarseness    Unifocal PVCs    Skin lesion of face    Primary hypertension    Medicare annual wellness visit, subsequent    Stage 3 chronic kidney disease due to benign hypertension    BMI 30.0-30.9,adult    Postherpetic neuralgia    Herpes zoster without complication    Calculus of gallbladder without cholecystitis without obstruction    Acute diverticulitis    Leg edema, right    Chest pain    Encounter for removal of sutures    Chronic pain of left knee    Fall        Past Medical History:   Diagnosis Date    Arthritis     Arthritis of neck Fusion     Bilateral radial fractures     fracture bilateral radial heads    CAD (coronary artery disease)     Cataract     Cervical disc disorder Fusion     Chronic kidney disease  2020    CVD (cardiovascular disease)     History of TIA 1989    Diabetes mellitus     DVT (deep venous thrombosis)     Right lower extremity DVT and pulmonary embolism in 06/2015, idiopathic    DVT of proximal lower limb 06/22/2015    proximal DVT right leg, small PE- anticoagulation    Dyslipidemia     Fracture 1952    H/o pf left forearm fracture    Fracture of right hand 1980    Fracture of wrist 1980    GERD (gastroesophageal reflux disease)     with hiatal hernia    HL (hearing loss)     Hx of angina pectoris     Hypertension     Normal renal angiography 02/16/11    Knee swelling Several years ago    Left wrist fracture 1953    Lumbosacral disc disease 1996    Osgood-Schlatter's disease 1955    Osteoarthritis     Right wrist fracture     Vertigo     Wears glasses         Past Surgical History:   Procedure Laterality Date    APPENDECTOMY  1957    BACK SURGERY      CARDIAC CATHETERIZATION  2011    with vein graft    CERVICAL FUSION      CERVICAL FUSION  1992    C3-4    COLONOSCOPY  2013    CORONARY ARTERY BYPASS GRAFT  1994    HERNIA REPAIR Right 2011    inguinal    INGUINAL HERNIA REPAIR Left 10/29/2019    Procedure: INGUINAL HERNIA REPAIR LEFT;  Surgeon: Charisma Raines MD;  Location: [facility];  Service: General    LUMBAR LAMINECTOMY  1996    laminectomy, lumbar, L3    NECK SURGERY  1992    OTHER SURGICAL HISTORY      wrist surgery    SPINE SURGERY  1996    TONSILLECTOMY AND ADENOIDECTOMY  1945    TRIGGER POINT INJECTION  2001 - 2007    VASECTOMY  1972         EVALUATION   PT Ortho       Row Name 03/18/24 1600       Subjective    Subjective Comments No new complaints or changes.  -MC       Subjective Pain    Able to rate subjective pain? yes  -MC    Pre-Treatment Pain Level 0  -MC    Post-Treatment Pain Level 0  -MC    Subjective Pain Comment lidocaine spray prior to debridement  -MC       Transfers    Sit-Stand [place] (Transfers) independent  -MC    Stand-Sit [place] (Transfers) independent   -MC    Comment, (Transfers) seated for tx  -       Gait/Stairs (Locomotion)    [place] Level (Gait) modified independence  -    Assistive Device (Gait) cane, straight  -              User Key  (r) = Recorded By, (t) = Taken By, (c) = Cosigned By      Initials Name Provider Type    Marlene Bethea PT Physical Therapist                     LDA Wound       Row Name 03/18/24 1600             Wound 01/31/24 1115 Right lateral leg Venous Ulcer    Wound - Properties Group Placement Date: 01/31/24  - Placement Time: 1115  - Present on Original Admission: Y  -MC Side: Right  - Orientation: lateral  - Location: leg  - Primary Wound Type: Venous ulcer  -    Dressing Appearance intact;moist drainage  -      Base moist;pink;yellow;slough  very small remaining open area to lateral aspect, actively weeping  -      Periwound intact;dry;redness;swelling  min erythema lat distal  -      Periwound Temperature warm  -      Periwound Skin Turgor soft  -      Edges irregular  -      Wound Length (cm) 0.2 cm  -      Wound Width (cm) 0.1 cm  -      Wound Depth (cm) 0.1 cm  -      Wound Surface Area (cm^2) 0.02 cm^2  -      Wound Volume (cm^3) 0.002 cm^3  -      Drainage Characteristics/Odor serosanguineous  -      Drainage Amount small  active, slow weeping  -      Care, Wound cleansed with;wound cleanser;debrided;yaa boot  -      Dressing Care dressing applied  unna boot, optilock, cast padding  -      Periwound Care cleansed with pH balanced cleanser;dry periwound area maintained  -      Retired Wound - Properties Group Placement Date: 01/31/24  - Placement Time: 1115  -MC Present on Original Admission: Y  -MC Side: Right  - Orientation: lateral  - Location: leg  - Primary Wound Type: Venous ulcer  -MC    Retired Wound - Properties Group Date first assessed: 01/31/24  - Time first assessed: 1115  - Present on Original Admission: Y  - Side: Right  - Location:  "leg  - Primary Wound Type: Venous ulcer  -              User Key  (r) = Recorded By, (t) = Taken By, (c) = Cosigned By      Initials Name Provider Type    Marlene Bethea, PT Physical Therapist                   Lymphedema       Row Name 03/18/24 1600             Lymphedema Edema Assessment    Ptting Edema Category By severity  -      Pitting Edema Mild;Moderate  moderate prox to unna boot  -         Skin Changes/Observations    Lower Extremity Conditions right:;clean;dry;hairless;inflamed;fragile  -      Lower Extremity Color/Pigment right:;erythema;hypopigmented;hyperpigmented  -         Lymphedema Pulses/Capillary Refill    Capillary Refill lower extremity capillary refill  -      Lower Extremity Capillary Refill right:;less than 3 seconds  -         Compression/Skin Care    Compression/Skin Care skin care;wrapping location;bandaging  -      Skin Care washed/dried;lotion applied  -      Wrapping Location lower extremity  -      Wrapping Location LE right:;foot to knee  -      Wrapping Comments Wound dressings, unna boot applied in clamshell pattern, 4\" coban, size 6 spandage to secure  -                User Key  (r) = Recorded By, (t) = Taken By, (c) = Cosigned By      Initials Name Provider Type    Marlene Bethea, PT Physical Therapist                    WOUND DEBRIDEMENT  Total area of Debridement: 2 cm2  Debridement Site 1  Location- Site 1: RLE wounds  Selective Debridement- Site 1: Wound Surface <20cmsq  Instruments- Site 1: tweezers, #15, scapel  Excised Tissue Description- Site 1: minimum, other (comment) (crust)  Bleeding- Site 1: none              Therapy Education       Row Name 03/18/24 1600             Therapy Education    Education Details Explained use of unna boot alone to try to keep areas from crusting. Continue POC.  -      Given Bandaging/dressing change;Edema management;Symptoms/condition management  -      Program Reinforced;Progressed  -      How " Provided Verbal;Demonstration  -      Provided to Patient  -      Level of Understanding Teach back education performed;Verbalized  -                User Key  (r) = Recorded By, (t) = Taken By, (c) = Cosigned By      Initials Name Provider Type    Marlene Bethea, PT Physical Therapist                    Recommendation and Plan   PT Assessment/Plan       Row Name 03/18/24 1600          PT Assessment    Functional Limitations Performance in self-care ADL;Other (comment)  -     Impairments Integumentary integrity;Impaired venous circulation;Edema  -     Assessment Comments Pt with one very small open area remaining that is slowly weeping serous fluid today. Pt will continue to benefit from skilled PT wound care to promote healing. He remains at risk for integumentary issues due to fluid overload and need for long-term compression.  -     Rehab Potential Good  -     Patient/caregiver participated in establishment of treatment plan and goals Yes  -     Patient would benefit from skilled therapy intervention Yes  -        PT Plan    PT Frequency 2x/week  -     Physical Therapy Interventions (Optional Details) wound care;patient/family education  -     PT Plan Comments debridement, unna boot  -               User Key  (r) = Recorded By, (t) = Taken By, (c) = Cosigned By      Initials Name Provider Type    Marlene Bethea, PT Physical Therapist                    Goals   PT OP Goals       Row Name 03/18/24 1605          Time Calculation    PT Goal Re-Cert Due Date 04/30/24  -               User Key  (r) = Recorded By, (t) = Taken By, (c) = Cosigned By      Initials Name Provider Type    Marlene Bethea, PT Physical Therapist                    PT Goal Re-Cert Due Date: 04/30/24            Time Calculation: Start Time: 1515  Untimed Charges  08433-Zpmt Boot: 15  79468-Nbhaphhgv debridement: 15  Total Minutes  Untimed Charges Total Minutes: 30   Total Minutes: 30  Therapy Charges  for Today       Code Description Service Date Service Provider Modifiers Qty    90901827458 HC EDWIN DEBRIDE OPEN WOUND UP TO 20CM 3/18/2024 Marlene Levy, PT GP 1    03365853197 HC PT STAPPING UNNA BOOT 3/18/2024 Marlene Levy, PT GP 1                    Marlene Levy, PT  3/19/2024

## 2024-03-21 ENCOUNTER — HOSPITAL ENCOUNTER (OUTPATIENT)
Dept: PHYSICAL THERAPY | Facility: HOSPITAL | Age: 84
Setting detail: THERAPIES SERIES
Discharge: HOME | End: 2024-03-21
Payer: MEDICARE

## 2024-03-21 DIAGNOSIS — S81.801D OPEN WOUND OF RIGHT LOWER LEG, SUBSEQUENT ENCOUNTER: Primary | ICD-10-CM

## 2024-03-21 DIAGNOSIS — I87.2 VENOUS STASIS DERMATITIS OF RIGHT LOWER EXTREMITY: ICD-10-CM

## 2024-03-21 DIAGNOSIS — R60.0 EDEMA OF RIGHT LOWER LEG: ICD-10-CM

## 2024-03-21 DIAGNOSIS — Z87.2 HISTORY OF CELLULITIS: ICD-10-CM

## 2024-03-21 PROCEDURE — 97597 DBRDMT OPN WND 1ST 20 CM/<: CPT

## 2024-03-21 PROCEDURE — 29580 STRAPPING UNNA BOOT: CPT

## 2024-03-22 NOTE — THERAPY WOUND CARE TREATMENT
Outpatient Rehabilitation - Wound/Debridement Treatment Note  [facility]     Patient Name: Toño Alcala  : 1940  MRN: [id number]  Today's Date: 3/22/2024                 Admit Date: 3/21/2024    Visit Dx:    ICD-10-CM ICD-9-CM   1. Open wound of right lower leg, subsequent encounter  S81.801D V58.89     891.0   2. Edema of right lower leg  R60.0 782.3   3. History of cellulitis  Z87.2 V13.3   4. Venous stasis dermatitis of right lower extremity  I87.2 454.1       Patient Active Problem List   Diagnosis    CAD (coronary artery disease)    CVD (cardiovascular disease)    DVT (deep venous thrombosis)    Diabetes mellitus    Dyslipidemia    Arthritis    GERD (gastroesophageal reflux disease)    Mixed hyperlipidemia    Diabetic nephropathy associated with type 2 diabetes mellitus    Diabetic polyneuropathy associated with type 2 diabetes mellitus    Chronic kidney disease, stage III (moderate)    Vitamin D deficiency    Disc disorder of cervical region    Postthrombotic syndrome    Vertigo    Angina pectoris    Lumbosacral spondylosis without myelopathy    Psoriasis    Arthritis of elbow    Swelling of right lower extremity    Acute right-sided low back pain without sciatica    Hoarseness    Unifocal PVCs    Skin lesion of face    Primary hypertension    Medicare annual wellness visit, subsequent    Stage 3 chronic kidney disease due to benign hypertension    BMI 30.0-30.9,adult    Postherpetic neuralgia    Herpes zoster without complication    Calculus of gallbladder without cholecystitis without obstruction    Acute diverticulitis    Leg edema, right    Chest pain    Encounter for removal of sutures    Chronic pain of left knee    Fall        Past Medical History:   Diagnosis Date    Arthritis     Arthritis of neck Fusion     Bilateral radial fractures     fracture bilateral radial heads    CAD (coronary artery disease)     Cataract     Cervical disc disorder Fusion     Chronic kidney disease  2020    CVD (cardiovascular disease)     History of TIA 1989    Diabetes mellitus     DVT (deep venous thrombosis)     Right lower extremity DVT and pulmonary embolism in 06/2015, idiopathic    DVT of proximal lower limb 06/22/2015    proximal DVT right leg, small PE- anticoagulation    Dyslipidemia     Fracture 1952    H/o pf left forearm fracture    Fracture of right hand 1980    Fracture of wrist 1980    GERD (gastroesophageal reflux disease)     with hiatal hernia    HL (hearing loss)     Hx of angina pectoris     Hypertension     Normal renal angiography 02/16/11    Knee swelling Several years ago    Left wrist fracture 1953    Lumbosacral disc disease 1996    Osgood-Schlatter's disease 1955    Osteoarthritis     Right wrist fracture     Vertigo     Wears glasses         Past Surgical History:   Procedure Laterality Date    APPENDECTOMY  1957    BACK SURGERY      CARDIAC CATHETERIZATION  2011    with vein graft    CERVICAL FUSION      CERVICAL FUSION  1992    C3-4    COLONOSCOPY  2013    CORONARY ARTERY BYPASS GRAFT  1994    HERNIA REPAIR Right 2011    inguinal    INGUINAL HERNIA REPAIR Left 10/29/2019    Procedure: INGUINAL HERNIA REPAIR LEFT;  Surgeon: Charisma Raines MD;  Location: [facility];  Service: General    LUMBAR LAMINECTOMY  1996    laminectomy, lumbar, L3    NECK SURGERY  1992    OTHER SURGICAL HISTORY      wrist surgery    SPINE SURGERY  1996    TONSILLECTOMY AND ADENOIDECTOMY  1945    TRIGGER POINT INJECTION  2001 - 2007    VASECTOMY  1972         EVALUATION   PT Ortho       Row Name 03/21/24 1600       Subjective    Subjective Comments Pt reports his leg has hurt more since his last appt, and he's concerned the area might be worse.  -MC       Subjective Pain    Able to rate subjective pain? yes  -MC    Pre-Treatment Pain Level 0  -MC    Post-Treatment Pain Level 0  -MC    Subjective Pain Comment lidocaine spray prior to debridement and dressing placement  -MC       Transfers    Sit-Stand  [place] (Transfers) independent  -    Stand-Sit [place] (Transfers) independent  -    Comment, (Transfers) seated for tx  -       Gait/Stairs (Locomotion)    [place] Level (Gait) modified independence  -    Assistive Device (Gait) cane, straight  -              User Key  (r) = Recorded By, (t) = Taken By, (c) = Cosigned By      Initials Name Provider Type    Marlene Bethea PT Physical Therapist                     [facility] Wound       Row Name 03/22/24 0800             Wound 01/31/24 1115 Right lateral leg Venous Ulcer    Wound - Properties Group Placement Date: 01/31/24  - Placement Time: 1115  - Present on Original Admission: Y  - Side: Right  - Orientation: lateral  - Location: leg  - Primary Wound Type: Venous ulcer  -    Dressing Appearance intact;dried drainage  -      Base moist;pink;epithelialization  appears closed but shiny/fragile  -      Periwound intact;dry;redness;swelling  min erythema lat distal  -      Periwound Temperature warm  -      Periwound Skin Turgor soft  -      Edges irregular  -      Drainage Amount none  -      Care, Wound cleansed with;wound cleanser;debrided;yaa boot  -      Dressing Care dressing applied;gauze, antimicrobial  thick layer of zguard, sorbact, unna boot  -      Periwound Care cleansed with pH balanced cleanser;barrier ointment applied  zguard  -      Retired Wound - Properties Group Placement Date: 01/31/24  - Placement Time: 1115  -MC Present on Original Admission: Y  - Side: Right  - Orientation: lateral  - Location: leg  - Primary Wound Type: Venous ulcer  -    Retired Wound - Properties Group Date first assessed: 01/31/24  - Time first assessed: 1115  - Present on Original Admission: Y  - Side: Right  - Location: leg  - Primary Wound Type: Venous ulcer  -              User Key  (r) = Recorded By, (t) = Taken By, (c) = Cosigned By      Initials Name Provider Type    ELISHA Levy  "Marlene MANN PT Physical Therapist                   Lymphedema       Row Name 03/21/24 1600             Lymphedema Edema Assessment    Pitting Edema Mild;Moderate  -         Skin Changes/Observations    Lower Extremity Conditions right:;clean;dry;hairless;inflamed;fragile  -      Lower Extremity Color/Pigment right:;erythema;hypopigmented;hyperpigmented  -         Lymphedema Pulses/Capillary Refill    Lower Extremity Capillary Refill right:;less than 3 seconds  -         Compression/Skin Care    Compression/Skin Care skin care;wrapping location;bandaging  -      Skin Care washed/dried;lotion applied  -      Wrapping Location lower extremity  -      Wrapping Location LE right:;foot to knee  -      Wrapping Comments Wound dressings, unna boot applied in clamshell pattern, 4\" coban, size 6 spandage to secure  -                User Key  (r) = Recorded By, (t) = Taken By, (c) = Cosigned By      Initials Name Provider Type    Marlene Bethea, PT Physical Therapist                    WOUND DEBRIDEMENT  Total area of Debridement: 1 cm2  Debridement Site 1  Location- Site 1: RLE wounds  Selective Debridement- Site 1: Wound Surface <20cmsq  Instruments- Site 1: tweezers, #15, scapel  Excised Tissue Description- Site 1: minimum, other (comment) (crust)  Bleeding- Site 1: none              Therapy Education       Row Name 03/21/24 1600             Therapy Education    Education Details Explained use of thick layer of zguard and sorbact to protect the area and try to reduce discomfort.  -MC      Given Bandaging/dressing change;Edema management;Symptoms/condition management  -      Program Modified  -MC      How Provided Verbal;Demonstration  -MC      Provided to Patient  -      Level of Understanding Teach back education performed;Verbalized  -                User Key  (r) = Recorded By, (t) = Taken By, (c) = Cosigned By      Initials Name Provider Type    Marlene Bethea, MYRIAM Physical " Therapist                    Recommendation and Plan   PT Assessment/Plan       Row Name 03/21/24 1600          PT Assessment    Functional Limitations Performance in self-care ADL;Other (comment)  -     Impairments Integumentary integrity;Impaired venous circulation;Edema  -     Assessment Comments Pt with apparent closure of the lateral RLE area today. The area is shiny and somewhat fragile. Pt with increased pain since last assessment, but no s/sx of infection noted. Pt will continue to benefit from skilled PT wound care to promote healing.  -     Rehab Potential Good  -     Patient/caregiver participated in establishment of treatment plan and goals Yes  -     Patient would benefit from skilled therapy intervention Yes  -        PT Plan    PT Frequency 2x/week  -     Physical Therapy Interventions (Optional Details) wound care;patient/family education  -     PT Plan Comments debridement prn, UB vs MLW  -               User Key  (r) = Recorded By, (t) = Taken By, (c) = Cosigned By      Initials Name Provider Type     Marlene Levy, PT Physical Therapist                    Goals   PT OP Goals       Row Name 03/21/24 1608          Time Calculation    PT Goal Re-Cert Due Date 04/30/24  -               User Key  (r) = Recorded By, (t) = Taken By, (c) = Cosigned By      Initials Name Provider Type     Marlene Levy, PT Physical Therapist                    PT Goal Re-Cert Due Date: 04/30/24            Time Calculation: Start Time: 1345  Untimed Charges  15047-Jyzh Boot: 15  22843-Ujvhtiyrz debridement: 10  Total Minutes  Untimed Charges Total Minutes: 25   Total Minutes: 25  Therapy Charges for Today       Code Description Service Date Service Provider Modifiers Qty    [id number]  EDWIN DEBRIDE OPEN WOUND UP TO 20CM 3/21/2024 Marlene Levy, PT GP 1    [id number]  PT STAPPING UNNA BOOT 3/21/2024 Marlene Levy, PT GP 1                    Marlene Levy  PT  3/22/2024

## 2024-03-25 ENCOUNTER — HOSPITAL ENCOUNTER (OUTPATIENT)
Dept: PHYSICAL THERAPY | Facility: HOSPITAL | Age: 84
Setting detail: THERAPIES SERIES
Discharge: HOME | End: 2024-03-25
Payer: MEDICARE

## 2024-03-25 DIAGNOSIS — R60.0 EDEMA OF RIGHT LOWER LEG: ICD-10-CM

## 2024-03-25 DIAGNOSIS — Z87.2 HISTORY OF CELLULITIS: ICD-10-CM

## 2024-03-25 DIAGNOSIS — I87.2 VENOUS STASIS DERMATITIS OF RIGHT LOWER EXTREMITY: ICD-10-CM

## 2024-03-25 DIAGNOSIS — S81.801D OPEN WOUND OF RIGHT LOWER LEG, SUBSEQUENT ENCOUNTER: Primary | ICD-10-CM

## 2024-03-25 PROCEDURE — 97597 DBRDMT OPN WND 1ST 20 CM/<: CPT

## 2024-03-25 PROCEDURE — 29580 STRAPPING UNNA BOOT: CPT

## 2024-03-25 NOTE — THERAPY WOUND CARE TREATMENT
Outpatient Rehabilitation - Wound/Debridement Treatment Note  [facility]     Patient Name: Toño Alcala  : 1940  MRN: [id number]  Today's Date: 3/25/2024                 Admit Date: 3/25/2024    Visit Dx:    ICD-10-CM ICD-9-CM   1. Open wound of right lower leg, subsequent encounter  S81.801D V58.89     891.0   2. Edema of right lower leg  R60.0 782.3   3. History of cellulitis  Z87.2 V13.3   4. Venous stasis dermatitis of right lower extremity  I87.2 454.1       Patient Active Problem List   Diagnosis    CAD (coronary artery disease)    CVD (cardiovascular disease)    DVT (deep venous thrombosis)    Diabetes mellitus    Dyslipidemia    Arthritis    GERD (gastroesophageal reflux disease)    Mixed hyperlipidemia    Diabetic nephropathy associated with type 2 diabetes mellitus    Diabetic polyneuropathy associated with type 2 diabetes mellitus    Chronic kidney disease, stage III (moderate)    Vitamin D deficiency    Disc disorder of cervical region    Postthrombotic syndrome    Vertigo    Angina pectoris    Lumbosacral spondylosis without myelopathy    Psoriasis    Arthritis of elbow    Swelling of right lower extremity    Acute right-sided low back pain without sciatica    Hoarseness    Unifocal PVCs    Skin lesion of face    Primary hypertension    Medicare annual wellness visit, subsequent    Stage 3 chronic kidney disease due to benign hypertension    BMI 30.0-30.9,adult    Postherpetic neuralgia    Herpes zoster without complication    Calculus of gallbladder without cholecystitis without obstruction    Acute diverticulitis    Leg edema, right    Chest pain    Encounter for removal of sutures    Chronic pain of left knee    Fall        Past Medical History:   Diagnosis Date    Arthritis     Arthritis of neck Fusion     Bilateral radial fractures     fracture bilateral radial heads    CAD (coronary artery disease)     Cataract     Cervical disc disorder Fusion     Chronic kidney disease  2020    CVD (cardiovascular disease)     History of TIA 1989    Diabetes mellitus     DVT (deep venous thrombosis)     Right lower extremity DVT and pulmonary embolism in 06/2015, idiopathic    DVT of proximal lower limb 06/22/2015    proximal DVT right leg, small PE- anticoagulation    Dyslipidemia     Fracture 1952    H/o pf left forearm fracture    Fracture of right hand 1980    Fracture of wrist 1980    GERD (gastroesophageal reflux disease)     with hiatal hernia    HL (hearing loss)     Hx of angina pectoris     Hypertension     Normal renal angiography 02/16/11    Knee swelling Several years ago    Left wrist fracture 1953    Lumbosacral disc disease 1996    Osgood-Schlatter's disease 1955    Osteoarthritis     Right wrist fracture     Vertigo     Wears glasses         Past Surgical History:   Procedure Laterality Date    APPENDECTOMY  1957    BACK SURGERY      CARDIAC CATHETERIZATION  2011    with vein graft    CERVICAL FUSION      CERVICAL FUSION  1992    C3-4    COLONOSCOPY  2013    CORONARY ARTERY BYPASS GRAFT  1994    HERNIA REPAIR Right 2011    inguinal    INGUINAL HERNIA REPAIR Left 10/29/2019    Procedure: INGUINAL HERNIA REPAIR LEFT;  Surgeon: Charisma Raines MD;  Location: [facility];  Service: General    LUMBAR LAMINECTOMY  1996    laminectomy, lumbar, L3    NECK SURGERY  1992    OTHER SURGICAL HISTORY      wrist surgery    SPINE SURGERY  1996    TONSILLECTOMY AND ADENOIDECTOMY  1945    TRIGGER POINT INJECTION  2001 - 2007    VASECTOMY  1972         EVALUATION   PT Ortho       Row Name 03/25/24 1500       Subjective    Subjective Comments Pt with unrelated skin tear to his scalp after rising up and bumping the area on a marble surface. Much less pain in his LE since last assessment.  -MC       Subjective Pain    Able to rate subjective pain? yes  -MC    Pre-Treatment Pain Level 0  -MC    Post-Treatment Pain Level 0  -MC    Subjective Pain Comment lidocaine spray prior to debridement  -MC        Transfers    Sit-Stand [place] (Transfers) independent  -    Stand-Sit [place] (Transfers) independent  -    Comment, (Transfers) seated for tx  -       Gait/Stairs (Locomotion)    [place] Level (Gait) modified independence  -    Assistive Device (Gait) cane, straight  -              User Key  (r) = Recorded By, (t) = Taken By, (c) = Cosigned By      Initials Name Provider Type     Marlene Levy PT Physical Therapist                     [facility] Wound       Row Name 03/25/24 1500             Wound 01/31/24 1115 Right lateral leg Venous Ulcer    Wound - Properties Group Placement Date: 01/31/24  - Placement Time: 1115  - Present on Original Admission: Y  -MC Side: Right  - Orientation: lateral  - Location: leg  - Primary Wound Type: Venous ulcer  -MC    Wound Image Images linked: 1  -MC      Dressing Appearance intact;no drainage  -      Base moist;pink;epithelialization  appears closed but shiny/fragile, hypopigmented  -      Periwound intact;dry;redness;swelling  min erythema lat distal  -      Periwound Temperature warm  -      Periwound Skin Turgor soft  -      Edges irregular  -      Drainage Characteristics/Odor serous  -      Drainage Amount scant  difficult to tell where drainage is coming from  -      Care, Wound cleansed with;wound cleanser;debrided;yaa boot  -      Dressing Care dressing applied;gauze, antimicrobial  thick layer zguard, sorbact, unna boot  -      Periwound Care cleansed with pH balanced cleanser;barrier ointment applied  zguard  -      Retired Wound - Properties Group Placement Date: 01/31/24  - Placement Time: 1115  -MC Present on Original Admission: Y  -MC Side: Right  - Orientation: lateral  - Location: leg  - Primary Wound Type: Venous ulcer  -MC    Retired Wound - Properties Group Date first assessed: 01/31/24  - Time first assessed: 1115  -MC Present on Original Admission: Y  -MC Side: Right  - Location: leg  -  "Primary Wound Type: Venous ulcer  -              User Key  (r) = Recorded By, (t) = Taken By, (c) = Cosigned By      Initials Name Provider Type    Marlene Bethea PT Physical Therapist                   Lymphedema       Row Name 03/25/24 1500             Lymphedema Edema Assessment    Pitting Edema Mild;Moderate  -         Skin Changes/Observations    Lower Extremity Conditions right:;clean;dry;hairless;inflamed;fragile  -      Lower Extremity Color/Pigment right:;erythema;hypopigmented;hyperpigmented  -         Lymphedema Pulses/Capillary Refill    Lower Extremity Capillary Refill right:;less than 3 seconds  -         Compression/Skin Care    Compression/Skin Care skin care;wrapping location;bandaging  -      Skin Care washed/dried;lotion applied  -      Wrapping Location lower extremity  -      Wrapping Location LE right:;foot to knee  -      Wrapping Comments Wound dressings, unna boot applied in clamshell pattern, 4\" coban, size 6 spandage to secure  -                User Key  (r) = Recorded By, (t) = Taken By, (c) = Cosigned By      Initials Name Provider Type    Marlene Bethea PT Physical Therapist                    WOUND DEBRIDEMENT  Total area of Debridement: 2 cm2  Debridement Site 1  Location- Site 1: RLE wounds  Selective Debridement- Site 1: Wound Surface <20cmsq  Instruments- Site 1: tweezers, #15, scapel  Excised Tissue Description- Site 1: minimum, other (comment) (crust)  Bleeding- Site 1: none   Debridement Site 2  Location- Site 2: scalp  Selective Debridement- Site 2: Wound Surface <20cmsq  Instruments- Site 2: tweezers, scissors  Excised Tissue Description- Site 2: minimum, other (comment) (nonviable skin s/p tear)  Bleeding- Site 2: none          Therapy Education       Row Name 03/25/24 1500             Therapy Education    Education Details Continue current POC. PT can assume care of scalp skin tear if desired  -      Given Bandaging/dressing change;Edema " management;Symptoms/condition management  -      Program Reinforced;Progressed  -      How Provided Verbal;Demonstration  -      Provided to Patient  -      Level of Understanding Teach back education performed;Verbalized  -                User Key  (r) = Recorded By, (t) = Taken By, (c) = Cosigned By      Initials Name Provider Type    Marlene Bethea, PT Physical Therapist                    Recommendation and Plan   PT Assessment/Plan       Row Name 03/25/24 1500          PT Assessment    Functional Limitations Performance in self-care ADL;Other (comment)  -     Impairments Integumentary integrity;Impaired venous circulation;Edema  -     Assessment Comments Pt with no apparent openings to the R lateral ankle. Several areas are hypopigmented and appear to be very, very slowly weeping. Pt's pain is much improved since last assessment. Pt will continue to benefit from skilled PT wound care to promote healing. Pt with new skin tear to his scalp. It appears narrow and clean. PT trimmed away nonviable skin and cleaned the area. If this area fails to heal, PT can further address the wound next assessment.  -     Rehab Potential Good  -     Patient/caregiver participated in establishment of treatment plan and goals Yes  -     Patient would benefit from skilled therapy intervention Yes  -        PT Plan    PT Frequency 2x/week  -     Physical Therapy Interventions (Optional Details) wound care;patient/family education  -     PT Plan Comments debridement prn, unna boot  -               User Key  (r) = Recorded By, (t) = Taken By, (c) = Cosigned By      Initials Name Provider Type    Marlene Bethea, PT Physical Therapist                    Goals   PT OP Goals       Row Name 03/25/24 1549          Time Calculation    PT Goal Re-Cert Due Date 04/30/24  -               User Key  (r) = Recorded By, (t) = Taken By, (c) = Cosigned By      Initials Name Provider Type    ELISHA Levy  Marlene MANN PT Physical Therapist                    PT Goal Re-Cert Due Date: 04/30/24            Time Calculation: Start Time: 1515  Untimed Charges  90279-[place] Boot: 15  73287-Wdbadtzdj debridement: 10  Total Minutes  Untimed Charges Total Minutes: 25   Total Minutes: 25              Marlene Levy, PT  3/25/2024

## 2024-03-28 ENCOUNTER — HOSPITAL ENCOUNTER (OUTPATIENT)
Dept: PHYSICAL THERAPY | Facility: HOSPITAL | Age: 84
Setting detail: THERAPIES SERIES
Discharge: HOME | End: 2024-03-28
Payer: MEDICARE

## 2024-03-28 DIAGNOSIS — R60.0 EDEMA OF RIGHT LOWER LEG: ICD-10-CM

## 2024-03-28 DIAGNOSIS — Z87.2 HISTORY OF CELLULITIS: ICD-10-CM

## 2024-03-28 DIAGNOSIS — S81.801D OPEN WOUND OF RIGHT LOWER LEG, SUBSEQUENT ENCOUNTER: Primary | ICD-10-CM

## 2024-03-28 DIAGNOSIS — I87.2 VENOUS STASIS DERMATITIS OF RIGHT LOWER EXTREMITY: ICD-10-CM

## 2024-03-28 PROCEDURE — 29580 STRAPPING UNNA BOOT: CPT

## 2024-03-28 PROCEDURE — 97597 DBRDMT OPN WND 1ST 20 CM/<: CPT

## 2024-03-28 NOTE — THERAPY PROGRESS REPORT/RE-CERT
Outpatient Rehabilitation - Wound/Debridement Progress Note  [facility]     Patient Name: oTño Alcala  : 1940  MRN: [id number]  Today's Date: 3/28/2024                 Admit Date: 3/28/2024    Visit Dx:    ICD-10-CM ICD-9-CM   1. Open wound of right lower leg, subsequent encounter  S81.801D V58.89     891.0   2. Edema of right lower leg  R60.0 782.3   3. History of cellulitis  Z87.2 V13.3   4. Venous stasis dermatitis of right lower extremity  I87.2 454.1     R lateral ankle      RLE        Patient Active Problem List   Diagnosis    CAD (coronary artery disease)    CVD (cardiovascular disease)    DVT (deep venous thrombosis)    Diabetes mellitus    Dyslipidemia    Arthritis    GERD (gastroesophageal reflux disease)    Mixed hyperlipidemia    Diabetic nephropathy associated with type 2 diabetes mellitus    Diabetic polyneuropathy associated with type 2 diabetes mellitus    Chronic kidney disease, stage III (moderate)    Vitamin D deficiency    Disc disorder of cervical region    Postthrombotic syndrome    Vertigo    Angina pectoris    Lumbosacral spondylosis without myelopathy    Psoriasis    Arthritis of elbow    Swelling of right lower extremity    Acute right-sided low back pain without sciatica    Hoarseness    Unifocal PVCs    Skin lesion of face    Primary hypertension    Medicare annual wellness visit, subsequent    Stage 3 chronic kidney disease due to benign hypertension    BMI 30.0-30.9,adult    Postherpetic neuralgia    Herpes zoster without complication    Calculus of gallbladder without cholecystitis without obstruction    Acute diverticulitis    Leg edema, right    Chest pain    Encounter for removal of sutures    Chronic pain of left knee    Fall        Past Medical History:   Diagnosis Date    Arthritis     Arthritis of neck Fusion     Bilateral radial fractures     fracture bilateral radial heads    CAD (coronary artery disease)     Cataract     Cervical disc disorder Fusion  1992    Chronic kidney disease 2020    CVD (cardiovascular disease)     History of TIA 1989    Diabetes mellitus     DVT (deep venous thrombosis)     Right lower extremity DVT and pulmonary embolism in 06/2015, idiopathic    DVT of proximal lower limb 06/22/2015    proximal DVT right leg, small PE- anticoagulation    Dyslipidemia     Fracture 1952    H/o pf left forearm fracture    Fracture of right hand 1980    Fracture of wrist 1980    GERD (gastroesophageal reflux disease)     with hiatal hernia    HL (hearing loss)     Hx of angina pectoris     Hypertension     Normal renal angiography 02/16/11    Knee swelling Several years ago    Left wrist fracture 1953    Lumbosacral disc disease 1996    Osgood-Schlatter's disease 1955    Osteoarthritis     Right wrist fracture     Vertigo     Wears glasses         Past Surgical History:   Procedure Laterality Date    APPENDECTOMY  1957    BACK SURGERY      CARDIAC CATHETERIZATION  2011    with vein graft    CERVICAL FUSION      CERVICAL FUSION  1992    C3-4    COLONOSCOPY  2013    CORONARY ARTERY BYPASS GRAFT  1994    HERNIA REPAIR Right 2011    inguinal    INGUINAL HERNIA REPAIR Left 10/29/2019    Procedure: INGUINAL HERNIA REPAIR LEFT;  Surgeon: Charisma Raines MD;  Location: [facility], 1928–1983;  Service: General    LUMBAR LAMINECTOMY  1996    laminectomy, lumbar, L3    NECK SURGERY  1992    OTHER SURGICAL HISTORY      wrist surgery    SPINE SURGERY  1996    TONSILLECTOMY AND ADENOIDECTOMY  1945    TRIGGER POINT INJECTION  2001 - 2007    VASECTOMY  1972         EVALUATION   PT Ortho       Row Name 03/28/24 1500       Subjective    Subjective Comments No new issues/complaints. Reports his scalp wound is doing well.  -LH       Subjective Pain    Able to rate subjective pain? yes  -LH    Pre-Treatment Pain Level 0  -LH    Post-Treatment Pain Level 0  -LH    Subjective Pain Comment lidocaine spray prior to debridement  -LH       Transfers    Sit-Stand [place] (Transfers)  independent  -    Stand-Sit [place] (Transfers) independent  -    Comment, (Transfers) seated for tx  -       Gait/Stairs (Locomotion)    [place] Level (Gait) modified independence  -    Assistive Device (Gait) cane, straight  -              User Key  (r) = Recorded By, (t) = Taken By, (c) = Cosigned By      Initials Name Provider Type     Dejan Zabala, PT Physical Therapist                     LDA Wound       Row Name 03/28/24 1500             Wound 01/31/24 1115 Right lateral leg Venous Ulcer    Wound - Properties Group Placement Date: 01/31/24  - Placement Time: 1115  - Present on Original Admission: Y  - Side: Right  - Orientation: lateral  - Location: leg  - Primary Wound Type: Venous ulcer  -    Wound Image Images linked: 1  -      Dressing Appearance intact;dried drainage  -      Base moist;pink;epithelialization  scant open areas, difficult to discern from fragile epithelium  -      Periwound intact;dry;redness;swelling  min erythema surrounding lateral ankle wound area  -      Periwound Temperature warm  -      Periwound Skin Turgor soft  -      Edges irregular  -      Wound Length (cm) 0.2 cm  a couple potential shallow open areas  -      Wound Width (cm) 0.1 cm  -      Wound Depth (cm) 0.1 cm  -      Wound Surface Area (cm^2) 0.02 cm^2  -      Wound Volume (cm^3) 0.002 cm^3  -      Drainage Characteristics/Odor serous  -      Drainage Amount scant  -      Care, Wound cleansed with;soap and water;debrided;yaa boot  -      Dressing Care dressing applied;silver impregnated;low-adherent;foam  Mepilex Ag, UB  -      Periwound Care cleansed with pH balanced cleanser;dry periwound area maintained;barrier ointment applied  zguard  -      Retired Wound - Properties Group Placement Date: 01/31/24  - Placement Time: 1115  - Present on Original Admission: Y  - Side: Right  - Orientation: lateral  - Location: leg  -MC Primary Wound  "Type: Venous ulcer  -    Retired Wound - Properties Group Date first assessed: 01/31/24  - Time first assessed: 1115  - Present on Original Admission: Y  - Side: Right  - Location: leg  - Primary Wound Type: Venous ulcer  -              User Key  (r) = Recorded By, (t) = Taken By, (c) = Cosigned By      Initials Name Provider Type     Marlene Levy, PT Physical Therapist     Dejan Zabala, PT Physical Therapist                   Lymphedema       Row Name 03/28/24 1500             Lymphedema Edema Assessment    Pitting Edema Mild;Moderate;Severe  moderate/severe L upper calf, proximal to [facility]         Skin Changes/Observations    Lower Extremity Conditions right:;clean;dry;hairless;inflamed;fragile  -      Lower Extremity Color/Pigment right:;erythema;hypopigmented;hyperpigmented  -      Skin Observations Comment Mild erythema surrounding wound area and proximal to [facility]         Lymphedema Pulses/Capillary Refill    Capillary Refill lower extremity capillary refill  -      Lower Extremity Capillary Refill right:;less than 3 seconds  -         Compression/Skin Care    Compression/Skin Care skin care;wrapping location;bandaging  -      Skin Care washed/dried;lotion applied  -      Wrapping Location lower extremity  -      Wrapping Location LE right:;foot to knee  -      Wrapping Comments Wound dressings, unna boot applied in clamshell pattern, 4\" coban, size 6 spandage to secure  -                User Key  (r) = Recorded By, (t) = Taken By, (c) = Cosigned By      Initials Name Provider Type     Dejan Zabala, PT Physical Therapist                    WOUND DEBRIDEMENT  Total area of Debridement: 1cm2  Debridement Site 1  Location- Site 1: RLE wounds  Selective Debridement- Site 1: Wound Surface <20cmsq  Instruments- Site 1: tweezers  Excised Tissue Description- Site 1: minimum, slough, other (comment) (crusts)  Bleeding- Site 1: none              Therapy Education  "      Row Name 03/28/24 1500             Therapy Education    Education Details Continue current POC with use of mepilex Ag, will assess response next session.  -      Given Bandaging/dressing change;Edema management;Symptoms/condition management  -      Program Reinforced;Progressed  -      How Provided Verbal;Demonstration  -      Provided to Patient  -      Level of Understanding Teach back education performed;Verbalized  -                User Key  (r) = Recorded By, (t) = Taken By, (c) = Cosigned By      Initials Name Provider Type     Dejan Zabala, PT Physical Therapist                    Recommendation and Plan   PT Assessment/Plan       Row Name 03/28/24 1500          PT Assessment    Functional Limitations Performance in self-care ADL;Other (comment)  -     Impairments Integumentary integrity;Impaired venous circulation;Edema  -     Assessment Comments Pt has not met any additional wound care goals this date, however has demonstrated continued progress toward remaining goals. Pt continuing with very shallow scattered open areas to the R lateral ankle which are difficult to discern from fragile epithelium. Pt with very small amount of drainage from these areas along with mild periwound redness. PT transitioned back to Mepilex Ag over wound areas to help improve moisture management and promote wound healing. Pt would continue to benefit from further debridment and advanced wound dressing management to improve wound healing potential.  -     Rehab Potential Good  -     Patient/caregiver participated in establishment of treatment plan and goals Yes  -     Patient would benefit from skilled therapy intervention Yes  -        PT Plan    PT Frequency 2x/week  -     Predicted Duration of Therapy Intervention (PT) 12 visits  -     Planned CPT's? PT SELF CARE/MGMT/TRAIN 15 MIN: 97377;PT NONSELECT DEBRIDE 15 MIN: 26789;PT EDWIN DEBRIDE OPEN WOUND UP TO 20 CM: 90645;PT UNNA BOOT: 08793;PT  MULTI LAYER COMP SYS LE  -     Physical Therapy Interventions (Optional Details) wound care;patient/family education  -     PT Plan Comments debridement prn, unna boot  -               User Key  (r) = Recorded By, (t) = Taken By, (c) = Cosigned By      Initials Name Provider Type     Dejan Zabala, PT Physical Therapist                    Goals   PT OP Goals       Row Name 24 1516 24 1500       PT Short Term Goals    STG 1 -- Pt will verbalize s/sx of infection and when to seek immediate medical care.  -    STG 1 Progress -- Met  -    STG 2 -- Pt will demonstrate 25% reduction in wound area to indicate healing progress.  -    STG 2 Progress -- Met  -    STG 3 -- Pt will demonstrate only mild RLE edema to indicate appropriate edema management.  -    STG 3 Progress -- Ongoing;Progressing  -       Long Term Goals    LTG 1 -- Pt will demonstrate independence with clean home dressing changes as appropriate.  -    LTG 1 Progress -- Ongoing;Progressing  -    LTG 2 -- Pt will demonstrate 90% reduction in wound area to indicate healing progress.  -    LTG 2 Progress -- Ongoing;Progressing  -    LTG 3 -- Pt will demonstrate only trace edema to the RLE to allow for transition to long term compression system.  -    LTG 3 Progress -- Ongoing;Progressing  -       Time Calculation    PT Goal Re-Cert Due Date 24  - --              User Key  (r) = Recorded By, (t) = Taken By, (c) = Cosigned By      Initials Name Provider Type     Dejan Zabala, PT Physical Therapist                    PT Goal Re-Cert Due Date: 24  PT Short Term Goals  STG 1: Pt will verbalize s/sx of infection and when to seek immediate medical care.  STG 1 Progress: Met  STG 2: Pt will demonstrate 25% reduction in wound area to indicate healing progress.  STG 2 Progress: Met  STG 3: Pt will demonstrate only mild RLE edema to indicate appropriate edema management.  STG 3 Progress: Ongoing,  Progressing  Long Term Goals  LTG 1: Pt will demonstrate independence with clean home dressing changes as appropriate.  LTG 1 Progress: Ongoing, Progressing  LTG 2: Pt will demonstrate 90% reduction in wound area to indicate healing progress.  LTG 2 Progress: Ongoing, Progressing  LTG 3: Pt will demonstrate only trace edema to the RLE to allow for transition to long term compression system.  LTG 3 Progress: Ongoing, Progressing      Time Calculation: Start Time: 1345  Untimed Charges  88131-Ddfr Boot: 15  83153-Wmnrdwnzg debridement: 15  Total Minutes  Untimed Charges Total Minutes: 30   Total Minutes: 30  Therapy Charges for Today       Code Description Service Date Service Provider Modifiers Qty    13059347092 HC EDWIN DEBRIDE OPEN WOUND UP TO 20CM 3/28/2024 Dejan Zabala, PT GP 1    68600316167 HC PT STAPPING UNNA BOOT 3/28/2024 Dejan Zabala, PT GP 1                    Dejan Zabala PT  3/28/2024

## 2024-04-01 ENCOUNTER — HOSPITAL ENCOUNTER (OUTPATIENT)
Dept: PHYSICAL THERAPY | Facility: HOSPITAL | Age: 84
Setting detail: THERAPIES SERIES
Discharge: HOME | End: 2024-04-01
Payer: MEDICARE

## 2024-04-01 DIAGNOSIS — S81.801D OPEN WOUND OF RIGHT LOWER LEG, SUBSEQUENT ENCOUNTER: Primary | ICD-10-CM

## 2024-04-01 DIAGNOSIS — Z87.2 HISTORY OF CELLULITIS: ICD-10-CM

## 2024-04-01 DIAGNOSIS — I87.2 VENOUS STASIS DERMATITIS OF RIGHT LOWER EXTREMITY: ICD-10-CM

## 2024-04-01 DIAGNOSIS — R60.0 EDEMA OF RIGHT LOWER LEG: ICD-10-CM

## 2024-04-01 PROCEDURE — 97597 DBRDMT OPN WND 1ST 20 CM/<: CPT

## 2024-04-01 PROCEDURE — 29580 STRAPPING UNNA BOOT: CPT

## 2024-04-01 NOTE — THERAPY WOUND CARE TREATMENT
Outpatient Rehabilitation - Wound/Debridement Treatment Note  [facility]     Patient Name: Toño Alcala  : 1940  MRN: [id number]  Today's Date: 2024                 Admit Date: (Not on file)    Visit Dx:    ICD-10-CM ICD-9-CM   1. Open wound of right lower leg, subsequent encounter  S81.801D V58.89     891.0   2. Edema of right lower leg  R60.0 782.3   3. History of cellulitis  Z87.2 V13.3   4. Venous stasis dermatitis of right lower extremity  I87.2 454.1       Patient Active Problem List   Diagnosis    CAD (coronary artery disease)    CVD (cardiovascular disease)    DVT (deep venous thrombosis)    Diabetes mellitus    Dyslipidemia    Arthritis    GERD (gastroesophageal reflux disease)    Mixed hyperlipidemia    Diabetic nephropathy associated with type 2 diabetes mellitus    Diabetic polyneuropathy associated with type 2 diabetes mellitus    Chronic kidney disease, stage III (moderate)    Vitamin D deficiency    Disc disorder of cervical region    Postthrombotic syndrome    Vertigo    Angina pectoris    Lumbosacral spondylosis without myelopathy    Psoriasis    Arthritis of elbow    Swelling of right lower extremity    Acute right-sided low back pain without sciatica    Hoarseness    Unifocal PVCs    Skin lesion of face    Primary hypertension    Medicare annual wellness visit, subsequent    Stage 3 chronic kidney disease due to benign hypertension    BMI 30.0-30.9,adult    Postherpetic neuralgia    Herpes zoster without complication    Calculus of gallbladder without cholecystitis without obstruction    Acute diverticulitis    Leg edema, right    Chest pain    Encounter for removal of sutures    Chronic pain of left knee    Fall        Past Medical History:   Diagnosis Date    Arthritis     Arthritis of neck Fusion     Bilateral radial fractures     fracture bilateral radial heads    CAD (coronary artery disease)     Cataract     Cervical disc disorder Fusion     Chronic kidney  disease 2020    CVD (cardiovascular disease)     History of TIA 1989    Diabetes mellitus     DVT (deep venous thrombosis)     Right lower extremity DVT and pulmonary embolism in 06/2015, idiopathic    DVT of proximal lower limb 06/22/2015    proximal DVT right leg, small PE- anticoagulation    Dyslipidemia     Fracture 1952    H/o pf left forearm fracture    Fracture of right hand 1980    Fracture of wrist 1980    GERD (gastroesophageal reflux disease)     with hiatal hernia    HL (hearing loss)     Hx of angina pectoris     Hypertension     Normal renal angiography 02/16/11    Knee swelling Several years ago    Left wrist fracture 1953    Lumbosacral disc disease 1996    Osgood-Schlatter's disease 1955    Osteoarthritis     Right wrist fracture     Vertigo     Wears glasses         Past Surgical History:   Procedure Laterality Date    APPENDECTOMY  1957    BACK SURGERY      CARDIAC CATHETERIZATION  2011    with vein graft    CERVICAL FUSION      CERVICAL FUSION  1992    C3-4    COLONOSCOPY  2013    CORONARY ARTERY BYPASS GRAFT  1994    HERNIA REPAIR Right 2011    inguinal    INGUINAL HERNIA REPAIR Left 10/29/2019    Procedure: INGUINAL HERNIA REPAIR LEFT;  Surgeon: Charisma Raines MD;  Location: [facility];  Service: General    LUMBAR LAMINECTOMY  1996    laminectomy, lumbar, L3    NECK SURGERY  1992    OTHER SURGICAL HISTORY      wrist surgery    SPINE SURGERY  1996    TONSILLECTOMY AND ADENOIDECTOMY  1945    TRIGGER POINT INJECTION  2001 - 2007    VASECTOMY  1972         EVALUATION   PT Ortho       Row Name 04/01/24 1300       Subjective    Subjective Comments No new issues/complaints.  -       Subjective Pain    Able to rate subjective pain? yes  -    Pre-Treatment Pain Level 0  -LH    Post-Treatment Pain Level 0  -    Subjective Pain Comment lidocaine spray prior to debridement  -       Transfers    Sit-Stand [place] (Transfers) independent  -    Stand-Sit [place] (Transfers)  independent  -    Comment, (Transfers) seated for tx  -       Gait/Stairs (Locomotion)    [place] Level (Gait) modified independence  -    Assistive Device (Gait) cane, straight  -              User Key  (r) = Recorded By, (t) = Taken By, (c) = Cosigned By      Initials Name Provider Type     Dejan Zabala, PT Physical Therapist                     LDA Wound       Row Name 04/01/24 1300             Wound 01/31/24 1115 Right lateral leg Venous Ulcer    Wound - Properties Group Placement Date: 01/31/24  [facility] Placement Time: 1115  - Present on Original Admission: Y  - Side: Right  - Orientation: lateral  - Location: leg  -MC Primary Wound Type: Venous ulcer  -MC    Dressing Appearance intact;dried drainage  -      Base moist;pink;epithelialization  scant open areas, difficult to discern from fragile epithelium. Appears to be becoming more superficial.  -      Periwound intact;dry;redness;swelling  scant erythema surrounding lateral ankle wound area, improving  -      Periwound Temperature warm  -      Periwound Skin Turgor soft  -      Edges irregular  -      Drainage Characteristics/Odor serous  -      Drainage Amount scant  -      Care, Wound cleansed with;soap and water;debrided;yaa boot  -      Dressing Care dressing applied;silver impregnated;low-adherent;foam  Mepilex Ag, UB  -      Periwound Care cleansed with pH balanced cleanser;dry periwound area maintained;barrier ointment applied  zguard  -      Retired Wound - Properties Group Placement Date: 01/31/24  - Placement Time: 1115  -MC Present on Original Admission: Y  - Side: Right  - Orientation: lateral  - Location: leg  -MC Primary Wound Type: Venous ulcer  -MC    Retired Wound - Properties Group Date first assessed: 01/31/24  - Time first assessed: 1115  -MC Present on Original Admission: Y  - Side: Right  - Location: leg  -MC Primary Wound Type: Venous ulcer  -MC              User Key  (r) =  "Recorded By, (t) = Taken By, (c) = Cosigned By      Initials Name Provider Type     Marlene Levy, PT Physical Therapist     Dejan Zabala, PT Physical Therapist                   Lymphedema       Row Name 04/01/24 1300             Lymphedema Edema Assessment    Pitting Edema Mild;Moderate;Severe  moderate/severe L upper calf, proximal to   -         Skin Changes/Observations    Lower Extremity Conditions right:;clean;dry;hairless;inflamed;fragile  -      Lower Extremity Color/Pigment right:;erythema;hypopigmented;hyperpigmented  -      Skin Observations Comment scant erythema surrounding wound area and proximal to   -         Lymphedema Pulses/Capillary Refill    Capillary Refill lower extremity capillary refill  -      Lower Extremity Capillary Refill right:;less than 3 seconds  -         Compression/Skin Care    Compression/Skin Care skin care;wrapping location;bandaging  -      Skin Care washed/dried;lotion applied  -      Wrapping Location lower extremity  -      Wrapping Location LE right:;foot to knee  -      Wrapping Comments Wound dressings, unna boot applied in clamshell pattern, 4\" coban, size 6 spandage to secure  -                User Key  (r) = Recorded By, (t) = Taken By, (c) = Cosigned By      Initials Name Provider Type     Dejan Zabala, PT Physical Therapist                    WOUND DEBRIDEMENT  Total area of Debridement: 1cm2  Debridement Site 1  Location- Site 1: RLE wounds  Selective Debridement- Site 1: Wound Surface <20cmsq  Instruments- Site 1: tweezers  Excised Tissue Description- Site 1: scant, slough (crusts)  Bleeding- Site 1: none              Therapy Education       Row Name 04/01/24 1300             Therapy Education    Education Details Continue current POC until wound fully resurfaced and then likely 1-2 more treatments following to allow for maturation of fragile new epithelium.  -      Given Bandaging/dressing change;Edema " management;Symptoms/condition management  -      Program Reinforced;Progressed  -      How Provided Verbal;Demonstration  -      Provided to Patient  -      Level of Understanding Teach back education performed;Verbalized  -                User Key  (r) = Recorded By, (t) = Taken By, (c) = Cosigned By      Initials Name Provider Type     Dejan Zabala, PT Physical Therapist                    Recommendation and Plan   PT Assessment/Plan       Row Name 04/01/24 1300          PT Assessment    Functional Limitations Performance in self-care ADL;Other (comment)  -     Impairments Integumentary integrity;Impaired venous circulation;Edema  -     Assessment Comments Pt appears to be demonstrating good improvements in R lateral ankle wound this date in response to transition to mepilex Ag contact layer. Pt with less depth to the wound crevaces. Only scant, dried drainage on the mepilex Ag foam. Pt would continue to benefit from current POC to help promote resurfacing of wound prior to transition to more long term compression management system.  -     Rehab Potential Good  -     Patient/caregiver participated in establishment of treatment plan and goals Yes  -     Patient would benefit from skilled therapy intervention Yes  -        PT Plan    PT Frequency 2x/week  -     Physical Therapy Interventions (Optional Details) wound care;patient/family education  -     PT Plan Comments debridement prn, unna boot  -               User Key  (r) = Recorded By, (t) = Taken By, (c) = Cosigned By      Initials Name Provider Type     Dejan Zabala, PT Physical Therapist                    Goals   PT OP Goals       Row Name 04/01/24 1321          Time Calculation    PT Goal Re-Cert Due Date 04/30/24  -               User Key  (r) = Recorded By, (t) = Taken By, (c) = Cosigned By      Initials Name Provider Type    Dejan Salazar, PT Physical Therapist                    PT Goal Re-Cert Due Date:  04/30/24            Time Calculation: Start Time: 1300  Untimed Charges  16235-Xokr Boot: 15  29049-Emjrjbrht debridement: 10  Total Minutes  Untimed Charges Total Minutes: 25   Total Minutes: 25  Therapy Charges for Today       Code Description Service Date Service Provider Modifiers Qty    89380889558 HC EDWIN DEBRIDE OPEN WOUND UP TO 20CM 4/1/2024 Dejan Zabala, PT GP 1    94697261227 HC PT STAPPING UNNA BOOT 4/1/2024 Dejan Zabala, PT GP 1                    Dejan Zabala, PT  4/1/2024

## 2024-04-04 ENCOUNTER — HOSPITAL ENCOUNTER (OUTPATIENT)
Dept: PHYSICAL THERAPY | Facility: HOSPITAL | Age: 84
Setting detail: THERAPIES SERIES
Discharge: HOME | End: 2024-04-04
Payer: MEDICARE

## 2024-04-04 DIAGNOSIS — S81.801D OPEN WOUND OF RIGHT LOWER LEG, SUBSEQUENT ENCOUNTER: Primary | ICD-10-CM

## 2024-04-04 DIAGNOSIS — I87.2 VENOUS STASIS DERMATITIS OF RIGHT LOWER EXTREMITY: ICD-10-CM

## 2024-04-04 DIAGNOSIS — Z87.2 HISTORY OF CELLULITIS: ICD-10-CM

## 2024-04-04 DIAGNOSIS — R60.0 EDEMA OF RIGHT LOWER LEG: ICD-10-CM

## 2024-04-04 PROCEDURE — 97597 DBRDMT OPN WND 1ST 20 CM/<: CPT

## 2024-04-04 PROCEDURE — 29580 STRAPPING UNNA BOOT: CPT

## 2024-04-04 NOTE — THERAPY WOUND CARE TREATMENT
Outpatient Rehabilitation - Wound/Debridement Treatment Note  [facility]     Patient Name: Toño Alcala  : 1940  MRN: [id number]  Today's Date: 2024                 Admit Date: 2024    Visit Dx:    ICD-10-CM ICD-9-CM   1. Open wound of right lower leg, subsequent encounter  S81.801D V58.89     891.0   2. Edema of right lower leg  R60.0 782.3   3. History of cellulitis  Z87.2 V13.3   4. Venous stasis dermatitis of right lower extremity  I87.2 454.1       Patient Active Problem List   Diagnosis    CAD (coronary artery disease)    CVD (cardiovascular disease)    DVT (deep venous thrombosis)    Diabetes mellitus    Dyslipidemia    Arthritis    GERD (gastroesophageal reflux disease)    Mixed hyperlipidemia    Diabetic nephropathy associated with type 2 diabetes mellitus    Diabetic polyneuropathy associated with type 2 diabetes mellitus    Chronic kidney disease, stage III (moderate)    Vitamin D deficiency    Disc disorder of cervical region    Postthrombotic syndrome    Vertigo    Angina pectoris    Lumbosacral spondylosis without myelopathy    Psoriasis    Arthritis of elbow    Swelling of right lower extremity    Acute right-sided low back pain without sciatica    Hoarseness    Unifocal PVCs    Skin lesion of face    Primary hypertension    Medicare annual wellness visit, subsequent    Stage 3 chronic kidney disease due to benign hypertension    BMI 30.0-30.9,adult    Postherpetic neuralgia    Herpes zoster without complication    Calculus of gallbladder without cholecystitis without obstruction    Acute diverticulitis    Leg edema, right    Chest pain    Encounter for removal of sutures    Chronic pain of left knee    Fall        Past Medical History:   Diagnosis Date    Arthritis     Arthritis of neck Fusion     Bilateral radial fractures     fracture bilateral radial heads    CAD (coronary artery disease)     Cataract     Cervical disc disorder Fusion     Chronic kidney disease  2020    CVD (cardiovascular disease)     History of TIA 1989    Diabetes mellitus     DVT (deep venous thrombosis)     Right lower extremity DVT and pulmonary embolism in 06/2015, idiopathic    DVT of proximal lower limb 06/22/2015    proximal DVT right leg, small PE- anticoagulation    Dyslipidemia     Fracture 1952    H/o pf left forearm fracture    Fracture of right hand 1980    Fracture of wrist 1980    GERD (gastroesophageal reflux disease)     with hiatal hernia    HL (hearing loss)     Hx of angina pectoris     Hypertension     Normal renal angiography 02/16/11    Knee swelling Several years ago    Left wrist fracture 1953    Lumbosacral disc disease 1996    Osgood-Schlatter's disease 1955    Osteoarthritis     Right wrist fracture     Vertigo     Wears glasses         Past Surgical History:   Procedure Laterality Date    APPENDECTOMY  1957    BACK SURGERY      CARDIAC CATHETERIZATION  2011    with vein graft    CERVICAL FUSION      CERVICAL FUSION  1992    C3-4    COLONOSCOPY  2013    CORONARY ARTERY BYPASS GRAFT  1994    HERNIA REPAIR Right 2011    inguinal    INGUINAL HERNIA REPAIR Left 10/29/2019    Procedure: INGUINAL HERNIA REPAIR LEFT;  Surgeon: Charisma Raines MD;  Location: [facility];  Service: General    LUMBAR LAMINECTOMY  1996    laminectomy, lumbar, L3    NECK SURGERY  1992    OTHER SURGICAL HISTORY      wrist surgery    SPINE SURGERY  1996    TONSILLECTOMY AND ADENOIDECTOMY  1945    TRIGGER POINT INJECTION  2001 - 2007    VASECTOMY  1972         EVALUATION   PT Ortho       Row Name 04/04/24 1500       Subjective    Subjective Comments No complaints or changes since last tx.  -MC       Subjective Pain    Able to rate subjective pain? yes  -MC    Pre-Treatment Pain Level 0  -MC    Post-Treatment Pain Level 0  -MC    Subjective Pain Comment lidocaine spray prior to debridement  -MC       Transfers    Sit-Stand [place] (Transfers) independent  -MC    Stand-Sit [place] (Transfers)  independent  -    Comment, (Transfers) seated for tx  -       Gait/Stairs (Locomotion)    [place] Level (Gait) modified independence  -    Assistive Device (Gait) cane, straight  -              User Key  (r) = Recorded By, (t) = Taken By, (c) = Cosigned By      Initials Name Provider Type    Marlene Bethea PT Physical Therapist                     LDA Wound       Row Name 04/04/24 1500             Wound 01/31/24 1115 Right lateral leg Venous Ulcer    Wound - Properties Group Placement Date: 01/31/24  - Placement Time: 1115  - Present on Original Admission: Y  - Side: Right  - Orientation: lateral  - Location: leg  - Primary Wound Type: Venous ulcer  -    Wound Image Images linked: 1  -MC      Dressing Appearance intact;dried drainage  -      Base moist;pink;epithelialization  one small area that is intact but fragile and shiny  -      Periwound intact;dry;redness;swelling  scant erythema surrounding lateral ankle wound area, improving  -      Periwound Temperature warm  -      Periwound Skin Turgor soft  -      Drainage Amount none  -      Care, Wound cleansed with;wound cleanser;debrided;yaa boot  -      Dressing Care dressing applied;silver impregnated;low-adherent;foam  mepilex Ag, unna boot  -      Periwound Care cleansed with pH balanced cleanser;barrier ointment applied  zguard  -      Retired Wound - Properties Group Placement Date: 01/31/24  - Placement Time: 1115  -MC Present on Original Admission: Y  - Side: Right  - Orientation: lateral  - Location: leg  - Primary Wound Type: Venous ulcer  -    Retired Wound - Properties Group Date first assessed: 01/31/24  - Time first assessed: 1115  - Present on Original Admission: Y  - Side: Right  - Location: leg  - Primary Wound Type: Venous ulcer  -              User Key  (r) = Recorded By, (t) = Taken By, (c) = Cosigned By      Initials Name Provider Type    Marlene Bethea PT  "Physical Therapist                   Lymphedema       Row Name 04/04/24 1500             Lymphedema Edema Assessment    Pitting Edema Mild;Moderate;Severe  moderate/severe L upper calf, proximal to UB  -         Skin Changes/Observations    Lower Extremity Conditions right:;clean;dry;hairless;inflamed;fragile  -      Lower Extremity Color/Pigment right:;erythema;hypopigmented;hyperpigmented  -         Lymphedema Pulses/Capillary Refill    Capillary Refill lower extremity capillary refill  -      Lower Extremity Capillary Refill right:;less than 3 seconds  -         Compression/Skin Care    Compression/Skin Care skin care;wrapping location;bandaging  -      Skin Care washed/dried;lotion applied  -      Wrapping Location lower extremity  -      Wrapping Location LE right:;foot to knee  -      Wrapping Comments Wound dressings, unna boot applied in clamshell pattern, 4\" coban, size 6 spandage to secure  -                User Key  (r) = Recorded By, (t) = Taken By, (c) = Cosigned By      Initials Name Provider Type    Marlene Bethea, PT Physical Therapist                    WOUND DEBRIDEMENT  Total area of Debridement: <1 cm2  Debridement Site 1  Location- Site 1: RLE wounds  Selective Debridement- Site 1: Wound Surface <20cmsq  Instruments- Site 1: tweezers  Excised Tissue Description- Site 1: minimum, other (comment) (crust)  Bleeding- Site 1: none              Therapy Education       Row Name 04/04/24 1500             Therapy Education    Education Details Continue current POC until area is confirmed closed, then transition to unna boot only for 1-2 weeks prior to transitioning to home compression system.  -MC      Given Bandaging/dressing change;Edema management;Symptoms/condition management  -      Program Reinforced  -MC      How Provided Verbal;Demonstration  -MC      Provided to Patient  -MC      Level of Understanding Teach back education performed;Verbalized  -MC                " User Key  (r) = Recorded By, (t) = Taken By, (c) = Cosigned By      Initials Name Provider Type     Marlene Levy, PT Physical Therapist                    Recommendation and Plan   PT Assessment/Plan       Row Name 04/04/24 1500          PT Assessment    Functional Limitations Performance in self-care ADL;Other (comment)  -     Impairments Integumentary integrity;Impaired venous circulation;Edema  -     Assessment Comments The only crusted area remaining appears closed after debridement but is shiny and fragile. Pt will continue to benefit from POC to continue progress.  -     Rehab Potential Good  -     Patient/caregiver participated in establishment of treatment plan and goals Yes  -     Patient would benefit from skilled therapy intervention Yes  -        PT Plan    PT Frequency 2x/week  -     Physical Therapy Interventions (Optional Details) wound care;patient/family education  -     PT Plan Comments debridement prn, unna boot  -               User Key  (r) = Recorded By, (t) = Taken By, (c) = Cosigned By      Initials Name Provider Type     Marlene Levy, PT Physical Therapist                    Goals   PT OP Goals       Row Name 04/04/24 1548          Time Calculation    PT Goal Re-Cert Due Date 04/30/24  -               User Key  (r) = Recorded By, (t) = Taken By, (c) = Cosigned By      Initials Name Provider Type     Marlene Levy, PT Physical Therapist                    PT Goal Re-Cert Due Date: 04/30/24            Time Calculation: Start Time: 1515  Untimed Charges  03862-Ihnt Boot: 15  57603-Lx[place]dkoor debridement: 10  Total Minutes  Untimed Charges Total Minutes: 25   Total Minutes: 25              Marlene Levy PT  4/4/2024

## 2024-04-08 ENCOUNTER — HOSPITAL ENCOUNTER (OUTPATIENT)
Dept: PHYSICAL THERAPY | Facility: HOSPITAL | Age: 84
Setting detail: THERAPIES SERIES
Discharge: HOME | End: 2024-04-08
Payer: MEDICARE

## 2024-04-08 DIAGNOSIS — Z87.2 HISTORY OF CELLULITIS: ICD-10-CM

## 2024-04-08 DIAGNOSIS — S81.801D OPEN WOUND OF RIGHT LOWER LEG, SUBSEQUENT ENCOUNTER: Primary | ICD-10-CM

## 2024-04-08 DIAGNOSIS — I87.2 VENOUS STASIS DERMATITIS OF RIGHT LOWER EXTREMITY: ICD-10-CM

## 2024-04-08 DIAGNOSIS — R60.0 EDEMA OF RIGHT LOWER LEG: ICD-10-CM

## 2024-04-08 PROCEDURE — 29580 STRAPPING UNNA BOOT: CPT

## 2024-04-08 NOTE — THERAPY WOUND CARE TREATMENT
Outpatient Rehabilitation - Wound/Debridement Treatment Note  [facility]     Patient Name: Toño Alcala  : 1940  MRN: [id number]  Today's Date: 2024                 Admit Date: 2024    Visit Dx:    ICD-10-CM ICD-9-CM   1. Open wound of right lower leg, subsequent encounter  S81.801D V58.89     891.0   2. Edema of right lower leg  R60.0 782.3   3. History of cellulitis  Z87.2 V13.3   4. Venous stasis dermatitis of right lower extremity  I87.2 454.1     R lateral ankle      Patient Active Problem List   Diagnosis    CAD (coronary artery disease)    CVD (cardiovascular disease)    DVT (deep venous thrombosis)    Diabetes mellitus    Dyslipidemia    Arthritis    GERD (gastroesophageal reflux disease)    Mixed hyperlipidemia    Diabetic nephropathy associated with type 2 diabetes mellitus    Diabetic polyneuropathy associated with type 2 diabetes mellitus    Chronic kidney disease, stage III (moderate)    Vitamin D deficiency    Disc disorder of cervical region    Postthrombotic syndrome    Vertigo    Angina pectoris    Lumbosacral spondylosis without myelopathy    Psoriasis    Arthritis of elbow    Swelling of right lower extremity    Acute right-sided low back pain without sciatica    Hoarseness    Unifocal PVCs    Skin lesion of face    Primary hypertension    Medicare annual wellness visit, subsequent    Stage 3 chronic kidney disease due to benign hypertension    BMI 30.0-30.9,adult    Postherpetic neuralgia    Herpes zoster without complication    Calculus of gallbladder without cholecystitis without obstruction    Acute diverticulitis    Leg edema, right    Chest pain    Encounter for removal of sutures    Chronic pain of left knee    Fall        Past Medical History:   Diagnosis Date    Arthritis     Arthritis of neck Fusion     Bilateral radial fractures     fracture bilateral radial heads    CAD (coronary artery disease)     Cataract     Cervical disc disorder Fusion      Chronic kidney disease 2020    CVD (cardiovascular disease)     History of TIA 1989    Diabetes mellitus     DVT (deep venous thrombosis)     Right lower extremity DVT and pulmonary embolism in 06/2015, idiopathic    DVT of proximal lower limb 06/22/2015    proximal DVT right leg, small PE- anticoagulation    Dyslipidemia     Fracture 1952    H/o pf left forearm fracture    Fracture of right hand 1980    Fracture of wrist 1980    GERD (gastroesophageal reflux disease)     with hiatal hernia    HL (hearing loss)     Hx of angina pectoris     Hypertension     Normal renal angiography 02/16/11    Knee swelling Several years ago    Left wrist fracture 1953    Lumbosacral disc disease 1996    Osgood-Schlatter's disease 1955    Osteoarthritis     Right wrist fracture     Vertigo     Wears glasses         Past Surgical History:   Procedure Laterality Date    APPENDECTOMY  1957    BACK SURGERY      CARDIAC CATHETERIZATION  2011    with vein graft    CERVICAL FUSION      CERVICAL FUSION  1992    C3-4    COLONOSCOPY  2013    CORONARY ARTERY BYPASS GRAFT  1994    HERNIA REPAIR Right 2011    inguinal    INGUINAL HERNIA REPAIR Left 10/29/2019    Procedure: INGUINAL HERNIA REPAIR LEFT;  Surgeon: Charisma Raines MD;  Location: [facility];  Service: General    LUMBAR LAMINECTOMY  1996    laminectomy, lumbar, L3    NECK SURGERY  1992    OTHER SURGICAL HISTORY      wrist surgery    SPINE SURGERY  1996    TONSILLECTOMY AND ADENOIDECTOMY  1945    TRIGGER POINT INJECTION  2001 - 2007    VASECTOMY  1972         EVALUATION   PT Ortho       Row Name 04/08/24 1300       Subjective    Subjective Comments No new issues/complaints. Hoping he is almost healed today.  -       Subjective Pain    Able to rate subjective pain? yes  -    Pre-Treatment Pain Level 0  -LH    Post-Treatment Pain Level 0  -LH       Transfers    Sit-Stand [place] (Transfers) independent  -LH    Stand-Sit [place] (Transfers) independent  -    Comment,  (Transfers) seated for tx  -       Gait/Stairs (Locomotion)    [place] Level (Gait) modified independence  -    Assistive Device (Gait) cane, straight  -              User Key  (r) = Recorded By, (t) = Taken By, (c) = Cosigned By      Initials Name Provider Type     Dejan Zabala, PT Physical Therapist                     LDA Wound       Row Name 04/08/24 1300             Wound 01/31/24 1115 Right lateral leg Venous Ulcer    Wound - Properties Group Placement Date: 01/31/24  [facility] Placement Time: 1115  - Present on Original Admission: Y  -MC Side: Right  - Orientation: lateral  -MC Location: leg  -MC Primary Wound Type: Venous ulcer  -    Wound Image Images linked: 1  -      Dressing Appearance intact;dried drainage  scant drainage at R distal/posterior ankle  -      Base moist;pink;epithelialization  small scabbed/open area at distal/posterior aspect.  -      Periwound intact;dry;redness;swelling  scant erythema surrounding lateral ankle wound area, improving. Small intact blisteres at border of mepilex  -      Periwound Temperature warm  -      Periwound Skin Turgor soft  -      Drainage Amount none  -      Care, Wound cleansed with;soap and water;yaa boot  -      Dressing Care dressing applied;silver impregnated;low-adherent;foam  Mepilex Ag, UB  -      Periwound Care cleansed with pH balanced cleanser;dry periwound area maintained;barrier ointment applied;other (see comments)  zguard  -      Retired Wound - Properties Group Placement Date: 01/31/24  - Placement Time: 1115  - Present on Original Admission: Y  - Side: Right  - Orientation: lateral  - Location: leg  -MC Primary Wound Type: Venous ulcer  -MC    Retired Wound - Properties Group Date first assessed: 01/31/24  - Time first assessed: 1115  - Present on Original Admission: Y  - Side: Right  - Location: leg  -MC Primary Wound Type: Venous ulcer  -MC              User Key  (r) = Recorded By, (t) =  "Taken By, (c) = Cosigned By      Initials Name Provider Type     Marlene Levy, PT Physical Therapist     Dejan Zabala, PT Physical Therapist                   Lymphedema       Row Name 04/08/24 1300             Lymphedema Edema Assessment    Pitting Edema Mild;Moderate;Severe  moderate/severe L upper calf, proximal to UB  -         Skin Changes/Observations    Lower Extremity Conditions right:;clean;dry;hairless;inflamed;fragile  -      Lower Extremity Color/Pigment right:;erythema;hypopigmented;hyperpigmented  -      Skin Observations Comment Intact blistering at border of mepilex Ag.  -         Lymphedema Pulses/Capillary Refill    Capillary Refill lower extremity capillary refill  -      Lower Extremity Capillary Refill right:;less than 3 seconds  -         Compression/Skin Care    Compression/Skin Care skin care;wrapping location;bandaging  -      Skin Care washed/dried;lotion applied  -      Wrapping Location lower extremity  -      Wrapping Location LE right:;foot to knee  -      Wrapping Comments Wound dressings, unna boot applied in clamshell pattern, 4\" coban, size 5 spandage to secure  -                User Key  (r) = Recorded By, (t) = Taken By, (c) = Cosigned By      Initials Name Provider Type     Dejan Zabala, PT Physical Therapist                    WOUND DEBRIDEMENT                    Therapy Education       Row Name 04/08/24 1300             Therapy Education    Education Details Continue with mepilex/UB until drainage completely resolved and no open areas. Then transition to UB only for 1-2 weeks prior to return to PLOF.  -      Given Bandaging/dressing change;Edema management;Symptoms/condition management  -      Program Reinforced  -      How Provided Verbal;Demonstration  -      Provided to Patient  -      Level of Understanding Teach back education performed;Verbalized  -                User Key  (r) = Recorded By, (t) = Taken By, (c) = " Cosigned By      Initials Name Provider Type     Dejan Zabala, PT Physical Therapist                    Recommendation and Plan   PT Assessment/Plan       Row Name 04/08/24 1300          PT Assessment    Functional Limitations Performance in self-care ADL;Other (comment)  -     Impairments Integumentary integrity;Impaired venous circulation;Edema  -     Assessment Comments Pt's original R lateral leg wound still appears to remain closed with fragile epithelium and no drainage on mepilex in this area, however pt with very small open area and scabbing distally/posteriorly to orignal area with small amount of drainage. Pt also with a few small intact blisters along the border of the mepilex ag likely related to increased RLE edema. Pt would continue to benefit from current POC to help promote wound healing.  -     Rehab Potential Good  -     Patient/caregiver participated in establishment of treatment plan and goals Yes  -     Patient would benefit from skilled therapy intervention Yes  -        PT Plan    PT Frequency 2x/week  -     Physical Therapy Interventions (Optional Details) wound care;patient/family education  -     PT Plan Comments debridement prn, unna boot  -               User Key  (r) = Recorded By, (t) = Taken By, (c) = Cosigned By      Initials Name Provider Type     Dejan Zabala, PT Physical Therapist                    Goals   PT OP Goals       Row Name 04/08/24 1331          Time Calculation    PT Goal Re-Cert Due Date 04/30/24  -               User Key  (r) = Recorded By, (t) = Taken By, (c) = Cosigned By      Initials Name Provider Type     Dejan Zabala, PT Physical Therapist                    PT Goal Re-Cert Due Date: 04/30/24            Time Calculation: Start Time: 1300  Untimed Charges  70418-[place]pc[place] Boot: 20  Total Minutes  Untimed Charges Total Minutes: 20   Total Minutes: 20  Therapy Charges for Today       Code Description Service Date Service Provider  Modifiers Qty    [id number] HC PT STAPPING UNNA BOOT 4/8/2024 Dejan Zabala, PT GP 1                    Dejan Zabala PT  4/8/2024

## 2024-04-10 ENCOUNTER — OFFICE VISIT (OUTPATIENT)
Dept: ORTHOPEDIC SURGERY | Facility: CLINIC | Age: 84
End: 2024-04-10
Payer: MEDICARE

## 2024-04-10 VITALS
BODY MASS INDEX: 28.95 KG/M2 | SYSTOLIC BLOOD PRESSURE: 148 MMHG | DIASTOLIC BLOOD PRESSURE: 68 MMHG | HEIGHT: 70 IN | WEIGHT: 202.2 LBS

## 2024-04-10 DIAGNOSIS — M17.12 PRIMARY OSTEOARTHRITIS OF LEFT KNEE: Primary | ICD-10-CM

## 2024-04-10 RX ORDER — ROPIVACAINE HYDROCHLORIDE 5 MG/ML
4 INJECTION, SOLUTION EPIDURAL; INFILTRATION; PERINEURAL
Status: COMPLETED | OUTPATIENT
Start: 2024-04-10 | End: 2024-04-10

## 2024-04-10 RX ORDER — TRIAMCINOLONE ACETONIDE 40 MG/ML
40 INJECTION, SUSPENSION INTRA-ARTICULAR; INTRAMUSCULAR
Status: COMPLETED | OUTPATIENT
Start: 2024-04-10 | End: 2024-04-10

## 2024-04-10 RX ORDER — BUMETANIDE 1 MG/1
1 TABLET ORAL AS NEEDED
COMMUNITY

## 2024-04-10 RX ADMIN — TRIAMCINOLONE ACETONIDE 40 MG: 40 INJECTION, SUSPENSION INTRA-ARTICULAR; INTRAMUSCULAR at 14:49

## 2024-04-10 RX ADMIN — ROPIVACAINE HYDROCHLORIDE 4 ML: 5 INJECTION, SOLUTION EPIDURAL; INFILTRATION; PERINEURAL at 14:49

## 2024-04-10 NOTE — PROGRESS NOTES
Procedure   - Large Joint Arthrocentesis: L knee on 4/10/2024 2:49 PM  Indications: pain  Details: 18 G needle, superolateral approach  Medications: 4 mL ropivacaine 0.5 %; 40 mg triamcinolone acetonide 40 MG/ML  Aspirate: 40 mL yellow and clear  Outcome: tolerated well, no immediate complications  Procedure, treatment alternatives, risks and benefits explained, specific risks discussed. Consent was given by the patient. Immediately prior to procedure a time out was called to verify the correct patient, procedure, equipment, support staff and site/side marked as required. Patient was prepped and draped in the usual sterile fashion.

## 2024-04-10 NOTE — PROGRESS NOTES
[facility] Note    Subjective     Chief Complaint   Patient presents with    Follow-up     6 week follow up -- primary osteoarthritis of left knee        HPI    It has been 6  week(s) since Mr. Alcala's last visit. He returns to clinic today for follow-up of left knee arthritis. The issue has been ongoing for several year(s). He rates his pain a 3/10 on the pain scale. Previous/current treatments: cane/walker, NSAIDS, and steroid injection (last injection 10/23/23). Current symptoms: same as prior visit. The pain is worse with walking, standing, and climbing stairs; resting, sitting, and assistive device (cane/walker) improve the pain. Overall, he is doing better.  He would like another injection today.    I have reviewed the following portions of the patient's history and agree with: History of Present Illness and Review of Systems    Patient Active Problem List   Diagnosis    CAD (coronary artery disease)    CVD (cardiovascular disease)    DVT (deep venous thrombosis)    Diabetes mellitus    Dyslipidemia    Arthritis    GERD (gastroesophageal reflux disease)    Mixed hyperlipidemia    Diabetic nephropathy associated with type 2 diabetes mellitus    Diabetic polyneuropathy associated with type 2 diabetes mellitus    Chronic kidney disease, stage III (moderate)    Vitamin D deficiency    Disc disorder of cervical region    Postthrombotic syndrome    Vertigo    Angina pectoris    Lumbosacral spondylosis without myelopathy    Psoriasis    Arthritis of elbow    Swelling of right lower extremity    Acute right-sided low back pain without sciatica    Hoarseness    Unifocal PVCs    Skin lesion of face    Primary hypertension    Medicare annual wellness visit, subsequent    Stage 3 chronic kidney disease due to benign hypertension    BMI 30.0-30.9,adult    Postherpetic neuralgia    Herpes zoster without complication    Calculus of gallbladder without cholecystitis without obstruction    Acute  diverticulitis    Leg edema, right    Chest pain    Encounter for removal of sutures    Chronic pain of left knee    Fall     Past Medical History:   Diagnosis Date    Arthritis     Arthritis of neck Fusion 1992    Bilateral radial fractures 1962    fracture bilateral radial heads    CAD (coronary artery disease)     Cataract     Cervical disc disorder Fusion 1992    Chronic kidney disease 2020    CVD (cardiovascular disease)     History of TIA 1989    Diabetes mellitus     DVT (deep venous thrombosis)     Right lower extremity DVT and pulmonary embolism in 06/2015, idiopathic    DVT of proximal lower limb 06/22/2015    proximal DVT right leg, small PE- anticoagulation    Dyslipidemia     Fracture 1952    H/o pf left forearm fracture    Fracture of right hand 1980    Fracture of wrist 1980    GERD (gastroesophageal reflux disease)     with hiatal hernia    HL (hearing loss)     Hx of angina pectoris     Hypertension     Normal renal angiography 02/16/11    Knee swelling Several years ago    Left wrist fracture 1953    Lumbosacral disc disease 1996    Osgood-Schlatter's disease 1955    Osteoarthritis     Right wrist fracture     Vertigo     Wears glasses       Past Surgical History:   Procedure Laterality Date    APPENDECTOMY  1957    BACK SURGERY      CARDIAC CATHETERIZATION  2011    with vein graft    CERVICAL FUSION      CERVICAL FUSION  1992    C3-4    COLONOSCOPY  2013    CORONARY ARTERY BYPASS GRAFT  1994    HERNIA REPAIR Right 2011    inguinal    INGUINAL HERNIA REPAIR Left 10/29/2019    Procedure: INGUINAL HERNIA REPAIR LEFT;  Surgeon: Charisma Raines MD;  Location: [facility] OR;  Service: General    LUMBAR LAMINECTOMY  1996    laminectomy, lumbar, L3    NECK SURGERY  1992    OTHER SURGICAL HISTORY      wrist surgery    SPINE SURGERY  1996    TONSILLECTOMY AND ADENOIDECTOMY  1945    TRIGGER POINT INJECTION  2001 - 2007    VASECTOMY  1972      Family History   Problem Relation Age of Onset    Arthritis  Mother         OA    Heart disease Father     Diabetes Father     Heart attack Father     Heart disease Brother     Heart attack Brother     Diabetes Brother     Diabetes Son     Hypertension Other     Cancer Other      Social History     Socioeconomic History    Marital status:    Tobacco Use    Smoking status: Former     Current packs/day: 0.00     Average packs/day: 1.5 packs/day for 34.8 years (52.2 ttl pk-yrs)     Types: Cigarettes, Pipe, Cigars     Start date: 1962     Quit date: 1997     Years since quittin.9     Passive exposure: Past    Smokeless tobacco: Never    Tobacco comments:     QUIT DATE 1992   Vaping Use    Vaping status: Never Used   Substance and Sexual Activity    Alcohol use: Yes     Alcohol/week: 11.0 - 13.0 standard drinks of alcohol     Types: 7 Glasses of wine, 2 Cans of beer, 2 - 4 Shots of liquor per week     Comment: glass of wine everyday     Drug use: No    Sexual activity: Not Currently     Partners: Female     Birth control/protection: Surgical, Vasectomy      Current Outpatient Medications on File Prior to Visit   Medication Sig Dispense Refill    acetaminophen (TYLENOL) 325 MG tablet Take 2 tablets by mouth As Needed for Mild Pain.      atorvastatin (LIPITOR) 40 MG tablet Take 1 tablet by mouth Daily.      bumetanide (BUMEX) 1 MG tablet Take 1 tablet by mouth As Needed (Edema/kidney).      calcipotriene-betamethasone (TACLONEX) 0.005-0.064 % ointment Apply 1 Application topically to the appropriate area as directed As Needed (psoriasis).      carvedilol (COREG) 6.25 MG tablet Take 1 tablet by mouth 2 (Two) Times a Day With Meals.      cholecalciferol (VITAMIN D3) 25 MCG (1000 UT) tablet Take 1 tablet by mouth Daily.      diclofenac (VOLTAREN) 1 % gel gel Apply 4 g topically As Needed.      diltiaZEM CD (CARDIZEM CD) 180 MG 24 hr capsule Take 1 capsule by mouth Daily.      doxazosin (CARDURA) 2 MG tablet Take 1 tablet by mouth 2 (Two) Times a Day.       glucose blood test strip Use to check blood sugar twice daily 100 each 3    lidocaine (LIDODERM) 5 % Place 1 patch on the skin as directed by provider Daily. Remove & Discard patch within 12 hours or as directed by MD Ocampo patch 2    Microlet Lancets misc Use to check blood sugar twice daily 100 each 3    nitroglycerin (NITROSTAT) 0.4 MG SL tablet Place 1 tablet under the tongue Every 5 (Five) Minutes As Needed for Chest Pain. Take no more than 3 doses in 15 minutes.      nortriptyline (PAMELOR) 10 MG capsule Take 1 capsule by mouth Every Night. 30 capsule 5    omeprazole (priLOSEC) 20 MG capsule Take 1 capsule by mouth Daily.      warfarin (COUMADIN) 5 MG tablet Take 1 tablet by mouth Every Night. Alternates 5 mg with 2.5 mg qod 30 tablet 2     No current facility-administered medications on file prior to visit.      Allergies   Allergen Reactions    Penicillins Hives and Swelling     Per patient, has tolerated Keflex        Review of Systems   Constitutional:  Negative for activity change, appetite change, chills, diaphoresis, fatigue, fever and unexpected weight change.   HENT:  Negative for congestion, dental problem, drooling, ear discharge, ear pain, facial swelling, hearing loss, mouth sores, nosebleeds, postnasal drip, rhinorrhea, sinus pressure, sneezing, sore throat, tinnitus, trouble swallowing and voice change.    Eyes:  Negative for photophobia, pain, discharge, redness, itching and visual disturbance.   Respiratory:  Negative for apnea, cough, choking, chest tightness, shortness of breath, wheezing and stridor.    Cardiovascular:  Negative for chest pain, palpitations and leg swelling.   Gastrointestinal:  Negative for abdominal distention, abdominal pain, anal bleeding, blood in stool, constipation, diarrhea, nausea, rectal pain and vomiting.   Endocrine: Negative for cold intolerance, heat intolerance, polydipsia, polyphagia and polyuria.   Genitourinary:  Negative for decreased urine volume,  "difficulty urinating, dysuria, enuresis, flank pain, frequency, genital sores, hematuria and urgency.   Musculoskeletal:  Positive for arthralgias. Negative for back pain, gait problem, joint swelling, myalgias, neck pain and neck stiffness.   Skin:  Negative for color change, pallor, rash and wound.   Allergic/Immunologic: Negative for environmental allergies, food allergies and immunocompromised state.   Neurological:  Negative for dizziness, tremors, seizures, syncope, facial asymmetry, speech difficulty, weakness, light-headedness, numbness and headaches.   Hematological:  Negative for adenopathy. Does not bruise/bleed easily.   Psychiatric/Behavioral:  Negative for agitation, behavioral problems, confusion, decreased concentration, dysphoric mood, hallucinations, self-injury, sleep disturbance and suicidal ideas. The patient is not nervous/anxious and is not hyperactive.         Objective      Physical Exam  /68   Ht 177.8 cm (70\")   Wt 91.7 kg (202 lb 3.2 oz)   BMI 29.01 kg/m²     Body mass index is 29.01 kg/m².    General:   Mental Status:  Alert   Appearance: Cooperative, in no acute distress   Build and Nutrition: Well-nourished well-developed male   Orientation: Alert and oriented to person, place and time   Posture: Normal   Gait: With a cane for balance    Integument:              Left knee: No skin lesions, no rash, no ecchymosis     Lower Extremities:              Left Knee:                          Tenderness:    None                          Effusion:          1-2+                          Swelling:          None                          Crepitus:          Soft                          Range of motion:        Extension:       10°                                                              Flexion:           110°    Imaging/Studies  Imaging Results (Last 24 Hours)       ** No results found for the last 24 hours. **          No new imaging today.    Assessment and Plan     Diagnoses and all " orders for this visit:    1. Primary osteoarthritis of left knee (Primary)  -     - Large Joint Arthrocentesis: L knee        1. Primary osteoarthritis of left knee        I reviewed my findings with the patient.  His left knee is bothering him to the point where he would like to have an aspiration and injection today.  This was provided, and I will see him back in 4 months, but sooner for any problems.    Procedure Note:  The potential benefits of performing a therapeutic left knee joint aspiration and injection, as well as potential risks (including, but not limited to infection, swelling, pain, bleeding, bruising, nerve/blood vessel damage, skin color changes, transient elevation in blood glucose levels, and fat atrophy) were discussed with the patient.  After informed consent, timeout procedure was performed, and the skin on the left knee was prepped with chlorhexidine soap and alcohol, after which ethyl chloride was applied to the skin at the injection site. Via the superior lateral approach, 40 cc of clear straw-colored fluid was aspirated then 1ml of Kenalog 40mg/ml mixed with 4ml 0.5% ropivacaine plain was injected into the knee joint.  The patient tolerated the procedure well, experiencing 90% improvement a few minutes following the injection. There were no complications.  Band-Aid was applied to the injection site. Post-procedural instructions were given to the patient and/or their caregiver.      Return in about 5 months (around 9/10/2024).      Jaya Renteria MD  04/10/24  15:21 EDT    Dictated Utilizing Dragon Dictation

## 2024-04-11 ENCOUNTER — HOSPITAL ENCOUNTER (OUTPATIENT)
Dept: PHYSICAL THERAPY | Facility: HOSPITAL | Age: 84
Setting detail: THERAPIES SERIES
Discharge: HOME | End: 2024-04-11
Payer: MEDICARE

## 2024-04-11 DIAGNOSIS — I87.2 VENOUS STASIS DERMATITIS OF RIGHT LOWER EXTREMITY: ICD-10-CM

## 2024-04-11 DIAGNOSIS — Z87.2 HISTORY OF CELLULITIS: ICD-10-CM

## 2024-04-11 DIAGNOSIS — S81.801D OPEN WOUND OF RIGHT LOWER LEG, SUBSEQUENT ENCOUNTER: Primary | ICD-10-CM

## 2024-04-11 DIAGNOSIS — R60.0 EDEMA OF RIGHT LOWER LEG: ICD-10-CM

## 2024-04-11 PROCEDURE — 29580 STRAPPING UNNA BOOT: CPT

## 2024-04-11 NOTE — THERAPY WOUND CARE TREATMENT
Outpatient Rehabilitation - Wound/Debridement Treatment Note  [facility]     Patient Name: Toño Alcala  : 1940  MRN: [id number]  Today's Date: 2024                 Admit Date: 2024    Visit Dx:    ICD-10-CM ICD-9-CM   1. Open wound of right lower leg, subsequent encounter  S81.801D V58.89     891.0   2. Edema of right lower leg  R60.0 782.3   3. History of cellulitis  Z87.2 V13.3   4. Venous stasis dermatitis of right lower extremity  I87.2 454.1       Patient Active Problem List   Diagnosis    CAD (coronary artery disease)    CVD (cardiovascular disease)    DVT (deep venous thrombosis)    Diabetes mellitus    Dyslipidemia    Arthritis    GERD (gastroesophageal reflux disease)    Mixed hyperlipidemia    Diabetic nephropathy associated with type 2 diabetes mellitus    Diabetic polyneuropathy associated with type 2 diabetes mellitus    Chronic kidney disease, stage III (moderate)    Vitamin D deficiency    Disc disorder of cervical region    Postthrombotic syndrome    Vertigo    Angina pectoris    Lumbosacral spondylosis without myelopathy    Psoriasis    Arthritis of elbow    Swelling of right lower extremity    Acute right-sided low back pain without sciatica    Hoarseness    Unifocal PVCs    Skin lesion of face    Primary hypertension    Medicare annual wellness visit, subsequent    Stage 3 chronic kidney disease due to benign hypertension    BMI 30.0-30.9,adult    Postherpetic neuralgia    Herpes zoster without complication    Calculus of gallbladder without cholecystitis without obstruction    Acute diverticulitis    Leg edema, right    Chest pain    Encounter for removal of sutures    Chronic pain of left knee    Fall        Past Medical History:   Diagnosis Date    Arthritis     Arthritis of neck Fusion     Bilateral radial fractures     fracture bilateral radial heads    CAD (coronary artery disease)     Cataract     Cervical disc disorder Fusion     Chronic kidney disease  2020    CVD (cardiovascular disease)     History of TIA 1989    Diabetes mellitus     DVT (deep venous thrombosis)     Right lower extremity DVT and pulmonary embolism in 06/2015, idiopathic    DVT of proximal lower limb 06/22/2015    proximal DVT right leg, small PE- anticoagulation    Dyslipidemia     Fracture 1952    H/o pf left forearm fracture    Fracture of right hand 1980    Fracture of wrist 1980    GERD (gastroesophageal reflux disease)     with hiatal hernia    HL (hearing loss)     Hx of angina pectoris     Hypertension     Normal renal angiography 02/16/11    Knee swelling Several years ago    Left wrist fracture 1953    Lumbosacral disc disease 1996    Osgood-Schlatter's disease 1955    Osteoarthritis     Right wrist fracture     Vertigo     Wears glasses         Past Surgical History:   Procedure Laterality Date    APPENDECTOMY  1957    BACK SURGERY      CARDIAC CATHETERIZATION  2011    with vein graft    CERVICAL FUSION      CERVICAL FUSION  1992    C3-4    COLONOSCOPY  2013    CORONARY ARTERY BYPASS GRAFT  1994    HERNIA REPAIR Right 2011    inguinal    INGUINAL HERNIA REPAIR Left 10/29/2019    Procedure: INGUINAL HERNIA REPAIR LEFT;  Surgeon: Charisma Raines MD;  Location: [facility];  Service: General    LUMBAR LAMINECTOMY  1996    laminectomy, lumbar, L3    NECK SURGERY  1992    OTHER SURGICAL HISTORY      wrist surgery    SPINE SURGERY  1996    TONSILLECTOMY AND ADENOIDECTOMY  1945    TRIGGER POINT INJECTION  2001 - 2007    VASECTOMY  1972         EVALUATION   PT Ortho       Row Name 04/11/24 1600       Subjective    Subjective Comments No new issues/complaints.  -LH       Subjective Pain    Able to rate subjective pain? yes  -LH    Pre-Treatment Pain Level 0  -LH    Post-Treatment Pain Level 0  -LH       Transfers    Sit-Stand [place] (Transfers) independent  -LH    Stand-Sit [place] (Transfers) independent  -LH    Comment, (Transfers) seated for tx  -LH       Gait/Stairs  (Locomotion)    [place] Level (Gait) modified independence  -    Assistive Device (Gait) cane, straight  -              User Key  (r) = Recorded By, (t) = Taken By, (c) = Cosigned By      Initials Name Provider Type     Dejan Zabala, PT Physical Therapist                     LDA Wound       Row Name 04/11/24 1600             Wound 01/31/24 1115 Right lateral leg Venous Ulcer    Wound - Properties Group Placement Date: 01/31/24  - Placement Time: 1115  - Present on Original Admission: Y  - Side: Right  - Orientation: lateral  - Location: leg  -MC Primary Wound Type: Venous ulcer  -    Dressing Appearance intact;dried drainage  Drainage only on under surface of Mepilex, did not make it through dressing.  -      Base moist;pink;epithelialization  2 pinpoint open areas at distal/posterior aspect.  -      Periwound intact;dry;swelling  -      Periwound Temperature warm  -      Periwound Skin Turgor soft  -      Edges open;irregular  -      Drainage Characteristics/Odor serous  -      Drainage Amount scant  -      Care, Wound cleansed with;soap and water;yaa boot  -      Dressing Care dressing applied;silver impregnated;low-adherent;foam  Mepilex Ag, UB  -      Periwound Care cleansed with pH balanced cleanser;dry periwound area maintained;barrier ointment applied;other (see comments)  zguard  -      Retired Wound - Properties Group Placement Date: 01/31/24  - Placement Time: 1115  -MC Present on Original Admission: Y  - Side: Right  - Orientation: lateral  - Location: leg  - Primary Wound Type: Venous ulcer  -    Retired Wound - Properties Group Date first assessed: 01/31/24  - Time first assessed: 1115  - Present on Original Admission: Y  - Side: Right  - Location: leg  - Primary Wound Type: Venous ulcer  -              User Key  (r) = Recorded By, (t) = Taken By, (c) = Cosigned By      Initials Name Provider Type    Marlene Bethea, PT  "Physical Therapist     Dejan Zabala, PT Physical Therapist                   Lymphedema       Row Name 04/11/24 1600             Lymphedema Edema Assessment    Pitting Edema Mild;Moderate  moderate L upper calf, proximal to UB  -         Skin Changes/Observations    Lower Extremity Conditions right:;clean;dry;hairless;inflamed;fragile  -      Lower Extremity Color/Pigment right:;erythema;hypopigmented;hyperpigmented  -         Lymphedema Pulses/Capillary Refill    Capillary Refill lower extremity capillary refill  -      Lower Extremity Capillary Refill right:;less than 3 seconds  -         Compression/Skin Care    Compression/Skin Care skin care;wrapping location;bandaging  -      Skin Care washed/dried;lotion applied  -      Wrapping Location lower extremity  -      Wrapping Location LE right:;foot to knee  -      Wrapping Comments Wound dressings, unna boot applied in clamshell pattern, 4\" coban, size 5 spandage to secure  -                User Key  (r) = Recorded By, (t) = Taken By, (c) = Cosigned By      Initials Name Provider Type     Dejan Zabala, PT Physical Therapist                    WOUND DEBRIDEMENT                    Therapy Education       Row Name 04/11/24 1600             Therapy Education    Education Details Continue current POC as drainge and skin integrity improving.  -      Given Bandaging/dressing change;Edema management;Symptoms/condition management  -      Program Reinforced  -      How Provided Verbal;Demonstration  -      Provided to Patient  -      Level of Understanding Teach back education performed;Verbalized  -                User Key  (r) = Recorded By, (t) = Taken By, (c) = Cosigned By      Initials Name Provider Type    Dejan Salazar, PT Physical Therapist                    Recommendation and Plan   PT Assessment/Plan       Row Name 04/11/24 1600          PT Assessment    Functional Limitations Performance in self-care ADL;Other " (comment)  -     Impairments Integumentary integrity;Impaired venous circulation;Edema  -     Assessment Comments Pt demonstrating good improvements in skin integrity of R lateral ankle this date as pt with on remaining serous bullae and scant/no erythema. Pt still with 2 pinpoint open areas at the distal/posterior aspect of the mepilex dressing with scant draiange which had not passed through the entire mepilex.  Pt would continue to benefit from current POC to promote wound healing.  -     Rehab Potential Good  -     Patient/caregiver participated in establishment of treatment plan and goals Yes  -     Patient would benefit from skilled therapy intervention Yes  -        PT Plan    PT Frequency 2x/week  -     Physical Therapy Interventions (Optional Details) wound care;patient/family education  -     PT Plan Comments debridement prn, unna boot  -               User Key  (r) = Recorded By, (t) = Taken By, (c) = Cosigned By      Initials Name Provider Type     Dejan Zabala, PT Physical Therapist                    Goals   PT OP Goals       Row Name 04/11/24 1614          Time Calculation    PT Goal Re-Cert Due Date 04/30/24  -               User Key  (r) = Recorded By, (t) = Taken By, (c) = Cosigned By      Initials Name Provider Type     Dejan Zabala, PT Physical Therapist                    PT Goal Re-Cert Due Date: 04/30/24            Time Calculation: Start Time: 1430  Untimed Charges  [id number] Boot: 20  Total Minutes  Untimed Charges Total Minutes: 20   Total Minutes: 20  Therapy Charges for Today       Code Description Service Date Service Provider Modifiers Qty    10441184153 HC PT STAPPING UNNA BOOT 4/11/2024 Dejan Zabala, PT GP 1                    Dejan Zabala PT  4/11/2024

## 2024-04-15 ENCOUNTER — HOSPITAL ENCOUNTER (OUTPATIENT)
Dept: PHYSICAL THERAPY | Facility: HOSPITAL | Age: 84
Setting detail: THERAPIES SERIES
Discharge: HOME | End: 2024-04-15
Payer: MEDICARE

## 2024-04-15 DIAGNOSIS — R60.0 EDEMA OF RIGHT LOWER LEG: ICD-10-CM

## 2024-04-15 DIAGNOSIS — Z87.2 HISTORY OF CELLULITIS: ICD-10-CM

## 2024-04-15 DIAGNOSIS — S81.801D OPEN WOUND OF RIGHT LOWER LEG, SUBSEQUENT ENCOUNTER: Primary | ICD-10-CM

## 2024-04-15 DIAGNOSIS — I87.2 VENOUS STASIS DERMATITIS OF RIGHT LOWER EXTREMITY: ICD-10-CM

## 2024-04-15 PROCEDURE — 29580 STRAPPING UNNA BOOT: CPT

## 2024-04-15 NOTE — THERAPY WOUND CARE TREATMENT
Outpatient Rehabilitation - Wound/Debridement Treatment Note  [facility]     Patient Name: Toño Alcala  : 1940  MRN: [id number]  Today's Date: 4/15/2024                 Admit Date: 4/15/2024    Visit Dx:    ICD-10-CM ICD-9-CM   1. Open wound of right lower leg, subsequent encounter  S81.801D V58.89     891.0   2. Edema of right lower leg  R60.0 782.3   3. History of cellulitis  Z87.2 V13.3   4. Venous stasis dermatitis of right lower extremity  I87.2 454.1       Patient Active Problem List   Diagnosis    CAD (coronary artery disease)    CVD (cardiovascular disease)    DVT (deep venous thrombosis)    Diabetes mellitus    Dyslipidemia    Arthritis    GERD (gastroesophageal reflux disease)    Mixed hyperlipidemia    Diabetic nephropathy associated with type 2 diabetes mellitus    Diabetic polyneuropathy associated with type 2 diabetes mellitus    Chronic kidney disease, stage III (moderate)    Vitamin D deficiency    Disc disorder of cervical region    Postthrombotic syndrome    Vertigo    Angina pectoris    Lumbosacral spondylosis without myelopathy    Psoriasis    Arthritis of elbow    Swelling of right lower extremity    Acute right-sided low back pain without sciatica    Hoarseness    Unifocal PVCs    Skin lesion of face    Primary hypertension    Medicare annual wellness visit, subsequent    Stage 3 chronic kidney disease due to benign hypertension    BMI 30.0-30.9,adult    Postherpetic neuralgia    Herpes zoster without complication    Calculus of gallbladder without cholecystitis without obstruction    Acute diverticulitis    Leg edema, right    Chest pain    Encounter for removal of sutures    Chronic pain of left knee    Fall        Past Medical History:   Diagnosis Date    Arthritis     Arthritis of neck Fusion     Bilateral radial fractures     fracture bilateral radial heads    CAD (coronary artery disease)     Cataract     Cervical disc disorder Fusion     Chronic kidney disease  2020    CVD (cardiovascular disease)     History of TIA 1989    Diabetes mellitus     DVT (deep venous thrombosis)     Right lower extremity DVT and pulmonary embolism in 06/2015, idiopathic    DVT of proximal lower limb 06/22/2015    proximal DVT right leg, small PE- anticoagulation    Dyslipidemia     Fracture 1952    H/o pf left forearm fracture    Fracture of right hand 1980    Fracture of wrist 1980    GERD (gastroesophageal reflux disease)     with hiatal hernia    HL (hearing loss)     Hx of angina pectoris     Hypertension     Normal renal angiography 02/16/11    Knee swelling Several years ago    Left wrist fracture 1953    Lumbosacral disc disease 1996    Osgood-Schlatter's disease 1955    Osteoarthritis     Right wrist fracture     Vertigo     Wears glasses         Past Surgical History:   Procedure Laterality Date    APPENDECTOMY  1957    BACK SURGERY      CARDIAC CATHETERIZATION  2011    with vein graft    CERVICAL FUSION      CERVICAL FUSION  1992    C3-4    COLONOSCOPY  2013    CORONARY ARTERY BYPASS GRAFT  1994    HERNIA REPAIR Right 2011    inguinal    INGUINAL HERNIA REPAIR Left 10/29/2019    Procedure: INGUINAL HERNIA REPAIR LEFT;  Surgeon: Charisma Raines MD;  Location: [facility];  Service: General    LUMBAR LAMINECTOMY  1996    laminectomy, lumbar, L3    NECK SURGERY  1992    OTHER SURGICAL HISTORY      wrist surgery    SPINE SURGERY  1996    TONSILLECTOMY AND ADENOIDECTOMY  1945    TRIGGER POINT INJECTION  2001 - 2007    VASECTOMY  1972         EVALUATION   PT Ortho       Row Name 04/15/24 1300       Subjective    Subjective Comments No complaints or changes since last tx  -MC       Subjective Pain    Able to rate subjective pain? yes  -MC    Pre-Treatment Pain Level 0  -MC    Post-Treatment Pain Level 0  -MC       Transfers    Sit-Stand [place] (Transfers) independent  -MC    Stand-Sit [place] (Transfers) independent  -MC    Comment, (Transfers) seated for tx  -MC        Gait/Stairs (Locomotion)    [place] (Gait) modified independence  -    Assistive Device (Gait) cane, straight  -              User Key  (r) = Recorded By, (t) = Taken By, (c) = Cosigned By      Initials Name Provider Type    Marlene Bethea, MYRIAM Physical Therapist                     LDA Wound       Row Name 04/15/24 1300             Wound 01/31/24 1115 Right lateral leg Venous Ulcer    Wound - Properties Group Placement Date: 01/31/24  - Placement Time: 1115  - Present on Original Admission: Y  - Side: Right  - Orientation: lateral  - Location: leg  - Primary Wound Type: Venous ulcer  -    Wound Image Images linked: 1  -      Dressing Appearance intact;dried drainage  pinpoint drainage  -      Base pink;epithelialization;dry  no open areas noted today  -      Periwound intact;dry;swelling  -      Periwound Temperature warm  -      Periwound Skin Turgor soft  -      Drainage Amount none  -      Care, Wound cleansed with;wound cleanser;yaa boot  -      Dressing Care dressing applied;silver impregnated;low-adherent;foam  mepilex Ag, unna boot  -      Periwound Care cleansed with pH balanced cleanser;barrier ointment applied  zguard  -      Retired Wound - Properties Group Placement Date: 01/31/24  - Placement Time: 1115  - Present on Original Admission: Y  - Side: Right  - Orientation: lateral  - Location: leg  - Primary Wound Type: Venous ulcer  -    Retired Wound - Properties Group Date first assessed: 01/31/24  - Time first assessed: 1115  - Present on Original Admission: Y  - Side: Right  - Location: leg  - Primary Wound Type: Venous ulcer  -              User Key  (r) = Recorded By, (t) = Taken By, (c) = Cosigned By      Initials Name Provider Type    Marlene Bethea PT Physical Therapist                   Lymphedema       Row Name 04/15/24 1300             Lymphedema Edema Assessment    Pitting Edema Mild;Moderate  moderate L  "upper calf, proximal to UB  -         Skin Changes/Observations    Lower Extremity Conditions right:;clean;dry;hairless;inflamed;fragile  -      Lower Extremity Color/Pigment right:;erythema;hypopigmented;hyperpigmented  -         Lymphedema Pulses/Capillary Refill    Capillary Refill lower extremity capillary refill  -      Lower Extremity Capillary Refill right:;less than 3 seconds  -         Compression/Skin Care    Compression/Skin Care skin care;wrapping location;bandaging  -      Skin Care washed/dried;lotion applied  -      Wrapping Location lower extremity  -      Wrapping Location LE right:;foot to knee  -      Wrapping Comments Wound dressings, unna boot applied in clamshell pattern, 4\" coban, size 5 spandage to secure  -                User Key  (r) = Recorded By, (t) = Taken By, (c) = Cosigned By      Initials Name Provider Type    Marlene Bethea, PT Physical Therapist                    WOUND DEBRIDEMENT                    Therapy Education       Row Name 04/15/24 1300             Therapy Education    Education Details Continue current POC until areas confirmed closed, then do UB x1-2 more treatments to allow for transition to compression socks at home.  -      Given Bandaging/dressing change;Edema management;Symptoms/condition management  -      Program Reinforced  -      How Provided Verbal;Demonstration  -MC      Provided to Patient  -      Level of Understanding Teach back education performed;Verbalized  -                User Key  (r) = Recorded By, (t) = Taken By, (c) = Cosigned By      Initials Name Provider Type    Marlene Bethea, PT Physical Therapist                    Recommendation and Plan   PT Assessment/Plan       Row Name 04/15/24 1300          PT Assessment    Functional Limitations Performance in self-care ADL;Other (comment)  -     Impairments Integumentary integrity;Impaired venous circulation;Edema  -     Assessment Comments Pt with no " visible open areas after thorough cleaning today. PT replaced silver/unna boot to protect new skin and will assess next treatment. If the area remains closed, PT suggests 1-2 additional placements of unna boot compression to further assess skin integrity prior to discharge.  -     Rehab Potential Good  -     Patient/caregiver participated in establishment of treatment plan and goals Yes  -     Patient would benefit from skilled therapy intervention Yes  -        PT Plan    PT Frequency 2x/week  -     Physical Therapy Interventions (Optional Details) wound care;patient/family education  -     PT Plan Comments debridement, dressings, UB  -               User Key  (r) = Recorded By, (t) = Taken By, (c) = Cosigned By      Initials Name Provider Type    Marlene Bethea, PT Physical Therapist                    Goals   PT OP Goals       Row Name 04/15/24 1333          Time Calculation    PT Goal Re-Cert Due Date 04/30/24  -               User Key  (r) = Recorded By, (t) = Taken By, (c) = Cosigned By      Initials Name Provider Type     Marlene Levy, PT Physical Therapist                    PT Goal Re-Cert Due Date: 04/30/24            Time Calculation: Start Time: 1300  Untimed Charges  29580-Unna Boot: 25  Total Minutes  Untimed Charges Total Minutes: 25   Total Minutes: 25              Marlene Levy PT  4/15/2024

## 2024-04-18 ENCOUNTER — HOSPITAL ENCOUNTER (OUTPATIENT)
Dept: PHYSICAL THERAPY | Facility: HOSPITAL | Age: 84
Setting detail: THERAPIES SERIES
Discharge: HOME | End: 2024-04-18
Payer: MEDICARE

## 2024-04-18 ENCOUNTER — CLINICAL SUPPORT (OUTPATIENT)
Dept: INTERNAL MEDICINE | Facility: CLINIC | Age: 84
End: 2024-04-18
Payer: MEDICARE

## 2024-04-18 DIAGNOSIS — R60.0 EDEMA OF RIGHT LOWER LEG: ICD-10-CM

## 2024-04-18 DIAGNOSIS — Z51.81 THERAPEUTIC DRUG MONITORING: Primary | ICD-10-CM

## 2024-04-18 DIAGNOSIS — I87.2 VENOUS STASIS DERMATITIS OF RIGHT LOWER EXTREMITY: ICD-10-CM

## 2024-04-18 DIAGNOSIS — Z87.2 HISTORY OF CELLULITIS: ICD-10-CM

## 2024-04-18 DIAGNOSIS — Z79.01 ANTICOAGULATED ON WARFARIN: ICD-10-CM

## 2024-04-18 DIAGNOSIS — S81.801D OPEN WOUND OF RIGHT LOWER LEG, SUBSEQUENT ENCOUNTER: Primary | ICD-10-CM

## 2024-04-18 LAB — INR PPP: 3.9 (ref 0.9–1.1)

## 2024-04-18 PROCEDURE — 85610 PROTHROMBIN TIME: CPT | Performed by: INTERNAL MEDICINE

## 2024-04-18 PROCEDURE — 29580 STRAPPING UNNA BOOT: CPT

## 2024-04-18 PROCEDURE — 36416 COLLJ CAPILLARY BLOOD SPEC: CPT | Performed by: INTERNAL MEDICINE

## 2024-04-18 NOTE — THERAPY WOUND CARE TREATMENT
Outpatient Rehabilitation - Wound/Debridement Treatment Note  [facility]     Patient Name: Toño Alcala  : 1940  MRN: [id number]  Today's Date: 2024                 Admit Date: 2024    Visit Dx:    ICD-10-CM ICD-9-CM   1. Open wound of right lower leg, subsequent encounter  S81.801D V58.89     891.0   2. Edema of right lower leg  R60.0 782.3   3. History of cellulitis  Z87.2 V13.3   4. Venous stasis dermatitis of right lower extremity  I87.2 454.1     R lateral ankle          Patient Active Problem List   Diagnosis    CAD (coronary artery disease)    CVD (cardiovascular disease)    DVT (deep venous thrombosis)    Diabetes mellitus    Dyslipidemia    Arthritis    GERD (gastroesophageal reflux disease)    Mixed hyperlipidemia    Diabetic nephropathy associated with type 2 diabetes mellitus    Diabetic polyneuropathy associated with type 2 diabetes mellitus    Chronic kidney disease, stage III (moderate)    Vitamin D deficiency    Disc disorder of cervical region    Postthrombotic syndrome    Vertigo    Angina pectoris    Lumbosacral spondylosis without myelopathy    Psoriasis    Arthritis of elbow    Swelling of right lower extremity    Acute right-sided low back pain without sciatica    Hoarseness    Unifocal PVCs    Skin lesion of face    Primary hypertension    Medicare annual wellness visit, subsequent    Stage 3 chronic kidney disease due to benign hypertension    BMI 30.0-30.9,adult    Postherpetic neuralgia    Herpes zoster without complication    Calculus of gallbladder without cholecystitis without obstruction    Acute diverticulitis    Leg edema, right    Chest pain    Encounter for removal of sutures    Chronic pain of left knee    Fall        Past Medical History:   Diagnosis Date    Arthritis     Arthritis of neck Fusion     Bilateral radial fractures     fracture bilateral radial heads    CAD (coronary artery disease)     Cataract     Cervical disc disorder Fusion      Chronic kidney disease 2020    CVD (cardiovascular disease)     History of TIA 1989    Diabetes mellitus     DVT (deep venous thrombosis)     Right lower extremity DVT and pulmonary embolism in 06/2015, idiopathic    DVT of proximal lower limb 06/22/2015    proximal DVT right leg, small PE- anticoagulation    Dyslipidemia     Fracture 1952    H/o pf left forearm fracture    Fracture of right hand 1980    Fracture of wrist 1980    GERD (gastroesophageal reflux disease)     with hiatal hernia    HL (hearing loss)     Hx of angina pectoris     Hypertension     Normal renal angiography 02/16/11    Knee swelling Several years ago    Left wrist fracture 1953    Lumbosacral disc disease 1996    Osgood-Schlatter's disease 1955    Osteoarthritis     Right wrist fracture     Vertigo     Wears glasses         Past Surgical History:   Procedure Laterality Date    APPENDECTOMY  1957    BACK SURGERY      CARDIAC CATHETERIZATION  2011    with vein graft    CERVICAL FUSION      CERVICAL FUSION  1992    C3-4    COLONOSCOPY  2013    CORONARY ARTERY BYPASS GRAFT  1994    HERNIA REPAIR Right 2011    inguinal    INGUINAL HERNIA REPAIR Left 10/29/2019    Procedure: INGUINAL HERNIA REPAIR LEFT;  Surgeon: Charisma Raines MD;  Location: [facility];  Service: General    LUMBAR LAMINECTOMY  1996    laminectomy, lumbar, L3    NECK SURGERY  1992    OTHER SURGICAL HISTORY      wrist surgery    SPINE SURGERY  1996    TONSILLECTOMY AND ADENOIDECTOMY  1945    TRIGGER POINT INJECTION  2001 - 2007    VASECTOMY  1972         EVALUATION   PT Ortho       Row Name 04/18/24 1500       Subjective    Subjective Comments No new issuses/complaints.  -LH       Subjective Pain    Able to rate subjective pain? yes  -LH    Pre-Treatment Pain Level 0  -LH    Post-Treatment Pain Level 0  -LH       Transfers    Sit-Stand [place] (Transfers) independent  -LH    Stand-Sit [place] (Transfers) independent  -    Comment, (Transfers) seated for tx  -        Gait/Stairs (Locomotion)    [place] (Gait) modified independence  -    Assistive Device (Gait) cane, straight  -              User Key  (r) = Recorded By, (t) = Taken By, (c) = Cosigned By      Initials Name Provider Type     Dejan Zabala, PT Physical Therapist                     LDA Wound       Row Name 04/18/24 1500             Wound 01/31/24 1115 Right lateral leg Venous Ulcer    Wound - Properties Group Placement Date: 01/31/24  - Placement Time: 1115  - Present on Original Admission: Y  - Side: Right  - Orientation: lateral  - Location: leg  -MC Primary Wound Type: Venous ulcer  -    Wound Image Images linked: 1  -      Dressing Appearance dry;intact;no drainage  no drainage noted on Mepilex Ag  -      Base pink;epithelialization;dry  no open areas  -      Periwound intact;dry;swelling  -      Periwound Temperature warm  -      Periwound Skin Turgor soft  -      Drainage Amount none  -      Care, Wound cleansed with;soap and water;yaa boot  -      Dressing Care dressing applied  UB only  -      Periwound Care cleansed with pH balanced cleanser;dry periwound area maintained;barrier ointment applied;other (see comments)  Zguard  -      Retired Wound - Properties Group Placement Date: 01/31/24  - Placement Time: 1115  -MC Present on Original Admission: Y  - Side: Right  - Orientation: lateral  - Location: leg  - Primary Wound Type: Venous ulcer  -    Retired Wound - Properties Group Date first assessed: 01/31/24  - Time first assessed: 1115  - Present on Original Admission: Y  - Side: Right  - Location: leg  - Primary Wound Type: Venous ulcer  -              User Key  (r) = Recorded By, (t) = Taken By, (c) = Cosigned By      Initials Name Provider Type     Marlene Levy, PT Physical Therapist    Dejan Salazar, PT Physical Therapist                   Lymphedema       Row Name 04/18/24 1500             Lymphedema Edema  "Assessment    Pitting Edema Mild;Severe  Severe L upper calf, proximal to UB  -         Skin Changes/Observations    Lower Extremity Conditions right:;clean;dry;hairless;inflamed;fragile  -      Lower Extremity Color/Pigment right:;erythema;hypopigmented;hyperpigmented  -         Lymphedema Pulses/Capillary Refill    Capillary Refill lower extremity capillary refill  -      Lower Extremity Capillary Refill right:;less than 3 seconds  -         Compression/Skin Care    Compression/Skin Care skin care;wrapping location;bandaging  -      Skin Care washed/dried;lotion applied  -      Wrapping Location lower extremity  -      Wrapping Location LE right:;foot to knee  -      Wrapping Comments RLE Unna boot applied in clamshell pattern, 4\" coban, size 5 spandge  -                User Key  (r) = Recorded By, (t) = Taken By, (c) = Cosigned By      Initials Name Provider Type     Dejan Zabala, PT Physical Therapist                    WOUND DEBRIDEMENT                    Therapy Education       Row Name 04/18/24 1500             Therapy Education    Education Details Trial discontinuation of mepilex foam. Reviewed need for re-application of UB this date with assessment of area next session and potential transition to personal compression.  -      Given Bandaging/dressing change;Edema management;Symptoms/condition management  -      Program Reinforced  -      How Provided Verbal;Demonstration  -      Provided to Patient  -      Level of Understanding Teach back education performed;Verbalized  -                User Key  (r) = Recorded By, (t) = Taken By, (c) = Cosigned By      Initials Name Provider Type     Dejan Zabala, PT Physical Therapist                    Recommendation and Plan   PT Assessment/Plan       Row Name 04/18/24 1500          PT Assessment    Functional Limitations Performance in self-care ADL;Other (comment)  -     Impairments Integumentary integrity;Impaired " venous circulation;Edema  -     Assessment Comments Pt continuing this date with no visible open wound to the R lateral ankle. Pt also with no drainage noted on the mepilex ag foam dressing this date. Due to improvements in wound/drainage PT trialed discontinuation of the mepilex foam with continued use of Unna boot for 1-2 more treatment sessions to allow for proper maturation of area prior to potential transition to personal compression garments.  -     Rehab Potential Good  -     Patient/caregiver participated in establishment of treatment plan and goals Yes  -     Patient would benefit from skilled therapy intervention Yes  -        PT Plan    PT Frequency 2x/week  -     Physical Therapy Interventions (Optional Details) wound care;patient/family education  -     PT Plan Comments debridement, dressings, UB  -               User Key  (r) = Recorded By, (t) = Taken By, (c) = Cosigned By      Initials Name Provider Type     Dejan Zabala, PT Physical Therapist                    Goals   PT OP Goals       Row Name 04/18/24 1518          Time Calculation    PT Goal Re-Cert Due Date 04/30/24  -               User Key  (r) = Recorded By, (t) = Taken By, (c) = Cosigned By      Initials Name Provider Type     Dejan Zabala, PT Physical Therapist                    PT Goal Re-Cert Due Date: 04/30/24            Time Calculation: Start Time: 1345  Untimed Charges  73406-Xgnj Boot: 20  Total Minutes  Untimed Charges Total Minutes: 20   Total Minutes: 20  Therapy Charges for Today       Code Description Service Date Service Provider Modifiers Qty    81267278043 HC PT STAPPING UNNA BOOT 4/18/2024 Dejan Zabala, PT GP 1                    Dejan Zabala PT  4/18/2024

## 2024-04-21 ENCOUNTER — TELEPHONE (OUTPATIENT)
Dept: INTERNAL MEDICINE | Facility: CLINIC | Age: 84
End: 2024-04-21
Payer: MEDICARE

## 2024-04-22 NOTE — TELEPHONE ENCOUNTER
I called patient advised per 's note and he stated that someone had already called him stating to take 2.5 mg on M and Th and 5 mg the rest of the week. Please advise on what dose he needs to be taking.

## 2024-04-22 NOTE — TELEPHONE ENCOUNTER
INR elevated Please hold coumadin 4/22/2024 and I believe on 2.5 qod with 5 mg qod and c would suggest 2.5 mg on Tuesday and Thursday and Saturday and 5 mg Mon Wed Fri Sunday and repeat INR in 2 weeks

## 2024-04-23 ENCOUNTER — HOSPITAL ENCOUNTER (OUTPATIENT)
Dept: PHYSICAL THERAPY | Facility: HOSPITAL | Age: 84
Setting detail: THERAPIES SERIES
Discharge: HOME | End: 2024-04-23
Payer: MEDICARE

## 2024-04-23 DIAGNOSIS — Z87.2 HISTORY OF CELLULITIS: ICD-10-CM

## 2024-04-23 DIAGNOSIS — S81.801D OPEN WOUND OF RIGHT LOWER LEG, SUBSEQUENT ENCOUNTER: Primary | ICD-10-CM

## 2024-04-23 DIAGNOSIS — R60.0 EDEMA OF RIGHT LOWER LEG: ICD-10-CM

## 2024-04-23 DIAGNOSIS — I87.2 VENOUS STASIS DERMATITIS OF RIGHT LOWER EXTREMITY: ICD-10-CM

## 2024-04-23 PROCEDURE — 97535 SELF CARE MNGMENT TRAINING: CPT

## 2024-04-23 NOTE — THERAPY WOUND CARE TREATMENT
Outpatient Rehabilitation - Wound/Debridement Treatment Note  [facility]     Patient Name: Toño Alcala  : 1940  MRN: [id number]  Today's Date: 2024                 Admit Date: 2024    Visit Dx:    ICD-10-CM ICD-9-CM   1. Open wound of right lower leg, subsequent encounter  S81.801D V58.89     891.0   2. Edema of right lower leg  R60.0 782.3   3. History of cellulitis  Z87.2 V13.3   4. Venous stasis dermatitis of right lower extremity  I87.2 454.1       Patient Active Problem List   Diagnosis    CAD (coronary artery disease)    CVD (cardiovascular disease)    DVT (deep venous thrombosis)    Diabetes mellitus    Dyslipidemia    Arthritis    GERD (gastroesophageal reflux disease)    Mixed hyperlipidemia    Diabetic nephropathy associated with type 2 diabetes mellitus    Diabetic polyneuropathy associated with type 2 diabetes mellitus    Chronic kidney disease, stage III (moderate)    Vitamin D deficiency    Disc disorder of cervical region    Postthrombotic syndrome    Vertigo    Angina pectoris    Lumbosacral spondylosis without myelopathy    Psoriasis    Arthritis of elbow    Swelling of right lower extremity    Acute right-sided low back pain without sciatica    Hoarseness    Unifocal PVCs    Skin lesion of face    Primary hypertension    Medicare annual wellness visit, subsequent    Stage 3 chronic kidney disease due to benign hypertension    BMI 30.0-30.9,adult    Postherpetic neuralgia    Herpes zoster without complication    Calculus of gallbladder without cholecystitis without obstruction    Acute diverticulitis    Leg edema, right    Chest pain    Encounter for removal of sutures    Chronic pain of left knee    Fall        Past Medical History:   Diagnosis Date    Arthritis     Arthritis of neck Fusion     Bilateral radial fractures     fracture bilateral radial heads    CAD (coronary artery disease)     Cataract     Cervical disc disorder Fusion     Chronic kidney disease  2020    CVD (cardiovascular disease)     History of TIA 1989    Diabetes mellitus     DVT (deep venous thrombosis)     Right lower extremity DVT and pulmonary embolism in 06/2015, idiopathic    DVT of proximal lower limb 06/22/2015    proximal DVT right leg, small PE- anticoagulation    Dyslipidemia     Fracture 1952    H/o pf left forearm fracture    Fracture of right hand 1980    Fracture of wrist 1980    GERD (gastroesophageal reflux disease)     with hiatal hernia    HL (hearing loss)     Hx of angina pectoris     Hypertension     Normal renal angiography 02/16/11    Knee swelling Several years ago    Left wrist fracture 1953    Lumbosacral disc disease 1996    Osgood-Schlatter's disease 1955    Osteoarthritis     Right wrist fracture     Vertigo     Wears glasses         Past Surgical History:   Procedure Laterality Date    APPENDECTOMY  1957    BACK SURGERY      CARDIAC CATHETERIZATION  2011    with vein graft    CERVICAL FUSION      CERVICAL FUSION  1992    C3-4    COLONOSCOPY  2013    CORONARY ARTERY BYPASS GRAFT  1994    HERNIA REPAIR Right 2011    inguinal    INGUINAL HERNIA REPAIR Left 10/29/2019    Procedure: INGUINAL HERNIA REPAIR LEFT;  Surgeon: Charisma Raines MD;  Location: [facility];  Service: General    LUMBAR LAMINECTOMY  1996    laminectomy, lumbar, L3    NECK SURGERY  1992    OTHER SURGICAL HISTORY      wrist surgery    SPINE SURGERY  1996    TONSILLECTOMY AND ADENOIDECTOMY  1945    TRIGGER POINT INJECTION  2001 - 2007    VASECTOMY  1972         EVALUATION   PT Ortho       Row Name 04/23/24 1345       Subjective    Subjective Comments no new issues / complaints  -MF       Subjective Pain    Able to rate subjective pain? yes  -MF    Pre-Treatment Pain Level 0  -MF    Post-Treatment Pain Level 0  -MF       Transfers    Sit-Stand [place] (Transfers) independent  -MF    Stand-Sit [place] (Transfers) independent  -MF    Comment, (Transfers) seated for tx  -MF       Gait/Stairs  (Locomotion)    Gillespie Level (Gait) modified independence  -    Assistive Device (Gait) cane, straight  -              User Key  (r) = Recorded By, (t) = Taken By, (c) = Cosigned By      Initials Name Provider Type    Jaya Doshi, PT Physical Therapist                       Lymphedema       Row Name 04/23/24 2652             Lymphedema Edema Assessment    Ptting Edema Category By severity  -      Pitting Edema Mild  -         Compression/Skin Care    Compression/Skin Care skin care;wrapping location;bandaging  -MF      Skin Care washed/dried;lotion applied  -      Wrapping Location lower extremity  -MF      Wrapping Location LE right:;foot to knee  -      Wrapping Comments BLE compression stockings donned with no issues noted.  -MF                User Key  (r) = Recorded By, (t) = Taken By, (c) = Cosigned By      Initials Name Provider Type    Jaya Doshi, PT Physical Therapist                    WOUND DEBRIDEMENT                    Therapy Education       Row Name 04/23/24 7210             Therapy Education    Education Details Pt to cont now with compression stockings daily. Pt able to verb understanding.  -MF      Given Bandaging/dressing change;Edema management;Symptoms/condition management  -      Program Reinforced  -MF      How Provided Verbal;Demonstration  -MF      Provided to Patient  -MF      Level of Understanding Teach back education performed;Verbalized  -                User Key  (r) = Recorded By, (t) = Taken By, (c) = Cosigned By      Initials Name Provider Type    Jaya Doshi, PT Physical Therapist                    Recommendation and Plan   PT Assessment/Plan       Row Name 04/23/24 7060          PT Assessment    Functional Limitations Performance in self-care ADL;Other (comment)  -MF     Impairments Integumentary integrity;Impaired venous circulation;Edema  -     Assessment Comments no open wounds noted to RLE with no drainage to unna boot.  PT transtioned pt to home compression stockings to allow for d/c to independent management with compression stockings.  -     Rehab Potential Good  -     Patient/caregiver participated in establishment of treatment plan and goals Yes  -     Patient would benefit from skilled therapy intervention Yes  -        PT Plan    PT Frequency Other (comment)  d/c to home management with f/u in 1-2 weeks if any issues arise.  -     Physical Therapy Interventions (Optional Details) wound care;patient/family education  -     PT Plan Comments Pt to cont with donning / doffing home stockings with possible d/c if no issues arise in 1-2 weeks.  -               User Key  (r) = Recorded By, (t) = Taken By, (c) = Cosigned By      Initials Name Provider Type    Jaya Doshi, PT Physical Therapist                    Goals   PT OP Goals       Row Name 04/23/24 1345          Time Calculation    PT Goal Re-Cert Due Date 04/30/24  -               User Key  (r) = Recorded By, (t) = Taken By, (c) = Cosigned By      Initials Name Provider Type    Jaya Doshi, PT Physical Therapist                    PT Goal Re-Cert Due Date: 04/30/24            Time Calculation: Start Time: 1345  Timed Charges  16129 - PT Self Care/Mgmt Minutes: 15  Total Minutes  Timed Charges Total Minutes: 15   Total Minutes: 15              Jaya Bower, PT  4/23/2024

## 2024-04-25 ENCOUNTER — TELEPHONE (OUTPATIENT)
Dept: INTERNAL MEDICINE | Facility: CLINIC | Age: 84
End: 2024-04-25

## 2024-04-25 RX ORDER — HYDRALAZINE HYDROCHLORIDE 25 MG/1
25 TABLET, FILM COATED ORAL 3 TIMES DAILY PRN
Qty: 45 TABLET | Refills: 1 | Status: SHIPPED | OUTPATIENT
Start: 2024-04-25

## 2024-04-25 NOTE — TELEPHONE ENCOUNTER
Patient called and stated about a month ago [facility] discontinued his losartan 100mg daily and replaced it with bumex 1mg 2 pills per week. Patient stated his BP readings have been fluctuating. Most recent readings being 180/80, 178/70, 174/84, 163/86, 162/80 over the last few days. He feels fine overall. Please advise.

## 2024-04-25 NOTE — TELEPHONE ENCOUNTER
"Caller: Toño Alcala \"Adama\"     Relationship: SELF    Best call back number: [phone]     What is your medical concern? HIGH BLOOD PRESSURE    How long has this issue been going on? FOR MONTHS, BUT GOT /80 TODAY 04/24/    Is your provider already aware of this issue? NO    Have you been treated for this issue? LOSARTAN WAS STOPPED AND HE STARTED     BEING WARM TRANSFERRED DUE TO BLOOD PRESSURE  "

## 2024-04-25 NOTE — TELEPHONE ENCOUNTER
I presume nephrology wants him to stay off losartan and therefore recommend he reach out to them to adjust his medications. If not able to get them I will send in hydralazine as needed    [Follow-Up Visit_____] : a follow-up visit for [unfilled]

## 2024-04-26 ENCOUNTER — APPOINTMENT (OUTPATIENT)
Dept: PHYSICAL THERAPY | Facility: HOSPITAL | Age: 84
End: 2024-04-26
Payer: MEDICARE

## 2024-04-30 ENCOUNTER — APPOINTMENT (OUTPATIENT)
Dept: PHYSICAL THERAPY | Facility: HOSPITAL | Age: 84
End: 2024-04-30
Payer: MEDICARE

## 2024-05-09 ENCOUNTER — CLINICAL SUPPORT (OUTPATIENT)
Dept: INTERNAL MEDICINE | Facility: CLINIC | Age: 84
End: 2024-05-09
Payer: MEDICARE

## 2024-05-09 ENCOUNTER — TELEPHONE (OUTPATIENT)
Dept: INTERNAL MEDICINE | Facility: CLINIC | Age: 84
End: 2024-05-09

## 2024-05-09 DIAGNOSIS — Z79.01 ANTICOAGULATED ON WARFARIN: ICD-10-CM

## 2024-05-09 DIAGNOSIS — Z51.81 THERAPEUTIC DRUG MONITORING: Primary | ICD-10-CM

## 2024-05-09 LAB
INR PPP: 4.7 (ref 0.9–1.1)
PROTHROMBIN TIME: 56.6 SECONDS

## 2024-05-09 PROCEDURE — 36416 COLLJ CAPILLARY BLOOD SPEC: CPT | Performed by: INTERNAL MEDICINE

## 2024-05-09 PROCEDURE — 85610 PROTHROMBIN TIME: CPT | Performed by: INTERNAL MEDICINE

## 2024-05-14 ENCOUNTER — CLINICAL SUPPORT (OUTPATIENT)
Dept: INTERNAL MEDICINE | Facility: CLINIC | Age: 84
End: 2024-05-14
Payer: MEDICARE

## 2024-05-14 ENCOUNTER — LAB (OUTPATIENT)
Dept: LAB | Facility: HOSPITAL | Age: 84
End: 2024-05-14
Payer: MEDICARE

## 2024-05-14 ENCOUNTER — TELEPHONE (OUTPATIENT)
Dept: INTERNAL MEDICINE | Facility: CLINIC | Age: 84
End: 2024-05-14

## 2024-05-14 ENCOUNTER — TRANSCRIBE ORDERS (OUTPATIENT)
Dept: LAB | Facility: HOSPITAL | Age: 84
End: 2024-05-14
Payer: MEDICARE

## 2024-05-14 DIAGNOSIS — Z51.81 THERAPEUTIC DRUG MONITORING: Primary | ICD-10-CM

## 2024-05-14 DIAGNOSIS — N18.32 CHRONIC KIDNEY DISEASE (CKD) STAGE G3B/A1, MODERATELY DECREASED GLOMERULAR FILTRATION RATE (GFR) BETWEEN 30-44 ML/MIN/1.73 SQUARE METER AND ALBUMINURIA CREATININE RATIO LESS THAN 30 MG/G (CMS/H*: Primary | ICD-10-CM

## 2024-05-14 DIAGNOSIS — Z79.01 ANTICOAGULATED ON WARFARIN: ICD-10-CM

## 2024-05-14 DIAGNOSIS — N18.32 CHRONIC KIDNEY DISEASE (CKD) STAGE G3B/A1, MODERATELY DECREASED GLOMERULAR FILTRATION RATE (GFR) BETWEEN 30-44 ML/MIN/1.73 SQUARE METER AND ALBUMINURIA CREATININE RATIO LESS THAN 30 MG/G (CMS/H*: ICD-10-CM

## 2024-05-14 LAB
CREAT UR-MCNC: 143.6 MG/DL
INR PPP: 1.4 (ref 0.9–1.1)
PROT ?TM UR-MCNC: 54.6 MG/DL
PROTHROMBIN TIME: 16.2 SECONDS

## 2024-05-14 PROCEDURE — 82570 ASSAY OF URINE CREATININE: CPT

## 2024-05-14 PROCEDURE — 85610 PROTHROMBIN TIME: CPT | Performed by: INTERNAL MEDICINE

## 2024-05-14 PROCEDURE — 36416 COLLJ CAPILLARY BLOOD SPEC: CPT | Performed by: INTERNAL MEDICINE

## 2024-05-14 PROCEDURE — 81001 URINALYSIS AUTO W/SCOPE: CPT | Performed by: STUDENT IN AN ORGANIZED HEALTH CARE EDUCATION/TRAINING PROGRAM

## 2024-05-14 PROCEDURE — 36415 COLL VENOUS BLD VENIPUNCTURE: CPT

## 2024-05-14 PROCEDURE — 80069 RENAL FUNCTION PANEL: CPT

## 2024-05-14 PROCEDURE — 84156 ASSAY OF PROTEIN URINE: CPT

## 2024-05-14 NOTE — TELEPHONE ENCOUNTER
"Called and left a voicemail asking to callback to go over the following results from Dr. Francis :    \"Not sure why his INR is all over the place.  Tell him to resumed coumadin 5 mg a day except for Monday, take 2.5, repeat 1 week. Ask him if he has made changes in his diet recently.   \"  "

## 2024-05-15 LAB
ALBUMIN SERPL-MCNC: 4.4 G/DL (ref 3.5–5.2)
ANION GAP SERPL CALCULATED.3IONS-SCNC: 11 MMOL/L (ref 5–15)
BACTERIA UR QL AUTO: NORMAL /HPF
BILIRUB UR QL STRIP: NEGATIVE
BUN SERPL-MCNC: 28 MG/DL (ref 8–23)
BUN/CREAT SERPL: 12.4 (ref 7–25)
CALCIUM SPEC-SCNC: 9.7 MG/DL (ref 8.6–10.5)
CHLORIDE SERPL-SCNC: 101 MMOL/L (ref 98–107)
CLARITY UR: CLEAR
CO2 SERPL-SCNC: 28 MMOL/L (ref 22–29)
COLOR UR: YELLOW
CREAT SERPL-MCNC: 2.25 MG/DL (ref 0.76–1.27)
EGFRCR SERPLBLD CKD-EPI 2021: 28.2 ML/MIN/1.73
GLUCOSE SERPL-MCNC: 146 MG/DL (ref 65–99)
GLUCOSE UR STRIP-MCNC: ABNORMAL MG/DL
HGB UR QL STRIP.AUTO: NEGATIVE
HYALINE CASTS UR QL AUTO: NORMAL /LPF
KETONES UR QL STRIP: NEGATIVE
LEUKOCYTE ESTERASE UR QL STRIP.AUTO: NEGATIVE
NITRITE UR QL STRIP: NEGATIVE
PH UR STRIP.AUTO: 7 [PH] (ref 5–8)
PHOSPHATE SERPL-MCNC: 2.2 MG/DL (ref 2.5–4.5)
POTASSIUM SERPL-SCNC: 4.2 MMOL/L (ref 3.5–5.2)
PROT UR QL STRIP: ABNORMAL
RBC # UR STRIP: NORMAL /HPF
REF LAB TEST METHOD: NORMAL
SODIUM SERPL-SCNC: 140 MMOL/L (ref 136–145)
SP GR UR STRIP: 1.02 (ref 1–1.03)
SQUAMOUS #/AREA URNS HPF: NORMAL /HPF
UROBILINOGEN UR QL STRIP: ABNORMAL
WBC # UR STRIP: NORMAL /HPF

## 2024-05-21 RX ORDER — WARFARIN SODIUM 5 MG/1
5 TABLET ORAL NIGHTLY
Qty: 30 TABLET | Refills: 2 | Status: SHIPPED | OUTPATIENT
Start: 2024-05-21

## 2024-05-21 NOTE — TELEPHONE ENCOUNTER
Rx Refill Note  Requested Prescriptions     Pending Prescriptions Disp Refills    warfarin (COUMADIN) 5 MG tablet 30 tablet 2     Sig: Take 1 tablet by mouth Every Night. Alternates 5 mg with 2.5 mg qod      Last office visit with prescribing clinician: 1/9/2024   Last telemedicine visit with prescribing clinician: Visit date not found   Next office visit with prescribing clinician: 7/9/2024                         Would you like a call back once the refill request has been completed: [] Yes [] No    If the office needs to give you a call back, can they leave a voicemail: [] Yes [] No    Ramya Bateman LPN  05/21/24, 12:55 EDT

## 2024-06-18 RX ORDER — WARFARIN SODIUM 5 MG/1
TABLET ORAL
Qty: 30 TABLET | Refills: 2 | Status: SHIPPED | OUTPATIENT
Start: 2024-06-18 | End: 2024-06-19 | Stop reason: SDUPTHER

## 2024-06-18 NOTE — TELEPHONE ENCOUNTER
Rx Refill Note  Requested Prescriptions     Pending Prescriptions Disp Refills    warfarin (COUMADIN) 5 MG tablet [Pharmacy Med Name: WARFARIN SODIUM 5 MG TABLET] 30 tablet 2     Sig: TAKE 1 TABLET BY MOUTH EVERY NIGHT. ALTERNATES 5 MG WITH 2.5 MG EVERY OTHER DAY      Last office visit with prescribing clinician: 1/9/2024   Last telemedicine visit with prescribing clinician: Visit date not found   Next office visit with prescribing clinician: 7/9/2024                         Would you like a call back once the refill request has been completed: [] Yes [] No    If the office needs to give you a call back, can they leave a voicemail: [] Yes [] No    Shayna Garzon MA  06/18/24, 08:14 EDT

## 2024-06-19 ENCOUNTER — TELEPHONE (OUTPATIENT)
Dept: INTERNAL MEDICINE | Facility: CLINIC | Age: 84
End: 2024-06-19
Payer: MEDICARE

## 2024-06-19 ENCOUNTER — CLINICAL SUPPORT (OUTPATIENT)
Dept: INTERNAL MEDICINE | Facility: CLINIC | Age: 84
End: 2024-06-19
Payer: MEDICARE

## 2024-06-19 DIAGNOSIS — Z79.01 ANTICOAGULATED ON WARFARIN: ICD-10-CM

## 2024-06-19 DIAGNOSIS — Z51.81 THERAPEUTIC DRUG MONITORING: Primary | ICD-10-CM

## 2024-06-19 LAB
INR PPP: 3.4 (ref 0.9–1.1)
PROTHROMBIN TIME: 40.9 SECONDS

## 2024-06-19 PROCEDURE — 85610 PROTHROMBIN TIME: CPT | Performed by: INTERNAL MEDICINE

## 2024-06-19 PROCEDURE — 36416 COLLJ CAPILLARY BLOOD SPEC: CPT | Performed by: INTERNAL MEDICINE

## 2024-06-19 RX ORDER — WARFARIN SODIUM 5 MG/1
TABLET ORAL
Qty: 30 TABLET | Refills: 2 | Status: SHIPPED | OUTPATIENT
Start: 2024-06-19

## 2024-06-19 NOTE — TELEPHONE ENCOUNTER
His most recent instructions from last month were that he he was to take 5 mg every day except for Mondays.  On Mondays he was to take 2.5 mg, or 1/2 tablet.  He is due now to have his INR repeated.

## 2024-06-19 NOTE — TELEPHONE ENCOUNTER
Faxed received from pharmacy needing clarification regarding medication for warfarin sodium 5 mg     DIRECTIONS STATE QD BUT THEN TO USE QOD/ PLEASE CLARIFY

## 2024-07-01 ENCOUNTER — OFFICE VISIT (OUTPATIENT)
Dept: CARDIOLOGY | Facility: CLINIC | Age: 84
End: 2024-07-01
Payer: MEDICARE

## 2024-07-01 VITALS
BODY MASS INDEX: 26.63 KG/M2 | OXYGEN SATURATION: 97 % | HEIGHT: 70 IN | HEART RATE: 62 BPM | SYSTOLIC BLOOD PRESSURE: 142 MMHG | DIASTOLIC BLOOD PRESSURE: 60 MMHG | WEIGHT: 186 LBS

## 2024-07-01 DIAGNOSIS — E78.2 MIXED HYPERLIPIDEMIA: ICD-10-CM

## 2024-07-01 DIAGNOSIS — I25.810 CORONARY ARTERY DISEASE INVOLVING CORONARY BYPASS GRAFT OF NATIVE HEART WITHOUT ANGINA PECTORIS: Primary | ICD-10-CM

## 2024-07-01 DIAGNOSIS — I10 ESSENTIAL HYPERTENSION: ICD-10-CM

## 2024-07-01 PROCEDURE — 3077F SYST BP >= 140 MM HG: CPT | Performed by: INTERNAL MEDICINE

## 2024-07-01 PROCEDURE — 1160F RVW MEDS BY RX/DR IN RCRD: CPT | Performed by: INTERNAL MEDICINE

## 2024-07-01 PROCEDURE — 1159F MED LIST DOCD IN RCRD: CPT | Performed by: INTERNAL MEDICINE

## 2024-07-01 PROCEDURE — 3078F DIAST BP <80 MM HG: CPT | Performed by: INTERNAL MEDICINE

## 2024-07-01 PROCEDURE — 99213 OFFICE O/P EST LOW 20 MIN: CPT | Performed by: INTERNAL MEDICINE

## 2024-07-01 NOTE — PROGRESS NOTES
[facility]  Subjective:     Encounter Date: 07/01/2024      Patient ID: Toño Alcala is a 83 y.o. male.    Chief Complaint: Coronary artery disease involving native coronary artery of      PROBLEM LIST:  Coronary artery disease:  History of CABG x4, 1994 - incomplete database.   [facility], 02/16/2011: 100% native RCA, and severe proximal LAD and LCx disease. Patent SVG to the right PDA. Patent LIMA to the LAD. Patent SVG to the OM.    Stress echo, 03/27/2014: Normal study.   Cerebrovascular disease:  History of TIA, 1989.  Hypertension:  Normal renal angiography, 02/16/2011.  Right lower extremity DVT and pulmonary embolism in 06/2015, idiopathic:  Recurrent idiopathic DVT, 2017.  Started on warfarin per Dr. Salinas and [place].  Venous duplex, 07/08/2022: Normal left lower extremity venous duplex scan. Chronic right lower extremity deep vein thrombosis noted in the common femoral.  Venous duplex, 07/24/2022: Same as previous.   DM.  Chronic kidney disease, stage 3:  Bilateral renal US, 05/25/2018: Enlargement of the prostate with calcifications.   Dyslipidemia.  Arthritis.   GERD/hiatal hernia.   History of a left forearm fracture.    Right wrist fracture.   History of cervical fusion.  History of lumbar laminectomy.  Arthritis.  BPPV.  Shingles.  Surgical history:  T and A.  Appendectomy.      History of Present Illness  Toño Alcala returns today for a 1 year follow up with a history of CAD and cardiac risk factors. Since last visit, patient has been doing well overall from a cardiovascular standpoint. He reports an abrasion on his left arm that is having difficulty with clotting due to his warfarin. Patient stays busy and active by spending time with his family. He reports that he has been feeling more unstable with his balance and often uses a cane when walking. Patient denies chest pain, shortness of breath, orthopnea, palpitations, edema, dizziness, and syncope.      Allergies   Allergen Reactions    Penicillins Hives and Swelling     Per patient, has tolerated Keflex         Current Outpatient Medications:     acetaminophen (TYLENOL) 325 MG tablet, Take 2 tablets by mouth As Needed for Mild Pain., Disp: , Rfl:     bumetanide (BUMEX) 1 MG tablet, Take 1 tablet by mouth As Needed (Edema/kidney)., Disp: , Rfl:     calcipotriene-betamethasone (TACLONEX) 0.005-0.064 % ointment, Apply 1 Application topically to the appropriate area as directed As Needed (psoriasis)., Disp: , Rfl:     carvedilol (COREG) 6.25 MG tablet, Take 1 tablet by mouth 2 (Two) Times a Day With Meals., Disp: , Rfl:     cholecalciferol (VITAMIN D3) 25 MCG (1000 UT) tablet, Take 1 tablet by mouth Daily., Disp: , Rfl:     diclofenac (VOLTAREN) 1 % gel gel, Apply 4 g topically As Needed., Disp: , Rfl:     diltiaZEM CD (CARDIZEM CD) 180 MG 24 hr capsule, Take 1 capsule by mouth Daily., Disp: , Rfl:     doxazosin (CARDURA) 2 MG tablet, Take 1 tablet by mouth 2 (Two) Times a Day., Disp: , Rfl:     glucose blood test strip, Use to check blood sugar twice daily, Disp: 100 each, Rfl: 3    lidocaine (LIDODERM) 5 %, Place 1 patch on the skin as directed by provider Daily. Remove & Discard patch within 12 hours or as directed by MD, Disp: 90 patch, Rfl: 2    Microlet Lancets misc, Use to check blood sugar twice daily, Disp: 100 each, Rfl: 3    nitroglycerin (NITROSTAT) 0.4 MG SL tablet, Place 1 tablet under the tongue Every 5 (Five) Minutes As Needed for Chest Pain. Take no more than 3 doses in 15 minutes., Disp: , Rfl:     omeprazole (priLOSEC) 20 MG capsule, Take 1 capsule by mouth Daily., Disp: , Rfl:     warfarin (COUMADIN) 5 MG tablet, Take 1 tablet by mouth every day except for Monday take 1/2 tablet  AS OF 06/19/24, Disp: 30 tablet, Rfl: 2    atorvastatin (LIPITOR) 40 MG tablet, Take 1 tablet by mouth Daily. (Patient not taking: Reported on 7/1/2024), Disp: , Rfl:     hydrALAZINE (APRESOLINE) 25 MG tablet, Take 1  "tablet by mouth 3 (Three) Times a Day As Needed (systolic > 165 or diastolic > 95 every 8 hours). (Patient not taking: Reported on 7/1/2024), Disp: 45 tablet, Rfl: 1    nortriptyline (PAMELOR) 10 MG capsule, Take 1 capsule by mouth Every Night. (Patient not taking: Reported on 7/1/2024), Disp: 30 capsule, Rfl: 5    The following portions of the patient's history were reviewed and updated as appropriate: allergies, current medications, past family history, past medical history, past social history, past surgical history and problem list.    ROS       Objective:     Vitals:    07/01/24 1151   BP: 142/60   BP Location: Right arm   Patient Position: Sitting   Cuff Size: Adult   Pulse: 62   SpO2: 97%   Weight: 84.4 kg (186 lb)   Height: 177.8 cm (70\")         Vitals reviewed.   Constitutional:       Appearance: Well-developed and not in distress.      Comments: Walks with the assistance of a cane   Neck:      Vascular: No JVD.      Trachea: No tracheal deviation.   Pulmonary:      Effort: Pulmonary effort is normal.      Breath sounds: Normal breath sounds.   Cardiovascular:      Normal rate. Regular rhythm.   Edema:     Peripheral edema absent.   Musculoskeletal:         General: No deformity. Skin:     General: Skin is warm and dry.   Neurological:      Mental Status: Alert and oriented to person, place, and time.         Lab Review:  Lab Results   Component Value Date    GLUCOSE 146 (H) 05/14/2024    BUN 28 (H) 05/14/2024    CREATININE 2.25 (H) 05/14/2024    BCR 12.4 05/14/2024    K 4.2 05/14/2024    CO2 28.0 05/14/2024    CALCIUM 9.7 05/14/2024    ALBUMIN 4.4 05/14/2024    ALKPHOS 137 (H) 01/25/2024    AST 40 01/25/2024    ALT 19 01/25/2024     Lab Results   Component Value Date    CHOL 128 03/07/2024    TRIG 86 03/07/2024    HDL 41 03/07/2024    LDL 70 03/07/2024      Lab Results   Component Value Date    WBC 9.30 01/25/2024    RBC 4.28 01/25/2024    HGB 12.3 (L) 01/25/2024    HCT 37.7 01/25/2024    MCV 88.1 " 01/25/2024     01/25/2024     Lab Results   Component Value Date    HGBA1C 6.4 (A) 01/09/2024        Procedures      Advance Care Planning   ACP discussion was held with the patient during this visit. Patient does not have an advance directive, information provided.           Assessment:   Diagnoses and all orders for this visit:    1. Coronary artery disease involving coronary bypass graft of native heart without angina pectoris (Primary)    2. Essential hypertension    3. Mixed hyperlipidemia        Impression:  1. Coronary artery disease. Stable without angina on current activity. Continue on nitroglycerin 0.4 mg PRN for chest pain. Continue on warfarin 5 mg daily for stroke prophylaxis.  30 years post CABG.  Stable without angina    2. Essential hypertension. Well controlled. Continue on Bumex 1 mg PRN for fluid retention. Continue on carvedilol 6.25 mg BID for hypertension. Continue on diltiazem 180 mg daily for rate control and hypertension.     3. Mixed hyperlipidemia. Well controlled.  But has stopped his statin, but LDL still at 70 despite not taking it.    4.  Class IV chronic kidney disease    Plan:  Stable cardiac status.   Continue current medications.  Would not resume statin at this point.  Revisit in 12 MO, or sooner as needed.      Scribed for Avelino Hernandez MD by Judith Sherman. 7/1/2024 12:09 EDT  Avelino Hernandez MD    Please note that portions of this note may have been completed with a voice recognition program. Efforts were made to edit the dictations, but occasionally words are mistranscribed.

## 2024-07-09 ENCOUNTER — OFFICE VISIT (OUTPATIENT)
Dept: INTERNAL MEDICINE | Facility: CLINIC | Age: 84
End: 2024-07-09
Payer: MEDICARE

## 2024-07-09 VITALS
BODY MASS INDEX: 27.09 KG/M2 | HEIGHT: 70 IN | HEART RATE: 60 BPM | SYSTOLIC BLOOD PRESSURE: 136 MMHG | DIASTOLIC BLOOD PRESSURE: 50 MMHG | WEIGHT: 189.2 LBS | TEMPERATURE: 97.5 F

## 2024-07-09 DIAGNOSIS — I10 ESSENTIAL HYPERTENSION: ICD-10-CM

## 2024-07-09 DIAGNOSIS — E11.21 DIABETIC NEPHROPATHY ASSOCIATED WITH TYPE 2 DIABETES MELLITUS: Primary | ICD-10-CM

## 2024-07-09 DIAGNOSIS — K21.9 GASTROESOPHAGEAL REFLUX DISEASE WITHOUT ESOPHAGITIS: ICD-10-CM

## 2024-07-09 DIAGNOSIS — N18.4 CHRONIC KIDNEY DISEASE, STAGE IV (SEVERE): ICD-10-CM

## 2024-07-09 DIAGNOSIS — I87.009 POSTTHROMBOTIC SYNDROME: ICD-10-CM

## 2024-07-09 PROBLEM — K80.20 CALCULUS OF GALLBLADDER WITHOUT CHOLECYSTITIS WITHOUT OBSTRUCTION: Status: RESOLVED | Noted: 2020-01-29 | Resolved: 2024-07-09

## 2024-07-09 PROBLEM — K57.92 ACUTE DIVERTICULITIS: Status: RESOLVED | Noted: 2020-01-29 | Resolved: 2024-07-09

## 2024-07-09 LAB
EXPIRATION DATE: ABNORMAL
HBA1C MFR BLD: 6.5 % (ref 4.5–5.7)
INR PPP: 2.2 (ref 0.9–1.1)
Lab: ABNORMAL
PROTHROMBIN TIME: 26.7 SECONDS

## 2024-07-09 RX ORDER — SILDENAFIL 100 MG/1
100 TABLET, FILM COATED ORAL DAILY PRN
Qty: 10 TABLET | Refills: 5 | Status: SHIPPED | OUTPATIENT
Start: 2024-07-09

## 2024-07-09 NOTE — PROGRESS NOTES
[facility]     Toño Alcala  1940   [id number]      Patient Care Team:  Radu Francis MD as PCP - General    Chief Complaint::   Chief Complaint   Patient presents with    Hypertension     Follow up     Diabetes        HPI  History of Present Illness  The patient is an 84-year-old male who comes in for follow-up of diabetes, hypertension, GERD, and chronic kidney disease. He sees cardiology for coronary artery disease.    The patient reports experiencing aches, a symptom he had not previously encountered. He also notes a deterioration in his balance, particularly when standing, but denies any hand tremors. He recalls an incident where he woke up feeling unsteady, prompting him to use a cane. Since this incident, he does not utilize the cane at home. He asserts that walking on level ground does not cause him any discomfort. However, he expresses apprehension about outdoor activities. He also reports numbness in the balls of his feet.      Chronic Conditions:      Patient Active Problem List   Diagnosis    CAD (coronary artery disease)    CVD (cardiovascular disease)    DVT (deep venous thrombosis)    Dyslipidemia    Arthritis    GERD (gastroesophageal reflux disease)    Mixed hyperlipidemia    Diabetic nephropathy associated with type 2 diabetes mellitus    Diabetic polyneuropathy associated with type 2 diabetes mellitus    Chronic kidney disease, stage IV (severe)    Vitamin D deficiency    Disc disorder of cervical region    Postthrombotic syndrome    Vertigo    Angina pectoris    Lumbosacral spondylosis without myelopathy    Psoriasis    Arthritis of elbow    Swelling of right lower extremity    Acute right-sided low back pain without sciatica    Hoarseness    Unifocal PVCs    Skin lesion of face    Essential hypertension    Medicare annual wellness visit, subsequent    Stage 3 chronic kidney disease due to benign hypertension    BMI 30.0-30.9,adult    Postherpetic neuralgia     Herpes zoster without complication    Leg edema, right    Chest pain    Encounter for removal of sutures    Chronic pain of left knee    Fall        Past Medical History:   Diagnosis Date    Acute diverticulitis 01/29/2020    Allergic 60 yrs ago    Arthritis     Arthritis of neck Fusion 1992    Bilateral radial fractures 1962    fracture bilateral radial heads    CAD (coronary artery disease)     Calculus of gallbladder without cholecystitis without obstruction 01/29/2020    Cataract     Cervical disc disorder Fusion 1992    Cholelithiasis     Chronic kidney disease 2020    Clotting disorder     CVD (cardiovascular disease)     History of TIA 1989    Diabetes mellitus     DVT (deep venous thrombosis)     Right lower extremity DVT and pulmonary embolism in 06/2015, idiopathic    DVT of proximal lower limb 06/22/2015    proximal DVT right leg, small PE- anticoagulation    Dyslipidemia     Erectile dysfunction     Fracture 1952    H/o pf left forearm fracture    Fracture of right hand 1980    Fracture of wrist 1980    GERD (gastroesophageal reflux disease)     with hiatal hernia    HL (hearing loss)     Hx of angina pectoris     Hypertension     Normal renal angiography 02/16/11    Knee swelling Several years ago    Left wrist fracture 1953    Lumbosacral disc disease 1996    Osgood-Schlatter's disease 1955    Osteoarthritis     Right wrist fracture     Vertigo     Wears glasses        Past Surgical History:   Procedure Laterality Date    APPENDECTOMY  1957    BACK SURGERY      CARDIAC CATHETERIZATION  2011    with vein graft    CATARACT EXTRACTION Left     CERVICAL FUSION      CERVICAL FUSION  1992    C3-4    COLONOSCOPY  2013    CORONARY ARTERY BYPASS GRAFT  1994    HERNIA REPAIR Right 2011    inguinal    INGUINAL HERNIA REPAIR Left 10/29/2019    Procedure: INGUINAL HERNIA REPAIR LEFT;  Surgeon: Charisma Raines MD;  Location: [facility];  Service: General    LUMBAR LAMINECTOMY  1996    laminectomy, lumbar, L3     NECK SURGERY      OTHER SURGICAL HISTORY      wrist surgery    SPINE SURGERY      TONSILLECTOMY AND ADENOIDECTOMY  194    TRIGGER POINT INJECTION   -     VASECTOMY  1972       Family History   Problem Relation Age of Onset    Arthritis Mother         OA    Heart disease Father     Diabetes Father     Heart attack Father     Heart disease Brother     Heart attack Brother     Diabetes Brother     Diabetes Son     Hypertension Other     Cancer Other        Social History     Socioeconomic History    Marital status:    Tobacco Use    Smoking status: Former     Current packs/day: 0.00     Average packs/day: 1.5 packs/day for 34.8 years (52.2 ttl pk-yrs)     Types: Cigarettes, Pipe, Cigars     Start date: 1962     Quit date: 1997     Years since quittin.2     Passive exposure: Past    Smokeless tobacco: Never    Tobacco comments:     QUIT DATE 1992   Vaping Use    Vaping status: Never Used   Substance and Sexual Activity    Alcohol use: Yes     Alcohol/week: 11.0 - 13.0 standard drinks of alcohol     Types: 7 Glasses of wine, 2 Cans of beer, 2 - 4 Shots of liquor per week     Comment: glass of wine everyday     Drug use: No    Sexual activity: Not Currently     Partners: Female     Birth control/protection: Vasectomy, Surgical       Allergies   Allergen Reactions    Penicillins Hives and Swelling     Per patient, has tolerated Keflex         Current Outpatient Medications:     acetaminophen (TYLENOL) 325 MG tablet, Take 2 tablets by mouth As Needed for Mild Pain., Disp: , Rfl:     bumetanide (BUMEX) 1 MG tablet, Take 1 tablet by mouth As Needed (Edema/kidney)., Disp: , Rfl:     calcipotriene-betamethasone (TACLONEX) 0.005-0.064 % ointment, Apply 1 Application topically to the appropriate area as directed As Needed (psoriasis)., Disp: , Rfl:     carvedilol (COREG) 6.25 MG tablet, Take 1 tablet by mouth 2 (Two) Times a Day With Meals., Disp: , Rfl:     cholecalciferol (VITAMIN D3)  "25 MCG (1000 UT) tablet, Take 1 tablet by mouth Daily., Disp: , Rfl:     diclofenac (VOLTAREN) 1 % gel gel, Apply 4 g topically As Needed., Disp: , Rfl:     diltiaZEM CD (CARDIZEM CD) 180 MG 24 hr capsule, Take 1 capsule by mouth Daily., Disp: , Rfl:     doxazosin (CARDURA) 2 MG tablet, Take 1 tablet by mouth 2 (Two) Times a Day., Disp: , Rfl:     glucose blood test strip, Use to check blood sugar twice daily, Disp: 100 each, Rfl: 3    lidocaine (LIDODERM) 5 %, Place 1 patch on the skin as directed by provider Daily. Remove & Discard patch within 12 hours or as directed by MD, Disp: 90 patch, Rfl: 2    Microlet Lancets misc, Use to check blood sugar twice daily, Disp: 100 each, Rfl: 3    nitroglycerin (NITROSTAT) 0.4 MG SL tablet, Place 1 tablet under the tongue Every 5 (Five) Minutes As Needed for Chest Pain. Take no more than 3 doses in 15 minutes., Disp: , Rfl:     omeprazole (priLOSEC) 20 MG capsule, Take 1 capsule by mouth Daily., Disp: , Rfl:     warfarin (COUMADIN) 5 MG tablet, Take 1 tablet by mouth every day except for Monday take 1/2 tablet  AS OF 06/19/24, Disp: 30 tablet, Rfl: 2    sildenafil (Viagra) 100 MG tablet, Take 1 tablet by mouth Daily As Needed for Erectile Dysfunction., Disp: 10 tablet, Rfl: 5    Review of Systems   Constitutional: Negative.    Respiratory: Negative.  Negative for chest tightness and shortness of breath.    Cardiovascular: Negative.  Negative for chest pain.   Gastrointestinal:  Negative for abdominal pain, blood in stool, constipation and diarrhea.        Vital Signs  Vitals:    07/09/24 1114   BP: 136/50   BP Location: Left arm   Patient Position: Sitting   Cuff Size: Adult   Pulse: 60   Temp: 97.5 °F (36.4 °C)   TempSrc: Temporal   Weight: 85.8 kg (189 lb 3.2 oz)   Height: 177.8 cm (70\")       Physical Exam  Vitals reviewed.   Constitutional:       Appearance: Normal appearance. He is well-developed.   HENT:      Head: Normocephalic and atraumatic.   Neck:      Thyroid: No " thyromegaly.      Vascular: No carotid bruit.   Cardiovascular:      Rate and Rhythm: Normal rate and regular rhythm.      Heart sounds: Normal heart sounds. No murmur heard.     No friction rub. No gallop.   Pulmonary:      Effort: Pulmonary effort is normal.      Breath sounds: Normal breath sounds.   Musculoskeletal:      Cervical back: Normal range of motion and neck supple.      Right lower leg: No edema.      Left lower leg: No edema.   Lymphadenopathy:      Cervical: No cervical adenopathy.   Skin:     General: Skin is dry.   Neurological:      Mental Status: He is alert and oriented to person, place, and time.      Cranial Nerves: No cranial nerve deficit.   Psychiatric:         Mood and Affect: Mood normal.         Behavior: Behavior normal.        Physical Exam      Procedures    ACE III MINI        Results             Assessment/Plan:    Diagnoses and all orders for this visit:    1. Diabetic nephropathy associated with type 2 diabetes mellitus (Primary)  -     POC Glycosylated Hemoglobin (Hb A1C)    2. Essential hypertension    3. Gastroesophageal reflux disease without esophagitis    4. Chronic kidney disease, stage IV (severe)    5. Postthrombotic syndrome  -     POC Protime / INR    Other orders  -     sildenafil (Viagra) 100 MG tablet; Take 1 tablet by mouth Daily As Needed for Erectile Dysfunction.  Dispense: 10 tablet; Refill: 5      Assessment & Plan  1. Diabetes.  The patient's A1c is currently pending. He is advised to maintain a healthy diet.    2. Hypertension.  The patient's blood pressure is well-managed with doxazosin, diltiazem, and carvedilol.    3. GERD.  The GERD is well-managed with omeprazole.    4. Stage 4 chronic kidney disease.  The patient is advised to continue follow-up with nephrology, avoid NSAIDs, and control blood pressure and blood glucose.    5. Post thrombotic syndrome/hypercoagulable state.  The patient is to continue lifelong warfarin. INR is pending.    6. Erectile  dysfunction.  A trial of sildenafil has been initiated.    Follow-up  A follow-up appointment is scheduled for 6 months from now, or as required.      Plan of care reviewed with patient at the conclusion of today's visit. Education was provided regarding diagnosis, management, and any prescribed or recommended OTC medications.Patient verbalizes understanding of and agreement with management plan.     Patient or patient representative verbalized consent for the use of Ambient Listening during the visit with  Radu Francis MD for chart documentation. 7/11/2024  08:33 EDT        Radu Francis MD

## 2024-08-12 ENCOUNTER — CLINICAL SUPPORT (OUTPATIENT)
Dept: INTERNAL MEDICINE | Facility: CLINIC | Age: 84
End: 2024-08-12
Payer: MEDICARE

## 2024-08-12 DIAGNOSIS — Z79.01 ANTICOAGULATED ON WARFARIN: ICD-10-CM

## 2024-08-12 DIAGNOSIS — Z51.81 THERAPEUTIC DRUG MONITORING: Primary | ICD-10-CM

## 2024-08-12 LAB
INR PPP: 4.5 (ref 0.9–1.1)
PROTHROMBIN TIME: 53.9 SECONDS

## 2024-08-12 PROCEDURE — 85610 PROTHROMBIN TIME: CPT | Performed by: INTERNAL MEDICINE

## 2024-08-12 PROCEDURE — 36416 COLLJ CAPILLARY BLOOD SPEC: CPT | Performed by: INTERNAL MEDICINE

## 2024-08-19 ENCOUNTER — TRANSCRIBE ORDERS (OUTPATIENT)
Dept: LAB | Facility: HOSPITAL | Age: 84
End: 2024-08-19
Payer: MEDICARE

## 2024-08-19 ENCOUNTER — LAB (OUTPATIENT)
Dept: LAB | Facility: HOSPITAL | Age: 84
End: 2024-08-19
Payer: MEDICARE

## 2024-08-19 DIAGNOSIS — N18.32 CHRONIC KIDNEY DISEASE (CKD) STAGE G3B/A1, MODERATELY DECREASED GLOMERULAR FILTRATION RATE (GFR) BETWEEN 30-44 ML/MIN/1.73 SQUARE METER AND ALBUMINURIA CREATININE RATIO LESS THAN 30 MG/G (CMS/H*: ICD-10-CM

## 2024-08-19 DIAGNOSIS — N18.32 CHRONIC KIDNEY DISEASE (CKD) STAGE G3B/A1, MODERATELY DECREASED GLOMERULAR FILTRATION RATE (GFR) BETWEEN 30-44 ML/MIN/1.73 SQUARE METER AND ALBUMINURIA CREATININE RATIO LESS THAN 30 MG/G (CMS/H*: Primary | ICD-10-CM

## 2024-08-19 PROCEDURE — 82570 ASSAY OF URINE CREATININE: CPT

## 2024-08-19 PROCEDURE — 84156 ASSAY OF PROTEIN URINE: CPT

## 2024-08-19 PROCEDURE — 80069 RENAL FUNCTION PANEL: CPT

## 2024-08-19 PROCEDURE — 36415 COLL VENOUS BLD VENIPUNCTURE: CPT

## 2024-08-19 PROCEDURE — 85025 COMPLETE CBC W/AUTO DIFF WBC: CPT

## 2024-08-19 PROCEDURE — 81001 URINALYSIS AUTO W/SCOPE: CPT

## 2024-08-20 LAB
ALBUMIN SERPL-MCNC: 4.1 G/DL (ref 3.5–5.2)
ANION GAP SERPL CALCULATED.3IONS-SCNC: 9.5 MMOL/L (ref 5–15)
BACTERIA UR QL AUTO: NORMAL /HPF
BASOPHILS # BLD AUTO: 0.03 10*3/MM3 (ref 0–0.2)
BASOPHILS NFR BLD AUTO: 0.4 % (ref 0–1.5)
BILIRUB UR QL STRIP: NEGATIVE
BUN SERPL-MCNC: 22 MG/DL (ref 8–23)
BUN/CREAT SERPL: 9.4 (ref 7–25)
CALCIUM SPEC-SCNC: 9.3 MG/DL (ref 8.6–10.5)
CHLORIDE SERPL-SCNC: 102 MMOL/L (ref 98–107)
CLARITY UR: CLEAR
CO2 SERPL-SCNC: 25.5 MMOL/L (ref 22–29)
COLOR UR: YELLOW
CREAT SERPL-MCNC: 2.35 MG/DL (ref 0.76–1.27)
CREAT UR-MCNC: 106.2 MG/DL
DEPRECATED RDW RBC AUTO: 50.6 FL (ref 37–54)
EGFRCR SERPLBLD CKD-EPI 2021: 26.6 ML/MIN/1.73
EOSINOPHIL # BLD AUTO: 0.34 10*3/MM3 (ref 0–0.4)
EOSINOPHIL NFR BLD AUTO: 4.8 % (ref 0.3–6.2)
ERYTHROCYTE [DISTWIDTH] IN BLOOD BY AUTOMATED COUNT: 15.2 % (ref 12.3–15.4)
GLUCOSE SERPL-MCNC: 100 MG/DL (ref 65–99)
GLUCOSE UR STRIP-MCNC: NEGATIVE MG/DL
HCT VFR BLD AUTO: 39.7 % (ref 37.5–51)
HGB BLD-MCNC: 13.2 G/DL (ref 13–17.7)
HGB UR QL STRIP.AUTO: NEGATIVE
HYALINE CASTS UR QL AUTO: NORMAL /LPF
IMM GRANULOCYTES # BLD AUTO: 0.04 10*3/MM3 (ref 0–0.05)
IMM GRANULOCYTES NFR BLD AUTO: 0.6 % (ref 0–0.5)
KETONES UR QL STRIP: NEGATIVE
LEUKOCYTE ESTERASE UR QL STRIP.AUTO: NEGATIVE
LYMPHOCYTES # BLD AUTO: 2.14 10*3/MM3 (ref 0.7–3.1)
LYMPHOCYTES NFR BLD AUTO: 29.9 % (ref 19.6–45.3)
MCH RBC QN AUTO: 30.1 PG (ref 26.6–33)
MCHC RBC AUTO-ENTMCNC: 33.2 G/DL (ref 31.5–35.7)
MCV RBC AUTO: 90.6 FL (ref 79–97)
MONOCYTES # BLD AUTO: 0.65 10*3/MM3 (ref 0.1–0.9)
MONOCYTES NFR BLD AUTO: 9.1 % (ref 5–12)
NEUTROPHILS NFR BLD AUTO: 3.95 10*3/MM3 (ref 1.7–7)
NEUTROPHILS NFR BLD AUTO: 55.2 % (ref 42.7–76)
NITRITE UR QL STRIP: NEGATIVE
NRBC BLD AUTO-RTO: 0 /100 WBC (ref 0–0.2)
PH UR STRIP.AUTO: 6.5 [PH] (ref 5–8)
PHOSPHATE SERPL-MCNC: 3.2 MG/DL (ref 2.5–4.5)
PLATELET # BLD AUTO: 216 10*3/MM3 (ref 140–450)
PMV BLD AUTO: 10.4 FL (ref 6–12)
POTASSIUM SERPL-SCNC: 4.8 MMOL/L (ref 3.5–5.2)
PROT ?TM UR-MCNC: 18.9 MG/DL
PROT UR QL STRIP: ABNORMAL
RBC # BLD AUTO: 4.38 10*6/MM3 (ref 4.14–5.8)
RBC # UR STRIP: NORMAL /HPF
REF LAB TEST METHOD: NORMAL
SODIUM SERPL-SCNC: 137 MMOL/L (ref 136–145)
SP GR UR STRIP: 1.01 (ref 1–1.03)
SQUAMOUS #/AREA URNS HPF: NORMAL /HPF
UROBILINOGEN UR QL STRIP: ABNORMAL
WBC # UR STRIP: NORMAL /HPF
WBC NRBC COR # BLD AUTO: 7.15 10*3/MM3 (ref 3.4–10.8)

## 2024-08-31 ENCOUNTER — HOSPITAL ENCOUNTER (EMERGENCY)
Facility: HOSPITAL | Age: 84
Discharge: HOME | End: 2024-08-31
Attending: STUDENT IN AN ORGANIZED HEALTH CARE EDUCATION/TRAINING PROGRAM
Payer: MEDICARE

## 2024-08-31 VITALS
HEIGHT: 72 IN | RESPIRATION RATE: 16 BRPM | DIASTOLIC BLOOD PRESSURE: 87 MMHG | BODY MASS INDEX: 25.06 KG/M2 | TEMPERATURE: 97.7 F | WEIGHT: 185 LBS | SYSTOLIC BLOOD PRESSURE: 156 MMHG | OXYGEN SATURATION: 96 % | HEART RATE: 68 BPM

## 2024-08-31 DIAGNOSIS — S89.91XA INJURY OF RIGHT LOWER EXTREMITY, INITIAL ENCOUNTER: Primary | ICD-10-CM

## 2024-08-31 DIAGNOSIS — Z79.01 ANTICOAGULATED ON COUMADIN: ICD-10-CM

## 2024-08-31 DIAGNOSIS — S81.811A NONINFECTED SKIN TEAR OF RIGHT LOWER EXTREMITY, INITIAL ENCOUNTER: ICD-10-CM

## 2024-08-31 PROCEDURE — 99282 EMERGENCY DEPT VISIT SF MDM: CPT

## 2024-08-31 NOTE — ED PROVIDER NOTES
Subjective   History of Present Illness  Pt is a 85 yo male presenting to ED with complaints of leg injury. PMHx significant for HTN, HLD, CAD, DVT, DM, CKD and Arthritis. Pt explains he bumped his right lower leg on car door last night around 10 pm. He has swelling and oozing to leg. He denies pain with walking. He is on Coumadin. He denies any other injuries. Denies weakness or numbness to LE. Tdap UTD.     History provided by:  Patient and medical records      Review of Systems   Constitutional:  Negative for fever.   Cardiovascular:  Positive for leg swelling.   Musculoskeletal:  Positive for myalgias. Negative for arthralgias, back pain and neck pain.   Skin:  Positive for wound.   Neurological:  Negative for dizziness, weakness, numbness and headaches.       Past Medical History:   Diagnosis Date    Acute diverticulitis 01/29/2020    Allergic 60 yrs ago    Arthritis     Arthritis of neck Fusion 1992    Bilateral radial fractures 1962    fracture bilateral radial heads    CAD (coronary artery disease)     Calculus of gallbladder without cholecystitis without obstruction 01/29/2020    Cataract     Cervical disc disorder Fusion 1992    Cholelithiasis     Chronic kidney disease 2020    Clotting disorder     CVD (cardiovascular disease)     History of TIA 1989    Diabetes mellitus     DVT (deep venous thrombosis)     Right lower extremity DVT and pulmonary embolism in 06/2015, idiopathic    DVT of proximal lower limb 06/22/2015    proximal DVT right leg, small PE- anticoagulation    Dyslipidemia     Erectile dysfunction     Fracture 1952    H/o pf left forearm fracture    Fracture of right hand 1980    Fracture of wrist 1980    GERD (gastroesophageal reflux disease)     with hiatal hernia    HL (hearing loss)     Hx of angina pectoris     Hypertension     Normal renal angiography 02/16/11    Knee swelling Several years ago    Left wrist fracture 1953    Lumbosacral disc disease 1996    Osgood-Schlatter's disease  1955    Osteoarthritis     Right wrist fracture     Vertigo     Wears glasses        Allergies   Allergen Reactions    Penicillins Hives and Swelling     Per patient, has tolerated Keflex       Past Surgical History:   Procedure Laterality Date    APPENDECTOMY  195    BACK SURGERY      CARDIAC CATHETERIZATION      with vein graft    CATARACT EXTRACTION Left     CERVICAL FUSION      CERVICAL FUSION  1992    C3-4    COLONOSCOPY  2013    CORONARY ARTERY BYPASS GRAFT  1994    HERNIA REPAIR Right 2011    inguinal    INGUINAL HERNIA REPAIR Left 10/29/2019    Procedure: INGUINAL HERNIA REPAIR LEFT;  Surgeon: Charisma Raines MD;  Location: [facility];  Service: General    LUMBAR LAMINECTOMY      laminectomy, lumbar, L3    NECK SURGERY      OTHER SURGICAL HISTORY      wrist surgery    SPINE SURGERY      TONSILLECTOMY AND ADENOIDECTOMY  194    TRIGGER POINT INJECTION   -     VASECTOMY  1972       Family History   Problem Relation Age of Onset    Arthritis Mother         OA    Heart disease Father     Diabetes Father     Heart attack Father     Heart disease Brother     Heart attack Brother     Diabetes Brother     Diabetes Son     Hypertension Other     Cancer Other        Social History     Socioeconomic History    Marital status:    Tobacco Use    Smoking status: Former     Current packs/day: 0.00     Average packs/day: 1.5 packs/day for 34.8 years (52.2 ttl pk-yrs)     Types: Cigarettes, Pipe, Cigars     Start date: 1962     Quit date: 1997     Years since quittin.3     Passive exposure: Past    Smokeless tobacco: Never    Tobacco comments:     QUIT DATE 1992   Vaping Use    Vaping status: Never Used   Substance and Sexual Activity    Alcohol use: Yes     Alcohol/week: 11.0 - 13.0 standard drinks of alcohol     Types: 7 Glasses of wine, 2 Cans of beer, 2 - 4 Shots of liquor per week     Comment: glass of wine everyday     Drug use: No    Sexual activity: Not  Currently     Partners: Female     Birth control/protection: Vasectomy, Surgical           Objective   Physical Exam  Vitals and nursing note reviewed.   Constitutional:       General: He is not in acute distress.  Eyes:      Conjunctiva/sclera: Conjunctivae normal.   Cardiovascular:      Rate and Rhythm: Normal rate.      Pulses: Normal pulses.   Pulmonary:      Effort: Pulmonary effort is normal. No respiratory distress.   Musculoskeletal:         General: Normal range of motion.      Cervical back: Normal range of motion and neck supple.      Right hip: No tenderness. Normal range of motion.      Left hip: No tenderness. Normal range of motion.      Right knee: Normal range of motion. No tenderness.      Left knee: Normal range of motion. No tenderness.      Right lower leg: Swelling and tenderness (mild around contusion) present.        Legs:       Comments: Right lateral lower leg with 2 cm skin tear. Bleeding controlled with bandage. He has hematoma. He has edema to right lower leg which he reports is chronic and is supposed to wear compression stockings.    Skin:     General: Skin is warm.   Neurological:      General: No focal deficit present.      Mental Status: He is alert.      Sensory: No sensory deficit.      Motor: No weakness.   Psychiatric:         Mood and Affect: Mood normal.         Behavior: Behavior normal.         Procedures           ED Course                                             Medical Decision Making  Pt is a 83 yo male presenting to ED with complaints of right lower leg skin injury and contusion. Patient leg cleaned with soap / saline and steri strips placed. Ace bandage placed for compression and protection. He is agreeable with plan. NO bony TTP.     DDx  Fracture, Contusion, Laceration, Skin tear    Problems Addressed:  Anticoagulated on Coumadin: acute illness or injury  Injury of right lower extremity, initial encounter: acute illness or injury  Noninfected skin tear of right  lower extremity, initial encounter: acute illness or injury    Amount and/or Complexity of Data Reviewed  External Data Reviewed: notes.     Details: Reviewed previous non ED visits including prior labs, imaging, available notes, medications, allergies and surgical hx.           Final diagnoses:   Injury of right lower extremity, initial encounter   Noninfected skin tear of right lower extremity, initial encounter   Anticoagulated on Coumadin       ED Disposition  ED Disposition       ED Disposition   Discharge    Condition   Stable    Comment   --               Radu Francis MD  [address]  [place] [address]  [phone]    Schedule an appointment as soon as possible for a visit   As needed    [facility] EMERGENCY DEPARTMENT  [address]  [facility] [id number] [phone]    If symptoms worsen         Medication List      No changes were made to your prescriptions during this visit.            Fani Mercedes PA  08/31/24 1755

## 2024-09-03 ENCOUNTER — NURSE TRIAGE (OUTPATIENT)
Dept: CALL CENTER | Facility: HOSPITAL | Age: 84
End: 2024-09-03
Payer: MEDICARE

## 2024-09-03 NOTE — TELEPHONE ENCOUNTER
Reason for Disposition   [1] Fever AND [2] no signs of serious infection or localizing symptoms (all other triage questions negative)    Additional Information   Negative: Shock suspected (e.g., cold/pale/clammy skin, too weak to stand, low BP, rapid pulse)   Negative: Difficult to awaken or acting confused (e.g., disoriented, slurred speech)   Negative: [1] Difficulty breathing AND [2] bluish lips, tongue or face   Negative: New-onset rash with multiple purple (or blood-colored) spots or dots   Negative: Sounds like a life-threatening emergency to the triager   Negative: Fever in a cancer patient who is currently (or recently) receiving chemotherapy or radiation therapy, or cancer patient who has metastatic or end-stage cancer and is receiving palliative care   Negative: Pregnant   Negative: Postpartum (from 0 to 6 weeks after delivery)   Negative: Fever onset within 24 hours of receiving vaccine   Negative: [1] Fever AND [2] within 14 days of COVID-19 Exposure   Negative: Other symptom is present, see that guideline (e.g., symptoms of cough, runny nose, sore throat, earache, abdominal pain, diarrhea, vomiting)   Negative: [1] Headache AND [2] stiff neck (can't touch chin to chest)   Negative: Difficulty breathing   Negative: IV Drug Use (IVDU)   Negative: [1] Drinking very little AND [2] dehydration suspected (e.g., no urine > 12 hours, very dry mouth, very lightheaded)   Negative: Widespread rash and cause unknown   Negative: Patient sounds very sick or weak to the triager  (Exception: Mild weakness and hasn't taken fever medicine.)   Negative: Fever > 104 F (40 C)   Negative: [1] Fever > 101 F (38.3 C) AND [2] age > 60 years   Negative: [1] Fever > 100.0 F (37.8 C) AND [2] bedridden (e.g., CVA, chronic illness, recovering from surgery)   Negative: [1] Fever > 100.0 F (37.8 C) AND [2] indwelling urinary catheter (e.g., Schuler, Coude)   Negative: [1] Fever > 100.0 F (37.8 C) AND [2] has port (portacath), central  "line, or PICC line   Negative: [1] Fever > 100.0 F (37.8 C) AND [2] diabetes mellitus or weak immune system (e.g., HIV positive, cancer chemo, splenectomy, organ transplant, chronic steroids)   Negative: [1] Fever > 100.0 F (37.8 C) AND [2] surgery in the last month   Negative: Transplant patient (e.g., kidney, liver, lung, heart)   Negative: Severe chills (i.e., feeling extremely cold WITH shaking chills)   Negative: Fever present > 3 days (72 hours)   Negative: [1] Fever > 100.0 F (37.8 C) AND [2] foreign travel to a developing country in the last month   Negative: [1] Fever comes and goes (intermittent) AND [2] lasts > 3 weeks    Answer Assessment - Initial Assessment Questions  1. TEMPERATURE: \"What is the most recent temperature?\"  \"How was it measured?\"       99, oral thermometer  2. ONSET: \"When did the fever start?\"       This afternoon  3. CHILLS: \"Do you have chills?\" If yes: \"How bad are they?\"  (e.g., none, mild, moderate, severe)    - NONE: no chills    - MILD: feeling cold    - MODERATE: feeling very cold, some shivering (feels better under a thick blanket)    - SEVERE: feeling extremely cold with shaking chills (general body shaking, rigors; even under a thick blanket)       For a minute, not now  4. OTHER SYMPTOMS: \"Do you have any other symptoms besides the fever?\"  (e.g., abdomen pain, cough, diarrhea, earache, headache, sore throat, urination pain)      Skin tear is sore  5. CAUSE: If there are no symptoms, ask: \"What do you think is causing the fever?\"       Possible infection from skin tear  6. CONTACTS: \"Does anyone else in the family have an infection?\"      no  7. TREATMENT: \"What have you done so far to treat this fever?\" (e.g., medications)      no  8. IMMUNOCOMPROMISE: \"Do you have of the following: diabetes, HIV positive, splenectomy, cancer chemotherapy, chronic steroid treatment, transplant patient, etc.\"      no  9. PREGNANCY: \"Is there any chance you are pregnant?\" \"When was your last " "menstrual period?\"      na  10. TRAVEL: \"Have you traveled out of the country in the last month?\" (e.g., travel history, exposures)        no    Protocols used: Fever-ADULT-AH    "

## 2024-09-03 NOTE — TELEPHONE ENCOUNTER
Fever 09/03/2024 Caller states he was seen in ED on Monday and now has a fever.    Caller had injury to his leg on Friday night and now is running a slight fever. Reviewed guideline with him and he states the injury is warm to the touch. Advised he go to  to have this examined and see if he needs antibiotic to treat infection of this site. Agrees to follow care advice.

## 2024-09-04 ENCOUNTER — TELEPHONE (OUTPATIENT)
Dept: INTERNAL MEDICINE | Facility: CLINIC | Age: 84
End: 2024-09-04
Payer: MEDICARE

## 2024-09-04 NOTE — TELEPHONE ENCOUNTER
Please call patient and see how he is doing.  There was a message from triage yesterday that he had a fever following a leg injury last week.

## 2024-09-05 ENCOUNTER — APPOINTMENT (OUTPATIENT)
Dept: CT IMAGING | Facility: HOSPITAL | Age: 84
End: 2024-09-05
Payer: MEDICARE

## 2024-09-05 ENCOUNTER — HOSPITAL ENCOUNTER (EMERGENCY)
Facility: HOSPITAL | Age: 84
Discharge: HOME | End: 2024-09-05
Attending: EMERGENCY MEDICINE
Payer: MEDICARE

## 2024-09-05 ENCOUNTER — APPOINTMENT (OUTPATIENT)
Dept: CARDIOLOGY | Facility: HOSPITAL | Age: 84
End: 2024-09-05
Payer: MEDICARE

## 2024-09-05 ENCOUNTER — OFFICE VISIT (OUTPATIENT)
Dept: INTERNAL MEDICINE | Facility: CLINIC | Age: 84
End: 2024-09-05
Payer: MEDICARE

## 2024-09-05 VITALS
BODY MASS INDEX: 26.55 KG/M2 | HEART RATE: 79 BPM | OXYGEN SATURATION: 97 % | WEIGHT: 196 LBS | HEIGHT: 72 IN | SYSTOLIC BLOOD PRESSURE: 150 MMHG | TEMPERATURE: 98.6 F | DIASTOLIC BLOOD PRESSURE: 70 MMHG

## 2024-09-05 VITALS
WEIGHT: 185 LBS | OXYGEN SATURATION: 96 % | SYSTOLIC BLOOD PRESSURE: 199 MMHG | HEIGHT: 72 IN | RESPIRATION RATE: 16 BRPM | TEMPERATURE: 98.8 F | HEART RATE: 88 BPM | DIASTOLIC BLOOD PRESSURE: 93 MMHG | BODY MASS INDEX: 25.06 KG/M2

## 2024-09-05 DIAGNOSIS — S51.011S SKIN TEAR OF RIGHT ELBOW WITHOUT COMPLICATION, SEQUELA: ICD-10-CM

## 2024-09-05 DIAGNOSIS — Z51.81 ANTICOAGULATION GOAL OF INR 2 TO 3: ICD-10-CM

## 2024-09-05 DIAGNOSIS — I10 HYPERTENSION, UNSPECIFIED TYPE: ICD-10-CM

## 2024-09-05 DIAGNOSIS — R68.89 SUSPECTED SOFT TISSUE INFECTION: Primary | ICD-10-CM

## 2024-09-05 DIAGNOSIS — Z79.01 ANTICOAGULATION GOAL OF INR 2 TO 3: ICD-10-CM

## 2024-09-05 DIAGNOSIS — T14.8XXA HEMATOMA: ICD-10-CM

## 2024-09-05 DIAGNOSIS — L03.113 CELLULITIS OF RIGHT UPPER EXTREMITY: Primary | ICD-10-CM

## 2024-09-05 LAB
ALBUMIN SERPL-MCNC: 4.2 G/DL (ref 3.5–5.2)
ALBUMIN/GLOB SERPL: 1.6 G/DL
ALP SERPL-CCNC: 152 U/L (ref 39–117)
ALT SERPL W P-5'-P-CCNC: 22 U/L (ref 1–41)
ANION GAP SERPL CALCULATED.3IONS-SCNC: 11 MMOL/L (ref 5–15)
AST SERPL-CCNC: 52 U/L (ref 1–40)
BASOPHILS # BLD AUTO: 0.04 10*3/MM3 (ref 0–0.2)
BASOPHILS NFR BLD AUTO: 0.4 % (ref 0–1.5)
BH CV LOW VAS RIGHT DISTAL FEMORAL SPONT: 1
BH CV LOW VAS RIGHT MID FEMORAL SPONT: 1
BH CV LOW VAS RIGHT PROXIMAL FEMORAL SPONT: 1
BH CV LOWER VASCULAR LEFT COMMON FEMORAL AUGMENT: NORMAL
BH CV LOWER VASCULAR LEFT COMMON FEMORAL PHASIC: NORMAL
BH CV LOWER VASCULAR LEFT COMMON FEMORAL SPONT: NORMAL
BH CV LOWER VASCULAR RIGHT COMMON FEMORAL AUGMENT: NORMAL
BH CV LOWER VASCULAR RIGHT COMMON FEMORAL COMPRESS: NORMAL
BH CV LOWER VASCULAR RIGHT COMMON FEMORAL PHASIC: NORMAL
BH CV LOWER VASCULAR RIGHT COMMON FEMORAL SPONT: NORMAL
BH CV LOWER VASCULAR RIGHT DISTAL FEMORAL AUGMENT: NORMAL
BH CV LOWER VASCULAR RIGHT DISTAL FEMORAL COMPETENT: NORMAL
BH CV LOWER VASCULAR RIGHT DISTAL FEMORAL COMPRESS: NORMAL
BH CV LOWER VASCULAR RIGHT DISTAL FEMORAL PHASIC: NORMAL
BH CV LOWER VASCULAR RIGHT DISTAL FEMORAL SPONT: NORMAL
BH CV LOWER VASCULAR RIGHT GASTRONEMIUS COMPRESS: NORMAL
BH CV LOWER VASCULAR RIGHT GREATER SAPH AK COMPRESS: NORMAL
BH CV LOWER VASCULAR RIGHT GREATER SAPH BK COMPRESS: NORMAL
BH CV LOWER VASCULAR RIGHT LESSER SAPH COMPRESS: NORMAL
BH CV LOWER VASCULAR RIGHT MID FEMORAL COMPRESS: NORMAL
BH CV LOWER VASCULAR RIGHT MID FEMORAL PHASIC: NORMAL
BH CV LOWER VASCULAR RIGHT MID FEMORAL SPONT: NORMAL
BH CV LOWER VASCULAR RIGHT POPLITEAL AUGMENT: NORMAL
BH CV LOWER VASCULAR RIGHT POPLITEAL COMPRESS: NORMAL
BH CV LOWER VASCULAR RIGHT POPLITEAL PHASIC: NORMAL
BH CV LOWER VASCULAR RIGHT POPLITEAL SPONT: NORMAL
BH CV LOWER VASCULAR RIGHT PROFUNDA FEMORAL PHASIC: NORMAL
BH CV LOWER VASCULAR RIGHT PROFUNDA FEMORAL SPONT: NORMAL
BH CV LOWER VASCULAR RIGHT PROXIMAL FEMORAL AUGMENT: NORMAL
BH CV LOWER VASCULAR RIGHT PROXIMAL FEMORAL COMPETENT: NORMAL
BH CV LOWER VASCULAR RIGHT PROXIMAL FEMORAL COMPRESS: NORMAL
BH CV LOWER VASCULAR RIGHT PROXIMAL FEMORAL PHASIC: NORMAL
BH CV LOWER VASCULAR RIGHT PROXIMAL FEMORAL SPONT: NORMAL
BH CV LOWER VASCULAR RIGHT SAPHENOFEMORAL JUNCTION AUGMENT: NORMAL
BH CV LOWER VASCULAR RIGHT SAPHENOFEMORAL JUNCTION COMPRESS: NORMAL
BH CV LOWER VASCULAR RIGHT SAPHENOFEMORAL JUNCTION PHASIC: NORMAL
BH CV LOWER VASCULAR RIGHT SAPHENOFEMORAL JUNCTION SPONT: NORMAL
BH CV VAS PRELIMINARY FINDINGS SCRIPTING: 1
BILIRUB SERPL-MCNC: 1 MG/DL (ref 0–1.2)
BUN SERPL-MCNC: 23 MG/DL (ref 8–23)
BUN/CREAT SERPL: 8.8 (ref 7–25)
CALCIUM SPEC-SCNC: 9 MG/DL (ref 8.6–10.5)
CHLORIDE SERPL-SCNC: 100 MMOL/L (ref 98–107)
CO2 SERPL-SCNC: 24 MMOL/L (ref 22–29)
CREAT SERPL-MCNC: 2.61 MG/DL (ref 0.76–1.27)
CRP SERPL-MCNC: 7.31 MG/DL (ref 0–0.5)
D-LACTATE SERPL-SCNC: 0.8 MMOL/L (ref 0.5–2)
DEPRECATED RDW RBC AUTO: 51.9 FL (ref 37–54)
EGFRCR SERPLBLD CKD-EPI 2021: 23.5 ML/MIN/1.73
EOSINOPHIL # BLD AUTO: 0.31 10*3/MM3 (ref 0–0.4)
EOSINOPHIL NFR BLD AUTO: 3.2 % (ref 0.3–6.2)
ERYTHROCYTE [DISTWIDTH] IN BLOOD BY AUTOMATED COUNT: 15.6 % (ref 12.3–15.4)
ERYTHROCYTE [SEDIMENTATION RATE] IN BLOOD: 26 MM/HR (ref 0–20)
GLOBULIN UR ELPH-MCNC: 2.6 GM/DL
GLUCOSE SERPL-MCNC: 106 MG/DL (ref 65–99)
HCT VFR BLD AUTO: 39 % (ref 37.5–51)
HGB BLD-MCNC: 12.9 G/DL (ref 13–17.7)
IMM GRANULOCYTES # BLD AUTO: 0.03 10*3/MM3 (ref 0–0.05)
IMM GRANULOCYTES NFR BLD AUTO: 0.3 % (ref 0–0.5)
INR PPP: 2.83 (ref 0.89–1.12)
LYMPHOCYTES # BLD AUTO: 1.49 10*3/MM3 (ref 0.7–3.1)
LYMPHOCYTES NFR BLD AUTO: 15.6 % (ref 19.6–45.3)
MCH RBC QN AUTO: 30.1 PG (ref 26.6–33)
MCHC RBC AUTO-ENTMCNC: 33.1 G/DL (ref 31.5–35.7)
MCV RBC AUTO: 90.9 FL (ref 79–97)
MONOCYTES # BLD AUTO: 1.32 10*3/MM3 (ref 0.1–0.9)
MONOCYTES NFR BLD AUTO: 13.8 % (ref 5–12)
NEUTROPHILS NFR BLD AUTO: 6.37 10*3/MM3 (ref 1.7–7)
NEUTROPHILS NFR BLD AUTO: 66.7 % (ref 42.7–76)
NRBC BLD AUTO-RTO: 0 /100 WBC (ref 0–0.2)
PLATELET # BLD AUTO: 181 10*3/MM3 (ref 140–450)
PMV BLD AUTO: 10.5 FL (ref 6–12)
POTASSIUM SERPL-SCNC: 4.3 MMOL/L (ref 3.5–5.2)
PROT SERPL-MCNC: 6.8 G/DL (ref 6–8.5)
PROTHROMBIN TIME: 29.8 SECONDS (ref 12.2–14.5)
RBC # BLD AUTO: 4.29 10*6/MM3 (ref 4.14–5.8)
SODIUM SERPL-SCNC: 135 MMOL/L (ref 136–145)
WBC NRBC COR # BLD AUTO: 9.56 10*3/MM3 (ref 3.4–10.8)

## 2024-09-05 PROCEDURE — 36415 COLL VENOUS BLD VENIPUNCTURE: CPT

## 2024-09-05 PROCEDURE — 93971 EXTREMITY STUDY: CPT

## 2024-09-05 PROCEDURE — 93971 EXTREMITY STUDY: CPT | Performed by: INTERNAL MEDICINE

## 2024-09-05 PROCEDURE — 99284 EMERGENCY DEPT VISIT MOD MDM: CPT

## 2024-09-05 PROCEDURE — 86140 C-REACTIVE PROTEIN: CPT | Performed by: NURSE PRACTITIONER

## 2024-09-05 PROCEDURE — 85652 RBC SED RATE AUTOMATED: CPT | Performed by: NURSE PRACTITIONER

## 2024-09-05 PROCEDURE — 83605 ASSAY OF LACTIC ACID: CPT | Performed by: NURSE PRACTITIONER

## 2024-09-05 PROCEDURE — 80053 COMPREHEN METABOLIC PANEL: CPT | Performed by: NURSE PRACTITIONER

## 2024-09-05 PROCEDURE — 85025 COMPLETE CBC W/AUTO DIFF WBC: CPT | Performed by: NURSE PRACTITIONER

## 2024-09-05 PROCEDURE — 85610 PROTHROMBIN TIME: CPT | Performed by: NURSE PRACTITIONER

## 2024-09-05 PROCEDURE — 73700 CT LOWER EXTREMITY W/O DYE: CPT

## 2024-09-05 RX ORDER — DOXYCYCLINE 100 MG/1
100 CAPSULE ORAL 2 TIMES DAILY
Qty: 14 CAPSULE | Refills: 0 | Status: CANCELLED | OUTPATIENT
Start: 2024-09-05 | End: 2024-09-12

## 2024-09-05 RX ORDER — CEPHALEXIN 500 MG/1
500 CAPSULE ORAL 2 TIMES DAILY
Qty: 21 CAPSULE | Refills: 0 | Status: SHIPPED | OUTPATIENT
Start: 2024-09-05 | End: 2024-09-15

## 2024-09-05 RX ORDER — CEPHALEXIN 500 MG/1
500 CAPSULE ORAL 3 TIMES DAILY
Qty: 21 CAPSULE | Refills: 0 | Status: SHIPPED | OUTPATIENT
Start: 2024-09-05 | End: 2024-09-05

## 2024-09-05 RX ORDER — CEPHALEXIN 500 MG/1
500 CAPSULE ORAL 4 TIMES DAILY
Qty: 28 CAPSULE | Refills: 0 | Status: CANCELLED | OUTPATIENT
Start: 2024-09-05 | End: 2024-09-12

## 2024-09-05 RX ADMIN — CEPHALEXIN 500 MG: 250 CAPSULE ORAL at 20:08

## 2024-09-05 NOTE — TELEPHONE ENCOUNTER
Spoke with pt and he states that his leg is worse today. He scheduled with Dr. Combs this afternoon.

## 2024-09-05 NOTE — PROGRESS NOTES
Subjective   Toño Alcala is a 84 y.o. male.     History of Present Illness    Mr. Alcala is being seen today for a leg wound.  He presented to the emergency department on 8/31 after bumping his right lower leg on his car door after which he had swelling and oozing at the site of the injury.  He is on Coumadin.  In the emergency department his leg was cleaned and dressed with an Ace bandage.  About 2 days ago he contacted nursing triage and reported low-dose fever at home and acute worsening of his leg injury.     On presentation patient's right leg is significantly and diffusely swollen with 2+ pitting edema up to the knee.  His leg is diffusely erythematous.  Steri-Strips are still in place overlying what appears to be an abscess.  Upon taking off the Steri-Strips to redress the wound there is oozing.  He reports the leg swelling has progressed very rapidly.  He denies any significant fevers at home is otherwise feeling well.  However he is concerned regarding how quickly the redness and swelling are progressing.    The following portions of the patient's history were reviewed and updated as appropriate: allergies, current medications, past family history, past medical history, past social history, past surgical history, and problem list.    Review of Systems   Constitutional:  Negative for chills and fever.   Skin:  Positive for wound.       Objective   Physical Exam  Constitutional:       Appearance: Normal appearance.   HENT:      Head: Normocephalic and atraumatic.   Eyes:      Extraocular Movements: Extraocular movements intact.      Conjunctiva/sclera: Conjunctivae normal.   Musculoskeletal:      Right lower leg: Edema present.   Skin:     Findings: Erythema present.      Comments: Diffuse erythema and swelling along right lower extremity with suspected abscess at the site of injury   Neurological:      General: No focal deficit present.      Mental Status: He is alert and oriented to person, place, and  time.   Psychiatric:         Mood and Affect: Mood normal.         Behavior: Behavior normal.         Assessment & Plan   Diagnoses and all orders for this visit:    1. Suspected soft tissue infection (Primary)  - I am concerned about the fast progression of his skin and soft tissue infection.  If there is an abscess at the site of the wound he would likely need I&D for source control. Given his possible need for urgent imaging, I&D, IV antibiotics to prevent continuous spread of his infection I believe this issue would be better addressed in the emergency department and have advised the patient to present for evaluation following this visit.      Oralia Combs MD

## 2024-09-05 NOTE — ED PROVIDER NOTES
EMERGENCY DEPARTMENT ENCOUNTER    Pt Name: Toño Alcala  MRN: [id number]  Pt :   1940  Room Number:    Date of encounter:  2024  PCP: Radu Francis MD  ED Provider: YARED Alcantar    Historian: Patient      HPI:  Chief Complaint: Right lower extremity pain and swelling        Context: Toño Alcala is a 84 y.o. male who presents to the ED c/o right lower extremity pain and swelling since 6 days ago.  Patient states he injured right shin hitting on a car door last Friday, on Monday he Was seen in our ER, wound was cleaned, he was discharged home.  Reports worsening of pain and swelling to the right lower extremity.  He denies shortness of breath, fever, malaise.  Patient is currently on warfarin, reports remote history of cardiac bypass.      PAST MEDICAL HISTORY  Past Medical History:   Diagnosis Date    Acute diverticulitis 2020    Allergic 60 yrs ago    Arthritis     Arthritis of neck Fusion     Bilateral radial fractures     fracture bilateral radial heads    CAD (coronary artery disease)     Calculus of gallbladder without cholecystitis without obstruction 2020    Cataract     Cervical disc disorder Fusion     Cholelithiasis     Chronic kidney disease     Clotting disorder     CVD (cardiovascular disease)     History of TIA     Diabetes mellitus     DVT (deep venous thrombosis)     Right lower extremity DVT and pulmonary embolism in 2015, idiopathic    DVT of proximal lower limb 2015    proximal DVT right leg, small PE- anticoagulation    Dyslipidemia     Erectile dysfunction     Fracture     H/o pf left forearm fracture    Fracture of right hand     Fracture of wrist     GERD (gastroesophageal reflux disease)     with hiatal hernia    HL (hearing loss)     Hx of angina pectoris     Hypertension     Normal renal angiography 11    Knee swelling Several years ago    Left wrist fracture     Lumbosacral disc disease      Osgood-Schlatter's disease 5    Osteoarthritis     Right wrist fracture     Vertigo     Wears glasses          PAST SURGICAL HISTORY  Past Surgical History:   Procedure Laterality Date    APPENDECTOMY      BACK SURGERY      CARDIAC CATHETERIZATION      with vein graft    CATARACT EXTRACTION Left     CERVICAL FUSION      CERVICAL FUSION  1992    C3-4    COLONOSCOPY  2013    CORONARY ARTERY BYPASS GRAFT  1994    HERNIA REPAIR Right 2011    inguinal    INGUINAL HERNIA REPAIR Left 10/29/2019    Procedure: INGUINAL HERNIA REPAIR LEFT;  Surgeon: Charisma Raines MD;  Location: [facility];  Service: General    LUMBAR LAMINECTOMY      laminectomy, lumbar, L3    NECK SURGERY      OTHER SURGICAL HISTORY      wrist surgery    SPINE SURGERY      TONSILLECTOMY AND ADENOIDECTOMY  89    TRIGGER POINT INJECTION   -     VASECTOMY  1972         FAMILY HISTORY  Family History   Problem Relation Age of Onset    Arthritis Mother         OA    Heart disease Father     Diabetes Father     Heart attack Father     Heart disease Brother     Heart attack Brother     Diabetes Brother     Diabetes Son     Hypertension Other     Cancer Other          SOCIAL HISTORY  Social History     Socioeconomic History    Marital status:    Tobacco Use    Smoking status: Former     Current packs/day: 0.00     Average packs/day: 1.5 packs/day for 34.8 years (52.2 ttl pk-yrs)     Types: Cigarettes, Pipe, Cigars     Start date: 1962     Quit date: 1997     Years since quittin.3     Passive exposure: Past    Smokeless tobacco: Never    Tobacco comments:     QUIT DATE 1992   Vaping Use    Vaping status: Never Used   Substance and Sexual Activity    Alcohol use: Yes     Alcohol/week: 11.0 - 13.0 standard drinks of alcohol     Types: 7 Glasses of wine, 2 Cans of beer, 2 - 4 Shots of liquor per week     Comment: glass of wine everyday     Drug use: No    Sexual activity: Not Currently      Partners: Female     Birth control/protection: Vasectomy, Surgical         ALLERGIES  Penicillins        REVIEW OF SYSTEMS  Review of Systems       All systems reviewed and negative except for those discussed in HPI.       PHYSICAL EXAM    I have reviewed the triage vital signs and nursing notes.    ED Triage Vitals   Temp Heart Rate Resp BP SpO2   09/05/24 1505 09/05/24 1505 09/05/24 1505 09/05/24 1507 09/05/24 1505   98.8 °F (37.1 °C) 75 16 155/86 98 %      Temp src Heart Rate Source Patient Position BP Location FiO2 (%)   09/05/24 1505 09/05/24 1505 09/05/24 1505 09/05/24 1505 --   Oral Monitor Sitting Left arm        Physical Exam  GENERAL:   Appears in no acute distress.   HENT: Nares patent.  EYES: No scleral icterus.  CV: Regular rhythm, regular rate.  Edema R lower extremity,  RESPIRATORY: Normal effort.  No audible wheezes, rales or rhonchi.  ABDOMEN: Soft, nontender  MUSCULOSKELETAL: No deformities.   NEURO: Alert, moves all extremities, follows commands.  SKIN: Warm, dry, no rash visualized.  Erythema right lower extremity, hematoma with skin avulsion right lateral shin      LAB RESULTS  Recent Results (from the past 24 hour(s))   Comprehensive Metabolic Panel    Collection Time: 09/05/24  3:41 PM    Specimen: Blood   Result Value Ref Range    Glucose 106 (H) 65 - 99 mg/dL    BUN 23 8 - 23 mg/dL    Creatinine 2.61 (H) 0.76 - 1.27 mg/dL    Sodium 135 (L) 136 - 145 mmol/L    Potassium 4.3 3.5 - 5.2 mmol/L    Chloride 100 98 - 107 mmol/L    CO2 24.0 22.0 - 29.0 mmol/L    Calcium 9.0 8.6 - 10.5 mg/dL    Total Protein 6.8 6.0 - 8.5 g/dL    Albumin 4.2 3.5 - 5.2 g/dL    ALT (SGPT) 22 1 - 41 U/L    AST (SGOT) 52 (H) 1 - 40 U/L    Alkaline Phosphatase 152 (H) 39 - 117 U/L    Total Bilirubin 1.0 0.0 - 1.2 mg/dL    Globulin 2.6 gm/dL    A/G Ratio 1.6 g/dL    BUN/Creatinine Ratio 8.8 7.0 - 25.0    Anion Gap 11.0 5.0 - 15.0 mmol/L    eGFR 23.5 (L) >60.0 mL/min/1.73   Lactic Acid, Plasma    Collection Time: 09/05/24   3:41 PM    Specimen: Blood   Result Value Ref Range    Lactate 0.8 0.5 - 2.0 mmol/L   Sedimentation Rate    Collection Time: 09/05/24  3:41 PM    Specimen: Blood   Result Value Ref Range    Sed Rate 26 (H) 0 - 20 mm/hr   C-reactive Protein    Collection Time: 09/05/24  3:41 PM    Specimen: Blood   Result Value Ref Range    C-Reactive Protein 7.31 (H) 0.00 - 0.50 mg/dL   Protime-INR    Collection Time: 09/05/24  3:41 PM    Specimen: Blood   Result Value Ref Range    Protime 29.8 (H) 12.2 - 14.5 Seconds    INR 2.83 (H) 0.89 - 1.12   CBC Auto Differential    Collection Time: 09/05/24  3:41 PM    Specimen: Blood   Result Value Ref Range    WBC 9.56 3.40 - 10.80 10*3/mm3    RBC 4.29 4.14 - 5.80 10*6/mm3    Hemoglobin 12.9 (L) 13.0 - 17.7 g/dL    Hematocrit 39.0 37.5 - 51.0 %    MCV 90.9 79.0 - 97.0 fL    MCH 30.1 26.6 - 33.0 pg    MCHC 33.1 31.5 - 35.7 g/dL    RDW 15.6 (H) 12.3 - 15.4 %    RDW-SD 51.9 37.0 - 54.0 fl    MPV 10.5 6.0 - 12.0 fL    Platelets 181 140 - 450 10*3/mm3    Neutrophil % 66.7 42.7 - 76.0 %    Lymphocyte % 15.6 (L) 19.6 - 45.3 %    Monocyte % 13.8 (H) 5.0 - 12.0 %    Eosinophil % 3.2 0.3 - 6.2 %    Basophil % 0.4 0.0 - 1.5 %    Immature Grans % 0.3 0.0 - 0.5 %    Neutrophils, Absolute 6.37 1.70 - 7.00 10*3/mm3    Lymphocytes, Absolute 1.49 0.70 - 3.10 10*3/mm3    Monocytes, Absolute 1.32 (H) 0.10 - 0.90 10*3/mm3    Eosinophils, Absolute 0.31 0.00 - 0.40 10*3/mm3    Basophils, Absolute 0.04 0.00 - 0.20 10*3/mm3    Immature Grans, Absolute 0.03 0.00 - 0.05 10*3/mm3    nRBC 0.0 0.0 - 0.2 /100 WBC   Duplex Venous Lower Extremity - Right CAR    Collection Time: 09/05/24  4:54 PM   Result Value Ref Range    Right Proximal Femoral Spont 1.0     Right Mid Femoral Spont 1.0     Right Distal Femoral Spont 1.0     Right Common Femoral Spont Y     Right Common Femoral Phasic Y     Right Common Femoral Compress C     Right Common Femoral Augment Y     Right Saphenofemoral Junction Spont Y     Right Saphenofemoral  Junction Phasic Y     Right Saphenofemoral Junction Compress C     Right Saphenofemoral Junction Augment Y     Right Profunda Femoral Spont Y     Right Profunda Femoral Phasic Y     Right Proximal Femoral Spont Y     Right Proximal Femoral Competent N     Right Proximal Femoral Phasic Y     Right Proximal Femoral Compress P     Right Proximal Femoral Augment Y     Right Mid Femoral Spont Y     Right Mid Femoral Phasic Y     Right Mid Femoral Compress P     Right Distal Femoral Spont N     Right Distal Femoral Competent N     Right Distal Femoral Phasic N     Right Distal Femoral Compress P     Right Distal Femoral Augment N     Right Popliteal Spont Y     Right Popliteal Phasic Y     Right Popliteal Compress C     Right Popliteal Augment Y     Right Gastronemius Compress C     Right Greater Saph AK Compress C     Right Greater Saph BK Compress C     Right Lesser Saph Compress C     Left Common Femoral Spont Y     Left Common Femoral Phasic Y     Left Common Femoral Augment Y     BH CV VAS PRELIMINARY FINDINGS SCRIPTING 1.0        If labs were ordered, I independently reviewed the results and considered them in treating the patient.        RADIOLOGY  CT Lower Extremity Right Without Contrast    Result Date: 9/5/2024  CT LOWER EXTREMITY RIGHT WO CONTRAST Date of Exam: 9/5/2024 5:04 PM EDT Indication: leg injury. Redness mid tibia area. Comparison: None available. Technique: Axial CT images were obtained of the right lower extremity without contrast administration.  Reconstructed coronal and sagittal images were also obtained. Automated exposure control and iterative construction methods were used. Findings: Diffuse edema is observed throughout the subcutaneous soft tissues of the right lower leg. There is also evidence for a focal hematoma involving the subcutaneous soft tissues at the lateral aspect of the lower leg overlying the proximal to mid diaphysis of the tibia and fibula. This hematoma measures approximately  4.5 cm in greatest dimension. Edema also extends into the soft tissues of the right foot. No significant edema is seen within the deep underlying muscular soft tissues. There is marked fatty atrophy throughout the muscular soft tissues. The intramuscular fat planes are maintained. There is no abnormal edema or hemorrhage in the deep soft tissues. There are no signs of compartment syndrome. The cortical margins are grossly intact. No displaced fracture is observed. No focal osseous abnormalities are seen. The articulations are intact.     Impression: 1.Nonspecific diffuse soft tissue edema throughout the right lower leg and right foot. The findings may be reactive in nature related to recent injury. Nonspecific inflammatory and vascular etiologies could also be considered. Diffuse soft tissue cellulitis could also have this appearance. 2.There is a well-defined hematoma in the subcutaneous soft tissues at the lateral aspect of the proximal lower leg overlying the level of the proximal to mid diaphysis of the tibia/fibula. 3.No evidence for significant edema within the underlying deep muscular soft tissues. There are no signs of myositis or deep muscular injury. There is no evidence for compartment syndrome. 4.No evidence for fracture or dislocation. Electronically Signed: Sushil Farias MD  9/5/2024 5:28 PM EDT  Workstation ID: [id number]    Duplex Venous Lower Extremity - Right CAR    Result Date: 9/5/2024    Chronic right lower extremity deep vein thrombosis noted in the proximal femoral, mid femoral and distal femoral.   All other right sided veins appeared normal.      I ordered and independently reviewed the above noted radiographic studies.        PROCEDURES    Procedures    No orders to display       MEDICATIONS GIVEN IN ER    Medications   cephalexin (KEFLEX) capsule 500 mg (500 mg Oral Given 9/5/24 2008)         MEDICAL DECISION MAKING, PROGRESS, and CONSULTS    All labs, if obtained, have been independently  reviewed by me.  All radiology studies, if obtained, have been reviewed by me and the radiologist dictating the report.  All EKG's, if obtained, have been independently viewed and interpreted by me/my attending physician.      Discussion below represents my analysis of pertinent findings related to patient's condition, differential diagnosis, treatment plan and final disposition.  Patient is a 84-year-old male who presents for evaluation of worsening of right lower extremity pain and swelling following injury 6 days ago.  On physical exam he was nontoxic-appearing, erythema, edema noted to the right lower extremity, hematoma with skin abrasion to the right lateral shin.   Lab work was obtained and significant for normal white count, stable hemoglobin hematocrit, elevated sed rate 26, CRP 7.31, creatinine 2.61, within patient's range, ultrasound right lower extremity shows chronic femoral DVT.  CT lower extremity without contrast significant for nonspecific diffused soft tissue edema and hematoma.  Discussed findings with the patient, will initiate Keflex for cellulitis, outpatient follow-up with wound care clinic.  Patient has appointment with PCP tomorrow afternoon.  We discussed return precautions for any new or worsening symptoms.                       Differential diagnosis:    Cellulitis, abscess, gas gangrene, DVT      Additional sources:    - Discussed/ obtained information from independent historians:      - External (non-ED) record review:ER visit August 31 initial right lower extremity injury    - Chronic or social conditions impacting care: Advanced age    - Shared decision making: Patient      Orders placed during this visit:  Orders Placed This Encounter   Procedures    CT Lower Extremity Right Without Contrast    Comprehensive Metabolic Panel    Lactic Acid, Plasma    Sedimentation Rate    C-reactive Protein    Protime-INR    CBC Auto Differential    Ambulatory Referral to Wound Clinic    CBC &  Differential         Additional orders considered but not ordered:      ED Course:    Consultants:                  Shared Decision Making:  After my consideration of clinical presentation and any laboratory/radiology studies obtained, I discussed the findings with the patient/patient representative who is in agreement with the treatment plan and the final disposition.   Risks and benefits of discharge and/or observation/admission were discussed.       AS OF 22:59 EDT VITALS:    BP - (!) 199/93  HR - 88  TEMP - 98.8 °F (37.1 °C) (Oral)  O2 SATS - 96%                  DIAGNOSIS  Final diagnoses:   Cellulitis of right upper extremity   Hematoma   Skin tear of right elbow without complication, sequela   Anticoagulation goal of INR 2 to 3   Hypertension, unspecified type         DISPOSITION  DISCHARGE    Patient discharged in stable condition.    Reviewed implications of results, diagnosis, meds, responsibility to follow up, warning signs and symptoms of possible worsening, potential complications and reasons to return to ER.    Patient/Family voiced understanding of above instructions.    Discussed plan for discharge, as there is no emergent indication for admission.  Pt/family is agreeable and understands need for follow up and possible repeat testing.  Pt/family is aware that discharge does not mean that nothing is wrong but that it indicates no emergency is currently present that requires admission and they must continue care with follow-up as given below or with a physician of their choice.     FOLLOW-UP  [facility] OUTPATIENT PHYSICAL THERAPY  [address] [address] [phone]             Medication List        New Prescriptions      cephalexin 500 MG capsule  Commonly known as: KEFLEX  Take 1 capsule by mouth 2 (Two) Times a Day for 10 days.               Where to Get Your Medications        These medications were sent to [facility] [id number] - [place], [place] -  [address] [facility] [address] - [phone] PH - [phone] FX  [address] , [facility] [address]      Phone: [phone]   cephalexin 500 MG capsule            Please note that portions of this document were completed with voice recognition software.     YARED Alcantar   09/05/24   22:59 EDT        Maty Son, APRBE  09/05/24 1363

## 2024-09-06 ENCOUNTER — DOCUMENTATION (OUTPATIENT)
Dept: PHYSICAL THERAPY | Facility: HOSPITAL | Age: 84
End: 2024-09-06
Payer: MEDICARE

## 2024-09-06 ENCOUNTER — OFFICE VISIT (OUTPATIENT)
Dept: INTERNAL MEDICINE | Facility: CLINIC | Age: 84
End: 2024-09-06
Payer: MEDICARE

## 2024-09-06 VITALS
WEIGHT: 195.8 LBS | HEART RATE: 82 BPM | HEIGHT: 72 IN | TEMPERATURE: 99.1 F | DIASTOLIC BLOOD PRESSURE: 58 MMHG | OXYGEN SATURATION: 96 % | BODY MASS INDEX: 26.52 KG/M2 | SYSTOLIC BLOOD PRESSURE: 118 MMHG

## 2024-09-06 DIAGNOSIS — L03.115 CELLULITIS OF RIGHT LOWER EXTREMITY: Primary | ICD-10-CM

## 2024-09-06 NOTE — PROGRESS NOTES
[facility]     Toño Alcala  1940   [id number]      Patient Care Team:  Radu Francis MD as PCP - General    Chief Complaint::   Chief Complaint   Patient presents with    Leg Swelling     right        HPI  History of Present Illness  The patient is an 84-year-old male who comes in for follow-up of cellulitis of the right lower extremity.    He injured his leg against a car door on 12/31/2023. He initially presented to the ED where the wound was cleaned and dressed. Yesterday, he presented with a significantly swollen, warm, and indurated leg. He was referred to the ER due to concerns about an underlying abscess. In the ED, a CT scan was performed, which showed diffuse edema throughout the right lower leg and right foot, felt to be reactive. There was a hematoma, but no sign of myositis or deep muscular injury or infection. He was placed on Keflex and released.    He reports that his condition has improved, but he still experiences a sharp pain. The swelling was so severe that he was unable to put on his sock. The dressing was applied by the hospital staff. He mentions that Dr. Combs removed the Steri-Strips, revealing the hematoma. He describes the area as very sore.      Chronic Conditions:      Patient Active Problem List   Diagnosis    CAD (coronary artery disease)    CVD (cardiovascular disease)    DVT (deep venous thrombosis)    Dyslipidemia    Arthritis    GERD (gastroesophageal reflux disease)    Mixed hyperlipidemia    Diabetic nephropathy associated with type 2 diabetes mellitus    Diabetic polyneuropathy associated with type 2 diabetes mellitus    Chronic kidney disease, stage IV (severe)    Vitamin D deficiency    Disc disorder of cervical region    Postthrombotic syndrome    Vertigo    Angina pectoris    Lumbosacral spondylosis without myelopathy    Psoriasis    Arthritis of elbow    Swelling of right lower extremity    Acute right-sided low back pain without sciatica     Hoarseness    Unifocal PVCs    Skin lesion of face    Essential hypertension    Medicare annual wellness visit, subsequent    Stage 3 chronic kidney disease due to benign hypertension    BMI 30.0-30.9,adult    Postherpetic neuralgia    Herpes zoster without complication    Leg edema, right    Chest pain    Encounter for removal of sutures    Chronic pain of left knee    Fall        Past Medical History:   Diagnosis Date    Acute diverticulitis 01/29/2020    Allergic 60 yrs ago    Arthritis     Arthritis of neck Fusion 1992    Bilateral radial fractures 1962    fracture bilateral radial heads    CAD (coronary artery disease)     Calculus of gallbladder without cholecystitis without obstruction 01/29/2020    Cataract     Cervical disc disorder Fusion 1992    Cholelithiasis     Chronic kidney disease 2020    Clotting disorder     CVD (cardiovascular disease)     History of TIA 1989    Diabetes mellitus     DVT (deep venous thrombosis)     Right lower extremity DVT and pulmonary embolism in 06/2015, idiopathic    DVT of proximal lower limb 06/22/2015    proximal DVT right leg, small PE- anticoagulation    Dyslipidemia     Erectile dysfunction     Fracture 1952    H/o pf left forearm fracture    Fracture of right hand 1980    Fracture of wrist 1980    GERD (gastroesophageal reflux disease)     with hiatal hernia    HL (hearing loss)     Hx of angina pectoris     Hypertension     Normal renal angiography 02/16/11    Knee swelling Several years ago    Left wrist fracture 1953    Lumbosacral disc disease 1996    Osgood-Schlatter's disease 1955    Osteoarthritis     Right wrist fracture     Vertigo     Wears glasses        Past Surgical History:   Procedure Laterality Date    APPENDECTOMY  1957    BACK SURGERY      CARDIAC CATHETERIZATION  2011    with vein graft    CATARACT EXTRACTION Left     CERVICAL FUSION      CERVICAL FUSION  1992    C3-4    COLONOSCOPY  2013    CORONARY ARTERY BYPASS GRAFT  1994    HERNIA REPAIR  Right 2011    inguinal    INGUINAL HERNIA REPAIR Left 10/29/2019    Procedure: INGUINAL HERNIA REPAIR LEFT;  Surgeon: Charisma Raines MD;  Location: [facility];  Service: General    LUMBAR LAMINECTOMY      laminectomy, lumbar, L3    NECK SURGERY      OTHER SURGICAL HISTORY      wrist surgery    SPINE SURGERY      TONSILLECTOMY AND ADENOIDECTOMY      TRIGGER POINT INJECTION   -     VASECTOMY  1972       Family History   Problem Relation Age of Onset    Arthritis Mother         OA    Heart disease Father     Diabetes Father     Heart attack Father     Heart disease Brother     Heart attack Brother     Diabetes Brother     Diabetes Son     Hypertension Other     Cancer Other        Social History     Socioeconomic History    Marital status:    Tobacco Use    Smoking status: Former     Current packs/day: 0.00     Average packs/day: 1.5 packs/day for 34.8 years (52.2 ttl pk-yrs)     Types: Cigarettes, Pipe, Cigars     Start date: 1962     Quit date: 1997     Years since quittin.3     Passive exposure: Past    Smokeless tobacco: Never    Tobacco comments:     QUIT DATE 1992   Vaping Use    Vaping status: Never Used   Substance and Sexual Activity    Alcohol use: Yes     Alcohol/week: 11.0 - 13.0 standard drinks of alcohol     Types: 7 Glasses of wine, 2 Cans of beer, 2 - 4 Shots of liquor per week     Comment: glass of wine everyday     Drug use: No    Sexual activity: Not Currently     Partners: Female     Birth control/protection: Vasectomy, Surgical       Allergies   Allergen Reactions    Penicillins Hives and Swelling     Per patient, has tolerated Keflex         Current Outpatient Medications:     acetaminophen (TYLENOL) 325 MG tablet, Take 2 tablets by mouth As Needed for Mild Pain., Disp: , Rfl:     bumetanide (BUMEX) 1 MG tablet, Take 1 tablet by mouth As Needed (Edema/kidney)., Disp: , Rfl:     calcipotriene-betamethasone (TACLONEX) 0.005-0.064 % ointment,  Apply 1 Application topically to the appropriate area as directed As Needed (psoriasis)., Disp: , Rfl:     carvedilol (COREG) 6.25 MG tablet, Take 1 tablet by mouth 2 (Two) Times a Day With Meals., Disp: , Rfl:     cephalexin (KEFLEX) 500 MG capsule, Take 1 capsule by mouth 2 (Two) Times a Day for 10 days., Disp: 21 capsule, Rfl: 0    cholecalciferol (VITAMIN D3) 25 MCG (1000 UT) tablet, Take 1 tablet by mouth Daily., Disp: , Rfl:     diclofenac (VOLTAREN) 1 % gel gel, Apply 4 g topically As Needed., Disp: , Rfl:     diltiaZEM CD (CARDIZEM CD) 180 MG 24 hr capsule, Take 1 capsule by mouth Daily., Disp: , Rfl:     doxazosin (CARDURA) 2 MG tablet, Take 1 tablet by mouth Daily., Disp: , Rfl:     glucose blood test strip, Use to check blood sugar twice daily, Disp: 100 each, Rfl: 3    lidocaine (LIDODERM) 5 %, Place 1 patch on the skin as directed by provider Daily. Remove & Discard patch within 12 hours or as directed by MD, Disp: 90 patch, Rfl: 2    Microlet Lancets misc, Use to check blood sugar twice daily, Disp: 100 each, Rfl: 3    nitroglycerin (NITROSTAT) 0.4 MG SL tablet, Place 1 tablet under the tongue Every 5 (Five) Minutes As Needed for Chest Pain. Take no more than 3 doses in 15 minutes., Disp: , Rfl:     omeprazole (priLOSEC) 20 MG capsule, Take 1 capsule by mouth Daily., Disp: , Rfl:     sildenafil (Viagra) 100 MG tablet, Take 1 tablet by mouth Daily As Needed for Erectile Dysfunction., Disp: 10 tablet, Rfl: 5    warfarin (COUMADIN) 5 MG tablet, Take 1 tablet by mouth every day except for Monday take 1/2 tablet  AS OF 06/19/24, Disp: 30 tablet, Rfl: 2    Review of Systems   Constitutional: Negative.    Respiratory: Negative.  Negative for chest tightness and shortness of breath.    Cardiovascular: Negative.  Negative for chest pain.   Gastrointestinal:  Negative for abdominal pain, blood in stool, constipation and diarrhea.        Vital Signs  Vitals:    09/06/24 1259   BP: 118/58   BP Location: Left arm  "  Patient Position: Sitting   Cuff Size: Adult   Pulse: 82   Temp: 99.1 °F (37.3 °C)   TempSrc: Infrared   SpO2: 96%   Weight: 88.8 kg (195 lb 12.8 oz)   Height: 182.9 cm (72.01\")   PainSc: 8  Comment: when walking   PainLoc: Leg       Physical Exam  Vitals reviewed.   Constitutional:       Appearance: Normal appearance. He is well-developed.   HENT:      Head: Normocephalic and atraumatic.   Cardiovascular:      Rate and Rhythm: Normal rate and regular rhythm.      Heart sounds: Normal heart sounds. No murmur heard.  Pulmonary:      Effort: Pulmonary effort is normal.      Breath sounds: Normal breath sounds.   Musculoskeletal:      Comments: RLE still swollen and tender   Neurological:      Mental Status: He is alert and oriented to person, place, and time.        Physical Exam      Procedures    ACE III MINI        Results  Laboratory Studies  INR was within the desired range.    Imaging  CT scan showed diffuse edema throughout the right lower leg and right foot, reactive. There was a hematoma. No sign of myositis or deep muscular injury or infection.           Assessment/Plan:    There are no diagnoses linked to this encounter.  Assessment & Plan  1. Hematoma, right lower extremity.  A CT scan conducted yesterday revealed no abscess. The condition is expected to resolve over time with appropriate management. The current treatment plan includes the continuation of Keflex. It is recommended to reduce the warfarin dosage to 2.5 mg daily for the next 5 days, after which he should resume the 5 mg daily dosage. He is also advised to keep his leg elevated. Heat application can be used if it provides comfort. The leg should be wrapped, but the wrap can be removed for bathing or showering.    Follow-up  He will follow up in 1 week.      Plan of care reviewed with patient at the conclusion of today's visit. Education was provided regarding diagnosis, management, and any prescribed or recommended OTC medications.Patient " verbalizes understanding of and agreement with management plan.     Patient or patient representative verbalized consent for the use of Ambient Listening during the visit with  Radu Francis MD for chart documentation. 9/8/2024  16:47 EDT        Radu Francis MD

## 2024-09-06 NOTE — THERAPY DISCHARGE NOTE
Outpatient Rehabilitation - Wound/Debridement  D/C Summary       Patient Name: Toño Alcala  : 1940  MRN: [id number]  Today's Date: 2024                  Admit Date: (Not on file)    Visit Dx:  No diagnosis found.    Patient Active Problem List   Diagnosis    CAD (coronary artery disease)    CVD (cardiovascular disease)    DVT (deep venous thrombosis)    Dyslipidemia    Arthritis    GERD (gastroesophageal reflux disease)    Mixed hyperlipidemia    Diabetic nephropathy associated with type 2 diabetes mellitus    Diabetic polyneuropathy associated with type 2 diabetes mellitus    Chronic kidney disease, stage IV (severe)    Vitamin D deficiency    Disc disorder of cervical region    Postthrombotic syndrome    Vertigo    Angina pectoris    Lumbosacral spondylosis without myelopathy    Psoriasis    Arthritis of elbow    Swelling of right lower extremity    Acute right-sided low back pain without sciatica    Hoarseness    Unifocal PVCs    Skin lesion of face    Essential hypertension    Medicare annual wellness visit, subsequent    Stage 3 chronic kidney disease due to benign hypertension    BMI 30.0-30.9,adult    Postherpetic neuralgia    Herpes zoster without complication    Leg edema, right    Chest pain    Encounter for removal of sutures    Chronic pain of left knee    Fall        Past Medical History:   Diagnosis Date    Acute diverticulitis 2020    Allergic 60 yrs ago    Arthritis     Arthritis of neck Fusion 1992    Bilateral radial fractures     fracture bilateral radial heads    CAD (coronary artery disease)     Calculus of gallbladder without cholecystitis without obstruction 2020    Cataract     Cervical disc disorder Fusion     Cholelithiasis     Chronic kidney disease     Clotting disorder     CVD (cardiovascular disease)     History of TIA     Diabetes mellitus     DVT (deep venous thrombosis)     Right lower extremity DVT and pulmonary embolism in 2015,  idiopathic    DVT of proximal lower limb 06/22/2015    proximal DVT right leg, small PE- anticoagulation    Dyslipidemia     Erectile dysfunction     Fracture 1952    H/o pf left forearm fracture    Fracture of right hand 1980    Fracture of wrist 1980    GERD (gastroesophageal reflux disease)     with hiatal hernia    HL (hearing loss)     Hx of angina pectoris     Hypertension     Normal renal angiography 02/16/11    Knee swelling Several years ago    Left wrist fracture 1953    Lumbosacral disc disease 1996    Osgood-Schlatter's disease 1955    Osteoarthritis     Right wrist fracture     Vertigo     Wears glasses         Past Surgical History:   Procedure Laterality Date    APPENDECTOMY  1957    BACK SURGERY      CARDIAC CATHETERIZATION  2011    with vein graft    CATARACT EXTRACTION Left     CERVICAL FUSION      CERVICAL FUSION  1992    C3-4    COLONOSCOPY  2013    CORONARY ARTERY BYPASS GRAFT  1994    HERNIA REPAIR Right 2011    inguinal    INGUINAL HERNIA REPAIR Left 10/29/2019    Procedure: INGUINAL HERNIA REPAIR LEFT;  Surgeon: Charisma Raines MD;  Location: [facility];  Service: General    LUMBAR LAMINECTOMY  1996    laminectomy, lumbar, L3    NECK SURGERY  1992    OTHER SURGICAL HISTORY      wrist surgery    SPINE SURGERY  1996    TONSILLECTOMY AND ADENOIDECTOMY  1945    TRIGGER POINT INJECTION  2001 - 2007    VASECTOMY  1972         EVALUATION                WOUND DEBRIDEMENT                            Recommendation and Plan      Goals   PT OP Goals       Row Name 09/06/24 1300          PT Short Term Goals    STG Date to Achieve 01/02/20  -     STG 1 Pt will verbalize s/sx of infection and when to seek immediate medical care.  -     STG 1 Progress Met  -     STG 2 Pt will demonstrate 25% reduction in wound area to indicate healing progress.  -     STG 2 Progress Met  -     STG 3 Pt will demonstrate only mild RLE edema to indicate appropriate edema management.  -     STG 3 Progress Met   -        Long Term Goals    LTG Date to Achieve 01/23/20  -     LTG 1 Pt will demonstrate independence with clean home dressing changes as appropriate.  -     LTG 1 Progress Met  -     LTG 2 Pt will demonstrate 90% reduction in wound area to indicate healing progress.  -     LTG 2 Progress Met  -     LTG 3 Pt will demonstrate only trace edema to the RLE to allow for transition to long term compression system.  -     LTG 3 Progress Met  -               User Key  (r) = Recorded By, (t) = Taken By, (c) = Cosigned By      Initials Name Provider Type    Marlene Bethea, PT Physical Therapist                    Time Calculation:               OP Discharge Summary       Row Name 09/06/24 1352             OP PT Discharge Summary    Date of Discharge 04/30/24  -      Reason for Discharge All goals achieved  -      Outcomes Achieved Able to achieve all goals within established timeline  -      Discharge Destination Home with home program  -                User Key  (r) = Recorded By, (t) = Taken By, (c) = Cosigned By      Initials Name Provider Type    Marlene Bethea, PT Physical Therapist                    Marlene Levy, PT  9/6/2024

## 2024-09-12 ENCOUNTER — HOSPITAL ENCOUNTER (OUTPATIENT)
Dept: PHYSICAL THERAPY | Facility: HOSPITAL | Age: 84
Setting detail: THERAPIES SERIES
Discharge: HOME | End: 2024-09-12
Payer: MEDICARE

## 2024-09-12 DIAGNOSIS — S80.11XA HEMATOMA OF RIGHT LOWER LEG: ICD-10-CM

## 2024-09-12 DIAGNOSIS — S81.801D OPEN WOUND OF RIGHT LOWER LEG, SUBSEQUENT ENCOUNTER: Primary | ICD-10-CM

## 2024-09-12 DIAGNOSIS — R60.0 EDEMA OF RIGHT LOWER LEG: ICD-10-CM

## 2024-09-12 PROCEDURE — 97597 DBRDMT OPN WND 1ST 20 CM/<: CPT

## 2024-09-12 PROCEDURE — 97162 PT EVAL MOD COMPLEX 30 MIN: CPT

## 2024-09-12 PROCEDURE — 29581 APPL MULTLAYER CMPRN SYS LEG: CPT

## 2024-09-12 NOTE — THERAPY EVALUATION
Outpatient Rehabilitation - Wound/Debridement Initial Eval  [facility]     Patient Name: Toño Alcala  : 1940  MRN: [id number]  Today's Date: 2024                  Admit Date: 2024    Visit Dx:    ICD-10-CM ICD-9-CM   1. Open wound of right lower leg, subsequent encounter  S81.801D V58.89     891.0   2. Edema of right lower leg  R60.0 782.3   3. Hematoma of right lower leg  S80.11XA 924.10             Patient Active Problem List   Diagnosis    CAD (coronary artery disease)    CVD (cardiovascular disease)    DVT (deep venous thrombosis)    Dyslipidemia    Arthritis    GERD (gastroesophageal reflux disease)    Mixed hyperlipidemia    Diabetic nephropathy associated with type 2 diabetes mellitus    Diabetic polyneuropathy associated with type 2 diabetes mellitus    Chronic kidney disease, stage IV (severe)    Vitamin D deficiency    Disc disorder of cervical region    Postthrombotic syndrome    Vertigo    Angina pectoris    Lumbosacral spondylosis without myelopathy    Psoriasis    Arthritis of elbow    Swelling of right lower extremity    Acute right-sided low back pain without sciatica    Hoarseness    Unifocal PVCs    Skin lesion of face    Essential hypertension    Medicare annual wellness visit, subsequent    Stage 3 chronic kidney disease due to benign hypertension    BMI 30.0-30.9,adult    Postherpetic neuralgia    Herpes zoster without complication    Leg edema, right    Chest pain    Encounter for removal of sutures    Chronic pain of left knee    Fall        Past Medical History:   Diagnosis Date    Acute diverticulitis 2020    Allergic 60 yrs ago    Arthritis     Arthritis of neck Fusion 1992    Bilateral radial fractures 196    fracture bilateral radial heads    CAD (coronary artery disease)     Calculus of gallbladder without cholecystitis without obstruction 2020    Cataract     Cervical disc disorder Fusion     Cholelithiasis     Chronic kidney disease      Clotting disorder     CVD (cardiovascular disease)     History of TIA 1989    Diabetes mellitus     DVT (deep venous thrombosis)     Right lower extremity DVT and pulmonary embolism in 06/2015, idiopathic    DVT of proximal lower limb 06/22/2015    proximal DVT right leg, small PE- anticoagulation    Dyslipidemia     Erectile dysfunction     Fracture 1952    H/o pf left forearm fracture    Fracture of right hand 1980    Fracture of wrist 1980    GERD (gastroesophageal reflux disease)     with hiatal hernia    HL (hearing loss)     Hx of angina pectoris     Hypertension     Normal renal angiography 02/16/11    Knee swelling Several years ago    Left wrist fracture 1953    Lumbosacral disc disease 1996    Osgood-Schlatter's disease 1955    Osteoarthritis     Right wrist fracture     Vertigo     Wears glasses         Past Surgical History:   Procedure Laterality Date    APPENDECTOMY  1957    BACK SURGERY      CARDIAC CATHETERIZATION  2011    with vein graft    CATARACT EXTRACTION Left     CERVICAL FUSION      CERVICAL FUSION  1992    C3-4    COLONOSCOPY  2013    CORONARY ARTERY BYPASS GRAFT  1994    HERNIA REPAIR Right 2011    inguinal    INGUINAL HERNIA REPAIR Left 10/29/2019    Procedure: INGUINAL HERNIA REPAIR LEFT;  Surgeon: Charisma Raines MD;  Location: [facility];  Service: General    LUMBAR LAMINECTOMY  1996    laminectomy, lumbar, L3    NECK SURGERY  1992    OTHER SURGICAL HISTORY      wrist surgery    SPINE SURGERY  1996    TONSILLECTOMY AND ADENOIDECTOMY  1945    TRIGGER POINT INJECTION  2001 - 2007    VASECTOMY  1972        Patient History       Row Name 09/12/24 1300             History    Chief Complaint Pain;Ulcer, wound or other skin conditions  -MF      Type of Pain Lower Extremity / Leg  -MF      Brief Description of Current Complaint Pt hit his leg on his car door and noted a moderate amount of swelling and presented to ED. Pt then f/u with his PCP and recommended f/u with OP PT wound care for  possible hematoma.  -      Patient/Caregiver Goals Heal wound;Decrease swelling  -      Patient's Rating of General Health Fair  -         Pain     Pain Location Leg  -      Pain at Present 4  -MF      Hernandez-Ball FACES Pain Rating  4;6  6 with debridement  -         Daily Activities    Primary Language English  -      Pt Participated in POC and Goals Yes  -MF                User Key  (r) = Recorded By, (t) = Taken By, (c) = Cosigned By      Initials Name Provider Type    MF Jaya Bower, PT Physical Therapist                    EVALUATION     LDA Wound       Row Name 09/12/24 1300             Wound 09/12/24 1300 Right lateral leg Traumatic    Wound - Properties Group Placement Date: 09/12/24  - Placement Time: 1300  -MF Side: Right  - Orientation: lateral  - Location: leg  - Primary Wound Type: Traumatic  -MF    Wound Image Images linked: 2  -MF      Dressing Appearance intact;moist drainage  -      Base moist;black eschar  -MF      Periwound intact;dry  -      Periwound Temperature warm  -      Periwound Skin Turgor firm  -      Edges irregular  -MF      Wound Length (cm) 3 cm  -MF      Wound Width (cm) 3.5 cm  -      Wound Depth (cm) 1.5 cm  -      Wound Surface Area (cm^2) 10.5 cm^2  -MF      Wound Volume (cm^3) 15.75 cm^3  -MF      Undermining [Depth (cm)/Location] ~4cm from 3:00-6:00  -      Drainage Characteristics/Odor sanguineous;clots  -      Drainage Amount large  -MF      Care, Wound irrigated with;sterile normal saline;debrided;pulsatile high pressure lavage  250ml nsaline with fan tip  -      Dressing Care foam;low-adherent  HFBc to pack with mepilex Ag foam and optifoam to cover  -MF      Retired Wound - Properties Group Placement Date: 09/12/24  - Placement Time: 1300  -MF Side: Right  - Orientation: lateral  - Location: leg  - Primary Wound Type: Traumatic  -MF    Retired Wound - Properties Group Date first assessed: 09/12/24  - Time first  assessed: 1300  - Side: Right  - Location: leg  - Primary Wound Type: Traumatic  -              User Key  (r) = Recorded By, (t) = Taken By, (c) = Cosigned By      Initials Name Provider Type    Jaya Doshi, PT Physical Therapist                   Lymphedema       Row Name 09/12/24 1300             Subjective Pain    Able to rate subjective pain? yes  -MF      Pre-Treatment Pain Level 4  -MF      Post-Treatment Pain Level 5  -      Subjective Pain Comment FACES  -         Subjective    Subjective Comments mild increased c/o pain with debridement.  -         Lymphedema Edema Assessment    Ptting Edema Category By severity  -      Pitting Edema Severe  RLE only  -         Skin Changes/Observations    Location/Assessment Lower Extremity  -      Lower Extremity Conditions right:;inflamed  -      Lower Extremity Color/Pigment right:;erythema  -         Compression/Skin Care    Compression/Skin Care skin care;wrapping location;bandaging  -      Skin Care washed/dried;lotion applied  -      Wrapping Location lower extremity  -      Wrapping Location LE right:;foot to knee  -      Wrapping Comments size 4/5 compressogrip doubled and overlapping for gradient compression.  -                User Key  (r) = Recorded By, (t) = Taken By, (c) = Cosigned By      Initials Name Provider Type    Jaya Doshi, PT Physical Therapist                    WOUND DEBRIDEMENT  Total area of Debridement: ~6cm2  Debridement Site 1  Location- Site 1: R Lateral LE  Selective Debridement- Site 1: Wound Surface <20cmsq  Instruments- Site 1: tweezers, scissors  Excised Tissue Description- Site 1: moderate, eschar, other (comment) (coagulated blood)  Bleeding- Site 1: none              Therapy Education       Row Name 09/12/24 1300             Therapy Education    Education Details Pt to keep dressing dry and intact until next appointment. Pt to keep compression wrapping intact and LE elevated as  much as possible.  -MF      Given Bandaging/dressing change;Pain management;Edema management  -      Program New  -      How Provided Verbal;Demonstration  -MF      Provided to Patient  -      Level of Understanding Teach back education performed;Verbalized  -                User Key  (r) = Recorded By, (t) = Taken By, (c) = Cosigned By      Initials Name Provider Type    MF Jaya Bower, PT Physical Therapist                    Recommendation and Plan   PT Assessment/Plan       Row Name 24 1300          PT Assessment    Functional Limitations Performance in self-care ADL;Other (comment)  -     Impairments Integumentary integrity;Impaired venous circulation;Edema  -     Assessment Comments Pt presents with open wound to [facility] s/p trauma with car door.  Pt is on blood thinners and noted a moderate effusion to the lateral portion of the Leg, now with increased LE edema. PT was able to express a copious amount of coagulated blood from the wound base with debridement today and Pulse lavage utilized to help further remove as much coagulated blood as possible. PT also applied light mulitlayered compression wrapping to help reduce LE edema to improve skin integrity.  -     Rehab Potential Good  -     Patient/caregiver participated in establishment of treatment plan and goals Yes  -     Patient would benefit from skilled therapy intervention Yes  -        PT Plan    PT Frequency 2x/week;3x/week  -     Predicted Duration of Therapy Intervention (PT) 6 weeks  -     Planned CPT's? PT EVAL MOD COMPLELITY: 56741;PT NONSELECT DEBRIDE 15 MIN: 80958;PT SELF CARE/MGMT/TRAIN 15 MIN: 16497;PT UNNA BOOT: 43927;PT MULTI LAYER COMP SYS LE;PT EDWIN DEBRIDE OPEN WOUND UP TO 20 CM: 40184  -     Physical Therapy Interventions (Optional Details) wound care  -     PT Plan Comments debridement and dressing management with compression wrapping.  -               User Key  (r) = Recorded By, (t) =  Taken By, (c) = Cosigned By      Initials Name Provider Type    Jaya Doshi, PT Physical Therapist                      Goals   PT OP Goals       Row Name 09/12/24 1300          PT Short Term Goals    STG Date to Achieve 10/27/24  -     STG 1 Pt to have no hematoma material to wound base to improve healing potential.  -     STG 2 Pt to have no significant undermining to allow for faster wound healing.  -        Long Term Goals    LTG Date to Achieve 12/11/24  -     LTG 1 Pt to have no LE edema to allow for transition to compression stockings for long term edema management  -     LTG 2 Decrease RLE wound size by 75% as evidence of wound healing.  -        Time Calculation    PT Goal Re-Cert Due Date 12/11/24  -               User Key  (r) = Recorded By, (t) = Taken By, (c) = Cosigned By      Initials Name Provider Type    Jaya Doshi, PT Physical Therapist                    Time Calculation: Start Time: 1300  Untimed Charges  PT Eval/Re-eval Minutes: 35  Wound Care: 78353 Selective debridement, 50716 Multilayer comp below knee  09156-Kjpfvnylry comp below knee: 10  04145-Shdjaokdj debridement: 10  Total Minutes  Untimed Charges Total Minutes: 55   Total Minutes: 55            Jaya Bower, PT  9/12/2024

## 2024-09-13 ENCOUNTER — OFFICE VISIT (OUTPATIENT)
Dept: INTERNAL MEDICINE | Facility: CLINIC | Age: 84
End: 2024-09-13
Payer: MEDICARE

## 2024-09-13 VITALS
OXYGEN SATURATION: 97 % | TEMPERATURE: 98.4 F | HEIGHT: 72 IN | BODY MASS INDEX: 26.28 KG/M2 | DIASTOLIC BLOOD PRESSURE: 64 MMHG | WEIGHT: 194 LBS | HEART RATE: 86 BPM | SYSTOLIC BLOOD PRESSURE: 126 MMHG

## 2024-09-13 DIAGNOSIS — S80.11XD HEMATOMA OF RIGHT LOWER EXTREMITY, SUBSEQUENT ENCOUNTER: Primary | ICD-10-CM

## 2024-09-13 PROCEDURE — 3078F DIAST BP <80 MM HG: CPT | Performed by: INTERNAL MEDICINE

## 2024-09-13 PROCEDURE — 1126F AMNT PAIN NOTED NONE PRSNT: CPT | Performed by: INTERNAL MEDICINE

## 2024-09-13 PROCEDURE — 1159F MED LIST DOCD IN RCRD: CPT | Performed by: INTERNAL MEDICINE

## 2024-09-13 PROCEDURE — 1160F RVW MEDS BY RX/DR IN RCRD: CPT | Performed by: INTERNAL MEDICINE

## 2024-09-13 PROCEDURE — G2211 COMPLEX E/M VISIT ADD ON: HCPCS | Performed by: INTERNAL MEDICINE

## 2024-09-13 PROCEDURE — 3074F SYST BP LT 130 MM HG: CPT | Performed by: INTERNAL MEDICINE

## 2024-09-13 PROCEDURE — 99212 OFFICE O/P EST SF 10 MIN: CPT | Performed by: INTERNAL MEDICINE

## 2024-09-13 RX ORDER — WARFARIN SODIUM 5 MG/1
TABLET ORAL
Qty: 90 TABLET | Refills: 3 | Status: SHIPPED | OUTPATIENT
Start: 2024-09-13

## 2024-09-13 NOTE — PROGRESS NOTES
[facility]     Toño Alcala  1940   [id number]      Patient Care Team:  Radu Francis MD as PCP - General    Chief Complaint::   Chief Complaint   Patient presents with    Right Leg Wound     He isn't to sure why he is here. He just saw wound care yesterday at the hospital.        HPI  History of Present Illness  The patient is an 84-year-old male who comes in for follow-up of hematoma of the right lower extremity.    He is currently under wound care for his right lower extremity hematoma. Yesterday, a large amount of the hematoma was drained from the lesion, and it was packed. He is scheduled to see wound care for the next 4 weeks.      Chronic Conditions:      Patient Active Problem List   Diagnosis    CAD (coronary artery disease)    CVD (cardiovascular disease)    DVT (deep venous thrombosis)    Dyslipidemia    Arthritis    GERD (gastroesophageal reflux disease)    Mixed hyperlipidemia    Diabetic nephropathy associated with type 2 diabetes mellitus    Diabetic polyneuropathy associated with type 2 diabetes mellitus    Chronic kidney disease, stage IV (severe)    Vitamin D deficiency    Disc disorder of cervical region    Postthrombotic syndrome    Vertigo    Angina pectoris    Lumbosacral spondylosis without myelopathy    Psoriasis    Arthritis of elbow    Swelling of right lower extremity    Acute right-sided low back pain without sciatica    Hoarseness    Unifocal PVCs    Skin lesion of face    Essential hypertension    Medicare annual wellness visit, subsequent    Stage 3 chronic kidney disease due to benign hypertension    BMI 30.0-30.9,adult    Postherpetic neuralgia    Herpes zoster without complication    Leg edema, right    Chest pain    Encounter for removal of sutures    Chronic pain of left knee    Fall        Past Medical History:   Diagnosis Date    Acute diverticulitis 01/29/2020    Allergic 60 yrs ago    Arthritis     Arthritis of neck Fusion 1992    Bilateral  radial fractures 1962    fracture bilateral radial heads    CAD (coronary artery disease)     Calculus of gallbladder without cholecystitis without obstruction 01/29/2020    Cataract     Cervical disc disorder Fusion 1992    Cholelithiasis     Chronic kidney disease 2020    Clotting disorder     CVD (cardiovascular disease)     History of TIA 1989    Diabetes mellitus     DVT (deep venous thrombosis)     Right lower extremity DVT and pulmonary embolism in 06/2015, idiopathic    DVT of proximal lower limb 06/22/2015    proximal DVT right leg, small PE- anticoagulation    Dyslipidemia     Erectile dysfunction     Fracture 1952    H/o pf left forearm fracture    Fracture of right hand 1980    Fracture of wrist 1980    GERD (gastroesophageal reflux disease)     with hiatal hernia    HL (hearing loss)     Hx of angina pectoris     Hypertension     Normal renal angiography 02/16/11    Knee swelling Several years ago    Left wrist fracture 1953    Lumbosacral disc disease 1996    Osgood-Schlatter's disease 1955    Osteoarthritis     Right wrist fracture     Vertigo     Wears glasses        Past Surgical History:   Procedure Laterality Date    APPENDECTOMY  1957    BACK SURGERY      CARDIAC CATHETERIZATION  2011    with vein graft    CATARACT EXTRACTION Left     CERVICAL FUSION      CERVICAL FUSION  1992    C3-4    COLONOSCOPY  2013    CORONARY ARTERY BYPASS GRAFT  1994    HERNIA REPAIR Right 2011    inguinal    INGUINAL HERNIA REPAIR Left 10/29/2019    Procedure: INGUINAL HERNIA REPAIR LEFT;  Surgeon: Charisma Raines MD;  Location: [facility];  Service: General    LUMBAR LAMINECTOMY  1996    laminectomy, lumbar, L3    NECK SURGERY  1992    OTHER SURGICAL HISTORY      wrist surgery    SPINE SURGERY  1996    TONSILLECTOMY AND ADENOIDECTOMY  1945    TRIGGER POINT INJECTION  2001 - 2007    VASECTOMY  1972       Family History   Problem Relation Age of Onset    Arthritis Mother         OA    Heart disease Father      Diabetes Father     Heart attack Father     Heart disease Brother     Heart attack Brother     Diabetes Brother     Diabetes Son     Hypertension Other     Cancer Other        Social History     Socioeconomic History    Marital status:    Tobacco Use    Smoking status: Former     Current packs/day: 0.00     Average packs/day: 1.5 packs/day for 34.8 years (52.2 ttl pk-yrs)     Types: Cigarettes, Pipe, Cigars     Start date: 1962     Quit date: 1997     Years since quittin.4     Passive exposure: Past    Smokeless tobacco: Never    Tobacco comments:     QUIT DATE 1992   Vaping Use    Vaping status: Never Used   Substance and Sexual Activity    Alcohol use: Yes     Alcohol/week: 11.0 - 13.0 standard drinks of alcohol     Types: 7 Glasses of wine, 2 Cans of beer, 2 - 4 Shots of liquor per week     Comment: glass of wine everyday     Drug use: No    Sexual activity: Not Currently     Partners: Female     Birth control/protection: Vasectomy, Surgical       Allergies   Allergen Reactions    Penicillins Hives and Swelling     Per patient, has tolerated Keflex         Current Outpatient Medications:     acetaminophen (TYLENOL) 325 MG tablet, Take 2 tablets by mouth As Needed for Mild Pain., Disp: , Rfl:     bumetanide (BUMEX) 1 MG tablet, Take 1 tablet by mouth As Needed (Edema/kidney)., Disp: , Rfl:     calcipotriene-betamethasone (TACLONEX) 0.005-0.064 % ointment, Apply 1 Application topically to the appropriate area as directed As Needed (psoriasis)., Disp: , Rfl:     carvedilol (COREG) 6.25 MG tablet, Take 1 tablet by mouth 2 (Two) Times a Day With Meals., Disp: , Rfl:     cephalexin (KEFLEX) 500 MG capsule, Take 1 capsule by mouth 2 (Two) Times a Day for 10 days., Disp: 21 capsule, Rfl: 0    cholecalciferol (VITAMIN D3) 25 MCG (1000 UT) tablet, Take 1 tablet by mouth Daily., Disp: , Rfl:     diclofenac (VOLTAREN) 1 % gel gel, Apply 4 g topically As Needed., Disp: , Rfl:     diltiaZEM CD  "(CARDIZEM CD) 180 MG 24 hr capsule, Take 1 capsule by mouth Daily., Disp: , Rfl:     doxazosin (CARDURA) 2 MG tablet, Take 1 tablet by mouth Daily., Disp: , Rfl:     glucose blood test strip, Use to check blood sugar twice daily, Disp: 100 each, Rfl: 3    lidocaine (LIDODERM) 5 %, Place 1 patch on the skin as directed by provider Daily. Remove & Discard patch within 12 hours or as directed by MD, Disp: 90 patch, Rfl: 2    Microlet Lancets misc, Use to check blood sugar twice daily, Disp: 100 each, Rfl: 3    nitroglycerin (NITROSTAT) 0.4 MG SL tablet, Place 1 tablet under the tongue Every 5 (Five) Minutes As Needed for Chest Pain. Take no more than 3 doses in 15 minutes., Disp: , Rfl:     omeprazole (priLOSEC) 20 MG capsule, Take 1 capsule by mouth Daily., Disp: , Rfl:     sildenafil (Viagra) 100 MG tablet, Take 1 tablet by mouth Daily As Needed for Erectile Dysfunction., Disp: 10 tablet, Rfl: 5    warfarin (COUMADIN) 5 MG tablet, TAKE 1 TABLET BY MOUTH EVERY DAY EXCEPT FOR MONDAY TAKE 1/2 TABLET AS OF 06/19/24, Disp: 90 tablet, Rfl: 3    Review of Systems   Constitutional: Negative.    Respiratory: Negative.  Negative for chest tightness and shortness of breath.    Cardiovascular: Negative.  Negative for chest pain.   Gastrointestinal:  Negative for abdominal pain, blood in stool, constipation and diarrhea.        Vital Signs  Vitals:    09/13/24 1451   BP: 126/64   BP Location: Left arm   Patient Position: Sitting   Cuff Size: Adult   Pulse: 86   Temp: 98.4 °F (36.9 °C)   SpO2: 97%   Weight: 88 kg (194 lb)   Height: 182.9 cm (72.01\")   PainSc: 0-No pain       Physical Exam  Vitals reviewed.   Constitutional:       Appearance: He is well-developed.   HENT:      Head: Normocephalic and atraumatic.   Cardiovascular:      Rate and Rhythm: Normal rate and regular rhythm.      Heart sounds: Normal heart sounds. No murmur heard.  Pulmonary:      Effort: Pulmonary effort is normal.      Breath sounds: Normal breath sounds. "   Musculoskeletal:      Comments: RLE dressing clean and dry   Neurological:      Mental Status: He is alert and oriented to person, place, and time.        Physical Exam      Procedures    ACE III MINI        Results             Assessment/Plan:    Diagnoses and all orders for this visit:    1. Hematoma of right lower extremity, subsequent encounter (Primary)      Assessment & Plan  1. Right lower extremity hematoma.  Now under the care of wound care. He will have INR done in the first week of 10/2024 and follow up with me as scheduled.      Plan of care reviewed with patient at the conclusion of today's visit. Education was provided regarding diagnosis, management, and any prescribed or recommended OTC medications.Patient verbalizes understanding of and agreement with management plan.     Patient or patient representative verbalized consent for the use of Ambient Listening during the visit with  Radu Francis MD for chart documentation. 9/14/2024  16:40 EDT        Radu Francis MD

## 2024-09-16 ENCOUNTER — OFFICE VISIT (OUTPATIENT)
Dept: ORTHOPEDIC SURGERY | Facility: CLINIC | Age: 84
End: 2024-09-16
Payer: MEDICARE

## 2024-09-16 VITALS
WEIGHT: 194 LBS | HEIGHT: 72 IN | BODY MASS INDEX: 26.28 KG/M2 | DIASTOLIC BLOOD PRESSURE: 62 MMHG | SYSTOLIC BLOOD PRESSURE: 114 MMHG

## 2024-09-16 DIAGNOSIS — M17.12 PRIMARY OSTEOARTHRITIS OF LEFT KNEE: Primary | ICD-10-CM

## 2024-09-16 PROCEDURE — 3074F SYST BP LT 130 MM HG: CPT | Performed by: ORTHOPAEDIC SURGERY

## 2024-09-16 PROCEDURE — 99212 OFFICE O/P EST SF 10 MIN: CPT | Performed by: ORTHOPAEDIC SURGERY

## 2024-09-16 PROCEDURE — 1159F MED LIST DOCD IN RCRD: CPT | Performed by: ORTHOPAEDIC SURGERY

## 2024-09-16 PROCEDURE — 1160F RVW MEDS BY RX/DR IN RCRD: CPT | Performed by: ORTHOPAEDIC SURGERY

## 2024-09-16 PROCEDURE — 3078F DIAST BP <80 MM HG: CPT | Performed by: ORTHOPAEDIC SURGERY

## 2024-09-17 ENCOUNTER — APPOINTMENT (OUTPATIENT)
Dept: PHYSICAL THERAPY | Facility: HOSPITAL | Age: 84
End: 2024-09-17
Payer: MEDICARE

## 2024-09-18 ENCOUNTER — HOSPITAL ENCOUNTER (OUTPATIENT)
Dept: PHYSICAL THERAPY | Facility: HOSPITAL | Age: 84
Setting detail: THERAPIES SERIES
Discharge: HOME | End: 2024-09-18
Payer: MEDICARE

## 2024-09-18 DIAGNOSIS — S80.11XA HEMATOMA OF RIGHT LOWER LEG: ICD-10-CM

## 2024-09-18 DIAGNOSIS — Z87.2 HISTORY OF CELLULITIS: ICD-10-CM

## 2024-09-18 DIAGNOSIS — R60.0 EDEMA OF RIGHT LOWER LEG: ICD-10-CM

## 2024-09-18 DIAGNOSIS — S81.801D OPEN WOUND OF RIGHT LOWER LEG, SUBSEQUENT ENCOUNTER: Primary | ICD-10-CM

## 2024-09-18 PROCEDURE — 29581 APPL MULTLAYER CMPRN SYS LEG: CPT

## 2024-09-18 PROCEDURE — 97597 DBRDMT OPN WND 1ST 20 CM/<: CPT

## 2024-09-20 ENCOUNTER — HOSPITAL ENCOUNTER (OUTPATIENT)
Dept: PHYSICAL THERAPY | Facility: HOSPITAL | Age: 84
Setting detail: THERAPIES SERIES
Discharge: HOME | End: 2024-09-20
Payer: MEDICARE

## 2024-09-20 DIAGNOSIS — R60.0 EDEMA OF RIGHT LOWER LEG: ICD-10-CM

## 2024-09-20 DIAGNOSIS — S81.801D OPEN WOUND OF RIGHT LOWER LEG, SUBSEQUENT ENCOUNTER: Primary | ICD-10-CM

## 2024-09-20 DIAGNOSIS — S80.11XA HEMATOMA OF RIGHT LOWER LEG: ICD-10-CM

## 2024-09-20 DIAGNOSIS — Z87.2 HISTORY OF CELLULITIS: ICD-10-CM

## 2024-09-20 DIAGNOSIS — I87.2 VENOUS STASIS DERMATITIS OF RIGHT LOWER EXTREMITY: ICD-10-CM

## 2024-09-20 PROCEDURE — 29581 APPL MULTLAYER CMPRN SYS LEG: CPT

## 2024-09-20 PROCEDURE — 97597 DBRDMT OPN WND 1ST 20 CM/<: CPT

## 2024-09-24 ENCOUNTER — HOSPITAL ENCOUNTER (OUTPATIENT)
Dept: PHYSICAL THERAPY | Facility: HOSPITAL | Age: 84
Setting detail: THERAPIES SERIES
Discharge: HOME | End: 2024-09-24
Payer: MEDICARE

## 2024-09-24 DIAGNOSIS — Z87.2 HISTORY OF CELLULITIS: ICD-10-CM

## 2024-09-24 DIAGNOSIS — S81.801D OPEN WOUND OF RIGHT LOWER LEG, SUBSEQUENT ENCOUNTER: Primary | ICD-10-CM

## 2024-09-24 DIAGNOSIS — S80.11XA HEMATOMA OF RIGHT LOWER LEG: ICD-10-CM

## 2024-09-24 DIAGNOSIS — I87.2 VENOUS STASIS DERMATITIS OF RIGHT LOWER EXTREMITY: ICD-10-CM

## 2024-09-24 DIAGNOSIS — R60.0 EDEMA OF RIGHT LOWER LEG: ICD-10-CM

## 2024-09-24 PROCEDURE — 29580 STRAPPING UNNA BOOT: CPT

## 2024-09-24 PROCEDURE — 97597 DBRDMT OPN WND 1ST 20 CM/<: CPT

## 2024-09-27 ENCOUNTER — TELEPHONE (OUTPATIENT)
Dept: PHYSICAL THERAPY | Facility: HOSPITAL | Age: 84
End: 2024-09-27
Payer: MEDICARE

## 2024-09-27 ENCOUNTER — HOSPITAL ENCOUNTER (OUTPATIENT)
Dept: PHYSICAL THERAPY | Facility: HOSPITAL | Age: 84
Setting detail: THERAPIES SERIES
Discharge: HOME | End: 2024-09-27
Payer: MEDICARE

## 2024-09-27 DIAGNOSIS — S81.801D OPEN WOUND OF RIGHT LOWER LEG, SUBSEQUENT ENCOUNTER: Primary | ICD-10-CM

## 2024-09-27 DIAGNOSIS — S80.11XA HEMATOMA OF RIGHT LOWER LEG: ICD-10-CM

## 2024-09-27 DIAGNOSIS — R60.0 EDEMA OF RIGHT LOWER LEG: ICD-10-CM

## 2024-09-27 DIAGNOSIS — I87.2 VENOUS STASIS DERMATITIS OF RIGHT LOWER EXTREMITY: ICD-10-CM

## 2024-09-27 DIAGNOSIS — Z87.2 HISTORY OF CELLULITIS: ICD-10-CM

## 2024-09-27 PROCEDURE — 29580 STRAPPING UNNA BOOT: CPT

## 2024-09-27 PROCEDURE — 97597 DBRDMT OPN WND 1ST 20 CM/<: CPT

## 2024-09-27 RX ORDER — DOXYCYCLINE 100 MG/1
100 CAPSULE ORAL 2 TIMES DAILY
Qty: 14 CAPSULE | Refills: 0 | Status: SHIPPED | OUTPATIENT
Start: 2024-09-27

## 2024-09-27 NOTE — TELEPHONE ENCOUNTER
Patient was just seen at PT wound care today and is having increased erythema and edema of his right leg with new weeping and blistering around his ankle.  His hematoma wound is improving with increased granulation but looks like he may be starting to develop some cellulitis.  Photos of his leg will be in his treatment note shortly, but wanted to notify you in case you think he may benefit from antibiotics or need to be seen in the office.  Thank you  Alesha Nunez, PT 9/27/2024 15:29 EDT

## 2024-09-27 NOTE — TELEPHONE ENCOUNTER
See note from wound care.  I tried to call patient on his cell to see if he could come by on his way home but could only leave a message on his voicemail.  If he comes into the office I will see him, but I went ahead and sent doxycycline to his pharmacy.  Please add him to Dr. Combs's schedule next week since my schedule was full.

## 2024-09-30 ENCOUNTER — TELEPHONE (OUTPATIENT)
Dept: INTERNAL MEDICINE | Facility: CLINIC | Age: 84
End: 2024-09-30
Payer: MEDICARE

## 2024-09-30 NOTE — TELEPHONE ENCOUNTER
PATIENT STATES HE WAS RETURNING A CALL BUT WASN'T SURE IF IT WAS FROM OUR OFFICE OR ANOTHER. PLEASE CALL BACK IF THERE WAS A CALL

## 2024-10-01 ENCOUNTER — HOSPITAL ENCOUNTER (OUTPATIENT)
Dept: PHYSICAL THERAPY | Facility: HOSPITAL | Age: 84
Setting detail: THERAPIES SERIES
Discharge: HOME | End: 2024-10-01
Payer: MEDICARE

## 2024-10-01 DIAGNOSIS — R60.0 EDEMA OF RIGHT LOWER LEG: ICD-10-CM

## 2024-10-01 DIAGNOSIS — S81.801D OPEN WOUND OF RIGHT LOWER LEG, SUBSEQUENT ENCOUNTER: Primary | ICD-10-CM

## 2024-10-01 DIAGNOSIS — I87.2 VENOUS STASIS DERMATITIS OF RIGHT LOWER EXTREMITY: ICD-10-CM

## 2024-10-01 DIAGNOSIS — Z87.2 HISTORY OF CELLULITIS: ICD-10-CM

## 2024-10-01 DIAGNOSIS — S80.11XA HEMATOMA OF RIGHT LOWER LEG: ICD-10-CM

## 2024-10-01 PROCEDURE — 29580 STRAPPING UNNA BOOT: CPT

## 2024-10-01 PROCEDURE — 97597 DBRDMT OPN WND 1ST 20 CM/<: CPT

## 2024-10-01 NOTE — THERAPY WOUND CARE TREATMENT
Outpatient Rehabilitation - Wound/Debridement Treatment Note   [place]     Patient Name: Toño Alcala  : 1940  MRN: [id number]  Today's Date: 10/1/2024                 Admit Date: 10/1/2024    Visit Dx:    ICD-10-CM ICD-9-CM   1. Open wound of right lower leg, subsequent encounter  S81.801D V58.89     891.0   2. Edema of right lower leg  R60.0 782.3   3. History of cellulitis  Z87.2 V13.3   4. Hematoma of right lower leg  S80.11XA 924.10   5. Venous stasis dermatitis of right lower extremity  I87.2 454.1       Patient Active Problem List   Diagnosis    CAD (coronary artery disease)    CVD (cardiovascular disease)    DVT (deep venous thrombosis)    Dyslipidemia    Arthritis    GERD (gastroesophageal reflux disease)    Mixed hyperlipidemia    Diabetic nephropathy associated with type 2 diabetes mellitus    Diabetic polyneuropathy associated with type 2 diabetes mellitus    Chronic kidney disease, stage IV (severe)    Vitamin D deficiency    Disc disorder of cervical region    Postthrombotic syndrome    Vertigo    Angina pectoris    Lumbosacral spondylosis without myelopathy    Psoriasis    Arthritis of elbow    Swelling of right lower extremity    Acute right-sided low back pain without sciatica    Hoarseness    Unifocal PVCs    Skin lesion of face    Essential hypertension    Medicare annual wellness visit, subsequent    Stage 3 chronic kidney disease due to benign hypertension    BMI 30.0-30.9,adult    Postherpetic neuralgia    Herpes zoster without complication    Leg edema, right    Chest pain    Encounter for removal of sutures    Chronic pain of left knee    Fall        Past Medical History:   Diagnosis Date    Acute diverticulitis 2020    Allergic 60 yrs ago    Arthritis     Arthritis of neck Fusion 1992    Bilateral radial fractures     fracture bilateral radial heads    CAD (coronary artery disease)     Calculus of gallbladder without cholecystitis without obstruction 2020     Cataract     Cervical disc disorder Fusion 1992    Cholelithiasis     Chronic kidney disease 2020    Clotting disorder     CVD (cardiovascular disease)     History of TIA 1989    Diabetes mellitus     DVT (deep venous thrombosis)     Right lower extremity DVT and pulmonary embolism in 06/2015, idiopathic    DVT of proximal lower limb 06/22/2015    proximal DVT right leg, small PE- anticoagulation    Dyslipidemia     Erectile dysfunction     Fracture 1952    H/o pf left forearm fracture    Fracture of right hand 1980    Fracture of wrist 1980    GERD (gastroesophageal reflux disease)     with hiatal hernia    HL (hearing loss)     Hx of angina pectoris     Hypertension     Normal renal angiography 02/16/11    Knee swelling Several years ago    Left wrist fracture 1953    Lumbosacral disc disease 1996    Osgood-Schlatter's disease 1955    Osteoarthritis     Right wrist fracture     Vertigo     Wears glasses         Past Surgical History:   Procedure Laterality Date    APPENDECTOMY  1957    BACK SURGERY      CARDIAC CATHETERIZATION  2011    with vein graft    CATARACT EXTRACTION Left     CERVICAL FUSION      CERVICAL FUSION  1992    C3-4    COLONOSCOPY  2013    CORONARY ARTERY BYPASS GRAFT  1994    HERNIA REPAIR Right 2011    inguinal    INGUINAL HERNIA REPAIR Left 10/29/2019    Procedure: INGUINAL HERNIA REPAIR LEFT;  Surgeon: Charisma Raines MD;  Location: [facility];  Service: General    LUMBAR LAMINECTOMY  1996    laminectomy, lumbar, L3    NECK SURGERY  1992    OTHER SURGICAL HISTORY      wrist surgery    SPINE SURGERY  1996    TONSILLECTOMY AND ADENOIDECTOMY  1945    TRIGGER POINT INJECTION  2001 - 2007    VASECTOMY  1972         EVALUATION   PT Ortho       Row Name 10/01/24 1400       Subjective    Subjective Comments Pt has 2-3 days left of an oral antibiotic. He's having pain around the ankle area, especially the lateral side, where his most severe issues were during his last episode of care  -        Subjective Pain    Able to rate subjective pain? yes  -MC    Pre-Treatment Pain Level 4  -MC    Post-Treatment Pain Level 4  -    Subjective Pain Comment FACES  -       Transfers    Sit-Stand [place] (Transfers) independent  -    Stand-Sit [place] (Transfers) independent  -    Comment, (Transfers) seated for tx  -       Gait/Stairs (Locomotion)    [place] Level (Gait) modified independence  -    Assistive Device (Gait) cane, straight  -              User Key  (r) = Recorded By, (t) = Taken By, (c) = Cosigned By      Initials Name Provider Type    Marlene Bethea PT Physical Therapist                     LDA Wound       Row Name 10/01/24 1400             Wound 09/12/24 1300 Right lateral leg Traumatic    Wound - Properties Group Placement Date: 09/12/24  - Placement Time: 1300  - Side: Right  - Orientation: lateral  - Location: leg  - Primary Wound Type: Traumatic  -    Dressing Appearance intact;moist drainage  -      Base moist;granulating;necrotic;red;yellow;slough;other (see comments)  necrosis of skin overlying undermining area  -      Periwound intact;dry;redness;swelling;blistered  weeping med/lat ankle much improved, slightly deeper area lateral ankle  -      Periwound Temperature warm  -      Periwound Skin Turgor firm  -      Edges irregular  -      Drainage Characteristics/Odor serosanguineous;serous  -      Drainage Amount moderate  -      Care, Wound cleansed with;wound cleanser;debrided;yaa boot  -      Dressing Care dressing applied;silver impregnated;collagen;antimicrobial agent applied;foam;low-adherent  lateral/primary wound: jimmy, saline-moist HFBc, mepilex Ag, unna boot, cast padding. Ankle: mepilex Ag, UB  -      Periwound Care cleansed with pH balanced cleanser;barrier ointment applied  zguard  -      Retired Wound - Properties Group Placement Date: 09/12/24  - Placement Time: 1300  -MF Side: Right  - Orientation:  "lateral  -MF Location: leg  -MF Primary Wound Type: Traumatic  -MF    Retired Wound - Properties Group Date first assessed: 09/12/24  -MF Time first assessed: 1300  -MF Side: Right  -MF Location: leg  -MF Primary Wound Type: Traumatic  -MF              User Key  (r) = Recorded By, (t) = Taken By, (c) = Cosigned By      Initials Name Provider Type     ShaniquaJaya wall, PT Physical Therapist    Marlene Bethea, PT Physical Therapist                   Lymphedema       Row Name 10/01/24 1400             Lymphedema Edema Assessment    Ptting Edema Category By severity  -      Pitting Edema Moderate;Severe  RLE only  -         Skin Changes/Observations    Location/Assessment Lower Extremity  -      Lower Extremity Conditions right:;inflamed;weeping;shiny  -      Lower Extremity Color/Pigment right:;erythema;hypopigmented;hyperpigmented  -         Compression/Skin Care    Compression/Skin Care skin care;wrapping location;bandaging  -      Skin Care washed/dried;lotion applied  -      Wrapping Location lower extremity  -      Wrapping Location LE right:;foot to knee  -      Wrapping Comments mepilex ag to cover ant ankle and weeping areas, Unna boot applied in clamshell pattern, cast padding, 4\" coban, size 6 spandage  -                User Key  (r) = Recorded By, (t) = Taken By, (c) = Cosigned By      Initials Name Provider Type    Marlene Bethea, PT Physical Therapist                    WOUND DEBRIDEMENT  Total area of Debridement: 12 cm2  Debridement Site 1  Location- Site 1: R Lateral LE  Selective Debridement- Site 1: Wound Surface <20cmsq  Instruments- Site 1: tweezers, scissors, other (comment) (cotton swab to sweep undermining area)  Excised Tissue Description- Site 1: minimum, slough, eschar  Bleeding- Site 1: scant, held pressure, 1 minute   Debridement Site 2  Location- Site 2: R medial/lateral ankle  Selective Debridement- Site 2: Wound Surface <20cmsq  Instruments- Site 2: " carlos  Excised Tissue Description- Site 2: minimum, slough, moderate, other (comment) (crust)  Bleeding- Site 2: none          Therapy Education       Row Name 10/01/24 1400             Therapy Education    Education Details Continue current POC  -      Given Bandaging/dressing change;Pain management;Edema management  -      Program Reinforced  -      How Provided Verbal;Demonstration  -MC      Provided to Patient  -      Level of Understanding Teach back education performed;Verbalized  -                User Key  (r) = Recorded By, (t) = Taken By, (c) = Cosigned By      Initials Name Provider Type    Marlene Bethea PT Physical Therapist                    Recommendation and Plan   PT Assessment/Plan       Row Name 10/01/24 1400          PT Assessment    Functional Limitations Performance in self-care ADL;Other (comment)  -     Impairments Integumentary integrity;Impaired venous circulation;Edema  -     Assessment Comments PT able to debride some additional necrotic eschar/slough from the thin, decaying skin overlying the tunneled area of the lateral calf wound. Pt with slightly deeper area to the L lateral ankle, in the same area as the wound in his previous episode of care. Otherwise, the R ankle appears somewhat improved, with dry/hypopigmented skin through much of the ankle.  -     Rehab Potential Good  -     Patient/caregiver participated in establishment of treatment plan and goals Yes  -     Patient would benefit from skilled therapy intervention Yes  -        PT Plan    PT Frequency 2x/week  -     Physical Therapy Interventions (Optional Details) wound care;patient/family education  -     PT Plan Comments debridement, dressings, UB, ?add LDA for lateral ankle  -               User Key  (r) = Recorded By, (t) = Taken By, (c) = Cosigned By      Initials Name Provider Type    Marlene Bethea PT Physical Therapist                    Goals   PT OP Goals       Row Name  10/01/24 1424          Time Calculation    PT Goal Re-Cert Due Date 12/11/24  -ELISHA               User Key  (r) = Recorded By, (t) = Taken By, (c) = Cosigned By      Initials Name Provider Type    Marlene Bethea, PT Physical Therapist                    PT Goal Re-Cert Due Date: 12/11/24            Time Calculation: Start Time: 1345  Untimed Charges  34852-Yzzo Boot: 20  82672-Ahokzmnbh debridement: 15  Total Minutes  Untimed Charges Total Minutes: 35   Total Minutes: 35              Marlene Levy, PT  10/1/2024

## 2024-10-04 ENCOUNTER — HOSPITAL ENCOUNTER (OUTPATIENT)
Dept: PHYSICAL THERAPY | Facility: HOSPITAL | Age: 84
Setting detail: THERAPIES SERIES
Discharge: HOME | End: 2024-10-04
Payer: MEDICARE

## 2024-10-04 DIAGNOSIS — R60.0 EDEMA OF RIGHT LOWER LEG: ICD-10-CM

## 2024-10-04 DIAGNOSIS — S81.801D OPEN WOUND OF RIGHT LOWER LEG, SUBSEQUENT ENCOUNTER: Primary | ICD-10-CM

## 2024-10-04 DIAGNOSIS — Z87.2 HISTORY OF CELLULITIS: ICD-10-CM

## 2024-10-04 DIAGNOSIS — I87.2 VENOUS STASIS DERMATITIS OF RIGHT LOWER EXTREMITY: ICD-10-CM

## 2024-10-04 DIAGNOSIS — S80.11XA HEMATOMA OF RIGHT LOWER LEG: ICD-10-CM

## 2024-10-04 PROCEDURE — 97597 DBRDMT OPN WND 1ST 20 CM/<: CPT

## 2024-10-04 PROCEDURE — 29580 STRAPPING UNNA BOOT: CPT

## 2024-10-04 NOTE — THERAPY WOUND CARE TREATMENT
Outpatient Rehabilitation - Wound/Debridement Treatment Note   [place]     Patient Name: Toño Alcala  : 1940  MRN: [id number]  Today's Date: 10/4/2024                 Admit Date: 10/4/2024    Visit Dx:    ICD-10-CM ICD-9-CM   1. Open wound of right lower leg, subsequent encounter  S81.801D V58.89     891.0   2. Edema of right lower leg  R60.0 782.3   3. History of cellulitis  Z87.2 V13.3   4. Hematoma of right lower leg  S80.11XA 924.10   5. Venous stasis dermatitis of right lower extremity  I87.2 454.1   Lat RLE wound:      Lat R ankle:      Patient Active Problem List   Diagnosis    CAD (coronary artery disease)    CVD (cardiovascular disease)    DVT (deep venous thrombosis)    Dyslipidemia    Arthritis    GERD (gastroesophageal reflux disease)    Mixed hyperlipidemia    Diabetic nephropathy associated with type 2 diabetes mellitus    Diabetic polyneuropathy associated with type 2 diabetes mellitus    Chronic kidney disease, stage IV (severe)    Vitamin D deficiency    Disc disorder of cervical region    Postthrombotic syndrome    Vertigo    Angina pectoris    Lumbosacral spondylosis without myelopathy    Psoriasis    Arthritis of elbow    Swelling of right lower extremity    Acute right-sided low back pain without sciatica    Hoarseness    Unifocal PVCs    Skin lesion of face    Essential hypertension    Medicare annual wellness visit, subsequent    Stage 3 chronic kidney disease due to benign hypertension    BMI 30.0-30.9,adult    Postherpetic neuralgia    Herpes zoster without complication    Leg edema, right    Chest pain    Encounter for removal of sutures    Chronic pain of left knee    Fall        Past Medical History:   Diagnosis Date    Acute diverticulitis 2020    Allergic 60 yrs ago    Arthritis     Arthritis of neck Fusion 1992    Bilateral radial fractures     fracture bilateral radial heads    CAD (coronary artery disease)     Calculus of gallbladder without cholecystitis  without obstruction 01/29/2020    Cataract     Cervical disc disorder Fusion 1992    Cholelithiasis     Chronic kidney disease 2020    Clotting disorder     CVD (cardiovascular disease)     History of TIA 1989    Diabetes mellitus     DVT (deep venous thrombosis)     Right lower extremity DVT and pulmonary embolism in 06/2015, idiopathic    DVT of proximal lower limb 06/22/2015    proximal DVT right leg, small PE- anticoagulation    Dyslipidemia     Erectile dysfunction     Fracture 1952    H/o pf left forearm fracture    Fracture of right hand 1980    Fracture of wrist 1980    GERD (gastroesophageal reflux disease)     with hiatal hernia    HL (hearing loss)     Hx of angina pectoris     Hypertension     Normal renal angiography 02/16/11    Knee swelling Several years ago    Left wrist fracture 1953    Lumbosacral disc disease 1996    Osgood-Schlatter's disease 1955    Osteoarthritis     Right wrist fracture     Vertigo     Wears glasses         Past Surgical History:   Procedure Laterality Date    APPENDECTOMY  1957    BACK SURGERY      CARDIAC CATHETERIZATION  2011    with vein graft    CATARACT EXTRACTION Left     CERVICAL FUSION      CERVICAL FUSION  1992    C3-4    COLONOSCOPY  2013    CORONARY ARTERY BYPASS GRAFT  1994    HERNIA REPAIR Right 2011    inguinal    INGUINAL HERNIA REPAIR Left 10/29/2019    Procedure: INGUINAL HERNIA REPAIR LEFT;  Surgeon: Charisma Raines MD;  Location: [facility];  Service: General    LUMBAR LAMINECTOMY  1996    laminectomy, lumbar, L3    NECK SURGERY  1992    OTHER SURGICAL HISTORY      wrist surgery    SPINE SURGERY  1996    TONSILLECTOMY AND ADENOIDECTOMY  1945    TRIGGER POINT INJECTION  2001 - 2007    VASECTOMY  1972         EVALUATION   PT Ortho       Row Name 10/04/24 1430       Subjective    Subjective Comments Pt reports slightly improved pain to lat ankle, has one more day of abx.  -JM       Subjective Pain    Able to rate subjective pain? yes  -JM    Pre-Treatment  Pain Level 3  -JM    Post-Treatment Pain Level 3  -JM    Subjective Pain Comment R lat ankle  -JM       Transfers    Sit-Stand [place] (Transfers) independent  -JM    Stand-Sit [place] (Transfers) independent  -JM    Comment, (Transfers) seated for tx  -JM       Gait/Stairs (Locomotion)    [place] Level (Gait) modified independence  -JM    Assistive Device (Gait) cane, straight  -JM              User Key  (r) = Recorded By, (t) = Taken By, (c) = Cosigned By      Initials Name Provider Type    Alesha Jason PT Physical Therapist                     LDA Wound       Row Name 10/04/24 1430             Wound 10/04/24 1430 Right lateral ankle    Wound - Properties Group Placement Date: 10/04/24  - Placement Time: 1430  -JM Side: Right  - Orientation: lateral  - Location: ankle  -JM    Wound Image Images linked: 1  -JM      Dressing Appearance intact;moist drainage  -JM      Base moist;pink;white;yellow  -JM      Periwound intact;pink;moist  fragile hypopigmented skin  -JM      Periwound Temperature warm  -JM      Periwound Skin Turgor soft  -JM      Edges irregular  -JM      Wound Length (cm) 7 cm  -JM      Wound Width (cm) 7 cm  -JM      Wound Depth (cm) 0.1 cm  -JM      Wound Surface Area (cm^2) 49 cm^2  -JM      Wound Volume (cm^3) 4.9 cm^3  -JM      Drainage Characteristics/Odor serous  -JM      Drainage Amount small  -JM      Care, Wound cleansed with;wound cleanser;debrided;yaa boot  -JM      Dressing Care dressing applied;silver impregnated;foam  mepilex ag, UB  -JM      Periwound Care cleansed with pH balanced cleanser;dry periwound area maintained  -JM      Retired Wound - Properties Group Placement Date: 10/04/24  - Placement Time: 1430 -JM Side: Right  -JM Orientation: lateral  - Location: ankle  -JM    Retired Wound - Properties Group Date first assessed: 10/04/24  - Time first assessed: 1430  -JM Side: Right  - Location: ankle  -JM       Wound 09/12/24 1300 Right lateral  leg Traumatic    Wound - Properties Group Placement Date: 09/12/24  - Placement Time: 1300  -MF Side: Right  - Orientation: lateral  - Location: leg  -MF Primary Wound Type: Traumatic  -MF    Wound Image Images linked: 2  -      Dressing Appearance intact;moist drainage  -      Base moist;granulating;necrotic;red;yellow;slough;other (see comments)  necrosis of skin overlying undermining area, min granulation forming in depth  -      Periwound intact;dry;redness;swelling;blistered  -      Periwound Temperature warm  -      Periwound Skin Turgor firm  -      Edges irregular  -      Wound Length (cm) 1.1 cm  -      Wound Width (cm) 3.2 cm  -      Wound Depth (cm) 1 cm  -      Wound Surface Area (cm^2) 3.52 cm^2  -JM      Wound Volume (cm^3) 3.52 cm^3  -      Drainage Characteristics/Odor serosanguineous  -      Drainage Amount moderate  -      Care, Wound cleansed with;wound cleanser;debrided;yaa boot  -      Dressing Care dressing applied;collagen;antimicrobial agent applied;foam;silver impregnated  jimmy, saline-moist HFBc, mepilex ag, UB  -      Periwound Care cleansed with pH balanced cleanser;dry periwound area maintained  -      Retired Wound - Properties Group Placement Date: 09/12/24  - Placement Time: 1300  - Side: Right  - Orientation: lateral  - Location: leg  -MF Primary Wound Type: Traumatic  -MF    Retired Wound - Properties Group Date first assessed: 09/12/24  - Time first assessed: 1300  - Side: Right  - Location: leg  -MF Primary Wound Type: Traumatic  -MF              User Key  (r) = Recorded By, (t) = Taken By, (c) = Cosigned By      Initials Name Provider Type    Jaya Doshi, PT Physical Therapist    Alesha Jason, PT Physical Therapist                   Lymphedema       Row Name 10/04/24 0820             Lymphedema Edema Assessment    Ptting Edema Category By severity  -      Pitting Edema Moderate  -         Skin  "Changes/Observations    Location/Assessment Lower Extremity  -      Lower Extremity Conditions right:;inflamed;weeping;shiny  -      Lower Extremity Color/Pigment right:;erythema;hypopigmented;hyperpigmented  -         Compression/Skin Care    Compression/Skin Care skin care;wrapping location;bandaging  -      Skin Care washed/dried;lotion applied  -      Wrapping Location lower extremity  -      Wrapping Location LE right:;foot to knee  -      Wrapping Comments mepilex ag to cover ant ankle and weeping areas, Unna boot applied in clamshell pattern, cast padding, 4\" coban, size 6 spandage  -                User Key  (r) = Recorded By, (t) = Taken By, (c) = Cosigned By      Initials Name Provider Type    Alesha Jason, PT Physical Therapist                    WOUND DEBRIDEMENT  Total area of Debridement: 3cmsq  Debridement Site 1  Location- Site 1: R Lateral LE  Selective Debridement- Site 1: Wound Surface <20cmsq  Instruments- Site 1: tweezers, other (comment) (cotton swab to sweep undermining area)  Excised Tissue Description- Site 1: minimum, slough  Bleeding- Site 1: none   Debridement Site 2  Location- Site 2: R medial/lateral ankle  Selective Debridement- Site 2: Wound Surface <20cmsq  Instruments- Site 2: tweezers  Excised Tissue Description- Site 2: minimum, slough, other (comment) (loose macerated tissues)  Bleeding- Site 2: none          Therapy Education       Row Name 10/04/24 9353             Therapy Education    Education Details Continue current POC  -JM      Given Bandaging/dressing change;Pain management;Edema management  -      Program Reinforced  -      How Provided Verbal;Demonstration  -JM      Provided to Patient  -      Level of Understanding Teach back education performed;Verbalized  -                User Key  (r) = Recorded By, (t) = Taken By, (c) = Cosigned By      Initials Name Provider Type    Alesha Jasno, PT Physical Therapist              "       Recommendation and Plan   PT Assessment/Plan       Row Name 10/04/24 1430          PT Assessment    Functional Limitations Performance in self-care ADL;Other (comment)  -     Impairments Integumentary integrity;Impaired venous circulation;Edema  -     Assessment Comments RLE edema slightly improved with less weeping from fragile hypopigmented areas.  Hematoma wound with slowly increasing granulation in visible depth, with continued necrosis of skin overlying undermining.  Pt will continue to require skilled PT for debridement and compression.  -        PT Plan    PT Frequency 2x/week  -     Physical Therapy Interventions (Optional Details) patient/family education;wound care  -     PT Plan Comments debridement, UB  -               User Key  (r) = Recorded By, (t) = Taken By, (c) = Cosigned By      Initials Name Provider Type    Alesha Jason, PT Physical Therapist                    Goals   PT OP Goals       Row Name 10/04/24 1536          Time Calculation    PT Goal Re-Cert Due Date 12/11/24  -               User Key  (r) = Recorded By, (t) = Taken By, (c) = Cosigned By      Initials Name Provider Type    Alesha Jason, PT Physical Therapist                    PT Goal Re-Cert Due Date: 12/11/24            Time Calculation: Start Time: 1430  Untimed Charges  23186-Ycqq Boot: 20  23156-Zxpmtromc debridement: 15  Total Minutes  Untimed Charges Total Minutes: 35   Total Minutes: 35  Therapy Charges for Today       Code Description Service Date Service Provider Modifiers Qty    01166228038 HC EDWIN DEBRIDE OPEN WOUND UP TO 20CM 10/4/2024 Alesha Nunez, PT GP 1    10759689448 HC PT STAPPING UNNA BOOT 10/4/2024 Alesha Nunez, PT GP 1                    Alesha Nunez, PT  10/4/2024

## 2024-10-08 ENCOUNTER — HOSPITAL ENCOUNTER (OUTPATIENT)
Dept: PHYSICAL THERAPY | Facility: HOSPITAL | Age: 84
Setting detail: THERAPIES SERIES
Discharge: HOME | End: 2024-10-08
Payer: MEDICARE

## 2024-10-08 DIAGNOSIS — S81.801D OPEN WOUND OF RIGHT LOWER LEG, SUBSEQUENT ENCOUNTER: Primary | ICD-10-CM

## 2024-10-08 DIAGNOSIS — I87.2 VENOUS STASIS DERMATITIS OF RIGHT LOWER EXTREMITY: ICD-10-CM

## 2024-10-08 DIAGNOSIS — S80.11XA HEMATOMA OF RIGHT LOWER LEG: ICD-10-CM

## 2024-10-08 DIAGNOSIS — R60.0 EDEMA OF RIGHT LOWER LEG: ICD-10-CM

## 2024-10-08 DIAGNOSIS — Z87.2 HISTORY OF CELLULITIS: ICD-10-CM

## 2024-10-08 PROCEDURE — 97597 DBRDMT OPN WND 1ST 20 CM/<: CPT

## 2024-10-08 PROCEDURE — 29580 STRAPPING UNNA BOOT: CPT

## 2024-10-08 NOTE — THERAPY WOUND CARE TREATMENT
Outpatient Rehabilitation - Wound/Debridement Treatment Note   [facility]     Patient Name: Toño Alcala  : 1940  MRN: [id number]  Today's Date: 10/8/2024                 Admit Date: 10/8/2024    Visit Dx:    ICD-10-CM ICD-9-CM   1. Open wound of right lower leg, subsequent encounter  S81.801D V58.89     891.0   2. Edema of right lower leg  R60.0 782.3   3. History of cellulitis  Z87.2 V13.3   4. Hematoma of right lower leg  S80.11XA 924.10   5. Venous stasis dermatitis of right lower extremity  I87.2 454.1   Lat R calf:        Patient Active Problem List   Diagnosis    CAD (coronary artery disease)    CVD (cardiovascular disease)    DVT (deep venous thrombosis)    Dyslipidemia    Arthritis    GERD (gastroesophageal reflux disease)    Mixed hyperlipidemia    Diabetic nephropathy associated with type 2 diabetes mellitus    Diabetic polyneuropathy associated with type 2 diabetes mellitus    Chronic kidney disease, stage IV (severe)    Vitamin D deficiency    Disc disorder of cervical region    Postthrombotic syndrome    Vertigo    Angina pectoris    Lumbosacral spondylosis without myelopathy    Psoriasis    Arthritis of elbow    Swelling of right lower extremity    Acute right-sided low back pain without sciatica    Hoarseness    Unifocal PVCs    Skin lesion of face    Essential hypertension    Medicare annual wellness visit, subsequent    Stage 3 chronic kidney disease due to benign hypertension    BMI 30.0-30.9,adult    Postherpetic neuralgia    Herpes zoster without complication    Leg edema, right    Chest pain    Encounter for removal of sutures    Chronic pain of left knee    Fall        Past Medical History:   Diagnosis Date    Acute diverticulitis 2020    Allergic 60 yrs ago    Arthritis     Arthritis of neck Fusion 1992    Bilateral radial fractures     fracture bilateral radial heads    CAD (coronary artery disease)     Calculus of gallbladder without cholecystitis without obstruction  01/29/2020    Cataract     Cervical disc disorder Fusion 1992    Cholelithiasis     Chronic kidney disease 2020    Clotting disorder     CVD (cardiovascular disease)     History of TIA 1989    Diabetes mellitus     DVT (deep venous thrombosis)     Right lower extremity DVT and pulmonary embolism in 06/2015, idiopathic    DVT of proximal lower limb 06/22/2015    proximal DVT right leg, small PE- anticoagulation    Dyslipidemia     Erectile dysfunction     Fracture 1952    H/o pf left forearm fracture    Fracture of right hand 1980    Fracture of wrist 1980    GERD (gastroesophageal reflux disease)     with hiatal hernia    HL (hearing loss)     Hx of angina pectoris     Hypertension     Normal renal angiography 02/16/11    Knee swelling Several years ago    Left wrist fracture 1953    Lumbosacral disc disease 1996    Osgood-Schlatter's disease 1955    Osteoarthritis     Right wrist fracture     Vertigo     Wears glasses         Past Surgical History:   Procedure Laterality Date    APPENDECTOMY  1957    BACK SURGERY      CARDIAC CATHETERIZATION  2011    with vein graft    CATARACT EXTRACTION Left     CERVICAL FUSION      CERVICAL FUSION  1992    C3-4    COLONOSCOPY  2013    CORONARY ARTERY BYPASS GRAFT  1994    HERNIA REPAIR Right 2011    inguinal    INGUINAL HERNIA REPAIR Left 10/29/2019    Procedure: INGUINAL HERNIA REPAIR LEFT;  Surgeon: Charisma Raines MD;  Location: [facility];  Service: General    LUMBAR LAMINECTOMY  1996    laminectomy, lumbar, L3    NECK SURGERY  1992    OTHER SURGICAL HISTORY      wrist surgery    SPINE SURGERY  1996    TONSILLECTOMY AND ADENOIDECTOMY  1945    TRIGGER POINT INJECTION  2001 - 2007    VASECTOMY  1972         EVALUATION   PT Ortho       Row Name 10/08/24 1500       Subjective    Subjective Comments No issues except ongoing ankle pain.  -JM       Subjective Pain    Able to rate subjective pain? yes  -JM    Pre-Treatment Pain Level 3  -JM    Post-Treatment Pain Level 3  -JM        Transfers    Sit-Stand [place] (Transfers) independent  -JM    Stand-Sit [place] (Transfers) independent  -    Comment, (Transfers) seated for tx  -JM       Gait/Stairs (Locomotion)    [place] Level (Gait) modified independence  -    Assistive Device (Gait) cane, straight  -JM              User Key  (r) = Recorded By, (t) = Taken By, (c) = Cosigned By      Initials Name Provider Type    Alesha Jason PT Physical Therapist                     LDA Wound       Row Name 10/08/24 1500             Wound 10/04/24 1430 Right lateral ankle    Wound - Properties Group Placement Date: 10/04/24  - Placement Time: 1430  -JM Side: Right  -JM Orientation: lateral  -JM Location: ankle  -JM    Dressing Appearance intact;moist drainage  -JM      Base moist;pink;white;yellow  -JM      Periwound intact;pink;moist  fragile hypopigmented skin  -JM      Periwound Temperature warm  -      Periwound Skin Turgor soft  -      Edges irregular  -JM      Drainage Characteristics/Odor serous  -JM      Drainage Amount small  -JM      Care, Wound cleansed with;wound cleanser;debrided;yaa boot  -JM      Dressing Care dressing applied;silver impregnated;foam  mepilex ag, unna boot  -JM      Periwound Care barrier ointment applied;cleansed with pH balanced cleanser;dry periwound area maintained  zguard  -      Retired Wound - Properties Group Placement Date: 10/04/24  - Placement Time: 1430  -JM Side: Right  -JM Orientation: lateral  -JM Location: ankle  -JM    Retired Wound - Properties Group Date first assessed: 10/04/24  - Time first assessed: 1430  -JM Side: Right  -JM Location: ankle  -JM       Wound 09/12/24 1300 Right lateral leg Traumatic    Wound - Properties Group Placement Date: 09/12/24  -MF Placement Time: 1300  -MF Side: Right  -MF Orientation: lateral  -MF Location: leg  -MF Primary Wound Type: Traumatic  -MF    Wound Image Images linked: 1  -JM      Dressing Appearance intact;moist drainage   -      Base moist;granulating;necrotic;red;yellow;slough;other (see comments)  overlying skin almost fully deteriorated  -      Periwound intact;dry;redness;swelling  -      Periwound Temperature warm  -      Periwound Skin Turgor firm  -      Edges irregular  -      Drainage Characteristics/Odor serosanguineous;yellow;creamy  -      Drainage Amount moderate  -      Care, Wound cleansed with;wound cleanser;debrided  -      Dressing Care dressing applied;collagen;antimicrobial agent applied;foam;silver impregnated  jimmy, saline-moist HFBc, mepilex ag, UB  -      Periwound Care barrier ointment applied;cleansed with pH balanced cleanser;dry periwound area maintained  zguard  -      Retired Wound - Properties Group Placement Date: 09/12/24  - Placement Time: 1300  -MF Side: Right  - Orientation: lateral  - Location: leg  - Primary Wound Type: Traumatic  -MF    Retired Wound - Properties Group Date first assessed: 09/12/24  - Time first assessed: 1300  - Side: Right  - Location: leg  -MF Primary Wound Type: Traumatic  -MF              User Key  (r) = Recorded By, (t) = Taken By, (c) = Cosigned By      Initials Name Provider Type    MF Jaya Bower, PT Physical Therapist    Alesha Jason, PT Physical Therapist                   Lymphedema       Row Name 10/08/24 1500             Lymphedema Edema Assessment    Ptting Edema Category By severity  -      Pitting Edema Moderate  -         Skin Changes/Observations    Location/Assessment Lower Extremity  -      Lower Extremity Conditions right:;inflamed;weeping;shiny  -      Lower Extremity Color/Pigment right:;erythema;hypopigmented;hyperpigmented  -         Compression/Skin Care    Compression/Skin Care skin care;wrapping location;bandaging  -      Skin Care washed/dried;lotion applied  -      Wrapping Location lower extremity  -      Wrapping Location LE right:;foot to knee  -      Wrapping Comments  "wound dressings, unna boot in clamshell, cast padding, 3\" coban, size 6 spandage  -                User Key  (r) = Recorded By, (t) = Taken By, (c) = Cosigned By      Initials Name Provider Type    Alesha Jason, PT Physical Therapist                    WOUND DEBRIDEMENT  Total area of Debridement: 3cmsq  Debridement Site 1  Location- Site 1: R Lateral LE  Selective Debridement- Site 1: Wound Surface <20cmsq  Instruments- Site 1: tweezers, other (comment) (cotton swab to sweep undermining area)  Excised Tissue Description- Site 1: minimum, slough  Bleeding- Site 1: seeping, held pressure, 3-5 minutes   Debridement Site 2  Location- Site 2: R medial/lateral ankle  Selective Debridement- Site 2: Wound Surface <20cmsq  Instruments- Site 2: tweezers  Excised Tissue Description- Site 2: minimum, slough, other (comment) (crusts)  Bleeding- Site 2: none          Therapy Education       Row Name 10/08/24 1500             Therapy Education    Given Bandaging/dressing change;Pain management;Edema management  -      Program Reinforced  -      How Provided Verbal;Demonstration  -      Provided to Patient  -      Level of Understanding Teach back education performed;Verbalized  -                User Key  (r) = Recorded By, (t) = Taken By, (c) = Cosigned By      Initials Name Provider Type    Alesha Jason, PT Physical Therapist                    Recommendation and Plan   PT Assessment/Plan       Row Name 10/08/24 1500          PT Assessment    Functional Limitations Performance in self-care ADL;Other (comment)  -     Impairments Integumentary integrity;Impaired venous circulation;Edema  -     Assessment Comments Necrotic skin overlying R lat calf undermining is nearly fully deteriorated with moderate creamy drainage noted from area today.  Lat R ankle and distal leg weeping much improved.  PT continued with debridement and UB to faciliate wound closure and improve skin integrity.  -        PT " Plan    PT Frequency 2x/week  -KACIE     Physical Therapy Interventions (Optional Details) patient/family education;wound care  -     PT Plan Comments debridement, UB  -               User Key  (r) = Recorded By, (t) = Taken By, (c) = Cosigned By      Initials Name Provider Type    Alesha Jason, PT Physical Therapist                    Goals   PT OP Goals       Row Name 10/08/24 1538          Time Calculation    PT Goal Re-Cert Due Date 12/11/24  -               User Key  (r) = Recorded By, (t) = Taken By, (c) = Cosigned By      Initials Name Provider Type    Alesha Jason, PT Physical Therapist                    PT Goal Re-Cert Due Date: 12/11/24            Time Calculation: Start Time: 1500  Untimed Charges  07875-Eptc Boot: 20  15727-Bvqlainke debridement: 15  Total Minutes  Untimed Charges Total Minutes: 35   Total Minutes: 35  Therapy Charges for Today       Code Description Service Date Service Provider Modifiers Qty    24941794617 HC EDWIN DEBRIDE OPEN WOUND UP TO 20CM 10/8/2024 Alesha Nunez, PT GP 1    68936033114 HC PT STAPPING UNNA BOOT 10/8/2024 Alesha Nunez, PT GP 1                    Alesha Nunez, PT  10/8/2024

## 2024-10-11 ENCOUNTER — HOSPITAL ENCOUNTER (OUTPATIENT)
Dept: PHYSICAL THERAPY | Facility: HOSPITAL | Age: 84
Setting detail: THERAPIES SERIES
Discharge: HOME | End: 2024-10-11
Payer: MEDICARE

## 2024-10-11 DIAGNOSIS — R60.0 EDEMA OF RIGHT LOWER LEG: ICD-10-CM

## 2024-10-11 DIAGNOSIS — S80.11XA HEMATOMA OF RIGHT LOWER LEG: ICD-10-CM

## 2024-10-11 DIAGNOSIS — S81.801D OPEN WOUND OF RIGHT LOWER LEG, SUBSEQUENT ENCOUNTER: Primary | ICD-10-CM

## 2024-10-11 DIAGNOSIS — I87.2 VENOUS STASIS DERMATITIS OF RIGHT LOWER EXTREMITY: ICD-10-CM

## 2024-10-11 DIAGNOSIS — Z87.2 HISTORY OF CELLULITIS: ICD-10-CM

## 2024-10-11 PROCEDURE — 97597 DBRDMT OPN WND 1ST 20 CM/<: CPT

## 2024-10-11 PROCEDURE — 29580 STRAPPING UNNA BOOT: CPT

## 2024-10-11 NOTE — THERAPY PROGRESS REPORT/RE-CERT
Outpatient Rehabilitation - Wound/Debridement Progress Note   [place]     Patient Name: Toño Alcala  : 1940  MRN: [id number]  Today's Date: 10/11/2024                 Admit Date: 10/11/2024    Visit Dx:    ICD-10-CM ICD-9-CM   1. Open wound of right lower leg, subsequent encounter  S81.801D V58.89     891.0   2. Edema of right lower leg  R60.0 782.3   3. History of cellulitis  Z87.2 V13.3   4. Hematoma of right lower leg  S80.11XA 924.10   5. Venous stasis dermatitis of right lower extremity  I87.2 454.1   Lat R ankle:    Lat R calf wound:      Patient Active Problem List   Diagnosis    CAD (coronary artery disease)    CVD (cardiovascular disease)    DVT (deep venous thrombosis)    Dyslipidemia    Arthritis    GERD (gastroesophageal reflux disease)    Mixed hyperlipidemia    Diabetic nephropathy associated with type 2 diabetes mellitus    Diabetic polyneuropathy associated with type 2 diabetes mellitus    Chronic kidney disease, stage IV (severe)    Vitamin D deficiency    Disc disorder of cervical region    Postthrombotic syndrome    Vertigo    Angina pectoris    Lumbosacral spondylosis without myelopathy    Psoriasis    Arthritis of elbow    Swelling of right lower extremity    Acute right-sided low back pain without sciatica    Hoarseness    Unifocal PVCs    Skin lesion of face    Essential hypertension    Medicare annual wellness visit, subsequent    Stage 3 chronic kidney disease due to benign hypertension    BMI 30.0-30.9,adult    Postherpetic neuralgia    Herpes zoster without complication    Leg edema, right    Chest pain    Encounter for removal of sutures    Chronic pain of left knee    Fall        Past Medical History:   Diagnosis Date    Acute diverticulitis 2020    Allergic 60 yrs ago    Arthritis     Arthritis of neck Fusion 1992    Bilateral radial fractures     fracture bilateral radial heads    CAD (coronary artery disease)     Calculus of gallbladder without cholecystitis  without obstruction 01/29/2020    Cataract     Cervical disc disorder Fusion 1992    Cholelithiasis     Chronic kidney disease 2020    Clotting disorder     CVD (cardiovascular disease)     History of TIA 1989    Diabetes mellitus     DVT (deep venous thrombosis)     Right lower extremity DVT and pulmonary embolism in 06/2015, idiopathic    DVT of proximal lower limb 06/22/2015    proximal DVT right leg, small PE- anticoagulation    Dyslipidemia     Erectile dysfunction     Fracture 1952    H/o pf left forearm fracture    Fracture of right hand 1980    Fracture of wrist 1980    GERD (gastroesophageal reflux disease)     with hiatal hernia    HL (hearing loss)     Hx of angina pectoris     Hypertension     Normal renal angiography 02/16/11    Knee swelling Several years ago    Left wrist fracture 1953    Lumbosacral disc disease 1996    Osgood-Schlatter's disease 1955    Osteoarthritis     Right wrist fracture     Vertigo     Wears glasses         Past Surgical History:   Procedure Laterality Date    APPENDECTOMY  1957    BACK SURGERY      CARDIAC CATHETERIZATION  2011    with vein graft    CATARACT EXTRACTION Left     CERVICAL FUSION      CERVICAL FUSION  1992    C3-4    COLONOSCOPY  2013    CORONARY ARTERY BYPASS GRAFT  1994    HERNIA REPAIR Right 2011    inguinal    INGUINAL HERNIA REPAIR Left 10/29/2019    Procedure: INGUINAL HERNIA REPAIR LEFT;  Surgeon: Charisma Raines MD;  Location: [facility];  Service: General    LUMBAR LAMINECTOMY  1996    laminectomy, lumbar, L3    NECK SURGERY  1992    OTHER SURGICAL HISTORY      wrist surgery    SPINE SURGERY  1996    TONSILLECTOMY AND ADENOIDECTOMY  1945    TRIGGER POINT INJECTION  2001 - 2007    VASECTOMY  1972         EVALUATION   PT Ortho       Row Name 10/11/24 1430       Subjective    Subjective Comments Pt only c/o lat ankle pain ongoing.  -JM       Subjective Pain    Able to rate subjective pain? yes  -JM    Pre-Treatment Pain Level 3  -JM    Post-Treatment  Pain Level 3  -JM    Subjective Pain Comment R lat ankle  -JM       Transfers    Sit-Stand [place] (Transfers) independent  -JM    Stand-Sit [place] (Transfers) independent  -JM    Comment, (Transfers) seated for tx  -JM       Gait/Stairs (Locomotion)    [place] Level (Gait) modified independence  -JM    Assistive Device (Gait) cane, straight  -JM              User Key  (r) = Recorded By, (t) = Taken By, (c) = Cosigned By      Initials Name Provider Type    Alesha Jason PT Physical Therapist                     [facility] Wound       Row Name 10/11/24 1430             Wound 10/04/24 1430 Right lateral ankle    Wound - Properties Group Placement Date: 10/04/24  - Placement Time: 1430  -JM Side: Right  - Orientation: lateral  - Location: ankle  -    Wound Image Images linked: 1  -JM      Dressing Appearance intact;moist drainage  -JM      Base moist;pink;white;yellow  moist partial-thickness area  -      Periwound intact;pink;moist  fragile hypopigmented skin  -      Periwound Temperature warm  -      Periwound Skin Turgor soft  -JM      Edges irregular  -JM      Wound Length (cm) 1.5 cm  -JM      Wound Width (cm) 1 cm  -JM      Wound Depth (cm) 0.1 cm  -JM      Wound Surface Area (cm^2) 1.5 cm^2  -JM      Wound Volume (cm^3) 0.15 cm^3  -JM      Drainage Characteristics/Odor serous  -JM      Drainage Amount small  -JM      Care, Wound cleansed with;wound cleanser;debrided;yaa boot  -JM      Dressing Care dressing changed;silver impregnated;foam  mepilex ag, unna boot  -      Periwound Care barrier ointment applied;cleansed with pH balanced cleanser;dry periwound area maintained  zguard  -      Retired Wound - Properties Group Placement Date: 10/04/24  - Placement Time: 1430 -JM Side: Right  - Orientation: lateral  - Location: ankle  -    Retired Wound - Properties Group Date first assessed: 10/04/24  - Time first assessed: 1430  -JM Side: Right  - Location: ankle  -        Wound 09/12/24 1300 Right lateral leg Traumatic    Wound - Properties Group Placement Date: 09/12/24  - Placement Time: 1300  - Side: Right  - Orientation: lateral  -MF Location: leg  -MF Primary Wound Type: Traumatic  -MF    Wound Image Images linked: 1  -      Dressing Appearance intact;moist drainage  -      Base moist;granulating;necrotic;red;yellow;slough;other (see comments)  beefy granulation under biofilm layer, still some residual necrotic tissues in undermining area  -      Periwound intact;dry;redness;swelling  -      Periwound Temperature warm  -      Periwound Skin Turgor firm  -      Edges irregular  -      Wound Length (cm) 1.4 cm  -      Wound Width (cm) 2.8 cm  -      Wound Depth (cm) 1 cm  -      Wound Surface Area (cm^2) 3.92 cm^2  -JM      Wound Volume (cm^3) 3.92 cm^3  -      Undermining [Depth (cm)/Location] 2cm@2-4:00  -      Drainage Characteristics/Odor serosanguineous;yellow;creamy;malodorous  min odor  -      Drainage Amount moderate  -      Care, Wound cleansed with;wound cleanser;debrided;yaa boot  -      Dressing Care dressing applied;collagen;antimicrobial agent applied;foam;silver impregnated  jimmy, saline-moist HFBc, mepilex ag, UB  -      Periwound Care barrier ointment applied;cleansed with pH balanced cleanser;dry periwound area maintained  zguard  -      Retired Wound - Properties Group Placement Date: 09/12/24  - Placement Time: 1300  - Side: Right  - Orientation: lateral  - Location: leg  -MF Primary Wound Type: Traumatic  -MF    Retired Wound - Properties Group Date first assessed: 09/12/24  - Time first assessed: 1300  - Side: Right  - Location: leg  -MF Primary Wound Type: Traumatic  -MF              User Key  (r) = Recorded By, (t) = Taken By, (c) = Cosigned By      Initials Name Provider Type    Jaya Doshi, PT Physical Therapist    Alesha Jason, PT Physical Therapist                    "Lymphedema       Row Name 10/11/24 1430             Lymphedema Edema Assessment    Ptting Edema Category By severity  -      Pitting Edema Moderate  -         Skin Changes/Observations    Location/Assessment Lower Extremity  -      Lower Extremity Conditions right:;inflamed;weeping;shiny  -      Lower Extremity Color/Pigment right:;erythema;hypopigmented;hyperpigmented  -         Compression/Skin Care    Compression/Skin Care skin care;wrapping location;bandaging  -      Skin Care washed/dried;lotion applied  -      Wrapping Location lower extremity  -      Wrapping Location LE right:;foot to knee  -      Wrapping Comments wound dressings, unna boot in clamshell, cast padding, 3\" coban, size 6 spandage  -                User Key  (r) = Recorded By, (t) = Taken By, (c) = Cosigned By      Initials Name Provider Type    Alesha Jason, PT Physical Therapist                    WOUND DEBRIDEMENT  Total area of Debridement: 4cmsq  Debridement Site 1  Location- Site 1: R Lateral LE  Selective Debridement- Site 1: Wound Surface <20cmsq  Instruments- Site 1: tweezers, other (comment) (cotton swab to sweep undermining area)  Excised Tissue Description- Site 1: maximum, slough, necrotic  Bleeding- Site 1: scant              Therapy Education       Row Name 10/11/24 1430             Therapy Education    Given Bandaging/dressing change;Pain management;Edema management  -      Program Reinforced  -KACIE      How Provided Verbal;Demonstration  -      Provided to Patient  -      Level of Understanding Teach back education performed;Verbalized  -                User Key  (r) = Recorded By, (t) = Taken By, (c) = Cosigned By      Initials Name Provider Type    Alesha Jason, PT Physical Therapist                    Recommendation and Plan   PT Assessment/Plan       Row Name 10/11/24 1430          PT Assessment    Functional Limitations Performance in self-care ADL;Other (comment)  -KACIE     " Impairments Integumentary integrity;Impaired venous circulation;Edema  -     Assessment Comments PT able to debride biofilm and residual necrotic tissues from R calf wound now that overlying skin has necrosed.  Wound with beefy granulation in base, but noted undermining with residual necrotic tissues at 2-4:00.  Otherwise RLE skin integrity improving with unna boot, pt still with small moist area to lat ankle causing some pain.  Pt is progressing toward goals, will continue to require skilled PT for wound debridement and compression wrapping/UB.  -     Rehab Potential Fair  -     Patient/caregiver participated in establishment of treatment plan and goals Yes  -     Patient would benefit from skilled therapy intervention Yes  -        PT Plan    PT Frequency 2x/week  -     Predicted Duration of Therapy Intervention (PT) 20 visits  -     Planned CPT's? PT EDWIN DEBRIDE OPEN WOUND UP TO 20 CM: 29382;PT EDWIN DEBRIDE OPEN WOUND EA ADD 20 CM: 83373;PT NONSELECT DEBRIDE 15 MIN: 88384;PT UNNA BOOT: 18306;PT MULTI LAYER COMP SYS LE;PT NLFU MIST: 02683;PT SELF CARE/MGMT/TRAIN 15 MIN: 01427  -     Physical Therapy Interventions (Optional Details) patient/family education;wound care  -     PT Plan Comments debridement, UB, ?consider MIST  -               User Key  (r) = Recorded By, (t) = Taken By, (c) = Cosigned By      Initials Name Provider Type    Alesha Jason, PT Physical Therapist                    Goals   PT OP Goals       Row Name 10/11/24 1526 10/11/24 1430       PT Short Term Goals    STG Date to Achieve -- 10/27/24  -    STG 1 -- Pt to have no hematoma material to wound base to improve healing potential.  -    STG 1 Progress -- Met  -    STG 2 -- Pt to have no significant undermining to allow for faster wound healing.  -    STG 2 Progress -- Ongoing  -    STG 3 -- Pt will demonstrate only mild RLE edema to indicate appropriate edema management.  -    STG 3 Progress --  Ongoing  -       Long Term Goals    LTG Date to Achieve -- 12/11/24  -    LTG 1 -- Pt to have no RLE skin breakdown to allow for transition to compression stockings for long term edema management  -    LTG 1 Progress -- Ongoing;Goal Revised  -    LTG 2 -- Decrease RLE wound size by 75% as evidence of wound healing.  -    LT 2 Progress -- Ongoing  -       Time Calculation    PT Goal Re-Cert Due Date 12/11/24  - 12/11/24  -              User Key  (r) = Recorded By, (t) = Taken By, (c) = Cosigned By      Initials Name Provider Type    Alesha Jason, PT Physical Therapist                    PT Goal Re-Cert Due Date: 12/11/24  PT Short Term Goals  STG Date to Achieve: 10/27/24  STG 1: Pt to have no hematoma material to wound base to improve healing potential.  STG 1 Progress: Met  STG 2: Pt to have no significant undermining to allow for faster wound healing.  STG 2 Progress: Ongoing  STG 3: Pt will demonstrate only mild RLE edema to indicate appropriate edema management.  STG 3 Progress: Ongoing  Long Term Goals  LTG Date to Achieve: 12/11/24  LTG 1: Pt to have no RLE skin breakdown to allow for transition to compression stockings for long term edema management  LTG 1 Progress: Ongoing, Goal Revised  LTG 2: Decrease RLE wound size by 75% as evidence of wound healing.  LTG 2 Progress: Ongoing      Time Calculation: Start Time: 1430  Untimed Charges  57051-Sjsx Boot: 15  15536-Vubwvdufr debridement: 20  Total Minutes  Untimed Charges Total Minutes: 35   Total Minutes: 35  Therapy Charges for Today       Code Description Service Date Service Provider Modifiers Qty    01977875348 HC EDWIN DEBRIDE OPEN WOUND UP TO 20CM 10/11/2024 Alesha Nunez, PT GP 1    81812831236 HC PT STAPPING UNNA BOOT 10/11/2024 Alesha Nunez, PT GP 1                    Alesha Nunez, PT  10/11/2024

## 2024-10-15 ENCOUNTER — HOSPITAL ENCOUNTER (OUTPATIENT)
Dept: PHYSICAL THERAPY | Facility: HOSPITAL | Age: 84
Setting detail: THERAPIES SERIES
Discharge: HOME | End: 2024-10-15
Payer: MEDICARE

## 2024-10-15 DIAGNOSIS — Z87.2 HISTORY OF CELLULITIS: ICD-10-CM

## 2024-10-15 DIAGNOSIS — I87.2 VENOUS STASIS DERMATITIS OF RIGHT LOWER EXTREMITY: ICD-10-CM

## 2024-10-15 DIAGNOSIS — R60.0 EDEMA OF RIGHT LOWER LEG: ICD-10-CM

## 2024-10-15 DIAGNOSIS — S81.801D OPEN WOUND OF RIGHT LOWER LEG, SUBSEQUENT ENCOUNTER: Primary | ICD-10-CM

## 2024-10-15 DIAGNOSIS — S80.11XA HEMATOMA OF RIGHT LOWER LEG: ICD-10-CM

## 2024-10-15 PROCEDURE — 29580 STRAPPING UNNA BOOT: CPT

## 2024-10-15 PROCEDURE — 97597 DBRDMT OPN WND 1ST 20 CM/<: CPT

## 2024-10-15 NOTE — THERAPY WOUND CARE TREATMENT
Outpatient Rehabilitation - Wound/Debridement Treatment Note  [facility]     Patient Name: Toño Alcala  : 1940  MRN: [id number]  Today's Date: 10/15/2024                 Admit Date: 10/15/2024    Visit Dx:    ICD-10-CM ICD-9-CM   1. Open wound of right lower leg, subsequent encounter  S81.801D V58.89     891.0   2. Edema of right lower leg  R60.0 782.3   3. History of cellulitis  Z87.2 V13.3   4. Hematoma of right lower leg  S80.11XA 924.10   5. Venous stasis dermatitis of right lower extremity  I87.2 454.1     R lateral leg      R lateral ankle        Patient Active Problem List   Diagnosis    CAD (coronary artery disease)    CVD (cardiovascular disease)    DVT (deep venous thrombosis)    Dyslipidemia    Arthritis    GERD (gastroesophageal reflux disease)    Mixed hyperlipidemia    Diabetic nephropathy associated with type 2 diabetes mellitus    Diabetic polyneuropathy associated with type 2 diabetes mellitus    Chronic kidney disease, stage IV (severe)    Vitamin D deficiency    Disc disorder of cervical region    Postthrombotic syndrome    Vertigo    Angina pectoris    Lumbosacral spondylosis without myelopathy    Psoriasis    Arthritis of elbow    Swelling of right lower extremity    Acute right-sided low back pain without sciatica    Hoarseness    Unifocal PVCs    Skin lesion of face    Essential hypertension    Medicare annual wellness visit, subsequent    Stage 3 chronic kidney disease due to benign hypertension    BMI 30.0-30.9,adult    Postherpetic neuralgia    Herpes zoster without complication    Leg edema, right    Chest pain    Encounter for removal of sutures    Chronic pain of left knee    Fall        Past Medical History:   Diagnosis Date    Acute diverticulitis 2020    Allergic 60 yrs ago    Arthritis     Arthritis of neck Fusion 1992    Bilateral radial fractures     fracture bilateral radial heads    CAD (coronary artery disease)     Calculus of gallbladder without  cholecystitis without obstruction 01/29/2020    Cataract     Cervical disc disorder Fusion 1992    Cholelithiasis     Chronic kidney disease 2020    Clotting disorder     CVD (cardiovascular disease)     History of TIA 1989    Diabetes mellitus     DVT (deep venous thrombosis)     Right lower extremity DVT and pulmonary embolism in 06/2015, idiopathic    DVT of proximal lower limb 06/22/2015    proximal DVT right leg, small PE- anticoagulation    Dyslipidemia     Erectile dysfunction     Fracture 1952    H/o pf left forearm fracture    Fracture of right hand 1980    Fracture of wrist 1980    GERD (gastroesophageal reflux disease)     with hiatal hernia    HL (hearing loss)     Hx of angina pectoris     Hypertension     Normal renal angiography 02/16/11    Knee swelling Several years ago    Left wrist fracture 1953    Lumbosacral disc disease 1996    Osgood-Schlatter's disease 1955    Osteoarthritis     Right wrist fracture     Vertigo     Wears glasses         Past Surgical History:   Procedure Laterality Date    APPENDECTOMY  1957    BACK SURGERY      CARDIAC CATHETERIZATION  2011    with vein graft    CATARACT EXTRACTION Left     CERVICAL FUSION      CERVICAL FUSION  1992    C3-4    COLONOSCOPY  2013    CORONARY ARTERY BYPASS GRAFT  1994    HERNIA REPAIR Right 2011    inguinal    INGUINAL HERNIA REPAIR Left 10/29/2019    Procedure: INGUINAL HERNIA REPAIR LEFT;  Surgeon: Charisma Raines MD;  Location: [facility];  Service: General    LUMBAR LAMINECTOMY  1996    laminectomy, lumbar, L3    NECK SURGERY  1992    OTHER SURGICAL HISTORY      wrist surgery    SPINE SURGERY  1996    TONSILLECTOMY AND ADENOIDECTOMY  1945    TRIGGER POINT INJECTION  2001 - 2007    VASECTOMY  1972         EVALUATION   PT Ortho       Row Name 10/15/24 1300       Subjective    Subjective Comments Pt reports he experienced considerable pain in his R lateral foot following his appointment last visit. Reports he was unable to sleep at night  d/t the pain so he cut the coban at the foot which helped relieve the pain.  -       Subjective Pain    Able to rate subjective pain? yes  -LH    Pre-Treatment Pain Level 3  -LH    Post-Treatment Pain Level 3  -    Subjective Pain Comment R lateral ankle and calf  -       Transfers    Sit-Stand [place] (Transfers) independent  -    Stand-Sit [place] (Transfers) independent  -    Comment, (Transfers) seated for tx  -       Gait/Stairs (Locomotion)    [place] Level (Gait) modified independence  -    Assistive Device (Gait) cane, straight  -              User Key  (r) = Recorded By, (t) = Taken By, (c) = Cosigned By      Initials Name Provider Type     Dejan Zabala, PT Physical Therapist                     LDA Wound       Row Name 10/15/24 1300             Wound 10/04/24 1430 Right lateral ankle    Wound - Properties Group Placement Date: 10/04/24  - Placement Time: 1430 -JM Side: Right  - Orientation: lateral  - Location: ankle  -    Wound Image Images linked: 1  -      Dressing Appearance intact;moist drainage  -      Base moist;pink;white;yellow  moist partial-thickness area  -      Periwound intact;pink;moist  fragile hypopigmented skin  -      Periwound Temperature warm  -      Periwound Skin Turgor soft  -      Edges irregular  -      Drainage Characteristics/Odor serous  very slow active weeping drainage  -      Drainage Amount small  -      Care, Wound cleansed with;wound cleanser;debrided;yaa boot  -      Dressing Care dressing changed;silver impregnated;foam  mepilex ag, unna boot  -      Periwound Care barrier ointment applied;cleansed with pH balanced cleanser;dry periwound area maintained  zguard  -      Retired Wound - Properties Group Placement Date: 10/04/24  - Placement Time: 1430 -JM Side: Right  - Orientation: lateral  - Location: ankle  -    Retired Wound - Properties Group Placement Date: 10/04/24  - Placement Time:  1430  -JM Side: Right  -JM Orientation: lateral  -JM Location: ankle  -JM    Retired Wound - Properties Group Date first assessed: 10/04/24  - Time first assessed: 1430  -JM Side: Right  -JM Location: ankle  -JM       Wound 09/12/24 1300 Right lateral leg Traumatic    Wound - Properties Group Placement Date: 09/12/24  - Placement Time: 1300  -MF Side: Right  -MF Orientation: lateral  -MF Location: leg  -MF Primary Wound Type: Traumatic  -MF    Wound Image Images linked: 1  -      Dressing Appearance intact;moist drainage  -      Base moist;granulating;necrotic;red;yellow;slough;other (see comments)  beefy granulation under considerably thick biofilm layer, still some residual necrotic tissues in undermining area  -      Periwound intact;dry;redness;swelling  -      Periwound Temperature warm  -      Periwound Skin Turgor firm  -      Edges irregular  -      Drainage Characteristics/Odor serosanguineous;yellow;creamy;malodorous  min odor  -      Drainage Amount moderate  -      Care, Wound cleansed with;wound cleanser;debrided;yaa boot  vashe soak  -      Dressing Care dressing applied;antimicrobial agent applied;foam;silver impregnated  saline-moist HFBc, mepilex ag, UB  -      Periwound Care barrier ointment applied;cleansed with pH balanced cleanser;dry periwound area maintained  zguard  -      Retired Wound - Properties Group Placement Date: 09/12/24  - Placement Time: 1300  -MF Side: Right  - Orientation: lateral  -MF Location: leg  -MF Primary Wound Type: Traumatic  -MF    Retired Wound - Properties Group Placement Date: 09/12/24  - Placement Time: 1300  -MF Side: Right  -MF Orientation: lateral  -MF Location: leg  -MF Primary Wound Type: Traumatic  -MF    Retired Wound - Properties Group Date first assessed: 09/12/24  - Time first assessed: 1300  -MF Side: Right  -MF Location: leg  -MF Primary Wound Type: Traumatic  -MF              User Key  (r) = Recorded By, (t) = Taken  "By, (c) = Cosigned By      Initials Name Provider Type     Jaya Bower, PT Physical Therapist    Alesha Jason, PT Physical Therapist     Dejan Zabala, PT Physical Therapist                   Lymphedema       Row Name 10/15/24 1300             Lymphedema Edema Assessment    Ptting Edema Category By severity  -      Pitting Edema Moderate  -         Skin Changes/Observations    Location/Assessment Lower Extremity  -      Lower Extremity Conditions right:;inflamed;weeping;shiny  -      Lower Extremity Color/Pigment right:;erythema;hypopigmented;hyperpigmented  -         Compression/Skin Care    Compression/Skin Care skin care;wrapping location;bandaging  -      Skin Care washed/dried;lotion applied  -      Wrapping Location lower extremity  -      Wrapping Location LE right:;foot to knee  -      Wrapping Comments wound dressings, unna boot in clamshell, cast padding, 4\" coban, size 5 spandage  -                User Key  (r) = Recorded By, (t) = Taken By, (c) = Cosigned By      Initials Name Provider Type     Dejan Zabala, PT Physical Therapist                    WOUND DEBRIDEMENT  Total area of Debridement: 6cm2  Debridement Site 1  Location- Site 1: R Lateral LE  Selective Debridement- Site 1: Wound Surface <20cmsq  Instruments- Site 1: tweezers, other (comment) (cotton swab to sweep undermining area)  Excised Tissue Description- Site 1: maximum, slough, moderate, other (comment) (nonviable peeling periwound tissue)  Bleeding- Site 1: scant              Therapy Education       Row Name 10/15/24 1300             Therapy Education    Education Details Continue current POC. Reinforced education on importance of compression and benefits of UB. Will closely monitor periwound redness and biofilm/slough buildup.  -      Given Bandaging/dressing change;Pain management;Edema management  -      Program Reinforced  -      How Provided Verbal;Demonstration  -      Provided " to Patient  -      Level of Understanding Teach back education performed;Verbalized  -                User Key  (r) = Recorded By, (t) = Taken By, (c) = Cosigned By      Initials Name Provider Type     Dejan Zabala, PT Physical Therapist                    Recommendation and Plan   PT Assessment/Plan       Row Name 10/15/24 1300          PT Assessment    Functional Limitations Performance in self-care ADL;Other (comment)  -     Impairments Integumentary integrity;Impaired venous circulation;Edema  -     Assessment Comments Pt presenting this date again with very thick, soft biofilm/slough covering nearly the entire wound base of the R lateral calf wound requiring extensive debridement. Following debridement pt with great granulation formation, with small amount of mixed areas of slough. Pt still with a few very shallow active weeping ulcerations to the R lateral ankle. Pt would cotinue to benefit from further debridement and UB to promote wound healing.  -     Rehab Potential Fair  -     Patient/caregiver participated in establishment of treatment plan and goals Yes  -     Patient would benefit from skilled therapy intervention Yes  -        PT Plan    PT Frequency 2x/week  -     Physical Therapy Interventions (Optional Details) patient/family education;wound care  -     PT Plan Comments debridement, UB, ?consider MIST  -               User Key  (r) = Recorded By, (t) = Taken By, (c) = Cosigned By      Initials Name Provider Type     Dejan Zabala, PT Physical Therapist                    Goals   PT OP Goals       Row Name 10/15/24 1344          Time Calculation    PT Goal Re-Cert Due Date 12/11/24  -               User Key  (r) = Recorded By, (t) = Taken By, (c) = Cosigned By      Initials Name Provider Type     Dejan Zabala, PT Physical Therapist                    PT Goal Re-Cert Due Date: 12/11/24            Time Calculation: Start Time: 1300  Untimed Charges  32359-Wkog  Boot: 15  21780-Gxbxpuswn debridement: 20  Total Minutes  Untimed Charges Total Minutes: 35   Total Minutes: 35  Therapy Charges for Today       Code Description Service Date Service Provider Modifiers Qty    [id number]  EDWIN DEBRIDE OPEN WOUND UP TO 20CM 10/15/2024 Dejan Zabala, PT GP 1    [id number]  PT STAPPING UNNA BOOT 10/15/2024 Dejan Zabala, PT GP 1                    Dejan Zabala PT  10/15/2024

## 2024-10-18 ENCOUNTER — HOSPITAL ENCOUNTER (OUTPATIENT)
Dept: PHYSICAL THERAPY | Facility: HOSPITAL | Age: 84
Setting detail: THERAPIES SERIES
Discharge: HOME | End: 2024-10-18
Payer: MEDICARE

## 2024-10-18 DIAGNOSIS — S80.11XA HEMATOMA OF RIGHT LOWER LEG: ICD-10-CM

## 2024-10-18 DIAGNOSIS — S81.801D OPEN WOUND OF RIGHT LOWER LEG, SUBSEQUENT ENCOUNTER: Primary | ICD-10-CM

## 2024-10-18 DIAGNOSIS — I87.2 VENOUS STASIS DERMATITIS OF RIGHT LOWER EXTREMITY: ICD-10-CM

## 2024-10-18 DIAGNOSIS — Z87.2 HISTORY OF CELLULITIS: ICD-10-CM

## 2024-10-18 DIAGNOSIS — R60.0 EDEMA OF RIGHT LOWER LEG: ICD-10-CM

## 2024-10-18 PROCEDURE — 29580 STRAPPING UNNA BOOT: CPT

## 2024-10-18 PROCEDURE — 97597 DBRDMT OPN WND 1ST 20 CM/<: CPT

## 2024-10-18 NOTE — THERAPY WOUND CARE TREATMENT
Outpatient Rehabilitation - Wound/Debridement Treatment Note   [facility]     Patient Name: Toño Alcala  : 1940  MRN: [id number]  Today's Date: 10/18/2024                 Admit Date: 10/18/2024    Visit Dx:    ICD-10-CM ICD-9-CM   1. Open wound of right lower leg, subsequent encounter  S81.801D V58.89     891.0   2. Edema of right lower leg  R60.0 782.3   3. History of cellulitis  Z87.2 V13.3   4. Hematoma of right lower leg  S80.11XA 924.10   5. Venous stasis dermatitis of right lower extremity  I87.2 454.1       Patient Active Problem List   Diagnosis    CAD (coronary artery disease)    CVD (cardiovascular disease)    DVT (deep venous thrombosis)    Dyslipidemia    Arthritis    GERD (gastroesophageal reflux disease)    Mixed hyperlipidemia    Diabetic nephropathy associated with type 2 diabetes mellitus    Diabetic polyneuropathy associated with type 2 diabetes mellitus    Chronic kidney disease, stage IV (severe)    Vitamin D deficiency    Disc disorder of cervical region    Postthrombotic syndrome    Vertigo    Angina pectoris    Lumbosacral spondylosis without myelopathy    Psoriasis    Arthritis of elbow    Swelling of right lower extremity    Acute right-sided low back pain without sciatica    Hoarseness    Unifocal PVCs    Skin lesion of face    Essential hypertension    Medicare annual wellness visit, subsequent    Stage 3 chronic kidney disease due to benign hypertension    BMI 30.0-30.9,adult    Postherpetic neuralgia    Herpes zoster without complication    Leg edema, right    Chest pain    Encounter for removal of sutures    Chronic pain of left knee    Fall        Past Medical History:   Diagnosis Date    Acute diverticulitis 2020    Allergic 60 yrs ago    Arthritis     Arthritis of neck Fusion 1992    Bilateral radial fractures     fracture bilateral radial heads    CAD (coronary artery disease)     Calculus of gallbladder without cholecystitis without obstruction 2020     Cataract     Cervical disc disorder Fusion 1992    Cholelithiasis     Chronic kidney disease 2020    Clotting disorder     CVD (cardiovascular disease)     History of TIA 1989    Diabetes mellitus     DVT (deep venous thrombosis)     Right lower extremity DVT and pulmonary embolism in 06/2015, idiopathic    DVT of proximal lower limb 06/22/2015    proximal DVT right leg, small PE- anticoagulation    Dyslipidemia     Erectile dysfunction     Fracture 1952    H/o pf left forearm fracture    Fracture of right hand 1980    Fracture of wrist 1980    GERD (gastroesophageal reflux disease)     with hiatal hernia    HL (hearing loss)     Hx of angina pectoris     Hypertension     Normal renal angiography 02/16/11    Knee swelling Several years ago    Left wrist fracture 1953    Lumbosacral disc disease 1996    Osgood-Schlatter's disease 1955    Osteoarthritis     Right wrist fracture     Vertigo     Wears glasses         Past Surgical History:   Procedure Laterality Date    APPENDECTOMY  1957    BACK SURGERY      CARDIAC CATHETERIZATION  2011    with vein graft    CATARACT EXTRACTION Left     CERVICAL FUSION      CERVICAL FUSION  1992    C3-4    COLONOSCOPY  2013    CORONARY ARTERY BYPASS GRAFT  1994    HERNIA REPAIR Right 2011    inguinal    INGUINAL HERNIA REPAIR Left 10/29/2019    Procedure: INGUINAL HERNIA REPAIR LEFT;  Surgeon: Charisma Raines MD;  Location: [facility];  Service: General    LUMBAR LAMINECTOMY  1996    laminectomy, lumbar, L3    NECK SURGERY  1992    OTHER SURGICAL HISTORY      wrist surgery    SPINE SURGERY  1996    TONSILLECTOMY AND ADENOIDECTOMY  1945    TRIGGER POINT INJECTION  2001 - 2007    VASECTOMY  1972         EVALUATION   PT Ortho       Row Name 10/18/24 1400       Subjective    Subjective Comments Pt reports consistent pain around his R ankle. He thinks he may have strained his L wrist as well.  -MC       Subjective Pain    Able to rate subjective pain? yes  -MC    Pre-Treatment Pain  Level 3  -MC    Post-Treatment Pain Level 3  -    Subjective Pain Comment R lat ankle  -MC       Transfers    Sit-Stand [place] (Transfers) independent  -    Stand-Sit [place] (Transfers) independent  -    Comment, (Transfers) seated for tx  -       Gait/Stairs (Locomotion)    [place] Level (Gait) modified independence  -    Assistive Device (Gait) cane, straight  -              User Key  (r) = Recorded By, (t) = Taken By, (c) = Cosigned By      Initials Name Provider Type    Marlene Bethea PT Physical Therapist                     LDA Wound       Row Name 10/18/24 1400             Wound 10/04/24 1430 Right lateral ankle    Wound - Properties Group Placement Date: 10/04/24  -JM Placement Time: 1430 -JM Side: Right  - Orientation: lateral  - Location: ankle  -    Dressing Appearance intact;moist drainage  -      Base moist;pink;white;yellow  no visible open areas, but here are areas of thin, hypopigmented skin that appear fragile  -      Periwound pink;moist;blistered  fragile hypopigmented skin, small line of new bullae along the edge of the previous mepilex Ag  -      Periwound Temperature warm  -      Periwound Skin Turgor soft  -      Edges irregular  -      Drainage Characteristics/Odor serous  no weeping  -      Drainage Amount scant  -      Care, Wound cleansed with;wound cleanser;debrided;yaa boot  -      Dressing Care dressing applied;silver impregnated;low-adherent;foam  mepilex Ag (full piece)  -      Periwound Care cleansed with pH balanced cleanser;barrier ointment applied  zguard  -      Retired Wound - Properties Group Placement Date: 10/04/24  -JM Placement Time: 1430 -JM Side: Right  - Orientation: lateral  - Location: ankle  -    Retired Wound - Properties Group Placement Date: 10/04/24  - Placement Time: 1430 -JM Side: Right  - Orientation: lateral  - Location: ankle  -    Retired Wound - Properties Group Date first  assessed: 10/04/24  - Time first assessed: 1430  -JM Side: Right  - Location: ankle  -JM       Wound 09/12/24 1300 Right lateral leg Traumatic    Wound - Properties Group Placement Date: 09/12/24  - Placement Time: 1300  -MF Side: Right  -MF Orientation: lateral  -MF Location: leg  -MF Primary Wound Type: Traumatic  -MF    Dressing Appearance intact;moist drainage  -      Base moist;granulating;necrotic;red;yellow;slough;other (see comments)  beefy granulation under somewhat thick biofilm layer, some residual necrotic tissues in undermining area. Skin between primary opening and smaller opening over the undermined area is thinning and likely to break soon  -      Periwound intact;dry;redness;swelling  -      Periwound Temperature warm  -      Periwound Skin Turgor firm  -      Edges irregular  -      Drainage Characteristics/Odor serosanguineous;yellow;creamy;malodorous  min odor  -      Drainage Amount moderate  -      Care, Wound cleansed with;wound cleanser;debrided  -      Dressing Care dressing applied;silver impregnated;collagen;antimicrobial agent applied;foam;low-adherent  jimmy, saline-moist HFBc, mepilex Ag, UB  -      Periwound Care cleansed with pH balanced cleanser;barrier ointment applied  zguard  -      Retired Wound - Properties Group Placement Date: 09/12/24  - Placement Time: 1300  -MF Side: Right  - Orientation: lateral  - Location: leg  -MF Primary Wound Type: Traumatic  -MF    Retired Wound - Properties Group Placement Date: 09/12/24  - Placement Time: 1300  - Side: Right  - Orientation: lateral  - Location: leg  -MF Primary Wound Type: Traumatic  -MF    Retired Wound - Properties Group Date first assessed: 09/12/24  - Time first assessed: 1300  - Side: Right  - Location: leg  -MF Primary Wound Type: Traumatic  -MF              User Key  (r) = Recorded By, (t) = Taken By, (c) = Cosigned By      Initials Name Provider Type    Jaya Doshi,  "PT Physical Therapist    Marlene Bethea, PT Physical Therapist    Alesha Jason, PT Physical Therapist                   Lymphedema       Row Name 10/18/24 1400             Lymphedema Edema Assessment    Ptting Edema Category By severity  -      Pitting Edema Moderate  -         Skin Changes/Observations    Location/Assessment Lower Extremity  -      Lower Extremity Conditions right:;inflamed;weeping;shiny  -      Lower Extremity Color/Pigment right:;erythema;hypopigmented;hyperpigmented  -         Compression/Skin Care    Compression/Skin Care skin care;wrapping location;bandaging  -      Skin Care washed/dried;lotion applied  -      Wrapping Location lower extremity  -      Wrapping Location LE right:;foot to knee  -      Wrapping Comments wound dressings, unna boot in clamshell, cast padding, 4\" coban, size 5 spandage  -MC                User Key  (r) = Recorded By, (t) = Taken By, (c) = Cosigned By      Initials Name Provider Type    Marleen Bethea, PT Physical Therapist                    WOUND DEBRIDEMENT  Total area of Debridement: 4 cm2  Debridement Site 1  Location- Site 1: R Lateral LE  Selective Debridement- Site 1: Wound Surface <20cmsq  Instruments- Site 1: tweezers, other (comment) (cotton swab to sweep undermining area)  Excised Tissue Description- Site 1: moderate, slough  Bleeding- Site 1: none              Therapy Education       Row Name 10/18/24 1400             Therapy Education    Education Details Continue current POC  -MC      Given Bandaging/dressing change;Pain management;Edema management  -MC      Program Reinforced  -MC      How Provided Verbal;Demonstration  -MC      Provided to Patient  -MC      Level of Understanding Teach back education performed;Verbalized  -MC                User Key  (r) = Recorded By, (t) = Taken By, (c) = Cosigned By      Initials Name Provider Type    Marlene Bethea, PT Physical Therapist              "       Recommendation and Plan   PT Assessment/Plan       Row Name 10/18/24 1400          PT Assessment    Functional Limitations Performance in self-care ADL;Other (comment)  -     Impairments Integumentary integrity;Impaired venous circulation;Edema  -     Assessment Comments Pt with less biofilm and increased granulation to the lateral/proximal wound today. Pt with scant drainage on the dressing over the lateral ankle, and PT unable to see any obviously open areas apart from small area of new blistering at the edge of the previous foam dressing. However, the patient still has taut skin and areas of hypopigmentation here that are fragile. Pt will continue to benefit from skilled PT wound care to promote healing.  -     Rehab Potential Fair  -     Patient/caregiver participated in establishment of treatment plan and goals Yes  -     Patient would benefit from skilled therapy intervention Yes  -        PT Plan    PT Frequency 2x/week  -     Physical Therapy Interventions (Optional Details) wound care;patient/family education  -     PT Plan Comments debridement prn, UB  -               User Key  (r) = Recorded By, (t) = Taken By, (c) = Cosigned By      Initials Name Provider Type    Marlene Bethea, PT Physical Therapist                    Goals   PT OP Goals       Row Name 10/18/24 1452          Time Calculation    PT Goal Re-Cert Due Date 12/11/24  -               User Key  (r) = Recorded By, (t) = Taken By, (c) = Cosigned By      Initials Name Provider Type    Marlene Bethea, PT Physical Therapist                    PT Goal Re-Cert Due Date: 12/11/24            Time Calculation: Start Time: 1310  Untimed Charges  75797-Uyam Boot: 15  78273-Rtgyclydv debridement: 10  Total Minutes  Untimed Charges Total Minutes: 25   Total Minutes: 25              Marlene Levy PT  10/18/2024

## 2024-10-22 ENCOUNTER — HOSPITAL ENCOUNTER (OUTPATIENT)
Dept: PHYSICAL THERAPY | Facility: HOSPITAL | Age: 84
Setting detail: THERAPIES SERIES
Discharge: HOME | End: 2024-10-22
Payer: MEDICARE

## 2024-10-22 DIAGNOSIS — R60.0 EDEMA OF RIGHT LOWER LEG: ICD-10-CM

## 2024-10-22 DIAGNOSIS — S80.11XA HEMATOMA OF RIGHT LOWER LEG: ICD-10-CM

## 2024-10-22 DIAGNOSIS — S81.801D OPEN WOUND OF RIGHT LOWER LEG, SUBSEQUENT ENCOUNTER: Primary | ICD-10-CM

## 2024-10-22 DIAGNOSIS — Z87.2 HISTORY OF CELLULITIS: ICD-10-CM

## 2024-10-22 DIAGNOSIS — I87.2 VENOUS STASIS DERMATITIS OF RIGHT LOWER EXTREMITY: ICD-10-CM

## 2024-10-22 PROCEDURE — 97597 DBRDMT OPN WND 1ST 20 CM/<: CPT

## 2024-10-22 PROCEDURE — 29580 STRAPPING UNNA BOOT: CPT

## 2024-10-22 NOTE — THERAPY WOUND CARE TREATMENT
Outpatient Rehabilitation - Wound/Debridement Treatment Note  [facility]     Patient Name: Toño Alcala  : 1940  MRN: [id number]  Today's Date: 10/22/2024                 Admit Date: 10/22/2024    Visit Dx:    ICD-10-CM ICD-9-CM   1. Open wound of right lower leg, subsequent encounter  S81.801D V58.89     891.0   2. Edema of right lower leg  R60.0 782.3   3. History of cellulitis  Z87.2 V13.3   4. Hematoma of right lower leg  S80.11XA 924.10   5. Venous stasis dermatitis of right lower extremity  I87.2 454.1     R lateral leg      R lateral ankle      Patient Active Problem List   Diagnosis    CAD (coronary artery disease)    CVD (cardiovascular disease)    DVT (deep venous thrombosis)    Dyslipidemia    Arthritis    GERD (gastroesophageal reflux disease)    Mixed hyperlipidemia    Diabetic nephropathy associated with type 2 diabetes mellitus    Diabetic polyneuropathy associated with type 2 diabetes mellitus    Chronic kidney disease, stage IV (severe)    Vitamin D deficiency    Disc disorder of cervical region    Postthrombotic syndrome    Vertigo    Angina pectoris    Lumbosacral spondylosis without myelopathy    Psoriasis    Arthritis of elbow    Swelling of right lower extremity    Acute right-sided low back pain without sciatica    Hoarseness    Unifocal PVCs    Skin lesion of face    Essential hypertension    Medicare annual wellness visit, subsequent    Stage 3 chronic kidney disease due to benign hypertension    BMI 30.0-30.9,adult    Postherpetic neuralgia    Herpes zoster without complication    Leg edema, right    Chest pain    Encounter for removal of sutures    Chronic pain of left knee    Fall        Past Medical History:   Diagnosis Date    Acute diverticulitis 2020    Allergic 60 yrs ago    Arthritis     Arthritis of neck Fusion 1992    Bilateral radial fractures     fracture bilateral radial heads    CAD (coronary artery disease)     Calculus of gallbladder without  cholecystitis without obstruction 01/29/2020    Cataract     Cervical disc disorder Fusion 1992    Cholelithiasis     Chronic kidney disease 2020    Clotting disorder     CVD (cardiovascular disease)     History of TIA 1989    Diabetes mellitus     DVT (deep venous thrombosis)     Right lower extremity DVT and pulmonary embolism in 06/2015, idiopathic    DVT of proximal lower limb 06/22/2015    proximal DVT right leg, small PE- anticoagulation    Dyslipidemia     Erectile dysfunction     Fracture 1952    H/o pf left forearm fracture    Fracture of right hand 1980    Fracture of wrist 1980    GERD (gastroesophageal reflux disease)     with hiatal hernia    HL (hearing loss)     Hx of angina pectoris     Hypertension     Normal renal angiography 02/16/11    Knee swelling Several years ago    Left wrist fracture 1953    Lumbosacral disc disease 1996    Osgood-Schlatter's disease 1955    Osteoarthritis     Right wrist fracture     Vertigo     Wears glasses         Past Surgical History:   Procedure Laterality Date    APPENDECTOMY  1957    BACK SURGERY      CARDIAC CATHETERIZATION  2011    with vein graft    CATARACT EXTRACTION Left     CERVICAL FUSION      CERVICAL FUSION  1992    C3-4    COLONOSCOPY  2013    CORONARY ARTERY BYPASS GRAFT  1994    HERNIA REPAIR Right 2011    inguinal    INGUINAL HERNIA REPAIR Left 10/29/2019    Procedure: INGUINAL HERNIA REPAIR LEFT;  Surgeon: Charisma Raines MD;  [facility];  Service: General    LUMBAR LAMINECTOMY  1996    laminectomy, lumbar, L3    NECK SURGERY  1992    OTHER SURGICAL HISTORY      wrist surgery    SPINE SURGERY  1996    TONSILLECTOMY AND ADENOIDECTOMY  1945    TRIGGER POINT INJECTION  2001 - 2007    VASECTOMY  1972         EVALUATION   PT Ortho       Row Name 10/22/24 1300       Subjective    Subjective Comments Continued achy pain in the R lateral ankle. No new issues/complaints.  -LH       Subjective Pain    Able to rate subjective pain? yes  -LH     Pre-Treatment Pain Level 3  -LH    Post-Treatment Pain Level 3  -LH    Subjective Pain Comment R lat ankle  -LH       Transfers    Sit-Stand [place] (Transfers) independent  -LH    Stand-Sit [place] (Transfers) independent  -    Comment, (Transfers) seated for tx  -       Gait/Stairs (Locomotion)    [place] Level (Gait) modified independence  -    Assistive Device (Gait) cane, straight  -              User Key  (r) = Recorded By, (t) = Taken By, (c) = Cosigned By      Initials Name Provider Type     Dejan Zabala, PT Physical Therapist                     LDA Wound       Row Name 10/22/24 1300             Wound 10/04/24 1430 Right lateral ankle    Wound - Properties Group Placement Date: 10/04/24  -JM Placement Time: 1430 -JM Side: Right  - Orientation: lateral  - Location: ankle  -    Wound Image Images linked: 1  -      Dressing Appearance intact;moist drainage  -      Base moist;pink;white;yellow  3-4 distinct shallow open areas w/ pale yellow wound base.  -      Periwound pink;moist;blistered  fragile hypopigmented skin  -      Periwound Temperature warm  -      Periwound Skin Turgor soft  -      Edges irregular  -      Drainage Characteristics/Odor serous  -      Drainage Amount small  -      Care, Wound cleansed with;wound cleanser;debrided;yaa boot  -      Dressing Care dressing applied;silver impregnated;low-adherent;foam  mepilex Ag  -      Periwound Care cleansed with pH balanced cleanser;barrier ointment applied  zguard  -      Retired Wound - Properties Group Placement Date: 10/04/24  -JM Placement Time: 1430 -JM Side: Right  - Orientation: lateral  - Location: ankle  -    Retired Wound - Properties Group Placement Date: 10/04/24  -JM Placement Time: 1430 -JM Side: Right  - Orientation: lateral  - Location: ankle  -    Retired Wound - Properties Group Date first assessed: 10/04/24  -JM Time first assessed: 1430 -JM Side: Right  -  Location: ankle  -JM       Wound 09/12/24 1300 Right lateral leg Traumatic    Wound - Properties Group Placement Date: 09/12/24  - Placement Time: 1300  -MF Side: Right  -MF Orientation: lateral  -MF Location: leg  -MF Primary Wound Type: Traumatic  -MF    Wound Image Images linked: 1  -LH      Dressing Appearance intact;moist drainage  -LH      Base moist;granulating;necrotic;red;yellow;slough;other (see comments)  beefy granulation under thick biofilm layer, some residual necrotic tissues in undermining area. Skin between primary opening and smaller opening over the undermined nearly broken down  -      Periwound intact;dry;redness;swelling  -      Periwound Temperature warm  -      Periwound Skin Turgor firm  -      Edges irregular  -LH      Drainage Characteristics/Odor serosanguineous;yellow;creamy;malodorous  min odor  -      Drainage Amount moderate  -      Care, Wound cleansed with;wound cleanser;debrided  -      Dressing Care dressing applied;silver impregnated;collagen;antimicrobial agent applied;foam;low-adherent  jimmy, saline-moist HFBc, mepilex Ag, UB, cast padding  -      Periwound Care cleansed with pH balanced cleanser;barrier ointment applied  zguard  -      Retired Wound - Properties Group Placement Date: 09/12/24  - Placement Time: 1300  -MF Side: Right  - Orientation: lateral  - Location: leg  -MF Primary Wound Type: Traumatic  -MF    Retired Wound - Properties Group Placement Date: 09/12/24  - Placement Time: 1300  -MF Side: Right  -MF Orientation: lateral  -MF Location: leg  -MF Primary Wound Type: Traumatic  -MF    Retired Wound - Properties Group Date first assessed: 09/12/24  - Time first assessed: 1300  -MF Side: Right  -MF Location: leg  -MF Primary Wound Type: Traumatic  -MF              User Key  (r) = Recorded By, (t) = Taken By, (c) = Cosigned By      Initials Name Provider Type    Jaya Doshi, PT Physical Therapist    Alesha Jason PT  "Physical Therapist     Dejan Zabala, PT Physical Therapist                   Lymphedema       Row Name 10/22/24 1300             Lymphedema Edema Assessment    Ptting Edema Category By severity  -      Pitting Edema Moderate  -         Skin Changes/Observations    Location/Assessment Lower Extremity  -      Lower Extremity Conditions right:;inflamed;weeping;shiny  -      Lower Extremity Color/Pigment right:;erythema;hypopigmented;hyperpigmented  -         Compression/Skin Care    Compression/Skin Care skin care;wrapping location;bandaging  -      Skin Care washed/dried;lotion applied  -      Wrapping Location lower extremity  -      Wrapping Location LE right:;foot to knee  -      Wrapping Comments wound dressings, unna boot in clamshell, cast padding, 4\" coban, size 5 spandage  -                User Key  (r) = Recorded By, (t) = Taken By, (c) = Cosigned By      Initials Name Provider Type     Dejan Zabala, PT Physical Therapist                    WOUND DEBRIDEMENT  Total area of Debridement: 4cm2  Debridement Site 1  Location- Site 1: R Lateral LE  Selective Debridement- Site 1: Wound Surface <20cmsq  Instruments- Site 1: tweezers  Excised Tissue Description- Site 1: moderate, slough, other (comment) (thick biofilm)  Bleeding- Site 1: scant, held pressure, 1 minute              Therapy Education       Row Name 10/22/24 1400             Therapy Education    Education Details Continue current POC.  -      Given Bandaging/dressing change;Pain management;Edema management  -      Program Reinforced  -      How Provided Verbal;Demonstration  -      Provided to Patient  -      Level of Understanding Teach back education performed;Verbalized  -                User Key  (r) = Recorded By, (t) = Taken By, (c) = Cosigned By      Initials Name Provider Type     Dejan Zabala, PT Physical Therapist                    Recommendation and Plan   PT Assessment/Plan       Row Name " 10/22/24 1300          PT Assessment    Functional Limitations Performance in self-care ADL;Other (comment)  -     Impairments Integumentary integrity;Impaired venous circulation;Edema  -     Assessment Comments Pt's R lateral leg wound presenting with thick layer of slough/biofilm initially covering nearly the entire wound base. Following debridement, pt with good granulation formation with only small scattered areas of fibrous slough remaining. Sligthly larger percentage of slough noted at the undermined area. Pt's R lateral ankle with a few distinct open areas with small amount of drainage. Pt would continue to benefit from further debridement and UB placement to improve wound healing potential.  -     Rehab Potential Fair  -     Patient/caregiver participated in establishment of treatment plan and goals Yes  -     Patient would benefit from skilled therapy intervention Yes  -        PT Plan    PT Frequency 2x/week  -     Physical Therapy Interventions (Optional Details) wound care;patient/family education  -     PT Plan Comments Debridement , UB  -               User Key  (r) = Recorded By, (t) = Taken By, (c) = Cosigned By      Initials Name Provider Type     Dejan Zabala, PT Physical Therapist                    Goals   PT OP Goals       Row Name 10/22/24 1403          Time Calculation    PT Goal Re-Cert Due Date 12/11/24  -               User Key  (r) = Recorded By, (t) = Taken By, (c) = Cosigned By      Initials Name Provider Type     Dejan Zabala, PT Physical Therapist                    PT Goal Re-Cert Due Date: 12/11/24            Time Calculation: Start Time: 1300  Untimed Charges  74665-Nzwb Boot: 15  36572-Htijmuysg debridement: 15  Total Minutes  Untimed Charges Total Minutes: 30   Total Minutes: 30  Therapy Charges for Today       Code Description Service Date Service Provider Modifiers Qty    76075681447  EDWIN DEBRIDE OPEN WOUND UP TO 20CM 10/22/2024 Dejan Zabala  MARQUEZ, PT GP 1    [id number]  PT STAPPING UNNA BOOT 10/22/2024 Dejan Zabala, PT GP 1                    Dejan Zabala PT  10/22/2024

## 2024-10-25 ENCOUNTER — HOSPITAL ENCOUNTER (OUTPATIENT)
Dept: PHYSICAL THERAPY | Facility: HOSPITAL | Age: 84
Setting detail: THERAPIES SERIES
Discharge: HOME | End: 2024-10-25
Payer: MEDICARE

## 2024-10-25 ENCOUNTER — FLU SHOT (OUTPATIENT)
Dept: INTERNAL MEDICINE | Facility: CLINIC | Age: 84
End: 2024-10-25
Payer: MEDICARE

## 2024-10-25 ENCOUNTER — CLINICAL SUPPORT (OUTPATIENT)
Dept: INTERNAL MEDICINE | Facility: CLINIC | Age: 84
End: 2024-10-25
Payer: MEDICARE

## 2024-10-25 DIAGNOSIS — S81.801D OPEN WOUND OF RIGHT LOWER LEG, SUBSEQUENT ENCOUNTER: Primary | ICD-10-CM

## 2024-10-25 DIAGNOSIS — Z51.81 THERAPEUTIC DRUG MONITORING: Primary | ICD-10-CM

## 2024-10-25 DIAGNOSIS — R60.0 EDEMA OF RIGHT LOWER LEG: ICD-10-CM

## 2024-10-25 DIAGNOSIS — S80.11XA HEMATOMA OF RIGHT LOWER LEG: ICD-10-CM

## 2024-10-25 DIAGNOSIS — Z79.01 ANTICOAGULATED ON WARFARIN: ICD-10-CM

## 2024-10-25 DIAGNOSIS — I87.2 VENOUS STASIS DERMATITIS OF RIGHT LOWER EXTREMITY: ICD-10-CM

## 2024-10-25 DIAGNOSIS — Z23 NEED FOR INFLUENZA VACCINATION: Primary | ICD-10-CM

## 2024-10-25 DIAGNOSIS — Z87.2 HISTORY OF CELLULITIS: ICD-10-CM

## 2024-10-25 LAB
INR PPP: 8 (ref 0.9–1.1)
PROTHROMBIN TIME: 96 SECONDS

## 2024-10-25 PROCEDURE — 97597 DBRDMT OPN WND 1ST 20 CM/<: CPT

## 2024-10-25 PROCEDURE — 36416 COLLJ CAPILLARY BLOOD SPEC: CPT | Performed by: INTERNAL MEDICINE

## 2024-10-25 PROCEDURE — G0008 ADMIN INFLUENZA VIRUS VAC: HCPCS | Performed by: INTERNAL MEDICINE

## 2024-10-25 PROCEDURE — 90662 IIV NO PRSV INCREASED AG IM: CPT | Performed by: INTERNAL MEDICINE

## 2024-10-25 PROCEDURE — 29580 STRAPPING UNNA BOOT: CPT

## 2024-10-25 PROCEDURE — 85610 PROTHROMBIN TIME: CPT | Performed by: INTERNAL MEDICINE

## 2024-10-25 NOTE — THERAPY WOUND CARE TREATMENT
Outpatient Rehabilitation - Wound/Debridement Treatment Note   [place]     Patient Name: Toño Alcala  : 1940  MRN: [id number]  Today's Date: 10/25/2024                 Admit Date: 10/25/2024    Visit Dx:    ICD-10-CM ICD-9-CM   1. Open wound of right lower leg, subsequent encounter  S81.801D V58.89     891.0   2. Edema of right lower leg  R60.0 782.3   3. History of cellulitis  Z87.2 V13.3   4. Hematoma of right lower leg  S80.11XA 924.10   5. Venous stasis dermatitis of right lower extremity  I87.2 454.1       Patient Active Problem List   Diagnosis    CAD (coronary artery disease)    CVD (cardiovascular disease)    DVT (deep venous thrombosis)    Dyslipidemia    Arthritis    GERD (gastroesophageal reflux disease)    Mixed hyperlipidemia    Diabetic nephropathy associated with type 2 diabetes mellitus    Diabetic polyneuropathy associated with type 2 diabetes mellitus    Chronic kidney disease, stage IV (severe)    Vitamin D deficiency    Disc disorder of cervical region    Postthrombotic syndrome    Vertigo    Angina pectoris    Lumbosacral spondylosis without myelopathy    Psoriasis    Arthritis of elbow    Swelling of right lower extremity    Acute right-sided low back pain without sciatica    Hoarseness    Unifocal PVCs    Skin lesion of face    Essential hypertension    Medicare annual wellness visit, subsequent    Stage 3 chronic kidney disease due to benign hypertension    BMI 30.0-30.9,adult    Postherpetic neuralgia    Herpes zoster without complication    Leg edema, right    Chest pain    Encounter for removal of sutures    Chronic pain of left knee    Fall        Past Medical History:   Diagnosis Date    Acute diverticulitis 2020    Allergic 60 yrs ago    Arthritis     Arthritis of neck Fusion 1992    Bilateral radial fractures     fracture bilateral radial heads    CAD (coronary artery disease)     Calculus of gallbladder without cholecystitis without obstruction 2020     Cataract     Cervical disc disorder Fusion 1992    Cholelithiasis     Chronic kidney disease 2020    Clotting disorder     CVD (cardiovascular disease)     History of TIA 1989    Diabetes mellitus     DVT (deep venous thrombosis)     Right lower extremity DVT and pulmonary embolism in 06/2015, idiopathic    DVT of proximal lower limb 06/22/2015    proximal DVT right leg, small PE- anticoagulation    Dyslipidemia     Erectile dysfunction     Fracture 1952    H/o pf left forearm fracture    Fracture of right hand 1980    Fracture of wrist 1980    GERD (gastroesophageal reflux disease)     with hiatal hernia    HL (hearing loss)     Hx of angina pectoris     Hypertension     Normal renal angiography 02/16/11    Knee swelling Several years ago    Left wrist fracture 1953    Lumbosacral disc disease 1996    Osgood-Schlatter's disease 1955    Osteoarthritis     Right wrist fracture     Vertigo     Wears glasses         Past Surgical History:   Procedure Laterality Date    APPENDECTOMY  1957    BACK SURGERY      CARDIAC CATHETERIZATION  2011    with vein graft    CATARACT EXTRACTION Left     CERVICAL FUSION      CERVICAL FUSION  1992    C3-4    COLONOSCOPY  2013    CORONARY ARTERY BYPASS GRAFT  1994    HERNIA REPAIR Right 2011    inguinal    INGUINAL HERNIA REPAIR Left 10/29/2019    Procedure: INGUINAL HERNIA REPAIR LEFT;  Surgeon: Charisma Raines MD;  Location: [facility];  Service: General    LUMBAR LAMINECTOMY  1996    laminectomy, lumbar, L3    NECK SURGERY  1992    OTHER SURGICAL HISTORY      wrist surgery    SPINE SURGERY  1996    TONSILLECTOMY AND ADENOIDECTOMY  1945    TRIGGER POINT INJECTION  2001 - 2007    VASECTOMY  1972         EVALUATION   PT Ortho       Row Name 10/25/24 1345       Subjective    Subjective Comments No changes, just lat ankle pain, mild itching of leg.  -JM       Subjective Pain    Able to rate subjective pain? yes  -JM    Pre-Treatment Pain Level 3  -JM    Post-Treatment Pain Level 3   -JM    Subjective Pain Comment R lat ankle  -JM       Transfers    Sit-Stand [place] (Transfers) independent  -JM    Stand-Sit [place] (Transfers) independent  -JM    Comment, (Transfers) seated for tx  -JM       Gait/Stairs (Locomotion)    [place] Level (Gait) modified independence  -JM    Assistive Device (Gait) cane, straight  -JM              User Key  (r) = Recorded By, (t) = Taken By, (c) = Cosigned By      Initials Name Provider Type    Alesha Jason, PT Physical Therapist                     [facility] Wound       Row Name 10/25/24 1345             Wound 10/04/24 1430 Right lateral ankle    Wound - Properties Group Placement Date: 10/04/24  - Placement Time: 1430  -JM Side: Right  -JM Orientation: lateral  -JM Location: ankle  -JM    Dressing Appearance intact;dried drainage  -JM      Base moist;pink;white;yellow  fragile moist hypopigmented areas, indistinct  -JM      Periwound pink;moist  fragile hypopigmented skin  -JM      Periwound Temperature warm  -      Periwound Skin Turgor soft  -      Edges irregular  -JM      Drainage Characteristics/Odor serous  -JM      Drainage Amount small  -JM      Care, Wound cleansed with;wound cleanser;debrided;yaa boot  -JM      Dressing Care dressing applied;silver impregnated;foam  mepilex ag, UB  -JM      Periwound Care barrier ointment applied;cleansed with pH balanced cleanser;dry periwound area maintained  zguard  -JM      Retired Wound - Properties Group Placement Date: 10/04/24  - Placement Time: 1430  -JM Side: Right  -JM Orientation: lateral  -JM Location: ankle  -JM    Retired Wound - Properties Group Placement Date: 10/04/24  - Placement Time: 1430  -JM Side: Right  -JM Orientation: lateral  - Location: ankle  -JM    Retired Wound - Properties Group Date first assessed: 10/04/24  - Time first assessed: 1430  -JM Side: Right  -JM Location: ankle  -JM       Wound 09/12/24 1300 Right lateral leg Traumatic    Wound - Properties Group  Placement Date: 09/12/24  - Placement Time: 1300  - Side: Right  - Orientation: lateral  - Location: leg  -MF Primary Wound Type: Traumatic  -    Dressing Appearance intact;moist drainage  scant bleeding with HFB removal  -      Base moist;granulating;necrotic;red;yellow;slough;other (see comments)  increasing granulation, residual necrotic tissues in undermining area. Skin between primary opening and smaller opening over the undermined nearly broken down  -      Periwound intact;dry;redness;swelling  -      Periwound Temperature warm  -      Periwound Skin Turgor firm  -      Edges irregular  -      Drainage Characteristics/Odor serosanguineous;yellow;creamy  no odor today  -      Drainage Amount small  -      Care, Wound cleansed with;wound cleanser;debrided;yaa boot  -      Dressing Care dressing applied;antimicrobial agent applied;foam;silver impregnated;collagen  jimmy, saline-moist HFBc, mepilex ag, UB  -      Periwound Care barrier ointment applied;cleansed with pH balanced cleanser;dry periwound area maintained  zguard  -      Retired Wound - Properties Group Placement Date: 09/12/24  - Placement Time: 1300  - Side: Right  - Orientation: lateral  - Location: leg  -MF Primary Wound Type: Traumatic  -MF    Retired Wound - Properties Group Placement Date: 09/12/24  - Placement Time: 1300  - Side: Right  - Orientation: lateral  - Location: leg  -MF Primary Wound Type: Traumatic  -MF    Retired Wound - Properties Group Date first assessed: 09/12/24  - Time first assessed: 1300  - Side: Right  - Location: leg  -MF Primary Wound Type: Traumatic  -MF              User Key  (r) = Recorded By, (t) = Taken By, (c) = Cosigned By      Initials Name Provider Type    Jaya Doshi, PT Physical Therapist    JM Alesha Nunez, PT Physical Therapist                   Lymphedema       Row Name 10/25/24 4641             Lymphedema Edema Assessment    Ptting  "Edema Category By severity  -      Pitting Edema Moderate  -         Skin Changes/Observations    Location/Assessment Lower Extremity  -      Lower Extremity Conditions right:;shiny;crust;fragile  -      Lower Extremity Color/Pigment right:;red;blanchable;hypopigmented;hyperpigmented  -         Compression/Skin Care    Compression/Skin Care skin care;wrapping location;bandaging  -      Skin Care washed/dried;lotion applied  -      Wrapping Location lower extremity  -      Wrapping Location LE right:;foot to knee  -      Wrapping Comments wound dressings, unna boot in clamshell, cast padding, 4\" coban, size 5 spandage  -                User Key  (r) = Recorded By, (t) = Taken By, (c) = Cosigned By      Initials Name Provider Type    Alesha Jason, PT Physical Therapist                    WOUND DEBRIDEMENT  Total area of Debridement: 6cmsq  Debridement Site 1  Location- Site 1: R Lateral LE  Selective Debridement- Site 1: Wound Surface <20cmsq  Instruments- Site 1: tweezers  Excised Tissue Description- Site 1: moderate, slough, minimum, other (comment) (loose crusts)  Bleeding- Site 1: scant, held pressure, 1 minute              Therapy Education       Row Name 10/25/24 7022             Therapy Education    Given Bandaging/dressing change;Pain management;Edema management  -      Program Reinforced  -      How Provided Verbal;Demonstration  -      Provided to Patient  -      Level of Understanding Teach back education performed;Verbalized  -                User Key  (r) = Recorded By, (t) = Taken By, (c) = Cosigned By      Initials Name Provider Type    Alesha Jason, PT Physical Therapist                    Recommendation and Plan   PT Assessment/Plan       Row Name 10/25/24 3936          PT Assessment    Functional Limitations Performance in self-care ADL;Other (comment)  -     Impairments Integumentary integrity;Impaired venous circulation;Edema  -     Assessment " Comments RLE wound with increasing granulation, less biofilm.  Lat ankle area with less drainage, more crusted but still fragile with a few moist indistinct areas.  Continue with current POC.  -        PT Plan    PT Frequency 2x/week  -KACIE     Physical Therapy Interventions (Optional Details) patient/family education;wound care  -     PT Plan Comments debridement, UB  -               User Key  (r) = Recorded By, (t) = Taken By, (c) = Cosigned By      Initials Name Provider Type    Alesha Jason, PT Physical Therapist                    Goals   PT OP Goals       Row Name 10/25/24 1559          Time Calculation    PT Goal Re-Cert Due Date 12/11/24  -               User Key  (r) = Recorded By, (t) = Taken By, (c) = Cosigned By      Initials Name Provider Type    Alesha Jason, PT Physical Therapist                    PT Goal Re-Cert Due Date: 12/11/24            Time Calculation: Start Time: 1345  Untimed Charges  66957-Vbrc Boot: 15  65400-Stsspsqcp debridement: 15  Total Minutes  Untimed Charges Total Minutes: 30   Total Minutes: 30  Therapy Charges for Today       Code Description Service Date Service Provider Modifiers Qty    [id number] HC EDWIN DEBRIDE OPEN WOUND UP TO 20CM 10/25/2024 Alesha Nunez, PT GP 1    [id number] HC PT STAPPING UNNA BOOT 10/25/2024 Alesha Nunez, PT GP 1                    Alseha Nunez, PT  10/25/2024

## 2024-10-29 ENCOUNTER — HOSPITAL ENCOUNTER (OUTPATIENT)
Dept: PHYSICAL THERAPY | Facility: HOSPITAL | Age: 84
Setting detail: THERAPIES SERIES
Discharge: HOME | End: 2024-10-29
Payer: MEDICARE

## 2024-10-29 DIAGNOSIS — I87.2 VENOUS STASIS DERMATITIS OF RIGHT LOWER EXTREMITY: ICD-10-CM

## 2024-10-29 DIAGNOSIS — S80.11XA HEMATOMA OF RIGHT LOWER LEG: ICD-10-CM

## 2024-10-29 DIAGNOSIS — R60.0 EDEMA OF RIGHT LOWER LEG: ICD-10-CM

## 2024-10-29 DIAGNOSIS — Z87.2 HISTORY OF CELLULITIS: ICD-10-CM

## 2024-10-29 DIAGNOSIS — S81.801D OPEN WOUND OF RIGHT LOWER LEG, SUBSEQUENT ENCOUNTER: Primary | ICD-10-CM

## 2024-10-29 PROCEDURE — 97597 DBRDMT OPN WND 1ST 20 CM/<: CPT

## 2024-10-29 PROCEDURE — 29580 STRAPPING UNNA BOOT: CPT

## 2024-10-29 NOTE — THERAPY WOUND CARE TREATMENT
Outpatient Rehabilitation - Wound/Debridement Treatment Note  [facility]     Patient Name: Toño Alcala  : 1940  MRN: [id number]  Today's Date: 10/29/2024                 Admit Date: 10/29/2024    Visit Dx:    ICD-10-CM ICD-9-CM   1. Open wound of right lower leg, subsequent encounter  S81.801D V58.89     891.0   2. Edema of right lower leg  R60.0 782.3   3. History of cellulitis  Z87.2 V13.3   4. Hematoma of right lower leg  S80.11XA 924.10   5. Venous stasis dermatitis of right lower extremity  I87.2 454.1     R lateral leg      R lateral ankle        Patient Active Problem List   Diagnosis    CAD (coronary artery disease)    CVD (cardiovascular disease)    DVT (deep venous thrombosis)    Dyslipidemia    Arthritis    GERD (gastroesophageal reflux disease)    Mixed hyperlipidemia    Diabetic nephropathy associated with type 2 diabetes mellitus    Diabetic polyneuropathy associated with type 2 diabetes mellitus    Chronic kidney disease, stage IV (severe)    Vitamin D deficiency    Disc disorder of cervical region    Postthrombotic syndrome    Vertigo    Angina pectoris    Lumbosacral spondylosis without myelopathy    Psoriasis    Arthritis of elbow    Swelling of right lower extremity    Acute right-sided low back pain without sciatica    Hoarseness    Unifocal PVCs    Skin lesion of face    Essential hypertension    Medicare annual wellness visit, subsequent    Stage 3 chronic kidney disease due to benign hypertension    BMI 30.0-30.9,adult    Postherpetic neuralgia    Herpes zoster without complication    Leg edema, right    Chest pain    Encounter for removal of sutures    Chronic pain of left knee    Fall        Past Medical History:   Diagnosis Date    Acute diverticulitis 2020    Allergic 60 yrs ago    Arthritis     Arthritis of neck Fusion 1992    Bilateral radial fractures     fracture bilateral radial heads    CAD (coronary artery disease)     Calculus of gallbladder without  cholecystitis without obstruction 01/29/2020    Cataract     Cervical disc disorder Fusion 1992    Cholelithiasis     Chronic kidney disease 2020    Clotting disorder     CVD (cardiovascular disease)     History of TIA 1989    Diabetes mellitus     DVT (deep venous thrombosis)     Right lower extremity DVT and pulmonary embolism in 06/2015, idiopathic    DVT of proximal lower limb 06/22/2015    proximal DVT right leg, small PE- anticoagulation    Dyslipidemia     Erectile dysfunction     Fracture 1952    H/o pf left forearm fracture    Fracture of right hand 1980    Fracture of wrist 1980    GERD (gastroesophageal reflux disease)     with hiatal hernia    HL (hearing loss)     Hx of angina pectoris     Hypertension     Normal renal angiography 02/16/11    Knee swelling Several years ago    Left wrist fracture 1953    Lumbosacral disc disease 1996    Osgood-Schlatter's disease 1955    Osteoarthritis     Right wrist fracture     Vertigo     Wears glasses         Past Surgical History:   Procedure Laterality Date    APPENDECTOMY  1957    BACK SURGERY      CARDIAC CATHETERIZATION  2011    with vein graft    CATARACT EXTRACTION Left     CERVICAL FUSION      CERVICAL FUSION  1992    C3-4    COLONOSCOPY  2013    CORONARY ARTERY BYPASS GRAFT  1994    HERNIA REPAIR Right 2011    inguinal    INGUINAL HERNIA REPAIR Left 10/29/2019    Procedure: INGUINAL HERNIA REPAIR LEFT;  Surgeon: Charisma Raines MD;  [facility];  Service: General    LUMBAR LAMINECTOMY  1996    laminectomy, lumbar, L3    NECK SURGERY  1992    OTHER SURGICAL HISTORY      wrist surgery    SPINE SURGERY  1996    TONSILLECTOMY AND ADENOIDECTOMY  1945    TRIGGER POINT INJECTION  2001 - 2007    VASECTOMY  1972         EVALUATION   PT Ortho       Row Name 10/29/24 1400       Subjective    Subjective Comments Continued achy pain in the R lateral/superior ankle.  -LH       Subjective Pain    Able to rate subjective pain? yes  -LH    Pre-Treatment Pain  Level 3  -    Post-Treatment Pain Level 3  -    Subjective Pain Comment R lat ankle  -LH       Transfers    Sit-Stand [place] (Transfers) independent  -LH    Stand-Sit [place] (Transfers) independent  -    Comment, (Transfers) seated for tx  -LH       Gait/Stairs (Locomotion)    [place] Level (Gait) modified independence  -    Assistive Device (Gait) cane, straight  -LH              User Key  (r) = Recorded By, (t) = Taken By, (c) = Cosigned By      Initials Name Provider Type     Dejan Zabala, PT Physical Therapist                     LDA Wound       Row Name 10/29/24 1400             Wound 10/04/24 1430 Right lateral ankle    Wound - Properties Group Placement Date: 10/04/24  -JM Placement Time: 1430 -JM Side: Right  - Orientation: lateral  - Location: ankle  -    Wound Image Images linked: 1  -LH      Dressing Appearance intact;moist drainage  -      Base moist;pink;white;yellow  fragile moist hypopigmented areas, indistinct  -      Periwound pink;moist  fragile hypopigmented skin  -      Periwound Temperature warm  -      Periwound Skin Turgor soft  -      Edges irregular  -      Drainage Characteristics/Odor serous  -LH      Drainage Amount small  -LH      Care, Wound cleansed with;wound cleanser;debrided;yaa boot  -      Dressing Care dressing applied;silver impregnated;foam  mepilex ag, UB  -LH      Periwound Care barrier ointment applied;cleansed with pH balanced cleanser;dry periwound area maintained  zguard  -      Retired Wound - Properties Group Placement Date: 10/04/24  -JM Placement Time: 1430 -JM Side: Right  - Orientation: lateral  - Location: ankle  -    Retired Wound - Properties Group Placement Date: 10/04/24  -JM Placement Time: 1430 -JM Side: Right  - Orientation: lateral  - Location: ankle  -    Retired Wound - Properties Group Date first assessed: 10/04/24  -JM Time first assessed: 1430 -JM Side: Right  - Location: ankle  -        Wound 09/12/24 1300 Right lateral leg Traumatic    Wound - Properties Group Placement Date: 09/12/24  - Placement Time: 1300  -MF Side: Right  - Orientation: lateral  -MF Location: leg  -MF Primary Wound Type: Traumatic  -MF    Wound Image Images linked: 1  -LH      Dressing Appearance intact;moist drainage  -      Base moist;granulating;necrotic;red;yellow;slough;other (see comments)  increasing granulation, moderate residual necrotic tissues in undermining area. No remaining skin bridging  -      Periwound intact;dry;redness;swelling  -      Periwound Temperature warm  -      Periwound Skin Turgor firm  -      Edges irregular  -      Wound Length (cm) 1.5 cm  -      Wound Width (cm) 3.4 cm  -      Wound Depth (cm) 0.5 cm  -      Wound Surface Area (cm^2) 5.1 cm^2  -      Wound Volume (cm^3) 2.55 cm^3  -      Undermining [Depth (cm)/Location] 1.4cm @ 2-4:00  -      Drainage Characteristics/Odor serosanguineous;yellow  -      Drainage Amount small  -      Care, Wound cleansed with;wound cleanser;debrided;yaa boot  -      Dressing Care dressing applied;antimicrobial agent applied;foam;silver impregnated;collagen  jimmy, saline-moist HFBc, mepilex ag, UB  -LH      Periwound Care barrier ointment applied;cleansed with pH balanced cleanser;dry periwound area maintained  zguard  -      Retired Wound - Properties Group Placement Date: 09/12/24  - Placement Time: 1300  - Side: Right  - Orientation: lateral  - Location: leg  -MF Primary Wound Type: Traumatic  -MF    Retired Wound - Properties Group Placement Date: 09/12/24  - Placement Time: 1300  - Side: Right  - Orientation: lateral  - Location: leg  -MF Primary Wound Type: Traumatic  -MF    Retired Wound - Properties Group Date first assessed: 09/12/24  - Time first assessed: 1300  - Side: Right  -MF Location: leg  -MF Primary Wound Type: Traumatic  -MF              User Key  (r) = Recorded By, (t) = Taken  "By, (c) = Cosigned By      Initials Name Provider Type     Jaya Bower, PT Physical Therapist    Alesha Jason, PT Physical Therapist     Dejan Zabala, PT Physical Therapist                   Lymphedema       Row Name 10/29/24 1400             Lymphedema Edema Assessment    Ptting Edema Category By severity  -      Pitting Edema Moderate  -         Skin Changes/Observations    Location/Assessment Lower Extremity  -      Lower Extremity Conditions right:;shiny;crust;fragile  -      Lower Extremity Color/Pigment right:;red;blanchable;hypopigmented;hyperpigmented  -         Compression/Skin Care    Compression/Skin Care skin care;wrapping location;bandaging  -      Skin Care washed/dried;lotion applied  -      Wrapping Location lower extremity  -      Wrapping Location LE right:;foot to knee  -      Wrapping Comments wound dressings, unna boot in clamshell, cast padding, 4\" coban, size 5 spandage  -                User Key  (r) = Recorded By, (t) = Taken By, (c) = Cosigned By      Initials Name Provider Type     Dejan Zabala, PT Physical Therapist                    WOUND DEBRIDEMENT  Total area of Debridement: 6cm2  Debridement Site 1  Location- Site 1: R Lateral LE  Selective Debridement- Site 1: Wound Surface <20cmsq  Instruments- Site 1: tweezers  Excised Tissue Description- Site 1: moderate, slough, minimum, other (comment) (crusts)  Bleeding- Site 1: scant, held pressure, 2 minutes              Therapy Education       Row Name 10/29/24 1400             Therapy Education    Education Details Continue current POC.  -      Given Bandaging/dressing change;Pain management;Edema management  -      Program Reinforced  -      How Provided Verbal;Demonstration  -      Provided to Patient  -      Level of Understanding Teach back education performed;Verbalized  -                User Key  (r) = Recorded By, (t) = Taken By, (c) = Cosigned By      Initials Name " Provider Type     Dejan Zabala, PT Physical Therapist                    Recommendation and Plan   PT Assessment/Plan       Row Name 10/29/24 1400          PT Assessment    Functional Limitations Performance in self-care ADL;Other (comment)  -     Impairments Integumentary integrity;Impaired venous circulation;Edema  -     Assessment Comments Pt continuing to demonstrate slow/steady improvements in wound healing of R lateral leg wound as pt with increasing granulation formation and reduced wound dimensions. Pt initially with scattered areas of thick slough particularly beneath undermined area requiring extensive debridement, however following debridement pt with excellent granulation of wound base. Pt's R lateral ankle still with very small, indistinct, hypopigmented areas with scant drainage. Pt would continue to benefit from current POC to promote wound healing.  -     Rehab Potential Fair  -     Patient/caregiver participated in establishment of treatment plan and goals Yes  -     Patient would benefit from skilled therapy intervention Yes  -        PT Plan    PT Frequency 2x/week  -     Physical Therapy Interventions (Optional Details) patient/family education;wound care  -     PT Plan Comments debridement, UB  -               User Key  (r) = Recorded By, (t) = Taken By, (c) = Cosigned By      Initials Name Provider Type     Dejan Zabala, PT Physical Therapist                    Goals   PT OP Goals       Row Name 10/29/24 1414          Time Calculation    PT Goal Re-Cert Due Date 12/11/24  -               User Key  (r) = Recorded By, (t) = Taken By, (c) = Cosigned By      Initials Name Provider Type     Dejan Zabala, PT Physical Therapist                    PT Goal Re-Cert Due Date: 12/11/24            Time Calculation: Start Time: 1345  Untimed Charges  87133-Jgyx Boot: 15  61343-Zqcgxrdxi debridement: 20  Total Minutes  Untimed Charges Total Minutes: 35   Total Minutes:  35  Therapy Charges for Today       Code Description Service Date Service Provider Modifiers Qty    14520811685 HC EDWIN DEBRIDE OPEN WOUND UP TO 20CM 10/29/2024 Dejan Zabala, PT GP, KX 1    73804788576 HC PT STAPPING UNNA BOOT 10/29/2024 Dejan Zabala, PT GP, KX 1                    Dejan Zabala PT  10/29/2024

## 2024-10-31 ENCOUNTER — CLINICAL SUPPORT (OUTPATIENT)
Dept: INTERNAL MEDICINE | Facility: CLINIC | Age: 84
End: 2024-10-31
Payer: MEDICARE

## 2024-10-31 DIAGNOSIS — M25.552 LEFT HIP PAIN: Primary | ICD-10-CM

## 2024-10-31 DIAGNOSIS — Z79.01 ANTICOAGULATED ON WARFARIN: ICD-10-CM

## 2024-10-31 DIAGNOSIS — Z51.81 THERAPEUTIC DRUG MONITORING: Primary | ICD-10-CM

## 2024-10-31 LAB
INR PPP: 2.3 (ref 0.9–1.1)
PROTHROMBIN TIME: 27.3 SECONDS

## 2024-10-31 PROCEDURE — 85610 PROTHROMBIN TIME: CPT | Performed by: INTERNAL MEDICINE

## 2024-10-31 PROCEDURE — 36416 COLLJ CAPILLARY BLOOD SPEC: CPT | Performed by: INTERNAL MEDICINE

## 2024-10-31 RX ORDER — HYDROCODONE BITARTRATE AND ACETAMINOPHEN 5; 325 MG/1; MG/1
1 TABLET ORAL EVERY 12 HOURS PRN
Qty: 60 TABLET | Refills: 0 | Status: SHIPPED | OUTPATIENT
Start: 2024-10-31

## 2024-11-01 ENCOUNTER — HOSPITAL ENCOUNTER (OUTPATIENT)
Dept: PHYSICAL THERAPY | Facility: HOSPITAL | Age: 84
Setting detail: THERAPIES SERIES
Discharge: HOME | End: 2024-11-01
Payer: MEDICARE

## 2024-11-01 ENCOUNTER — HOSPITAL ENCOUNTER (OUTPATIENT)
Dept: GENERAL RADIOLOGY | Facility: HOSPITAL | Age: 84
Discharge: HOME | End: 2024-11-01
Payer: MEDICARE

## 2024-11-01 ENCOUNTER — TELEPHONE (OUTPATIENT)
Dept: INTERNAL MEDICINE | Facility: CLINIC | Age: 84
End: 2024-11-01
Payer: MEDICARE

## 2024-11-01 DIAGNOSIS — Z87.2 HISTORY OF CELLULITIS: ICD-10-CM

## 2024-11-01 DIAGNOSIS — S80.11XA HEMATOMA OF RIGHT LOWER LEG: ICD-10-CM

## 2024-11-01 DIAGNOSIS — I87.2 VENOUS STASIS DERMATITIS OF RIGHT LOWER EXTREMITY: ICD-10-CM

## 2024-11-01 DIAGNOSIS — S81.801D OPEN WOUND OF RIGHT LOWER LEG, SUBSEQUENT ENCOUNTER: Primary | ICD-10-CM

## 2024-11-01 DIAGNOSIS — M25.551 PAIN OF RIGHT HIP: ICD-10-CM

## 2024-11-01 DIAGNOSIS — M25.551 PAIN OF RIGHT HIP: Primary | ICD-10-CM

## 2024-11-01 DIAGNOSIS — M25.552 LEFT HIP PAIN: ICD-10-CM

## 2024-11-01 DIAGNOSIS — R60.0 EDEMA OF RIGHT LOWER LEG: ICD-10-CM

## 2024-11-01 PROCEDURE — 97597 DBRDMT OPN WND 1ST 20 CM/<: CPT

## 2024-11-01 PROCEDURE — 29580 STRAPPING UNNA BOOT: CPT

## 2024-11-01 PROCEDURE — 73522 X-RAY EXAM HIPS BI 3-4 VIEWS: CPT

## 2024-11-01 NOTE — TELEPHONE ENCOUNTER
Aby from [facility] called stating patient is there for a X-ray of his left hip and his right one is hurting to and wants to know if you would send in a x-ray order for it to?

## 2024-11-01 NOTE — THERAPY WOUND CARE TREATMENT
Outpatient Rehabilitation - Wound/Debridement Treatment Note   [place]     Patient Name: Toño Alcala  : 1940  MRN: [id number]  Today's Date: 2024                 Admit Date: 2024    Visit Dx:    ICD-10-CM ICD-9-CM   1. Open wound of right lower leg, subsequent encounter  S81.801D V58.89     891.0   2. Edema of right lower leg  R60.0 782.3   3. History of cellulitis  Z87.2 V13.3   4. Hematoma of right lower leg  S80.11XA 924.10   5. Venous stasis dermatitis of right lower extremity  I87.2 454.1       Patient Active Problem List   Diagnosis    CAD (coronary artery disease)    CVD (cardiovascular disease)    DVT (deep venous thrombosis)    Dyslipidemia    Arthritis    GERD (gastroesophageal reflux disease)    Mixed hyperlipidemia    Diabetic nephropathy associated with type 2 diabetes mellitus    Diabetic polyneuropathy associated with type 2 diabetes mellitus    Chronic kidney disease, stage IV (severe)    Vitamin D deficiency    Disc disorder of cervical region    Postthrombotic syndrome    Vertigo    Angina pectoris    Lumbosacral spondylosis without myelopathy    Psoriasis    Arthritis of elbow    Swelling of right lower extremity    Acute right-sided low back pain without sciatica    Hoarseness    Unifocal PVCs    Skin lesion of face    Essential hypertension    Medicare annual wellness visit, subsequent    Stage 3 chronic kidney disease due to benign hypertension    BMI 30.0-30.9,adult    Postherpetic neuralgia    Herpes zoster without complication    Leg edema, right    Chest pain    Encounter for removal of sutures    Chronic pain of left knee    Fall        Past Medical History:   Diagnosis Date    Acute diverticulitis 2020    Allergic 60 yrs ago    Arthritis     Arthritis of neck Fusion 1992    Bilateral radial fractures     fracture bilateral radial heads    CAD (coronary artery disease)     Calculus of gallbladder without cholecystitis without obstruction 2020     Cataract     Cervical disc disorder Fusion 1992    Cholelithiasis     Chronic kidney disease 2020    Clotting disorder     CVD (cardiovascular disease)     History of TIA 1989    Diabetes mellitus     DVT (deep venous thrombosis)     Right lower extremity DVT and pulmonary embolism in 06/2015, idiopathic    DVT of proximal lower limb 06/22/2015    proximal DVT right leg, small PE- anticoagulation    Dyslipidemia     Erectile dysfunction     Fracture 1952    H/o pf left forearm fracture    Fracture of right hand 1980    Fracture of wrist 1980    GERD (gastroesophageal reflux disease)     with hiatal hernia    HL (hearing loss)     Hx of angina pectoris     Hypertension     Normal renal angiography 02/16/11    Knee swelling Several years ago    Left wrist fracture 1953    Lumbosacral disc disease 1996    Osgood-Schlatter's disease 1955    Osteoarthritis     Right wrist fracture     Vertigo     Wears glasses         Past Surgical History:   Procedure Laterality Date    APPENDECTOMY  1957    BACK SURGERY      CARDIAC CATHETERIZATION  2011    with vein graft    CATARACT EXTRACTION Left     CERVICAL FUSION      CERVICAL FUSION  1992    C3-4    COLONOSCOPY  2013    CORONARY ARTERY BYPASS GRAFT  1994    HERNIA REPAIR Right 2011    inguinal    INGUINAL HERNIA REPAIR Left 10/29/2019    Procedure: INGUINAL HERNIA REPAIR LEFT;  Surgeon: Charisma Raines MD;  Location: [facility];  Service: General    LUMBAR LAMINECTOMY  1996    laminectomy, lumbar, L3    NECK SURGERY  1992    OTHER SURGICAL HISTORY      wrist surgery    SPINE SURGERY  1996    TONSILLECTOMY AND ADENOIDECTOMY  1945    TRIGGER POINT INJECTION  2001 - 2007    VASECTOMY  1972         EVALUATION   PT Ortho       Row Name 11/01/24 1400       Subjective    Subjective Comments Pt reports his R lateral/anterior ankle is still painful, however he is having much worse bilateral hip arthritis pain this date.  -LH       Subjective Pain    Able to rate subjective pain?  yes  -LH    Pre-Treatment Pain Level 3  -LH    Post-Treatment Pain Level 3  -LH    Subjective Pain Comment R lat ankle  -LH       Transfers    Sit-Stand [place] (Transfers) independent  -LH    Stand-Sit [place] (Transfers) independent  -    Comment, (Transfers) seated for tx  -LH       Gait/Stairs (Locomotion)    [place] Level (Gait) modified independence  -    Assistive Device (Gait) cane, straight  -LH              User Key  (r) = Recorded By, (t) = Taken By, (c) = Cosigned By      Initials Name Provider Type     Dejan Zabala, PT Physical Therapist                     LDA Wound       Row Name 11/01/24 1400             Wound 10/04/24 1430 Right lateral ankle    Wound - Properties Group Placement Date: 10/04/24  -JM Placement Time: 1430 -JM Side: Right  - Orientation: lateral  - Location: ankle  -    Dressing Appearance intact;moist drainage  -      Base moist;pink;white;yellow  fragile moist hypopigmented areas, indistinct  -      Periwound pink;moist  fragile hypopigmented skin  -      Periwound Temperature warm  -      Periwound Skin Turgor soft  -      Edges irregular  -      Drainage Characteristics/Odor serous  -LH      Drainage Amount small  -LH      Care, Wound cleansed with;wound cleanser;debrided;yaa boot  -LH      Dressing Care dressing applied;silver impregnated;foam  mepilex ag, UB  -LH      Periwound Care barrier ointment applied;cleansed with pH balanced cleanser;dry periwound area maintained  zguard  -      Retired Wound - Properties Group Placement Date: 10/04/24  -JM Placement Time: 1430 -JM Side: Right  - Orientation: lateral  - Location: ankle  -    Retired Wound - Properties Group Placement Date: 10/04/24  -JM Placement Time: 1430 - Side: Right  - Orientation: lateral  - Location: ankle  -    Retired Wound - Properties Group Date first assessed: 10/04/24  -JM Time first assessed: 1430 - Side: Right  - Location: ankle  -        Wound 09/12/24 1300 Right lateral leg Traumatic    Wound - Properties Group Placement Date: 09/12/24  - Placement Time: 1300  -MF Side: Right  - Orientation: lateral  -MF Location: leg  -MF Primary Wound Type: Traumatic  -MF    Dressing Appearance intact;moist drainage  -      Base moist;granulating;necrotic;red;yellow;slough;other (see comments)  increasing granulation, moderate residual necrotic tissues in undermining area  -      Periwound intact;dry;redness;swelling  -      Periwound Temperature warm  -      Periwound Skin Turgor firm  -      Edges irregular  -      Drainage Characteristics/Odor serosanguineous;yellow  -      Drainage Amount small  -      Care, Wound cleansed with;wound cleanser;debrided;yaa boot  -      Dressing Care dressing applied;antimicrobial agent applied;foam;silver impregnated;collagen  jimmy, saline-moist HFBc, mepilex ag, UB  -      Periwound Care barrier ointment applied;cleansed with pH balanced cleanser;dry periwound area maintained  zguard  -      Retired Wound - Properties Group Placement Date: 09/12/24  - Placement Time: 1300  - Side: Right  - Orientation: lateral  - Location: leg  -MF Primary Wound Type: Traumatic  -MF    Retired Wound - Properties Group Placement Date: 09/12/24  - Placement Time: 1300  - Side: Right  -MF Orientation: lateral  -MF Location: leg  -MF Primary Wound Type: Traumatic  -MF    Retired Wound - Properties Group Date first assessed: 09/12/24  - Time first assessed: 1300  - Side: Right  - Location: leg  -MF Primary Wound Type: Traumatic  -MF              User Key  (r) = Recorded By, (t) = Taken By, (c) = Cosigned By      Initials Name Provider Type    MF Jaya Bower, PT Physical Therapist    Alesha Jason, PT Physical Therapist    Dejan Salazar, PT Physical Therapist                   Lymphedema       Row Name 11/01/24 1400             Lymphedema Edema Assessment    Ptting Edema Category By  "severity  -      Pitting Edema Moderate  -         Skin Changes/Observations    Location/Assessment Lower Extremity  -      Lower Extremity Conditions right:;shiny;crust;fragile  -      Lower Extremity Color/Pigment right:;red;blanchable;hypopigmented;hyperpigmented  -         Compression/Skin Care    Compression/Skin Care skin care;wrapping location;bandaging  -      Skin Care washed/dried;lotion applied  -      Wrapping Location lower extremity  -      Wrapping Location LE right:;foot to knee  -      Wrapping Comments wound dressings, unna boot in clamshell, cast padding, 4\" coban, size 5 spandage  -                User Key  (r) = Recorded By, (t) = Taken By, (c) = Cosigned By      Initials Name Provider Type     Dejan Zabala, PT Physical Therapist                    WOUND DEBRIDEMENT  Total area of Debridement: 4cm2  Debridement Site 1  Location- Site 1: R Lateral LE  Selective Debridement- Site 1: Wound Surface <20cmsq  Instruments- Site 1: tweezers  Excised Tissue Description- Site 1: moderate, slough  Bleeding- Site 1: scant, held pressure, 1 minute              Therapy Education       Row Name 11/01/24 1400             Therapy Education    Education Details Continue current POC.  -      Given Bandaging/dressing change;Pain management;Edema management  -      Program Reinforced  -      How Provided Verbal;Demonstration  -      Provided to Patient  -      Level of Understanding Teach back education performed;Verbalized  -                User Key  (r) = Recorded By, (t) = Taken By, (c) = Cosigned By      Initials Name Provider Type     Dejan Zabala, PT Physical Therapist                    Recommendation and Plan   PT Assessment/Plan       Row Name 11/01/24 1400          PT Assessment    Functional Limitations Performance in self-care ADL;Other (comment)  -     Impairments Integumentary integrity;Impaired venous circulation;Edema  -     Assessment Comments Pt " presenting this date with much improved granualtion of R lateral leg wound base with less thick slough/boifilm buildup requiring debridement. Pt also appears to be demonstrating small improvements in amount of drainage from the R lateral ankle wounds. Pt would continue to benefit from current POC to promote wound healing.  -     Rehab Potential Fair  -     Patient/caregiver participated in establishment of treatment plan and goals Yes  -     Patient would benefit from skilled therapy intervention Yes  -        PT Plan    PT Frequency 2x/week  -     Physical Therapy Interventions (Optional Details) patient/family education;wound care  -     PT Plan Comments debridement, UB  -               User Key  (r) = Recorded By, (t) = Taken By, (c) = Cosigned By      Initials Name Provider Type     Dejan Zabala, PT Physical Therapist                    Goals   PT OP Goals       Row Name 11/01/24 1417          Time Calculation    PT Goal Re-Cert Due Date 12/11/24  -               User Key  (r) = Recorded By, (t) = Taken By, (c) = Cosigned By      Initials Name Provider Type     Dejan Zabala, PT Physical Therapist                    PT Goal Re-Cert Due Date: 12/11/24            Time Calculation: Start Time: 1345  Untimed Charges  50659-Kwxk Boot: 15  34220-Btsxxqzxd debridement: 15  Total Minutes  Untimed Charges Total Minutes: 30   Total Minutes: 30  Therapy Charges for Today       Code Description Service Date Service Provider Modifiers Qty    51618030925 HC EDWIN DEBRIDE OPEN WOUND UP TO 20CM 11/1/2024 Dejan Zabala, PT GP 1    26782735380 HC PT STAPPING UNNA BOOT 11/1/2024 Dejan Zabala, PT GP 1                    Dejan Zabala PT  11/1/2024

## 2024-11-05 ENCOUNTER — HOSPITAL ENCOUNTER (OUTPATIENT)
Dept: PHYSICAL THERAPY | Facility: HOSPITAL | Age: 84
Setting detail: THERAPIES SERIES
Discharge: HOME | End: 2024-11-05
Payer: MEDICARE

## 2024-11-05 DIAGNOSIS — I87.2 VENOUS STASIS DERMATITIS OF RIGHT LOWER EXTREMITY: ICD-10-CM

## 2024-11-05 DIAGNOSIS — Z87.2 HISTORY OF CELLULITIS: ICD-10-CM

## 2024-11-05 DIAGNOSIS — S80.11XA HEMATOMA OF RIGHT LOWER LEG: ICD-10-CM

## 2024-11-05 DIAGNOSIS — R60.0 EDEMA OF RIGHT LOWER LEG: ICD-10-CM

## 2024-11-05 DIAGNOSIS — S81.801D OPEN WOUND OF RIGHT LOWER LEG, SUBSEQUENT ENCOUNTER: Primary | ICD-10-CM

## 2024-11-05 PROCEDURE — 97597 DBRDMT OPN WND 1ST 20 CM/<: CPT

## 2024-11-05 PROCEDURE — 29580 STRAPPING UNNA BOOT: CPT

## 2024-11-05 NOTE — THERAPY WOUND CARE TREATMENT
Outpatient Rehabilitation - Wound/Debridement Treatment Note   [facility]     Patient Name: Toño Alcala  : 1940  MRN: [id number]  Today's Date: 2024                 Admit Date: 2024    Visit Dx:    ICD-10-CM ICD-9-CM   1. Open wound of right lower leg, subsequent encounter  S81.801D V58.89     891.0   2. Edema of right lower leg  R60.0 782.3   3. History of cellulitis  Z87.2 V13.3   4. Hematoma of right lower leg  S80.11XA 924.10   5. Venous stasis dermatitis of right lower extremity  I87.2 454.1   R lat calf wound:    Lat R ankle:      Patient Active Problem List   Diagnosis    CAD (coronary artery disease)    CVD (cardiovascular disease)    DVT (deep venous thrombosis)    Dyslipidemia    Arthritis    GERD (gastroesophageal reflux disease)    Mixed hyperlipidemia    Diabetic nephropathy associated with type 2 diabetes mellitus    Diabetic polyneuropathy associated with type 2 diabetes mellitus    Chronic kidney disease, stage IV (severe)    Vitamin D deficiency    Disc disorder of cervical region    Postthrombotic syndrome    Vertigo    Angina pectoris    Lumbosacral spondylosis without myelopathy    Psoriasis    Arthritis of elbow    Swelling of right lower extremity    Acute right-sided low back pain without sciatica    Hoarseness    Unifocal PVCs    Skin lesion of face    Essential hypertension    Medicare annual wellness visit, subsequent    Stage 3 chronic kidney disease due to benign hypertension    BMI 30.0-30.9,adult    Postherpetic neuralgia    Herpes zoster without complication    Leg edema, right    Chest pain    Encounter for removal of sutures    Chronic pain of left knee    Fall        Past Medical History:   Diagnosis Date    Acute diverticulitis 2020    Allergic 60 yrs ago    Arthritis     Arthritis of neck Fusion 1992    Bilateral radial fractures     fracture bilateral radial heads    CAD (coronary artery disease)     Calculus of gallbladder without cholecystitis  without obstruction 01/29/2020    Cataract     Cervical disc disorder Fusion 1992    Cholelithiasis     Chronic kidney disease 2020    Clotting disorder     CVD (cardiovascular disease)     History of TIA 1989    Diabetes mellitus     DVT (deep venous thrombosis)     Right lower extremity DVT and pulmonary embolism in 06/2015, idiopathic    DVT of proximal lower limb 06/22/2015    proximal DVT right leg, small PE- anticoagulation    Dyslipidemia     Erectile dysfunction     Fracture 1952    H/o pf left forearm fracture    Fracture of right hand 1980    Fracture of wrist 1980    GERD (gastroesophageal reflux disease)     with hiatal hernia    HL (hearing loss)     Hx of angina pectoris     Hypertension     Normal renal angiography 02/16/11    Knee swelling Several years ago    Left wrist fracture 1953    Lumbosacral disc disease 1996    Osgood-Schlatter's disease 1955    Osteoarthritis     Right wrist fracture     Vertigo     Wears glasses         Past Surgical History:   Procedure Laterality Date    APPENDECTOMY  1957    BACK SURGERY      CARDIAC CATHETERIZATION  2011    with vein graft    CATARACT EXTRACTION Left     CERVICAL FUSION      CERVICAL FUSION  1992    C3-4    COLONOSCOPY  2013    CORONARY ARTERY BYPASS GRAFT  1994    HERNIA REPAIR Right 2011    inguinal    INGUINAL HERNIA REPAIR Left 10/29/2019    Procedure: INGUINAL HERNIA REPAIR LEFT;  Surgeon: Charisma Raines MD;  Location: [facility];  Service: General    LUMBAR LAMINECTOMY  1996    laminectomy, lumbar, L3    NECK SURGERY  1992    OTHER SURGICAL HISTORY      wrist surgery    SPINE SURGERY  1996    TONSILLECTOMY AND ADENOIDECTOMY  1945    TRIGGER POINT INJECTION  2001 - 2007    VASECTOMY  1972         EVALUATION   PT Ortho       Row Name 11/05/24 1300       Subjective    Subjective Comments No complaints or changes.  Intermittant ankle pain  -JM       Subjective Pain    Able to rate subjective pain? yes  -JM    Pre-Treatment Pain Level 3  -JM     Post-Treatment Pain Level 3  -JM    Subjective Pain Comment R lat ankle  -JM       Transfers    Sit-Stand [place] (Transfers) independent  -JM    Stand-Sit [place] (Transfers) independent  -JM    Comment, (Transfers) seated for tx  -JM       Gait/Stairs (Locomotion)    [place] Level (Gait) modified independence  -JM    Assistive Device (Gait) cane, straight  -JM              User Key  (r) = Recorded By, (t) = Taken By, (c) = Cosigned By      Initials Name Provider Type    Alesha Jason PT Physical Therapist                     LDA Wound       Row Name 11/05/24 1300             Wound 10/04/24 1430 Right lateral ankle    Wound - Properties Group Placement Date: 10/04/24  - Placement Time: 1430  -JM Side: Right  -JM Orientation: lateral  -JM Location: ankle  -    Wound Image Images linked: 1  -JM      Dressing Appearance intact;moist drainage  -JM      Base moist;pink;white;yellow  fragile moist hypopigmented areas, indistinct  -JM      Periwound pink;moist  fragile hypopigmented skin  -JM      Periwound Temperature warm  -JM      Periwound Skin Turgor soft  -JM      Edges irregular  -JM      Drainage Characteristics/Odor serous  -JM      Drainage Amount small  -JM      Care, Wound cleansed with;wound cleanser;debrided;yaa boot  -JM      Dressing Care dressing applied;silver impregnated;foam  mepilex ag, unna boot  -JM      Periwound Care barrier ointment applied;cleansed with pH balanced cleanser;dry periwound area maintained  zguard  -JM      Retired Wound - Properties Group Placement Date: 10/04/24  - Placement Time: 1430  -JM Side: Right  -JM Orientation: lateral  -JM Location: ankle  -JM    Retired Wound - Properties Group Placement Date: 10/04/24  - Placement Time: 1430  -JM Side: Right  -JM Orientation: lateral  -JM Location: ankle  -JM    Retired Wound - Properties Group Date first assessed: 10/04/24  - Time first assessed: 1430  -JM Side: Right  -JM Location: ankle  -        Wound 09/12/24 1300 Right lateral leg Traumatic    Wound - Properties Group Placement Date: 09/12/24  - Placement Time: 1300  - Side: Right  -MF Orientation: lateral  -MF Location: leg  -MF Primary Wound Type: Traumatic  -MF    Wound Image Images linked: 1  -      Dressing Appearance intact;moist drainage  -      Base moist;granulating;red;yellow;slough;other (see comments)  increasing granulation, new epithelial growth posterior edge  -      Periwound intact;dry;redness;swelling  -      Periwound Temperature warm  -      Periwound Skin Turgor firm  -      Edges irregular  -      Wound Length (cm) 1.4 cm  -      Wound Width (cm) 3.2 cm  -      Wound Depth (cm) 0.5 cm  -      Wound Surface Area (cm^2) 4.48 cm^2  -      Wound Volume (cm^3) 2.24 cm^3  -JM      Drainage Characteristics/Odor serosanguineous;yellow  -      Drainage Amount small  -      Care, Wound cleansed with;wound cleanser;debrided;yaa boot  -      Dressing Care dressing applied;collagen;antimicrobial agent applied;foam  jimmy, HFBt, mepilex ag, unna boot  -      Periwound Care barrier ointment applied;cleansed with pH balanced cleanser;dry periwound area maintained  zguard  -      Retired Wound - Properties Group Placement Date: 09/12/24  - Placement Time: 1300  - Side: Right  - Orientation: lateral  - Location: leg  -MF Primary Wound Type: Traumatic  -MF    Retired Wound - Properties Group Placement Date: 09/12/24  - Placement Time: 1300  - Side: Right  - Orientation: lateral  -MF Location: leg  -MF Primary Wound Type: Traumatic  -MF    Retired Wound - Properties Group Date first assessed: 09/12/24  - Time first assessed: 1300  - Side: Right  - Location: leg  -MF Primary Wound Type: Traumatic  -MF              User Key  (r) = Recorded By, (t) = Taken By, (c) = Cosigned By      Initials Name Provider Type    Jaya Doshi, PT Physical Therapist    Alesha Jason, PT Physical  "Therapist                   Lymphedema       Row Name 11/05/24 1300             Lymphedema Edema Assessment    Ptting Edema Category By severity  -      Pitting Edema Moderate;Mild  -         Skin Changes/Observations    Location/Assessment Lower Extremity  -      Lower Extremity Conditions right:;shiny;crust;fragile  -      Lower Extremity Color/Pigment right:;red;blanchable;hypopigmented;hyperpigmented  -         Compression/Skin Care    Compression/Skin Care skin care;wrapping location;bandaging  -      Skin Care washed/dried;lotion applied  -      Wrapping Location lower extremity  -      Wrapping Location LE right:;foot to knee  -      Wrapping Comments wound dressings, unna boot in clamshell, cast padding, 4\" coban, size 5 spandage  -                User Key  (r) = Recorded By, (t) = Taken By, (c) = Cosigned By      Initials Name Provider Type    Alesha Jason, PT Physical Therapist                    WOUND DEBRIDEMENT  Total area of Debridement: 4cmsq  Debridement Site 1  Location- Site 1: R Lateral LE  Selective Debridement- Site 1: Wound Surface <20cmsq  Instruments- Site 1: tweezers  Excised Tissue Description- Site 1: minimum, slough  Bleeding- Site 1: scant, held pressure, 1 minute              Therapy Education       Row Name 11/05/24 1300             Therapy Education    Given Bandaging/dressing change;Pain management;Edema management  -      Program Reinforced  -      How Provided Verbal;Demonstration  -      Provided to Patient  -      Level of Understanding Teach back education performed;Verbalized  -                User Key  (r) = Recorded By, (t) = Taken By, (c) = Cosigned By      Initials Name Provider Type    Alesha Jason, PT Physical Therapist                    Recommendation and Plan   PT Assessment/Plan       Row Name 11/05/24 1300          PT Assessment    Functional Limitations Performance in self-care ADL;Other (comment)  -     Impairments " Integumentary integrity;Impaired venous circulation;Edema  -     Assessment Comments New epithelial growth from posterior edge of lat R calf wound, with increasing granulation and decreased undermining.  Lat R ankle still with several moist partial-thickness areas.  Pt improving with current tx, continue with POC.  -        PT Plan    PT Frequency 2x/week  -     Physical Therapy Interventions (Optional Details) patient/family education;wound care  -     PT Plan Comments debridement, UB  -               User Key  (r) = Recorded By, (t) = Taken By, (c) = Cosigned By      Initials Name Provider Type    Alesha Jason, PT Physical Therapist                    Goals   PT OP Goals       Row Name 11/05/24 1330          Time Calculation    PT Goal Re-Cert Due Date 12/11/24  -               User Key  (r) = Recorded By, (t) = Taken By, (c) = Cosigned By      Initials Name Provider Type    Alesha Jason, PT Physical Therapist                    PT Goal Re-Cert Due Date: 12/11/24            Time Calculation: Start Time: 1300  Untimed Charges  18674-Dxkk Boot: 15  47421-Tglwfkloz debridement: 10  Total Minutes  Untimed Charges Total Minutes: 25   Total Minutes: 25  Therapy Charges for Today       Code Description Service Date Service Provider Modifiers Qty    76803965087 HC EDWIN DEBRIDE OPEN WOUND UP TO 20CM 11/5/2024 Alesha Nunez, PT GP 1    65348954139 HC PT STAPPING UNNA BOOT 11/5/2024 Alesha Nunez, PT GP 1                    lAesha Nunez, PT  11/5/2024

## 2024-11-07 DIAGNOSIS — M87.051 AVASCULAR NECROSIS OF BONES OF BOTH HIPS: Primary | ICD-10-CM

## 2024-11-07 DIAGNOSIS — M87.052 AVASCULAR NECROSIS OF BONES OF BOTH HIPS: Primary | ICD-10-CM

## 2024-11-08 ENCOUNTER — HOSPITAL ENCOUNTER (OUTPATIENT)
Dept: PHYSICAL THERAPY | Facility: HOSPITAL | Age: 84
Setting detail: THERAPIES SERIES
Discharge: HOME | End: 2024-11-08
Payer: MEDICARE

## 2024-11-08 DIAGNOSIS — S80.11XA HEMATOMA OF RIGHT LOWER LEG: ICD-10-CM

## 2024-11-08 DIAGNOSIS — Z87.2 HISTORY OF CELLULITIS: ICD-10-CM

## 2024-11-08 DIAGNOSIS — S81.801D OPEN WOUND OF RIGHT LOWER LEG, SUBSEQUENT ENCOUNTER: Primary | ICD-10-CM

## 2024-11-08 DIAGNOSIS — R60.0 EDEMA OF RIGHT LOWER LEG: ICD-10-CM

## 2024-11-08 DIAGNOSIS — I87.2 VENOUS STASIS DERMATITIS OF RIGHT LOWER EXTREMITY: ICD-10-CM

## 2024-11-08 PROCEDURE — 97597 DBRDMT OPN WND 1ST 20 CM/<: CPT

## 2024-11-08 PROCEDURE — 29580 STRAPPING UNNA BOOT: CPT

## 2024-11-08 NOTE — THERAPY WOUND CARE TREATMENT
Outpatient Rehabilitation - Wound/Debridement Treatment Note   [place]     Patient Name: Toño Alcala  : 1940  MRN: [id number]  Today's Date: 2024                 Admit Date: 2024    Visit Dx:    ICD-10-CM ICD-9-CM   1. Open wound of right lower leg, subsequent encounter  S81.801D V58.89     891.0   2. Edema of right lower leg  R60.0 782.3   3. History of cellulitis  Z87.2 V13.3   4. Hematoma of right lower leg  S80.11XA 924.10   5. Venous stasis dermatitis of right lower extremity  I87.2 454.1       Patient Active Problem List   Diagnosis    CAD (coronary artery disease)    CVD (cardiovascular disease)    DVT (deep venous thrombosis)    Dyslipidemia    Arthritis    GERD (gastroesophageal reflux disease)    Mixed hyperlipidemia    Diabetic nephropathy associated with type 2 diabetes mellitus    Diabetic polyneuropathy associated with type 2 diabetes mellitus    Chronic kidney disease, stage IV (severe)    Vitamin D deficiency    Disc disorder of cervical region    Postthrombotic syndrome    Vertigo    Angina pectoris    Lumbosacral spondylosis without myelopathy    Psoriasis    Arthritis of elbow    Swelling of right lower extremity    Acute right-sided low back pain without sciatica    Hoarseness    Unifocal PVCs    Skin lesion of face    Essential hypertension    Medicare annual wellness visit, subsequent    Stage 3 chronic kidney disease due to benign hypertension    BMI 30.0-30.9,adult    Postherpetic neuralgia    Herpes zoster without complication    Leg edema, right    Chest pain    Encounter for removal of sutures    Chronic pain of left knee    Fall        Past Medical History:   Diagnosis Date    Acute diverticulitis 2020    Allergic 60 yrs ago    Arthritis     Arthritis of neck Fusion 1992    Bilateral radial fractures     fracture bilateral radial heads    CAD (coronary artery disease)     Calculus of gallbladder without cholecystitis without obstruction 2020     Cataract     Cervical disc disorder Fusion 1992    Cholelithiasis     Chronic kidney disease 2020    Clotting disorder     CVD (cardiovascular disease)     History of TIA 1989    Diabetes mellitus     DVT (deep venous thrombosis)     Right lower extremity DVT and pulmonary embolism in 06/2015, idiopathic    DVT of proximal lower limb 06/22/2015    proximal DVT right leg, small PE- anticoagulation    Dyslipidemia     Erectile dysfunction     Fracture 1952    H/o pf left forearm fracture    Fracture of right hand 1980    Fracture of wrist 1980    GERD (gastroesophageal reflux disease)     with hiatal hernia    HL (hearing loss)     Hx of angina pectoris     Hypertension     Normal renal angiography 02/16/11    Knee swelling Several years ago    Left wrist fracture 1953    Lumbosacral disc disease 1996    Osgood-Schlatter's disease 1955    Osteoarthritis     Right wrist fracture     Vertigo     Wears glasses         Past Surgical History:   Procedure Laterality Date    APPENDECTOMY  1957    BACK SURGERY      CARDIAC CATHETERIZATION  2011    with vein graft    CATARACT EXTRACTION Left     CERVICAL FUSION      CERVICAL FUSION  1992    C3-4    COLONOSCOPY  2013    CORONARY ARTERY BYPASS GRAFT  1994    HERNIA REPAIR Right 2011    inguinal    INGUINAL HERNIA REPAIR Left 10/29/2019    Procedure: INGUINAL HERNIA REPAIR LEFT;  Surgeon: Charisma Raines MD;  Location: [facility];  Service: General    LUMBAR LAMINECTOMY  1996    laminectomy, lumbar, L3    NECK SURGERY  1992    OTHER SURGICAL HISTORY      wrist surgery    SPINE SURGERY  1996    TONSILLECTOMY AND ADENOIDECTOMY  1945    TRIGGER POINT INJECTION  2001 - 2007    VASECTOMY  1972         EVALUATION   PT Ortho       Row Name 11/08/24 1300       Subjective    Subjective Comments Pt states unna boot started to feel tight and caused increased swelling of proximal calf, also increased itching of calf.  -JM       Subjective Pain    Able to rate subjective pain? yes  -JM     Pre-Treatment Pain Level 3  -JM    Post-Treatment Pain Level 3  -JM       Transfers    Sit-Stand [place] (Transfers) independent  -JM    Stand-Sit [place] (Transfers) independent  -JM    Comment, (Transfers) seated for tx  -JM       Gait/Stairs (Locomotion)    [place] Level (Gait) modified independence  -JM    Assistive Device (Gait) cane, straight  -JM              User Key  (r) = Recorded By, (t) = Taken By, (c) = Cosigned By      Initials Name Provider Type    Alesha Jason PT Physical Therapist                     LDA Wound       Row Name 11/08/24 1300             Wound 10/04/24 1430 Right lateral ankle    Wound - Properties Group Placement Date: 10/04/24  - Placement Time: 1430  -JM Side: Right  -JM Orientation: lateral  -JM Location: ankle  -JM    Dressing Appearance intact;dried drainage  -JM      Base moist;pink;white;yellow  fragile moist hypopigmented areas, indistinct  -JM      Periwound pink;moist;blistered  fragile hypopigmented skin, a couple small blisters at edge of ag foam dressing  -JM      Periwound Temperature warm  -      Periwound Skin Turgor soft  -      Edges irregular  -      Drainage Characteristics/Odor serous  -JM      Drainage Amount scant  -JM      Care, Wound cleansed with;wound cleanser;debrided;yaa boot  -JM      Dressing Care dressing applied;silver impregnated;foam  mepilex ag, unna boot  -JM      Periwound Care cleansed with pH balanced cleanser;dry periwound area maintained;barrier ointment applied  zguard  -      Retired Wound - Properties Group Placement Date: 10/04/24  - Placement Time: 1430  -JM Side: Right  -JM Orientation: lateral  -JM Location: ankle  -JM    Retired Wound - Properties Group Placement Date: 10/04/24  - Placement Time: 1430  -JM Side: Right  -JM Orientation: lateral  - Location: ankle  -JM    Retired Wound - Properties Group Date first assessed: 10/04/24  - Time first assessed: 1430  -JM Side: Right  - Location:  ankle  -JM       Wound 09/12/24 1300 Right lateral leg Traumatic    Wound - Properties Group Placement Date: 09/12/24  - Placement Time: 1300  - Side: Right  - Orientation: lateral  -MF Location: leg  -MF Primary Wound Type: Traumatic  -MF    Dressing Appearance intact;moist drainage  -      Base moist;granulating;red;yellow;slough;other (see comments)  increasing granulation, new epithelial growth posterior edge  -      Periwound intact;dry;redness;swelling  -      Periwound Temperature warm  -      Periwound Skin Turgor firm  -      Edges irregular  -      Drainage Characteristics/Odor serosanguineous;yellow  -      Drainage Amount small  -      Care, Wound cleansed with;wound cleanser;debrided;yaa boot  -      Dressing Care dressing applied;collagen;antimicrobial agent applied;foam;silver impregnated  jimmy, HFBt, mepilex ag, unna boot  -      Periwound Care barrier ointment applied;cleansed with pH balanced cleanser;dry periwound area maintained  zguard  -      Retired Wound - Properties Group Placement Date: 09/12/24  - Placement Time: 1300  -MF Side: Right  - Orientation: lateral  - Location: leg  -MF Primary Wound Type: Traumatic  -MF    Retired Wound - Properties Group Placement Date: 09/12/24  - Placement Time: 1300  - Side: Right  - Orientation: lateral  - Location: leg  -MF Primary Wound Type: Traumatic  -MF    Retired Wound - Properties Group Date first assessed: 09/12/24  - Time first assessed: 1300  - Side: Right  - Location: leg  -MF Primary Wound Type: Traumatic  -MF              User Key  (r) = Recorded By, (t) = Taken By, (c) = Cosigned By      Initials Name Provider Type    Jaya Doshi, PT Physical Therapist    Alesha Jason, PT Physical Therapist                   Lymphedema       Row Name 11/08/24 1300             Lymphedema Edema Assessment    Ptting Edema Category By severity  -KACIE      Pitting Edema Moderate;Mild  -KACIE       "Edema Assessment Comment increased edema prox calf above unna boot  -         Skin Changes/Observations    Location/Assessment Lower Extremity  -      Lower Extremity Conditions right:;shiny;crust;fragile  -      Lower Extremity Color/Pigment right:;red;blanchable;hypopigmented;hyperpigmented  -         Compression/Skin Care    Compression/Skin Care skin care;wrapping location;bandaging  -      Skin Care washed/dried;lotion applied  -      Wrapping Location lower extremity  -      Wrapping Location LE right:;foot to knee  -      Wrapping Comments wound dressings, unna boot in clamshell, cast padding, 4\" coban, size 5 spandage  -                User Key  (r) = Recorded By, (t) = Taken By, (c) = Cosigned By      Initials Name Provider Type    Alesha Jason, PT Physical Therapist                    WOUND DEBRIDEMENT  Total area of Debridement: 2cmsq  Debridement Site 1  Location- Site 1: R Lateral LE  Selective Debridement- Site 1: Wound Surface <20cmsq  Instruments- Site 1: tweezers  Excised Tissue Description- Site 1: minimum, slough  Bleeding- Site 1: scant              Therapy Education       Row Name 11/08/24 1300             Therapy Education    Education Details Elevate leg to reduce edema  -      Given Bandaging/dressing change;Pain management;Edema management  -      Program Reinforced  -      How Provided Verbal;Demonstration  -      Provided to Patient  -      Level of Understanding Teach back education performed;Verbalized  -                User Key  (r) = Recorded By, (t) = Taken By, (c) = Cosigned By      Initials Name Provider Type    Alesha Jason, PT Physical Therapist                    Recommendation and Plan   PT Assessment/Plan       Row Name 11/08/24 1300          PT Assessment    Functional Limitations Performance in self-care ADL;Other (comment)  -     Impairments Integumentary integrity;Impaired venous circulation;Edema  -     Assessment " Comments Pt with increased proximal calf edema, likely from PT eding unna boot at a lower point at pt's request last tx.  Pt agreeable to allow PT to wrap to tibial tuberosity as recommended for increased venous return and to reduce edema.  PT reinforced leg elevation for edema reduction.  Lat RLE wound is improving with increased granulation and epithelialization at post/inf edges, still with min slough buildup requiring debridement.  R lat ankle with improved skin integrity and less drainage.  Pt continues to benefit from current POC to faciliate wound closure.  -        PT Plan    PT Frequency 2x/week  -     Physical Therapy Interventions (Optional Details) patient/family education;wound care  -     PT Plan Comments debridement, UB  -               User Key  (r) = Recorded By, (t) = Taken By, (c) = Cosigned By      Initials Name Provider Type    Alesha Jason, PT Physical Therapist                    Goals   PT OP Goals       Row Name 11/08/24 1342          Time Calculation    PT Goal Re-Cert Due Date 12/11/24  -               User Key  (r) = Recorded By, (t) = Taken By, (c) = Cosigned By      Initials Name Provider Type    Alesha Jason, PT Physical Therapist                    PT Goal Re-Cert Due Date: 12/11/24            Time Calculation: Start Time: 1300  Untimed Charges  13765-Kwcp Boot: 20  51558-Vdijgpfeu debridement: 15  Total Minutes  Untimed Charges Total Minutes: 35   Total Minutes: 35  Therapy Charges for Today       Code Description Service Date Service Provider Modifiers Qty    33684237937 HC EDWIN DEBRIDE OPEN WOUND UP TO 20CM 11/8/2024 Alesha Nunez, PT GP, KX 1    94238166699 HC PT STAPPING UNNA BOOT 11/8/2024 Alesha Nunez, PT GP, KX 1                    Alesha Nunez, PT  11/8/2024

## 2024-11-12 ENCOUNTER — HOSPITAL ENCOUNTER (OUTPATIENT)
Dept: PHYSICAL THERAPY | Facility: HOSPITAL | Age: 84
Setting detail: THERAPIES SERIES
Discharge: HOME | End: 2024-11-12
Payer: MEDICARE

## 2024-11-12 ENCOUNTER — TRANSCRIBE ORDERS (OUTPATIENT)
Dept: LAB | Facility: HOSPITAL | Age: 84
End: 2024-11-12
Payer: MEDICARE

## 2024-11-12 ENCOUNTER — LAB (OUTPATIENT)
Dept: LAB | Facility: HOSPITAL | Age: 84
End: 2024-11-12
Payer: MEDICARE

## 2024-11-12 DIAGNOSIS — N18.32 CHRONIC KIDNEY DISEASE (CKD) STAGE G3B/A1, MODERATELY DECREASED GLOMERULAR FILTRATION RATE (GFR) BETWEEN 30-44 ML/MIN/1.73 SQUARE METER AND ALBUMINURIA CREATININE RATIO LESS THAN 30 MG/G (CMS/H*: ICD-10-CM

## 2024-11-12 DIAGNOSIS — I87.2 VENOUS STASIS DERMATITIS OF RIGHT LOWER EXTREMITY: ICD-10-CM

## 2024-11-12 DIAGNOSIS — R60.0 EDEMA OF RIGHT LOWER LEG: ICD-10-CM

## 2024-11-12 DIAGNOSIS — N18.32 CHRONIC KIDNEY DISEASE (CKD) STAGE G3B/A1, MODERATELY DECREASED GLOMERULAR FILTRATION RATE (GFR) BETWEEN 30-44 ML/MIN/1.73 SQUARE METER AND ALBUMINURIA CREATININE RATIO LESS THAN 30 MG/G (CMS/H*: Primary | ICD-10-CM

## 2024-11-12 DIAGNOSIS — Z87.2 HISTORY OF CELLULITIS: ICD-10-CM

## 2024-11-12 DIAGNOSIS — S80.11XA HEMATOMA OF RIGHT LOWER LEG: ICD-10-CM

## 2024-11-12 DIAGNOSIS — S81.801D OPEN WOUND OF RIGHT LOWER LEG, SUBSEQUENT ENCOUNTER: Primary | ICD-10-CM

## 2024-11-12 LAB
BASOPHILS # BLD AUTO: 0.05 10*3/MM3 (ref 0–0.2)
BASOPHILS NFR BLD AUTO: 0.6 % (ref 0–1.5)
DEPRECATED RDW RBC AUTO: 46.8 FL (ref 37–54)
EOSINOPHIL # BLD AUTO: 0.38 10*3/MM3 (ref 0–0.4)
EOSINOPHIL NFR BLD AUTO: 4.6 % (ref 0.3–6.2)
ERYTHROCYTE [DISTWIDTH] IN BLOOD BY AUTOMATED COUNT: 14.4 % (ref 12.3–15.4)
HCT VFR BLD AUTO: 37.8 % (ref 37.5–51)
HGB BLD-MCNC: 12.5 G/DL (ref 13–17.7)
IMM GRANULOCYTES # BLD AUTO: 0.03 10*3/MM3 (ref 0–0.05)
IMM GRANULOCYTES NFR BLD AUTO: 0.4 % (ref 0–0.5)
LYMPHOCYTES # BLD AUTO: 1.64 10*3/MM3 (ref 0.7–3.1)
LYMPHOCYTES NFR BLD AUTO: 19.7 % (ref 19.6–45.3)
MCH RBC QN AUTO: 29.8 PG (ref 26.6–33)
MCHC RBC AUTO-ENTMCNC: 33.1 G/DL (ref 31.5–35.7)
MCV RBC AUTO: 90.2 FL (ref 79–97)
MONOCYTES # BLD AUTO: 0.94 10*3/MM3 (ref 0.1–0.9)
MONOCYTES NFR BLD AUTO: 11.3 % (ref 5–12)
NEUTROPHILS NFR BLD AUTO: 5.29 10*3/MM3 (ref 1.7–7)
NEUTROPHILS NFR BLD AUTO: 63.4 % (ref 42.7–76)
NRBC BLD AUTO-RTO: 0 /100 WBC (ref 0–0.2)
PLATELET # BLD AUTO: 279 10*3/MM3 (ref 140–450)
PMV BLD AUTO: 10.7 FL (ref 6–12)
RBC # BLD AUTO: 4.19 10*6/MM3 (ref 4.14–5.8)
WBC NRBC COR # BLD AUTO: 8.33 10*3/MM3 (ref 3.4–10.8)

## 2024-11-12 PROCEDURE — 80069 RENAL FUNCTION PANEL: CPT

## 2024-11-12 PROCEDURE — 84156 ASSAY OF PROTEIN URINE: CPT

## 2024-11-12 PROCEDURE — 29580 STRAPPING UNNA BOOT: CPT

## 2024-11-12 PROCEDURE — 36415 COLL VENOUS BLD VENIPUNCTURE: CPT

## 2024-11-12 PROCEDURE — 97597 DBRDMT OPN WND 1ST 20 CM/<: CPT

## 2024-11-12 PROCEDURE — 82570 ASSAY OF URINE CREATININE: CPT

## 2024-11-12 PROCEDURE — 85025 COMPLETE CBC W/AUTO DIFF WBC: CPT

## 2024-11-12 PROCEDURE — 81001 URINALYSIS AUTO W/SCOPE: CPT | Performed by: STUDENT IN AN ORGANIZED HEALTH CARE EDUCATION/TRAINING PROGRAM

## 2024-11-12 NOTE — THERAPY PROGRESS REPORT/RE-CERT
Outpatient Rehabilitation - Wound/Debridement Progress Note   [place]     Patient Name: Toño Alcala  : 1940  MRN: [id number]  Today's Date: 2024                 Admit Date: 2024    Visit Dx:    ICD-10-CM ICD-9-CM   1. Open wound of right lower leg, subsequent encounter  S81.801D V58.89     891.0   2. Edema of right lower leg  R60.0 782.3   3. History of cellulitis  Z87.2 V13.3   4. Hematoma of right lower leg  S80.11XA 924.10   5. Venous stasis dermatitis of right lower extremity  I87.2 454.1   Lat R calf wound:    Lat R ankle:        Patient Active Problem List   Diagnosis    CAD (coronary artery disease)    CVD (cardiovascular disease)    DVT (deep venous thrombosis)    Dyslipidemia    Arthritis    GERD (gastroesophageal reflux disease)    Mixed hyperlipidemia    Diabetic nephropathy associated with type 2 diabetes mellitus    Diabetic polyneuropathy associated with type 2 diabetes mellitus    Chronic kidney disease, stage IV (severe)    Vitamin D deficiency    Disc disorder of cervical region    Postthrombotic syndrome    Vertigo    Angina pectoris    Lumbosacral spondylosis without myelopathy    Psoriasis    Arthritis of elbow    Swelling of right lower extremity    Acute right-sided low back pain without sciatica    Hoarseness    Unifocal PVCs    Skin lesion of face    Essential hypertension    Medicare annual wellness visit, subsequent    Stage 3 chronic kidney disease due to benign hypertension    BMI 30.0-30.9,adult    Postherpetic neuralgia    Herpes zoster without complication    Leg edema, right    Chest pain    Encounter for removal of sutures    Chronic pain of left knee    Fall        Past Medical History:   Diagnosis Date    Acute diverticulitis 2020    Allergic 60 yrs ago    Arthritis     Arthritis of neck Fusion 1992    Bilateral radial fractures     fracture bilateral radial heads    CAD (coronary artery disease)     Calculus of gallbladder without  cholecystitis without obstruction 01/29/2020    Cataract     Cervical disc disorder Fusion 1992    Cholelithiasis     Chronic kidney disease 2020    Clotting disorder     CVD (cardiovascular disease)     History of TIA 1989    Diabetes mellitus     DVT (deep venous thrombosis)     Right lower extremity DVT and pulmonary embolism in 06/2015, idiopathic    DVT of proximal lower limb 06/22/2015    proximal DVT right leg, small PE- anticoagulation    Dyslipidemia     Erectile dysfunction     Fracture 1952    H/o pf left forearm fracture    Fracture of right hand 1980    Fracture of wrist 1980    GERD (gastroesophageal reflux disease)     with hiatal hernia    HL (hearing loss)     Hx of angina pectoris     Hypertension     Normal renal angiography 02/16/11    Knee swelling Several years ago    Left wrist fracture 1953    Lumbosacral disc disease 1996    Osgood-Schlatter's disease 1955    Osteoarthritis     Right wrist fracture     Vertigo     Wears glasses         Past Surgical History:   Procedure Laterality Date    APPENDECTOMY  1957    BACK SURGERY      CARDIAC CATHETERIZATION  2011    with vein graft    CATARACT EXTRACTION Left     CERVICAL FUSION      CERVICAL FUSION  1992    C3-4    COLONOSCOPY  2013    CORONARY ARTERY BYPASS GRAFT  1994    HERNIA REPAIR Right 2011    inguinal    INGUINAL HERNIA REPAIR Left 10/29/2019    Procedure: INGUINAL HERNIA REPAIR LEFT;  Surgeon: Charisma Raines MD;  Location: [facility];  Service: General    LUMBAR LAMINECTOMY  1996    laminectomy, lumbar, L3    NECK SURGERY  1992    OTHER SURGICAL HISTORY      wrist surgery    SPINE SURGERY  1996    TONSILLECTOMY AND ADENOIDECTOMY  1945    TRIGGER POINT INJECTION  2001 - 2007    VASECTOMY  1972         EVALUATION   PT Ortho       Row Name 11/12/24 1300       Subjective    Subjective Comments No complaints except continued lateral ankle tenderness.  States no pain at rest but is tender to palpation and for a couple hours after tx.   -JM       Subjective Pain    Able to rate subjective pain? yes  -JM    Pre-Treatment Pain Level 3  -JM    Post-Treatment Pain Level 3  -JM    Subjective Pain Comment R ankle  -JM       Transfers    Sit-Stand [place] (Transfers) independent  -JM    Stand-Sit [place] (Transfers) independent  -JM    Comment, (Transfers) seated for tx  -JM       Gait/Stairs (Locomotion)    [place] Level (Gait) modified independence  -JM    Assistive Device (Gait) cane, straight  -JM              User Key  (r) = Recorded By, (t) = Taken By, (c) = Cosigned By      Initials Name Provider Type    Alesha Jason, PT Physical Therapist                     LDA Wound       Row Name 11/12/24 1300             Wound 10/04/24 1430 Right lateral ankle    Wound - Properties Group Placement Date: 10/04/24  - Placement Time: 1430 - Side: Right  - Orientation: lateral  - Location: ankle  -    Wound Image Images linked: 1  -JM      Dressing Appearance intact;moist drainage  -      Base moist;pink;white;yellow  fragile moist hypopigmented areas, indistinct, 2 small purple purpra noted  -      Periwound pink;moist;blistered  fragile hypopigmented skin, a couple small blisters at edge of ag foam dressing  -      Periwound Temperature warm  -      Periwound Skin Turgor soft  -      Edges irregular  -      Drainage Characteristics/Odor serous  -JM      Drainage Amount scant  -      Care, Wound cleansed with;wound cleanser;debrided;yaa boot  -JM      Dressing Care dressing changed;silver impregnated;foam  mepilex ag, UB  -      Periwound Care cleansed with pH balanced cleanser;dry periwound area maintained;barrier ointment applied  zguard  -      Retired Wound - Properties Group Placement Date: 10/04/24  - Placement Time: 1430 -JM Side: Right  - Orientation: lateral  - Location: ankle  -    Retired Wound - Properties Group Placement Date: 10/04/24  - Placement Time: 1430  - Side: Right  -  Orientation: lateral  -JM Location: ankle  -JM    Retired Wound - Properties Group Date first assessed: 10/04/24  - Time first assessed: 1430  -JM Side: Right  -JM Location: ankle  -JM       Wound 09/12/24 1300 Right lateral leg Traumatic    Wound - Properties Group Placement Date: 09/12/24  - Placement Time: 1300  -MF Side: Right  -MF Orientation: lateral  -MF Location: leg  -MF Primary Wound Type: Traumatic  -MF    Wound Image Images linked: 1  -JM      Dressing Appearance intact;moist drainage  -      Base moist;granulating;red;yellow;slough;other (see comments)  increasing granulation, new epithelial growth posterior edge  -      Periwound intact;dry;redness;swelling  -      Periwound Temperature warm  -      Periwound Skin Turgor firm  -      Edges irregular;rolled/closed  min rolling inf/med  -      Wound Length (cm) 2.6 cm  longest diagonal  -      Wound Width (cm) 1 cm  -      Wound Depth (cm) 0.5 cm  -JM      Wound Surface Area (cm^2) 2.6 cm^2  -JM      Wound Volume (cm^3) 1.3 cm^3  -JM      Drainage Characteristics/Odor serosanguineous;yellow  -JM      Drainage Amount small  -      Care, Wound cleansed with;wound cleanser;debrided;yaa boot  -      Dressing Care dressing applied;collagen;antimicrobial agent applied;foam;silver impregnated  jimmy, HFBt, mepilex ag, UB  -      Periwound Care barrier ointment applied;cleansed with pH balanced cleanser;dry periwound area maintained  -      Retired Wound - Properties Group Placement Date: 09/12/24  - Placement Time: 1300  -MF Side: Right  -MF Orientation: lateral  -MF Location: leg  -MF Primary Wound Type: Traumatic  -MF    Retired Wound - Properties Group Placement Date: 09/12/24  - Placement Time: 1300  -MF Side: Right  -MF Orientation: lateral  -MF Location: leg  -MF Primary Wound Type: Traumatic  -MF    Retired Wound - Properties Group Date first assessed: 09/12/24  - Time first assessed: 1300  -MF Side: Right  -MF  "Location: leg  - Primary Wound Type: Traumatic  -              User Key  (r) = Recorded By, (t) = Taken By, (c) = Cosigned By      Initials Name Provider Type    Jaya Doshi, PT Physical Therapist    Alesha Jason, PT Physical Therapist                   Lymphedema       Row Name 11/12/24 1300             Lymphedema Edema Assessment    Ptting Edema Category By severity  -      Pitting Edema Moderate;Mild  mod prox and distal to UB  -         Skin Changes/Observations    Location/Assessment Lower Extremity  -      Lower Extremity Conditions right:;shiny;crust;fragile  -      Lower Extremity Color/Pigment right:;red;blanchable;hypopigmented;hyperpigmented  -         Compression/Skin Care    Compression/Skin Care skin care;wrapping location;bandaging  -      Skin Care washed/dried;lotion applied  -      Wrapping Location lower extremity  -      Wrapping Location LE right:;foot to knee  -      Wrapping Comments wound dressings, [facility]na boot in [facility], 4\" coban, size 5 spandage  -                User Key  (r) = Recorded By, (t) = Taken By, (c) = Cosigned By      Initials Name Provider Type    Alesha Jason, PT Physical Therapist                    WOUND DEBRIDEMENT  Total area of Debridement: 2cmsq  Debridement Site 1  Location- Site 1: R Lateral LE  Selective Debridement- Site 1: Wound Surface <20cmsq  Instruments- Site 1: tweezers  Excised Tissue Description- Site 1: moderate, slough  Bleeding- Site 1: scant              Therapy Education       Row Name 11/12/24 1300             Therapy Education    Education Details Continuation of POC, elevate leg to reduce edema  -      Given Bandaging/dressing change;Pain management;Edema management  -      Program Reinforced  -      How Provided Verbal;Demonstration  -JM      Provided to Patient  -      Level of Understanding Teach back education performed;Verbalized  -                User Key  (r) = Recorded By, (t) = " Taken By, (c) = Cosigned By      Initials Name Provider Type    Alesha Jason, PT Physical Therapist                    Recommendation and Plan   PT Assessment/Plan       Row Name 24 1300          PT Assessment    Functional Limitations Performance in self-care ADL;Other (comment)  -     Impairments Integumentary integrity;Impaired venous circulation;Edema  -     Assessment Comments Pt is progressing toward goals with decreased R calf wound dimensions and reduction in edema.  Pt continues to require skilled PT for debridement of slough buildup between txs, with advanced dressings and compression/UB to facilitate wound closure.  Continue with current POC.  -     Rehab Potential Fair  -     Patient/caregiver participated in establishment of treatment plan and goals Yes  -     Patient would benefit from skilled therapy intervention Yes  -        PT Plan    PT Frequency 2x/week  -     Predicted Duration of Therapy Intervention (PT) 20 visits  -     Planned CPT's? PT EDWIN DEBRIDE OPEN WOUND UP TO 20 CM: 03886;PT UNNA BOOT: 28681;PT MULTI LAYER COMP SYS LE;PT NONSELECT DEBRIDE 15 MIN: 90697;PT SELF CARE/MGMT/TRAIN 15 MIN: 07292  -     Physical Therapy Interventions (Optional Details) patient/family education;wound care  -     PT Plan Comments debridement, UB vs MLW  -               User Key  (r) = Recorded By, (t) = Taken By, (c) = Cosigned By      Initials Name Provider Type    Alesha Jason, PT Physical Therapist                    Goals   PT OP Goals       Row Name 24 1344 24 1300       PT Short Term Goals    STG Date to Achieve -- 10/27/24  -    STG 1 -- Pt to have no hematoma material to wound base to improve healing potential.  -    STG 1 Progress -- Met  -    STG 2 -- Pt to have no significant undermining to allow for faster wound healing.  -    STG 2 Progress -- Ongoing;Progressing  -    STG 3 -- Pt will demonstrate only mild RLE edema to  indicate appropriate edema management.  -    STG 3 Progress -- Ongoing  -       Long Term Goals    LTG Date to Achieve -- 12/11/24  -    LTG 1 -- Pt to have no RLE skin breakdown to allow for transition to compression stockings for long term edema management  -    LTG 1 Progress -- Ongoing  -    LTG 2 -- Decrease RLE wound size by 75% as evidence of wound healing.  -    LTG 2 Progress -- Ongoing;Progressing  -    LTG 3 -- Pt will demonstrate only trace edema to the RLE to allow for transition to long term compression system.  -    LTG 3 Progress -- Ongoing  -       Time Calculation    PT Goal Re-Cert Due Date 12/11/24  - 12/11/24  -              User Key  (r) = Recorded By, (t) = Taken By, (c) = Cosigned By      Initials Name Provider Type    Alesha Jason, PT Physical Therapist                    PT Goal Re-Cert Due Date: 12/11/24  PT Short Term Goals  STG Date to Achieve: 10/27/24  STG 1: Pt to have no hematoma material to wound base to improve healing potential.  STG 1 Progress: Met  STG 2: Pt to have no significant undermining to allow for faster wound healing.  STG 2 Progress: Ongoing, Progressing  STG 3: Pt will demonstrate only mild RLE edema to indicate appropriate edema management.  STG 3 Progress: Ongoing  Long Term Goals  LTG Date to Achieve: 12/11/24  LTG 1: Pt to have no RLE skin breakdown to allow for transition to compression stockings for long term edema management  LTG 1 Progress: Ongoing  LTG 2: Decrease RLE wound size by 75% as evidence of wound healing.  LTG 2 Progress: Ongoing, Progressing  LTG 3: Pt will demonstrate only trace edema to the RLE to allow for transition to long term compression system.  LTG 3 Progress: Ongoing      Time Calculation: Start Time: 1300  Untimed Charges  15137-Erhb Boot: 20  12743-Lepccbnkq debridement: 10  Total Minutes  Untimed Charges Total Minutes: 30   Total Minutes: 30  Therapy Charges for Today       Code Description Service Date  Service Provider Modifiers Qty    67151144909 HC EDWIN DEBRIDE OPEN WOUND UP TO 20CM 11/12/2024 Alesha Nunez, PT GP, KX 1    [id number] HC PT STAPPING UNNA BOOT 11/12/2024 Alesha Nunez, PT GP, KX 1                    Alesha Nunez, PT  11/12/2024

## 2024-11-13 LAB
ALBUMIN SERPL-MCNC: 3.7 G/DL (ref 3.5–5.2)
ANION GAP SERPL CALCULATED.3IONS-SCNC: 16.4 MMOL/L (ref 5–15)
BACTERIA UR QL AUTO: NORMAL /HPF
BILIRUB UR QL STRIP: NEGATIVE
BUN SERPL-MCNC: 21 MG/DL (ref 8–23)
BUN/CREAT SERPL: 8.7 (ref 7–25)
CALCIUM SPEC-SCNC: 9.4 MG/DL (ref 8.6–10.5)
CHLORIDE SERPL-SCNC: 102 MMOL/L (ref 98–107)
CLARITY UR: CLEAR
CO2 SERPL-SCNC: 19.6 MMOL/L (ref 22–29)
COLOR UR: YELLOW
CREAT SERPL-MCNC: 2.41 MG/DL (ref 0.76–1.27)
CREAT UR-MCNC: 120.7 MG/DL
EGFRCR SERPLBLD CKD-EPI 2021: 25.8 ML/MIN/1.73
GLUCOSE SERPL-MCNC: 122 MG/DL (ref 65–99)
GLUCOSE UR STRIP-MCNC: NEGATIVE MG/DL
HGB UR QL STRIP.AUTO: NEGATIVE
HYALINE CASTS UR QL AUTO: NORMAL /LPF
KETONES UR QL STRIP: NEGATIVE
LEUKOCYTE ESTERASE UR QL STRIP.AUTO: NEGATIVE
NITRITE UR QL STRIP: NEGATIVE
PH UR STRIP.AUTO: 7 [PH] (ref 5–8)
PHOSPHATE SERPL-MCNC: 2.4 MG/DL (ref 2.5–4.5)
POTASSIUM SERPL-SCNC: 4.2 MMOL/L (ref 3.5–5.2)
PROT ?TM UR-MCNC: 20.4 MG/DL
PROT UR QL STRIP: ABNORMAL
RBC # UR STRIP: NORMAL /HPF
REF LAB TEST METHOD: NORMAL
SODIUM SERPL-SCNC: 138 MMOL/L (ref 136–145)
SP GR UR STRIP: 1.01 (ref 1–1.03)
SQUAMOUS #/AREA URNS HPF: NORMAL /HPF
UROBILINOGEN UR QL STRIP: ABNORMAL
WBC # UR STRIP: NORMAL /HPF

## 2024-11-15 ENCOUNTER — HOSPITAL ENCOUNTER (OUTPATIENT)
Dept: PHYSICAL THERAPY | Facility: HOSPITAL | Age: 84
Setting detail: THERAPIES SERIES
Discharge: HOME | End: 2024-11-15
Payer: MEDICARE

## 2024-11-15 DIAGNOSIS — R60.0 EDEMA OF RIGHT LOWER LEG: ICD-10-CM

## 2024-11-15 DIAGNOSIS — Z87.2 HISTORY OF CELLULITIS: ICD-10-CM

## 2024-11-15 DIAGNOSIS — S80.11XA HEMATOMA OF RIGHT LOWER LEG: ICD-10-CM

## 2024-11-15 DIAGNOSIS — I87.2 VENOUS STASIS DERMATITIS OF RIGHT LOWER EXTREMITY: ICD-10-CM

## 2024-11-15 DIAGNOSIS — S81.801D OPEN WOUND OF RIGHT LOWER LEG, SUBSEQUENT ENCOUNTER: Primary | ICD-10-CM

## 2024-11-15 PROCEDURE — 29580 STRAPPING UNNA BOOT: CPT

## 2024-11-15 PROCEDURE — 97597 DBRDMT OPN WND 1ST 20 CM/<: CPT

## 2024-11-15 NOTE — THERAPY WOUND CARE TREATMENT
Outpatient Rehabilitation - Wound/Debridement Treatment Note   [place]     Patient Name: Toño Alcala  : 1940  MRN: [id number]  Today's Date: 11/15/2024                 Admit Date: 11/15/2024    Visit Dx:    ICD-10-CM ICD-9-CM   1. Open wound of right lower leg, subsequent encounter  S81.801D V58.89     891.0   2. Edema of right lower leg  R60.0 782.3   3. History of cellulitis  Z87.2 V13.3   4. Hematoma of right lower leg  S80.11XA 924.10   5. Venous stasis dermatitis of right lower extremity  I87.2 454.1       Patient Active Problem List   Diagnosis    CAD (coronary artery disease)    CVD (cardiovascular disease)    DVT (deep venous thrombosis)    Dyslipidemia    Arthritis    GERD (gastroesophageal reflux disease)    Mixed hyperlipidemia    Diabetic nephropathy associated with type 2 diabetes mellitus    Diabetic polyneuropathy associated with type 2 diabetes mellitus    Chronic kidney disease, stage IV (severe)    Vitamin D deficiency    Disc disorder of cervical region    Postthrombotic syndrome    Vertigo    Angina pectoris    Lumbosacral spondylosis without myelopathy    Psoriasis    Arthritis of elbow    Swelling of right lower extremity    Acute right-sided low back pain without sciatica    Hoarseness    Unifocal PVCs    Skin lesion of face    Essential hypertension    Medicare annual wellness visit, subsequent    Stage 3 chronic kidney disease due to benign hypertension    BMI 30.0-30.9,adult    Postherpetic neuralgia    Herpes zoster without complication    Leg edema, right    Chest pain    Encounter for removal of sutures    Chronic pain of left knee    Fall        Past Medical History:   Diagnosis Date    Acute diverticulitis 2020    Allergic 60 yrs ago    Arthritis     Arthritis of neck Fusion 1992    Bilateral radial fractures     fracture bilateral radial heads    CAD (coronary artery disease)     Calculus of gallbladder without cholecystitis without obstruction 2020     Cataract     Cervical disc disorder Fusion 1992    Cholelithiasis     Chronic kidney disease 2020    Clotting disorder     CVD (cardiovascular disease)     History of TIA 1989    Diabetes mellitus     DVT (deep venous thrombosis)     Right lower extremity DVT and pulmonary embolism in 06/2015, idiopathic    DVT of proximal lower limb 06/22/2015    proximal DVT right leg, small PE- anticoagulation    Dyslipidemia     Erectile dysfunction     Fracture 1952    H/o pf left forearm fracture    Fracture of right hand 1980    Fracture of wrist 1980    GERD (gastroesophageal reflux disease)     with hiatal hernia    HL (hearing loss)     Hx of angina pectoris     Hypertension     Normal renal angiography 02/16/11    Knee swelling Several years ago    Left wrist fracture 1953    Lumbosacral disc disease 1996    Osgood-Schlatter's disease 1955    Osteoarthritis     Right wrist fracture     Vertigo     Wears glasses         Past Surgical History:   Procedure Laterality Date    APPENDECTOMY  1957    BACK SURGERY      CARDIAC CATHETERIZATION  2011    with vein graft    CATARACT EXTRACTION Left     CERVICAL FUSION      CERVICAL FUSION  1992    C3-4    COLONOSCOPY  2013    CORONARY ARTERY BYPASS GRAFT  1994    HERNIA REPAIR Right 2011    inguinal    INGUINAL HERNIA REPAIR Left 10/29/2019    Procedure: INGUINAL HERNIA REPAIR LEFT;  Surgeon: Charisma Raines MD;  Location: [facility];  Service: General    LUMBAR LAMINECTOMY  1996    laminectomy, lumbar, L3    NECK SURGERY  1992    OTHER SURGICAL HISTORY      wrist surgery    SPINE SURGERY  1996    TONSILLECTOMY AND ADENOIDECTOMY  1945    TRIGGER POINT INJECTION  2001 - 2007    VASECTOMY  1972         EVALUATION   PT Ortho       Row Name 11/15/24 1300       Subjective    Subjective Comments Pt reports pain is now closer to heel and higher up above the ankle.  -JM       Subjective Pain    Able to rate subjective pain? yes  -JM    Pre-Treatment Pain Level 3  -JM    Post-Treatment  Pain Level 3  -JM       Transfers    Sit-Stand [place] (Transfers) independent  -JM    Stand-Sit [place] (Transfers) independent  -JM    Comment, (Transfers) seated for tx  -JM       Gait/Stairs (Locomotion)    [place] Level (Gait) modified independence  -JM    Assistive Device (Gait) cane, straight  -JM              User Key  (r) = Recorded By, (t) = Taken By, (c) = Cosigned By      Initials Name Provider Type    Alesha Jason PT Physical Therapist                     [facility] Wound       Row Name 11/15/24 1300             Wound 10/04/24 1430 Right lateral ankle    Wound - Properties Group Placement Date: 10/04/24  - Placement Time: 1430  -JM Side: Right  -JM Orientation: lateral  - Location: ankle  -JM    Dressing Appearance intact;moist drainage  -JM      Base moist;pink;white;yellow  fragile moist hypopigmented areas, indistinct, 2 small moist red/purple areas inferiorly near heel, also small purpura proximally where pt reports pain  -JM      Periwound pink;moist;blistered  fragile hypopigmented skin, a couple small blisters at edge of ag foam dressing  -JM      Periwound Temperature warm  -JM      Periwound Skin Turgor soft  -      Edges irregular  -JM      Drainage Characteristics/Odor serous  -JM      Drainage Amount small  -JM      Care, Wound cleansed with;wound cleanser;debrided;yaa boot  -JM      Dressing Care dressing applied  unna boot cast padding, coban  -JM      Periwound Care barrier ointment applied;cleansed with pH balanced cleanser;dry periwound area maintained  zguard  -      Retired Wound - Properties Group Placement Date: 10/04/24  - Placement Time: 1430  -JM Side: Right  - Orientation: lateral  - Location: ankle  -JM    Retired Wound - Properties Group Placement Date: 10/04/24  - Placement Time: 1430  -JM Side: Right  - Orientation: lateral  - Location: ankle  -JM    Retired Wound - Properties Group Date first assessed: 10/04/24  - Time first assessed:  1430  - Side: Right  - Location: ankle  -JM       Wound 09/12/24 1300 Right lateral leg Traumatic    Wound - Properties Group Placement Date: 09/12/24  - Placement Time: 1300  - Side: Right  - Orientation: lateral  - Location: leg  -MF Primary Wound Type: Traumatic  -MF    Dressing Appearance intact;moist drainage  -      Base moist;granulating;red;yellow;slough;other (see comments)  increasing granulation, new epithelial growth posterior edge  -      Periwound intact;dry;redness;swelling  -      Periwound Temperature warm  -      Periwound Skin Turgor firm  -      Edges irregular;rolled/closed  min rolling inf/med  -      Drainage Characteristics/Odor serosanguineous;yellow  -      Drainage Amount small  -      Care, Wound cleansed with;wound cleanser;debrided;yaa boot  -      Dressing Care dressing applied;collagen;antimicrobial agent applied;foam  jimmy, HFBt, UB, cast padding  -      Periwound Care barrier ointment applied;cleansed with pH balanced cleanser;dry periwound area maintained  zguard  -      Retired Wound - Properties Group Placement Date: 09/12/24  - Placement Time: 1300  - Side: Right  - Orientation: lateral  - Location: leg  -MF Primary Wound Type: Traumatic  -MF    Retired Wound - Properties Group Placement Date: 09/12/24  - Placement Time: 1300  - Side: Right  - Orientation: lateral  - Location: leg  -MF Primary Wound Type: Traumatic  -MF    Retired Wound - Properties Group Date first assessed: 09/12/24  - Time first assessed: 1300  - Side: Right  - Location: leg  -MF Primary Wound Type: Traumatic  -MF              User Key  (r) = Recorded By, (t) = Taken By, (c) = Cosigned By      Initials Name Provider Type    Jaya Doshi, PT Physical Therapist    Alesha Jason, PT Physical Therapist                   Lymphedema       Row Name 11/15/24 1300             Lymphedema Edema Assessment    Ptting Edema Category By severity  -  "     Pitting Edema Moderate;Mild  mod prox and distal to UB  -         Skin Changes/Observations    Location/Assessment Lower Extremity  -      Lower Extremity Conditions right:;shiny;crust;fragile  -      Lower Extremity Color/Pigment right:;red;blanchable;hypopigmented;hyperpigmented  -         Compression/Skin Care    Compression/Skin Care skin care;wrapping location;bandaging  -      Skin Care washed/dried;lotion applied  -      Wrapping Location lower extremity  -      Wrapping Location LE right:;foot to knee  -      Wrapping Comments wound dressings, unna boot in clamshell, cast padding, 4\" coban, size 5 spandage  -                User Key  (r) = Recorded By, (t) = Taken By, (c) = Cosigned By      Initials Name Provider Type    Alesha Jason, PT Physical Therapist                    WOUND DEBRIDEMENT  Total area of Debridement: 2cmsq  Debridement Site 1  Location- Site 1: R Lateral LE  Selective Debridement- Site 1: Wound Surface <20cmsq  Instruments- Site 1: tweezers  Excised Tissue Description- Site 1: minimum, slough, other (comment) (roughened rolling edges)  Bleeding- Site 1: scant              Therapy Education       Row Name 11/15/24 1300             Therapy Education    Education Details Continue leg elevation, ongoing POC.  -      Given Bandaging/dressing change;Pain management;Edema management  -      Program Reinforced  -      How Provided Verbal;Demonstration  -      Provided to Patient  -      Level of Understanding Teach back education performed;Verbalized  -                User Key  (r) = Recorded By, (t) = Taken By, (c) = Cosigned By      Initials Name Provider Type    Alesha Jason, PT Physical Therapist                    Recommendation and Plan   PT Assessment/Plan       Row Name 11/15/24 1300          PT Assessment    Functional Limitations Performance in self-care ADL;Other (comment)  -KACIE     Impairments Integumentary integrity;Impaired venous " circulation;Edema  -     Assessment Comments Lat calf wound continuing to granulate and reepithelialize.  Lat ankle still with scattered areas of drainage and partial thickness skin breakdown.  Pt with a few new purpura noted where he c/o pain.  PT trialed d/c of mepilex ag due to small blister formation at edges, used zguard to fragile skin and unna boot directly on area with extra cast padding around ankle and heel under coban to help reduce rubbing/friction.  Will assess reponse next tx.  -        PT Plan    PT Frequency 2x/week  -     Physical Therapy Interventions (Optional Details) patient/family education;wound care  -     PT Plan Comments debridement, UB/compression  -               User Key  (r) = Recorded By, (t) = Taken By, (c) = Cosigned By      Initials Name Provider Type    Alesha Jason, PT Physical Therapist                    Goals   PT OP Goals       Row Name 11/15/24 1616          Time Calculation    PT Goal Re-Cert Due Date 12/11/24  -               User Key  (r) = Recorded By, (t) = Taken By, (c) = Cosigned By      Initials Name Provider Type    Alesha Jason, PT Physical Therapist                    PT Goal Re-Cert Due Date: 12/11/24            Time Calculation: Start Time: 1300  Untimed Charges  98418-Nqtp Boot: 20  70533-[place] debridement: 10  Total Minutes  Untimed Charges Total Minutes: 30   Total Minutes: 30  Therapy Charges for Today       Code Description Service Date Service Provider Modifiers Qty    37294614998 HC EDWIN DEBRIDE OPEN WOUND UP TO 20CM 11/15/2024 Alesha Nunez, PT GP, KX 1    [id number] HC PT STAPPING UNNA BOOT 11/15/2024 Alesha Nunez, PT GP, KX 1                    Alesha Nunez, PT  11/15/2024

## 2024-11-19 ENCOUNTER — HOSPITAL ENCOUNTER (OUTPATIENT)
Dept: PHYSICAL THERAPY | Facility: HOSPITAL | Age: 84
Setting detail: THERAPIES SERIES
Discharge: HOME | End: 2024-11-19
Payer: MEDICARE

## 2024-11-19 DIAGNOSIS — R60.0 EDEMA OF RIGHT LOWER LEG: ICD-10-CM

## 2024-11-19 DIAGNOSIS — S81.801D OPEN WOUND OF RIGHT LOWER LEG, SUBSEQUENT ENCOUNTER: Primary | ICD-10-CM

## 2024-11-19 DIAGNOSIS — S80.11XA HEMATOMA OF RIGHT LOWER LEG: ICD-10-CM

## 2024-11-19 DIAGNOSIS — I87.2 VENOUS STASIS DERMATITIS OF RIGHT LOWER EXTREMITY: ICD-10-CM

## 2024-11-19 DIAGNOSIS — Z87.2 HISTORY OF CELLULITIS: ICD-10-CM

## 2024-11-19 PROCEDURE — 97597 DBRDMT OPN WND 1ST 20 CM/<: CPT

## 2024-11-19 PROCEDURE — 29580 STRAPPING UNNA BOOT: CPT

## 2024-11-19 NOTE — THERAPY WOUND CARE TREATMENT
Outpatient Rehabilitation - Wound/Debridement Treatment Note   [place]     Patient Name: Toño Alcala  : 1940  MRN: [id number]  Today's Date: 2024                 Admit Date: 2024    Visit Dx:    ICD-10-CM ICD-9-CM   1. Open wound of right lower leg, subsequent encounter  S81.801D V58.89     891.0   2. Edema of right lower leg  R60.0 782.3   3. History of cellulitis  Z87.2 V13.3   4. Hematoma of right lower leg  S80.11XA 924.10   5. Venous stasis dermatitis of right lower extremity  I87.2 454.1   R lat ankle:    R calf:      Patient Active Problem List   Diagnosis    CAD (coronary artery disease)    CVD (cardiovascular disease)    DVT (deep venous thrombosis)    Dyslipidemia    Arthritis    GERD (gastroesophageal reflux disease)    Mixed hyperlipidemia    Diabetic nephropathy associated with type 2 diabetes mellitus    Diabetic polyneuropathy associated with type 2 diabetes mellitus    Chronic kidney disease, stage IV (severe)    Vitamin D deficiency    Disc disorder of cervical region    Postthrombotic syndrome    Vertigo    Angina pectoris    Lumbosacral spondylosis without myelopathy    Psoriasis    Arthritis of elbow    Swelling of right lower extremity    Acute right-sided low back pain without sciatica    Hoarseness    Unifocal PVCs    Skin lesion of face    Essential hypertension    Medicare annual wellness visit, subsequent    Stage 3 chronic kidney disease due to benign hypertension    BMI 30.0-30.9,adult    Postherpetic neuralgia    Herpes zoster without complication    Leg edema, right    Chest pain    Encounter for removal of sutures    Chronic pain of left knee    Fall        Past Medical History:   Diagnosis Date    Acute diverticulitis 2020    Allergic 60 yrs ago    Arthritis     Arthritis of neck Fusion 1992    Bilateral radial fractures     fracture bilateral radial heads    CAD (coronary artery disease)     Calculus of gallbladder without cholecystitis without  obstruction 01/29/2020    Cataract     Cervical disc disorder Fusion 1992    Cholelithiasis     Chronic kidney disease 2020    Clotting disorder     CVD (cardiovascular disease)     History of TIA 1989    Diabetes mellitus     DVT (deep venous thrombosis)     Right lower extremity DVT and pulmonary embolism in 06/2015, idiopathic    DVT of proximal lower limb 06/22/2015    proximal DVT right leg, small PE- anticoagulation    Dyslipidemia     Erectile dysfunction     Fracture 1952    H/o pf left forearm fracture    Fracture of right hand 1980    Fracture of wrist 1980    GERD (gastroesophageal reflux disease)     with hiatal hernia    HL (hearing loss)     Hx of angina pectoris     Hypertension     Normal renal angiography 02/16/11    Knee swelling Several years ago    Left wrist fracture 1953    Lumbosacral disc disease 1996    Osgood-Schlatter's disease 1955    Osteoarthritis     Right wrist fracture     Vertigo     Wears glasses         Past Surgical History:   Procedure Laterality Date    APPENDECTOMY  1957    BACK SURGERY      CARDIAC CATHETERIZATION  2011    with vein graft    CATARACT EXTRACTION Left     CERVICAL FUSION      CERVICAL FUSION  1992    C3-4    COLONOSCOPY  2013    CORONARY ARTERY BYPASS GRAFT  1994    HERNIA REPAIR Right 2011    inguinal    INGUINAL HERNIA REPAIR Left 10/29/2019    Procedure: INGUINAL HERNIA REPAIR LEFT;  Surgeon: Charisma Raines MD;  Location: [facility];  Service: General    LUMBAR LAMINECTOMY  1996    laminectomy, lumbar, L3    NECK SURGERY  1992    OTHER SURGICAL HISTORY      wrist surgery    SPINE SURGERY  1996    TONSILLECTOMY AND ADENOIDECTOMY  1945    TRIGGER POINT INJECTION  2001 - 2007    VASECTOMY  1972         EVALUATION   PT Ortho       Row Name 11/19/24 1518       Subjective    Subjective Comments Pt states his leg hurt for two days and kept him up at night.  States he just saw his nephrologist and was told he is retaining fluid and was prescribed diuretics.   -       Subjective Pain    Able to rate subjective pain? yes  -    Pre-Treatment Pain Level 4  -    Post-Treatment Pain Level 0  -    Subjective Pain Comment bactine spray applied  -       Transfers    Sit-Stand [place] (Transfers) independent  -    Stand-Sit [place] (Transfers) independent  -    Comment, (Transfers) seated for tx  -       Gait/Stairs (Locomotion)    [place] Level (Gait) modified independence  -    Assistive Device (Gait) cane, straight  -              User Key  (r) = Recorded By, (t) = Taken By, (c) = Cosigned By      Initials Name Provider Type    Alesha Jason PT Physical Therapist                     [facility] Wound       Row Name 11/19/24 1515             Wound 10/04/24 1430 Right lateral ankle    Wound - Properties Group Placement Date: 10/04/24  - Placement Time: 1430  - Side: Right  - Orientation: lateral  - Location: ankle  -    Wound Image Images linked: 1  -      Dressing Appearance intact;moist drainage  -      Base moist;pink;white;yellow;slough  3-4 small slough-covered areas  -      Periwound pink;moist  fragile hypopigmented skin  -      Periwound Temperature warm  -      Periwound Skin Turgor soft  -      Edges irregular  -      Wound Length (cm) 4.5 cm  several moist areas  -      Wound Width (cm) 2.5 cm  -      Wound Depth (cm) --  obscured  -      Wound Surface Area (cm^2) 11.25 cm^2  -      Drainage Characteristics/Odor serous  -      Drainage Amount small  -      Care, Wound cleansed with;wound cleanser;debrided;yaa boot  -      Dressing Care dressing applied;silver impregnated;foam  mepilex ag, UB  -      Periwound Care barrier ointment applied;cleansed with pH balanced cleanser;dry periwound area maintained  zguard mixed with bactine spray  -      Retired Wound - Properties Group Placement Date: 10/04/24  - Placement Time: 1430 -JM Side: Right  - Orientation: lateral  - Location: ankle  -     Retired Wound - Properties Group Placement Date: 10/04/24  - Placement Time: 1430  -JM Side: Right  -JM Orientation: lateral  -JM Location: ankle  -JM    Retired Wound - Properties Group Date first assessed: 10/04/24  - Time first assessed: 1430  -JM Side: Right  -JM Location: ankle  -JM       Wound 09/12/24 1300 Right lateral leg Traumatic    Wound - Properties Group Placement Date: 09/12/24  - Placement Time: 1300  -MF Side: Right  -MF Orientation: lateral  -MF Location: leg  -MF Primary Wound Type: Traumatic  -MF    Wound Image Images linked: 1  -JM      Dressing Appearance intact;moist drainage  -      Base moist;granulating;red;yellow;slough;other (see comments)  increasing granulation, new epithelial growth posterior edge, min slough buildup in inferior depth  -      Periwound intact;dry;redness;swelling  -      Periwound Temperature warm  -      Periwound Skin Turgor firm  -      Edges irregular;rolled/closed  min rolling inf/med  -      Wound Length (cm) 2.2 cm  -      Wound Width (cm) 0.6 cm  -      Wound Depth (cm) 0.3 cm  -      Wound Surface Area (cm^2) 1.32 cm^2  -JM      Wound Volume (cm^3) 0.396 cm^3  -JM      Drainage Characteristics/Odor serosanguineous;yellow  -JM      Drainage Amount small  -      Care, Wound cleansed with;wound cleanser;debrided;yaa boot  -      Dressing Care dressing applied;collagen;antimicrobial agent applied;foam  jimmy, HFBt, UB  -      Periwound Care barrier ointment applied;cleansed with pH balanced cleanser;dry periwound area maintained  zguard  -      Retired Wound - Properties Group Placement Date: 09/12/24  - Placement Time: 1300  -MF Side: Right  -MF Orientation: lateral  -MF Location: leg  -MF Primary Wound Type: Traumatic  -MF    Retired Wound - Properties Group Placement Date: 09/12/24  - Placement Time: 1300  -MF Side: Right  -MF Orientation: lateral  -MF Location: leg  -MF Primary Wound Type: Traumatic  -MF    Retired Wound  "- Properties Group Date first assessed: 09/12/24  -MF Time first assessed: 1300  -MF Side: Right  - Location: leg  - Primary Wound Type: Traumatic  -              User Key  (r) = Recorded By, (t) = Taken By, (c) = Cosigned By      Initials Name Provider Type    Jaya Doshi, PT Physical Therapist    Alesha Jason, PT Physical Therapist                   Lymphedema       Row Name 11/19/24 2812             Lymphedema Edema Assessment    Ptting Edema Category By severity  -      Pitting Edema Moderate;Mild  mod prox and distal to UB  -         Skin Changes/Observations    Location/Assessment Lower Extremity  -      Lower Extremity Conditions right:;shiny;crust;fragile  -      Lower Extremity Color/Pigment right:;red;blanchable;hypopigmented;hyperpigmented  -         Compression/Skin Care    Compression/Skin Care skin care;wrapping location;bandaging  -      Skin Care washed/dried;lotion applied  -      Wrapping Location lower extremity  -      Wrapping Location LE right:;foot to knee  -      Wrapping Comments wound dressings, unna boot in clamshell, cast padding, 4\" coban, size 5 spandage  -                User Key  (r) = Recorded By, (t) = Taken By, (c) = Cosigned By      Initials Name Provider Type    Alesha Jason, PT Physical Therapist                    WOUND DEBRIDEMENT  Total area of Debridement: 4cmsq  Debridement Site 1  Location- Site 1: R Lateral LE and ankle  Selective Debridement- Site 1: Wound Surface <20cmsq  Instruments- Site 1: tweezers  Excised Tissue Description- Site 1: moderate, slough  Bleeding- Site 1: scant              Therapy Education       Row Name 11/19/24 1661             Therapy Education    Education Details Recommended increased leg elevation to help reduce edema and drainage.  -KACIE      Given Bandaging/dressing change;Pain management;Edema management  -      Program Reinforced  -KACIE      How Provided Verbal;Demonstration  -KACIE      " Provided to Patient  -      Level of Understanding Teach back education performed;Verbalized  -                User Key  (r) = Recorded By, (t) = Taken By, (c) = Cosigned By      Initials Name Provider Type    lAesha Jason, PT Physical Therapist                    Recommendation and Plan   PT Assessment/Plan       Row Name 11/19/24 1515          PT Assessment    Functional Limitations Performance in self-care ADL;Other (comment)  -     Impairments Integumentary integrity;Impaired venous circulation;Edema  -     Assessment Comments Pt reporting significant increase in lat ankle pain since last tx, so PT resumed use of mepilex ag to lat ankle.  R calf wound continues to improve with decreased dimensions and less drainage.  Pt has been prescirbed a diuretic, which should help with RLE edema and wound healing.  Continue with current POC.  -     Patient would benefit from skilled therapy intervention Yes  -        PT Plan    PT Frequency 2x/week  -     Physical Therapy Interventions (Optional Details) patient/family education;wound care  -     PT Plan Comments debridement, UB  -               User Key  (r) = Recorded By, (t) = Taken By, (c) = Cosigned By      Initials Name Provider Type    Alesha Jason, PT Physical Therapist                    Goals   PT OP Goals       Row Name 11/19/24 1626          Time Calculation    PT Goal Re-Cert Due Date 12/11/24  -               User Key  (r) = Recorded By, (t) = Taken By, (c) = Cosigned By      Initials Name Provider Type    Alesha Jason, PT Physical Therapist                    PT Goal Re-Cert Due Date: 12/11/24            Time Calculation: Start Time: 1515  Untimed Charges  90142-[place] Boot: 20  58137-[place] debridement: 15  Total Minutes  Untimed Charges Total Minutes: 35   Total Minutes: 35  Therapy Charges for Today       Code Description Service Date Service Provider Modifiers Qty    50145751241 HC EDWIN DEBRIDE OPEN WOUND UP  TO 20CM 11/19/2024 Alesha Nunez, PT GP, KX 1    [id number]  PT STAPPING UNNA BOOT 11/19/2024 Alesha Nunez, PT GP, KX 1                    Alesha Nunez, PT  11/19/2024

## 2024-11-22 ENCOUNTER — HOSPITAL ENCOUNTER (OUTPATIENT)
Dept: PHYSICAL THERAPY | Facility: HOSPITAL | Age: 84
Setting detail: THERAPIES SERIES
Discharge: HOME | End: 2024-11-22
Payer: MEDICARE

## 2024-11-22 DIAGNOSIS — I87.2 VENOUS STASIS DERMATITIS OF RIGHT LOWER EXTREMITY: ICD-10-CM

## 2024-11-22 DIAGNOSIS — Z87.2 HISTORY OF CELLULITIS: ICD-10-CM

## 2024-11-22 DIAGNOSIS — R60.0 EDEMA OF RIGHT LOWER LEG: ICD-10-CM

## 2024-11-22 DIAGNOSIS — S80.11XA HEMATOMA OF RIGHT LOWER LEG: ICD-10-CM

## 2024-11-22 DIAGNOSIS — S81.801D OPEN WOUND OF RIGHT LOWER LEG, SUBSEQUENT ENCOUNTER: Primary | ICD-10-CM

## 2024-11-22 PROCEDURE — 97597 DBRDMT OPN WND 1ST 20 CM/<: CPT

## 2024-11-22 PROCEDURE — 29580 STRAPPING UNNA BOOT: CPT

## 2024-11-22 NOTE — THERAPY WOUND CARE TREATMENT
Outpatient Rehabilitation - Wound/Debridement Treatment Note  [facility]     Patient Name: Toño Alcala  : 1940  MRN: [id number]  Today's Date: 2024                 Admit Date: 2024    Visit Dx:    ICD-10-CM ICD-9-CM   1. Open wound of right lower leg, subsequent encounter  S81.801D V58.89     891.0   2. Edema of right lower leg  R60.0 782.3   3. History of cellulitis  Z87.2 V13.3   4. Hematoma of right lower leg  S80.11XA 924.10   5. Venous stasis dermatitis of right lower extremity  I87.2 454.1     R lateral leg      R lateral ankle      R anterior ankle        Patient Active Problem List   Diagnosis    CAD (coronary artery disease)    CVD (cardiovascular disease)    DVT (deep venous thrombosis)    Dyslipidemia    Arthritis    GERD (gastroesophageal reflux disease)    Mixed hyperlipidemia    Diabetic nephropathy associated with type 2 diabetes mellitus    Diabetic polyneuropathy associated with type 2 diabetes mellitus    Chronic kidney disease, stage IV (severe)    Vitamin D deficiency    Disc disorder of cervical region    Postthrombotic syndrome    Vertigo    Angina pectoris    Lumbosacral spondylosis without myelopathy    Psoriasis    Arthritis of elbow    Swelling of right lower extremity    Acute right-sided low back pain without sciatica    Hoarseness    Unifocal PVCs    Skin lesion of face    Essential hypertension    Medicare annual wellness visit, subsequent    Stage 3 chronic kidney disease due to benign hypertension    BMI 30.0-30.9,adult    Postherpetic neuralgia    Herpes zoster without complication    Leg edema, right    Chest pain    Encounter for removal of sutures    Chronic pain of left knee    Fall        Past Medical History:   Diagnosis Date    Acute diverticulitis 2020    Allergic 60 yrs ago    Arthritis     Arthritis of neck Fusion     Bilateral radial fractures     fracture bilateral radial heads    CAD (coronary artery disease)     Calculus of  gallbladder without cholecystitis without obstruction 01/29/2020    Cataract     Cervical disc disorder Fusion 1992    Cholelithiasis     Chronic kidney disease 2020    Clotting disorder     CVD (cardiovascular disease)     History of TIA 1989    Diabetes mellitus     DVT (deep venous thrombosis)     Right lower extremity DVT and pulmonary embolism in 06/2015, idiopathic    DVT of proximal lower limb 06/22/2015    proximal DVT right leg, small PE- anticoagulation    Dyslipidemia     Erectile dysfunction     Fracture 1952    H/o pf left forearm fracture    Fracture of right hand 1980    Fracture of wrist 1980    GERD (gastroesophageal reflux disease)     with hiatal hernia    HL (hearing loss)     Hx of angina pectoris     Hypertension     Normal renal angiography 02/16/11    Knee swelling Several years ago    Left wrist fracture 1953    Lumbosacral disc disease 1996    Osgood-Schlatter's disease 1955    Osteoarthritis     Right wrist fracture     Vertigo     Wears glasses         Past Surgical History:   Procedure Laterality Date    APPENDECTOMY  1957    BACK SURGERY      CARDIAC CATHETERIZATION  2011    with vein graft    CATARACT EXTRACTION Left     CERVICAL FUSION      CERVICAL FUSION  1992    C3-4    COLONOSCOPY  2013    CORONARY ARTERY BYPASS GRAFT  1994    HERNIA REPAIR Right 2011    inguinal    INGUINAL HERNIA REPAIR Left 10/29/2019    Procedure: INGUINAL HERNIA REPAIR LEFT;  Surgeon: Charisma Raines MD;  [facility];  Service: General    LUMBAR LAMINECTOMY  1996    laminectomy, lumbar, L3    NECK SURGERY  1992    OTHER SURGICAL HISTORY      wrist surgery    SPINE SURGERY  1996    TONSILLECTOMY AND ADENOIDECTOMY  1945    TRIGGER POINT INJECTION  2001 - 2007    VASECTOMY  1972         EVALUATION   PT Ortho       Row Name 11/22/24 1400       Subjective    Subjective Comments Pt reports he has not had any issues with his RLE since previous session. Thinks the Bactine spray worked great as his pain  was greatly improved. Reports he has been taking a half a pill of diuretic which has not helped so his doctor told him to begin taking a whole pill daily.  -       Subjective Pain    Able to rate subjective pain? yes  -    Pre-Treatment Pain Level 0  -    Post-Treatment Pain Level 2  -    Subjective Pain Comment bactine spray applied  -LH       Transfers    Sit-Stand [place] (Transfers) independent  -    Stand-Sit [place] (Transfers) independent  -    Comment, (Transfers) seated for tx  -       Gait/Stairs (Locomotion)    [place] Level (Gait) modified independence  -    Assistive Device (Gait) cane, straight  -              User Key  (r) = Recorded By, (t) = Taken By, (c) = Cosigned By      Initials Name Provider Type     Dejan Zabala, PT Physical Therapist                     LDA Wound       Row Name 11/22/24 1400             Wound 10/04/24 1430 Right lateral ankle    Wound - Properties Group Placement Date: 10/04/24  - Placement Time: 1430  - Side: Right  [facility] Orientation: lateral  - Location: ankle  -    Wound Image Images linked: 2  -      Dressing Appearance intact;moist drainage  -      Base moist;pink;white;yellow;slough  3-4 small slough-covered areas.  -LH      Periwound pink;moist  fragile hypopigmented skin. A few NEW small, serous filled bullae ant anterior darius of Mepilex.  -      Periwound Temperature warm  -      Periwound Skin Turgor soft  -      Edges irregular  -      Drainage Characteristics/Odor serous  -      Drainage Amount small  -      Care, Wound cleansed with;wound cleanser;debrided;yaa boot  -      Dressing Care dressing applied;silver impregnated;foam  mepilex ag, UB  -      Periwound Care barrier ointment applied;cleansed with pH balanced cleanser;dry periwound area maintained  zguard  -      Retired Wound - Properties Group Placement Date: 10/04/24  - Placement Time: 1430  - Side: Right  - Orientation: lateral   -JM Location: ankle  -JM    Retired Wound - Properties Group Placement Date: 10/04/24  - Placement Time: 1430  -JM Side: Right  -JM Orientation: lateral  -JM Location: ankle  -JM    Retired Wound - Properties Group Date first assessed: 10/04/24  - Time first assessed: 1430  -JM Side: Right  -JM Location: ankle  -JM       Wound 09/12/24 1300 Right lateral leg Traumatic    Wound - Properties Group Placement Date: 09/12/24  - Placement Time: 1300  -MF Side: Right  -MF Orientation: lateral  -MF Location: leg  -MF Primary Wound Type: Traumatic  -MF    Wound Image Images linked: 1  -LH      Dressing Appearance intact;moist drainage  -LH      Base moist;granulating;red;yellow;slough;other (see comments)  increasing granulation, new epithelial growth posterior edge, min slough buildup in inferior depth  -      Periwound intact;dry;redness;swelling  -      Periwound Temperature warm  -      Periwound Skin Turgor firm  -      Edges irregular;rolled/closed  min rolling inf/med  -LH      Drainage Characteristics/Odor serosanguineous;yellow  -LH      Drainage Amount small  -LH      Care, Wound cleansed with;wound cleanser;debrided;yaa boot  -LH      Dressing Care dressing applied;collagen;antimicrobial agent applied;foam  Temitope Ag, Mepilex Ag, UB  -LH      Periwound Care barrier ointment applied;cleansed with pH balanced cleanser;dry periwound area maintained  zguard  -LH      Retired Wound - Properties Group Placement Date: 09/12/24  - Placement Time: 1300  -MF Side: Right  -MF Orientation: lateral  -MF Location: leg  -MF Primary Wound Type: Traumatic  -MF    Retired Wound - Properties Group Placement Date: 09/12/24  - Placement Time: 1300  -MF Side: Right  -MF Orientation: lateral  -MF Location: leg  -MF Primary Wound Type: Traumatic  -MF    Retired Wound - Properties Group Date first assessed: 09/12/24  - Time first assessed: 1300  -MF Side: Right  -MF Location: leg  -MF Primary Wound Type: Traumatic  -MF  "             User Key  (r) = Recorded By, (t) = Taken By, (c) = Cosigned By      Initials Name Provider Type     Jaya Bower, PT Physical Therapist    Alesha Jason, PT Physical Therapist     Dejan Zabala, PT Physical Therapist                   Lymphedema       Row Name 11/22/24 1400             Lymphedema Edema Assessment    Ptting Edema Category By severity  -      Pitting Edema Moderate;Mild  mod prox and distal to UB  -         Skin Changes/Observations    Location/Assessment Lower Extremity  -      Lower Extremity Conditions right:;shiny;crust;fragile  -      Lower Extremity Color/Pigment right:;red;blanchable;hypopigmented;hyperpigmented  -         Compression/Skin Care    Compression/Skin Care skin care;wrapping location;bandaging  -      Skin Care washed/dried;lotion applied  -      Wrapping Location lower extremity  -      Wrapping Location LE right:;foot to knee  -      Wrapping Comments wound dressings, unna boot in clamshell, cast padding, 4\" coban, size 5 spandage  -                User Key  (r) = Recorded By, (t) = Taken By, (c) = Cosigned By      Initials Name Provider Type     Dejan Zabala, PT Physical Therapist                    WOUND DEBRIDEMENT  Total area of Debridement: 4cm2  Debridement Site 1  Location- Site 1: R Lateral LE and ankle  Selective Debridement- Site 1: Wound Surface <20cmsq  Instruments- Site 1: tweezers  Excised Tissue Description- Site 1: minimum, slough, moderate, other (comment) (periwound crusts)  Bleeding- Site 1: scant              Therapy Education       Row Name 11/22/24 1400             Therapy Education    Education Details Continue current POC, reviewed rationale for trial of mepilex on R lateral leg  -      Given Bandaging/dressing change;Pain management;Edema management  -      Program Reinforced;Modified  -      How Provided Verbal;Demonstration  -      Provided to Patient  -      Level of Understanding " Teach back education performed;Verbalized  -                User Key  (r) = Recorded By, (t) = Taken By, (c) = Cosigned By      Initials Name Provider Type     Dejan Zabala, PT Physical Therapist                    Recommendation and Plan   PT Assessment/Plan       Row Name 11/22/24 1400          PT Assessment    Functional Limitations Performance in self-care ADL;Other (comment)  -     Impairments Integumentary integrity;Impaired venous circulation;Edema  -     Assessment Comments Pt presenting this date with continued improvements in epithelialization of wound edges of R lateral leg wound. PT trialed transition from HFBt to Mepilex Ag to help further promote epithelialization and wound closure. Pt does, however, also present this date with a few NEW small serous filled bullae at the border of the Mepilex ag foam pad. Pt's R lateral ankle continuing with small amount of drainge and scattered shallow open ulcerations. Pt would continue to benefit from current POC to promote wound healing.  -     Rehab Potential Fair  -     Patient/caregiver participated in establishment of treatment plan and goals Yes  -     Patient would benefit from skilled therapy intervention Yes  -        PT Plan    PT Frequency 2x/week  -     Physical Therapy Interventions (Optional Details) patient/family education;wound care  -     PT Plan Comments debridement, UB  -               User Key  (r) = Recorded By, (t) = Taken By, (c) = Cosigned By      Initials Name Provider Type     Dejan Zabala, PT Physical Therapist                    Goals   PT OP Goals       Row Name 11/22/24 1407          Time Calculation    PT Goal Re-Cert Due Date 12/11/24  -               User Key  (r) = Recorded By, (t) = Taken By, (c) = Cosigned By      Initials Name Provider Type     Dejan Zabala, PT Physical Therapist                    PT Goal Re-Cert Due Date: 12/11/24            Time Calculation: Start Time: 1300  Untimed  Charges  68250-Chaz Boot: 15  59247-Wkpxnipgv debridement: 15  Total Minutes  Untimed Charges Total Minutes: 30   Total Minutes: 30  Therapy Charges for Today       Code Description Service Date Service Provider Modifiers Qty    63026772333 HC EDWIN DEBRIDE OPEN WOUND UP TO 20CM 11/22/2024 Dejan Zabala, PT GP, KX 1    22734146504 HC PT STAPPING UNNA BOOT 11/22/2024 Dejan Zabala, PT GP, KX 1                    Dejan Zabala PT  11/22/2024

## 2024-11-26 ENCOUNTER — HOSPITAL ENCOUNTER (OUTPATIENT)
Dept: PHYSICAL THERAPY | Facility: HOSPITAL | Age: 84
Setting detail: THERAPIES SERIES
Discharge: HOME | End: 2024-11-26
Payer: MEDICARE

## 2024-11-26 DIAGNOSIS — S80.11XA HEMATOMA OF RIGHT LOWER LEG: ICD-10-CM

## 2024-11-26 DIAGNOSIS — S81.801D OPEN WOUND OF RIGHT LOWER LEG, SUBSEQUENT ENCOUNTER: Primary | ICD-10-CM

## 2024-11-26 DIAGNOSIS — R60.0 EDEMA OF RIGHT LOWER LEG: ICD-10-CM

## 2024-11-26 DIAGNOSIS — Z87.2 HISTORY OF CELLULITIS: ICD-10-CM

## 2024-11-26 PROCEDURE — 29580 STRAPPING UNNA BOOT: CPT

## 2024-11-26 PROCEDURE — 97597 DBRDMT OPN WND 1ST 20 CM/<: CPT

## 2024-11-26 NOTE — THERAPY WOUND CARE TREATMENT
Outpatient Rehabilitation - Wound/Debridement Treatment Note   [facility]     Patient Name: Toño Alcala  : 1940  MRN: [id number]  Today's Date: 2024                 Admit Date: 2024    Visit Dx:    ICD-10-CM ICD-9-CM   1. Open wound of right lower leg, subsequent encounter  S81.801D V58.89     891.0   2. Edema of right lower leg  R60.0 782.3   3. History of cellulitis  Z87.2 V13.3   4. Hematoma of right lower leg  S80.11XA 924.10       Patient Active Problem List   Diagnosis    CAD (coronary artery disease)    CVD (cardiovascular disease)    DVT (deep venous thrombosis)    Dyslipidemia    Arthritis    GERD (gastroesophageal reflux disease)    Mixed hyperlipidemia    Diabetic nephropathy associated with type 2 diabetes mellitus    Diabetic polyneuropathy associated with type 2 diabetes mellitus    Chronic kidney disease, stage IV (severe)    Vitamin D deficiency    Disc disorder of cervical region    Postthrombotic syndrome    Vertigo    Angina pectoris    Lumbosacral spondylosis without myelopathy    Psoriasis    Arthritis of elbow    Swelling of right lower extremity    Acute right-sided low back pain without sciatica    Hoarseness    Unifocal PVCs    Skin lesion of face    Essential hypertension    Medicare annual wellness visit, subsequent    Stage 3 chronic kidney disease due to benign hypertension    BMI 30.0-30.9,adult    Postherpetic neuralgia    Herpes zoster without complication    Leg edema, right    Chest pain    Encounter for removal of sutures    Chronic pain of left knee    Fall        Past Medical History:   Diagnosis Date    Acute diverticulitis 2020    Allergic 60 yrs ago    Arthritis     Arthritis of neck Fusion     Bilateral radial fractures 1962    fracture bilateral radial heads    CAD (coronary artery disease)     Calculus of gallbladder without cholecystitis without obstruction 2020    Cataract     Cervical disc disorder Fusion     Cholelithiasis      Chronic kidney disease 2020    Clotting disorder     CVD (cardiovascular disease)     History of TIA 1989    Diabetes mellitus     DVT (deep venous thrombosis)     Right lower extremity DVT and pulmonary embolism in 06/2015, idiopathic    DVT of proximal lower limb 06/22/2015    proximal DVT right leg, small PE- anticoagulation    Dyslipidemia     Erectile dysfunction     Fracture 1952    H/o pf left forearm fracture    Fracture of right hand 1980    Fracture of wrist 1980    GERD (gastroesophageal reflux disease)     with hiatal hernia    HL (hearing loss)     Hx of angina pectoris     Hypertension     Normal renal angiography 02/16/11    Knee swelling Several years ago    Left wrist fracture 1953    Lumbosacral disc disease 1996    Osgood-Schlatter's disease 1955    Osteoarthritis     Right wrist fracture     Vertigo     Wears glasses         Past Surgical History:   Procedure Laterality Date    APPENDECTOMY  1957    BACK SURGERY      CARDIAC CATHETERIZATION  2011    with vein graft    CATARACT EXTRACTION Left     CERVICAL FUSION      CERVICAL FUSION  1992    C3-4    COLONOSCOPY  2013    CORONARY ARTERY BYPASS GRAFT  1994    HERNIA REPAIR Right 2011    inguinal    INGUINAL HERNIA REPAIR Left 10/29/2019    Procedure: INGUINAL HERNIA REPAIR LEFT;  Surgeon: Charimsa Raines MD;  Location: [facility];  Service: General    LUMBAR LAMINECTOMY  1996    laminectomy, lumbar, L3    NECK SURGERY  1992    OTHER SURGICAL HISTORY      wrist surgery    SPINE SURGERY  1996    TONSILLECTOMY AND ADENOIDECTOMY  1945    TRIGGER POINT INJECTION  2001 - 2007    VASECTOMY  1972         EVALUATION   PT Ortho       Row Name 11/26/24 1600       Subjective    Subjective Comments No new complaints or changes. Forgot bactine today  -MC       Subjective Pain    Able to rate subjective pain? yes  -MC    Pre-Treatment Pain Level 0  -MC    Post-Treatment Pain Level 2  -MC       Transfers    Sit-Stand [place] (Transfers) independent   -    Stand-Sit [place] (Transfers) independent  -    Comment, (Transfers) seated for tx  -       Gait/Stairs (Locomotion)    [place] Level (Gait) modified independence  -    Assistive Device (Gait) cane, straight  -              User Key  (r) = Recorded By, (t) = Taken By, (c) = Cosigned By      Initials Name Provider Type     Marlene Levy PT Physical Therapist                     LDA Wound       Row Name 11/26/24 1600             Wound 10/04/24 1430 Right lateral ankle    Wound - Properties Group Placement Date: 10/04/24  - Placement Time: 1430  -JM Side: Right  - Orientation: lateral  - Location: ankle  -    Dressing Appearance intact;dried drainage  -      Base moist;pink;white;yellow;slough  1-2 small pink/yellow areas that appear mostly epithelialized  -      Periwound pink;moist;other (see comments)  fragile hypopigmented skin. continued blistering at anterior edge of mepilex  -      Periwound Temperature warm  -      Periwound Skin Turgor soft  -      Edges irregular  -      Drainage Characteristics/Odor serous  -      Drainage Amount small  -      Care, Wound cleansed with;wound cleanser;debrided;yaa boot  -      Dressing Care dressing applied  UB only  -      Periwound Care cleansed with pH balanced cleanser;barrier ointment applied  zguard  -      Retired Wound - Properties Group Placement Date: 10/04/24  - Placement Time: 1430  -JM Side: Right  - Orientation: lateral  - Location: ankle  -    Retired Wound - Properties Group Placement Date: 10/04/24  - Placement Time: 1430  -JM Side: Right  - Orientation: lateral  - Location: ankle  -JM    Retired Wound - Properties Group Date first assessed: 10/04/24  - Time first assessed: 1430  -JM Side: Right  -JM Location: ankle  -JM       Wound 09/12/24 1300 Right lateral leg Traumatic    Wound - Properties Group Placement Date: 09/12/24  - Placement Time: 1300  -MF Side: Right  -  Orientation: lateral  - Location: leg  -MF Primary Wound Type: Traumatic  -MF    Dressing Appearance intact;moist drainage  -      Base moist;granulating;red  very small, narrow area remaining, moist/red after debridement  -      Periwound intact;dry;redness;swelling  -      Periwound Temperature warm  -      Periwound Skin Turgor firm  -      Edges irregular  -      Drainage Characteristics/Odor serosanguineous;yellow  -      Drainage Amount small  -      Care, Wound cleansed with;wound cleanser;debrided;yaa boot  -      Dressing Care dressing applied;silver impregnated;collagen;low-adherent;foam  jimmy, mepilex Ag, UB  -      Periwound Care cleansed with pH balanced cleanser;barrier ointment applied  zguard  -      Retired Wound - Properties Group Placement Date: 09/12/24  - Placement Time: 1300  -MF Side: Right  -MF Orientation: lateral  - Location: leg  -MF Primary Wound Type: Traumatic  -MF    Retired Wound - Properties Group Placement Date: 09/12/24  - Placement Time: 1300  - Side: Right  - Orientation: lateral  - Location: leg  -MF Primary Wound Type: Traumatic  -MF    Retired Wound - Properties Group Date first assessed: 09/12/24  - Time first assessed: 1300  - Side: Right  - Location: leg  -MF Primary Wound Type: Traumatic  -MF              User Key  (r) = Recorded By, (t) = Taken By, (c) = Cosigned By      Initials Name Provider Type    Jaya Doshi, PT Physical Therapist    Marlene Bethea, PT Physical Therapist    Alesha Jason, PT Physical Therapist                   Lymphedema       Row Name 11/26/24 1600             Lymphedema Edema Assessment    Ptting Edema Category By severity  -      Pitting Edema Moderate;Mild  mod prox and distal to UB  -         Skin Changes/Observations    Location/Assessment Lower Extremity  -      Lower Extremity Conditions right:;shiny;crust;fragile  -      Lower Extremity Color/Pigment  "right:;red;blanchable;hypopigmented;hyperpigmented  -         Compression/Skin Care    Compression/Skin Care skin care;wrapping location;bandaging  -      Skin Care washed/dried;lotion applied  -      Wrapping Location lower extremity  -      Wrapping Location LE right:;foot to knee  -      Wrapping Comments wound dressings, unna boot in clamshell, cast padding, 4\" coban, size 5 spandage  -                User Key  (r) = Recorded By, (t) = Taken By, (c) = Cosigned By      Initials Name Provider Type    Marlene Bethea, PT Physical Therapist                    WOUND DEBRIDEMENT  Total area of Debridement: 3 cm2  Debridement Site 1  Location- Site 1: R Lateral LE and ankle  Selective Debridement- Site 1: Wound Surface <20cmsq  Instruments- Site 1: tweezers  Excised Tissue Description- Site 1: minimum, slough, other (comment) (crust)  Bleeding- Site 1: none              Therapy Education       Row Name 11/26/24 1600             Therapy Education    Education Details Explained hold of mepilex Ag over lateral ankle on trial basis due to blistering at edge  -MC      Given Bandaging/dressing change;Pain management;Edema management  -      Program Reinforced;Modified  -      How Provided Verbal;Demonstration  -MC      Provided to Patient  -MC      Level of Understanding Teach back education performed;Verbalized  -                User Key  (r) = Recorded By, (t) = Taken By, (c) = Cosigned By      Initials Name Provider Type    Marlene Bethea, PT Physical Therapist                    Recommendation and Plan   PT Assessment/Plan       Row Name 11/26/24 1600          PT Assessment    Functional Limitations Performance in self-care ADL;Other (comment)  -     Impairments Integumentary integrity;Impaired venous circulation;Edema  -     Assessment Comments The proximal/lateral wound area is improving steadily, with a very narrow, clean open area remaining. The lateral ankle appears to be mostly " closed. Pt with deep grooves around where the foam was previously placed, with some blistering present. Due to improvement in skin integrity, PT tried holding mepilex Ag to the lateral ankle today. Pt will continue to benefit from skilled PT wound care to promote healing.  -     Rehab Potential Fair  -     Patient/caregiver participated in establishment of treatment plan and goals Yes  -     Patient would benefit from skilled therapy intervention Yes  -        PT Plan    PT Frequency 2x/week  -     Physical Therapy Interventions (Optional Details) wound care;patient/family education  -     PT Plan Comments debridement, UB  -               User Key  (r) = Recorded By, (t) = Taken By, (c) = Cosigned By      Initials Name Provider Type    Marlene Bethea, PT Physical Therapist                    Goals   PT OP Goals       Row Name 11/26/24 1613          Time Calculation    PT Goal Re-Cert Due Date 12/11/24  -               User Key  (r) = Recorded By, (t) = Taken By, (c) = Cosigned By      Initials Name Provider Type     Marlene Levy, PT Physical Therapist                    PT Goal Re-Cert Due Date: 12/11/24            Time Calculation: Start Time: 1310  Untimed Charges  06641-[place] Boot: 15  67733-Errjqrxmm debridement: 15  Total Minutes  Untimed Charges Total Minutes: 30   Total Minutes: 30              Marlene Levy PT  11/26/2024

## 2024-11-29 ENCOUNTER — HOSPITAL ENCOUNTER (OUTPATIENT)
Dept: PHYSICAL THERAPY | Facility: HOSPITAL | Age: 84
Setting detail: THERAPIES SERIES
Discharge: HOME | End: 2024-11-29
Payer: MEDICARE

## 2024-11-29 DIAGNOSIS — R60.0 EDEMA OF RIGHT LOWER LEG: ICD-10-CM

## 2024-11-29 DIAGNOSIS — S80.11XA HEMATOMA OF RIGHT LOWER LEG: ICD-10-CM

## 2024-11-29 DIAGNOSIS — S81.801D OPEN WOUND OF RIGHT LOWER LEG, SUBSEQUENT ENCOUNTER: Primary | ICD-10-CM

## 2024-11-29 DIAGNOSIS — Z87.2 HISTORY OF CELLULITIS: ICD-10-CM

## 2024-11-29 DIAGNOSIS — I87.2 VENOUS STASIS DERMATITIS OF RIGHT LOWER EXTREMITY: ICD-10-CM

## 2024-11-29 PROCEDURE — 97597 DBRDMT OPN WND 1ST 20 CM/<: CPT

## 2024-11-29 PROCEDURE — 29580 STRAPPING UNNA BOOT: CPT

## 2024-11-29 NOTE — THERAPY WOUND CARE TREATMENT
Outpatient Rehabilitation - Wound/Debridement Treatment Note   [facility]     Patient Name: Toño Alcala  : 1940  MRN: [id number]  Today's Date: 2024                 Admit Date: 2024    Visit Dx:    ICD-10-CM ICD-9-CM   1. Open wound of right lower leg, subsequent encounter  S81.801D V58.89     891.0   2. Edema of right lower leg  R60.0 782.3   3. History of cellulitis  Z87.2 V13.3   4. Hematoma of right lower leg  S80.11XA 924.10   5. Venous stasis dermatitis of right lower extremity  I87.2 454.1   Lat R calf:    Lat R ankle:      Patient Active Problem List   Diagnosis    CAD (coronary artery disease)    CVD (cardiovascular disease)    DVT (deep venous thrombosis)    Dyslipidemia    Arthritis    GERD (gastroesophageal reflux disease)    Mixed hyperlipidemia    Diabetic nephropathy associated with type 2 diabetes mellitus    Diabetic polyneuropathy associated with type 2 diabetes mellitus    Chronic kidney disease, stage IV (severe)    Vitamin D deficiency    Disc disorder of cervical region    Postthrombotic syndrome    Vertigo    Angina pectoris    Lumbosacral spondylosis without myelopathy    Psoriasis    Arthritis of elbow    Swelling of right lower extremity    Acute right-sided low back pain without sciatica    Hoarseness    Unifocal PVCs    Skin lesion of face    Essential hypertension    Medicare annual wellness visit, subsequent    Stage 3 chronic kidney disease due to benign hypertension    BMI 30.0-30.9,adult    Postherpetic neuralgia    Herpes zoster without complication    Leg edema, right    Chest pain    Encounter for removal of sutures    Chronic pain of left knee    Fall        Past Medical History:   Diagnosis Date    Acute diverticulitis 2020    Allergic 60 yrs ago    Arthritis     Arthritis of neck Fusion 1992    Bilateral radial fractures     fracture bilateral radial heads    CAD (coronary artery disease)     Calculus of gallbladder without cholecystitis  without obstruction 01/29/2020    Cataract     Cervical disc disorder Fusion 1992    Cholelithiasis     Chronic kidney disease 2020    Clotting disorder     CVD (cardiovascular disease)     History of TIA 1989    Diabetes mellitus     DVT (deep venous thrombosis)     Right lower extremity DVT and pulmonary embolism in 06/2015, idiopathic    DVT of proximal lower limb 06/22/2015    proximal DVT right leg, small PE- anticoagulation    Dyslipidemia     Erectile dysfunction     Fracture 1952    H/o pf left forearm fracture    Fracture of right hand 1980    Fracture of wrist 1980    GERD (gastroesophageal reflux disease)     with hiatal hernia    HL (hearing loss)     Hx of angina pectoris     Hypertension     Normal renal angiography 02/16/11    Knee swelling Several years ago    Left wrist fracture 1953    Lumbosacral disc disease 1996    Osgood-Schlatter's disease 1955    Osteoarthritis     Right wrist fracture     Vertigo     Wears glasses         Past Surgical History:   Procedure Laterality Date    APPENDECTOMY  1957    BACK SURGERY      CARDIAC CATHETERIZATION  2011    with vein graft    CATARACT EXTRACTION Left     CERVICAL FUSION      CERVICAL FUSION  1992    C3-4    COLONOSCOPY  2013    CORONARY ARTERY BYPASS GRAFT  1994    HERNIA REPAIR Right 2011    inguinal    INGUINAL HERNIA REPAIR Left 10/29/2019    Procedure: INGUINAL HERNIA REPAIR LEFT;  Surgeon: Charisma Raines MD;  Location: [facility];  Service: General    LUMBAR LAMINECTOMY  1996    laminectomy, lumbar, L3    NECK SURGERY  1992    OTHER SURGICAL HISTORY      wrist surgery    SPINE SURGERY  1996    TONSILLECTOMY AND ADENOIDECTOMY  1945    TRIGGER POINT INJECTION  2001 - 2007    VASECTOMY  1972         EVALUATION   PT Ortho       Row Name 11/29/24 1300       Subjective    Subjective Comments Pt c/o B hip pain today d/t arthritis.  States ankle only hurts occasionally.  -JM       Subjective Pain    Able to rate subjective pain? yes  -KACIE     Pre-Treatment Pain Level 0  -JM    Post-Treatment Pain Level 2  -JM    Subjective Pain Comment bactine spray applied  -JM       Transfers    Sit-Stand [place] (Transfers) independent  -JM    Stand-Sit [place] (Transfers) independent  -JM    Comment, (Transfers) seated for tx  -JM       Gait/Stairs (Locomotion)    [place] Level (Gait) modified independence  -JM    Assistive Device (Gait) cane, straight  -JM              User Key  (r) = Recorded By, (t) = Taken By, (c) = Cosigned By      Initials Name Provider Type    Alesha Jason, PT Physical Therapist                     LDA Wound       Row Name 11/29/24 1300             Wound 10/04/24 1430 Right lateral ankle    Wound - Properties Group Placement Date: 10/04/24  -JM Placement Time: 1430 -JM Side: Right  - Orientation: lateral  - Location: ankle  -    Wound Image Images linked: 1  -JM      Dressing Appearance intact;moist drainage  -JM      Base moist;pink;white;yellow;slough  1-2 small pink/yellow areas that appear mostly epithelialized  -JM      Periwound pink;moist;other (see comments)  fragile hypopigmented skin  -JM      Periwound Temperature warm  -      Periwound Skin Turgor soft  -      Edges irregular  -      Drainage Characteristics/Odor serous  -JM      Drainage Amount small  -JM      Care, Wound cleansed with;wound cleanser;debrided;yaa boot  -JM      Dressing Care dressing changed  UB only  -JM      Periwound Care barrier ointment applied;cleansed with pH balanced cleanser;dry periwound area maintained  zguard  -      Retired Wound - Properties Group Placement Date: 10/04/24  - Placement Time: 1430  -JM Side: Right  - Orientation: lateral  - Location: ankle  -    Retired Wound - Properties Group Placement Date: 10/04/24  - Placement Time: 1430 -JM Side: Right  - Orientation: lateral  - Location: ankle  -    Retired Wound - Properties Group Date first assessed: 10/04/24  - Time first assessed:  1430  -JM Side: Right  - Location: ankle  -JM       Wound 09/12/24 1300 Right lateral leg Traumatic    Wound - Properties Group Placement Date: 09/12/24  - Placement Time: 1300  -MF Side: Right  - Orientation: lateral  -MF Location: leg  -MF Primary Wound Type: Traumatic  -MF    Wound Image Images linked: 1  -JM      Dressing Appearance intact;moist drainage  -      Base moist;granulating;red;epithelialization  2-3 pinpoint open areas  -      Periwound intact;dry;redness;swelling;blistered  small blisters at post edge of mepilex  -      Periwound Temperature warm  -      Periwound Skin Turgor firm  -      Edges irregular  -      Drainage Characteristics/Odor serous  -      Drainage Amount small  -      Care, Wound cleansed with;wound cleanser;debrided;yaa boot  -      Dressing Care dressing applied  UB only  -      Periwound Care cleansed with pH balanced cleanser;dry periwound area maintained;barrier ointment applied  zguard  -      Retired Wound - Properties Group Placement Date: 09/12/24  - Placement Time: 1300  -MF Side: Right  - Orientation: lateral  - Location: leg  -MF Primary Wound Type: Traumatic  -MF    Retired Wound - Properties Group Placement Date: 09/12/24  - Placement Time: 1300  - Side: Right  - Orientation: lateral  - Location: leg  -MF Primary Wound Type: Traumatic  -MF    Retired Wound - Properties Group Date first assessed: 09/12/24  - Time first assessed: 1300  - Side: Right  - Location: leg  -MF Primary Wound Type: Traumatic  -MF              User Key  (r) = Recorded By, (t) = Taken By, (c) = Cosigned By      Initials Name Provider Type    Jaya Doshi, PT Physical Therapist    Alesha Jason, PT Physical Therapist                   Lymphedema       Row Name 11/29/24 1300             Lymphedema Edema Assessment    Ptting Edema Category By severity  -      Pitting Edema Moderate;Mild  mod prox and distal to UB  -         Skin  "Changes/Observations    Location/Assessment Lower Extremity  -      Lower Extremity Conditions right:;shiny;crust;fragile  -      Lower Extremity Color/Pigment right:;red;blanchable;hypopigmented;hyperpigmented  -         Compression/Skin Care    Compression/Skin Care skin care;wrapping location;bandaging  -      Skin Care washed/dried;lotion applied  -      Wrapping Location lower extremity  -JM      Wrapping Location LE right:;foot to knee  -      Wrapping Comments UB in clamshell, cast padding ankle to calf, 4\" coban, size 6 spandage  -                User Key  (r) = Recorded By, (t) = Taken By, (c) = Cosigned By      Initials Name Provider Type    Alesha Jason, PT Physical Therapist                    WOUND DEBRIDEMENT  Total area of Debridement: 1cmsq  Debridement Site 1  Location- Site 1: Lateral RLE  Selective Debridement- Site 1: Wound Surface <20cmsq  Instruments- Site 1: tweezers  Excised Tissue Description- Site 1: minimum, slough, other (comment) (crust)  Bleeding- Site 1: none              Therapy Education       Row Name 11/29/24 1300             Therapy Education    Education Details Continue leg elevation and diuretic as prescribed.  Discussed potential trial of MLW in next 1-2 txs.  -KACIE      Given Bandaging/dressing change;Pain management;Edema management  -KACIE      Program Reinforced;Modified  -KACIE      How Provided Verbal;Demonstration  -      Provided to Patient  -      Level of Understanding Teach back education performed;Verbalized  -                User Key  (r) = Recorded By, (t) = Taken By, (c) = Cosigned By      Initials Name Provider Type    Alesha Jason, PT Physical Therapist                    Recommendation and Plan   PT Assessment/Plan       Row Name 11/29/24 1300          PT Assessment    Functional Limitations Performance in self-care ADL;Other (comment)  -KACIE     Impairments Integumentary integrity;Impaired venous circulation;Edema  -KACIE     " Assessment Comments Lat R calf wound nearly closed.  Min blistering at edge of mepilex so PT held foam dressing and only applied UB to RLE with zguard to fragile open areas.  Pt may be appropriate to trial MLW in next 1-2 txs.  -     Rehab Potential Fair  -     Patient/caregiver participated in establishment of treatment plan and goals Yes  -     Patient would benefit from skilled therapy intervention Yes  -        PT Plan    PT Frequency 2x/week  -     Physical Therapy Interventions (Optional Details) patient/family education;wound care  -     PT Plan Comments debridement, UB vs MLW  -               User Key  (r) = Recorded By, (t) = Taken By, (c) = Cosigned By      Initials Name Provider Type    Alesha Jason, PT Physical Therapist                    Goals   PT OP Goals       Row Name 11/29/24 1338          Time Calculation    PT Goal Re-Cert Due Date 12/11/24  -               User Key  (r) = Recorded By, (t) = Taken By, (c) = Cosigned By      Initials Name Provider Type    Alesha Jason, PT Physical Therapist                    PT Goal Re-Cert Due Date: 12/11/24            Time Calculation: Start Time: 1300  Untimed Charges  13369-Oono Boot: 15  19768-Etadjgazs debridement: 10  Total Minutes  Untimed Charges Total Minutes: 25   Total Minutes: 25  Therapy Charges for Today       Code Description Service Date Service Provider Modifiers Qty    78802767063 HC EDWIN DEBRIDE OPEN WOUND UP TO 20CM 11/29/2024 Alesha Nunez, PT GP, KX 1    71555430498 HC PT STAPPING UNNA BOOT 11/29/2024 Alesha Nunez, PT GP, KX 1                    Alesha Nunez, PT  11/29/2024

## 2024-12-03 ENCOUNTER — HOSPITAL ENCOUNTER (OUTPATIENT)
Dept: PHYSICAL THERAPY | Facility: HOSPITAL | Age: 84
Setting detail: THERAPIES SERIES
Discharge: HOME | End: 2024-12-03
Payer: MEDICARE

## 2024-12-03 DIAGNOSIS — S81.801D OPEN WOUND OF RIGHT LOWER LEG, SUBSEQUENT ENCOUNTER: Primary | ICD-10-CM

## 2024-12-03 DIAGNOSIS — I87.2 VENOUS STASIS DERMATITIS OF RIGHT LOWER EXTREMITY: ICD-10-CM

## 2024-12-03 DIAGNOSIS — R60.0 EDEMA OF RIGHT LOWER LEG: ICD-10-CM

## 2024-12-03 DIAGNOSIS — Z87.2 HISTORY OF CELLULITIS: ICD-10-CM

## 2024-12-03 DIAGNOSIS — S80.11XA HEMATOMA OF RIGHT LOWER LEG: ICD-10-CM

## 2024-12-03 PROCEDURE — 29581 APPL MULTLAYER CMPRN SYS LEG: CPT

## 2024-12-03 PROCEDURE — 97597 DBRDMT OPN WND 1ST 20 CM/<: CPT

## 2024-12-03 NOTE — THERAPY WOUND CARE TREATMENT
Outpatient Rehabilitation - Wound/Debridement Treatment Note   [place]     Patient Name: Toño Alcala  : 1940  MRN: [id number]  Today's Date: 12/3/2024                 Admit Date: 12/3/2024    Visit Dx:    ICD-10-CM ICD-9-CM   1. Open wound of right lower leg, subsequent encounter  S81.801D V58.89     891.0   2. Edema of right lower leg  R60.0 782.3   3. History of cellulitis  Z87.2 V13.3   4. Hematoma of right lower leg  S80.11XA 924.10   5. Venous stasis dermatitis of right lower extremity  I87.2 454.1   Lat R calf wound (closed):    Lat R ankle:      Patient Active Problem List   Diagnosis    CAD (coronary artery disease)    CVD (cardiovascular disease)    DVT (deep venous thrombosis)    Dyslipidemia    Arthritis    GERD (gastroesophageal reflux disease)    Mixed hyperlipidemia    Diabetic nephropathy associated with type 2 diabetes mellitus    Diabetic polyneuropathy associated with type 2 diabetes mellitus    Chronic kidney disease, stage IV (severe)    Vitamin D deficiency    Disc disorder of cervical region    Postthrombotic syndrome    Vertigo    Angina pectoris    Lumbosacral spondylosis without myelopathy    Psoriasis    Arthritis of elbow    Swelling of right lower extremity    Acute right-sided low back pain without sciatica    Hoarseness    Unifocal PVCs    Skin lesion of face    Essential hypertension    Medicare annual wellness visit, subsequent    Stage 3 chronic kidney disease due to benign hypertension    BMI 30.0-30.9,adult    Postherpetic neuralgia    Herpes zoster without complication    Leg edema, right    Chest pain    Encounter for removal of sutures    Chronic pain of left knee    Fall        Past Medical History:   Diagnosis Date    Acute diverticulitis 2020    Allergic 60 yrs ago    Arthritis     Arthritis of neck Fusion     Bilateral radial fractures     fracture bilateral radial heads    CAD (coronary artery disease)     Calculus of gallbladder without  cholecystitis without obstruction 01/29/2020    Cataract     Cervical disc disorder Fusion 1992    Cholelithiasis     Chronic kidney disease 2020    Clotting disorder     CVD (cardiovascular disease)     History of TIA 1989    Diabetes mellitus     DVT (deep venous thrombosis)     Right lower extremity DVT and pulmonary embolism in 06/2015, idiopathic    DVT of proximal lower limb 06/22/2015    proximal DVT right leg, small PE- anticoagulation    Dyslipidemia     Erectile dysfunction     Fracture 1952    H/o pf left forearm fracture    Fracture of right hand 1980    Fracture of wrist 1980    GERD (gastroesophageal reflux disease)     with hiatal hernia    HL (hearing loss)     Hx of angina pectoris     Hypertension     Normal renal angiography 02/16/11    Knee swelling Several years ago    Left wrist fracture 1953    Lumbosacral disc disease 1996    Osgood-Schlatter's disease 1955    Osteoarthritis     Right wrist fracture     Vertigo     Wears glasses         Past Surgical History:   Procedure Laterality Date    APPENDECTOMY  1957    BACK SURGERY      CARDIAC CATHETERIZATION  2011    with vein graft    CATARACT EXTRACTION Left     CERVICAL FUSION      CERVICAL FUSION  1992    C3-4    COLONOSCOPY  2013    CORONARY ARTERY BYPASS GRAFT  1994    HERNIA REPAIR Right 2011    inguinal    INGUINAL HERNIA REPAIR Left 10/29/2019    Procedure: INGUINAL HERNIA REPAIR LEFT;  Surgeon: Charisma Raines MD;  Location: [facility];  Service: General    LUMBAR LAMINECTOMY  1996    laminectomy, lumbar, L3    NECK SURGERY  1992    OTHER SURGICAL HISTORY      wrist surgery    SPINE SURGERY  1996    TONSILLECTOMY AND ADENOIDECTOMY  1945    TRIGGER POINT INJECTION  2001 - 2007    VASECTOMY  1972         EVALUATION   PT Ortho       Row Name 12/03/24 1300       Subjective    Subjective Comments No complaints today, hoping to not have an unna boot so he can shower.  -KACIE       Subjective Pain    Able to rate subjective pain? yes  -KACIE     Pre-Treatment Pain Level 0  -JM    Post-Treatment Pain Level 0  -JM       Transfers    Sit-Stand [place] (Transfers) independent  -JM    Stand-Sit [place] (Transfers) independent  -JM    Comment, (Transfers) seated for tx  -JM       Gait/Stairs (Locomotion)    [place] Level (Gait) modified independence  -JM    Assistive Device (Gait) cane, straight  -JM              User Key  (r) = Recorded By, (t) = Taken By, (c) = Cosigned By      Initials Name Provider Type    Alesha Jason PT Physical Therapist                     LDA Wound       Row Name 12/03/24 1300             Wound 10/04/24 1430 Right lateral ankle    Wound - Properties Group Placement Date: 10/04/24  - Placement Time: 1430  -JM Side: Right  -JM Orientation: lateral  -JM Location: ankle  -    Wound Image Images linked: 1  -JM      Dressing Appearance intact;moist drainage  -JM      Base moist;pink;white;yellow;slough  2-3 small moist areas  -JM      Periwound pink;moist;redness;other (see comments)  fragile hypopigmented skin  -      Periwound Temperature warm  -      Periwound Skin Turgor soft  -      Edges irregular  -JM      Drainage Characteristics/Odor serous  -JM      Drainage Amount small  -JM      Care, Wound cleansed with;wound cleanser;debrided  -      Dressing Care dressing applied;abdominal pad;multi-layer wrap  -JM      Periwound Care barrier ointment applied;cleansed with pH balanced cleanser;dry periwound area maintained  zguard  -      Retired Wound - Properties Group Placement Date: 10/04/24  - Placement Time: 1430  -JM Side: Right  -JM Orientation: lateral  -JM Location: ankle  -JM    Retired Wound - Properties Group Placement Date: 10/04/24  - Placement Time: 1430  -JM Side: Right  -JM Orientation: lateral  -JM Location: ankle  -JM    Retired Wound - Properties Group Date first assessed: 10/04/24  - Time first assessed: 1430  -JM Side: Right  -JM Location: ankle  -JM       Wound 09/12/24 1300  Right lateral leg Traumatic    Wound - Properties Group Placement Date: 09/12/24  - Placement Time: 1300  -MF Side: Right  - Orientation: lateral  -MF Location: leg  -MF Primary Wound Type: Traumatic  -MF    Wound Image Images linked: 1  -JM      Dressing Appearance dry;intact;no drainage  -      Base epithelialization;pink  -      Periwound intact;dry;redness;swelling  small blisters at post edge of mepilex  -      Periwound Temperature warm  -      Periwound Skin Turgor firm  -      Edges irregular  -      Drainage Characteristics/Odor serous  -      Drainage Amount none  -      Care, Wound cleansed with;wound cleanser  -      Dressing Care multi-layer wrap  -      Periwound Care cleansed with pH balanced cleanser;dry periwound area maintained  -      Retired Wound - Properties Group Placement Date: 09/12/24  - Placement Time: 1300  -MF Side: Right  -MF Orientation: lateral  -MF Location: leg  -MF Primary Wound Type: Traumatic  -MF    Retired Wound - Properties Group Placement Date: 09/12/24  - Placement Time: 1300  - Side: Right  - Orientation: lateral  - Location: leg  -MF Primary Wound Type: Traumatic  -MF    Retired Wound - Properties Group Date first assessed: 09/12/24  - Time first assessed: 1300  -MF Side: Right  - Location: leg  -MF Primary Wound Type: Traumatic  -MF              User Key  (r) = Recorded By, (t) = Taken By, (c) = Cosigned By      Initials Name Provider Type    Jaya Doshi, PT Physical Therapist    Alesha Jason, PT Physical Therapist                   Lymphedema       Row Name 12/03/24 1300             Lymphedema Edema Assessment    Ptting Edema Category By severity  -      Pitting Edema Moderate;Mild  mod prox and distal to UB  -         Skin Changes/Observations    Location/Assessment Lower Extremity  -      Lower Extremity Conditions right:;shiny;crust;fragile  -      Lower Extremity Color/Pigment  right:;red;blanchable;hypopigmented;hyperpigmented  -         Compression/Skin Care    Compression/Skin Care skin care;wrapping location;bandaging  -      Skin Care washed/dried;lotion applied  -      Wrapping Location lower extremity  -      Wrapping Location LE right:;foot to knee  -      Wrapping Comments 1/2 ABD to lat ankle, cast padding to secure, size 4 compressogrip doubled distal half, 10cm comprilan  -      Bandage Layers padding/fluff layer;cotton elastic stocking- single layer (comment size);short-stretch bandages (comment size/quantity)  -                User Key  (r) = Recorded By, (t) = Taken By, (c) = Cosigned By      Initials Name Provider Type    Alesha Jason, PT Physical Therapist                    WOUND DEBRIDEMENT  Total area of Debridement: 1cmsq  Debridement Site 1  Location- Site 1: Lateral RLE  Selective Debridement- Site 1: Wound Surface <20cmsq  Instruments- Site 1: tweezers, other (comment) (wipes, gauze)  Excised Tissue Description- Site 1: minimum, slough  Bleeding- Site 1: none              Therapy Education       Row Name 12/03/24 1300             Therapy Education    Education Details OK to remove wrap and shower, then reapply zinc cream, ABD and cast padding, with compressogrip and comprilan.  May adjust comprilan as needed for more/less compression.  Pt may also trial use of personal compression stocking over dressings if able to don without rolling up bandages.  -KACIE      Given Bandaging/dressing change;Pain management;Edema management  -      Program Reinforced;Modified  -KACIE      How Provided Verbal;Demonstration  -      Provided to Patient  -KACIE      Level of Understanding Teach back education performed;Verbalized  -                User Key  (r) = Recorded By, (t) = Taken By, (c) = Cosigned By      Initials Name Provider Type    Alesha Jason, PT Physical Therapist                    Recommendation and Plan   PT Assessment/Plan       Row Name  12/03/24 1300          PT Assessment    Functional Limitations Performance in self-care ADL;Other (comment)  -     Impairments Integumentary integrity;Impaired venous circulation;Edema  -     Assessment Comments Trial of MLW with compressogrip/comprilan now that calf wound is closed.  Pt still with min serous drainage from ankle, so continued with zinc cream and ABD/absorptive layers under compression.  Pt to attempt home dressing changes and compression use.  If not improved or worsening, will plan to resume unna boot.  -     Rehab Potential Fair  -     Patient/caregiver participated in establishment of treatment plan and goals Yes  -     Patient would benefit from skilled therapy intervention Yes  -        PT Plan    PT Frequency 2x/week  -     Physical Therapy Interventions (Optional Details) patient/family education;wound care  -     PT Plan Comments debridement, MLW vs UB  -               User Key  (r) = Recorded By, (t) = Taken By, (c) = Cosigned By      Initials Name Provider Type    Alesha Jason, PT Physical Therapist                    Goals   PT OP Goals       Row Name 12/03/24 1330          Time Calculation    PT Goal Re-Cert Due Date 12/11/24  -               User Key  (r) = Recorded By, (t) = Taken By, (c) = Cosigned By      Initials Name Provider Type    Alesha Jason, PT Physical Therapist                    PT Goal Re-Cert Due Date: 12/11/24            Time Calculation: Start Time: 1300  Untimed Charges  39163-Osorxxkqwp comp below knee: 15  31039-Kwcgmmpha debridement: 10  Total Minutes  Untimed Charges Total Minutes: 25   Total Minutes: 25  Therapy Charges for Today       Code Description Service Date Service Provider Modifiers Qty    91064281201 HC EDWIN DEBRIDE OPEN WOUND UP TO 20CM 12/3/2024 Alesha Nunez, PT GP, KX 1    11977057320 HC PT MULTI LAYER COMP SYS BELOW KNEE 12/3/2024 Alesha Nunez, PT GP, KX 1                    Alesha Nunez,  PT  12/3/2024

## 2024-12-06 ENCOUNTER — HOSPITAL ENCOUNTER (OUTPATIENT)
Dept: PHYSICAL THERAPY | Facility: HOSPITAL | Age: 84
Setting detail: THERAPIES SERIES
Discharge: HOME | End: 2024-12-06
Payer: MEDICARE

## 2024-12-06 DIAGNOSIS — R60.0 EDEMA OF RIGHT LOWER LEG: ICD-10-CM

## 2024-12-06 DIAGNOSIS — I87.2 VENOUS STASIS DERMATITIS OF RIGHT LOWER EXTREMITY: ICD-10-CM

## 2024-12-06 DIAGNOSIS — S81.801D OPEN WOUND OF RIGHT LOWER LEG, SUBSEQUENT ENCOUNTER: Primary | ICD-10-CM

## 2024-12-06 DIAGNOSIS — S80.11XA HEMATOMA OF RIGHT LOWER LEG: ICD-10-CM

## 2024-12-06 DIAGNOSIS — Z87.2 HISTORY OF CELLULITIS: ICD-10-CM

## 2024-12-06 PROCEDURE — 97597 DBRDMT OPN WND 1ST 20 CM/<: CPT

## 2024-12-06 PROCEDURE — 29580 STRAPPING UNNA BOOT: CPT

## 2024-12-06 NOTE — THERAPY WOUND CARE TREATMENT
Outpatient Rehabilitation - Wound/Debridement Treatment Note   [place]     Patient Name: Toño Alcala  : 1940  MRN: [id number]  Today's Date: 2024                 Admit Date: 2024    Visit Dx:    ICD-10-CM ICD-9-CM   1. Open wound of right lower leg, subsequent encounter  S81.801D V58.89     891.0   2. Edema of right lower leg  R60.0 782.3   3. History of cellulitis  Z87.2 V13.3   4. Hematoma of right lower leg  S80.11XA 924.10   5. Venous stasis dermatitis of right lower extremity  I87.2 454.1   Lat R calf:    Lat R ankle:      Patient Active Problem List   Diagnosis    CAD (coronary artery disease)    CVD (cardiovascular disease)    DVT (deep venous thrombosis)    Dyslipidemia    Arthritis    GERD (gastroesophageal reflux disease)    Mixed hyperlipidemia    Diabetic nephropathy associated with type 2 diabetes mellitus    Diabetic polyneuropathy associated with type 2 diabetes mellitus    Chronic kidney disease, stage IV (severe)    Vitamin D deficiency    Disc disorder of cervical region    Postthrombotic syndrome    Vertigo    Angina pectoris    Lumbosacral spondylosis without myelopathy    Psoriasis    Arthritis of elbow    Swelling of right lower extremity    Acute right-sided low back pain without sciatica    Hoarseness    Unifocal PVCs    Skin lesion of face    Essential hypertension    Medicare annual wellness visit, subsequent    Stage 3 chronic kidney disease due to benign hypertension    BMI 30.0-30.9,adult    Postherpetic neuralgia    Herpes zoster without complication    Leg edema, right    Chest pain    Encounter for removal of sutures    Chronic pain of left knee    Fall        Past Medical History:   Diagnosis Date    Acute diverticulitis 2020    Allergic 60 yrs ago    Arthritis     Arthritis of neck Fusion 1992    Bilateral radial fractures     fracture bilateral radial heads    CAD (coronary artery disease)     Calculus of gallbladder without cholecystitis  without obstruction 01/29/2020    Cataract     Cervical disc disorder Fusion 1992    Cholelithiasis     Chronic kidney disease 2020    Clotting disorder     CVD (cardiovascular disease)     History of TIA 1989    Diabetes mellitus     DVT (deep venous thrombosis)     Right lower extremity DVT and pulmonary embolism in 06/2015, idiopathic    DVT of proximal lower limb 06/22/2015    proximal DVT right leg, small PE- anticoagulation    Dyslipidemia     Erectile dysfunction     Fracture 1952    H/o pf left forearm fracture    Fracture of right hand 1980    Fracture of wrist 1980    GERD (gastroesophageal reflux disease)     with hiatal hernia    HL (hearing loss)     Hx of angina pectoris     Hypertension     Normal renal angiography 02/16/11    Knee swelling Several years ago    Left wrist fracture 1953    Lumbosacral disc disease 1996    Osgood-Schlatter's disease 1955    Osteoarthritis     Right wrist fracture     Vertigo     Wears glasses         Past Surgical History:   Procedure Laterality Date    APPENDECTOMY  1957    BACK SURGERY      CARDIAC CATHETERIZATION  2011    with vein graft    CATARACT EXTRACTION Left     CERVICAL FUSION      CERVICAL FUSION  1992    C3-4    COLONOSCOPY  2013    CORONARY ARTERY BYPASS GRAFT  1994    HERNIA REPAIR Right 2011    inguinal    INGUINAL HERNIA REPAIR Left 10/29/2019    Procedure: INGUINAL HERNIA REPAIR LEFT;  Surgeon: Charisma Raines MD;  Location: [facility];  Service: General    LUMBAR LAMINECTOMY  1996    laminectomy, lumbar, L3    NECK SURGERY  1992    OTHER SURGICAL HISTORY      wrist surgery    SPINE SURGERY  1996    TONSILLECTOMY AND ADENOIDECTOMY  1945    TRIGGER POINT INJECTION  2001 - 2007    VASECTOMY  1972         EVALUATION   PT Ortho       Row Name 12/06/24 1300       Subjective    Subjective Comments Pt reports he had difficulty with using the wrap, so he used his compression sock overtop the dressings.  -JM       Subjective Pain    Able to rate subjective  pain? yes  -JM    Pre-Treatment Pain Level 0  -JM    Post-Treatment Pain Level 0  -JM    Subjective Pain Comment min pain lat ankle during tx, applied bactine spray  -JM       Transfers    Sit-Stand Forest (Transfers) independent  -JM    Stand-Sit [place] (Transfers) independent  -    Comment, (Transfers) seated for tx  -JM       Gait/Stairs (Locomotion)    Forest Level (Gait) modified independence  -    Assistive Device (Gait) cane, straight  -              User Key  (r) = Recorded By, (t) = Taken By, (c) = Cosigned By      Initials Name Provider Type    Alesha Jason, PT Physical Therapist                     LDA Wound       Row Name 12/06/24 1300             Wound 10/04/24 1430 Right lateral ankle    Wound - Properties Group Placement Date: 10/04/24  - Placement Time: 1430 - Side: Right  - Orientation: lateral  - Location: ankle  -    Wound Image Images linked: 1  -JM      Dressing Appearance intact;moist drainage  -JM      Base moist;pink;white;yellow;slough  1 pinpoint open area, mostly dry/crusted, small blister anteriorly  -      Periwound pink;moist;redness;other (see comments);blistered  fragile hypopigmented skin, small ruptured blister anteriorly  -JM      Periwound Temperature warm  -      Periwound Skin Turgor soft  -      Edges irregular  -      Drainage Characteristics/Odor serous  -JM      Drainage Amount small  -JM      Care, Wound cleansed with;wound cleanser;debrided;yaa boot  -JM      Dressing Care dressing applied  cast padding, UB  -JM      Periwound Care barrier ointment applied;cleansed with pH balanced cleanser;dry periwound area maintained  thick layer of zguard  -      Retired Wound - Properties Group Placement Date: 10/04/24  - Placement Time: 1430 -JM Side: Right  - Orientation: lateral  - Location: ankle  -    Retired Wound - Properties Group Placement Date: 10/04/24  - Placement Time: 1430 - Side: Right  - Orientation:  lateral  -JM Location: ankle  -JM    Retired Wound - Properties Group Date first assessed: 10/04/24  - Time first assessed: 1430  -JM Side: Right  -JM Location: ankle  -JM       Wound 09/12/24 1300 Right lateral leg Traumatic    Wound - Properties Group Placement Date: 09/12/24  - Placement Time: 1300  -MF Side: Right  -MF Orientation: lateral  -MF Location: leg  -MF Primary Wound Type: Traumatic  -MF    Wound Image Images linked: 1  -JM      Dressing Appearance dry;intact;no drainage  -      Base epithelialization;pink  -      Periwound intact;dry;redness;swelling  min erythema, shiny moderate edema  -      Periwound Temperature warm  -      Periwound Skin Turgor firm  -      Edges irregular  -      Drainage Amount none  -      Care, Wound cleansed with;wound cleanser;debrided;yaa boot  -      Dressing Care other (see comments)  UB only  -      Periwound Care cleansed with pH balanced cleanser;dry periwound area maintained;barrier ointment applied  zguard  -      Retired Wound - Properties Group Placement Date: 09/12/24  - Placement Time: 1300  -MF Side: Right  -MF Orientation: lateral  -MF Location: leg  -MF Primary Wound Type: Traumatic  -MF    Retired Wound - Properties Group Placement Date: 09/12/24  - Placement Time: 1300  - Side: Right  - Orientation: lateral  -MF Location: leg  -MF Primary Wound Type: Traumatic  -MF    Retired Wound - Properties Group Date first assessed: 09/12/24  - Time first assessed: 1300  -MF Side: Right  -MF Location: leg  -MF Primary Wound Type: Traumatic  -MF              User Key  (r) = Recorded By, (t) = Taken By, (c) = Cosigned By      Initials Name Provider Type    MF Jaya Bower, PT Physical Therapist    Alesha Jason, PT Physical Therapist                   Lymphedema       Row Name 12/06/24 1300             Lymphedema Edema Assessment    Ptting Edema Category By severity  -JM      Pitting Edema Moderate  mod prox and distal to  "UB  -         Skin Changes/Observations    Location/Assessment Lower Extremity  -      Lower Extremity Conditions right:;shiny;crust;inflamed;fragile  -      Lower Extremity Color/Pigment right:;erythema;purple;red;blanchable;hypopigmented;hyperpigmented  -         Compression/Skin Care    Compression/Skin Care skin care;wrapping location;bandaging  -      Skin Care washed/dried;lotion applied  -      Wrapping Location lower extremity  -      Wrapping Location LE right:;foot to knee  -      Wrapping Comments cast padding to ankle under unna boot, UB in [facility], 4\" coban, size 6 spandage  -                User Key  (r) = Recorded By, (t) = Taken By, (c) = Cosigned By      Initials Name Provider Type    Alesha Jason, PT Physical Therapist                    WOUND DEBRIDEMENT  Total area of Debridement: 2cmsq  Debridement Site 1  Location- Site 1: Lateral RLE  Selective Debridement- Site 1: Wound Surface <20cmsq  Instruments- Site 1: tweezers, other (comment) (wipes, gauze)  Excised Tissue Description- Site 1: minimum, other (comment) (crusts)  Bleeding- Site 1: none              Therapy Education       Row Name 12/06/24 1300             Therapy Education    Education Details Plan to resume unna boot due to increased edema and inflammation.  Recommended increased leg elevation.  Contact your kidney doctor about diuretics not helping with swelling/fluid retention.  -JM      Given Bandaging/dressing change;Pain management;Edema management  -      Program Reinforced;Modified  -KACIE      How Provided Verbal;Demonstration  -JM      Provided to Patient  -KACIE      Level of Understanding Teach back education performed;Verbalized  -                User Key  (r) = Recorded By, (t) = Taken By, (c) = Cosigned By      Initials Name Provider Type    Alesha Jason, PT Physical Therapist                    Recommendation and Plan   PT Assessment/Plan       Row Name 12/06/24 1300          PT " Assessment    Functional Limitations Performance in self-care ADL;Other (comment)  -     Impairments Integumentary integrity;Impaired venous circulation;Edema  -     Assessment Comments Lat R calf wound remains closed, lat ankle wound improved but pt with increased erythema and shiny edema with use of personal compression stocking, so PT resumed unna boot with use of cast padding underneath at fragile ankle area.  Will assess response next tx.  Continue with POC.  -     Rehab Potential Fair  -     Patient/caregiver participated in establishment of treatment plan and goals Yes  -     Patient would benefit from skilled therapy intervention Yes  -        PT Plan    PT Frequency 2x/week  -     Physical Therapy Interventions (Optional Details) patient/family education;wound care  -     PT Plan Comments debridement, UB/MLW  -               User Key  (r) = Recorded By, (t) = Taken By, (c) = Cosigned By      Initials Name Provider Type    Alesha Jason, PT Physical Therapist                    Goals   PT OP Goals       Row Name 12/06/24 1359          Time Calculation    PT Goal Re-Cert Due Date 12/11/24  -               User Key  (r) = Recorded By, (t) = Taken By, (c) = Cosigned By      Initials Name Provider Type    Alesha Jason, PT Physical Therapist                    PT Goal Re-Cert Due Date: 12/11/24            Time Calculation: Start Time: 1300  Untimed Charges  16992-Vely Boot: 20  78498-Pskypvzcp debridement: 10  Total Minutes  Untimed Charges Total Minutes: 30   Total Minutes: 30  Therapy Charges for Today       Code Description Service Date Service Provider Modifiers Qty    45789426082 HC EDWIN DEBRIDE OPEN WOUND UP TO 20CM 12/6/2024 Alesha Nunez, PT GP, KX 1    16501339130 HC PT STAPPING UNNA BOOT 12/6/2024 Alesha Nunez, PT GP, KX 1                    Alesha Nunez, PT  12/6/2024

## 2024-12-09 ENCOUNTER — OFFICE VISIT (OUTPATIENT)
Dept: ORTHOPEDIC SURGERY | Facility: CLINIC | Age: 84
End: 2024-12-09
Payer: MEDICARE

## 2024-12-09 VITALS
BODY MASS INDEX: 26.52 KG/M2 | HEIGHT: 72 IN | DIASTOLIC BLOOD PRESSURE: 60 MMHG | WEIGHT: 195.8 LBS | SYSTOLIC BLOOD PRESSURE: 132 MMHG

## 2024-12-09 DIAGNOSIS — M16.0 PRIMARY OSTEOARTHRITIS OF BOTH HIPS: Primary | ICD-10-CM

## 2024-12-09 DIAGNOSIS — M87.051 AVASCULAR NECROSIS OF BONE OF HIP, RIGHT: ICD-10-CM

## 2024-12-09 DIAGNOSIS — M54.50 CHRONIC BILATERAL LOW BACK PAIN WITHOUT SCIATICA: ICD-10-CM

## 2024-12-09 DIAGNOSIS — G89.29 CHRONIC BILATERAL LOW BACK PAIN WITHOUT SCIATICA: ICD-10-CM

## 2024-12-09 DIAGNOSIS — M87.052 AVASCULAR NECROSIS OF BONE OF HIP, LEFT: ICD-10-CM

## 2024-12-09 PROCEDURE — 3078F DIAST BP <80 MM HG: CPT | Performed by: ORTHOPAEDIC SURGERY

## 2024-12-09 PROCEDURE — 99213 OFFICE O/P EST LOW 20 MIN: CPT | Performed by: ORTHOPAEDIC SURGERY

## 2024-12-09 PROCEDURE — 3075F SYST BP GE 130 - 139MM HG: CPT | Performed by: ORTHOPAEDIC SURGERY

## 2024-12-09 PROCEDURE — 1159F MED LIST DOCD IN RCRD: CPT | Performed by: ORTHOPAEDIC SURGERY

## 2024-12-09 PROCEDURE — 1160F RVW MEDS BY RX/DR IN RCRD: CPT | Performed by: ORTHOPAEDIC SURGERY

## 2024-12-09 NOTE — PROGRESS NOTES
[facility] Orthopaedic Surgery Clinic Note    Subjective     Chief Complaint   Patient presents with   • Left Hip - Pain   • Right Hip - Pain        HPI    Toño Alcala is a 84 y.o. male who presents with new problem of: bilateral hip pain.  Onset: atraumatic and gradual in nature. The issue has been ongoing for 3 week(s). Pain is a 6/10 on the pain scale. Pain is described as aching. Associated symptoms include pain. The pain is worse with walking, standing, climbing stairs, and any movement of the joint; resting and sitting improve the pain. Previous treatments have included: nothing.  Pain is located in the posterior aspect in the low back and SI joint region.  No groin pain.    I have reviewed the following portions of the patient's history and agree with: History of Present Illness and Review of Systems    Patient Active Problem List   Diagnosis   • CAD (coronary artery disease)   • CVD (cardiovascular disease)   • DVT (deep venous thrombosis)   • Dyslipidemia   • Arthritis   • GERD (gastroesophageal reflux disease)   • Mixed hyperlipidemia   • Diabetic nephropathy associated with type 2 diabetes mellitus   • Diabetic polyneuropathy associated with type 2 diabetes mellitus   • Chronic kidney disease, stage IV (severe)   • Vitamin D deficiency   • Disc disorder of cervical region   • Postthrombotic syndrome   • Vertigo   • Angina pectoris   • Lumbosacral spondylosis without myelopathy   • Psoriasis   • Arthritis of elbow   • Swelling of right lower extremity   • Acute right-sided low back pain without sciatica   • Hoarseness   • Unifocal PVCs   • Skin lesion of face   • Essential hypertension   • Medicare annual wellness visit, subsequent   • Stage 3 chronic kidney disease due to benign hypertension   • BMI 30.0-30.9,adult   • Postherpetic neuralgia   • Herpes zoster without complication   • Leg edema, right   • Chest pain   • Encounter for removal of sutures   • Chronic pain of left knee   • Fall     Past  Medical History:   Diagnosis Date   • Acute diverticulitis 01/29/2020   • Allergic 60 yrs ago   • Arthritis    • Arthritis of neck Fusion 1992   • Bilateral radial fractures 1962    fracture bilateral radial heads   • CAD (coronary artery disease)    • Calculus of gallbladder without cholecystitis without obstruction 01/29/2020   • Cataract    • Cervical disc disorder Fusion 1992   • Cholelithiasis    • Chronic kidney disease 2020   • Clotting disorder    • CVD (cardiovascular disease)     History of TIA 1989   • Diabetes mellitus    • DVT (deep venous thrombosis)     Right lower extremity DVT and pulmonary embolism in 06/2015, idiopathic   • DVT of proximal lower limb 06/22/2015    proximal DVT right leg, small PE- anticoagulation   • Dyslipidemia    • Erectile dysfunction    • Fracture 1952    H/o pf left forearm fracture   • Fracture of right hand 1980   • Fracture of wrist 1980   • GERD (gastroesophageal reflux disease)     with hiatal hernia   • HL (hearing loss)    • Hx of angina pectoris    • Hypertension     Normal renal angiography 02/16/11   • Knee swelling Several years ago   • Left wrist fracture 1953   • Lumbosacral disc disease 1996   • Osgood-Schlatter's disease 1955   • Osteoarthritis    • Right wrist fracture    • Vertigo    • Wears glasses       Past Surgical History:   Procedure Laterality Date   • APPENDECTOMY  1957   • BACK SURGERY     • CARDIAC CATHETERIZATION  2011    with vein graft   • CATARACT EXTRACTION Left    • CERVICAL FUSION     • CERVICAL FUSION  1992    C3-4   • COLONOSCOPY  2013   • CORONARY ARTERY BYPASS GRAFT  1994   • HERNIA REPAIR Right 2011    inguinal   • INGUINAL HERNIA REPAIR Left 10/29/2019    Procedure: INGUINAL HERNIA REPAIR LEFT;  Surgeon: Charisma Raines MD;  Location: [facility];  Service: General   • LUMBAR LAMINECTOMY  1996    laminectomy, lumbar, L3   • NECK SURGERY  1992   • OTHER SURGICAL HISTORY      wrist surgery   • SPINE SURGERY  1996   • TONSILLECTOMY  AND ADENOIDECTOMY     • TRIGGER POINT INJECTION   -    • VASECTOMY  1972      Family History   Problem Relation Age of Onset   • Arthritis Mother         OA   • Heart disease Father    • Diabetes Father    • Heart attack Father    • Heart disease Brother    • Heart attack Brother    • Diabetes Brother    • Diabetes Son    • Hypertension Other    • Cancer Other      Social History     Socioeconomic History   • Marital status:    Tobacco Use   • Smoking status: Former     Current packs/day: 0.00     Average packs/day: 1.5 packs/day for 34.8 years (52.2 ttl pk-yrs)     Types: Cigarettes, Pipe, Cigars     Start date: 1962     Quit date: 1997     Years since quittin.6     Passive exposure: Past   • Smokeless tobacco: Never   • Tobacco comments:     QUIT DATE 1992   Vaping Use   • Vaping status: Never Used   Substance and Sexual Activity   • Alcohol use: Yes     Alcohol/week: 11.0 - 13.0 standard drinks of alcohol     Types: 7 Glasses of wine, 2 Cans of beer, 2 - 4 Shots of liquor per week     Comment: glass of wine everyday    • Drug use: No   • Sexual activity: Not Currently     Partners: Female     Birth control/protection: Vasectomy, Surgical      Current Outpatient Medications on File Prior to Visit   Medication Sig Dispense Refill   • acetaminophen (TYLENOL) 325 MG tablet Take 2 tablets by mouth As Needed for Mild Pain.     • bumetanide (BUMEX) 1 MG tablet Take 1 tablet by mouth As Needed (Edema/kidney).     • calcipotriene-betamethasone (TACLONEX) 0.005-0.064 % ointment Apply 1 Application topically to the appropriate area as directed As Needed (psoriasis).     • carvedilol (COREG) 6.25 MG tablet Take 1 tablet by mouth 2 (Two) Times a Day With Meals.     • cholecalciferol (VITAMIN D3) 25 MCG (1000 UT) tablet Take 1 tablet by mouth Daily.     • diclofenac (VOLTAREN) 1 % gel gel Apply 4 g topically As Needed.     • diltiaZEM CD (CARDIZEM CD) 180 MG 24 hr capsule Take 1 capsule  by mouth Daily.     • doxazosin (CARDURA) 2 MG tablet Take 1 tablet by mouth Daily.     • doxycycline (VIBRAMYCIN) 100 MG capsule Take 1 capsule by mouth 2 (Two) Times a Day. 14 capsule 0   • glucose blood test strip Use to check blood sugar twice daily 100 each 3   • HYDROcodone-acetaminophen (NORCO) 5-325 MG per tablet Take 1 tablet by mouth Every 12 (Twelve) Hours As Needed for Severe Pain. 60 tablet 0   • lidocaine (LIDODERM) 5 % Place 1 patch on the skin as directed by provider Daily. Remove & Discard patch within 12 hours or as directed by MD 90 patch 2   • Microlet Lancets misc Use to check blood sugar twice daily 100 each 3   • nitroglycerin (NITROSTAT) 0.4 MG SL tablet Place 1 tablet under the tongue Every 5 (Five) Minutes As Needed for Chest Pain. Take no more than 3 doses in 15 minutes.     • omeprazole (priLOSEC) 20 MG capsule Take 1 capsule by mouth Daily.     • sildenafil (Viagra) 100 MG tablet Take 1 tablet by mouth Daily As Needed for Erectile Dysfunction. 10 tablet 5   • warfarin (COUMADIN) 5 MG tablet TAKE 1 TABLET BY MOUTH EVERY DAY EXCEPT FOR MONDAY TAKE 1/2 TABLET AS OF 06/19/24 90 tablet 3     No current facility-administered medications on file prior to visit.      Allergies   Allergen Reactions   • Penicillins Hives and Swelling     Per patient, has tolerated Keflex        Review of Systems   Constitutional:  Negative for activity change, appetite change, chills, diaphoresis, fatigue, fever and unexpected weight change.   HENT:  Negative for congestion, dental problem, drooling, ear discharge, ear pain, facial swelling, hearing loss, mouth sores, nosebleeds, postnasal drip, rhinorrhea, sinus pressure, sneezing, sore throat, tinnitus, trouble swallowing and voice change.    Eyes:  Negative for photophobia, pain, discharge, redness, itching and visual disturbance.   Respiratory:  Negative for apnea, cough, choking, chest tightness, shortness of breath, wheezing and stridor.    Cardiovascular:   "Negative for chest pain, palpitations and leg swelling.   Gastrointestinal:  Negative for abdominal distention, abdominal pain, anal bleeding, blood in stool, constipation, diarrhea, nausea, rectal pain and vomiting.   Endocrine: Negative for cold intolerance, heat intolerance, polydipsia, polyphagia and polyuria.   Genitourinary:  Negative for decreased urine volume, difficulty urinating, dysuria, enuresis, flank pain, frequency, genital sores, hematuria and urgency.   Musculoskeletal:  Positive for arthralgias. Negative for back pain, gait problem, joint swelling, myalgias, neck pain and neck stiffness.   Skin:  Negative for color change, pallor, rash and wound.   Allergic/Immunologic: Negative for environmental allergies, food allergies and immunocompromised state.   Neurological:  Negative for dizziness, tremors, seizures, syncope, facial asymmetry, speech difficulty, weakness, light-headedness, numbness and headaches.   Hematological:  Negative for adenopathy. Does not bruise/bleed easily.   Psychiatric/Behavioral:  Negative for agitation, behavioral problems, confusion, decreased concentration, dysphoric mood, hallucinations, self-injury, sleep disturbance and suicidal ideas. The patient is not nervous/anxious and is not hyperactive.         Objective      Physical Exam  /60   Ht 182.9 cm (72\")   Wt 88.8 kg (195 lb 12.8 oz)   BMI 26.56 kg/m²     Body mass index is 26.56 kg/m².           General:   Mental Status:  Alert   Appearance: Cooperative, in no acute distress   Build and Nutrition: Well-nourished well-developed male   Orientation: Alert and oriented to person, place and time   Posture: Normal   Gait: Slow, cautious    Integument:   Right hip: No skin lesions, no rash, no ecchymosis   Left hip: No skin lesions, no rash, no ecchymosis    Neurologic:   Motor:  Right lower extremity: 5/5 quadriceps, hamstrings, ankle dorsiflexors, and ankle plantar flexors  Left lower extremity: 5/5 quadriceps, " hamstrings, ankle dorsiflexors, and ankle plantar flexors    Lower Extremities:   Right Hip:    Tenderness:  None    Swelling:  None    Crepitus: None    Atrophy:  None    Range of motion:  External Rotation: 20°       Internal Rotation: 20°       Flexion:  100°       Extension:  0°   Instability:  None  Deformities:  None  Functional testing: Negative Stinchfield  No leg length discrepancy   Left Hip:    Tenderness:  None    Swelling:  None    Crepitus:  None    Atrophy:  None    Range of motion:  External Rotation: 20°       Internal Rotation: 20°       Flexion:  100°       Extension:  0°   Instability:  None  Deformities:  None  Functional testing: Negative Stinchfield    No leg length discrepancy      Imaging/Studies      Imaging Results (Last 24 Hours)       ** No results found for the last 24 hours. **          XR HIPS BILATERAL W OR WO PELVIS 3-4 VIEW     Date of Exam: 11/1/2024 2:57 PM EDT     Indication: right hip pain     Comparison: None available.     Findings:  No evidence of acute fracture. No joint malalignment. Pelvic ring is intact. Curvilinear areas of sclerosis involving the bilateral femoral heads, consistent with avascular necrosis; no evidence of collapse. Moderate degenerative changes of the bilateral   hips. Degenerative changes of the lumbosacral spine.     IMPRESSION:  Impression:  Moderate degenerative changes of the bilateral hips with underlying avascular necrosis of the bilateral femoral heads without collapse. No acute fracture or joint malalignment.        Electronically Signed: Vic Braga MD    11/5/2024 11:37 AM EST    Workstation ID: [id number]    I reviewed the above imaging, and agree with findings.    Assessment and Plan     Diagnoses and all orders for this visit:    1. Primary osteoarthritis of both hips (Primary)    2. Avascular necrosis of bone of hip, right    3. Avascular necrosis of bone of hip, left    4. Chronic bilateral low back pain without sciatica  -      Ambulatory Referral to Orthopedic Surgery        1. Primary osteoarthritis of both hips    2. Avascular necrosis of bone of hip, right    3. Avascular necrosis of bone of hip, left    4. Chronic bilateral low back pain without sciatica          I reviewed my findings with the patient.  Although he has bilateral hip AVN with resultant arthritis, that is not his pain generator currently.  Pain is in the posterior aspect in the low back area and SI joint region.  I recommend a referral to a spine specialist, and I have referred him to see Dr. Barahona for an evaluation.  I will be happy to see him back in the future if it seems to be more of his hips in the future.    Return if symptoms worsen or fail to improve.      Jaya Renteria MD  12/09/24  13:16 EST      Dictated Utilizing Dragon Dictation

## 2024-12-10 ENCOUNTER — CLINICAL SUPPORT (OUTPATIENT)
Dept: INTERNAL MEDICINE | Facility: CLINIC | Age: 84
End: 2024-12-10
Payer: MEDICARE

## 2024-12-10 ENCOUNTER — HOSPITAL ENCOUNTER (OUTPATIENT)
Dept: PHYSICAL THERAPY | Facility: HOSPITAL | Age: 84
Setting detail: THERAPIES SERIES
Discharge: HOME | End: 2024-12-10
Payer: MEDICARE

## 2024-12-10 DIAGNOSIS — S80.11XA HEMATOMA OF RIGHT LOWER LEG: ICD-10-CM

## 2024-12-10 DIAGNOSIS — Z79.01 LONG TERM (CURRENT) USE OF ANTICOAGULANTS: ICD-10-CM

## 2024-12-10 DIAGNOSIS — R60.0 EDEMA OF RIGHT LOWER LEG: ICD-10-CM

## 2024-12-10 DIAGNOSIS — S81.801D OPEN WOUND OF RIGHT LOWER LEG, SUBSEQUENT ENCOUNTER: Primary | ICD-10-CM

## 2024-12-10 DIAGNOSIS — I87.2 VENOUS STASIS DERMATITIS OF RIGHT LOWER EXTREMITY: ICD-10-CM

## 2024-12-10 DIAGNOSIS — Z51.81 ENCOUNTER FOR THERAPEUTIC DRUG MONITORING: Primary | ICD-10-CM

## 2024-12-10 DIAGNOSIS — Z87.2 HISTORY OF CELLULITIS: ICD-10-CM

## 2024-12-10 LAB
INR PPP: 5.9 (ref 0.9–1.1)
PROTHROMBIN TIME: 71.1 SECONDS

## 2024-12-10 PROCEDURE — 85610 PROTHROMBIN TIME: CPT | Performed by: INTERNAL MEDICINE

## 2024-12-10 PROCEDURE — 29580 STRAPPING UNNA BOOT: CPT

## 2024-12-10 PROCEDURE — 97597 DBRDMT OPN WND 1ST 20 CM/<: CPT

## 2024-12-10 PROCEDURE — 36416 COLLJ CAPILLARY BLOOD SPEC: CPT | Performed by: INTERNAL MEDICINE

## 2024-12-10 NOTE — THERAPY PROGRESS REPORT/RE-CERT
Outpatient Rehabilitation - Wound/Debridement Progress Note   [place]     Patient Name: Toño Alcala  : 1940  MRN: [id number]  Today's Date: 12/10/2024                 Admit Date: 12/10/2024    Visit Dx:    ICD-10-CM ICD-9-CM   1. Open wound of right lower leg, subsequent encounter  S81.801D V58.89     891.0   2. Edema of right lower leg  R60.0 782.3   3. History of cellulitis  Z87.2 V13.3   4. Hematoma of right lower leg  S80.11XA 924.10   5. Venous stasis dermatitis of right lower extremity  I87.2 454.1   Lat R calf wound (closed):    Lat R ankle:      Patient Active Problem List   Diagnosis    CAD (coronary artery disease)    CVD (cardiovascular disease)    DVT (deep venous thrombosis)    Dyslipidemia    Arthritis    GERD (gastroesophageal reflux disease)    Mixed hyperlipidemia    Diabetic nephropathy associated with type 2 diabetes mellitus    Diabetic polyneuropathy associated with type 2 diabetes mellitus    Chronic kidney disease, stage IV (severe)    Vitamin D deficiency    Disc disorder of cervical region    Postthrombotic syndrome    Vertigo    Angina pectoris    Lumbosacral spondylosis without myelopathy    Psoriasis    Arthritis of elbow    Swelling of right lower extremity    Acute right-sided low back pain without sciatica    Hoarseness    Unifocal PVCs    Skin lesion of face    Essential hypertension    Medicare annual wellness visit, subsequent    Stage 3 chronic kidney disease due to benign hypertension    BMI 30.0-30.9,adult    Postherpetic neuralgia    Herpes zoster without complication    Leg edema, right    Chest pain    Encounter for removal of sutures    Chronic pain of left knee    Fall        Past Medical History:   Diagnosis Date    Acute diverticulitis 2020    Allergic 60 yrs ago    Arthritis     Arthritis of neck Fusion     Bilateral radial fractures     fracture bilateral radial heads    CAD (coronary artery disease)     Calculus of gallbladder without  cholecystitis without obstruction 01/29/2020    Cataract     Cervical disc disorder Fusion 1992    Cholelithiasis     Chronic kidney disease 2020    Clotting disorder     CVD (cardiovascular disease)     History of TIA 1989    Diabetes mellitus     DVT (deep venous thrombosis)     Right lower extremity DVT and pulmonary embolism in 06/2015, idiopathic    DVT of proximal lower limb 06/22/2015    proximal DVT right leg, small PE- anticoagulation    Dyslipidemia     Erectile dysfunction     Fracture 1952    H/o pf left forearm fracture    Fracture of right hand 1980    Fracture of wrist 1980    GERD (gastroesophageal reflux disease)     with hiatal hernia    HL (hearing loss)     Hx of angina pectoris     Hypertension     Normal renal angiography 02/16/11    Knee swelling Several years ago    Left wrist fracture 1953    Lumbosacral disc disease 1996    Osgood-Schlatter's disease 1955    Osteoarthritis     Right wrist fracture     Vertigo     Wears glasses         Past Surgical History:   Procedure Laterality Date    APPENDECTOMY  1957    BACK SURGERY      CARDIAC CATHETERIZATION  2011    with vein graft    CATARACT EXTRACTION Left     CERVICAL FUSION      CERVICAL FUSION  1992    C3-4    COLONOSCOPY  2013    CORONARY ARTERY BYPASS GRAFT  1994    HERNIA REPAIR Right 2011    inguinal    INGUINAL HERNIA REPAIR Left 10/29/2019    Procedure: INGUINAL HERNIA REPAIR LEFT;  Surgeon: Charisma Raines MD;  Location: [facility];  Service: General    LUMBAR LAMINECTOMY  1996    laminectomy, lumbar, L3    NECK SURGERY  1992    OTHER SURGICAL HISTORY      wrist surgery    SPINE SURGERY  1996    TONSILLECTOMY AND ADENOIDECTOMY  1945    TRIGGER POINT INJECTION  2001 - 2007    VASECTOMY  1972         EVALUATION   PT Ortho       Row Name 12/10/24 1300       Subjective    Subjective Comments Pt reports ankle only hurts during debridement, did not bring his bactine spray today because he thought he wouldn't need it.  Reports itching  of leg.  Has not contacted nephrologist about ongoing edema, states he is just going to wait until his follow-up in a couple weeks.  -       Subjective Pain    Able to rate subjective pain? yes  -    Pre-Treatment Pain Level 0  -    Post-Treatment Pain Level 1  -    Subjective Pain Comment min pain with debridement  -       Transfers    Sit-Stand [place] (Transfers) independent  -    Stand-Sit [place] (Transfers) independent  -    Comment, (Transfers) seated for tx  -       Gait/Stairs (Locomotion)    [place] Level (Gait) modified independence  -    Assistive Device (Gait) cane, straight  -              User Key  (r) = Recorded By, (t) = Taken By, (c) = Cosigned By      Initials Name Provider Type    Alesha Jason PT Physical Therapist                     [facility] Wound       Row Name 12/10/24 1300             Wound 10/04/24 1430 Right lateral ankle    Wound - Properties Group Placement Date: 10/04/24  - Placement Time: 1430  -JM Side: Right  - Orientation: lateral  - Location: ankle  -    Wound Image Images linked: 1  -      Dressing Appearance intact;moist drainage  -      Base moist;pink;white;yellow;slough  2-3 small open areas laterally, anterior blister sloughed off with shallow partial-thickness area undernath  -      Periwound pink;moist;redness;other (see comments);blistered  fragile hypopigmented skin, improved periwound redness, less maceration  -      Periwound Temperature warm  -      Periwound Skin Turgor soft  -      Edges irregular  -      Drainage Characteristics/Odor serous  -      Drainage Amount small  -      Care, Wound cleansed with;wound cleanser;debrided;yaa boot  -      Dressing Care dressing applied;collagen  jimmy, cast padding, UB  -      Periwound Care barrier ointment applied;cleansed with pH balanced cleanser;dry periwound area maintained  zguard  -      Retired Wound - Properties Group Placement Date: 10/04/24   -JM Placement Time: 1430  -JM Side: Right  -JM Orientation: lateral  -JM Location: ankle  -JM    Retired Wound - Properties Group Placement Date: 10/04/24  -JM Placement Time: 1430  -JM Side: Right  -JM Orientation: lateral  -JM Location: ankle  -JM    Retired Wound - Properties Group Date first assessed: 10/04/24  -JM Time first assessed: 1430  -JM Side: Right  -JM Location: ankle  -JM       Wound 09/12/24 1300 Right lateral leg Traumatic    Wound - Properties Group Placement Date: 09/12/24  - Placement Time: 1300  -MF Side: Right  -MF Orientation: lateral  -MF Location: leg  -MF Primary Wound Type: Traumatic  -MF    Wound Image Images linked: 1  -JM      Dressing Appearance dry;intact;no drainage  -      Base epithelialization;pink;scab  -      Periwound intact;dry;redness;swelling  faint redness, less shiny today  -      Periwound Temperature warm  -      Periwound Skin Turgor firm  -      Edges irregular  -      Drainage Amount none  -      Care, Wound cleansed with;wound cleanser;debrided;yaa boot  -      Dressing Care dressing applied  UB  -      Periwound Care cleansed with pH balanced cleanser;dry periwound area maintained;barrier ointment applied  zguard  -      Retired Wound - Properties Group Placement Date: 09/12/24  - Placement Time: 1300  -MF Side: Right  -MF Orientation: lateral  -MF Location: leg  -MF Primary Wound Type: Traumatic  -MF    Retired Wound - Properties Group Placement Date: 09/12/24  - Placement Time: 1300  -MF Side: Right  -MF Orientation: lateral  -MF Location: leg  -MF Primary Wound Type: Traumatic  -MF    Retired Wound - Properties Group Date first assessed: 09/12/24  - Time first assessed: 1300  -MF Side: Right  -MF Location: leg  -MF Primary Wound Type: Traumatic  -MF              User Key  (r) = Recorded By, (t) = Taken By, (c) = Cosigned By      Initials Name Provider Type    Jaya Doshi, PT Physical Therapist    Alesha Jason PT  "Physical Therapist                   Lymphedema       Row Name 12/10/24 1300             Lymphedema Edema Assessment    Ptting Edema Category By severity  -      Pitting Edema Moderate;Mild  mod prox and distal to UB  -         Skin Changes/Observations    Location/Assessment Lower Extremity  -      Lower Extremity Conditions right:;shiny;crust;inflamed;fragile  -      Lower Extremity Color/Pigment right:;erythema;red;blanchable;hypopigmented;hyperpigmented  -         Compression/Skin Care    Compression/Skin Care skin care;wrapping location;bandaging  -      Skin Care washed/dried;lotion applied  -      Wrapping Location lower extremity  -      Wrapping Location LE right:;foot to knee  -      Wrapping Comments cast padding to ankle under unna boot, UB in clamshell, 4\" coban, size 6 spandage  -                User Key  (r) = Recorded By, (t) = Taken By, (c) = Cosigned By      Initials Name Provider Type    Alesha Jason, PT Physical Therapist                    WOUND DEBRIDEMENT  Total area of Debridement: 2cmsq  Debridement Site 1  Location- Site 1: Lateral RLE  Selective Debridement- Site 1: Wound Surface <20cmsq  Instruments- Site 1: tweezers, other (comment) (wipes, gauze)  Excised Tissue Description- Site 1: minimum, slough, other (comment) (loose crusts, ruptured blister)  Bleeding- Site 1: none              Therapy Education       Row Name 12/10/24 1300             Therapy Education    Education Details Reinforced leg elevation to reduce edema.  -KACIE      Given Bandaging/dressing change;Pain management;Edema management  -      Program Reinforced;Modified  -KACIE      How Provided Verbal;Demonstration  -KACIE      Provided to Patient  -KACIE      Level of Understanding Teach back education performed;Verbalized  -                User Key  (r) = Recorded By, (t) = Taken By, (c) = Cosigned By      Initials Name Provider Type    Alesha Jason, PT Physical Therapist              "       Recommendation and Plan   PT Assessment/Plan       Row Name 12/10/24 1300          PT Assessment    Functional Limitations Performance in self-care ADL;Other (comment)  -     Impairments Integumentary integrity;Impaired venous circulation;Edema  -     Assessment Comments Pt with improved RLE edema and redness with resuming of unna boot.  Pt still with 2-3 small draining areas at lateral ankle where he has fragile hypopigmented skin.  PT continued with contact layer of cast padding over fragile areas prior to UB placement, continued to reinforce edema management and follow-up with nephrology.  Pt has met goals for RLE wound closure, ongoing and progressing toward remaining goals for edema reduction.  Continue with current POC for wound healing for transition to independent compression stocking use.  -     Rehab Potential Fair  -     Patient/caregiver participated in establishment of treatment plan and goals Yes  -     Patient would benefit from skilled therapy intervention Yes  -        PT Plan    PT Frequency 2x/week  -     Physical Therapy Interventions (Optional Details) patient/family education;wound care  -     PT Plan Comments debridement, UB  -               User Key  (r) = Recorded By, (t) = Taken By, (c) = Cosigned By      Initials Name Provider Type    Alesha Jason, PT Physical Therapist                    Goals   PT OP Goals       Row Name 12/10/24 1337          PT Short Term Goals    STG Date to Achieve 10/27/24  -     STG 1 Pt to have no hematoma material to wound base to improve healing potential.  -     STG 1 Progress Met  -     STG 2 Pt to have no significant undermining to allow for faster wound healing.  -     STG 2 Progress Met  -     STG 3 Pt will demonstrate only mild RLE edema to indicate appropriate edema management.  -     STG 3 Progress Ongoing  -        Long Term Goals    LTG Date to Achieve 12/11/24  -     LTG 1 Pt to have no RLE skin  breakdown to allow for transition to compression stockings for long term edema management  -     LTG 1 Progress Ongoing  -     LTG 2 Decrease RLE wound size by 75% as evidence of wound healing.  -     LTG 2 Progress Met  -     LTG 3 Pt will demonstrate only trace edema to the RLE to allow for transition to long term compression system.  -     LTG 3 Progress Ongoing  -        Time Calculation    PT Goal Re-Cert Due Date 03/10/25  -               User Key  (r) = Recorded By, (t) = Taken By, (c) = Cosigned By      Initials Name Provider Type    Alesha Jason, PT Physical Therapist                    PT Goal Re-Cert Due Date: 03/10/25  PT Short Term Goals  STG Date to Achieve: 10/27/24  STG 1: Pt to have no hematoma material to wound base to improve healing potential.  STG 1 Progress: Met  STG 2: Pt to have no significant undermining to allow for faster wound healing.  STG 2 Progress: Met  STG 3: Pt will demonstrate only mild RLE edema to indicate appropriate edema management.  STG 3 Progress: Ongoing  Long Term Goals  LTG Date to Achieve: 12/11/24  LTG 1: Pt to have no RLE skin breakdown to allow for transition to compression stockings for long term edema management  LTG 1 Progress: Ongoing  LTG 2: Decrease RLE wound size by 75% as evidence of wound healing.  LTG 2 Progress: Met  LTG 3: Pt will demonstrate only trace edema to the RLE to allow for transition to long term compression system.  LTG 3 Progress: Ongoing      Time Calculation: Start Time: 1300  Untimed Charges  53531-Uzzl Boot: 20  88248-Ywopqzwfp debridement: 10  Total Minutes  Untimed Charges Total Minutes: 30   Total Minutes: 30  Therapy Charges for Today       Code Description Service Date Service Provider Modifiers Qty    57051222575 HC EDWIN DEBRIDE OPEN WOUND UP TO 20CM 12/10/2024 Alesha Nunez, PT GP, KX 1    35198918088 HC PT STAPPING UNNA BOOT 12/10/2024 Alesha Nunez, PT GP, KX 1                    Alesha Nunez,  PT  12/10/2024

## 2024-12-11 ENCOUNTER — TELEPHONE (OUTPATIENT)
Dept: INTERNAL MEDICINE | Facility: CLINIC | Age: 84
End: 2024-12-11
Payer: MEDICARE

## 2024-12-11 ENCOUNTER — TELEPHONE (OUTPATIENT)
Dept: ORTHOPEDIC SURGERY | Facility: CLINIC | Age: 84
End: 2024-12-11

## 2024-12-11 NOTE — TELEPHONE ENCOUNTER
----- Message from Radu Francis sent at 12/11/2024  2:10 PM EST -----  INR is high again at 5.9.  I want him to hold warfarin for the next 3 days, then resume at 1/2 tablet a day, which is 2.5 mg a day.  He should repeat INR next Wednesday.

## 2024-12-11 NOTE — TELEPHONE ENCOUNTER
Caller: MIGDALIA AREVALO    Phone Number: [phone]    Reason for Call:   PATIENT REACH OUT TO DR. SAUNDERS'S OFFICE THEY STATED THEY DO NOT HAVE A REFERRAL FOR HIM. PLEASE RE FAX REFERRAL. PLEASE PROVIDE MOBILE NUMBER FOR PATIENT TO DR. SAUNDERS'S OFFICE AS WELL.

## 2024-12-13 ENCOUNTER — HOSPITAL ENCOUNTER (OUTPATIENT)
Dept: PHYSICAL THERAPY | Facility: HOSPITAL | Age: 84
Setting detail: THERAPIES SERIES
Discharge: HOME | End: 2024-12-13
Payer: MEDICARE

## 2024-12-13 DIAGNOSIS — S81.801D OPEN WOUND OF RIGHT LOWER LEG, SUBSEQUENT ENCOUNTER: Primary | ICD-10-CM

## 2024-12-13 DIAGNOSIS — Z87.2 HISTORY OF CELLULITIS: ICD-10-CM

## 2024-12-13 DIAGNOSIS — R60.0 EDEMA OF RIGHT LOWER LEG: ICD-10-CM

## 2024-12-13 DIAGNOSIS — S80.11XA HEMATOMA OF RIGHT LOWER LEG: ICD-10-CM

## 2024-12-13 DIAGNOSIS — I87.2 VENOUS STASIS DERMATITIS OF RIGHT LOWER EXTREMITY: ICD-10-CM

## 2024-12-13 PROCEDURE — 97597 DBRDMT OPN WND 1ST 20 CM/<: CPT

## 2024-12-13 PROCEDURE — 29580 STRAPPING UNNA BOOT: CPT

## 2024-12-13 NOTE — THERAPY WOUND CARE TREATMENT
Outpatient Rehabilitation - Wound/Debridement Treatment Note   [place]     Patient Name: Toño Alcala  : 1940  MRN: [id number]  Today's Date: 2024                 Admit Date: 2024    Visit Dx:    ICD-10-CM ICD-9-CM   1. Open wound of right lower leg, subsequent encounter  S81.801D V58.89     891.0   2. Edema of right lower leg  R60.0 782.3   3. History of cellulitis  Z87.2 V13.3   4. Hematoma of right lower leg  S80.11XA 924.10   5. Venous stasis dermatitis of right lower extremity  I87.2 454.1       Patient Active Problem List   Diagnosis    CAD (coronary artery disease)    CVD (cardiovascular disease)    DVT (deep venous thrombosis)    Dyslipidemia    Arthritis    GERD (gastroesophageal reflux disease)    Mixed hyperlipidemia    Diabetic nephropathy associated with type 2 diabetes mellitus    Diabetic polyneuropathy associated with type 2 diabetes mellitus    Chronic kidney disease, stage IV (severe)    Vitamin D deficiency    Disc disorder of cervical region    Postthrombotic syndrome    Vertigo    Angina pectoris    Lumbosacral spondylosis without myelopathy    Psoriasis    Arthritis of elbow    Swelling of right lower extremity    Acute right-sided low back pain without sciatica    Hoarseness    Unifocal PVCs    Skin lesion of face    Essential hypertension    Medicare annual wellness visit, subsequent    Stage 3 chronic kidney disease due to benign hypertension    BMI 30.0-30.9,adult    Postherpetic neuralgia    Herpes zoster without complication    Leg edema, right    Chest pain    Encounter for removal of sutures    Chronic pain of left knee    Fall        Past Medical History:   Diagnosis Date    Acute diverticulitis 2020    Allergic 60 yrs ago    Arthritis     Arthritis of neck Fusion 1992    Bilateral radial fractures     fracture bilateral radial heads    CAD (coronary artery disease)     Calculus of gallbladder without cholecystitis without obstruction 2020     Cataract     Cervical disc disorder Fusion 1992    Cholelithiasis     Chronic kidney disease 2020    Clotting disorder     CVD (cardiovascular disease)     History of TIA 1989    Diabetes mellitus     DVT (deep venous thrombosis)     Right lower extremity DVT and pulmonary embolism in 06/2015, idiopathic    DVT of proximal lower limb 06/22/2015    proximal DVT right leg, small PE- anticoagulation    Dyslipidemia     Erectile dysfunction     Fracture 1952    H/o pf left forearm fracture    Fracture of right hand 1980    Fracture of wrist 1980    GERD (gastroesophageal reflux disease)     with hiatal hernia    HL (hearing loss)     Hx of angina pectoris     Hypertension     Normal renal angiography 02/16/11    Knee swelling Several years ago    Left wrist fracture 1953    Lumbosacral disc disease 1996    Osgood-Schlatter's disease 1955    Osteoarthritis     Right wrist fracture     Vertigo     Wears glasses         Past Surgical History:   Procedure Laterality Date    APPENDECTOMY  1957    BACK SURGERY      CARDIAC CATHETERIZATION  2011    with vein graft    CATARACT EXTRACTION Left     CERVICAL FUSION      CERVICAL FUSION  1992    C3-4    COLONOSCOPY  2013    CORONARY ARTERY BYPASS GRAFT  1994    HERNIA REPAIR Right 2011    inguinal    INGUINAL HERNIA REPAIR Left 10/29/2019    Procedure: INGUINAL HERNIA REPAIR LEFT;  Surgeon: Charisma Raines MD;  Location: [facility];  Service: General    LUMBAR LAMINECTOMY  1996    laminectomy, lumbar, L3    NECK SURGERY  1992    OTHER SURGICAL HISTORY      wrist surgery    SPINE SURGERY  1996    TONSILLECTOMY AND ADENOIDECTOMY  1945    TRIGGER POINT INJECTION  2001 - 2007    VASECTOMY  1972         EVALUATION   PT Ortho       Row Name 12/13/24 1300       Subjective    Subjective Comments No changes, asking if he can just put a couple bandaids over the wounds and wear his compression stockings.  Thinks the unna boot is making his proximal leg swell.  -JM       Subjective Pain     Able to rate subjective pain? yes  -JM    Pre-Treatment Pain Level 0  -JM    Post-Treatment Pain Level 0  -JM    Subjective Pain Comment bactine spray applied to open areas  -JM       Transfers    Sit-Stand [place] (Transfers) independent  -JM    Stand-Sit [place] (Transfers) independent  -    Comment, (Transfers) seated for tx  -JM       Gait/Stairs (Locomotion)    [place] Level (Gait) modified independence  -    Assistive Device (Gait) cane, straight  -JM              User Key  (r) = Recorded By, (t) = Taken By, (c) = Cosigned By      Initials Name Provider Type    Alesha Jason, PT Physical Therapist                     LDA Wound       Row Name 12/13/24 1300             Wound 10/04/24 1430 Right lateral ankle    Wound - Properties Group Placement Date: 10/04/24  - Placement Time: 1430 - Side: Right  - Orientation: lateral  - Location: ankle  -    Dressing Appearance intact;moist drainage  3 spots of drainage on cast padding at lat ankle  -      Base moist;pink;white;yellow;slough  2-3 small open areas laterally, shallow partial-thickness area anteriorly  -      Periwound pink;moist;redness;other (see comments);blistered  fragile hypopigmented skin, improved periwound redness, less maceration  -      Periwound Temperature warm  -      Periwound Skin Turgor soft  -      Edges irregular  -      Drainage Characteristics/Odor serous  -JM      Drainage Amount small  -JM      Care, Wound cleansed with;wound cleanser;debrided;yaa boot  -JM      Dressing Care dressing applied;collagen  jimmy, UB  -JM      Periwound Care barrier ointment applied;cleansed with pH balanced cleanser;dry periwound area maintained;barrier film applied  nosting spray x2 layers, zguard  -      Retired Wound - Properties Group Placement Date: 10/04/24  - Placement Time: 1430 -JM Side: Right  - Orientation: lateral  - Location: ankle  -    Retired Wound - Properties Group Placement  Date: 10/04/24  - Placement Time: 1430  -JM Side: Right  -JM Orientation: lateral  -JM Location: ankle  -JM    Retired Wound - Properties Group Date first assessed: 10/04/24  - Time first assessed: 1430  -JM Side: Right  -JM Location: ankle  -JM       Wound 09/12/24 1300 Right lateral leg Traumatic    Wound - Properties Group Placement Date: 09/12/24  - Placement Time: 1300  -MF Side: Right  -MF Orientation: lateral  -MF Location: leg  -MF Primary Wound Type: Traumatic  -MF    Dressing Appearance dry;intact;no drainage  -      Base clean;closed/resurfaced;epithelialization;pink  fully intact after debridement of loose crust  -      Periwound intact;dry;redness;swelling  mild redness  -      Periwound Temperature warm  -      Periwound Skin Turgor firm  -      Edges irregular  -      Drainage Amount none  -      Care, Wound cleansed with;soap and water;debrided;yaa boot  -      Dressing Care dressing changed  UB  -      Periwound Care cleansed with pH balanced cleanser;dry periwound area maintained;barrier ointment applied  zguard  -      Retired Wound - Properties Group Placement Date: 09/12/24  - Placement Time: 1300  -MF Side: Right  -MF Orientation: lateral  -MF Location: leg  -MF Primary Wound Type: Traumatic  -MF    Retired Wound - Properties Group Placement Date: 09/12/24  - Placement Time: 1300  -MF Side: Right  -MF Orientation: lateral  -MF Location: leg  -MF Primary Wound Type: Traumatic  -MF    Retired Wound - Properties Group Date first assessed: 09/12/24  - Time first assessed: 1300  -MF Side: Right  -MF Location: leg  -MF Primary Wound Type: Traumatic  -MF              User Key  (r) = Recorded By, (t) = Taken By, (c) = Cosigned By      Initials Name Provider Type    Jaya Doshi, PT Physical Therapist    Alesha Jason PT Physical Therapist                   Lymphedema       Row Name 12/13/24 1300             Lymphedema Edema Assessment    Ptting Edema  "Category By severity  -      Pitting Edema Moderate;Mild  mod prox to UB  -         Skin Changes/Observations    Location/Assessment Lower Extremity  -      Lower Extremity Conditions right:;shiny;crust;inflamed;fragile  -      Lower Extremity Color/Pigment right:;erythema;red;blanchable;hypopigmented;hyperpigmented  -         Compression/Skin Care    Compression/Skin Care skin care;wrapping location;bandaging  -      Skin Care washed/dried;lotion applied  -      Wrapping Location lower extremity  -      Wrapping Location LE right:;foot to knee  -      Wrapping Comments cast padding to ankle under unna boot, UB in [facility], 4\" coban, size 6 spandage  -                User Key  (r) = Recorded By, (t) = Taken By, (c) = Cosigned By      Initials Name Provider Type    Alesha Jason, PT Physical Therapist                    WOUND DEBRIDEMENT  Total area of Debridement: 2cmsq  Debridement Site 1  Location- Site 1: Lateral RLE  Selective Debridement- Site 1: Wound Surface <20cmsq  Instruments- Site 1: tweezers, scissors, other (comment) (wipes, gauze)  Excised Tissue Description- Site 1: scant, slough, minimum, other (comment) (loose crusts)  Bleeding- Site 1: none              Therapy Education       Row Name 12/13/24 1300             Therapy Education    Education Details PT continues to reinforce follow-up with nephrology about fluid retention,  elevate leg to reduce edema.  PT does not recommend using personal compression stockings due to not enough compression.  Continuation of current POC.  -      Given Bandaging/dressing change;Pain management;Edema management  -      Program Reinforced;Modified  -      How Provided Verbal;Demonstration  -      Provided to Patient  -      Level of Understanding Teach back education performed;Verbalized  -                User Key  (r) = Recorded By, (t) = Taken By, (c) = Cosigned By      Initials Name Provider Type    Alesha Jason, PT " Physical Therapist                    Recommendation and Plan   PT Assessment/Plan       Row Name 12/13/24 1300          PT Assessment    Functional Limitations Performance in self-care ADL;Other (comment)  -     Impairments Integumentary integrity;Impaired venous circulation;Edema  -     Assessment Comments SLow improvement in lateral R ankle area, with 3 small areas of drainage.  Lat calf wound fully closed, less redness of RLE since resuming unna boot, though pt with proximal edema.  PT recommended continued unna boot to increase venous return and promote wound healing.  PT added nosting spray to lat ankle area x2 layers under zguard to protect fragile skin.  Continue with POC.  Recommended nephrology follow up due to ongoing edema.  -     Rehab Potential Fair  -     Patient/caregiver participated in establishment of treatment plan and goals Yes  -     Patient would benefit from skilled therapy intervention Yes  -        PT Plan    PT Frequency 2x/week  -     Physical Therapy Interventions (Optional Details) patient/family education;wound care  -     PT Plan Comments debridement, UB  -               User Key  (r) = Recorded By, (t) = Taken By, (c) = Cosigned By      Initials Name Provider Type    Alesha Jason, PT Physical Therapist                    Goals   PT OP Goals       Row Name 12/13/24 1352          Time Calculation    PT Goal Re-Cert Due Date 03/10/25  -               User Key  (r) = Recorded By, (t) = Taken By, (c) = Cosigned By      Initials Name Provider Type    Alesha Jason, PT Physical Therapist                    PT Goal Re-Cert Due Date: 03/10/25            Time Calculation: Start Time: 1300  Untimed Charges  10457-Ekdv Boot: 20  93082-Ybcqbfkmt debridement: 10  Total Minutes  Untimed Charges Total Minutes: 30   Total Minutes: 30  Therapy Charges for Today       Code Description Service Date Service Provider Modifiers Qty    39493554725  EDWIN DEBRIDE OPEN  WOUND UP TO 20CM 12/13/2024 Alesha Nunez, PT GP, KX 1    21594308343 HC PT STAPPING UNNA BOOT 12/13/2024 Alesha Nunez, PT GP, KX 1                    Alesha Nunez, PT  12/13/2024

## 2024-12-17 ENCOUNTER — HOSPITAL ENCOUNTER (OUTPATIENT)
Dept: PHYSICAL THERAPY | Facility: HOSPITAL | Age: 84
Setting detail: THERAPIES SERIES
Discharge: HOME | End: 2024-12-17
Payer: MEDICARE

## 2024-12-17 DIAGNOSIS — Z87.2 HISTORY OF CELLULITIS: ICD-10-CM

## 2024-12-17 DIAGNOSIS — I87.2 VENOUS STASIS DERMATITIS OF RIGHT LOWER EXTREMITY: ICD-10-CM

## 2024-12-17 DIAGNOSIS — R60.0 EDEMA OF RIGHT LOWER LEG: ICD-10-CM

## 2024-12-17 DIAGNOSIS — S81.801D OPEN WOUND OF RIGHT LOWER LEG, SUBSEQUENT ENCOUNTER: Primary | ICD-10-CM

## 2024-12-17 DIAGNOSIS — S80.11XA HEMATOMA OF RIGHT LOWER LEG: ICD-10-CM

## 2024-12-17 PROCEDURE — 97597 DBRDMT OPN WND 1ST 20 CM/<: CPT

## 2024-12-17 PROCEDURE — 29580 STRAPPING UNNA BOOT: CPT

## 2024-12-17 NOTE — THERAPY WOUND CARE TREATMENT
Outpatient Rehabilitation - Wound/Debridement Treatment Note  [facility]     Patient Name: Toño Alcala  : 1940  MRN: [id number]  Today's Date: 2024                 Admit Date: 2024    Visit Dx:    ICD-10-CM ICD-9-CM   1. Open wound of right lower leg, subsequent encounter  S81.801D V58.89     891.0   2. Edema of right lower leg  R60.0 782.3   3. History of cellulitis  Z87.2 V13.3   4. Hematoma of right lower leg  S80.11XA 924.10   5. Venous stasis dermatitis of right lower extremity  I87.2 454.1       Patient Active Problem List   Diagnosis    CAD (coronary artery disease)    CVD (cardiovascular disease)    DVT (deep venous thrombosis)    Dyslipidemia    Arthritis    GERD (gastroesophageal reflux disease)    Mixed hyperlipidemia    Diabetic nephropathy associated with type 2 diabetes mellitus    Diabetic polyneuropathy associated with type 2 diabetes mellitus    Chronic kidney disease, stage IV (severe)    Vitamin D deficiency    Disc disorder of cervical region    Postthrombotic syndrome    Vertigo    Angina pectoris    Lumbosacral spondylosis without myelopathy    Psoriasis    Arthritis of elbow    Swelling of right lower extremity    Acute right-sided low back pain without sciatica    Hoarseness    Unifocal PVCs    Skin lesion of face    Essential hypertension    Medicare annual wellness visit, subsequent    Stage 3 chronic kidney disease due to benign hypertension    BMI 30.0-30.9,adult    Postherpetic neuralgia    Herpes zoster without complication    Leg edema, right    Chest pain    Encounter for removal of sutures    Chronic pain of left knee    Fall        Past Medical History:   Diagnosis Date    Acute diverticulitis 2020    Allergic 60 yrs ago    Arthritis     Arthritis of neck Fusion 1992    Bilateral radial fractures     fracture bilateral radial heads    CAD (coronary artery disease)     Calculus of gallbladder without cholecystitis without obstruction 2020     Cataract     Cervical disc disorder Fusion 1992    Cholelithiasis     Chronic kidney disease 2020    Clotting disorder     CVD (cardiovascular disease)     History of TIA 1989    Diabetes mellitus     DVT (deep venous thrombosis)     Right lower extremity DVT and pulmonary embolism in 06/2015, idiopathic    DVT of proximal lower limb 06/22/2015    proximal DVT right leg, small PE- anticoagulation    Dyslipidemia     Erectile dysfunction     Fracture 1952    H/o pf left forearm fracture    Fracture of right hand 1980    Fracture of wrist 1980    GERD (gastroesophageal reflux disease)     with hiatal hernia    HL (hearing loss)     Hx of angina pectoris     Hypertension     Normal renal angiography 02/16/11    Knee swelling Several years ago    Left wrist fracture 1953    Lumbosacral disc disease 1996    Osgood-Schlatter's disease 1955    Osteoarthritis     Right wrist fracture     Vertigo     Wears glasses         Past Surgical History:   Procedure Laterality Date    APPENDECTOMY  1957    BACK SURGERY      CARDIAC CATHETERIZATION  2011    with vein graft    CATARACT EXTRACTION Left     CERVICAL FUSION      CERVICAL FUSION  1992    C3-4    COLONOSCOPY  2013    CORONARY ARTERY BYPASS GRAFT  1994    HERNIA REPAIR Right 2011    inguinal    INGUINAL HERNIA REPAIR Left 10/29/2019    Procedure: INGUINAL HERNIA REPAIR LEFT;  Surgeon: Charisma Raines MD;  Location: [facility];  Service: General    LUMBAR LAMINECTOMY  1996    laminectomy, lumbar, L3    NECK SURGERY  1992    OTHER SURGICAL HISTORY      wrist surgery    SPINE SURGERY  1996    TONSILLECTOMY AND ADENOIDECTOMY  1945    TRIGGER POINT INJECTION  2001 - 2007    VASECTOMY  1972         EVALUATION   PT Ortho       Row Name 12/17/24 1245       Subjective    Subjective Comments No new complaints or changes, no pain at present.  -JM       Subjective Pain    Able to rate subjective pain? yes  -JM    Pre-Treatment Pain Level 0  -JM    Post-Treatment Pain Level 0  -JM        Transfers    Sit-Stand [place] (Transfers) independent  -    Stand-Sit [place] (Transfers) independent  -    Comment, (Transfers) seated for tx  -JM       Gait/Stairs (Locomotion)    [place] Level (Gait) modified independence  -    Assistive Device (Gait) cane, straight  -JM              User Key  (r) = Recorded By, (t) = Taken By, (c) = Cosigned By      Initials Name Provider Type    Alesha Jason PT Physical Therapist                     [facility] Wound       Row Name 12/17/24 1245             Wound 10/04/24 1430 Right lateral ankle    Wound - Properties Group Placement Date: 10/04/24  - Placement Time: 1430  -JM Side: Right  - Orientation: lateral  - Location: ankle  -    Dressing Appearance intact;moist drainage  -JM      Base moist;pink;white;yellow;slough  2 shallow moist areas proximal lat ankle, remainder dry/crusted  -JM      Periwound pink;moist;redness;other (see comments);blistered  fragile hypopigmented skin, improved periwound redness, less maceration  -      Periwound Temperature warm  -      Periwound Skin Turgor soft  -      Edges irregular  -      Drainage Characteristics/Odor serous  -JM      Drainage Amount small  -JM      Care, Wound cleansed with;wound cleanser;debrided;yaa boot  -JM      Dressing Care dressing applied;collagen  jimmy, cast padding, UB  -JM      Periwound Care cleansed with pH balanced cleanser;dry periwound area maintained;barrier film applied;barrier ointment applied  nosting spray x2 layers, zguard  -JM      Retired Wound - Properties Group Placement Date: 10/04/24  - Placement Time: 1430  -JM Side: Right  - Orientation: lateral  - Location: ankle  -    Retired Wound - Properties Group Placement Date: 10/04/24  - Placement Time: 1430  -JM Side: Right  - Orientation: lateral  - Location: ankle  -    Retired Wound - Properties Group Date first assessed: 10/04/24  - Time first assessed: 1430  - Side: Right  -  Location: ankle  -JM       Wound 09/12/24 1300 Right lateral leg Traumatic    Wound - Properties Group Placement Date: 09/12/24  - Placement Time: 1300  -MF Side: Right  -MF Orientation: lateral  -MF Location: leg  -MF Primary Wound Type: Traumatic  -MF    Base clean;closed/resurfaced;epithelialization;pink  fully intact after debridement of loose crust  -      Periwound intact;dry;redness;swelling  mild redness  -      Periwound Temperature warm  -      Periwound Skin Turgor firm  -      Edges irregular  -      Drainage Amount none  -      Retired Wound - Properties Group Placement Date: 09/12/24  - Placement Time: 1300  -MF Side: Right  -MF Orientation: lateral  -MF Location: leg  -MF Primary Wound Type: Traumatic  -MF    Retired Wound - Properties Group Placement Date: 09/12/24  - Placement Time: 1300  -MF Side: Right  -MF Orientation: lateral  -MF Location: leg  -MF Primary Wound Type: Traumatic  -MF    Retired Wound - Properties Group Date first assessed: 09/12/24  - Time first assessed: 1300  -MF Side: Right  -MF Location: leg  -MF Primary Wound Type: Traumatic  -MF              User Key  (r) = Recorded By, (t) = Taken By, (c) = Cosigned By      Initials Name Provider Type     Jaya Bower, PT Physical Therapist    Alesha Jason, PT Physical Therapist                   Lymphedema       Row Name 12/17/24 1245             Lymphedema Edema Assessment    Ptting Edema Category By severity  -      Pitting Edema Moderate;Mild  mod prox to UB  -         Skin Changes/Observations    Location/Assessment Lower Extremity  -      Lower Extremity Conditions right:;shiny;crust;inflamed;fragile  -      Lower Extremity Color/Pigment right:;erythema;red;blanchable;hypopigmented;hyperpigmented  -         Compression/Skin Care    Compression/Skin Care skin care;wrapping location;bandaging  -      Skin Care washed/dried;lotion applied  -      Wrapping Location lower extremity  -   "    Wrapping Location LE right:;foot to knee  -      Wrapping Comments cast padding to ankle under unna boot, UB in clamshell, 4\" coban, size 6 spandage  -                User Key  (r) = Recorded By, (t) = Taken By, (c) = Cosigned By      Initials Name Provider Type    Alesha Jason, PT Physical Therapist                    WOUND DEBRIDEMENT  Total area of Debridement: 2cmsq  Debridement Site 1  Location- Site 1: Lateral RLE  Selective Debridement- Site 1: Wound Surface <20cmsq  Instruments- Site 1: tweezers  Excised Tissue Description- Site 1: minimum, slough, other (comment) (loose crusts)  Bleeding- Site 1: none              Therapy Education       Row Name 12/17/24 6363             Therapy Education    Education Details Continue frequent leg elevation  -      Given Bandaging/dressing change;Pain management;Edema management  -      Program Reinforced  -      How Provided Verbal;Demonstration  -      Provided to Patient  -      Level of Understanding Teach back education performed;Verbalized  -                User Key  (r) = Recorded By, (t) = Taken By, (c) = Cosigned By      Initials Name Provider Type    Alesha Jason, PT Physical Therapist                    Recommendation and Plan   PT Assessment/Plan       Row Name 12/17/24 8963          PT Assessment    Functional Limitations Performance in self-care ADL;Other (comment)  -     Impairments Integumentary integrity;Impaired venous circulation;Edema  -     Assessment Comments Lat ankle skin integrity improving with less drainage and only 2 shallow moist areas remaining, less erythema/edema.  Pt continues to require skilled PT for debridement of slough and UB to increase venous return and promote wound healing.  -     Rehab Potential Fair  -     Patient/caregiver participated in establishment of treatment plan and goals Yes  -     Patient would benefit from skilled therapy intervention Yes  -        PT Plan    PT " Frequency 2x/week  -     Physical Therapy Interventions (Optional Details) patient/family education;wound care  -     PT Plan Comments debridement, UB  -               User Key  (r) = Recorded By, (t) = Taken By, (c) = Cosigned By      Initials Name Provider Type    Alesha Jason, PT Physical Therapist                    Goals   PT OP Goals       Row Name 12/17/24 1329          Time Calculation    PT Goal Re-Cert Due Date 03/10/25  -               User Key  (r) = Recorded By, (t) = Taken By, (c) = Cosigned By      Initials Name Provider Type    Alesha Jason, PT Physical Therapist                    PT Goal Re-Cert Due Date: 03/10/25            Time Calculation: Start Time: 1245  Untimed Charges  95241-Qqui Boot: 15  32710-Pjgrhogfk debridement: 10  Total Minutes  Untimed Charges Total Minutes: 25   Total Minutes: 25  Therapy Charges for Today       Code Description Service Date Service Provider Modifiers Qty    18322783546 HC EDWIN DEBRIDE OPEN WOUND UP TO 20CM 12/17/2024 Alesha Nunez, PT GP, KX 1    [id number] HC PT STAPPING UNNA BOOT 12/17/2024 Alesha Nunez, PT GP, KX 1                    Alesha Nunez, PT  12/17/2024

## 2024-12-19 ENCOUNTER — CLINICAL SUPPORT (OUTPATIENT)
Dept: INTERNAL MEDICINE | Facility: CLINIC | Age: 84
End: 2024-12-19
Payer: MEDICARE

## 2024-12-19 DIAGNOSIS — Z51.81 THERAPEUTIC DRUG MONITORING: Primary | ICD-10-CM

## 2024-12-19 LAB
INR PPP: 2.4 (ref 0.9–1.1)
PROTHROMBIN TIME: 29.3 SECONDS

## 2024-12-19 PROCEDURE — 36416 COLLJ CAPILLARY BLOOD SPEC: CPT | Performed by: INTERNAL MEDICINE

## 2024-12-19 PROCEDURE — 85610 PROTHROMBIN TIME: CPT | Performed by: INTERNAL MEDICINE

## 2024-12-20 ENCOUNTER — TRANSCRIBE ORDERS (OUTPATIENT)
Dept: PHYSICAL THERAPY | Facility: HOSPITAL | Age: 84
End: 2024-12-20
Payer: MEDICARE

## 2024-12-20 ENCOUNTER — HOSPITAL ENCOUNTER (OUTPATIENT)
Dept: PHYSICAL THERAPY | Facility: HOSPITAL | Age: 84
Setting detail: THERAPIES SERIES
Discharge: HOME | End: 2024-12-20
Payer: MEDICARE

## 2024-12-20 DIAGNOSIS — I87.2 VENOUS STASIS DERMATITIS OF RIGHT LOWER EXTREMITY: ICD-10-CM

## 2024-12-20 DIAGNOSIS — S80.11XA HEMATOMA OF RIGHT LOWER LEG: ICD-10-CM

## 2024-12-20 DIAGNOSIS — S81.801D OPEN WOUND OF RIGHT LOWER LEG, SUBSEQUENT ENCOUNTER: Primary | ICD-10-CM

## 2024-12-20 DIAGNOSIS — R60.0 EDEMA OF RIGHT LOWER LEG: ICD-10-CM

## 2024-12-20 DIAGNOSIS — Z87.2 HISTORY OF CELLULITIS: ICD-10-CM

## 2024-12-20 PROCEDURE — 29580 STRAPPING UNNA BOOT: CPT

## 2024-12-20 PROCEDURE — 97597 DBRDMT OPN WND 1ST 20 CM/<: CPT

## 2024-12-20 NOTE — THERAPY WOUND CARE TREATMENT
Outpatient Rehabilitation - Wound/Debridement Treatment Note  [facility]     Patient Name: Toño Alcala  : 1940  MRN: [id number]  Today's Date: 2024                 Admit Date: 2024    Visit Dx:    ICD-10-CM ICD-9-CM   1. Open wound of right lower leg, subsequent encounter  S81.801D V58.89     891.0   2. Edema of right lower leg  R60.0 782.3   3. Venous stasis dermatitis of right lower extremity  I87.2 454.1   4. Hematoma of right lower leg  S80.11XA 924.10   5. History of cellulitis  Z87.2 V13.3       Patient Active Problem List   Diagnosis    CAD (coronary artery disease)    CVD (cardiovascular disease)    DVT (deep venous thrombosis)    Dyslipidemia    Arthritis    GERD (gastroesophageal reflux disease)    Mixed hyperlipidemia    Diabetic nephropathy associated with type 2 diabetes mellitus    Diabetic polyneuropathy associated with type 2 diabetes mellitus    Chronic kidney disease, stage IV (severe)    Vitamin D deficiency    Disc disorder of cervical region    Postthrombotic syndrome    Vertigo    Angina pectoris    Lumbosacral spondylosis without myelopathy    Psoriasis    Arthritis of elbow    Swelling of right lower extremity    Acute right-sided low back pain without sciatica    Hoarseness    Unifocal PVCs    Skin lesion of face    Essential hypertension    Medicare annual wellness visit, subsequent    Stage 3 chronic kidney disease due to benign hypertension    BMI 30.0-30.9,adult    Postherpetic neuralgia    Herpes zoster without complication    Leg edema, right    Chest pain    Encounter for removal of sutures    Chronic pain of left knee    Fall        Past Medical History:   Diagnosis Date    Acute diverticulitis 2020    Allergic 60 yrs ago    Arthritis     Arthritis of neck Fusion 1992    Bilateral radial fractures     fracture bilateral radial heads    CAD (coronary artery disease)     Calculus of gallbladder without cholecystitis without obstruction 2020     Cataract     Cervical disc disorder Fusion 1992    Cholelithiasis     Chronic kidney disease 2020    Clotting disorder     CVD (cardiovascular disease)     History of TIA 1989    Diabetes mellitus     DVT (deep venous thrombosis)     Right lower extremity DVT and pulmonary embolism in 06/2015, idiopathic    DVT of proximal lower limb 06/22/2015    proximal DVT right leg, small PE- anticoagulation    Dyslipidemia     Erectile dysfunction     Fracture 1952    H/o pf left forearm fracture    Fracture of right hand 1980    Fracture of wrist 1980    GERD (gastroesophageal reflux disease)     with hiatal hernia    HL (hearing loss)     Hx of angina pectoris     Hypertension     Normal renal angiography 02/16/11    Knee swelling Several years ago    Left wrist fracture 1953    Lumbosacral disc disease 1996    Osgood-Schlatter's disease 1955    Osteoarthritis     Right wrist fracture     Vertigo     Wears glasses         Past Surgical History:   Procedure Laterality Date    APPENDECTOMY  1957    BACK SURGERY      CARDIAC CATHETERIZATION  2011    with vein graft    CATARACT EXTRACTION Left     CERVICAL FUSION      CERVICAL FUSION  1992    C3-4    COLONOSCOPY  2013    CORONARY ARTERY BYPASS GRAFT  1994    HERNIA REPAIR Right 2011    inguinal    INGUINAL HERNIA REPAIR Left 10/29/2019    Procedure: INGUINAL HERNIA REPAIR LEFT;  Surgeon: Charisma Raines MD;  Location: [facility];  Service: General    LUMBAR LAMINECTOMY  1996    laminectomy, lumbar, L3    NECK SURGERY  1992    OTHER SURGICAL HISTORY      wrist surgery    SPINE SURGERY  1996    TONSILLECTOMY AND ADENOIDECTOMY  1945    TRIGGER POINT INJECTION  2001 - 2007    VASECTOMY  1972         EVALUATION   PT Ortho       Row Name 12/20/24 1300       Subjective    Subjective Comments No pain in wound area, but c/o generalized achiness today, possibly arthritis due to weather changes.  -JM       Subjective Pain    Able to rate subjective pain? yes  -JM    Pre-Treatment Pain  Level 0  -JM    Post-Treatment Pain Level 0  -JM       Transfers    Sit-Stand [place] (Transfers) independent  -JM    Stand-Sit [place] (Transfers) independent  -    Comment, (Transfers) seated for tx  -JM       Gait/Stairs (Locomotion)    [place] Level (Gait) modified independence  -    Assistive Device (Gait) cane, straight  -JM              User Key  (r) = Recorded By, (t) = Taken By, (c) = Cosigned By      Initials Name Provider Type    Alesha Jason, PT Physical Therapist                     LDA Wound       Row Name 12/20/24 1300             Wound 10/04/24 1430 Right lateral ankle    Wound - Properties Group Placement Date: 10/04/24  - Placement Time: 1430  -JM Side: Right  - Orientation: lateral  - Location: ankle  -    Wound Image Images linked: 1  -JM      Dressing Appearance intact;moist drainage  -JM      Base moist;pink;white;yellow;slough  2 shallow moist areas proximal lat ankle, remainder dry/crusted  -      Periwound pink;moist;redness;other (see comments);blistered  fragile hypopigmented skin, improved periwound redness, less maceration  -      Periwound Temperature warm  -      Periwound Skin Turgor soft  -      Edges irregular  -      Drainage Characteristics/Odor serous  -JM      Drainage Amount small  -      Care, Wound cleansed with;wound cleanser;debrided;yaa boot  -JM      Dressing Care dressing applied;collagen  jimmy ag, cast padding, UB  -JM      Periwound Care cleansed with pH balanced cleanser;dry periwound area maintained;barrier ointment applied;barrier film applied  nosting spray x2 layers, zguard  -      Retired Wound - Properties Group Placement Date: 10/04/24  - Placement Time: 1430  -JM Side: Right  - Orientation: lateral  - Location: ankle  -    Retired Wound - Properties Group Placement Date: 10/04/24  - Placement Time: 1430  -JM Side: Right  - Orientation: lateral  - Location: ankle  -    Retired Wound -  Properties Group Date first assessed: 10/04/24  - Time first assessed: 1430  -JM Side: Right  -JM Location: ankle  -JM       Wound 09/12/24 1300 Right lateral leg Traumatic    Wound - Properties Group Placement Date: 09/12/24  - Placement Time: 1300  -MF Side: Right  -MF Orientation: lateral  -MF Location: leg  -MF Primary Wound Type: Traumatic  -MF    Base clean;closed/resurfaced;epithelialization;pink  fully intact after debridement of loose crust  -      Periwound intact;dry;redness;swelling  mild redness  -      Periwound Temperature warm  -      Periwound Skin Turgor firm  -      Edges irregular  -JM      Drainage Amount none  -JM      Retired Wound - Properties Group Placement Date: 09/12/24  - Placement Time: 1300  -MF Side: Right  -MF Orientation: lateral  -MF Location: leg  -MF Primary Wound Type: Traumatic  -MF    Retired Wound - Properties Group Placement Date: 09/12/24  - Placement Time: 1300  -MF Side: Right  -MF Orientation: lateral  -MF Location: leg  -MF Primary Wound Type: Traumatic  -MF    Retired Wound - Properties Group Date first assessed: 09/12/24  - Time first assessed: 1300  -MF Side: Right  -MF Location: leg  -MF Primary Wound Type: Traumatic  -MF              User Key  (r) = Recorded By, (t) = Taken By, (c) = Cosigned By      Initials Name Provider Type    Jaya Doshi, PT Physical Therapist    Alesha Jason, PT Physical Therapist                   Lymphedema       Row Name 12/20/24 1300             Lymphedema Edema Assessment    Ptting Edema Category By severity  -      Pitting Edema Moderate;Mild  mod prox to UB  -         Skin Changes/Observations    Location/Assessment Lower Extremity  -      Lower Extremity Conditions right:;shiny;crust;inflamed;fragile  -      Lower Extremity Color/Pigment right:;erythema;red;blanchable;hypopigmented;hyperpigmented  erythema mostly resolved  -         Compression/Skin Care    Compression/Skin Care skin  "care;wrapping location;bandaging  -      Skin Care washed/dried;lotion applied  -      Wrapping Location lower extremity  -      Wrapping Location LE right:;foot to knee  -      Wrapping Comments cast padding to ankle under unna boot, UB in clamshell, 4\" coban, size 6 spandage  -                User Key  (r) = Recorded By, (t) = Taken By, (c) = Cosigned By      Initials Name Provider Type    Alesha Jason, PT Physical Therapist                    WOUND DEBRIDEMENT  Total area of Debridement: 2cmsq  Debridement Site 1  Location- Site 1: Lateral RLE  Selective Debridement- Site 1: Wound Surface <20cmsq  Instruments- Site 1: tweezers  Excised Tissue Description- Site 1: moderate, other (comment), scant, slough (crusts)  Bleeding- Site 1: none              Therapy Education       Row Name 12/20/24 1300             Therapy Education    Education Details Continue leg elevation to reduce edema and wound drainage, continuation of POC  -      Given Bandaging/dressing change;Pain management;Edema management  -      Program Reinforced  -      How Provided Verbal;Demonstration  -      Provided to Patient  -      Level of Understanding Teach back education performed;Verbalized  -                User Key  (r) = Recorded By, (t) = Taken By, (c) = Cosigned By      Initials Name Provider Type    Alesha Jason, PT Physical Therapist                    Recommendation and Plan   PT Assessment/Plan       Row Name 12/20/24 1300          PT Assessment    Functional Limitations Performance in self-care ADL;Other (comment)  -     Impairments Integumentary integrity;Impaired venous circulation;Edema  -     Assessment Comments Pt's R ankle with improving skin integrity, still with 2-3 small moist areas, some flaking of hypertrophic crusts.  RLE erythema nearly resolved with continuation of unna boot.  Pt continues to benefit from current POC to facilitate closure of remaining wounds for transition to " personal compression stocking for long-term home management.  -     Rehab Potential Fair  -     Patient/caregiver participated in establishment of treatment plan and goals Yes  -     Patient would benefit from skilled therapy intervention Yes  -        PT Plan    PT Frequency 2x/week  -     Physical Therapy Interventions (Optional Details) patient/family education;wound care  -     PT Plan Comments debridement, UB  -               User Key  (r) = Recorded By, (t) = Taken By, (c) = Cosigned By      Initials Name Provider Type    Alesha Jason, PT Physical Therapist                    Goals   PT OP Goals       Row Name 12/20/24 1344          Time Calculation    PT Goal Re-Cert Due Date 03/10/25  -               User Key  (r) = Recorded By, (t) = Taken By, (c) = Cosigned By      Initials Name Provider Type    Alesha Jason, PT Physical Therapist                    PT Goal Re-Cert Due Date: 03/10/25            Time Calculation: Start Time: 1300  Untimed Charges  18965-Lraq Boot: 15  50844-Utbrgwfxb debridement: 10  Total Minutes  Untimed Charges Total Minutes: 25   Total Minutes: 25  Therapy Charges for Today       Code Description Service Date Service Provider Modifiers Qty    27110112603 HC EDWIN DEBRIDE OPEN WOUND UP TO 20CM 12/20/2024 Alesha Nunez, PT GP, KX 1    33538726002 HC PT STAPPING UNNA BOOT 12/20/2024 Alesha Nunez, PT GP, KX 1                    Alesha Nunez, PT  12/20/2024

## 2024-12-24 ENCOUNTER — HOSPITAL ENCOUNTER (OUTPATIENT)
Dept: PHYSICAL THERAPY | Facility: HOSPITAL | Age: 84
Setting detail: THERAPIES SERIES
Discharge: HOME | End: 2024-12-24
Payer: MEDICARE

## 2024-12-24 DIAGNOSIS — S80.11XA HEMATOMA OF RIGHT LOWER LEG: ICD-10-CM

## 2024-12-24 DIAGNOSIS — R60.0 EDEMA OF RIGHT LOWER LEG: ICD-10-CM

## 2024-12-24 DIAGNOSIS — S81.801D OPEN WOUND OF RIGHT LOWER LEG, SUBSEQUENT ENCOUNTER: Primary | ICD-10-CM

## 2024-12-24 DIAGNOSIS — Z87.2 HISTORY OF CELLULITIS: ICD-10-CM

## 2024-12-24 DIAGNOSIS — I87.2 VENOUS STASIS DERMATITIS OF RIGHT LOWER EXTREMITY: ICD-10-CM

## 2024-12-24 PROCEDURE — 29580 STRAPPING UNNA BOOT: CPT

## 2024-12-24 PROCEDURE — 97597 DBRDMT OPN WND 1ST 20 CM/<: CPT

## 2024-12-24 NOTE — THERAPY WOUND CARE TREATMENT
Outpatient Rehabilitation - Wound/Debridement Treatment Note  [facility]     Patient Name: Toño Alcala  : 1940  MRN: [id number]  Today's Date: 2024                 Admit Date: 2024    Visit Dx:    ICD-10-CM ICD-9-CM   1. Open wound of right lower leg, subsequent encounter  S81.801D V58.89     891.0   2. Edema of right lower leg  R60.0 782.3   3. Venous stasis dermatitis of right lower extremity  I87.2 454.1   4. Hematoma of right lower leg  S80.11XA 924.10   5. History of cellulitis  Z87.2 V13.3   Lat R ankle:      Patient Active Problem List   Diagnosis    CAD (coronary artery disease)    CVD (cardiovascular disease)    DVT (deep venous thrombosis)    Dyslipidemia    Arthritis    GERD (gastroesophageal reflux disease)    Mixed hyperlipidemia    Diabetic nephropathy associated with type 2 diabetes mellitus    Diabetic polyneuropathy associated with type 2 diabetes mellitus    Chronic kidney disease, stage IV (severe)    Vitamin D deficiency    Disc disorder of cervical region    Postthrombotic syndrome    Vertigo    Angina pectoris    Lumbosacral spondylosis without myelopathy    Psoriasis    Arthritis of elbow    Swelling of right lower extremity    Acute right-sided low back pain without sciatica    Hoarseness    Unifocal PVCs    Skin lesion of face    Essential hypertension    Medicare annual wellness visit, subsequent    Stage 3 chronic kidney disease due to benign hypertension    BMI 30.0-30.9,adult    Postherpetic neuralgia    Herpes zoster without complication    Leg edema, right    Chest pain    Encounter for removal of sutures    Chronic pain of left knee    Fall        Past Medical History:   Diagnosis Date    Acute diverticulitis 2020    Allergic 60 yrs ago    Arthritis     Arthritis of neck Fusion 1992    Bilateral radial fractures     fracture bilateral radial heads    CAD (coronary artery disease)     Calculus of gallbladder without cholecystitis without obstruction  01/29/2020    Cataract     Cervical disc disorder Fusion 1992    Cholelithiasis     Chronic kidney disease 2020    Clotting disorder     CVD (cardiovascular disease)     History of TIA 1989    Diabetes mellitus     DVT (deep venous thrombosis)     Right lower extremity DVT and pulmonary embolism in 06/2015, idiopathic    DVT of proximal lower limb 06/22/2015    proximal DVT right leg, small PE- anticoagulation    Dyslipidemia     Erectile dysfunction     Fracture 1952    H/o pf left forearm fracture    Fracture of right hand 1980    Fracture of wrist 1980    GERD (gastroesophageal reflux disease)     with hiatal hernia    HL (hearing loss)     Hx of angina pectoris     Hypertension     Normal renal angiography 02/16/11    Knee swelling Several years ago    Left wrist fracture 1953    Lumbosacral disc disease 1996    Osgood-Schlatter's disease 1955    Osteoarthritis     Right wrist fracture     Vertigo     Wears glasses         Past Surgical History:   Procedure Laterality Date    APPENDECTOMY  1957    BACK SURGERY      CARDIAC CATHETERIZATION  2011    with vein graft    CATARACT EXTRACTION Left     CERVICAL FUSION      CERVICAL FUSION  1992    C3-4    COLONOSCOPY  2013    CORONARY ARTERY BYPASS GRAFT  1994    HERNIA REPAIR Right 2011    inguinal    INGUINAL HERNIA REPAIR Left 10/29/2019    Procedure: INGUINAL HERNIA REPAIR LEFT;  Surgeon: Charisma Raines MD;  Location: [facility];  Service: General    LUMBAR LAMINECTOMY  1996    laminectomy, lumbar, L3    NECK SURGERY  1992    OTHER SURGICAL HISTORY      wrist surgery    SPINE SURGERY  1996    TONSILLECTOMY AND ADENOIDECTOMY  1945    TRIGGER POINT INJECTION  2001 - 2007    VASECTOMY  1972         EVALUATION   PT Ortho       Row Name 12/24/24 1300       Subjective    Subjective Comments No complaints other than tape coming loose at the top of the unna boot.  -JM       Subjective Pain    Able to rate subjective pain? yes  -JM    Pre-Treatment Pain Level 0  -JM     Post-Treatment Pain Level 0  -JM       Transfers    Sit-Stand [place] (Transfers) independent  -JM    Stand-Sit [place] (Transfers) independent  -    Comment, (Transfers) seated for tx  -JM       Gait/Stairs (Locomotion)    [place] Level (Gait) modified independence  -    Assistive Device (Gait) cane, straight  -JM              User Key  (r) = Recorded By, (t) = Taken By, (c) = Cosigned By      Initials Name Provider Type    Alesha Jason, PT Physical Therapist                     [facility] Wound       Row Name 12/24/24 1300             Wound 10/04/24 1430 Right lateral ankle    Wound - Properties Group Placement Date: 10/04/24  - Placement Time: 1430  -JM Side: Right  -JM Orientation: lateral  -JM Location: ankle  -    Wound Image Images linked: 1  -JM      Dressing Appearance intact;moist drainage  -JM      Base moist;pink;white;yellow;slough  2 shallow moist areas proximal lat ankle, 1 moist area distally  -JM      Periwound pink;moist;redness;other (see comments)  fragile hypopigmented skin, improved periwound redness, less maceration  -      Periwound Temperature warm  -      Periwound Skin Turgor soft  -      Edges irregular  -      Drainage Characteristics/Odor serous  -JM      Drainage Amount small  -JM      Care, Wound cleansed with;wound cleanser;debrided;yaa boot  -JM      Dressing Care dressing applied;collagen;antimicrobial agent applied  jimmy, sorbact, cast padding, UB  -JM      Periwound Care cleansed with pH balanced cleanser;dry periwound area maintained;barrier ointment applied  zguard  -      Retired Wound - Properties Group Placement Date: 10/04/24  - Placement Time: 1430  -JM Side: Right  - Orientation: lateral  - Location: ankle  -    Retired Wound - Properties Group Placement Date: 10/04/24  - Placement Time: 1430  -JM Side: Right  - Orientation: lateral  - Location: ankle  -    Retired Wound - Properties Group Date first assessed: 10/04/24   - Time first assessed: 1430  -JM Side: Right  -JM Location: ankle  -JM       Wound 09/12/24 1300 Right lateral leg Traumatic    Wound - Properties Group Placement Date: 09/12/24  - Placement Time: 1300  -MF Side: Right  -MF Orientation: lateral  -MF Location: leg  -MF Primary Wound Type: Traumatic  -MF    Base clean;closed/resurfaced;epithelialization  -      Periwound intact;dry;redness;swelling  -      Periwound Temperature warm  -      Periwound Skin Turgor firm  -      Edges irregular  -      Drainage Amount none  -JM      Care, Wound yaa boot  -JM      Retired Wound - Properties Group Placement Date: 09/12/24  - Placement Time: 1300  -MF Side: Right  -MF Orientation: lateral  -MF Location: leg  -MF Primary Wound Type: Traumatic  -MF    Retired Wound - Properties Group Placement Date: 09/12/24  - Placement Time: 1300  -MF Side: Right  -MF Orientation: lateral  -MF Location: leg  -MF Primary Wound Type: Traumatic  -MF    Retired Wound - Properties Group Date first assessed: 09/12/24  - Time first assessed: 1300  -MF Side: Right  -MF Location: leg  -MF Primary Wound Type: Traumatic  -MF              User Key  (r) = Recorded By, (t) = Taken By, (c) = Cosigned By      Initials Name Provider Type     Jaya Bower, PT Physical Therapist    Alesha Jason, PT Physical Therapist                   Lymphedema       Row Name 12/24/24 1300             Lymphedema Edema Assessment    Ptting Edema Category By severity  -      Pitting Edema Moderate;Mild  mod prox to UB  -         Skin Changes/Observations    Location/Assessment Lower Extremity  -      Lower Extremity Conditions right:;shiny;crust;inflamed;fragile  -      Lower Extremity Color/Pigment right:;erythema;red;blanchable;hypopigmented;hyperpigmented  erythema mostly resolved  -         Compression/Skin Care    Compression/Skin Care skin care;wrapping location;bandaging  -      Skin Care washed/dried;lotion applied  -    "   Wrapping Location lower extremity  -      Wrapping Location LE right:;foot to knee  -      Wrapping Comments sorbact contact layer, cast padding to ankle under unna boot, UB in clamshell, 4\" coban, size 5 spandage  -                User Key  (r) = Recorded By, (t) = Taken By, (c) = Cosigned By      Initials Name Provider Type    Alesha Jason, PT Physical Therapist                    WOUND DEBRIDEMENT  Total area of Debridement: 1csq  Debridement Site 1  Location- Site 1: Lateral RLE  Selective Debridement- Site 1: Wound Surface <20cmsq  Instruments- Site 1: tweezers, other (comment) (green wipes, gauze)  Excised Tissue Description- Site 1: minimum, other (comment) (loose crusts)  Bleeding- Site 1: none              Therapy Education       Row Name 12/24/24 1300             Therapy Education    Given Bandaging/dressing change;Pain management;Edema management  -      Program Reinforced  -      How Provided Verbal;Demonstration  -      Provided to Patient  -      Level of Understanding Teach back education performed;Verbalized  -                User Key  (r) = Recorded By, (t) = Taken By, (c) = Cosigned By      Initials Name Provider Type    Alesha Jason, PT Physical Therapist                    Recommendation and Plan   PT Assessment/Plan       Row Name 12/24/24 1300          PT Assessment    Functional Limitations Performance in self-care ADL;Other (comment)  -KACIE     Impairments Integumentary integrity;Impaired venous circulation;Edema  -     Assessment Comments SLowly improving edema and erythema of RLE with a couple small moist areas still draining from lat R ankle, some crusts still present requiring debridement.  PT added sorbact contact layer to help reduce adherence of cast padding, continued UB for venous return to help maintin edema reduction and support wound healing.  Continue with POC.  -     Rehab Potential Fair  -     Patient/caregiver participated in " establishment of treatment plan and goals Yes  -     Patient would benefit from skilled therapy intervention Yes  -        PT Plan    PT Frequency 2x/week  -     Physical Therapy Interventions (Optional Details) patient/family education;wound care  -               User Key  (r) = Recorded By, (t) = Taken By, (c) = Cosigned By      Initials Name Provider Type    Alesha Jason, PT Physical Therapist                    Goals   PT OP Goals       Row Name 12/24/24 1339          Time Calculation    PT Goal Re-Cert Due Date 03/10/25  -               User Key  (r) = Recorded By, (t) = Taken By, (c) = Cosigned By      Initials Name Provider Type    Alesha Jason, PT Physical Therapist                    PT Goal Re-Cert Due Date: 03/10/25            Time Calculation: Start Time: 1300  Untimed Charges  12594-Vpct Boot: 15  37358-Lmnhvtzca debridement: 10  Total Minutes  Untimed Charges Total Minutes: 25   Total Minutes: 25  Therapy Charges for Today       Code Description Service Date Service Provider Modifiers Qty    87411114002 HC EDWIN DEBRIDE OPEN WOUND UP TO 20CM 12/24/2024 Alesha Nunez, PT GP 1    23808589793 HC PT STAPPING UNNA BOOT 12/24/2024 Alesha Nunez, PT GP 1                    Alesha Nunez, PT  12/24/2024

## 2024-12-27 ENCOUNTER — HOSPITAL ENCOUNTER (OUTPATIENT)
Dept: PHYSICAL THERAPY | Facility: HOSPITAL | Age: 84
Setting detail: THERAPIES SERIES
Discharge: HOME | End: 2024-12-27
Payer: MEDICARE

## 2024-12-27 DIAGNOSIS — R60.0 EDEMA OF RIGHT LOWER LEG: ICD-10-CM

## 2024-12-27 DIAGNOSIS — S80.11XA HEMATOMA OF RIGHT LOWER LEG: ICD-10-CM

## 2024-12-27 DIAGNOSIS — Z87.2 HISTORY OF CELLULITIS: ICD-10-CM

## 2024-12-27 DIAGNOSIS — I87.2 VENOUS STASIS DERMATITIS OF RIGHT LOWER EXTREMITY: ICD-10-CM

## 2024-12-27 DIAGNOSIS — S81.801D OPEN WOUND OF RIGHT LOWER LEG, SUBSEQUENT ENCOUNTER: Primary | ICD-10-CM

## 2024-12-27 PROCEDURE — 29581 APPL MULTLAYER CMPRN SYS LEG: CPT

## 2024-12-27 PROCEDURE — 97597 DBRDMT OPN WND 1ST 20 CM/<: CPT

## 2024-12-27 NOTE — THERAPY WOUND CARE TREATMENT
Outpatient Rehabilitation - Wound/Debridement Treatment Note  [facility]     Patient Name: Toño Alcala  : 1940  MRN: [id number]  Today's Date: 2024                 Admit Date: 2024    Visit Dx:    ICD-10-CM ICD-9-CM   1. Open wound of right lower leg, subsequent encounter  S81.801D V58.89     891.0   2. Edema of right lower leg  R60.0 782.3   3. Hematoma of right lower leg  S80.11XA 924.10   4. Venous stasis dermatitis of right lower extremity  I87.2 454.1   5. History of cellulitis  Z87.2 V13.3   Lat R ankle:      Patient Active Problem List   Diagnosis    CAD (coronary artery disease)    CVD (cardiovascular disease)    DVT (deep venous thrombosis)    Dyslipidemia    Arthritis    GERD (gastroesophageal reflux disease)    Mixed hyperlipidemia    Diabetic nephropathy associated with type 2 diabetes mellitus    Diabetic polyneuropathy associated with type 2 diabetes mellitus    Chronic kidney disease, stage IV (severe)    Vitamin D deficiency    Disc disorder of cervical region    Postthrombotic syndrome    Vertigo    Angina pectoris    Lumbosacral spondylosis without myelopathy    Psoriasis    Arthritis of elbow    Swelling of right lower extremity    Acute right-sided low back pain without sciatica    Hoarseness    Unifocal PVCs    Skin lesion of face    Essential hypertension    Medicare annual wellness visit, subsequent    Stage 3 chronic kidney disease due to benign hypertension    BMI 30.0-30.9,adult    Postherpetic neuralgia    Herpes zoster without complication    Leg edema, right    Chest pain    Encounter for removal of sutures    Chronic pain of left knee    Fall        Past Medical History:   Diagnosis Date    Acute diverticulitis 2020    Allergic 60 yrs ago    Arthritis     Arthritis of neck Fusion 1992    Bilateral radial fractures     fracture bilateral radial heads    CAD (coronary artery disease)     Calculus of gallbladder without cholecystitis without obstruction  01/29/2020    Cataract     Cervical disc disorder Fusion 1992    Cholelithiasis     Chronic kidney disease 2020    Clotting disorder     CVD (cardiovascular disease)     History of TIA 1989    Diabetes mellitus     DVT (deep venous thrombosis)     Right lower extremity DVT and pulmonary embolism in 06/2015, idiopathic    DVT of proximal lower limb 06/22/2015    proximal DVT right leg, small PE- anticoagulation    Dyslipidemia     Erectile dysfunction     Fracture 1952    H/o pf left forearm fracture    Fracture of right hand 1980    Fracture of wrist 1980    GERD (gastroesophageal reflux disease)     with hiatal hernia    HL (hearing loss)     Hx of angina pectoris     Hypertension     Normal renal angiography 02/16/11    Knee swelling Several years ago    Left wrist fracture 1953    Lumbosacral disc disease 1996    Osgood-Schlatter's disease 1955    Osteoarthritis     Right wrist fracture     Vertigo     Wears glasses         Past Surgical History:   Procedure Laterality Date    APPENDECTOMY  1957    BACK SURGERY      CARDIAC CATHETERIZATION  2011    with vein graft    CATARACT EXTRACTION Left     CERVICAL FUSION      CERVICAL FUSION  1992    C3-4    COLONOSCOPY  2013    CORONARY ARTERY BYPASS GRAFT  1994    HERNIA REPAIR Right 2011    inguinal    INGUINAL HERNIA REPAIR Left 10/29/2019    Procedure: INGUINAL HERNIA REPAIR LEFT;  Surgeon: Charisma Raines MD;  Location: [facility];  Service: General    LUMBAR LAMINECTOMY  1996    laminectomy, lumbar, L3    NECK SURGERY  1992    OTHER SURGICAL HISTORY      wrist surgery    SPINE SURGERY  1996    TONSILLECTOMY AND ADENOIDECTOMY  1945    TRIGGER POINT INJECTION  2001 - 2007    VASECTOMY  1972         EVALUATION   PT Ortho       Row Name 12/27/24 1300       Subjective    Subjective Comments Pt reports top of his foot has been bothering him for the last couple days, asking if he can take a break from the unna boot.  -JM       Subjective Pain    Able to rate  subjective pain? yes  -JM    Pre-Treatment Pain Level 2  -JM    Post-Treatment Pain Level 1  -JM    Subjective Pain Comment dorsal foot  -JM       Transfers    Sit-Stand [place] (Transfers) independent  -JM    Stand-Sit [place] (Transfers) independent  -    Comment, (Transfers) seated for tx  -JM       Gait/Stairs (Locomotion)    [place] Level (Gait) modified independence  -    Assistive Device (Gait) cane, straight  -JM              User Key  (r) = Recorded By, (t) = Taken By, (c) = Cosigned By      Initials Name Provider Type    Alesha Jason, PT Physical Therapist                     [facility] Wound       Row Name 12/27/24 1300             Wound 10/04/24 1430 Right lateral ankle    Wound - Properties Group Placement Date: 10/04/24  - Placement Time: 1430  - Side: Right  - Orientation: lateral  - Location: ankle  -    Wound Image Images linked: 1  -JM      Dressing Appearance intact;dried drainage  -      Base moist;pink;white;yellow;slough  areas crusted except one moist area proximally  -      Periwound pink;moist;redness;other (see comments)  fragile hypopigmented skin, improved periwound redness, less maceration  -      Periwound Temperature warm  -      Periwound Skin Turgor soft  -      Edges irregular  -      Drainage Characteristics/Odor serous  -JM      Drainage Amount scant  -      Care, Wound cleansed with;wound cleanser;debrided  -      Dressing Care dressing applied;antimicrobial agent applied;multi-layer wrap  sorbact, cast padding, MLW  -      Periwound Care cleansed with pH balanced cleanser;dry periwound area maintained;barrier ointment applied  zguard  -      Retired Wound - Properties Group Placement Date: 10/04/24  - Placement Time: 1430 -JM Side: Right  - Orientation: lateral  - Location: ankle  -    Retired Wound - Properties Group Placement Date: 10/04/24  - Placement Time: 1430  -JM Side: Right  - Orientation: lateral  -  Location: ankle  -JM    Retired Wound - Properties Group Date first assessed: 10/04/24  - Time first assessed: 1430  -JM Side: Right  -JM Location: ankle  -JM       Wound 09/12/24 1300 Right lateral leg Traumatic    Wound - Properties Group Placement Date: 09/12/24  - Placement Time: 1300  -MF Side: Right  -MF Orientation: lateral  -MF Location: leg  -MF Primary Wound Type: Traumatic  -MF    Base clean;closed/resurfaced;epithelialization  -      Drainage Amount none  -JM      Retired Wound - Properties Group Placement Date: 09/12/24  -MF Placement Time: 1300  -MF Side: Right  -MF Orientation: lateral  -MF Location: leg  -MF Primary Wound Type: Traumatic  -MF    Retired Wound - Properties Group Placement Date: 09/12/24  - Placement Time: 1300  -MF Side: Right  -MF Orientation: lateral  -MF Location: leg  -MF Primary Wound Type: Traumatic  -MF    Retired Wound - Properties Group Date first assessed: 09/12/24  - Time first assessed: 1300  -MF Side: Right  -MF Location: leg  -MF Primary Wound Type: Traumatic  -MF              User Key  (r) = Recorded By, (t) = Taken By, (c) = Cosigned By      Initials Name Provider Type     Jaya Bower, PT Physical Therapist    Alesha Jason, PT Physical Therapist                   Lymphedema       Row Name 12/27/24 1300             Lymphedema Edema Assessment    Ptting Edema Category By severity  -      Pitting Edema Moderate;Mild  mod prox to UB  -         Skin Changes/Observations    Location/Assessment Lower Extremity  -      Lower Extremity Conditions right:;shiny;crust;inflamed;fragile  -      Lower Extremity Color/Pigment right:;red;blanchable;hypopigmented;hyperpigmented  faint erythema lat ankle, red blanchable area dorsal foot  -         Compression/Skin Care    Compression/Skin Care skin care;wrapping location;bandaging  -      Skin Care washed/dried;lotion applied  -      Wrapping Location lower extremity  -      Wrapping Location  LE right:;foot to knee  -      Wrapping Comments sorbact, cast padding to ankle, size 4 compressogrip doubled distally for gradient compression  -                User Key  (r) = Recorded By, (t) = Taken By, (c) = Cosigned By      Initials Name Provider Type    Alesha Jason, PT Physical Therapist                    WOUND DEBRIDEMENT  Total area of Debridement: 1cmsq  Debridement Site 1  Location- Site 1: Lateral RLE  Selective Debridement- Site 1: Wound Surface <20cmsq  Instruments- Site 1: tweezers, other (comment) (green wipes, gauze)  Excised Tissue Description- Site 1: scant, other (comment) (crusts)  Bleeding- Site 1: none              Therapy Education       Row Name 12/27/24 1300             Therapy Education    Education Details OK to remove dressings and shower but avoid rubbing/scrubbing over wound area.  Apply zinc cream to lateral ankle, sorbact to cover and secure with cast padding prior to donning either compressogrip or personal compression stockings.  Elevate leg to reduce edema.  -JM      Given Bandaging/dressing change;Pain management;Edema management  -      Program Reinforced;Progressed;Modified  -      How Provided Verbal;Demonstration  -      Provided to Patient  -      Level of Understanding Teach back education performed;Verbalized  -                User Key  (r) = Recorded By, (t) = Taken By, (c) = Cosigned By      Initials Name Provider Type    Alesha Jason, PT Physical Therapist                    Recommendation and Plan   PT Assessment/Plan       Row Name 12/27/24 1300          PT Assessment    Functional Limitations Performance in self-care ADL;Other (comment)  -KACIE     Impairments Integumentary integrity;Impaired venous circulation;Edema  -     Assessment Comments Pt's lateral ankle improved with only one small area of drainage, remainder crusted and fragile.  Faint erythema at lat ankle only.  Pt with mild blanchable redness to dorsal foot where unna  boot had rolled and pt requesting to hold unna boot today, so PT applied MLW with compressogrips to allow for pt to shower and for skin breaks, but stated that if leg starts swelling with increasing redness then may need to resume unna boot.  -     Rehab Potential Fair  -     Patient/caregiver participated in establishment of treatment plan and goals Yes  -     Patient would benefit from skilled therapy intervention Yes  -        PT Plan    PT Frequency 2x/week  -     Physical Therapy Interventions (Optional Details) patient/family education;wound care  -     PT Plan Comments UB vs MLW, PRN debridement  -               User Key  (r) = Recorded By, (t) = Taken By, (c) = Cosigned By      Initials Name Provider Type    Alesha Jason, PT Physical Therapist                    Goals   PT OP Goals       Row Name 12/27/24 1334          Time Calculation    PT Goal Re-Cert Due Date 03/10/25  -               User Key  (r) = Recorded By, (t) = Taken By, (c) = Cosigned By      Initials Name Provider Type    Alesha Jason, PT Physical Therapist                    PT Goal Re-Cert Due Date: 03/10/25            Time Calculation: Start Time: 1300  Untimed Charges  42226-Mrdbbhkllr comp below knee: 15  01533-Dujhzconk debridement: 10  Total Minutes  Untimed Charges Total Minutes: 25   Total Minutes: 25  Therapy Charges for Today       Code Description Service Date Service Provider Modifiers Qty    39760459104 HC EDWIN DEBRIDE OPEN WOUND UP TO 20CM 12/27/2024 Alesha Nunez, PT GP, KX 1    [id number] HC PT MULTI LAYER COMP SYS BELOW KNEE 12/27/2024 Alesha Nunez, PT GP, KX 1                    Alesha Nunez, PT  12/27/2024

## 2024-12-31 ENCOUNTER — HOSPITAL ENCOUNTER (OUTPATIENT)
Dept: PHYSICAL THERAPY | Facility: HOSPITAL | Age: 84
Setting detail: THERAPIES SERIES
Discharge: HOME | End: 2024-12-31
Payer: MEDICARE

## 2024-12-31 DIAGNOSIS — S80.11XA HEMATOMA OF RIGHT LOWER LEG: ICD-10-CM

## 2024-12-31 DIAGNOSIS — R60.0 EDEMA OF RIGHT LOWER LEG: ICD-10-CM

## 2024-12-31 DIAGNOSIS — I87.2 VENOUS STASIS DERMATITIS OF RIGHT LOWER EXTREMITY: ICD-10-CM

## 2024-12-31 DIAGNOSIS — S81.801D OPEN WOUND OF RIGHT LOWER LEG, SUBSEQUENT ENCOUNTER: Primary | ICD-10-CM

## 2024-12-31 DIAGNOSIS — Z87.2 HISTORY OF CELLULITIS: ICD-10-CM

## 2024-12-31 PROCEDURE — 97597 DBRDMT OPN WND 1ST 20 CM/<: CPT

## 2024-12-31 NOTE — THERAPY WOUND CARE TREATMENT
Outpatient Rehabilitation - Wound/Debridement Treatment Note  [facility]     Patient Name: Toño Alcala  : 1940  MRN: [id number]  Today's Date: 2024                 Admit Date: 2024    Visit Dx:    ICD-10-CM ICD-9-CM   1. Open wound of right lower leg, subsequent encounter  S81.801D V58.89     891.0   2. Edema of right lower leg  R60.0 782.3   3. Hematoma of right lower leg  S80.11XA 924.10   4. Venous stasis dermatitis of right lower extremity  I87.2 454.1   5. History of cellulitis  Z87.2 V13.3             Patient Active Problem List   Diagnosis    CAD (coronary artery disease)    CVD (cardiovascular disease)    DVT (deep venous thrombosis)    Dyslipidemia    Arthritis    GERD (gastroesophageal reflux disease)    Mixed hyperlipidemia    Diabetic nephropathy associated with type 2 diabetes mellitus    Diabetic polyneuropathy associated with type 2 diabetes mellitus    Chronic kidney disease, stage IV (severe)    Vitamin D deficiency    Disc disorder of cervical region    Postthrombotic syndrome    Vertigo    Angina pectoris    Lumbosacral spondylosis without myelopathy    Psoriasis    Arthritis of elbow    Swelling of right lower extremity    Acute right-sided low back pain without sciatica    Hoarseness    Unifocal PVCs    Skin lesion of face    Essential hypertension    Medicare annual wellness visit, subsequent    Stage 3 chronic kidney disease due to benign hypertension    BMI 30.0-30.9,adult    Postherpetic neuralgia    Herpes zoster without complication    Leg edema, right    Chest pain    Encounter for removal of sutures    Chronic pain of left knee    Fall        Past Medical History:   Diagnosis Date    Acute diverticulitis 2020    Allergic 60 yrs ago    Arthritis     Arthritis of neck Fusion 1992    Bilateral radial fractures     fracture bilateral radial heads    CAD (coronary artery disease)     Calculus of gallbladder without cholecystitis without obstruction  01/29/2020    Cataract     Cervical disc disorder Fusion 1992    Cholelithiasis     Chronic kidney disease 2020    Clotting disorder     CVD (cardiovascular disease)     History of TIA 1989    Diabetes mellitus     DVT (deep venous thrombosis)     Right lower extremity DVT and pulmonary embolism in 06/2015, idiopathic    DVT of proximal lower limb 06/22/2015    proximal DVT right leg, small PE- anticoagulation    Dyslipidemia     Erectile dysfunction     Fracture 1952    H/o pf left forearm fracture    Fracture of right hand 1980    Fracture of wrist 1980    GERD (gastroesophageal reflux disease)     with hiatal hernia    HL (hearing loss)     Hx of angina pectoris     Hypertension     Normal renal angiography 02/16/11    Knee swelling Several years ago    Left wrist fracture 1953    Lumbosacral disc disease 1996    Osgood-Schlatter's disease 1955    Osteoarthritis     Right wrist fracture     Vertigo     Wears glasses         Past Surgical History:   Procedure Laterality Date    APPENDECTOMY  1957    BACK SURGERY      CARDIAC CATHETERIZATION  2011    with vein graft    CATARACT EXTRACTION Left     CERVICAL FUSION      CERVICAL FUSION  1992    C3-4    COLONOSCOPY  2013    CORONARY ARTERY BYPASS GRAFT  1994    HERNIA REPAIR Right 2011    inguinal    INGUINAL HERNIA REPAIR Left 10/29/2019    Procedure: INGUINAL HERNIA REPAIR LEFT;  Surgeon: Charisma Raines MD;  Location: [facility];  Service: General    LUMBAR LAMINECTOMY  1996    laminectomy, lumbar, L3    NECK SURGERY  1992    OTHER SURGICAL HISTORY      wrist surgery    SPINE SURGERY  1996    TONSILLECTOMY AND ADENOIDECTOMY  1945    TRIGGER POINT INJECTION  2001 - 2007    VASECTOMY  1972         EVALUATION   PT Ortho       Row Name 12/31/24 1340       Subjective    Subjective Comments Pt cont to have mild pain to dorsal foot  -MF       Subjective Pain    Able to rate subjective pain? yes  -MF    Pre-Treatment Pain Level 2  -MF    Post-Treatment Pain Level 2   -MF    Subjective Pain Comment dorsal foot  -MF       Transfers    Sit-Stand [place] (Transfers) independent  -MF    Stand-Sit [place] (Transfers) independent  -MF    Comment, (Transfers) seated for tx  -MF       Gait/Stairs (Locomotion)    [place] Level (Gait) modified independence  -MF    Assistive Device (Gait) cane, straight  -MF              User Key  (r) = Recorded By, (t) = Taken By, (c) = Cosigned By      Initials Name Provider Type     Jaya Bower, PT Physical Therapist                     [facility] Wound       Row Name 12/31/24 1340             Wound 12/31/24 1231 Right anterior leg    Wound - Properties Group Placement Date: 12/31/24  - Placement Time: 1231  -MF Side: Right  -MF Orientation: anterior  -MF Location: leg  -MF Primary Wound Type: Blisters  -MF    Wound Image Images linked: 1  -MF      Dressing Appearance open to air  -MF      Base moist;pink  -MF      Periwound intact;dry;redness  -MF      Periwound Temperature warm  -MF      Periwound Skin Turgor soft  -MF      Wound Length (cm) 0.5 cm  -MF      Wound Width (cm) 1.8 cm  -MF      Wound Depth (cm) 0.1 cm  -MF      Wound Surface Area (cm^2) 0.9 cm^2  -MF      Wound Volume (cm^3) 0.09 cm^3  -MF      Drainage Characteristics/Odor serosanguineous  -MF      Drainage Amount scant  -MF      Care, Wound irrigated with;wound cleanser  -MF      Dressing Care foam;low-adherent;silver impregnated  mepilex Ag foam  -MF      Retired Wound - Properties Group Placement Date: 12/31/24  - Placement Time: 1231  -MF Side: Right  -MF Orientation: anterior  -MF Location: leg  -MF Primary Wound Type: Blisters  -MF    Retired Wound - Properties Group Placement Date: 12/31/24  - Placement Time: 1231  -MF Side: Right  -MF Orientation: anterior  -MF Location: leg  -MF Primary Wound Type: Blisters  -MF    Retired Wound - Properties Group Date first assessed: 12/31/24  - Time first assessed: 1231  -MF Side: Right  -MF Location: leg  -MF Primary  Wound Type: Blisters  -MF       Wound 10/04/24 1430 Right lateral ankle    Wound - Properties Group Placement Date: 10/04/24  - Placement Time: 1430  -JM Side: Right  -JM Orientation: lateral  -JM Location: ankle  -JM    Wound Image Images linked: 1  -MF      Dressing Appearance intact  -MF      Base closed/resurfaced  -MF      Periwound intact;dry  -MF      Periwound Temperature warm  -MF      Periwound Skin Turgor soft  -MF      Edges irregular  -MF      Drainage Amount none  -MF      Care, Wound cleansed with;soap and water;debrided  -MF      Dressing Care open to air  -MF      Periwound Care cleansed with pH balanced cleanser  -MF      Retired Wound - Properties Group Placement Date: 10/04/24  -JM Placement Time: 1430  -JM Side: Right  -JM Orientation: lateral  -JM Location: ankle  -JM    Retired Wound - Properties Group Placement Date: 10/04/24  -JM Placement Time: 1430  -JM Side: Right  -JM Orientation: lateral  -JM Location: ankle  -JM    Retired Wound - Properties Group Date first assessed: 10/04/24  -JM Time first assessed: 1430  -JM Side: Right  -JM Location: ankle  -JM       Wound 09/12/24 1300 Right lateral leg Traumatic    Wound - Properties Group Placement Date: 09/12/24  -MF Placement Time: 1300  -MF Side: Right  -MF Orientation: lateral  -MF Location: leg  -MF Primary Wound Type: Traumatic  -MF    Base closed/resurfaced  -MF      Retired Wound - Properties Group Placement Date: 09/12/24  -MF Placement Time: 1300  -MF Side: Right  -MF Orientation: lateral  -MF Location: leg  -MF Primary Wound Type: Traumatic  -MF    Retired Wound - Properties Group Placement Date: 09/12/24  -MF Placement Time: 1300  -MF Side: Right  -MF Orientation: lateral  -MF Location: leg  -MF Primary Wound Type: Traumatic  -MF    Retired Wound - Properties Group Date first assessed: 09/12/24  -MF Time first assessed: 1300  -MF Side: Right  -MF Location: leg  -MF Primary Wound Type: Traumatic  -MF              User Key  (r) =  Recorded By, (t) = Taken By, (c) = Cosigned By      Initials Name Provider Type    Jaya Doshi, PT Physical Therapist    Alesha Jason, PT Physical Therapist                   Lymphedema       Row Name 12/31/24 1340             Lymphedema Edema Assessment    Ptting Edema Category By severity  -      Pitting Edema Mild  -         Skin Changes/Observations    Location/Assessment Lower Extremity  -      Lower Extremity Conditions right:;shiny;crust;inflamed;fragile  -      Lower Extremity Color/Pigment right:;red;blanchable;hypopigmented;hyperpigmented  -         Compression/Skin Care    Compression/Skin Care skin care;wrapping location;bandaging  -      Skin Care washed/dried;lotion applied  -      Wrapping Location lower extremity  -      Wrapping Location LE right:;foot to knee  -      Wrapping Comments mepilex Ag foam to ant shin blister with compression stockings reapplied.  -                User Key  (r) = Recorded By, (t) = Taken By, (c) = Cosigned By      Initials Name Provider Type    Jaya Doshi, PT Physical Therapist                    WOUND DEBRIDEMENT  Total area of Debridement: ~1cm2  Debridement Site 1  Location- Site 1: Lateral RLE  Selective Debridement- Site 1: Wound Surface <20cmsq  Instruments- Site 1: tweezers  Excised Tissue Description- Site 1: minimum, other (comment) (crusted nonviable skin)  Bleeding- Site 1: none                 Recommendation and Plan   PT Assessment/Plan       Row Name 12/31/24 1340          PT Assessment    Functional Limitations Performance in self-care ADL;Other (comment)  -     Impairments Integumentary integrity;Impaired venous circulation;Edema  -     Assessment Comments no open wound noted to Lateral ankle area, but pt with new small, shallow blister to ant LE. PT continued with compression stocking,but added mepilex Ag foam to ant shin blister to help manage exudate and improve healing potential.  -     Rehab  Potential Fair  -MF     Patient/caregiver participated in establishment of treatment plan and goals Yes  -MF     Patient would benefit from skilled therapy intervention Yes  -MF        PT Plan    PT Frequency 2x/week  -     Physical Therapy Interventions (Optional Details) wound care;patient/family education  -     PT Plan Comments compression stocking with dressing management  -               User Key  (r) = Recorded By, (t) = Taken By, (c) = Cosigned By      Initials Name Provider Type    Jaya Doshi, PT Physical Therapist                    Goals   PT OP Goals       Row Name 12/31/24 1340          Time Calculation    PT Goal Re-Cert Due Date 03/10/25  -               User Key  (r) = Recorded By, (t) = Taken By, (c) = Cosigned By      Initials Name Provider Type    Jaya Doshi, PT Physical Therapist                    PT Goal Re-Cert Due Date: 03/10/25            Time Calculation: Start Time: 1340  Untimed Charges  10799-Yxmyxxeny debridement: 20  Total Minutes  Untimed Charges Total Minutes: 20   Total Minutes: 20              Jaya Bower, PT  12/31/2024

## 2025-01-03 ENCOUNTER — HOSPITAL ENCOUNTER (OUTPATIENT)
Dept: PHYSICAL THERAPY | Facility: HOSPITAL | Age: 85
Setting detail: THERAPIES SERIES
Discharge: HOME | End: 2025-01-03
Payer: MEDICARE

## 2025-01-03 DIAGNOSIS — R60.0 EDEMA OF RIGHT LOWER LEG: ICD-10-CM

## 2025-01-03 DIAGNOSIS — S81.801D OPEN WOUND OF RIGHT LOWER LEG, SUBSEQUENT ENCOUNTER: Primary | ICD-10-CM

## 2025-01-03 DIAGNOSIS — S80.11XA HEMATOMA OF RIGHT LOWER LEG: ICD-10-CM

## 2025-01-03 DIAGNOSIS — Z87.2 HISTORY OF CELLULITIS: ICD-10-CM

## 2025-01-03 DIAGNOSIS — I87.2 VENOUS STASIS DERMATITIS OF RIGHT LOWER EXTREMITY: ICD-10-CM

## 2025-01-03 PROCEDURE — 97535 SELF CARE MNGMENT TRAINING: CPT

## 2025-01-03 NOTE — THERAPY WOUND CARE TREATMENT
Outpatient Rehabilitation - Wound/Debridement Treatment Note  [facility]     Patient Name: Toño Alcala  : 1940  MRN: [id number]  Today's Date: 1/3/2025                 Admit Date: 1/3/2025    Visit Dx:    ICD-10-CM ICD-9-CM   1. Open wound of right lower leg, subsequent encounter  S81.801D V58.89     891.0   2. Venous stasis dermatitis of right lower extremity  I87.2 454.1   3. History of cellulitis  Z87.2 V13.3   4. Edema of right lower leg  R60.0 782.3   5. Hematoma of right lower leg  S80.11XA 924.10     RLE      R anterior leg      R medial leg      R lateral ankle                Patient Active Problem List   Diagnosis    CAD (coronary artery disease)    CVD (cardiovascular disease)    DVT (deep venous thrombosis)    Dyslipidemia    Arthritis    GERD (gastroesophageal reflux disease)    Mixed hyperlipidemia    Diabetic nephropathy associated with type 2 diabetes mellitus    Diabetic polyneuropathy associated with type 2 diabetes mellitus    Chronic kidney disease, stage IV (severe)    Vitamin D deficiency    Disc disorder of cervical region    Postthrombotic syndrome    Vertigo    Angina pectoris    Lumbosacral spondylosis without myelopathy    Psoriasis    Arthritis of elbow    Swelling of right lower extremity    Acute right-sided low back pain without sciatica    Hoarseness    Unifocal PVCs    Skin lesion of face    Essential hypertension    Medicare annual wellness visit, subsequent    Stage 3 chronic kidney disease due to benign hypertension    BMI 30.0-30.9,adult    Postherpetic neuralgia    Herpes zoster without complication    Leg edema, right    Chest pain    Encounter for removal of sutures    Chronic pain of left knee    Fall        Past Medical History:   Diagnosis Date    Acute diverticulitis 2020    Allergic 60 yrs ago    Arthritis     Arthritis of neck Fusion     Bilateral radial fractures     fracture bilateral radial heads    CAD (coronary artery disease)      Calculus of gallbladder without cholecystitis without obstruction 01/29/2020    Cataract     Cervical disc disorder Fusion 1992    Cholelithiasis     Chronic kidney disease 2020    Clotting disorder     CVD (cardiovascular disease)     History of TIA 1989    Diabetes mellitus     DVT (deep venous thrombosis)     Right lower extremity DVT and pulmonary embolism in 06/2015, idiopathic    DVT of proximal lower limb 06/22/2015    proximal DVT right leg, small PE- anticoagulation    Dyslipidemia     Erectile dysfunction     Fracture 1952    H/o pf left forearm fracture    Fracture of right hand 1980    Fracture of wrist 1980    GERD (gastroesophageal reflux disease)     with hiatal hernia    HL (hearing loss)     Hx of angina pectoris     Hypertension     Normal renal angiography 02/16/11    Knee swelling Several years ago    Left wrist fracture 1953    Lumbosacral disc disease 1996    Osgood-Schlatter's disease 1955    Osteoarthritis     Right wrist fracture     Vertigo     Wears glasses         Past Surgical History:   Procedure Laterality Date    APPENDECTOMY  1957    BACK SURGERY      CARDIAC CATHETERIZATION  2011    with vein graft    CATARACT EXTRACTION Left     CERVICAL FUSION      CERVICAL FUSION  1992    C3-4    COLONOSCOPY  2013    CORONARY ARTERY BYPASS GRAFT  1994    HERNIA REPAIR Right 2011    inguinal    INGUINAL HERNIA REPAIR Left 10/29/2019    Procedure: INGUINAL HERNIA REPAIR LEFT;  Surgeon: Charisma Raines MD;  Location: [facility];  Service: General    LUMBAR LAMINECTOMY  1996    laminectomy, lumbar, L3    NECK SURGERY  1992    OTHER SURGICAL HISTORY      wrist surgery    SPINE SURGERY  1996    TONSILLECTOMY AND ADENOIDECTOMY  1945    TRIGGER POINT INJECTION  2001 - 2007    VASECTOMY  1972         EVALUATION   PT Ortho       Row Name 01/03/25 1400       Subjective    Subjective Comments Pt reports he thinks the area has worsened once again because he has noticed multiple areas of draiange coming  through his compression stocking. Thinks his personal compression stocking might be too tight.  -       Subjective Pain    Able to rate subjective pain? yes  -    Pre-Treatment Pain Level 1  -    Post-Treatment Pain Level 1  -    Subjective Pain Comment dorsal foot  -       Transfers    Sit-Stand [place] (Transfers) independent  -    Stand-Sit [place] (Transfers) independent  -    Comment, (Transfers) seated for tx  -       Gait/Stairs (Locomotion)    [place] Level (Gait) modified independence  -    Assistive Device (Gait) cane, straight  -              User Key  (r) = Recorded By, (t) = Taken By, (c) = Cosigned By      Initials Name Provider Type     Dejan Zabala, PT Physical Therapist                     LDA Wound       Row Name 01/03/25 1400             Wound 12/31/24 1231 Right anterior leg    Wound - Properties Group Placement Date: 12/31/24  - Placement Time: 1231  - Side: Right  - Orientation: anterior  -MF Location: leg  -MF Primary Wound Type: Blisters  -MF    Wound Image Images linked: 3  -      Dressing Appearance intact;moist drainage  -      Base moist;pink;other (see comments)  multiple small, very shallow ulcerations.  -      Periwound intact;dry;redness  mild/moderate erythema  -      Periwound Temperature warm  -      Periwound Skin Turgor soft  -      Drainage Characteristics/Odor serosanguineous  -      Drainage Amount small  -      Care, Wound cleansed with;soap and water  -      Dressing Care dressing applied;silver impregnated;low-adherent;foam  Mepilex Ag, Juxtalite velcro compression system  -      Periwound Care cleansed with pH balanced cleanser;dry periwound area maintained  -      Retired Wound - Properties Group Placement Date: 12/31/24  - Placement Time: 1231  -MF Side: Right  -MF Orientation: anterior  -MF Location: leg  -MF Primary Wound Type: Blisters  -MF    Retired Wound - Properties Group Placement Date:  12/31/24  - Placement Time: 1231  -MF Side: Right  -MF Orientation: anterior  -MF Location: leg  -MF Primary Wound Type: Blisters  -MF    Retired Wound - Properties Group Date first assessed: 12/31/24  - Time first assessed: 1231  -MF Side: Right  -MF Location: leg  -MF Primary Wound Type: Blisters  -MF       Wound 10/04/24 1430 Right lateral ankle    Wound - Properties Group Placement Date: 10/04/24  - Placement Time: 1430  -JM Side: Right  -JM Orientation: lateral  -JM Location: ankle  -JM    Wound Image Images linked: 1  -LH      Dressing Appearance dry;intact  -LH      Base closed/resurfaced  -      Periwound intact;dry  -      Periwound Temperature warm  -      Periwound Skin Turgor soft  -      Edges irregular  -      Drainage Amount none  -LH      Care, Wound cleansed with;soap and water  -      Dressing Care open to air  -LH      Periwound Care cleansed with pH balanced cleanser;dry periwound area maintained  -      Retired Wound - Properties Group Placement Date: 10/04/24  - Placement Time: 1430  -JM Side: Right  -JM Orientation: lateral  -JM Location: ankle  -JM    Retired Wound - Properties Group Placement Date: 10/04/24  - Placement Time: 1430  -JM Side: Right  -JM Orientation: lateral  -JM Location: ankle  -JM    Retired Wound - Properties Group Date first assessed: 10/04/24  - Time first assessed: 1430  -JM Side: Right  -JM Location: ankle  -JM       Wound 09/12/24 1300 Right lateral leg Traumatic    Wound - Properties Group Placement Date: 09/12/24  - Placement Time: 1300  -MF Side: Right  -MF Orientation: lateral  -MF Location: leg  -MF Primary Wound Type: Traumatic  -MF    Base --  -LH      Retired Wound - Properties Group Placement Date: 09/12/24  - Placement Time: 1300  -MF Side: Right  -MF Orientation: lateral  -MF Location: leg  -MF Primary Wound Type: Traumatic  -MF    Retired Wound - Properties Group Placement Date: 09/12/24  - Placement Time: 1300  -MF Side:  Right  -MF Orientation: lateral  -MF Location: leg  -MF Primary Wound Type: Traumatic  -MF    Retired Wound - Properties Group Date first assessed: 09/12/24  -MF Time first assessed: 1300  -MF Side: Right  -MF Location: leg  -MF Primary Wound Type: Traumatic  -MF              User Key  (r) = Recorded By, (t) = Taken By, (c) = Cosigned By      Initials Name Provider Type     Jaya Bower, PT Physical Therapist    Alesha Jason, PT Physical Therapist     Dejan Zabala, PT Physical Therapist                   Lymphedema       Row Name 01/03/25 1400             Lymphedema Edema Assessment    Ptting Edema Category By severity  -      Pitting Edema Moderate  -         Skin Changes/Observations    Location/Assessment Lower Extremity  -      Lower Extremity Conditions right:;shiny;crust;inflamed;fragile  -      Lower Extremity Color/Pigment right:;red;blanchable;hypopigmented;hyperpigmented;erythema  mild/moderate redness  -         Compression/Skin Care    Compression/Skin Care skin care;wrapping location;bandaging  -      Skin Care washed/dried;lotion applied  -      Wrapping Location lower extremity  -      Wrapping Location LE right:;foot to knee  -      Wrapping Comments Mepilex Ag to open edna, Juxtalite size short, medium velcro closure compression system.  -                User Key  (r) = Recorded By, (t) = Taken By, (c) = Cosigned By      Initials Name Provider Type     Dejan Zabala, PT Physical Therapist                    WOUND DEBRIDEMENT                    Therapy Education       Row Name 01/03/25 1400             Therapy Education    Education Details Reviewed importance of RLE elevation and covering all wounds with mepilex Ag. Closely monitor RLE edema and redness, if worsening may need to contact PCP for abx. Reviewed donning/doffing of velcro closure system. If velcro closure not managing edema properly trial use of compressogrips.  -      Given  Bandaging/dressing change;Pain management;Edema management  -      Program Reinforced;Progressed;Modified  -      How Provided Verbal;Demonstration  -      Provided to Patient  -      Level of Understanding Teach back education performed;Verbalized  -                User Key  (r) = Recorded By, (t) = Taken By, (c) = Cosigned By      Initials Name Provider Type     Dejan Zabala, PT Physical Therapist                    Recommendation and Plan   PT Assessment/Plan       Row Name 01/03/25 1400          PT Assessment    Functional Limitations Performance in self-care ADL;Other (comment)  -     Impairments Integumentary integrity;Impaired venous circulation;Edema  -     Assessment Comments Pt's R lateral ankle remains closed this date with only a few adherent crusts. Pt does, however, present today with moderate increase in RLE edema with moderate redness and multiple scattered small, shallow ulcerations to the R anterior and medial calf area. Pt's recent decline in status of edema and wounds likely related to proximal portion of pt's personal compression stocking rolling down causing very tigh narrow band of compression limiting venous return from RLE. PT covered all open areas with mepilex ag to help reduce bacterial load and manage drainage. PT trialed use of Juxtalite velcro closure compression system to help with edema management and prevent rolling of proximal portion of compression. Pt would continue to benefit from current POC to promote wound healing.  -     Rehab Potential Fair  -     Patient/caregiver participated in establishment of treatment plan and goals Yes  -     Patient would benefit from skilled therapy intervention Yes  -        PT Plan    PT Frequency 2x/week  -     Physical Therapy Interventions (Optional Details) wound care;patient/family education  -     PT Plan Comments Debridement PRN, dressings, compression  -               User Key  (r) = Recorded By, (t) =  Taken By, (c) = Cosigned By      Initials Name Provider Type     Dejan Zabala, PT Physical Therapist                    Goals   PT OP Goals       Row Name 01/03/25 1432          Time Calculation    PT Goal Re-Cert Due Date 03/10/25  -               User Key  (r) = Recorded By, (t) = Taken By, (c) = Cosigned By      Initials Name Provider Type     Dejan Zabala, PT Physical Therapist                    PT Goal Re-Cert Due Date: 03/10/25            Time Calculation: Start Time: 1345  Timed Charges  38971 - PT Self Care/Mgmt Minutes: 25  Total Minutes  Timed Charges Total Minutes: 25   Total Minutes: 25  Therapy Charges for Today       Code Description Service Date Service Provider Modifiers Qty    [id number] HC PT SELF CARE/MGMT/TRAIN EA 15 MIN 1/3/2025 Dejan Zabala, PT GP 2                    Dejan Zabala, PT  1/3/2025

## 2025-01-07 ENCOUNTER — HOSPITAL ENCOUNTER (OUTPATIENT)
Dept: PHYSICAL THERAPY | Facility: HOSPITAL | Age: 85
Setting detail: THERAPIES SERIES
Discharge: HOME | End: 2025-01-07
Payer: MEDICARE

## 2025-01-07 DIAGNOSIS — S81.801D OPEN WOUND OF RIGHT LOWER LEG, SUBSEQUENT ENCOUNTER: ICD-10-CM

## 2025-01-07 DIAGNOSIS — S80.11XA HEMATOMA OF RIGHT LOWER LEG: ICD-10-CM

## 2025-01-07 DIAGNOSIS — I87.2 VENOUS STASIS DERMATITIS OF RIGHT LOWER EXTREMITY: Primary | ICD-10-CM

## 2025-01-07 DIAGNOSIS — R60.0 EDEMA OF RIGHT LOWER LEG: ICD-10-CM

## 2025-01-07 DIAGNOSIS — Z87.2 HISTORY OF CELLULITIS: ICD-10-CM

## 2025-01-07 PROCEDURE — 97597 DBRDMT OPN WND 1ST 20 CM/<: CPT

## 2025-01-07 NOTE — THERAPY PROGRESS REPORT/RE-CERT
Outpatient Rehabilitation - Wound/Debridement Progress Note   [place]     Patient Name: Toño Alcala  : 1940  MRN: [id number]  Today's Date: 2025                 Admit Date: 2025    Visit Dx:    ICD-10-CM ICD-9-CM   1. Venous stasis dermatitis of right lower extremity  I87.2 454.1   2. Open wound of right lower leg, subsequent encounter  S81.801D V58.89     891.0   3. History of cellulitis  Z87.2 V13.3   4. Edema of right lower leg  R60.0 782.3   5. Hematoma of right lower leg  S80.11XA 924.10       Patient Active Problem List   Diagnosis    CAD (coronary artery disease)    CVD (cardiovascular disease)    DVT (deep venous thrombosis)    Dyslipidemia    Arthritis    GERD (gastroesophageal reflux disease)    Mixed hyperlipidemia    Diabetic nephropathy associated with type 2 diabetes mellitus    Diabetic polyneuropathy associated with type 2 diabetes mellitus    Chronic kidney disease, stage IV (severe)    Vitamin D deficiency    Disc disorder of cervical region    Postthrombotic syndrome    Vertigo    Angina pectoris    Lumbosacral spondylosis without myelopathy    Psoriasis    Arthritis of elbow    Swelling of right lower extremity    Acute right-sided low back pain without sciatica    Hoarseness    Unifocal PVCs    Skin lesion of face    Essential hypertension    Medicare annual wellness visit, subsequent    Stage 3 chronic kidney disease due to benign hypertension    BMI 30.0-30.9,adult    Postherpetic neuralgia    Herpes zoster without complication    Leg edema, right    Chest pain    Encounter for removal of sutures    Chronic pain of left knee    Fall        Past Medical History:   Diagnosis Date    Acute diverticulitis 2020    Allergic 60 yrs ago    Arthritis     Arthritis of neck Fusion 1992    Bilateral radial fractures     fracture bilateral radial heads    CAD (coronary artery disease)     Calculus of gallbladder without cholecystitis without obstruction 2020     Cataract     Cervical disc disorder Fusion 1992    Cholelithiasis     Chronic kidney disease 2020    Clotting disorder     CVD (cardiovascular disease)     History of TIA 1989    Diabetes mellitus     DVT (deep venous thrombosis)     Right lower extremity DVT and pulmonary embolism in 06/2015, idiopathic    DVT of proximal lower limb 06/22/2015    proximal DVT right leg, small PE- anticoagulation    Dyslipidemia     Erectile dysfunction     Fracture 1952    H/o pf left forearm fracture    Fracture of right hand 1980    Fracture of wrist 1980    GERD (gastroesophageal reflux disease)     with hiatal hernia    HL (hearing loss)     Hx of angina pectoris     Hypertension     Normal renal angiography 02/16/11    Knee swelling Several years ago    Left wrist fracture 1953    Lumbosacral disc disease 1996    Osgood-Schlatter's disease 1955    Osteoarthritis     Right wrist fracture     Vertigo     Wears glasses         Past Surgical History:   Procedure Laterality Date    APPENDECTOMY  1957    BACK SURGERY      CARDIAC CATHETERIZATION  2011    with vein graft    CATARACT EXTRACTION Left     CERVICAL FUSION      CERVICAL FUSION  1992    C3-4    COLONOSCOPY  2013    CORONARY ARTERY BYPASS GRAFT  1994    HERNIA REPAIR Right 2011    inguinal    INGUINAL HERNIA REPAIR Left 10/29/2019    Procedure: INGUINAL HERNIA REPAIR LEFT;  Surgeon: Charisma Raines MD;  Location: [facility];  Service: General    LUMBAR LAMINECTOMY  1996    laminectomy, lumbar, L3    NECK SURGERY  1992    OTHER SURGICAL HISTORY      wrist surgery    SPINE SURGERY  1996    TONSILLECTOMY AND ADENOIDECTOMY  1945    TRIGGER POINT INJECTION  2001 - 2007    VASECTOMY  1972         EVALUATION   PT Ortho       Row Name 01/07/25 1300       Subjective    Subjective Comments Pt with no new issues. stated things have been feeling better  -MF       Subjective Pain    Able to rate subjective pain? yes  -MF    Pre-Treatment Pain Level 0  -MF    Post-Treatment Pain  Level 0  -MF       Transfers    Sit-Stand [place] (Transfers) independent  -MF    Stand-Sit [place] (Transfers) independent  -MF    Comment, (Transfers) seated for tx  -MF       Gait/Stairs (Locomotion)    [place] Level (Gait) modified independence  -MF    Assistive Device (Gait) cane, straight  -MF              User Key  (r) = Recorded By, (t) = Taken By, (c) = Cosigned By      Initials Name Provider Type     Jaya Bower PT Physical Therapist                     LDA Wound       Row Name 01/07/25 1300             Wound 12/31/24 1231 Right anterior leg    Wound - Properties Group Placement Date: 12/31/24  -MF Placement Time: 1231  -MF Side: Right  -MF Orientation: anterior  -MF Location: leg  -MF Primary Wound Type: Blisters  -MF    Wound Image Images linked: 1  -MF      Dressing Appearance intact  -MF      Base closed/resurfaced;epithelialization  -MF      Periwound intact;dry;pink  -MF      Periwound Temperature warm  -MF      Drainage Amount none  -MF      Care, Wound cleansed with;soap and water;debrided  -MF      Dressing Care dressing changed  -MF      Periwound Care cleansed with pH balanced cleanser  -MF      Retired Wound - Properties Group Placement Date: 12/31/24  -MF Placement Time: 1231  -MF Side: Right  -MF Orientation: anterior  -MF Location: leg  -MF Primary Wound Type: Blisters  -MF    Retired Wound - Properties Group Placement Date: 12/31/24  -MF Placement Time: 1231  -MF Side: Right  -MF Orientation: anterior  -MF Location: leg  -MF Primary Wound Type: Blisters  -MF    Retired Wound - Properties Group Date first assessed: 12/31/24  - Time first assessed: 1231  -MF Side: Right  -MF Location: leg  -MF Primary Wound Type: Blisters  -MF       Wound 10/04/24 1430 Right lateral ankle    Wound - Properties Group Placement Date: 10/04/24  -JM Placement Time: 1430  -JM Side: Right  -JM Orientation: lateral  -JM Location: ankle  -JM    Dressing Appearance intact  -MF      Base  closed/resurfaced  -MF      Periwound intact;dry  -MF      Periwound Temperature warm  -MF      Periwound Skin Turgor soft  -MF      Edges irregular  -MF      Drainage Amount none  -MF      Care, Wound cleansed with;soap and water;debrided  -MF      Dressing Care open to air  -MF      Periwound Care cleansed with pH balanced cleanser  -MF      Retired Wound - Properties Group Placement Date: 10/04/24  -JM Placement Time: 1430  -JM Side: Right  -JM Orientation: lateral  - Location: ankle  -JM    Retired Wound - Properties Group Placement Date: 10/04/24  -JM Placement Time: 1430  -JM Side: Right  -JM Orientation: lateral  - Location: ankle  -JM    Retired Wound - Properties Group Date first assessed: 10/04/24  -JM Time first assessed: 1430 -JM Side: Right  - Location: ankle  -              User Key  (r) = Recorded By, (t) = Taken By, (c) = Cosigned By      Initials Name Provider Type    Jaya Doshi, PT Physical Therapist    Alesha Jason, PT Physical Therapist                   Lymphedema       Row Name 01/07/25 1300             Lymphedema Edema Assessment    Ptting Edema Category By severity  -MF      Pitting Edema Mild  -MF         Compression/Skin Care    Compression/Skin Care skin care;wrapping location;bandaging  -MF      Skin Care washed/dried;lotion applied  -MF      Wrapping Location lower extremity  -MF      Wrapping Location LE right:;foot to knee  -MF      Wrapping Comments pt's home compression stockings  -MF                User Key  (r) = Recorded By, (t) = Taken By, (c) = Cosigned By      Initials Name Provider Type    Jaya Doshi, PT Physical Therapist                    WOUND DEBRIDEMENT  Total area of Debridement: ~1cm2  Debridement Site 1  Location- Site 1: Lateral RLE  Selective Debridement- Site 1: Wound Surface <20cmsq  Instruments- Site 1: tweezers  Excised Tissue Description- Site 1: scant, other (comment) (crusted nonviable skin / hypertrophic  crust)  Bleeding- Site 1: none                 Recommendation and Plan   PT Assessment/Plan       Row Name 01/07/25 1300          PT Assessment    Functional Limitations Performance in self-care ADL;Other (comment)  -     Impairments Integumentary integrity;Impaired venous circulation;Edema  -     Assessment Comments Pt noted to have significant improvement in skin integrity today with only minimal erythema noted and no new blisters or ulcerations. Pt still has a few small areas of crusted / hypertrophic skin and will benefit from cont use of compression stockings to help further improve venous return to improve skin integrity. all STGs  met and only 1 LTG remaining. Pt to cont with home management and f/u in 2-3 weeks if issues noted.  -     Rehab Potential Fair  -     Patient/caregiver participated in establishment of treatment plan and goals Yes  -     Patient would benefit from skilled therapy intervention Yes  -        PT Plan    PT Frequency Other (comment)  Pt to f/u in 2-3 weeks if any further issues arise, otherwise PT will d/c pt.  -     Predicted Duration of Therapy Intervention (PT) 1-2 visits  -     Physical Therapy Interventions (Optional Details) wound care;patient/family education  -     PT Plan Comments Pt to cont with home edema management with possible d/c at this time if no further issues in the next 2 weeks.  -               User Key  (r) = Recorded By, (t) = Taken By, (c) = Cosigned By      Initials Name Provider Type    Jaya Doshi, PT Physical Therapist                    Goals   PT OP Goals       Row Name 01/07/25 1300          PT Short Term Goals    STG Date to Achieve 10/27/24  -     STG 1 Pt to have no hematoma material to wound base to improve healing potential.  -     STG 1 Progress Met  -     STG 2 Pt to have no significant undermining to allow for faster wound healing.  -     STG 2 Progress Met  -     STG 3 Pt will demonstrate only mild RLE  edema to indicate appropriate edema management.  -MF     STG 3 Progress Met  -        Long Term Goals    LTG Date to Achieve 12/11/24  -MF     LTG 1 Pt to have no RLE skin breakdown to allow for transition to compression stockings for long term edema management  -MF     LTG 1 Progress Ongoing  -     LTG 2 Decrease RLE wound size by 75% as evidence of wound healing.  -     LTG 2 Progress Met  -     LTG 3 Pt will demonstrate only trace edema to the RLE to allow for transition to long term compression system.  -     LTG 3 Progress Met  -        Time Calculation    PT Goal Re-Cert Due Date 03/10/25  -               User Key  (r) = Recorded By, (t) = Taken By, (c) = Cosigned By      Initials Name Provider Type     Jaya Bower, PT Physical Therapist                    PT Goal Re-Cert Due Date: 03/10/25  PT Short Term Goals  STG Date to Achieve: 10/27/24  STG 1: Pt to have no hematoma material to wound base to improve healing potential.  STG 1 Progress: Met  STG 2: Pt to have no significant undermining to allow for faster wound healing.  STG 2 Progress: Met  STG 3: Pt will demonstrate only mild RLE edema to indicate appropriate edema management.  STG 3 Progress: Met  Long Term Goals  LTG Date to Achieve: 12/11/24  LTG 1: Pt to have no RLE skin breakdown to allow for transition to compression stockings for long term edema management  LTG 1 Progress: Ongoing  LTG 2: Decrease RLE wound size by 75% as evidence of wound healing.  LTG 2 Progress: Met  LTG 3: Pt will demonstrate only trace edema to the RLE to allow for transition to long term compression system.  LTG 3 Progress: Met      Time Calculation: Start Time: 1300  Untimed Charges  76738-Geaxxgpeu debridement: 20  Total Minutes  Untimed Charges Total Minutes: 20   Total Minutes: 20  Therapy Charges for Today       Code Description Service Date Service Provider Modifiers Qty    [id number] HC EDWIN DEBRIDE OPEN WOUND UP TO 20CM 1/7/2025 Cheli  Jaya NORIEGA, PT GP 1                    Jaya Bower, PT  1/7/2025

## 2025-01-07 NOTE — THERAPY WOUND CARE TREATMENT
Outpatient Rehabilitation - Wound/Debridement Treatment Note   [place]     Patient Name: Toño Alcala  : 1940  MRN: [id number]  Today's Date: 2025                 Admit Date: 2025    Visit Dx:    ICD-10-CM ICD-9-CM   1. Venous stasis dermatitis of right lower extremity  I87.2 454.1   2. Open wound of right lower leg, subsequent encounter  S81.801D V58.89     891.0   3. History of cellulitis  Z87.2 V13.3   4. Edema of right lower leg  R60.0 782.3   5. Hematoma of right lower leg  S80.11XA 924.10       Patient Active Problem List   Diagnosis    CAD (coronary artery disease)    CVD (cardiovascular disease)    DVT (deep venous thrombosis)    Dyslipidemia    Arthritis    GERD (gastroesophageal reflux disease)    Mixed hyperlipidemia    Diabetic nephropathy associated with type 2 diabetes mellitus    Diabetic polyneuropathy associated with type 2 diabetes mellitus    Chronic kidney disease, stage IV (severe)    Vitamin D deficiency    Disc disorder of cervical region    Postthrombotic syndrome    Vertigo    Angina pectoris    Lumbosacral spondylosis without myelopathy    Psoriasis    Arthritis of elbow    Swelling of right lower extremity    Acute right-sided low back pain without sciatica    Hoarseness    Unifocal PVCs    Skin lesion of face    Essential hypertension    Medicare annual wellness visit, subsequent    Stage 3 chronic kidney disease due to benign hypertension    BMI 30.0-30.9,adult    Postherpetic neuralgia    Herpes zoster without complication    Leg edema, right    Chest pain    Encounter for removal of sutures    Chronic pain of left knee    Fall        Past Medical History:   Diagnosis Date    Acute diverticulitis 2020    Allergic 60 yrs ago    Arthritis     Arthritis of neck Fusion 1992    Bilateral radial fractures     fracture bilateral radial heads    CAD (coronary artery disease)     Calculus of gallbladder without cholecystitis without obstruction 2020     Cataract     Cervical disc disorder Fusion 1992    Cholelithiasis     Chronic kidney disease 2020    Clotting disorder     CVD (cardiovascular disease)     History of TIA 1989    Diabetes mellitus     DVT (deep venous thrombosis)     Right lower extremity DVT and pulmonary embolism in 06/2015, idiopathic    DVT of proximal lower limb 06/22/2015    proximal DVT right leg, small PE- anticoagulation    Dyslipidemia     Erectile dysfunction     Fracture 1952    H/o pf left forearm fracture    Fracture of right hand 1980    Fracture of wrist 1980    GERD (gastroesophageal reflux disease)     with hiatal hernia    HL (hearing loss)     Hx of angina pectoris     Hypertension     Normal renal angiography 02/16/11    Knee swelling Several years ago    Left wrist fracture 1953    Lumbosacral disc disease 1996    Osgood-Schlatter's disease 1955    Osteoarthritis     Right wrist fracture     Vertigo     Wears glasses         Past Surgical History:   Procedure Laterality Date    APPENDECTOMY  1957    BACK SURGERY      CARDIAC CATHETERIZATION  2011    with vein graft    CATARACT EXTRACTION Left     CERVICAL FUSION      CERVICAL FUSION  1992    C3-4    COLONOSCOPY  2013    CORONARY ARTERY BYPASS GRAFT  1994    HERNIA REPAIR Right 2011    inguinal    INGUINAL HERNIA REPAIR Left 10/29/2019    Procedure: INGUINAL HERNIA REPAIR LEFT;  Surgeon: Charisma Raines MD;  Location: [facility];  Service: General    LUMBAR LAMINECTOMY  1996    laminectomy, lumbar, L3    NECK SURGERY  1992    OTHER SURGICAL HISTORY      wrist surgery    SPINE SURGERY  1996    TONSILLECTOMY AND ADENOIDECTOMY  1945    TRIGGER POINT INJECTION  2001 - 2007    VASECTOMY  1972         EVALUATION   PT Ortho       Row Name 01/07/25 1300       Subjective    Subjective Comments Pt with no new issues. stated things have been feeling better  -MF       Subjective Pain    Able to rate subjective pain? yes  -MF    Pre-Treatment Pain Level 0  -MF    Post-Treatment Pain  Level 0  -MF       Transfers    Sit-Stand [place] (Transfers) independent  -MF    Stand-Sit [place] (Transfers) independent  -MF    Comment, (Transfers) seated for tx  -MF       Gait/Stairs (Locomotion)    [place] Level (Gait) modified independence  -MF    Assistive Device (Gait) cane, straight  -MF              User Key  (r) = Recorded By, (t) = Taken By, (c) = Cosigned By      Initials Name Provider Type     Jaya Bower PT Physical Therapist                     LDA Wound       Row Name 01/07/25 1300             Wound 12/31/24 1231 Right anterior leg    Wound - Properties Group Placement Date: 12/31/24  -MF Placement Time: 1231  -MF Side: Right  -MF Orientation: anterior  -MF Location: leg  -MF Primary Wound Type: Blisters  -MF    Wound Image Images linked: 1  -MF      Dressing Appearance intact  -MF      Base closed/resurfaced;epithelialization  -MF      Periwound intact;dry;pink  -MF      Periwound Temperature warm  -MF      Drainage Amount none  -MF      Care, Wound cleansed with;soap and water;debrided  -MF      Dressing Care dressing changed  -MF      Periwound Care cleansed with pH balanced cleanser  -MF      Retired Wound - Properties Group Placement Date: 12/31/24  -MF Placement Time: 1231  -MF Side: Right  -MF Orientation: anterior  -MF Location: leg  -MF Primary Wound Type: Blisters  -MF    Retired Wound - Properties Group Placement Date: 12/31/24  -MF Placement Time: 1231  -MF Side: Right  -MF Orientation: anterior  -MF Location: leg  -MF Primary Wound Type: Blisters  -MF    Retired Wound - Properties Group Date first assessed: 12/31/24  - Time first assessed: 1231  -MF Side: Right  -MF Location: leg  -MF Primary Wound Type: Blisters  -MF       Wound 10/04/24 1430 Right lateral ankle    Wound - Properties Group Placement Date: 10/04/24  -JM Placement Time: 1430  -JM Side: Right  -JM Orientation: lateral  -JM Location: ankle  -JM    Dressing Appearance intact  -MF      Base  closed/resurfaced  -MF      Periwound intact;dry  -MF      Periwound Temperature warm  -MF      Periwound Skin Turgor soft  -MF      Edges irregular  -MF      Drainage Amount none  -MF      Care, Wound cleansed with;soap and water;debrided  -MF      Dressing Care open to air  -MF      Periwound Care cleansed with pH balanced cleanser  -MF      Retired Wound - Properties Group Placement Date: 10/04/24  -JM Placement Time: 1430  -JM Side: Right  -JM Orientation: lateral  - Location: ankle  -JM    Retired Wound - Properties Group Placement Date: 10/04/24  -JM Placement Time: 1430  -JM Side: Right  -JM Orientation: lateral  - Location: ankle  -JM    Retired Wound - Properties Group Date first assessed: 10/04/24  -JM Time first assessed: 1430 -JM Side: Right  - Location: ankle  -              User Key  (r) = Recorded By, (t) = Taken By, (c) = Cosigned By      Initials Name Provider Type    Jaya Doshi, PT Physical Therapist    Alesha Jason, PT Physical Therapist                   Lymphedema       Row Name 01/07/25 1300             Lymphedema Edema Assessment    Ptting Edema Category By severity  -MF      Pitting Edema Mild  -MF         Compression/Skin Care    Compression/Skin Care skin care;wrapping location;bandaging  -MF      Skin Care washed/dried;lotion applied  -MF      Wrapping Location lower extremity  -MF      Wrapping Location LE right:;foot to knee  -MF      Wrapping Comments pt's home compression stockings  -MF                User Key  (r) = Recorded By, (t) = Taken By, (c) = Cosigned By      Initials Name Provider Type    Jaya Doshi, PT Physical Therapist                    WOUND DEBRIDEMENT  Total area of Debridement: ~1cm2  Debridement Site 1  Location- Site 1: Lateral RLE  Selective Debridement- Site 1: Wound Surface <20cmsq  Instruments- Site 1: tweezers  Excised Tissue Description- Site 1: scant, other (comment) (crusted nonviable skin / hypertrophic  crust)  Bleeding- Site 1: none                 Recommendation and Plan   PT Assessment/Plan       Row Name 01/07/25 1300          PT Assessment    Functional Limitations Performance in self-care ADL;Other (comment)  -     Impairments Integumentary integrity;Impaired venous circulation;Edema  -     Assessment Comments Pt noted to have significant improvement in skin integrity today with only minimal erythema noted and no new blisters or ulcerations. Pt still has a few small areas of crusted / hypertrophic skin and will benefit from cont use of compression stockings to help further improve venous return to improve skin integrity. all STGs  met and only 1 LTG remaining. Pt to cont with home management and f/u in 2-3 weeks if issues noted.  -     Rehab Potential Fair  -     Patient/caregiver participated in establishment of treatment plan and goals Yes  -     Patient would benefit from skilled therapy intervention Yes  -        PT Plan    PT Frequency Other (comment)  Pt to f/u in 2-3 weeks if any further issues arise, otherwise PT will d/c pt.  -     Predicted Duration of Therapy Intervention (PT) 1-2 visits  -     Physical Therapy Interventions (Optional Details) wound care;patient/family education  -     PT Plan Comments Pt to cont with home edema management with possible d/c at this time if no further issues in the next 2 weeks.  -               User Key  (r) = Recorded By, (t) = Taken By, (c) = Cosigned By      Initials Name Provider Type    Jaya Doshi, PT Physical Therapist                    Goals   PT OP Goals       Row Name 01/07/25 1300          PT Short Term Goals    STG Date to Achieve 10/27/24  -     STG 1 Pt to have no hematoma material to wound base to improve healing potential.  -     STG 1 Progress Met  -     STG 2 Pt to have no significant undermining to allow for faster wound healing.  -     STG 2 Progress Met  -     STG 3 Pt will demonstrate only mild RLE  edema to indicate appropriate edema management.  -MF     STG 3 Progress Met  -MF        Long Term Goals    LTG Date to Achieve 12/11/24  -MF     LTG 1 Pt to have no RLE skin breakdown to allow for transition to compression stockings for long term edema management  -MF     LTG 1 Progress Ongoing  -     LTG 2 Decrease RLE wound size by 75% as evidence of wound healing.  -MF     LTG 2 Progress Met  -     LTG 3 Pt will demonstrate only trace edema to the RLE to allow for transition to long term compression system.  -     LTG 3 Progress Met  -        Time Calculation    PT Goal Re-Cert Due Date 03/10/25  -               User Key  (r) = Recorded By, (t) = Taken By, (c) = Cosigned By      Initials Name Provider Type    Jaya Doshi, PT Physical Therapist                    PT Goal Re-Cert Due Date: 03/10/25  PT Short Term Goals  STG Date to Achieve: 10/27/24  STG 1: Pt to have no hematoma material to wound base to improve healing potential.  STG 1 Progress: Met  STG 2: Pt to have no significant undermining to allow for faster wound healing.  STG 2 Progress: Met  STG 3: Pt will demonstrate only mild RLE edema to indicate appropriate edema management.  STG 3 Progress: Met  Long Term Goals  LTG Date to Achieve: 12/11/24  LTG 1: Pt to have no RLE skin breakdown to allow for transition to compression stockings for long term edema management  LTG 1 Progress: Ongoing  LTG 2: Decrease RLE wound size by 75% as evidence of wound healing.  LTG 2 Progress: Met  LTG 3: Pt will demonstrate only trace edema to the RLE to allow for transition to long term compression system.  LTG 3 Progress: Met      Time Calculation: Start Time: 1300  Untimed Charges  39878-Uutjtjhvb debridement: 20  Total Minutes  Untimed Charges Total Minutes: 20   Total Minutes: 20              Jaya Bower, PT  1/7/2025

## 2025-01-10 ENCOUNTER — TELEPHONE (OUTPATIENT)
Dept: INTERNAL MEDICINE | Facility: CLINIC | Age: 85
End: 2025-01-10

## 2025-01-10 ENCOUNTER — CLINICAL SUPPORT (OUTPATIENT)
Dept: INTERNAL MEDICINE | Facility: CLINIC | Age: 85
End: 2025-01-10
Payer: MEDICARE

## 2025-01-10 ENCOUNTER — LAB (OUTPATIENT)
Dept: LAB | Facility: HOSPITAL | Age: 85
End: 2025-01-10
Payer: MEDICARE

## 2025-01-10 ENCOUNTER — APPOINTMENT (OUTPATIENT)
Dept: PHYSICAL THERAPY | Facility: HOSPITAL | Age: 85
End: 2025-01-10
Payer: MEDICARE

## 2025-01-10 DIAGNOSIS — E55.9 VITAMIN D DEFICIENCY: ICD-10-CM

## 2025-01-10 DIAGNOSIS — I10 ESSENTIAL HYPERTENSION: ICD-10-CM

## 2025-01-10 DIAGNOSIS — Z51.81 ENCOUNTER FOR THERAPEUTIC DRUG MONITORING: Primary | ICD-10-CM

## 2025-01-10 DIAGNOSIS — E78.2 MIXED HYPERLIPIDEMIA: Primary | ICD-10-CM

## 2025-01-10 DIAGNOSIS — E78.2 MIXED HYPERLIPIDEMIA: ICD-10-CM

## 2025-01-10 DIAGNOSIS — E11.21 DIABETIC NEPHROPATHY ASSOCIATED WITH TYPE 2 DIABETES MELLITUS: ICD-10-CM

## 2025-01-10 DIAGNOSIS — Z79.01 ANTICOAGULATED ON WARFARIN: ICD-10-CM

## 2025-01-10 LAB
25(OH)D3 SERPL-MCNC: 70.1 NG/ML (ref 30–100)
ALBUMIN SERPL-MCNC: 4.1 G/DL (ref 3.5–5.2)
ALBUMIN/GLOB SERPL: 1.2 G/DL
ALP SERPL-CCNC: 143 U/L (ref 39–117)
ALT SERPL W P-5'-P-CCNC: 11 U/L (ref 1–41)
ANION GAP SERPL CALCULATED.3IONS-SCNC: 13 MMOL/L (ref 5–15)
AST SERPL-CCNC: 24 U/L (ref 1–40)
BASOPHILS # BLD AUTO: 0.04 10*3/MM3 (ref 0–0.2)
BASOPHILS NFR BLD AUTO: 0.6 % (ref 0–1.5)
BILIRUB SERPL-MCNC: 0.6 MG/DL (ref 0–1.2)
BUN SERPL-MCNC: 20 MG/DL (ref 8–23)
BUN/CREAT SERPL: 8 (ref 7–25)
CALCIUM SPEC-SCNC: 9.6 MG/DL (ref 8.6–10.5)
CHLORIDE SERPL-SCNC: 101 MMOL/L (ref 98–107)
CHOLEST SERPL-MCNC: 143 MG/DL (ref 0–200)
CO2 SERPL-SCNC: 26 MMOL/L (ref 22–29)
CREAT SERPL-MCNC: 2.49 MG/DL (ref 0.76–1.27)
DEPRECATED RDW RBC AUTO: 48.8 FL (ref 37–54)
EGFRCR SERPLBLD CKD-EPI 2021: 24.8 ML/MIN/1.73
EOSINOPHIL # BLD AUTO: 0.58 10*3/MM3 (ref 0–0.4)
EOSINOPHIL NFR BLD AUTO: 8.7 % (ref 0.3–6.2)
ERYTHROCYTE [DISTWIDTH] IN BLOOD BY AUTOMATED COUNT: 15.2 % (ref 12.3–15.4)
GLOBULIN UR ELPH-MCNC: 3.4 GM/DL
GLUCOSE SERPL-MCNC: 95 MG/DL (ref 65–99)
HBA1C MFR BLD: 6.1 % (ref 4.8–5.6)
HCT VFR BLD AUTO: 41.3 % (ref 37.5–51)
HDLC SERPL-MCNC: 45 MG/DL (ref 40–60)
HGB BLD-MCNC: 13.8 G/DL (ref 13–17.7)
IMM GRANULOCYTES # BLD AUTO: 0.02 10*3/MM3 (ref 0–0.05)
IMM GRANULOCYTES NFR BLD AUTO: 0.3 % (ref 0–0.5)
INR PPP: 1.5 (ref 0.9–1.1)
LDLC SERPL CALC-MCNC: 82 MG/DL (ref 0–100)
LDLC/HDLC SERPL: 1.8 {RATIO}
LYMPHOCYTES # BLD AUTO: 1.71 10*3/MM3 (ref 0.7–3.1)
LYMPHOCYTES NFR BLD AUTO: 25.7 % (ref 19.6–45.3)
MCH RBC QN AUTO: 29.1 PG (ref 26.6–33)
MCHC RBC AUTO-ENTMCNC: 33.4 G/DL (ref 31.5–35.7)
MCV RBC AUTO: 87.1 FL (ref 79–97)
MONOCYTES # BLD AUTO: 0.74 10*3/MM3 (ref 0.1–0.9)
MONOCYTES NFR BLD AUTO: 11.1 % (ref 5–12)
NEUTROPHILS NFR BLD AUTO: 3.57 10*3/MM3 (ref 1.7–7)
NEUTROPHILS NFR BLD AUTO: 53.6 % (ref 42.7–76)
NRBC BLD AUTO-RTO: 0 /100 WBC (ref 0–0.2)
PLATELET # BLD AUTO: 240 10*3/MM3 (ref 140–450)
PMV BLD AUTO: 10.8 FL (ref 6–12)
POTASSIUM SERPL-SCNC: 4.1 MMOL/L (ref 3.5–5.2)
PROT SERPL-MCNC: 7.5 G/DL (ref 6–8.5)
PROTHROMBIN TIME: 18.1 SECONDS
RBC # BLD AUTO: 4.74 10*6/MM3 (ref 4.14–5.8)
SODIUM SERPL-SCNC: 140 MMOL/L (ref 136–145)
TRIGL SERPL-MCNC: 85 MG/DL (ref 0–150)
VLDLC SERPL-MCNC: 16 MG/DL (ref 5–40)
WBC NRBC COR # BLD AUTO: 6.66 10*3/MM3 (ref 3.4–10.8)

## 2025-01-10 PROCEDURE — 36416 COLLJ CAPILLARY BLOOD SPEC: CPT | Performed by: INTERNAL MEDICINE

## 2025-01-10 PROCEDURE — 83036 HEMOGLOBIN GLYCOSYLATED A1C: CPT

## 2025-01-10 PROCEDURE — 80061 LIPID PANEL: CPT

## 2025-01-10 PROCEDURE — 85610 PROTHROMBIN TIME: CPT | Performed by: INTERNAL MEDICINE

## 2025-01-10 PROCEDURE — 80053 COMPREHEN METABOLIC PANEL: CPT

## 2025-01-10 PROCEDURE — 82306 VITAMIN D 25 HYDROXY: CPT

## 2025-01-10 PROCEDURE — 82570 ASSAY OF URINE CREATININE: CPT

## 2025-01-10 PROCEDURE — 85025 COMPLETE CBC W/AUTO DIFF WBC: CPT

## 2025-01-10 PROCEDURE — 82043 UR ALBUMIN QUANTITATIVE: CPT

## 2025-01-10 NOTE — TELEPHONE ENCOUNTER
"Per secure chat from [facility], \"INR 1.5, which is lower than I want it, but for now, continue same dose and repeat in 2 weeks. Orders for [facility] labs, including urine test, placed.\"      Patient verbalized understanding.   "

## 2025-01-11 LAB
ALBUMIN UR-MCNC: 4.6 MG/DL
CREAT UR-MCNC: 60.8 MG/DL
MICROALBUMIN/CREAT UR: 75.7 MG/G (ref 0–29)

## 2025-01-14 ENCOUNTER — OFFICE VISIT (OUTPATIENT)
Dept: INTERNAL MEDICINE | Facility: CLINIC | Age: 85
End: 2025-01-14
Payer: MEDICARE

## 2025-01-14 ENCOUNTER — APPOINTMENT (OUTPATIENT)
Dept: PHYSICAL THERAPY | Facility: HOSPITAL | Age: 85
End: 2025-01-14
Payer: MEDICARE

## 2025-01-14 VITALS
SYSTOLIC BLOOD PRESSURE: 130 MMHG | OXYGEN SATURATION: 98 % | TEMPERATURE: 98.2 F | DIASTOLIC BLOOD PRESSURE: 60 MMHG | HEIGHT: 72 IN | HEART RATE: 60 BPM | WEIGHT: 195.4 LBS | BODY MASS INDEX: 26.47 KG/M2

## 2025-01-14 DIAGNOSIS — I10 ESSENTIAL HYPERTENSION: ICD-10-CM

## 2025-01-14 DIAGNOSIS — Z00.00 MEDICARE ANNUAL WELLNESS VISIT, SUBSEQUENT: Primary | ICD-10-CM

## 2025-01-14 DIAGNOSIS — M47.817 LUMBOSACRAL SPONDYLOSIS WITHOUT MYELOPATHY: ICD-10-CM

## 2025-01-14 DIAGNOSIS — E78.2 MIXED HYPERLIPIDEMIA: ICD-10-CM

## 2025-01-14 DIAGNOSIS — I87.009 POSTTHROMBOTIC SYNDROME: ICD-10-CM

## 2025-01-14 DIAGNOSIS — N18.4 CHRONIC KIDNEY DISEASE, STAGE IV (SEVERE): ICD-10-CM

## 2025-01-14 DIAGNOSIS — E11.21 DIABETIC NEPHROPATHY ASSOCIATED WITH TYPE 2 DIABETES MELLITUS: ICD-10-CM

## 2025-01-14 PROBLEM — Z48.02 ENCOUNTER FOR REMOVAL OF SUTURES: Status: RESOLVED | Noted: 2022-02-21 | Resolved: 2025-01-14

## 2025-01-14 LAB
INR PPP: 1.4 (ref 0.9–1.1)
PROTHROMBIN TIME: 17 SECONDS

## 2025-01-14 PROCEDURE — G0439 PPPS, SUBSEQ VISIT: HCPCS | Performed by: INTERNAL MEDICINE

## 2025-01-14 PROCEDURE — 1170F FXNL STATUS ASSESSED: CPT | Performed by: INTERNAL MEDICINE

## 2025-01-14 PROCEDURE — 85610 PROTHROMBIN TIME: CPT | Performed by: INTERNAL MEDICINE

## 2025-01-14 PROCEDURE — 3078F DIAST BP <80 MM HG: CPT | Performed by: INTERNAL MEDICINE

## 2025-01-14 PROCEDURE — 1159F MED LIST DOCD IN RCRD: CPT | Performed by: INTERNAL MEDICINE

## 2025-01-14 PROCEDURE — 1126F AMNT PAIN NOTED NONE PRSNT: CPT | Performed by: INTERNAL MEDICINE

## 2025-01-14 PROCEDURE — 3075F SYST BP GE 130 - 139MM HG: CPT | Performed by: INTERNAL MEDICINE

## 2025-01-14 PROCEDURE — 36416 COLLJ CAPILLARY BLOOD SPEC: CPT | Performed by: INTERNAL MEDICINE

## 2025-01-14 PROCEDURE — 1160F RVW MEDS BY RX/DR IN RCRD: CPT | Performed by: INTERNAL MEDICINE

## 2025-01-14 NOTE — PROGRESS NOTES
Subjective   The ABCs of the Annual Wellness Visit  Medicare Wellness Visit      Toño Alcala is a 84 y.o. patient who presents for a Medicare Wellness Visit.    The following portions of the patient's history were reviewed and   updated as appropriate: allergies, current medications, past family history, past medical history, past social history, past surgical history, and problem list.    Compared to one year ago, the patient's physical   health is the same.  Compared to one year ago, the patient's mental   health is the same.    Recent Hospitalizations:  He was not admitted to the hospital during the last year.     Current Medical Providers:  Patient Care Team:  Radu Francis MD as PCP - General    Outpatient Medications Prior to Visit   Medication Sig Dispense Refill    acetaminophen (TYLENOL) 325 MG tablet Take 2 tablets by mouth As Needed for Mild Pain.      bumetanide (BUMEX) 1 MG tablet Take 1 tablet by mouth As Needed (Edema/kidney).      calcipotriene-betamethasone (TACLONEX) 0.005-0.064 % ointment Apply 1 Application topically to the appropriate area as directed As Needed (psoriasis).      carvedilol (COREG) 6.25 MG tablet Take 1 tablet by mouth 2 (Two) Times a Day With Meals.      cholecalciferol (VITAMIN D3) 25 MCG (1000 UT) tablet Take 1 tablet by mouth Daily.      diclofenac (VOLTAREN) 1 % gel gel Apply 4 g topically As Needed.      diltiaZEM CD (CARDIZEM CD) 180 MG 24 hr capsule Take 1 capsule by mouth Daily.      doxazosin (CARDURA) 2 MG tablet Take 1 tablet by mouth Daily.      glucose blood test strip Use to check blood sugar twice daily 100 each 3    HYDROcodone-acetaminophen (NORCO) 5-325 MG per tablet Take 1 tablet by mouth Every 12 (Twelve) Hours As Needed for Severe Pain. 60 tablet 0    lidocaine (LIDODERM) 5 % Place 1 patch on the skin as directed by provider Daily. Remove & Discard patch within 12 hours or as directed by MD 90 patch 2    Microlet Lancets misc Use to check blood  sugar twice daily 100 each 3    nitroglycerin (NITROSTAT) 0.4 MG SL tablet Place 1 tablet under the tongue Every 5 (Five) Minutes As Needed for Chest Pain. Take no more than 3 doses in 15 minutes.      omeprazole (priLOSEC) 20 MG capsule Take 1 capsule by mouth Daily.      sildenafil (Viagra) 100 MG tablet Take 1 tablet by mouth Daily As Needed for Erectile Dysfunction. 10 tablet 5    warfarin (COUMADIN) 5 MG tablet TAKE 1 TABLET BY MOUTH EVERY DAY EXCEPT FOR MONDAY TAKE 1/2 TABLET AS OF 06/19/24 90 tablet 3    doxycycline (VIBRAMYCIN) 100 MG capsule Take 1 capsule by mouth 2 (Two) Times a Day. 14 capsule 0     No facility-administered medications prior to visit.     Opioid medication/s are on active medication list.  and I have evaluated his active treatment plan and pain score trends (see table).  Vitals:    01/14/25 1142   PainSc: 0-No pain     I have reviewed the chart for potential of high risk medication and harmful drug interactions in the elderly.        Aspirin is not on active medication list.  Aspirin use is not indicated based on review of current medical condition/s. Risk of harm outweighs potential benefits.  .    Patient Active Problem List   Diagnosis    CAD (coronary artery disease)    CVD (cardiovascular disease)    DVT (deep venous thrombosis)    Dyslipidemia    Arthritis    GERD (gastroesophageal reflux disease)    Mixed hyperlipidemia    Diabetic nephropathy associated with type 2 diabetes mellitus    Diabetic polyneuropathy associated with type 2 diabetes mellitus    Chronic kidney disease, stage IV (severe)    Vitamin D deficiency    Disc disorder of cervical region    Postthrombotic syndrome    Vertigo    Angina pectoris    Lumbosacral spondylosis without myelopathy    Psoriasis    Arthritis of elbow    Swelling of right lower extremity    Acute right-sided low back pain without sciatica    Hoarseness    Unifocal PVCs    Skin lesion of face    Essential hypertension    Medicare annual  "wellness visit, subsequent    Stage 3 chronic kidney disease due to benign hypertension    BMI 30.0-30.9,adult    Postherpetic neuralgia    Herpes zoster without complication    Leg edema, right    Chest pain    Chronic pain of left knee    Fall     Advance Care Planning Advance Directive is not on file.  ACP discussion was held with the patient during this visit. Patient has an advance directive (not in EMR), copy requested.            Objective   Vitals:    25 1142   BP: 130/60   BP Location: Left arm   Patient Position: Sitting   Cuff Size: Adult   Pulse: 60   Temp: 98.2 °F (36.8 °C)   TempSrc: Infrared   SpO2: 98%   Weight: 88.6 kg (195 lb 6.4 oz)   Height: 182 cm (71.65\")   PainSc: 0-No pain       Estimated body mass index is 26.76 kg/m² as calculated from the following:    Height as of this encounter: 182 cm (71.65\").    Weight as of this encounter: 88.6 kg (195 lb 6.4 oz).    BMI is >= 25 and <30. (Overweight) The following options were offered after discussion;: exercise counseling/recommendations and nutrition counseling/recommendations           Does the patient have evidence of cognitive impairment? No  Lab Results   Component Value Date    TRIG 85 01/10/2025    HDL 45 01/10/2025    LDL 82 01/10/2025    VLDL 16 01/10/2025    HGBA1C 6.10 (H) 01/10/2025                                                                                                Health  Risk Assessment    Smoking Status:  Social History     Tobacco Use   Smoking Status Former    Current packs/day: 0.00    Average packs/day: 1.5 packs/day for 34.8 years (52.2 ttl pk-yrs)    Types: Cigarettes, Pipe, Cigars    Start date: 1962    Quit date: 1997    Years since quittin.7    Passive exposure: Past   Smokeless Tobacco Never   Tobacco Comments    QUIT DATE 1992     Alcohol Consumption:  Social History     Substance and Sexual Activity   Alcohol Use Yes    Alcohol/week: 11.0 - 13.0 standard drinks of alcohol    Types: 7 " Glasses of wine, 2 Cans of beer, 2 - 4 Shots of liquor per week    Comment: glass of wine everyday        Fall Risk Screen  RODOLFO Fall Risk Assessment was completed, and patient is at LOW risk for falls.Assessment completed on:2025    Depression Screening   Little interest or pleasure in doing things? Not at all   Feeling down, depressed, or hopeless? Not at all   PHQ-2 Total Score 0      Health Habits and Functional and Cognitive Screenin/14/2025    11:46 AM   Functional & Cognitive Status   Do you have difficulty preparing food and eating? No   Do you have difficulty bathing yourself, getting dressed or grooming yourself? No   Do you have difficulty using the toilet? No   Do you have difficulty moving around from place to place? Yes   Do you have trouble with steps or getting out of a bed or a chair? Yes   Current Diet Well Balanced Diet   Dental Exam Up to date   Eye Exam Up to date   Exercise (times per week) 0 times per week   Current Exercises Include No Regular Exercise   Do you need help using the phone?  No   Are you deaf or do you have serious difficulty hearing?  No   Do you need help to go to places out of walking distance? No   Do you need help shopping? No   Do you need help preparing meals?  No   Do you need help with housework?  No   Do you need help with laundry? No   Do you need help taking your medications? No   Do you need help managing money? No   Do you ever drive or ride in a car without wearing a seat belt? No   Have you felt unusual stress, anger or loneliness in the last month? No   Who do you live with? Other   If you need help, do you have trouble finding someone available to you? No   Have you been bothered in the last four weeks by sexual problems? No   Do you have difficulty concentrating, remembering or making decisions? No           Age-appropriate Screening Schedule:  Refer to the list below for future screening recommendations based on patient's age, sex and/or  medical conditions. Orders for these recommended tests are listed in the plan section. The patient has been provided with a written plan.    Health Maintenance List  Health Maintenance   Topic Date Due    RSV Vaccine - Adults (1 - 1-dose 75+ series) Never done    ZOSTER VACCINE (3 of 3) 12/15/2021    DIABETIC FOOT EXAM  12/15/2022    COVID-19 Vaccine (7 - 2024-25 season) 09/01/2024    DIABETIC EYE EXAM  11/15/2024    ANNUAL WELLNESS VISIT  01/09/2025    BMI FOLLOWUP  01/09/2025    HEMOGLOBIN A1C  07/10/2025    LIPID PANEL  01/10/2026    URINE MICROALBUMIN  01/10/2026    TDAP/TD VACCINES (2 - Td or Tdap) 02/15/2032    INFLUENZA VACCINE  Completed    Pneumococcal Vaccine 65+  Completed    LUNG CANCER SCREENING  Discontinued                                                                                                                                                CMS Preventative Services Quick Reference  Risk Factors Identified During Encounter  None Identified    The above risks/problems have been discussed with the patient.  Pertinent information has been shared with the patient in the After Visit Summary.  An After Visit Summary and PPPS were made available to the patient.    Follow Up:   Next Medicare Wellness visit to be scheduled in 1 year.         Additional E&M Note during same encounter follows:  Patient has additional, significant, and separately identifiable condition(s)/problem(s) that require work above and beyond the Medicare Wellness Visit     Chief Complaint  Annual Exam, Hypertension, and Hyperlipidemia    Subjective    HPI         The patient is an 84-year-old male who presents for a subsequent Medicare annual wellness examination and follow-up of diabetes, hypertension, hyperlipidemia, and chronic kidney disease.    He reports experiencing back pain upon rising in the morning, which improves with movement. The pain is described as severe enough to necessitate the use of a cane for mobility within  "his home until he achieves a certain level of flexibility. He has been prescribed medication for this condition, which he takes in half doses, finding it beneficial. He has not yet consulted with the spine specialist recommended by Dr. Renteria. He has previously taken full doses of his medication but found it caused constipation, a side effect not observed with half doses. He expresses interest in exploring the use of laxatives in conjunction with his full-dose medication.    Supplemental Information  Additionally, he mentions a resolved leg injury, with the wound now healed and replaced by a scar. He also notes the presence of a scab on his ankle, which he treats with heavy lotion at night and lighter lotion during the day. This treatment regimen appears to be effective, as he reports improvement in the condition of the scab.    MEDICATIONS  Doxazosin, diltiazem, carvedilol, hydrocodone.    IMMUNIZATIONS  He is fully vaccinated against influenza.          Objective   Vital Signs:  /60 (BP Location: Left arm, Patient Position: Sitting, Cuff Size: Adult)   Pulse 60   Temp 98.2 °F (36.8 °C) (Infrared)   Ht 182 cm (71.65\")   Wt 88.6 kg (195 lb 6.4 oz)   SpO2 98%   BMI 26.76 kg/m²   Physical Exam  Vitals reviewed.   Constitutional:       Appearance: Normal appearance. He is well-developed.   HENT:      Head: Normocephalic and atraumatic.      Right Ear: Tympanic membrane and ear canal normal.      Left Ear: Tympanic membrane and ear canal normal.      Mouth/Throat:      Pharynx: Oropharynx is clear. No posterior oropharyngeal erythema.   Neck:      Thyroid: No thyromegaly.      Vascular: No carotid bruit.   Cardiovascular:      Rate and Rhythm: Normal rate and regular rhythm.      Heart sounds: Normal heart sounds. No murmur heard.     No friction rub. No gallop.   Pulmonary:      Effort: Pulmonary effort is normal.      Breath sounds: Normal breath sounds.   Abdominal:      General: Bowel sounds are normal.    "   Palpations: Abdomen is soft.      Tenderness: There is no abdominal tenderness.   Musculoskeletal:      Cervical back: Normal range of motion and neck supple.      Right lower leg: No edema.      Left lower leg: No edema.   Lymphadenopathy:      Cervical: No cervical adenopathy.   Skin:     General: Skin is warm and dry.   Neurological:      Mental Status: He is alert and oriented to person, place, and time.      Cranial Nerves: No cranial nerve deficit.   Psychiatric:         Mood and Affect: Mood normal.         Behavior: Behavior normal.           Lungs were auscultated.            Results  Laboratory Studies  A1c is 6.1. GFR is 24. Cholesterol is normal. Blood counts are normal.              Assessment and Plan        1. Health maintenance.  His primary concern at present is back pain, which is being addressed separately. He is fully vaccinated against influenza.    2. Hypertension.  His blood pressure is well-regulated with the current regimen of doxazosin, diltiazem, and carvedilol.    3. Hyperlipidemia.  His lipid levels are excellently managed through dietary control alone.    4. Diabetes.  His A1c level is commendably controlled at 6.1 percent, which he continues to manage through dietary measures.    5. Chronic kidney disease stage 4.  His GFR remains stable at 24, similar to previous readings. The management of his condition continues to be through blood pressure control, blood glucose regulation, and avoidance of NSAIDs.    6. Lumbar spondylosis.  He has been referred to orthopedic spine surgery by Dr. Renteria, with the potential consideration of an epidural injection for relief. It was suggested that he increase his hydrocodone dosage to a full tablet every morning, instead of the current half-tablet regimen. He can manage the resultant constipation with a daily stimulant/softener.    Follow-up  The patient will follow up in 6 months or as needed.            Follow Up   Return in about 6 months (around  7/14/2025) for follow up.  Patient was given instructions and counseling regarding his condition or for health maintenance advice. Please see specific information pulled into the AVS if appropriate.  Patient or patient representative verbalized consent for the use of Ambient Listening during the visit with  Radu Francis MD for chart documentation. 1/15/2025  12:42 EST

## 2025-01-17 ENCOUNTER — APPOINTMENT (OUTPATIENT)
Dept: PHYSICAL THERAPY | Facility: HOSPITAL | Age: 85
End: 2025-01-17
Payer: MEDICARE

## 2025-01-19 ENCOUNTER — HOSPITAL ENCOUNTER (INPATIENT)
Facility: HOSPITAL | Age: 85
LOS: 8 days | Discharge: NURSING, REHAB OR LONG-TERM CARE | DRG: 064 | End: 2025-01-28
Attending: EMERGENCY MEDICINE | Admitting: STUDENT IN AN ORGANIZED HEALTH CARE EDUCATION/TRAINING PROGRAM
Payer: MEDICARE

## 2025-01-19 ENCOUNTER — APPOINTMENT (OUTPATIENT)
Dept: CT IMAGING | Facility: HOSPITAL | Age: 85
DRG: 064 | End: 2025-01-19
Payer: MEDICARE

## 2025-01-19 ENCOUNTER — APPOINTMENT (OUTPATIENT)
Dept: GENERAL RADIOLOGY | Facility: HOSPITAL | Age: 85
DRG: 064 | End: 2025-01-19
Payer: MEDICARE

## 2025-01-19 DIAGNOSIS — R13.12 OROPHARYNGEAL DYSPHAGIA: ICD-10-CM

## 2025-01-19 DIAGNOSIS — R74.8 ELEVATED CK: ICD-10-CM

## 2025-01-19 DIAGNOSIS — S09.90XA INJURY OF HEAD, INITIAL ENCOUNTER: ICD-10-CM

## 2025-01-19 DIAGNOSIS — R41.841 COGNITIVE COMMUNICATION DEFICIT: ICD-10-CM

## 2025-01-19 DIAGNOSIS — I50.9 ACUTE CONGESTIVE HEART FAILURE, UNSPECIFIED HEART FAILURE TYPE: ICD-10-CM

## 2025-01-19 DIAGNOSIS — S12.401A CLOSED NONDISPLACED FRACTURE OF FIFTH CERVICAL VERTEBRA, UNSPECIFIED FRACTURE MORPHOLOGY, INITIAL ENCOUNTER: ICD-10-CM

## 2025-01-19 DIAGNOSIS — I61.8 OTHER RIGHT-SIDED NONTRAUMATIC INTRACEREBRAL HEMORRHAGE: ICD-10-CM
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
## 2025-01-19 DIAGNOSIS — R53.1 GENERALIZED WEAKNESS: Primary | ICD-10-CM

## 2025-01-19 DIAGNOSIS — S12.501A CLOSED NONDISPLACED FRACTURE OF SIXTH CERVICAL VERTEBRA, UNSPECIFIED FRACTURE MORPHOLOGY, INITIAL ENCOUNTER: ICD-10-CM

## 2025-01-19 DIAGNOSIS — R79.89 ELEVATED TROPONIN: ICD-10-CM

## 2025-01-19 PROBLEM — F10.29 ALCOHOL DEPENDENCE WITH UNSPECIFIED ALCOHOL-INDUCED DISORDER: Status: ACTIVE | Noted: 2025-01-19

## 2025-01-19 PROBLEM — S12.400A C5 CERVICAL FRACTURE: Status: ACTIVE | Noted: 2025-01-19

## 2025-01-19 PROBLEM — S12.9XXA: Status: ACTIVE | Noted: 2025-01-19

## 2025-01-19 PROBLEM — S12.500A C6 CERVICAL FRACTURE: Status: ACTIVE | Noted: 2025-01-19

## 2025-01-19 LAB
ALBUMIN SERPL-MCNC: 4 G/DL (ref 3.5–5.2)
ALBUMIN/GLOB SERPL: 1.3 G/DL
ALP SERPL-CCNC: 135 U/L (ref 39–117)
ALT SERPL W P-5'-P-CCNC: 12 U/L (ref 1–41)
ANION GAP SERPL CALCULATED.3IONS-SCNC: 12 MMOL/L (ref 5–15)
AST SERPL-CCNC: 48 U/L (ref 1–40)
BACTERIA UR QL AUTO: NORMAL /HPF
BASOPHILS # BLD AUTO: 0.02 10*3/MM3 (ref 0–0.2)
BASOPHILS NFR BLD AUTO: 0.2 % (ref 0–1.5)
BILIRUB SERPL-MCNC: 0.9 MG/DL (ref 0–1.2)
BILIRUB UR QL STRIP: NEGATIVE
BUN SERPL-MCNC: 23 MG/DL (ref 8–23)
BUN/CREAT SERPL: 10.6 (ref 7–25)
CALCIUM SPEC-SCNC: 9.1 MG/DL (ref 8.6–10.5)
CHLORIDE SERPL-SCNC: 101 MMOL/L (ref 98–107)
CK SERPL-CCNC: 507 U/L (ref 20–200)
CLARITY UR: CLEAR
CO2 SERPL-SCNC: 24 MMOL/L (ref 22–29)
COLOR UR: YELLOW
CREAT SERPL-MCNC: 2.17 MG/DL (ref 0.76–1.27)
D-LACTATE SERPL-SCNC: 1.7 MMOL/L (ref 0.5–2)
DEPRECATED RDW RBC AUTO: 51.4 FL (ref 37–54)
EGFRCR SERPLBLD CKD-EPI 2021: 29.3 ML/MIN/1.73
EOSINOPHIL # BLD AUTO: 0 10*3/MM3 (ref 0–0.4)
EOSINOPHIL NFR BLD AUTO: 0 % (ref 0.3–6.2)
ERYTHROCYTE [DISTWIDTH] IN BLOOD BY AUTOMATED COUNT: 16.2 % (ref 12.3–15.4)
ETHANOL BLD-MCNC: <10 MG/DL (ref 0–10)
GEN 5 1HR TROPONIN T REFLEX: 491 NG/L
GLOBULIN UR ELPH-MCNC: 3.2 GM/DL
GLUCOSE BLDC GLUCOMTR-MCNC: 135 MG/DL (ref 70–130)
GLUCOSE SERPL-MCNC: 153 MG/DL (ref 65–99)
GLUCOSE UR STRIP-MCNC: ABNORMAL MG/DL
HCT VFR BLD AUTO: 41.1 % (ref 37.5–51)
HGB BLD-MCNC: 13.6 G/DL (ref 13–17.7)
HGB UR QL STRIP.AUTO: ABNORMAL
HOLD SPECIMEN: NORMAL
HYALINE CASTS UR QL AUTO: NORMAL /LPF
IMM GRANULOCYTES # BLD AUTO: 0.05 10*3/MM3 (ref 0–0.05)
IMM GRANULOCYTES NFR BLD AUTO: 0.4 % (ref 0–0.5)
INR PPP: 1.83 (ref 0.89–1.12)
KETONES UR QL STRIP: ABNORMAL
LEUKOCYTE ESTERASE UR QL STRIP.AUTO: NEGATIVE
LYMPHOCYTES # BLD AUTO: 0.76 10*3/MM3 (ref 0.7–3.1)
LYMPHOCYTES NFR BLD AUTO: 6.8 % (ref 19.6–45.3)
MAGNESIUM SERPL-MCNC: 1.7 MG/DL (ref 1.6–2.4)
MCH RBC QN AUTO: 28.5 PG (ref 26.6–33)
MCHC RBC AUTO-ENTMCNC: 33.1 G/DL (ref 31.5–35.7)
MCV RBC AUTO: 86 FL (ref 79–97)
MONOCYTES # BLD AUTO: 1.11 10*3/MM3 (ref 0.1–0.9)
MONOCYTES NFR BLD AUTO: 9.9 % (ref 5–12)
NEUTROPHILS NFR BLD AUTO: 82.7 % (ref 42.7–76)
NEUTROPHILS NFR BLD AUTO: 9.29 10*3/MM3 (ref 1.7–7)
NITRITE UR QL STRIP: NEGATIVE
NRBC BLD AUTO-RTO: 0 /100 WBC (ref 0–0.2)
NT-PROBNP SERPL-MCNC: 9226 PG/ML (ref 0–1800)
PH UR STRIP.AUTO: 7.5 [PH] (ref 5–8)
PLATELET # BLD AUTO: 196 10*3/MM3 (ref 140–450)
PMV BLD AUTO: 10.2 FL (ref 6–12)
POTASSIUM SERPL-SCNC: 4.3 MMOL/L (ref 3.5–5.2)
PROCALCITONIN SERPL-MCNC: 0.13 NG/ML (ref 0–0.25)
PROT SERPL-MCNC: 7.2 G/DL (ref 6–8.5)
PROT UR QL STRIP: ABNORMAL
PROTHROMBIN TIME: 21.3 SECONDS (ref 12.2–14.5)
QT INTERVAL: 376 MS
QTC INTERVAL: 482 MS
RBC # BLD AUTO: 4.78 10*6/MM3 (ref 4.14–5.8)
RBC # UR STRIP: NORMAL /HPF
REF LAB TEST METHOD: NORMAL
SODIUM SERPL-SCNC: 137 MMOL/L (ref 136–145)
SP GR UR STRIP: 1.02 (ref 1–1.03)
SQUAMOUS #/AREA URNS HPF: NORMAL /HPF
TRANS CELLS #/AREA URNS HPF: NORMAL /HPF
TROPONIN T % DELTA: -4
TROPONIN T NUMERIC DELTA: -20 NG/L
TROPONIN T SERPL HS-MCNC: 511 NG/L
TSH SERPL DL<=0.05 MIU/L-ACNC: 1.08 UIU/ML (ref 0.27–4.2)
UROBILINOGEN UR QL STRIP: ABNORMAL
WBC # UR STRIP: NORMAL /HPF
WBC NRBC COR # BLD AUTO: 11.23 10*3/MM3 (ref 3.4–10.8)
WHOLE BLOOD HOLD COAG: NORMAL
WHOLE BLOOD HOLD SPECIMEN: NORMAL

## 2025-01-19 PROCEDURE — 83605 ASSAY OF LACTIC ACID: CPT | Performed by: EMERGENCY MEDICINE

## 2025-01-19 PROCEDURE — 99285 EMERGENCY DEPT VISIT HI MDM: CPT

## 2025-01-19 PROCEDURE — 80053 COMPREHEN METABOLIC PANEL: CPT | Performed by: EMERGENCY MEDICINE

## 2025-01-19 PROCEDURE — 83880 ASSAY OF NATRIURETIC PEPTIDE: CPT | Performed by: EMERGENCY MEDICINE

## 2025-01-19 PROCEDURE — 83735 ASSAY OF MAGNESIUM: CPT | Performed by: EMERGENCY MEDICINE

## 2025-01-19 PROCEDURE — 36415 COLL VENOUS BLD VENIPUNCTURE: CPT

## 2025-01-19 PROCEDURE — 81001 URINALYSIS AUTO W/SCOPE: CPT | Performed by: EMERGENCY MEDICINE

## 2025-01-19 PROCEDURE — 25810000003 SODIUM CHLORIDE 0.9 % SOLUTION: Performed by: EMERGENCY MEDICINE

## 2025-01-19 PROCEDURE — 70450 CT HEAD/BRAIN W/O DYE: CPT

## 2025-01-19 PROCEDURE — 72125 CT NECK SPINE W/O DYE: CPT

## 2025-01-19 PROCEDURE — 72128 CT CHEST SPINE W/O DYE: CPT

## 2025-01-19 PROCEDURE — 82077 ASSAY SPEC XCP UR&BREATH IA: CPT

## 2025-01-19 PROCEDURE — 84443 ASSAY THYROID STIM HORMONE: CPT | Performed by: EMERGENCY MEDICINE

## 2025-01-19 PROCEDURE — 71045 X-RAY EXAM CHEST 1 VIEW: CPT

## 2025-01-19 PROCEDURE — 93005 ELECTROCARDIOGRAM TRACING: CPT | Performed by: EMERGENCY MEDICINE

## 2025-01-19 PROCEDURE — 85025 COMPLETE CBC W/AUTO DIFF WBC: CPT | Performed by: EMERGENCY MEDICINE

## 2025-01-19 PROCEDURE — 72131 CT LUMBAR SPINE W/O DYE: CPT

## 2025-01-19 PROCEDURE — 85610 PROTHROMBIN TIME: CPT | Performed by: EMERGENCY MEDICINE

## 2025-01-19 PROCEDURE — 99223 1ST HOSP IP/OBS HIGH 75: CPT | Performed by: INTERNAL MEDICINE

## 2025-01-19 PROCEDURE — 82550 ASSAY OF CK (CPK): CPT | Performed by: EMERGENCY MEDICINE

## 2025-01-19 PROCEDURE — 25010000002 BUMETANIDE PER 0.5 MG: Performed by: EMERGENCY MEDICINE

## 2025-01-19 PROCEDURE — 82948 REAGENT STRIP/BLOOD GLUCOSE: CPT

## 2025-01-19 PROCEDURE — 84484 ASSAY OF TROPONIN QUANT: CPT | Performed by: EMERGENCY MEDICINE

## 2025-01-19 PROCEDURE — 84145 PROCALCITONIN (PCT): CPT | Performed by: EMERGENCY MEDICINE

## 2025-01-19 PROCEDURE — P9612 CATHETERIZE FOR URINE SPEC: HCPCS

## 2025-01-19 RX ORDER — SODIUM CHLORIDE 0.9 % (FLUSH) 0.9 %
10 SYRINGE (ML) INJECTION AS NEEDED
Status: DISCONTINUED | OUTPATIENT
Start: 2025-01-19 | End: 2025-01-21

## 2025-01-19 RX ORDER — BUMETANIDE 0.25 MG/ML
2 INJECTION, SOLUTION INTRAMUSCULAR; INTRAVENOUS ONCE
Status: COMPLETED | OUTPATIENT
Start: 2025-01-19 | End: 2025-01-19

## 2025-01-19 RX ORDER — BUMETANIDE 0.25 MG/ML
2 INJECTION, SOLUTION INTRAMUSCULAR; INTRAVENOUS DAILY
Status: CANCELLED | OUTPATIENT
Start: 2025-01-20

## 2025-01-19 RX ORDER — DIPHENOXYLATE HYDROCHLORIDE AND ATROPINE SULFATE 2.5; .025 MG/1; MG/1
1 TABLET ORAL DAILY
Status: DISCONTINUED | OUTPATIENT
Start: 2025-01-20 | End: 2025-01-28 | Stop reason: HOSPADM

## 2025-01-19 RX ORDER — ROSUVASTATIN CALCIUM 20 MG/1
20 TABLET, COATED ORAL NIGHTLY
Status: ON HOLD | COMMUNITY
End: 2025-01-21

## 2025-01-19 RX ORDER — HYDROCODONE BITARTRATE AND ACETAMINOPHEN 5; 325 MG/1; MG/1
1 TABLET ORAL EVERY 6 HOURS PRN
Status: DISCONTINUED | OUTPATIENT
Start: 2025-01-19 | End: 2025-01-28 | Stop reason: HOSPADM

## 2025-01-19 RX ORDER — FOLIC ACID 1 MG/1
1 TABLET ORAL DAILY
Status: DISCONTINUED | OUTPATIENT
Start: 2025-01-20 | End: 2025-01-28 | Stop reason: HOSPADM

## 2025-01-19 RX ORDER — ACETAMINOPHEN 500 MG
1000 TABLET ORAL ONCE
Status: COMPLETED | OUTPATIENT
Start: 2025-01-19 | End: 2025-01-19

## 2025-01-19 RX ORDER — CARVEDILOL 12.5 MG/1
12.5 TABLET ORAL 2 TIMES DAILY WITH MEALS
Status: DISCONTINUED | OUTPATIENT
Start: 2025-01-19 | End: 2025-01-22

## 2025-01-19 RX ADMIN — ACETAMINOPHEN 1000 MG: 500 TABLET, FILM COATED ORAL at 22:37

## 2025-01-19 RX ADMIN — CARVEDILOL 12.5 MG: 12.5 TABLET, FILM COATED ORAL at 23:57

## 2025-01-19 RX ADMIN — BUMETANIDE 2 MG: 0.25 INJECTION INTRAMUSCULAR; INTRAVENOUS at 20:49

## 2025-01-19 RX ADMIN — SODIUM CHLORIDE 1000 ML: 9 INJECTION, SOLUTION INTRAVENOUS at 19:00

## 2025-01-19 RX ADMIN — HYDROCODONE BITARTRATE AND ACETAMINOPHEN 1 TABLET: 5; 325 TABLET ORAL at 23:57

## 2025-01-19 NOTE — Clinical Note
Level of Care: Telemetry [5]   Diagnosis: Multiple fractures of cervical spine [[id number]]   Admitting Physician: JORGE L CASTRO III [[id number]]   Attending Physician: JORGE L CASTRO III [[id number]]   Bed Request Comments: tele obs not cdu

## 2025-01-19 NOTE — Clinical Note
Hemostasis started on the right femoral vein. Manual pressure applied to vessel. Manual pressure was held by melany. Manual pressure was held for 10 min. Hemostasis achieved successfully.

## 2025-01-19 NOTE — ED PROVIDER NOTES
EMERGENCY DEPARTMENT ENCOUNTER    Pt Name: Toño Alcala  MRN: [id number]  Pt :   1940  Room Number:  32/32  Date of encounter:  2025  PCP: Radu Francis MD  ED Provider: Michael Hernandez MD    Historian: Patient, paramedics, patient's son      HPI:  Chief Complaint: Fall        Context: Toño Alcala is a 84 y.o. male who presents to the ED c/o patient was found by his son this afternoon.  He was on the floor in the patient's son believes that he was on the floor overnight given the arrangement of his close.  The patient was unable to get upright.  He had abrasions to his face and head.  He initially complained of back pain but now states that he has no discomfort whatsoever.  The patient is unable to tell us when he fell.  He does live by himself.  He was known to be normal last evening around 7 PM when his son last saw him.  The patient denies chest pain and difficulty breathing.        PAST MEDICAL HISTORY  Past Medical History:   Diagnosis Date   • Acute diverticulitis 2020   • Allergic 60 yrs ago   • Arthritis    • Arthritis of neck Fusion    • Bilateral radial fractures     fracture bilateral radial heads   • CAD (coronary artery disease)    • Calculus of gallbladder without cholecystitis without obstruction 2020   • Cataract    • Cervical disc disorder Fusion    • Cholelithiasis    • Chronic kidney disease    • Clotting disorder    • CVD (cardiovascular disease)     History of TIA    • Diabetes mellitus    • DVT (deep venous thrombosis)     Right lower extremity DVT and pulmonary embolism in 2015, idiopathic   • DVT of proximal lower limb 2015    proximal DVT right leg, small PE- anticoagulation   • Dyslipidemia    • Erectile dysfunction    • Fracture     H/o pf left forearm fracture   • Fracture of right hand    • Fracture of wrist    • GERD (gastroesophageal reflux disease)     with hiatal hernia   • HL (hearing loss)    • Hx of  angina pectoris    • Hypertension     Normal renal angiography 11   • Knee swelling Several years ago   • Left wrist fracture    • Lumbosacral disc disease    • Osgood-Schlatter's disease    • Osteoarthritis    • Right wrist fracture    • Vertigo    • Wears glasses          PAST SURGICAL HISTORY  Past Surgical History:   Procedure Laterality Date   • APPENDECTOMY     • BACK SURGERY     • CARDIAC CATHETERIZATION      with vein graft   • CATARACT EXTRACTION Left    • CERVICAL FUSION     • CERVICAL FUSION      C3-4   • COLONOSCOPY     • CORONARY ARTERY BYPASS GRAFT     • HERNIA REPAIR Right     inguinal   • INGUINAL HERNIA REPAIR Left 10/29/2019    Procedure: INGUINAL HERNIA REPAIR LEFT;  Surgeon: Charisma Raines MD;  Location: [facility];  Service: General   • LUMBAR LAMINECTOMY      laminectomy, lumbar, L3   • NECK SURGERY     • OTHER SURGICAL HISTORY      wrist surgery   • SPINE SURGERY     • TONSILLECTOMY AND ADENOIDECTOMY     • TRIGGER POINT INJECTION   -    • VASECTOMY  1972         FAMILY HISTORY  Family History   Problem Relation Age of Onset   • Arthritis Mother         OA   • Heart disease Father    • Diabetes Father    • Heart attack Father    • Heart disease Brother    • Heart attack Brother    • Diabetes Brother    • Diabetes Son    • Hypertension Other    • Cancer Other          SOCIAL HISTORY  Social History     Socioeconomic History   • Marital status:    Tobacco Use   • Smoking status: Former     Current packs/day: 0.00     Average packs/day: 1.5 packs/day for 34.8 years (52.2 ttl pk-yrs)     Types: Cigarettes, Pipe, Cigars     Start date: 1962     Quit date: 1997     Years since quittin.7     Passive exposure: Past   • Smokeless tobacco: Never   • Tobacco comments:     QUIT DATE 1992   Vaping Use   • Vaping status: Never Used   Substance and Sexual Activity   • Alcohol use: Yes     Alcohol/week: 11.0 -  13.0 standard drinks of alcohol     Types: 7 Glasses of wine, 2 Cans of beer, 2 - 4 Shots of liquor per week     Comment: glass of wine everyday    • Drug use: No   • Sexual activity: Not Currently     Partners: Female     Birth control/protection: Vasectomy, Surgical         ALLERGIES  Penicillins        REVIEW OF SYSTEMS  Review of Systems       All systems reviewed and negative except for those discussed in HPI.       PHYSICAL EXAM    I have reviewed the triage vital signs and nursing notes.    ED Triage Vitals   Temp Pulse Resp BP SpO2   -- -- -- -- --      Temp src Heart Rate Source Patient Position BP Location FiO2 (%)   -- -- -- -- --       Physical Exam  GENERAL:   Appears weak but nontoxic   HENT: Nares patent.  Mild abrasions to face  EYES: No scleral icterus.  CV: Regular rhythm, regular rate.  No murmurs gallops rubs  RESPIRATORY: Normal effort.  No audible wheezes, rales or rhonchi.  Clear to auscultation  ABDOMEN: Soft, nontender to deep palpation  MUSCULOSKELETAL: No deformities.  Mild tenderness in his lower cervical spine and upper thoracic spine without obvious deformity or step-off.  Absolutely no tenderness throughout his lumbar spine.  Extremity exams are benign.  NEURO: Alert, moves all extremities, follows commands.  SKIN: Warm, dry, no rash visualized.  Skin tear left elbow.      LAB RESULTS  Recent Results (from the past 24 hours)   ECG 12 Lead Tachycardia    Collection Time: 01/19/25  6:04 PM   Result Value Ref Range    QT Interval 376 ms    QTC Interval 482 ms   Protime-INR    Collection Time: 01/19/25  6:46 PM    Specimen: Blood   Result Value Ref Range    Protime 21.3 (H) 12.2 - 14.5 Seconds    INR 1.83 (H) 0.89 - 1.12   Comprehensive Metabolic Panel    Collection Time: 01/19/25  6:46 PM    Specimen: Blood   Result Value Ref Range    Glucose 153 (H) 65 - 99 mg/dL    BUN 23 8 - 23 mg/dL    Creatinine 2.17 (H) 0.76 - 1.27 mg/dL    Sodium 137 136 - 145 mmol/L    Potassium 4.3 3.5 - 5.2  mmol/L    Chloride 101 98 - 107 mmol/L    CO2 24.0 22.0 - 29.0 mmol/L    Calcium 9.1 8.6 - 10.5 mg/dL    Total Protein 7.2 6.0 - 8.5 g/dL    Albumin 4.0 3.5 - 5.2 g/dL    ALT (SGPT) 12 1 - 41 U/L    AST (SGOT) 48 (H) 1 - 40 U/L    Alkaline Phosphatase 135 (H) 39 - 117 U/L    Total Bilirubin 0.9 0.0 - 1.2 mg/dL    Globulin 3.2 gm/dL    A/G Ratio 1.3 g/dL    BUN/Creatinine Ratio 10.6 7.0 - 25.0    Anion Gap 12.0 5.0 - 15.0 mmol/L    eGFR 29.3 (L) >60.0 mL/min/1.73   Green Top (Gel)    Collection Time: 01/19/25  6:46 PM   Result Value Ref Range    Extra Tube Hold for add-ons.    Lavender Top    Collection Time: 01/19/25  6:46 PM   Result Value Ref Range    Extra Tube hold for add-on    Gold Top - SST    Collection Time: 01/19/25  6:46 PM   Result Value Ref Range    Extra Tube Hold for add-ons.    Gray Top    Collection Time: 01/19/25  6:46 PM   Result Value Ref Range    Extra Tube Hold for add-ons.    Light Blue Top    Collection Time: 01/19/25  6:46 PM   Result Value Ref Range    Extra Tube Hold for add-ons.    CBC Auto Differential    Collection Time: 01/19/25  6:46 PM    Specimen: Blood   Result Value Ref Range    WBC 11.23 (H) 3.40 - 10.80 10*3/mm3    RBC 4.78 4.14 - 5.80 10*6/mm3    Hemoglobin 13.6 13.0 - 17.7 g/dL    Hematocrit 41.1 37.5 - 51.0 %    MCV 86.0 79.0 - 97.0 fL    MCH 28.5 26.6 - 33.0 pg    MCHC 33.1 31.5 - 35.7 g/dL    RDW 16.2 (H) 12.3 - 15.4 %    RDW-SD 51.4 37.0 - 54.0 fl    MPV 10.2 6.0 - 12.0 fL    Platelets 196 140 - 450 10*3/mm3    Neutrophil % 82.7 (H) 42.7 - 76.0 %    Lymphocyte % 6.8 (L) 19.6 - 45.3 %    Monocyte % 9.9 5.0 - 12.0 %    Eosinophil % 0.0 (L) 0.3 - 6.2 %    Basophil % 0.2 0.0 - 1.5 %    Immature Grans % 0.4 0.0 - 0.5 %    Neutrophils, Absolute 9.29 (H) 1.70 - 7.00 10*3/mm3    Lymphocytes, Absolute 0.76 0.70 - 3.10 10*3/mm3    Monocytes, Absolute 1.11 (H) 0.10 - 0.90 10*3/mm3    Eosinophils, Absolute 0.00 0.00 - 0.40 10*3/mm3    Basophils, Absolute 0.02 0.00 - 0.20 10*3/mm3     Immature Grans, Absolute 0.05 0.00 - 0.05 10*3/mm3    nRBC 0.0 0.0 - 0.2 /100 WBC   BNP    Collection Time: 01/19/25  6:46 PM    Specimen: Blood   Result Value Ref Range    proBNP 9,226.0 (H) 0.0 - 1,800.0 pg/mL   Lactic Acid, Plasma    Collection Time: 01/19/25  6:46 PM    Specimen: Blood   Result Value Ref Range    Lactate 1.7 0.5 - 2.0 mmol/L   Procalcitonin    Collection Time: 01/19/25  6:46 PM    Specimen: Blood   Result Value Ref Range    Procalcitonin 0.13 0.00 - 0.25 ng/mL   High Sensitivity Troponin T    Collection Time: 01/19/25  6:46 PM    Specimen: Blood   Result Value Ref Range    HS Troponin T 511 (C) <22 ng/L   POC Glucose Once    Collection Time: 01/19/25  6:51 PM    Specimen: Blood   Result Value Ref Range    Glucose 135 (H) 70 - 130 mg/dL   Urinalysis With Microscopic If Indicated (No Culture) - Straight Cath    Collection Time: 01/19/25  7:32 PM    Specimen: Straight Cath; Urine   Result Value Ref Range    Color, UA Yellow Yellow, Straw    Appearance, UA Clear Clear    pH, UA 7.5 5.0 - 8.0    Specific Gravity, UA 1.016 1.001 - 1.030    Glucose,  mg/dL (1+) (A) Negative    Ketones, UA Trace (A) Negative    Bilirubin, UA Negative Negative    Blood, UA Small (1+) (A) Negative    Protein,  mg/dL (2+) (A) Negative    Leuk Esterase, UA Negative Negative    Nitrite, UA Negative Negative    Urobilinogen, UA 0.2 E.U./dL 0.2 - 1.0 E.U./dL   Urinalysis, Microscopic Only - Straight Cath    Collection Time: 01/19/25  7:32 PM    Specimen: Straight Cath; Urine   Result Value Ref Range    RBC, UA 0-2 None Seen, 0-2 /HPF    WBC, UA 0-2 None Seen, 0-2 /HPF    Bacteria, UA Trace None Seen, Trace /HPF    Squamous Epithelial Cells, UA 0-2 None Seen, 0-2 /HPF    Transitional Epithelial Cells, UA 0-2 0 - 2 /HPF    Hyaline Casts, UA 0-6 0 - 6 /LPF    Methodology Manual Light Microscopy    High Sensitivity Troponin T 1Hr    Collection Time: 01/19/25  8:06 PM    Specimen: Blood   Result Value Ref Range    HS  Troponin T 491 (C) <22 ng/L    Troponin T Numeric Delta -20 ng/L    Troponin T % Delta -4 Abnormal if >/= 20%   Magnesium    Collection Time: 01/19/25  8:06 PM    Specimen: Blood   Result Value Ref Range    Magnesium 1.7 1.6 - 2.4 mg/dL   TSH Rfx On Abnormal To Free T4    Collection Time: 01/19/25  8:06 PM    Specimen: Blood   Result Value Ref Range    TSH 1.080 0.270 - 4.200 uIU/mL   CK    Collection Time: 01/19/25  8:06 PM    Specimen: Blood   Result Value Ref Range    Creatine Kinase 507 (H) 20 - 200 U/L       If labs were ordered, I independently reviewed the results and considered them in treating the patient.        RADIOLOGY  CT Thoracic Spine Without Contrast    Result Date: 1/19/2025  CT THORACIC SPINE WO CONTRAST Date of Exam: 1/19/2025 6:24 PM EST Indication: Back and neck pain status post fall. Comparison: CT chest from June 19, 2015 Technique: Axial CT images were obtained of the thoracic spine without contrast administration.  Reconstructed coronal and sagittal images were also obtained. Automated exposure control and iterative construction methods were used. Findings: No acute fracture is identified. There is dextroscoliosis of the lower thoracic spine. The vertebral body heights are normal. There are multiple bridging marginal osteophytes compatible with diffuse idiopathic skeletal hyperostosis. There are moderate discogenic changes throughout. The spinal canal is widely patent throughout. There are scattered areas of mild to moderate neural foraminal stenosis in the upper and mid thoracic spine secondary to prominent facet arthropathy. The posterior paravertebral  soft tissues are unremarkable.     Impression: 1.No acute fracture identified. 2.Degenerative changes as described above. Electronically Signed: Rodríguez Mckeon MD  1/19/2025 6:58 PM EST  Workstation ID: [id number]    CT Cervical Spine Without Contrast    Result Date: 1/19/2025  CT CERVICAL SPINE WO CONTRAST Date of Exam: 1/19/2025 6:24 PM  EST Indication: Upper neck and back pain status post fall. Comparison: February 15, 2022 Technique: Axial CT images were obtained of the cervical spine without contrast administration.  Reconstructed coronal and sagittal images were also obtained. Automated exposure control and iterative construction methods were used. Findings: There is a nondisplaced oblique fracture involving the C5 and C6 vertebral bodies, depicted on image 18 of series 4. The craniocervical junction appears intact. The alignment is anatomic. The vertebral body heights appear normal. There is osseous fusion across C5-6 and C6-7. Prominent anterior marginal bridging osteophytes are present. No high-grade spinal canal stenosis is identified. There is uncovertebral hypertrophy and facet arthropathy at several levels, resulting in up to severe neural foraminal stenosis on the right at C3-4 and bilaterally at C4-5. The prevertebral soft tissues are unremarkable.     Impression: Nondisplaced oblique fracture involving C5 and C6 vertebral bodies. Electronically Signed: Rodríguez Mckeon MD  1/19/2025 6:54 PM EST  Workstation ID: [id number]    CT Head Without Contrast    Result Date: 1/19/2025  CT HEAD WO CONTRAST Date of Exam: 1/19/2025 6:24 PM EST Indication: Head trauma status post fall. Comparison: February 15, 2022 Technique: Axial CT images were obtained of the head without contrast administration.  Automated exposure control and iterative construction methods were used. Findings: No acute intracranial hemorrhage or extra-axial collection is identified. The ventricles appear normal in caliber, with no evidence of mass effect or midline shift. The basal cisterns appear patent. The gray-white differentiation appears preserved. The calvarium appear intact. The paranasal sinuses are clear. The mastoid air cells are well-aerated. Scattered foci of periventricular and subcortical white matter hypodensities are nonspecific, but likely the sequela of  moderate chronic small vessel ischemic disease. There is mild generalized parenchymal atrophy.     Impression: 1.No acute intracranial process or calvarial fracture identified. 2.Findings suggestive of moderate chronic small vessel ischemic disease. Electronically Signed: Rodríguez Mckeon MD  1/19/2025 6:47 PM EST  Workstation ID: [id number]    XR Chest 1 View    Result Date: 1/19/2025  XR CHEST 1 VW Date of Exam: 1/19/2025 5:59 PM EST Indication: AMS Protocol AMS Protocol Comparison: February 21, 2022 Findings: Median sternotomy wires appear intact. There are coarse bilateral sacral markings. There is no focal consolidation. The heart and mediastinal contours appear stable.     Impression: Coarse bilateral interstitial markings, which could reflect interstitial pulmonary edema or atypical pneumonia. Electronically Signed: Rodríguez Mckeon MD  1/19/2025 6:30 PM EST  Workstation ID: [id number]     I ordered and independently reviewed the above noted radiographic studies.      I viewed images of chest x-ray which showed vascular congestion per my independent interpretation.    I viewed images of CT head and cervical spine which showed no intracranial hemorrhage however nondisplaced fractures of C5 and C6 per my independent interpretation.    See radiologist's dictation for official interpretation.        PROCEDURES    Procedures    ECG 12 Lead Tachycardia   Final Result   Test Reason : Tachycardia   Blood Pressure :   */*   mmHG   Vent. Rate :  99 BPM     Atrial Rate :  99 BPM      P-R Int : 166 ms          QRS Dur :  96 ms       QT Int : 376 ms       P-R-T Axes :  70  64  85 degrees     QTcB Int : 482 ms      Normal sinus rhythm   Minimal voltage criteria for LVH, may be normal variant   Marked ST abnormality, possible inferolateral subendocardial injury   Prolonged QT   Abnormal ECG   When compared with ECG of 14-Feb-2022 22:32,   ST now depressed in Inferior leads   ST more depressed in Anterolateral leads   T wave  inversion now evident in Inferior leads   Confirmed by DANYEL THOMPSON MD ([id number]) on 1/19/2025 7:21:53 PM      Referred By: EDMD           Confirmed By: DANYEL THOMPSON MD          MEDICATIONS GIVEN IN ER    Medications   sodium chloride 0.9 % flush 10 mL (has no administration in time range)   bumetanide (BUMEX) injection 2 mg (2 mg Intravenous Given 1/19/25 2049)         MEDICAL DECISION MAKING, PROGRESS, and CONSULTS    All labs, if obtained, have been independently reviewed by me.  All radiology studies, if obtained, have been reviewed by me and the radiologist dictating the report.  All EKG's, if obtained, have been independently viewed and interpreted by me/my attending physician.      Discussion below represents my analysis of pertinent findings related to patient's condition, differential diagnosis, treatment plan and final disposition.                                          Differential diagnosis:    Fall with concern for occult fractures, intracranial hemorrhage, rhabdomyolysis, occult infection such as UTI, etc.      Additional sources:    - Discussed/ obtained information from independent historians: I spoke with patient's son as well as paramedics    - Chronic or social conditions impacting care: Lives alone at an advanced age.  Hard of hearing.        Orders placed during this visit:  Orders Placed This Encounter   Procedures   • CT Head Without Contrast   • CT Cervical Spine Without Contrast   • CT Thoracic Spine Without Contrast   • XR Chest 1 View   • Protime-INR   • [place] Draw   • Comprehensive Metabolic Panel   • Urinalysis With Microscopic If Indicated (No Culture) - Urine, Catheter   • CBC Auto Differential   • BNP   • Lactic Acid, Plasma   • Procalcitonin   • High Sensitivity Troponin T   • High Sensitivity Troponin T 1Hr   • Urinalysis, Microscopic Only - Urine, Clean Catch   • Magnesium   • TSH Rfx On Abnormal To Free T4   • CK   • Continuous Pulse Oximetry   • Vital Signs   • Administer only  500 cc of the fluid bolus  Misc Nursing Order (Specify)   • Clean and dress skin tears left elbow.  Misc Nursing Order (Specify)   • Apply collar   • Oxygen Therapy- Nasal Cannula; Titrate 1-6 LPM Per SpO2; 90 - 95%   • POC Glucose Once   • POC Glucose Once   • ECG 12 Lead Tachycardia   • Insert Peripheral IV   • Fall Precautions   • CBC & Differential   • Green Top (Gel)   • Lavender Top   • Gold Top - SST   • Gray Top   • Light Blue Top         Additional orders considered but not ordered:  MRI cervical spine    ED Course:    Consultants: Neurosurgery.  Hospitalist.    ED Course as of 01/19/25 2142   Sun Jan 19, 2025 1808 I have ordered multiple CT scans.  I ordered bolus of normal saline.  Awaiting laboratories. [MS]   1920 proBNP(!): 9,226.0 [MS]   2007 I spoke with the patient's son at bedside.  He is unaware of previous diagnosis of congestive heart failure. [MS]   2007 Given patient's markedly elevated BNP of [MS]   2007  discontinued the 1 L normal saline bolus and requested that the patient's nurse administer only 500 mL. [MS]   2007 Catheterized urinalysis was necessary as the patient was unable to produce urine.  It appears yellow but without cloudiness to indicate infection.  Awaiting results. [MS]   2008 HS Troponin T(!!): 511 [MS]   2008 I reconfirmed that the patient has no pain at all at this point.  No chest pain whatsoever.  I repalpated his lower back as he told his son that his low back was hurting when he was laying on the floor.  He states that he has no discomfort there whatsoever. [MS]   2009 I have ordered Bumex for diuresis. [MS]   2134 I am contacting Dr. Byrd, on-call neurosurgery to discuss the cervical spine fractures. [MS]   2139 Dr. Byrd agrees with hard cervical collar.  Admitting to hospitalist now. [MS]   2142 I have contacted Dr. Joseph for admission. [MS]      ED Course User Index  [MS] Michael Heranndez MD              Shared Decision Making:  After my consideration of  clinical presentation and any laboratory/radiology studies obtained, I discussed the findings with the patient/patient representative who is in agreement with the treatment plan and the final disposition.   Risks and benefits of discharge and/or observation/admission were discussed.       AS OF 21:42 EST VITALS:    BP - 167/98  HR - 96  TEMP - 98.9 °F (37.2 °C) (Axillary)  O2 SATS - 94%                  DIAGNOSIS  Final diagnoses:   Generalized weakness   Fall, initial encounter   Injury of head, initial encounter   Acute congestive heart failure, unspecified heart failure type   Elevated troponin   Closed nondisplaced fracture of fifth cervical vertebra, unspecified fracture morphology, initial encounter   Closed nondisplaced fracture of sixth cervical vertebra, unspecified fracture morphology, initial encounter   Elevated CK         DISPOSITION  Admission      Please note that portions of this document were completed with voice recognition software.        Michael Hernandez MD  01/19/25 2146

## 2025-01-19 NOTE — Clinical Note
Pressure done being held. Hemostasis achieved, no hematoma. Distal pulses present. Good capillary refill.

## 2025-01-20 ENCOUNTER — APPOINTMENT (OUTPATIENT)
Dept: CT IMAGING | Facility: HOSPITAL | Age: 85
DRG: 064 | End: 2025-01-20
Payer: MEDICARE

## 2025-01-20 ENCOUNTER — APPOINTMENT (OUTPATIENT)
Dept: CARDIOLOGY | Facility: HOSPITAL | Age: 85
DRG: 064 | End: 2025-01-20
Payer: MEDICARE

## 2025-01-20 PROBLEM — I20.9 ANGINA PECTORIS: Status: RESOLVED | Noted: 2019-06-03 | Resolved: 2025-01-20

## 2025-01-20 PROBLEM — L98.9 SKIN LESION OF FACE: Status: RESOLVED | Noted: 2019-06-03 | Resolved: 2025-01-20

## 2025-01-20 PROBLEM — I50.33 ACUTE ON CHRONIC DIASTOLIC CHF (CONGESTIVE HEART FAILURE): Status: ACTIVE | Noted: 2025-01-20

## 2025-01-20 PROBLEM — E78.5 HYPERLIPIDEMIA LDL GOAL <70: Status: ACTIVE | Noted: 2019-06-03

## 2025-01-20 PROBLEM — B02.29 POSTHERPETIC NEURALGIA: Status: RESOLVED | Noted: 2020-01-14 | Resolved: 2025-01-20

## 2025-01-20 PROBLEM — M79.89 SWELLING OF RIGHT LOWER EXTREMITY: Status: RESOLVED | Noted: 2019-06-03 | Resolved: 2025-01-20

## 2025-01-20 PROBLEM — M25.562 CHRONIC PAIN OF LEFT KNEE: Status: RESOLVED | Noted: 2022-02-21 | Resolved: 2025-01-20

## 2025-01-20 PROBLEM — G89.29 CHRONIC PAIN OF LEFT KNEE: Status: RESOLVED | Noted: 2022-02-21 | Resolved: 2025-01-20

## 2025-01-20 PROBLEM — I21.4 NSTEMI, INITIAL EPISODE OF CARE: Status: ACTIVE | Noted: 2025-01-20

## 2025-01-20 PROBLEM — Z86.711 HISTORY OF PULMONARY EMBOLUS (PE): Status: ACTIVE | Noted: 2025-01-20

## 2025-01-20 PROBLEM — B02.9 HERPES ZOSTER WITHOUT COMPLICATION: Status: RESOLVED | Noted: 2020-01-29 | Resolved: 2025-01-20

## 2025-01-20 PROBLEM — L40.9 PSORIASIS: Status: RESOLVED | Noted: 2019-06-03 | Resolved: 2025-01-20

## 2025-01-20 PROBLEM — Z00.00 MEDICARE ANNUAL WELLNESS VISIT, SUBSEQUENT: Status: RESOLVED | Noted: 2019-06-06 | Resolved: 2025-01-20

## 2025-01-20 PROBLEM — R42 VERTIGO: Status: RESOLVED | Noted: 2019-06-03 | Resolved: 2025-01-20

## 2025-01-20 PROBLEM — I87.009 POSTTHROMBOTIC SYNDROME: Status: RESOLVED | Noted: 2019-06-03 | Resolved: 2025-01-20

## 2025-01-20 PROBLEM — R55 SYNCOPE AND COLLAPSE: Status: ACTIVE | Noted: 2025-01-20

## 2025-01-20 PROBLEM — R60.0 LEG EDEMA, RIGHT: Status: RESOLVED | Noted: 2020-12-03 | Resolved: 2025-01-20

## 2025-01-20 PROBLEM — R49.0 HOARSENESS: Status: RESOLVED | Noted: 2019-06-03 | Resolved: 2025-01-20

## 2025-01-20 PROBLEM — R07.9 CHEST PAIN: Status: RESOLVED | Noted: 2022-02-21 | Resolved: 2025-01-20

## 2025-01-20 PROBLEM — I61.9 ICH (INTRACEREBRAL HEMORRHAGE): Status: ACTIVE | Noted: 2025-01-20

## 2025-01-20 LAB
ALBUMIN SERPL-MCNC: 3.7 G/DL (ref 3.5–5.2)
ALBUMIN/GLOB SERPL: 1.3 G/DL
ALP SERPL-CCNC: 116 U/L (ref 39–117)
ALT SERPL W P-5'-P-CCNC: 12 U/L (ref 1–41)
ANION GAP SERPL CALCULATED.3IONS-SCNC: 12 MMOL/L (ref 5–15)
AST SERPL-CCNC: 53 U/L (ref 1–40)
AV MEAN PRESS GRAD SYS DOP V1V2: 6 MMHG
AV VMAX SYS DOP: 174 CM/SEC
BASOPHILS # BLD AUTO: 0.04 10*3/MM3 (ref 0–0.2)
BASOPHILS NFR BLD AUTO: 0.3 % (ref 0–1.5)
BH CV ECHO MEAS - AO MAX PG: 12.1 MMHG
BH CV ECHO MEAS - AO ROOT DIAM: 3.6 CM
BH CV ECHO MEAS - AO V2 VTI: 35.5 CM
BH CV ECHO MEAS - AVA(I,D): 2.08 CM2
BH CV ECHO MEAS - EDV(CUBED): 140.6 ML
BH CV ECHO MEAS - EDV(MOD-SP2): 124 ML
BH CV ECHO MEAS - EDV(MOD-SP4): 144 ML
BH CV ECHO MEAS - EF(MOD-SP2): 47.2 %
BH CV ECHO MEAS - EF(MOD-SP4): 49.3 %
BH CV ECHO MEAS - ESV(CUBED): 85.2 ML
BH CV ECHO MEAS - ESV(MOD-SP2): 65.5 ML
BH CV ECHO MEAS - ESV(MOD-SP4): 73 ML
BH CV ECHO MEAS - FS: 15.4 %
BH CV ECHO MEAS - IVS/LVPW: 1.1 CM
BH CV ECHO MEAS - IVSD: 1.1 CM
BH CV ECHO MEAS - LA DIMENSION: 4.7 CM
BH CV ECHO MEAS - LAT PEAK E' VEL: 8.9 CM/SEC
BH CV ECHO MEAS - LV DIASTOLIC VOL/BSA (35-75): 70.4 CM2
BH CV ECHO MEAS - LV MASS(C)D: 207.3 GRAMS
BH CV ECHO MEAS - LV MAX PG: 5.4 MMHG
BH CV ECHO MEAS - LV MEAN PG: 3 MMHG
BH CV ECHO MEAS - LV SYSTOLIC VOL/BSA (12-30): 35.7 CM2
BH CV ECHO MEAS - LV V1 MAX: 116 CM/SEC
BH CV ECHO MEAS - LV V1 VTI: 23.5 CM
BH CV ECHO MEAS - LVIDD: 5.2 CM
BH CV ECHO MEAS - LVIDS: 4.4 CM
BH CV ECHO MEAS - LVOT AREA: 3.1 CM2
BH CV ECHO MEAS - LVOT DIAM: 2 CM
BH CV ECHO MEAS - LVPWD: 1 CM
BH CV ECHO MEAS - MED PEAK E' VEL: 6.9 CM/SEC
BH CV ECHO MEAS - MV A MAX VEL: 78.6 CM/SEC
BH CV ECHO MEAS - MV DEC SLOPE: 338 CM/SEC2
BH CV ECHO MEAS - MV DEC TIME: 0.28 SEC
BH CV ECHO MEAS - MV E MAX VEL: 77 CM/SEC
BH CV ECHO MEAS - MV E/A: 0.98
BH CV ECHO MEAS - MV MAX PG: 3.4 MMHG
BH CV ECHO MEAS - MV MEAN PG: 2 MMHG
BH CV ECHO MEAS - MV P1/2T: 73.7 MSEC
BH CV ECHO MEAS - MV V2 VTI: 25.5 CM
BH CV ECHO MEAS - MVA(P1/2T): 3 CM2
BH CV ECHO MEAS - MVA(VTI): 2.9 CM2
BH CV ECHO MEAS - PA ACC TIME: 0.17 SEC
BH CV ECHO MEAS - RAP SYSTOLE: 3 MMHG
BH CV ECHO MEAS - RVSP: 29 MMHG
BH CV ECHO MEAS - SV(LVOT): 73.8 ML
BH CV ECHO MEAS - SV(MOD-SP2): 58.5 ML
BH CV ECHO MEAS - SV(MOD-SP4): 71 ML
BH CV ECHO MEAS - SVI(LVOT): 36.1 ML/M2
BH CV ECHO MEAS - SVI(MOD-SP2): 28.6 ML/M2
BH CV ECHO MEAS - SVI(MOD-SP4): 34.7 ML/M2
BH CV ECHO MEAS - TAPSE (>1.6): 1.41 CM
BH CV ECHO MEAS - TR MAX PG: 25.6 MMHG
BH CV ECHO MEAS - TR MAX VEL: 250.8 CM/SEC
BH CV ECHO MEASUREMENTS AVERAGE E/E' RATIO: 9.75
BH CV LOWER VASCULAR LEFT COMMON FEMORAL AUGMENT: NORMAL
BH CV LOWER VASCULAR LEFT COMMON FEMORAL PHASIC: NORMAL
BH CV LOWER VASCULAR LEFT COMMON FEMORAL SPONT: NORMAL
BH CV LOWER VASCULAR RIGHT COMMON FEMORAL AUGMENT: NORMAL
BH CV LOWER VASCULAR RIGHT COMMON FEMORAL COMPRESS: NORMAL
BH CV LOWER VASCULAR RIGHT COMMON FEMORAL PHASIC: NORMAL
BH CV LOWER VASCULAR RIGHT COMMON FEMORAL SPONT: NORMAL
BH CV LOWER VASCULAR RIGHT DISTAL FEMORAL COMPRESS: NORMAL
BH CV LOWER VASCULAR RIGHT GREATER SAPH AK COMPRESS: NORMAL
BH CV LOWER VASCULAR RIGHT GREATER SAPH BK COMPRESS: NORMAL
BH CV LOWER VASCULAR RIGHT MID FEMORAL COMPRESS: NORMAL
BH CV LOWER VASCULAR RIGHT MID FEMORAL PHASIC: NORMAL
BH CV LOWER VASCULAR RIGHT MID FEMORAL SPONT: NORMAL
BH CV LOWER VASCULAR RIGHT PERONEAL COMPRESS: NORMAL
BH CV LOWER VASCULAR RIGHT POPLITEAL AUGMENT: NORMAL
BH CV LOWER VASCULAR RIGHT POPLITEAL PHASIC: NORMAL
BH CV LOWER VASCULAR RIGHT POPLITEAL SPONT: NORMAL
BH CV LOWER VASCULAR RIGHT POSTERIOR TIBIAL COMPRESS: NORMAL
BH CV LOWER VASCULAR RIGHT PROFUNDA FEMORAL PHASIC: NORMAL
BH CV LOWER VASCULAR RIGHT PROFUNDA FEMORAL SPONT: NORMAL
BH CV LOWER VASCULAR RIGHT PROXIMAL FEMORAL COMPRESS: NORMAL
BH CV LOWER VASCULAR RIGHT PROXIMAL FEMORAL PHASIC: NORMAL
BH CV LOWER VASCULAR RIGHT PROXIMAL FEMORAL SPONT: NORMAL
BH CV LOWER VASCULAR RIGHT SAPHENOFEMORAL JUNCTION AUGMENT: NORMAL
BH CV LOWER VASCULAR RIGHT SAPHENOFEMORAL JUNCTION COMPRESS: NORMAL
BH CV LOWER VASCULAR RIGHT SAPHENOFEMORAL JUNCTION PHASIC: NORMAL
BH CV LOWER VASCULAR RIGHT SAPHENOFEMORAL JUNCTION SPONT: NORMAL
BH CV VAS PRELIMINARY FINDINGS SCRIPTING: 1
BH CV XLRA - RV BASE: 4.9 CM
BH CV XLRA - RV LENGTH: 6.6 CM
BH CV XLRA - RV MID: 3.4 CM
BH CV XLRA - TDI S': 11.2 CM/SEC
BILIRUB SERPL-MCNC: 0.8 MG/DL (ref 0–1.2)
BUN SERPL-MCNC: 26 MG/DL (ref 8–23)
BUN/CREAT SERPL: 11.8 (ref 7–25)
CALCIUM SPEC-SCNC: 9 MG/DL (ref 8.6–10.5)
CHLORIDE SERPL-SCNC: 102 MMOL/L (ref 98–107)
CO2 SERPL-SCNC: 25 MMOL/L (ref 22–29)
CREAT SERPL-MCNC: 2.21 MG/DL (ref 0.76–1.27)
DEPRECATED RDW RBC AUTO: 51.4 FL (ref 37–54)
EGFRCR SERPLBLD CKD-EPI 2021: 28.7 ML/MIN/1.73
EOSINOPHIL # BLD AUTO: 0.03 10*3/MM3 (ref 0–0.4)
EOSINOPHIL NFR BLD AUTO: 0.3 % (ref 0.3–6.2)
ERYTHROCYTE [DISTWIDTH] IN BLOOD BY AUTOMATED COUNT: 16.1 % (ref 12.3–15.4)
GLOBULIN UR ELPH-MCNC: 2.8 GM/DL
GLUCOSE BLDC GLUCOMTR-MCNC: 102 MG/DL (ref 70–130)
GLUCOSE BLDC GLUCOMTR-MCNC: 112 MG/DL (ref 70–130)
GLUCOSE SERPL-MCNC: 139 MG/DL (ref 65–99)
HCT VFR BLD AUTO: 38.5 % (ref 37.5–51)
HGB BLD-MCNC: 12.7 G/DL (ref 13–17.7)
IMM GRANULOCYTES # BLD AUTO: 0.02 10*3/MM3 (ref 0–0.05)
IMM GRANULOCYTES NFR BLD AUTO: 0.2 % (ref 0–0.5)
INR PPP: 1.21 (ref 0.89–1.12)
INR PPP: 2.02 (ref 0.89–1.12)
LEFT ATRIUM VOLUME INDEX: 41 ML/M2
LV EF 2D ECHO EST: 50 %
LV EF BIPLANE MOD: 47.5 %
LYMPHOCYTES # BLD AUTO: 1.27 10*3/MM3 (ref 0.7–3.1)
LYMPHOCYTES NFR BLD AUTO: 11 % (ref 19.6–45.3)
MCH RBC QN AUTO: 28.5 PG (ref 26.6–33)
MCHC RBC AUTO-ENTMCNC: 33 G/DL (ref 31.5–35.7)
MCV RBC AUTO: 86.3 FL (ref 79–97)
MONOCYTES # BLD AUTO: 1.19 10*3/MM3 (ref 0.1–0.9)
MONOCYTES NFR BLD AUTO: 10.3 % (ref 5–12)
NEUTROPHILS NFR BLD AUTO: 77.9 % (ref 42.7–76)
NEUTROPHILS NFR BLD AUTO: 9.01 10*3/MM3 (ref 1.7–7)
NRBC BLD AUTO-RTO: 0 /100 WBC (ref 0–0.2)
PLATELET # BLD AUTO: 193 10*3/MM3 (ref 140–450)
PMV BLD AUTO: 10 FL (ref 6–12)
POTASSIUM SERPL-SCNC: 4 MMOL/L (ref 3.5–5.2)
PROT SERPL-MCNC: 6.5 G/DL (ref 6–8.5)
PROTHROMBIN TIME: 15.4 SECONDS (ref 12.2–14.5)
PROTHROMBIN TIME: 23 SECONDS (ref 12.2–14.5)
RBC # BLD AUTO: 4.46 10*6/MM3 (ref 4.14–5.8)
SODIUM SERPL-SCNC: 139 MMOL/L (ref 136–145)
WBC NRBC COR # BLD AUTO: 11.56 10*3/MM3 (ref 3.4–10.8)

## 2025-01-20 PROCEDURE — 82948 REAGENT STRIP/BLOOD GLUCOSE: CPT

## 2025-01-20 PROCEDURE — 93306 TTE W/DOPPLER COMPLETE: CPT | Performed by: INTERNAL MEDICINE

## 2025-01-20 PROCEDURE — 25010000002 PROTHROMBIN COMPLEX CONC HUMAN 1000 UNITS KIT

## 2025-01-20 PROCEDURE — 25010000002 NICARDIPINE 2.5 MG/ML SOLUTION 10 ML VIAL

## 2025-01-20 PROCEDURE — 97166 OT EVAL MOD COMPLEX 45 MIN: CPT

## 2025-01-20 PROCEDURE — 25010000002 VITAMIN K1 PER 1 MG

## 2025-01-20 PROCEDURE — 85025 COMPLETE CBC W/AUTO DIFF WBC: CPT

## 2025-01-20 PROCEDURE — 99222 1ST HOSP IP/OBS MODERATE 55: CPT | Performed by: NURSE PRACTITIONER

## 2025-01-20 PROCEDURE — 93971 EXTREMITY STUDY: CPT

## 2025-01-20 PROCEDURE — 99223 1ST HOSP IP/OBS HIGH 75: CPT

## 2025-01-20 PROCEDURE — 99232 SBSQ HOSP IP/OBS MODERATE 35: CPT | Performed by: STUDENT IN AN ORGANIZED HEALTH CARE EDUCATION/TRAINING PROGRAM

## 2025-01-20 PROCEDURE — 25810000003 SODIUM CHLORIDE 0.9 % SOLUTION 250 ML FLEX CONT

## 2025-01-20 PROCEDURE — 93306 TTE W/DOPPLER COMPLETE: CPT

## 2025-01-20 PROCEDURE — 85610 PROTHROMBIN TIME: CPT

## 2025-01-20 PROCEDURE — 99291 CRITICAL CARE FIRST HOUR: CPT | Performed by: STUDENT IN AN ORGANIZED HEALTH CARE EDUCATION/TRAINING PROGRAM

## 2025-01-20 PROCEDURE — 80053 COMPREHEN METABOLIC PANEL: CPT

## 2025-01-20 PROCEDURE — 70450 CT HEAD/BRAIN W/O DYE: CPT

## 2025-01-20 PROCEDURE — 93971 EXTREMITY STUDY: CPT | Performed by: INTERNAL MEDICINE

## 2025-01-20 PROCEDURE — 25010000002 CEFTRIAXONE PER 250 MG: Performed by: INTERNAL MEDICINE

## 2025-01-20 PROCEDURE — 97162 PT EVAL MOD COMPLEX 30 MIN: CPT

## 2025-01-20 RX ORDER — LIDOCAINE 4 G/G
2 PATCH TOPICAL
Status: DISCONTINUED | OUTPATIENT
Start: 2025-01-20 | End: 2025-01-21

## 2025-01-20 RX ORDER — BISACODYL 5 MG/1
5 TABLET, DELAYED RELEASE ORAL DAILY PRN
Status: DISCONTINUED | OUTPATIENT
Start: 2025-01-20 | End: 2025-01-28 | Stop reason: HOSPADM

## 2025-01-20 RX ORDER — ACETAMINOPHEN 650 MG/1
650 SUPPOSITORY RECTAL EVERY 4 HOURS PRN
Status: DISCONTINUED | OUTPATIENT
Start: 2025-01-20 | End: 2025-01-28 | Stop reason: HOSPADM

## 2025-01-20 RX ORDER — SODIUM CHLORIDE 0.9 % (FLUSH) 0.9 %
10 SYRINGE (ML) INJECTION AS NEEDED
Status: DISCONTINUED | OUTPATIENT
Start: 2025-01-20 | End: 2025-01-28 | Stop reason: HOSPADM

## 2025-01-20 RX ORDER — PANTOPRAZOLE SODIUM 40 MG/1
40 TABLET, DELAYED RELEASE ORAL
Status: DISCONTINUED | OUTPATIENT
Start: 2025-01-20 | End: 2025-01-28 | Stop reason: HOSPADM

## 2025-01-20 RX ORDER — SODIUM CHLORIDE 9 MG/ML
40 INJECTION, SOLUTION INTRAVENOUS AS NEEDED
Status: DISCONTINUED | OUTPATIENT
Start: 2025-01-20 | End: 2025-01-21

## 2025-01-20 RX ORDER — ONDANSETRON 2 MG/ML
4 INJECTION INTRAMUSCULAR; INTRAVENOUS EVERY 8 HOURS PRN
Status: DISCONTINUED | OUTPATIENT
Start: 2025-01-20 | End: 2025-01-28 | Stop reason: HOSPADM

## 2025-01-20 RX ORDER — BISACODYL 10 MG
10 SUPPOSITORY, RECTAL RECTAL DAILY PRN
Status: DISCONTINUED | OUTPATIENT
Start: 2025-01-20 | End: 2025-01-28 | Stop reason: HOSPADM

## 2025-01-20 RX ORDER — ALPRAZOLAM 0.25 MG/1
0.25 TABLET ORAL EVERY 6 HOURS PRN
Status: DISCONTINUED | OUTPATIENT
Start: 2025-01-20 | End: 2025-01-20

## 2025-01-20 RX ORDER — ACETAMINOPHEN 160 MG/5ML
650 SOLUTION ORAL EVERY 4 HOURS PRN
Status: DISCONTINUED | OUTPATIENT
Start: 2025-01-20 | End: 2025-01-28 | Stop reason: HOSPADM

## 2025-01-20 RX ORDER — SODIUM CHLORIDE 0.9 % (FLUSH) 0.9 %
10 SYRINGE (ML) INJECTION EVERY 12 HOURS SCHEDULED
Status: DISCONTINUED | OUTPATIENT
Start: 2025-01-20 | End: 2025-01-21

## 2025-01-20 RX ORDER — WARFARIN SODIUM 5 MG/1
5 TABLET ORAL
Status: DISCONTINUED | OUTPATIENT
Start: 2025-01-20 | End: 2025-01-21

## 2025-01-20 RX ORDER — BUMETANIDE 1 MG/1
1 TABLET ORAL DAILY PRN
Status: DISCONTINUED | OUTPATIENT
Start: 2025-01-20 | End: 2025-01-28 | Stop reason: HOSPADM

## 2025-01-20 RX ORDER — ACETAMINOPHEN 325 MG/1
650 TABLET ORAL EVERY 4 HOURS PRN
Status: DISCONTINUED | OUTPATIENT
Start: 2025-01-20 | End: 2025-01-28 | Stop reason: HOSPADM

## 2025-01-20 RX ORDER — ATROPINE SULFATE 1 MG/ML
1 INJECTION, SOLUTION INTRAMUSCULAR; INTRAVENOUS; SUBCUTANEOUS ONCE
Status: DISCONTINUED | OUTPATIENT
Start: 2025-01-20 | End: 2025-01-20

## 2025-01-20 RX ORDER — POLYETHYLENE GLYCOL 3350 17 G/17G
17 POWDER, FOR SOLUTION ORAL DAILY PRN
Status: DISCONTINUED | OUTPATIENT
Start: 2025-01-20 | End: 2025-01-28 | Stop reason: HOSPADM

## 2025-01-20 RX ORDER — SODIUM CHLORIDE 0.9 % (FLUSH) 0.9 %
10 SYRINGE (ML) INJECTION AS NEEDED
Status: DISCONTINUED | OUTPATIENT
Start: 2025-01-20 | End: 2025-01-21

## 2025-01-20 RX ORDER — ROSUVASTATIN CALCIUM 20 MG/1
20 TABLET, COATED ORAL NIGHTLY
Status: DISCONTINUED | OUTPATIENT
Start: 2025-01-20 | End: 2025-01-28 | Stop reason: HOSPADM

## 2025-01-20 RX ORDER — AMOXICILLIN 250 MG
2 CAPSULE ORAL 2 TIMES DAILY PRN
Status: DISCONTINUED | OUTPATIENT
Start: 2025-01-20 | End: 2025-01-28 | Stop reason: HOSPADM

## 2025-01-20 RX ADMIN — PHYTONADIONE 10 MG: 10 INJECTION, EMULSION INTRAMUSCULAR; INTRAVENOUS; SUBCUTANEOUS at 18:57

## 2025-01-20 RX ADMIN — Medication 10 ML: at 08:52

## 2025-01-20 RX ADMIN — PROTHROMBIN, COAGULATION FACTOR VII HUMAN, COAGULATION FACTOR IX HUMAN, COAGULATION FACTOR X HUMAN, PROTEIN C, PROTEIN S HUMAN, AND WATER 2228 UNITS: KIT at 20:13

## 2025-01-20 RX ADMIN — LIDOCAINE 2 PATCH: 4 PATCH TOPICAL at 08:50

## 2025-01-20 RX ADMIN — NICARDIPINE HYDROCHLORIDE 5 MG/HR: 25 INJECTION, SOLUTION INTRAVENOUS at 18:51

## 2025-01-20 RX ADMIN — WARFARIN SODIUM 5 MG: 5 TABLET ORAL at 02:31

## 2025-01-20 RX ADMIN — ACETAMINOPHEN 650 MG: 650 SUPPOSITORY RECTAL at 20:50

## 2025-01-20 RX ADMIN — MULTIVITAMIN TABLET 1 TABLET: TABLET at 08:50

## 2025-01-20 RX ADMIN — FOLIC ACID 1 MG: 1 TABLET ORAL at 08:50

## 2025-01-20 RX ADMIN — CARVEDILOL 12.5 MG: 12.5 TABLET, FILM COATED ORAL at 08:50

## 2025-01-20 RX ADMIN — SODIUM CHLORIDE 1000 MG: 900 INJECTION INTRAVENOUS at 02:31

## 2025-01-20 RX ADMIN — WARFARIN SODIUM 5 MG: 5 TABLET ORAL at 17:44

## 2025-01-20 RX ADMIN — Medication 10 ML: at 20:13

## 2025-01-20 RX ADMIN — SODIUM CHLORIDE 1000 MG: 900 INJECTION INTRAVENOUS at 20:13

## 2025-01-20 RX ADMIN — HYDROCODONE BITARTRATE AND ACETAMINOPHEN 1 TABLET: 5; 325 TABLET ORAL at 09:09

## 2025-01-20 RX ADMIN — THIAMINE HCL TAB 100 MG 100 MG: 100 TAB at 08:50

## 2025-01-20 RX ADMIN — CARVEDILOL 12.5 MG: 12.5 TABLET, FILM COATED ORAL at 17:44

## 2025-01-20 RX ADMIN — PANTOPRAZOLE SODIUM 40 MG: 40 TABLET, DELAYED RELEASE ORAL at 06:11

## 2025-01-20 NOTE — THERAPY EVALUATION
Patient Name: Toño Alcala  : 1940    MRN: [id number]                              Today's Date: 2025       Admit Date: 2025    Visit Dx:     ICD-10-CM ICD-9-CM   1. Generalized weakness  R53.1 780.79   2. Fall, initial encounter  W19.XXXA E888.9   3. Injury of head, initial encounter  S09.90XA 959.01   4. Acute congestive heart failure, unspecified heart failure type  I50.9 428.0   5. Elevated troponin  R79.89 790.6   6. Closed nondisplaced fracture of fifth cervical vertebra, unspecified fracture morphology, initial encounter  S12.401A 805.05   7. Closed nondisplaced fracture of sixth cervical vertebra, unspecified fracture morphology, initial encounter  S12.501A 805.06   8. Elevated CK  R74.8 790.5     Patient Active Problem List   Diagnosis    CAD (coronary artery disease)    DVT (deep venous thrombosis)    Dyslipidemia    GERD (gastroesophageal reflux disease)    Hyperlipidemia LDL goal <70    Diabetic nephropathy associated with type 2 diabetes mellitus    Diabetic polyneuropathy associated with type 2 diabetes mellitus    Chronic kidney disease, stage IV (severe)    Vitamin D deficiency    Disc disorder of cervical region    Lumbosacral spondylosis without myelopathy    Arthritis of elbow    Acute right-sided low back pain without sciatica    Unifocal PVCs    Essential hypertension    Stage 3 chronic kidney disease due to benign hypertension    BMI 30.0-30.9,adult    Fall    Alcohol dependence with unspecified alcohol-induced disorder    C5 cervical fracture    C6 cervical fracture    Multiple fractures of cervical spine    NSTEMI, initial episode of care    History of pulmonary embolus (PE)/DVT    Acute on chronic diastolic CHF (congestive heart failure)    Syncope and collapse     Past Medical History:   Diagnosis Date    Acute diverticulitis 2020    Allergic 60 yrs ago    Arthritis     Arthritis of neck Fusion     Bilateral radial fractures     fracture bilateral radial  heads    CAD (coronary artery disease)     Calculus of gallbladder without cholecystitis without obstruction 01/29/2020    Cataract     Cervical disc disorder Fusion 1992    Cholelithiasis     Chronic kidney disease 2020    Clotting disorder     CVD (cardiovascular disease)     History of TIA 1989    Diabetes mellitus     DVT (deep venous thrombosis)     Right lower extremity DVT and pulmonary embolism in 06/2015, idiopathic    DVT of proximal lower limb 06/22/2015    proximal DVT right leg, small PE- anticoagulation    Dyslipidemia     Erectile dysfunction     Fracture 1952    H/o pf left forearm fracture    Fracture of right hand 1980    Fracture of wrist 1980    GERD (gastroesophageal reflux disease)     with hiatal hernia    HL (hearing loss)     Hx of angina pectoris     Hypertension     Normal renal angiography 02/16/11    Knee swelling Several years ago    Left wrist fracture 1953    Lumbosacral disc disease 1996    Osgood-Schlatter's disease 1955    Osteoarthritis     Right wrist fracture     Vertigo     Wears glasses      Past Surgical History:   Procedure Laterality Date    APPENDECTOMY  1957    BACK SURGERY      CARDIAC CATHETERIZATION  2011    with vein graft    CATARACT EXTRACTION Left     CERVICAL FUSION      CERVICAL FUSION  1992    C3-4    COLONOSCOPY  2013    CORONARY ARTERY BYPASS GRAFT  1994    HERNIA REPAIR Right 2011    inguinal    INGUINAL HERNIA REPAIR Left 10/29/2019    Procedure: INGUINAL HERNIA REPAIR LEFT;  Surgeon: Charisma Raines MD;  Location: Replaced by [facility];  Service: General    LUMBAR LAMINECTOMY  1996    laminectomy, lumbar, L3    NECK SURGERY  1992    OTHER SURGICAL HISTORY      wrist surgery    SPINE SURGERY  1996    TONSILLECTOMY AND ADENOIDECTOMY  1945    TRIGGER POINT INJECTION  2001 - 2007    VASECTOMY  1972      General Information       Row Name 01/20/25 4550          Physical Therapy Time and Intention    Document Type evaluation  -ND     Mode of Treatment physical therapy   -ND       Row Name 01/20/25 1540          General Information    Patient Profile Reviewed yes  -ND     Prior Level of Function independent:;all household mobility;community mobility;transfer;gait;bed mobility;using stairs  Independent with [facility]PC. 2 falls in last 2 months, one due to dizziness and second is reason for admission. Son checks on pt every day.  -ND     Existing Precautions/Restrictions fall;cervical collar;brace on at all times;orthostatic hypotension;other (see comments)  C5-6 fx with cervical collar on AAT; monitor BP  -ND     Barriers to Rehab medically complex;previous functional deficit  -ND       Row Name 01/20/25 1540          Living Environment    People in Home alone;other (see comments)  Son checks on pt daily  -ND       Row Name 01/20/25 1540          Home Main Entrance    Number of Stairs, Main Entrance three  -ND     Stair Railings, Main Entrance railings on both sides of stairs  -ND       Row Name 01/20/25 1540          Stairs Within Home, Primary    Stairs, Within Home, Primary Basement- does not access.  -ND       Row Name 01/20/25 1540          Cognition    Orientation Status (Cognition) oriented x 3  -ND       Row Name 01/20/25 1540          Safety Issues/Impairments Affecting Functional Mobility    Safety Issues Affecting Function (Mobility) awareness of need for assistance;insight into deficits/self-awareness;safety precaution awareness;safety precautions follow-through/compliance;sequencing abilities  -ND     Impairments Affecting Function (Mobility) balance;endurance/activity tolerance;pain;postural/trunk control;strength  -ND               User Key  (r) = Recorded By, (t) = Taken By, (c) = Cosigned By      Initials Name Provider Type    Hannah Cervantes, PT Physical Therapist                   Mobility       Row Name 01/20/25 1545          Bed Mobility    Bed Mobility supine-sit;sit-supine  -ND     Supine-Sit [place] (Bed Mobility) maximum assist (25% patient effort);2  person assist;verbal cues;nonverbal cues (demo/gesture)  -ND     Sit-Supine Smith (Bed Mobility) maximum assist (25% patient effort);2 person assist;verbal cues;nonverbal cues (demo/gesture)  -ND     Assistive Device (Bed Mobility) bed rails;head of bed elevated;repositioning sheet  -ND     Comment, (Bed Mobility) Assist at trunk and BLE due to pain and difficulty initiating/sequencing task. Denies dizziness sitting EOB.  -ND       Row Name 01/20/25 1544          Transfers    Comment, (Transfers) Dizziness in standing requiring return to sitting EOB. Attempted second stand and dizziness again resulting in return to supine. BP readings t/o, see clinical impression. RN aware.  -ND       Row Name 01/20/25 1544          Sit-Stand Transfer    Sit-Stand Smith (Transfers) minimum assist (75% patient effort);2 person assist;verbal cues;nonverbal cues (demo/gesture)  -ND     Assistive Device (Sit-Stand Transfers) walker, front-wheeled  -ND     Comment, (Sit-Stand Transfer) x2 from EOB with dizziness in standing each time. Unable to complete transfer to chair due to dizziness. Standing /76.  -ND       Row Name 01/20/25 1544          Gait/Stairs (Locomotion)    Smith Level (Gait) minimum assist (75% patient effort);1 person assist;verbal cues;nonverbal cues (demo/gesture)  -ND     Assistive Device (Gait) walker, front-wheeled  -ND     Distance in Feet (Gait) 3  -ND     Comment, (Gait/Stairs) Pt takes side steps toward HOB due to inability to finish transfer to chair due to orthostatic BP drop.  -ND               User Key  (r) = Recorded By, (t) = Taken By, (c) = Cosigned By      Initials Name Provider Type    Hannah Cervantes PT Physical Therapist                   Obj/Interventions       Row Name 01/20/25 1548          Range of Motion Comprehensive    General Range of Motion bilateral lower extremity ROM WFL  -ND       Row Name 01/20/25 1548          Strength Comprehensive (MMT)    General  Manual Muscle Testing (MMT) Assessment lower extremity strength deficits identified  -ND     Comment, General Manual Muscle Testing (MMT) Assessment BLE grossly 4/5.  -ND       Row Name 01/20/25 1548          Balance    Balance Assessment sitting static balance;sitting dynamic balance;standing static balance;standing dynamic balance  -ND     Static Sitting Balance contact guard  -ND     Dynamic Sitting Balance minimal assist  -ND     Position, Sitting Balance unsupported;sitting edge of bed  -ND     Static Standing Balance minimal assist;2-person assist;verbal cues;non-verbal cues (demo/gesture)  -ND     Dynamic Standing Balance minimal assist;2-person assist;verbal cues;non-verbal cues (demo/gesture)  -ND     Position/Device Used, Standing Balance supported;walker, front-wheeled  -ND     Balance Interventions sitting;standing;sit to stand;supported;static;dynamic  -ND       Row Name 01/20/25 1548          Sensory Assessment (Somatosensory)    Sensory Assessment (Somatosensory) LE sensation intact  -ND               User Key  (r) = Recorded By, (t) = Taken By, (c) = Cosigned By      Initials Name Provider Type    ND Hannah Blanton, PT Physical Therapist                   Goals/Plan       Row Name 01/20/25 1557          Bed Mobility Goal 1 (PT)    Activity/Assistive Device (Bed Mobility Goal 1, PT) sit to supine/supine to sit  -ND     [place] Level/Cues Needed (Bed Mobility Goal 1, PT) minimum assist (75% or more patient effort)  -ND     Time Frame (Bed Mobility Goal 1, PT) short term goal (STG);4 days  -ND       Row Name 01/20/25 9728          Transfer Goal 1 (PT)    Activity/Assistive Device (Transfer Goal 1, PT) sit-to-stand/stand-to-sit;bed-to-chair/chair-to-bed  -ND     [place] Level/Cues Needed (Transfer Goal 1, PT) modified independence  -ND     Time Frame (Transfer Goal 1, PT) long term goal (LTG);1 week  -ND       Row Name 01/20/25 0196          Gait Training Goal 1 (PT)    Activity/Assistive  Device (Gait Training Goal 1, PT) gait (walking locomotion);increase endurance/gait distance;decrease fall risk  -ND     [place] Level (Gait Training Goal 1, PT) modified independence  -ND     Distance (Gait Training Goal 1, PT) 150  -ND     Time Frame (Gait Training Goal 1, PT) long term goal (LTG);1 week  -ND       Row Name 01/20/25 1557          Balance Goal 1 (PT)    Activity/Assistive Device (Balance Goal) with functional mobility activities  -ND     [place] Level/Cues Needed (Balance Goal 1, PT) modified independence  -ND     Time Frame (Balance Goal 1, PT) long-term goal (LTG);1 week  -ND       Row Name 01/20/25 1557          Stairs Goal 1 (PT)    Activity/Assistive Device (Stairs Goal 1, PT) ascending stairs;descending stairs;using handrail, left;using handrail, right  -ND     [place] Level/Cues Needed (Stairs Goal 1, PT) modified independence  -ND     Number of Stairs (Stairs Goal 1, PT) 3  -ND     Time Frame (Stairs Goal 1, PT) long term goal (LTG);1 week  -ND       Row Name 01/20/25 7177          Therapy Assessment/Plan (PT)    Planned Therapy Interventions (PT) balance training;bed mobility training;gait training;home exercise program;patient/family education;transfer training;postural re-education;ROM (range of motion);strengthening;stair training  -ND               User Key  (r) = Recorded By, (t) = Taken By, (c) = Cosigned By      Initials Name Provider Type    ND Hannah Blanton, PT Physical Therapist                   Clinical Impression       Row Name 01/20/25 8051          Pain    Pain Location back;neck  -ND     Pain Side/Orientation generalized  -ND     Pain Management Interventions exercise or physical activity utilized;positioning techniques utilized  -ND     Response to Pain Interventions activity participation with tolerable pain  -ND     Additional Documentation Pain Scale: FACES Pre/Post-Treatment (Group)  -ND       Row Name 01/20/25 5761          Pain Scale: FACES  Pre/Post-Treatment    Pain: FACES Scale, Pretreatment 4-->hurts little more  -ND     Posttreatment Pain Rating 4-->hurts little more  -ND       Row Name 01/20/25 1557          Plan of Care Review    Plan of Care Reviewed With patient;child  -ND     Outcome Evaluation PT evaluation completed. Pt presenting below his functional baseline with generalized weakness, orthostatic BP, decreased activity tolerance, and balance impairment warranting IP PT services to address deficits and promote return to baseline. Recommend IRF following d/c for best functional outcome.  -ND       Row Name 01/20/25 1556          Therapy Assessment/Plan (PT)    Patient/Family Therapy Goals Statement (PT) Return to baseline.  -ND     Rehab Potential (PT) fair  -ND     Criteria for Skilled Interventions Met (PT) yes;meets criteria;skilled treatment is necessary  -ND     Therapy Frequency (PT) daily  -ND     Predicted Duration of Therapy Intervention (PT) 1 week.  -ND       Row Name 01/20/25 1551          Vital Signs    Pre Systolic BP Rehab 134  supine  -ND     Pre Treatment Diastolic BP 85  -ND     Intra Systolic BP Rehab 113  Standing  -ND     Intra Treatment Diastolic BP 76  -ND     Post Systolic BP Rehab 136  return to supine  -ND     Post Treatment Diastolic BP 77  -ND     Pretreatment Heart Rate (beats/min) 65  -ND     Posttreatment Heart Rate (beats/min) 72  -ND     Pre SpO2 (%) 98  -ND     O2 Delivery Pre Treatment room air  -ND     O2 Delivery Intra Treatment room air  -ND     Post SpO2 (%) 96  -ND     O2 Delivery Post Treatment room air  -ND     Pre Patient Position Supine  -ND     Intra Patient Position Standing  -ND     Post Patient Position Supine  -ND       Row Name 01/20/25 1556          Positioning and Restraints    Pre-Treatment Position in bed  -ND     Post Treatment Position bed  -ND     In Bed notified nsg;supine;call light within reach;encouraged to call for assist;exit alarm on;with family/caregiver  -ND               User  Key  (r) = Recorded By, (t) = Taken By, (c) = Cosigned By      Initials Name Provider Type    Hannah Cervantes PT Physical Therapist                   Outcome Measures       Row Name 01/20/25 1559          How much help from another person do you currently need...    Turning from your back to your side while in flat bed without using bedrails? 2  -ND     Moving from lying on back to sitting on the side of a flat bed without bedrails? 2  -ND     Moving to and from a bed to a chair (including a wheelchair)? 3  -ND     Standing up from a chair using your arms (e.g., wheelchair, bedside chair)? 3  -ND     Climbing 3-5 steps with a railing? 2  -ND     To walk in hospital room? 3  -ND     AM-PAC 6 Clicks Score (PT) 15  -ND     Highest Level of Mobility Goal 4 --> Transfer to chair/commode  -ND       Row Name 01/20/25 1559 01/20/25 1530       Functional Assessment    Outcome Measure Options AM-PAC 6 Clicks Basic Mobility (PT)  -ND AM-PAC 6 Clicks Daily Activity (OT)  -KF              User Key  (r) = Recorded By, (t) = Taken By, (c) = Cosigned By      Initials Name Provider Type    Apple Parr OT Occupational Therapist    Hannah Cervantes, MYRIAM Physical Therapist                                 Physical Therapy Education       Title: PT OT SLP Therapies (In Progress)       Topic: Physical Therapy (In Progress)       Point: Mobility training (In Progress)       Learning Progress Summary            Patient Acceptance, E, NR by ND at 1/20/2025 1600                      Point: Home exercise program (Not Started)       Learner Progress:  Not documented in this visit.              Point: Body mechanics (In Progress)       Learning Progress Summary            Patient Acceptance, E, NR by ND at 1/20/2025 1600                      Point: Precautions (In Progress)       Learning Progress Summary            Patient Acceptance, E, NR by ND at 1/20/2025 1600                                      User Key       Initials  Effective Dates Name Provider Type Discipline    ND 11/16/23 -  Hannah Blanton PT Physical Therapist PT                  PT Recommendation and Plan  Planned Therapy Interventions (PT): balance training, bed mobility training, gait training, home exercise program, patient/family education, transfer training, postural re-education, ROM (range of motion), strengthening, stair training  Outcome Evaluation: PT evaluation completed. Pt presenting below his functional baseline with generalized weakness, orthostatic BP, decreased activity tolerance, and balance impairment warranting IP PT services to address deficits and promote return to baseline. Recommend IRF following d/c for best functional outcome.     Time Calculation:   PT Evaluation Complexity  History, PT Evaluation Complexity: 3 or more personal factors and/or comorbidities  Examination of Body Systems (PT Eval Complexity): total of 4 or more elements  Clinical Presentation (PT Evaluation Complexity): evolving  Clinical Decision Making (PT Evaluation Complexity): moderate complexity  Overall Complexity (PT Evaluation Complexity): moderate complexity     PT Charges       Row Name 01/20/25 1600             Time Calculation    Start Time 1408  -ND      PT Received On 01/20/25  -ND      PT Goal Re-Cert Due Date 01/30/25  -ND         Untimed Charges    PT Eval/Re-eval Minutes 46  -ND         Total Minutes    Untimed Charges Total Minutes 46  -ND       Total Minutes 46  -ND                User Key  (r) = Recorded By, (t) = Taken By, (c) = Cosigned By      Initials Name Provider Type    ND Hannah Blanton PT Physical Therapist                  Therapy Charges for Today       Code Description Service Date Service Provider Modifiers Qty    05300161874 HC PT EVAL MOD COMPLEXITY 4 1/20/2025 Hannah Blanton PT GP 1            PT G-Codes  Outcome Measure Options: AM-PAC 6 Clicks Basic Mobility (PT)  AM-PAC 6 Clicks Score (PT): 15  AM-PAC 6 Clicks Score (OT):  14  PT Discharge Summary  Anticipated Discharge Disposition (PT): inpatient rehabilitation facility    Hannah Blanton, PT  1/20/2025

## 2025-01-20 NOTE — ED NOTES
To[facility] [place]cala    Nursing Report ED to Floor:  Mental status: A&OX4  Ambulatory status: NON AMBULATORY AT THIS TIME   Oxygen Therapy:  2LNC  Cardiac Rhythm: NSR  Admitted from: HOME   Safety Concerns:  HIGH FALL RISK   Social Issues: NA  ED Room #:  32    ED Nurse Phone Extension - [phone] or may call [phone].      HPI:   Chief Complaint   Patient presents with    Fall       Past Medical History:  Past Medical History:   Diagnosis Date    Acute diverticulitis 01/29/2020    Allergic 60 yrs ago    Arthritis     Arthritis of neck Fusion 1992    Bilateral radial fractures 1962    fracture bilateral radial heads    CAD (coronary artery disease)     Calculus of gallbladder without cholecystitis without obstruction 01/29/2020    Cataract     Cervical disc disorder Fusion 1992    Cholelithiasis     Chronic kidney disease 2020    Clotting disorder     CVD (cardiovascular disease)     History of TIA 1989    Diabetes mellitus     DVT (deep venous thrombosis)     Right lower extremity DVT and pulmonary embolism in 06/2015, idiopathic    DVT of proximal lower limb 06/22/2015    proximal DVT right leg, small PE- anticoagulation    Dyslipidemia     Erectile dysfunction     Fracture 1952    H/o pf left forearm fracture    Fracture of right hand 1980    Fracture of wrist 1980    GERD (gastroesophageal reflux disease)     with hiatal hernia    HL (hearing loss)     Hx of angina pectoris     Hypertension     Normal renal angiography 02/16/11    Knee swelling Several years ago    Left wrist fracture 1953    Lumbosacral disc disease 1996    Osgood-Schlatter's disease 1955    Osteoarthritis     Right wrist fracture     Vertigo     Wears glasses         Past Surgical History:  Past Surgical History:   Procedure Laterality Date    APPENDECTOMY  1957    BACK SURGERY      CARDIAC CATHETERIZATION  2011    with vein graft    CATARACT EXTRACTION Left     CERVICAL FUSION      CERVICAL FUSION  1992    C3-4    COLONOSCOPY  2013    CORONARY ARTERY  BYPASS GRAFT  1994    HERNIA REPAIR Right 2011    inguinal    INGUINAL HERNIA REPAIR Left 10/29/2019    Procedure: INGUINAL HERNIA REPAIR LEFT;  Surgeon: Charisma Raines MD;  [facility];  Service: General    LUMBAR LAMINECTOMY  1996    laminectomy, lumbar, L3    NECK SURGERY  1992    OTHER SURGICAL HISTORY      wrist surgery    SPINE SURGERY  1996    TONSILLECTOMY AND ADENOIDECTOMY  1945    TRIGGER POINT INJECTION  2001 - 2007    VASECTOMY  1972        Admitting Doctor:   No admitting provider for patient encounter.    Consulting Provider(s):  Consults       No orders found from 12/21/2024 to 1/20/2025.             Admitting Diagnosis:   The primary encounter diagnosis was Generalized weakness. Diagnoses of Fall, initial encounter, Injury of head, initial encounter, Acute congestive heart failure, unspecified heart failure type, Elevated troponin, Closed nondisplaced fracture of fifth cervical vertebra, unspecified fracture morphology, initial encounter, Closed nondisplaced fracture of sixth cervical vertebra, unspecified fracture morphology, initial encounter, and Elevated CK were also pertinent to this visit.    Most Recent Vitals:   Vitals:    01/19/25 2125 01/19/25 2126 01/19/25 2127 01/19/25 2128   BP:       BP Location:       Patient Position:       Pulse: 99 98 95 96   Resp:       Temp:       TempSrc:       SpO2: 94% 94% 94% 94%   Weight:       Height:           Active LDAs/IV Access:   Lines, Drains & Airways       Active LDAs       Name Placement date Placement time Site Days    Peripheral IV 01/19/25 Anterior;Left Arm 01/19/25  --  Arm  less than 1                    Labs (abnormal labs have a star):   Labs Reviewed   PROTIME-INR - Abnormal; Notable for the following components:       Result Value    Protime 21.3 (*)     INR 1.83 (*)     All other components within normal limits   COMPREHENSIVE METABOLIC PANEL - Abnormal; Notable for the following components:    Glucose 153 (*)      Creatinine 2.17 (*)     AST (SGOT) 48 (*)     Alkaline Phosphatase 135 (*)     eGFR 29.3 (*)     All other components within normal limits    Narrative:     GFR Categories in Chronic Kidney Disease (CKD)      GFR Category          GFR (mL/min/1.73)    Interpretation  G1                     90 or greater         Normal or high (1)  G2                      60-89                Mild decrease (1)  G3a                   45-59                Mild to moderate decrease  G3b                   30-44                Moderate to severe decrease  G4                    15-29                Severe decrease  G5                    14 or less           Kidney failure          (1)In the absence of evidence of kidney disease, neither GFR category G1 or G2 fulfill the criteria for CKD.    eGFR calculation 2021 CKD-EPI creatinine equation, which does not include race as a factor   URINALYSIS W/ MICROSCOPIC IF INDICATED (NO CULTURE) - Abnormal; Notable for the following components:    Glucose,  mg/dL (1+) (*)     Ketones, UA Trace (*)     Blood, UA Small (1+) (*)     Protein,  mg/dL (2+) (*)     All other components within normal limits   CBC WITH AUTO DIFFERENTIAL - Abnormal; Notable for the following components:    WBC 11.23 (*)     RDW 16.2 (*)     Neutrophil % 82.7 (*)     Lymphocyte % 6.8 (*)     Eosinophil % 0.0 (*)     Neutrophils, Absolute 9.29 (*)     Monocytes, Absolute 1.11 (*)     All other components within normal limits   BNP (IN-HOUSE) - Abnormal; Notable for the following components:    proBNP 9,226.0 (*)     All other components within normal limits    Narrative:     This assay is used as an aid in the diagnosis of individuals suspected of having heart failure. It can be used as an aid in the diagnosis of acute decompensated heart failure (ADHF) in patients presenting with signs and symptoms of ADHF to the emergency department (ED). In addition, NT-proBNP of <300 pg/mL indicates ADHF is not likely.    Age  Range Result Interpretation  NT-proBNP Concentration (pg/mL:      <50             Positive            >450                   Gray                 300-450                    Negative             <300    50-75           Positive            >900                  Gray                300-900                  Negative            <300      >75             Positive            >1800                  Gray                300-1800                  Negative            <300   TROPONIN - Abnormal; Notable for the following components:    HS Troponin T 511 (*)     All other components within normal limits    Narrative:     High Sensitive Troponin T Reference Range:  <14.0 ng/L- Negative Female for AMI  <22.0 ng/L- Negative Male for AMI  >=14 - Abnormal Female indicating possible myocardial injury.  >=22 - Abnormal Male indicating possible myocardial injury.   Clinicians would have to utilize clinical acumen, EKG, Troponin, and serial changes to determine if it is an Acute Myocardial Infarction or myocardial injury due to an underlying chronic condition.        HIGH SENSITIVITIY TROPONIN T 1HR - Abnormal; Notable for the following components:    HS Troponin T 491 (*)     All other components within normal limits    Narrative:     High Sensitive Troponin T Reference Range:  <14.0 ng/L- Negative Female for AMI  <22.0 ng/L- Negative Male for AMI  >=14 - Abnormal Female indicating possible myocardial injury.  >=22 - Abnormal Male indicating possible myocardial injury.   Clinicians would have to utilize clinical acumen, EKG, Troponin, and serial changes to determine if it is an Acute Myocardial Infarction or myocardial injury due to an underlying chronic condition.        CK - Abnormal; Notable for the following components:    Creatine Kinase 507 (*)     All other components within normal limits   POCT GLUCOSE FINGERSTICK - Abnormal; Notable for the following components:    Glucose 135 (*)     All other components within normal limits  "  LACTIC ACID, PLASMA - Normal   PROCALCITONIN - Normal    Narrative:     As a Marker for Sepsis (Non-Neonates):    1. <0.5 ng/mL represents a low risk of severe sepsis and/or septic shock.  2. >2 ng/mL represents a high risk of severe sepsis and/or septic shock.    As a Marker for Lower Respiratory Tract Infections that require antibiotic therapy:    PCT on Admission    Antibiotic Therapy       6-12 Hrs later    >0.5                Strongly Recommended  >0.25 - <0.5        Recommended   0.1 - 0.25          Discouraged              Remeasure/reassess PCT  <0.1                Strongly Discouraged     Remeasure/reassess PCT    As 28 day mortality risk marker: \"Change in Procalcitonin Result\" (>80% or <=80%) if Day 0 (or Day 1) and Day 4 values are available. Refer to [email or web address]    Change in PCT <=80%  A decrease of PCT levels below or equal to 80% defines a positive change in PCT test result representing a higher risk for 28-day all-cause mortality of patients diagnosed with severe sepsis for septic shock.    Change in PCT >80%  A decrease of PCT levels of more than 80% defines a negative change in PCT result representing a lower risk for 28-day all-cause mortality of patients diagnosed with severe sepsis or septic shock.      MAGNESIUM - Normal   TSH RFX ON ABNORMAL TO FREE T4 - Normal   RAINBOW DRAW    Narrative:     The following orders were created for panel order [place] Draw.  Procedure                               Abnormality         Status                     ---------                               -----------         ------                     Green Top (Gel)[[id number]]                                  Final result               Lavender Top[[id number]]                                     Final result               Gold Top - SST[[id number]]                                   Final result               Gray Top[[id number]]                                         Final result             "   Light Blue Top[[id number]]                                   Final result                 Please view results for these tests on the individual orders.   URINALYSIS, MICROSCOPIC ONLY   POCT GLUCOSE FINGERSTICK   CBC AND DIFFERENTIAL    Narrative:     The following orders were created for panel order CBC & Differential.  Procedure                               Abnormality         Status                     ---------                               -----------         ------                     CBC Auto Differential[[id number]]        Abnormal            Final result                 Please view results for these tests on the individual orders.   GREEN TOP   LAVENDER TOP   GOLD TOP - SST   GRAY TOP   LIGHT BLUE TOP       Meds Given in ED:   Medications   sodium chloride 0.9 % flush 10 mL (has no administration in time range)   acetaminophen (TYLENOL) tablet 1,000 mg (has no administration in time range)   bumetanide (BUMEX) injection 2 mg (2 mg Intravenous Given 1/19/25 2049)           Last NIH score:                                                          Dysphagia screening results:  Patient Factors Component (Dysphagia:Stroke or Rule-out)  Best Eye Response: 4-->(E4) spontaneous (01/19/25 1948)  Best Motor Response: 6-->(M6) obeys commands (01/19/25 1948)  Best Verbal Response: 5-->(V5) oriented (01/19/25 1948)  Jackson Coma Scale Score: 15 (01/19/25 1948)     Jackson Coma Scale:  No data recorded     CIWA:        Restraint Type:            Isolation Status:  No active isolations

## 2025-01-20 NOTE — CONSULTS
Stroke Consult Note    Patient Name: Toño Alcala   MRN: [id number]  Age: 84 y.o.  Sex: male  : 1940    Primary Care Physician: Radu Francis MD  Referring Physician:  MD Ananya    TIME STROKE TEAM CALLED:  EST     TIME PATIENT SEEN: 1800 EST    Handedness: Right  Race:      Chief Complaint/Reason for Consultation: Left visual cut    HPI: Toño Alcala is a 84-year-old male with past medical history of remote CVA (no deficits, in ), CAD, NSTEMI, HTN, CKD IV, HLD, T2DM, DVT, PE (on Coumadin) was a in-house code stroke at 1759 this evening.  Patient is admitted for a nondisplaced oblique fracture through the C5 and C6 vertebral bodies that he obtained after a fall while at home.  Patient is unsure what caused him to fall or how long he was on the ground.  Primary nurse reports she was giving him his evening Coumadin when she noticed that he did not acknowledge the drink in his left hand and seemed to have a left visual deficit.  Last known well is unclear as nursing and family tells me that patient has slept most of the day.  On initial exam, patient is in a c-collar.  He will look to the left and right however will not track my finger in any direction.  He does complain of a headache.  Patient son tells me that he has noticed patient has been slightly confused today.  Patient was taken to CT scan emergently.  CT head shows evolving right temporal and occipital lobe infarcts with new intraparenchymal hemorrhage within the right occipital lobe measuring 2.1 x 1.6 cm (volume less than 30 cc).  No mass effect or midline shift.  CTA head neck and CT perfusion were deferred as patient's EGFR was 28.7.  Spoke to Dr. Pandey (vascular neurologist) regarding case and imaging.  She recommends MRA head/neck for further vessel imaging.  Patient's Coumadin was reversed with Kcentra and vitamin K.  He was transferred immediately to the ICU for close neurological monitoring.  I did discuss  case and imaging with Dr. Cruz (neurosurgery) who recommends medical management at this time.    Of note,  patient did have CT head without contrast yesterday, 1/19/2025, on my independent review it revealed a hypodensity in the right temporal and occipital lobes.    I did extensively discuss risk/benefits of reversing Coumadin which includes AIS with patient and son who are both agreeable.    Last Known Normal Date/Time:unclear    Review of Systems   Constitutional:  Positive for fever.   Eyes:  Positive for visual disturbance.   Neurological:  Positive for weakness and headaches. Negative for dizziness, facial asymmetry and speech difficulty.   All other systems reviewed and are negative.     Past Medical History:   Diagnosis Date    Acute diverticulitis 01/29/2020    Allergic 60 yrs ago    Arthritis     Arthritis of neck Fusion 1992    Bilateral radial fractures 1962    fracture bilateral radial heads    CAD (coronary artery disease)     Calculus of gallbladder without cholecystitis without obstruction 01/29/2020    Cataract     Cervical disc disorder Fusion 1992    Cholelithiasis     Chronic kidney disease 2020    Clotting disorder     CVD (cardiovascular disease)     History of TIA 1989    Diabetes mellitus     DVT (deep venous thrombosis)     Right lower extremity DVT and pulmonary embolism in 06/2015, idiopathic    DVT of proximal lower limb 06/22/2015    proximal DVT right leg, small PE- anticoagulation    Dyslipidemia     Erectile dysfunction     Fracture 1952    H/o pf left forearm fracture    Fracture of right hand 1980    Fracture of wrist 1980    GERD (gastroesophageal reflux disease)     with hiatal hernia    HL (hearing loss)     Hx of angina pectoris     Hypertension     Normal renal angiography 02/16/11    Knee swelling Several years ago    Left wrist fracture 1953    Lumbosacral disc disease 1996    Osgood-Schlatter's disease 1955    Osteoarthritis     Right wrist fracture     Vertigo     Wears  glasses      Past Surgical History:   Procedure Laterality Date    APPENDECTOMY      BACK SURGERY      CARDIAC CATHETERIZATION      with vein graft    CATARACT EXTRACTION Left     CERVICAL FUSION      CERVICAL FUSION  1992    C3-4    COLONOSCOPY  2013    CORONARY ARTERY BYPASS GRAFT  1994    HERNIA REPAIR Right 2011    inguinal    INGUINAL HERNIA REPAIR Left 10/29/2019    Procedure: INGUINAL HERNIA REPAIR LEFT;  Surgeon: Charisma Raines MD;  Location: [facility];  Service: General    LUMBAR LAMINECTOMY      laminectomy, lumbar, L3    NECK SURGERY      OTHER SURGICAL HISTORY      wrist surgery    SPINE SURGERY      TONSILLECTOMY AND ADENOIDECTOMY      TRIGGER POINT INJECTION   -     VASECTOMY  1972     Family History   Problem Relation Age of Onset    Arthritis Mother         OA    Heart disease Father     Diabetes Father     Heart attack Father     Heart disease Brother     Heart attack Brother     Diabetes Brother     Diabetes Son     Hypertension Other     Cancer Other      Social History     Socioeconomic History    Marital status:    Tobacco Use    Smoking status: Former     Current packs/day: 0.00     Average packs/day: 1.5 packs/day for 34.8 years (52.2 ttl pk-yrs)     Types: Cigarettes, Pipe, Cigars     Start date: 1962     Quit date: 1997     Years since quittin.7     Passive exposure: Past    Smokeless tobacco: Never    Tobacco comments:     QUIT DATE 1992   Vaping Use    Vaping status: Never Used   Substance and Sexual Activity    Alcohol use: Yes     Alcohol/week: 11.0 - 13.0 standard drinks of alcohol     Types: 7 Glasses of wine, 2 Cans of beer, 2 - 4 Shots of liquor per week     Comment: glass of wine everyday     Drug use: No    Sexual activity: Not Currently     Partners: Female     Birth control/protection: Vasectomy, Surgical     Allergies   Allergen Reactions    Penicillins Hives and Swelling     Per patient, has tolerated Keflex      Prior to Admission medications    Medication Sig Start Date End Date Taking? Authorizing Provider   bumetanide (BUMEX) 1 MG tablet Take 1 tablet by mouth As Needed (Edema/kidney).   Yes Clem Jolely MD   carvedilol (COREG) 6.25 MG tablet Take 1 tablet by mouth 2 (Two) Times a Day With Meals. 12/19/23  Yes Tj Sharif MD   diltiaZEM CD (CARDIZEM CD) 180 MG 24 hr capsule Take 1 capsule by mouth Daily.   Yes Tj Sharif MD   doxazosin (CARDURA) 2 MG tablet Take 1 tablet by mouth Daily. 12/15/23  Yes Tj Sharif MD   nitroglycerin (NITROSTAT) 0.4 MG SL tablet Place 1 tablet under the tongue Every 5 (Five) Minutes As Needed for Chest Pain. Take no more than 3 doses in 15 minutes.   Yes Tj Sharif MD   omeprazole (priLOSEC) 20 MG capsule Take 1 capsule by mouth Daily.   Yes Tj Sharif MD   rosuvastatin (CRESTOR) 20 MG tablet Take 1 tablet by mouth Every Night.   Yes Tj Sharif MD   warfarin (COUMADIN) 5 MG tablet TAKE 1 TABLET BY MOUTH EVERY DAY EXCEPT FOR MONDAY TAKE 1/2 TABLET AS OF 06/19/24 9/13/24  Yes Radu Francis MD   acetaminophen (TYLENOL) 325 MG tablet Take 2 tablets by mouth As Needed for Mild Pain.    Tj Sharif MD   calcipotriene-betamethasone (TACLONEX) 0.005-0.064 % ointment Apply 1 Application topically to the appropriate area as directed As Needed (psoriasis).    Tj Sharif MD   cholecalciferol (VITAMIN D3) 25 MCG (1000 UT) tablet Take 1 tablet by mouth Daily.    Tj Sharif MD   diclofenac (VOLTAREN) 1 % gel gel Apply 4 g topically As Needed.    Tj Sharif MD   glucose blood test strip Use to check blood sugar twice daily 10/4/21   Radu Francis MD   HYDROcodone-acetaminophen (NORCO) 5-325 MG per tablet Take 1 tablet by mouth Every 12 (Twelve) Hours As Needed for Severe Pain. 10/31/24   Radu Francis MD   lidocaine (LIDODERM) 5 % Place 1 patch on the skin as  directed by provider Daily. Remove & Discard patch within 12 hours or as directed by MD 5/19/21   Radu Francis MD   Microlet Lancets misc Use to check blood sugar twice daily 1/8/21   Laila Field PA-C   sildenafil (Viagra) 100 MG tablet Take 1 tablet by mouth Daily As Needed for Erectile Dysfunction. 7/9/24   Radu Francis MD         Temp:  [97.6 °F (36.4 °C)-97.8 °F (36.6 °C)] 97.8 °F (36.6 °C)  Heart Rate:  [] 69  Resp:  [17-18] 18  BP: (107-175)/() 129/79  Neurological Exam  Mental Status  Alert. Oriented to person, place, time and situation. Recent and remote memory are intact. Speech is normal. Language is fluent with no aphasia. Attention and concentration are normal.    Cranial Nerves  CN II: Left homonymous hemianopsia.  CN III, IV, VI: Pupils equal round and reactive to light bilaterally. Will look all the way to left and right when prompted. However, does not track my finger in any direction..  CN V: Facial sensation is normal.  CN VII: Full and symmetric facial movement.  CN VIII: Hearing appears intact.  CN XII: Tongue midline without atrophy or fasciculations.    Motor  Normal muscle bulk throughout. Normal muscle tone. Strength is 5/5 throughout all four extremities.  BUE 4/5  BLE 3/5.    Sensory  Sensation is intact to light touch, pinprick, vibration and proprioception in all four extremities. No right-sided hemispatial neglect. No left-sided hemispatial neglect.    Coordination    No dysmetria out of proportion to weakness noted.    Gait    Not observed.      Physical Exam  Vitals and nursing note reviewed.   Constitutional:       Appearance: He is ill-appearing.   HENT:      Head: Normocephalic.      Comments: Bruising and laceration to left temporal area  Eyes:      Pupils: Pupils are equal, round, and reactive to light.   Neck:      Comments: In c-collar  Cardiovascular:      Pulses: Normal pulses.   Pulmonary:      Effort: Pulmonary effort is normal. No  respiratory distress.   Musculoskeletal:      Right lower leg: Edema present.      Left lower leg: Edema present.   Neurological:      Mental Status: He is alert.      Motor: Motor strength is normal.  Psychiatric:         Mood and Affect: Mood normal.         Speech: Speech normal.         Behavior: Behavior normal.         Acute Stroke Data    Thrombolytic Inclusion / Exclusion Criteria    Time: 18:28 EST  Person Administering Scale: YARED Resenidz    Inclusion Criteria  [x]   18 years of age or greater   []   Onset of symptoms < 4.5 hours before beginning treatment (stroke onset = time patient was last seen well or without symptoms).   []   Diagnosis of acute ischemic stroke causing measurable disabling deficit (Complete Hemianopia, Any Aphasia, Visual or Sensory Extinction, Any weakness limiting sustained effort against gravity)   []   Any remaining deficit considered potentially disabling in view of patient and practitioner   Exclusion criteria (Do not proceed with Alteplase if any are checked under exclusion criteria)  []   Onset unknown or GREATER than 4.5 hours   [x]   ICH on CT/MRI   []   CT demonstrates hypodensity representing acute or subacute infarct   []   Significant head trauma or prior stroke in the previous 3 months   []   Symptoms suggestive of subarachnoid hemorrhage   []   History of un-ruptured intracranial aneurysm GREATER than 10 mm   []   Recent intracranial or intraspinal surgery within the last 3 months   []   Arterial puncture at a non-compressible site in the previous 7 days   []   Active internal bleeding   []   Acute bleeding tendency   []   Platelet count LESS than 100,000 for known hematological diseases such as leukemia, thrombocytopenia or chronic cirrhosis   []   Current use of anticoagulant with INR GREATER than 1.7 or PT GREATER than 15 seconds, aPTT GREATER than 40 seconds   []   Heparin received within 48 hours, resulting in abnormally elevated aPTT GREATER than upper  limit of normal   []   Current use of direct thrombin inhibitors or direct factor Xa inhibitors in the past 48 hours   []   Elevated blood pressure refractory to treatment (systolic GREATER than 185 mm/Hg or diastolic  GREATER than 110 mm/Hg   []   Suspected infective endocarditis and aortic arch dissection   []   Current use of therapeutic treatment dose of low-molecular-weight heparin (LMWH) within the previous 24 hours   []   Structural GI malignancy or bleed   Relative exclusion for all patients  []   Only minor non-disabling symptoms   []   Pregnancy   []   Seizure at onset with postictal residual neurological impairments   []   Major surgery or previous trauma within past 14 days   []   History of previous spontaneous ICH, intracranial neoplasm, or AV malformation   []   Postpartum (within previous 14 days)   []   Recent GI or urinary tract hemorrhage (within previous 21 days)   []   Recent acute MI (within previous 3 months)   []   History of un-ruptured intracranial aneurysm LESS than 10 mm   []   History of ruptured intracranial aneurysm   []   Blood glucose LESS than 50 mg/dL (2.7 mmol/L)   []   Dural puncture within the last 7 days   []   Known GREATER than 10 cerebral microbleeds   Additional exclusions for patients with symptoms onset between 3 and 4.5 hours.  []   Age > 80.   []   On any anticoagulants regardless of INR  >>> Warfarin (Coumadin), Heparin, Enoxaparin (Lovenox), fondaparinux (Arixtra), bivalirudin (Angiomax), Argatroban, dabigatran (Pradaxa), rivaroxaban (Xarelto), or apixaban (Eliquis)   []   Severe stroke (NIHSS > 25).   []   History of BOTH diabetes and previous ischemic stroke.   []   The risks and benefits have been discussed with the patient or family related to the administration of IV thrombolytic therapy for stroke symptoms.   []   I have discussed and reviewed the patient's case and imaging with the attending prior to IV thrombolytic therapy.   N/A Time IV thrombolytic  administered       Hospital Meds:  Scheduled- carvedilol, 12.5 mg, Oral, BID With Meals  cefTRIAXone, 1,000 mg, Intravenous, Q24H  folic acid, 1 mg, Oral, Daily  Lidocaine, 2 patch, Transdermal, Q24H  multivitamin, 1 tablet, Oral, Daily  pantoprazole, 40 mg, Oral, Q AM  rosuvastatin, 20 mg, Oral, Nightly  sodium chloride, 10 mL, Intravenous, Q12H  thiamine, 100 mg, Oral, Daily  warfarin, 5 mg, Oral, Daily      Infusions- Pharmacy to dose warfarin,        PRNs-   acetaminophen **OR** acetaminophen **OR** acetaminophen    ALPRAZolam    senna-docusate sodium **AND** polyethylene glycol **AND** bisacodyl **AND** bisacodyl    bumetanide    Calcium Replacement - Follow Nurse / BPA Driven Protocol    HYDROcodone-acetaminophen    Magnesium Standard Dose Replacement - Follow Nurse / BPA Driven Protocol    melatonin    ondansetron    Pharmacy to dose warfarin    Phosphorus Replacement - Follow Nurse / BPA Driven Protocol    Potassium Replacement - Follow Nurse / BPA Driven Protocol    sodium chloride    sodium chloride    Functional Status Prior to Current Stroke/[place] Score: 1    NIH Stroke Scale  Time: 18:28 EST  Person Administering Scale: YARED Resendiz    Interval: baseline  1a. Level of Consciousness: 0-->Alert, keenly responsive  1b. LOC Questions: 0-->Answers both questions correctly  1c. LOC Commands: 0-->Performs both tasks correctly  2. Best Gaze: 0-->Normal  3. Visual: 2-->Complete hemianopia  4. Facial Palsy: 0-->Normal symmetrical movements  5a. Motor Arm, Left: 0-->No drift, limb holds 90 (or 45) degrees for full 10 secs  5b. Motor Arm, Right: 0-->No drift, limb holds 90 (or 45) degrees for full 10 secs  6a. Motor Leg, Left: 1-->Drift, leg falls by the end of the 5-sec period but does not hit bed  6b. Motor Leg, Right: 1-->Drift, leg falls by the end of the 5-sec period but does not hit bed  7. Limb Ataxia: 0-->Absent  8. Sensory: 0-->Normal, no sensory loss  9. Best Language: 0-->No aphasia, normal  10.  Dysarthria: 0-->Normal  11. Extinction and Inattention (formerly Neglect): 1-->Visual, tactile, auditory, spatial, or personal inattention or extinction to bilateral simultaneous stimulation in one of the sensory modalities    Total (NIH Stroke Scale): 5  ICH Score:  0 Points (GCS 13 to 15)  0 Points (ICH volume < 30 cm3)  0 Points (Intraventricular Extension Absent)   0 Points (Infratentorial Origin - No )  1 Point (Age =/> 80 years)  The total ICH Score for this patient is 1 at 19:30 EST on 01/20/25    Results Reviewed:  I have personally reviewed current lab, radiology, and data and agree with results.    Results for orders placed during the hospital encounter of 01/19/25    Adult Transthoracic Echo Complete W/ Cont if Necessary Per Protocol    Interpretation Summary    Left ventricular systolic function is normal. Estimated left ventricular EF = 50%    Left ventricular wall thickness is consistent with borderline concentric hypertrophy.    Mild mitral valve regurgitation is present.   CT Head Without Contrast Stroke Protocol    Result Date: 1/20/2025  Impression: 1. Evolving right temporal and occipital lobe infarcts with new intraparenchymal hemorrhage within the right occipital lobe measuring 2.1 x 1.6 cm in size with a volume of less than 30 cc. No mass effect or midline shift. No intraventricular hemorrhage. Electronically Signed: Delano Mcrae MD  1/20/2025 6:38 PM EST  Workstation ID: [id number]    CT Lumbar Spine Without Contrast    Result Date: 1/19/2025  Impression: No acute osseous abnormality. At least moderate multilevel degenerative changes are present throughout the spine with a levoscoliotic curvature. Electronically Signed: Deana Caballero MD  1/19/2025 11:11 PM EST  Workstation ID: [id number]    CT Thoracic Spine Without Contrast    Result Date: 1/19/2025  Impression: 1.No acute fracture identified. 2.Degenerative changes as described above. Electronically Signed: Rodríguez Mckeon MD  1/19/2025 6:58  PM EST  Workstation ID: [id number]    CT Cervical Spine Without Contrast    Result Date: 1/19/2025  Impression: Nondisplaced oblique fracture involving C5 and C6 vertebral bodies. Electronically Signed: Rodríguez Mckeon MD  1/19/2025 6:54 PM EST  Workstation ID: [id number]    CT Head Without Contrast    Result Date: 1/19/2025  Impression: 1.No acute intracranial process or calvarial fracture identified. 2.Findings suggestive of moderate chronic small vessel ischemic disease. Electronically Signed: Rodríguez Mckeon MD  1/19/2025 6:47 PM EST  Workstation ID: [id number]  WBC   Date Value Ref Range Status   01/20/2025 11.56 (H) 3.40 - 10.80 10*3/mm3 Final     RBC   Date Value Ref Range Status   01/20/2025 4.46 4.14 - 5.80 10*6/mm3 Final     Hemoglobin   Date Value Ref Range Status   01/20/2025 12.7 (L) 13.0 - 17.7 g/dL Final     Hematocrit   Date Value Ref Range Status   01/20/2025 38.5 37.5 - 51.0 % Final     MCV   Date Value Ref Range Status   01/20/2025 86.3 79.0 - 97.0 fL Final     MCH   Date Value Ref Range Status   01/20/2025 28.5 26.6 - 33.0 pg Final     MCHC   Date Value Ref Range Status   01/20/2025 33.0 31.5 - 35.7 g/dL Final     RDW   Date Value Ref Range Status   01/20/2025 16.1 (H) 12.3 - 15.4 % Final     RDW-SD   Date Value Ref Range Status   01/20/2025 51.4 37.0 - 54.0 fl Final     MPV   Date Value Ref Range Status   01/20/2025 10.0 6.0 - 12.0 fL Final     Platelets   Date Value Ref Range Status   01/20/2025 193 140 - 450 10*3/mm3 Final     Neutrophil %   Date Value Ref Range Status   01/20/2025 77.9 (H) 42.7 - 76.0 % Final     Lymphocyte %   Date Value Ref Range Status   01/20/2025 11.0 (L) 19.6 - 45.3 % Final     Monocyte %   Date Value Ref Range Status   01/20/2025 10.3 5.0 - 12.0 % Final     Eosinophil %   Date Value Ref Range Status   01/20/2025 0.3 0.3 - 6.2 % Final     Basophil %   Date Value Ref Range Status   01/20/2025 0.3 0.0 - 1.5 % Final     Immature Grans %   Date Value Ref Range Status    01/20/2025 0.2 0.0 - 0.5 % Final     Neutrophils, Absolute   Date Value Ref Range Status   01/20/2025 9.01 (H) 1.70 - 7.00 10*3/mm3 Final     Lymphocytes, Absolute   Date Value Ref Range Status   01/20/2025 1.27 0.70 - 3.10 10*3/mm3 Final     Monocytes, Absolute   Date Value Ref Range Status   01/20/2025 1.19 (H) 0.10 - 0.90 10*3/mm3 Final     Eosinophils, Absolute   Date Value Ref Range Status   01/20/2025 0.03 0.00 - 0.40 10*3/mm3 Final     Basophils, Absolute   Date Value Ref Range Status   01/20/2025 0.04 0.00 - 0.20 10*3/mm3 Final     Immature Grans, Absolute   Date Value Ref Range Status   01/20/2025 0.02 0.00 - 0.05 10*3/mm3 Final     nRBC   Date Value Ref Range Status   01/20/2025 0.0 0.0 - 0.2 /100 WBC Final     Lab Results   Component Value Date    GLUCOSE 139 (H) 01/20/2025    BUN 26 (H) 01/20/2025    CREATININE 2.21 (H) 01/20/2025     01/20/2025    K 4.0 01/20/2025     01/20/2025    CALCIUM 9.0 01/20/2025    PROTEINTOT 6.5 01/20/2025    ALBUMIN 3.7 01/20/2025    ALT 12 01/20/2025    AST 53 (H) 01/20/2025    ALKPHOS 116 01/20/2025    BILITOT 0.8 01/20/2025    GLOB 2.8 01/20/2025    AGRATIO 1.3 01/20/2025    BCR 11.8 01/20/2025    ANIONGAP 12.0 01/20/2025    EGFR 28.7 (L) 01/20/2025 1/20/2025 INR 2.02  1/20/2025 protime 23.0  1/10/2025 A1c 6.10  1/10/2025 LDL 82  Assessment/Plan:    This is a 84-year-old male with past medical history of remote CVA (no deficits, in 1990), CAD, NSTEMI, HTN, CKD IV, HLD, T2DM, DVT, PE (on Coumadin) was a in-house code stroke at 1759 this evening.  Patient is admitted for a nondisplaced oblique fracture through the C5 and C6 vertebral bodies that he obtained after a fall while at home.  Patient is unsure what caused him to fall or how long he was on the ground.  Primary nurse reports she was giving him his evening Coumadin when she noticed that he did not acknowledge the drink in his left hand and seemed to have a left visual deficit.  Last known well is  unclear as nursing and family tells me that patient has slept most of the day.  CT head shows evolving right temporal and occipital lobe infarcts with new intraparenchymal hemorrhage within the right occipital lobe measuring 2.1 x 1.6 cm (volume less than 30 cc).  No mass effect or midline shift.  CTA head neck and CT perfusion were deferred as patient's EGFR was 28.7. Patient's Coumadin was reversed with Kcentra and vitamin K.  He was transferred immediately to the ICU for close neurological monitoring.  I did discuss case and imaging with Dr. Cruz (neurosurgery) who recommends medical management at this time.    Antiplatelet PTA: None  Anticoagulant PTA: Warfarin        Acute right temporal and occipital lobe infarct with hemorrhagic conversion  -Hemorrhagic stroke order set has been initiated  -ICH score 1  -Discussed with Dr. Cruz, neurosurgery, patient not a candidate for neurosurgical treatment  -Repeat CT in 6 hours, midnight  -MRA head/neck pending  -MRI brain pending  -Hold warfarin  -NPO until bedside nursing dysphagia screen completed  -TTE pending  -A1c and lipid panel in AM  -Meds: HOLD all antiplatelet and anticoagulants at this time  -Activity as tolerated, fall risk precautions  -PT/OT/SLP evaluation    2.  Essential hypertension  -Strict blood pressure monitoring, please keep SBP <140  -Nicardipine as needed for SBP >140    3.  Hyperlipidemia  -Lipid panel in AM  -Atorvastatin 80mg nightly    4.  Diabetes Mellitus type 2, goal A1c<7  -A1c in AM  -Maintain euglycemia  -Management per primary team    Plan of care was discussed with Dr. Pandey (vascular neurologist), Dr. Cruz (neurosurgery), Dr. Schreiber (intensivest), primary nurse, patient, and patient's family at the bedside.  Stroke neurology will continue to follow.  Please call with any questions or concerns.  Thank you for this consult.     Gauri Moralez, APRN  January 20, 2025  18:28 EST

## 2025-01-20 NOTE — PLAN OF CARE
Goal Outcome Evaluation:  Plan of Care Reviewed With: patient, son        Progress: no change  Outcome Evaluation: OT evaluation completed. The pt presents below baseline with acute pain, generalized weakness, decreased activity tolerance, balance deficits, and orthostatic hypotension limiting mobility. Pt assisted in supine <> sit transfer with maxA x2. Pt performed STS x2 from the EOB using a RWx with Eunice x2. Pt noting increased dizziness during stands. Orthostatic vitals taken- supine /57, EOB /85, standing /76, post supine /77. Unable to progress steps due to dizziness. Recommend a d/c to IRF for best outcome.    Anticipated Discharge Disposition (OT): inpatient rehabilitation facility

## 2025-01-20 NOTE — CASE MANAGEMENT/SOCIAL WORK
Discharge Planning Assessment  [facility]     Patient Name: Toño Alcala  MRN: [id number]  Today's Date: 1/20/2025    Admit Date: 1/19/2025    Plan: Home w/HH   Discharge Needs Assessment       Row Name 01/20/25 1257       Living Environment    People in Home alone    Current Living Arrangements home    Potentially Unsafe Housing Conditions unable to assess    Primary Care Provided by self    Provides Primary Care For no one    Family Caregiver if Needed child(adalid), adult    Family Caregiver Names Maury Alcala (son) [phone] or Salo Alcala (son) [phone]    Quality of Family Relationships supportive    Able to Return to Prior Arrangements yes       Resource/Environmental Concerns    Resource/Environmental Concerns none    Transportation Concerns none       Transportation Needs    In the past 12 months, has lack of transportation kept you from medical appointments or from getting medications? no    In the past 12 months, has lack of transportation kept you from meetings, work, or from getting things needed for daily living? No       Food Insecurity    Within the past 12 months, you worried that your food would run out before you got the money to buy more. Never true    Within the past 12 months, the food you bought just didn't last and you didn't have money to get more. Never true       Transition Planning    Patient/Family Anticipates Transition to home with help/services;other (see comments)  home w/HH    Patient/Family Anticipated Services at Transition     Transportation Anticipated family or friend will provide       Discharge Needs Assessment    Readmission Within the Last 30 Days no previous admission in last 30 days    Equipment Currently Used at Home walker, standard;shower chair;grab bar;cane, quad;cane, straight;glucometer    Concerns to be Addressed discharge planning    Do you want help finding or keeping work or a job? Patient unable to answer  confused    Do you want help with  school or training? For example, starting or completing job training or getting a high school diploma, GED or equivalent No  confused    Anticipated Changes Related to Illness none    Equipment Needed After Discharge cane, quad;cane, straight;grab bar, tub/shower;glucometer;shower chair;walker, standard    Discharge Facility/Level of Care Needs home with home health    Current Discharge Risk dependent with mobility/activities of daily living;cognitively impaired    Discharge Coordination/Progress Due to Pt's confusion, I spoke with son, Maury, via telephone, to initiate discharge plan. Pt is admitted with C5 cervical fracture. He lives alone in [facility]. There is one flight of stairs to the basement of the home. He ambulates independently with a straight cane. He is dependent with housekeeping only (son assists). He has the following DME: shower chair, grab bars, straight/quad canes, and glucometer. His PCP is Dr Radu Francis. He has Medicare and [employer] insurance which has Rx coverage. He uses [facility], [place]. Prescriptions are affordable. He does not use HH/O2. Plan is home with HH. Son will provide transportation. CM will continue to follow hospital course.                   Discharge Plan       Row Name 01/20/25 1303       Plan    Plan Home w/HH    Final Discharge Disposition Code 06 - home with home health care                  Continued Care and Services - Admitted Since 1/19/2025    No active coordination exists for this encounter.       Expected Discharge Date and Time       Expected Discharge Date Expected Discharge Time    Jan 21, 2025 12:00 PM           Demographic Summary       Row Name 01/20/25 1256       General Information    Arrived From home    Reason for Consult discharge planning    Preferred Language English    General Information Comments PCP Dr Radu Francis       Contact Information    Permission Granted to Share Info With     Contact Information Obtained  for     Contact Information Comments Maury Alcala (son) [phone] or Salo Fredrick (son) [phone]                   Functional Status       Row Name 01/20/25 1257       Functional Status    Usual Activity Tolerance moderate    Current Activity Tolerance moderate       Functional Status, IADL    Medications independent    Meal Preparation independent    Housekeeping assistive person    Laundry independent    Shopping independent                   Psychosocial    No documentation.                  Abuse/Neglect    No documentation.                  Legal    No documentation.                  Substance Abuse    No documentation.                  Patient Forms    No documentation.                     Kayy Soria RN

## 2025-01-20 NOTE — PROGRESS NOTES
[facility] Medicine Services  PROGRESS NOTE    Patient Name: Toño Alcala  : 1940  MRN: [id number]    Date of Admission: 2025  Primary Care Physician: Radu Francis MD    Subjective   Subjective     CC:  S/p fall    HPI:  Patient son at bedside.  Patient reports low back pain.  No chest pain or shortness of breath.  Not eating much.      Objective   Objective     Vital Signs:   Temp:  [97.6 °F (36.4 °C)-98.9 °F (37.2 °C)] 97.6 °F (36.4 °C)  Heart Rate:  [] 89  Resp:  [17-18] 17  BP: (142-175)/() 159/89  Flow (L/min) (Oxygen Therapy):  [2] 2     Physical Exam:  Constitutional: No acute distress, sleeping but awakens to voice  HENT: NCAT, mucous membranes moist, cervical collar in place  Respiratory: Clear to auscultation bilaterally, respiratory effort normal   Cardiovascular: RRR, no murmurs, rubs, or gallops  Gastrointestinal: Positive bowel sounds, soft, nontender, nondistended  Musculoskeletal: No bilateral ankle edema  Psychiatric: Appropriate affect, cooperative  Neurologic: Alert, oriented, symmetric facies, moves all extremities, speech clear  Skin: Right lower extremity with erythema on anterior calf    Results Reviewed:  LAB RESULTS:      Lab 25  0301 25  1846 25  1213   WBC 11.56*  --  11.23*  --    HEMOGLOBIN 12.7*  --  13.6  --    HEMATOCRIT 38.5  --  41.1  --    PLATELETS 193  --  196  --    NEUTROS ABS 9.01*  --  9.29*  --    IMMATURE GRANS (ABS) 0.02  --  0.05  --    LYMPHS ABS 1.27  --  0.76  --    MONOS ABS 1.19*  --  1.11*  --    EOS ABS 0.03  --  0.00  --    MCV 86.3  --  86.0  --    PROCALCITONIN  --   --  0.13  --    LACTATE  --   --  1.7  --    PROTIME 23.0*  --  21.3* 17.0   HSTROP T  --  491* 511*  --          Lab 25  0301 25  1846   SODIUM 139  --  137   POTASSIUM 4.0  --  4.3   CHLORIDE 102  --  101   CO2 25.0  --  24.0   ANION GAP 12.0  --  12.0   BUN 26*  --  23    CREATININE 2.21*  --  2.17*   EGFR 28.7*  --  29.3*   GLUCOSE 139*  --  153*   CALCIUM 9.0  --  9.1   MAGNESIUM  --  1.7  --    TSH  --  1.080  --          Lab 01/20/25 0301 01/19/25 1846   TOTAL PROTEIN 6.5 7.2   ALBUMIN 3.7 4.0   GLOBULIN 2.8 3.2   ALT (SGPT) 12 12   AST (SGOT) 53* 48*   BILIRUBIN 0.8 0.9   ALK PHOS 116 135*         Lab 01/20/25  0301 01/19/25 2006 01/19/25  1846 01/14/25  1213   PROBNP  --   --  9,226.0*  --    HSTROP T  --  491* 511*  --    PROTIME 23.0*  --  21.3* 17.0   INR 2.02*  --  1.83* 1.4*                 Brief Urine Lab Results  (Last result in the past 365 days)        Color   Clarity   Blood   Leuk Est   Nitrite   Protein   CREAT   Urine HCG        01/19/25 1932 Yellow   Clear   Small (1+)   Negative   Negative   100 mg/dL (2+)                   Microbiology Results Abnormal       None            CT Lumbar Spine Without Contrast    Result Date: 1/19/2025  CT LUMBAR SPINE WO CONTRAST Date of Exam: 1/19/2025 10:57 PM EST Indication: Back pain status post fall. Comparison: None available. Technique: Axial CT images were obtained of the lumbar spine without contrast administration.  Reconstructed coronal and sagittal images were also obtained. Automated exposure control and iterative construction methods were used. Findings: There are 5 typical lumbar spine vertebral bodies in anatomic alignment. There is no evidence of fracture or compression deformity.  No focal lesions identified.  No evidence of spondylolysis. No significant spondylolisthesis. No significant focal soft tissue abnormalities. At least moderate multilevel degenerative changes are present throughout the spine. There is a levoscoliotic curvature present. Surrounding osseous structures appear intact. There is no evidence of bony canal stenosis. No sizable disc bulge or protrusion identified. Multilevel facet disease is present with mild to moderate multilevel neuroforaminal stenosis present bilaterally, most pronounced  on the right at L2-L3 and L3-L4 and on the left at L3-L4 and L5-S1.     Impression: Impression: No acute osseous abnormality. At least moderate multilevel degenerative changes are present throughout the spine with a levoscoliotic curvature. Electronically Signed: Deana Caballero MD  1/19/2025 11:11 PM EST  Workstation ID: [id number]    CT Thoracic Spine Without Contrast    Result Date: 1/19/2025  CT THORACIC SPINE WO CONTRAST Date of Exam: 1/19/2025 6:24 PM EST Indication: Back and neck pain status post fall. Comparison: CT chest from June 19, 2015 Technique: Axial CT images were obtained of the thoracic spine without contrast administration.  Reconstructed coronal and sagittal images were also obtained. Automated exposure control and iterative construction methods were used. Findings: No acute fracture is identified. There is dextroscoliosis of the lower thoracic spine. The vertebral body heights are normal. There are multiple bridging marginal osteophytes compatible with diffuse idiopathic skeletal hyperostosis. There are moderate discogenic changes throughout. The spinal canal is widely patent throughout. There are scattered areas of mild to moderate neural foraminal stenosis in the upper and mid thoracic spine secondary to prominent facet arthropathy. The posterior paravertebral  soft tissues are unremarkable.     Impression: Impression: 1.No acute fracture identified. 2.Degenerative changes as described above. Electronically Signed: Rodríguez Mckeon MD  1/19/2025 6:58 PM EST  Workstation ID: [id number]    CT Cervical Spine Without Contrast    Result Date: 1/19/2025  CT CERVICAL SPINE WO CONTRAST Date of Exam: 1/19/2025 6:24 PM EST Indication: Upper neck and back pain status post fall. Comparison: February 15, 2022 Technique: Axial CT images were obtained of the cervical spine without contrast administration.  Reconstructed coronal and sagittal images were also obtained. Automated exposure control and iterative  construction methods were used. Findings: There is a nondisplaced oblique fracture involving the C5 and C6 vertebral bodies, depicted on image 18 of series 4. The craniocervical junction appears intact. The alignment is anatomic. The vertebral body heights appear normal. There is osseous fusion across C5-6 and C6-7. Prominent anterior marginal bridging osteophytes are present. No high-grade spinal canal stenosis is identified. There is uncovertebral hypertrophy and facet arthropathy at several levels, resulting in up to severe neural foraminal stenosis on the right at C3-4 and bilaterally at C4-5. The prevertebral soft tissues are unremarkable.     Impression: Impression: Nondisplaced oblique fracture involving C5 and C6 vertebral bodies. Electronically Signed: Rodríguez Mckeon MD  1/19/2025 6:54 PM EST  Workstation ID: [id number]    CT Head Without Contrast    Result Date: 1/19/2025  CT HEAD WO CONTRAST Date of Exam: 1/19/2025 6:24 PM EST Indication: Head trauma status post fall. Comparison: February 15, 2022 Technique: Axial CT images were obtained of the head without contrast administration.  Automated exposure control and iterative construction methods were used. Findings: No acute intracranial hemorrhage or extra-axial collection is identified. The ventricles appear normal in caliber, with no evidence of mass effect or midline shift. The basal cisterns appear patent. The gray-white differentiation appears preserved. The calvarium appear intact. The paranasal sinuses are clear. The mastoid air cells are well-aerated. Scattered foci of periventricular and subcortical white matter hypodensities are nonspecific, but likely the sequela of moderate chronic small vessel ischemic disease. There is mild generalized parenchymal atrophy.     Impression: Impression: 1.No acute intracranial process or calvarial fracture identified. 2.Findings suggestive of moderate chronic small vessel ischemic disease. Electronically Signed:  Rodríguez Mckeon MD  1/19/2025 6:47 PM EST  Workstation ID: [id number]    XR Chest 1 View    Result Date: 1/19/2025  XR CHEST 1 VW Date of Exam: 1/19/2025 5:59 PM EST Indication: AMS Protocol AMS Protocol Comparison: February 21, 2022 Findings: Median sternotomy wires appear intact. There are coarse bilateral sacral markings. There is no focal consolidation. The heart and mediastinal contours appear stable.     Impression: Impression: Coarse bilateral interstitial markings, which could reflect interstitial pulmonary edema or atypical pneumonia. Electronically Signed: Rodríguez Mckeon MD  1/19/2025 6:30 PM EST  Workstation ID: [id number]         Current medications:  Scheduled Meds:carvedilol, 12.5 mg, Oral, BID With Meals  cefTRIAXone, 1,000 mg, Intravenous, Q24H  folic acid, 1 mg, Oral, Daily  Lidocaine, 2 patch, Transdermal, Q24H  multivitamin, 1 tablet, Oral, Daily  pantoprazole, 40 mg, Oral, Q AM  rosuvastatin, 20 mg, Oral, Nightly  sodium chloride, 10 mL, Intravenous, Q12H  thiamine, 100 mg, Oral, Daily  warfarin, 5 mg, Oral, Daily      Continuous Infusions:Pharmacy to dose warfarin,       PRN Meds:.  acetaminophen **OR** acetaminophen **OR** acetaminophen    ALPRAZolam    senna-docusate sodium **AND** polyethylene glycol **AND** bisacodyl **AND** bisacodyl    bumetanide    Calcium Replacement - Follow Nurse / BPA Driven Protocol    HYDROcodone-acetaminophen    Magnesium Standard Dose Replacement - Follow Nurse / BPA Driven Protocol    melatonin    Pharmacy to dose warfarin    Phosphorus Replacement - Follow Nurse / BPA Driven Protocol    Potassium Replacement - Follow Nurse / BPA Driven Protocol    sodium chloride    sodium chloride    Assessment & Plan   Assessment & Plan     Active Hospital Problems    Diagnosis  POA    **Multiple fractures of cervical spine [S12.9XXA]  Yes    Alcohol dependence with unspecified alcohol-induced disorder [F10.29]  Unknown    C5 cervical fracture [S12.400A]  Unknown    C6 cervical  fracture [S12.500A]  Unknown    Essential hypertension [I10]  Yes    Mixed hyperlipidemia [E78.2]  Yes    Chronic kidney disease, stage IV (severe) [N18.4]  Yes    Disc disorder of cervical region [M50.90]  Yes      Resolved Hospital Problems   No resolved problems to display.        Brief Hospital Course to date:  Toño Alcala is a 84 y.o. male with significant medical history of HTN, HLD, CAD, cervical disc disorder, CKD, clotting disorder, DM2, and Osgood-Schlatter's disease who presented to [facility] with complaint of low back pain after sustaining a fall at home. CT cervical spine without contrast revealed nondisplaced oblique fracture involving C5 and C6 vertebral bodies. CT thoracic spine without contrast revealed no acute fracture identified. CT lumbar spine with no acute fractures. CT head without acute process. CXR with PNA vs pulmonary edema. He was admitted for further management    This patient's problems and plans were partially entered by my partner and updated as appropriate by me 01/20/25.      C5 and C6 nondisplaced oblique vertebral fractures  Chronic back pain  -Continue collar placed in ER  -Neurosurgery consulted, rec continue cervical collar for the next 6 to 8 weeks, okay to remove for eating and bathing  -Follow-up with Dr. Byrd in 4 weeks with cervical x-rays  -Pain control  -Continue lidocaine patch   -PT/OT consult   -Case management consult   -If continued low back pain, can consider additional imaging     Right lower extremity cellulitis  -IV antibiotic coverage with ceftriaxone  -Has penicillin allergy listed but has taken cephalosporins before with no issues    Chronic right lower extremity DVT  -Continue warfarin, pharmacy to dose     CHF exacerbation  NSTEMI  HTN/HLD  -Initial troponin 511, trending down  -proBNP 9,226  -EKG NSR with T wave inversion  -Received IV Bumex in ED  -Continue carvedilol, statin, and warfarin  -S/p Bumex  -Echo on 1/20 showed LVEF  50%, mild MR, borderline concentric LVH  -Cards consulted      CKD IV  -Baseline creatinine 2.25-2.49  -Creatinine 2.17 on admission     Alcohol dependence  -Ethanol level <10  -multivitamin, folic acid, and thiamine  -Seizure precautions  -Consider adding IV meds, if indicated    Expected Discharge Location and Transportation: TBD  Expected Discharge   Expected Discharge Date: 1/21/2025; Expected Discharge Time: 12:00 PM     VTE Prophylaxis:  Pharmacologic & mechanical VTE prophylaxis orders are present.         AM-PAC 6 Clicks Score (PT): 17 (01/20/25 9814)    CODE STATUS:   Code Status and Medical Interventions: CPR (Attempt to Resuscitate); Full Support   Ordered at: 01/19/25 8097     Level Of Support Discussed With:    Patient     Code Status (Patient has no pulse and is not breathing):    CPR (Attempt to Resuscitate)     Medical Interventions (Patient has pulse or is breathing):    Full Support       Noreen Hare MD  01/20/25

## 2025-01-20 NOTE — THERAPY EVALUATION
Patient Name: Toño Alcala  : 1940    MRN: [id number]                              Today's Date: 2025       Admit Date: 2025    Visit Dx:     ICD-10-CM ICD-9-CM   1. Generalized weakness  R53.1 780.79   2. Fall, initial encounter  W19.XXXA E888.9   3. Injury of head, initial encounter  S09.90XA 959.01   4. Acute congestive heart failure, unspecified heart failure type  I50.9 428.0   5. Elevated troponin  R79.89 790.6   6. Closed nondisplaced fracture of fifth cervical vertebra, unspecified fracture morphology, initial encounter  S12.401A 805.05   7. Closed nondisplaced fracture of sixth cervical vertebra, unspecified fracture morphology, initial encounter  S12.501A 805.06   8. Elevated CK  R74.8 790.5     Patient Active Problem List   Diagnosis    CAD (coronary artery disease)    DVT (deep venous thrombosis)    Dyslipidemia    GERD (gastroesophageal reflux disease)    Hyperlipidemia LDL goal <70    Diabetic nephropathy associated with type 2 diabetes mellitus    Diabetic polyneuropathy associated with type 2 diabetes mellitus    Chronic kidney disease, stage IV (severe)    Vitamin D deficiency    Disc disorder of cervical region    Lumbosacral spondylosis without myelopathy    Arthritis of elbow    Acute right-sided low back pain without sciatica    Unifocal PVCs    Essential hypertension    Stage 3 chronic kidney disease due to benign hypertension    BMI 30.0-30.9,adult    Fall    Alcohol dependence with unspecified alcohol-induced disorder    C5 cervical fracture    C6 cervical fracture    Multiple fractures of cervical spine    NSTEMI, initial episode of care    History of pulmonary embolus (PE)/DVT    Acute on chronic diastolic CHF (congestive heart failure)    Syncope and collapse     Past Medical History:   Diagnosis Date    Acute diverticulitis 2020    Allergic 60 yrs ago    Arthritis     Arthritis of neck Fusion     Bilateral radial fractures     fracture bilateral radial  heads    CAD (coronary artery disease)     Calculus of gallbladder without cholecystitis without obstruction 01/29/2020    Cataract     Cervical disc disorder Fusion 1992    Cholelithiasis     Chronic kidney disease 2020    Clotting disorder     CVD (cardiovascular disease)     History of TIA 1989    Diabetes mellitus     DVT (deep venous thrombosis)     Right lower extremity DVT and pulmonary embolism in 06/2015, idiopathic    DVT of proximal lower limb 06/22/2015    proximal DVT right leg, small PE- anticoagulation    Dyslipidemia     Erectile dysfunction     Fracture 1952    H/o pf left forearm fracture    Fracture of right hand 1980    Fracture of wrist 1980    GERD (gastroesophageal reflux disease)     with hiatal hernia    HL (hearing loss)     Hx of angina pectoris     Hypertension     Normal renal angiography 02/16/11    Knee swelling Several years ago    Left wrist fracture 1953    Lumbosacral disc disease 1996    Osgood-Schlatter's disease 1955    Osteoarthritis     Right wrist fracture     Vertigo     Wears glasses      Past Surgical History:   Procedure Laterality Date    APPENDECTOMY  1957    BACK SURGERY      CARDIAC CATHETERIZATION  2011    with vein graft    CATARACT EXTRACTION Left     CERVICAL FUSION      CERVICAL FUSION  1992    C3-4    COLONOSCOPY  2013    CORONARY ARTERY BYPASS GRAFT  1994    HERNIA REPAIR Right 2011    inguinal    INGUINAL HERNIA REPAIR Left 10/29/2019    Procedure: INGUINAL HERNIA REPAIR LEFT;  Surgeon: Charisma Raines MD;  Location: [facility] OR;  Service: General    LUMBAR LAMINECTOMY  1996    laminectomy, lumbar, L3    NECK SURGERY  1992    OTHER SURGICAL HISTORY      wrist surgery    SPINE SURGERY  1996    TONSILLECTOMY AND ADENOIDECTOMY  1945    TRIGGER POINT INJECTION  2001 - 2007    VASECTOMY  1972      General Information       Row Name 01/20/25 1521          OT Time and Intention    Document Type evaluation  -KF     Mode of Treatment occupational therapy  -KF        Row Name 01/20/25 1521          General Information    Patient Profile Reviewed yes  -KF     Prior Level of Function independent:;all household mobility;community mobility;bed mobility;ADL's;driving  [place] with [employer]. History of falls.  -KF     Existing Precautions/Restrictions fall;cervical collar;brace on at all times;orthostatic hypotension;other (see comments)  C5-6 fx with cervical collar on AAT; monitor BP  -KF     Barriers to Rehab medically complex;previous functional deficit  -KF       Row Name 01/20/25 1521          Living Environment    People in Home alone;other (see comments)  son checks on pt daily  -KF       Row Name 01/20/25 1521          Home Main Entrance    Number of Stairs, Main Entrance three  -KF     Stair Railings, Main Entrance railings on both sides of stairs  -KF       Row Name 01/20/25 1521          Stairs Within Home, Primary    Stairs, Within Home, Primary Pt has a basement, but doesn't go downstairs.  -KF     Number of Stairs, Within Home, Primary none  -KF       Row Name 01/20/25 1521          Cognition    Orientation Status (Cognition) oriented x 3  -KF       Row Name 01/20/25 1521          Safety Issues/Impairments Affecting Functional Mobility    Safety Issues Affecting Function (Mobility) awareness of need for assistance;insight into deficits/self-awareness;safety precaution awareness;safety precautions follow-through/compliance;sequencing abilities  -KF     Impairments Affecting Function (Mobility) balance;endurance/activity tolerance;postural/trunk control;pain;strength;shortness of breath  -KF               User Key  (r) = Recorded By, (t) = Taken By, (c) = Cosigned By      Initials Name Provider Type    KF Apple Renteria OT Occupational Therapist                     Mobility/ADL's       Row Name 01/20/25 1523          Bed Mobility    Bed Mobility supine-sit;sit-supine  -KF     Supine-Sit [place] (Bed Mobility) maximum assist (25% patient effort);2 person assist;verbal  cues;nonverbal cues (demo/gesture)  -KF     Sit-Supine [place] (Bed Mobility) maximum assist (25% patient effort);2 person assist;verbal cues;nonverbal cues (demo/gesture)  -KF     Assistive Device (Bed Mobility) bed rails;head of bed elevated;repositioning sheet  -       Row Name 01/20/25 1523          Transfers    Transfers sit-stand transfer;stand-sit transfer  -       Row Name 01/20/25 1523          Sit-Stand Transfer    Sit-Stand [place] (Transfers) minimum assist (75% patient effort);2 person assist;verbal cues  -KF     Assistive Device (Sit-Stand Transfers) walker, front-wheeled  -KF     Comment, (Sit-Stand Transfer) STS x2 from the EOB. Pt noted increased dizziness during each stand. Pt unable to pivot to chair, but did take side steps toward the HOB. Orthostatic vitals taken and recorded below.  -       Row Name 01/20/25 1523          Stand-Sit Transfer    Stand-Sit [place] (Transfers) minimum assist (75% patient effort);2 person assist;verbal cues  -KF     Assistive Device (Stand-Sit Transfers) walker, front-wheeled  -KF       Row Name 01/20/25 1523          Functional Mobility    Patient was able to Ambulate no, other medical factors prevent ambulation  -     Reason Patient was unable to Ambulate Hypotension  -       Row Name 01/20/25 1523          Activities of Daily Living    BADL Assessment/Intervention upper body dressing;toileting  -       Row Name 01/20/25 [facility]          Upper Body Dressing Assessment/Training    [place] Level (Upper Body Dressing) don;doff;front opening garment;maximum assist (25% patient effort)  -KF     Position (Upper Body Dressing) edge of bed sitting  -       Row Name 01/20/25 1523          Toileting Assessment/Training    [place] Level (Toileting) dependent (less than 25% patient effort)  -KF     Assistive Devices (Toileting) urinal  -KF     Position (Toileting) supported standing  -               User Key  (r) = Recorded By, (t) =  Taken By, (c) = Cosigned By      Initials Name Provider Type    Apple Parr OT Occupational Therapist                   Obj/Interventions       Row Name 01/20/25 1525          Sensory Assessment (Somatosensory)    Sensory Assessment (Somatosensory) UE sensation intact  -KF       Row Name 01/20/25 1525          Range of Motion Comprehensive    General Range of Motion bilateral upper extremity ROM WFL  -KF       Row Name 01/20/25 1525          Strength Comprehensive (MMT)    General Manual Muscle Testing (MMT) Assessment upper extremity strength deficits identified  -KF     Comment, General Manual Muscle Testing (MMT) Assessment BUE grossly 3+/5  -KF       Row Name 01/20/25 1525          Balance    Balance Assessment sitting static balance;sitting dynamic balance;sit to stand dynamic balance;standing static balance;standing dynamic balance  -KF     Static Sitting Balance contact guard  -KF     Dynamic Sitting Balance minimal assist  -KF     Position, Sitting Balance unsupported;sitting edge of bed  -KF     Sit to Stand Dynamic Balance minimal assist;2-person assist;verbal cues  -KF     Static Standing Balance minimal assist;2-person assist  -KF     Dynamic Standing Balance minimal assist;2-person assist  -KF     Position/Device Used, Standing Balance supported;walker, front-wheeled  -KF     Balance Interventions sitting;standing;sit to stand;supported;static;dynamic;occupation based/functional task  -KF               User Key  (r) = Recorded By, (t) = Taken By, (c) = Cosigned By      Initials Name Provider Type    KF Apple Renteria OT Occupational Therapist                   Goals/Plan       Row Name 01/20/25 1530          Transfer Goal 1 (OT)    Activity/Assistive Device (Transfer Goal 1, OT) commode;bed-to-chair/chair-to-bed  -KF     [place] Level/Cues Needed (Transfer Goal 1, OT) standby assist  -KF     Time Frame (Transfer Goal 1, OT) long term goal (LTG);10 days  -KF     Progress/Outcome (Transfer  Goal 1, OT) goal ongoing  -KF       Row Name 01/20/25 1530          Dressing Goal 1 (OT)    Activity/Device (Dressing Goal 1, OT) upper body dressing  -KF     [place]/Cues Needed (Dressing Goal 1, OT) minimum assist (75% or more patient effort)  -KF     Time Frame (Dressing Goal 1, OT) short term goal (STG);5 days  -KF     Strategies/Barriers (Dressing Goal 1, OT) cervical collar  -KF     Progress/Outcome (Dressing Goal 1, OT) goal ongoing  -KF       Row Name 01/20/25 1530          Grooming Goal 1 (OT)    Activity/Device (Grooming Goal 1, OT) hair care;oral care;wash face, hands  -KF     [place] (Grooming Goal 1, OT) set-up required  -KF     Time Frame (Grooming Goal 1, OT) long term goal (LTG);10 days  -KF     Strategies/Barriers (Grooming Goal 1, OT) standing sink side  -KF     Progress/Outcome (Grooming Goal 1, OT) goal ongoing  -KF       Row Name 01/20/25 1530          Therapy Assessment/Plan (OT)    Planned Therapy Interventions (OT) activity tolerance training;adaptive equipment training;BADL retraining;functional balance retraining;occupation/activity based interventions;patient/caregiver education/training;ROM/therapeutic exercise;strengthening exercise;transfer/mobility retraining  -KF               User Key  (r) = Recorded By, (t) = Taken By, (c) = Cosigned By      Initials Name Provider Type    KF Apple Renteria OT Occupational Therapist                   Clinical Impression       Row Name 01/20/25 7469          Pain Assessment    Pain Location back;neck  -KF     Pain Side/Orientation generalized  -KF     Pain Management Interventions activity modification encouraged;exercise or physical activity utilized;positioning techniques utilized  -KF     Response to Pain Interventions activity participation with tolerable pain  -KF     Additional Documentation Pain Scale: FACES Pre/Post-Treatment (Group)  -KF       Row Name 01/20/25 9260          Pain Scale: FACES Pre/Post-Treatment    Pain: FACES  Scale, Pretreatment 4-->hurts little more  -KF     Posttreatment Pain Rating 4-->hurts little more  -KF       Row Name 01/20/25 1525          Plan of Care Review    Plan of Care Reviewed With patient;son  -KF     Progress no change  -KF     Outcome Evaluation OT evaluation completed. The pt presents below baseline with acute pain, generalized weakness, decreased activity tolerance, balance deficits, and orthostatic hypotension limiting mobility. Pt assisted in supine <> sit transfer with maxA x2. Pt performed STS x2 from the EOB using a RWx with [place] x2. Pt noting increased dizziness during stands. Orthostatic vitals taken- supine /57, EOB /85, standing /76, post supine /77. Unable to progress steps due to dizziness. Recommend a d/c to IRF for best outcome.  -       Row Name 01/20/25 1525          Therapy Assessment/Plan (OT)    Patient/Family Therapy Goal Statement (OT) Restore PLOF  -KF     Rehab Potential (OT) good  -KF     Criteria for Skilled Therapeutic Interventions Met (OT) yes;skilled treatment is necessary  -KF     Therapy Frequency (OT) daily  -KF     Predicted Duration of Therapy Intervention (OT) 10 days  -       Row Name 01/20/25 1525          Therapy Plan Review/Discharge Plan (OT)    Anticipated Discharge Disposition (OT) inpatient rehabilitation facility  -       Row Name 01/20/25 1525          Vital Signs    Pre Systolic BP Rehab 110  -KF     Pre Treatment Diastolic BP 57  -KF     Intra Systolic BP Rehab 134  -KF     Intra Treatment Diastolic BP 85  -KF     Post Systolic BP Rehab 113  -KF     Post Treatment Diastolic BP 76  -KF     Pretreatment Heart Rate (beats/min) 57  -KF     Posttreatment Heart Rate (beats/min) 69  -KF     Pre SpO2 (%) 98  -KF     O2 Delivery Pre Treatment room air  -KF     Post SpO2 (%) 97  -KF     O2 Delivery Post Treatment room air  -KF     Pre Patient Position Supine  -KF     Intra Patient Position Standing  -KF     Post Patient Position Supine   -KF       Row Name 01/20/25 1525          Positioning and Restraints    Pre-Treatment Position in bed  -KF     Post Treatment Position bed  -KF     In Bed notified nsg;supine;call light within reach;encouraged to call for assist;exit alarm on;with family/caregiver  -KF               User Key  (r) = Recorded By, (t) = Taken By, (c) = Cosigned By      Initials Name Provider Type    Apple Parr OT Occupational Therapist                   Outcome Measures       Row Name 01/20/25 1530          How much help from another is currently needed...    Putting on and taking off regular lower body clothing? 2  -KF     Bathing (including washing, rinsing, and drying) 2  -KF     Toileting (which includes using toilet bed pan or urinal) 2  -KF     Putting on and taking off regular upper body clothing 3  -KF     Taking care of personal grooming (such as brushing teeth) 3  -KF     Eating meals 2  -KF     AM-PAC 6 Clicks Score (OT) 14  -KF       Row Name 01/20/25 1530          Functional Assessment    Outcome Measure Options AM-PAC 6 Clicks Daily Activity (OT)  -KF               User Key  (r) = Recorded By, (t) = Taken By, (c) = Cosigned By      Initials Name Provider Type    Apple Parr OT Occupational Therapist                    Occupational Therapy Education       Title: PT OT SLP Therapies (In Progress)       Topic: Occupational Therapy (In Progress)       Point: ADL training (In Progress)       Description:   Instruct learner(s) on proper safety adaptation and remediation techniques during self care or transfers.   Instruct in proper use of assistive devices.                  Learning Progress Summary            Patient Acceptance, E,TB, NR by  at 1/20/2025 1402                      Point: Home exercise program (Not Started)       Description:   Instruct learner(s) on appropriate technique for monitoring, assisting and/or progressing therapeutic exercises/activities.                  Learner Progress:  Not  documented in this visit.              Point: Precautions (In Progress)       Description:   Instruct learner(s) on prescribed precautions during self-care and functional transfers.                  Learning Progress Summary            Patient Acceptance, E,TB, NR by KF at 1/20/2025 1402                      Point: Body mechanics (In Progress)       Description:   Instruct learner(s) on proper positioning and spine alignment during self-care, functional mobility activities and/or exercises.                  Learning Progress Summary            Patient Acceptance, E,TB, NR by KF at 1/20/2025 1402                                      User Key       Initials Effective Dates Name Provider Type Discipline    MUSHTAQ 08/09/23 -  Apple Renteria OT Occupational Therapist OT                  OT Recommendation and Plan  Planned Therapy Interventions (OT): activity tolerance training, adaptive equipment training, BADL retraining, functional balance retraining, occupation/activity based interventions, patient/caregiver education/training, ROM/therapeutic exercise, strengthening exercise, transfer/mobility retraining  Therapy Frequency (OT): daily  Plan of Care Review  Plan of Care Reviewed With: patient, son  Progress: no change  Outcome Evaluation: OT evaluation completed. The pt presents below baseline with acute pain, generalized weakness, decreased activity tolerance, balance deficits, and orthostatic hypotension limiting mobility. Pt assisted in supine <> sit transfer with maxA x2. Pt performed STS x2 from the EOB using a RWx with [place] x2. Pt noting increased dizziness during stands. Orthostatic vitals taken- supine /57, EOB /85, standing /76, post supine /77. Unable to progress steps due to dizziness. Recommend a d/c to IRF for best outcome.     Time Calculation:   Evaluation Complexity (OT)  Review Occupational Profile/Medical/Therapy History Complexity: expanded/moderate complexity  Assessment,  Occupational Performance/Identification of Deficit Complexity: 3-5 performance deficits  Clinical Decision Making Complexity (OT): detailed assessment/moderate complexity  Overall Complexity of Evaluation (OT): moderate complexity     Time Calculation- OT       Row Name 01/20/25 1531             Time Calculation- OT    OT Start Time 1402  -KF      OT Received On 01/20/25  -KF      OT Goal Re-Cert Due Date 01/30/25  -KF         Untimed Charges    OT Eval/Re-eval Minutes 48  -KF         Total Minutes    Untimed Charges Total Minutes 48  -KF       Total Minutes 48  -KF                User Key  (r) = Recorded By, (t) = Taken By, (c) = Cosigned By      Initials Name Provider Type    KF Apple Renteria OT Occupational Therapist                  Therapy Charges for Today       Code Description Service Date Service Provider Modifiers Qty    [id number] HC OT EVAL MOD COMPLEXITY 4 1/20/2025 Apple Renteria OT GO 1                 Apple Renteria OT  1/20/2025

## 2025-01-20 NOTE — PLAN OF CARE
Goal Outcome Evaluation:  Plan of Care Reviewed With: patient, child           Outcome Evaluation: PT evaluation completed. Pt presenting below his functional baseline with generalized weakness, orthostatic BP, decreased activity tolerance, and balance impairment warranting IP PT services to address deficits and promote return to baseline. Recommend IRF following d/c for best functional outcome.    Anticipated Discharge Disposition (PT): inpatient rehabilitation facility

## 2025-01-20 NOTE — CONSULTS
Inpatient Cardiology Consult  Consult performed by: Manda Childs APRN  Consult ordered by: Noreen Hare MD          [facility] at [facility]  Cardiovascular Consultation Note    Reason for consult: Elevated troponin     Patient is an 84-year-old gentleman with a history of coronary artery disease status post CABG in 1994, stage IV chronic kidney disease, type 2 diabetes mellitus, history of PE/DVT on Coumadin, alcohol use, hypertension and hyperlipidemia who we are being asked to consult for elevated troponin.  The patient presented to [facility] yesterday with low back pain that occurred after a fall at home.  The patient's son is at bedside.  According to the son he saw his father on Saturday 1/18 and his dad was doing fine.  He returned on Sunday 1/19 and the patient did not answer the door.  He found his father lying in the bedroom floor disoriented.  The son thinks he fell the night before trying to get ready for bed because he had a pajama top on but no bottoms.  The son says his father is normally oriented and does not have confusion.  According to the patient the last thing he remembers is getting ready for bed.  He is not certain whether he fell or passed out.  According to the son the patient had a syncopal episode that occurred in October for which he did not seek medical evaluation.  CT of the head showed chronic small vessel disease but otherwise benign.  CT of the thoracic and lumbar area was benign but cervical CT showed a fracture at C5 and C6.  Neurosurgery was consulted and recommends conservative treatment with c-collar for 4 weeks and no surgical intervention.  His proBNP was elevated over 9000 and troponins were elevated at 511 and 491.  EKG has ST depression in the anterior and inferior lateral leads..  His creatinine is elevated over 2.  Patient does report for the past 6 weeks he has been having shortness of breath and chest tightness.  He reports  "drinking 2 glasses of wine per day but says he does not \"overindulge\".  His ethanol level was within normal limits on admission    Cardiac risk factors: Known CAD, hypertension, hyperlipidemia, type 2 diabetes mellitus, advanced age    Past medical and surgical history, social and family history reviewed in EMR.    REVIEW OF SYSTEMS:   H&P ROS reviewed and pertinent CV ROS as noted in HPI.         Vital Sign Min/Max for last 24 hours  Temp  Min: 97.6 °F (36.4 °C)  Max: 98.9 °F (37.2 °C)   BP  Min: 142/75  Max: 175/97   Pulse  Min: 73  Max: 106   Resp  Min: 17  Max: 18   SpO2  Min: 89 %  Max: 98 %   No data recorded    No intake or output data in the 24 hours ending 25 1053        Constitutional:       Appearance: Healthy appearance. Well-developed.   Eyes:      General: Lids are normal. No scleral icterus.     Conjunctiva/sclera: Conjunctivae normal.   HENT:      Head: Normocephalic and atraumatic.   Neck:      Thyroid: No thyromegaly.      Vascular: No carotid bruit or JVD.   Pulmonary:      Effort: Pulmonary effort is normal.      Breath sounds: Normal breath sounds. No wheezing. No rhonchi. No rales.   Cardiovascular:      Normal rate. Regular rhythm.      Murmurs: There is no murmur.      No gallop.  No rub.   Pulses:     Intact distal pulses.   Edema:     Peripheral edema absent.   Abdominal:      General: There is no distension.      Palpations: Abdomen is soft. There is no abdominal mass.   Musculoskeletal:      Cervical back: Normal range of motion. Skin:     General: Skin is warm and dry.      Findings: No rash.   Neurological:      General: No focal deficit present.      Mental Status: Alert and oriented to person, place, and time.      Gait: Gait is intact.   Psychiatric:         Attention and Perception: Attention normal.         Mood and Affect: Mood normal.         Behavior: Behavior normal.          EK2025: Normal sinus rhythm, prolonged QT, ST abnormality with ST depression in the " inferior and anterior lateral leads    Lab Review:   Labs reviewed in the electronic medical record.  Pertinent findings include:  Lab Results   Component Value Date    GLUCOSE 139 (H) 01/20/2025    BUN 26 (H) 01/20/2025    CREATININE 2.21 (H) 01/20/2025     01/20/2025    K 4.0 01/20/2025     01/20/2025    CALCIUM 9.0 01/20/2025    PROTEINTOT 6.5 01/20/2025    ALBUMIN 3.7 01/20/2025    ALT 12 01/20/2025    AST 53 (H) 01/20/2025    ALKPHOS 116 01/20/2025    BILITOT 0.8 01/20/2025    GLOB 2.8 01/20/2025    AGRATIO 1.3 01/20/2025    BCR 11.8 01/20/2025    ANIONGAP 12.0 01/20/2025    EGFR 28.7 (L) 01/20/2025     Lab Results   Component Value Date    WBC 11.56 (H) 01/20/2025    HGB 12.7 (L) 01/20/2025    HCT 38.5 01/20/2025    MCV 86.3 01/20/2025     01/20/2025     Lab Results   Component Value Date    CHOL 143 01/10/2025    TRIG 85 01/10/2025    HDL 45 01/10/2025    LDL 82 01/10/2025             Active Hospital Problems    Diagnosis     **C5 cervical fracture     NSTEMI, initial episode of care     Acute on chronic diastolic CHF (congestive heart failure)     Syncope and collapse      Reported episode of syncope 10/2024  Fall with possible syncopal episode 1/18/2025      C6 cervical fracture     Multiple fractures of cervical spine     Fall     Chronic kidney disease, stage IV (severe)     Disc disorder of cervical region     History of pulmonary embolus (PE)/DVT      Recurrent idiopathic DVT, 2017.  Started on warfarin per Dr. Salinas and Tito.  Venous duplex, 07/08/2022: Normal left lower extremity venous duplex scan. Chronic right lower extremity deep vein thrombosis noted in the common femoral.  Venous duplex, 07/24/2022: Same as previous.       Alcohol dependence with unspecified alcohol-induced disorder     Essential hypertension      Continue losartan 100 mg tablets daily      Diabetic nephropathy associated with type 2 diabetes mellitus     CAD (coronary artery disease)      History of  CABG x4, 1994 - incomplete database.   [facility], 02/16/2011: 100% native RCA, and severe proximal LAD and LCx disease. Patent SVG to the right PDA. Patent LIMA to the LAD. Patent SVG to the OM.    Stress echo, 03/27/2014: Normal study.       Hyperlipidemia LDL goal <70                Cervical fracture  No surgical intervention planned.  Cervical collar for 4 weeks and follow-up with neurosurgery    Possible syncope  Unclear if patient suffered a syncopal episode leading to fall.  However did suffer syncopal episode in October.  No arrhythmias noted on telemetry but cardiogenic syncope cannot be ruled out  Echo shows normal LVEF and no significant valvular abnormality    Acute on chronic diastolic heart failure  proBNP elevated over 9000 and chest x-ray shows interstitial pulmonary edema versus atypical pneumonia  Treated with 2 mg IV Bumex x 1 on 1/19  Echo this admission shows normal LVEF and no significant valvular abnormality  Would hold off on further diuresis today and reassess in a.m.    Elevated troponin/NSTEMI  Troponins elevated and flat and trending down.  EKG does have ST depression  Patient reports chest tightness and shortness of breath over the past 6 weeks    Coronary artery disease  History of remote CABG 1994 with last cath 2011 with 3/3 grafts patent  Stress echo 2014 normal study.  No recent ischemic eval  Not on aspirin due to concomitant use of Coumadin  Patient does report shortness of breath and chest tightness over the past 6 weeks. Consider stress testing prior to d/c    Hypertension  Carvedilol 12.5 mg twice daily    Hyperlipidemia  Crestor 20 mg daily  Total cholesterol 143, triglycerides 85, HDL 45, LDL 82    Type 2 diabetes mellitus  Controlled with hemoglobin A1c 6.1    Stage IV chronic kidney disease  Creatinine elevated but at baseline at 2.21    History of PE/DVT  On Coumadin with current INR 2.02    CYN Crain MD  01/20/25 15:45 EST

## 2025-01-20 NOTE — H&P
[facility] Medicine Services  HISTORY AND PHYSICAL    Patient Name: Toño Alcala  : 1940  MRN: [id number]  Primary Care Physician: Radu Francis MD  Date of admission: 2025    Subjective   Subjective     Chief Complaint:  Fall at home    HPI:  Toño Alcala is a 84 y.o. male with significant medical history of HTN, dyslipidemia, CAD, cervical disc disorder, chronic kidney disease, clotting disorder, type 2 diabetes, and Osgood-Schlatter's disease who presents to [facility] with complaints of low back pain after sustaining a fall at home.       Patient lives at home alone approximately 3 miles from his adult son, who is present at bedside.  His son reports he did see the patient around 1900 on .  He stopped by the patient's home today about 1600 to check on him and the patient was unable to come to the door.  Upon entering his home, the patient's son found him on his bedroom floor wedged between his amina-size bed and the wall.  The patient has no recollection of this fall, and is unsure if he lost consciousness.  The son states it is his belief that the fall occurred on  based on patient wearing pajamas/same clothes as the night before, as this is out of character for him.  Patient reports 8 out of 10 back pain.  He denies taking anything at home for pain.     Patient reports a mild headache, denies any fever, lightheadedness, or dizziness.  He denies current chest pain, but does report intermittent shortness of breath.  He does not wear home O2.  Patient also reports intermittent leg swelling, but states he consistently takes his diuretic as prescribed.  Patient denies current tobacco use, but reports drinking 2 glasses of white wine per day, last drink was .    In ED    Initial troponin 511, 491  proBNP 9226    PT 21.3  INR 1.83    WBC 11.23    UA negative for UTI    CXR coarse bilateral interstitial markings, which could reflect  interstitial pulmonary edema or atypical pneumonia    CT of head without contrast reveals no acute intracranial process or calvarial fracture notified.  Findings suggestive of moderate chronic small vessel ischemic disease.     CT thoracic spine without contrast reveals no acute fracture identified.  Degenerative changes noted    CT cervical spine without contrast reveals nondisplaced oblique fracture involving C5 and C6 vertebral bodies.    Received 1 L NS IVF bolus, IV Bumex, and Tylenol in ED    Review of Systems   Constitutional:  Positive for activity change. Negative for chills, diaphoresis, fatigue and fever.   Eyes: Negative.    Respiratory:  Positive for shortness of breath. Negative for cough and chest tightness.    Cardiovascular:  Positive for leg swelling. Negative for chest pain.   Gastrointestinal:  Negative for abdominal distention, abdominal pain, diarrhea, nausea and vomiting.   Endocrine: Negative.    Genitourinary:  Negative for difficulty urinating.   Musculoskeletal:  Positive for arthralgias and back pain.   Skin:  Positive for wound (Abrasion left side of face).   Neurological:  Positive for headaches. Negative for dizziness and light-headedness. Syncope: Patient unsure if he lost consciousness.  Hematological:  Bruises/bleeds easily (Patient on warfarin).   Psychiatric/Behavioral: Negative.               Personal History     Past Medical History:   Diagnosis Date    Acute diverticulitis 01/29/2020    Allergic 60 yrs ago    Arthritis     Arthritis of neck Fusion 1992    Bilateral radial fractures 1962    fracture bilateral radial heads    CAD (coronary artery disease)     Calculus of gallbladder without cholecystitis without obstruction 01/29/2020    Cataract     Cervical disc disorder Fusion 1992    Cholelithiasis     Chronic kidney disease 2020    Clotting disorder     CVD (cardiovascular disease)     History of TIA 1989    Diabetes mellitus     DVT (deep venous thrombosis)     Right lower  extremity DVT and pulmonary embolism in 06/2015, idiopathic    DVT of proximal lower limb 06/22/2015    proximal DVT right leg, small PE- anticoagulation    Dyslipidemia     Erectile dysfunction     Fracture 1952    H/o pf left forearm fracture    Fracture of right hand 1980    Fracture of wrist 1980    GERD (gastroesophageal reflux disease)     with hiatal hernia    HL (hearing loss)     Hx of angina pectoris     Hypertension     Normal renal angiography 02/16/11    Knee swelling Several years ago    Left wrist fracture 1953    Lumbosacral disc disease 1996    Osgood-Schlatter's disease 1955    Osteoarthritis     Right wrist fracture     Vertigo     Wears glasses              Past Surgical History:   Procedure Laterality Date    APPENDECTOMY  1957    BACK SURGERY      CARDIAC CATHETERIZATION  2011    with vein graft    CATARACT EXTRACTION Left     CERVICAL FUSION      CERVICAL FUSION  1992    C3-4    COLONOSCOPY  2013    CORONARY ARTERY BYPASS GRAFT  1994    HERNIA REPAIR Right 2011    inguinal    INGUINAL HERNIA REPAIR Left 10/29/2019    Procedure: INGUINAL HERNIA REPAIR LEFT;  Surgeon: Charisma Raines MD;  Location: [facility];  Service: General    LUMBAR LAMINECTOMY  1996    laminectomy, lumbar, L3    NECK SURGERY  1992    OTHER SURGICAL HISTORY      wrist surgery    SPINE SURGERY  1996    TONSILLECTOMY AND ADENOIDECTOMY  1945    TRIGGER POINT INJECTION  2001 - 2007    VASECTOMY  1972       Family History: family history includes Arthritis in his mother; Cancer in an other family member; Diabetes in his brother, father, and son; Heart attack in his brother and father; Heart disease in his brother and father; Hypertension in an other family member.     Social History:  reports that he quit smoking about 27 years ago. His smoking use included cigarettes, pipe, and cigars. He started smoking about 62 years ago. He has a 52.2 pack-year smoking history. He has been exposed to tobacco smoke. He has never used  smokeless tobacco. He reports current alcohol use of about 11.0 - 13.0 standard drinks of alcohol per week. He reports that he does not use drugs.  Social History     Social History Narrative    Not on file       Medications:  HYDROcodone-acetaminophen, Microlet Lancets, acetaminophen, bumetanide, calcipotriene-betamethasone, carvedilol, cholecalciferol, diclofenac, dilTIAZem CD, doxazosin, glucose blood, lidocaine, nitroglycerin, omeprazole, rosuvastatin, sildenafil, and warfarin    Allergies   Allergen Reactions    Penicillins Hives and Swelling     Per patient, has tolerated Keflex       Objective   Objective     Vital Signs:   Temp:  [98.9 °F (37.2 °C)] 98.9 °F (37.2 °C)  Heart Rate:  [] 89  Resp:  [18] 18  BP: (149-175)/() 170/102    Physical Exam  Constitutional:       Appearance: Normal appearance.   HENT:      Head: Normocephalic.   Eyes:      Extraocular Movements: Extraocular movements intact.   Cardiovascular:      Rate and Rhythm: Regular rhythm. Tachycardia present.      Pulses: Normal pulses.      Heart sounds: Normal heart sounds.   Pulmonary:      Effort: Pulmonary effort is normal.      Breath sounds: Normal breath sounds.   Abdominal:      General: Bowel sounds are normal.      Palpations: Abdomen is soft.   Musculoskeletal:      Right lower leg: Edema (+1) present.      Left lower leg: Edema (+1) present.   Skin:     General: Skin is warm.      Findings: Abrasion present.             Comments: Abrasion left temple   Neurological:      General: No focal deficit present.      Mental Status: He is alert and oriented to person, place, and time.   Psychiatric:         Mood and Affect: Mood normal.         Behavior: Behavior normal.          Result Review:  I have personally reviewed the results from the time of this admission to 1/19/2025 23:21 EST and agree with these findings:  [x]  Laboratory list / accordion  [x]  Microbiology  [x]  Radiology  [x]  EKG/Telemetry   []   Cardiology/Vascular   []  Pathology  [x]  Old records  []  Other:      LAB RESULTS:      Lab 01/19/25 2006 01/19/25 1846 01/14/25  1213   WBC  --  11.23*  --    HEMOGLOBIN  --  13.6  --    HEMATOCRIT  --  41.1  --    PLATELETS  --  196  --    NEUTROS ABS  --  9.29*  --    IMMATURE GRANS (ABS)  --  0.05  --    LYMPHS ABS  --  0.76  --    MONOS ABS  --  1.11*  --    EOS ABS  --  0.00  --    MCV  --  86.0  --    PROCALCITONIN  --  0.13  --    LACTATE  --  1.7  --    PROTIME  --  21.3* 17.0   CK TOTAL 507*  --   --          Lab 01/19/25 2006 01/19/25 1846   SODIUM  --  137   POTASSIUM  --  4.3   CHLORIDE  --  101   CO2  --  24.0   ANION GAP  --  12.0   BUN  --  23   CREATININE  --  2.17*   EGFR  --  29.3*   GLUCOSE  --  153*   CALCIUM  --  9.1   MAGNESIUM 1.7  --    TSH 1.080  --          Lab 01/19/25 1846   TOTAL PROTEIN 7.2   ALBUMIN 4.0   GLOBULIN 3.2   ALT (SGPT) 12   AST (SGOT) 48*   BILIRUBIN 0.9   ALK PHOS 135*         Lab 01/19/25 2006 01/19/25 1846 01/14/25  1213   PROBNP  --  9,226.0*  --    HSTROP T 491* 511*  --    PROTIME  --  21.3* 17.0   INR  --  1.83* 1.4*                 Brief Urine Lab Results  (Last result in the past 365 days)        Color   Clarity   Blood   Leuk Est   Nitrite   Protein   CREAT   Urine HCG        01/19/25 1932 Yellow   Clear   Small (1+)   Negative   Negative   100 mg/dL (2+)                 Microbiology Results (last 10 days)       ** No results found for the last 240 hours. **            CT Lumbar Spine Without Contrast    Result Date: 1/19/2025  CT LUMBAR SPINE WO CONTRAST Date of Exam: 1/19/2025 10:57 PM EST Indication: Back pain status post fall. Comparison: None available. Technique: Axial CT images were obtained of the lumbar spine without contrast administration.  Reconstructed coronal and sagittal images were also obtained. Automated exposure control and iterative construction methods were used. Findings: There are 5 typical lumbar spine vertebral bodies in anatomic  alignment. There is no evidence of fracture or compression deformity.  No focal lesions identified.  No evidence of spondylolysis. No significant spondylolisthesis. No significant focal soft tissue abnormalities. At least moderate multilevel degenerative changes are present throughout the spine. There is a levoscoliotic curvature present. Surrounding osseous structures appear intact. There is no evidence of bony canal stenosis. No sizable disc bulge or protrusion identified. Multilevel facet disease is present with mild to moderate multilevel neuroforaminal stenosis present bilaterally, most pronounced on the right at L2-L3 and L3-L4 and on the left at L3-L4 and L5-S1.     Impression: Impression: No acute osseous abnormality. At least moderate multilevel degenerative changes are present throughout the spine with a levoscoliotic curvature. Electronically Signed: Deana Caballero MD  1/19/2025 11:11 PM EST  Workstation ID: [id number]    CT Thoracic Spine Without Contrast    Result Date: 1/19/2025  CT THORACIC SPINE WO CONTRAST Date of Exam: 1/19/2025 6:24 PM EST Indication: Back and neck pain status post fall. Comparison: CT chest from June 19, 2015 Technique: Axial CT images were obtained of the thoracic spine without contrast administration.  Reconstructed coronal and sagittal images were also obtained. Automated exposure control and iterative construction methods were used. Findings: No acute fracture is identified. There is dextroscoliosis of the lower thoracic spine. The vertebral body heights are normal. There are multiple bridging marginal osteophytes compatible with diffuse idiopathic skeletal hyperostosis. There are moderate discogenic changes throughout. The spinal canal is widely patent throughout. There are scattered areas of mild to moderate neural foraminal stenosis in the upper and mid thoracic spine secondary to prominent facet arthropathy. The posterior paravertebral  soft tissues are unremarkable.      Impression: Impression: 1.No acute fracture identified. 2.Degenerative changes as described above. Electronically Signed: Rodríguez Mckeon MD  1/19/2025 6:58 PM EST  Workstation ID: [id number]    CT Cervical Spine Without Contrast    Result Date: 1/19/2025  CT CERVICAL SPINE WO CONTRAST Date of Exam: 1/19/2025 6:24 PM EST Indication: Upper neck and back pain status post fall. Comparison: February 15, 2022 Technique: Axial CT images were obtained of the cervical spine without contrast administration.  Reconstructed coronal and sagittal images were also obtained. Automated exposure control and iterative construction methods were used. Findings: There is a nondisplaced oblique fracture involving the C5 and C6 vertebral bodies, depicted on image 18 of series 4. The craniocervical junction appears intact. The alignment is anatomic. The vertebral body heights appear normal. There is osseous fusion across C5-6 and C6-7. Prominent anterior marginal bridging osteophytes are present. No high-grade spinal canal stenosis is identified. There is uncovertebral hypertrophy and facet arthropathy at several levels, resulting in up to severe neural foraminal stenosis on the right at C3-4 and bilaterally at C4-5. The prevertebral soft tissues are unremarkable.     Impression: Impression: Nondisplaced oblique fracture involving C5 and C6 vertebral bodies. Electronically Signed: Rodríguez Mckeon MD  1/19/2025 6:54 PM EST  Workstation ID: [id number]    CT Head Without Contrast    Result Date: 1/19/2025  CT HEAD WO CONTRAST Date of Exam: 1/19/2025 6:24 PM EST Indication: Head trauma status post fall. Comparison: February 15, 2022 Technique: Axial CT images were obtained of the head without contrast administration.  Automated exposure control and iterative construction methods were used. Findings: No acute intracranial hemorrhage or extra-axial collection is identified. The ventricles appear normal in caliber, with no evidence of mass  effect or midline shift. The basal cisterns appear patent. The gray-white differentiation appears preserved. The calvarium appear intact. The paranasal sinuses are clear. The mastoid air cells are well-aerated. Scattered foci of periventricular and subcortical white matter hypodensities are nonspecific, but likely the sequela of moderate chronic small vessel ischemic disease. There is mild generalized parenchymal atrophy.     Impression: Impression: 1.No acute intracranial process or calvarial fracture identified. 2.Findings suggestive of moderate chronic small vessel ischemic disease. Electronically Signed: Rodríguez Mckeon MD  1/19/2025 6:47 PM EST  Workstation ID: [id number]    XR Chest 1 View    Result Date: 1/19/2025  XR CHEST 1 VW Date of Exam: 1/19/2025 5:59 PM EST Indication: AMS Protocol AMS Protocol Comparison: February 21, 2022 Findings: Median sternotomy wires appear intact. There are coarse bilateral sacral markings. There is no focal consolidation. The heart and mediastinal contours appear stable.     Impression: Impression: Coarse bilateral interstitial markings, which could reflect interstitial pulmonary edema or atypical pneumonia. Electronically Signed: Rodríguez Mckeon MD  1/19/2025 6:30 PM EST  Workstation ID: [id number]         Assessment & Plan   Assessment & Plan       C5 cervical fracture    C6 cervical fracture    Mixed hyperlipidemia    Chronic kidney disease, stage IV (severe)    Disc disorder of cervical region    Essential hypertension    Alcohol dependence with unspecified alcohol-induced disorder      Spine fracture  Cervical disc disorder  Chronic back pain  -CT thoracic spine without contrast reveals no acute fracture identified.  Degenerative changes noted  -CT cervical spine without contrast reveals nondisplaced oblique fracture involving C5 and C6 vertebral bodies.  -Neurosurgery consult  -Pain control  -Continue lidocaine patch   -Continue cervical collar  -PT/OT consult in  a.m.  -Case management consult in a.m.  -CBC and CMP in a.m.  -PT/INR in a.m.    Right lower extremity cellulitis  Chronic right lower extremity DVT  -IV antibiotic coverage with ceftriaxone  -Has penicillin allergy listed but has taken cephalosporins before with no issues  -Will defer repeating right lower extremity ultrasound at this time, as he had a study done in September and has history of chronic DVT there, and is already on warfarin.    CHF exacerbation  HTN/HLD  -Initial troponin 511, trending down  -proBNP 9,226  -EKG NSR with T wave inversion  -Received IV Bumex in ED  -Continue carvedilol, statin, and warfarin  -Bumex  -Echo    CKD IV  -Baseline creatinine 2.25-2.49  -Creatinine 2.17  -eGFR 29.3    Alcohol dependence  -Ethanol level  -Start multivitamin, folic acid, and thiamine  -Seizure precautions  -Consider adding IV meds, if indicated        DVT prophylaxis: Warfarin    CODE STATUS: Full code  Level Of Support Discussed With: Patient  Code Status (Patient has no pulse and is not breathing): CPR (Attempt to Resuscitate)  Medical Interventions (Patient has pulse or is breathing): Full Support      Expected Discharge  Expected Discharge Date: 1/21/2025; Expected Discharge Time: 12:00 PM      This note has been completed as part of a split-shared workflow.     Signature: Electronically signed by YARED Landers, 01/19/25, 11:06 PM EST      Attending   Admission Attestation       I have performed an independent face-to-face diagnostic evaluation including performing an independent physical examination.  I approve of the documented plan of care above that was reviewed and developed with the advanced practice clinician (APC) and take responsibility for that plan along with its associated risks.  I have updated the HPI as appropriate.    Brief HPI    84M was brought to the ED tonight after a fall at home.  The patient states he does not recall falling.  His son accompanies him in the ED and states that he  last checked on the patient at around 7 PM last night (Saturday 1/18), and the patient was in his usual state of health at that time.  He states he came back to check on the patient at around 4 PM earlier today (Sunday 1/19) and found the patient on the ground in the patient's bedroom, wedged in the gap between his bed and the wall.  Neither the patient nor the patient's son know how long the patient was on the ground.  The son comments that the patient was largely wearing the same clothes as the previous night (1/18), and therefore speculates that the fall actually happened on Saturday night.  Workup in the ED included imaging of the cervical, thoracic, and lumbar spine, with the C-spine radiology report mentioning nondisplaced oblique fracture involving C5 and C6.  The neurosurgery service was called and the patient was placed in a hard collar with that service to visit him on Monday.  ED provider also felt that the patient had an x-ray reflecting pulmonary edema, so he received diuresis in the ED and admission was requested.    Attending Physical Exam:  Temp:  [98.9 °F (37.2 °C)] 98.9 °F (37.2 °C)  Heart Rate:  [] 76  Resp:  [18] 18  BP: (142-175)/() 150/88    Constitutional: Awake, alert, NAD.  Eyes: PERRLA, sclerae anicteric, no conjunctival injection  HENT: NC, some dried blood on his face near abrasion on the left temple/cheek, mucous membranes moist  Neck: Supple, no thyromegaly, no lymphadenopathy, trachea midline  Respiratory: Clear to auscultation bilaterally, nonlabored respirations   Cardiovascular: RRR, 1/6 systolic murmur, no rubs or gallops, palpable pedal pulses bilaterally  Gastrointestinal: Positive bowel sounds, soft, nontender, nondistended  Musculoskeletal: Right sided distal lower extremity edema, no clubbing or cyanosis to extremities.  There is pain on right calf palpation consistent with history of chronic RLE DVT.  Psychiatric: Appropriate affect, cooperative  Neurologic:  Oriented x 3, strength symmetric in all extremities, Cranial Nerves grossly intact to confrontation, speech clear  Skin: Distal RLE is anterior erythema, increased warmth, no drainage, some pain on palpation of the entire RLE.  Some mild bilateral distal lower extremity venous stasis change.      Result Review:  I have personally reviewed the results from the time of this admission to 1/20/2025 01:59 EST and agree with these findings:  [x]  Laboratory list / accordion  []  Microbiology  [x]  Radiology  [x]  EKG/Telemetry   []  Cardiology/Vascular   []  Pathology  [x]  Old records  []  Other:  Most notable findings include: Reviewed radiology reports from CT C-spine, L-spine, T-spine, and CT head.  I reviewed EKG which by my read shows sinus rhythm, ventricular rate just under 100 bpm, normal axis, agree with machine read of prolonged QT, nonspecific ST/T wave changes.  I reviewed chest x-ray which is a single AP view and by my read shows slight increase in interstitial markings which could be edema versus fibrosis, difficult to discern level of acuity/chronicity, as some of these changes are present on prior films as far back as 2022.    Assessment and Plan:    See assessment and plan documented by APC above and updated/edited by me as appropriate.      Total time spent: 43 minutes  Time spent includes time reviewing chart, face-to-face time, counseling patient/family/caregiver, ordering medications/tests/procedures, communicating with other health care professionals, documenting clinical information in the electronic health record, and coordination of care.       Juan Joseph III, DO  01/20/25

## 2025-01-20 NOTE — CONSULTS
[facility]    Inpatient Neurosurgery Consult  Consult performed by: Heather Blake PA-C  Consult ordered by: Ellyn Ott APRN        Name: Toño Alcala  YOB: 1940  MRN: [id number]    Referring Provider: No ref. provider found     Patient Care Team:  Radu Francis MD as PCP - General    Chief Complaint: Fall      History of Present Illness: This is a 84 y.o. male who presented to [facility] after being found down by his son.  No family at bedside this morning.  Patient does not remember any fall or the events leading up to last night.  He denies any significant neck pain, pain radiating to his arms, weakness or numbness of his arms. CT scan of the cervical spine shows nondisplaced oblique fracture through the C5 and C6 vertebral bodies.  He was placed in a cervical collar in the ER.  He does report some numbness and tingling of the groin but denies any bowel or bladder issues, pain in his legs or weakness in his legs.    PMHX  Allergies:  Allergies   Allergen Reactions    Penicillins Hives and Swelling     Per patient, has tolerated Keflex     Medications    Current Facility-Administered Medications:     acetaminophen (TYLENOL) tablet 650 mg, 650 mg, Oral, Q4H PRN **OR** acetaminophen (TYLENOL) 160 MG/5ML oral solution 650 mg, 650 mg, Oral, Q4H PRN **OR** acetaminophen (TYLENOL) suppository 650 mg, 650 mg, Rectal, Q4H PRN, Ellyn Ott APRN    ALPRAZolam (XANAX) tablet 0.25 mg, 0.25 mg, Oral, Q6H PRN, Juan Joseph III, DO    sennosides-docusate (PERICOLACE) 8.6-50 MG per tablet 2 tablet, 2 tablet, Oral, BID PRN **AND** polyethylene glycol (MIRALAX) packet 17 g, 17 g, Oral, Daily PRN **AND** bisacodyl (DULCOLAX) EC tablet 5 mg, 5 mg, Oral, Daily PRN **AND** bisacodyl (DULCOLAX) suppository 10 mg, 10 mg, Rectal, Daily PRN, Ellyn Ott APRN    bumetanide (BUMEX) tablet 1 mg, 1 mg, Oral, Daily PRN, Ellyn Ott APRN    Calcium Replacement  - Follow Nurse / BPA Driven Protocol, , Not Applicable, PRN, Ellyn Ott, APRN    carvedilol (COREG) tablet 12.5 mg, 12.5 mg, Oral, BID With Meals, Ellyn Ott APRN, 12.5 mg at 01/20/25 0850    cefTRIAXone (ROCEPHIN) 1,000 mg in sodium chloride 0.9 % 100 mL MBP, 1,000 mg, Intravenous, Q24H, Juan Joseph III, DO, Stopped at 01/20/25 0847    folic acid (FOLVITE) tablet 1 mg, 1 mg, Oral, Daily, LetEllyn arzate APRN, 1 mg at 01/20/25 0850    HYDROcodone-acetaminophen (NORCO) 5-325 MG per tablet 1 tablet, 1 tablet, Oral, Q6H PRN, Juan Joseph III, DO, 1 tablet at 01/20/25 0909    Lidocaine 4 % 2 patch, 2 patch, Transdermal, Q24H, Ellyn Ott APRN, 2 patch at 01/20/25 0850    Magnesium Standard Dose Replacement - Follow Nurse / BPA Driven Protocol, , Not Applicable, PRN, Ellyn Ott, APRN    melatonin tablet 5 mg, 5 mg, Oral, Nightly PRN, Ellyn Ott, APRN    multivitamin (THERAGRAN) tablet 1 tablet, 1 tablet, Oral, Daily, Ellyn Ott APRN, 1 tablet at 01/20/25 0850    pantoprazole (PROTONIX) EC tablet 40 mg, 40 mg, Oral, Q AM, Ellyn Ott APRN, 40 mg at 01/20/25 0611    Pharmacy to dose warfarin, , Not Applicable, Continuous PRN, Tru Ellyn, APRN    Phosphorus Replacement - Follow Nurse / BPA Driven Protocol, , Not Applicable, PRN, Tru, Ellyn, APRN    Potassium Replacement - Follow Nurse / BPA Driven Protocol, , Not Applicable, PRN, Letz, Ellyn, APRN    rosuvastatin (CRESTOR) tablet 20 mg, 20 mg, Oral, Nightly, Letz Ellyn, APRN    sodium chloride 0.9 % flush 10 mL, 10 mL, Intravenous, PRN, Michael Hernandez MD    sodium chloride 0.9 % flush 10 mL, 10 mL, Intravenous, Q12H, Letz, Ellyn, APRN, 10 mL at 01/20/25 0852    sodium chloride 0.9 % flush 10 mL, 10 mL, Intravenous, PRN, Ellyn Ott APRN    thiamine (VITAMIN B-1) tablet 100 mg, 100 mg, Oral, Daily, Ellyn Ott APRN, 100 mg at 01/20/25 0850    warfarin (COUMADIN) tablet 5 mg, 5 mg, Oral, Daily, Ellyn Ott APRN, 5 mg at 01/20/25 0231  Past Medical  History:  Past Medical History:   Diagnosis Date    Acute diverticulitis 01/29/2020    Allergic 60 yrs ago    Arthritis     Arthritis of neck Fusion 1992    Bilateral radial fractures 1962    fracture bilateral radial heads    CAD (coronary artery disease)     Calculus of gallbladder without cholecystitis without obstruction 01/29/2020    Cataract     Cervical disc disorder Fusion 1992    Cholelithiasis     Chronic kidney disease 2020    Clotting disorder     CVD (cardiovascular disease)     History of TIA 1989    Diabetes mellitus     DVT (deep venous thrombosis)     Right lower extremity DVT and pulmonary embolism in 06/2015, idiopathic    DVT of proximal lower limb 06/22/2015    proximal DVT right leg, small PE- anticoagulation    Dyslipidemia     Erectile dysfunction     Fracture 1952    H/o pf left forearm fracture    Fracture of right hand 1980    Fracture of wrist 1980    GERD (gastroesophageal reflux disease)     with hiatal hernia    HL (hearing loss)     Hx of angina pectoris     Hypertension     Normal renal angiography 02/16/11    Knee swelling Several years ago    Left wrist fracture 1953    Lumbosacral disc disease 1996    Osgood-Schlatter's disease 1955    Osteoarthritis     Right wrist fracture     Vertigo     Wears glasses      Past Surgical History:  Past Surgical History:   Procedure Laterality Date    APPENDECTOMY  1957    BACK SURGERY      CARDIAC CATHETERIZATION  2011    with vein graft    CATARACT EXTRACTION Left     CERVICAL FUSION      CERVICAL FUSION  1992    C3-4    COLONOSCOPY  2013    CORONARY ARTERY BYPASS GRAFT  1994    HERNIA REPAIR Right 2011    inguinal    INGUINAL HERNIA REPAIR Left 10/29/2019    Procedure: INGUINAL HERNIA REPAIR LEFT;  Surgeon: Charisma Raines MD;  Location: [facility];  Service: General    LUMBAR LAMINECTOMY  1996    laminectomy, lumbar, L3    NECK SURGERY  1992    OTHER SURGICAL HISTORY      wrist surgery    SPINE SURGERY  1996    TONSILLECTOMY AND  ADENOIDECTOMY      TRIGGER POINT INJECTION   -     VASECTOMY  1972     Social Hx:  Social History     Tobacco Use    Smoking status: Former     Current packs/day: 0.00     Average packs/day: 1.5 packs/day for 34.8 years (52.2 ttl pk-yrs)     Types: Cigarettes, Pipe, Cigars     Start date: 1962     Quit date: 1997     Years since quittin.7     Passive exposure: Past    Smokeless tobacco: Never    Tobacco comments:     QUIT DATE 1992   Vaping Use    Vaping status: Never Used   Substance Use Topics    Alcohol use: Yes     Alcohol/week: 11.0 - 13.0 standard drinks of alcohol     Types: 7 Glasses of wine, 2 Cans of beer, 2 - 4 Shots of liquor per week     Comment: glass of wine everyday     Drug use: No     Family Hx:  Family History   Problem Relation Age of Onset    Arthritis Mother         OA    Heart disease Father     Diabetes Father     Heart attack Father     Heart disease Brother     Heart attack Brother     Diabetes Brother     Diabetes Son     Hypertension Other     Cancer Other      Review of Systems:        Review of Systems   Constitutional:  Negative for activity change, appetite change, chills, diaphoresis, fatigue, fever and unexpected weight change.   HENT:  Positive for hearing loss. Negative for congestion, dental problem, drooling, ear discharge, ear pain, facial swelling, mouth sores, nosebleeds, postnasal drip, rhinorrhea, sinus pressure, sinus pain, sneezing, sore throat, tinnitus, trouble swallowing and voice change.    Eyes:  Negative for photophobia, pain, discharge, redness, itching and visual disturbance.   Respiratory:  Negative for apnea, cough, choking, chest tightness, shortness of breath, wheezing and stridor.    Cardiovascular:  Negative for chest pain, palpitations and leg swelling.   Gastrointestinal:  Negative for abdominal distention, abdominal pain, anal bleeding, blood in stool, constipation, diarrhea, nausea and vomiting.   Endocrine: Negative for  "polydipsia, polyphagia and polyuria.   Genitourinary:  Negative for decreased urine volume, difficulty urinating, dysuria, enuresis, frequency, hematuria and urgency.   Musculoskeletal:  Negative for arthralgias, back pain, gait problem, joint swelling, myalgias, neck pain and neck stiffness.   Skin:  Negative for color change, pallor and wound.   Neurological:  Negative for dizziness, tremors, seizures, syncope, facial asymmetry, speech difficulty, weakness, light-headedness, numbness and headaches.   Psychiatric/Behavioral:  Negative for behavioral problems, confusion, decreased concentration, dysphoric mood, hallucinations, self-injury, sleep disturbance and suicidal ideas. The patient is not nervous/anxious and is not hyperactive.         Physical Exam:   /89 (BP Location: Left arm, Patient Position: Lying)   Pulse 89   Temp 97.6 °F (36.4 °C) (Oral)   Resp 17   Ht 182.9 cm (72.01\")   Wt 82.7 kg (182 lb 5.1 oz)   SpO2 95%   BMI 24.72 kg/m²   Patient appears comfortable, resting, cervical collar in place  Awake, alert and oriented x 3  Speech f/c  Opens eyes spontaneously  EOM intact  Face symmetric  Tongue midline  5/5 strength in all 4 extremities  Normal sensation to light touch in all 4 ext  No saddle anesthesia      Intake/Output: No intake or output data in the 24 hours ending 01/20/25 0914    Current Medications:   Current Facility-Administered Medications:     acetaminophen (TYLENOL) tablet 650 mg, 650 mg, Oral, Q4H PRN **OR** acetaminophen (TYLENOL) 160 MG/5ML oral solution 650 mg, 650 mg, Oral, Q4H PRN **OR** acetaminophen (TYLENOL) suppository 650 mg, 650 mg, Rectal, Q4H PRN, Ellyn Ott APRN    ALPRAZolam (XANAX) tablet 0.25 mg, 0.25 mg, Oral, Q6H PRN, Juan Joseph III, DO    sennosides-docusate (PERICOLACE) 8.6-50 MG per tablet 2 tablet, 2 tablet, Oral, BID PRN **AND** polyethylene glycol (MIRALAX) packet 17 g, 17 g, Oral, Daily PRN **AND** bisacodyl (DULCOLAX) EC tablet 5 mg, " 5 mg, Oral, Daily PRN **AND** bisacodyl (DULCOLAX) suppository 10 mg, 10 mg, Rectal, Daily PRN, Ellyn Ott APRN    bumetanide (BUMEX) tablet 1 mg, 1 mg, Oral, Daily PRN, Ellyn Ott APRN    Calcium Replacement - Follow Nurse / BPA Driven Protocol, , Not Applicable, PRN, Ellyn Ott, APRN    carvedilol (COREG) tablet 12.5 mg, 12.5 mg, Oral, BID With Meals, Ellyn Ott APRN, 12.5 mg at 01/20/25 0850    cefTRIAXone (ROCEPHIN) 1,000 mg in sodium chloride 0.9 % 100 mL MBP, 1,000 mg, Intravenous, Q24H, Juan Joseph III, DO, Stopped at 01/20/25 0847    folic acid (FOLVITE) tablet 1 mg, 1 mg, Oral, Daily, Ellyn Ott APRN, 1 mg at 01/20/25 0850    HYDROcodone-acetaminophen (NORCO) 5-325 MG per tablet 1 tablet, 1 tablet, Oral, Q6H PRN, Juan Joseph III, DO, 1 tablet at 01/20/25 0909    Lidocaine 4 % 2 patch, 2 patch, Transdermal, Q24H, Ellyn Ott APRN, 2 patch at 01/20/25 0850    Magnesium Standard Dose Replacement - Follow Nurse / BPA Driven Protocol, , Not Applicable, PRN, Ellyn Ott APRN    melatonin tablet 5 mg, 5 mg, Oral, Nightly PRN, Ellyn Ott APRN    multivitamin (THERAGRAN) tablet 1 tablet, 1 tablet, Oral, Daily, Ellyn Ott APRN, 1 tablet at 01/20/25 0850    pantoprazole (PROTONIX) EC tablet 40 mg, 40 mg, Oral, Q AM, Ellyn Ott APRN, 40 mg at 01/20/25 0611    Pharmacy to dose warfarin, , Not Applicable, Continuous PRN, Ellyn Ott APRN    Phosphorus Replacement - Follow Nurse / BPA Driven Protocol, , Not Applicable, PRN, Ellyn Ott, APRN    Potassium Replacement - Follow Nurse / BPA Driven Protocol, , Not Applicable, PRN, Ellyn Ott APRN    rosuvastatin (CRESTOR) tablet 20 mg, 20 mg, Oral, Nightly, Ellyn Ott APRN    sodium chloride 0.9 % flush 10 mL, 10 mL, Intravenous, PRN, Michael Hernandez MD    sodium chloride 0.9 % flush 10 mL, 10 mL, Intravenous, Q12H, Ellyn Ott APRN, 10 mL at 01/20/25 0852    sodium chloride 0.9 % flush 10 mL, 10 mL, Intravenous, PRN, Ellyn Ott APRN     thiamine (VITAMIN B-1) tablet 100 mg, 100 mg, Oral, Daily, Ellyn Ott APRN, 100 mg at 01/20/25 0850    warfarin (COUMADIN) tablet 5 mg, 5 mg, Oral, Daily, Ellyn Ott APRN, 5 mg at 01/20/25 0231     Laboratory Results:      Lab 01/20/25 0301 01/19/25 1846 01/14/25  1213   WBC 11.56* 11.23*  --    HEMOGLOBIN 12.7* 13.6  --    HEMATOCRIT 38.5 41.1  --    PLATELETS 193 196  --    NEUTROS ABS 9.01* 9.29*  --    IMMATURE GRANS (ABS) 0.02 0.05  --    LYMPHS ABS 1.27 0.76  --    MONOS ABS 1.19* 1.11*  --    EOS ABS 0.03 0.00  --    MCV 86.3 86.0  --    PROCALCITONIN  --  0.13  --    LACTATE  --  1.7  --    PROTIME 23.0* 21.3* 17.0         Lab 01/20/25 0301 01/19/25 2006 01/19/25  1846   SODIUM 139  --  137   POTASSIUM 4.0  --  4.3   CHLORIDE 102  --  101   CO2 25.0  --  24.0   ANION GAP 12.0  --  12.0   BUN 26*  --  23   CREATININE 2.21*  --  2.17*   EGFR 28.7*  --  29.3*   GLUCOSE 139*  --  153*   CALCIUM 9.0  --  9.1   MAGNESIUM  --  1.7  --    TSH  --  1.080  --          Lab 01/20/25 0301 01/19/25  1846   TOTAL PROTEIN 6.5 7.2   ALBUMIN 3.7 4.0   GLOBULIN 2.8 3.2   ALT (SGPT) 12 12   AST (SGOT) 53* 48*   BILIRUBIN 0.8 0.9   ALK PHOS 116 135*         Lab 01/20/25 0301 01/19/25 2006 01/19/25 1846 01/14/25  1213   PROBNP  --   --  9,226.0*  --    HSTROP T  --  491* 511*  --    PROTIME 23.0*  --  21.3* 17.0   INR 2.02*  --  1.83* 1.4*                 Brief Urine Lab Results  (Last result in the past 365 days)        Color   Clarity   Blood   Leuk Est   Nitrite   Protein   CREAT   Urine HCG        01/19/25 1932 Yellow   Clear   Small (1+)   Negative   Negative   100 mg/dL (2+)                 Microbiology Results (last 10 days)       ** No results found for the last 240 hours. **               Diagnostic Imaging: The patient's diagnostic imaging was independently reviewed and interpreted by myself.    MDM    Assessment and Plan:    1. Generalized weakness    2. Fall, initial encounter    3. Injury of head, initial  encounter    4. Acute congestive heart failure, unspecified heart failure type    5. Elevated troponin    6. Closed nondisplaced fracture of fifth cervical vertebra, unspecified fracture morphology, initial encounter    7. Closed nondisplaced fracture of sixth cervical vertebra, unspecified fracture morphology, initial encounter    8. Elevated CK         This is a 84 y.o. male who presented to [facility] after being found down by son.  Patient does not remember the fall whatsoever.  CT of the entire spine was obtained which showed nondisplaced oblique fracture through C5 and C6 vertebral bodies.  He was subsequently placed in a cervical collar.  Patient denies any significant neck pain nor does he have any signs or symptoms of radiculopathy or myelopathy.  He is neurologically intact on exam with full strength in bilateral upper and lower extremities with no long track signs or saddle anesthesia.  No role for surgical intervention.  Patient will continue cervical collar.  The collar is for cervical stabilization, comfort and pain control. The patient must wear the collar at all times for the next 6-8 weeks. The patient can remove the collar for eating and bathing.  He will follow-up in the neurosurgery office in 4 weeks with cervical x-rays.  Will sign off at this time.  Please call with any questions or concerns.    Heather Blake PA-C  01/20/25  09:14 EST

## 2025-01-20 NOTE — PROGRESS NOTES
Pharmacy Consult - Warfarin Dosing    Toño Alcala is a 84 y.o. male receiving warfarin therapy.    Consulting Provider: Ellyn Ott APRN   Indication: DVT (chronic)  Goal INR: 2 - 3  Home Regimen:               Warfarin 5mg daily    Bridge Therapy: No     Drug-Drug Interactions with current regimen:         Ceftriaxone - increased bleeding risk         Pantoprazole - may increase INR      Warfarin Dosing During Admission:    Date  1/19 1/20          INR  1.83 2.02          Dose  5mg 5mg              Education Provided:  Warfarin education was provided on 1/20/2025 verbally and in writing. Discussed effects of warfarin, importance of checking INR, drug-drug and drug-food interactions, and signs/symptoms of bleeding and clotting. Patient/family verbalized understanding through teach back. All pertinent questions were answered.      Discharge Follow-Up:     Outpatient Following Provider - Dr. Radu Francis      Follow-Up Recommendation - 2-3 days following discharge     Labs:  Results from last 7 days   Lab Units 01/20/25  0301 01/19/25  1846 01/14/25  1213   INR  2.02* 1.83* 1.4*   HEMOGLOBIN g/dL 12.7* 13.6  --    HEMATOCRIT % 38.5 41.1  --      Results from last 7 days   Lab Units 01/20/25  0301 01/19/25  1846   SODIUM mmol/L 139 137   POTASSIUM mmol/L 4.0 4.3   CHLORIDE mmol/L 102 101   CO2 mmol/L 25.0 24.0   BUN mg/dL 26* 23   CREATININE mg/dL 2.21* 2.17*   CALCIUM mg/dL 9.0 9.1   BILIRUBIN mg/dL 0.8 0.9   ALK PHOS U/L 116 135*   ALT (SGPT) U/L 12 12   AST (SGOT) U/L 53* 48*   GLUCOSE mg/dL 139* 153*     Current dietary intake:   Diet Order   Procedures    Diet: Cardiac, Diabetic; Healthy Heart (2-3 Na+); Consistent Carbohydrate; Fluid Consistency: Thin (IDDSI 0)       Assessment/Plan:  Warfarin dosing for hx DVT  Goal INR: 2-3  1/20 INR - 2.02, increased from 1.83  1/20 H/H 12.7/38.5    Will continue warfarin 5mg daily  Monitor s/s bleeding, clinical status, dietary intake and drug-drug  interactions  Follow daily INR and adjust dose accordingly    Thanks  James Farias [facility]  1/20/2025  08:31 EST

## 2025-01-21 ENCOUNTER — APPOINTMENT (OUTPATIENT)
Dept: PHYSICAL THERAPY | Facility: HOSPITAL | Age: 85
End: 2025-01-21
Payer: MEDICARE

## 2025-01-21 ENCOUNTER — APPOINTMENT (OUTPATIENT)
Dept: MRI IMAGING | Facility: HOSPITAL | Age: 85
DRG: 064 | End: 2025-01-21
Payer: MEDICARE

## 2025-01-21 ENCOUNTER — ANCILLARY PROCEDURE (OUTPATIENT)
Dept: SPEECH THERAPY | Facility: HOSPITAL | Age: 85
DRG: 064 | End: 2025-01-21
Payer: MEDICARE

## 2025-01-21 LAB
ALBUMIN SERPL-MCNC: 3.5 G/DL (ref 3.5–5.2)
ALBUMIN/GLOB SERPL: 1.1 G/DL
ALP SERPL-CCNC: 119 U/L (ref 39–117)
ALT SERPL W P-5'-P-CCNC: 12 U/L (ref 1–41)
ANION GAP SERPL CALCULATED.3IONS-SCNC: 10 MMOL/L (ref 5–15)
AST SERPL-CCNC: 45 U/L (ref 1–40)
BASOPHILS # BLD AUTO: 0.04 10*3/MM3 (ref 0–0.2)
BASOPHILS NFR BLD AUTO: 0.4 % (ref 0–1.5)
BILIRUB SERPL-MCNC: 0.5 MG/DL (ref 0–1.2)
BUN SERPL-MCNC: 30 MG/DL (ref 8–23)
BUN/CREAT SERPL: 13.6 (ref 7–25)
CALCIUM SPEC-SCNC: 8.9 MG/DL (ref 8.6–10.5)
CHLORIDE SERPL-SCNC: 105 MMOL/L (ref 98–107)
CHOLEST SERPL-MCNC: 111 MG/DL (ref 0–200)
CO2 SERPL-SCNC: 25 MMOL/L (ref 22–29)
CREAT SERPL-MCNC: 2.21 MG/DL (ref 0.76–1.27)
DEPRECATED RDW RBC AUTO: 51.7 FL (ref 37–54)
EGFRCR SERPLBLD CKD-EPI 2021: 28.7 ML/MIN/1.73
EOSINOPHIL # BLD AUTO: 0.47 10*3/MM3 (ref 0–0.4)
EOSINOPHIL NFR BLD AUTO: 5 % (ref 0.3–6.2)
ERYTHROCYTE [DISTWIDTH] IN BLOOD BY AUTOMATED COUNT: 16.3 % (ref 12.3–15.4)
GLOBULIN UR ELPH-MCNC: 3.1 GM/DL
GLUCOSE BLDC GLUCOMTR-MCNC: 92 MG/DL (ref 70–130)
GLUCOSE SERPL-MCNC: 99 MG/DL (ref 65–99)
HBA1C MFR BLD: 5.7 % (ref 4.8–5.6)
HCT VFR BLD AUTO: 35.8 % (ref 37.5–51)
HDLC SERPL-MCNC: 35 MG/DL (ref 40–60)
HGB BLD-MCNC: 11.9 G/DL (ref 13–17.7)
IMM GRANULOCYTES # BLD AUTO: 0.03 10*3/MM3 (ref 0–0.05)
IMM GRANULOCYTES NFR BLD AUTO: 0.3 % (ref 0–0.5)
INR PPP: 1.13 (ref 0.89–1.12)
INR PPP: 1.21 (ref 0.89–1.12)
LDLC SERPL CALC-MCNC: 59 MG/DL (ref 0–100)
LDLC/HDLC SERPL: 1.69 {RATIO}
LYMPHOCYTES # BLD AUTO: 1.7 10*3/MM3 (ref 0.7–3.1)
LYMPHOCYTES NFR BLD AUTO: 18 % (ref 19.6–45.3)
MAGNESIUM SERPL-MCNC: 2 MG/DL (ref 1.6–2.4)
MCH RBC QN AUTO: 28.5 PG (ref 26.6–33)
MCHC RBC AUTO-ENTMCNC: 33.2 G/DL (ref 31.5–35.7)
MCV RBC AUTO: 85.9 FL (ref 79–97)
MONOCYTES # BLD AUTO: 1.01 10*3/MM3 (ref 0.1–0.9)
MONOCYTES NFR BLD AUTO: 10.7 % (ref 5–12)
NEUTROPHILS NFR BLD AUTO: 6.19 10*3/MM3 (ref 1.7–7)
NEUTROPHILS NFR BLD AUTO: 65.6 % (ref 42.7–76)
NRBC BLD AUTO-RTO: 0 /100 WBC (ref 0–0.2)
PHOSPHATE SERPL-MCNC: 3.2 MG/DL (ref 2.5–4.5)
PLATELET # BLD AUTO: 186 10*3/MM3 (ref 140–450)
PMV BLD AUTO: 10.1 FL (ref 6–12)
POTASSIUM SERPL-SCNC: 3.9 MMOL/L (ref 3.5–5.2)
PROT SERPL-MCNC: 6.6 G/DL (ref 6–8.5)
PROTHROMBIN TIME: 14.6 SECONDS (ref 12.2–14.5)
PROTHROMBIN TIME: 15.4 SECONDS (ref 12.2–14.5)
QT INTERVAL: 444 MS
QTC INTERVAL: 479 MS
RBC # BLD AUTO: 4.17 10*6/MM3 (ref 4.14–5.8)
SODIUM SERPL-SCNC: 140 MMOL/L (ref 136–145)
TRIGL SERPL-MCNC: 84 MG/DL (ref 0–150)
VLDLC SERPL-MCNC: 17 MG/DL (ref 5–40)
WBC NRBC COR # BLD AUTO: 9.44 10*3/MM3 (ref 3.4–10.8)

## 2025-01-21 PROCEDURE — 84100 ASSAY OF PHOSPHORUS: CPT | Performed by: NURSE PRACTITIONER

## 2025-01-21 PROCEDURE — 85610 PROTHROMBIN TIME: CPT

## 2025-01-21 PROCEDURE — 97530 THERAPEUTIC ACTIVITIES: CPT

## 2025-01-21 PROCEDURE — 92610 EVALUATE SWALLOWING FUNCTION: CPT

## 2025-01-21 PROCEDURE — 92612 ENDOSCOPY SWALLOW (FEES) VID: CPT

## 2025-01-21 PROCEDURE — 80053 COMPREHEN METABOLIC PANEL: CPT | Performed by: STUDENT IN AN ORGANIZED HEALTH CARE EDUCATION/TRAINING PROGRAM

## 2025-01-21 PROCEDURE — 83735 ASSAY OF MAGNESIUM: CPT | Performed by: NURSE PRACTITIONER

## 2025-01-21 PROCEDURE — 83036 HEMOGLOBIN GLYCOSYLATED A1C: CPT

## 2025-01-21 PROCEDURE — 80061 LIPID PANEL: CPT

## 2025-01-21 PROCEDURE — 92523 SPEECH SOUND LANG COMPREHEN: CPT

## 2025-01-21 PROCEDURE — 85025 COMPLETE CBC W/AUTO DIFF WBC: CPT | Performed by: STUDENT IN AN ORGANIZED HEALTH CARE EDUCATION/TRAINING PROGRAM

## 2025-01-21 PROCEDURE — 97164 PT RE-EVAL EST PLAN CARE: CPT

## 2025-01-21 PROCEDURE — 82948 REAGENT STRIP/BLOOD GLUCOSE: CPT

## 2025-01-21 PROCEDURE — 70547 MR ANGIOGRAPHY NECK W/O DYE: CPT

## 2025-01-21 PROCEDURE — 93010 ELECTROCARDIOGRAM REPORT: CPT | Performed by: INTERNAL MEDICINE

## 2025-01-21 PROCEDURE — 70544 MR ANGIOGRAPHY HEAD W/O DYE: CPT

## 2025-01-21 PROCEDURE — 99232 SBSQ HOSP IP/OBS MODERATE 35: CPT | Performed by: INTERNAL MEDICINE

## 2025-01-21 PROCEDURE — 25010000002 CEFTRIAXONE PER 250 MG: Performed by: INTERNAL MEDICINE

## 2025-01-21 PROCEDURE — 99233 SBSQ HOSP IP/OBS HIGH 50: CPT | Performed by: INTERNAL MEDICINE

## 2025-01-21 PROCEDURE — 93005 ELECTROCARDIOGRAM TRACING: CPT | Performed by: STUDENT IN AN ORGANIZED HEALTH CARE EDUCATION/TRAINING PROGRAM

## 2025-01-21 PROCEDURE — 70551 MRI BRAIN STEM W/O DYE: CPT

## 2025-01-21 PROCEDURE — 97168 OT RE-EVAL EST PLAN CARE: CPT

## 2025-01-21 PROCEDURE — 99233 SBSQ HOSP IP/OBS HIGH 50: CPT | Performed by: STUDENT IN AN ORGANIZED HEALTH CARE EDUCATION/TRAINING PROGRAM

## 2025-01-21 RX ORDER — CARVEDILOL 12.5 MG/1
12.5 TABLET ORAL 2 TIMES DAILY WITH MEALS
COMMUNITY
End: 2025-01-28 | Stop reason: HOSPADM

## 2025-01-21 RX ORDER — LIDOCAINE 4 G/G
1 PATCH TOPICAL EVERY 12 HOURS SCHEDULED
Status: DISCONTINUED | OUTPATIENT
Start: 2025-01-21 | End: 2025-01-28 | Stop reason: HOSPADM

## 2025-01-21 RX ADMIN — Medication 10 ML: at 20:38

## 2025-01-21 RX ADMIN — FOLIC ACID 1 MG: 1 TABLET ORAL at 11:12

## 2025-01-21 RX ADMIN — MUPIROCIN 1 APPLICATION: 20 OINTMENT TOPICAL at 11:12

## 2025-01-21 RX ADMIN — HYDROCODONE BITARTRATE AND ACETAMINOPHEN 1 TABLET: 5; 325 TABLET ORAL at 20:38

## 2025-01-21 RX ADMIN — MULTIVITAMIN TABLET 1 TABLET: TABLET at 11:12

## 2025-01-21 RX ADMIN — THIAMINE HCL TAB 100 MG 100 MG: 100 TAB at 11:12

## 2025-01-21 RX ADMIN — ROSUVASTATIN 20 MG: 20 TABLET, FILM COATED ORAL at 20:37

## 2025-01-21 RX ADMIN — LIDOCAINE 1 PATCH: 560 PATCH PERCUTANEOUS; TOPICAL; TRANSDERMAL at 22:20

## 2025-01-21 RX ADMIN — SODIUM CHLORIDE 1000 MG: 900 INJECTION INTRAVENOUS at 20:37

## 2025-01-21 RX ADMIN — CARVEDILOL 12.5 MG: 12.5 TABLET, FILM COATED ORAL at 11:12

## 2025-01-21 RX ADMIN — LIDOCAINE 2 PATCH: 4 PATCH TOPICAL at 11:12

## 2025-01-21 RX ADMIN — MUPIROCIN 1 APPLICATION: 20 OINTMENT TOPICAL at 20:37

## 2025-01-21 RX ADMIN — ACETAMINOPHEN 650 MG: 650 SUPPOSITORY RECTAL at 03:24

## 2025-01-21 NOTE — PLAN OF CARE
Goal Outcome Evaluation:  Plan of Care Reviewed With: patient, child        Progress:  (eval)       Anticipated Discharge Disposition (SLP): inpatient rehabilitation facility    SLP Diagnosis: functional speech/language skills (01/21/25 0848)  SLP Diagnosis Comments: Pt presents with functional speech and language skills at this time. Pt was able to complete all the tasks with 100% accuracy. (01/21/25 0848)  SLP Swallowing Diagnosis: functional oral phase, suspected pharyngeal dysphagia (01/21/25 0848)           FEES on this date

## 2025-01-21 NOTE — SIGNIFICANT NOTE
I spoke with cardiology,.  They did place IVC filter send can place 1 tomorrow.  Patient is followed in clinic by Dr. Hernandez.  Will make him n.p.o. after midnight and they will plan on an IVC filter tomorrow.

## 2025-01-21 NOTE — PROGRESS NOTES
"  [facility]   Inpatient Progress Note       LOS: 1 day   Patient Care Team:  Radu Francis MD as PCP - General    Chief Complaint:  follow-up for elevated troponin    Subjective     Interval History:     Patient in chair.  Cervical collar on.  Confused, though does awaken and attempt communication.  Discussed care with son.  He was unaware of any chest pain prior to event, until patient mention in the emergency room.  Found unresponsive on floor for at least 12 hours.  New intracranial bleeding with therapeutic PT/INR.    Review of Systems:   Pertinent positives noted in history, exam, and assessment. Otherwise reviewed and negative.      Objective     Vitals:  Blood pressure 122/64, pulse 61, temperature 97.8 °F (36.6 °C), temperature source Axillary, resp. rate 18, height 182.9 cm (72.01\"), weight 82.7 kg (182 lb 5.1 oz), SpO2 95%.     Intake/Output Summary (Last 24 hours) at 1/21/2025 0810  Last data filed at 1/21/2025 0000  Gross per 24 hour   Intake 819.8 ml   Output 600 ml   Net 219.8 ml     Vitals reviewed.   Constitutional:       Appearance: Well-developed and not in distress. Frail.   Neck:      Thyroid: No thyromegaly.      Vascular: No carotid bruit or JVD.   Pulmonary:      Breath sounds: Normal breath sounds.   Cardiovascular:      Regular rhythm.      No gallop. No S3 and S4 gallop.   Pulses:     Intact distal pulses.      Carotid: 2+ bilaterally.     Radial: 2+ bilaterally.  Edema:     Peripheral edema absent.   Abdominal:      General: Bowel sounds are normal.      Palpations: Abdomen is soft. There is no abdominal mass.      Tenderness: There is no abdominal tenderness.   Musculoskeletal:         General: No deformity.      Extremities: No clubbing present.Skin:     General: Skin is warm and dry.      Findings: No rash.   Neurological:      Mental Status: Oriented to person, place, and time.      Comments: Drowsy.  Communicative mildly.  Simple command follows. "            Results Review:     I reviewed the patient's new clinical results.    Results from last 7 days   Lab Units 01/21/25  0536   WBC 10*3/mm3 9.44   HEMOGLOBIN g/dL 11.9*   HEMATOCRIT % 35.8*   PLATELETS 10*3/mm3 186     Results from last 7 days   Lab Units 01/21/25  0536   SODIUM mmol/L 140   POTASSIUM mmol/L 3.9   CHLORIDE mmol/L 105   CO2 mmol/L 25.0   BUN mg/dL 30*   CREATININE mg/dL 2.21*   CALCIUM mg/dL 8.9   BILIRUBIN mg/dL 0.5   ALK PHOS U/L 119*   ALT (SGPT) U/L 12   AST (SGOT) U/L 45*   GLUCOSE mg/dL 99     Results from last 7 days   Lab Units 01/21/25  0536   SODIUM mmol/L 140   POTASSIUM mmol/L 3.9   CHLORIDE mmol/L 105   CO2 mmol/L 25.0   BUN mg/dL 30*   CREATININE mg/dL 2.21*   GLUCOSE mg/dL 99   CALCIUM mg/dL 8.9     Results from last 7 days   Lab Units 01/21/25  0536 01/20/25  2145 01/20/25  0301   INR  1.21* 1.21* 2.02*     Lab Results   Lab Value Date/Time    TROPONINT 491 (C) 01/19/2025 2006    TROPONINT 511 (C) 01/19/2025 1846    TROPONINT <0.010 02/14/2022 2253    TROPONINT <0.010 01/06/2020 1129    TROPONINT <0.010 01/06/2020 0849     Results from last 7 days   Lab Units 01/19/25 2006   TSH uIU/mL 1.080     Results from last 7 days   Lab Units 01/21/25  0536   CHOLESTEROL mg/dL 111   TRIGLYCERIDES mg/dL 84   HDL CHOL mg/dL 35*   LDL CHOL mg/dL 59     Results from last 7 days   Lab Units 01/19/25  1846   PROBNP pg/mL 9,226.0*     Results from last 7 days   Lab Units 01/19/25 2006 01/19/25  1846   HSTROP T ng/L 491* 511*         Tele: Sinus rhythm    Assessment:      ICH (intracerebral hemorrhage)    CAD (coronary artery disease)    Diabetes mellitus    Hyperlipidemia LDL goal <70    Diabetic nephropathy associated with type 2 diabetes mellitus    Chronic kidney disease, stage IV (severe)    Disc disorder of cervical region    Essential hypertension    Alcohol dependence with unspecified alcohol-induced disorder    C5 cervical fracture    C6 cervical fracture    NSTEMI, initial episode of  care    History of pulmonary embolus (PE)/DVT    Acute on chronic diastolic CHF (congestive heart failure)    Syncope and collapse      Cervical fracture  No surgical intervention planned.  Cervical collar for 4 weeks and follow-up with neurosurgery     Possible syncope  Unclear if patient suffered a syncopal episode leading to fall.  However did suffer syncopal episode in October.  No arrhythmias noted on telemetry but cardiogenic syncope cannot be ruled out  Echo shows normal LVEF and no significant valvular abnormality     Acute on chronic diastolic heart failure  proBNP elevated over 9000 and chest x-ray shows interstitial pulmonary edema versus atypical pneumonia  Treated with 2 mg IV Bumex x 1 on 1/19  Echo this admission shows normal LVEF and no significant valvular abnormality  Would hold off on further diuresis      Elevated troponin/NSTEMI  Troponins elevated and flat and trending down.  EKG does have ST depression  Patient reports chest tightness and shortness of breath over the past 6 weeks     Coronary artery disease  History of remote CABG 1994 with last cath 2011 with 3/3 grafts patent  Stress echo 2014 normal study.  No recent ischemic eval  Not on aspirin due to concomitant use of Coumadin  Patient does report shortness of breath and chest tightness over the past 6 weeks.     Hypertension  Carvedilol 12.5 mg twice daily     Hyperlipidemia  Crestor 20 mg daily  Total cholesterol 143, triglycerides 85, HDL 45, LDL 82     Type 2 diabetes mellitus  Controlled with hemoglobin A1c 6.1     Stage IV chronic kidney disease  Creatinine elevated but at baseline at 2.21     History of PE/DVT  On Coumadin with current INR 2.02    Plan:  Patient with syncope, cervical spine fracture, stroke, with intracranial hemorrhage.  Cannot be on anticoagulation for quite some time.  History of DVT/PE in the remote past.  Has chronic right lower extremity DVT.  His positive troponin, unlikely represents an ACS.  Does not fit  the clinical history.  In addition he is unlikely to tolerate a cath/PCI given her inability to anticoagulate at this time.  Will defer and manage medically with blood pressure control, glucose control and statin.  Discussed with intensivist possible IVC filter.  Will discuss this with patient's family in more detail as well as getting guidance regarding level of care.  Patient was independent at home prior to event.  Discussed with son this is unlikely be the case moving forward.  If IVC filter necessary can place on Thursday.      YARED Mueller   Dictated utilizing Dragon dictation  I have seen and examined the patient, I have reviewed the note, discussed the case with the advance practice clinician, made necessary changes and I agree with the final note.    Avelino Hernandez MD  01/21/25  16:23 EST

## 2025-01-21 NOTE — THERAPY RE-EVALUATION
Patient Name: Toño Alcala  : 1940    MRN: [id number]                              Today's Date: 2025       Admit Date: 2025    Visit Dx:     ICD-10-CM ICD-9-CM   1. Generalized weakness  R53.1 780.79   2. Fall, initial encounter  W19.XXXA E888.9   3. Injury of head, initial encounter  S09.90XA 959.01   4. Acute congestive heart failure, unspecified heart failure type  I50.9 428.0   5. Elevated troponin  R79.89 790.6   6. Closed nondisplaced fracture of fifth cervical vertebra, unspecified fracture morphology, initial encounter  S12.401A 805.05   7. Closed nondisplaced fracture of sixth cervical vertebra, unspecified fracture morphology, initial encounter  S12.501A 805.06   8. Elevated CK  R74.8 790.5   9. Oropharyngeal dysphagia  R13.12 787.22     Patient Active Problem List   Diagnosis    CAD (coronary artery disease)    DVT (deep venous thrombosis)    Diabetes mellitus    Dyslipidemia    GERD (gastroesophageal reflux disease)    Hyperlipidemia LDL goal <70    Diabetic nephropathy associated with type 2 diabetes mellitus    Diabetic polyneuropathy associated with type 2 diabetes mellitus    Chronic kidney disease, stage IV (severe)    Vitamin D deficiency    Disc disorder of cervical region    Lumbosacral spondylosis without myelopathy    Arthritis of elbow    Acute right-sided low back pain without sciatica    Unifocal PVCs    Essential hypertension    Stage 3 chronic kidney disease due to benign hypertension    BMI 30.0-30.9,adult    Fall    Alcohol dependence with unspecified alcohol-induced disorder    C5 cervical fracture    C6 cervical fracture    Multiple fractures of cervical spine    NSTEMI, initial episode of care    History of pulmonary embolus (PE)/DVT    Acute on chronic diastolic CHF (congestive heart failure)    Syncope and collapse    ICH (intracerebral hemorrhage)     Past Medical History:   Diagnosis Date    Acute diverticulitis 2020    Allergic 60 yrs ago    Arthritis      Arthritis of neck Fusion 1992    Bilateral radial fractures 1962    fracture bilateral radial heads    CAD (coronary artery disease)     Calculus of gallbladder without cholecystitis without obstruction 01/29/2020    Cataract     Cervical disc disorder Fusion 1992    Cholelithiasis     Chronic kidney disease 2020    Clotting disorder     CVD (cardiovascular disease)     History of TIA 1989    Diabetes mellitus     DVT (deep venous thrombosis)     Right lower extremity DVT and pulmonary embolism in 06/2015, idiopathic    DVT of proximal lower limb 06/22/2015    proximal DVT right leg, small PE- anticoagulation    Dyslipidemia     Erectile dysfunction     Fracture 1952    H/o pf left forearm fracture    Fracture of right hand 1980    Fracture of wrist 1980    GERD (gastroesophageal reflux disease)     with hiatal hernia    HL (hearing loss)     Hx of angina pectoris     Hypertension     Normal renal angiography 02/16/11    Knee swelling Several years ago    Left wrist fracture 1953    Lumbosacral disc disease 1996    Osgood-Schlatter's disease 1955    Osteoarthritis     Right wrist fracture     Vertigo     Wears glasses      Past Surgical History:   Procedure Laterality Date    APPENDECTOMY  1957    BACK SURGERY      CARDIAC CATHETERIZATION  2011    with vein graft    CATARACT EXTRACTION Left     CERVICAL FUSION      CERVICAL FUSION  1992    C3-4    COLONOSCOPY  2013    CORONARY ARTERY BYPASS GRAFT  1994    HERNIA REPAIR Right 2011    inguinal    INGUINAL HERNIA REPAIR Left 10/29/2019    Procedure: INGUINAL HERNIA REPAIR LEFT;  Surgeon: Charisma Raines MD;  [facility]: [facility];  Service: General    LUMBAR LAMINECTOMY  1996    laminectomy, lumbar, L3    NECK SURGERY  1992    OTHER SURGICAL HISTORY      wrist surgery    SPINE SURGERY  1996    TONSILLECTOMY AND ADENOIDECTOMY  1945    TRIGGER POINT INJECTION  2001 - 2007    VASECTOMY  1972      General Information       Row Name 01/21/25 1313          Physical  Therapy Time and Intention    Document Type re-evaluation  -ES     Mode of Treatment physical therapy  -ES       Row Name 01/21/25 1313          General Information    Patient Profile Reviewed yes  -ES     Prior Level of Function --  see IE  -ES     Existing Precautions/Restrictions fall;cervical collar;brace on at all times;other (see comments)  C5-6 fx with cervical collar on AAT: L visual field cut  -ES     Barriers to Rehab medically complex;previous functional deficit;visual deficit  -ES       Row Name 01/21/25 1313          Living Environment    People in Home other (see comments)  see IE  -ES       Row Name 01/21/25 1313          Cognition    Orientation Status (Cognition) oriented x 3  -ES       Row Name 01/21/25 1313          Safety Issues/Impairments Affecting Functional Mobility    Safety Issues Affecting Function (Mobility) awareness of need for assistance;insight into deficits/self-awareness;safety precaution awareness;sequencing abilities;safety precautions follow-through/compliance  -ES     Impairments Affecting Function (Mobility) balance;endurance/activity tolerance;pain;postural/trunk control;strength;visual/perceptual  -ES               User Key  (r) = Recorded By, (t) = Taken By, (c) = Cosigned By      Initials Name Provider Type    ES Elvira Karimi PT Physical Therapist                   Mobility       Row Name 01/21/25 1314          Bed Mobility    Bed Mobility supine-sit  -ES     Supine-Sit [place] (Bed Mobility) minimum assist (75% patient effort);2 person assist;verbal cues;nonverbal cues (demo/gesture)  -ES     Assistive Device (Bed Mobility) bed rails;head of bed elevated;repositioning sheet  -ES     Comment, (Bed Mobility) v/c for sequencing and to improve awareness to L side  -ES       Row Name 01/21/25 1314          Bed-Chair Transfer    Bed-Chair [place] (Transfers) minimum assist (75% patient effort);2 person assist;verbal cues  -ES     Assistive Device (Bed-Chair  Transfers) walker, front-wheeled  -ES     Comment, (Bed-Chair Transfer) v/c for sequencing and to improve awareness to L side. Able to take 5 steps from EOB>chair.  -ES       Row Name 01/21/25 1314          Sit-Stand Transfer    Sit-Stand [place] (Transfers) minimum assist (75% patient effort);2 person assist;verbal cues;nonverbal cues (demo/gesture)  -ES     Assistive Device (Sit-Stand Transfers) walker, front-wheeled  -ES     Comment, (Sit-Stand Transfer) v/c for sequencing.  -ES               User Key  (r) = Recorded By, (t) = Taken By, (c) = Cosigned By      Initials Name Provider Type    ES Elvira Karimi PT Physical Therapist                   Obj/Interventions       Row Name 01/21/25 1317          Range of Motion Comprehensive    General Range of Motion bilateral lower extremity ROM WFL  -ES       Row Name 01/21/25 1317          Strength Comprehensive (MMT)    General Manual Muscle Testing (MMT) Assessment lower extremity strength deficits identified  -ES     Comment, General Manual Muscle Testing (MMT) Assessment BLE grossly 4/5  -ES       Row Name 01/21/25 1317          Balance    Balance Assessment sitting static balance;sitting dynamic balance;standing static balance;standing dynamic balance  -ES     Static Sitting Balance contact guard  -ES     Dynamic Sitting Balance minimal assist  -ES     Position, Sitting Balance supported;sitting in chair;sitting edge of bed  -ES     Static Standing Balance minimal assist;2-person assist  -ES     Dynamic Standing Balance minimal assist;2-person assist;verbal cues  -ES     Position/Device Used, Standing Balance supported;walker, front-wheeled  -ES     Balance Interventions sitting;standing;sit to stand;supported;static;dynamic;occupation based/functional task  -ES       Row Name 01/21/25 1317          Sensory Assessment (Somatosensory)    Sensory Assessment (Somatosensory) LE sensation intact  -ES               User Key  (r) = Recorded By, (t) = Taken By, (c)  = Cosigned By      Initials Name Provider Type    ES Elvira Karimi, PT Physical Therapist                   Goals/Plan       Row Name 01/21/25 1322          Bed Mobility Goal 1 (PT)    Activity/Assistive Device (Bed Mobility Goal 1, PT) sit to supine/supine to sit  -ES     Emmet Level/Cues Needed (Bed Mobility Goal 1, PT) standby assist  -ES     Time Frame (Bed Mobility Goal 1, PT) short term goal (STG);5 days  -ES     Progress/Outcomes (Bed Mobility Goal 1, PT) goal revised this date  -ES       Row Name 01/21/25 1322          Transfer Goal 1 (PT)    Activity/Assistive Device (Transfer Goal 1, PT) sit-to-stand/stand-to-sit;bed-to-chair/chair-to-bed  -ES     E[place]et Level/Cues Needed (Transfer Goal 1, PT) standby assist  -ES     Time Frame (Transfer Goal 1, PT) long term goal (LTG);10 days  -ES     Progress/Outcome (Transfer Goal 1, PT) goal revised this date  -ES       Row Name 01/21/25 1322          Gait Training Goal 1 (PT)    Activity/Assistive Device (Gait Training Goal 1, PT) gait (walking locomotion);increase endurance/gait distance;decrease fall risk  -ES     Emmet Level (Gait Training Goal 1, PT) standby assist  -ES     Distance (Gait Training Goal 1, PT) 150  -ES     Time Frame (Gait Training Goal 1, PT) long term goal (LTG);10 days  -ES     Progress/Outcome (Gait Training Goal 1, PT) goal revised this date  -ES       Row Name 01/21/25 1322          Stairs Goal 1 (PT)    Activity/Assistive Device (Stairs Goal 1, PT) ascending stairs;descending stairs;using handrail, left;using handrail, right  -ES     E[place]et Level/Cues Needed (Stairs Goal 1, PT) standby assist  -ES     Number of Stairs (Stairs Goal 1, PT) 3  -ES     Time Frame (Stairs Goal 1, PT) long term goal (LTG);10 days  -ES               User Key  (r) = Recorded By, (t) = Taken By, (c) = Cosigned By      Initials Name Provider Type    ES Elvira Karimi, PT Physical Therapist                   Clinical Impression       Row  Name 01/21/25 1318          Pain    Pretreatment Pain Rating 3/10  -ES     Posttreatment Pain Rating 3/10  -ES     Pain Location foot  -ES     Pain Side/Orientation right;generalized  -ES     Pain Management Interventions nursing notified;premedicated for activity  -ES     Response to Pain Interventions activity participation with tolerable pain  -ES       Row Name 01/21/25 1318          Plan of Care Review    Plan of Care Reviewed With patient;child  -ES     Progress no change  -ES     Outcome Evaluation PT re-eval complete with goals adjusted appropriately. Pt presents with L field cut, balance deficits, and decreased activity tolerance warranting skilled IPPT services. Cervical collar donned AAT for session. PT rec IRF at d/c when medically appropriate.  -ES       Row Name 01/21/25 1318          Therapy Assessment/Plan (PT)    Rehab Potential (PT) good  -ES     Criteria for Skilled Interventions Met (PT) yes;meets criteria;skilled treatment is necessary  -ES     Therapy Frequency (PT) daily  -ES     Predicted Duration of Therapy Intervention (PT) 10 days  -ES       Row Name 01/21/25 1318          Vital Signs    Pre Systolic BP Rehab 149  -ES     Pre Treatment Diastolic BP 76  -ES     Post Systolic BP Rehab 120  -ES     Post Treatment Diastolic BP 66  -ES     Pretreatment Heart Rate (beats/min) 64  -ES     Posttreatment Heart Rate (beats/min) 78  -ES     Pre SpO2 (%) 92  -ES     O2 Delivery Pre Treatment room air  -ES     O2 Delivery Intra Treatment room air  -ES     Post SpO2 (%) 94  -ES     O2 Delivery Post Treatment room air  -ES     Pre Patient Position Supine  -ES     Intra Patient Position Standing  -ES     Post Patient Position Sitting  -ES       Row Name 01/21/25 1318          Positioning and Restraints    Pre-Treatment Position in bed  -ES     Post Treatment Position chair  -ES     In Chair notified nsg;reclined;sitting;exit alarm on;encouraged to call for assist;call light within reach;waffle  cushion;legs elevated  -ES               User Key  (r) = Recorded By, (t) = Taken By, (c) = Cosigned By      Initials Name Provider Type    ES Elvira Karimi PT Physical Therapist                   Outcome Measures       Row Name 01/21/25 1323          How much help from another person do you currently need...    Turning from your back to your side while in flat bed without using bedrails? 3  -ES     Moving from lying on back to sitting on the side of a flat bed without bedrails? 3  -ES     Moving to and from a bed to a chair (including a wheelchair)? 2  -ES     Standing up from a chair using your arms (e.g., wheelchair, bedside chair)? 3  -ES     Climbing 3-5 steps with a railing? 2  -ES     To walk in hospital room? 2  -ES     AM-PAC 6 Clicks Score (PT) 15  -ES     Highest Level of Mobility Goal 4 --> Transfer to chair/commode  -ES       Row Name 01/21/25 1323 01/21/25 1136       Modified Janey Scale    Pre-Stroke Modified Janey Scale 6 - Unable to determine (UTD) from the medical record documentation  -ES 6 - Unable to determine (UTD) from the medical record documentation  -KF    Modified [place] Scale 4 - Moderately severe disability.  Unable to walk without assistance, and unable to attend to own bodily needs without assistance.  -ES 4 - Moderately severe disability.  Unable to walk without assistance, and unable to attend to own bodily needs without assistance.  -KF      Row Name 01/21/25 1323 01/21/25 1136       Functional Assessment    Outcome Measure Options AM-PAC 6 Clicks Basic Mobility (PT);Modified Janey  -ES AM-PAC 6 Clicks Daily Activity (OT);Modified Janey  -KF              User Key  (r) = Recorded By, (t) = Taken By, (c) = Cosigned By      Initials Name Provider Type    Elvira Muir PT Physical Therapist    Apple Parr OT Occupational Therapist                                 Physical Therapy Education       Title: PT OT SLP Therapies (In Progress)       Topic: Physical Therapy  (In Progress)       Point: Mobility training (In Progress)       Learning Progress Summary            Patient Acceptance, E, NR by ND at 1/20/2025 1600                      Point: Home exercise program (Not Started)       Learner Progress:  Not documented in this visit.              Point: Body mechanics (In Progress)       Learning Progress Summary            Patient Acceptance, E, NR by ND at 1/20/2025 1600                      Point: Precautions (In Progress)       Learning Progress Summary            Patient Acceptance, E, NR by ND at 1/20/2025 1600                                      User Key       Initials Effective Dates Name Provider Type Discipline    ND 11/16/23 -  Hannah Blanton PT Physical Therapist PT                  PT Recommendation and Plan     Progress: no change  Outcome Evaluation: PT re-eval complete with goals adjusted appropriately. Pt presents with L field cut, balance deficits, and decreased activity tolerance warranting skilled IPPT services. Cervical collar donned AAT for session. PT rec IRF at d/c when medically appropriate.     Time Calculation:         PT Charges       Row Name 01/21/25 1323             Time Calculation    Start Time 0957  -ES      PT Received On 01/21/25  -ES      PT Goal Re-Cert Due Date 01/31/25  -ES         Time Calculation- PT    Total Timed Code Minutes- PT 15 minute(s)  -ES         Timed Charges    09683 - PT Therapeutic Activity Minutes 15  -ES         Untimed Charges    PT Eval/Re-eval Minutes 23  -ES         Total Minutes    Timed Charges Total Minutes 15  -ES      Untimed Charges Total Minutes 23  -ES       Total Minutes 38  -ES                User Key  (r) = Recorded By, (t) = Taken By, (c) = Cosigned By      Initials Name Provider Type    ES Elvira Karimi PT Physical Therapist                  Therapy Charges for Today       Code Description Service Date Service Provider Modifiers Qty    [id number]  PT THERAPEUTIC ACT EA 15 MIN 1/21/2025 Sachi  Elvira PT GP 1    [id number]  PT RE-EVAL ESTABLISHED PLAN 2 1/21/2025 Elvira Karimi, PT GP 1            PT G-Codes  Outcome Measure Options: AM-PAC 6 Clicks Basic Mobility (PT), Modified Janey  AM-PAC 6 Clicks Score (PT): 15  AM-PAC 6 Clicks Score (OT): 12  Modified [place] Scale: 4 - Moderately severe disability.  Unable to walk without assistance, and unable to attend to own bodily needs without assistance.  PT Discharge Summary  Anticipated Discharge Disposition (PT): inpatient rehabilitation facility    Elvira Karimi PT  1/21/2025

## 2025-01-21 NOTE — THERAPY RE-EVALUATION
Patient Name: Toño Alcala  : 1940    MRN: [id number]                              Today's Date: 2025       Admit Date: 2025    Visit Dx:     ICD-10-CM ICD-9-CM   1. Generalized weakness  R53.1 780.79   2. Fall, initial encounter  W19.XXXA E888.9   3. Injury of head, initial encounter  S09.90XA 959.01   4. Acute congestive heart failure, unspecified heart failure type  I50.9 428.0   5. Elevated troponin  R79.89 790.6   6. Closed nondisplaced fracture of fifth cervical vertebra, unspecified fracture morphology, initial encounter  S12.401A 805.05   7. Closed nondisplaced fracture of sixth cervical vertebra, unspecified fracture morphology, initial encounter  S12.501A 805.06   8. Elevated CK  R74.8 790.5   9. Oropharyngeal dysphagia  R13.12 787.22     Patient Active Problem List   Diagnosis    CAD (coronary artery disease)    DVT (deep venous thrombosis)    Diabetes mellitus    Dyslipidemia    GERD (gastroesophageal reflux disease)    Hyperlipidemia LDL goal <70    Diabetic nephropathy associated with type 2 diabetes mellitus    Diabetic polyneuropathy associated with type 2 diabetes mellitus    Chronic kidney disease, stage IV (severe)    Vitamin D deficiency    Disc disorder of cervical region    Lumbosacral spondylosis without myelopathy    Arthritis of elbow    Acute right-sided low back pain without sciatica    Unifocal PVCs    Essential hypertension    Stage 3 chronic kidney disease due to benign hypertension    BMI 30.0-30.9,adult    Fall    Alcohol dependence with unspecified alcohol-induced disorder    C5 cervical fracture    C6 cervical fracture    Multiple fractures of cervical spine    NSTEMI, initial episode of care    History of pulmonary embolus (PE)/DVT    Acute on chronic diastolic CHF (congestive heart failure)    Syncope and collapse    ICH (intracerebral hemorrhage)     Past Medical History:   Diagnosis Date    Acute diverticulitis 2020    Allergic 60 yrs ago    Arthritis      Arthritis of neck Fusion 1992    Bilateral radial fractures 1962    fracture bilateral radial heads    CAD (coronary artery disease)     Calculus of gallbladder without cholecystitis without obstruction 01/29/2020    Cataract     Cervical disc disorder Fusion 1992    Cholelithiasis     Chronic kidney disease 2020    Clotting disorder     CVD (cardiovascular disease)     History of TIA 1989    Diabetes mellitus     DVT (deep venous thrombosis)     Right lower extremity DVT and pulmonary embolism in 06/2015, idiopathic    DVT of proximal lower limb 06/22/2015    proximal DVT right leg, small PE- anticoagulation    Dyslipidemia     Erectile dysfunction     Fracture 1952    H/o pf left forearm fracture    Fracture of right hand 1980    Fracture of wrist 1980    GERD (gastroesophageal reflux disease)     with hiatal hernia    HL (hearing loss)     Hx of angina pectoris     Hypertension     Normal renal angiography 02/16/11    Knee swelling Several years ago    Left wrist fracture 1953    Lumbosacral disc disease 1996    Osgood-Schlatter's disease 1955    Osteoarthritis     Right wrist fracture     Vertigo     Wears glasses      Past Surgical History:   Procedure Laterality Date    APPENDECTOMY  1957    BACK SURGERY      CARDIAC CATHETERIZATION  2011    with vein graft    CATARACT EXTRACTION Left     CERVICAL FUSION      CERVICAL FUSION  1992    C3-4    COLONOSCOPY  2013    CORONARY ARTERY BYPASS GRAFT  1994    HERNIA REPAIR Right 2011    inguinal    INGUINAL HERNIA REPAIR Left 10/29/2019    Procedure: INGUINAL HERNIA REPAIR LEFT;  Surgeon: Charisma Raines MD;  Location: [facility];  Service: General    LUMBAR LAMINECTOMY  1996    laminectomy, lumbar, L3    NECK SURGERY  1992    OTHER SURGICAL HISTORY      wrist surgery    SPINE SURGERY  1996    TONSILLECTOMY AND ADENOIDECTOMY  1945    TRIGGER POINT INJECTION  2001 - 2007    VASECTOMY  1972      General Information       Row Name 01/21/25 1127          OT Time and  Intention    Document Type re-evaluation  -KF     Mode of Treatment occupational therapy  -KF       Row Name 01/21/25 1127          General Information    Patient Profile Reviewed yes  -KF     Prior Level of Function --  Please see IE on (01/20)  -KF     Existing Precautions/Restrictions fall;cervical collar;brace on at all times;other (see comments)  C5-6 fx with cervical collar on AAT: L visual field cut  -KF     Barriers to Rehab medically complex;previous functional deficit;visual deficit  -KF       Row Name 01/21/25 1127          Cognition    Orientation Status (Cognition) oriented x 3  -KF       Row Name 01/21/25 1127          Safety Issues/Impairments Affecting Functional Mobility    Safety Issues Affecting Function (Mobility) awareness of need for assistance;insight into deficits/self-awareness;safety precaution awareness;safety precautions follow-through/compliance;positioning of assistive device;sequencing abilities  -KF     Impairments Affecting Function (Mobility) balance;endurance/activity tolerance;pain;postural/trunk control;strength;visual/perceptual  -KF               User Key  (r) = Recorded By, (t) = Taken By, (c) = Cosigned By      Initials Name Provider Type    KF Apple Renteria OT Occupational Therapist                     Mobility/ADL's       Row Name 01/21/25 1129          Bed Mobility    Bed Mobility supine-sit  -KF     Supine-Sit [place] (Bed Mobility) minimum assist (75% patient effort);2 person assist;verbal cues;nonverbal cues (demo/gesture)  -KF     Assistive Device (Bed Mobility) bed rails;head of bed elevated;repositioning sheet  -KF     Comment, (Bed Mobility) Increased time and effort needed  -       Row Name 01/21/25 1129          Transfers    Transfers bed-chair transfer;sit-stand transfer;stand-sit transfer  -KF     Comment, (Transfers) Assistance needed assistance with LUE positioning on RWx  -KF       Row Name 01/21/25 1129          Bed-Chair Transfer    Bed-Chair  [place] (Transfers) minimum assist (75% patient effort);2 person assist;verbal cues  -KF     Assistive Device (Bed-Chair Transfers) walker, front-wheeled  -KF     Comment, (Bed-Chair Transfer) ~4-5 steps from EOB to chair  -KF       Row Name 01/21/25 1129          Sit-Stand Transfer    Sit-Stand [place] (Transfers) minimum assist (75% patient effort);2 person assist;verbal cues;nonverbal cues (demo/gesture)  -KF     Assistive Device (Sit-Stand Transfers) walker, front-wheeled  -KF       Row Name 01/21/25 1129          Stand-Sit Transfer    Stand-Sit [place] (Transfers) minimum assist (75% patient effort);2 person assist;verbal cues;nonverbal cues (demo/gesture)  -KF     Assistive Device (Stand-Sit Transfers) walker, front-wheeled  -KF       Row Name 01/21/25 1129          Activities of Daily Living    BADL Assessment/Intervention lower body dressing  -       Row Name 01/21/25 1129          Lower Body Dressing Assessment/Training    [place] Level (Lower Body Dressing) don;socks;dependent (less than 25% patient effort)  -     Position (Lower Body Dressing) supine  -               User Key  (r) = Recorded By, (t) = Taken By, (c) = Cosigned By      Initials Name Provider Type    Apple Parr OT Occupational Therapist                   Obj/Interventions       Row Name 01/21/25 1131          Sensory Assessment (Somatosensory)    Sensory Assessment (Somatosensory) UE sensation intact  -       Row Name 01/21/25 1131          Vision Assessment/Intervention    Vision Assessment Comment L visual field cut  -       Row Name 01/21/25 1131          Range of Motion Comprehensive    General Range of Motion bilateral upper extremity ROM WFL  -       Row Name 01/21/25 1131          Strength Comprehensive (MMT)    General Manual Muscle Testing (MMT) Assessment upper extremity strength deficits identified  -     Comment, General Manual Muscle Testing (MMT) Assessment BUE grossly 4/5 with cues to  attend to left side  -KF       Row Name 01/21/25 1131          Balance    Balance Assessment sitting static balance;sitting dynamic balance;sit to stand dynamic balance;standing static balance;standing dynamic balance  -KF     Static Sitting Balance contact guard  -KF     Dynamic Sitting Balance minimal assist  -KF     Position, Sitting Balance unsupported;sitting edge of bed  -KF     Sit to Stand Dynamic Balance minimal assist;2-person assist;verbal cues;non-verbal cues (demo/gesture)  -KF     Static Standing Balance minimal assist;2-person assist  -KF     Dynamic Standing Balance minimal assist;2-person assist  -KF     Position/Device Used, Standing Balance supported;walker, front-wheeled  -KF     Balance Interventions sitting;standing;sit to stand;supported;static;dynamic;occupation based/functional task  -KF               User Key  (r) = Recorded By, (t) = Taken By, (c) = Cosigned By      Initials Name Provider Type    KF Apple Renteria OT Occupational Therapist                   Goals/Plan       Row Name 01/21/25 1136          Transfer Goal 1 (OT)    Activity/Assistive Device (Transfer Goal 1, OT) commode;bed-to-chair/chair-to-bed  -KF     [place] Level/Cues Needed (Transfer Goal 1, OT) standby assist  -KF     Time Frame (Transfer Goal 1, OT) long term goal (LTG);10 days  -KF     Progress/Outcome (Transfer Goal 1, OT) goal ongoing  -KF       Row Name 01/21/25 1136          Dressing Goal 1 (OT)    Activity/Device (Dressing Goal 1, OT) upper body dressing  -KF     [place]/Cues Needed (Dressing Goal 1, OT) minimum assist (75% or more patient effort)  -KF     Time Frame (Dressing Goal 1, OT) short term goal (STG);5 days  -KF     Strategies/Barriers (Dressing Goal 1, OT) cervical collar  -KF     Progress/Outcome (Dressing Goal 1, OT) goal ongoing  -KF       Row Name 01/21/25 1136          Grooming Goal 1 (OT)    Activity/Device (Grooming Goal 1, OT) hair care;oral care;wash face, hands  -KF      [place] (Grooming Goal 1, OT) set-up required  -KF     Time Frame (Grooming Goal 1, OT) long term goal (LTG);10 days  -KF     Strategies/Barriers (Grooming Goal 1, OT) standing sink side and attending to left side  -KF     Progress/Outcome (Grooming Goal 1, OT) goal ongoing  -KF               User Key  (r) = Recorded By, (t) = Taken By, (c) = Cosigned By      Initials Name Provider Type    KF Apple Renteria, MARY Occupational Therapist                   Clinical Impression       Row Name 01/21/25 1133          Pain Assessment    Pretreatment Pain Rating 3/10  -KF     Posttreatment Pain Rating 3/10  -KF     Pain Location foot  -KF     Pain Side/Orientation right;generalized  -KF     Pain Management Interventions activity modification encouraged;exercise or physical activity utilized;positioning techniques utilized  -KF     Response to Pain Interventions activity participation with tolerable pain  -KF       Row Name 01/21/25 1133          Plan of Care Review    Plan of Care Reviewed With patient  -KF     Progress no change  -KF     Outcome Evaluation OT re-evaluation completed. The pt presents below baseline with L visual field cut, generalized weakness, decreased activity tolerance, balance deficits, and spinal precautions with cervical collar on AAT impacting mobility. Pt was assisted in supine to sit transfer and took steps from the EOB to the bedside chair using a RWx with Eunice x2. Orthostatic vitals taken and found negative today. Pt will benefit from continued IP OT services to address deficits listed. Recommend a d/c to IRF when medically ready.  -KF       Row Name 01/21/25 1133          Therapy Assessment/Plan (OT)    Patient/Family Therapy Goal Statement (OT) Restore PLOF  -KF     Rehab Potential (OT) good  -KF     Criteria for Skilled Therapeutic Interventions Met (OT) yes;skilled treatment is necessary  -KF     Therapy Frequency (OT) daily  -KF     Predicted Duration of Therapy Intervention (OT) 10 days   -KF       Row Name 01/21/25 1133          Therapy Plan Review/Discharge Plan (OT)    Anticipated Discharge Disposition (OT) inpatient rehabilitation facility  -KF       Row Name 01/21/25 1133          Vital Signs    Pre Systolic BP Rehab 121  supine  -KF     Pre Treatment Diastolic BP 72  -KF     Intra Systolic BP Rehab 138  sitting  -KF     Intra Treatment Diastolic BP 86  -KF     Post Systolic BP Rehab 120  standing  -KF     Post Treatment Diastolic BP 66  -KF     Pretreatment Heart Rate (beats/min) 71  -KF     Posttreatment Heart Rate (beats/min) 80  -KF     Pre SpO2 (%) 94  -KF     O2 Delivery Pre Treatment room air  -KF     Post SpO2 (%) 94  -KF     O2 Delivery Post Treatment room air  -KF     Pre Patient Position Supine  -KF     Intra Patient Position Standing  -KF     Post Patient Position Sitting  -KF       Row Name 01/21/25 1133          Positioning and Restraints    Pre-Treatment Position in bed  -KF     Post Treatment Position chair  -KF     In Chair notified nsg;reclined;call light within reach;encouraged to call for assist;exit alarm on;with other staff;legs elevated;with brace  -KF               User Key  (r) = Recorded By, (t) = Taken By, (c) = Cosigned By      Initials Name Provider Type    KF Apple Renteria, MARY Occupational Therapist                   Outcome Measures       Row Name 01/21/25 1136          How much help from another is currently needed...    Putting on and taking off regular lower body clothing? 2  -KF     Bathing (including washing, rinsing, and drying) 2  -KF     Toileting (which includes using toilet bed pan or urinal) 2  -KF     Putting on and taking off regular upper body clothing 2  -KF     Taking care of personal grooming (such as brushing teeth) 2  -KF     Eating meals 2  -KF     AM-PAC 6 Clicks Score (OT) 12  -KF       Row Name 01/21/25 1136          Modified [place] Scale    Pre-Stroke Modified [place] Scale 6 - Unable to determine (UTD) from the medical record  documentation  -     Modified Webb Scale 4 - Moderately severe disability.  Unable to walk without assistance, and unable to attend to own bodily needs without assistance.  -       Row Name 01/21/25 1136          Functional Assessment    Outcome Measure Options AM-PAC 6 Clicks Daily Activity (OT);Modified [place]  -KF               User Key  (r) = Recorded By, (t) = Taken By, (c) = Cosigned By      Initials Name Provider Type    Apple Parr OT Occupational Therapist                    Occupational Therapy Education       Title: PT OT SLP Therapies (In Progress)       Topic: Occupational Therapy (In Progress)       Point: ADL training (In Progress)       Description:   Instruct learner(s) on proper safety adaptation and remediation techniques during self care or transfers.   Instruct in proper use of assistive devices.                  Learning Progress Summary            Patient Acceptance, E,TB, NR by  at 1/21/2025 1002    Acceptance, E,TB, NR by KF at 1/20/2025 1402                      Point: Home exercise program (Not Started)       Description:   Instruct learner(s) on appropriate technique for monitoring, assisting and/or progressing therapeutic exercises/activities.                  Learner Progress:  Not documented in this visit.              Point: Precautions (In Progress)       Description:   Instruct learner(s) on prescribed precautions during self-care and functional transfers.                  Learning Progress Summary            Patient Acceptance, E,TB, NR by  at 1/21/2025 1002    Acceptance, E,TB, NR by KF at 1/20/2025 1402                      Point: Body mechanics (In Progress)       Description:   Instruct learner(s) on proper positioning and spine alignment during self-care, functional mobility activities and/or exercises.                  Learning Progress Summary            Patient Acceptance, E,TB, NR by  at 1/21/2025 1002    Acceptance, E,TB, NR by KF at 1/20/2025 1402                                       User Key       Initials Effective Dates Name Provider Type Discipline     08/09/23 -  Apple Renteria, MARY Occupational Therapist OT                  OT Recommendation and Plan  Planned Therapy Interventions (OT): activity tolerance training, adaptive equipment training, BADL retraining, functional balance retraining, occupation/activity based interventions, patient/caregiver education/training, ROM/therapeutic exercise, strengthening exercise, transfer/mobility retraining  Therapy Frequency (OT): daily  Plan of Care Review  Plan of Care Reviewed With: patient  Progress: no change  Outcome Evaluation: OT re-evaluation completed. The pt presents below baseline with L visual field cut, generalized weakness, decreased activity tolerance, balance deficits, and spinal precautions with cervical collar on AAT impacting mobility. Pt was assisted in supine to sit transfer and took steps from the EOB to the bedside chair using a RWx with Eunice x2. Orthostatic vitals taken and found negative today. Pt will benefit from continued IP OT services to address deficits listed. Recommend a d/c to IRF when medically ready.     Time Calculation:   Evaluation Complexity (OT)  Review Occupational Profile/Medical/Therapy History Complexity: expanded/moderate complexity  Assessment, Occupational Performance/Identification of Deficit Complexity: 3-5 performance deficits  Clinical Decision Making Complexity (OT): detailed assessment/moderate complexity  Overall Complexity of Evaluation (OT): moderate complexity     Time Calculation- OT       Row Name 01/21/25 1137             Time Calculation- OT    OT Start Time 1002  -KF      OT Received On 01/21/25  -KF      OT Goal Re-Cert Due Date 01/31/25  -KF         Timed Charges    45400 - OT Therapeutic Activity Minutes 8  -KF      98273 - OT Self Care/Mgmt Minutes 3  -KF         Untimed Charges    OT Eval/Re-eval Minutes 25  -KF         Total Minutes    Timed  Charges Total Minutes 11  -KF      Untimed Charges Total Minutes 25  -KF       Total Minutes 36  -KF                User Key  (r) = Recorded By, (t) = Taken By, (c) = Cosigned By      Initials Name Provider Type    KF Apple Renteria OT Occupational Therapist                  Therapy Charges for Today       Code Description Service Date Service Provider Modifiers Qty    25795712839 HC OT EVAL MOD COMPLEXITY 4 1/20/2025 Apple Renteria OT GO 1    [id number]  OT RE-EVAL 2 1/21/2025 Apple Renteria OT GO 1    [id number]  OT THERAPEUTIC ACT EA 15 MIN 1/21/2025 Apple Renteria OT GO 1                 Apple Renteria OT  1/21/2025

## 2025-01-21 NOTE — PROGRESS NOTES
INTENSIVIST   PROGRESS NOTE          SUBJECTIVE     Toño 84 y.o. male is followed for:Fall       C5 cervical fracture    CAD (coronary artery disease)    Hyperlipidemia LDL goal <70    Diabetic nephropathy associated with type 2 diabetes mellitus    Chronic kidney disease, stage IV (severe)    Disc disorder of cervical region    Essential hypertension    Fall    Alcohol dependence with unspecified alcohol-induced disorder    C6 cervical fracture    Multiple fractures of cervical spine    NSTEMI, initial episode of care    History of pulmonary embolus (PE)/DVT    Acute on chronic diastolic CHF (congestive heart failure)    Syncope and collapse    ICH (intracerebral hemorrhage)    As an Intensivist, we provide an integrated approach to the ICU patient and family, medical management of comorbid conditions, including but not limited to electrolytes, glycemic control, organ dysfunction, lead interdisciplinary rounds and coordinate the care with all other services, including those from other specialists.     HPI:  Toño is a 84 y.o. male admitted by Hospital Medicine on 1/19 s/p fall.  CT scan of the cervical spine shows nondisplaced oblique fracture through the C5 and C6 vertebral bodies.  He was placed in spinal precautions and has been evaluated by NS without plan for surgical intervention.  He was also seen by cardiology for possible syncope, acute on chronic diastolic heart failure, and elevated troponin/NSTEMI.    The evening of 1/20 he was found to have a left visual defect and decreased left hand movement.  Code CVA was called and STAT CTH showed evolving right temporal and occipital lobe infarcts with new intraparenchymal hemorrhage within the right occipital lobe measuring 2.1 x 1.6 cm (volume less than 30 cc) w/o mass effect or midline shift.     Patient was transferred to ICU w/ goal SBP <140 .  Coumadin was received @ 1744 and reversal w/ 10 mg of Vitamin K & KCentra has been ordered by the stroke team.   Patient was seen at the bedside along with his son.  Patient is complaining of neck pain which has been present since the fall, intermittently relieved with pain meds.  Plan was discussed with patient and family and all questions were addressed.        PMH: He  has a past medical history of Acute diverticulitis (01/29/2020), Allergic (60 yrs ago), Arthritis, Arthritis of neck (Fusion 1992), Bilateral radial fractures (1962), CAD (coronary artery disease), Calculus of gallbladder without cholecystitis without obstruction (01/29/2020), Cataract, Cervical disc disorder (Fusion 1992), Cholelithiasis, Chronic kidney disease (2020), Clotting disorder, CVD (cardiovascular disease), Diabetes mellitus, DVT (deep venous thrombosis), DVT of proximal lower limb (06/22/2015), Dyslipidemia, Erectile dysfunction, Fracture (1952), Fracture of right hand (1980), Fracture of wrist (1980), GERD (gastroesophageal reflux disease), HL (hearing loss), angina pectoris, Hypertension, Knee swelling (Several years ago), Left wrist fracture (1953), Lumbosacral disc disease (1996), Osgood-Schlatter's disease (1955), Osteoarthritis, Right wrist fracture, Vertigo, and Wears glasses.   PSxH: He  has a past surgical history that includes Cardiac catheterization (2011); Cervical fusion; Lumbar laminectomy (1996); Tonsillectomy and adenoidectomy (1945); Cervical fusion (1992); Coronary artery bypass graft (1994); Appendectomy (1957); Hernia repair (Right, 2011); Other surgical history; Back surgery; Colonoscopy (2013); Inguinal Hernia Repair (Left, 10/29/2019); Spine surgery (1996); Vasectomy (1972); Neck surgery (1992); Trigger point injection (2001 - 2007); and Cataract extraction (Left).     Medications:  No current facility-administered medications on file prior to encounter.     Current Outpatient Medications on File Prior to Encounter   Medication Sig    bumetanide (BUMEX) 1 MG tablet Take 1 tablet by mouth As Needed (Edema/kidney).     carvedilol (COREG) 6.25 MG tablet Take 1 tablet by mouth 2 (Two) Times a Day With Meals.    diltiaZEM CD (CARDIZEM CD) 180 MG 24 hr capsule Take 1 capsule by mouth Daily.    doxazosin (CARDURA) 2 MG tablet Take 1 tablet by mouth Daily.    nitroglycerin (NITROSTAT) 0.4 MG SL tablet Place 1 tablet under the tongue Every 5 (Five) Minutes As Needed for Chest Pain. Take no more than 3 doses in 15 minutes.    omeprazole (priLOSEC) 20 MG capsule Take 1 capsule by mouth Daily.    rosuvastatin (CRESTOR) 20 MG tablet Take 1 tablet by mouth Every Night.    warfarin (COUMADIN) 5 MG tablet TAKE 1 TABLET BY MOUTH EVERY DAY EXCEPT FOR MONDAY TAKE 1/2 TABLET AS OF 06/19/24    acetaminophen (TYLENOL) 325 MG tablet Take 2 tablets by mouth As Needed for Mild Pain.    calcipotriene-betamethasone (TACLONEX) 0.005-0.064 % ointment Apply 1 Application topically to the appropriate area as directed As Needed (psoriasis).    cholecalciferol (VITAMIN D3) 25 MCG (1000 UT) tablet Take 1 tablet by mouth Daily.    diclofenac (VOLTAREN) 1 % gel gel Apply 4 g topically As Needed.    glucose blood test strip Use to check blood sugar twice daily    HYDROcodone-acetaminophen (NORCO) 5-325 MG per tablet Take 1 tablet by mouth Every 12 (Twelve) Hours As Needed for Severe Pain.    lidocaine (LIDODERM) 5 % Place 1 patch on the skin as directed by provider Daily. Remove & Discard patch within 12 hours or as directed by MD    Microlet Lancets misc Use to check blood sugar twice daily    sildenafil (Viagra) 100 MG tablet Take 1 tablet by mouth Daily As Needed for Erectile Dysfunction.        Allergies: He is allergic to penicillins.   FH: His family history includes Arthritis in his mother; Cancer in an other family member; Diabetes in his brother, father, and son; Heart attack in his brother and father; Heart disease in his brother and father; Hypertension in an other family member.   SH: He  reports that he quit smoking about 27 years ago. His smoking use  included cigarettes, pipe, and cigars. He started smoking about 62 years ago. He has a 52.2 pack-year smoking history. He has been exposed to tobacco smoke. He has never used smokeless tobacco. He reports current alcohol use of about 11.0 - 13.0 standard drinks of alcohol per week. He reports that he does not use drugs.     The patient's relevant past medical, surgical and social history were reviewed and updated in Epic as appropriate.                 Review of Systems  As described in the HPI.       OBJECTIVE     Vitals:  Temp: 97.8 °F (36.6 °C) (25 1120) Temp  Min: 97.6 °F (36.4 °C)  Max: 97.8 °F (36.6 °C)   Temp core:      BP: 122/63 (25) BP  Min: 107/57  Max: 173/101   MAP (non-invasive) Noninvasive MAP (mmHg): 83 (25) Noninvasive MAP (mmHg)  Av.4  Min: 77  Max: 138   Pulse: 77 (25) Pulse  Min: 64  Max: 106   Resp: 18 (25 1120) Resp  Min: 17  Max: 18   SpO2: 94 % (25) SpO2  Min: 91 %  Max: 98 %   Device: nasal cannula (25)    Flow Rate: 2 (25) Flow (L/min) (Oxygen Therapy)  Min: 2  Max: 2         25  0826 25  0919   Weight: 82.7 kg (182 lb 5.1 oz) 82.7 kg (182 lb 5.1 oz)        Intake/Ouptut 24 hrs (7:00AM - 6:59 AM)  Intake & Output (last 2 days)          0701   0700  0701   0700    P.O.  715    Total Intake(mL/kg)  715 (8.6)    Net  +715          Urine Unmeasured Occurrence  1 x    Stool Unmeasured Occurrence  1 x            Medications (drips):  niCARdipine, Last Rate: 5 mg/hr (25 2053)  Pharmacy to dose warfarin           Physical Examination  Telemetry:  Rhythm: normal sinus rhythm (25 1600)         Constitutional:  No acute distress.   Cardiovascular: RRR.    Respiratory: Normal breath sounds, no respiratory distress    Abdominal:  Soft with no tenderness.   Extremities: 1+ Edema bilateral lower extremities   Neurological:   Alert, Oriented, Cooperative.  Best Eye Response:  4-->(E4) spontaneous (01/20/25 1744)  Best Motor Response: 6-->(M6) obeys commands (01/20/25 1744)  Best Verbal Response: 5-->(V5) oriented (01/20/25 1744)  [place] Coma Scale Score: 15 (01/20/25 1744)     NIH Stroke Scale  Interval: baseline (01/20/25 1828)  1a. Level of Consciousness: 0-->Alert, keenly responsive (01/20/25 1828)  1b. LOC Questions: 0-->Answers both questions correctly (01/20/25 1828)  1c. LOC Commands: 0-->Performs both tasks correctly (01/20/25 1828)  2. Best Gaze: 0-->Normal (01/20/25 1828)  3. Visual: 2-->Complete hemianopia (01/20/25 1828)  4. Facial Palsy: 0-->Normal symmetrical movements (01/20/25 1828)  5a. Motor Arm, Left: 0-->No drift, limb holds 90 (or 45) degrees for full 10 secs (01/20/25 1828)  5b. Motor Arm, Right: 0-->No drift, limb holds 90 (or 45) degrees for full 10 secs (01/20/25 1828)  6a. Motor Leg, Left: 1-->Drift, leg falls by the end of the 5-sec period but does not hit bed (01/20/25 1828)  6b. Motor Leg, Right: 1-->Drift, leg falls by the end of the 5-sec period but does not hit bed (01/20/25 1828)  7. Limb Ataxia: 0-->Absent (01/20/25 1828)  8. Sensory: 0-->Normal, no sensory loss (01/20/25 1828)  9. Best Language: 0-->No aphasia, normal (01/20/25 1828)  10. Dysarthria: 0-->Normal (01/20/25 1828)  11. Extinction and Inattention (formerly Neglect): 1-->Visual, tactile, auditory, spatial, or personal inattention or extinction to bilateral simultaneous stimulation in one of the sensory modalities (01/20/25 1828)  Total (NIH Stroke Scale): 5 (01/20/25 1828)    Lines, Drains & Airways       Active LDAs       Name Placement date Placement time Site Days    Peripheral IV 01/19/25 Anterior;Left Arm 01/19/25  --  Arm  1    Peripheral IV 01/20/25 0231 Right;Posterior Forearm 01/20/25 0231  Forearm  less than 1    External Urinary Catheter 01/20/25  0220  --  less than 1                    Results Reviewed:  Laboratory  Microbiology  Radiology  Pathology    Hematology:  Results  from last 7 days   Lab Units 01/20/25 0301 01/19/25  1846   WBC 10*3/mm3 11.56* 11.23*   HEMOGLOBIN g/dL 12.7* 13.6   MCV fL 86.3 86.0   PLATELETS 10*3/mm3 193 196     Results from last 7 days   Lab Units 01/20/25 0301 01/19/25  1846   NEUTROS ABS 10*3/mm3 9.01* 9.29*   LYMPHS ABS 10*3/mm3 1.27 0.76   EOS ABS 10*3/mm3 0.03 0.00     Chemistry:  Estimated Creatinine Clearance: 29.1 mL/min (A) (by C-G formula based on SCr of 2.21 mg/dL (H)).    Results from last 7 days   Lab Units 01/20/25 0301 01/19/25  1846   SODIUM mmol/L 139 137   POTASSIUM mmol/L 4.0 4.3   CHLORIDE mmol/L 102 101   CO2 mmol/L 25.0 24.0   BUN mg/dL 26* 23   CREATININE mg/dL 2.21* 2.17*   GLUCOSE mg/dL 139* 153*     Results from last 7 days   Lab Units 01/20/25 0301 01/19/25 2006 01/19/25  1846   CALCIUM mg/dL 9.0  --  9.1   MAGNESIUM mg/dL  --  1.7  --        Hepatic Panel:  Results from last 7 days   Lab Units 01/20/25 0301 01/19/25  1846   ALBUMIN g/dL 3.7 4.0   TOTAL PROTEIN g/dL 6.5 7.2   BILIRUBIN mg/dL 0.8 0.9   AST (SGOT) U/L 53* 48*   ALT (SGPT) U/L 12 12   ALK PHOS U/L 116 135*        Coagulation Labs:  Results from last 7 days   Lab Units 01/20/25 0301 01/19/25  1846   PROTIME Seconds 23.0* 21.3*   INR  2.02* 1.83*        Cardiac Labs:  Results from last 7 days   Lab Units 01/19/25 2006 01/19/25  1846   PROBNP pg/mL  --  9,226.0*   CK TOTAL U/L 507*  --    HSTROP T ng/L 491* 511*       Biomarkers:  Results from last 7 days   Lab Units 01/19/25  1846   LACTATE mmol/L 1.7   PROCALCITONIN ng/mL 0.13       U/A  Results from last 7 days   Lab Units 01/19/25 1932   COLOR UA  Yellow   CLARITY UA  Clear   PH, URINE  7.5   SPECIFIC GRAVITY, URINE  1.016   GLUCOSE UA  250 mg/dL (1+)*   KETONES UA  Trace*   BILIRUBIN UA  Negative   PROTEIN UA  100 mg/dL (2+)*   BLOOD UA  Small (1+)*   LEUKOCYTES UA  Negative   NITRITE UA  Negative   UROBILINOGEN UA  0.2 E.U./dL       Results from last 7 days   Lab Units 01/19/25 1932   RBC UA /HPF 0-2  "  WBC UA /HPF 0-2   BACTERIA UA /HPF Trace   SQUAM EPITHEL UA /HPF 0-2   HYALINE CASTS UA /LPF 0-6       COVID-19  No results found for: \"COVID19\"    Arterial Blood Gases:        Images:  CT Head Without Contrast Stroke Protocol    Result Date: 1/20/2025  Impression: 1. Evolving right temporal and occipital lobe infarcts with new intraparenchymal hemorrhage within the right occipital lobe measuring 2.1 x 1.6 cm in size with a volume of less than 30 cc. No mass effect or midline shift. No intraventricular hemorrhage. Electronically Signed: Delano Mcrae MD  1/20/2025 6:38 PM EST  Workstation ID: [id number]    CT Lumbar Spine Without Contrast    Result Date: 1/19/2025  Impression: No acute osseous abnormality. At least moderate multilevel degenerative changes are present throughout the spine with a levoscoliotic curvature. Electronically Signed: Deana Caballero MD  1/19/2025 11:11 PM EST  Workstation ID: [id number]    CT Thoracic Spine Without Contrast    Result Date: 1/19/2025  Impression: 1.No acute fracture identified. 2.Degenerative changes as described above. Electronically Signed: Rodríguez Mckeon MD  1/19/2025 6:58 PM EST  Workstation ID: [id number]    CT Cervical Spine Without Contrast    Result Date: 1/19/2025  Impression: Nondisplaced oblique fracture involving C5 and C6 vertebral bodies. Electronically Signed: Rodríguez Mckeon MD  1/19/2025 6:54 PM EST  Workstation ID: [id number]    CT Head Without Contrast    Result Date: 1/19/2025  Impression: 1.No acute intracranial process or calvarial fracture identified. 2.Findings suggestive of moderate chronic small vessel ischemic disease. Electronically Signed: Rodríguez Mckeon MD  1/19/2025 6:47 PM EST  Workstation ID: [id number]    XR Chest 1 View    Result Date: 1/19/2025  Impression: Coarse bilateral interstitial markings, which could reflect interstitial pulmonary edema or atypical pneumonia. Electronically Signed: Rodríguez Mckeon MD  1/19/2025 6:30 PM EST  Workstation " ID: [id number]     Echo:  Results for orders placed during the hospital encounter of 01/19/25    Adult Transthoracic Echo Complete W/ Cont if Necessary Per Protocol    Interpretation Summary    Left ventricular systolic function is normal. Estimated left ventricular EF = 50%    Left ventricular wall thickness is consistent with borderline concentric hypertrophy.    Mild mitral valve regurgitation is present.      Results: Reviewed.  I reviewed the patient's new laboratory and imaging results.  I independently reviewed the patient's new images.    Medications: Reviewed.    Assessment    A/P     Hospital:  LOS: 0 days   ICU: 1h     Active Hospital Problems    Diagnosis  POA    **C5 cervical fracture [S12.400A]  Yes    NSTEMI, initial episode of care [I21.4]  Yes    History of pulmonary embolus (PE)/DVT [Z86.711]  No     Recurrent idiopathic DVT, 2017.  Started on warfarin per Dr. Salinas and Tito.  Venous duplex, 07/08/2022: Normal left lower extremity venous duplex scan. Chronic right lower extremity deep vein thrombosis noted in the common femoral.  Venous duplex, 07/24/2022: Same as previous.       Acute on chronic diastolic CHF (congestive heart failure) [I50.33]  Yes    Syncope and collapse [R55]  Unknown     Reported episode of syncope 10/2024  Fall with possible syncopal episode 1/18/2025      ICH (intracerebral hemorrhage) [I61.9]  Yes    Alcohol dependence with unspecified alcohol-induced disorder [F10.29]  Yes    C6 cervical fracture [S12.500A]  Yes    Multiple fractures of cervical spine [S12.9XXA]  Yes    Fall [W19.XXXA]  Yes    Essential hypertension [I10]  Yes     Continue losartan 100 mg tablets daily      Hyperlipidemia LDL goal <70 [E78.5]  Yes    Chronic kidney disease, stage IV (severe) [N18.4]  Yes    Disc disorder of cervical region [M50.90]  Yes    Diabetic nephropathy associated with type 2 diabetes mellitus [E11.21]  Yes    CAD (coronary artery disease) [I25.10]  Yes     History of CABG  x4, 1994 - incomplete database.   [facility], 02/16/2011: 100% native RCA, and severe proximal LAD and LCx disease. Patent SVG to the right PDA. Patent LIMA to the LAD. Patent SVG to the OM.    Stress echo, 03/27/2014: Normal study.        Toño is a 84 y.o. male admitted on 1/19/2025 with Multiple fractures of cervical spine [S12.9XXA]  ICH (intracerebral hemorrhage) [I61.9]    Assessment/Management/Treatment Plan:    //Acute right temporal and occipital lobe infarct with hemorrhagic conversion  //C5 and C6 nondisplaced oblique vertebral fractures  //Right lower extremity cellulitis  //Elevated troponin/NSTEMI  //Acute on chronic diastolic heart failure  //MH: Hypertension, hyperlipidemia, type 2 diabetes, CAD s/p CABG 1994, CKD stage IV, PE/DVT (home coumadin), alcohol use     Pulmonary   Nasal cannula 2 L, chest x-ray 1/19 with vascular congestion  Cardiovascular  Cardiology following, diuresis yesterday with 2 mg IV Bumex.  Echo on 1/20 showed LVEF 50%, mild MR, borderline concentric LVH. Troponin peaked at 551 but patient reported shortness of breath and chest tightness over past 6 weeks, considering stress test prior to discharge  Nicardipine for SBP goal less than 140.  Continue Coreg 12.5 mg twice daily  Statin nightly  Neuro  Repeat CT head at midnight.  MRA head/neck and MRI brain pending  Hold warfarin and give vitamin K and Kcentra.  Repeat labs pending  [place] [place] indication at this time.  PT/OT/SLP evaluation.  Neurochecks per protocol  GI/Hepatology  N.p.o. pending dysphagia screen  Renal  Baseline creatinine 2.2-2.4, creatinine currently 2.21.  Avoid nephrotoxic agents  Status post 2 mg IV Bumex 1/19, no current diuretics required   ID/Antibiotics  Continue ceftriaxone for RLE cellulitis  Hematology  SCDs, chemical prophy contraindicated as above  1/20 Doppler showed RLE chronic proximal femoral and mid femoral DVT.  Will need to consider IVC  filter  Endocrine  Body mass index is 24.72 kg/m². Normal: 18.5-24.9kg/m2  Type 2 diabetes.  Accu-Cheks every 6 hours    Lab Results   Lab Value Date/Time    HGBA1C 6.10 (H) 01/10/2025 1202    HGBA1C 6.5 (A) 07/09/2024 1202    HGBA1C 6.4 (A) 01/09/2024 1214    HGBA1C 6.30 (H) 12/14/2022 1103     Results from last 7 days   Lab Units 01/20/25  1801 01/19/25  1851   GLUCOSE mg/dL 112 135*       Diet: NPO Diet NPO Type: Strict NPO  No active supplement orders      Advance Directives: Code Status and Medical Interventions: CPR (Attempt to Resuscitate); Full Support   Ordered at: 01/19/25 0283     Level Of Support Discussed With:    Patient     Code Status (Patient has no pulse and is not breathing):    CPR (Attempt to Resuscitate)     Medical Interventions (Patient has pulse or is breathing):    Full Support        VTE Prophylaxis:  Pharmacologic & mechanical VTE prophylaxis orders are present.           Disposition:  Transfer to ICU    Plan of care and goals reviewed during interdisciplinary rounds.  I discussed the patient's findings and my recommendations with stroke neurology, patient, family, and nursing staff    Time: was greater than 36 minutes. This is non-concurrent time.   (This excludes time spent performing separately reportable procedures and services).    He has a high risk of imminent or life-threatening  deterioration, which requires the highest level of physician preparedness to intervene urgently. I devoted my full attention to the direct care of this patient for the amount of time indicated above.     Time spent with family or surrogate(s) is included only if the patient was incapable of providing the necessary information or participating in medical decision making.    He has the following organ/system impairments Stroke.        Kathleen Schreiber MD  Pulmonary Critical Care Medicine       9 minutes of critical care provided by YARED Quinonez in addendum accordance with split/shared billing. This  time excludes other billable procedures. Time does include preparation of documents, medical consultations, review of old records, and direct bedside care. Patient is at high risk for life-threatening deterioration due to ICH.   Electronically signed by YARED Lake, 01/20/25, 7:55 PM EST.

## 2025-01-21 NOTE — PROGRESS NOTES
Stroke Progress Note       Chief Complaint: Fall    Subjective    Subjective     Subjective:  The patient is lying down in the bed in NAD.  No family were at the bedside. The patient stated that he is doing okay this morning.  Denies having any new stroke or strokelike symptoms.  All patient's questions and concerns were answered    No other acute complains at this time    Review of Systems   Constitutional: No fatigue      Objective      Temp:  [97.8 °F (36.6 °C)-98.2 °F (36.8 °C)] 97.8 °F (36.6 °C)  Heart Rate:  [61-85] 61  Resp:  [18] 18  BP: (101-142)/(54-87) 122/64    Objective    GEN: lying in bed; in NAD  HENT: normocephalic, non-erythematous oropharynx  CV: no LE edema    NEURO:  Mental Status: A&O x 3, interactive, able to follow commands  Speech: Intact Articulation  CN 2-12:  II - PERRLA, left homonymous hemianopsia  III, IV, VI - EOMI  V - Facial sensation intact  VII -no gross facial asymmetry  VIII - Auditory acuity intact  XII - Tongue protrudes midline    Motor: The patient can move the bilateral upper extremity against gravity and can bend bilateral lower extremity at knee  Sensory: intact light touch throughout  Reflexes: negative Salcido's sign BL  Coordination: no ataxia with finger-to-nose testing  Gait/Station: deferred       Results Review:    I reviewed the patient's new clinical results.    WBC   Date Value Ref Range Status   01/21/2025 9.44 3.40 - 10.80 10*3/mm3 Final     RBC   Date Value Ref Range Status   01/21/2025 4.17 4.14 - 5.80 10*6/mm3 Final     Hemoglobin   Date Value Ref Range Status   01/21/2025 11.9 (L) 13.0 - 17.7 g/dL Final     Hematocrit   Date Value Ref Range Status   01/21/2025 35.8 (L) 37.5 - 51.0 % Final     MCV   Date Value Ref Range Status   01/21/2025 85.9 79.0 - 97.0 fL Final     MCH   Date Value Ref Range Status   01/21/2025 28.5 26.6 - 33.0 pg Final     MCHC   Date Value Ref Range Status   01/21/2025 33.2 31.5 - 35.7 g/dL Final     RDW   Date Value Ref Range Status    01/21/2025 16.3 (H) 12.3 - 15.4 % Final     RDW-SD   Date Value Ref Range Status   01/21/2025 51.7 37.0 - 54.0 fl Final     MPV   Date Value Ref Range Status   01/21/2025 10.1 6.0 - 12.0 fL Final     Platelets   Date Value Ref Range Status   01/21/2025 186 140 - 450 10*3/mm3 Final     Neutrophil %   Date Value Ref Range Status   01/21/2025 65.6 42.7 - 76.0 % Final     Lymphocyte %   Date Value Ref Range Status   01/21/2025 18.0 (L) 19.6 - 45.3 % Final     Monocyte %   Date Value Ref Range Status   01/21/2025 10.7 5.0 - 12.0 % Final     Eosinophil %   Date Value Ref Range Status   01/21/2025 5.0 0.3 - 6.2 % Final     Basophil %   Date Value Ref Range Status   01/21/2025 0.4 0.0 - 1.5 % Final     Immature Grans %   Date Value Ref Range Status   01/21/2025 0.3 0.0 - 0.5 % Final     Neutrophils, Absolute   Date Value Ref Range Status   01/21/2025 6.19 1.70 - 7.00 10*3/mm3 Final     Lymphocytes, Absolute   Date Value Ref Range Status   01/21/2025 1.70 0.70 - 3.10 10*3/mm3 Final     Monocytes, Absolute   Date Value Ref Range Status   01/21/2025 1.01 (H) 0.10 - 0.90 10*3/mm3 Final     Eosinophils, Absolute   Date Value Ref Range Status   01/21/2025 0.47 (H) 0.00 - 0.40 10*3/mm3 Final     Basophils, Absolute   Date Value Ref Range Status   01/21/2025 0.04 0.00 - 0.20 10*3/mm3 Final     Immature Grans, Absolute   Date Value Ref Range Status   01/21/2025 0.03 0.00 - 0.05 10*3/mm3 Final     nRBC   Date Value Ref Range Status   01/21/2025 0.0 0.0 - 0.2 /100 WBC Final       Lab Results   Component Value Date    GLUCOSE 99 01/21/2025    BUN 30 (H) 01/21/2025    CREATININE 2.21 (H) 01/21/2025     01/21/2025    K 3.9 01/21/2025     01/21/2025    CALCIUM 8.9 01/21/2025    PROTEINTOT 6.6 01/21/2025    ALBUMIN 3.5 01/21/2025    ALT 12 01/21/2025    AST 45 (H) 01/21/2025    ALKPHOS 119 (H) 01/21/2025    BILITOT 0.5 01/21/2025    GLOB 3.1 01/21/2025    AGRATIO 1.1 01/21/2025    BCR 13.6 01/21/2025    ANIONGAP 10.0  01/21/2025    EGFR 28.7 (L) 01/21/2025     MRI Angiogram Head Without Contrast    Result Date: 1/21/2025  Impression: Redemonstrated evolving right PCA territory infarct with stable small amount of intervening hemorrhage. Mild localized edema is present without significant associated mass effect. No additional acute findings. MR angiogram of the head and neck demonstrates some mild atherosclerotic changes without evidence of flow-limiting stenosis, large vessel occlusion or aneurysm. Electronically Signed: Indra Hill MD  1/21/2025 5:28 AM EST  Workstation ID: [id number]    MRI Angiogram Neck Without Contrast    Result Date: 1/21/2025  Impression: Redemonstrated evolving right PCA territory infarct with stable small amount of intervening hemorrhage. Mild localized edema is present without significant associated mass effect. No additional acute findings. MR angiogram of the head and neck demonstrates some mild atherosclerotic changes without evidence of flow-limiting stenosis, large vessel occlusion or aneurysm. Electronically Signed: Indra Hill MD  1/21/2025 5:28 AM EST  Workstation ID: [id number]    MRI Brain Without Contrast    Result Date: 1/21/2025  Impression: Redemonstrated evolving right PCA territory infarct with stable small amount of intervening hemorrhage. Mild localized edema is present without significant associated mass effect. No additional acute findings. MR angiogram of the head and neck demonstrates some mild atherosclerotic changes without evidence of flow-limiting stenosis, large vessel occlusion or aneurysm. Electronically Signed: Indra Hill MD  1/21/2025 5:28 AM EST  Workstation ID: [id number]    CT Head Without Contrast    Result Date: 1/21/2025  Impression: No significant change. Stable evolving infarcts within the right occipital and temporal lobes with a small amount of surrounding edema and hemorrhage. No new areas of hemorrhage identified. Electronically Signed: Deana Caballero MD   1/21/2025 12:13 AM EST  Workstation ID: [id number]    CT Head Without Contrast Stroke Protocol    Result Date: 1/20/2025  Impression: 1. Evolving right temporal and occipital lobe infarcts with new intraparenchymal hemorrhage within the right occipital lobe measuring 2.1 x 1.6 cm in size with a volume of less than 30 cc. No mass effect or midline shift. No intraventricular hemorrhage. Electronically Signed: Delano Mcrae MD  1/20/2025 6:38 PM EST  Workstation ID: [id number]    CT Lumbar Spine Without Contrast    Result Date: 1/19/2025  Impression: No acute osseous abnormality. At least moderate multilevel degenerative changes are present throughout the spine with a levoscoliotic curvature. Electronically Signed: Deana Caballero MD  1/19/2025 11:11 PM EST  Workstation ID: [id number]    CT Thoracic Spine Without Contrast    Result Date: 1/19/2025  Impression: 1.No acute fracture identified. 2.Degenerative changes as described above. Electronically Signed: Rodríguez Mckeon MD  1/19/2025 6:58 PM EST  Workstation ID: [id number]    CT Cervical Spine Without Contrast    Result Date: 1/19/2025  Impression: Nondisplaced oblique fracture involving C5 and C6 vertebral bodies. Electronically Signed: Rodríguez Mckeon MD  1/19/2025 6:54 PM EST  Workstation ID: [id number]    CT Head Without Contrast    Result Date: 1/19/2025  Impression: 1.No acute intracranial process or calvarial fracture identified. 2.Findings suggestive of moderate chronic small vessel ischemic disease. Electronically Signed: Rodríguez Mckeon MD  1/19/2025 6:47 PM EST  Workstation ID: [id number]    XR Chest 1 View    Result Date: 1/19/2025  Impression: Coarse bilateral interstitial markings, which could reflect interstitial pulmonary edema or atypical pneumonia. Electronically Signed: Rodríguez Mckeon MD  1/19/2025 6:30 PM EST  Workstation ID: [id number]   Results for orders placed during the hospital encounter of 01/19/25    Adult Transthoracic Echo Complete W/ Cont if  Necessary Per Protocol    Interpretation Summary    Left ventricular systolic function is normal. Estimated left ventricular EF = 50%    Left ventricular wall thickness is consistent with borderline concentric hypertrophy.    Mild mitral valve regurgitation is present.      -MRA of the head and neck images from 1- were personally reviewed and showed no flow limiting stenosis or LVO  -MRI brain images from 1- were personally reviewed and showed an acute ischemic stroke of the R tempro-occipital lobe with hemorrhagic conversion  -Transthoracic echocardiogram is pending   -A1c from 1- was 5.7%  -LDL from 1- was 59  -BL LE duplex on 1- showed chronic RLE DVT    Assessment/Plan   This is a 84-year-old male with past medical history of remote CVA (no deficits, in 1990), CAD, NSTEMI, HTN, CKD IV, HLD, T2DM, DVT, PE (on Coumadin) was a in-house code stroke at 1759 this evening.  Patient is admitted for a nondisplaced oblique fracture through the C5 and C6 vertebral bodies that he obtained after a fall while at home.  Patient is unsure what caused him to fall or how long he was on the ground.  Primary nurse reports she was giving him his evening Coumadin when she noticed that he did not acknowledge the drink in his left hand and seemed to have a left visual deficit.  Last known well is unclear as nursing and family tells me that patient has slept most of the day.  CT head shows evolving right temporal and occipital lobe infarcts with new intraparenchymal hemorrhage within the right occipital lobe measuring 2.1 x 1.6 cm (volume less than 30 cc).  No mass effect or midline shift.  CTA head neck and CT perfusion were deferred as patient's EGFR was 28.7. Patient's Coumadin was reversed with Kcentra and vitamin K.  He was transferred immediately to the ICU for close neurological monitoring.  I did discuss case and imaging with Dr. Cruz (neurosurgery) who recommends medical management at this  time.     Antiplatelet PTA: None  Anticoagulant PTA: Warfarin           #Acute right temporal and occipital lobe infarct with hemorrhagic conversion  #s/p Coumadin reversal with Kcentra and vitamin K  -Hemorrhagic stroke order set has been initiated  -ICH score 1  -Discussed with Dr. Cruz, neurosurgery, patient not a candidate for neurosurgical treatment  -MRA of the head and neck images from 1- were personally reviewed and showed no flow limiting stenosis or LVO  -MRI brain images from 1- were personally reviewed and showed an acute ischemic stroke of the R tempro-occipital lobe with hemorrhagic conversion  -Transthoracic echocardiogram on 1/20/2025 showed left ventricular ejection fraction of 50%, dilated left atrium  -A1c from 1- was 5.7%  -LDL from 1- was 59  -BL LE duplex on 1- showed chronic RLE DVT    Recs:  -Hold warfarin  -Target blood pressure goals of 130-150.  -Meds: HOLD all antiplatelet and anticoagulants at this time  -Activity as tolerated, fall risk precautions  -PT/OT/SLP evaluation     #Essential hypertension  -Target blood pressure goals of 120-150  -Nicardipine as needed for SBP >150     #Hyperlipidemia  -Atorvastatin 80mg nightly     #Diabetes Mellitus type 2, goal A1c<7  -Maintain euglycemia  -Management per primary team      Stroke will continue to follow. Please call for any further questions or concerns  =================================  Caleb Morrison MD, Msc, PhD  Vascular Neurologist  [facility]

## 2025-01-21 NOTE — THERAPY EVALUATION
Acute Care - Speech Language Pathology   Swallow Initial Evaluation [facility]  Clinical Swallow Evaluation  Cognitive-Communication Evaluation       Patient Name: Toño Alcala  : 1940  MRN: [id number]  Today's Date: 2025               Admit Date: 2025    Visit Dx:     ICD-10-CM ICD-9-CM   1. Generalized weakness  R53.1 780.79   2. Fall, initial encounter  W19.XXXA E888.9   3. Injury of head, initial encounter  S09.90XA 959.01   4. Acute congestive heart failure, unspecified heart failure type  I50.9 428.0   5. Elevated troponin  R79.89 790.6   6. Closed nondisplaced fracture of fifth cervical vertebra, unspecified fracture morphology, initial encounter  S12.401A 805.05   7. Closed nondisplaced fracture of sixth cervical vertebra, unspecified fracture morphology, initial encounter  S12.501A 805.06   8. Elevated CK  R74.8 790.5   9. Dysphagia, unspecified type  R13.10 787.20     Patient Active Problem List   Diagnosis    CAD (coronary artery disease)    DVT (deep venous thrombosis)    Diabetes mellitus    Dyslipidemia    GERD (gastroesophageal reflux disease)    Hyperlipidemia LDL goal <70    Diabetic nephropathy associated with type 2 diabetes mellitus    Diabetic polyneuropathy associated with type 2 diabetes mellitus    Chronic kidney disease, stage IV (severe)    Vitamin D deficiency    Disc disorder of cervical region    Lumbosacral spondylosis without myelopathy    Arthritis of elbow    Acute right-sided low back pain without sciatica    Unifocal PVCs    Essential hypertension    Stage 3 chronic kidney disease due to benign hypertension    BMI 30.0-30.9,adult    Fall    Alcohol dependence with unspecified alcohol-induced disorder    C5 cervical fracture    C6 cervical fracture    Multiple fractures of cervical spine    NSTEMI, initial episode of care    History of pulmonary embolus (PE)/DVT    Acute on chronic diastolic CHF (congestive heart failure)    Syncope and collapse    ICH  (intracerebral hemorrhage)     Past Medical History:   Diagnosis Date    Acute diverticulitis 01/29/2020    Allergic 60 yrs ago    Arthritis     Arthritis of neck Fusion 1992    Bilateral radial fractures 1962    fracture bilateral radial heads    CAD (coronary artery disease)     Calculus of gallbladder without cholecystitis without obstruction 01/29/2020    Cataract     Cervical disc disorder Fusion 1992    Cholelithiasis     Chronic kidney disease 2020    Clotting disorder     CVD (cardiovascular disease)     History of TIA 1989    Diabetes mellitus     DVT (deep venous thrombosis)     Right lower extremity DVT and pulmonary embolism in 06/2015, idiopathic    DVT of proximal lower limb 06/22/2015    proximal DVT right leg, small PE- anticoagulation    Dyslipidemia     Erectile dysfunction     Fracture 1952    H/o pf left forearm fracture    Fracture of right hand 1980    Fracture of wrist 1980    GERD (gastroesophageal reflux disease)     with hiatal hernia    HL (hearing loss)     Hx of angina pectoris     Hypertension     Normal renal angiography 02/16/11    Knee swelling Several years ago    Left wrist fracture 1953    Lumbosacral disc disease 1996    Osgood-Schlatter's disease 1955    Osteoarthritis     Right wrist fracture     Vertigo     Wears glasses      Past Surgical History:   Procedure Laterality Date    APPENDECTOMY  1957    BACK SURGERY      CARDIAC CATHETERIZATION  2011    with vein graft    CATARACT EXTRACTION Left     CERVICAL FUSION      CERVICAL FUSION  1992    C3-4    COLONOSCOPY  2013    CORONARY ARTERY BYPASS GRAFT  1994    HERNIA REPAIR Right 2011    inguinal    INGUINAL HERNIA REPAIR Left 10/29/2019    Procedure: INGUINAL HERNIA REPAIR LEFT;  Surgeon: Charisma Raines MD;  Location: [facility];  Service: General    LUMBAR LAMINECTOMY  1996    laminectomy, lumbar, L3    NECK SURGERY  1992    OTHER SURGICAL HISTORY      wrist surgery    SPINE SURGERY  1996    TONSILLECTOMY AND  "ADENOIDECTOMY  1945    TRIGGER POINT INJECTION  2001 - 2007    VASECTOMY  1972       SLP Recommendation and Plan  SLP Swallowing Diagnosis: functional oral phase, suspected pharyngeal dysphagia (01/21/25 0848)  SLP Diet Recommendation: NPO, other (see comments) (ice chips and sips of h20 pending FEES) (01/21/25 0848)  Recommended Precautions and Strategies: general aspiration precautions (01/21/25 0848)        Monitor for Signs of Aspiration: yes, notify SLP if any concerns (01/21/25 0848)  Recommended Diagnostics: reassess via FEES (01/21/25 0848)  Swallow Criteria for Skilled Therapeutic Interventions Met: demonstrates skilled criteria (01/21/25 0848)  Anticipated Discharge Disposition (SLP): inpatient rehabilitation facility (01/21/25 0848)  Rehab Potential/Prognosis, Swallowing: re-evaluate goals as necessary (01/21/25 0848)        Oral Care Recommendations: Oral Care BID/PRN, Suction toothbrush (01/21/25 0848)                                        Progress:  (eval)      SWALLOW EVALUATION (Last 72 Hours)       SLP Adult Swallow Evaluation       Row Name 01/21/25 0848                   Rehab Evaluation    Document Type evaluation  -MM        Subjective Information complains of;fatigue  -MM        Patient Observations alert;cooperative  -MM        Patient/Family/Caregiver Comments/Observations Son present  -MM        Patient Effort good  -MM        Symptoms Noted During/After Treatment none  -MM           General Information    Patient Profile Reviewed yes  -MM        Pertinent History Of Current Problem Pt presented to the facility after a fall and was found on the ground with a C5 fracture. During admission the pt had a code stroke called and was dx with an ischemic stroke. MRI revealed the following: \"evolving right PCA territory infarct with stable small amount of intervening hemorrhage.\" Pt has a history of HTN, dyslipidemia, CAD, cervical disc disorder, CKD, DM, and Osgood-Schlatter's disease.  -MM        " Current Method of Nutrition --  -MM        Precautions/Limitations, Vision WFL;for purposes of eval  -MM        Precautions/Limitations, Hearing hearing aid, bilaterally  -MM        Prior Level of Function-Communication WFL  -MM        Prior Level of Function-Swallowing no diet consistency restrictions;esophageal concerns;other (see comments)  history of hiatal hernia  -MM        Plans/Goals Discussed with patient and family;agreed upon  -MM        Barriers to Rehab medically complex  -MM        Patient's Goals for Discharge return home  -MM           Pain    Pretreatment Pain Rating 0/10 - no pain  -MM        Posttreatment Pain Rating 0/10 - no pain  -MM           Oral Motor Structure and Function    Secretion Management WNL/WFL  -MM        Mucosal Quality moist, healthy  -MM           Oral Musculature and Cranial Nerve Assessment    Oral Motor General Assessment WFL  -MM           General Eating/Swallowing Observations    Eating/Swallowing Skills fed by SLP  -MM        Positioning During Eating upright in bed  -MM        Utensils Used spoon;straw  -MM        Consistencies Trialed ice chips;thin liquids;pureed  -MM           Clinical Swallow Eval    Oral Prep Phase WFL  -MM        Oral Transit WFL  -MM        Oral Residue WFL  -MM        Pharyngeal Phase suspected pharyngeal impairment  -MM        Esophageal Phase unremarkable  -MM           Pharyngeal Phase Concerns    Pharyngeal Phase Concerns wet vocal quality;multiple swallows;throat clear  -MM        Wet Vocal Quality thin  -MM        Multiple Swallows thin;pudding  -MM        Throat Clear thin  -MM        Pharyngeal Phase Concerns, Comment Pt presents with suspected pharyngeal dysphagia characterized by repeated swallows, wet vocal quality, and intermittent throat clear. Pt also complains of globus sensation indicating UES. SLP recs NPO except ice chips and sips of water with FEES to further evaluate swallow function.  -MM           SLP Evaluation Clinical  Impression    SLP Swallowing Diagnosis functional oral phase;suspected pharyngeal dysphagia  -MM        Functional Impact risk of aspiration/pneumonia  -MM        Rehab Potential/Prognosis, Swallowing re-evaluate goals as necessary  -MM        Swallow Criteria for Skilled Therapeutic Interventions Met demonstrates skilled criteria  -MM           Recommendations    SLP Diet Recommendation NPO;other (see comments)  ice chips and sips of h20 pending FEES  -MM        Recommended Diagnostics reassess via FEES  -MM        Recommended Precautions and Strategies general aspiration precautions  -MM        Oral Care Recommendations Oral Care BID/PRN;Suction toothbrush  -MM        Monitor for Signs of Aspiration yes;notify SLP if any concerns  -MM        Anticipated Discharge Disposition (SLP) inpatient rehabilitation facility  -                  User Key  (r) = Recorded By, (t) = Taken By, (c) = Cosigned By      Initials Name Effective Dates    Adenike Perales MS CCC-SLP 08/30/24 -                     EDUCATION  The patient has been educated in the following areas:   Dysphagia (Swallowing Impairment).                Time Calculation:    Time Calculation- SLP       Row Name 01/21/25 1007             Time Calculation- SLP    SLP Start Time 0848  -MM      SLP Received On 01/21/25  -MM         Untimed Charges    84680-DB Eval Speech and Production w/ Language Minutes 25  -MM      75647-ID Eval Oral Pharyng Swallow Minutes 28  -MM         Total Minutes    Untimed Charges Total Minutes 53  -MM       Total Minutes 53  -MM                User Key  (r) = Recorded By, (t) = Taken By, (c) = Cosigned By      Initials Name Provider Type    Adenike Perales MS CCC-SLP Speech and Language Pathologist                    Therapy Charges for Today       Code Description Service Date Service Provider Modifiers Qty    14056957371 HC ST EVAL ORAL PHARYNG SWALLOW 2 1/21/2025 Adenike May MS CCC-SLP GN 1    [id number]  ST EVAL SPEECH AND  PROD W LANG  2 2025 Adenike May, MS CCC-SLP GN 1                 Adenike May, MS CCC-SLP  2025   and Acute Care - Speech Language Pathology Initial Evaluation  [facility]     Patient Name: Toño Alcala  : 1940  MRN: [id number]  Today's Date: 2025               Admit Date: 2025     Visit Dx:    ICD-10-CM ICD-9-CM   1. Generalized weakness  R53.1 780.79   2. Fall, initial encounter  W19.XXXA E888.9   3. Injury of head, initial encounter  S09.90XA 959.01   4. Acute congestive heart failure, unspecified heart failure type  I50.9 428.0   5. Elevated troponin  R79.89 790.6   6. Closed nondisplaced fracture of fifth cervical vertebra, unspecified fracture morphology, initial encounter  S12.401A 805.05   7. Closed nondisplaced fracture of sixth cervical vertebra, unspecified fracture morphology, initial encounter  S12.501A 805.06   8. Elevated CK  R74.8 790.5   9. Dysphagia, unspecified type  R13.10 787.20     Patient Active Problem List   Diagnosis    CAD (coronary artery disease)    DVT (deep venous thrombosis)    Diabetes mellitus    Dyslipidemia    GERD (gastroesophageal reflux disease)    Hyperlipidemia LDL goal <70    Diabetic nephropathy associated with type 2 diabetes mellitus    Diabetic polyneuropathy associated with type 2 diabetes mellitus    Chronic kidney disease, stage IV (severe)    Vitamin D deficiency    Disc disorder of cervical region    Lumbosacral spondylosis without myelopathy    Arthritis of elbow    Acute right-sided low back pain without sciatica    Unifocal PVCs    Essential hypertension    Stage 3 chronic kidney disease due to benign hypertension    BMI 30.0-30.9,adult    Fall    Alcohol dependence with unspecified alcohol-induced disorder    C5 cervical fracture    C6 cervical fracture    Multiple fractures of cervical spine    NSTEMI, initial episode of care    History of pulmonary embolus (PE)/DVT    Acute on chronic diastolic CHF (congestive heart failure)     Syncope and collapse    ICH (intracerebral hemorrhage)     Past Medical History:   Diagnosis Date    Acute diverticulitis 01/29/2020    Allergic 60 yrs ago    Arthritis     Arthritis of neck Fusion 1992    Bilateral radial fractures 1962    fracture bilateral radial heads    CAD (coronary artery disease)     Calculus of gallbladder without cholecystitis without obstruction 01/29/2020    Cataract     Cervical disc disorder Fusion 1992    Cholelithiasis     Chronic kidney disease 2020    Clotting disorder     CVD (cardiovascular disease)     History of TIA 1989    Diabetes mellitus     DVT (deep venous thrombosis)     Right lower extremity DVT and pulmonary embolism in 06/2015, idiopathic    DVT of proximal lower limb 06/22/2015    proximal DVT right leg, small PE- anticoagulation    Dyslipidemia     Erectile dysfunction     Fracture 1952    H/o pf left forearm fracture    Fracture of right hand 1980    Fracture of wrist 1980    GERD (gastroesophageal reflux disease)     with hiatal hernia    HL (hearing loss)     Hx of angina pectoris     Hypertension     Normal renal angiography 02/16/11    Knee swelling Several years ago    Left wrist fracture 1953    Lumbosacral disc disease 1996    Osgood-Schlatter's disease 1955    Osteoarthritis     Right wrist fracture     Vertigo     Wears glasses      Past Surgical History:   Procedure Laterality Date    APPENDECTOMY  1957    BACK SURGERY      CARDIAC CATHETERIZATION  2011    with vein graft    CATARACT EXTRACTION Left     CERVICAL FUSION      CERVICAL FUSION  1992    C3-4    COLONOSCOPY  2013    CORONARY ARTERY BYPASS GRAFT  1994    HERNIA REPAIR Right 2011    inguinal    INGUINAL HERNIA REPAIR Left 10/29/2019    Procedure: INGUINAL HERNIA REPAIR LEFT;  Surgeon: Charisma Raines MD;  Location: [facility];  Service: General    LUMBAR LAMINECTOMY  1996    laminectomy, lumbar, L3    NECK SURGERY  1992    OTHER SURGICAL HISTORY      wrist surgery    SPINE SURGERY  1996     TONSILLECTOMY AND ADENOIDECTOMY  1945    TRIGGER POINT INJECTION  2001 - 2007    VASECTOMY  1972       SLP Recommendation and Plan  SLP Diagnosis: functional speech/language skills (01/21/25 0848)  SLP Diagnosis Comments: Pt presents with functional speech and language skills at this time. Pt was able to complete all the tasks with 100% accuracy. (01/21/25 0848)     Monitor for Signs of Aspiration: yes, notify SLP if any concerns (01/21/25 0848)  Swallow Criteria for Skilled Therapeutic Interventions Met: demonstrates skilled criteria (01/21/25 0848)  SLC Criteria for Skilled Therapy Interventions Met: no problems identified which require skilled intervention (01/21/25 0848)  Anticipated Discharge Disposition (SLP): inpatient rehabilitation facility (01/21/25 0848)        Therapy Frequency (SLP SLC): evaluation only (01/21/25 0848)     Oral Care Recommendations: Oral Care BID/PRN, Suction toothbrush (01/21/25 0848)                          Progress:  (eval) (01/21/25 1006)      SLP EVALUATION (Last 72 Hours)       SLP SLC Evaluation       Row Name 01/21/25 0848                   Comprehension Assessment/Intervention    Comprehension Assessment/Intervention Auditory Comprehension;Reading Comprehension  -MM           Auditory Comprehension Assessment/Intervention    Auditory Comprehension (Communication) WFL  -MM        Narrative Discourse WFL  -MM           Reading Comprehension Assessment/Intervention    Reading Comprehension (Communication) WFL  -MM        Functional Reading Tasks WFL  -MM           Expression Assessment/Intervention    Expression Assessment/Intervention verbal expression;graphic expression  -MM           Verbal Expression Assessment/Intervention    Verbal Expression WFL  -MM        Conversational Discourse/Fluency WFL  -MM           Graphic Expression Assessment/Intervention    Graphic Expression WFL  -MM        Sentence Formulation WFL  -MM           Oral Motor Structure and Function     Dentition Assessment missing teeth  -MM           Motor Speech Assessment/Intervention    Motor Speech Function WFL  -MM        Speech intelligibility 100%;in connected speech;with unfamiliar listener  -           SLP Evaluation Clinical Impressions    SLP Diagnosis functional speech/language skills  -        SLP Diagnosis Comments Pt presents with functional speech and language skills at this time. Pt was able to complete all the tasks with 100% accuracy.  -[facility] Criteria for Skilled Therapy Interventions Met no problems identified which require skilled intervention  -           Recommendations    Therapy Frequency (SLP SLC) evaluation only  -MM                  User Key  (r) = Recorded By, (t) = Taken By, (c) = Cosigned By      Initials Name Effective Dates    Adenike Perales MS CCC-SLP 08/30/24 -                        EDUCATION  The patient has been educated in the following areas:     Evaluation results .                        Time Calculation:      Time Calculation- SLP       Row Name 01/21/25 1007             Time Calculation- SLP    SLP Start Time 0848  -MM      SLP Received On 01/21/25  -MM         Untimed Charges    34596-DL Eval Speech and Production w/ Language Minutes 25  -MM      12828-AU Eval Oral Pharyng Swallow Minutes 28  -MM         Total Minutes    Untimed Charges Total Minutes 53  -MM       Total Minutes 53  -MM                User Key  (r) = Recorded By, (t) = Taken By, (c) = Cosigned By      Initials Name Provider Type    Adenike Perales MS CCC-SLP Speech and Language Pathologist                    Therapy Charges for Today       Code Description Service Date Service Provider Modifiers Qty    [id number] HC ST EVAL ORAL PHARYNG SWALLOW 2 1/21/2025 Adenike May MS CCC-SLP GN 1    [id number] HC ST EVAL SPEECH AND PROD W LANG  2 1/21/2025 Adenike May MS CCC-SLP GN 1                       Adenike May MS CCC-SLP  1/21/2025

## 2025-01-21 NOTE — MBS/VFSS/FEES
Acute Care - Speech Language Pathology   Swallow Re-Evaluation [facility]  Fiberoptic Endoscopic Evaluation of Swallowing (FEES)       Patient Name: Toño Alcala  : 1940  MRN: [id number]  Today's Date: 2025               Admit Date: 2025    Visit Dx:     ICD-10-CM ICD-9-CM   1. Generalized weakness  R53.1 780.79   2. Fall, initial encounter  W19.XXXA E888.9   3. Injury of head, initial encounter  S09.90XA 959.01   4. Acute congestive heart failure, unspecified heart failure type  I50.9 428.0   5. Elevated troponin  R79.89 790.6   6. Closed nondisplaced fracture of fifth cervical vertebra, unspecified fracture morphology, initial encounter  S12.401A 805.05   7. Closed nondisplaced fracture of sixth cervical vertebra, unspecified fracture morphology, initial encounter  S12.501A 805.06   8. Elevated CK  R74.8 790.5   9. Oropharyngeal dysphagia  R13.12 787.22     Patient Active Problem List   Diagnosis    CAD (coronary artery disease)    DVT (deep venous thrombosis)    Diabetes mellitus    Dyslipidemia    GERD (gastroesophageal reflux disease)    Hyperlipidemia LDL goal <70    Diabetic nephropathy associated with type 2 diabetes mellitus    Diabetic polyneuropathy associated with type 2 diabetes mellitus    Chronic kidney disease, stage IV (severe)    Vitamin D deficiency    Disc disorder of cervical region    Lumbosacral spondylosis without myelopathy    Arthritis of elbow    Acute right-sided low back pain without sciatica    Unifocal PVCs    Essential hypertension    Stage 3 chronic kidney disease due to benign hypertension    BMI 30.0-30.9,adult    Fall    Alcohol dependence with unspecified alcohol-induced disorder    C5 cervical fracture    C6 cervical fracture    Multiple fractures of cervical spine    NSTEMI, initial episode of care    History of pulmonary embolus (PE)/DVT    Acute on chronic diastolic CHF (congestive heart failure)    Syncope and collapse    ICH (intracerebral hemorrhage)      Past Medical History:   Diagnosis Date    Acute diverticulitis 01/29/2020    Allergic 60 yrs ago    Arthritis     Arthritis of neck Fusion 1992    Bilateral radial fractures 1962    fracture bilateral radial heads    CAD (coronary artery disease)     Calculus of gallbladder without cholecystitis without obstruction 01/29/2020    Cataract     Cervical disc disorder Fusion 1992    Cholelithiasis     Chronic kidney disease 2020    Clotting disorder     CVD (cardiovascular disease)     History of TIA 1989    Diabetes mellitus     DVT (deep venous thrombosis)     Right lower extremity DVT and pulmonary embolism in 06/2015, idiopathic    DVT of proximal lower limb 06/22/2015    proximal DVT right leg, small PE- anticoagulation    Dyslipidemia     Erectile dysfunction     Fracture 1952    H/o pf left forearm fracture    Fracture of right hand 1980    Fracture of wrist 1980    GERD (gastroesophageal reflux disease)     with hiatal hernia    HL (hearing loss)     Hx of angina pectoris     Hypertension     Normal renal angiography 02/16/11    Knee swelling Several years ago    Left wrist fracture 1953    Lumbosacral disc disease 1996    Osgood-Schlatter's disease 1955    Osteoarthritis     Right wrist fracture     Vertigo     Wears glasses      Past Surgical History:   Procedure Laterality Date    APPENDECTOMY  1957    BACK SURGERY      CARDIAC CATHETERIZATION  2011    with vein graft    CATARACT EXTRACTION Left     CERVICAL FUSION      CERVICAL FUSION  1992    C3-4    COLONOSCOPY  2013    CORONARY ARTERY BYPASS GRAFT  1994    HERNIA REPAIR Right 2011    inguinal    INGUINAL HERNIA REPAIR Left 10/29/2019    Procedure: INGUINAL HERNIA REPAIR LEFT;  Surgeon: Charisma Raines MD;  Location: [facility];  Service: General    LUMBAR LAMINECTOMY  1996    laminectomy, lumbar, L3    NECK SURGERY  1992    OTHER SURGICAL HISTORY      wrist surgery    SPINE SURGERY  1996    TONSILLECTOMY AND ADENOIDECTOMY  1945    TRIGGER POINT  INJECTION  2001 - 2007    VASECTOMY  1972       SLP Recommendation and Plan  SLP Swallowing Diagnosis: mild-moderate, oral dysphagia, pharyngeal dysphagia (01/21/25 1032)  SLP Diet Recommendation: mechanical ground textures, nectar thick liquids, ice chips between meals after oral care, with supervision (01/21/25 1032)  Recommended Precautions and Strategies: upright posture during/after eating, small bites of food and sips of liquid, general aspiration precautions (01/21/25 1032)  SLP Rec. for Method of Medication Administration: meds whole, meds crushed, with puree, as tolerated (01/21/25 1032)     Monitor for Signs of Aspiration: yes, notify SLP if any concerns (01/21/25 1032)  Recommended Diagnostics: reassess via FEES (01/21/25 0848)  Swallow Criteria for Skilled Therapeutic Interventions Met: demonstrates skilled criteria (01/21/25 1032)  Anticipated Discharge Disposition (SLP): inpatient rehabilitation facility (01/21/25 1032)  Rehab Potential/Prognosis, Swallowing: good, to achieve stated therapy goals (01/21/25 1032)  Therapy Frequency (Swallow): 5 days per week (01/21/25 1032)  Predicted Duration Therapy Intervention (Days): 1 week (01/21/25 1032)  Oral Care Recommendations: Oral Care BID/PRN, Suction toothbrush (01/21/25 1032)                                        Progress: improving      SWALLOW EVALUATION (Last 72 Hours)       SLP Adult Swallow Evaluation       Row Name 01/21/25 1032 01/21/25 0848                Rehab Evaluation    Document Type re-evaluation;other (see comments)  FEES  -MM evaluation  -MM       Subjective Information no complaints  -MM complains of;fatigue  -MM       Patient Observations alert;cooperative  -MM alert;cooperative  -MM       Patient/Family/Caregiver Comments/Observations Son present  -MM Son present  -MM       Patient Effort good  -MM good  -MM       Symptoms Noted During/After Treatment none  -MM none  -MM          General Information    Patient Profile Reviewed yes   "-MM yes  -MM       Pertinent History Of Current Problem See initial eval  -MM Pt presented to the facility after a fall and was found on the ground with a C5 fracture. During admission the pt had a code stroke called and was dx with an ischemic stroke. MRI revealed the following: \"evolving right PCA territory infarct with stable small amount of intervening hemorrhage.\" Pt has a history of HTN, dyslipidemia, CAD, cervical disc disorder, CKD, DM, and Osgood-Schlatter's disease.  -MM       Current Method of Nutrition -- --  -MM       Precautions/Limitations, Vision WFL;for purposes of eval  -MM WFL;for purposes of eval  -MM       Precautions/Limitations, Hearing hearing impairment, bilaterally  -MM hearing aid, bilaterally  -MM       Prior Level of Function-Communication WFL  -MM WFL  -MM       Prior Level of Function-Swallowing no diet consistency restrictions;other (see comments)  history of hiatal hernia and reported soft foods at baseline  -MM no diet consistency restrictions;esophageal concerns;other (see comments)  history of hiatal hernia  -MM       Plans/Goals Discussed with patient and family;agreed upon  -MM patient and family;agreed upon  -MM       Barriers to Rehab medically complex  -MM medically complex  -MM       Patient's Goals for Discharge return home  -MM return home  -MM          Pain    Pretreatment Pain Rating 0/10 - no pain  -MM 0/10 - no pain  -MM       Posttreatment Pain Rating 0/10 - no pain  -MM 0/10 - no pain  -MM          Oral Motor Structure and Function    Secretion Management -- WNL/WFL  -MM       Mucosal Quality -- moist, healthy  -MM          Oral Musculature and Cranial Nerve Assessment    Oral Motor General Assessment -- WFL  -MM          General Eating/Swallowing Observations    Eating/Swallowing Skills -- fed by SLP  -MM       Positioning During Eating -- upright in bed  -MM       Utensils Used -- spoon;straw  -MM       Consistencies Trialed -- ice chips;thin liquids;pureed  -MM    "       Clinical Swallow Eval    Oral Prep Phase -- WFL  -MM       Oral Transit -- WFL  -MM       Oral Residue -- WFL  -MM       Pharyngeal Phase -- suspected pharyngeal impairment  -MM       Esophageal Phase -- unremarkable  -MM          Pharyngeal Phase Concerns    Pharyngeal Phase Concerns -- wet vocal quality;multiple swallows;throat clear  -MM       Wet Vocal Quality -- thin  -MM       Multiple Swallows -- thin;pudding  -MM       Throat Clear -- thin  -MM       Pharyngeal Phase Concerns, Comment -- Pt presents with suspected pharyngeal dysphagia characterized by repeated swallows, wet vocal quality, and intermittent throat clear. Pt also complains of globus sensation indicating UES. SLP recs NPO except ice chips and sips of water with FEES to further evaluate swallow function.  -MM          Fiberoptic Endoscopic Evaluation of Swallowing (FEES)    Risks/Benefits Reviewed risks/benefits explained;patient;family;agreed to eval  -MM --       Nasal Entry right:  -MM --       Scope serial number/identification [id number]  -MM --          Anatomy and Physiology    Anatomic Considerations erythema;other (see comments)  prominent posterior pharyngeal wall  -MM --       Velopharyngeal reduced movement  -MM --       Base of Tongue symmetrical  -MM --       Epiglottis WFL  -MM --       Laryngeal Function Breathing symmetrical  -MM --       Laryngeal Function Phonation symmetrical  -MM --       Laryngeal Function to Breath Hold TVF/FVF/Arytenoid;sustained closure  -MM --       Secretion Rating Scale (Russell et al. 1996) 1- secretions present around the laryngeal vestibule  -MM --       Secretion Description thick;discolored  -MM --       Ice Chips elicited swallow  -MM --       Spontaneous Swallow frequency reduced  -MM --       Sensory sensed scope  -MM --       Utensils Used Spoon;Straw  -MM --       Consistencies Trialed ice chips;thin liquids;nectar-thick liquids;pudding/puree;regular textures  -MM --          FEES  Interpretation    Oral Phase prolonged manipulation;inadequate manipulation;prespill of liquids into pharynx  -MM --       Oral Phase, Comment Pt presents with mild-moderate oral dysphagia characterized by reduced and prolonged mastication of regular consistency, prolonged manipulation of all consistencies trialed, and premature spillage of liquids into pharynx.  -MM --          Initiation of Pharyngeal Swallow    Initiation of Pharyngeal Swallow bolus in valleculae;bolus in pyriform sinuses  -MM --       Pharyngeal Phase impaired pharyngeal phase of swallowing  -MM --       Penetration Before the Swallow thin liquids;secondary to reduced back of tongue control;secondary to delayed swallow initiation or mistiming  -MM --       Aspiration During the Swallow thin liquids;secondary to delayed swallow initiation or mistiming;secondary to reduced laryngeal elevation;secondary to reduced vestibular closure  -MM --       Depth of Penetration deep  -MM --       Response to Aspiration Yes  -MM --       Responsed to aspiration with cough;effective  -MM --       Rosenbek's Scale thin:;6-->Level 6  -MM --       Residue pudding/puree;regular Textures;valleculae;pyriform sinuses;secondary to reduced base of tongue retraction;secondary to reduced posterior pharyngeal wall stripping  -MM --       Response to Residue cleared residue;with spontaneous subsequent swallow;with liquid wash  -MM --       Attempted Compensatory Maneuvers bolus size;bolus presentation style;additional subsequent swallow;alternate liquids/solids;effortful (hard swallow)  -MM --       Response to Attempted Compensatory Maneuvers did not prevent aspiration;reduced residue  -MM --       Pharyngeal Phase, Comment Pt presents with mild-moderate pharyngeal dysphagia characterized by reduced BOT retraction, reduced hyolaryngeal elevation and excursion, and reduced pharyngeal stripping wave resulting in aspiration of thin via straw. SLP recs mechanical ground and  nectar thick liquids and will continue to follow.  -MM --          SLP Evaluation Clinical Impression    SLP Swallowing Diagnosis mild-moderate;oral dysphagia;pharyngeal dysphagia  -MM functional oral phase;suspected pharyngeal dysphagia  -MM       Functional Impact risk of aspiration/pneumonia  -MM risk of aspiration/pneumonia  -MM       Rehab Potential/Prognosis, Swallowing good, to achieve stated therapy goals  -MM re-evaluate goals as necessary  -MM       Swallow Criteria for Skilled Therapeutic Interventions Met demonstrates skilled criteria  -MM demonstrates skilled criteria  -MM          Recommendations    Therapy Frequency (Swallow) 5 days per week  -MM --       Predicted Duration Therapy Intervention (Days) 1 week  -MM --       SLP Diet Recommendation mechanical ground textures;nectar thick liquids;ice chips between meals after oral care, with supervision  -MM NPO;other (see comments)  ice chips and sips of h20 pending FEES  -MM       Recommended Diagnostics -- reassess via FEES  -MM       Recommended Precautions and Strategies upright posture during/after eating;small bites of food and sips of liquid;general aspiration precautions  -MM general aspiration precautions  -MM       Oral Care Recommendations Oral Care BID/PRN;Suction toothbrush  -MM Oral Care BID/PRN;Suction toothbrush  -MM       SLP Rec. for Method of Medication Administration meds whole;meds crushed;with puree;as tolerated  -MM --       Monitor for Signs of Aspiration yes;notify SLP if any concerns  -MM yes;notify SLP if any concerns  -MM       Anticipated Discharge Disposition (SLP) inpatient rehabilitation facility  -MM inpatient rehabilitation facility  -MM                 User Key  (r) = Recorded By, (t) = Taken By, (c) = Cosigned By      Initials Name Effective Dates    MM Adenike May MS CCC-SLP 08/30/24 -                     EDUCATION  The patient has been educated in the following areas:   Dysphagia (Swallowing Impairment).         SLP GOALS       Row Name 01/21/25 1032             (LTG) Patient will demonstrate functional swallow for    Diet Texture (Demonstrate functional swallow) soft to chew (chopped) textures  -MM      Liquid viscosity (Demonstrate functional swallow) thin liquids  -MM      Glenn (Demonstrate functional swallow) independently (over 90% accuracy)  -MM      Time Frame (Demonstrate functional swallow) 1 week  -MM      Progress/Outcomes (Demonstrate functional swallow) new goal  -MM         (STG) Patient will tolerate trials of    Consistencies Trialed (Tolerate trials) mechanical ground textures;nectar/ mildly thick liquids  -MM      Desired Outcome (Tolerate trials) without signs/symptoms of aspiration;with adequate oral prep/transit/clearance  -MM      Glenn (Tolerate trials) with minimal cues (75-90% accuracy)  -MM      Time Frame (Tolerate trials) 1 week  -MM      Progress/Outcomes (Tolerate trials) new goal  -MM         (STG) Patient will tolerate therapeutic trials of    Consistencies Trialed (Tolerate therapeutic trials) soft to chew (chopped) textures;thin liquids  -MM      Desired Outcome (Tolerate therapeutic trials) without signs/symptoms of aspiration;with adequate oral prep/transit/clearance  -MM      Glenn (Tolerate therapeutic trials) with minimal cues (75-90% accuracy)  -MM      Time Frame (Tolerate therapeutic trials) 1 week  -MM      Progress/Outcomes (Tolerate therapeutic trials) new goal  -MM         (STG) Lingual Strengthening Goal 1 (SLP)    Activity (Lingual Strengthening Goal 1, SLP) increase tongue back strength  -MM      Increase Tongue Back Strength lingual resistance exercises  -MM      Glenn/Accuracy (Lingual Strengthening Goal 1, SLP) with minimal cues (75-90% accuracy)  -MM      Time Frame (Lingual Strengthening Goal 1, SLP) 1 week  -MM      Progress/Outcomes (Lingual Strengthening Goal 1, SLP) new goal  -MM         (STG) Pharyngeal Strengthening Exercise Goal 1  (SLP)    Activity (Pharyngeal Strengthening Goal 1, SLP) increase timing;increase superior movement of the hyolaryngeal complex;increase anterior movement of the hyolaryngeal complex;increase squeeze/positive pressure generation;increase tongue base retraction  -MM      Increase Timing prepping - 3 second prep or suck swallow or 3-step swallow  -MM      Increase Superior Movement of the Hyolaryngeal Complex Mendelsohn  -MM      Increase Anterior Movement of the Hyolaryngeal Complex chin tuck against resistance (CTAR)  -MM      Increase Squeeze/Positive Pressure Generation hard effortful swallow  -MM      Increase Tongue Base Retraction wang  -MM      Woodward/Accuracy (Pharyngeal Strengthening Goal 1, SLP) with minimal cues (75-90% accuracy)  -MM      Time Frame (Pharyngeal Strengthening Goal 1, SLP) 1 week  -MM      Progress/Outcomes (Pharyngeal Strengthening Goal 1, SLP) new goal  -MM                User Key  (r) = Recorded By, (t) = Taken By, (c) = Cosigned By      Initials Name Provider Type    Adenike Perales MS CCC-SLP Speech and Language Pathologist                         Time Calculation:    Time Calculation- SLP       Row Name 01/21/25 1124 01/21/25 1007          Time Calculation- SLP    SLP Start Time 1032  -MM 0848  -MM     SLP Received On 01/21/25  -MM 01/21/25  -MM        Untimed Charges    [id number] Eval Speech and Production w/ Language Minutes -- 25  -MM     [id number] Eval Oral Pharyng Swallow Minutes -- 28  -MM     [id number] Fiberoptic Endo Eval Swallow Minutes 55  -MM --        Total Minutes    Untimed Charges Total Minutes 55  -MM 53  -MM      Total Minutes 55  -MM 53  -MM               User Key  (r) = Recorded By, (t) = Taken By, (c) = Cosigned By      Initials Name Provider Type    Adenike Perales MS CCC-SLP Speech and Language Pathologist                    Therapy Charges for Today       Code Description Service Date Service Provider Modifiers Qty    62809350790 HC ST EVAL ORAL PHARYNG  SWALLOW 2 1/21/2025 Adenike May, MS CCC-SLP GN 1    58190852806 HC ST EVAL SPEECH AND PROD W LANG  2 1/21/2025 Adenike May, MS CCC-SLP GN 1    [id number] HC ST FIBEROPTIC ENDO EVAL SWALL 4 1/21/2025 Adenike May, MS CCC-SLP GN 1                 Adenike May, MS CCC-SLP  1/21/2025

## 2025-01-21 NOTE — PLAN OF CARE
Goal Outcome Evaluation:  Plan of Care Reviewed With: patient        Progress: no change  Outcome Evaluation: OT re-evaluation completed. The pt presents below baseline with L visual field cut, generalized weakness, decreased activity tolerance, balance deficits, and spinal precautions with cervical collar on AAT impacting mobility. Pt was assisted in supine to sit transfer and took steps from the EOB to the bedside chair using a RWx with Eunice x2. Orthostatic vitals taken and found negative today. Pt will benefit from continued IP OT services to address deficits listed. Recommend a d/c to IRF when medically ready.    Anticipated Discharge Disposition (OT): inpatient rehabilitation facility

## 2025-01-21 NOTE — PLAN OF CARE
Goal Outcome Evaluation:  Plan of Care Reviewed With: patient, child        Progress: improving       Anticipated Discharge Disposition (SLP): inpatient rehabilitation facility    SLP Diagnosis: functional speech/language skills (01/21/25 0848)  SLP Diagnosis Comments: Pt presents with functional speech and language skills at this time. Pt was able to complete all the tasks with 100% accuracy. (01/21/25 0869)  SLP Swallowing Diagnosis: mild-moderate, oral dysphagia, pharyngeal dysphagia (01/21/25 1032)         FEES completed; see evaluation for more information

## 2025-01-21 NOTE — PLAN OF CARE
Problem: Adult Inpatient Plan of Care  Goal: Plan of Care Review  Outcome: Not Progressing  Flowsheets (Taken 1/20/2025 1954)  Progress: declining  Outcome Evaluation: Pt AOx3, off on the date, but got the year correct. NSR on monitor, 94% 2L NC. Pt did not eat much, reduced appetite. One episode of nausea, that subsided before giving Zofran. Pt had right sided pain from Singles, lidocaine patches placed, pain then a 0. Pt moved well with PT, but did have orthostatic hypotention dropping from 130s to 110s, see vitals. Pt took meds at 1744 with sips of water after each med. Pt was asked to take another sip of water after the 2nd medication, pt was holding water cup in his left hand, but reached for the cup with his right hand. When asked what he is reaching for he said something to drink. I gave him another drink and set the cup down and did a quick neuro check. Called charge. Visual changes on left side present, charged checked pt. Provider returned call and instructed to call code stroke. Pt taken to CT and then transferred to ICU after 1828 CT scan. Continue monitoring.  Plan of Care Reviewed With:   patient   child  Goal: Patient-Specific Goal (Individualized)  Outcome: Not Progressing  Goal: Absence of Hospital-Acquired Illness or Injury  Outcome: Not Progressing  Intervention: Identify and Manage Fall Risk  Description: Perform standard risk assessment on admission using a validated tool or comprehensive approach appropriate to the patient; reassess fall risk frequently, with change in status or transfer to another level of care.  Communicate risk to interprofessional healthcare team; ensure fall risk visible cue.  Determine need for increased observation, equipment and environmental modification, as well as use of supportive, nonskid footwear.  Adjust safety measures to individual needs and identified risk factors.  Reinforce the importance of active participation with fall risk prevention, safety, and  physical activity with the patient and family.  Perform regular intentional rounding to assess need for position change, pain assessment and personal needs, including assistance with toileting.  Recent Flowsheet Documentation  Taken 1/20/2025 1600 by Renee Quinn RN  Safety Promotion/Fall Prevention:   activity supervised   assistive device/personal items within reach   clutter free environment maintained   fall prevention program maintained   nonskid shoes/slippers when out of bed   room organization consistent   safety round/check completed   toileting scheduled  Taken 1/20/2025 1400 by Renee Quinn RN  Safety Promotion/Fall Prevention:   activity supervised   assistive device/personal items within reach   clutter free environment maintained   fall prevention program maintained   nonskid shoes/slippers when out of bed   room organization consistent   safety round/check completed   toileting scheduled  Taken 1/20/2025 1210 by Renee Quinn RN  Safety Promotion/Fall Prevention:   activity supervised   assistive device/personal items within reach   clutter free environment maintained   fall prevention program maintained   nonskid shoes/slippers when out of bed   room organization consistent   safety round/check completed   toileting scheduled  Taken 1/20/2025 1010 by Renee Quinn RN  Safety Promotion/Fall Prevention:   activity supervised   assistive device/personal items within reach   clutter free environment maintained   fall prevention program maintained   nonskid shoes/slippers when out of bed   room organization consistent   safety round/check completed   toileting scheduled  Taken 1/20/2025 0850 by Renee Quinn RN  Safety Promotion/Fall Prevention:   activity supervised   assistive device/personal items within reach   clutter free environment maintained   fall prevention program maintained   nonskid shoes/slippers when out of bed   room organization consistent   safety round/check completed    toileting scheduled  Intervention: Prevent Skin Injury  Description: Perform a screening for skin injury risk, such as pressure or moisture-associated skin damage on admission and at regular intervals throughout hospital stay.  Keep all areas of skin (especially folds) clean and dry.  Maintain adequate skin hydration.  Relieve and redistribute pressure and protect bony prominences and skin at risk for injury; implement measures based on patient-specific risk factors.  Match turning and repositioning schedule to clinical condition.  Encourage weight shift frequently; assist with reposition if unable to complete independently.  Float heels off bed; avoid pressure on the Achilles tendon.  Keep skin free from extended contact with medical devices.  Optimize nutrition and hydration.  Encourage functional activity and mobility, as early as tolerated.  Use aids (e.g., slide boards, mechanical lift) during transfer.  Recent Flowsheet Documentation  Taken 1/20/2025 1600 by Renee Quinn RN  Body Position: position changed independently  Skin Protection: incontinence pads utilized  Taken 1/20/2025 1400 by Renee Quinn RN  Skin Protection: incontinence pads utilized  Taken 1/20/2025 1210 by Renee Quinn RN  Body Position: legs elevated  Skin Protection: incontinence pads utilized  Taken 1/20/2025 1010 by Renee Quinn RN  Body Position:   turned   right  Skin Protection: incontinence pads utilized  Taken 1/20/2025 0850 by Renee Quinn RN  Body Position:   turned   left  Skin Protection: incontinence pads utilized  Intervention: Prevent Infection  Description: Maintain skin and mucous membrane integrity; promote hand, oral and pulmonary hygiene.  Optimize fluid balance, nutrition, sleep and glycemic control to maximize infection resistance.  Identify potential sources of infection early to prevent or mitigate progression of infection (e.g., wound, lines, devices).  Evaluate ongoing need for invasive devices; remove  promptly when no longer indicated.  Review vaccination status.  Recent Flowsheet Documentation  Taken 1/20/2025 1600 by Renee Quinn RN  Infection Prevention:   rest/sleep promoted   single patient room provided  Taken 1/20/2025 1400 by Renee Quinn RN  Infection Prevention:   rest/sleep promoted   single patient room provided  Taken 1/20/2025 1210 by Renee Quinn RN  Infection Prevention:   rest/sleep promoted   single patient room provided  Taken 1/20/2025 1010 by Renee Quinn RN  Infection Prevention:   rest/sleep promoted   single patient room provided  Taken 1/20/2025 0850 by Renee Quinn RN  Infection Prevention:   rest/sleep promoted   single patient room provided  Goal: Optimal Comfort and Wellbeing  Outcome: Not Progressing  Intervention: Monitor Pain and Promote Comfort  Description: Assess pain level, treatment efficacy and patient response at regular intervals using a consistent pain scale.  Consider the presence and impact of preexisting chronic pain.  Encourage patient and caregiver involvement in pain assessment, interventions and safety measures.  Promote activity; balance with sleep and rest to enhance healing.  Recent Flowsheet Documentation  Taken 1/20/2025 1600 by Renee Quinn RN  Pain Management Interventions:   care clustered   pillow support provided  Taken 1/20/2025 1400 by Renee Quinn RN  Pain Management Interventions:   care clustered   pillow support provided  Taken 1/20/2025 1210 by Renee Quinn RN  Pain Management Interventions:   care clustered   pillow support provided  Taken 1/20/2025 1010 by Renee Quinn RN  Pain Management Interventions:   care clustered   pillow support provided  Taken 1/20/2025 0909 by Renee Quinn RN  Pain Management Interventions:   care clustered   pain medication given  Taken 1/20/2025 0850 by Renee Quinn RN  Pain Management Interventions:   care clustered   pillow support provided  Intervention: Provide Person-Centered  Care  Description: Use a family-focused approach to care; encourage support system presence and participation.  Develop trust and rapport by proactively providing information, encouraging questions, addressing concerns and offering reassurance.  Acknowledge emotional response to hospitalization.  Recognize and utilize personal coping strategies and strengths; develop goals via shared decision-making.  Honor spiritual and cultural preferences.  Recent Flowsheet Documentation  Taken 1/20/2025 1600 by Renee Quinn RN  Trust Relationship/Rapport:   choices provided   care explained   questions answered   questions encouraged  Taken 1/20/2025 1400 by Renee Quinn RN  Trust Relationship/Rapport:   choices provided   care explained   questions answered   questions encouraged  Taken 1/20/2025 1210 by Renee Quinn RN  Trust Relationship/Rapport:   choices provided   care explained   questions answered   questions encouraged  Taken 1/20/2025 1010 by Renee Quinn RN  Trust Relationship/Rapport:   choices provided   care explained   questions answered   questions encouraged  Taken 1/20/2025 0850 by Renee Quinn RN  Trust Relationship/Rapport:   choices provided   care explained   questions answered   questions encouraged  Goal: Readiness for Transition of Care  Outcome: Not Progressing   Goal Outcome Evaluation:  Plan of Care Reviewed With: patient, child        Progress: declining  Outcome Evaluation: Pt AOx3, off on the date, but got the year correct. NSR on monitor, 94% 2L NC. Pt did not eat much, reduced appetite. One episode of nausea, that subsided before giving Zofran. Pt had right sided pain from Singles, lidocaine patches placed, pain then a 0. Pt moved well with PT, but did have orthostatic hypotention dropping from 130s to 110s, see vitals. Pt took meds at 1744 with sips of water after each med. Pt was asked to take another sip of water after the 2nd medication, pt was holding water cup in his left  hand, but reached for the cup with his right hand. When asked what he is reaching for he said something to drink. I gave him another drink and set the cup down and did a quick neuro check. Called charge. Visual changes on left side present, charged checked pt. Provider returned call and instructed to call code stroke. Pt taken to CT and then transferred to ICU after 1828 CT scan. Continue monitoring.

## 2025-01-21 NOTE — CONSULTS
"Diabetes Education    Patient Name:  Toño Alcala  YOB: 1940  MRN: [id number]  Admit Date:  1/19/2025      Consult for diabetes education received per Stroke protocol. Chart reviewed. Pt was seen at bedside today. Permission given for visit.     Patient's current A1C is 5.7%. Per chart review patient has a history of type 2 diabetes but when asked patient states he does not, also diabetes was not listed in H&P. Patient has a meter at home but does not check his glucose. He has 1-2 meals/day. Usually purchases a [employer] meal and has a banana for breakfast. Encouraged frequent meals to prevent hypoglycemia, especially in the older population. Patient denies questions or concerns. Provided [employer] \"Stroke Prevention\" to patient's son.    Patient does not qualify for the follow up stroke class based on the exclusion criteria of  BMI 24 as well as no history of diabetes.    Thank you.    Electronically signed by:  Angeli Nj RN  01/21/25 12:08 EST  "

## 2025-01-21 NOTE — PROGRESS NOTES
Paged by nursing this evening for new neuro changes of left visual field deficits. Asked RN to call code stroke. CT found stroke w bleed. Pt transferred to the ICU.

## 2025-01-21 NOTE — PLAN OF CARE
Goal Outcome Evaluation:  Plan of Care Reviewed With: patient, child        Progress: no change  Outcome Evaluation: PT re-eval complete with goals adjusted appropriately. Pt presents with L field cut, balance deficits, and decreased activity tolerance warranting skilled IPPT services. Cervical collar donned AAT for session. PT rec IRF at d/c when medically appropriate.    Anticipated Discharge Disposition (PT): inpatient rehabilitation facility

## 2025-01-21 NOTE — PROGRESS NOTES
Critical Care Note     LOS: 1 day   Patient Care Team:  Radu Francis MD as PCP - General    Chief Complaint/Reason for visit:    Chief Complaint   Patient presents with    Fall   C5-6 vertebral body fractures  Intracranial hemorrhage  History of DVT/PE on Coumadin  Chronic right lower extremity DVT  Coronary artery disease/NSTEMI  Chronic kidney disease  Diabetes mellitus type 2 with neuropathy  Hypertension    Subjective     Interval History:     Patient was found by his son on his bedroom floor wedged between the bed and the wall.  Imaging revealed C5 and C6 vertebral body fractures without displacement and he was placed in a cervical collar.  A code stroke was called on January 20 with neglect of his left side and left visual field defect.  Imaging revealed an evolving right temporal occipital lobe infarct with intraparenchymal hemorrhage within the right occipital lobe.  Anticoagulation was reversed and he was moved to the ICU.  Today he underwent a fees examination that revealed some mild oropharyngeal dysphagia.  Mechanical ground textures with nectar thick liquids recommended.  He is on 2 L with a saturation of 95%.  He remains in sinus rhythm.  He denies shortness of air, chest pain.  He does have a slight headache but no nausea or vomiting.    Review of Systems:    All systems were reviewed and negative except as noted in subjective.    Medical history, surgical history, social history, family history reviewed    Objective     Intake/Output:    Intake/Output Summary (Last 24 hours) at 1/21/2025 1312  Last data filed at 1/21/2025 0000  Gross per 24 hour   Intake 104.8 ml   Output 600 ml   Net -495.2 ml       Nutrition:  Diet: Cardiac; Healthy Heart (2-3 Na+); No Mixed Consistencies; Texture: Mechanical Ground (NDD 2); Fluid Consistency: Nectar Thick    Infusions:  niCARdipine, 5-15 mg/hr, Last Rate: Stopped (01/20/25 2200)  [Held by provider] Pharmacy to dose warfarin,         Mechanical Ventilator  "Settings:                                                Telemetry: Sinus rhythm             Vital Signs  Blood pressure 122/64, pulse 61, temperature 97.8 °F (36.6 °C), temperature source Axillary, resp. rate 18, height 182.9 cm (72.01\"), weight 82.7 kg (182 lb 5.1 oz), SpO2 95%.    Physical Exam:  General Appearance:  Elderly gentleman in no respiratory distress   Head:  Abrasion over his left temporal area   Eyes:          Conjunctiva pink, pupils reactive to light and equal   Ears:     Throat: Oral mucosa moist   Neck: Wearing cervical collar   Back:      Lungs:   Breath sounds are bilateral, equal, clear    Heart:  Regular rhythm, S1, S2 auscultated   Abdomen:   Nondistended, bowel sounds present, soft   Rectal:   Deferred   Extremities: Lower extremity edema   Pulses: Palpable pedal pulses   Skin: Warm and dry   Lymph nodes:    Neurologic: Alert, ,  equal, speech fluent, lower extremity strength 3 out of 5, upper extremity strength 4 out of 5, neglects his left side, left visual field cut  Interval:  (returned from MRI)  1a. Level of Consciousness: 0-->Alert, keenly responsive  1b. LOC Questions: 0-->Answers both questions correctly  1c. LOC Commands: 0-->Performs both tasks correctly  2. Best Gaze: 1-->Partial gaze palsy, gaze is abnormal in one or both eyes, but forced deviation or total gaze paresis is not present  3. Visual: 2-->Complete hemianopia  4. Facial Palsy: 1-->Minor paralysis (flattened nasolabial fold, asymmetry on smiling)  5a. Motor Arm, Left: 0-->No drift, limb holds 90 (or 45) degrees for full 10 secs  5b. Motor Arm, Right: 0-->No drift, limb holds 90 (or 45) degrees for full 10 secs  6a. Motor Leg, Left: 2-->Some effort against gravity, leg falls to bed by 5 secs, but has some effort against gravity  6b. Motor Leg, Right: 1-->Drift, leg falls by the end of the 5-sec period but does not hit bed  7. Limb Ataxia: 1-->Present in one limb  8. Sensory: 1-->Mild-to-moderate sensory loss, " "patient feels pinprick is less sharp or is dull on the affected side, or there is a loss of superficial pain with pinprick, but patient is aware of being touched  9. Best Language: 0-->No aphasia, normal  10. Dysarthria: 0-->Normal  11. Extinction and Inattention (formerly Neglect): 1-->Visual, tactile, auditory, spatial, or personal inattention or extinction to bilateral simultaneous stimulation in one of the sensory modalities    Total (NIH Stroke Scale): 10       Results Review:     I reviewed the patient's new clinical results.   Results from last 7 days   Lab Units 01/21/25  0536 01/20/25  0301 01/19/25  1846   SODIUM mmol/L 140 139 137   POTASSIUM mmol/L 3.9 4.0 4.3   CHLORIDE mmol/L 105 102 101   CO2 mmol/L 25.0 25.0 24.0   BUN mg/dL 30* 26* 23   CREATININE mg/dL 2.21* 2.21* 2.17*   CALCIUM mg/dL 8.9 9.0 9.1   BILIRUBIN mg/dL 0.5 0.8 0.9   ALK PHOS U/L 119* 116 135*   ALT (SGPT) U/L 12 12 12   AST (SGOT) U/L 45* 53* 48*   GLUCOSE mg/dL 99 139* 153*     Results from last 7 days   Lab Units 01/21/25  0536 01/20/25  0301 01/19/25  1846   WBC 10*3/mm3 9.44 11.56* 11.23*   HEMOGLOBIN g/dL 11.9* 12.7* 13.6   HEMATOCRIT % 35.8* 38.5 41.1   PLATELETS 10*3/mm3 186 193 196         Lab Results   Component Value Date    BLOODCX No growth at 5 days 01/25/2024     No results found for: \"URINECX\"    I reviewed the patient's new imaging including images and reports.  MRI BRAIN WO CONTRAST, MRI ANGIOGRAM NECK WO CONTRAST, MRI ANGIOGRAM HEAD WO CONTRAST    Date of Exam: 1/21/2025 2:35 AM EST    Indication: left visual cut, hemorrhagic conversion.     Comparison: CT 1 day prior.    Technique: Multiplanar multisequence MRI of the brain performed without IV contrast. Noncontrast time-of-flight 3-dimensional MR angiogram of the head and neck with three-dimensional maximum intensity projections postprocessed      Findings:  MRI brain: Diffusion restriction is present corresponding with known acute infarct in the right PCA " territory, with predominant right occipital and mesial temporal lobe involvement. 1-2 tiny foci of additional likely small acute infarct within the left  occipital lobe are also present. There is intervening small area of acute hemorrhage with T2 prolongation and susceptibility artifact. No new or increased hemorrhage is present. There is localized edema present, some overlying sulcal effacement,  otherwise without midline shift. Age-related volume loss and periventricular leukomalacia appears similar, with associated ex vacuo prominence of the ventricles. No unexpected hemorrhage, mass or mass effect is evident. Midline structures appear normal  and the craniocervical junction is satisfactory. The orbits are normal. The paranasal sinuses are clear.    MR angiogram: The common carotid arteries demonstrate expected flow related signal. The right ICA origin appears patent without evidence of significant atherosclerotic narrowing, 0% stenosis by NASCET criteria. There is evidence of some mild  atherosclerotic narrowing at the left ICA origin, less than 50%. The vertebral arteries are normal in course and caliber bilaterally. Intracranially, the carotid siphons are patent. The anterior cerebral arteries are normal in course and caliber  bilaterally. The right and left middle cerebral arteries are normal in course and caliber bilaterally. Tortuous patent vertebrobasilar system. The posterior cerebral arteries are normal in course and caliber bilaterally.   Impression:     Impression:  Redemonstrated evolving right PCA territory infarct with stable small amount of intervening hemorrhage. Mild localized edema is present without significant associated mass effect. No additional acute findings.    MR angiogram of the head and neck demonstrates some mild atherosclerotic changes without evidence of flow-limiting stenosis, large vessel occlusion or aneurysm.        Electronically Signed: Indra Hill MD   1/21/2025 5:28 AM  EST   Workstation ID: [id number]           All medications reviewed.   carvedilol, 12.5 mg, Oral, BID With Meals  cefTRIAXone, 1,000 mg, Intravenous, Q24H  folic acid, 1 mg, Oral, Daily  Lidocaine, 2 patch, Transdermal, Q24H  multivitamin, 1 tablet, Oral, Daily  mupirocin, 1 Application, Each Nare, BID  pantoprazole, 40 mg, Oral, Q AM  rosuvastatin, 20 mg, Oral, Nightly  thiamine, 100 mg, Oral, Daily          Assessment & Plan       ICH (intracerebral hemorrhage)    CAD (coronary artery disease)    Diabetes mellitus    Hyperlipidemia LDL goal <70    Diabetic nephropathy associated with type 2 diabetes mellitus    Chronic kidney disease, stage IV (severe)    Disc disorder of cervical region    Essential hypertension    Alcohol dependence with unspecified alcohol-induced disorder    C5 cervical fracture    C6 cervical fracture    NSTEMI, initial episode of care    History of pulmonary embolus (PE)/DVT    Acute on chronic diastolic CHF (congestive heart failure)    Syncope and collapse    84-year-old gentleman with diabetes, hypertension, coronary artery disease, chronic kidney disease, history of PE, DVT on Coumadin found down at his home with C5, C6 vertebral body fractures assumed to be traumatic.  He developed left-sided neglect and visual field defect and was evaluated for stroke.  Imaging reveals right posterior cerebral artery infarct with a small amount of hemorrhage and edema.  No large vessel occlusion, aneurysm or flow-limiting stenosis.  He did pass his swallow assessment with a dysphagia diet today.  Blood pressures ranging from 100-142.  Blood glucoses are running .  His A1c was 6.1.  Lipid panel indicates a total cholesterol of 111, triglyceride of 84.  Serum creatinine today is 2.21, compared to 2.49 on admission.  This is his baseline.    EKG on admission revealed ST depression in the inferolateral leads suspicious for non-ST elevation MI.  Echocardiogram revealed an EF of 50% with some LVH and  mild MR.  Troponin T was elevated at 491, 511.  Venous duplex confirms a chronic right lower extremity DVT in the proximal and mid femoral vein.    He had trace bacteria on his UA, bilateral interstitial markings, possible atypical pneumonia and is on empiric Rocephin.  Good oxygenation on 2 L nasal cannula.        PLAN:    Monitor NIH stroke scale  Cervical collar  PT, OT, speech therapy  Dysphagia diet  Sliding scale insulin  Complete a 5-day course of Rocephin  Hold anticoagulation  Consider IVC filter, have ordered placement for tomorrow, called his son Maury and got voicemail  Hold carvedilol secondary to bradycardia  Avoid hypotension  Monitor creatinine, urine output    VTE Prophylaxis:none    Stress Ulcer Prophylaxis:protonix    Enriqueta Torres MD  01/21/25  13:12 EST      Time: Critical care 25 min  I personally provided care to this critically ill patient as documented above.  Critical care time does not include time spent on separately billed procedures.  None of my critical care time was concurrent with other critical care providers.

## 2025-01-22 LAB
GLUCOSE BLDC GLUCOMTR-MCNC: 100 MG/DL (ref 70–130)
GLUCOSE BLDC GLUCOMTR-MCNC: 105 MG/DL (ref 70–130)
GLUCOSE BLDC GLUCOMTR-MCNC: 114 MG/DL (ref 70–130)
INR PPP: 1.16 (ref 0.89–1.12)
PROTHROMBIN TIME: 15 SECONDS (ref 12.2–14.5)

## 2025-01-22 PROCEDURE — 85610 PROTHROMBIN TIME: CPT

## 2025-01-22 PROCEDURE — 99233 SBSQ HOSP IP/OBS HIGH 50: CPT | Performed by: NURSE PRACTITIONER

## 2025-01-22 PROCEDURE — 99232 SBSQ HOSP IP/OBS MODERATE 35: CPT | Performed by: INTERNAL MEDICINE

## 2025-01-22 PROCEDURE — 82948 REAGENT STRIP/BLOOD GLUCOSE: CPT

## 2025-01-22 RX ORDER — ASPIRIN 81 MG/1
81 TABLET, CHEWABLE ORAL DAILY
Status: DISCONTINUED | OUTPATIENT
Start: 2025-01-22 | End: 2025-01-28 | Stop reason: HOSPADM

## 2025-01-22 RX ORDER — AMLODIPINE BESYLATE 5 MG/1
5 TABLET ORAL
Status: DISCONTINUED | OUTPATIENT
Start: 2025-01-22 | End: 2025-01-28 | Stop reason: HOSPADM

## 2025-01-22 RX ORDER — HYDROXYZINE HYDROCHLORIDE 25 MG/1
25 TABLET, FILM COATED ORAL NIGHTLY PRN
Status: DISCONTINUED | OUTPATIENT
Start: 2025-01-22 | End: 2025-01-28 | Stop reason: HOSPADM

## 2025-01-22 RX ADMIN — MUPIROCIN 1 APPLICATION: 20 OINTMENT TOPICAL at 20:16

## 2025-01-22 RX ADMIN — LIDOCAINE 1 PATCH: 560 PATCH PERCUTANEOUS; TOPICAL; TRANSDERMAL at 10:25

## 2025-01-22 RX ADMIN — MULTIVITAMIN TABLET 1 TABLET: TABLET at 10:25

## 2025-01-22 RX ADMIN — THIAMINE HCL TAB 100 MG 100 MG: 100 TAB at 10:25

## 2025-01-22 RX ADMIN — ROSUVASTATIN 20 MG: 20 TABLET, FILM COATED ORAL at 20:16

## 2025-01-22 RX ADMIN — ASPIRIN 81 MG CHEWABLE TABLET 81 MG: 81 TABLET CHEWABLE at 17:06

## 2025-01-22 RX ADMIN — AMLODIPINE BESYLATE 5 MG: 5 TABLET ORAL at 10:25

## 2025-01-22 RX ADMIN — HYDROXYZINE HYDROCHLORIDE 25 MG: 25 TABLET ORAL at 22:18

## 2025-01-22 RX ADMIN — FOLIC ACID 1 MG: 1 TABLET ORAL at 10:25

## 2025-01-22 RX ADMIN — LIDOCAINE 1 PATCH: 560 PATCH PERCUTANEOUS; TOPICAL; TRANSDERMAL at 20:16

## 2025-01-22 NOTE — PLAN OF CARE
Goal Outcome Evaluation:      Intermittent confusion noted, restless at times. Complained of neck discomfort related to C collar at times. New order for Lidoderm patch twice daily obtained with some relief of pain. Up with assist X2. SBP within parameters of less than 150. Son remained at bedside. Possible IVC filter placement today or tomorrow.

## 2025-01-22 NOTE — PROGRESS NOTES
"Critical Care Note     LOS: 2 days   Patient Care Team:  Radu Francis MD as PCP - General    Chief Complaint/Reason for visit:    Chief Complaint   Patient presents with    Fall   C5-6 vertebral body fractures  Intracranial hemorrhage  History of DVT/PE on Coumadin  Chronic right lower extremity DVT  Coronary artery disease/NSTEMI  Chronic kidney disease  Diabetes mellitus type 2 with neuropathy  Hypertension    Subjective     Interval History:     Slight headache.  Continues to have visual field defect on the left.  Denies neck pain.  Denies chest pain or shortness of air.  On room air with a saturation of 93%.  Remains in sinus rhythm.  Voiding without a Schuler catheter.  Status swallow assessment yesterday.  He is requiring assistance to transition from supine to sit.  He did take some steps from the edge of the bed to the chair using a walker and minimal assist of 2.    Review of Systems:    All systems were reviewed and negative except as noted in subjective.    Medical history, surgical history, social history, family history reviewed    Objective     Intake/Output:    Intake/Output Summary (Last 24 hours) at 1/22/2025 1231  Last data filed at 1/22/2025 0200  Gross per 24 hour   Intake 360 ml   Output 950 ml   Net -590 ml       Nutrition:  Diet: Cardiac; Healthy Heart (2-3 Na+); No Mixed Consistencies; Texture: Mechanical Ground (NDD 2); Fluid Consistency: Nectar Thick    Infusions:  niCARdipine, 5-15 mg/hr, Last Rate: Stopped (01/20/25 2200)        Mechanical Ventilator Settings:                                                Telemetry: Sinus rhythm             Vital Signs  Blood pressure 135/70, pulse 67, temperature 97 °F (36.1 °C), temperature source Oral, resp. rate 18, height 182.9 cm (72.01\"), weight 82.7 kg (182 lb 5.1 oz), SpO2 93%.    Physical Exam:  General Appearance:  Elderly gentleman in no respiratory distress   Head:  Abrasion over his left temporal area   Eyes:          Conjunctiva " pink, pupils reactive to light and equal   Ears:     Throat: Oral mucosa moist   Neck: Wearing cervical collar   Back:      Lungs:   Breath sounds are bilateral, equal, clear    Heart:  Regular rhythm, S1, S2 auscultated   Abdomen:   Nondistended, bowel sounds present, soft   Rectal:   Deferred   Extremities: Lower extremity edema   Pulses: Palpable pedal pulses   Skin: Warm and dry   Lymph nodes:    Neurologic: Alert, ,  equal, speech fluent, lower extremity strength 3 out of 5, upper extremity strength 4 out of 5, neglects his left side, left visual field cut  Interval:  (returned from MRI)  1a. Level of Consciousness: 0-->Alert, keenly responsive  1b. LOC Questions: 0-->Answers both questions correctly  1c. LOC Commands: 0-->Performs both tasks correctly  2. Best Gaze: 1-->Partial gaze palsy, gaze is abnormal in one or both eyes, but forced deviation or total gaze paresis is not present  3. Visual: 2-->Complete hemianopia  4. Facial Palsy: 1-->Minor paralysis (flattened nasolabial fold, asymmetry on smiling)  5a. Motor Arm, Left: 0-->No drift, limb holds 90 (or 45) degrees for full 10 secs  5b. Motor Arm, Right: 0-->No drift, limb holds 90 (or 45) degrees for full 10 secs  6a. Motor Leg, Left: 2-->Some effort against gravity, leg falls to bed by 5 secs, but has some effort against gravity  6b. Motor Leg, Right: 1-->Drift, leg falls by the end of the 5-sec period but does not hit bed  7. Limb Ataxia: 1-->Present in one limb  8. Sensory: 1-->Mild-to-moderate sensory loss, patient feels pinprick is less sharp or is dull on the affected side, or there is a loss of superficial pain with pinprick, but patient is aware of being touched  9. Best Language: 0-->No aphasia, normal  10. Dysarthria: 0-->Normal  11. Extinction and Inattention (formerly Neglect): 1-->Visual, tactile, auditory, spatial, or personal inattention or extinction to bilateral simultaneous stimulation in one of the sensory modalities    Total (NIH  "Stroke Scale): 10       Results Review:     I reviewed the patient's new clinical results.   Results from last 7 days   Lab Units 01/21/25  0536 01/20/25  0301 01/19/25  1846   SODIUM mmol/L 140 139 137   POTASSIUM mmol/L 3.9 4.0 4.3   CHLORIDE mmol/L 105 102 101   CO2 mmol/L 25.0 25.0 24.0   BUN mg/dL 30* 26* 23   CREATININE mg/dL 2.21* 2.21* 2.17*   CALCIUM mg/dL 8.9 9.0 9.1   BILIRUBIN mg/dL 0.5 0.8 0.9   ALK PHOS U/L 119* 116 135*   ALT (SGPT) U/L 12 12 12   AST (SGOT) U/L 45* 53* 48*   GLUCOSE mg/dL 99 139* 153*     Results from last 7 days   Lab Units 01/21/25  0536 01/20/25  0301 01/19/25  1846   WBC 10*3/mm3 9.44 11.56* 11.23*   HEMOGLOBIN g/dL 11.9* 12.7* 13.6   HEMATOCRIT % 35.8* 38.5 41.1   PLATELETS 10*3/mm3 186 193 196         Lab Results   Component Value Date    BLOODCX No growth at 5 days 01/25/2024     No results found for: \"URINECX\"    I reviewed the patient's new imaging including images and reports.  MRI BRAIN WO CONTRAST, MRI ANGIOGRAM NECK WO CONTRAST, MRI ANGIOGRAM HEAD WO CONTRAST    Date of Exam: 1/21/2025 2:35 AM EST    Indication: left visual cut, hemorrhagic conversion.     Comparison: CT 1 day prior.    Technique: Multiplanar multisequence MRI of the brain performed without IV contrast. Noncontrast time-of-flight 3-dimensional MR angiogram of the head and neck with three-dimensional maximum intensity projections postprocessed      Findings:  MRI brain: Diffusion restriction is present corresponding with known acute infarct in the right PCA territory, with predominant right occipital and mesial temporal lobe involvement. 1-2 tiny foci of additional likely small acute infarct within the left  occipital lobe are also present. There is intervening small area of acute hemorrhage with T2 prolongation and susceptibility artifact. No new or increased hemorrhage is present. There is localized edema present, some overlying sulcal effacement,  otherwise without midline shift. Age-related volume " loss and periventricular leukomalacia appears similar, with associated ex vacuo prominence of the ventricles. No unexpected hemorrhage, mass or mass effect is evident. Midline structures appear normal  and the craniocervical junction is satisfactory. The orbits are normal. The paranasal sinuses are clear.    MR angiogram: The common carotid arteries demonstrate expected flow related signal. The right ICA origin appears patent without evidence of significant atherosclerotic narrowing, 0% stenosis by NASCET criteria. There is evidence of some mild  atherosclerotic narrowing at the left ICA origin, less than 50%. The vertebral arteries are normal in course and caliber bilaterally. Intracranially, the carotid siphons are patent. The anterior cerebral arteries are normal in course and caliber  bilaterally. The right and left middle cerebral arteries are normal in course and caliber bilaterally. Tortuous patent vertebrobasilar system. The posterior cerebral arteries are normal in course and caliber bilaterally.   Impression:     Impression:  Redemonstrated evolving right PCA territory infarct with stable small amount of intervening hemorrhage. Mild localized edema is present without significant associated mass effect. No additional acute findings.    MR angiogram of the head and neck demonstrates some mild atherosclerotic changes without evidence of flow-limiting stenosis, large vessel occlusion or aneurysm.        Electronically Signed: Indra Hill MD   1/21/2025 5:28 AM EST   Workstation ID: [id number]           All medications reviewed.   amLODIPine, 5 mg, Oral, Q24H  folic acid, 1 mg, Oral, Daily  Lidocaine, 1 patch, Transdermal, Q12H  multivitamin, 1 tablet, Oral, Daily  mupirocin, 1 Application, Each Nare, BID  pantoprazole, 40 mg, Oral, Q AM  rosuvastatin, 20 mg, Oral, Nightly  thiamine, 100 mg, Oral, Daily          Assessment & Plan       ICH (intracerebral hemorrhage)    CAD (coronary artery disease)    Diabetes  mellitus    Hyperlipidemia LDL goal <70    Diabetic nephropathy associated with type 2 diabetes mellitus    Chronic kidney disease, stage IV (severe)    Disc disorder of cervical region    Essential hypertension    Alcohol dependence with unspecified alcohol-induced disorder    C5 cervical fracture    C6 cervical fracture    NSTEMI, initial episode of care    History of pulmonary embolus (PE)/DVT    Acute on chronic diastolic CHF (congestive heart failure)    Syncope and collapse    84-year-old gentleman with diabetes, hypertension, coronary artery disease, chronic kidney disease, history of PE, DVT on Coumadin found down at his home with C5, C6 vertebral body fractures assumed to be traumatic.  He developed left-sided neglect and visual field defect and was evaluated for stroke.  Imaging reveals right posterior cerebral artery infarct with a small amount of hemorrhage and edema.  No large vessel occlusion, aneurysm or flow-limiting stenosis.  He did pass his swallow assessment with a dysphagia diet.  He did mobilize with physical therapy.  He continues to have a left visual field cut and some minor left lower extremity weakness.  Blood pressures ranging from 100-166.  Blood glucoses are running .  His A1c was 6.1.  Lipid panel indicates a total cholesterol of 111, triglyceride of 84.  Serum creatinine 2.21 yesterday.  This is his baseline.    EKG on admission revealed ST depression in the inferolateral leads suspicious for non-ST elevation MI.  Echocardiogram revealed an EF of 50% with some LVH and mild MR.  Troponin T was elevated at 491, 511.  Venous duplex confirms a chronic right lower extremity DVT in the proximal and mid femoral vein.  He will be off anticoagulation for a minimum of 2 weeks I feel we should consider an IVC filter.  He is at risk for additional falls and recurrent bleeding.    He had trace bacteria on his UA, bilateral interstitial markings, possible atypical pneumonia and is on empiric  Rocephin.  Good oxygenation on 2 L nasal cannula.        PLAN:    Monitor NIH stroke scale  Cervical collar  PT, OT, speech therapy  Dysphagia diet  Sliding scale insulin  Complete a 5-day course of Rocephin  Hold anticoagulation  Consider IVC filter, discussed with his cardiologist,  Hold carvedilol secondary to bradycardia  Avoid hypotension  Monitor creatinine, urine output    VTE Prophylaxis:none    Stress Ulcer Prophylaxis:protonix    Addendum: Stroke neurology recommends that we can resume anticoagulation in 7 days.  Therefore we will hold off on IVC filter and plan on starting anticoagulation.  I would like to see how he mobilizes with PT and OT.  He fell hard enough to fracture C5-C6 vertebral bodies.  If he continues to be a fall risk, may still want the IVC filter    Enriqueta Torres MD  01/22/25  12:31 EST      Time: Critical care 25 min  I personally provided care to this critically ill patient as documented above.  Critical care time does not include time spent on separately billed procedures.  None of my critical care time was concurrent with other critical care providers.

## 2025-01-22 NOTE — CASE MANAGEMENT/SOCIAL WORK
Continued Stay Note  [facility]     Patient Name: Toño Alcala  MRN: [id number]  Today's Date: 1/22/2025    Admit Date: 1/19/2025    Plan: [facility]   Discharge Plan       Row Name 01/22/25 1133       Plan    Plan [facility]    Patient/Family in Agreement with Plan yes    Plan Comments Pt was discussed in Medical Rounds today. Pt is scheduled for IVC filter placement today. PT/OT are recommending Inpatient Rehab.  spoke with Mr. Alcala and his son, at bedside. Pt and son would like a referral to [facility]. CM spoke with Rekha/[facility]. Referral given.  will continue to follow.    Final Discharge Disposition Code 62 - inpatient rehab facility                   Discharge Codes    No documentation.                 Expected Discharge Date and Time       Expected Discharge Date Expected Discharge Time    Jan 21, 2025 12:00 PM              Emy Hanson RN

## 2025-01-22 NOTE — PROGRESS NOTES
No plan for IVC filter placement today.  Will revisit discussion with patient and family tomorrow.  Patient does not need to be n.p.o. for procedure.    YARED Mueller

## 2025-01-22 NOTE — PROGRESS NOTES
Stroke Progress Note       Chief Complaint: Fall    Subjective    Subjective     Subjective:  Sitting up in the chair. Son is present at the bedside.   Denies any headache at this time. Following commands without issue.   No acute complaints.     Review of Systems   Eyes:  Positive for visual disturbance.   Neurological:  Positive for weakness and headaches.         Objective      Temp:  [97 °F (36.1 °C)-97.7 °F (36.5 °C)] 97 °F (36.1 °C)  Heart Rate:  [58-92] 67  Resp:  [14-18] 18  BP: (114-166)/(54-97) 135/70    Objective    GEN: lying in bed; in NAD  HENT: normocephalic, non-erythematous oropharynx  CV: no LE edema    NEURO:  Mental Status: A&O x 3, interactive, able to follow commands  Speech: Intact Articulation  CN 2-12:  II - PERRLA, left homonymous hemianopsia noted  III, IV, VI - EOMI  V - Facial sensation intact  VII -no gross facial asymmetry  VIII - Auditory acuity intact  XII - Tongue protrudes midline    Motor: The patient can move the bilateral upper extremity against gravity and can bend bilateral lower extremity at knee, BLE weakness  Sensory: left sided sensory deficit noted with closed eye testing  Reflexes: negative Salcido's sign BL  Coordination: no ataxia with finger-to-nose testing  Gait/Station: deferred     Results Review:    I reviewed the patient's new clinical results.    WBC   Date Value Ref Range Status   01/21/2025 9.44 3.40 - 10.80 10*3/mm3 Final     RBC   Date Value Ref Range Status   01/21/2025 4.17 4.14 - 5.80 10*6/mm3 Final     Hemoglobin   Date Value Ref Range Status   01/21/2025 11.9 (L) 13.0 - 17.7 g/dL Final     Hematocrit   Date Value Ref Range Status   01/21/2025 35.8 (L) 37.5 - 51.0 % Final     MCV   Date Value Ref Range Status   01/21/2025 85.9 79.0 - 97.0 fL Final     MCH   Date Value Ref Range Status   01/21/2025 28.5 26.6 - 33.0 pg Final     MCHC   Date Value Ref Range Status   01/21/2025 33.2 31.5 - 35.7 g/dL Final     RDW   Date Value Ref Range Status   01/21/2025  16.3 (H) 12.3 - 15.4 % Final     RDW-SD   Date Value Ref Range Status   01/21/2025 51.7 37.0 - 54.0 fl Final     MPV   Date Value Ref Range Status   01/21/2025 10.1 6.0 - 12.0 fL Final     Platelets   Date Value Ref Range Status   01/21/2025 186 140 - 450 10*3/mm3 Final     Neutrophil %   Date Value Ref Range Status   01/21/2025 65.6 42.7 - 76.0 % Final     Lymphocyte %   Date Value Ref Range Status   01/21/2025 18.0 (L) 19.6 - 45.3 % Final     Monocyte %   Date Value Ref Range Status   01/21/2025 10.7 5.0 - 12.0 % Final     Eosinophil %   Date Value Ref Range Status   01/21/2025 5.0 0.3 - 6.2 % Final     Basophil %   Date Value Ref Range Status   01/21/2025 0.4 0.0 - 1.5 % Final     Immature Grans %   Date Value Ref Range Status   01/21/2025 0.3 0.0 - 0.5 % Final     Neutrophils, Absolute   Date Value Ref Range Status   01/21/2025 6.19 1.70 - 7.00 10*3/mm3 Final     Lymphocytes, Absolute   Date Value Ref Range Status   01/21/2025 1.70 0.70 - 3.10 10*3/mm3 Final     Monocytes, Absolute   Date Value Ref Range Status   01/21/2025 1.01 (H) 0.10 - 0.90 10*3/mm3 Final     Eosinophils, Absolute   Date Value Ref Range Status   01/21/2025 0.47 (H) 0.00 - 0.40 10*3/mm3 Final     Basophils, Absolute   Date Value Ref Range Status   01/21/2025 0.04 0.00 - 0.20 10*3/mm3 Final     Immature Grans, Absolute   Date Value Ref Range Status   01/21/2025 0.03 0.00 - 0.05 10*3/mm3 Final     nRBC   Date Value Ref Range Status   01/21/2025 0.0 0.0 - 0.2 /100 WBC Final       Lab Results   Component Value Date    GLUCOSE 99 01/21/2025    BUN 30 (H) 01/21/2025    CREATININE 2.21 (H) 01/21/2025     01/21/2025    K 3.9 01/21/2025     01/21/2025    CALCIUM 8.9 01/21/2025    PROTEINTOT 6.6 01/21/2025    ALBUMIN 3.5 01/21/2025    ALT 12 01/21/2025    AST 45 (H) 01/21/2025    ALKPHOS 119 (H) 01/21/2025    BILITOT 0.5 01/21/2025    GLOB 3.1 01/21/2025    AGRATIO 1.1 01/21/2025    BCR 13.6 01/21/2025    ANIONGAP 10.0 01/21/2025    EGFR  28.7 (L) 01/21/2025     MRI Angiogram Head Without Contrast    Result Date: 1/21/2025  Impression: Redemonstrated evolving right PCA territory infarct with stable small amount of intervening hemorrhage. Mild localized edema is present without significant associated mass effect. No additional acute findings. MR angiogram of the head and neck demonstrates some mild atherosclerotic changes without evidence of flow-limiting stenosis, large vessel occlusion or aneurysm. Electronically Signed: Indra Hill MD  1/21/2025 5:28 AM EST  Workstation ID: [id number]    MRI Angiogram Neck Without Contrast    Result Date: 1/21/2025  Impression: Redemonstrated evolving right PCA territory infarct with stable small amount of intervening hemorrhage. Mild localized edema is present without significant associated mass effect. No additional acute findings. MR angiogram of the head and neck demonstrates some mild atherosclerotic changes without evidence of flow-limiting stenosis, large vessel occlusion or aneurysm. Electronically Signed: Indra Hill MD  1/21/2025 5:28 AM EST  Workstation ID: [id number]    MRI Brain Without Contrast    Result Date: 1/21/2025  Impression: Redemonstrated evolving right PCA territory infarct with stable small amount of intervening hemorrhage. Mild localized edema is present without significant associated mass effect. No additional acute findings. MR angiogram of the head and neck demonstrates some mild atherosclerotic changes without evidence of flow-limiting stenosis, large vessel occlusion or aneurysm. Electronically Signed: Indra Hill MD  1/21/2025 5:28 AM EST  Workstation ID: [id number]    CT Head Without Contrast    Result Date: 1/21/2025  Impression: No significant change. Stable evolving infarcts within the right occipital and temporal lobes with a small amount of surrounding edema and hemorrhage. No new areas of hemorrhage identified. Electronically Signed: Deana Caballero MD  1/21/2025 12:13 AM EST   Workstation ID: [id number]    CT Head Without Contrast Stroke Protocol    Result Date: 1/20/2025  Impression: 1. Evolving right temporal and occipital lobe infarcts with new intraparenchymal hemorrhage within the right occipital lobe measuring 2.1 x 1.6 cm in size with a volume of less than 30 cc. No mass effect or midline shift. No intraventricular hemorrhage. Electronically Signed: Delano Mcrae MD  1/20/2025 6:38 PM EST  Workstation ID: [id number]   Results for orders placed during the hospital encounter of 01/19/25    Adult Transthoracic Echo Complete W/ Cont if Necessary Per Protocol    Interpretation Summary    Left ventricular systolic function is normal. Estimated left ventricular EF = 50%    Left ventricular wall thickness is consistent with borderline concentric hypertrophy.    Mild mitral valve regurgitation is present.      -MRA of the head and neck images from 1- were personally reviewed and showed no flow limiting stenosis or LVO  -MRI brain images from 1- were personally reviewed and showed an acute ischemic stroke of the R tempro-occipital lobe with hemorrhagic conversion  -Transthoracic echocardiogram is pending   -A1c from 1- was 5.7%  -LDL from 1- was 59  -BL LE duplex on 1- showed chronic RLE DVT    Assessment/Plan   This is a 84-year-old male with past medical history of remote CVA (no deficits, in 1990), CAD, NSTEMI, HTN, CKD IV, HLD, T2DM, DVT, PE (on Coumadin) was a in-house code stroke at 1759 this evening.  Patient is admitted for a nondisplaced oblique fracture through the C5 and C6 vertebral bodies that he obtained after a fall while at home.  Patient is unsure what caused him to fall or how long he was on the ground.  Primary nurse reports she was giving him his evening Coumadin when she noticed that he did not acknowledge the drink in his left hand and seemed to have a left visual deficit.  Last known well is unclear as nursing and family tells me that  patient has slept most of the day.  CT head shows evolving right temporal and occipital lobe infarcts with new intraparenchymal hemorrhage within the right occipital lobe measuring 2.1 x 1.6 cm (volume less than 30 cc).  No mass effect or midline shift.  CTA head neck and CT perfusion were deferred as patient's EGFR was 28.7. Patient's Coumadin was reversed with Kcentra and vitamin K.  He was transferred immediately to the ICU for close neurological monitoring.  I did discuss case and imaging with Dr. Cruz (neurosurgery) who recommends medical management at this time.     Antiplatelet PTA: None  Anticoagulant PTA: Warfarin        #Acute right temporal and occipital lobe infarct with hemorrhagic conversion  #s/p Coumadin reversal with Kcentra and vitamin K  -Hemorrhagic stroke order set has been initiated  -ICH score 1  -Discussed with Dr. Cruz, neurosurgery, patient not a candidate for neurosurgical treatment  -MRA of the head and neck images from 1- were personally reviewed and showed no flow limiting stenosis or LVO  -MRI brain images from 1- were personally reviewed and showed an acute ischemic stroke of the R tempro-occipital lobe with hemorrhagic conversion  -Transthoracic echocardiogram on 1/20/2025 showed left ventricular ejection fraction of 50%, dilated left atrium  -A1c from 1- was 5.7%  -LDL from 1- was 59  -BL LE duplex on 1- showed chronic RLE DVT    Recommendations:   -Target blood pressure goals of 130-150.  -Initiate ASA 81mg daily today  -Will continue to hold Warfarin for a total of 7 days   -Activity as tolerated, fall risk precautions  -PT/OT/SLP recommend IPR at DC, lives independently   -Target blood pressure goals of 120-150, overall management per ICU Medicine team   -Atorvastatin 80mg nightly    Stroke will continue to follow. Please call for any further questions or concerns.  =================================  YARED Forrest  Stroke Neurology    [facility]

## 2025-01-23 ENCOUNTER — APPOINTMENT (OUTPATIENT)
Dept: CT IMAGING | Facility: HOSPITAL | Age: 85
DRG: 064 | End: 2025-01-23
Payer: MEDICARE

## 2025-01-23 LAB
ANION GAP SERPL CALCULATED.3IONS-SCNC: 9 MMOL/L (ref 5–15)
BUN SERPL-MCNC: 27 MG/DL (ref 8–23)
BUN/CREAT SERPL: 13.6 (ref 7–25)
CALCIUM SPEC-SCNC: 9.2 MG/DL (ref 8.6–10.5)
CHLORIDE SERPL-SCNC: 101 MMOL/L (ref 98–107)
CO2 SERPL-SCNC: 26 MMOL/L (ref 22–29)
CREAT SERPL-MCNC: 1.99 MG/DL (ref 0.76–1.27)
DEPRECATED RDW RBC AUTO: 50.8 FL (ref 37–54)
EGFRCR SERPLBLD CKD-EPI 2021: 32.5 ML/MIN/1.73
ERYTHROCYTE [DISTWIDTH] IN BLOOD BY AUTOMATED COUNT: 16.3 % (ref 12.3–15.4)
GLUCOSE BLDC GLUCOMTR-MCNC: 107 MG/DL (ref 70–130)
GLUCOSE BLDC GLUCOMTR-MCNC: 92 MG/DL (ref 70–130)
GLUCOSE BLDC GLUCOMTR-MCNC: 94 MG/DL (ref 70–130)
GLUCOSE SERPL-MCNC: 99 MG/DL (ref 65–99)
HCT VFR BLD AUTO: 37.2 % (ref 37.5–51)
HGB BLD-MCNC: 12.7 G/DL (ref 13–17.7)
INR PPP: 1.25 (ref 0.89–1.12)
MCH RBC QN AUTO: 29.1 PG (ref 26.6–33)
MCHC RBC AUTO-ENTMCNC: 34.1 G/DL (ref 31.5–35.7)
MCV RBC AUTO: 85.1 FL (ref 79–97)
PLATELET # BLD AUTO: 218 10*3/MM3 (ref 140–450)
PMV BLD AUTO: 9.9 FL (ref 6–12)
POTASSIUM SERPL-SCNC: 3.6 MMOL/L (ref 3.5–5.2)
POTASSIUM SERPL-SCNC: 4.1 MMOL/L (ref 3.5–5.2)
PROTHROMBIN TIME: 15.8 SECONDS (ref 12.2–14.5)
RBC # BLD AUTO: 4.37 10*6/MM3 (ref 4.14–5.8)
SODIUM SERPL-SCNC: 136 MMOL/L (ref 136–145)
WBC NRBC COR # BLD AUTO: 9.55 10*3/MM3 (ref 3.4–10.8)

## 2025-01-23 PROCEDURE — 25010000002 FENTANYL CITRATE (PF) 50 MCG/ML SOLUTION: Performed by: INTERNAL MEDICINE

## 2025-01-23 PROCEDURE — C1880 VENA CAVA FILTER: HCPCS | Performed by: INTERNAL MEDICINE

## 2025-01-23 PROCEDURE — C1894 INTRO/SHEATH, NON-LASER: HCPCS | Performed by: INTERNAL MEDICINE

## 2025-01-23 PROCEDURE — C1769 GUIDE WIRE: HCPCS | Performed by: INTERNAL MEDICINE

## 2025-01-23 PROCEDURE — 25010000002 MIDAZOLAM PER 1 MG: Performed by: INTERNAL MEDICINE

## 2025-01-23 PROCEDURE — 99232 SBSQ HOSP IP/OBS MODERATE 35: CPT | Performed by: INTERNAL MEDICINE

## 2025-01-23 PROCEDURE — 06H03DZ INSERTION OF INTRALUMINAL DEVICE INTO INFERIOR VENA CAVA, PERCUTANEOUS APPROACH: ICD-10-PCS | Performed by: INTERNAL MEDICINE

## 2025-01-23 PROCEDURE — 37191 INS ENDOVAS VENA CAVA FILTR: CPT | Performed by: INTERNAL MEDICINE

## 2025-01-23 PROCEDURE — 70450 CT HEAD/BRAIN W/O DYE: CPT

## 2025-01-23 PROCEDURE — 82948 REAGENT STRIP/BLOOD GLUCOSE: CPT

## 2025-01-23 PROCEDURE — 97530 THERAPEUTIC ACTIVITIES: CPT

## 2025-01-23 PROCEDURE — 25010000002 LIDOCAINE PF 1% 1 % SOLUTION: Performed by: INTERNAL MEDICINE

## 2025-01-23 PROCEDURE — 97535 SELF CARE MNGMENT TRAINING: CPT

## 2025-01-23 PROCEDURE — 25510000001 IOPAMIDOL PER 1 ML: Performed by: INTERNAL MEDICINE

## 2025-01-23 PROCEDURE — 85610 PROTHROMBIN TIME: CPT

## 2025-01-23 PROCEDURE — 80048 BASIC METABOLIC PNL TOTAL CA: CPT | Performed by: INTERNAL MEDICINE

## 2025-01-23 PROCEDURE — 85027 COMPLETE CBC AUTOMATED: CPT | Performed by: INTERNAL MEDICINE

## 2025-01-23 PROCEDURE — 99232 SBSQ HOSP IP/OBS MODERATE 35: CPT | Performed by: NURSE PRACTITIONER

## 2025-01-23 PROCEDURE — 25010000002 POTASSIUM CHLORIDE 10 MEQ/100ML SOLUTION: Performed by: NURSE PRACTITIONER

## 2025-01-23 PROCEDURE — 84132 ASSAY OF SERUM POTASSIUM: CPT | Performed by: INTERNAL MEDICINE

## 2025-01-23 DEVICE — DENALI® VENA CAVA FILTER FEMORAL
Type: IMPLANTABLE DEVICE | Status: FUNCTIONAL
Brand: DENALI® VENA CAVA FILTER

## 2025-01-23 RX ORDER — IOPAMIDOL 755 MG/ML
INJECTION, SOLUTION INTRAVASCULAR
Status: DISCONTINUED | OUTPATIENT
Start: 2025-01-23 | End: 2025-01-23 | Stop reason: HOSPADM

## 2025-01-23 RX ORDER — LABETALOL HYDROCHLORIDE 5 MG/ML
20 INJECTION, SOLUTION INTRAVENOUS EVERY 4 HOURS PRN
Status: DISCONTINUED | OUTPATIENT
Start: 2025-01-23 | End: 2025-01-23

## 2025-01-23 RX ORDER — LABETALOL HYDROCHLORIDE 5 MG/ML
20 INJECTION, SOLUTION INTRAVENOUS EVERY 4 HOURS PRN
Status: DISCONTINUED | OUTPATIENT
Start: 2025-01-23 | End: 2025-01-28 | Stop reason: HOSPADM

## 2025-01-23 RX ORDER — FENTANYL CITRATE 50 UG/ML
INJECTION, SOLUTION INTRAMUSCULAR; INTRAVENOUS
Status: DISCONTINUED | OUTPATIENT
Start: 2025-01-23 | End: 2025-01-23 | Stop reason: HOSPADM

## 2025-01-23 RX ORDER — POTASSIUM CHLORIDE 7.45 MG/ML
10 INJECTION INTRAVENOUS
Status: COMPLETED | OUTPATIENT
Start: 2025-01-23 | End: 2025-01-23

## 2025-01-23 RX ORDER — HYDRALAZINE HYDROCHLORIDE 25 MG/1
25 TABLET, FILM COATED ORAL EVERY 12 HOURS SCHEDULED
Status: DISCONTINUED | OUTPATIENT
Start: 2025-01-23 | End: 2025-01-25

## 2025-01-23 RX ORDER — LIDOCAINE HYDROCHLORIDE 10 MG/ML
INJECTION, SOLUTION EPIDURAL; INFILTRATION; INTRACAUDAL; PERINEURAL
Status: DISCONTINUED | OUTPATIENT
Start: 2025-01-23 | End: 2025-01-23 | Stop reason: HOSPADM

## 2025-01-23 RX ORDER — MIDAZOLAM HYDROCHLORIDE 1 MG/ML
INJECTION, SOLUTION INTRAMUSCULAR; INTRAVENOUS
Status: DISCONTINUED | OUTPATIENT
Start: 2025-01-23 | End: 2025-01-23 | Stop reason: HOSPADM

## 2025-01-23 RX ORDER — POTASSIUM CHLORIDE 29.8 MG/ML
20 INJECTION INTRAVENOUS
Status: DISCONTINUED | OUTPATIENT
Start: 2025-01-23 | End: 2025-01-23

## 2025-01-23 RX ADMIN — ASPIRIN 81 MG CHEWABLE TABLET 81 MG: 81 TABLET CHEWABLE at 08:57

## 2025-01-23 RX ADMIN — ROSUVASTATIN 20 MG: 20 TABLET, FILM COATED ORAL at 20:53

## 2025-01-23 RX ADMIN — PANTOPRAZOLE SODIUM 40 MG: 40 TABLET, DELAYED RELEASE ORAL at 05:14

## 2025-01-23 RX ADMIN — POTASSIUM CHLORIDE 10 MEQ: 7.45 INJECTION INTRAVENOUS at 06:06

## 2025-01-23 RX ADMIN — Medication 20 MG: at 05:14

## 2025-01-23 RX ADMIN — POTASSIUM CHLORIDE 10 MEQ: 7.45 INJECTION INTRAVENOUS at 11:10

## 2025-01-23 RX ADMIN — LIDOCAINE 1 PATCH: 560 PATCH PERCUTANEOUS; TOPICAL; TRANSDERMAL at 08:58

## 2025-01-23 RX ADMIN — HYDRALAZINE HYDROCHLORIDE 25 MG: 25 TABLET ORAL at 20:53

## 2025-01-23 RX ADMIN — MULTIVITAMIN TABLET 1 TABLET: TABLET at 08:57

## 2025-01-23 RX ADMIN — MUPIROCIN 1 APPLICATION: 20 OINTMENT TOPICAL at 08:58

## 2025-01-23 RX ADMIN — AMLODIPINE BESYLATE 5 MG: 5 TABLET ORAL at 08:57

## 2025-01-23 RX ADMIN — THIAMINE HCL TAB 100 MG 100 MG: 100 TAB at 08:57

## 2025-01-23 RX ADMIN — LIDOCAINE 1 PATCH: 560 PATCH PERCUTANEOUS; TOPICAL; TRANSDERMAL at 20:53

## 2025-01-23 RX ADMIN — HYDRALAZINE HYDROCHLORIDE 25 MG: 25 TABLET ORAL at 08:58

## 2025-01-23 RX ADMIN — POTASSIUM CHLORIDE 10 MEQ: 7.45 INJECTION INTRAVENOUS at 07:49

## 2025-01-23 RX ADMIN — MUPIROCIN 1 APPLICATION: 20 OINTMENT TOPICAL at 20:53

## 2025-01-23 RX ADMIN — POTASSIUM CHLORIDE 10 MEQ: 7.45 INJECTION INTRAVENOUS at 08:58

## 2025-01-23 RX ADMIN — FOLIC ACID 1 MG: 1 TABLET ORAL at 08:57

## 2025-01-23 NOTE — PLAN OF CARE
Goal Outcome Evaluation:  Plan of Care Reviewed With: patient        Progress: no change  Outcome Evaluation: Pt's ADL independence limited d/t generalized weakness, decreased functional endurance, increased pain, balance deficits, and pain. Will continue to progress pt as tolerated per OT POC. Rec IRF at d/c.    Anticipated Discharge Disposition (OT): inpatient rehabilitation facility

## 2025-01-23 NOTE — SIGNIFICANT NOTE
01/23/25 0053 01/23/25 0116 01/23/25 0118   Clinician Notification   Reason for Communication Other (Comment)  (pt fell) Other (Comment)  --    Clinician Name Eduar Coe  --  Ambika Flores   Clinician Role Nurse practitioner Physician assistant Physician assistant   Method of Communication Call Page Call   Response No new orders Waiting for response No new orders   Notification Time 0053  --  0011

## 2025-01-23 NOTE — PLAN OF CARE
Problem: Adult Inpatient Plan of Care  Goal: Plan of Care Review  1/22/2025 2133 by Kayy Milan RN  Outcome: Progressing  1/22/2025 2131 by Kayy Milan RN  Outcome: Progressing  Goal: Patient-Specific Goal (Individualized)  1/22/2025 2133 by Kayy Milan RN  Outcome: Progressing  1/22/2025 2131 by Kayy Milan RN  Outcome: Progressing  Goal: Absence of Hospital-Acquired Illness or Injury  1/22/2025 2133 by Kayy Milan RN  Outcome: Progressing  1/22/2025 2131 by Kayy Milan RN  Outcome: Progressing  Intervention: Identify and Manage Fall Risk  Recent Flowsheet Documentation  Taken 1/22/2025 2000 by Kayy Milan RN  Safety Promotion/Fall Prevention: safety round/check completed  Intervention: Prevent Skin Injury  Recent Flowsheet Documentation  Taken 1/22/2025 2000 by Kayy Milan RN  Body Position:   heels elevated   tilted   left  Skin Protection:   incontinence pads utilized   silicone foam dressing in place  Intervention: Prevent and Manage VTE (Venous Thromboembolism) Risk  Recent Flowsheet Documentation  Taken 1/22/2025 2000 by Kayy Milan RN  VTE Prevention/Management:   bilateral   SCDs (sequential compression devices) off   patient refused intervention  Intervention: Prevent Infection  Recent Flowsheet Documentation  Taken 1/22/2025 2000 by Kayy Milan RN  Infection Prevention: environmental surveillance performed  Goal: Optimal Comfort and Wellbeing  1/22/2025 2133 by Kayy Milan RN  Outcome: Progressing  1/22/2025 2131 by Kayy Milan RN  Outcome: Progressing  Intervention: Provide Person-Centered Care  Recent Flowsheet Documentation  Taken 1/22/2025 2000 by Kayy Milan RN  Trust Relationship/Rapport:   care explained   choices provided   emotional support provided   questions answered   reassurance provided   thoughts/feelings acknowledged  Goal: Readiness for Transition of Care  1/22/2025 2133 by Kayy Milan  RN  Outcome: Progressing  1/22/2025 2131 by Kayy Milan RN  Outcome: Progressing     Problem: Skin Injury Risk Increased  Goal: Skin Health and Integrity  1/22/2025 2133 by Kayy Milan RN  Outcome: Progressing  1/22/2025 2131 by Kayy Milan RN  Outcome: Progressing  Intervention: Optimize Skin Protection  Recent Flowsheet Documentation  Taken 1/22/2025 2000 by Kayy Milan RN  Activity Management: up in chair  Pressure Reduction Techniques:   frequent weight shift encouraged   heels elevated off bed  Head of Bed (HOB) Positioning: HOB elevated  Pressure Reduction Devices:   chair cushion utilized   feet on footrest/footstool  Skin Protection:   incontinence pads utilized   silicone foam dressing in place     Problem: Orthopaedic Fracture  Goal: Absence of Bleeding  1/22/2025 2133 by Kayy Milan RN  Outcome: Progressing  1/22/2025 2131 by Kayy Milan RN  Outcome: Progressing  Goal: Bowel Elimination  1/22/2025 2133 by Kayy Milan RN  Outcome: Progressing  1/22/2025 2131 by Kayy Milan RN  Outcome: Progressing  Goal: Absence of Embolism Signs and Symptoms  1/22/2025 2133 by Kayy Milan RN  Outcome: Progressing  1/22/2025 2131 by Kayy Milan RN  Outcome: Progressing  Intervention: Prevent or Manage Embolism Risk  Recent Flowsheet Documentation  Taken 1/22/2025 2000 by Kayy Milan RN  VTE Prevention/Management:   bilateral   SCDs (sequential compression devices) off   patient refused intervention  Goal: Fracture Stability  1/22/2025 2133 by Kayy Milan RN  Outcome: Progressing  1/22/2025 2131 by Kayy Milan RN  Outcome: Progressing  Goal: Optimal Functional Ability  1/22/2025 2133 by Kayy Milan RN  Outcome: Progressing  1/22/2025 2131 by Kayy Milan RN  Outcome: Progressing  Intervention: Optimize Functional Ability  Recent Flowsheet Documentation  Taken 1/22/2025 2000 by Kayy Milan RN  Activity Management:  up in chair  Positioning/Transfer Devices:   pillows   in use  Range of Motion: ROM (range of motion) performed  Goal: Absence of Infection Signs and Symptoms  1/22/2025 2133 by Kayy Milan RN  Outcome: Progressing  1/22/2025 2131 by Kayy Milan RN  Outcome: Progressing  Goal: Effective Tissue Perfusion  1/22/2025 2133 by Kayy Milan RN  Outcome: Progressing  1/22/2025 2131 by Kayy Milan RN  Outcome: Progressing  Goal: Optimal Pain Control and Function  1/22/2025 2133 by Kayy Milan RN  Outcome: Progressing  1/22/2025 2131 by Kayy Milan RN  Outcome: Progressing  Goal: Effective Oxygenation and Ventilation  1/22/2025 2133 by Kayy Milan RN  Outcome: Progressing  1/22/2025 2131 by Kayy Milan RN  Outcome: Progressing  Intervention: Promote Airway Secretion Clearance  Recent Flowsheet Documentation  Taken 1/22/2025 2000 by Kayy Milan RN  Activity Management: up in chair  Cough And Deep Breathing: done independently per patient  Intervention: Optimize Oxygenation and Ventilation  Recent Flowsheet Documentation  Taken 1/22/2025 2000 by Kayy Milan RN  Head of Bed (HOB) Positioning: HOB elevated     Problem: Heart Failure  Goal: Optimal Coping  1/22/2025 2133 by Kayy Milan RN  Outcome: Progressing  1/22/2025 2131 by Kayy Milan RN  Outcome: Progressing  Intervention: Support Psychosocial Response  Recent Flowsheet Documentation  Taken 1/22/2025 2000 by Kayy Milan RN  Supportive Measures:   active listening utilized   decision-making supported   positive reinforcement provided   verbalization of feelings encouraged  Goal: Optimal Cardiac Output and Blood Flow  1/22/2025 2133 by Kayy Milan RN  Outcome: Progressing  1/22/2025 2131 by Kayy Milan RN  Outcome: Progressing  Intervention: Optimize Cardiac Output  Recent Flowsheet Documentation  Taken 1/22/2025 2000 by Kayy Milan RN  Environmental Support: calm  environment promoted  Goal: Stable Heart Rate and Rhythm  1/22/2025 2133 by Kayy Milan RN  Outcome: Progressing  1/22/2025 2131 by Kayy Milan RN  Outcome: Progressing  Goal: Fluid and Electrolyte Balance  1/22/2025 2133 by Kayy Milan RN  Outcome: Progressing  1/22/2025 2131 by Kayy Milan RN  Outcome: Progressing  Goal: Optimal Functional Ability  1/22/2025 2133 by Kayy Milan RN  Outcome: Progressing  1/22/2025 2131 by Kayy Milan RN  Outcome: Progressing  Intervention: Optimize Functional Ability  Recent Flowsheet Documentation  Taken 1/22/2025 2000 by Kayy Milan RN  Activity Management: up in chair  Goal: Effective Oxygenation and Ventilation  1/22/2025 2133 by Kayy Milan RN  Outcome: Progressing  1/22/2025 2131 by Kayy Milan RN  Outcome: Progressing  Intervention: Promote Airway Secretion Clearance  Recent Flowsheet Documentation  Taken 1/22/2025 2000 by Kayy Milan RN  Activity Management: up in chair  Cough And Deep Breathing: done independently per patient  Intervention: Optimize Oxygenation and Ventilation  Recent Flowsheet Documentation  Taken 1/22/2025 2000 by Kayy Milan RN  Head of Bed (HOB) Positioning: HOB elevated  Goal: Effective Breathing Pattern During Sleep  1/22/2025 2133 by Kayy Milan RN  Outcome: Progressing  1/22/2025 2131 by Kayy Milan RN  Outcome: Progressing  Intervention: Monitor and Manage Obstructive Sleep Apnea  Recent Flowsheet Documentation  Taken 1/22/2025 2000 by Kayy Milan RN  Medication Review/Management:   medications reviewed   high-risk medications identified     Problem: Stroke, Intracerebral Hemorrhage  Goal: Optimal Coping  Outcome: Progressing  Goal: Effective Bowel Elimination  Outcome: Progressing  Goal: Optimal Cerebral Tissue Perfusion  Outcome: Progressing  Goal: Optimal Cognitive Function  Outcome: Progressing  Goal: Effective Communication Skills  Outcome:  Progressing  Goal: Optimal Functional Ability  Outcome: Progressing  Goal: Improved Oral Intake  Outcome: Progressing  Goal: Optimal Pain Control and Function  Outcome: Progressing  Goal: Effective Oxygenation and Ventilation  Outcome: Progressing  Goal: Improved Sensorimotor Function  Outcome: Progressing   Goal Outcome Evaluation:

## 2025-01-23 NOTE — SIGNIFICANT NOTE
01/23/25 0052   Clinician Notification   Reason for Communication Other (Comment)  (pt got out of chair on his own and fell, bed alarm was on. blood on Left side of head.)   Clinician Name May   Clinician Role Nurse practitioner   Method of Communication Call   Response See orders   Notification Time 0052

## 2025-01-23 NOTE — NURSING NOTE
Responded to pts chair alarm going off to find him on the floor.  Trickle of blood from the left side of head where their were already scabs.  Unsure if he hit his head or pulled a scab off.  Pt AO x 4, GCS 15.  C-Colar was on pt at time of fall.  Does not complain of new or worsening pain. Pt placed in bed, CC NP, tony NP and MAR PA all notified of fall.

## 2025-01-23 NOTE — PROGRESS NOTES
"  [facility] at [facility]   Inpatient Progress Note       LOS: 3 days   Patient Care Team:  Radu Francis MD as PCP - General    Chief Complaint:  follow-up for elevated troponin    Subjective     Interval History:     Patient sitting in bed.  No specific complaints just generally not feeling well.  Still confused.  Had fall last night when try to stand up to get out of bed on his own.  Discussed IVC filter with son who agrees to proceed.      Review of Systems:   Pertinent positives noted in history, exam, and assessment. Otherwise reviewed and negative.      Objective     Vitals:  Blood pressure 176/94, pulse 73, temperature 97.6 °F (36.4 °C), temperature source Oral, resp. rate 16, height 182.9 cm (72.01\"), weight 83.1 kg (183 lb 3.2 oz), SpO2 94%.     Intake/Output Summary (Last 24 hours) at 1/23/2025 0758  Last data filed at 1/23/2025 0700  Gross per 24 hour   Intake 710 ml   Output 950 ml   Net -240 ml     Vitals reviewed.   Constitutional:       Appearance: Well-developed and not in distress. Frail.   Neck:      Thyroid: No thyromegaly.      Vascular: No carotid bruit or JVD.   Pulmonary:      Breath sounds: Normal breath sounds.   Cardiovascular:      Regular rhythm.      No gallop. No S3 and S4 gallop.   Pulses:     Intact distal pulses.      Carotid: 2+ bilaterally.     Radial: 2+ bilaterally.  Edema:     Peripheral edema absent.   Abdominal:      General: Bowel sounds are normal.      Palpations: Abdomen is soft. There is no abdominal mass.      Tenderness: There is no abdominal tenderness.   Musculoskeletal:         General: No deformity.      Extremities: No clubbing present.Skin:     General: Skin is warm and dry.      Findings: No rash.   Neurological:      Mental Status: Oriented to person, place, and time.      Comments: Drowsy.  Communicative mildly.  Simple command follows.     I have examined the patient and agree with the above         Results Review:     I reviewed the " patient's new clinical results.    Results from last 7 days   Lab Units 01/23/25  0418   WBC 10*3/mm3 9.55   HEMOGLOBIN g/dL 12.7*   HEMATOCRIT % 37.2*   PLATELETS 10*3/mm3 218     Results from last 7 days   Lab Units 01/23/25  0418 01/21/25  0536   SODIUM mmol/L 136 140   POTASSIUM mmol/L 3.6 3.9   CHLORIDE mmol/L 101 105   CO2 mmol/L 26.0 25.0   BUN mg/dL 27* 30*   CREATININE mg/dL 1.99* 2.21*   CALCIUM mg/dL 9.2 8.9   BILIRUBIN mg/dL  --  0.5   ALK PHOS U/L  --  119*   ALT (SGPT) U/L  --  12   AST (SGOT) U/L  --  45*   GLUCOSE mg/dL 99 99     Results from last 7 days   Lab Units 01/23/25  0418   SODIUM mmol/L 136   POTASSIUM mmol/L 3.6   CHLORIDE mmol/L 101   CO2 mmol/L 26.0   BUN mg/dL 27*   CREATININE mg/dL 1.99*   GLUCOSE mg/dL 99   CALCIUM mg/dL 9.2     Results from last 7 days   Lab Units 01/23/25  0418 01/22/25  0541 01/21/25  1845   INR  1.25* 1.16* 1.13*     Lab Results   Lab Value Date/Time    TROPONINT 491 (C) 01/19/2025 2006    TROPONINT 511 (C) 01/19/2025 1846    TROPONINT <0.010 02/14/2022 2253    TROPONINT <0.010 01/06/2020 1129    TROPONINT <0.010 01/06/2020 0849     Results from last 7 days   Lab Units 01/19/25 2006   TSH uIU/mL 1.080     Results from last 7 days   Lab Units 01/21/25  0536   CHOLESTEROL mg/dL 111   TRIGLYCERIDES mg/dL 84   HDL CHOL mg/dL 35*   LDL CHOL mg/dL 59     Results from last 7 days   Lab Units 01/19/25  1846   PROBNP pg/mL 9,226.0*     Results from last 7 days   Lab Units 01/19/25 2006 01/19/25  1846   HSTROP T ng/L 491* 511*         Tele: Sinus rhythm    Assessment:      ICH (intracerebral hemorrhage)    CAD (coronary artery disease)    Diabetes mellitus    Hyperlipidemia LDL goal <70    Diabetic nephropathy associated with type 2 diabetes mellitus    Chronic kidney disease, stage IV (severe)    Disc disorder of cervical region    Essential hypertension    Alcohol dependence with unspecified alcohol-induced disorder    C5 cervical fracture    C6 cervical fracture     NSTEMI, initial episode of care    History of pulmonary embolus (PE)/DVT    Acute on chronic diastolic CHF (congestive heart failure)    Syncope and collapse      Cervical fracture  No surgical intervention planned.  Cervical collar for 4 weeks and follow-up with neurosurgery     Possible syncope  Unclear if patient suffered a syncopal episode leading to fall.  However did suffer syncopal episode in October.  No arrhythmias noted on telemetry but cardiogenic syncope cannot be ruled out  Echo shows normal LVEF and no significant valvular abnormality     Acute on chronic diastolic heart failure  proBNP elevated over 9000 and chest x-ray shows interstitial pulmonary edema versus atypical pneumonia  Treated with 2 mg IV Bumex x 1 on 1/19  Echo this admission shows normal LVEF and no significant valvular abnormality  Would hold off on further diuresis      Elevated troponin/NSTEMI  Troponins elevated and flat and trending down.  EKG does have ST depression  Patient reports chest tightness and shortness of breath over the past 6 weeks     Coronary artery disease  History of remote CABG 1994 with last cath 2011 with 3/3 grafts patent  Stress echo 2014 normal study.  No recent ischemic eval  Not on aspirin due to concomitant use of Coumadin  Patient does report shortness of breath and chest tightness over the past 6 weeks.     Hypertension  Carvedilol 12.5 mg twice daily     Hyperlipidemia  Crestor 20 mg daily  Total cholesterol 143, triglycerides 85, HDL 45, LDL 82     Type 2 diabetes mellitus  Controlled with hemoglobin A1c 6.1     Stage IV chronic kidney disease  Creatinine elevated but at baseline at 2.21     History of PE/DVT  On Coumadin with current INR 2.02    Plan:  Patient with syncope, cervical spine fracture, stroke, with intracranial hemorrhage.  Cannot be on anticoagulation for quite some time.  History of DVT/PE in the remote past.  Has chronic right lower extremity DVT.  His positive troponin, unlikely  represents an ACS.  Does not fit the clinical history.  In addition he is unlikely to tolerate a cath/PCI given her inability to anticoagulate at this time.  Will defer and manage medically with blood pressure control, glucose control and statin.  IVC filter successfully placed today.  Patient was independent at home prior to event.  Discussed with son this is unlikely be the case moving forward.        Avelino Hernandez MD     Dictated utilizing Dragon dictation

## 2025-01-23 NOTE — SIGNIFICANT NOTE
Primary notified me that patient got up by himself and fell hit his head left side, C- collar in place, there is a small swelling to left forehead , stat CT ordered , patient was seen and examen, stable neuro status. He is A&O x3, follow command, no drift Upper and lower extremities,  PERRLA, left homonymous hemianopsia noted,  EOMI, no dysarthria or aphasia,. Pt denies headache, dizzines, new stroke symptoms.    Interval:  (shift assessment)  1a. Level of Consciousness: 0-->Alert, keenly responsive  1b. LOC Questions: 0-->Answers both questions correctly  1c. LOC Commands: 0-->Performs both tasks correctly  2. Best Gaze: 1-->Partial gaze palsy, gaze is abnormal in one or both eyes, but forced deviation or total gaze paresis is not present  3. Visual: 2-->Complete hemianopia  4. Facial Palsy: 1-->Minor paralysis (flattened nasolabial fold, asymmetry on smiling)  5a. Motor Arm, Left: 0-->No drift, limb holds 90 (or 45) degrees for full 10 secs  5b. Motor Arm, Right: 0-->No drift, limb holds 90 (or 45) degrees for full 10 secs  6a. Motor Leg, Left: 1-->Drift, leg falls by the end of the 5-sec period but does not hit bed  6b. Motor Leg, Right: 1-->Drift, leg falls by the end of the 5-sec period but does not hit bed  7. Limb Ataxia: 0-->Absent  8. Sensory: 1-->Mild-to-moderate sensory loss, patient feels pinprick is less sharp or is dull on the affected side, or there is a loss of superficial pain with pinprick, but patient is aware of being touched  9. Best Language: 0-->No aphasia, normal  10. Dysarthria: 0-->Normal  11. Extinction and Inattention (formerly Neglect): 1-->Visual, tactile, auditory, spatial, or personal inattention or extinction to bilateral simultaneous stimulation in one of the sensory modalities    Total (NIH Stroke Scale): 8     CT Head Without Contrast    Result Date: 1/23/2025  Impression: Redemonstration of recent infarcts within the right occipital lobe and the right temporal lobe with a small  amount of hemorrhage which appears unchanged as compared to the previous study. No new areas of hemorrhage identified. No significant mass effect or midline shift. Electronically Signed: Deana Caballero MD  1/23/2025 1:24 AM EST  Workstation ID: [id number]    MRI Angiogram Head Without Contrast    Result Date: 1/21/2025  Impression: Redemonstrated evolving right PCA territory infarct with stable small amount of intervening hemorrhage. Mild localized edema is present without significant associated mass effect. No additional acute findings. MR angiogram of the head and neck demonstrates some mild atherosclerotic changes without evidence of flow-limiting stenosis, large vessel occlusion or aneurysm. Electronically Signed: Indra Hill MD  1/21/2025 5:28 AM EST  Workstation ID: [id number]    MRI Angiogram Neck Without Contrast    Result Date: 1/21/2025  Impression: Redemonstrated evolving right PCA territory infarct with stable small amount of intervening hemorrhage. Mild localized edema is present without significant associated mass effect. No additional acute findings. MR angiogram of the head and neck demonstrates some mild atherosclerotic changes without evidence of flow-limiting stenosis, large vessel occlusion or aneurysm. Electronically Signed: Indra Hill MD  1/21/2025 5:28 AM EST  Workstation ID: [id number]    MRI Brain Without Contrast    Result Date: 1/21/2025  Impression: Redemonstrated evolving right PCA territory infarct with stable small amount of intervening hemorrhage. Mild localized edema is present without significant associated mass effect. No additional acute findings. MR angiogram of the head and neck demonstrates some mild atherosclerotic changes without evidence of flow-limiting stenosis, large vessel occlusion or aneurysm. Electronically Signed: Indra Hill MD  1/21/2025 5:28 AM EST  Workstation ID: [id number]    Stat CT order looks R ICH occipital  stable comparing to prior image, final read is  done yet.  Continue Hemorrhagic stroke order set has been initiated  -ICH score 1  -Primary RN to notify immediately Neurosurgery on call.  We appreciate their recommendation  Continue to follow up.

## 2025-01-23 NOTE — PROGRESS NOTES
"Critical Care Note     LOS: 3 days   Patient Care Team:  Radu Francis MD as PCP - General    Chief Complaint/Reason for visit:    Chief Complaint   Patient presents with    Fall   C5-6 vertebral body fractures  Intracranial hemorrhage  History of DVT/PE on Coumadin  Chronic right lower extremity DVT  Coronary artery disease/NSTEMI  Chronic kidney disease  Diabetes mellitus type 2 with neuropathy  Hypertension    Subjective     Interval History:     Stood up last night and tried to go to the bathroom and fell.  Repeat image no change.  This morning he is resting.  His NIH stroke scale is a 6.  He continues to have the visual field cut with poor awareness of his difficulties.  Yesterday he was having bradycardia into the 40s.  Today his heart rate is improved.    Review of Systems:    All systems were reviewed and negative except as noted in subjective.    Medical history, surgical history, social history, family history reviewed    Objective     Intake/Output:    Intake/Output Summary (Last 24 hours) at 1/23/2025 1317  Last data filed at 1/23/2025 1200  Gross per 24 hour   Intake 1086.2 ml   Output 1300 ml   Net -213.8 ml       Nutrition:  Diet: Cardiac; Healthy Heart (2-3 Na+); No Mixed Consistencies; Texture: Mechanical Ground (NDD 2); Fluid Consistency: Nectar Thick    Infusions:         Mechanical Ventilator Settings:                                                Telemetry: Sinus rhythm             Vital Signs  Blood pressure 136/72, pulse 70, temperature 97.4 °F (36.3 °C), temperature source Axillary, resp. rate 14, height 182.9 cm (72.01\"), weight 83.1 kg (183 lb 3.2 oz), SpO2 91%.    Physical Exam:  General Appearance:  Elderly gentleman in no respiratory distress, resting supine in bed   Head:  Abrasion over his left temporal area   Eyes:          Conjunctiva pink, pupils reactive to light and equal   Ears:     Throat: Oral mucosa moist   Neck: Wearing cervical collar   Back:      Lungs:   Breath " sounds are bilateral, equal, clear    Heart:  Regular rhythm, S1, S2 auscultated   Abdomen:   Nondistended, bowel sounds present, soft   Rectal:   Deferred   Extremities: Lower extremity edema   Pulses: Palpable pedal pulses   Skin: Warm and dry   Lymph nodes:    Neurologic: Sleeping  Interval:  (post fall)  1a. Level of Consciousness: 0-->Alert, keenly responsive  1b. LOC Questions: 0-->Answers both questions correctly  1c. LOC Commands: 0-->Performs both tasks correctly  2. Best Gaze: 1-->Partial gaze palsy, gaze is abnormal in one or both eyes, but forced deviation or total gaze paresis is not present  3. Visual: 2-->Complete hemianopia  4. Facial Palsy: 1-->Minor paralysis (flattened nasolabial fold, asymmetry on smiling)  5a. Motor Arm, Left: 0-->No drift, limb holds 90 (or 45) degrees for full 10 secs  5b. Motor Arm, Right: 0-->No drift, limb holds 90 (or 45) degrees for full 10 secs  6a. Motor Leg, Left: 0-->No drift, leg holds 30 degree position for full 5 secs  6b. Motor Leg, Right: 0-->No drift, leg holds 30 degree position for full 5 secs  7. Limb Ataxia: 0-->Absent  8. Sensory: 1-->Mild-to-moderate sensory loss, patient feels pinprick is less sharp or is dull on the affected side, or there is a loss of superficial pain with pinprick, but patient is aware of being touched  9. Best Language: 0-->No aphasia, normal  10. Dysarthria: 0-->Normal  11. Extinction and Inattention (formerly Neglect): 1-->Visual, tactile, auditory, spatial, or personal inattention or extinction to bilateral simultaneous stimulation in one of the sensory modalities    Total (NIH Stroke Scale): 6       Results Review:     I reviewed the patient's new clinical results.   Results from last 7 days   Lab Units 01/23/25  0418 01/21/25  0536 01/20/25  0301 01/19/25  1846   SODIUM mmol/L 136 140 139 137   POTASSIUM mmol/L 3.6 3.9 4.0 4.3   CHLORIDE mmol/L 101 105 102 101   CO2 mmol/L 26.0 25.0 25.0 24.0   BUN mg/dL 27* 30* 26* 23  "  CREATININE mg/dL 1.99* 2.21* 2.21* 2.17*   CALCIUM mg/dL 9.2 8.9 9.0 9.1   BILIRUBIN mg/dL  --  0.5 0.8 0.9   ALK PHOS U/L  --  119* 116 135*   ALT (SGPT) U/L  --  12 12 12   AST (SGOT) U/L  --  45* 53* 48*   GLUCOSE mg/dL 99 99 139* 153*     Results from last 7 days   Lab Units 01/23/25  0418 01/21/25  0536 01/20/25  0301   WBC 10*3/mm3 9.55 9.44 11.56*   HEMOGLOBIN g/dL 12.7* 11.9* 12.7*   HEMATOCRIT % 37.2* 35.8* 38.5   PLATELETS 10*3/mm3 218 186 193         Lab Results   Component Value Date    BLOODCX No growth at 5 days 01/25/2024     No results found for: \"URINECX\"    I reviewed the patient's new imaging including images and reports.    CT HEAD WO CONTRAST    Date of Exam: 1/23/2025 1:14 AM EST    Indication: FALL.    Comparison: 1/21/2025.    Technique: Axial CT images were obtained of the head without contrast administration.  Automated exposure control and iterative construction methods were used.      Findings:  There is redemonstration of hypodensity within the right occipital lobe and the right temporal lobe related to recent infarcts. Patchy hyperdensity is present within the medial right temporal lobe and the right occipital lobe related to a small amount of   hemorrhage which appears unchanged as compared to the previous study. No additional areas of hemorrhage identified. No significant mass effect. No evidence of midline shift.    There is no extracerebral collection.    Ventricles are normal in size and configuration for patient's stated age.      Posterior fossa is within normal limits.    Calvarium and skull base appear intact.   Visualized sinuses show no air fluid levels. Visualized orbits are unremarkable.   Impression:     Impression:  Redemonstration of recent infarcts within the right occipital lobe and the right temporal lobe with a small amount of hemorrhage which appears unchanged as compared to the previous study. No new areas of hemorrhage identified. No significant mass effect  or " midline shift.            Electronically Signed: Deana Caballero MD   1/23/2025 1:24 AM EST       MRI BRAIN WO CONTRAST, MRI ANGIOGRAM NECK WO CONTRAST, MRI ANGIOGRAM HEAD WO CONTRAST    Date of Exam: 1/21/2025 2:35 AM EST    Indication: left visual cut, hemorrhagic conversion.     Comparison: CT 1 day prior.    Technique: Multiplanar multisequence MRI of the brain performed without IV contrast. Noncontrast time-of-flight 3-dimensional MR angiogram of the head and neck with three-dimensional maximum intensity projections postprocessed      Findings:  MRI brain: Diffusion restriction is present corresponding with known acute infarct in the right PCA territory, with predominant right occipital and mesial temporal lobe involvement. 1-2 tiny foci of additional likely small acute infarct within the left  occipital lobe are also present. There is intervening small area of acute hemorrhage with T2 prolongation and susceptibility artifact. No new or increased hemorrhage is present. There is localized edema present, some overlying sulcal effacement,  otherwise without midline shift. Age-related volume loss and periventricular leukomalacia appears similar, with associated ex vacuo prominence of the ventricles. No unexpected hemorrhage, mass or mass effect is evident. Midline structures appear normal  and the craniocervical junction is satisfactory. The orbits are normal. The paranasal sinuses are clear.    MR angiogram: The common carotid arteries demonstrate expected flow related signal. The right ICA origin appears patent without evidence of significant atherosclerotic narrowing, 0% stenosis by NASCET criteria. There is evidence of some mild  atherosclerotic narrowing at the left ICA origin, less than 50%. The vertebral arteries are normal in course and caliber bilaterally. Intracranially, the carotid siphons are patent. The anterior cerebral arteries are normal in course and caliber  bilaterally. The right and left middle  cerebral arteries are normal in course and caliber bilaterally. Tortuous patent vertebrobasilar system. The posterior cerebral arteries are normal in course and caliber bilaterally.   Impression:     Impression:  Redemonstrated evolving right PCA territory infarct with stable small amount of intervening hemorrhage. Mild localized edema is present without significant associated mass effect. No additional acute findings.    MR angiogram of the head and neck demonstrates some mild atherosclerotic changes without evidence of flow-limiting stenosis, large vessel occlusion or aneurysm.        Electronically Signed: Indra Hill MD   1/21/2025 5:28 AM EST   Workstation ID: [id number]           All medications reviewed.   amLODIPine, 5 mg, Oral, Q24H  aspirin, 81 mg, Oral, Daily  folic acid, 1 mg, Oral, Daily  hydrALAZINE, 25 mg, Oral, Q12H  Lidocaine, 1 patch, Transdermal, Q12H  multivitamin, 1 tablet, Oral, Daily  mupirocin, 1 Application, Each Nare, BID  pantoprazole, 40 mg, Oral, Q AM  rosuvastatin, 20 mg, Oral, Nightly  thiamine, 100 mg, Oral, Daily          Assessment & Plan       ICH (intracerebral hemorrhage)    CAD (coronary artery disease)    Diabetes mellitus    Hyperlipidemia LDL goal <70    Diabetic nephropathy associated with type 2 diabetes mellitus    Chronic kidney disease, stage IV (severe)    Disc disorder of cervical region    Essential hypertension    Alcohol dependence with unspecified alcohol-induced disorder    C5 cervical fracture    C6 cervical fracture    NSTEMI, initial episode of care    History of pulmonary embolus (PE)/DVT    Acute on chronic diastolic CHF (congestive heart failure)    Syncope and collapse    84-year-old gentleman with diabetes, hypertension, coronary artery disease, chronic kidney disease, history of PE, DVT on Coumadin found down at his home with C5, C6 vertebral body fractures assumed to be traumatic.  He developed left-sided neglect and visual field defect and was  evaluated for stroke.  Imaging reveals right posterior cerebral artery infarct with a small amount of hemorrhage and edema.  No large vessel occlusion, aneurysm or flow-limiting stenosis.  He did pass his swallow assessment with a dysphagia diet.  He did mobilize with physical therapy.  He continues to have a left visual field cut and some minor left lower extremity weakness.  He got up from the chair and fell yesterday.  Imaging revealed no new bleed.  Given his falls at home, impulsiveness in terms of getting up, I feel we should not anticoagulate him.     Blood pressures ranging from 120-176.  Blood glucoses are running .  His A1c was 6.1.  Lipid panel indicates a total cholesterol of 111, triglyceride of 84.  Serum creatinine 2.21 yesterday.  This is his baseline.    EKG on admission revealed ST depression in the inferolateral leads suspicious for non-ST elevation MI.  Echocardiogram revealed an EF of 50% with some LVH and mild MR.  Troponin T was elevated at 491, 511.  Venous duplex confirms a chronic right lower extremity DVT in the proximal and mid femoral vein.  Initially stroke team felt he could start anticoagulation after 7 days.  However with his fall last night and impulsive behavior, ongoing fall risk, I feel we should proceed with an IVC filter and avoid anticoagulation      He had trace bacteria on his UA, bilateral interstitial markings, possible atypical pneumonia and is on empiric Rocephin.  Good oxygenation on 2 L nasal cannula.        PLAN:    Monitor NIH stroke scale  Cervical collar  PT, OT, speech therapy  Dysphagia diet  Sliding scale insulin, he is not requiring coverage consider stopping  Complete a 5-day course of Rocephin  Hold anticoagulation   IVC filter, discussed with his cardiologist,    Hold carvedilol secondary to bradycardia  Start amlodipine 5 mg daily for hypertension  Monitor creatinine, urine output    Sitter at the bedside because of fall risk    VTE  Prophylaxis:none    Stress Ulcer Prophylaxis:protonix      Enriqueta Torres MD  01/23/25  13:17 EST      Time: Critical care 25 min  I personally provided care to this critically ill patient as documented above.  Critical care time does not include time spent on separately billed procedures.  None of my critical care time was concurrent with other critical care providers.

## 2025-01-23 NOTE — PROGRESS NOTES
Stroke Progress Note       Chief Complaint: Fall    Subjective    Subjective     Subjective:  PT had a fall overnight. Does not remember incident.   Sleeping this morning. Reports pain in the right arm/hand.     Review of Systems   Eyes:  Positive for visual disturbance.   Neurological:  Positive for weakness and headaches.         Objective      Temp:  [97.5 °F (36.4 °C)-97.8 °F (36.6 °C)] 97.6 °F (36.4 °C)  Heart Rate:  [44-85] 73  Resp:  [14-18] 16  BP: (121-176)/() 176/94    Objective    GEN: lying in bed; in NAD  HENT: normocephalic, non-erythematous oropharynx  CV: no LE edema    NEURO:  Mental Status: A&O x 3, interactive, able to follow commands  Speech: Intact Articulation  CN 2-12:  II - PERRLA, left homonymous hemianopsia noted  III, IV, VI - EOMI  V - Facial sensation intact  VII -no gross facial asymmetry  VIII - Auditory acuity intact  XII - Tongue protrudes midline    Motor: The patient can move the bilateral upper extremity against gravity and can bend bilateral lower extremity at knee, BLE weakness  Sensory: left sided sensory deficit noted with closed eye testing  Reflexes: negative Salcido's sign BL  Coordination: no ataxia with finger-to-nose testing  Gait/Station: deferred     Results Review:    I reviewed the patient's new clinical results.    WBC   Date Value Ref Range Status   01/23/2025 9.55 3.40 - 10.80 10*3/mm3 Final     RBC   Date Value Ref Range Status   01/23/2025 4.37 4.14 - 5.80 10*6/mm3 Final     Hemoglobin   Date Value Ref Range Status   01/23/2025 12.7 (L) 13.0 - 17.7 g/dL Final     Hematocrit   Date Value Ref Range Status   01/23/2025 37.2 (L) 37.5 - 51.0 % Final     MCV   Date Value Ref Range Status   01/23/2025 85.1 79.0 - 97.0 fL Final     MCH   Date Value Ref Range Status   01/23/2025 29.1 26.6 - 33.0 pg Final     MCHC   Date Value Ref Range Status   01/23/2025 34.1 31.5 - 35.7 g/dL Final     RDW   Date Value Ref Range Status   01/23/2025 16.3 (H) 12.3 - 15.4 % Final      RDW-SD   Date Value Ref Range Status   01/23/2025 50.8 37.0 - 54.0 fl Final     MPV   Date Value Ref Range Status   01/23/2025 9.9 6.0 - 12.0 fL Final     Platelets   Date Value Ref Range Status   01/23/2025 218 140 - 450 10*3/mm3 Final     Neutrophil %   Date Value Ref Range Status   01/21/2025 65.6 42.7 - 76.0 % Final     Lymphocyte %   Date Value Ref Range Status   01/21/2025 18.0 (L) 19.6 - 45.3 % Final     Monocyte %   Date Value Ref Range Status   01/21/2025 10.7 5.0 - 12.0 % Final     Eosinophil %   Date Value Ref Range Status   01/21/2025 5.0 0.3 - 6.2 % Final     Basophil %   Date Value Ref Range Status   01/21/2025 0.4 0.0 - 1.5 % Final     Immature Grans %   Date Value Ref Range Status   01/21/2025 0.3 0.0 - 0.5 % Final     Neutrophils, Absolute   Date Value Ref Range Status   01/21/2025 6.19 1.70 - 7.00 10*3/mm3 Final     Lymphocytes, Absolute   Date Value Ref Range Status   01/21/2025 1.70 0.70 - 3.10 10*3/mm3 Final     Monocytes, Absolute   Date Value Ref Range Status   01/21/2025 1.01 (H) 0.10 - 0.90 10*3/mm3 Final     Eosinophils, Absolute   Date Value Ref Range Status   01/21/2025 0.47 (H) 0.00 - 0.40 10*3/mm3 Final     Basophils, Absolute   Date Value Ref Range Status   01/21/2025 0.04 0.00 - 0.20 10*3/mm3 Final     Immature Grans, Absolute   Date Value Ref Range Status   01/21/2025 0.03 0.00 - 0.05 10*3/mm3 Final     nRBC   Date Value Ref Range Status   01/21/2025 0.0 0.0 - 0.2 /100 WBC Final       Lab Results   Component Value Date    GLUCOSE 99 01/23/2025    BUN 27 (H) 01/23/2025    CREATININE 1.99 (H) 01/23/2025     01/23/2025    K 3.6 01/23/2025     01/23/2025    CALCIUM 9.2 01/23/2025    PROTEINTOT 6.6 01/21/2025    ALBUMIN 3.5 01/21/2025    ALT 12 01/21/2025    AST 45 (H) 01/21/2025    ALKPHOS 119 (H) 01/21/2025    BILITOT 0.5 01/21/2025    GLOB 3.1 01/21/2025    AGRATIO 1.1 01/21/2025    BCR 13.6 01/23/2025    ANIONGAP 9.0 01/23/2025    EGFR 32.5 (L) 01/23/2025     CT Head  Without Contrast    Result Date: 1/23/2025  Impression: Redemonstration of recent infarcts within the right occipital lobe and the right temporal lobe with a small amount of hemorrhage which appears unchanged as compared to the previous study. No new areas of hemorrhage identified. No significant mass effect or midline shift. Electronically Signed: Deana Caballero MD  1/23/2025 1:24 AM EST  Workstation ID: [id number]   Results for orders placed during the hospital encounter of 01/19/25    Adult Transthoracic Echo Complete W/ Cont if Necessary Per Protocol    Interpretation Summary    Left ventricular systolic function is normal. Estimated left ventricular EF = 50%    Left ventricular wall thickness is consistent with borderline concentric hypertrophy.    Mild mitral valve regurgitation is present.      -MRA of the head and neck images from 1- were personally reviewed and showed no flow limiting stenosis or LVO  -MRI brain images from 1- were personally reviewed and showed an acute ischemic stroke of the R tempro-occipital lobe with hemorrhagic conversion  -Transthoracic echocardiogram is pending   -A1c from 1- was 5.7%  -LDL from 1- was 59  -BL LE duplex on 1- showed chronic RLE DVT    Assessment/Plan   This is a 84-year-old male with past medical history of remote CVA (no deficits, in 1990), CAD, NSTEMI, HTN, CKD IV, HLD, T2DM, DVT, PE (on Coumadin) was a in-house code stroke at 1759 this evening.  Patient is admitted for a nondisplaced oblique fracture through the C5 and C6 vertebral bodies that he obtained after a fall while at home.  Patient is unsure what caused him to fall or how long he was on the ground.  Primary nurse reports she was giving him his evening Coumadin when she noticed that he did not acknowledge the drink in his left hand and seemed to have a left visual deficit.  Last known well is unclear as nursing and family tells me that patient has slept most of the day.  CT  head shows evolving right temporal and occipital lobe infarcts with new intraparenchymal hemorrhage within the right occipital lobe measuring 2.1 x 1.6 cm (volume less than 30 cc).  No mass effect or midline shift.  CTA head neck and CT perfusion were deferred as patient's EGFR was 28.7. Patient's Coumadin was reversed with Kcentra and vitamin K.  He was transferred immediately to the ICU for close neurological monitoring.  I did discuss case and imaging with Dr. Cruz (neurosurgery) who recommends medical management at this time.     Antiplatelet PTA: None  Anticoagulant PTA: Warfarin        #Acute right temporal and occipital lobe infarct with hemorrhagic conversion  #s/p Coumadin reversal with Kcentra and vitamin K  -Hemorrhagic stroke order set has been initiated  -ICH score 1  -Discussed with Dr. Cruz, neurosurgery, patient not a candidate for neurosurgical treatment  -MRA of the head and neck images from 1- were personally reviewed and showed no flow limiting stenosis or LVO  -MRI brain images from 1- were personally reviewed and showed an acute ischemic stroke of the R tempro-occipital lobe with hemorrhagic conversion  -Transthoracic echocardiogram on 1/20/2025 showed left ventricular ejection fraction of 50%, dilated left atrium  -A1c from 1- was 5.7%  -LDL from 1- was 59  -BL LE duplex on 1- showed chronic RLE DVT    Recommendations:   -Normal BP goals, overall management per ICU medicine team, SBP <150.  -Review of blood pressures have not met goal, discussed with ICU medicine team.  -ASA 81mg daily   -Repeat CTH overnight appears to be stable  -Will continue to hold Warfarin for at least a total of 7 days  -Activity as tolerated, fall risk precautions  -PT/OT/SLP recommend IPR at DC  -PT was previously living independently which poses safety concerns  -Atorvastatin 80mg nightly  -Fall precautions    Stroke will continue to follow. Please call for any further questions  or concerns.  =================================  YARED Forrest  Stroke Neurology   [facility]    ADDENDUM:   1100am  Discussed risk and benefit with ICU medicine as well as Dr. Hernandez and suggest proceeding with IVC placement for the ability to discontinue long term OAC. Secondary to current small hemorrhagic conversion as well as multiple falls long term OAC wouldn't be ideal for this patient secondary to the increased risk of bleeding.

## 2025-01-23 NOTE — SIGNIFICANT NOTE
01/23/25 1528   SLP Deferred Reason   SLP Deferred Reason Routine  (Pt sleeping soundly following procedure. Unable to suffiently alert for tx. Will f/u tomorrow for dysphagia tx.)

## 2025-01-23 NOTE — THERAPY TREATMENT NOTE
Patient Name: Toño Alcala  : 1940    MRN: [id number]                              Today's Date: 2025       Admit Date: 2025    Visit Dx:     ICD-10-CM ICD-9-CM   1. Generalized weakness  R53.1 780.79   2. Fall, initial encounter  W19.XXXA E888.9   3. Injury of head, initial encounter  S09.90XA 959.01   4. Acute congestive heart failure, unspecified heart failure type  I50.9 428.0   5. Elevated troponin  R79.89 790.6   6. Closed nondisplaced fracture of fifth cervical vertebra, unspecified fracture morphology, initial encounter  S12.401A 805.05   7. Closed nondisplaced fracture of sixth cervical vertebra, unspecified fracture morphology, initial encounter  S12.501A 805.06   8. Elevated CK  R74.8 790.5   9. Oropharyngeal dysphagia  R13.12 787.22     Patient Active Problem List   Diagnosis    CAD (coronary artery disease)    DVT (deep venous thrombosis)    Diabetes mellitus    Dyslipidemia    GERD (gastroesophageal reflux disease)    Hyperlipidemia LDL goal <70    Diabetic nephropathy associated with type 2 diabetes mellitus    Diabetic polyneuropathy associated with type 2 diabetes mellitus    Chronic kidney disease, stage IV (severe)    Vitamin D deficiency    Disc disorder of cervical region    Lumbosacral spondylosis without myelopathy    Arthritis of elbow    Acute right-sided low back pain without sciatica    Unifocal PVCs    Essential hypertension    Stage 3 chronic kidney disease due to benign hypertension    BMI 30.0-30.9,adult    Fall    Alcohol dependence with unspecified alcohol-induced disorder    C5 cervical fracture    C6 cervical fracture    Multiple fractures of cervical spine    NSTEMI, initial episode of care    History of pulmonary embolus (PE)/DVT    Acute on chronic diastolic CHF (congestive heart failure)    Syncope and collapse    ICH (intracerebral hemorrhage)     Past Medical History:   Diagnosis Date    Acute diverticulitis 2020    Allergic 60 yrs ago    Arthritis      Arthritis of neck Fusion 1992    Bilateral radial fractures 1962    fracture bilateral radial heads    CAD (coronary artery disease)     Calculus of gallbladder without cholecystitis without obstruction 01/29/2020    Cataract     Cervical disc disorder Fusion 1992    Cholelithiasis     Chronic kidney disease 2020    Clotting disorder     CVD (cardiovascular disease)     History of TIA 1989    Diabetes mellitus     DVT (deep venous thrombosis)     Right lower extremity DVT and pulmonary embolism in 06/2015, idiopathic    DVT of proximal lower limb 06/22/2015    proximal DVT right leg, small PE- anticoagulation    Dyslipidemia     Erectile dysfunction     Fracture 1952    H/o pf left forearm fracture    Fracture of right hand 1980    Fracture of wrist 1980    GERD (gastroesophageal reflux disease)     with hiatal hernia    HL (hearing loss)     Hx of angina pectoris     Hypertension     Normal renal angiography 02/16/11    Knee swelling Several years ago    Left wrist fracture 1953    Lumbosacral disc disease 1996    Osgood-Schlatter's disease 1955    Osteoarthritis     Right wrist fracture     Vertigo     Wears glasses      Past Surgical History:   Procedure Laterality Date    APPENDECTOMY  1957    BACK SURGERY      CARDIAC CATHETERIZATION  2011    with vein graft    CATARACT EXTRACTION Left     CERVICAL FUSION      CERVICAL FUSION  1992    C3-4    COLONOSCOPY  2013    CORONARY ARTERY BYPASS GRAFT  1994    HERNIA REPAIR Right 2011    inguinal    INGUINAL HERNIA REPAIR Left 10/29/2019    Procedure: INGUINAL HERNIA REPAIR LEFT;  Surgeon: Charisma Raines MD;  Location: [facility];  Service: General    LUMBAR LAMINECTOMY  1996    laminectomy, lumbar, L3    NECK SURGERY  1992    OTHER SURGICAL HISTORY      wrist surgery    SPINE SURGERY  1996    TONSILLECTOMY AND ADENOIDECTOMY  1945    TRIGGER POINT INJECTION  2001 - 2007    VASECTOMY  1972      General Information       Row Name 01/23/25 1029          OT Time and  Intention    Document Type therapy note (daily note)  -     Mode of Treatment occupational therapy  -       Row Name 01/23/25 1029          General Information    Patient Profile Reviewed yes  -     Existing Precautions/Restrictions fall;cervical collar;brace on at all times;other (see comments)  C5-6 fx with cervical collar on AAT: L visual field cut, sensitive to touch BLEs  -     Barriers to Rehab medically complex;previous functional deficit;visual deficit;cognitive status  -       Row Name 01/23/25 1029          Cognition    Orientation Status (Cognition) oriented x 3  -       Row Name 01/23/25 1029          Safety Issues/Impairments Affecting Functional Mobility    Safety Issues Affecting Function (Mobility) awareness of need for assistance;insight into deficits/self-awareness;safety precaution awareness;safety precautions follow-through/compliance;sequencing abilities  -     Impairments Affecting Function (Mobility) balance;endurance/activity tolerance;pain;postural/trunk control;strength;visual/perceptual;cognition  -     Cognitive Impairments, Mobility Safety/Performance awareness, need for assistance;insight into deficits/self-awareness;safety precaution awareness;safety precaution follow-through;sequencing abilities  -               User Key  (r) = Recorded By, (t) = Taken By, (c) = Cosigned By      Initials Name Provider Type     Emy Junior OT Occupational Therapist                     Mobility/ADL's       Row Name 01/23/25 1030          Bed Mobility    Bed Mobility supine-sit  -     Supine-Sit [place] (Bed Mobility) minimum assist (75% patient effort);2 person assist;verbal cues  -     Assistive Device (Bed Mobility) bed rails;head of bed elevated;repositioning sheet  -       Row Name 01/23/25 1030          Transfers    Transfers sit-stand transfer;stand-sit transfer  -       Row Name 01/23/25 1030          Sit-Stand Transfer    Sit-Stand [place]  (Transfers) minimum assist (75% patient effort);2 person assist;verbal cues;nonverbal cues (demo/gesture)  -     Assistive Device (Sit-Stand Transfers) other (see comments)  BUE support  -       Row Name 01/23/25 1030          Stand-Sit Transfer    Stand-Sit [place] (Transfers) minimum assist (75% patient effort);2 person assist;verbal cues;nonverbal cues (demo/gesture)  -[facility] Name 01/23/25 1030          Activities of Daily Living    BADL Assessment/Intervention lower body dressing;upper body dressing;toileting  -[facility] Name 01/23/25 1030          Upper Body Dressing Assessment/Training    [place] Level (Upper Body Dressing) don;pajama/robe;maximum assist (25% patient effort)  -     Position (Upper Body Dressing) edge of bed sitting  -[facility] Name 01/23/25 1030          Toileting Assessment/Training    [place] Level (Toileting) maximum assist (25% patient effort);verbal cues  -     Assistive Devices (Toileting) urinal  -     Position (Toileting) supported standing  -[facility] Name 01/23/25 1030          Lower Body Dressing Assessment/Training    [place] Level (Lower Body Dressing) don;socks;dependent (less than 25% patient effort)  -     Position (Lower Body Dressing) supine  -               User Key  (r) = Recorded By, (t) = Taken By, (c) = Cosigned By      Initials Name Provider Type     Emy Junior OT Occupational Therapist                   Obj/Interventions       [facility] Name 01/23/25 1032          Balance    Balance Assessment sitting static balance;sitting dynamic balance;sit to stand dynamic balance;standing static balance;standing dynamic balance  -     Static Sitting Balance contact guard  -     Dynamic Sitting Balance minimal assist  -     Position, Sitting Balance unsupported;sitting edge of bed  -     Sit to Stand Dynamic Balance minimal assist;2-person assist;verbal cues  -     Static Standing Balance minimal assist;2-person  assist;verbal cues  -     Dynamic Standing Balance minimal assist;2-person assist;verbal cues  [facility] [place]     Position/Device Used, Standing Balance supported  -     Balance Interventions sitting;sit to stand;occupation based/functional task  -               User Key  (r) = Recorded By, (t) = Taken By, (c) = Cosigned By      Initials Name Provider Type     Emy Junior, MARY Occupational Therapist                   Goals/Plan    No documentation.                  Clinical Impression       [facility] Name 01/23/25 1032          Pain Assessment    Pain Side/Orientation generalized  -     Pain Management Interventions exercise or physical activity utilized;positioning techniques utilized;nursing notified  -     Response to Pain Interventions activity participation with tolerable pain  -     Pre/Posttreatment Pain Comment Pt unable to localize or rate pain on scale from 0/10  -     Additional Documentation Pain Scale: FACES Pre/Post-Treatment (Group)  -MC       Row Name 01/23/25 1032          Pain Scale: FACES Pre/Post-Treatment    Pain: FACES Scale, Pretreatment 8-->hurts whole lot  -     Posttreatment Pain Rating 8-->hurts whole lot  -MC       Row Name 01/23/25 1032          Plan of Care Review    Plan of Care Reviewed With patient  -     Progress no change  -     Outcome Evaluation Pt's ADL independence limited d/t generalized weakness, decreased functional endurance, increased pain, balance deficits, and pain. Will continue to progress pt as tolerated per OT POC. Rec IRF at d/c.  -[facility] Name 01/23/25 1032          Therapy Assessment/Plan (OT)    Rehab Potential (OT) good  -     Criteria for Skilled Therapeutic Interventions Met (OT) yes;skilled treatment is necessary  -     Therapy Frequency (OT) daily  -[facility] Name 01/23/25 1032          Therapy Plan Review/Discharge Plan (OT)    Anticipated Discharge Disposition (OT) inpatient rehabilitation facility  -[facility] Name 01/23/25 1032           Vital Signs    Pre Systolic BP Rehab 159  -MC     Pre Treatment Diastolic BP 95  -MC     Post Systolic BP Rehab 159  -MC     Post Treatment Diastolic BP 96  -MC     Pretreatment Heart Rate (beats/min) 74  -MC     Posttreatment Heart Rate (beats/min) 80  -MC     Pre SpO2 (%) 90  -MC     O2 Delivery Pre Treatment room air  -MC     O2 Delivery Intra Treatment room air  -MC     Post SpO2 (%) 91  -MC     O2 Delivery Post Treatment room air  -MC     Pre Patient Position Supine  -MC     Intra Patient Position Standing  -MC     Post Patient Position Sitting  -MC       Row Name 01/23/25 1032          Positioning and Restraints    Pre-Treatment Position in bed  -MC     Post Treatment Position bed  -MC     In Bed sitting EOB;with PT  -MC               User Key  (r) = Recorded By, (t) = Taken By, (c) = Cosigned By      Initials Name Provider Type    Emy Kilpatrick, MARY Occupational Therapist                   Outcome Measures       Row Name 01/23/25 1034          How much help from another is currently needed...    Putting on and taking off regular lower body clothing? 2  -MC     Bathing (including washing, rinsing, and drying) 2  -MC     Toileting (which includes using toilet bed pan or urinal) 2  -MC     Putting on and taking off regular upper body clothing 2  -MC     Taking care of personal grooming (such as brushing teeth) 2  -MC     Eating meals 2  -MC     AM-PAC 6 Clicks Score (OT) 12  -MC       Row Name 01/23/25 0800          How much help from another person do you currently need...    Turning from your back to your side while in flat bed without using bedrails? 3  -CD     Moving from lying on back to sitting on the side of a flat bed without bedrails? 3  -CD     Moving to and from a bed to a chair (including a wheelchair)? 2  -CD     Standing up from a chair using your arms (e.g., wheelchair, bedside chair)? 2  -CD     Climbing 3-5 steps with a railing? 2  -CD     To walk in hospital room? 2  -CD      -PAC 6 Clicks Score (PT) 14  -CD     Highest Level of Mobility Goal 4 --> Transfer to chair/commode  -CD       Row Name 01/23/25 1034          Functional Assessment    Outcome Measure Options AM-PAC 6 Clicks Daily Activity (OT)  -               User Key  (r) = Recorded By, (t) = Taken By, (c) = Cosigned By      Initials Name Provider Type    Emy Kilpatrick OT Occupational Therapist    Blayne Maher, RN Registered Nurse                    Occupational Therapy Education       Title: PT OT SLP Therapies (In Progress)       Topic: Occupational Therapy (In Progress)       Point: ADL training (In Progress)       Description:   Instruct learner(s) on proper safety adaptation and remediation techniques during self care or transfers.   Instruct in proper use of assistive devices.                  Learning Progress Summary            Patient Acceptance, E, NR by  at 1/23/2025 1035    Acceptance, E,TB, NR by KF at 1/21/2025 1002    Acceptance, E,TB, NR by KF at 1/20/2025 1402                      Point: Home exercise program (Not Started)       Description:   Instruct learner(s) on appropriate technique for monitoring, assisting and/or progressing therapeutic exercises/activities.                  Learner Progress:  Not documented in this visit.              Point: Precautions (In Progress)       Description:   Instruct learner(s) on prescribed precautions during self-care and functional transfers.                  Learning Progress Summary            Patient Acceptance, E, NR by  at 1/23/2025 1035    Acceptance, E,TB, NR by KF at 1/21/2025 1002    Acceptance, E,TB, NR by KF at 1/20/2025 1402                      Point: Body mechanics (In Progress)       Description:   Instruct learner(s) on proper positioning and spine alignment during self-care, functional mobility activities and/or exercises.                  Learning Progress Summary            Patient Acceptance, E, NR by  at 1/23/2025 1035     Acceptance, E,TB, NR by KF at 1/21/2025 1002    Acceptance, E,TB, NR by KF at 1/20/2025 1402                                      User Key       Initials Effective Dates Name Provider Type Discipline     10/14/22 -  Emy Junior OT Occupational Therapist OT     08/09/23 -  Apple Renteria OT Occupational Therapist OT                  OT Recommendation and Plan  Therapy Frequency (OT): daily  Plan of Care Review  Plan of Care Reviewed With: patient  Progress: no change  Outcome Evaluation: Pt's ADL independence limited d/t generalized weakness, decreased functional endurance, increased pain, balance deficits, and pain. Will continue to progress pt as tolerated per OT POC. Rec IRF at d/c.     Time Calculation:         Time Calculation- OT       Row Name 01/23/25 1035             Time Calculation- OT    OT Start Time 0857  -      OT Received On 01/23/25  -      OT Goal Re-Cert Due Date 01/31/25  -         Timed Charges    88254 - OT Therapeutic Activity Minutes 5  -MC      40560 - OT Self Care/Mgmt Minutes 7  -MC         Total Minutes    Timed Charges Total Minutes 12  -MC       Total Minutes 12  -MC                User Key  (r) = Recorded By, (t) = Taken By, (c) = Cosigned By      Initials Name Provider Type     Emy Junior OT Occupational Therapist                  Therapy Charges for Today       Code Description Service Date Service Provider Modifiers Qty    [id number] HC OT SELF CARE/MGMT/TRAIN EA 15 MIN 1/23/2025 Emy Junior OT GO 1                 Emy Junior OT  1/23/2025

## 2025-01-23 NOTE — PROGRESS NOTES
Head CT is reviewed.  Small amount of patchy contrast staining versus a tiny amount of hemorrhagic conversion primarily in the stroke bed of the right PCA infarction.    There obviously is no role for surgical intervention.    I will defer to the stroke neurology team, however my typical recommendation is to wait at least 7 days following demonstrated stability of the hemorrhagic conversion on CT scans before resuming anticoagulation in order to balance the risk-benefit calculus of secondary ischemic insult versus exacerbation of the intracranial hemorrhage.  Please reconsult neurosurgery if additional questions arise.

## 2025-01-23 NOTE — THERAPY TREATMENT NOTE
Patient Name: Toño Alcala  : 1940    MRN: [id number]                              Today's Date: 2025       Admit Date: 2025    Visit Dx:     ICD-10-CM ICD-9-CM   1. Generalized weakness  R53.1 780.79   2. Fall, initial encounter  W19.XXXA E888.9   3. Injury of head, initial encounter  S09.90XA 959.01   4. Acute congestive heart failure, unspecified heart failure type  I50.9 428.0   5. Elevated troponin  R79.89 790.6   6. Closed nondisplaced fracture of fifth cervical vertebra, unspecified fracture morphology, initial encounter  S12.401A 805.05   7. Closed nondisplaced fracture of sixth cervical vertebra, unspecified fracture morphology, initial encounter  S12.501A 805.06   8. Elevated CK  R74.8 790.5   9. Oropharyngeal dysphagia  R13.12 787.22     Patient Active Problem List   Diagnosis    CAD (coronary artery disease)    DVT (deep venous thrombosis)    Diabetes mellitus    Dyslipidemia    GERD (gastroesophageal reflux disease)    Hyperlipidemia LDL goal <70    Diabetic nephropathy associated with type 2 diabetes mellitus    Diabetic polyneuropathy associated with type 2 diabetes mellitus    Chronic kidney disease, stage IV (severe)    Vitamin D deficiency    Disc disorder of cervical region    Lumbosacral spondylosis without myelopathy    Arthritis of elbow    Acute right-sided low back pain without sciatica    Unifocal PVCs    Essential hypertension    Stage 3 chronic kidney disease due to benign hypertension    BMI 30.0-30.9,adult    Fall    Alcohol dependence with unspecified alcohol-induced disorder    C5 cervical fracture    C6 cervical fracture    Multiple fractures of cervical spine    NSTEMI, initial episode of care    History of pulmonary embolus (PE)/DVT    Acute on chronic diastolic CHF (congestive heart failure)    Syncope and collapse    ICH (intracerebral hemorrhage)     Past Medical History:   Diagnosis Date    Acute diverticulitis 2020    Allergic 60 yrs ago    Arthritis      Arthritis of neck Fusion 1992    Bilateral radial fractures 1962    fracture bilateral radial heads    CAD (coronary artery disease)     Calculus of gallbladder without cholecystitis without obstruction 01/29/2020    Cataract     Cervical disc disorder Fusion 1992    Cholelithiasis     Chronic kidney disease 2020    Clotting disorder     CVD (cardiovascular disease)     History of TIA 1989    Diabetes mellitus     DVT (deep venous thrombosis)     Right lower extremity DVT and pulmonary embolism in 06/2015, idiopathic    DVT of proximal lower limb 06/22/2015    proximal DVT right leg, small PE- anticoagulation    Dyslipidemia     Erectile dysfunction     Fracture 1952    H/o pf left forearm fracture    Fracture of right hand 1980    Fracture of wrist 1980    GERD (gastroesophageal reflux disease)     with hiatal hernia    HL (hearing loss)     Hx of angina pectoris     Hypertension     Normal renal angiography 02/16/11    Knee swelling Several years ago    Left wrist fracture 1953    Lumbosacral disc disease 1996    Osgood-Schlatter's disease 1955    Osteoarthritis     Right wrist fracture     Vertigo     Wears glasses      Past Surgical History:   Procedure Laterality Date    APPENDECTOMY  1957    BACK SURGERY      CARDIAC CATHETERIZATION  2011    with vein graft    CATARACT EXTRACTION Left     CERVICAL FUSION      CERVICAL FUSION  1992    C3-4    COLONOSCOPY  2013    CORONARY ARTERY BYPASS GRAFT  1994    HERNIA REPAIR Right 2011    inguinal    INGUINAL HERNIA REPAIR Left 10/29/2019    Procedure: INGUINAL HERNIA REPAIR LEFT;  Surgeon: Charisma Raines MD;  Location: [facility];  Service: General    LUMBAR LAMINECTOMY  1996    laminectomy, lumbar, L3    NECK SURGERY  1992    OTHER SURGICAL HISTORY      wrist surgery    SPINE SURGERY  1996    TONSILLECTOMY AND ADENOIDECTOMY  1945    TRIGGER POINT INJECTION  2001 - 2007    VASECTOMY  1972      General Information       Row Name 01/23/25 1141          Physical  Therapy Time and Intention    Document Type therapy note (daily note)  -KG     Mode of Treatment physical therapy  -KG       Row Name 01/23/25 1141          General Information    Existing Precautions/Restrictions fall;cervical collar;brace on at all times;other (see comments)  C5-6 fx with cervical collar AAT; recent fall; L visual field cut; confusion  -KG     Barriers to Rehab medically complex;previous functional deficit;cognitive status;visual deficit  -KG       Row Name 01/23/25 1141          Cognition    Orientation Status (Cognition) oriented x 3  -KG       Row Name 01/23/25 1141          Safety Issues/Impairments Affecting Functional Mobility    Safety Issues Affecting Function (Mobility) ability to follow commands;awareness of need for assistance;insight into deficits/self-awareness;judgment;safety precaution awareness;safety precautions follow-through/compliance;sequencing abilities  -KG     Impairments Affecting Function (Mobility) balance;cognition;coordination;endurance/activity tolerance;postural/trunk control;pain;strength  -KG     Cognitive Impairments, Mobility Safety/Performance awareness, need for assistance;insight into deficits/self-awareness;judgment;safety precaution awareness;safety precaution follow-through;sequencing abilities  -KG               User Key  (r) = Recorded By, (t) = Taken By, (c) = Cosigned By      Initials Name Provider Type    KG Emy Bates, PT Physical Therapist                   Mobility       Row Name 01/23/25 1142          Bed Mobility    Bed Mobility sit-supine  -KG     Sit-Supine [place] (Bed Mobility) maximum assist (25% patient effort);2 person assist;verbal cues  -KG     Assistive Device (Bed Mobility) head of bed elevated  -KG     Comment, (Bed Mobility) VC's for sequencing and technique. Pt required increased assistance at trunk and BLEs.  -KG       Row Name 01/23/25 1142          Transfers    Comment, (Transfers) VC's for sequencing and safe hand  placement.  -KG       Row Name 01/23/25 1142          Sit-Stand Transfer    Sit-Stand [place] (Transfers) minimum assist (75% patient effort);2 person assist;verbal cues  -KG       Row Name 01/23/25 1142          Gait/Stairs (Locomotion)    [place] Level (Gait) minimum assist (75% patient effort);2 person assist;verbal cues  -KG     Assistive Device (Gait) other (see comments)  B UE support on tele monitor  -KG     Distance in Feet (Gait) 40  -KG     Deviations/Abnormal Patterns (Gait) bilateral deviations;base of support, narrow;maddison decreased;festinating/shuffling;stride length decreased  -KG     Bilateral Gait Deviations forward flexed posture;heel strike decreased  -KG     Comment, (Gait/Stairs) Pt demonstrated step to gait pattern with slow maddison and short, shuffled steps. Pt required consistent cues for upright posture and to keep eyes open. Pt with several standing rest breaks. Required increased encouragement for continued forward ambulation. Limited by fatigue.  -KG               User Key  (r) = Recorded By, (t) = Taken By, (c) = Cosigned By      Initials Name Provider Type    KG Emy Bates, PT Physical Therapist                   Obj/Interventions       Row Name 01/23/25 1143          Balance    Balance Assessment sitting static balance;standing static balance;standing dynamic balance  -KG     Static Sitting Balance contact guard  -KG     Position, Sitting Balance unsupported;sitting edge of bed  -KG     Static Standing Balance minimal assist  -KG     Dynamic Standing Balance minimal assist;2-person assist  -KG     Position/Device Used, Standing Balance supported  -KG               User Key  (r) = Recorded By, (t) = Taken By, (c) = Cosigned By      Initials Name Provider Type    KG Emy Bates, PT Physical Therapist                   Goals/Plan    No documentation.                  Clinical Impression       Row Name 01/23/25 1143          Pain    Additional Documentation  Pain Scale: FACES Pre/Post-Treatment (Group)  -KG       Row Name 01/23/25 1143          Pain Scale: FACES Pre/Post-Treatment    Pain: FACES Scale, Pretreatment 6-->hurts even more  -KG     Posttreatment Pain Rating 6-->hurts even more  -KG       Row Name 01/23/25 1143          Plan of Care Review    Plan of Care Reviewed With patient  -KG     Progress improving  -KG     Outcome Evaluation Pt ambulated 40ft with Eunice x2 and B UE support on tele monitor. Pt required consistent cues for upright posture with eyes open. Max encouragement for continued forward ambulation. Pt's distance limited by fatigue and mild balance deficits. Continues to be limited by decreased command following. Continue to recommend PT skilled care and D/C to IP rehab facility.  -KG       Row Name 01/23/25 1143          Vital Signs    Pre Systolic BP Rehab 162  -KG     Pre Treatment Diastolic BP 93  -KG     Post Systolic BP Rehab 159  -KG     Post Treatment Diastolic BP 96  -KG     Pretreatment Heart Rate (beats/min) 72  -KG     Posttreatment Heart Rate (beats/min) 89  -KG     Pre SpO2 (%) 91  -KG     O2 Delivery Pre Treatment room air  -KG     Post SpO2 (%) 90  -KG     O2 Delivery Post Treatment room air  -KG     Pre Patient Position Sitting  -KG     Intra Patient Position Standing  -KG     Post Patient Position Supine  -KG       Row Name 01/23/25 1143          Positioning and Restraints    Pre-Treatment Position in bed  -KG     Post Treatment Position bed  -KG     In Bed notified nsg;supine;call light within reach;encouraged to call for assist;exit alarm on;side rails up x2;SCD pump applied  -KG               User Key  (r) = Recorded By, (t) = Taken By, (c) = Cosigned By      Initials Name Provider Type    KG Emy Bates, PT Physical Therapist                   Outcome Measures       Row Name 01/23/25 1145 01/23/25 0800       How much help from another person do you currently need...    Turning from your back to your side while in flat  bed without using bedrails? 3  -KG 3  -CD    Moving from lying on back to sitting on the side of a flat bed without bedrails? 3  -KG 3  -CD    Moving to and from a bed to a chair (including a wheelchair)? 2  -KG 2  -CD    Standing up from a chair using your arms (e.g., wheelchair, bedside chair)? 3  -KG 2  -CD    Climbing 3-5 steps with a railing? 2  -KG 2  -CD    To walk in hospital room? 2  -KG 2  -CD    AM-PAC 6 Clicks Score (PT) 15  -KG 14  -CD    Highest Level of Mobility Goal 4 --> Transfer to chair/commode  -KG 4 --> Transfer to chair/commode  -CD      Row Name 01/23/25 1145 01/23/25 1034       Functional Assessment    Outcome Measure Options AM-PAC 6 Clicks Basic Mobility (PT)  -KG AM-PAC 6 Clicks Daily Activity (OT)  -              User Key  (r) = Recorded By, (t) = Taken By, (c) = Cosigned By      Initials Name Provider Type    KG Emy Bates, PT Physical Therapist    Emy Kilpatrick OT Occupational Therapist    Blayne Maher, RN Registered Nurse                                 Physical Therapy Education       Title: PT OT SLP Therapies (In Progress)       Topic: Physical Therapy (In Progress)       Point: Mobility training (In Progress)       Learning Progress Summary            Patient Acceptance, E, NR by KG at 1/23/2025 0913    Acceptance, E, NR by ND at 1/20/2025 1600                      Point: Home exercise program (In Progress)       Learning Progress Summary            Patient Acceptance, E, NR by KG at 1/23/2025 0913                      Point: Body mechanics (In Progress)       Learning Progress Summary            Patient Acceptance, E, NR by KG at 1/23/2025 0913    Acceptance, E, NR by ND at 1/20/2025 1600                      Point: Precautions (In Progress)       Learning Progress Summary            Patient Acceptance, E, NR by KG at 1/23/2025 0913    Acceptance, E, NR by ND at 1/20/2025 1600                                      User Key       Initials Effective Dates  Name Provider Type Discipline    KG 05/22/20 -  Emy Bates, PT Physical Therapist PT    ND 11/16/23 -  Hannah Blanton PT Physical Therapist PT                  PT Recommendation and Plan     Progress: improving  Outcome Evaluation: Pt ambulated 40ft with Eunice x2 and B UE support on tele monitor. Pt required consistent cues for upright posture with eyes open. Max encouragement for continued forward ambulation. Pt's distance limited by fatigue and mild balance deficits. Continues to be limited by decreased command following. Continue to recommend PT skilled care and D/C to IP rehab facility.     Time Calculation:         PT Charges       Row Name 01/23/25 0913             Time Calculation    Start Time 0913  -KG      PT Received On 01/23/25  -KG      PT Goal Re-Cert Due Date 01/31/25  -KG         Time Calculation- PT    Total Timed Code Minutes- PT 12 minute(s)  -KG         Timed Charges    75340 - PT Therapeutic Activity Minutes 12  -KG         Total Minutes    Timed Charges Total Minutes 12  -KG       Total Minutes 12  -KG                User Key  (r) = Recorded By, (t) = Taken By, (c) = Cosigned By      Initials Name Provider Type    KG Emy Bates, PT Physical Therapist                  Therapy Charges for Today       Code Description Service Date Service Provider Modifiers Qty    71094727635 HC PT THERAPEUTIC ACT EA 15 MIN 1/23/2025 Emy Bates, PT GP 1            PT G-Codes  Outcome Measure Options: AM-PAC 6 Clicks Basic Mobility (PT)  AM-PAC 6 Clicks Score (PT): 15  AM-PAC 6 Clicks Score (OT): 12  Modified Janey Scale: 4 - Moderately severe disability.  Unable to walk without assistance, and unable to attend to own bodily needs without assistance.       Fina Bates, PT  1/23/2025     Good

## 2025-01-24 ENCOUNTER — APPOINTMENT (OUTPATIENT)
Dept: PHYSICAL THERAPY | Facility: HOSPITAL | Age: 85
End: 2025-01-24
Payer: MEDICARE

## 2025-01-24 ENCOUNTER — APPOINTMENT (OUTPATIENT)
Dept: CT IMAGING | Facility: HOSPITAL | Age: 85
DRG: 064 | End: 2025-01-24
Payer: MEDICARE

## 2025-01-24 LAB
GLUCOSE BLDC GLUCOMTR-MCNC: 107 MG/DL (ref 70–130)
GLUCOSE BLDC GLUCOMTR-MCNC: 110 MG/DL (ref 70–130)
GLUCOSE BLDC GLUCOMTR-MCNC: 119 MG/DL (ref 70–130)
INR PPP: 1.25 (ref 0.89–1.12)
PROTHROMBIN TIME: 15.8 SECONDS (ref 12.2–14.5)

## 2025-01-24 PROCEDURE — 97168 OT RE-EVAL EST PLAN CARE: CPT

## 2025-01-24 PROCEDURE — 97110 THERAPEUTIC EXERCISES: CPT

## 2025-01-24 PROCEDURE — 85610 PROTHROMBIN TIME: CPT | Performed by: INTERNAL MEDICINE

## 2025-01-24 PROCEDURE — 99233 SBSQ HOSP IP/OBS HIGH 50: CPT | Performed by: NURSE PRACTITIONER

## 2025-01-24 PROCEDURE — 99232 SBSQ HOSP IP/OBS MODERATE 35: CPT | Performed by: INTERNAL MEDICINE

## 2025-01-24 PROCEDURE — 70450 CT HEAD/BRAIN W/O DYE: CPT

## 2025-01-24 PROCEDURE — 97164 PT RE-EVAL EST PLAN CARE: CPT

## 2025-01-24 PROCEDURE — 82948 REAGENT STRIP/BLOOD GLUCOSE: CPT

## 2025-01-24 PROCEDURE — 92526 ORAL FUNCTION THERAPY: CPT

## 2025-01-24 PROCEDURE — 97530 THERAPEUTIC ACTIVITIES: CPT

## 2025-01-24 RX ADMIN — FOLIC ACID 1 MG: 1 TABLET ORAL at 08:05

## 2025-01-24 RX ADMIN — MUPIROCIN 1 APPLICATION: 20 OINTMENT TOPICAL at 20:36

## 2025-01-24 RX ADMIN — LIDOCAINE 1 PATCH: 560 PATCH PERCUTANEOUS; TOPICAL; TRANSDERMAL at 08:07

## 2025-01-24 RX ADMIN — LIDOCAINE 1 PATCH: 560 PATCH PERCUTANEOUS; TOPICAL; TRANSDERMAL at 20:36

## 2025-01-24 RX ADMIN — HYDRALAZINE HYDROCHLORIDE 25 MG: 25 TABLET ORAL at 08:05

## 2025-01-24 RX ADMIN — MULTIVITAMIN TABLET 1 TABLET: TABLET at 08:03

## 2025-01-24 RX ADMIN — Medication 10 ML: at 20:37

## 2025-01-24 RX ADMIN — ROSUVASTATIN 20 MG: 20 TABLET, FILM COATED ORAL at 20:36

## 2025-01-24 RX ADMIN — PANTOPRAZOLE SODIUM 40 MG: 40 TABLET, DELAYED RELEASE ORAL at 06:13

## 2025-01-24 RX ADMIN — HYDRALAZINE HYDROCHLORIDE 25 MG: 25 TABLET ORAL at 20:34

## 2025-01-24 RX ADMIN — AMLODIPINE BESYLATE 5 MG: 5 TABLET ORAL at 08:04

## 2025-01-24 RX ADMIN — ASPIRIN 81 MG CHEWABLE TABLET 81 MG: 81 TABLET CHEWABLE at 08:03

## 2025-01-24 RX ADMIN — Medication 20 MG: at 06:45

## 2025-01-24 RX ADMIN — ACETAMINOPHEN 650 MG: 325 TABLET ORAL at 08:05

## 2025-01-24 RX ADMIN — THIAMINE HCL TAB 100 MG 100 MG: 100 TAB at 08:03

## 2025-01-24 RX ADMIN — MUPIROCIN 1 APPLICATION: 20 OINTMENT TOPICAL at 08:02

## 2025-01-24 NOTE — PROGRESS NOTES
Stroke Progress Note       Chief Complaint: Fall    Subjective    Subjective     Subjective:  Sitting up in the chair this morning. Complains of headache and vision loss in left eye.   BP was in 150's this morning and is currently 102 systolic following AM medicines.    Review of Systems   Constitutional:  Positive for fatigue.   Eyes:  Positive for visual disturbance.   Neurological:  Positive for weakness and headaches.         Objective      Temp:  [97.4 °F (36.3 °C)-98.5 °F (36.9 °C)] 98.2 °F (36.8 °C)  Heart Rate:  [42-90] 84  Resp:  [14-24] 18  BP: (109-164)/(54-96) 162/87    Objective    GEN: lying in bed; in NAD  HENT: normocephalic, non-erythematous oropharynx  CV: no LE edema    NEURO:  Mental Status: A&O x 3, interactive, able to follow commands  Speech: Intact Articulation  CN 2-12:  II - PERRLA, left homonymous hemianopsia noted  III, IV, VI - EOMI  V - Facial sensation intact  VII -no gross facial asymmetry  VIII - Auditory acuity intact  XII - Tongue protrudes midline    Motor: The patient can move the bilateral upper extremity against gravity and can bend bilateral lower extremity at knee, BLE weakness  Sensory: left sided sensory deficit noted with closed eye testing  Reflexes: negative Salcido's sign BL  Coordination: no ataxia with finger-to-nose testing  Gait/Station: deferred     Results Review:    I reviewed the patient's new clinical results.    WBC   Date Value Ref Range Status   01/23/2025 9.55 3.40 - 10.80 10*3/mm3 Final     RBC   Date Value Ref Range Status   01/23/2025 4.37 4.14 - 5.80 10*6/mm3 Final     Hemoglobin   Date Value Ref Range Status   01/23/2025 12.7 (L) 13.0 - 17.7 g/dL Final     Hematocrit   Date Value Ref Range Status   01/23/2025 37.2 (L) 37.5 - 51.0 % Final     MCV   Date Value Ref Range Status   01/23/2025 85.1 79.0 - 97.0 fL Final     MCH   Date Value Ref Range Status   01/23/2025 29.1 26.6 - 33.0 pg Final     MCHC   Date Value Ref Range Status   01/23/2025 34.1 31.5 -  35.7 g/dL Final     RDW   Date Value Ref Range Status   01/23/2025 16.3 (H) 12.3 - 15.4 % Final     RDW-SD   Date Value Ref Range Status   01/23/2025 50.8 37.0 - 54.0 fl Final     MPV   Date Value Ref Range Status   01/23/2025 9.9 6.0 - 12.0 fL Final     Platelets   Date Value Ref Range Status   01/23/2025 218 140 - 450 10*3/mm3 Final       Lab Results   Component Value Date    GLUCOSE 99 01/23/2025    BUN 27 (H) 01/23/2025    CREATININE 1.99 (H) 01/23/2025     01/23/2025    K 4.1 01/23/2025     01/23/2025    CALCIUM 9.2 01/23/2025    PROTEINTOT 6.6 01/21/2025    ALBUMIN 3.5 01/21/2025    ALT 12 01/21/2025    AST 45 (H) 01/21/2025    ALKPHOS 119 (H) 01/21/2025    BILITOT 0.5 01/21/2025    GLOB 3.1 01/21/2025    AGRATIO 1.1 01/21/2025    BCR 13.6 01/23/2025    ANIONGAP 9.0 01/23/2025    EGFR 32.5 (L) 01/23/2025     CT Head Without Contrast    Result Date: 1/23/2025  Impression: Redemonstration of recent infarcts within the right occipital lobe and the right temporal lobe with a small amount of hemorrhage which appears unchanged as compared to the previous study. No new areas of hemorrhage identified. No significant mass effect or midline shift. Electronically Signed: Deana Caballero MD  1/23/2025 1:24 AM EST  Workstation ID: [id number]   Results for orders placed during the hospital encounter of 01/19/25    Adult Transthoracic Echo Complete W/ Cont if Necessary Per Protocol    Interpretation Summary    Left ventricular systolic function is normal. Estimated left ventricular EF = 50%    Left ventricular wall thickness is consistent with borderline concentric hypertrophy.    Mild mitral valve regurgitation is present.      -MRA of the head and neck images from 1- were personally reviewed and showed no flow limiting stenosis or LVO  -MRI brain images from 1- were personally reviewed and showed an acute ischemic stroke of the R tempro-occipital lobe with hemorrhagic conversion  -Transthoracic  echocardiogram is pending   -A1c from 1- was 5.7%  -LDL from 1- was 59  -BL LE duplex on 1- showed chronic RLE DVT    Assessment/Plan   This is a 84-year-old male with past medical history of remote CVA (no deficits, in 1990), CAD, NSTEMI, HTN, CKD IV, HLD, T2DM, DVT, PE (on Coumadin) was a in-house code stroke at 1759 this evening.  Patient is admitted for a nondisplaced oblique fracture through the C5 and C6 vertebral bodies that he obtained after a fall while at home.  Patient is unsure what caused him to fall or how long he was on the ground.  Primary nurse reports she was giving him his evening Coumadin when she noticed that he did not acknowledge the drink in his left hand and seemed to have a left visual deficit.  Last known well is unclear as nursing and family tells me that patient has slept most of the day.  CT head shows evolving right temporal and occipital lobe infarcts with new intraparenchymal hemorrhage within the right occipital lobe measuring 2.1 x 1.6 cm (volume less than 30 cc).  No mass effect or midline shift.  CTA head neck and CT perfusion were deferred as patient's EGFR was 28.7. Patient's Coumadin was reversed with Kcentra and vitamin K.  He was transferred immediately to the ICU for close neurological monitoring.  I did discuss case and imaging with Dr. Cruz (neurosurgery) who recommends medical management at this time.     Antiplatelet PTA: None  Anticoagulant PTA: Warfarin        #Acute right temporal and occipital lobe infarct with hemorrhagic conversion  #s/p Coumadin reversal with Kcentra and vitamin K  -Hemorrhagic stroke order set has been initiated  -ICH score 1  -Discussed with Dr. Cruz, neurosurgery, patient not a candidate for neurosurgical treatment  -MRA of the head and neck images from 1- were personally reviewed and showed no flow limiting stenosis or LVO  -MRI brain images from 1- were personally reviewed and showed an acute ischemic  stroke of the R tempro-occipital lobe with hemorrhagic conversion  -Transthoracic echocardiogram on 1/20/2025 showed left ventricular ejection fraction of 50%, dilated left atrium  -A1c from 1- was 5.7%  -LDL from 1- was 59  -BL LE duplex on 1- showed chronic RLE DVT  -IVC filter placed 1/23/2025    Recommendations:   -Normal BP goals, overall management per ICU medicine team, SBP <150.  -ASA 81mg daily   -Repeat CTH this morning, stable without increase in hemorrhage  -Activity as tolerated, fall risk precautions, sitter present at bedside   -PT/OT/SLP recommend IPR at DC  -PT was previously living independently which poses significant safety concerns  -Atorvastatin 80mg nightly  -Fall precautions    Stroke will continue to follow. Please call for any further questions or concerns.  =================================  YARED Forrest  Stroke Neurology   [facility]

## 2025-01-24 NOTE — PLAN OF CARE
Goal Outcome Evaluation:  Plan of Care Reviewed With: patient        Progress: improving       Anticipated Discharge Disposition (SLP): inpatient rehabilitation facility             Treatment Assessment (SLP): improved, mild, oral dysphagia, continued, mild-moderate, pharyngeal dysphagia, suspected (01/24/25 1430)  Treatment Assessment Comments (SLP): Upgraded diet texture. Continue no mixed consistencies and nectar-thick liquids. (01/24/25 1430)  Plan for Continued Treatment (SLP): continue treatment per plan of care, other (see comments) (will f/u for further cognitive-linguistic assessment, as appropriate) (01/24/25 1430)

## 2025-01-24 NOTE — PROGRESS NOTES
"  [facility]   Inpatient Progress Note       LOS: 4 days   Patient Care Team:  Radu Francis MD as PCP - General    Chief Complaint:  follow-up for elevated troponin    Subjective     Interval History:     Patient sitting in chair.  More alert and cooperative.  Family at bedside.  No chest pain.      Review of Systems:   Pertinent positives noted in history, exam, and assessment. Otherwise reviewed and negative.      Objective     Vitals:  Blood pressure 164/85, pulse 90, temperature 98.2 °F (36.8 °C), temperature source Axillary, resp. rate 22, height 182.9 cm (72.01\"), weight 82.5 kg (181 lb 14.1 oz), SpO2 93%.     Intake/Output Summary (Last 24 hours) at 1/24/2025 0751  Last data filed at 1/24/2025 0638  Gross per 24 hour   Intake 376.2 ml   Output 1000 ml   Net -623.8 ml     Vitals reviewed.   Constitutional:       Appearance: Well-developed and not in distress. Frail.   Neck:      Thyroid: No thyromegaly.      Vascular: No carotid bruit or JVD.   Pulmonary:      Breath sounds: Normal breath sounds.   Cardiovascular:      Regular rhythm.      No gallop. No S3 and S4 gallop.   Pulses:     Intact distal pulses.      Carotid: 2+ bilaterally.     Radial: 2+ bilaterally.  Edema:     Peripheral edema absent.   Abdominal:      General: Bowel sounds are normal.      Palpations: Abdomen is soft. There is no abdominal mass.      Tenderness: There is no abdominal tenderness.   Musculoskeletal:         General: No deformity.      Extremities: No clubbing present.Skin:     General: Skin is warm and dry.      Findings: No rash.   Neurological:      Mental Status: Oriented to person, place, and time.      Comments: Drowsy.  Communicative mildly.  Simple command follows.     I have examined the patient and agree with the above         Results Review:     I reviewed the patient's new clinical results.    Results from last 7 days   Lab Units 01/23/25  0418   WBC 10*3/mm3 9.55   HEMOGLOBIN g/dL " 12.7*   HEMATOCRIT % 37.2*   PLATELETS 10*3/mm3 218     Results from last 7 days   Lab Units 01/23/25  1846 01/23/25  0418 01/21/25  0536   SODIUM mmol/L  --  136 140   POTASSIUM mmol/L 4.1 3.6 3.9   CHLORIDE mmol/L  --  101 105   CO2 mmol/L  --  26.0 25.0   BUN mg/dL  --  27* 30*   CREATININE mg/dL  --  1.99* 2.21*   CALCIUM mg/dL  --  9.2 8.9   BILIRUBIN mg/dL  --   --  0.5   ALK PHOS U/L  --   --  119*   ALT (SGPT) U/L  --   --  12   AST (SGOT) U/L  --   --  45*   GLUCOSE mg/dL  --  99 99     Results from last 7 days   Lab Units 01/23/25 1846 01/23/25  0418   SODIUM mmol/L  --  136   POTASSIUM mmol/L 4.1 3.6   CHLORIDE mmol/L  --  101   CO2 mmol/L  --  26.0   BUN mg/dL  --  27*   CREATININE mg/dL  --  1.99*   GLUCOSE mg/dL  --  99   CALCIUM mg/dL  --  9.2     Results from last 7 days   Lab Units 01/24/25  0405 01/23/25  0418 01/22/25  0541   INR  1.25* 1.25* 1.16*     Lab Results   Lab Value Date/Time    TROPONINT 491 (C) 01/19/2025 2006    TROPONINT 511 (C) 01/19/2025 1846    TROPONINT <0.010 02/14/2022 2253    TROPONINT <0.010 01/06/2020 1129    TROPONINT <0.010 01/06/2020 0849     Results from last 7 days   Lab Units 01/19/25 2006   TSH uIU/mL 1.080     Results from last 7 days   Lab Units 01/21/25  0536   CHOLESTEROL mg/dL 111   TRIGLYCERIDES mg/dL 84   HDL CHOL mg/dL 35*   LDL CHOL mg/dL 59     Results from last 7 days   Lab Units 01/19/25  1846   PROBNP pg/mL 9,226.0*     Results from last 7 days   Lab Units 01/19/25 2006 01/19/25  1846   HSTROP T ng/L 491* 511*         Tele: Sinus rhythm    Assessment:      ICH (intracerebral hemorrhage)    CAD (coronary artery disease)    Diabetes mellitus    Hyperlipidemia LDL goal <70    Diabetic nephropathy associated with type 2 diabetes mellitus    Chronic kidney disease, stage IV (severe)    Disc disorder of cervical region    Essential hypertension    Alcohol dependence with unspecified alcohol-induced disorder    C5 cervical fracture    C6 cervical fracture     "NSTEMI, initial episode of care    History of pulmonary embolus (PE)/DVT    Acute on chronic diastolic CHF (congestive heart failure)    Syncope and collapse      Cervical fracture  No surgical intervention planned.  Cervical collar for 4 weeks and follow-up with neurosurgery     Possible syncope  Unclear if patient suffered a syncopal episode leading to fall.  However did suffer syncopal episode in October.  No arrhythmias noted on telemetry but cardiogenic syncope cannot be ruled out  Echo shows normal LVEF and no significant valvular abnormality     Acute on chronic diastolic heart failure  proBNP elevated over 9000 and chest x-ray shows interstitial pulmonary edema versus atypical pneumonia  Treated with 2 mg IV Bumex x 1 on 1/19  Echo this admission shows normal LVEF and no significant valvular abnormality  Would hold off on further diuresis      Elevated troponin/NSTEMI  Troponins elevated and flat and trending down.  EKG does have ST depression  Patient reports chest tightness and shortness of breath over the past 6 weeks     Coronary artery disease  History of remote CABG 1994 with last cath 2011 with 3/3 grafts patent  Stress echo 2014 normal study.  No recent ischemic eval  Not on aspirin due to concomitant use of Coumadin  Patient does report shortness of breath and chest tightness over the past 6 weeks.     Hypertension  Carvedilol 12.5 mg twice daily     Hyperlipidemia  Crestor 20 mg daily  Total cholesterol 143, triglycerides 85, HDL 45, LDL 82     Type 2 diabetes mellitus  Controlled with hemoglobin A1c 6.1     Stage IV chronic kidney disease  Creatinine elevated but at baseline at 2.21     History of PE/DVT  On Coumadin with current INR 2.02    Plan:  Patient with no history of A-fib, and on warfarin for DVT/PE.  Now that he has IVC filter, there is \"no hurry\" in restarting this.  May wait quite some time.  Blood pressure is increasing, continue amlodipine and hydralazine.  Elevated troponin, with no " plans for ischemic evaluation.  Okay to rehab anytime from our standpoint.  Patient was independent at home prior to event.  Discussed with son this is unlikely be the case moving forward.      Avelino Hernandez MD         Dictated utilizing Dragon dictation

## 2025-01-24 NOTE — PLAN OF CARE
Goal Outcome Evaluation:  Plan of Care Reviewed With: patient        Progress: no change  Outcome Evaluation: PT re-eval complete with goals adjusted appropriately. Pt presents with generalized weakness, balance deficits, history of falls, and L sided visual deficits/inattention warranting IPPT services. Pt mobility limited by L ankle pain, and pt is point tender at lateral malleolus. RN notified. PT rec IRF at d/c.    Anticipated Discharge Disposition (PT): inpatient rehabilitation facility

## 2025-01-24 NOTE — THERAPY RE-EVALUATION
Patient Name: Toño Alcala  : 1940    MRN: [id number]                              Today's Date: 2025       Admit Date: 2025    Visit Dx:     ICD-10-CM ICD-9-CM   1. Generalized weakness  R53.1 780.79   2. Fall, initial encounter  W19.XXXA E888.9   3. Injury of head, initial encounter  S09.90XA 959.01   4. Acute congestive heart failure, unspecified heart failure type  I50.9 428.0   5. Elevated troponin  R79.89 790.6   6. Closed nondisplaced fracture of fifth cervical vertebra, unspecified fracture morphology, initial encounter  S12.401A 805.05   7. Closed nondisplaced fracture of sixth cervical vertebra, unspecified fracture morphology, initial encounter  S12.501A 805.06   8. Elevated CK  R74.8 790.5   9. Oropharyngeal dysphagia  R13.12 787.22     Patient Active Problem List   Diagnosis    CAD (coronary artery disease)    DVT (deep venous thrombosis)    Diabetes mellitus    Dyslipidemia    GERD (gastroesophageal reflux disease)    Hyperlipidemia LDL goal <70    Diabetic nephropathy associated with type 2 diabetes mellitus    Diabetic polyneuropathy associated with type 2 diabetes mellitus    Chronic kidney disease, stage IV (severe)    Vitamin D deficiency    Disc disorder of cervical region    Lumbosacral spondylosis without myelopathy    Arthritis of elbow    Acute right-sided low back pain without sciatica    Unifocal PVCs    Essential hypertension    Stage 3 chronic kidney disease due to benign hypertension    BMI 30.0-30.9,adult    Fall    Alcohol dependence with unspecified alcohol-induced disorder    C5 cervical fracture    C6 cervical fracture    Multiple fractures of cervical spine    NSTEMI, initial episode of care    History of pulmonary embolus (PE)/DVT    Acute on chronic diastolic CHF (congestive heart failure)    Syncope and collapse    ICH (intracerebral hemorrhage)     Past Medical History:   Diagnosis Date    Acute diverticulitis 2020    Allergic 60 yrs ago    Arthritis      Arthritis of neck Fusion 1992    Bilateral radial fractures 1962    fracture bilateral radial heads    CAD (coronary artery disease)     Calculus of gallbladder without cholecystitis without obstruction 01/29/2020    Cataract     Cervical disc disorder Fusion 1992    Cholelithiasis     Chronic kidney disease 2020    Clotting disorder     CVD (cardiovascular disease)     History of TIA 1989    Diabetes mellitus     DVT (deep venous thrombosis)     Right lower extremity DVT and pulmonary embolism in 06/2015, idiopathic    DVT of proximal lower limb 06/22/2015    proximal DVT right leg, small PE- anticoagulation    Dyslipidemia     Erectile dysfunction     Fracture 1952    H/o pf left forearm fracture    Fracture of right hand 1980    Fracture of wrist 1980    GERD (gastroesophageal reflux disease)     with hiatal hernia    HL (hearing loss)     Hx of angina pectoris     Hypertension     Normal renal angiography 02/16/11    Knee swelling Several years ago    Left wrist fracture 1953    Lumbosacral disc disease 1996    Osgood-Schlatter's disease 1955    Osteoarthritis     Right wrist fracture     Vertigo     Wears glasses      Past Surgical History:   Procedure Laterality Date    APPENDECTOMY  1957    BACK SURGERY      CARDIAC CATHETERIZATION  2011    with vein graft    CATARACT EXTRACTION Left     CERVICAL FUSION      CERVICAL FUSION  1992    C3-4    COLONOSCOPY  2013    CORONARY ARTERY BYPASS GRAFT  1994    HERNIA REPAIR Right 2011    inguinal    INGUINAL HERNIA REPAIR Left 10/29/2019    Procedure: INGUINAL HERNIA REPAIR LEFT;  Surgeon: Charisma Raines MD;  Location: [facility] OR;  Service: General    INTERVENTIONAL RADIOLOGY PROCEDURE N/A 1/23/2025    Procedure: IVC Filter Placement;  Surgeon: Avelino Hernandez MD;  Location:  [facility] CATH INVASIVE LOCATION;  Service: Cardiovascular;  Laterality: N/A;    LUMBAR LAMINECTOMY  1996    laminectomy, lumbar, L3    NECK SURGERY  1992    OTHER SURGICAL HISTORY      wrist  surgery    SPINE SURGERY  1996    TONSILLECTOMY AND ADENOIDECTOMY  1945    TRIGGER POINT INJECTION  2001 - 2007    VASECTOMY  1972      General Information       Row Name 01/24/25 1133          Physical Therapy Time and Intention    Document Type re-evaluation  -ES     Mode of Treatment physical therapy  -ES       Row Name 01/24/25 1133          General Information    Patient Profile Reviewed yes  -ES     Prior Level of Function --  see IE  -ES     Existing Precautions/Restrictions fall;cervical collar;brace on at all times;other (see comments)  C5-6 fx with cervical collar AAT; hx falls/confusion; L visual field cut; s/p RLE IVC filter (1/23), L ankle pain  -ES     Barriers to Rehab medically complex;previous functional deficit;cognitive status;visual deficit  -ES       Row Name 01/24/25 1133          Living Environment    People in Home --  see IE  -ES       Row Name 01/24/25 1133          Cognition    Orientation Status (Cognition) oriented x 3  -ES       Row Name 01/24/25 1133          Safety Issues/Impairments Affecting Functional Mobility    Safety Issues Affecting Function (Mobility) awareness of need for assistance;insight into deficits/self-awareness;safety precaution awareness;safety precautions follow-through/compliance;sequencing abilities  -ES     Impairments Affecting Function (Mobility) balance;cognition;coordination;endurance/activity tolerance;postural/trunk control;pain;strength;visual/perceptual  -ES     Cognitive Impairments, Mobility Safety/Performance attention;awareness, need for assistance;insight into deficits/self-awareness;safety precaution awareness;safety precaution follow-through  -ES               User Key  (r) = Recorded By, (t) = Taken By, (c) = Cosigned By      Initials Name Provider Type    ES Elvira Karimi PT Physical Therapist                   Mobility       Row Name 01/24/25 1133          Bed Mobility    Comment, (Bed Mobility) UIC  -ES       Row Name 01/24/25 1134           Sit-Stand Transfer    Sit-Stand [place] (Transfers) moderate assist (50% patient effort);2 person assist;verbal cues;nonverbal cues (demo/gesture)  -ES     Assistive Device (Sit-Stand Transfers) other (see comments)  BUE support  -ES     Comment, (Sit-Stand Transfer) STS x chair. Performed 5x lateral weight shifting, but further mobility deferred due to L ankle pain. Of note, pt is point tender at lateral malleolus and pain increases with WB. RN notified and present throughout session.  -ES               User Key  (r) = Recorded By, (t) = Taken By, (c) = Cosigned By      Initials Name Provider Type    ES Elvira Karimi PT Physical Therapist                   Obj/Interventions       Row Name 01/24/25 1135          Range of Motion Comprehensive    General Range of Motion bilateral lower extremity ROM WFL  -ES       Row Name 01/24/25 1135          Strength Comprehensive (MMT)    General Manual Muscle Testing (MMT) Assessment lower extremity strength deficits identified  -ES     Comment, General Manual Muscle Testing (MMT) Assessment RLE 4/5, LLE 3+/5  -ES       Row Name 01/24/25 1135          Balance    Balance Assessment sitting static balance;sitting dynamic balance;standing static balance;standing dynamic balance  -ES     Static Sitting Balance minimal assist  -ES     Dynamic Sitting Balance minimal assist;verbal cues  -ES     Position, Sitting Balance supported;sitting in chair  -ES     Static Standing Balance minimal assist;2-person assist  -ES     Dynamic Standing Balance moderate assist;2-person assist  -ES     Position/Device Used, Standing Balance supported  -ES     Balance Interventions sitting;standing;sit to stand;supported;static;dynamic;occupation based/functional task  -ES               User Key  (r) = Recorded By, (t) = Taken By, (c) = Cosigned By      Initials Name Provider Type    ES Elvira Karimi PT Physical Therapist                   Goals/Plan       Row Name 01/24/25 1139          Bed  Mobility Goal 1 (PT)    Activity/Assistive Device (Bed Mobility Goal 1, PT) sit to supine/supine to sit  -ES     [place] Level/Cues Needed (Bed Mobility Goal 1, PT) standby assist  -ES     Time Frame (Bed Mobility Goal 1, PT) short term goal (STG);5 days  -ES     Progress/Outcomes (Bed Mobility Goal 1, PT) goal ongoing  -ES       Row Name 01/24/25 1139          Transfer Goal 1 (PT)    Activity/Assistive Device (Transfer Goal 1, PT) sit-to-stand/stand-to-sit;bed-to-chair/chair-to-bed  -ES     [place] Level/Cues Needed (Transfer Goal 1, PT) standby assist  -ES     Time Frame (Transfer Goal 1, PT) long term goal (LTG);10 days  -ES     Progress/Outcome (Transfer Goal 1, PT) goal ongoing  -ES       Row Name 01/24/25 1139          Gait Training Goal 1 (PT)    Activity/Assistive Device (Gait Training Goal 1, PT) gait (walking locomotion);increase endurance/gait distance;decrease fall risk  -ES     [place] Level (Gait Training Goal 1, PT) standby assist  -ES     Distance (Gait Training Goal 1, PT) 150  -ES     Time Frame (Gait Training Goal 1, PT) long term goal (LTG);10 days  -ES     Progress/Outcome (Gait Training Goal 1, PT) goal ongoing  -ES       Row Name 01/24/25 1139          Stairs Goal 1 (PT)    Activity/Assistive Device (Stairs Goal 1, PT) ascending stairs;descending stairs;using handrail, left;using handrail, right  -ES     [place] Level/Cues Needed (Stairs Goal 1, PT) standby assist  -ES     Number of Stairs (Stairs Goal 1, PT) 3  -ES     Time Frame (Stairs Goal 1, PT) long term goal (LTG);10 days  -ES     Progress/Outcome (Stairs Goal 1, PT) goal ongoing  -ES               User Key  (r) = Recorded By, (t) = Taken By, (c) = Cosigned By      Initials Name Provider Type    ES Elvira Karimi, PT Physical Therapist                   Clinical Impression       Row Name 01/24/25 1135          Pain    Pretreatment Pain Rating 5/10  -ES     Posttreatment Pain Rating 5/10  -ES     Pain Location ankle   -ES     Pain Side/Orientation left  -ES     Pain Management Interventions nursing notified  -ES     Response to Pain Interventions activity participation with tolerable pain  -ES       Row Name 01/24/25 1135          Plan of Care Review    Plan of Care Reviewed With patient  -ES     Progress no change  -ES     Outcome Evaluation PT re-eval complete with goals adjusted appropriately. Pt presents with generalized weakness, balance deficits, history of falls, and L sided visual deficits/inattention warranting IPPT services. Pt mobility limited by L ankle pain, and pt is point tender at lateral malleolus. RN notified. PT rec IRF at d/c.  -ES       Row Name 01/24/25 1135          Therapy Assessment/Plan (PT)    Rehab Potential (PT) good  -ES     Criteria for Skilled Interventions Met (PT) yes;meets criteria;skilled treatment is necessary  -ES     Therapy Frequency (PT) daily  -ES     Predicted Duration of Therapy Intervention (PT) 10 days  -ES       Row Name 01/24/25 1135          Vital Signs    Pre Systolic BP Rehab 130  -ES     Pre Treatment Diastolic BP 83  -ES     Post Systolic BP Rehab 146  -ES     Post Treatment Diastolic BP 86  -ES     Pretreatment Heart Rate (beats/min) 98  -ES     Posttreatment Heart Rate (beats/min) 105  -ES     Pre SpO2 (%) 95  -ES     O2 Delivery Pre Treatment room air  -ES     O2 Delivery Intra Treatment room air  -ES     Post SpO2 (%) 94  -ES     O2 Delivery Post Treatment room air  -ES     Pre Patient Position Sitting  -ES     Intra Patient Position Standing  -ES     Post Patient Position Sitting  -ES       Row Name 01/24/25 1135          Positioning and Restraints    Pre-Treatment Position sitting in chair/recliner  -ES     Post Treatment Position chair  -ES     In Chair notified nsg;reclined;sitting;call light within reach;encouraged to call for assist;exit alarm on;waffle cushion;with nsg;legs elevated  -ES               User Key  (r) = Recorded By, (t) = Taken By, (c) = Cosigned By       Initials Name Provider Type    Elvira Muir PT Physical Therapist                   Outcome Measures       Row Name 01/24/25 1140 01/24/25 0803       How much help from another person do you currently need...    Turning from your back to your side while in flat bed without using bedrails? 3  -ES 3  -WW (r)  CD (c)    Moving from lying on back to sitting on the side of a flat bed without bedrails? 3  -ES 3  -WW (r)  CD (c)    Moving to and from a bed to a chair (including a wheelchair)? 3  -ES 3  -WW (r)  CD (c)    Standing up from a chair using your arms (e.g., wheelchair, bedside chair)? 2  -ES 3  -WW (r)  CD (c)    Climbing 3-5 steps with a railing? 2  -ES 2  -WW (r)  CD (c)    To walk in hospital room? 2  -ES 2  -WW (r)  CD (c)    AM-PAC 6 Clicks Score (PT) 15  -ES 16  -WW    Highest Level of Mobility Goal 4 --> Transfer to chair/commode  -ES 5 --> Static standing  -WW      Row Name 01/24/25 1140 01/24/25 0937       Modified [place] Scale    Pre-Stroke Modified [place] Scale 6 - Unable to determine (UTD) from the medical record documentation  -ES 6 - Unable to determine (UTD) from the medical record documentation  -KF    Modified [place] Scale 4 - Moderately severe disability.  Unable to walk without assistance, and unable to attend to own bodily needs without assistance.  -ES 4 - Moderately severe disability.  Unable to walk without assistance, and unable to attend to own bodily needs without assistance.  -KF      Row Name 01/24/25 1140 01/24/25 0937       Functional Assessment    Outcome Measure Options AM-PAC 6 Clicks Basic Mobility (PT);Modified [place]  -ES AM-PAC 6 Clicks Daily Activity (OT);Modified [place]  -KF              User Key  (r) = Recorded By, (t) = Taken By, (c) = Cosigned By      Initials Name Provider Type    Elvira Muir, MYRIAM Physical Therapist    Blayne Maher, RN Registered Nurse    Apple Parr OT Occupational Therapist    WW Antonio Hanson RNA Registered Nurse                                  Physical Therapy Education       Title: PT OT SLP Therapies (In Progress)       Topic: Physical Therapy (In Progress)       Point: Mobility training (In Progress)       Learning Progress Summary            Patient Acceptance, E, NR by KG at 1/23/2025 0913    Acceptance, E, NR by ND at 1/20/2025 1600                      Point: Home exercise program (In Progress)       Learning Progress Summary            Patient Acceptance, E, NR by KG at 1/23/2025 0913                      Point: Body mechanics (In Progress)       Learning Progress Summary            Patient Acceptance, E, NR by KG at 1/23/2025 0913    Acceptance, E, NR by ND at 1/20/2025 1600                      Point: Precautions (In Progress)       Learning Progress Summary            Patient Acceptance, E, NR by KG at 1/23/2025 0913    Acceptance, E, NR by ND at 1/20/2025 1600                                      User Key       Initials Effective Dates Name Provider Type Discipline    KG 05/22/20 -  Emy Bates, PT Physical Therapist PT    ND 11/16/23 -  Hannah Blanton, PT Physical Therapist PT                  PT Recommendation and Plan     Progress: no change  Outcome Evaluation: PT re-eval complete with goals adjusted appropriately. Pt presents with generalized weakness, balance deficits, history of falls, and L sided visual deficits/inattention warranting IPPT services. Pt mobility limited by L ankle pain, and pt is point tender at lateral malleolus. RN notified. PT rec IRF at d/c.     Time Calculation:         PT Charges       Row Name 01/24/25 1140             Time Calculation    Start Time 1105  -ES      PT Received On 01/24/25  -ES      PT Goal Re-Cert Due Date 02/03/25  -ES         Time Calculation- PT    Total Timed Code Minutes- PT 23 minute(s)  -ES         Timed Charges    84707 - PT Therapeutic Exercise Minutes 23  -ES         Untimed Charges    PT Eval/Re-eval Minutes 23  -ES         Total Minutes     Timed Charges Total Minutes 23  -ES      Untimed Charges Total Minutes 23  -ES       Total Minutes 46  -ES                User Key  (r) = Recorded By, (t) = Taken By, (c) = Cosigned By      Initials Name Provider Type    Elvira Muir PT Physical Therapist                  Therapy Charges for Today       Code Description Service Date Service Provider Modifiers Qty    [id number]  PT RE-EVAL ESTABLISHED PLAN 2 1/24/2025 Elvira Karimi, PT GP 1    [id number]  PT THER PROC EA 15 MIN 1/24/2025 Elvira Karimi PT GP 2            PT G-Codes  Outcome Measure Options: AM-PAC 6 Clicks Basic Mobility (PT), Modified [place]  AM-PAC 6 Clicks Score (PT): 15  AM-PAC 6 Clicks Score (OT): 10  Modified [place] Scale: 4 - Moderately severe disability.  Unable to walk without assistance, and unable to attend to own bodily needs without assistance.  PT Discharge Summary  Anticipated Discharge Disposition (PT): inpatient rehabilitation facility    Elvira Karimi PT  1/24/2025

## 2025-01-24 NOTE — PLAN OF CARE
Goal Outcome Evaluation:  Plan of Care Reviewed With: patient        Progress: no change  Outcome Evaluation: OT re-evaluation completed following IVC filter placement. The pt remains below baseline with acute pain limiting mobility, generalized weakness, decreased activity tolerance, and decreased safety awareness warranting continued IP OT services. Pt was assisted in supine to sit transfer and took steps to the chair with modA x2, BUE support. Additional mobility limited by pt complaints of L ankle pain with transfer. Continue to rec a d/c to IRF when medically ready.    Anticipated Discharge Disposition (OT): inpatient rehabilitation facility

## 2025-01-24 NOTE — PROGRESS NOTES
"Critical Care Note     LOS: 4 days   Patient Care Team:  Radu Francis MD as PCP - General    Chief Complaint/Reason for visit:    Chief Complaint   Patient presents with    Fall   C5-6 vertebral body fractures  Intracranial hemorrhage  History of DVT/PE on Coumadin  Chronic right lower extremity DVT  Coronary artery disease/NSTEMI  Chronic kidney disease  Diabetes mellitus type 2 with neuropathy  Hypertension    Subjective     Interval History:     Some confusion and restlessness overnight.  He did try to stand up once last night but he was unable.  He required assist to sit at the edge of the bed and moderate assist of 2 to transfer to the chair. He continues to have left visual field defect.  Room air saturation 97%.  Containing sinus rhythm.  IVC filter placed yesterday    Review of Systems:    All systems were reviewed and negative except as noted in subjective.    Medical history, surgical history, social history, family history reviewed    Objective     Intake/Output:    Intake/Output Summary (Last 24 hours) at 1/24/2025 1202  Last data filed at 1/24/2025 0800  Gross per 24 hour   Intake 100 ml   Output 650 ml   Net -550 ml       Nutrition:  Diet: Cardiac; Healthy Heart (2-3 Na+); No Mixed Consistencies; Texture: Mechanical Ground (NDD 2); Fluid Consistency: Nectar Thick    Infusions:         Mechanical Ventilator Settings:                                                Telemetry: Sinus rhythm             Vital Signs  Blood pressure 113/56, pulse 71, temperature 98.2 °F (36.8 °C), temperature source Axillary, resp. rate 16, height 182.9 cm (72.01\"), weight 82.5 kg (181 lb 14.1 oz), SpO2 97%.    Physical Exam:  General Appearance:  Elderly gentleman in no respiratory distress, upright in bed   Head:  Abrasion over his left temporal area   Eyes:          Conjunctiva pink, pupils reactive to light and equal   Ears:     Throat: Oral mucosa moist   Neck: Wearing cervical collar   Back:      Lungs:   " Breath sounds are bilateral, equal, clear    Heart:  Regular rhythm, S1, S2 auscultated   Abdomen:   Nondistended, bowel sounds present, soft   Rectal:   Deferred   Extremities: Lower extremity edema   Pulses: Palpable pedal pulses   Skin: Warm and dry   Lymph nodes:    Neurologic: A little drowsy but does arouse and is oriented.  Persistent visual field defect.  Mild right leg weakness  Interval:  (shift)  1a. Level of Consciousness: 1-->Not alert, but arousable by minor stimulation to obey, answer, or respond  1b. LOC Questions: 0-->Answers both questions correctly  1c. LOC Commands: 0-->Performs both tasks correctly  2. Best Gaze: 1-->Partial gaze palsy, gaze is abnormal in one or both eyes, but forced deviation or total gaze paresis is not present  3. Visual: 2-->Complete hemianopia  4. Facial Palsy: 0-->Normal symmetrical movements  5a. Motor Arm, Left: 0-->No drift, limb holds 90 (or 45) degrees for full 10 secs  5b. Motor Arm, Right: 0-->No drift, limb holds 90 (or 45) degrees for full 10 secs  6a. Motor Leg, Left: 0-->No drift, leg holds 30 degree position for full 5 secs  6b. Motor Leg, Right: 1-->Drift, leg falls by the end of the 5-sec period but does not hit bed  7. Limb Ataxia: 0-->Absent  8. Sensory: 1-->Mild-to-moderate sensory loss, patient feels pinprick is less sharp or is dull on the affected side, or there is a loss of superficial pain with pinprick, but patient is aware of being touched  9. Best Language: 0-->No aphasia, normal  10. Dysarthria: 0-->Normal  11. Extinction and Inattention (formerly Neglect): 1-->Visual, tactile, auditory, spatial, or personal inattention or extinction to bilateral simultaneous stimulation in one of the sensory modalities    Total (NIH Stroke Scale): 7       Results Review:     I reviewed the patient's new clinical results.   Results from last 7 days   Lab Units 01/23/25  1846 01/23/25  0418 01/21/25  0536 01/20/25  0301 01/19/25  1846   SODIUM mmol/L  --  136 140  "139 137   POTASSIUM mmol/L 4.1 3.6 3.9 4.0 4.3   CHLORIDE mmol/L  --  101 105 102 101   CO2 mmol/L  --  26.0 25.0 25.0 24.0   BUN mg/dL  --  27* 30* 26* 23   CREATININE mg/dL  --  1.99* 2.21* 2.21* 2.17*   CALCIUM mg/dL  --  9.2 8.9 9.0 9.1   BILIRUBIN mg/dL  --   --  0.5 0.8 0.9   ALK PHOS U/L  --   --  119* 116 135*   ALT (SGPT) U/L  --   --  12 12 12   AST (SGOT) U/L  --   --  45* 53* 48*   GLUCOSE mg/dL  --  99 99 139* 153*     Results from last 7 days   Lab Units 01/23/25  0418 01/21/25  0536 01/20/25  0301   WBC 10*3/mm3 9.55 9.44 11.56*   HEMOGLOBIN g/dL 12.7* 11.9* 12.7*   HEMATOCRIT % 37.2* 35.8* 38.5   PLATELETS 10*3/mm3 218 186 193         Lab Results   Component Value Date    BLOODCX No growth at 5 days 01/25/2024     No results found for: \"URINECX\"    I reviewed the patient's new imaging including images and reports.    CT HEAD WO CONTRAST    Date of Exam: 1/24/2025 10:26 AM EST    Indication: stroke,  hemorrhage assessment.    Comparison: January 23, 2025    Technique: Axial CT images were obtained of the head without contrast administration.  Automated exposure control and iterative construction methods were used.      Findings:  There are evolving subacute infarcts within the right temporal and occipital lobes. A small amount of hemorrhage within the right occipital lobe infarct appears unchanged. No new intracranial hemorrhage is identified. The ventricles are stable in  caliber, with no midline shift. The basal cisterns appear patent. Degenerative changes appear stable.    The calvarium appears intact. The paranasal sinuses and mastoid air cells are well aerated.   Impression:     Impression:  Evolving subacute infarcts within right temporal and occipital lobes. Small amount of hemorrhage within right occipital lobe infarct appears unchanged. No new acute intracranial process identified.        Electronically Signed: Rodríguez Mckeon MD   1/24/2025 10:43 AM EST         MRI BRAIN WO CONTRAST, MRI " ANGIOGRAM NECK WO CONTRAST, MRI ANGIOGRAM HEAD WO CONTRAST    Date of Exam: 1/21/2025 2:35 AM EST    Indication: left visual cut, hemorrhagic conversion.     Comparison: CT 1 day prior.    Technique: Multiplanar multisequence MRI of the brain performed without IV contrast. Noncontrast time-of-flight 3-dimensional MR angiogram of the head and neck with three-dimensional maximum intensity projections postprocessed      Findings:  MRI brain: Diffusion restriction is present corresponding with known acute infarct in the right PCA territory, with predominant right occipital and mesial temporal lobe involvement. 1-2 tiny foci of additional likely small acute infarct within the left  occipital lobe are also present. There is intervening small area of acute hemorrhage with T2 prolongation and susceptibility artifact. No new or increased hemorrhage is present. There is localized edema present, some overlying sulcal effacement,  otherwise without midline shift. Age-related volume loss and periventricular leukomalacia appears similar, with associated ex vacuo prominence of the ventricles. No unexpected hemorrhage, mass or mass effect is evident. Midline structures appear normal  and the craniocervical junction is satisfactory. The orbits are normal. The paranasal sinuses are clear.    MR angiogram: The common carotid arteries demonstrate expected flow related signal. The right ICA origin appears patent without evidence of significant atherosclerotic narrowing, 0% stenosis by NASCET criteria. There is evidence of some mild  atherosclerotic narrowing at the left ICA origin, less than 50%. The vertebral arteries are normal in course and caliber bilaterally. Intracranially, the carotid siphons are patent. The anterior cerebral arteries are normal in course and caliber  bilaterally. The right and left middle cerebral arteries are normal in course and caliber bilaterally. Tortuous patent vertebrobasilar system. The posterior  cerebral arteries are normal in course and caliber bilaterally.   Impression:     Impression:  Redemonstrated evolving right PCA territory infarct with stable small amount of intervening hemorrhage. Mild localized edema is present without significant associated mass effect. No additional acute findings.    MR angiogram of the head and neck demonstrates some mild atherosclerotic changes without evidence of flow-limiting stenosis, large vessel occlusion or aneurysm.        Electronically Signed: Indra Hill MD   1/21/2025 5:28 AM EST   Workstation ID: [id number]           All medications reviewed.   amLODIPine, 5 mg, Oral, Q24H  aspirin, 81 mg, Oral, Daily  folic acid, 1 mg, Oral, Daily  hydrALAZINE, 25 mg, Oral, Q12H  Lidocaine, 1 patch, Transdermal, Q12H  multivitamin, 1 tablet, Oral, Daily  mupirocin, 1 Application, Each Nare, BID  pantoprazole, 40 mg, Oral, Q AM  rosuvastatin, 20 mg, Oral, Nightly  thiamine, 100 mg, Oral, Daily          Assessment & Plan       ICH (intracerebral hemorrhage)    CAD (coronary artery disease)    Diabetes mellitus    Hyperlipidemia LDL goal <70    Diabetic nephropathy associated with type 2 diabetes mellitus    Chronic kidney disease, stage IV (severe)    Disc disorder of cervical region    Essential hypertension    Alcohol dependence with unspecified alcohol-induced disorder    C5 cervical fracture    C6 cervical fracture    NSTEMI, initial episode of care    History of pulmonary embolus (PE)/DVT    Acute on chronic diastolic CHF (congestive heart failure)    Syncope and collapse    84-year-old gentleman with diabetes, hypertension, coronary artery disease, chronic kidney disease, history of PE, DVT on Coumadin found down at his home with C5, C6 vertebral body fractures assumed to be traumatic.  He developed left-sided neglect and visual field defect and was evaluated for stroke.  He passed his speech swallow assessment with a dysphagia diet.  Imaging reveals right posterior  cerebral artery infarct with a small amount of hemorrhage and edema.  No large vessel occlusion, aneurysm or flow-limiting stenosis.  He has a persistent visual field deficit, left, and some weakness and difficulty with mobilization.  He did get up and fall 2 nights ago.  Therapy notes moderate assist of 2 to mobilize from the bed to the chair.  He will need inpatient rehab.  He currently has a sitter at the bedside.  He had 1 attempt to get up last night but was too weak.    He has a known chronic DVT in the right lower extremity.  He was previously on anticoagulation.  With his fall risk, intracranial hemorrhage anticoagulation has been on hold.  Now with his fall would like to leave him off of anticoagulation.  Therefore an IVC filter was placed January 23 with no difficulties       Blood pressures ranging from 110-160.  He was on carvedilol at home but has been bradycardic here in his has remained on hold.  Blood glucoses are running .  His A1c was 6.1.  Will stop Accu-Cheks and sliding scale as he has not required coverage.  Lipid panel indicates a total cholesterol of 111, triglyceride of 84.  Serum creatinine is 1.99 today, slightly better.  He has chronic underlying kidney disease, with creatinine normally running 2.4-2.6.    EKG on admission revealed ST depression in the inferolateral leads suspicious for non-ST elevation MI.  Echocardiogram revealed an EF of 50% with some LVH and mild MR.  Troponin T was elevated at 491, 511.  He is maintaining sinus rhythm.      He had trace bacteria on his UA, bilateral interstitial markings, possible atypical pneumonia and completed empiric Rocephin, 5 doses.  Good oxygenation on room air        PLAN:    Monitor NIH stroke scale  Cervical collar  PT, OT, speech therapy  Dysphagia diet  Stop Accu-Cheks and sliding scale  IVC filter in place so anticoagulation has been stopped.    Hold carvedilol secondary to bradycardia  amlodipine 5 mg daily for  hypertension  Monitor creatinine, urine output    Sitter at the bedside because of fall risk  Telemetry    VTE Prophylaxis:none    Stress Ulcer Prophylaxis:protonix      Enriqueta Torres MD  01/24/25  12:02 EST      Time: Critical care 25 min  I personally provided care to this critically ill patient as documented above.  Critical care time does not include time spent on separately billed procedures.  None of my critical care time was concurrent with other critical care providers.

## 2025-01-24 NOTE — PROGRESS NOTES
"Multidisciplinary Rounds, LOS    Time: 20min  Patient Name: Toño Alcala  Date of Encounter: 01/24/25 10:47 EST  MRN: [id number]  Admission date: 1/19/2025      Reason for visit: MDR, LOS, Dysphagia    Additional information obtained during MDR: Report per RN patient not eating optimally, does okay at L/D but doesn't like breakfast, doesn't like dysphagia diet. Visited with pt at bedside. States he eats well at home. 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 Reviewed similar options on mechanical ground diet. When asked what he likes to drink he stated \"vodka tonic\" but does say he is joking and likes tea. Agreeable to magic cup. Pt denies further dietary needs/preferences or nutritional questions/concerns at this time, NKFA.     Current diet: Diet: Cardiac; Healthy Heart (2-3 Na+); No Mixed Consistencies; Texture: Mechanical Ground (NDD 2); Fluid Consistency: Nectar Thick    Intervention:  Follow treatment plan  Care plan reviewed  Magic cup (chocolate) BID  Provided menu to room, please assist pt with ordering preferences as feasible    Follow up:   Per protocol      Linnea Mares, RD  10:47 EST         "

## 2025-01-24 NOTE — THERAPY TREATMENT NOTE
Acute Care - Speech Language Pathology   Swallow Treatment Note [facility]     Patient Name: Toño Alcala  : 1940  MRN: [id number]  Today's Date: 2025               Admit Date: 2025    Visit Dx:     ICD-10-CM ICD-9-CM   1. Generalized weakness  R53.1 780.79   2. Fall, initial encounter  W19.XXXA E888.9   3. Injury of head, initial encounter  S09.90XA 959.01   4. Acute congestive heart failure, unspecified heart failure type  I50.9 428.0   5. Elevated troponin  R79.89 790.6   6. Closed nondisplaced fracture of fifth cervical vertebra, unspecified fracture morphology, initial encounter  S12.401A 805.05   7. Closed nondisplaced fracture of sixth cervical vertebra, unspecified fracture morphology, initial encounter  S12.501A 805.06   8. Elevated CK  R74.8 790.5   9. Oropharyngeal dysphagia  R13.12 787.22     Patient Active Problem List   Diagnosis    CAD (coronary artery disease)    DVT (deep venous thrombosis)    Diabetes mellitus    Dyslipidemia    GERD (gastroesophageal reflux disease)    Hyperlipidemia LDL goal <70    Diabetic nephropathy associated with type 2 diabetes mellitus    Diabetic polyneuropathy associated with type 2 diabetes mellitus    Chronic kidney disease, stage IV (severe)    Vitamin D deficiency    Disc disorder of cervical region    Lumbosacral spondylosis without myelopathy    Arthritis of elbow    Acute right-sided low back pain without sciatica    Unifocal PVCs    Essential hypertension    Stage 3 chronic kidney disease due to benign hypertension    BMI 30.0-30.9,adult    Fall    Alcohol dependence with unspecified alcohol-induced disorder    C5 cervical fracture    C6 cervical fracture    Multiple fractures of cervical spine    NSTEMI, initial episode of care    History of pulmonary embolus (PE)/DVT    Acute on chronic diastolic CHF (congestive heart failure)    Syncope and collapse    ICH (intracerebral hemorrhage)     Past Medical History:   Diagnosis Date    Acute  diverticulitis 01/29/2020    Allergic 60 yrs ago    Arthritis     Arthritis of neck Fusion 1992    Bilateral radial fractures 1962    fracture bilateral radial heads    CAD (coronary artery disease)     Calculus of gallbladder without cholecystitis without obstruction 01/29/2020    Cataract     Cervical disc disorder Fusion 1992    Cholelithiasis     Chronic kidney disease 2020    Clotting disorder     CVD (cardiovascular disease)     History of TIA 1989    Diabetes mellitus     DVT (deep venous thrombosis)     Right lower extremity DVT and pulmonary embolism in 06/2015, idiopathic    DVT of proximal lower limb 06/22/2015    proximal DVT right leg, small PE- anticoagulation    Dyslipidemia     Erectile dysfunction     Fracture 1952    H/o pf left forearm fracture    Fracture of right hand 1980    Fracture of wrist 1980    GERD (gastroesophageal reflux disease)     with hiatal hernia    HL (hearing loss)     Hx of angina pectoris     Hypertension     Normal renal angiography 02/16/11    Knee swelling Several years ago    Left wrist fracture 1953    Lumbosacral disc disease 1996    Osgood-Schlatter's disease 1955    Osteoarthritis     Right wrist fracture     Vertigo     Wears glasses      Past Surgical History:   Procedure Laterality Date    APPENDECTOMY  1957    BACK SURGERY      CARDIAC CATHETERIZATION  2011    with vein graft    CATARACT EXTRACTION Left     CERVICAL FUSION      CERVICAL FUSION  1992    C3-4    COLONOSCOPY  2013    CORONARY ARTERY BYPASS GRAFT  1994    HERNIA REPAIR Right 2011    inguinal    INGUINAL HERNIA REPAIR Left 10/29/2019    Procedure: INGUINAL HERNIA REPAIR LEFT;  Surgeon: Charisma Raines MD;  Location:  [facility];  Service: General    INTERVENTIONAL RADIOLOGY PROCEDURE N/A 1/23/2025    Procedure: IVC Filter Placement;  Surgeon: Avelino Hernandez MD;  Location:  [facility] CATH INVASIVE LOCATION;  Service: Cardiovascular;  Laterality: N/A;    LUMBAR LAMINECTOMY  1996    laminectomy, lumbar,  "L3    NECK SURGERY  1992    OTHER SURGICAL HISTORY      wrist surgery    SPINE SURGERY  1996    TONSILLECTOMY AND ADENOIDECTOMY  1945    TRIGGER POINT INJECTION  2001 - 2007    VASECTOMY  1972       SLP Recommendation and Plan     SLP Diet Recommendation: soft to chew textures, chopped, no mixed consistencies, nectar thick liquids (01/24/25 1430)  Recommended Precautions and Strategies: upright posture during/after eating, small bites of food and sips of liquid, general aspiration precautions (01/24/25 1430)                 Anticipated Discharge Disposition (SLP): inpatient rehabilitation facility (01/24/25 1430)                    Daily Summary of Progress (SLP): progress toward functional goals as expected (01/24/25 1430)               Treatment Assessment (SLP): improved, mild, oral dysphagia, continued, mild-moderate, pharyngeal dysphagia, suspected (01/24/25 1430)  Treatment Assessment Comments (SLP): Upgraded diet texture. Continue no mixed consistencies and nectar-thick liquids. (01/24/25 1430)  Plan for Continued Treatment (SLP): continue treatment per plan of care, other (see comments) (will f/u for further cognitive-linguistic assessment, as appropriate) (01/24/25 1430)         Progress: improving      SWALLOW EVALUATION (Last 72 Hours)       SLP Adult Swallow Evaluation       Row Name 01/24/25 1430                   Rehab Evaluation    Document Type therapy note (daily note)  -AC        Subjective Information no complaints  -AC        Patient Observations alert;cooperative  -AC        Patient/Family/Caregiver Comments/Observations Staff present.  -AC        Patient Effort good  -AC           Pain    Additional Documentation Pain Scale: FACES Pre/Post-Treatment (Group)  -AC           Pain Scale: FACES Pre/Post-Treatment    Pain: FACES Scale, Pretreatment 4-->hurts little more  -AC        Posttreatment Pain Rating 4-->hurts little more  -AC        Pre/Posttreatment Pain Comment Reported pain is \"tolerable.\" "  -AC           Swallowing Quality of Life Assessment    Education and counseling provided Risks of aspiration;Safest diet options;Aspiration precautions  -AC           SLP Treatment Clinical Impressions    Treatment Assessment (SLP) improved;mild;oral dysphagia;continued;mild-moderate;pharyngeal dysphagia;suspected  -AC        Treatment Assessment Comments (SLP) Upgraded diet texture. Continue no mixed consistencies and nectar-thick liquids.  -AC        Daily Summary of Progress (SLP) progress toward functional goals as expected  -AC        Plan for Continued Treatment (SLP) continue treatment per plan of care;other (see comments)  will f/u for further cognitive-linguistic assessment, as appropriate  -AC        Care Plan Review evaluation/treatment results reviewed;care plan/treatment goals reviewed;risks/benefits reviewed;current/potential barriers reviewed;patient/other agree to care plan  -           Recommendations    SLP Diet Recommendation soft to chew textures;chopped;no mixed consistencies;nectar thick liquids  -        Recommended Precautions and Strategies upright posture during/after eating;small bites of food and sips of liquid;general aspiration precautions  -AC        Anticipated Discharge Disposition (SLP) inpatient rehabilitation facility  -                  User Key  (r) = Recorded By, (t) = Taken By, (c) = Cosigned By      Initials Name Effective Dates    AC Lauryn Martinez, MS [facility]-SLP 02/03/23 -                     EDUCATION  The patient has been educated in the following areas:   Dysphagia (Swallowing Impairment) Modified Diet Instruction.        SLP GOALS       Row Name 01/24/25 1430             (LTG) Patient will demonstrate functional swallow for    Diet Texture (Demonstrate functional swallow) soft to chew (chopped) textures  -AC      Liquid viscosity (Demonstrate functional swallow) thin liquids  -AC      [place] (Demonstrate functional swallow) independently (over 90% accuracy)   -AC      Time Frame (Demonstrate functional swallow) 1 week  -AC      Progress/Outcomes (Demonstrate functional swallow) continuing progress toward goal  -AC      Comment (Demonstrate functional swallow) Provided exercise handout.  -AC         (STG) Patient will tolerate trials of    Consistencies Trialed (Tolerate trials) soft to chew (chopped) textures;nectar/ mildly thick liquids  -AC      Desired Outcome (Tolerate trials) without signs/symptoms of aspiration;with adequate oral prep/transit/clearance  -AC      [place] (Tolerate trials) with minimal cues (75-90% accuracy)  -AC      Time Frame (Tolerate trials) 1 week  -AC      Progress/Outcomes (Tolerate trials) goal revised this date  -AC      Comment (Tolerate trials) Trialed nectar via straw and soft solids. Increased oral prep time, but mastication adequate. Mild oral residue cleared w/ subsequent swallow/liquid wash. No overt clinical s/sxs aspiration.  -AC         (STG) Patient will tolerate therapeutic trials of    Consistencies Trialed (Tolerate therapeutic trials) regular textures;thin liquids  -AC      Desired Outcome (Tolerate therapeutic trials) without signs/symptoms of aspiration;with adequate oral prep/transit/clearance  -AC      [place] (Tolerate therapeutic trials) with minimal cues (75-90% accuracy)  -AC      Time Frame (Tolerate therapeutic trials) 1 week  -AC      Progress/Outcomes (Tolerate therapeutic trials) goal revised this date  -AC         (STG) Lingual Strengthening Goal 1 (SLP)    Increase Tongue Back Strength lingual resistance exercises  -AC      [place]/Accuracy (Lingual Strengthening Goal 1, SLP) with minimal cues (75-90% accuracy)  -AC      Time Frame (Lingual Strengthening Goal 1, SLP) 1 week  -AC      Progress/Outcomes (Lingual Strengthening Goal 1, SLP) continuing progress toward goal  -AC      Comment (Lingual Strengthening Goal 1, SLP) Back of tongue resistance x10 w/ mod cues.  -AC         (STG) Pharyngeal  Strengthening Exercise Goal 1 (SLP)    Increase Timing prepping - 3 second prep or suck swallow or 3-step swallow  -AC      Increase Superior Movement of the Hyolaryngeal Complex Mendelsohn  -AC      Increase Anterior Movement of the Hyolaryngeal Complex chin tuck against resistance (CTAR)  -AC      Increase Squeeze/Positive Pressure Generation hard effortful swallow  -AC      Increase Tongue Base Retraction wagn  -AC      [place]/Accuracy (Pharyngeal Strengthening Goal 1, SLP) with minimal cues (75-90% accuracy)  -AC      Time Frame (Pharyngeal Strengthening Goal 1, SLP) 1 week  -AC      Progress/Outcomes (Pharyngeal Strengthening Goal 1, SLP) continuing progress toward goal  -AC      Comment (Pharyngeal Strengthening Goal 1, SLP) Pt was able to demonstrate each exercise w/ min-mod cues.  -AC                User Key  (r) = Recorded By, (t) = Taken By, (c) = Cosigned By      Initials Name Provider Type    Lauryn Cazares MS CCC-SLP Speech and Language Pathologist                         Time Calculation:    Time Calculation- SLP       Row Name 01/24/25 1530             Time Calculation- SLP    SLP Start Time 1430  -AC      SLP Received On 01/24/25  -AC         Untimed Charges    02172-VB Treatment Swallow Minutes 55  -AC         Total Minutes    Untimed Charges Total Minutes 55  -AC       Total Minutes 55  -AC                User Key  (r) = Recorded By, (t) = Taken By, (c) = Cosigned By      Initials Name Provider Type    Lauryn Cazares MS CCC-SLP Speech and Language Pathologist                    Therapy Charges for Today       Code Description Service Date Service Provider Modifiers Qty    10018123051 HC ST TREATMENT SWALLOW 4 1/24/2025 Lauryn Martinez MS CCC-SLP GN 1                 Lauryn Martinez MS CCC-ELSA  1/24/2025

## 2025-01-24 NOTE — CASE MANAGEMENT/SOCIAL WORK
Continued Stay Note  [facility]     Patient Name: Toño Alcala  MRN: [id number]  Today's Date: 1/24/2025    Admit Date: 1/19/2025    Plan: [facility] acute   Discharge Plan       Row Name 01/24/25 1252       Plan    Plan [facility] acute    Patient/Family in Agreement with Plan yes    Plan Comments Discussed patient in MDR; patient had IVC filter placed yesterday.  Spoke with patient and his son, Maury, at bedside to discuss disposition.  Discharge plan is [facility] acute rehab when medically ready; April is following.  Provided Maury with information regarding private caregiver list, [facility], [facility], and [facility].  CM following.                   Discharge Codes    No documentation.                          Neha Elkins RN

## 2025-01-24 NOTE — PLAN OF CARE
Goal Outcome Evaluation:      Pt continued to have intermittent confusion and restlessness. Pt Aox3, moves all extremities and follows commands.  VSS, PRN labetalol given x1 for BP.  Pt having difficulty urinating while in bed. Pt tried one time to stand at side of bed with RN and NA, pt unstable and weak.  Pt sat on side of bed to use urinal.  Pt free from falls during shift, RN was safety sitter during shift.  Call light and belongings within reach.  Bed wheels locked, alarm set and audible.

## 2025-01-24 NOTE — THERAPY RE-EVALUATION
Patient Name: Toño Alcala  : 1940    MRN: [id number]                              Today's Date: 2025       Admit Date: 2025    Visit Dx:     ICD-10-CM ICD-9-CM   1. Generalized weakness  R53.1 780.79   2. Fall, initial encounter  W19.XXXA E888.9   3. Injury of head, initial encounter  S09.90XA 959.01   4. Acute congestive heart failure, unspecified heart failure type  I50.9 428.0   5. Elevated troponin  R79.89 790.6   6. Closed nondisplaced fracture of fifth cervical vertebra, unspecified fracture morphology, initial encounter  S12.401A 805.05   7. Closed nondisplaced fracture of sixth cervical vertebra, unspecified fracture morphology, initial encounter  S12.501A 805.06   8. Elevated CK  R74.8 790.5   9. Oropharyngeal dysphagia  R13.12 787.22     Patient Active Problem List   Diagnosis    CAD (coronary artery disease)    DVT (deep venous thrombosis)    Diabetes mellitus    Dyslipidemia    GERD (gastroesophageal reflux disease)    Hyperlipidemia LDL goal <70    Diabetic nephropathy associated with type 2 diabetes mellitus    Diabetic polyneuropathy associated with type 2 diabetes mellitus    Chronic kidney disease, stage IV (severe)    Vitamin D deficiency    Disc disorder of cervical region    Lumbosacral spondylosis without myelopathy    Arthritis of elbow    Acute right-sided low back pain without sciatica    Unifocal PVCs    Essential hypertension    Stage 3 chronic kidney disease due to benign hypertension    BMI 30.0-30.9,adult    Fall    Alcohol dependence with unspecified alcohol-induced disorder    C5 cervical fracture    C6 cervical fracture    Multiple fractures of cervical spine    NSTEMI, initial episode of care    History of pulmonary embolus (PE)/DVT    Acute on chronic diastolic CHF (congestive heart failure)    Syncope and collapse    ICH (intracerebral hemorrhage)     Past Medical History:   Diagnosis Date    Acute diverticulitis 2020    Allergic 60 yrs ago    Arthritis      Arthritis of neck Fusion 1992    Bilateral radial fractures 1962    fracture bilateral radial heads    CAD (coronary artery disease)     Calculus of gallbladder without cholecystitis without obstruction 01/29/2020    Cataract     Cervical disc disorder Fusion 1992    Cholelithiasis     Chronic kidney disease 2020    Clotting disorder     CVD (cardiovascular disease)     History of TIA 1989    Diabetes mellitus     DVT (deep venous thrombosis)     Right lower extremity DVT and pulmonary embolism in 06/2015, idiopathic    DVT of proximal lower limb 06/22/2015    proximal DVT right leg, small PE- anticoagulation    Dyslipidemia     Erectile dysfunction     Fracture 1952    H/o pf left forearm fracture    Fracture of right hand 1980    Fracture of wrist 1980    GERD (gastroesophageal reflux disease)     with hiatal hernia    HL (hearing loss)     Hx of angina pectoris     Hypertension     Normal renal angiography 02/16/11    Knee swelling Several years ago    Left wrist fracture 1953    Lumbosacral disc disease 1996    Osgood-Schlatter's disease 1955    Osteoarthritis     Right wrist fracture     Vertigo     Wears glasses      Past Surgical History:   Procedure Laterality Date    APPENDECTOMY  1957    BACK SURGERY      CARDIAC CATHETERIZATION  2011    with vein graft    CATARACT EXTRACTION Left     CERVICAL FUSION      CERVICAL FUSION  1992    C3-4    COLONOSCOPY  2013    CORONARY ARTERY BYPASS GRAFT  1994    HERNIA REPAIR Right 2011    inguinal    INGUINAL HERNIA REPAIR Left 10/29/2019    Procedure: INGUINAL HERNIA REPAIR LEFT;  Surgeon: Charisma Raines MD;  Location: [facility] OR;  Service: General    INTERVENTIONAL RADIOLOGY PROCEDURE N/A 1/23/2025    Procedure: IVC Filter Placement;  Surgeon: Avelino Hernandez MD;  Location:  [facility] CATH INVASIVE LOCATION;  Service: Cardiovascular;  Laterality: N/A;    LUMBAR LAMINECTOMY  1996    laminectomy, lumbar, L3    NECK SURGERY  1992    OTHER SURGICAL HISTORY      wrist  surgery    SPINE SURGERY  1996    TONSILLECTOMY AND ADENOIDECTOMY  1945    TRIGGER POINT INJECTION  2001 - 2007    VASECTOMY  1972      General Information       Row Name 01/24/25 0928          OT Time and Intention    Document Type re-evaluation  -KF     Mode of Treatment occupational therapy;individual therapy  -KF       Row Name 01/24/25 0928          General Information    Patient Profile Reviewed yes  -KF     Prior Level of Function --  Please see IE  -KF     Existing Precautions/Restrictions fall;cervical collar;brace on at all times;other (see comments)  C5-6 fx with cervical collar AAT; hx falls/confusion; L visual field cut; s/p RLE IVC filter (1/23)  -KF     Barriers to Rehab medically complex;previous functional deficit;cognitive status;visual deficit  -KF       Row Name 01/24/25 0928          Living Environment    People in Home other (see comments)  Please see IE  -KF       Row Name 01/24/25 0928          Cognition    Orientation Status (Cognition) oriented x 3  -KF       Row Name 01/24/25 0928          Safety Issues/Impairments Affecting Functional Mobility    Safety Issues Affecting Function (Mobility) awareness of need for assistance;insight into deficits/self-awareness;judgment;problem-solving;safety precaution awareness;safety precautions follow-through/compliance;sequencing abilities  -KF     Impairments Affecting Function (Mobility) balance;cognition;coordination;endurance/activity tolerance;postural/trunk control;pain;strength;visual/perceptual  -KF     Cognitive Impairments, Mobility Safety/Performance awareness, need for assistance;insight into deficits/self-awareness;judgment;problem-solving/reasoning;safety precaution awareness;safety precaution follow-through;sequencing abilities  -KF               User Key  (r) = Recorded By, (t) = Taken By, (c) = Cosigned By      Initials Name Provider Type    KF Apple Renteria OT Occupational Therapist                     Mobility/ADL's       Row Name  01/24/25 0932          Bed Mobility    Bed Mobility supine-sit  -KF     Supine-Sit [place] (Bed Mobility) moderate assist (50% patient effort);2 person assist;verbal cues;nonverbal cues (demo/gesture)  -     Assistive Device (Bed Mobility) bed rails;head of bed elevated;repositioning sheet  -     Comment, (Bed Mobility) Increased time and effort needed with cues for anterior weight shift  -       Row Name 01/24/25 0932          Transfers    Transfers bed-chair transfer;sit-stand transfer;stand-sit transfer  -       Row Name 01/24/25 0932          Bed-Chair Transfer    Bed-Chair [place] (Transfers) moderate assist (50% patient effort);2 person assist;verbal cues;nonverbal cues (demo/gesture)  -     Assistive Device (Bed-Chair Transfers) other (see comments)  BUE support  -     Comment, (Bed-Chair Transfer) Pt able to take ~3 steps from EOB to chair with modA x2, BUE support. Pt limited by L ankle pain this AM.  -       Row Name 01/24/25 0932          Sit-Stand Transfer    Sit-Stand [place] (Transfers) moderate assist (50% patient effort);2 person assist;verbal cues;nonverbal cues (demo/gesture)  -KF     Assistive Device (Sit-Stand Transfers) other (see comments)  BUE support  -     Comment, (Sit-Stand Transfer) STS x2 from EOB  -       Row Name 01/24/25 0932          Stand-Sit Transfer    Stand-Sit [place] (Transfers) moderate assist (50% patient effort);2 person assist;verbal cues;nonverbal cues (demo/gesture)  -KF     Assistive Device (Stand-Sit Transfers) other (see comments)  BUE support  -       Row Name 01/24/25 0932          Activities of Daily Living    BADL Assessment/Intervention lower body dressing  -       Row Name 01/24/25 0932          Lower Body Dressing Assessment/Training    [place] Level (Lower Body Dressing) don;socks;dependent (less than 25% patient effort)  -     Position (Lower Body Dressing) supine  -               User Key  (r) = Recorded By,  (t) = Taken By, (c) = Cosigned By      Initials Name Provider Type    KF Apple Renteria OT Occupational Therapist                   Obj/Interventions       Row Name 01/24/25 0933          Sensory Assessment (Somatosensory)    Sensory Assessment (Somatosensory) UE sensation intact  -       Row Name 01/24/25 0933          Vision Assessment/Intervention    Vision Assessment Comment L visual field cut  -       Row Name 01/24/25 0933          Range of Motion Comprehensive    General Range of Motion bilateral upper extremity ROM WFL  -       Row Name 01/24/25 0933          Strength Comprehensive (MMT)    General Manual Muscle Testing (MMT) Assessment upper extremity strength deficits identified  -     Comment, General Manual Muscle Testing (MMT) Assessment BUE grossly 4/5  -       Row Name 01/24/25 0933          Balance    Balance Assessment sitting static balance;sitting dynamic balance;sit to stand dynamic balance;standing static balance;standing dynamic balance  -KF     Static Sitting Balance minimal assist  -KF     Dynamic Sitting Balance minimal assist  -KF     Position, Sitting Balance unsupported;sitting edge of bed  -KF     Sit to Stand Dynamic Balance moderate assist;2-person assist;verbal cues;non-verbal cues (demo/gesture)  -KF     Static Standing Balance minimal assist;2-person assist  -KF     Dynamic Standing Balance moderate assist;2-person assist  -KF     Position/Device Used, Standing Balance supported  -KF     Balance Interventions sitting;standing;sit to stand;supported;static;dynamic;occupation based/functional task  -               User Key  (r) = Recorded By, (t) = Taken By, (c) = Cosigned By      Initials Name Provider Type    KF Apple Renteria OT Occupational Therapist                   Goals/Plan       Row Name 01/24/25 0937          Transfer Goal 1 (OT)    Activity/Assistive Device (Transfer Goal 1, OT) commode;bed-to-chair/chair-to-bed  -KF     [place] Level/Cues Needed  (Transfer Goal 1, OT) standby assist  -KF     Time Frame (Transfer Goal 1, OT) long term goal (LTG);10 days  -KF     Progress/Outcome (Transfer Goal 1, OT) goal ongoing  -KF       Row Name 01/24/25 0937          Dressing Goal 1 (OT)    Activity/Device (Dressing Goal 1, OT) upper body dressing  -KF     [place]/Cues Needed (Dressing Goal 1, OT) minimum assist (75% or more patient effort)  -KF     Time Frame (Dressing Goal 1, OT) short term goal (STG);5 days  -KF     Strategies/Barriers (Dressing Goal 1, OT) cervical collar  -KF     Progress/Outcome (Dressing Goal 1, OT) goal ongoing  -KF       Row Name 01/24/25 0937          Grooming Goal 1 (OT)    Activity/Device (Grooming Goal 1, OT) hair care;oral care;wash face, hands  -KF     [place] (Grooming Goal 1, OT) set-up required  -KF     Time Frame (Grooming Goal 1, OT) long term goal (LTG);10 days  -KF     Strategies/Barriers (Grooming Goal 1, OT) standing sink side and attending to left side  -KF     Progress/Outcome (Grooming Goal 1, OT) goal ongoing  -KF               User Key  (r) = Recorded By, (t) = Taken By, (c) = Cosigned By      Initials Name Provider Type    KF Apple Renteria, OT Occupational Therapist                   Clinical Impression       Row Name 01/24/25 0934          Pain Assessment    Pain Location flank  -KF     Pain Side/Orientation bilateral  -KF     Pre/Posttreatment Pain Comment L ankle pain with WBing  -KF     Additional Documentation Pain Scale: FACES Pre/Post-Treatment (Group)  -KF       Row Name 01/24/25 0934          Pain Scale: FACES Pre/Post-Treatment    Pain: FACES Scale, Pretreatment 4-->hurts little more  -KF     Posttreatment Pain Rating 4-->hurts little more  -KF       Row Name 01/24/25 0934          Plan of Care Review    Plan of Care Reviewed With patient  -KF     Progress no change  -KF     Outcome Evaluation OT re-evaluation completed following IVC filter placement. The pt remains below baseline with acute pain  limiting mobility, generalized weakness, decreased activity tolerance, and decreased safety awareness warranting continued IP OT services. Pt was assisted in supine to sit transfer and took steps to the chair with modA x2, BUE support. Additional mobility limited by pt complaints of L ankle pain with transfer. Continue to rec a d/c to IRF when medically ready.  -       Row Name 01/24/25 0934          Therapy Assessment/Plan (OT)    Patient/Family Therapy Goal Statement (OT) Return home  -KF     Rehab Potential (OT) good  -KF     Criteria for Skilled Therapeutic Interventions Met (OT) yes;skilled treatment is necessary  -KF     Therapy Frequency (OT) daily  -KF       Row Name 01/24/25 0934          Therapy Plan Review/Discharge Plan (OT)    Anticipated Discharge Disposition (OT) inpatient rehabilitation facility  -       Row Name 01/24/25 0934          Vital Signs    Pre Systolic BP Rehab 162  -KF     Pre Treatment Diastolic BP 87  -KF     Post Systolic BP Rehab 150  -KF     Post Treatment Diastolic BP 72  -KF     Pretreatment Heart Rate (beats/min) 93  -KF     Posttreatment Heart Rate (beats/min) 93  -KF     Pre SpO2 (%) 94  -KF     O2 Delivery Pre Treatment room air  -KF     Post SpO2 (%) 93  -KF     O2 Delivery Post Treatment room air  -KF     Pre Patient Position Supine  -KF     Intra Patient Position Standing  -KF     Post Patient Position Sitting  -KF       Row Name 01/24/25 0934          Positioning and Restraints    Pre-Treatment Position in bed  -KF     Post Treatment Position chair  -KF     In Chair notified nsg;reclined;call light within reach;encouraged to call for assist;exit alarm on;waffle cushion;legs elevated  PCT sitter  -KF               User Key  (r) = Recorded By, (t) = Taken By, (c) = Cosigned By      Initials Name Provider Type    Apple Parr OT Occupational Therapist                   Outcome Measures       Row Name 01/24/25 0937          How much help from another is currently  needed...    Putting on and taking off regular lower body clothing? 1  -KF     Bathing (including washing, rinsing, and drying) 2  -KF     Toileting (which includes using toilet bed pan or urinal) 1  -KF     Putting on and taking off regular upper body clothing 2  -KF     Taking care of personal grooming (such as brushing teeth) 2  -KF     Eating meals 2  -KF     AM-PAC 6 Clicks Score (OT) 10  -       Row Name 01/24/25 0937          Modified [place] Scale    Pre-Stroke Modified [place] Scale 6 - Unable to determine (UTD) from the medical record documentation  -KF     Modified [place] Scale 4 - Moderately severe disability.  Unable to walk without assistance, and unable to attend to own bodily needs without assistance.  -       Row Name 01/24/25 0937          Functional Assessment    Outcome Measure Options AM-PAC 6 Clicks Daily Activity (OT);Modified [place]  -KF               User Key  (r) = Recorded By, (t) = Taken By, (c) = Cosigned By      Initials Name Provider Type    Apple Parr OT Occupational Therapist                    Occupational Therapy Education       Title: PT OT SLP Therapies (In Progress)       Topic: Occupational Therapy (In Progress)       Point: ADL training (In Progress)       Description:   Instruct learner(s) on proper safety adaptation and remediation techniques during self care or transfers.   Instruct in proper use of assistive devices.                  Learning Progress Summary            Patient Acceptance, E,TB, NR by  at 1/24/2025 0856    Acceptance, E, NR by  at 1/23/2025 1035    Acceptance, E,TB, NR by  at 1/21/2025 1002    Acceptance, E,TB, NR by  at 1/20/2025 1402                      Point: Home exercise program (Not Started)       Description:   Instruct learner(s) on appropriate technique for monitoring, assisting and/or progressing therapeutic exercises/activities.                  Learner Progress:  Not documented in this visit.              Point: Precautions  (In Progress)       Description:   Instruct learner(s) on prescribed precautions during self-care and functional transfers.                  Learning Progress Summary            Patient Acceptance, E,TB, NR by  at 1/24/2025 0856    Acceptance, E, NR by  at 1/23/2025 1035    Acceptance, E,TB, NR by  at 1/21/2025 1002    Acceptance, E,TB, NR by  at 1/20/2025 1402                      Point: Body mechanics (In Progress)       Description:   Instruct learner(s) on proper positioning and spine alignment during self-care, functional mobility activities and/or exercises.                  Learning Progress Summary            Patient Acceptance, E,TB, NR by  at 1/24/2025 0856    Acceptance, E, NR by  at 1/23/2025 1035    Acceptance, E,TB, NR by  at 1/21/2025 1002    Acceptance, E,TB, NR by  at 1/20/2025 1402                                      User Key       Initials Effective Dates Name Provider Type Discipline     10/14/22 -  Emy Junior OT Occupational Therapist OT     08/09/23 -  Apple Renteria OT Occupational Therapist OT                  OT Recommendation and Plan  Planned Therapy Interventions (OT): activity tolerance training, adaptive equipment training, BADL retraining, functional balance retraining, occupation/activity based interventions, patient/caregiver education/training, ROM/therapeutic exercise, strengthening exercise, transfer/mobility retraining  Therapy Frequency (OT): daily  Plan of Care Review  Plan of Care Reviewed With: patient  Progress: no change  Outcome Evaluation: OT re-evaluation completed following IVC filter placement. The pt remains below baseline with acute pain limiting mobility, generalized weakness, decreased activity tolerance, and decreased safety awareness warranting continued IP OT services. Pt was assisted in supine to sit transfer and took steps to the chair with modA x2, BUE support. Additional mobility limited by pt complaints of L ankle pain with  transfer. Continue to rec a d/c to IRF when medically ready.     Time Calculation:   Evaluation Complexity (OT)  Review Occupational Profile/Medical/Therapy History Complexity: expanded/moderate complexity  Assessment, Occupational Performance/Identification of Deficit Complexity: 3-5 performance deficits  Clinical Decision Making Complexity (OT): detailed assessment/moderate complexity  Overall Complexity of Evaluation (OT): moderate complexity     Time Calculation- OT       Row Name 01/24/25 0939             Time Calculation- OT    OT Start Time 0856  -KF      OT Received On 01/24/25  -KF      OT Goal Re-Cert Due Date 02/03/25  -KF         Timed Charges    88613 - OT Therapeutic Activity Minutes 10  -KF         Untimed Charges    OT Eval/Re-eval Minutes 20  -KF         Total Minutes    Timed Charges Total Minutes 10  -KF      Untimed Charges Total Minutes 20  -KF       Total Minutes 30  -KF                User Key  (r) = Recorded By, (t) = Taken By, (c) = Cosigned By      Initials Name Provider Type    KF Apple Renteria OT Occupational Therapist                  Therapy Charges for Today       Code Description Service Date Service Provider Modifiers Qty    [id number]  OT RE-EVAL 2 1/24/2025 Apple Renteria OT GO 1    [id number]  OT THERAPEUTIC ACT EA 15 MIN 1/24/2025 Apple Renteria OT GO 1                 Apple Renteria OT  1/24/2025

## 2025-01-25 LAB
ANION GAP SERPL CALCULATED.3IONS-SCNC: 11 MMOL/L (ref 5–15)
BUN SERPL-MCNC: 35 MG/DL (ref 8–23)
BUN/CREAT SERPL: 15.9 (ref 7–25)
CALCIUM SPEC-SCNC: 8.9 MG/DL (ref 8.6–10.5)
CHLORIDE SERPL-SCNC: 100 MMOL/L (ref 98–107)
CO2 SERPL-SCNC: 23 MMOL/L (ref 22–29)
CREAT SERPL-MCNC: 2.2 MG/DL (ref 0.76–1.27)
DEPRECATED RDW RBC AUTO: 51.4 FL (ref 37–54)
EGFRCR SERPLBLD CKD-EPI 2021: 28.8 ML/MIN/1.73
ERYTHROCYTE [DISTWIDTH] IN BLOOD BY AUTOMATED COUNT: 16.1 % (ref 12.3–15.4)
GLUCOSE BLDC GLUCOMTR-MCNC: 129 MG/DL (ref 70–130)
GLUCOSE BLDC GLUCOMTR-MCNC: 143 MG/DL (ref 70–130)
GLUCOSE BLDC GLUCOMTR-MCNC: 235 MG/DL (ref 70–130)
GLUCOSE SERPL-MCNC: 159 MG/DL (ref 65–99)
HCT VFR BLD AUTO: 35.2 % (ref 37.5–51)
HGB BLD-MCNC: 11.6 G/DL (ref 13–17.7)
INR PPP: 1.25 (ref 0.89–1.12)
MAGNESIUM SERPL-MCNC: 2 MG/DL (ref 1.6–2.4)
MCH RBC QN AUTO: 28.7 PG (ref 26.6–33)
MCHC RBC AUTO-ENTMCNC: 33 G/DL (ref 31.5–35.7)
MCV RBC AUTO: 87.1 FL (ref 79–97)
PLATELET # BLD AUTO: 206 10*3/MM3 (ref 140–450)
PMV BLD AUTO: 10.3 FL (ref 6–12)
POTASSIUM SERPL-SCNC: 4.1 MMOL/L (ref 3.5–5.2)
PROTHROMBIN TIME: 15.8 SECONDS (ref 12.2–14.5)
RBC # BLD AUTO: 4.04 10*6/MM3 (ref 4.14–5.8)
SODIUM SERPL-SCNC: 134 MMOL/L (ref 136–145)
WBC NRBC COR # BLD AUTO: 8.34 10*3/MM3 (ref 3.4–10.8)

## 2025-01-25 PROCEDURE — 99232 SBSQ HOSP IP/OBS MODERATE 35: CPT | Performed by: INTERNAL MEDICINE

## 2025-01-25 PROCEDURE — 80048 BASIC METABOLIC PNL TOTAL CA: CPT | Performed by: INTERNAL MEDICINE

## 2025-01-25 PROCEDURE — 83735 ASSAY OF MAGNESIUM: CPT | Performed by: INTERNAL MEDICINE

## 2025-01-25 PROCEDURE — 85610 PROTHROMBIN TIME: CPT | Performed by: INTERNAL MEDICINE

## 2025-01-25 PROCEDURE — 82948 REAGENT STRIP/BLOOD GLUCOSE: CPT

## 2025-01-25 PROCEDURE — 97530 THERAPEUTIC ACTIVITIES: CPT

## 2025-01-25 PROCEDURE — 85027 COMPLETE CBC AUTOMATED: CPT | Performed by: INTERNAL MEDICINE

## 2025-01-25 PROCEDURE — 99232 SBSQ HOSP IP/OBS MODERATE 35: CPT | Performed by: NURSE PRACTITIONER

## 2025-01-25 RX ORDER — CARVEDILOL 3.12 MG/1
3.12 TABLET ORAL EVERY 12 HOURS SCHEDULED
Status: DISCONTINUED | OUTPATIENT
Start: 2025-01-25 | End: 2025-01-28 | Stop reason: HOSPADM

## 2025-01-25 RX ADMIN — MULTIVITAMIN TABLET 1 TABLET: TABLET at 07:50

## 2025-01-25 RX ADMIN — FOLIC ACID 1 MG: 1 TABLET ORAL at 07:50

## 2025-01-25 RX ADMIN — AMLODIPINE BESYLATE 5 MG: 5 TABLET ORAL at 07:49

## 2025-01-25 RX ADMIN — HYDRALAZINE HYDROCHLORIDE 25 MG: 25 TABLET ORAL at 07:50

## 2025-01-25 RX ADMIN — LIDOCAINE 1 PATCH: 560 PATCH PERCUTANEOUS; TOPICAL; TRANSDERMAL at 10:05

## 2025-01-25 RX ADMIN — LIDOCAINE 1 PATCH: 560 PATCH PERCUTANEOUS; TOPICAL; TRANSDERMAL at 20:31

## 2025-01-25 RX ADMIN — MUPIROCIN 1 APPLICATION: 20 OINTMENT TOPICAL at 07:50

## 2025-01-25 RX ADMIN — PANTOPRAZOLE SODIUM 40 MG: 40 TABLET, DELAYED RELEASE ORAL at 04:16

## 2025-01-25 RX ADMIN — HYDROCODONE BITARTRATE AND ACETAMINOPHEN 0.5 TABLET: 5; 325 TABLET ORAL at 14:00

## 2025-01-25 RX ADMIN — CARVEDILOL 3.12 MG: 3.12 TABLET, FILM COATED ORAL at 20:33

## 2025-01-25 RX ADMIN — ACETAMINOPHEN 650 MG: 325 TABLET ORAL at 18:29

## 2025-01-25 RX ADMIN — HYDROCODONE BITARTRATE AND ACETAMINOPHEN 1 TABLET: 5; 325 TABLET ORAL at 08:10

## 2025-01-25 RX ADMIN — ASPIRIN 81 MG CHEWABLE TABLET 81 MG: 81 TABLET CHEWABLE at 07:49

## 2025-01-25 RX ADMIN — ROSUVASTATIN 20 MG: 20 TABLET, FILM COATED ORAL at 20:31

## 2025-01-25 RX ADMIN — THIAMINE HCL TAB 100 MG 100 MG: 100 TAB at 07:50

## 2025-01-25 RX ADMIN — MUPIROCIN 1 APPLICATION: 20 OINTMENT TOPICAL at 20:32

## 2025-01-25 NOTE — PLAN OF CARE
Oriented x 4. SR VS S bandar on cardiac mx. On RA.  NIH 7 for partial gaze / complete hemianopia / BLE drift / mild sensory deficit. Foam dressings to posterior head / thoracic spine CDI.   BP closely monitored. Call light in reach. Sitter at bedside.

## 2025-01-25 NOTE — PLAN OF CARE
Goal Outcome Evaluation:  Plan of Care Reviewed With: patient        Progress: improving  Outcome Evaluation: PT able to increase walking distance and walk with less assistance. Needs assist to guide and maneuver RW.

## 2025-01-25 NOTE — PROGRESS NOTES
"  [facility]   Inpatient Progress Note       LOS: 5 days   Patient Care Team:  Radu Francis MD as PCP - General    Chief Complaint:  follow-up for elevated troponin    Subjective     Interval History:     Patient sitting in chair.  Much more alert and interactive.  Family at bedside.  In good spirits.  Ambulated some in hallways with walker today      Review of Systems:   Pertinent positives noted in history, exam, and assessment. Otherwise reviewed and negative.      Objective     Vitals:  Blood pressure 102/55, pulse 77, temperature 98.1 °F (36.7 °C), temperature source Oral, resp. rate 18, height 182.9 cm (72.01\"), weight 82.5 kg (181 lb 14.1 oz), SpO2 96%.     Intake/Output Summary (Last 24 hours) at 1/25/2025 1321  Last data filed at 1/25/2025 0800  Gross per 24 hour   Intake 325 ml   Output 700 ml   Net -375 ml     Vitals reviewed.   Constitutional:       Appearance: Well-developed and not in distress. Frail.   Neck:      Thyroid: No thyromegaly.      Vascular: No carotid bruit or JVD.   Pulmonary:      Breath sounds: Normal breath sounds.   Cardiovascular:      Regular rhythm.      No gallop. No S3 and S4 gallop.   Pulses:     Intact distal pulses.      Carotid: 2+ bilaterally.     Radial: 2+ bilaterally.  Edema:     Peripheral edema absent.   Abdominal:      General: Bowel sounds are normal.      Palpations: Abdomen is soft. There is no abdominal mass.      Tenderness: There is no abdominal tenderness.   Musculoskeletal:         General: No deformity.      Extremities: No clubbing present.Skin:     General: Skin is warm and dry.      Findings: No rash.   Neurological:      Mental Status: Alert and oriented to person, place, and time.     I have examined the patient and agree with the above         Results Review:     I reviewed the patient's new clinical results.    Results from last 7 days   Lab Units 01/23/25  0418   WBC 10*3/mm3 9.55   HEMOGLOBIN g/dL 12.7* "   HEMATOCRIT % 37.2*   PLATELETS 10*3/mm3 218     Results from last 7 days   Lab Units 01/23/25  1846 01/23/25  0418 01/21/25  0536   SODIUM mmol/L  --  136 140   POTASSIUM mmol/L 4.1 3.6 3.9   CHLORIDE mmol/L  --  101 105   CO2 mmol/L  --  26.0 25.0   BUN mg/dL  --  27* 30*   CREATININE mg/dL  --  1.99* 2.21*   CALCIUM mg/dL  --  9.2 8.9   BILIRUBIN mg/dL  --   --  0.5   ALK PHOS U/L  --   --  119*   ALT (SGPT) U/L  --   --  12   AST (SGOT) U/L  --   --  45*   GLUCOSE mg/dL  --  99 99     Results from last 7 days   Lab Units 01/23/25 1846 01/23/25  0418   SODIUM mmol/L  --  136   POTASSIUM mmol/L 4.1 3.6   CHLORIDE mmol/L  --  101   CO2 mmol/L  --  26.0   BUN mg/dL  --  27*   CREATININE mg/dL  --  1.99*   GLUCOSE mg/dL  --  99   CALCIUM mg/dL  --  9.2     Results from last 7 days   Lab Units 01/24/25  0405 01/23/25  0418 01/22/25  0541   INR  1.25* 1.25* 1.16*     Lab Results   Lab Value Date/Time    TROPONINT 491 (C) 01/19/2025 2006    TROPONINT 511 (C) 01/19/2025 1846    TROPONINT <0.010 02/14/2022 2253    TROPONINT <0.010 01/06/2020 1129    TROPONINT <0.010 01/06/2020 0849     Results from last 7 days   Lab Units 01/19/25 2006   TSH uIU/mL 1.080     Results from last 7 days   Lab Units 01/21/25  0536   CHOLESTEROL mg/dL 111   TRIGLYCERIDES mg/dL 84   HDL CHOL mg/dL 35*   LDL CHOL mg/dL 59     Results from last 7 days   Lab Units 01/19/25  1846   PROBNP pg/mL 9,226.0*     Results from last 7 days   Lab Units 01/19/25 2006 01/19/25  1846   HSTROP T ng/L 491* 511*         Tele: Sinus rhythm    Assessment:      ICH (intracerebral hemorrhage)    CAD (coronary artery disease)    Diabetes mellitus    Hyperlipidemia LDL goal <70    Diabetic nephropathy associated with type 2 diabetes mellitus    Chronic kidney disease, stage IV (severe)    Disc disorder of cervical region    Essential hypertension    Alcohol dependence with unspecified alcohol-induced disorder    C5 cervical fracture    C6 cervical fracture     "NSTEMI, initial episode of care    History of pulmonary embolus (PE)/DVT    Acute on chronic diastolic CHF (congestive heart failure)    Syncope and collapse      Cervical fracture  No surgical intervention planned.  Cervical collar for 4 weeks and follow-up with neurosurgery     Possible syncope  Unclear if patient suffered a syncopal episode leading to fall.  However did suffer syncopal episode in October.  No arrhythmias noted on telemetry but cardiogenic syncope cannot be ruled out  Echo shows normal LVEF and no significant valvular abnormality     Acute on chronic diastolic heart failure  proBNP elevated over 9000 and chest x-ray shows interstitial pulmonary edema versus atypical pneumonia  Treated with 2 mg IV Bumex x 1 on 1/19  Echo this admission shows normal LVEF and no significant valvular abnormality  Would hold off on further diuresis      Elevated troponin/NSTEMI  Troponins elevated and flat and trending down.  EKG does have ST depression  Patient reports chest tightness and shortness of breath over the past 6 weeks     Coronary artery disease  History of remote CABG 1994 with last cath 2011 with 3/3 grafts patent  Stress echo 2014 normal study.  No recent ischemic eval  Not on aspirin due to concomitant use of Coumadin  Patient does report shortness of breath and chest tightness over the past 6 weeks.     Hypertension  Carvedilol 12.5 mg twice daily     Hyperlipidemia  Crestor 20 mg daily  Total cholesterol 143, triglycerides 85, HDL 45, LDL 82     Type 2 diabetes mellitus  Controlled with hemoglobin A1c 6.1     Stage IV chronic kidney disease  Creatinine elevated but at baseline at 2.21     History of PE/DVT  On Coumadin with current INR 2.02    Plan:  Patient with no history of A-fib, and on warfarin for DVT/PE.  Now that he has IVC filter, there is \"no hurry\" in restarting this.  May wait quite some time.  Continue amlodipine.  Will transition hydralazine to carvedilol given his known CAD.  Elevated " troponin, with no plans for ischemic evaluation.  Okay to rehab anytime from our standpoint.  We will sign off and see on a as needed/courtesy basis.  Please call if any questions      Avelino Hernandez MD         Dictated utilizing Dragon dictation

## 2025-01-25 NOTE — PROGRESS NOTES
[facility] Medicine Services  PROGRESS NOTE    Patient Name: Toño Alcala  : 1940  MRN: [id number]    Date of Admission: 2025  Primary Care Physician: Radu Francis MD    Subjective   Subjective     CC:  Stroke w/ hemorrhagic transformation, dvt, c5,c6 fx    HPI:  Feels ok, pain stable, reasonably controlled  No dyspnea  No palpitations  No n/v/d    Objective   Objective     Vital Signs:   Temp:  [97.7 °F (36.5 °C)-98.5 °F (36.9 °C)] 97.7 °F (36.5 °C)  Heart Rate:  [] 85  Resp:  [14-16] 16  BP: (104-149)/(55-83) 134/76     Physical Exam:  Alert, nontoxic appearing, sitting up in chair  Some scrapes on scalp, oroph clear; c-collar in place  Rrr  Lungs clear  Abd soft, nontender  Face symmetric, speech clear, left homonymous hemianopsia, moves all extremities    Results Reviewed:  LAB RESULTS:      Lab 25  0405 25  0418 25  0541 25  1845 25  0536 25  2145 25  0301 25   WBC  --  9.55  --   --  9.44  --  11.56*  --  11.23*   HEMOGLOBIN  --  12.7*  --   --  11.9*  --  12.7*  --  13.6   HEMATOCRIT  --  37.2*  --   --  35.8*  --  38.5  --  41.1   PLATELETS  --  218  --   --  186  --  193  --  196   NEUTROS ABS  --   --   --   --  6.19  --  9.01*  --  9.29*   IMMATURE GRANS (ABS)  --   --   --   --  0.03  --  0.02  --  0.05   LYMPHS ABS  --   --   --   --  1.70  --  1.27  --  0.76   MONOS ABS  --   --   --   --  1.01*  --  1.19*  --  1.11*   EOS ABS  --   --   --   --  0.47*  --  0.03  --  0.00   MCV  --  85.1  --   --  85.9  --  86.3  --  86.0   PROCALCITONIN  --   --   --   --   --   --   --   --  0.13   LACTATE  --   --   --   --   --   --   --   --  1.7   PROTIME 15.8* 15.8* 15.0* 14.6* 15.4*   < > 23.0*  --  21.3*   HSTROP T  --   --   --   --   --   --   --  491* 511*    < > = values in this interval not displayed.         Lab 25  1846 25  0418 25  0536 25  0301  01/19/25 2006 01/19/25 1846   SODIUM  --  136 140 139  --  137   POTASSIUM 4.1 3.6 3.9 4.0  --  4.3   CHLORIDE  --  101 105 102  --  101   CO2  --  26.0 25.0 25.0  --  24.0   ANION GAP  --  9.0 10.0 12.0  --  12.0   BUN  --  27* 30* 26*  --  23   CREATININE  --  1.99* 2.21* 2.21*  --  2.17*   EGFR  --  32.5* 28.7* 28.7*  --  29.3*   GLUCOSE  --  99 99 139*  --  153*   CALCIUM  --  9.2 8.9 9.0  --  9.1   MAGNESIUM  --   --  2.0  --  1.7  --    PHOSPHORUS  --   --  3.2  --   --   --    HEMOGLOBIN A1C  --   --  5.70*  --   --   --    TSH  --   --   --   --  1.080  --          Lab 01/21/25  0536 01/20/25 0301 01/19/25 1846   TOTAL PROTEIN 6.6 6.5 7.2   ALBUMIN 3.5 3.7 4.0   GLOBULIN 3.1 2.8 3.2   ALT (SGPT) 12 12 12   AST (SGOT) 45* 53* 48*   BILIRUBIN 0.5 0.8 0.9   ALK PHOS 119* 116 135*         Lab 01/24/25 0405 01/23/25 0418 01/22/25 0541 01/21/25 1845 01/21/25 0536 01/20/25 0301 01/19/25 2006 01/19/25 1846   PROBNP  --   --   --   --   --   --   --  9,226.0*   HSTROP T  --   --   --   --   --   --  491* 511*   PROTIME 15.8* 15.8* 15.0* 14.6* 15.4*   < >  --  21.3*   INR 1.25* 1.25* 1.16* 1.13* 1.21*   < >  --  1.83*    < > = values in this interval not displayed.         Lab 01/21/25  0536   CHOLESTEROL 111   LDL CHOL 59   HDL CHOL 35*   TRIGLYCERIDES 84             Brief Urine Lab Results  (Last result in the past 365 days)        Color   Clarity   Blood   Leuk Est   Nitrite   Protein   CREAT   Urine HCG        01/19/25 1932 Yellow   Clear   Small (1+)   Negative   Negative   100 mg/dL (2+)                   Microbiology Results Abnormal       None            CT Head Without Contrast    Result Date: 1/24/2025  CT HEAD WO CONTRAST Date of Exam: 1/24/2025 10:26 AM EST Indication: stroke,  hemorrhage assessment. Comparison: January 23, 2025 Technique: Axial CT images were obtained of the head without contrast administration.  Automated exposure control and iterative construction methods were used.  Findings: There are evolving subacute infarcts within the right temporal and occipital lobes. A small amount of hemorrhage within the right occipital lobe infarct appears unchanged. No new intracranial hemorrhage is identified. The ventricles are stable in caliber, with no midline shift. The basal cisterns appear patent. Degenerative changes appear stable. The calvarium appears intact. The paranasal sinuses and mastoid air cells are well aerated.     Impression: Impression: Evolving subacute infarcts within right temporal and occipital lobes. Small amount of hemorrhage within right occipital lobe infarct appears unchanged. No new acute intracranial process identified. Electronically Signed: Rodríguez Mckeon MD  1/24/2025 10:43 AM EST  Workstation ID: [id number]    Invasive peripheral vascular study    Result Date: 1/23/2025    Successful IVC filter placement      Results for orders placed during the hospital encounter of 01/19/25    Adult Transthoracic Echo Complete W/ Cont if Necessary Per Protocol    Interpretation Summary    Left ventricular systolic function is normal. Estimated left ventricular EF = 50%    Left ventricular wall thickness is consistent with borderline concentric hypertrophy.    Mild mitral valve regurgitation is present.      Current medications:  Scheduled Meds:amLODIPine, 5 mg, Oral, Q24H  aspirin, 81 mg, Oral, Daily  folic acid, 1 mg, Oral, Daily  hydrALAZINE, 25 mg, Oral, Q12H  Lidocaine, 1 patch, Transdermal, Q12H  multivitamin, 1 tablet, Oral, Daily  mupirocin, 1 Application, Each Nare, BID  pantoprazole, 40 mg, Oral, Q AM  rosuvastatin, 20 mg, Oral, Nightly  thiamine, 100 mg, Oral, Daily      Continuous Infusions:   PRN Meds:.  acetaminophen **OR** acetaminophen **OR** acetaminophen    senna-docusate sodium **AND** polyethylene glycol **AND** bisacodyl **AND** bisacodyl    bumetanide    Calcium Replacement - Follow Nurse / BPA Driven Protocol    HYDROcodone-acetaminophen    hydrOXYzine     labetalol    Magnesium Standard Dose Replacement - Follow Nurse / BPA Driven Protocol    ondansetron    Phosphorus Replacement - Follow Nurse / BPA Driven Protocol    Potassium Replacement - Follow Nurse / BPA Driven Protocol    sodium chloride    Assessment & Plan   Assessment & Plan     Active Hospital Problems    Diagnosis  POA    **ICH (intracerebral hemorrhage) [I61.9]  Yes    NSTEMI, initial episode of care [I21.4]  Yes    History of pulmonary embolus (PE)/DVT [Z86.711]  No    Acute on chronic diastolic CHF (congestive heart failure) [I50.33]  Yes    Syncope and collapse [R55]  Unknown    Alcohol dependence with unspecified alcohol-induced disorder [F10.29]  Yes    C5 cervical fracture [S12.400A]  Yes    C6 cervical fracture [S12.500A]  Yes    Essential hypertension [I10]  Yes    Hyperlipidemia LDL goal <70 [E78.5]  Yes    Chronic kidney disease, stage IV (severe) [N18.4]  Yes    Disc disorder of cervical region [M50.90]  Yes    Diabetic nephropathy associated with type 2 diabetes mellitus [E11.21]  Yes    CAD (coronary artery disease) [I25.10]  Yes    Diabetes mellitus [E11.9]  Yes      Resolved Hospital Problems   No resolved problems to display.        Brief Hospital Course to date:  Toño Alcala is a 84 y.o. male w/ hx CAD (previous cabg 1994), HFpEF, HTN, HL, DM2, ckd 4, previous PE & RLE DVT (on chronic coumadin) who was found down in his home w/ a C5,C6 vertebral body fractures assumed to be traumatic. Also had some elevated troponins for which cardiology consulted. Neurosurgery evaluated and recommended non-op management, C-collar use. Patient developed left sided neglect & visual symptoms and was found to have ischemic stroke w/ hemorrhagic transformation (no large vessel occlusion, aneurysm or flow-limiting stenosis) and was transferred to ICU. Continued to have falls in the icu. Cardiology followed. Lower extremity duplex showed proximal femoral and mid-femoral chronic appearing DVT. Due to  recurrent falls (and acute ischemic stroke w/ hemorrhagic transformation) decision made to place IVC filter which was performed 1/23/25 by Dr. Hernandez; it is felt that patient should remain off anticoagulation indefinitely at this point, at least until mobility/falls improved as patient has history of recurrent falls, even while in the hospital. Furthermore, cardiology did not feel that patient would benefit from ischemic evaluation this admission. Patient placed on asa 81mg. Patient passed dyspagia evaluation and tolerating po. 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 Stopping anticoagulation indefinitely at this point (in the short term due to the ischemic CVA w/ hemorrhagic transformation, and more long term due to recurrent falls)  -normal BP goals  -therapy has recommended IPR at d/c  -tolerating modified diet    C5 & C6 fracture (oblique nondisplaced); non-operative management  -evaluated by Neurosurgery (Dr. Byrd) on 1/2/25: non-op management as no myelopathy/radiculopathy; C-collar at all times (except eating & bathing) for 6-8 weeks; follow up w/ Dr. Byrd (neurosurgery) office 4 weeks w/ cervical xrays    RLE DVT (proximal femoral and mid-femoral), s/p IVC filter placement  Hx previous PE/DVT (on chronic coumadin prior to admission)  -duplex 1/20/25 revealed chronic appearing RLE DVT proximal femoral and mid femoral  **s/p IVC FILTER PLACEMENT 1/23/25, Dr. Hernandez (as not able to anticoagulation for at least 1-2 weeks after hemorrhagic stroke, and now decision made to hold anticoagulation indefinitely due to  multiple recurrent falls)  -can follow up w/ Cardiology outpatient regarding future anticoagulation and determination whether remove the IVC filter or remain in place long-term    Elevated troponin/NSTEMI (type of NSTEMI unclear per cardiology)  Hx CAD   Acute on chronic HFpEF  HTN  HL  -remote CABG 1994, last cath 2011 w/ 3/3 grafts patent  -stress echo 2014 normal study; no recent ischemic evaluation  -EKG his admission w/ non-specific st changes  -echo 1/19/25: LV ef 50%, valves ok  -has received IV diuretic this admission  -cards follows: on asa 81mg (no longer on coumadin due to hemorrhagic stroke, falls), crestor 20mg, amlodipine 5mg, changed hydralazine back to Coreg 3.125mg bid (titrate up as tolerates). Holding on further diuresis. No plans for ischemic evaluation this admission (could not tolerate anticoagulation due to stroke w/ hemorrhagic transformation)  -depending on BP & HR, if ok w/ cards could consider trial adding low dose coreg 3.125mg in future (had previously been on 12.5mg)  -cleared for d/c to rehab from cards standpoint    CKD IV (baseline cr ~2.3-2.4)  -currently creatinine stable    DM2  -A1c 6.1 on 1/10/25  -A1c 5.7 on 1/2125  -appears was on no meds prior to admission  -accuchecks tid over next 1-2 days to trend fsbs      Am lab: bmp,mag, inr (stop inr checks if it is stable)    Expected Discharge Location and Transportation: await [facility]  Expected Discharge   Expected Discharge Date: 1/28/2025; Expected Discharge Time: 12:00 PM     VTE Prophylaxis:  Mechanical VTE prophylaxis orders are present.         AM-PAC 6 Clicks Score (PT): 15 (01/24/25 2030)    CODE STATUS:   Code Status and Medical Interventions: CPR (Attempt to Resuscitate); Full Support   Ordered at: 01/19/25 5018     Level Of Support Discussed With:    Patient     Code Status (Patient has no pulse and is not breathing):    CPR (Attempt to Resuscitate)     Medical Interventions (Patient has pulse or is breathing):    Full  Support       Jose Cota MD  01/25/25

## 2025-01-25 NOTE — PROGRESS NOTES
Stroke Progress Note       Chief Complaint: Fall    Subjective    Subjective     Subjective: Sitting up in the recliner eating lunch, his family is at bedside.  Patient and family feel like he is improving and are working towards rehab hopefully [facility] early this week.      Review of Systems   Constitutional:  Positive for fatigue. Negative for fever.   Eyes:  Positive for visual disturbance.   Respiratory:  Negative for shortness of breath.    Gastrointestinal:  Negative for nausea and vomiting.   Neurological:  Positive for weakness and headaches. Negative for dizziness and numbness.         Objective      Temp:  [97.7 °F (36.5 °C)-98.5 °F (36.9 °C)] 97.7 °F (36.5 °C)  Heart Rate:  [] 85  Resp:  [14-16] 16  BP: (102-149)/(55-83) 134/76    Objective    GEN: lying in bed; in NAD  HENT: normocephalic, non-erythematous oropharynx  CV: no LE edema    NEURO:  Mental Status: A&O x 3, interactive, able to follow commands  Speech: Intact Articulation  CN 2-12:  II - PERRLA, left homonymous hemianopsia noted  III, IV, VI - EOMI  V - Facial sensation intact  VII -no gross facial asymmetry  VIII - Auditory acuity intact  XII - Tongue protrudes midline    Motor: The patient can move the bilateral upper extremity against gravity and can bend bilateral lower extremity at knee, BLE weakness 4/5.  Has been ambulating in the [place]  Sensory: No sensory deficit  Reflexes: negative Salcido's sign BL  Coordination: no ataxia with finger-to-nose testing  Gait/Station: deferred     Results Review:    I reviewed the patient's new clinical results.    WBC   Date Value Ref Range Status   01/23/2025 9.55 3.40 - 10.80 10*3/mm3 Final     RBC   Date Value Ref Range Status   01/23/2025 4.37 4.14 - 5.80 10*6/mm3 Final     Hemoglobin   Date Value Ref Range Status   01/23/2025 12.7 (L) 13.0 - 17.7 g/dL Final     Hematocrit   Date Value Ref Range Status   01/23/2025 37.2 (L) 37.5 - 51.0 % Final     MCV   Date Value Ref Range Status    01/23/2025 85.1 79.0 - 97.0 fL Final     MCH   Date Value Ref Range Status   01/23/2025 29.1 26.6 - 33.0 pg Final     MCHC   Date Value Ref Range Status   01/23/2025 34.1 31.5 - 35.7 g/dL Final     RDW   Date Value Ref Range Status   01/23/2025 16.3 (H) 12.3 - 15.4 % Final     RDW-SD   Date Value Ref Range Status   01/23/2025 50.8 37.0 - 54.0 fl Final     MPV   Date Value Ref Range Status   01/23/2025 9.9 6.0 - 12.0 fL Final     Platelets   Date Value Ref Range Status   01/23/2025 218 140 - 450 10*3/mm3 Final       Lab Results   Component Value Date    GLUCOSE 99 01/23/2025    BUN 27 (H) 01/23/2025    CREATININE 1.99 (H) 01/23/2025     01/23/2025    K 4.1 01/23/2025     01/23/2025    CALCIUM 9.2 01/23/2025    PROTEINTOT 6.6 01/21/2025    ALBUMIN 3.5 01/21/2025    ALT 12 01/21/2025    AST 45 (H) 01/21/2025    ALKPHOS 119 (H) 01/21/2025    BILITOT 0.5 01/21/2025    GLOB 3.1 01/21/2025    AGRATIO 1.1 01/21/2025    BCR 13.6 01/23/2025    ANIONGAP 9.0 01/23/2025    EGFR 32.5 (L) 01/23/2025     CT Head Without Contrast    Result Date: 1/24/2025  Impression: Evolving subacute infarcts within right temporal and occipital lobes. Small amount of hemorrhage within right occipital lobe infarct appears unchanged. No new acute intracranial process identified. Electronically Signed: Rodríguez Mckeon MD  1/24/2025 10:43 AM EST  Workstation ID: [id number]   Results for orders placed during the hospital encounter of 01/19/25    Adult Transthoracic Echo Complete W/ Cont if Necessary Per Protocol    Interpretation Summary    Left ventricular systolic function is normal. Estimated left ventricular EF = 50%    Left ventricular wall thickness is consistent with borderline concentric hypertrophy.    Mild mitral valve regurgitation is present.      -MRA of the head and neck images from 1- were personally reviewed and showed no flow limiting stenosis or LVO  -MRI brain images from 1- were personally reviewed and  showed an acute ischemic stroke of the R tempro-occipital lobe with hemorrhagic conversion  -Transthoracic echocardiogram is pending   -A1c from 1- was 5.7%  -LDL from 1- was 59  -BL LE duplex on 1- showed chronic RLE DVT    Assessment/Plan   This is a 84-year-old male with past medical history of remote CVA (no deficits, in 1990), CAD, NSTEMI, HTN, CKD IV, HLD, T2DM, DVT, PE (on Coumadin) was a in-house code stroke at 1759 this evening.  Patient is admitted for a nondisplaced oblique fracture through the C5 and C6 vertebral bodies that he obtained after a fall while at home.  Patient is unsure what caused him to fall or how long he was on the ground.  Primary nurse reports she was giving him his evening Coumadin when she noticed that he did not acknowledge the drink in his left hand and seemed to have a left visual deficit.  Last known well is unclear as nursing and family tells me that patient has slept most of the day.  CT head shows evolving right temporal and occipital lobe infarcts with new intraparenchymal hemorrhage within the right occipital lobe measuring 2.1 x 1.6 cm (volume less than 30 cc).  No mass effect or midline shift.  CTA head neck and CT perfusion were deferred as patient's EGFR was 28.7. Patient's Coumadin was reversed with Kcentra and vitamin K.  He was transferred immediately to the ICU for close neurological monitoring.  I did discuss case and imaging with Dr. Cruz (neurosurgery) who recommends medical management at this time.     Antiplatelet PTA: None  Anticoagulant PTA: Warfarin        #Acute right temporal and occipital lobe infarct with hemorrhagic conversion  #s/p Coumadin reversal with Kcentra and vitamin K  -Hemorrhagic stroke order set  -ICH score 1  -Discussed with Dr. Cruz, neurosurgery, patient not a candidate for neurosurgical treatment  -MRA of the head and neck images from 1- were personally reviewed and showed no flow limiting stenosis or  LVO  -MRI brain images from 1- were personally reviewed and showed an acute ischemic stroke of the R tempro-occipital lobe with hemorrhagic conversion  -Transthoracic echocardiogram on 1/20/2025 showed left ventricular ejection fraction of 50%, dilated left atrium  -A1c from 1- was 5.7%  -LDL from 1- was 59  -BL LE duplex on 1- showed chronic RLE DVT, also has prior PE/DVT  -IVC filter placed 1/23/2025    Recommendations:   -Continues to show clinical improvement  -Normal blood pressure parameters, SBP <150  -Continue ASA 81mg daily   -Anticoagulation has been stopped (status post IVC filter inpatient with recent CVA status post hemorrhagic transformation and recurrent falls)  -Okay for rosuvastatin 20 mg nightly  -Activity as tolerated, fall risk precautions, sitter present at bedside   -PT/OT/SLP recommend IPR at DC  -Appreciate CM assistance with discharge planning, family is hoping for [facility] at discharge  -Fall precautions  -Follow-up in stroke clinic in 4 weeks, added to ADT     Discussed the importance of medication compliance Aspirin 81mg daily and Crestor 20mg nightly and lifestyle modifications adequate control of blood pressure, adequate control of cholesterol (goal LDL <70), adequate control of glucose (<140, A1c goal <7), increased physical activity, and implementation of healthy diet to help reduce the risk of future cerebrovascular events.  Also discussed the signs symptoms that would warrant the patient return back to the emergency department including unilateral weakness, unilateral numbness, visual disturbances, loss of balance, speech difficulties, and/or a sudden severe headache.  Patient and son verbalized understanding.      Stroke workup is complete at this time, stroke neurology will sign off and follow-up in clinic.  Please call with questions thank you      =================================  YARED Bourne  Stroke Neurology   [facility] -  [place]

## 2025-01-25 NOTE — THERAPY TREATMENT NOTE
Patient Name: Toño Alcala  : 1940    MRN: [id number]                              Today's Date: 2025       Admit Date: 2025    Visit Dx:     ICD-10-CM ICD-9-CM   1. Generalized weakness  R53.1 780.79   2. Fall, initial encounter  W19.XXXA E888.9   3. Injury of head, initial encounter  S09.90XA 959.01   4. Acute congestive heart failure, unspecified heart failure type  I50.9 428.0   5. Elevated troponin  R79.89 790.6   6. Closed nondisplaced fracture of fifth cervical vertebra, unspecified fracture morphology, initial encounter  S12.401A 805.05   7. Closed nondisplaced fracture of sixth cervical vertebra, unspecified fracture morphology, initial encounter  S12.501A 805.06   8. Elevated CK  R74.8 790.5   9. Oropharyngeal dysphagia  R13.12 787.22     Patient Active Problem List   Diagnosis    CAD (coronary artery disease)    DVT (deep venous thrombosis)    Diabetes mellitus    Dyslipidemia    GERD (gastroesophageal reflux disease)    Hyperlipidemia LDL goal <70    Diabetic nephropathy associated with type 2 diabetes mellitus    Diabetic polyneuropathy associated with type 2 diabetes mellitus    Chronic kidney disease, stage IV (severe)    Vitamin D deficiency    Disc disorder of cervical region    Lumbosacral spondylosis without myelopathy    Arthritis of elbow    Acute right-sided low back pain without sciatica    Unifocal PVCs    Essential hypertension    Stage 3 chronic kidney disease due to benign hypertension    BMI 30.0-30.9,adult    Fall    Alcohol dependence with unspecified alcohol-induced disorder    C5 cervical fracture    C6 cervical fracture    Multiple fractures of cervical spine    NSTEMI, initial episode of care    History of pulmonary embolus (PE)/DVT    Acute on chronic diastolic CHF (congestive heart failure)    Syncope and collapse    ICH (intracerebral hemorrhage)     Past Medical History:   Diagnosis Date    Acute diverticulitis 2020    Allergic 60 yrs ago    Arthritis      Arthritis of neck Fusion 1992    Bilateral radial fractures 1962    fracture bilateral radial heads    CAD (coronary artery disease)     Calculus of gallbladder without cholecystitis without obstruction 01/29/2020    Cataract     Cervical disc disorder Fusion 1992    Cholelithiasis     Chronic kidney disease 2020    Clotting disorder     CVD (cardiovascular disease)     History of TIA 1989    Diabetes mellitus     DVT (deep venous thrombosis)     Right lower extremity DVT and pulmonary embolism in 06/2015, idiopathic    DVT of proximal lower limb 06/22/2015    proximal DVT right leg, small PE- anticoagulation    Dyslipidemia     Erectile dysfunction     Fracture 1952    H/o pf left forearm fracture    Fracture of right hand 1980    Fracture of wrist 1980    GERD (gastroesophageal reflux disease)     with hiatal hernia    HL (hearing loss)     Hx of angina pectoris     Hypertension     Normal renal angiography 02/16/11    Knee swelling Several years ago    Left wrist fracture 1953    Lumbosacral disc disease 1996    Osgood-Schlatter's disease 1955    Osteoarthritis     Right wrist fracture     Vertigo     Wears glasses      Past Surgical History:   Procedure Laterality Date    APPENDECTOMY  1957    BACK SURGERY      CARDIAC CATHETERIZATION  2011    with vein graft    CATARACT EXTRACTION Left     CERVICAL FUSION      CERVICAL FUSION  1992    C3-4    COLONOSCOPY  2013    CORONARY ARTERY BYPASS GRAFT  1994    HERNIA REPAIR Right 2011    inguinal    INGUINAL HERNIA REPAIR Left 10/29/2019    Procedure: INGUINAL HERNIA REPAIR LEFT;  Surgeon: Charisma Raines MD;  Location: [facility] OR;  Service: General    INTERVENTIONAL RADIOLOGY PROCEDURE N/A 1/23/2025    Procedure: IVC Filter Placement;  Surgeon: Avelino Hernandez MD;  Location:  [facility] CATH INVASIVE LOCATION;  Service: Cardiovascular;  Laterality: N/A;    LUMBAR LAMINECTOMY  1996    laminectomy, lumbar, L3    NECK SURGERY  1992    OTHER SURGICAL HISTORY      wrist  surgery    SPINE SURGERY  1996    TONSILLECTOMY AND ADENOIDECTOMY  1945    TRIGGER POINT INJECTION  2001 - 2007    VASECTOMY  1972      General Information       Row Name 01/25/25 1005          Physical Therapy Time and Intention    Document Type therapy note (daily note)  -LS     Mode of Treatment physical therapy  -       Row Name 01/25/25 1005          General Information    Patient Profile Reviewed yes  -LS     Existing Precautions/Restrictions fall;cervical collar;brace on at all times;other (see comments)  C5-6 fx with cervical collar AAT; hx falls/confusion; L visual field cut; s/p RLE IVC filter (1/23), L ankle pain  -LS       Row Name 01/25/25 1005          Cognition    Orientation Status (Cognition) oriented x 3  -               User Key  (r) = Recorded By, (t) = Taken By, (c) = Cosigned By      Initials Name Provider Type    Marsha Najera, MYRIAM Physical Therapist                   Mobility       Row Name 01/25/25 1005          Bed Mobility    Comment, (Bed Mobility) UIC  -       Row Name 01/25/25 1005          Sit-Stand Transfer    Sit-Stand [place] (Transfers) verbal cues;minimum assist (75% patient effort)  -LS     Assistive Device (Sit-Stand Transfers) walker, front-wheeled  -LS     Comment, (Sit-Stand Transfer) vcs for hand placement  -       Row Name 01/25/25 1005          Gait/Stairs (Locomotion)    [place] Level (Gait) verbal cues  -LS     Assistive Device (Gait) walker, front-wheeled  -LS     Patient was able to Ambulate yes  -LS     Distance in Feet (Gait) 60  -LS     Deviations/Abnormal Patterns (Gait) gait speed decreased;stride length decreased  -LS     Comment, (Gait/Stairs) step-through gait pattern at a slow pace. Needs assist to guide and maneuver RW.  -LS               User Key  (r) = Recorded By, (t) = Taken By, (c) = Cosigned By      Initials Name Provider Type    Marsha Najera PT Physical Therapist                   Obj/Interventions       Row Name  01/25/25 1005          Motor Skills    Therapeutic Exercise knee;ankle  -LS       Row Name 01/25/25 1005          Knee (Therapeutic Exercise)    Knee (Therapeutic Exercise) AROM (active range of motion)  -LS     Knee AROM (Therapeutic Exercise) right;left;LAQ (long arc quad);3 repetitions  limited resps c/t pt c/o R knee pain  -LS       Row Name 01/25/25 1005          Ankle (Therapeutic Exercise)    Ankle (Therapeutic Exercise) AROM (active range of motion)  -LS     Ankle AROM (Therapeutic Exercise) bilateral;dorsiflexion;plantarflexion;10 repetitions  -       Row Name 01/25/25 1005          Balance    Position/Device Used, Standing Balance supported;walker, rolling  -LS     Balance Interventions standing;sit to stand;supported;weight shifting activity  -LS               User Key  (r) = Recorded By, (t) = Taken By, (c) = Cosigned By      Initials Name Provider Type    Marsha Najera, PT Physical Therapist                   Goals/Plan    No documentation.                  Clinical Impression       Row Name 01/25/25 1005          Pain    Pretreatment Pain Rating 5/10  -LS     Posttreatment Pain Rating 5/10  -LS     Pain Location flank  -LS     Pain Side/Orientation right  -LS     Pain Management Interventions nursing notified  -       Row Name 01/25/25 1005          Plan of Care Review    Plan of Care Reviewed With patient  -LS     Progress improving  -LS     Outcome Evaluation PT able to increase walking distance and walk with less assistance. Needs assist to guide and maneuver RW.  -LS       Row Name 01/25/25 1005          Positioning and Restraints    Pre-Treatment Position sitting in chair/recliner  -LS     Post Treatment Position chair  -LS     In Chair notified nsg;sitting;call light within reach;encouraged to call for assist;with nsg  -LS               User Key  (r) = Recorded By, (t) = Taken By, (c) = Cosigned By      Initials Name Provider Type    Marsha Najera, PT Physical Therapist                    Outcome Measures       Row Name 01/25/25 1005 01/25/25 0800       How much help from another person do you currently need...    Turning from your back to your side while in flat bed without using bedrails? 3  -LS 3  -NC    Moving from lying on back to sitting on the side of a flat bed without bedrails? 3  -LS 3  -NC    Moving to and from a bed to a chair (including a wheelchair)? 3  -LS 3  -NC    Standing up from a chair using your arms (e.g., wheelchair, bedside chair)? 3  -LS 3  -NC    Climbing 3-5 steps with a railing? 2  -LS 2  -NC    To walk in hospital room? 3  -LS 3  -NC    AM-PAC 6 Clicks Score (PT) 17  -LS 17  -NC    Highest Level of Mobility Goal 5 --> Static standing  -LS 5 --> Static standing  -NC      Row Name 01/25/25 1005          Functional Assessment    Outcome Measure Options AM-PAC 6 Clicks Basic Mobility (PT)  -LS               User Key  (r) = Recorded By, (t) = Taken By, (c) = Cosigned By      Initials Name Provider Type    Marsha Najera, PT Physical Therapist    Adelaide Quiñonez RN Registered Nurse                                 Physical Therapy Education       Title: PT OT SLP Therapies (In Progress)       Topic: Physical Therapy (In Progress)       Point: Mobility training (Done)       Learning Progress Summary            Patient Acceptance, E, VU,NR by LS at 1/25/2025 1005    Acceptance, E, NR by KG at 1/23/2025 0913    Acceptance, E, NR by ND at 1/20/2025 1600                      Point: Home exercise program (In Progress)       Learning Progress Summary            Patient Acceptance, E, NR by KG at 1/23/2025 0913                      Point: Body mechanics (In Progress)       Learning Progress Summary            Patient Acceptance, E, NR by KG at 1/23/2025 0913    Acceptance, E, NR by ND at 1/20/2025 1600                      Point: Precautions (In Progress)       Learning Progress Summary            Patient Acceptance, E, NR by KG at 1/23/2025 0913     Acceptance, E, NR by ND at 1/20/2025 1600                                      User Key       Initials Effective Dates Name Provider Type Discipline    LS 07/11/23 -  Marsha Rangel, PT Physical Therapist PT    KG 05/22/20 -  Emy Bates, PT Physical Therapist PT    ND 11/16/23 -  Hannah Blanton PT Physical Therapist PT                  PT Recommendation and Plan     Progress: improving  Outcome Evaluation: PT able to increase walking distance and walk with less assistance. Needs assist to guide and maneuver RW.     Time Calculation:         PT Charges       Row Name 01/25/25 1005             Time Calculation    Start Time 1005  -LS      PT Received On 01/25/25  -LS      PT Goal Re-Cert Due Date 01/03/25  -LS         Timed Charges    41953 - Gait Training Minutes  5  -LS      78707 - PT Therapeutic Activity Minutes 10  -LS         Total Minutes    Timed Charges Total Minutes 15  -LS       Total Minutes 15  -LS                User Key  (r) = Recorded By, (t) = Taken By, (c) = Cosigned By      Initials Name Provider Type    Marsha Najera, PT Physical Therapist                  Therapy Charges for Today       Code Description Service Date Service Provider Modifiers Qty    91889525973 HC PT THERAPEUTIC ACT EA 15 MIN 1/25/2025 Marsha Rangel, PT GP 1            PT G-Codes  Outcome Measure Options: AM-PAC 6 Clicks Basic Mobility (PT)  AM-PAC 6 Clicks Score (PT): 17  AM-PAC 6 Clicks Score (OT): 10  Modified Janey Scale: 4 - Moderately severe disability.  Unable to walk without assistance, and unable to attend to own bodily needs without assistance.       Marsha Rangel, PT  1/25/2025

## 2025-01-25 NOTE — PLAN OF CARE
Goal Outcome Evaluation:  Plan of Care Reviewed With: patient, child        Progress: improving  Outcome Evaluation: Pt VSS, Alert to self and time, throughout the shift. Disoriented to place and situation only once when asked orientation questions after being woken up from sleep. Reoriented. The rest of the shift A&Ox4. Pain controlled with PO pain medication, NIHSS-4 this shift.  Up to chair, walked in room and out in hallway with assist of 1, gait belt, and walker. Pleasent and cooperative. Has attempted to get up twice so far this shift. Utilizing urinal with assist of 1. Sons and grandson came to visit. Appetite improving. Able to feed self. Only needing tray set-up. Family says they see much improvement with alertness and ability to move. RN sitter at bedside this shift. Will cont to monitor.

## 2025-01-26 LAB
ANION GAP SERPL CALCULATED.3IONS-SCNC: 10 MMOL/L (ref 5–15)
BUN SERPL-MCNC: 33 MG/DL (ref 8–23)
BUN/CREAT SERPL: 15.8 (ref 7–25)
CALCIUM SPEC-SCNC: 9.3 MG/DL (ref 8.6–10.5)
CHLORIDE SERPL-SCNC: 101 MMOL/L (ref 98–107)
CO2 SERPL-SCNC: 26 MMOL/L (ref 22–29)
CREAT SERPL-MCNC: 2.09 MG/DL (ref 0.76–1.27)
EGFRCR SERPLBLD CKD-EPI 2021: 30.6 ML/MIN/1.73
GLUCOSE BLDC GLUCOMTR-MCNC: 106 MG/DL (ref 70–130)
GLUCOSE BLDC GLUCOMTR-MCNC: 135 MG/DL (ref 70–130)
GLUCOSE BLDC GLUCOMTR-MCNC: 154 MG/DL (ref 70–130)
GLUCOSE SERPL-MCNC: 110 MG/DL (ref 65–99)
INR PPP: 1.2 (ref 0.89–1.12)
MAGNESIUM SERPL-MCNC: 2 MG/DL (ref 1.6–2.4)
POTASSIUM SERPL-SCNC: 4 MMOL/L (ref 3.5–5.2)
PROTHROMBIN TIME: 15.3 SECONDS (ref 12.2–14.5)
SODIUM SERPL-SCNC: 137 MMOL/L (ref 136–145)

## 2025-01-26 PROCEDURE — 85610 PROTHROMBIN TIME: CPT | Performed by: INTERNAL MEDICINE

## 2025-01-26 PROCEDURE — 97530 THERAPEUTIC ACTIVITIES: CPT

## 2025-01-26 PROCEDURE — 82948 REAGENT STRIP/BLOOD GLUCOSE: CPT

## 2025-01-26 PROCEDURE — 99232 SBSQ HOSP IP/OBS MODERATE 35: CPT | Performed by: INTERNAL MEDICINE

## 2025-01-26 PROCEDURE — 83735 ASSAY OF MAGNESIUM: CPT | Performed by: INTERNAL MEDICINE

## 2025-01-26 PROCEDURE — 80048 BASIC METABOLIC PNL TOTAL CA: CPT | Performed by: INTERNAL MEDICINE

## 2025-01-26 PROCEDURE — 97116 GAIT TRAINING THERAPY: CPT

## 2025-01-26 RX ADMIN — Medication 10 ML: at 20:47

## 2025-01-26 RX ADMIN — PANTOPRAZOLE SODIUM 40 MG: 40 TABLET, DELAYED RELEASE ORAL at 04:23

## 2025-01-26 RX ADMIN — FOLIC ACID 1 MG: 1 TABLET ORAL at 10:58

## 2025-01-26 RX ADMIN — ASPIRIN 81 MG CHEWABLE TABLET 81 MG: 81 TABLET CHEWABLE at 10:57

## 2025-01-26 RX ADMIN — CARVEDILOL 3.12 MG: 3.12 TABLET, FILM COATED ORAL at 10:58

## 2025-01-26 RX ADMIN — LIDOCAINE 1 PATCH: 560 PATCH PERCUTANEOUS; TOPICAL; TRANSDERMAL at 11:01

## 2025-01-26 RX ADMIN — MULTIVITAMIN TABLET 1 TABLET: TABLET at 10:58

## 2025-01-26 RX ADMIN — LIDOCAINE 1 PATCH: 560 PATCH PERCUTANEOUS; TOPICAL; TRANSDERMAL at 20:48

## 2025-01-26 RX ADMIN — AMLODIPINE BESYLATE 5 MG: 5 TABLET ORAL at 10:57

## 2025-01-26 RX ADMIN — THIAMINE HCL TAB 100 MG 100 MG: 100 TAB at 10:57

## 2025-01-26 RX ADMIN — CARVEDILOL 3.12 MG: 3.12 TABLET, FILM COATED ORAL at 20:48

## 2025-01-26 RX ADMIN — ROSUVASTATIN 20 MG: 20 TABLET, FILM COATED ORAL at 20:48

## 2025-01-26 NOTE — PLAN OF CARE
Goal Outcome Evaluation:  Plan of Care Reviewed With: patient        Progress: improving  Outcome Evaluation: Pt able to tranfer with supervison sit to stand.  Ambulated 200 ft with RW and VC to address line of progression and avoid hallway obstacles.    Anticipated Discharge Disposition (PT): inpatient rehabilitation facility

## 2025-01-26 NOTE — PLAN OF CARE
Oriented x 4 with forgetfulness. Mood / affect appropriate. VSS on RA. SR in 70s on cardiac mx. NIH 6 for partial gaze on left / complete hemianopia / LLE drift / mild sensory deficit. Q s T&R in place. BP closely monitored. Call light in reach.

## 2025-01-26 NOTE — THERAPY TREATMENT NOTE
Patient Name: Toño Alcala  : 1940    MRN: [id number]                              Today's Date: 2025       Admit Date: 2025    Visit Dx:     ICD-10-CM ICD-9-CM   1. Generalized weakness  R53.1 780.79   2. Fall, initial encounter  W19.XXXA E888.9   3. Injury of head, initial encounter  S09.90XA 959.01   4. Acute congestive heart failure, unspecified heart failure type  I50.9 428.0   5. Elevated troponin  R79.89 790.6   6. Closed nondisplaced fracture of fifth cervical vertebra, unspecified fracture morphology, initial encounter  S12.401A 805.05   7. Closed nondisplaced fracture of sixth cervical vertebra, unspecified fracture morphology, initial encounter  S12.501A 805.06   8. Elevated CK  R74.8 790.5   9. Oropharyngeal dysphagia  R13.12 787.22   10. Other right-sided nontraumatic intracerebral hemorrhage  I61.8 431     Patient Active Problem List   Diagnosis    CAD (coronary artery disease)    DVT (deep venous thrombosis)    Diabetes mellitus    Dyslipidemia    GERD (gastroesophageal reflux disease)    Hyperlipidemia LDL goal <70    Diabetic nephropathy associated with type 2 diabetes mellitus    Diabetic polyneuropathy associated with type 2 diabetes mellitus    Chronic kidney disease, stage IV (severe)    Vitamin D deficiency    Disc disorder of cervical region    Lumbosacral spondylosis without myelopathy    Arthritis of elbow    Acute right-sided low back pain without sciatica    Unifocal PVCs    Essential hypertension    Stage 3 chronic kidney disease due to benign hypertension    BMI 30.0-30.9,adult    Fall    Alcohol dependence with unspecified alcohol-induced disorder    C5 cervical fracture    C6 cervical fracture    Multiple fractures of cervical spine    NSTEMI, initial episode of care    History of pulmonary embolus (PE)/DVT    Acute on chronic diastolic CHF (congestive heart failure)    Syncope and collapse    ICH (intracerebral hemorrhage)     Past Medical History:   Diagnosis Date     Acute diverticulitis 01/29/2020    Allergic 60 yrs ago    Arthritis     Arthritis of neck Fusion 1992    Bilateral radial fractures 1962    fracture bilateral radial heads    CAD (coronary artery disease)     Calculus of gallbladder without cholecystitis without obstruction 01/29/2020    Cataract     Cervical disc disorder Fusion 1992    Cholelithiasis     Chronic kidney disease 2020    Clotting disorder     CVD (cardiovascular disease)     History of TIA 1989    Diabetes mellitus     DVT (deep venous thrombosis)     Right lower extremity DVT and pulmonary embolism in 06/2015, idiopathic    DVT of proximal lower limb 06/22/2015    proximal DVT right leg, small PE- anticoagulation    Dyslipidemia     Erectile dysfunction     Fracture 1952    H/o pf left forearm fracture    Fracture of right hand 1980    Fracture of wrist 1980    GERD (gastroesophageal reflux disease)     with hiatal hernia    HL (hearing loss)     Hx of angina pectoris     Hypertension     Normal renal angiography 02/16/11    Knee swelling Several years ago    Left wrist fracture 1953    Lumbosacral disc disease 1996    Osgood-Schlatter's disease 1955    Osteoarthritis     Right wrist fracture     Vertigo     Wears glasses      Past Surgical History:   Procedure Laterality Date    APPENDECTOMY  1957    BACK SURGERY      CARDIAC CATHETERIZATION  2011    with vein graft    CATARACT EXTRACTION Left     CERVICAL FUSION      CERVICAL FUSION  1992    C3-4    COLONOSCOPY  2013    CORONARY ARTERY BYPASS GRAFT  1994    HERNIA REPAIR Right 2011    inguinal    INGUINAL HERNIA REPAIR Left 10/29/2019    Procedure: INGUINAL HERNIA REPAIR LEFT;  Surgeon: Charisma Raines MD;  Location: [facility];  Service: General    INTERVENTIONAL RADIOLOGY PROCEDURE N/A 1/23/2025    Procedure: IVC Filter Placement;  Surgeon: Avelino Hernandez MD;  Location:  [email or web address];  Service: Cardiovascular;  Laterality: N/A;    LUMBAR LAMINECTOMY  1996     laminectomy, lumbar, L3    NECK SURGERY  1992    OTHER SURGICAL HISTORY      wrist surgery    SPINE SURGERY  1996    TONSILLECTOMY AND ADENOIDECTOMY  1945    TRIGGER POINT INJECTION  2001 - 2007    VASECTOMY  1972      General Information       Row Name 01/26/25 1342          Physical Therapy Time and Intention    Document Type therapy note (daily note)  -CT     Mode of Treatment physical therapy  -CT       Row Name 01/26/25 1342          General Information    Patient Profile Reviewed yes  -CT     Prior Level of Function independent:;all household mobility;gait;transfer;bed mobility;ADL's  -CT     Existing Precautions/Restrictions fall;cervical collar;brace on at all times;other (see comments)  -CT     Barriers to Rehab medically complex;previous functional deficit;cognitive status  -CT       Row Name 01/26/25 1342          Living Environment    People in Home alone  -CT       Row Name 01/26/25 1342          Home Main Entrance    Number of Stairs, Main Entrance three  -CT     Stair Railings, Main Entrance railings safe and in good condition  -CT       Row Name 01/26/25 1342          Stairs Within Home, Primary    Number of Stairs, Within Home, Primary none  -CT       Row Name 01/26/25 1342          Cognition    Orientation Status (Cognition) oriented x 3  -CT       Row Name 01/26/25 1342          Safety Issues/Impairments Affecting Functional Mobility    Safety Issues Affecting Function (Mobility) insight into deficits/self-awareness;safety precaution awareness;other (see comments)   pt has a left visual field deficit and continue to have impairment to depth preception.  His line of progression will deviate left.  -CT     Impairments Affecting Function (Mobility) balance;cognition;coordination;endurance/activity tolerance;postural/trunk control;pain;strength;visual/perceptual  -CT     Cognitive Impairments, Mobility Safety/Performance attention;awareness, need for assistance;insight into  deficits/self-awareness;problem-solving/reasoning;safety precaution follow-through  -CT               User Key  (r) = Recorded By, (t) = Taken By, (c) = Cosigned By      Initials Name Provider Type    CT Paul Marin, MYRIAM Physical Therapist                   Mobility       Row Name 01/26/25 1346          Bed Mobility    Bed Mobility supine-sit  -CT     Supine-Sit [place] (Bed Mobility) standby assist  -CT     Sit-Supine [place] (Bed Mobility) not tested  -CT     Comment, (Bed Mobility) UIC  -CT       Row Name 01/26/25 1346          Transfers    Comment, (Transfers) VC for hallway navigation and avoid left side obstacles during gait.  -CT       Row Name 01/26/25 1346          Bed-Chair Transfer    Bed-Chair [place] (Transfers) supervision  -CT     Assistive Device (Bed-Chair Transfers) walker, front-wheeled  -CT       Row Name 01/26/25 1346          Sit-Stand Transfer    Sit-Stand [place] (Transfers) supervision  -CT     Assistive Device (Sit-Stand Transfers) walker, front-wheeled  -CT       Row Name 01/26/25 1346          Gait/Stairs (Locomotion)    [place] Level (Gait) verbal cues  -CT     Assistive Device (Gait) walker, front-wheeled  -CT     Patient was able to Ambulate yes  -CT     Distance in Feet (Gait) 200  -CT     Deviations/Abnormal Patterns (Gait) gait speed decreased;stride length decreased  -CT     Bilateral Gait Deviations forward flexed posture  -CT     Left Sided Gait Deviations --  deviates line of progression to left.  -CT               User Key  (r) = Recorded By, (t) = Taken By, (c) = Cosigned By      Initials Name Provider Type    CT Paul Marin PT Physical Therapist                   Obj/Interventions       Row Name 01/26/25 1344          Motor Skills    Motor Skills coordination;functional endurance;motor control/coordination interventions  -CT     Coordination gross motor deficit;minimal impairment  -CT     Therapeutic Exercise knee;hip  -CT        Row Name 01/26/25 1349          Hip (Therapeutic Exercise)    Hip (Therapeutic Exercise) AROM (active range of motion)  -CT     Hip AROM (Therapeutic Exercise) bilateral;flexion;extension  -CT       Row Name 01/26/25 1349          Knee (Therapeutic Exercise)    Knee (Therapeutic Exercise) AROM (active range of motion)  -CT     Knee AROM (Therapeutic Exercise) bilateral;flexion;extension  -CT       Row Name 01/26/25 1349          Balance    Balance Assessment sitting static balance;sitting dynamic balance;standing static balance;standing dynamic balance  -CT     Static Sitting Balance independent  -CT     Dynamic Sitting Balance supervision  -CT     Static Standing Balance modified independence;verbal cues  -CT     Dynamic Standing Balance supervision;verbal cues  -CT     Position/Device Used, Standing Balance supported;walker, rolling  -CT     Balance Interventions sitting;standing;sit to stand;dynamic;static;weight shifting activity;multisensory training  -CT               User Key  (r) = Recorded By, (t) = Taken By, (c) = Cosigned By      Initials Name Provider Type    CT Paul Marin, PT Physical Therapist                   Goals/Plan    No documentation.                  Clinical Impression       Row Name 01/26/25 1351          Pain    Pretreatment Pain Rating 2/10  -CT     Posttreatment Pain Rating 2/10  -CT     Pain Location hip  -CT     Pain Side/Orientation right;anterior  -CT     Pain Management Interventions positioning techniques utilized  -CT     Response to Pain Interventions activity level improved;activity participation with tolerable pain;mobility function improved  -CT       Row Name 01/26/25 1350          Plan of Care Review    Plan of Care Reviewed With patient  -CT     Progress improving  -CT     Outcome Evaluation Pt able to tranfer with supervison sit to stand.  Ambulated 200 ft with RW and VC to address line of progression and avoid hallway obstacles.  -CT       Row Name  01/26/25 1351          Therapy Assessment/Plan (PT)    Rehab Potential (PT) good  -CT     Criteria for Skilled Interventions Met (PT) yes;meets criteria;skilled treatment is necessary  -CT     Therapy Frequency (PT) daily  -CT       Row Name 01/26/25 1351          Positioning and Restraints    Pre-Treatment Position sitting in chair/recliner  -CT     Post Treatment Position chair  -CT     In Chair notified nsg;reclined;sitting;call light within reach;encouraged to call for assist;exit alarm on;with family/caregiver;legs elevated  -CT               User Key  (r) = Recorded By, (t) = Taken By, (c) = Cosigned By      Initials Name Provider Type    CT Paul Marin, PT Physical Therapist                   Outcome Measures       Row Name 01/26/25 6229          How much help from another person do you currently need...    Turning from your back to your side while in flat bed without using bedrails? 3  -CT     Moving from lying on back to sitting on the side of a flat bed without bedrails? 4  -CT     Moving to and from a bed to a chair (including a wheelchair)? 4  -CT     Standing up from a chair using your arms (e.g., wheelchair, bedside chair)? 4  -CT     Climbing 3-5 steps with a railing? 3  -CT     To walk in hospital room? 3  -CT     AM-PAC 6 Clicks Score (PT) 21  -CT     Highest Level of Mobility Goal 6 --> Walk 10 steps or more  -CT       Row Name 01/26/25 1355          Modified [place] Scale    Pre-Stroke Modified [place] Scale 6 - Unable to determine (UTD) from the medical record documentation  -CT     Modified [place] Scale 4 - Moderately severe disability.  Unable to walk without assistance, and unable to attend to own bodily needs without assistance.  -CT       Row Name 01/26/25 1351          Functional Assessment    Outcome Measure Options AM-PAC 6 Clicks Basic Mobility (PT)  -CT               User Key  (r) = Recorded By, (t) = Taken By, (c) = Cosigned By      Initials Name Provider Type    CT Tomas  Paul Jara, PT Physical Therapist                                 Physical Therapy Education       Title: PT OT SLP Therapies (In Progress)       Topic: Physical Therapy (In Progress)       Point: Mobility training (In Progress)       Learning Progress Summary            Patient Acceptance, E,D, NR by CT at 1/26/2025 1356    Acceptance, E, VU,NR by LS at 1/25/2025 1005    Acceptance, E, NR by KG at 1/23/2025 0913    Acceptance, E, NR by ND at 1/20/2025 1600   Family Acceptance, E,D, NR by CT at 1/26/2025 1356                      Point: Home exercise program (In Progress)       Learning Progress Summary            Patient Acceptance, E,D, NR by CT at 1/26/2025 1356    Acceptance, E, NR by KG at 1/23/2025 0913   Family Acceptance, E,D, NR by CT at 1/26/2025 1356                      Point: Body mechanics (In Progress)       Learning Progress Summary            Patient Acceptance, E,D, NR by CT at 1/26/2025 1356    Acceptance, E, NR by KG at 1/23/2025 0913    Acceptance, E, NR by ND at 1/20/2025 1600   Family Acceptance, E,D, NR by CT at 1/26/2025 1356                      Point: Precautions (In Progress)       Learning Progress Summary            Patient Acceptance, E,D, NR by CT at 1/26/2025 1356    Acceptance, E, NR by KG at 1/23/2025 0913    Acceptance, E, NR by ND at 1/20/2025 1600   Family Acceptance, E,D, NR by CT at 1/26/2025 1356                                      User Key       Initials Effective Dates Name Provider Type Discipline    LS 07/11/23 -  Marsha Rangel, PT Physical Therapist PT    CT 07/07/23 -  Paul Marin, PT Physical Therapist PT    KG 05/22/20 -  Emy Bates, PT Physical Therapist PT    ND 11/16/23 -  Hannah Blanton, PT Physical Therapist PT                  PT Recommendation and Plan     Progress: improving  Outcome Evaluation: Pt able to tranfer with supervison sit to stand.  Ambulated 200 ft with RW and VC to address line of progression and avoid  hallway obstacles.     Time Calculation:         PT Charges       Row Name 01/26/25 1356             Time Calculation    Start Time 1305  -CT      PT Received On 01/26/25  -CT         Time Calculation- PT    Total Timed Code Minutes- PT 40 minute(s)  -CT         Timed Charges    97096 - Gait Training Minutes  25  -CT      19549 - PT Therapeutic Activity Minutes 15  -CT         Total Minutes    Timed Charges Total Minutes 40  -CT       Total Minutes 40  -CT                User Key  (r) = Recorded By, (t) = Taken By, (c) = Cosigned By      Initials Name Provider Type    CT Paul Marin, PT Physical Therapist                  Therapy Charges for Today       Code Description Service Date Service Provider Modifiers Qty    36366706570 HC GAIT TRAINING EA 15 MIN 1/26/2025 Paul Marin, PT GP 2    21641250501 HC PT THERAPEUTIC ACT EA 15 MIN 1/26/2025 Paul Marin, PT GP 1            PT G-Codes  Outcome Measure Options: AM-PAC 6 Clicks Basic Mobility (PT)  AM-PAC 6 Clicks Score (PT): 21  AM-PAC 6 Clicks Score (OT): 10  Modified [place] Scale: 4 - Moderately severe disability.  Unable to walk without assistance, and unable to attend to own bodily needs without assistance.  PT Discharge Summary  Anticipated Discharge Disposition (PT): inpatient rehabilitation facility    Paul Marin PT  1/26/2025

## 2025-01-26 NOTE — PROGRESS NOTES
[facility] Medicine Services  PROGRESS NOTE    Patient Name: Toño Alcala  : 1940  MRN: [id number]    Date of Admission: 2025  Primary Care Physician: Radu Francis MD    Subjective   Subjective     CC:  Stroke w/ hemorrhagic transformation, dvt, c5,c6 fx    HPI:  Feels ok, pain reasonably controlled  No dyspnea  No chest pain  No n/v/d    Objective   Objective     Vital Signs:   Temp:  [97.7 °F (36.5 °C)-98.3 °F (36.8 °C)] 98.2 °F (36.8 °C)  Heart Rate:  [57-87] 79  Resp:  [16-18] 16  BP: (108-149)/(64-87) 144/84     Physical Exam:  Alert, nontoxic appearing, sitting up in chair  Some scrapes on scalp, oroph clear; c-collar in place  rrr  ctab  Abd soft, nontender  Face symmetric, speech clear, left homonymous hemianopsia, moves all extremities    Results Reviewed:  LAB RESULTS:      Lab 25  0456 25  2215 25  0405 25  0418 25  0541 25  1845 25  0536 25  2145 25   WBC  --  8.34  --  9.55  --   --  9.44  --  11.56*  --  11.23*   HEMOGLOBIN  --  11.6*  --  12.7*  --   --  11.9*  --  12.7*  --  13.6   HEMATOCRIT  --  35.2*  --  37.2*  --   --  35.8*  --  38.5  --  41.1   PLATELETS  --  206  --  218  --   --  186  --  193  --  196   NEUTROS ABS  --   --   --   --   --   --  6.19  --  9.01*  --  9.29*   IMMATURE GRANS (ABS)  --   --   --   --   --   --  0.03  --  0.02  --  0.05   LYMPHS ABS  --   --   --   --   --   --  1.70  --  1.27  --  0.76   MONOS ABS  --   --   --   --   --   --  1.01*  --  1.19*  --  1.11*   EOS ABS  --   --   --   --   --   --  0.47*  --  0.03  --  0.00   MCV  --  87.1  --  85.1  --   --  85.9  --  86.3  --  86.0   PROCALCITONIN  --   --   --   --   --   --   --   --   --   --  0.13   LACTATE  --   --   --   --   --   --   --   --   --   --  1.7   PROTIME 15.3* 15.8* 15.8* 15.8* 15.0*   < > 15.4*   < > 23.0*  --  21.3*   HSTROP T  --   --   --   --   --   --    --   --   --  491* 511*    < > = values in this interval not displayed.         Lab 01/26/25 0456 01/25/25 2215 01/23/25 1846 01/23/25 0418 01/21/25 0536 01/20/25 0301 01/19/25 2006   SODIUM 137 134*  --  136 140 139  --    POTASSIUM 4.0 4.1 4.1 3.6 3.9 4.0  --    CHLORIDE 101 100  --  101 105 102  --    CO2 26.0 23.0  --  26.0 25.0 25.0  --    ANION GAP 10.0 11.0  --  9.0 10.0 12.0  --    BUN 33* 35*  --  27* 30* 26*  --    CREATININE 2.09* 2.20*  --  1.99* 2.21* 2.21*  --    EGFR 30.6* 28.8*  --  32.5* 28.7* 28.7*  --    GLUCOSE 110* 159*  --  99 99 139*  --    CALCIUM 9.3 8.9  --  9.2 8.9 9.0  --    MAGNESIUM 2.0 2.0  --   --  2.0  --  1.7   PHOSPHORUS  --   --   --   --  3.2  --   --    HEMOGLOBIN A1C  --   --   --   --  5.70*  --   --    TSH  --   --   --   --   --   --  1.080         Lab 01/21/25 0536 01/20/25 0301 01/19/25  1846   TOTAL PROTEIN 6.6 6.5 7.2   ALBUMIN 3.5 3.7 4.0   GLOBULIN 3.1 2.8 3.2   ALT (SGPT) 12 12 12   AST (SGOT) 45* 53* 48*   BILIRUBIN 0.5 0.8 0.9   ALK PHOS 119* 116 135*         Lab 01/26/25 0456 01/25/25 2215 01/24/25 0405 01/23/25 0418 01/22/25 0541 01/20/25 0301 01/19/25 2006 01/19/25  1846   PROBNP  --   --   --   --   --   --   --  9,226.0*   HSTROP T  --   --   --   --   --   --  491* 511*   PROTIME 15.3* 15.8* 15.8* 15.8* 15.0*   < >  --  21.3*   INR 1.20* 1.25* 1.25* 1.25* 1.16*   < >  --  1.83*    < > = values in this interval not displayed.         Lab 01/21/25  0536   CHOLESTEROL 111   LDL CHOL 59   HDL CHOL 35*   TRIGLYCERIDES 84             Brief Urine Lab Results  (Last result in the past 365 days)        Color   Clarity   Blood   Leuk Est   Nitrite   Protein   CREAT   Urine HCG        01/19/25 1932 Yellow   Clear   Small (1+)   Negative   Negative   100 mg/dL (2+)                   Microbiology Results Abnormal       None            No radiology results from the last 24 hrs    Results for orders placed during the hospital encounter of 01/19/25    Adult  Transthoracic Echo Complete W/ Cont if Necessary Per Protocol    Interpretation Summary    Left ventricular systolic function is normal. Estimated left ventricular EF = 50%    Left ventricular wall thickness is consistent with borderline concentric hypertrophy.    Mild mitral valve regurgitation is present.      Current medications:  Scheduled Meds:amLODIPine, 5 mg, Oral, Q24H  aspirin, 81 mg, Oral, Daily  carvedilol, 3.125 mg, Oral, Q12H  folic acid, 1 mg, Oral, Daily  Lidocaine, 1 patch, Transdermal, Q12H  multivitamin, 1 tablet, Oral, Daily  pantoprazole, 40 mg, Oral, Q AM  rosuvastatin, 20 mg, Oral, Nightly  thiamine, 100 mg, Oral, Daily      Continuous Infusions:   PRN Meds:.  acetaminophen **OR** acetaminophen **OR** acetaminophen    senna-docusate sodium **AND** polyethylene glycol **AND** bisacodyl **AND** bisacodyl    bumetanide    Calcium Replacement - Follow Nurse / BPA Driven Protocol    HYDROcodone-acetaminophen    hydrOXYzine    labetalol    Magnesium Standard Dose Replacement - Follow Nurse / BPA Driven Protocol    ondansetron    Phosphorus Replacement - Follow Nurse / BPA Driven Protocol    Potassium Replacement - Follow Nurse / BPA Driven Protocol    sodium chloride    Assessment & Plan   Assessment & Plan     Active Hospital Problems    Diagnosis  POA    **ICH (intracerebral hemorrhage) [I61.9]  Yes    NSTEMI, initial episode of care [I21.4]  Yes    History of pulmonary embolus (PE)/DVT [Z86.711]  No    Acute on chronic diastolic CHF (congestive heart failure) [I50.33]  Yes    Syncope and collapse [R55]  Unknown    Alcohol dependence with unspecified alcohol-induced disorder [F10.29]  Yes    C5 cervical fracture [S12.400A]  Yes    C6 cervical fracture [S12.500A]  Yes    Essential hypertension [I10]  Yes    Hyperlipidemia LDL goal <70 [E78.5]  Yes    Chronic kidney disease, stage IV (severe) [N18.4]  Yes    Disc disorder of cervical region [M50.90]  Yes    Diabetic nephropathy associated with type 2  diabetes mellitus [E11.21]  Yes    CAD (coronary artery disease) [I25.10]  Yes    Diabetes mellitus [E11.9]  Yes      Resolved Hospital Problems   No resolved problems to display.        Brief Hospital Course to date:  Toño Alcala is a 84 y.o. male w/ hx CAD (previous cabg 1994), HFpEF, HTN, HL, DM2, ckd 4, previous PE & RLE DVT (on chronic coumadin) who was found down in his home w/ a C5,C6 vertebral body fractures assumed to be traumatic. Also had some elevated troponins for which cardiology consulted. Neurosurgery evaluated and recommended non-op management, C-collar use. Patient developed left sided neglect & visual symptoms and was found to have ischemic stroke w/ hemorrhagic transformation (no large vessel occlusion, aneurysm or flow-limiting stenosis) and was transferred to ICU. Continued to have falls in the icu. Cardiology followed. Lower extremity duplex showed proximal femoral and mid-femoral chronic appearing DVT. Due to recurrent falls (and acute ischemic stroke w/ hemorrhagic transformation) decision made to place IVC filter which was performed 1/23/25 by Dr. Hernandez; it is felt that patient should remain off anticoagulation indefinitely at this point, at least until mobility/falls improved as patient has history of recurrent falls, even while in the hospital. Furthermore, cardiology did not feel that patient would benefit from ischemic evaluation this admission. Patient placed on asa 81mg. Patient passed dyspagia evaluation and tolerating po. 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 Stopping anticoagulation  indefinitely at this point (in the short term due to the ischemic CVA w/ hemorrhagic transformation, and more long term due to recurrent falls)  -normal BP goals  -therapy has recommended IPR at d/c  -tolerating modified diet    C5 & C6 fracture (oblique nondisplaced); non-operative management  -evaluated by Neurosurgery (Dr. Byrd) on 1/2/25: non-op management as no myelopathy/radiculopathy; C-collar at all times (except eating & bathing) for 6-8 weeks; follow up w/ Dr. Byrd (neurosurgery) office 4 weeks w/ cervical xrays    RLE DVT (proximal femoral and mid-femoral), s/p IVC filter placement  Hx previous PE/DVT (on chronic coumadin prior to admission)  -duplex 1/20/25 revealed chronic appearing RLE DVT proximal femoral and mid femoral  **s/p IVC FILTER PLACEMENT 1/23/25, Dr. Hernandez (as not able to anticoagulation for at least 1-2 weeks after hemorrhagic stroke, and now decision made to hold anticoagulation indefinitely due to multiple recurrent falls)  -can follow up w/ Cardiology outpatient regarding future anticoagulation and determination whether remove the IVC filter or remain in place long-term    Elevated troponin/NSTEMI (type of NSTEMI unclear per cardiology)  Hx CAD   Acute on chronic HFpEF  HTN  HL  -remote CABG 1994, last cath 2011 w/ 3/3 grafts patent  -stress echo 2014 normal study; no recent ischemic evaluation  -EKG his admission w/ non-specific st changes  -echo 1/19/25: LV ef 50%, valves ok  -has received IV diuretic this admission  -cards follows: on asa 81mg (no longer on coumadin due to hemorrhagic stroke, falls), crestor 20mg, amlodipine 5mg, changed hydralazine back to Coreg 3.125mg bid (titrate up as tolerates). Holding on further diuresis. No plans for ischemic evaluation this admission (could not tolerate anticoagulation due to stroke w/ hemorrhagic transformation)  -depending on BP & HR, if ok w/ cards could consider trial adding low dose coreg 3.125mg in future (had previously been  on 12.5mg)  -cleared for d/c to rehab from cards standpoint    CKD IV (baseline cr ~2.3-2.4)  -currently creatinine stable    DM2  -A1c 6.1 on 1/10/25  -A1c 5.7 on 1/2125  -appears was on no meds prior to admission  -accuchecks through 1/26 quite normal; will stop accuchecks      Am labs: BMP    Expected Discharge Location and Transportation: await [facility]  Expected Discharge   Expected Discharge Date: 1/27/2025; Expected Discharge Time: 12:00 PM     VTE Prophylaxis:  Mechanical VTE prophylaxis orders are present.         AM-PAC 6 Clicks Score (PT): 21 (01/26/25 4174)    CODE STATUS:   Code Status and Medical Interventions: CPR (Attempt to Resuscitate); Full Support   Ordered at: 01/19/25 4831     Level Of Support Discussed With:    Patient     Code Status (Patient has no pulse and is not breathing):    CPR (Attempt to Resuscitate)     Medical Interventions (Patient has pulse or is breathing):    Full Support       Jose Cota MD  01/26/25

## 2025-01-27 LAB
ANION GAP SERPL CALCULATED.3IONS-SCNC: 11 MMOL/L (ref 5–15)
BUN SERPL-MCNC: 30 MG/DL (ref 8–23)
BUN/CREAT SERPL: 16.6 (ref 7–25)
CALCIUM SPEC-SCNC: 9.2 MG/DL (ref 8.6–10.5)
CHLORIDE SERPL-SCNC: 102 MMOL/L (ref 98–107)
CO2 SERPL-SCNC: 25 MMOL/L (ref 22–29)
CREAT SERPL-MCNC: 1.81 MG/DL (ref 0.76–1.27)
EGFRCR SERPLBLD CKD-EPI 2021: 36.4 ML/MIN/1.73
GLUCOSE BLDC GLUCOMTR-MCNC: 122 MG/DL (ref 70–130)
GLUCOSE BLDC GLUCOMTR-MCNC: 148 MG/DL (ref 70–130)
GLUCOSE BLDC GLUCOMTR-MCNC: 162 MG/DL (ref 70–130)
GLUCOSE SERPL-MCNC: 120 MG/DL (ref 65–99)
INR PPP: 1.25 (ref 0.89–1.12)
POTASSIUM SERPL-SCNC: 3.9 MMOL/L (ref 3.5–5.2)
PROTHROMBIN TIME: 15.8 SECONDS (ref 12.2–14.5)
SODIUM SERPL-SCNC: 138 MMOL/L (ref 136–145)

## 2025-01-27 PROCEDURE — 97110 THERAPEUTIC EXERCISES: CPT

## 2025-01-27 PROCEDURE — 92526 ORAL FUNCTION THERAPY: CPT

## 2025-01-27 PROCEDURE — 97116 GAIT TRAINING THERAPY: CPT

## 2025-01-27 PROCEDURE — 99232 SBSQ HOSP IP/OBS MODERATE 35: CPT | Performed by: INTERNAL MEDICINE

## 2025-01-27 PROCEDURE — 85610 PROTHROMBIN TIME: CPT | Performed by: INTERNAL MEDICINE

## 2025-01-27 PROCEDURE — 82948 REAGENT STRIP/BLOOD GLUCOSE: CPT

## 2025-01-27 PROCEDURE — 92523 SPEECH SOUND LANG COMPREHEN: CPT

## 2025-01-27 PROCEDURE — 80048 BASIC METABOLIC PNL TOTAL CA: CPT | Performed by: INTERNAL MEDICINE

## 2025-01-27 RX ADMIN — CARVEDILOL 3.12 MG: 3.12 TABLET, FILM COATED ORAL at 20:38

## 2025-01-27 RX ADMIN — SENNOSIDES AND DOCUSATE SODIUM 2 TABLET: 50; 8.6 TABLET ORAL at 08:55

## 2025-01-27 RX ADMIN — AMLODIPINE BESYLATE 5 MG: 5 TABLET ORAL at 08:56

## 2025-01-27 RX ADMIN — THIAMINE HCL TAB 100 MG 100 MG: 100 TAB at 08:56

## 2025-01-27 RX ADMIN — PANTOPRAZOLE SODIUM 40 MG: 40 TABLET, DELAYED RELEASE ORAL at 04:42

## 2025-01-27 RX ADMIN — ASPIRIN 81 MG CHEWABLE TABLET 81 MG: 81 TABLET CHEWABLE at 08:55

## 2025-01-27 RX ADMIN — ROSUVASTATIN 20 MG: 20 TABLET, FILM COATED ORAL at 20:38

## 2025-01-27 RX ADMIN — MULTIVITAMIN TABLET 1 TABLET: TABLET at 08:54

## 2025-01-27 RX ADMIN — FOLIC ACID 1 MG: 1 TABLET ORAL at 08:55

## 2025-01-27 RX ADMIN — LIDOCAINE 1 PATCH: 560 PATCH PERCUTANEOUS; TOPICAL; TRANSDERMAL at 20:38

## 2025-01-27 RX ADMIN — HYDROCODONE BITARTRATE AND ACETAMINOPHEN 1 TABLET: 5; 325 TABLET ORAL at 13:40

## 2025-01-27 RX ADMIN — Medication 10 ML: at 20:39

## 2025-01-27 RX ADMIN — CARVEDILOL 3.12 MG: 3.12 TABLET, FILM COATED ORAL at 08:55

## 2025-01-27 RX ADMIN — LIDOCAINE 1 PATCH: 560 PATCH PERCUTANEOUS; TOPICAL; TRANSDERMAL at 08:57

## 2025-01-27 NOTE — CASE MANAGEMENT/SOCIAL WORK
Case Management Discharge Note      Final Note:     Mr. Alcala has an inpatient rehab bed at [facility] tomorrow, Tuesday, 1/28/25, if medically ready.  Confirmed bed with Rekha from facility.      Updated Mr. Alcala's son, Maury, by phone, and he is agreeable to the DC plan and to [facility] transport.    [facility] will  the patient from the hospital room, tomorrow, at 1pm.      Call report to [facility] CVA Unit at  [phone].     will fax the DC summary to fax [phone].      Thank you.         Selected Continued Care - Admitted Since 1/19/2025       Destination Coordination complete.      Service Provider Services Address Phone Fax Patient Preferred    [facility] [address] [facility] [address] [phone] --               Transportation Services  W/C Van:  ([facility])    Final Discharge Disposition Code: 62 - inpatient rehab facility

## 2025-01-27 NOTE — THERAPY EVALUATION
Acute Care - Speech Language Pathology   Swallow Treatment Note  [place]  & Cognitive-Linguistic Evaluation     Patient Name: Toño Alcala  : 1940  MRN: [id number]  Today's Date: 2025               Admit Date: 2025    Visit Dx:     ICD-10-CM ICD-9-CM   1. Generalized weakness  R53.1 780.79   2. Fall, initial encounter  W19.XXXA E888.9   3. Injury of head, initial encounter  S09.90XA 959.01   4. Acute congestive heart failure, unspecified heart failure type  I50.9 428.0   5. Elevated troponin  R79.89 790.6   6. Closed nondisplaced fracture of fifth cervical vertebra, unspecified fracture morphology, initial encounter  S12.401A 805.05   7. Closed nondisplaced fracture of sixth cervical vertebra, unspecified fracture morphology, initial encounter  S12.501A 805.06   8. Elevated CK  R74.8 790.5   9. Oropharyngeal dysphagia  R13.12 787.22   10. Other right-sided nontraumatic intracerebral hemorrhage  I61.8 431   11. Cognitive communication deficit  R41.841 799.52     Patient Active Problem List   Diagnosis    CAD (coronary artery disease)    DVT (deep venous thrombosis)    Diabetes mellitus    Dyslipidemia    GERD (gastroesophageal reflux disease)    Hyperlipidemia LDL goal <70    Diabetic nephropathy associated with type 2 diabetes mellitus    Diabetic polyneuropathy associated with type 2 diabetes mellitus    Chronic kidney disease, stage IV (severe)    Vitamin D deficiency    Disc disorder of cervical region    Lumbosacral spondylosis without myelopathy    Arthritis of elbow    Acute right-sided low back pain without sciatica    Unifocal PVCs    Essential hypertension    Stage 3 chronic kidney disease due to benign hypertension    BMI 30.0-30.9,adult    Fall    Alcohol dependence with unspecified alcohol-induced disorder    C5 cervical fracture    C6 cervical fracture    Multiple fractures of cervical spine    NSTEMI, initial episode of care    History of pulmonary embolus (PE)/DVT    Acute on  chronic diastolic CHF (congestive heart failure)    Syncope and collapse    ICH (intracerebral hemorrhage)     Past Medical History:   Diagnosis Date    Acute diverticulitis 01/29/2020    Allergic 60 yrs ago    Arthritis     Arthritis of neck Fusion 1992    Bilateral radial fractures 1962    fracture bilateral radial heads    CAD (coronary artery disease)     Calculus of gallbladder without cholecystitis without obstruction 01/29/2020    Cataract     Cervical disc disorder Fusion 1992    Cholelithiasis     Chronic kidney disease 2020    Clotting disorder     CVD (cardiovascular disease)     History of TIA 1989    Diabetes mellitus     DVT (deep venous thrombosis)     Right lower extremity DVT and pulmonary embolism in 06/2015, idiopathic    DVT of proximal lower limb 06/22/2015    proximal DVT right leg, small PE- anticoagulation    Dyslipidemia     Erectile dysfunction     Fracture 1952    H/o pf left forearm fracture    Fracture of right hand 1980    Fracture of wrist 1980    GERD (gastroesophageal reflux disease)     with hiatal hernia    HL (hearing loss)     Hx of angina pectoris     Hypertension     Normal renal angiography 02/16/11    Knee swelling Several years ago    Left wrist fracture 1953    Lumbosacral disc disease 1996    Osgood-Schlatter's disease 1955    Osteoarthritis     Right wrist fracture     Vertigo     Wears glasses      Past Surgical History:   Procedure Laterality Date    APPENDECTOMY  1957    BACK SURGERY      CARDIAC CATHETERIZATION  2011    with vein graft    CATARACT EXTRACTION Left     CERVICAL FUSION      CERVICAL FUSION  1992    C3-4    COLONOSCOPY  2013    CORONARY ARTERY BYPASS GRAFT  1994    HERNIA REPAIR Right 2011    inguinal    INGUINAL HERNIA REPAIR Left 10/29/2019    Procedure: INGUINAL HERNIA REPAIR LEFT;  Surgeon: Charisma Raines MD;  Location: [facility];  Service: General    INTERVENTIONAL RADIOLOGY PROCEDURE N/A 1/23/2025    Procedure: IVC Filter Placement;  Surgeon:  Avelino Hernandez MD;  Location:  [facility] INVASIVE LOC[facility];  Service: Cardiovascular;  Laterality: N/A;    LUMBAR LAMINECTOMY  1996    laminectomy, lumbar, L3    NECK SURGERY  1992    OTHER SURGICAL HISTORY      wrist surgery    SPINE SURGERY  1996    TONSILLECTOMY AND ADENOIDECTOMY  1945    TRIGGER POINT INJECTION  2001 - 2007    VASECTOMY  1972       SLP Recommendation and Plan     SLP Diet Recommendation: regular textures, no mixed consistencies, nectar thick liquids (01/27/25 1445)                    Anticipated Discharge Disposition (SLP): inpatient rehabilitation facility (01/27/25 1445)        Predicted Duration Therapy Intervention (Days): 1 week (01/27/25 1430)           Daily Summary of Progress (SLP): progress toward functional goals as expected (01/27/25 1445)               Treatment Assessment (SLP): improved, oral dysphagia (01/27/25 1445)  Treatment Assessment Comments (SLP): Overall, seemingly improved. Upgraded diet texture to regular. Continue no mixed consistencies and nectar-thick liquids. Will tentatively plan for repeat FEES prior to d/c to determine if safe for further diet/liquid upgrade. (01/27/25 1445)  Plan for Continued Treatment (SLP): continue treatment per plan of care, further assessment needed (see comments) (FEES) (01/27/25 1445)         Progress: improving      SWALLOW EVALUATION (Last 72 Hours)       SLP Adult Swallow Evaluation       Row Name 01/27/25 1445                   Rehab Evaluation    Document Type therapy note (daily note)  -AC           Pain Scale: FACES Pre/Post-Treatment    Pain: FACES Scale, Pretreatment 4-->hurts little more  -AC        Posttreatment Pain Rating 2-->hurts little bit  -AC        Pre/Posttreatment Pain Comment Reported L neck pain  -AC           SLP Treatment Clinical Impressions    Treatment Assessment (SLP) improved;oral dysphagia  -AC        Treatment Assessment Comments (SLP) Overall, seemingly improved. Upgraded diet texture to regular.  Continue no mixed consistencies and nectar-thick liquids. Will tentatively plan for repeat FEES prior to d/c to determine if safe for further diet/liquid upgrade.  -AC        Daily Summary of Progress (SLP) progress toward functional goals as expected  -AC        Plan for Continued Treatment (SLP) continue treatment per plan of care;further assessment needed (see comments)  FEES  -AC        Care Plan Review evaluation/treatment results reviewed;risks/benefits reviewed;care plan/treatment goals reviewed;current/potential barriers reviewed;patient/other agree to care plan  -AC        Care Plan Review, Other Participant(s) son  -AC           Recommendations    SLP Diet Recommendation regular textures;no mixed consistencies;nectar thick liquids  -AC        Anticipated Discharge Disposition (SLP) inpatient rehabilitation facility  -AC                  User Key  (r) = Recorded By, (t) = Taken By, (c) = Cosigned By      Initials Name Effective Dates    AC Lauryn Martinez, MS CCC-SLP 02/03/23 -                     EDUCATION  The patient has been educated in the following areas:   Dysphagia (Swallowing Impairment) Modified Diet Instruction.        SLP GOALS       Row Name 01/27/25 1430             (LTG) Patient will demonstrate functional swallow for    Diet Texture (Demonstrate functional swallow) regular textures  -AC      Liquid viscosity (Demonstrate functional swallow) thin liquids  -AC      [employer] (Demonstrate functional swallow) independently (over 90% accuracy)  -AC      Time Frame (Demonstrate functional swallow) 1 week  -AC      Progress/Outcomes (Demonstrate functional swallow) goal revised this date  -AC         (STG) Patient will tolerate trials of    Consistencies Trialed (Tolerate trials) soft to chew (chopped) textures;nectar/ mildly thick liquids  -AC      Desired Outcome (Tolerate trials) without signs/symptoms of aspiration;with adequate oral prep/transit/clearance  -AC      [employer] (Tolerate  trials) with minimal cues (75-90% accuracy)  -AC      Time Frame (Tolerate trials) 1 week  -AC      Progress/Outcomes (Tolerate trials) goal met  -AC      Comment (Tolerate trials) Trialed nectar-thick liquid via straw and regular solids. Oral prep/transit/clearance seemingly WFL. No overt clinical s/sxs aspiration w/ any consistency.  -AC         (STG) Patient will tolerate therapeutic trials of    Consistencies Trialed (Tolerate therapeutic trials) thin liquids  -AC      Desired Outcome (Tolerate therapeutic trials) without signs/symptoms of aspiration;with adequate oral prep/transit/clearance  -AC      [place] (Tolerate therapeutic trials) with minimal cues (75-90% accuracy)  -AC      Time Frame (Tolerate therapeutic trials) 1 week  -AC      Progress/Outcomes (Tolerate therapeutic trials) goal revised this date  -AC      Comment (Tolerate therapeutic trials) Trialed thin liquid via ice/tsp/straw. Throat clear x1 following sequential straw drinks; otherwise, no concerns.  -AC         (STG) Lingual Strengthening Goal 1 (SLP)    Increase Tongue Back Strength lingual resistance exercises  -AC      [place]/Accuracy (Lingual Strengthening Goal 1, SLP) with minimal cues (75-90% accuracy)  -AC      Time Frame (Lingual Strengthening Goal 1, SLP) 1 week  -AC      Progress/Outcomes (Lingual Strengthening Goal 1, SLP) goal ongoing  -AC         (STG) Pharyngeal Strengthening Exercise Goal 1 (SLP)    Increase Timing prepping - 3 second prep or suck swallow or 3-step swallow  -AC      Increase Superior Movement of the Hyolaryngeal Complex Mendelsohn  -AC      Increase Anterior Movement of the Hyolaryngeal Complex chin tuck against resistance (CTAR)  -AC      Increase Squeeze/Positive Pressure Generation hard effortful swallow  -AC      Increase Tongue Base Retraction wang  -AC      [place]/Accuracy (Pharyngeal Strengthening Goal 1, SLP) with minimal cues (75-90% accuracy)  -AC      Time Frame (Pharyngeal  Strengthening Goal 1, SLP) 1 week  -AC      Progress/Outcomes (Pharyngeal Strengthening Goal 1, SLP) goal ongoing  -AC         Patient will demonstrate functional cognitive-linguistic skills for return to discharge environment    [place] with minimal cues  -AC      Time frame 1 week  -AC      Progress/Outcomes new goal  -AC         Memory Skills Goal 1 (SLP)    Improve Memory Skills Through Goal 1 (SLP) recalling unrelated word lists with an imposed delay;use memory strategies;80%;with minimal cues (75-90%)  -AC      Time Frame (Memory Skills Goal 1, SLP) 1 week  -AC      Progress/Outcomes (Memory Skills Goal 1, SLP) new goal  -AC         Organizational Skills Goal 1 (SLP)    Improve Thought Organization Through Goal 1 (SLP) completing a divergent naming task;completing mental manipulation task;80%;with minimal cues (75-90%)  -AC      Time Frame (Thought Organization Skills Goal 1, SLP) 1 week  -AC      Progress/Outcomes (Thought Organization Skills Goal 1, SLP) new goal  -AC         Right Hemisphere Function Goal 1 (SLP)    Improve Right Hemisphere Function Through Goal 1 (SLP) demonstrate awareness of communication partner in left visual field;complete visuo-spatial activities (visual closure, trail making, mazes;70%;with moderate cues (50-74%)  -AC      Time Frame (Right Hemisphere Function Goal 1, SLP) 1 week  -AC      Progress/Outcomes (Right Hemisphere Function Goal 1, SLP) new goal  -AC                User Key  (r) = Recorded By, (t) = Taken By, (c) = Cosigned By      Initials Name Provider Type    AC Lauryn Martinez, MS CCC-SLP Speech and Language Pathologist                         Time Calculation:    Time Calculation- SLP       Row Name 01/27/25 1636             Time Calculation- SLP    SLP Start Time 1430  -      SLP Received On 01/27/25  -         Untimed Charges    60807-IF Eval Speech and Production w/ Language Minutes 30  -AC      83081-MB Treatment Swallow Minutes 40  -AC         Total  Minutes    Untimed Charges Total Minutes 70  -AC       Total Minutes 70  -AC                User Key  (r) = Recorded By, (t) = Taken By, (c) = Cosigned By      Initials Name Provider Type    AC Lauryn Martinez, MS CCC-SLP Speech and Language Pathologist                    Therapy Charges for Today       Code Description Service Date Service Provider Modifiers Qty    91066450124 HC ST EVAL SPEECH AND PROD W LANG  2 2025 Lauryn Martinez, MS CCC-SLP GN 1    73104617728 HC ST TREATMENT SWALLOW 3 2025 Lauryn Martinez, MS CCC-SLP GN 1                 Lauryn STROUD Juan MS CCC-SLP  2025   and Acute Care - Speech Language Pathology Initial Evaluation  [facility]     Patient Name: Toño Alcala  : 1940  MRN: [id number]  Today's Date: 2025               Admit Date: 2025     Visit Dx:    ICD-10-CM ICD-9-CM   1. Generalized weakness  R53.1 780.79   2. Fall, initial encounter  W19.XXXA E888.9   3. Injury of head, initial encounter  S09.90XA 959.01   4. Acute congestive heart failure, unspecified heart failure type  I50.9 428.0   5. Elevated troponin  R79.89 790.6   6. Closed nondisplaced fracture of fifth cervical vertebra, unspecified fracture morphology, initial encounter  S12.401A 805.05   7. Closed nondisplaced fracture of sixth cervical vertebra, unspecified fracture morphology, initial encounter  S12.501A 805.06   8. Elevated CK  R74.8 790.5   9. Oropharyngeal dysphagia  R13.12 787.22   10. Other right-sided nontraumatic intracerebral hemorrhage  I61.8 431   11. Cognitive communication deficit  R41.841 799.52     Patient Active Problem List   Diagnosis    CAD (coronary artery disease)    DVT (deep venous thrombosis)    Diabetes mellitus    Dyslipidemia    GERD (gastroesophageal reflux disease)    Hyperlipidemia LDL goal <70    Diabetic nephropathy associated with type 2 diabetes mellitus    Diabetic polyneuropathy associated with type 2 diabetes mellitus    Chronic kidney disease, stage IV  (severe)    Vitamin D deficiency    Disc disorder of cervical region    Lumbosacral spondylosis without myelopathy    Arthritis of elbow    Acute right-sided low back pain without sciatica    Unifocal PVCs    Essential hypertension    Stage 3 chronic kidney disease due to benign hypertension    BMI 30.0-30.9,adult    Fall    Alcohol dependence with unspecified alcohol-induced disorder    C5 cervical fracture    C6 cervical fracture    Multiple fractures of cervical spine    NSTEMI, initial episode of care    History of pulmonary embolus (PE)/DVT    Acute on chronic diastolic CHF (congestive heart failure)    Syncope and collapse    ICH (intracerebral hemorrhage)     Past Medical History:   Diagnosis Date    Acute diverticulitis 01/29/2020    Allergic 60 yrs ago    Arthritis     Arthritis of neck Fusion 1992    Bilateral radial fractures 1962    fracture bilateral radial heads    CAD (coronary artery disease)     Calculus of gallbladder without cholecystitis without obstruction 01/29/2020    Cataract     Cervical disc disorder Fusion 1992    Cholelithiasis     Chronic kidney disease 2020    Clotting disorder     CVD (cardiovascular disease)     History of TIA 1989    Diabetes mellitus     DVT (deep venous thrombosis)     Right lower extremity DVT and pulmonary embolism in 06/2015, idiopathic    DVT of proximal lower limb 06/22/2015    proximal DVT right leg, small PE- anticoagulation    Dyslipidemia     Erectile dysfunction     Fracture 1952    H/o pf left forearm fracture    Fracture of right hand 1980    Fracture of wrist 1980    GERD (gastroesophageal reflux disease)     with hiatal hernia    HL (hearing loss)     Hx of angina pectoris     Hypertension     Normal renal angiography 02/16/11    Knee swelling Several years ago    Left wrist fracture 1953    Lumbosacral disc disease 1996    Osgood-Schlatter's disease 1955    Osteoarthritis     Right wrist fracture     Vertigo     Wears glasses      Past Surgical  History:   Procedure Laterality Date    APPENDECTOMY  1957    BACK SURGERY      CARDIAC CATHETERIZATION  2011    with vein graft    CATARACT EXTRACTION Left     CERVICAL FUSION      CERVICAL FUSION  1992    C3-4    COLONOSCOPY  2013    CORONARY ARTERY BYPASS GRAFT  1994    HERNIA REPAIR Right 2011    inguinal    INGUINAL HERNIA REPAIR Left 10/29/2019    Procedure: INGUINAL HERNIA REPAIR LEFT;  Surgeon: Charisma Raines MD;  Location:  [facility];  Service: General    INTERVENTIONAL RADIOLOGY PROCEDURE N/A 1/23/2025    Procedure: IVC Filter Placement;  Surgeon: Avelino Hernandez MD;  Location:  [facility] C[facility] INVASIVE LOCATION;  Service: Cardiovascular;  Laterality: N/A;    LUMBAR LAMINECTOMY  1996    laminectomy, lumbar, L3    NECK SURGERY  1992    OTHER SURGICAL HISTORY      wrist surgery    SPINE SURGERY  1996    TONSILLECTOMY AND ADENOIDECTOMY  1945    TRIGGER POINT INJECTION  2001 - 2007    VASECTOMY  1972       SLP Recommendation and Plan  SLP Diagnosis: mild-moderate, cognitive-linguistic disorder (01/27/25 1430)              [facility] Criteria for Skilled Therapy Interventions Met: yes (01/27/25 1430)  Anticipated Discharge Disposition (SLP): inpatient rehabilitation facility (01/27/25 1445)        Therapy Frequency (SLP SLC): 5 days per week (01/27/25 1430)  Predicted Duration Therapy Intervention (Days): 1 week (01/27/25 1430)        Daily Summary of Progress (SLP): progress toward functional goals as expected (01/27/25 1445)           Treatment Assessment (SLP): improved, oral dysphagia (01/27/25 1445)  Treatment Assessment Comments (SLP): Overall, seemingly improved. Upgraded diet texture to regular. Continue no mixed consistencies and nectar-thick liquids. Will tentatively plan for repeat FEES prior to d/c to determine if safe for further diet/liquid upgrade. (01/27/25 1445)  Plan for Continued Treatment (SLP): continue treatment per plan of care, further assessment needed (see comments) (FEES) (01/27/25  8555)  Progress: improving (01/27/25 1636)      SLP EVALUATION (Last 72 Hours)       SLP SLC Evaluation       Row Name 01/27/25 1430                   Communication Assessment/Intervention    Document Type evaluation  -        Subjective Information complains of;pain  -AC        Patient Observations alert;cooperative  -AC        Patient/Family/Caregiver Comments/Observations Son present.  -AC        Patient Effort good  -AC           General Information    Patient Profile Reviewed yes  -AC        Pertinent History Of Current Problem See previous eval.  -AC        Precautions/Limitations, Vision vision impairment, left;neglect, left  -AC        Prior Level of Function-Communication WFL  -AC        Plans/Goals Discussed with patient and family;agreed upon  -AC        Barriers to Rehab none identified  -AC        Patient's Goals for Discharge take care of myself at home  -AC        Family Goals for Discharge family did not state  -           Pain Scale: FACES Pre/Post-Treatment    Pain: FACES Scale, Pretreatment 4-->hurts little more  -AC        Posttreatment Pain Rating 2-->hurts little bit  -AC        Pre/Posttreatment Pain Comment Reported L neck pain.  -AC           Cognitive Assessment Intervention- SLP    Cognitive Function (Cognition) mild impairment;moderate impairment  -        Orientation Status (Cognition) WFL;person;place;time;situation  -        Memory (Cognitive) mild impairment;moderate impairment;short-term;delayed;unrelated;other (see comments)  immediate recall: 4/5 words (5/5 w/ min cue); 5-min delayed recall: 2/5 words (5/5 w/ mod cues)  -AC        Attention (Cognitive) WFL;selective;sustained  -AC        Thought Organization (Cognitive) mild impairment;concrete divergent;mental manipulation  -        Reasoning (Cognitive) WFL;deductive;analogies  -        Right Hemisphere Function moderate impairment;left neglect;visuo-spatial  -AC           SLP Evaluation Clinical Impressions    SLP  Diagnosis mild-moderate;cognitive-linguistic disorder  -AC        Rehab Potential/Prognosis good  -AC        SLC Criteria for Skilled Therapy Interventions Met yes  -AC        Functional Impact functional impact in ADLs;difficulty completing home management task  -AC           Recommendations    Therapy Frequency (SLP SLC) 5 days per week  -AC        Predicted Duration Therapy Intervention (Days) 1 week  -AC        Anticipated Discharge Disposition (SLP) inpatient rehabilitation facility  -AC                  User Key  (r) = Recorded By, (t) = Taken By, (c) = Cosigned By      Initials Name Effective Dates     Lauryn Martinez MS [facility]-SLP 02/03/23 -                        EDUCATION  The patient has been educated in the following areas:     Cognitive Impairment.           SLP GOALS       Row Name 01/27/25 1430             (LTG) Patient will demonstrate functional swallow for    Diet Texture (Demonstrate functional swallow) regular textures  -AC      Liquid viscosity (Demonstrate functional swallow) thin liquids  -AC      [place] (Demonstrate functional swallow) independently (over 90% accuracy)  -AC      Time Frame (Demonstrate functional swallow) 1 week  -AC      Progress/Outcomes (Demonstrate functional swallow) goal revised this date  -AC         (STG) Patient will tolerate trials of    Consistencies Trialed (Tolerate trials) soft to chew (chopped) textures;nectar/ mildly thick liquids  -AC      Desired Outcome (Tolerate trials) without signs/symptoms of aspiration;with adequate oral prep/transit/clearance  -AC      [place] (Tolerate trials) with minimal cues (75-90% accuracy)  -AC      Time Frame (Tolerate trials) 1 week  -AC      Progress/Outcomes (Tolerate trials) goal met  -AC      Comment (Tolerate trials) Trialed nectar-thick liquid via straw and regular solids. Oral prep/transit/clearance seemingly WFL. No overt clinical s/sxs aspiration w/ any consistency.  -AC         (STG) Patient will tolerate  therapeutic trials of    Consistencies Trialed (Tolerate therapeutic trials) thin liquids  -AC      Desired Outcome (Tolerate therapeutic trials) without signs/symptoms of aspiration;with adequate oral prep/transit/clearance  -AC      [place] (Tolerate therapeutic trials) with minimal cues (75-90% accuracy)  -AC      Time Frame (Tolerate therapeutic trials) 1 week  -AC      Progress/Outcomes (Tolerate therapeutic trials) goal revised this date  -AC      Comment (Tolerate therapeutic trials) Trialed thin liquid via ice/tsp/straw. Throat clear x1 following sequential straw drinks; otherwise, no concerns.  -AC         (STG) Lingual Strengthening Goal 1 (SLP)    Increase Tongue Back Strength lingual resistance exercises  -AC      [place]/Accuracy (Lingual Strengthening Goal 1, SLP) with minimal cues (75-90% accuracy)  -AC      Time Frame (Lingual Strengthening Goal 1, SLP) 1 week  -AC      Progress/Outcomes (Lingual Strengthening Goal 1, SLP) goal ongoing  -AC         (STG) Pharyngeal Strengthening Exercise Goal 1 (SLP)    Increase Timing prepping - 3 second prep or suck swallow or 3-step swallow  -AC      Increase Superior Movement of the Hyolaryngeal Complex Mendelsohn  -AC      Increase Anterior Movement of the Hyolaryngeal Complex chin tuck against resistance (CTAR)  -AC      Increase Squeeze/Positive Pressure Generation hard effortful swallow  -AC      Increase Tongue Base Retraction wang  -AC      [place]/Accuracy (Pharyngeal Strengthening Goal 1, SLP) with minimal cues (75-90% accuracy)  -AC      Time Frame (Pharyngeal Strengthening Goal 1, SLP) 1 week  -AC      Progress/Outcomes (Pharyngeal Strengthening Goal 1, SLP) goal ongoing  -AC         Patient will demonstrate functional cognitive-linguistic skills for return to discharge environment    [place] with minimal cues  -AC      Time frame 1 week  -AC      Progress/Outcomes new goal  -AC         Memory Skills Goal 1 (SLP)    Improve Memory  Skills Through Goal 1 (SLP) recalling unrelated word lists with an imposed delay;use memory strategies;80%;with minimal cues (75-90%)  -AC      Time Frame (Memory Skills Goal 1, SLP) 1 week  -AC      Progress/Outcomes (Memory Skills Goal 1, SLP) new goal  -AC         Organizational Skills Goal 1 (SLP)    Improve Thought Organization Through Goal 1 (SLP) completing a divergent naming task;completing mental manipulation task;80%;with minimal cues (75-90%)  -AC      Time Frame (Thought Organization Skills Goal 1, SLP) 1 week  -AC      Progress/Outcomes (Thought Organization Skills Goal 1, SLP) new goal  -AC         Right Hemisphere Function Goal 1 (SLP)    Improve Right Hemisphere Function Through Goal 1 (SLP) demonstrate awareness of communication partner in left visual field;complete visuo-spatial activities (visual closure, trail making, mazes;70%;with moderate cues (50-74%)  -AC      Time Frame (Right Hemisphere Function Goal 1, SLP) 1 week  -AC      Progress/Outcomes (Right Hemisphere Function Goal 1, SLP) new goal  -AC                User Key  (r) = Recorded By, (t) = Taken By, (c) = Cosigned By      Initials Name Provider Type    Lauryn Cazares MS CCC-SLP Speech and Language Pathologist                              Time Calculation:      Time Calculation- SLP       Row Name 01/27/25 1636             Time Calculation- SLP    SLP Start Time 1430  -AC      SLP Received On 01/27/25  -AC         Untimed Charges    [id number] Eval Speech and Production w/ Language Minutes 30  -AC      [id number] Treatment Swallow Minutes 40  -AC         Total Minutes    Untimed Charges Total Minutes 70  -AC       Total Minutes 70  -AC                User Key  (r) = Recorded By, (t) = Taken By, (c) = Cosigned By      Initials Name Provider Type    Lauryn Cazares MS CCC-SLP Speech and Language Pathologist                    Therapy Charges for Today       Code Description Service Date Service Provider Modifiers Qty    [id number]   ST EVAL SPEECH AND PROD W LANG  2 1/27/2025 Lauryn Martinez, MS CCC-SLP GN 1    [id number] HC ST TREATMENT SWALLOW 3 1/27/2025 Lauryn Martinez, MS CCC-SLP GN 1                       Lauryn Martinez, MS CCC-SLP  1/27/2025

## 2025-01-27 NOTE — PLAN OF CARE
Goal Outcome Evaluation:  Plan of Care Reviewed With: patient, child        Progress: improving  Outcome Evaluation: Patient showing improvement with ability to manage walker and ambulate 150' CGA today, but is still requiring a lot of cues to scan environment to avoid running into obstacles on his left side. IPPT remains indicated to address current deficits. Continue to recommend D/C to IPR.    Anticipated Discharge Disposition (PT): inpatient rehabilitation facility

## 2025-01-27 NOTE — PROGRESS NOTES
[facility] Medicine Services  PROGRESS NOTE    Patient Name: Toño Alcala  : 1940  MRN: [id number]    Date of Admission: 2025  Primary Care Physician: Radu Francis MD    Subjective   Subjective     CC:  Stroke w/ hemorrhagic transformation, dvt, c5,c6 fx    HPI:  Feels quite well, feels stronger each day. No dyspnea. No pain. No chest pain. No palpitations    Objective   Objective     Vital Signs:   Temp:  [97.4 °F (36.3 °C)-98.3 °F (36.8 °C)] 97.5 °F (36.4 °C)  Heart Rate:  [53-75] 53  Resp:  [18] 18  BP: (117-142)/(65-75) 117/65     Physical Exam:  Alert, nontoxic appearing, sitting up in chair eating, comfortable appearing  Some scrapes on scalp, oroph clear; c-collar in place  rrr  ctab  Abd soft, nontender  Face symmetric, speech clear, left homonymous hemianopsia stable, moves all extremities    Results Reviewed:  LAB RESULTS:      Lab 25  0712 25  0456 25  2215 25  0405 25  0418 25  1845 25  0536   WBC  --   --  8.34  --  9.55  --  9.44   HEMOGLOBIN  --   --  11.6*  --  12.7*  --  11.9*   HEMATOCRIT  --   --  35.2*  --  37.2*  --  35.8*   PLATELETS  --   --  206  --  218  --  186   NEUTROS ABS  --   --   --   --   --   --  6.19   IMMATURE GRANS (ABS)  --   --   --   --   --   --  0.03   LYMPHS ABS  --   --   --   --   --   --  1.70   MONOS ABS  --   --   --   --   --   --  1.01*   EOS ABS  --   --   --   --   --   --  0.47*   MCV  --   --  87.1  --  85.1  --  85.9   PROTIME 15.8* 15.3* 15.8* 15.8* 15.8*   < > 15.4*    < > = values in this interval not displayed.         Lab 25  0712 25  0456 25  2215 25  1846 25  0418 25  0536   SODIUM 138 137 134*  --  136 140   POTASSIUM 3.9 4.0 4.1 4.1 3.6 3.9   CHLORIDE 102 101 100  --  101 105   CO2 25.0 26.0 23.0  --  26.0 25.0   ANION GAP 11.0 10.0 11.0  --  9.0 10.0   BUN 30* 33* 35*  --  27* 30*   CREATININE 1.81* 2.09* 2.20*  --  1.99*  2.21*   EGFR 36.4* 30.6* 28.8*  --  32.5* 28.7*   GLUCOSE 120* 110* 159*  --  99 99   CALCIUM 9.2 9.3 8.9  --  9.2 8.9   MAGNESIUM  --  2.0 2.0  --   --  2.0   PHOSPHORUS  --   --   --   --   --  3.2   HEMOGLOBIN A1C  --   --   --   --   --  5.70*         Lab 01/21/25  0536   TOTAL PROTEIN 6.6   ALBUMIN 3.5   GLOBULIN 3.1   ALT (SGPT) 12   AST (SGOT) 45*   BILIRUBIN 0.5   ALK PHOS 119*         Lab 01/27/25  0712 01/26/25  0456 01/25/25  2215 01/24/25  0405 01/23/25  0418   PROTIME 15.8* 15.3* 15.8* 15.8* 15.8*   INR 1.25* 1.20* 1.25* 1.25* 1.25*         Lab 01/21/25  0536   CHOLESTEROL 111   LDL CHOL 59   HDL CHOL 35*   TRIGLYCERIDES 84             Brief Urine Lab Results  (Last result in the past 365 days)        Color   Clarity   Blood   Leuk Est   Nitrite   Protein   CREAT   Urine HCG        01/19/25 1932 Yellow   Clear   Small (1+)   Negative   Negative   100 mg/dL (2+)                   Microbiology Results Abnormal       None            No radiology results from the last 24 hrs    Results for orders placed during the hospital encounter of 01/19/25    Adult Transthoracic Echo Complete W/ Cont if Necessary Per Protocol    Interpretation Summary    Left ventricular systolic function is normal. Estimated left ventricular EF = 50%    Left ventricular wall thickness is consistent with borderline concentric hypertrophy.    Mild mitral valve regurgitation is present.      Current medications:  Scheduled Meds:amLODIPine, 5 mg, Oral, Q24H  aspirin, 81 mg, Oral, Daily  carvedilol, 3.125 mg, Oral, Q12H  folic acid, 1 mg, Oral, Daily  Lidocaine, 1 patch, Transdermal, Q12H  multivitamin, 1 tablet, Oral, Daily  pantoprazole, 40 mg, Oral, Q AM  rosuvastatin, 20 mg, Oral, Nightly  thiamine, 100 mg, Oral, Daily      Continuous Infusions:   PRN Meds:.  acetaminophen **OR** acetaminophen **OR** acetaminophen    senna-docusate sodium **AND** polyethylene glycol **AND** bisacodyl **AND** bisacodyl    bumetanide    Calcium  Replacement - Follow Nurse / BPA Driven Protocol    HYDROcodone-acetaminophen    hydrOXYzine    labetalol    Magnesium Standard Dose Replacement - Follow Nurse / BPA Driven Protocol    ondansetron    Phosphorus Replacement - Follow Nurse / BPA Driven Protocol    Potassium Replacement - Follow Nurse / BPA Driven Protocol    sodium chloride    Assessment & Plan   Assessment & Plan     Active Hospital Problems    Diagnosis  POA    **ICH (intracerebral hemorrhage) [I61.9]  Yes    NSTEMI, initial episode of care [I21.4]  Yes    History of pulmonary embolus (PE)/DVT [Z86.711]  No    Acute on chronic diastolic CHF (congestive heart failure) [I50.33]  Yes    Syncope and collapse [R55]  Unknown    Alcohol dependence with unspecified alcohol-induced disorder [F10.29]  Yes    C5 cervical fracture [S12.400A]  Yes    C6 cervical fracture [S12.500A]  Yes    Essential hypertension [I10]  Yes    Hyperlipidemia LDL goal <70 [E78.5]  Yes    Chronic kidney disease, stage IV (severe) [N18.4]  Yes    Disc disorder of cervical region [M50.90]  Yes    Diabetic nephropathy associated with type 2 diabetes mellitus [E11.21]  Yes    CAD (coronary artery disease) [I25.10]  Yes    Diabetes mellitus [E11.9]  Yes      Resolved Hospital Problems   No resolved problems to display.        Brief Hospital Course to date:  Toño Alcala is a 84 y.o. male w/ hx CAD (previous cabg 1994), HFpEF, HTN, HL, DM2, ckd 4, previous PE & RLE DVT (on chronic coumadin) who was found down in his home w/ a C5,C6 vertebral body fractures assumed to be traumatic. Also had some elevated troponins for which cardiology consulted. Neurosurgery evaluated and recommended non-op management, C-collar use. Patient developed left sided neglect & visual symptoms and was found to have ischemic stroke w/ hemorrhagic transformation (no large vessel occlusion, aneurysm or flow-limiting stenosis) and was transferred to ICU. Continued to have falls in the icu. Cardiology followed.  Lower extremity duplex showed proximal femoral and mid-femoral chronic appearing DVT. Due to recurrent falls (and acute ischemic stroke w/ hemorrhagic transformation) decision made to place IVC filter which was performed 1/23/25 by Dr. Hernandez; it is felt that patient should remain off anticoagulation indefinitely at this point, at least until mobility/falls improved as patient has history of recurrent falls, even while in the hospital. Furthermore, cardiology did not feel that patient would benefit from ischemic evaluation this admission. Patient placed on asa 81mg. Patient passed dyspagia evaluation and tolerating po. 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 Stopping anticoagulation indefinitely at this point (in the short term due to the ischemic CVA w/ hemorrhagic transformation, and more long term due to recurrent falls)  -normal BP goals  -therapy has recommended IPR at d/c  -tolerating modified diet    C5 & C6 fracture (oblique nondisplaced); non-operative management  -evaluated by Neurosurgery (Dr. Byrd) on 1/2/25: non-op management as no myelopathy/radiculopathy; C-collar at all times (except eating & bathing) for 6-8 weeks; follow up w/ Dr. Byrd (neurosurgery) office 4 weeks w/ cervical xrays    RLE DVT (proximal femoral and mid-femoral), s/p IVC filter placement  Hx previous PE/DVT (on chronic coumadin prior to admission)  -duplex 1/20/25 revealed chronic appearing RLE DVT proximal femoral and mid femoral  **s/p IVC FILTER PLACEMENT 1/23/25, Dr. Hernandez (as not able to anticoagulation for at least 1-2 weeks  after hemorrhagic stroke, and now decision made to hold anticoagulation indefinitely due to multiple recurrent falls)  -can follow up w/ Cardiology outpatient regarding future anticoagulation and determination whether remove the IVC filter or remain in place long-term    Elevated troponin/NSTEMI (type of NSTEMI unclear per cardiology)  Hx CAD   Acute on chronic HFpEF  HTN  HL  -remote CABG 1994, last cath 2011 w/ 3/3 grafts patent  -stress echo 2014 normal study; no recent ischemic evaluation  -EKG his admission w/ non-specific st changes  -echo 1/19/25: LV ef 50%, valves ok  -has received IV diuretic this admission  -cards followed: on asa 81mg (no longer on coumadin due to hemorrhagic stroke, falls), crestor 20mg, amlodipine 5mg, changed hydralazine back to Coreg 3.125mg bid (titrate up as tolerates). Holding on further diuresis. No plans for ischemic evaluation this admission (could not tolerate anticoagulation due to stroke w/ hemorrhagic transformation)  -f/u cardiology as outpatient    -cleared for d/c to rehab from cards standpoint    CKD IV (baseline cr ~2.3-2.4)  -currently creatinine stable    DM2  -A1c 6.1 on 1/10/25  -A1c 5.7 on 1/2125  -appears was on no meds prior to admission  -accuchecks through 1/26 quite normal; will stop accuchecks        Expected Discharge Location and Transportation: [facility] via medical transport  Expected Discharge  1/28/25 @ 13:00  Expected Discharge Date: 1/28/2025; Expected Discharge Time:  1:00 PM     VTE Prophylaxis:  Mechanical VTE prophylaxis orders are present.         AM-PAC 6 Clicks Score (PT): 14 (01/26/25 2045)    CODE STATUS:   Code Status and Medical Interventions: CPR (Attempt to Resuscitate); Full Support   Ordered at: 01/19/25 2255     Level Of Support Discussed With:    Patient     Code Status (Patient has no pulse and is not breathing):    CPR (Attempt to Resuscitate)     Medical Interventions (Patient has pulse or is breathing):    Full Support        Jose Cota MD  01/27/25

## 2025-01-27 NOTE — PLAN OF CARE
Goal Outcome Evaluation:  Plan of Care Reviewed With: patient, family        Progress: improving       Anticipated Discharge Disposition (SLP): inpatient rehabilitation facility    SLP Diagnosis: mild-moderate, cognitive-linguistic disorder (01/27/25 1430)        Treatment Assessment (SLP): improved, oral dysphagia (01/27/25 1445)  Treatment Assessment Comments (SLP): Overall, seemingly improved. Upgraded diet texture to regular. Continue no mixed consistencies and nectar-thick liquids. Will tentatively plan for repeat FEES prior to d/c to determine if safe for further diet/liquid upgrade. (01/27/25 1445)  Plan for Continued Treatment (SLP): continue treatment per plan of care, further assessment needed (see comments) (FEES) (01/27/25 1445)

## 2025-01-27 NOTE — PLAN OF CARE
Back to the bed, Q2T&R in place. Patient cont to deny pain / discomfort. VSS on RA. SR VS S Nithin on cardiac mx. Patient has been strongly encouraged to use call light. BP closely monitored.

## 2025-01-27 NOTE — NURSING NOTE
WOC consulted for open wound to thoracic spine, to left elbow and arm.    Patient has multiple scabs and skin tears on his extremities and top of head.  For management of skin tears, please follow [facility] standing orders for management of skin tears.  See wound care orders.    Patient has what appears to be a friction injury to his thoracic spine.  The epidermis appears to have  and sheared off.  Dermis is visible and blanchable.  Periwound skin is intact.  No signs of infection.  Continue Xeroform to wound and cover with Optifoam dressing.  Change every 3 days.    Pressure injury prevention protocol.    Turn patient every 2 hours.  Offload bony prominences.  Elevate heels off bed.  Allevyn dressing should be utilized.    WOC will sign off.    Ortega Son RN, BSN, CCRN, CWOCN  Wound, Ostomy and Continence (WOC) Department  [facility]

## 2025-01-27 NOTE — THERAPY TREATMENT NOTE
Patient Name: Toño Alcala  : 1940    MRN: [id number]                              Today's Date: 2025       Admit Date: 2025    Visit Dx:     ICD-10-CM ICD-9-CM   1. Generalized weakness  R53.1 780.79   2. Fall, initial encounter  W19.XXXA E888.9   3. Injury of head, initial encounter  S09.90XA 959.01   4. Acute congestive heart failure, unspecified heart failure type  I50.9 428.0   5. Elevated troponin  R79.89 790.6   6. Closed nondisplaced fracture of fifth cervical vertebra, unspecified fracture morphology, initial encounter  S12.401A 805.05   7. Closed nondisplaced fracture of sixth cervical vertebra, unspecified fracture morphology, initial encounter  S12.501A 805.06   8. Elevated CK  R74.8 790.5   9. Oropharyngeal dysphagia  R13.12 787.22   10. Other right-sided nontraumatic intracerebral hemorrhage  I61.8 431     Patient Active Problem List   Diagnosis    CAD (coronary artery disease)    DVT (deep venous thrombosis)    Diabetes mellitus    Dyslipidemia    GERD (gastroesophageal reflux disease)    Hyperlipidemia LDL goal <70    Diabetic nephropathy associated with type 2 diabetes mellitus    Diabetic polyneuropathy associated with type 2 diabetes mellitus    Chronic kidney disease, stage IV (severe)    Vitamin D deficiency    Disc disorder of cervical region    Lumbosacral spondylosis without myelopathy    Arthritis of elbow    Acute right-sided low back pain without sciatica    Unifocal PVCs    Essential hypertension    Stage 3 chronic kidney disease due to benign hypertension    BMI 30.0-30.9,adult    Fall    Alcohol dependence with unspecified alcohol-induced disorder    C5 cervical fracture    C6 cervical fracture    Multiple fractures of cervical spine    NSTEMI, initial episode of care    History of pulmonary embolus (PE)/DVT    Acute on chronic diastolic CHF (congestive heart failure)    Syncope and collapse    ICH (intracerebral hemorrhage)     Past Medical History:   Diagnosis Date     Acute diverticulitis 01/29/2020    Allergic 60 yrs ago    Arthritis     Arthritis of neck Fusion 1992    Bilateral radial fractures 1962    fracture bilateral radial heads    CAD (coronary artery disease)     Calculus of gallbladder without cholecystitis without obstruction 01/29/2020    Cataract     Cervical disc disorder Fusion 1992    Cholelithiasis     Chronic kidney disease 2020    Clotting disorder     CVD (cardiovascular disease)     History of TIA 1989    Diabetes mellitus     DVT (deep venous thrombosis)     Right lower extremity DVT and pulmonary embolism in 06/2015, idiopathic    DVT of proximal lower limb 06/22/2015    proximal DVT right leg, small PE- anticoagulation    Dyslipidemia     Erectile dysfunction     Fracture 1952    H/o pf left forearm fracture    Fracture of right hand 1980    Fracture of wrist 1980    GERD (gastroesophageal reflux disease)     with hiatal hernia    HL (hearing loss)     Hx of angina pectoris     Hypertension     Normal renal angiography 02/16/11    Knee swelling Several years ago    Left wrist fracture 1953    Lumbosacral disc disease 1996    Osgood-Schlatter's disease 1955    Osteoarthritis     Right wrist fracture     Vertigo     Wears glasses      Past Surgical History:   Procedure Laterality Date    APPENDECTOMY  1957    BACK SURGERY      CARDIAC CATHETERIZATION  2011    with vein graft    CATARACT EXTRACTION Left     CERVICAL FUSION      CERVICAL FUSION  1992    C3-4    COLONOSCOPY  2013    CORONARY ARTERY BYPASS GRAFT  1994    HERNIA REPAIR Right 2011    inguinal    INGUINAL HERNIA REPAIR Left 10/29/2019    Procedure: INGUINAL HERNIA REPAIR LEFT;  Surgeon: Charisma Raines MD;  Location: [facility] OR;  Service: General    INTERVENTIONAL RADIOLOGY PROCEDURE N/A 1/23/2025    Procedure: IVC Filter Placement;  Surgeon: Avelino Hernandez MD;  Location:  [facility] CATH INVASIVE LOCATION;  Service: Cardiovascular;  Laterality: N/A;    LUMBAR LAMINECTOMY  1996     laminectomy, lumbar, L3    NECK SURGERY  1992    OTHER SURGICAL HISTORY      wrist surgery    SPINE SURGERY  1996    TONSILLECTOMY AND ADENOIDECTOMY  1945    TRIGGER POINT INJECTION  2001 - 2007    VASECTOMY  1972      General Information       Row Name 01/27/25 1558          Physical Therapy Time and Intention    Document Type therapy note (daily note)  -CK     Mode of Treatment physical therapy;individual therapy  -CK       Row Name 01/27/25 1551          General Information    Patient Profile Reviewed yes  -CK     Existing Precautions/Restrictions fall;cervical collar;brace on at all times;other (see comments)  c-collar AAT, L visual field cut with inattention, RLE DVT s/p IVC filter  -CK     Barriers to Rehab medically complex;previous functional deficit;visual deficit  -CK       Row Name 01/27/25 0619          Cognition    Orientation Status (Cognition) oriented x 3  -CK       Row Name 01/27/25 5372          Safety Issues/Impairments Affecting Functional Mobility    Safety Issues Affecting Function (Mobility) awareness of need for assistance;insight into deficits/self-awareness;safety precaution awareness;safety precautions follow-through/compliance  -CK     Impairments Affecting Function (Mobility) balance;cognition;coordination;endurance/activity tolerance;postural/trunk control;pain;strength;visual/perceptual  -CK               User Key  (r) = Recorded By, (t) = Taken By, (c) = Cosigned By      Initials Name Provider Type    CK Gauri Amador, MYRIAM Physical Therapist                   Mobility       Row Name 01/27/25 1600          Bed Mobility    Comment, (Bed Mobility) [facility] pre/post treatment  -CK       Row Name 01/27/25 1600          Sit-Stand Transfer    Sit-Stand [place] (Transfers) contact guard  -CK     Assistive Device (Sit-Stand Transfers) walker, front-wheeled  -CK     Comment, (Sit-Stand Transfer) patient uses a bit of momentum safely to achieve standing, requires a lot of cues before sitting  in chair for appropriate placement in chair  -CK       Row Name 01/27/25 1600          Gait/Stairs (Locomotion)    [place] Level (Gait) contact guard;1 person assist;verbal cues  -CK     Assistive Device (Gait) walker, front-wheeled  -CK     Distance in Feet (Gait) 150  -CK     Deviations/Abnormal Patterns (Gait) bilateral deviations;m[place]ison decreased;gait speed decreased;festinating/shuffling;stride length decreased  -CK     Bilateral Gait Deviations forward flexed posture;heel strike decreased  -CK     Comment, (Gait/Stairs) Patient ambulated in [place] with step through gait pattern. Multiple cues to scan environment especially to L due to visual field cut. Patient able to find each [place] in hallway and stay in middle of hallway with cues, but has much difficulty navigating objects in room. Additional cues for improved foot clearance in moscoso.  -CK               User Key  (r) = Recorded By, (t) = Taken By, (c) = Cosigned By      Initials Name Provider Type    CK Gauri Amador PT Physical Therapist                   Obj/Interventions       Row Name 01/27/25 1604          Motor Skills    Therapeutic Exercise other (see comments)  worked on sitting in front of tray and scanning entire tray for all objects especially on L side.  -CK       Row Name 01/27/25 1604          Balance    Balance Assessment sitting static balance;standing static balance;standing dynamic balance  -CK     Static Sitting Balance standby assist  -CK     Position, Sitting Balance unsupported;sitting in chair  -CK     Static Standing Balance contact guard  -CK     Dynamic Standing Balance contact guard  -CK     Position/Device Used, Standing Balance supported;walker, front-wheeled  -CK     Comment, Balance no overt LOB, requires a lot of cues not to run into objects on his L side  -CK               User Key  (r) = Recorded By, (t) = Taken By, (c) = Cosigned By      Initials Name Provider Type    Gauri Ferrara PT Physical Therapist                    Goals/Plan    No documentation.                  Clinical Impression       Row Name 01/27/25 1605          Pain    Pain Location flank;abdomen  -CK     Pain Side/Orientation right  -CK     Pain Management Interventions premedicated for activity;nursing notified;exercise or physical activity utilized  -CK     Response to Pain Interventions activity participation with tolerable pain  -CK     Additional Documentation Pain Scale: FACES Pre/Post-Treatment (Group)  -CK       Row Name 01/27/25 1605          Pain Scale: FACES Pre/Post-Treatment    Pain: FACES Scale, Pretreatment 2-->hurts little bit  -CK     Posttreatment Pain Rating 2-->hurts little bit  -CK       Row Name 01/27/25 1605          Plan of Care Review    Plan of Care Reviewed With patient;child  -CK     Progress improving  -CK     Outcome Evaluation Patient showing improvement with ability to manage walker and ambulate 150' CGA today, but is still requiring a lot of cues to scan environment to avoid running into obstacles on his left side. IPPT remains indicated to address current deficits. Continue to recommend D/C to IPR.  -CK       Row Name 01/27/25 1605          Vital Signs    O2 Delivery Pre Treatment room air  -CK     O2 Delivery Intra Treatment room air  -CK     O2 Delivery Post Treatment room air  -CK     Pre Patient Position Sitting  -CK     Post Patient Position Sitting  -CK       Row Name 01/27/25 1605          Positioning and Restraints    Pre-Treatment Position sitting in chair/recliner  -CK     Post Treatment Position chair  -CK     In Chair reclined;call light within reach;encouraged to call for assist;exit alarm on;with family/caregiver;waffle cushion;with brace;notified nsg;heels elevated  -CK               User Key  (r) = Recorded By, (t) = Taken By, (c) = Cosigned By      Initials Name Provider Type    CK Gauri Amador PT Physical Therapist                   Outcome Measures       Row Name 01/27/25 1605          How  much help from another person do you currently need...    Turning from your back to your side while in flat bed without using bedrails? 3  -CK     Moving from lying on back to sitting on the side of a flat bed without bedrails? 3  -CK     Moving to and from a bed to a chair (including a wheelchair)? 3  -CK     Standing up from a chair using your arms (e.g., wheelchair, bedside chair)? 3  -CK     Climbing 3-5 steps with a railing? 2  -CK     To walk in hospital room? 3  -CK     AM-PAC 6 Clicks Score (PT) 17  -CK     Highest Level of Mobility Goal 5 --> Static standing  -CK       Row Name 01/27/25 1607          Functional Assessment    Outcome Measure Options AM-PAC 6 Clicks Basic Mobility (PT)  -CK               User Key  (r) = Recorded By, (t) = Taken By, (c) = Cosigned By      Initials Name Provider Type    Gauri Ferrara, PT Physical Therapist                                 Physical Therapy Education       Title: PT OT SLP Therapies (In Progress)       Topic: Physical Therapy (In Progress)       Point: Mobility training (In Progress)       Learning Progress Summary            Patient Acceptance, E, NR by CK at 1/27/2025 1607    Acceptance, E,D, NR by CT at 1/26/2025 1356    Acceptance, E, VU,NR by LS at 1/25/2025 1005    Acceptance, E, NR by KG at 1/23/2025 0913    Acceptance, E, NR by ND at 1/20/2025 1600   Family Acceptance, E, NR by CK at 1/27/2025 1607    Acceptance, E,D, NR by CT at 1/26/2025 1356                      Point: Home exercise program (In Progress)       Learning Progress Summary            Patient Acceptance, E, NR by CK at 1/27/2025 1607    Acceptance, E,D, NR by CT at 1/26/2025 1356    Acceptance, E, NR by KG at 1/23/2025 0913   Family Acceptance, E, NR by CK at 1/27/2025 1607    Acceptance, E,D, NR by CT at 1/26/2025 1356                      Point: Body mechanics (In Progress)       Learning Progress Summary            Patient Acceptance, E, NR by CK at 1/27/2025 1607    Acceptance,  E,D, NR by CT at 1/26/2025 1356    Acceptance, E, NR by KG at 1/23/2025 0913    Acceptance, E, NR by ND at 1/20/2025 1600   Family Acceptance, E, NR by CK at 1/27/2025 1607    Acceptance, E,D, NR by CT at 1/26/2025 1356                      Point: Precautions (In Progress)       Learning Progress Summary            Patient Acceptance, E, NR by CK at 1/27/2025 1607    Acceptance, E,D, NR by CT at 1/26/2025 1356    Acceptance, E, NR by KG at 1/23/2025 0913    Acceptance, E, NR by ND at 1/20/2025 1600   Family Acceptance, E, NR by CK at 1/27/2025 1607    Acceptance, E,D, NR by CT at 1/26/2025 1356                                      User Key       Initials Effective Dates Name Provider Type Discipline    LS 07/11/23 -  Marsha Rangel, PT Physical Therapist PT    CT 07/07/23 -  Paul Marin, PT Physical Therapist PT    KG 05/22/20 -  Emy Bates, PT Physical Therapist PT    CK 02/06/24 -  Gauri Amador, PT Physical Therapist PT    ND 11/16/23 -  Hannah Blanton, PT Physical Therapist PT                  PT Recommendation and Plan     Progress: improving  Outcome Evaluation: Patient showing improvement with ability to manage walker and ambulate 150' CGA today, but is still requiring a lot of cues to scan environment to avoid running into obstacles on his left side. IPPT remains indicated to address current deficits. Continue to recommend D/C to IPR.     Time Calculation:         PT Charges       Row Name 01/27/25 1607             Time Calculation    Start Time 1525  -CK      PT Received On 01/27/25  -CK         Timed Charges    49437 - PT Therapeutic Exercise Minutes 10  -CK      31291 - Gait Training Minutes  10  -CK      68184 - PT Therapeutic Activity Minutes 7  -CK         Total Minutes    Timed Charges Total Minutes 27  -CK       Total Minutes 27  -CK                User Key  (r) = Recorded By, (t) = Taken By, (c) = Cosigned By      Initials Name Provider Type    CK Marjorie  Gauri, MYRIAM Physical Therapist                  Therapy Charges for Today       Code Description Service Date Service Provider Modifiers Qty    53804390431 HC GAIT TRAINING EA 15 MIN 1/27/2025 Gauri Amador, PT GP 1    04496859976 HC PT THER PROC EA 15 MIN 1/27/2025 Gauri Amador, PT GP 1            PT G-Codes  Outcome Measure Options: AM-PAC 6 Clicks Basic Mobility (PT)  AM-PAC 6 Clicks Score (PT): 17  AM-PAC 6 Clicks Score (OT): 10  Modified [place] Scale: 4 - Moderately severe disability.  Unable to walk without assistance, and unable to attend to own bodily needs without assistance.  PT Discharge Summary  Anticipated Discharge Disposition (PT): inpatient rehabilitation facility    Gauri Amador PT  1/27/2025

## 2025-01-28 ENCOUNTER — APPOINTMENT (OUTPATIENT)
Dept: PHYSICAL THERAPY | Facility: HOSPITAL | Age: 85
End: 2025-01-28
Payer: MEDICARE

## 2025-01-28 ENCOUNTER — ANCILLARY PROCEDURE (OUTPATIENT)
Dept: SPEECH THERAPY | Facility: HOSPITAL | Age: 85
DRG: 064 | End: 2025-01-28
Payer: MEDICARE

## 2025-01-28 VITALS
DIASTOLIC BLOOD PRESSURE: 52 MMHG | OXYGEN SATURATION: 97 % | SYSTOLIC BLOOD PRESSURE: 104 MMHG | BODY MASS INDEX: 23.71 KG/M2 | TEMPERATURE: 98.4 F | RESPIRATION RATE: 18 BRPM | HEIGHT: 72 IN | HEART RATE: 54 BPM | WEIGHT: 175.04 LBS

## 2025-01-28 PROBLEM — F10.29 ALCOHOL DEPENDENCE WITH UNSPECIFIED ALCOHOL-INDUCED DISORDER: Status: RESOLVED | Noted: 2025-01-19 | Resolved: 2025-01-28

## 2025-01-28 PROBLEM — R55 SYNCOPE AND COLLAPSE: Status: RESOLVED | Noted: 2025-01-20 | Resolved: 2025-01-28

## 2025-01-28 LAB
GLUCOSE BLDC GLUCOMTR-MCNC: 119 MG/DL (ref 70–130)
GLUCOSE BLDC GLUCOMTR-MCNC: 129 MG/DL (ref 70–130)

## 2025-01-28 PROCEDURE — 92612 ENDOSCOPY SWALLOW (FEES) VID: CPT

## 2025-01-28 PROCEDURE — 99239 HOSP IP/OBS DSCHRG MGMT >30: CPT | Performed by: PHYSICIAN ASSISTANT

## 2025-01-28 PROCEDURE — 97535 SELF CARE MNGMENT TRAINING: CPT | Performed by: OCCUPATIONAL THERAPIST

## 2025-01-28 PROCEDURE — 97530 THERAPEUTIC ACTIVITIES: CPT | Performed by: OCCUPATIONAL THERAPIST

## 2025-01-28 PROCEDURE — 82948 REAGENT STRIP/BLOOD GLUCOSE: CPT

## 2025-01-28 PROCEDURE — 92507 TX SP LANG VOICE COMM INDIV: CPT

## 2025-01-28 PROCEDURE — 97110 THERAPEUTIC EXERCISES: CPT

## 2025-01-28 PROCEDURE — 97116 GAIT TRAINING THERAPY: CPT

## 2025-01-28 RX ORDER — DIPHENOXYLATE HYDROCHLORIDE AND ATROPINE SULFATE 2.5; .025 MG/1; MG/1
1 TABLET ORAL DAILY
Start: 2025-01-29

## 2025-01-28 RX ORDER — AMLODIPINE BESYLATE 5 MG/1
5 TABLET ORAL
Start: 2025-01-29

## 2025-01-28 RX ORDER — LANOLIN ALCOHOL/MO/W.PET/CERES
100 CREAM (GRAM) TOPICAL DAILY
Start: 2025-01-29

## 2025-01-28 RX ORDER — CARVEDILOL 3.12 MG/1
3.12 TABLET ORAL EVERY 12 HOURS SCHEDULED
Start: 2025-01-28

## 2025-01-28 RX ORDER — ASPIRIN 81 MG/1
81 TABLET, CHEWABLE ORAL DAILY
Start: 2025-01-29

## 2025-01-28 RX ORDER — ACETAMINOPHEN 325 MG/1
650 TABLET ORAL AS NEEDED
Start: 2025-01-28

## 2025-01-28 RX ORDER — ROSUVASTATIN CALCIUM 20 MG/1
20 TABLET, COATED ORAL NIGHTLY
Start: 2025-01-28

## 2025-01-28 RX ORDER — FOLIC ACID 1 MG/1
1 TABLET ORAL DAILY
Start: 2025-01-29

## 2025-01-28 RX ADMIN — HYDROCODONE BITARTRATE AND ACETAMINOPHEN 1 TABLET: 5; 325 TABLET ORAL at 09:10

## 2025-01-28 RX ADMIN — ASPIRIN 81 MG CHEWABLE TABLET 81 MG: 81 TABLET CHEWABLE at 09:10

## 2025-01-28 RX ADMIN — FOLIC ACID 1 MG: 1 TABLET ORAL at 09:10

## 2025-01-28 RX ADMIN — CARVEDILOL 3.12 MG: 3.12 TABLET, FILM COATED ORAL at 09:10

## 2025-01-28 RX ADMIN — SENNOSIDES AND DOCUSATE SODIUM 2 TABLET: 50; 8.6 TABLET ORAL at 09:10

## 2025-01-28 RX ADMIN — THIAMINE HCL TAB 100 MG 100 MG: 100 TAB at 09:10

## 2025-01-28 RX ADMIN — AMLODIPINE BESYLATE 5 MG: 5 TABLET ORAL at 09:10

## 2025-01-28 RX ADMIN — MULTIVITAMIN TABLET 1 TABLET: TABLET at 09:10

## 2025-01-28 RX ADMIN — PANTOPRAZOLE SODIUM 40 MG: 40 TABLET, DELAYED RELEASE ORAL at 04:30

## 2025-01-28 RX ADMIN — LIDOCAINE 1 PATCH: 560 PATCH PERCUTANEOUS; TOPICAL; TRANSDERMAL at 09:12

## 2025-01-28 NOTE — DISCHARGE SUMMARY
[facility] Hospital Medicine Services  DISCHARGE SUMMARY    Patient Name: Toño Alcala  : 1940  MRN: [id number]    Date of Admission: 2025  5:52 PM  Date of Discharge:  2025  Primary Care Physician: Radu Francis MD    Consults       Date and Time Order Name Status Description    2025  3:48 PM Inpatient Cardiology Consult Completed     2025  6:45 PM Inpatient Neurosurgery Consult Completed     2025  8:15 AM Inpatient Cardiology Consult Completed     2025  2:19 AM Inpatient Neurosurgery Consult Completed           Hospital Course     Active Hospital Problems    Diagnosis  POA    **ICH (intracerebral hemorrhage) [I61.9]  Yes    NSTEMI, initial episode of care [I21.4]  Yes    History of pulmonary embolus (PE)/DVT [Z86.711]  No    Acute on chronic diastolic CHF (congestive heart failure) [I50.33]  Yes    C5 cervical fracture [S12.400A]  Yes    C6 cervical fracture [S12.500A]  Yes    Essential hypertension [I10]  Yes    Hyperlipidemia LDL goal <70 [E78.5]  Yes    Chronic kidney disease, stage IV (severe) [N18.4]  Yes    Disc disorder of cervical region [M50.90]  Yes    Diabetic nephropathy associated with type 2 diabetes mellitus [E11.21]  Yes    CAD (coronary artery disease) [I25.10]  Yes    Diabetes mellitus [E11.9]  Yes      Resolved Hospital Problems    Diagnosis Date Resolved POA    Syncope and collapse [R55] 2025 Unknown    Alcohol dependence with unspecified alcohol-induced disorder [F10.29] 2025 Yes      Hospital Course:  Mr. Toño Alcala is an 84yoM with PMH significant for HTN, HLD, CAD (s/p CABG in ), chronic HFpEF, CKD IV, DMII and prior PE/RLE DVT on chronic Coumadin. He was brought to [facility] ED on 25 after he was found down in his home. He was found to have C5 and C6 vertebral fractures. Troponins elevated in ED. He was admitted to the hospital medicine service - neurosurgery and cardiology  services consulted to follow. On 1/20, he developed left-sided neglect and L visual deficit. He was found to have an acute ischemic stroke with hemorrhagic conversion (no LVO, aneurysm or flow-limiting stenosis). He was transferred to the ICU for higher level of care. Lower extremity duplex revealed chronic-appearing proximal femoral and mid-femoral DVTs of the RLE. Due to recurrent falls (including a fall in the hospital) as well as hemorrhagic conversion of recent stroke, a decision was made for IVC filter placement. This was done by Dr. Hernandez on 1/23/25. It is felt that patient should remain OFF of anticoagulation indefinitely, at least until his mobility improves and frequency of falls decreases. Given elevated troponins, patient was placed on ASA 81mg daily - cardiology did not feel patient would benefit from ischemic evaluation during this admission. SLP followed - patient passed dysphagia evaluation and is tolerating a PO diet.      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  -w/ left homonymous hemianopsia   - 1/20/25 MRA head & neck showed no flow limiting stenosis or LVO  - 1/20/25 MRI brain showed acute ischemic stroke right temporal-occipital lobe w/ hemorrhagic transformation  -1 /20/25 TTE with LVEF 50%  - Stroke neurology followed - recommended ASA 81mg daily, high-intensity statin  - Stopping anticoagulation indefinitely (short-term due to CVA, long-term due to recurrent falls)  - Normal BP goals  - Tolerating modified diet  - PT/OT recommend rehab - to [facility] today     C5 & C6 fracture (oblique nondisplaced); non-operative management  - Evaluated by Dr. Byrd of neurosurgery on 1/20/25   - Cervical collar on at all times (may remove for eating / bathing) x 6-8 weeks  - Follow up with Dr. Byrd in 4 weeks for repeat cervical XRs     RLE DVT (proximal femoral and mid-femoral), s/p IVC filter placement  Hx previous PE/DVT (on chronic coumadin  prior to admission)  - 1/20/25 venous duplex revealed chronic appearing RLE DVT proximal femoral and mid femoral  - He is s/p IVC filter placement 1/23/25 by Dr. Hernandez   - Follow up with cardiology outpatient regarding possible future anticoagulation and IVC filter removal vs keep in place long-term     Elevated troponin/NSTEMI (type of NSTEMI unclear per cardiology)  Hx CAD   Acute on chronic HFpEF  HTN  HL  - Remote CABG 1994, last cath 2011 w/ 3/3 grafts patent  - 2014 stress ECHO was normal. No recent ischemic evaluation  - EKG this admission w/ non-specific ST changes  - Cardiology deferred ischemic evaluation this admission  - 1/20/25 TTE with EF 50%, no significant valvular disease  - s/p IV diuresis during this admission  - Cardiology followed. Continue ASA 81mg, Rosuvastatin 20mg QHS, Amlodipine 5mg daily, Carvedilol 3.125mg BID (up-titrate as tolerated)  - Follow up with cardiology outpatient      CKD IV (baseline cr ~2.3-2.4)  - Renal function stable / at baseline     Diet-controlled DMII  - A1c 5.7% on 1/21  - On no meds prior to admission  - Stopped accuchecks / SSI due to euglycemia  Day of Discharge     HPI:   Sitting up in chair. Feeling well without new complaints. Notes chronic pain related to post-herpetic neuralgia. Denies chest pain or dyspnea. No nausea or vomiting. BM yesterday. Tolerating diet without issues. Feels ready to go to [facility] today.     Review of Systems  Gen- No fevers, chills  CV- No chest pain, palpitations  Resp- No cough, dyspnea  GI- No N/V/D, abd pain    Vital Signs:   Temp:  [97.2 °F (36.2 °C)-97.7 °F (36.5 °C)] 97.2 °F (36.2 °C)  Heart Rate:  [49-69] 69  Resp:  [16-18] 16  BP: (117-145)/(62-74) 136/68    Physical Exam:  Constitutional: No acute distress, awake, alert and conversant. Sitting upright in chair   HENT: NCAT, mucous membranes moist. Hard C-collar in place   Respiratory: Clear to auscultation bilaterally, respiratory effort normal   Cardiovascular:  RRR  Gastrointestinal: Positive bowel sounds, soft, nontender, nondistended  Musculoskeletal: No bilateral ankle edema  Psychiatric: Appropriate affect, cooperative with exam  Neurologic: Oriented x 3, moves all extremities spontaneously without focal deficits, speech clear  Skin: No rashes to exposed surfaces     Pertinent  and/or Most Recent Results     LAB RESULTS:      Lab 01/27/25  0712 01/26/25  0456 01/25/25 2215 01/24/25 0405 01/23/25 0418   WBC  --   --  8.34  --  9.55   HEMOGLOBIN  --   --  11.6*  --  12.7*   HEMATOCRIT  --   --  35.2*  --  37.2*   PLATELETS  --   --  206  --  218   MCV  --   --  87.1  --  85.1   PROTIME 15.8* 15.3* 15.8* 15.8* 15.8*         Lab 01/27/25  0712 01/26/25 0456 01/25/25 2215 01/23/25  1846 01/23/25 0418   SODIUM 138 137 134*  --  136   POTASSIUM 3.9 4.0 4.1 4.1 3.6   CHLORIDE 102 101 100  --  101   CO2 25.0 26.0 23.0  --  26.0   ANION GAP 11.0 10.0 11.0  --  9.0   BUN 30* 33* 35*  --  27*   CREATININE 1.81* 2.09* 2.20*  --  1.99*   EGFR 36.4* 30.6* 28.8*  --  32.5*   GLUCOSE 120* 110* 159*  --  99   CALCIUM 9.2 9.3 8.9  --  9.2   MAGNESIUM  --  2.0 2.0  --   --              Lab 01/27/25  0712 01/26/25 0456 01/25/25 2215 01/24/25 0405 01/23/25  0418   PROTIME 15.8* 15.3* 15.8* 15.8* 15.8*   INR 1.25* 1.20* 1.25* 1.25* 1.25*     Brief Urine Lab Results  (Last result in the past 365 days)        Color   Clarity   Blood   Leuk Est   Nitrite   Protein   CREAT   Urine HCG        01/19/25 1932 Yellow   Clear   Small (1+)   Negative   Negative   100 mg/dL (2+)                 Microbiology Results (last 10 days)       ** No results found for the last 240 hours. **          CT Head Without Contrast    Result Date: 1/24/2025  CT HEAD WO CONTRAST Date of Exam: 1/24/2025 10:26 AM EST Indication: stroke,  hemorrhage assessment. Comparison: January 23, 2025 Technique: Axial CT images were obtained of the head without contrast administration.  Automated exposure control and iterative  construction methods were used. Findings: There are evolving subacute infarcts within the right temporal and occipital lobes. A small amount of hemorrhage within the right occipital lobe infarct appears unchanged. No new intracranial hemorrhage is identified. The ventricles are stable in caliber, with no midline shift. The basal cisterns appear patent. Degenerative changes appear stable. The calvarium appears intact. The paranasal sinuses and mastoid air cells are well aerated.     Impression: Evolving subacute infarcts within right temporal and occipital lobes. Small amount of hemorrhage within right occipital lobe infarct appears unchanged. No new acute intracranial process identified. Electronically Signed: Rodríguez Mckeon MD  1/24/2025 10:43 AM EST  Workstation ID: [id number]    Invasive peripheral vascular study    Result Date: 1/23/2025    Successful IVC filter placement     CT Head Without Contrast    Result Date: 1/23/2025  CT HEAD WO CONTRAST Date of Exam: 1/23/2025 1:14 AM EST Indication: FALL. Comparison: 1/21/2025. Technique: Axial CT images were obtained of the head without contrast administration.  Automated exposure control and iterative construction methods were used. Findings: There is redemonstration of hypodensity within the right occipital lobe and the right temporal lobe related to recent infarcts. Patchy hyperdensity is present within the medial right temporal lobe and the right occipital lobe related to a small amount of  hemorrhage which appears unchanged as compared to the previous study. No additional areas of hemorrhage identified. No significant mass effect. No evidence of midline shift. There is no extracerebral collection. Ventricles are normal in size and configuration for patient's stated age.  Posterior fossa is within normal limits. Calvarium and skull base appear intact.   Visualized sinuses show no air fluid levels. Visualized orbits are unremarkable.     Impression:  Redemonstration of recent infarcts within the right occipital lobe and the right temporal lobe with a small amount of hemorrhage which appears unchanged as compared to the previous study. No new areas of hemorrhage identified. No significant mass effect or midline shift. Electronically Signed: Deana Caballero MD  1/23/2025 1:24 AM EST  Workstation ID: [id number]    SLP FEES - Fiberoptic Endo Eval Swallow    Result Date: 1/21/2025  This procedure was auto-finalized with no dictation required.    MRI Angiogram Head Without Contrast    Result Date: 1/21/2025  MRI BRAIN WO CONTRAST, MRI ANGIOGRAM NECK WO CONTRAST, MRI ANGIOGRAM HEAD WO CONTRAST Date of Exam: 1/21/2025 2:35 AM EST Indication: left visual cut, hemorrhagic conversion.  Comparison: CT 1 day prior. Technique: Multiplanar multisequence MRI of the brain performed without IV contrast. Noncontrast time-of-flight 3-dimensional MR angiogram of the head and neck with three-dimensional maximum intensity projections postprocessed Findings: MRI brain: Diffusion restriction is present corresponding with known acute infarct in the right PCA territory, with predominant right occipital and mesial temporal lobe involvement. 1-2 tiny foci of additional likely small acute infarct within the left occipital lobe are also present. There is intervening small area of acute hemorrhage with T2 prolongation and susceptibility artifact. No new or increased hemorrhage is present. There is localized edema present, some overlying sulcal effacement, otherwise without midline shift. Age-related volume loss and periventricular leukomalacia appears similar, with associated ex vacuo prominence of the ventricles. No unexpected hemorrhage, mass or mass effect is evident. Midline structures appear normal and the craniocervical junction is satisfactory. The orbits are normal. The paranasal sinuses are clear. MR angiogram: The common carotid arteries demonstrate expected flow related signal. The right  ICA origin appears patent without evidence of significant atherosclerotic narrowing, 0% stenosis by NASCET criteria. There is evidence of some mild atherosclerotic narrowing at the left ICA origin, less than 50%. The vertebral arteries are normal in course and caliber bilaterally. Intracranially, the carotid siphons are patent. The anterior cerebral arteries are normal in course and caliber bilaterally. The right and left middle cerebral arteries are normal in course and caliber bilaterally. Tortuous patent vertebrobasilar system. The posterior cerebral arteries are normal in course and caliber bilaterally.     Impression: Redemonstrated evolving right PCA territory infarct with stable small amount of intervening hemorrhage. Mild localized edema is present without significant associated mass effect. No additional acute findings. MR angiogram of the head and neck demonstrates some mild atherosclerotic changes without evidence of flow-limiting stenosis, large vessel occlusion or aneurysm. Electronically Signed: Indra Hill MD  1/21/2025 5:28 AM EST  Workstation ID: [id number]    MRI Angiogram Neck Without Contrast    Result Date: 1/21/2025  MRI BRAIN WO CONTRAST, MRI ANGIOGRAM NECK WO CONTRAST, MRI ANGIOGRAM HEAD WO CONTRAST Date of Exam: 1/21/2025 2:35 AM EST Indication: left visual cut, hemorrhagic conversion.  Comparison: CT 1 day prior. Technique: Multiplanar multisequence MRI of the brain performed without IV contrast. Noncontrast time-of-flight 3-dimensional MR angiogram of the head and neck with three-dimensional maximum intensity projections postprocessed Findings: MRI brain: Diffusion restriction is present corresponding with known acute infarct in the right PCA territory, with predominant right occipital and mesial temporal lobe involvement. 1-2 tiny foci of additional likely small acute infarct within the left occipital lobe are also present. There is intervening small area of acute hemorrhage with T2  prolongation and susceptibility artifact. No new or increased hemorrhage is present. There is localized edema present, some overlying sulcal effacement, otherwise without midline shift. Age-related volume loss and periventricular leukomalacia appears similar, with associated ex vacuo prominence of the ventricles. No unexpected hemorrhage, mass or mass effect is evident. Midline structures appear normal and the craniocervical junction is satisfactory. The orbits are normal. The paranasal sinuses are clear. MR angiogram: The common carotid arteries demonstrate expected flow related signal. The right ICA origin appears patent without evidence of significant atherosclerotic narrowing, 0% stenosis by NASCET criteria. There is evidence of some mild atherosclerotic narrowing at the left ICA origin, less than 50%. The vertebral arteries are normal in course and caliber bilaterally. Intracranially, the carotid siphons are patent. The anterior cerebral arteries are normal in course and caliber bilaterally. The right and left middle cerebral arteries are normal in course and caliber bilaterally. Tortuous patent vertebrobasilar system. The posterior cerebral arteries are normal in course and caliber bilaterally.     Impression: Redemonstrated evolving right PCA territory infarct with stable small amount of intervening hemorrhage. Mild localized edema is present without significant associated mass effect. No additional acute findings. MR angiogram of the head and neck demonstrates some mild atherosclerotic changes without evidence of flow-limiting stenosis, large vessel occlusion or aneurysm. Electronically Signed: Indra Hill MD  1/21/2025 5:28 AM EST  Workstation ID: [id number]    MRI Brain Without Contrast    Result Date: 1/21/2025  MRI BRAIN WO CONTRAST, MRI ANGIOGRAM NECK WO CONTRAST, MRI ANGIOGRAM HEAD WO CONTRAST Date of Exam: 1/21/2025 2:35 AM EST Indication: left visual cut, hemorrhagic conversion.  Comparison: CT 1  day prior. Technique: Multiplanar multisequence MRI of the brain performed without IV contrast. Noncontrast time-of-flight 3-dimensional MR angiogram of the head and neck with three-dimensional maximum intensity projections postprocessed Findings: MRI brain: Diffusion restriction is present corresponding with known acute infarct in the right PCA territory, with predominant right occipital and mesial temporal lobe involvement. 1-2 tiny foci of additional likely small acute infarct within the left occipital lobe are also present. There is intervening small area of acute hemorrhage with T2 prolongation and susceptibility artifact. No new or increased hemorrhage is present. There is localized edema present, some overlying sulcal effacement, otherwise without midline shift. Age-related volume loss and periventricular leukomalacia appears similar, with associated ex vacuo prominence of the ventricles. No unexpected hemorrhage, mass or mass effect is evident. Midline structures appear normal and the craniocervical junction is satisfactory. The orbits are normal. The paranasal sinuses are clear. MR angiogram: The common carotid arteries demonstrate expected flow related signal. The right ICA origin appears patent without evidence of significant atherosclerotic narrowing, 0% stenosis by NASCET criteria. There is evidence of some mild atherosclerotic narrowing at the left ICA origin, less than 50%. The vertebral arteries are normal in course and caliber bilaterally. Intracranially, the carotid siphons are patent. The anterior cerebral arteries are normal in course and caliber bilaterally. The right and left middle cerebral arteries are normal in course and caliber bilaterally. Tortuous patent vertebrobasilar system. The posterior cerebral arteries are normal in course and caliber bilaterally.     Impression: Redemonstrated evolving right PCA territory infarct with stable small amount of intervening hemorrhage. Mild localized  edema is present without significant associated mass effect. No additional acute findings. MR angiogram of the head and neck demonstrates some mild atherosclerotic changes without evidence of flow-limiting stenosis, large vessel occlusion or aneurysm. Electronically Signed: Indra Hill MD  1/21/2025 5:28 AM EST  Workstation ID: [id number]    CT Head Without Contrast    Result Date: 1/21/2025  CT HEAD WO CONTRAST Date of Exam: 1/21/2025 12:03 AM EST Indication: Stroke, follow up ich stability. Comparison: 1/20/2025, 1/19/2025. Technique: Axial CT images were obtained of the head without contrast administration.  Automated exposure control and iterative construction methods were used. Findings: Redemonstration of evolving infarcts within the right occipital and temporal lobes which appear unchanged as compared to the previous study. A small amount of surrounding edema is present which appears stable. Hemorrhage is present within the right occipital infarct which appears unchanged as compared to the previous study. No new areas of hemorrhage identified. Stable encephalomalacia present within the left occipital lobe. No intraventricular blood products identified. No extra-axial collection is identified. There is no extracerebral collection. Ventricles are normal in size and configuration for patient's stated age.  Posterior fossa is within normal limits. Calvarium and skull base appear intact.   Visualized sinuses show no air fluid levels. Visualized orbits are unremarkable.     Impression: No significant change. Stable evolving infarcts within the right occipital and temporal lobes with a small amount of surrounding edema and hemorrhage. No new areas of hemorrhage identified. Electronically Signed: Deana Caballero MD  1/21/2025 12:13 AM EST  Workstation ID: [id number]    CT Head Without Contrast Stroke Protocol    Result Date: 1/20/2025  CT HEAD WO CONTRAST STROKE PROTOCOL Date of Exam: 1/20/2025 6:20 PM EST Indication:  left visual deficit. Comparison: 1/19/2025 Technique: Axial CT images were obtained of the head without contrast administration.  Reconstructed coronal images were also obtained. Automated exposure control and iterative construction methods were used. Scan Time: 1823 Results discussed with Eboni of the stroke team at 6:38 p.m. on 1/20/2025. Findings: Again seen is an evolving right occipital lobe and temporal lobe infarct with a new small area of intraparenchymal hemorrhage measuring 2.1 x 1.6 cm in size with a volume of less than 30 cc. There is no evidence of new infarct. No extra-axial fluid collection identified. No mass effect or hydrocephalus. No intraventricular hemorrhage identified. There is moderate generalized parenchymal volume loss. Globes and orbits are within normal limits.     Impression: 1. Evolving right temporal and occipital lobe infarcts with new intraparenchymal hemorrhage within the right occipital lobe measuring 2.1 x 1.6 cm in size with a volume of less than 30 cc. No mass effect or midline shift. No intraventricular hemorrhage. Electronically Signed: Delano Mcrae MD  1/20/2025 6:38 PM EST  Workstation ID: [id number]    Duplex Venous Lower Extremity - Right CAR    Result Date: 1/20/2025    Chronic right lower extremity deep vein thrombosis noted in the proximal femoral and mid femoral.   All other right sided veins appeared normal.     CT Lumbar Spine Without Contrast    Result Date: 1/19/2025  CT LUMBAR SPINE WO CONTRAST Date of Exam: 1/19/2025 10:57 PM EST Indication: Back pain status post fall. Comparison: None available. Technique: Axial CT images were obtained of the lumbar spine without contrast administration.  Reconstructed coronal and sagittal images were also obtained. Automated exposure control and iterative construction methods were used. Findings: There are 5 typical lumbar spine vertebral bodies in anatomic alignment. There is no evidence of fracture or compression deformity.   No focal lesions identified.  No evidence of spondylolysis. No significant spondylolisthesis. No significant focal soft tissue abnormalities. At least moderate multilevel degenerative changes are present throughout the spine. There is a levoscoliotic curvature present. Surrounding osseous structures appear intact. There is no evidence of bony canal stenosis. No sizable disc bulge or protrusion identified. Multilevel facet disease is present with mild to moderate multilevel neuroforaminal stenosis present bilaterally, most pronounced on the right at L2-L3 and L3-L4 and on the left at L3-L4 and L5-S1.     Impression: No acute osseous abnormality. At least moderate multilevel degenerative changes are present throughout the spine with a levoscoliotic curvature. Electronically Signed: Deana Caballero MD  1/19/2025 11:11 PM EST  Workstation ID: [id number]    CT Thoracic Spine Without Contrast    Result Date: 1/19/2025  CT THORACIC SPINE WO CONTRAST Date of Exam: 1/19/2025 6:24 PM EST Indication: Back and neck pain status post fall. Comparison: CT chest from June 19, 2015 Technique: Axial CT images were obtained of the thoracic spine without contrast administration.  Reconstructed coronal and sagittal images were also obtained. Automated exposure control and iterative construction methods were used. Findings: No acute fracture is identified. There is dextroscoliosis of the lower thoracic spine. The vertebral body heights are normal. There are multiple bridging marginal osteophytes compatible with diffuse idiopathic skeletal hyperostosis. There are moderate discogenic changes throughout. The spinal canal is widely patent throughout. There are scattered areas of mild to moderate neural foraminal stenosis in the upper and mid thoracic spine secondary to prominent facet arthropathy. The posterior paravertebral  soft tissues are unremarkable.     Impression: 1.No acute fracture identified. 2.Degenerative changes as described above.  Electronically Signed: Rodríguez Mckeon MD  1/19/2025 6:58 PM EST  Workstation ID: [id number]    CT Cervical Spine Without Contrast    Result Date: 1/19/2025  CT CERVICAL SPINE WO CONTRAST Date of Exam: 1/19/2025 6:24 PM EST Indication: Upper neck and back pain status post fall. Comparison: February 15, 2022 Technique: Axial CT images were obtained of the cervical spine without contrast administration.  Reconstructed coronal and sagittal images were also obtained. Automated exposure control and iterative construction methods were used. Findings: There is a nondisplaced oblique fracture involving the C5 and C6 vertebral bodies, depicted on image 18 of series 4. The craniocervical junction appears intact. The alignment is anatomic. The vertebral body heights appear normal. There is osseous fusion across C5-6 and C6-7. Prominent anterior marginal bridging osteophytes are present. No high-grade spinal canal stenosis is identified. There is uncovertebral hypertrophy and facet arthropathy at several levels, resulting in up to severe neural foraminal stenosis on the right at C3-4 and bilaterally at C4-5. The prevertebral soft tissues are unremarkable.     Impression: Nondisplaced oblique fracture involving C5 and C6 vertebral bodies. Electronically Signed: Rodríguez Mckeon MD  1/19/2025 6:54 PM EST  Workstation ID: [id number]    CT Head Without Contrast    Result Date: 1/19/2025  CT HEAD WO CONTRAST Date of Exam: 1/19/2025 6:24 PM EST Indication: Head trauma status post fall. Comparison: February 15, 2022 Technique: Axial CT images were obtained of the head without contrast administration.  Automated exposure control and iterative construction methods were used. Findings: No acute intracranial hemorrhage or extra-axial collection is identified. The ventricles appear normal in caliber, with no evidence of mass effect or midline shift. The basal cisterns appear patent. The gray-white differentiation appears preserved. The  calvarium appear intact. The paranasal sinuses are clear. The mastoid air cells are well-aerated. Scattered foci of periventricular and subcortical white matter hypodensities are nonspecific, but likely the sequela of moderate chronic small vessel ischemic disease. There is mild generalized parenchymal atrophy.     Impression: 1.No acute intracranial process or calvarial fracture identified. 2.Findings suggestive of moderate chronic small vessel ischemic disease. Electronically Signed: Rodríguez Mckeon MD  1/19/2025 6:47 PM EST  Workstation ID: [id number]    XR Chest 1 View    Result Date: 1/19/2025  XR CHEST 1 VW Date of Exam: 1/19/2025 5:59 PM EST Indication: AMS Protocol AMS Protocol Comparison: February 21, 2022 Findings: Median sternotomy wires appear intact. There are coarse bilateral sacral markings. There is no focal consolidation. The heart and mediastinal contours appear stable.     Impression: Coarse bilateral interstitial markings, which could reflect interstitial pulmonary edema or atypical pneumonia. Electronically Signed: Rodríguez Mckeon MD  1/19/2025 6:30 PM EST  Workstation ID: [id number]     Results for orders placed during the hospital encounter of 01/19/25    Duplex Venous Lower Extremity - Right CAR    Interpretation Summary    Chronic right lower extremity deep vein thrombosis noted in the proximal femoral and mid femoral.    All other right sided veins appeared normal.    Results for orders placed during the hospital encounter of 01/19/25    Adult Transthoracic Echo Complete W/ Cont if Necessary Per Protocol    Interpretation Summary    Left ventricular systolic function is normal. Estimated left ventricular EF = 50%    Left ventricular wall thickness is consistent with borderline concentric hypertrophy.    Mild mitral valve regurgitation is present.    Discharge Details        Discharge Medications        New Medications        Instructions Start Date   amLODIPine 5 MG tablet  Commonly known as:  NORVASC   5 mg, Oral, Every 24 Hours Scheduled   Start Date: January 29, 2025     aspirin 81 MG chewable tablet   81 mg, Oral, Daily   Start Date: January 29, 2025     folic acid 1 MG tablet  Commonly known as: FOLVITE   1 mg, Oral, Daily   Start Date: January 29, 2025     multivitamin tablet tablet   1 tablet, Oral, Daily   Start Date: January 29, 2025     thiamine 100 MG tablet  Commonly known as: VITAMIN B1   100 mg, Oral, Daily   Start Date: January 29, 2025            Changes to Medications        Instructions Start Date   acetaminophen 325 MG tablet  Commonly known as: TYLENOL  What changed: reasons to take this   650 mg, Oral, As Needed      carvedilol 3.125 MG tablet  Commonly known as: COREG  What changed:   medication strength  how much to take  when to take this   3.125 mg, Oral, Every 12 Hours Scheduled      omeprazole 20 MG capsule  Commonly known as: priLOSEC  What changed: when to take this   20 mg, Oral, Every Morning Before Breakfast             Continue These Medications        Instructions Start Date   bumetanide 1 MG tablet  Commonly known as: BUMEX   1 mg, As Needed      glucose blood test strip   Use to check blood sugar twice daily      Microlet Lancets misc   Use to check blood sugar twice daily      rosuvastatin 20 MG tablet  Commonly known as: CRESTOR   20 mg, Oral, Nightly      sildenafil 100 MG tablet  Commonly known as: Viagra   100 mg, Oral, Daily PRN             Stop These Medications      HYDROcodone-acetaminophen 5-325 MG per tablet  Commonly known as: NORCO     warfarin 5 MG tablet  Commonly known as: COUMADIN            Allergies   Allergen Reactions    Penicillins Hives and Swelling     Per patient, has tolerated Keflex     Discharge Disposition:Skilled Nursing Facility (DC - External)    Diet:  Diet Instructions       Diet: Regular/House Diet, Cardiac Diets; Healthy Heart (2-3 Na+); Regular (IDDSI 7); Nectar Thick; No Mixed Consistencies      Discharge Diet:  Regular/House  Diet  Cardiac Diets       Cardiac Diet: Healthy Heart (2-3 Na+)    Texture: Regular (IDDSI 7)    Fluid Consistency: Nectar Thick    Diet Modifiers / Additional Diets: No Mixed Consistencies           Activity:  Activity Instructions       Activity as Tolerated      Other Activity Instructions      C-collar on at all times except for eating/bathing x 6-8 weeks    Up WIth Assist            CODE STATUS:    Code Status and Medical Interventions: CPR (Attempt to Resuscitate); Full Support   Ordered at: 01/19/25 2255     Level Of Support Discussed With:    Patient     Code Status (Patient has no pulse and is not breathing):    CPR (Attempt to Resuscitate)     Medical Interventions (Patient has pulse or is breathing):    Full Support     Future Appointments   Date Time Provider Department Center   2/17/2025 10:30 AM Heather Blake PA-C [facility]   2/28/2025  1:30 PM Alesha Parra APRN [facility]   3/17/2025  2:30 PM Jaya Renteria MD [facility]   7/7/2025 11:45 AM Avelino Hernandez MD [facility]   7/15/2025 11:15 AM Radu Francis MD [facility]     Additional Instructions for the Follow-ups that You Need to Schedule       Ambulatory Referral to Neurology   As directed      4 weeks    Discharge Follow-up with Specified Provider: David in 1-2 months   As directed      To: David in 1-2 months        Discharge Follow-up with Specified Provider: Carson in 3 weeks (around 2/20)   As directed      To: Carson in 3 weeks (around 2/20)        XR Spine Cervical 2 or 3 View   Feb 20, 2025      Exam reason: cervical fracture f/u   Release to patient: Routine Release              Sheba Martinze PA-C  01/28/25    Time Spent on Discharge:  I spent 35 minutes on this discharge activity which included: face-to-face encounter with the patient, reviewing the data in the system, coordination of the care with the nursing staff as well as consultants, documentation, and entering orders.

## 2025-01-28 NOTE — PLAN OF CARE
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 Gaze normalized. Oriented x 4 with intermittent confusion. ( Patient asked, \" Where is everybody? Why am I here alone? \" )  conversant, follows commands. Ambulates to the bathroom with one person assist. Patient has been instructed to use call light when needs to void. VSS on RA. SR on cardiac mx. Call light in reach. BP closely monitored.                                         "

## 2025-01-28 NOTE — DISCHARGE PLACEMENT REQUEST
"Toño Alcala (84 y.o. Male)       Date of Birth   1940    Social Security Number       Address   [facility] [address]    Home Phone   [phone]    MRN   [id number]       Scientologist   Druze    Marital Status                               Admission Date   1/19/25    Admission Type   Emergency    Admitting Provider   Luc Chang DO    Attending Provider   Luc Chang DO    Department, Room/Bed   [facility] 3G, S362/1       Discharge Date       Discharge Disposition   Skilled Nursing Facility (DC - External)    Discharge Destination                                 Attending Provider: Luc Chang DO    Allergies: Penicillins    Isolation: None   Infection: None   Code Status: CPR    Ht: 182.9 cm (72.01\")   Wt: 79.4 kg (175 lb 0.7 oz)    Admission Cmt: None   Principal Problem: ICH (intracerebral hemorrhage) [I61.9]                   Active Insurance as of 1/19/2025       Primary Coverage       Payor Plan Insurance Group Employer/Plan Group    MEDICARE MEDICARE A & B        Payor Plan Address Payor Plan Phone Number Payor Plan Fax Number Effective Dates    [address] [phone]  7/1/2005 - None Entered    [facility] [address]         Subscriber Name Subscriber Birth Date Member ID       TOÑO ALCALA 1940 [id number]               Secondary Coverage       Payor Plan Insurance Group Employer/Plan Group    [employer]ET[employer] COMMERCIAL A[employer] HE[employer] AND LIFE  SUP               Payor Plan Address Payor Plan Phone Number Payor Plan Fax Number Effective Dates    [address] [address]   1/1/2017 - None Entered    [facility] [address]         Subscriber Name Subscriber Birth Date Member ID       TOÑO ALCALA 1940 [id number]                     Emergency Contacts        (Rel.) Home Phone Work Phone Mobile Phone    FREDRICKROSEMARY (Son) [phone] -- [phone]    FredrickHumbertot (Son) [phone] -- [phone]                 Discharge Summary    "     Sheba Martinez PA-C at 25 0924                  [facility] Hospital Medicine Services  DISCHARGE SUMMARY    Patient Name: Toño Alcala  : 1940  MRN: [id number]    Date of Admission: 2025  5:52 PM  Date of Discharge:  2025  Primary Care Physician: aRdu Francis MD    Consults       Date and Time Order Name Status Description    2025  3:48 PM Inpatient Cardiology Consult Completed     2025  6:45 PM Inpatient Neurosurgery Consult Completed     2025  8:15 AM Inpatient Cardiology Consult Completed     2025  2:19 AM Inpatient Neurosurgery Consult Completed           Hospital Course     Active Hospital Problems    Diagnosis  POA    **ICH (intracerebral hemorrhage) [I61.9]  Yes    NSTEMI, initial episode of care [I21.4]  Yes    History of pulmonary embolus (PE)/DVT [Z86.711]  No    Acute on chronic diastolic CHF (congestive heart failure) [I50.33]  Yes    C5 cervical fracture [S12.400A]  Yes    C6 cervical fracture [S12.500A]  Yes    Essential hypertension [I10]  Yes    Hyperlipidemia LDL goal <70 [E78.5]  Yes    Chronic kidney disease, stage IV (severe) [N18.4]  Yes    Disc disorder of cervical region [M50.90]  Yes    Diabetic nephropathy associated with type 2 diabetes mellitus [E11.21]  Yes    CAD (coronary artery disease) [I25.10]  Yes    Diabetes mellitus [E11.9]  Yes      Resolved Hospital Problems    Diagnosis Date Resolved POA    Syncope and collapse [R55] 2025 Unknown    Alcohol dependence with unspecified alcohol-induced disorder [F10.29] 2025 Yes      Hospital Course:  Mr. Toño Alcala is an 84yoM with PMH significant for HTN, HLD, CAD (s/p CABG in ), chronic HFpEF, CKD IV, DMII and prior PE/RLE DVT on chronic Coumadin. He was brought to [facility] ED on 25 after he was found down in his home. He was found to have C5 and C6 vertebral fractures. Troponins elevated in ED. He was admitted to the  hospital medicine service - neurosurgery and cardiology services consulted to follow. On 1/20, he developed left-sided neglect and L visual deficit. He was found to have an acute ischemic stroke with hemorrhagic conversion (no LVO, aneurysm or flow-limiting stenosis). He was transferred to the ICU for higher level of care. Lower extremity duplex revealed chronic-appearing proximal femoral and mid-femoral DVTs of the RLE. Due to recurrent falls (including a fall in the hospital) as well as hemorrhagic conversion of recent stroke, a decision was made for IVC filter placement. This was done by Dr. Hernandez on 1/23/25. It is felt that patient should remain OFF of anticoagulation indefinitely, at least until his mobility improves and frequency of falls decreases. Given elevated troponins, patient was placed on ASA 81mg daily - cardiology did not feel patient would benefit from ischemic evaluation during this admission. SLP followed - patient passed dysphagia evaluation and is tolerating a PO diet.      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  -w/ left homonymous hemianopsia   - 1/20/25 MRA head & neck showed no flow limiting stenosis or LVO  - 1/20/25 MRI brain showed acute ischemic stroke right temporal-occipital lobe w/ hemorrhagic transformation  -1 /20/25 TTE with LVEF 50%  - Stroke neurology followed - recommended ASA 81mg daily, high-intensity statin  - Stopping anticoagulation indefinitely (short-term due to CVA, long-term due to recurrent falls)  - Normal BP goals  - Tolerating modified diet  - PT/OT recommend rehab - to [facility] today     C5 & C6 fracture (oblique nondisplaced); non-operative management  - Evaluated by Dr. Byrd of neurosurgery on 1/20/25   - Cervical collar on at all times (may remove for eating / bathing) x 6-8 weeks  - Follow up with Dr. Byrd in 4 weeks for repeat cervical XRs     RLE DVT (proximal femoral and mid-femoral), s/p IVC  filter placement  Hx previous PE/DVT (on chronic coumadin prior to admission)  - 1/20/25 venous duplex revealed chronic appearing RLE DVT proximal femoral and mid femoral  - He is s/p IVC filter placement 1/23/25 by Dr. Hernandez   - Follow up with cardiology outpatient regarding possible future anticoagulation and IVC filter removal vs keep in place long-term     Elevated troponin/NSTEMI (type of NSTEMI unclear per cardiology)  Hx CAD   Acute on chronic HFpEF  HTN  HL  - Remote CABG 1994, last cath 2011 w/ 3/3 grafts patent  - 2014 stress ECHO was normal. No recent ischemic evaluation  - EKG this admission w/ non-specific ST changes  - Cardiology deferred ischemic evaluation this admission  - 1/20/25 TTE with EF 50%, no significant valvular disease  - s/p IV diuresis during this admission  - Cardiology followed. Continue ASA 81mg, Rosuvastatin 20mg QHS, Amlodipine 5mg daily, Carvedilol 3.125mg BID (up-titrate as tolerated)  - Follow up with cardiology outpatient      CKD IV (baseline cr ~2.3-2.4)  - Renal function stable / at baseline     Diet-controlled DMII  - A1c 5.7% on 1/21  - On no meds prior to admission  - Stopped accuchecks / SSI due to euglycemia  Day of Discharge     HPI:   Sitting up in chair. Feeling well without new complaints. Notes chronic pain related to post-herpetic neuralgia. Denies chest pain or dyspnea. No nausea or vomiting. BM yesterday. Tolerating diet without issues. Feels ready to go to [facility] today.     Review of Systems  Gen- No fevers, chills  CV- No chest pain, palpitations  Resp- No cough, dyspnea  GI- No N/V/D, abd pain    Vital Signs:   Temp:  [97.2 °F (36.2 °C)-97.7 °F (36.5 °C)] 97.2 °F (36.2 °C)  Heart Rate:  [49-69] 69  Resp:  [16-18] 16  BP: (117-145)/(62-74) 136/68    Physical Exam:  Constitutional: No acute distress, awake, alert and conversant. Sitting upright in chair   HENT: NCAT, mucous membranes moist. Hard C-collar in place   Respiratory: Clear to auscultation bilaterally,  respiratory effort normal   Cardiovascular: RRR  Gastrointestinal: Positive bowel sounds, soft, nontender, nondistended  Musculoskeletal: No bilateral ankle edema  Psychiatric: Appropriate affect, cooperative with exam  Neurologic: Oriented x 3, moves all extremities spontaneously without focal deficits, speech clear  Skin: No rashes to exposed surfaces     Pertinent  and/or Most Recent Results     LAB RESULTS:      Lab 01/27/25  0712 01/26/25  0456 01/25/25 2215 01/24/25 0405 01/23/25 0418   WBC  --   --  8.34  --  9.55   HEMOGLOBIN  --   --  11.6*  --  12.7*   HEMATOCRIT  --   --  35.2*  --  37.2*   PLATELETS  --   --  206  --  218   MCV  --   --  87.1  --  85.1   PROTIME 15.8* 15.3* 15.8* 15.8* 15.8*         Lab 01/27/25  0712 01/26/25 0456 01/25/25 2215 01/23/25 1846 01/23/25 0418   SODIUM 138 137 134*  --  136   POTASSIUM 3.9 4.0 4.1 4.1 3.6   CHLORIDE 102 101 100  --  101   CO2 25.0 26.0 23.0  --  26.0   ANION GAP 11.0 10.0 11.0  --  9.0   BUN 30* 33* 35*  --  27*   CREATININE 1.81* 2.09* 2.20*  --  1.99*   EGFR 36.4* 30.6* 28.8*  --  32.5*   GLUCOSE 120* 110* 159*  --  99   CALCIUM 9.2 9.3 8.9  --  9.2   MAGNESIUM  --  2.0 2.0  --   --              Lab 01/27/25  0712 01/26/25  0456 01/25/25 2215 01/24/25 0405 01/23/25 0418   PROTIME 15.8* 15.3* 15.8* 15.8* 15.8*   INR 1.25* 1.20* 1.25* 1.25* 1.25*     Brief Urine Lab Results  (Last result in the past 365 days)        Color   Clarity   Blood   Leuk Est   Nitrite   Protein   CREAT   Urine HCG        01/19/25 1932 Yellow   Clear   Small (1+)   Negative   Negative   100 mg/dL (2+)                 Microbiology Results (last 10 days)       ** No results found for the last 240 hours. **          CT Head Without Contrast    Result Date: 1/24/2025  CT HEAD WO CONTRAST Date of Exam: 1/24/2025 10:26 AM EST Indication: stroke,  hemorrhage assessment. Comparison: January 23, 2025 Technique: Axial CT images were obtained of the head without contrast  administration.  Automated exposure control and iterative construction methods were used. Findings: There are evolving subacute infarcts within the right temporal and occipital lobes. A small amount of hemorrhage within the right occipital lobe infarct appears unchanged. No new intracranial hemorrhage is identified. The ventricles are stable in caliber, with no midline shift. The basal cisterns appear patent. Degenerative changes appear stable. The calvarium appears intact. The paranasal sinuses and mastoid air cells are well aerated.     Impression: Evolving subacute infarcts within right temporal and occipital lobes. Small amount of hemorrhage within right occipital lobe infarct appears unchanged. No new acute intracranial process identified. Electronically Signed: Rodríguez Mckeon MD  1/24/2025 10:43 AM EST  Workstation ID: [id number]    Invasive peripheral vascular study    Result Date: 1/23/2025    Successful IVC filter placement     CT Head Without Contrast    Result Date: 1/23/2025  CT HEAD WO CONTRAST Date of Exam: 1/23/2025 1:14 AM EST Indication: FALL. Comparison: 1/21/2025. Technique: Axial CT images were obtained of the head without contrast administration.  Automated exposure control and iterative construction methods were used. Findings: There is redemonstration of hypodensity within the right occipital lobe and the right temporal lobe related to recent infarcts. Patchy hyperdensity is present within the medial right temporal lobe and the right occipital lobe related to a small amount of  hemorrhage which appears unchanged as compared to the previous study. No additional areas of hemorrhage identified. No significant mass effect. No evidence of midline shift. There is no extracerebral collection. Ventricles are normal in size and configuration for patient's stated age.  Posterior fossa is within normal limits. Calvarium and skull base appear intact.   Visualized sinuses show no air fluid levels.  Visualized orbits are unremarkable.     Impression: Redemonstration of recent infarcts within the right occipital lobe and the right temporal lobe with a small amount of hemorrhage which appears unchanged as compared to the previous study. No new areas of hemorrhage identified. No significant mass effect or midline shift. Electronically Signed: Deana Caballero MD  1/23/2025 1:24 AM EST  Workstation ID: [id number]    SLP FEES - Fiberoptic Endo Eval Swallow    Result Date: 1/21/2025  This procedure was auto-finalized with no dictation required.    MRI Angiogram Head Without Contrast    Result Date: 1/21/2025  MRI BRAIN WO CONTRAST, MRI ANGIOGRAM NECK WO CONTRAST, MRI ANGIOGRAM HEAD WO CONTRAST Date of Exam: 1/21/2025 2:35 AM EST Indication: left visual cut, hemorrhagic conversion.  Comparison: CT 1 day prior. Technique: Multiplanar multisequence MRI of the brain performed without IV contrast. Noncontrast time-of-flight 3-dimensional MR angiogram of the head and neck with three-dimensional maximum intensity projections postprocessed Findings: MRI brain: Diffusion restriction is present corresponding with known acute infarct in the right PCA territory, with predominant right occipital and mesial temporal lobe involvement. 1-2 tiny foci of additional likely small acute infarct within the left occipital lobe are also present. There is intervening small area of acute hemorrhage with T2 prolongation and susceptibility artifact. No new or increased hemorrhage is present. There is localized edema present, some overlying sulcal effacement, otherwise without midline shift. Age-related volume loss and periventricular leukomalacia appears similar, with associated ex vacuo prominence of the ventricles. No unexpected hemorrhage, mass or mass effect is evident. Midline structures appear normal and the craniocervical junction is satisfactory. The orbits are normal. The paranasal sinuses are clear. MR angiogram: The common carotid arteries  demonstrate expected flow related signal. The right ICA origin appears patent without evidence of significant atherosclerotic narrowing, 0% stenosis by NASCET criteria. There is evidence of some mild atherosclerotic narrowing at the left ICA origin, less than 50%. The vertebral arteries are normal in course and caliber bilaterally. Intracranially, the carotid siphons are patent. The anterior cerebral arteries are normal in course and caliber bilaterally. The right and left middle cerebral arteries are normal in course and caliber bilaterally. Tortuous patent vertebrobasilar system. The posterior cerebral arteries are normal in course and caliber bilaterally.     Impression: Redemonstrated evolving right PCA territory infarct with stable small amount of intervening hemorrhage. Mild localized edema is present without significant associated mass effect. No additional acute findings. MR angiogram of the head and neck demonstrates some mild atherosclerotic changes without evidence of flow-limiting stenosis, large vessel occlusion or aneurysm. Electronically Signed: Indra Hill MD  1/21/2025 5:28 AM EST  Workstation ID: [id number]    MRI Angiogram Neck Without Contrast    Result Date: 1/21/2025  MRI BRAIN WO CONTRAST, MRI ANGIOGRAM NECK WO CONTRAST, MRI ANGIOGRAM HEAD WO CONTRAST Date of Exam: 1/21/2025 2:35 AM EST Indication: left visual cut, hemorrhagic conversion.  Comparison: CT 1 day prior. Technique: Multiplanar multisequence MRI of the brain performed without IV contrast. Noncontrast time-of-flight 3-dimensional MR angiogram of the head and neck with three-dimensional maximum intensity projections postprocessed Findings: MRI brain: Diffusion restriction is present corresponding with known acute infarct in the right PCA territory, with predominant right occipital and mesial temporal lobe involvement. 1-2 tiny foci of additional likely small acute infarct within the left occipital lobe are also present. There is  intervening small area of acute hemorrhage with T2 prolongation and susceptibility artifact. No new or increased hemorrhage is present. There is localized edema present, some overlying sulcal effacement, otherwise without midline shift. Age-related volume loss and periventricular leukomalacia appears similar, with associated ex vacuo prominence of the ventricles. No unexpected hemorrhage, mass or mass effect is evident. Midline structures appear normal and the craniocervical junction is satisfactory. The orbits are normal. The paranasal sinuses are clear. MR angiogram: The common carotid arteries demonstrate expected flow related signal. The right ICA origin appears patent without evidence of significant atherosclerotic narrowing, 0% stenosis by NASCET criteria. There is evidence of some mild atherosclerotic narrowing at the left ICA origin, less than 50%. The vertebral arteries are normal in course and caliber bilaterally. Intracranially, the carotid siphons are patent. The anterior cerebral arteries are normal in course and caliber bilaterally. The right and left middle cerebral arteries are normal in course and caliber bilaterally. Tortuous patent vertebrobasilar system. The posterior cerebral arteries are normal in course and caliber bilaterally.     Impression: Redemonstrated evolving right PCA territory infarct with stable small amount of intervening hemorrhage. Mild localized edema is present without significant associated mass effect. No additional acute findings. MR angiogram of the head and neck demonstrates some mild atherosclerotic changes without evidence of flow-limiting stenosis, large vessel occlusion or aneurysm. Electronically Signed: Indra Hill MD  1/21/2025 5:28 AM EST  Workstation ID: [id number]    MRI Brain Without Contrast    Result Date: 1/21/2025  MRI BRAIN WO CONTRAST, MRI ANGIOGRAM NECK WO CONTRAST, MRI ANGIOGRAM HEAD WO CONTRAST Date of Exam: 1/21/2025 2:35 AM EST Indication: left  visual cut, hemorrhagic conversion.  Comparison: CT 1 day prior. Technique: Multiplanar multisequence MRI of the brain performed without IV contrast. Noncontrast time-of-flight 3-dimensional MR angiogram of the head and neck with three-dimensional maximum intensity projections postprocessed Findings: MRI brain: Diffusion restriction is present corresponding with known acute infarct in the right PCA territory, with predominant right occipital and mesial temporal lobe involvement. 1-2 tiny foci of additional likely small acute infarct within the left occipital lobe are also present. There is intervening small area of acute hemorrhage with T2 prolongation and susceptibility artifact. No new or increased hemorrhage is present. There is localized edema present, some overlying sulcal effacement, otherwise without midline shift. Age-related volume loss and periventricular leukomalacia appears similar, with associated ex vacuo prominence of the ventricles. No unexpected hemorrhage, mass or mass effect is evident. Midline structures appear normal and the craniocervical junction is satisfactory. The orbits are normal. The paranasal sinuses are clear. MR angiogram: The common carotid arteries demonstrate expected flow related signal. The right ICA origin appears patent without evidence of significant atherosclerotic narrowing, 0% stenosis by NASCET criteria. There is evidence of some mild atherosclerotic narrowing at the left ICA origin, less than 50%. The vertebral arteries are normal in course and caliber bilaterally. Intracranially, the carotid siphons are patent. The anterior cerebral arteries are normal in course and caliber bilaterally. The right and left middle cerebral arteries are normal in course and caliber bilaterally. Tortuous patent vertebrobasilar system. The posterior cerebral arteries are normal in course and caliber bilaterally.     Impression: Redemonstrated evolving right PCA territory infarct with stable  small amount of intervening hemorrhage. Mild localized edema is present without significant associated mass effect. No additional acute findings. MR angiogram of the head and neck demonstrates some mild atherosclerotic changes without evidence of flow-limiting stenosis, large vessel occlusion or aneurysm. Electronically Signed: Indra Hill MD  1/21/2025 5:28 AM EST  Workstation ID: [id number]    CT Head Without Contrast    Result Date: 1/21/2025  CT HEAD WO CONTRAST Date of Exam: 1/21/2025 12:03 AM EST Indication: Stroke, follow up ich stability. Comparison: 1/20/2025, 1/19/2025. Technique: Axial CT images were obtained of the head without contrast administration.  Automated exposure control and iterative construction methods were used. Findings: Redemonstration of evolving infarcts within the right occipital and temporal lobes which appear unchanged as compared to the previous study. A small amount of surrounding edema is present which appears stable. Hemorrhage is present within the right occipital infarct which appears unchanged as compared to the previous study. No new areas of hemorrhage identified. Stable encephalomalacia present within the left occipital lobe. No intraventricular blood products identified. No extra-axial collection is identified. There is no extracerebral collection. Ventricles are normal in size and configuration for patient's stated age.  Posterior fossa is within normal limits. Calvarium and skull base appear intact.   Visualized sinuses show no air fluid levels. Visualized orbits are unremarkable.     Impression: No significant change. Stable evolving infarcts within the right occipital and temporal lobes with a small amount of surrounding edema and hemorrhage. No new areas of hemorrhage identified. Electronically Signed: Deana Caballero MD  1/21/2025 12:13 AM EST  Workstation ID: [id number]    CT Head Without Contrast Stroke Protocol    Result Date: 1/20/2025  CT HEAD WO CONTRAST STROKE  PROTOCOL Date of Exam: 1/20/2025 6:20 PM EST Indication: left visual deficit. Comparison: 1/19/2025 Technique: Axial CT images were obtained of the head without contrast administration.  Reconstructed coronal images were also obtained. Automated exposure control and iterative construction methods were used. Scan Time: 1823 Results discussed with Ashery of the stroke team at 6:38 p.m. on 1/20/2025. Findings: Again seen is an evolving right occipital lobe and temporal lobe infarct with a new small area of intraparenchymal hemorrhage measuring 2.1 x 1.6 cm in size with a volume of less than 30 cc. There is no evidence of new infarct. No extra-axial fluid collection identified. No mass effect or hydrocephalus. No intraventricular hemorrhage identified. There is moderate generalized parenchymal volume loss. Globes and orbits are within normal limits.     Impression: 1. Evolving right temporal and occipital lobe infarcts with new intraparenchymal hemorrhage within the right occipital lobe measuring 2.1 x 1.6 cm in size with a volume of less than 30 cc. No mass effect or midline shift. No intraventricular hemorrhage. Electronically Signed: Delano Mcrae MD  1/20/2025 6:38 PM EST  Workstation ID: [id number]    Duplex Venous Lower Extremity - Right CAR    Result Date: 1/20/2025    Chronic right lower extremity deep vein thrombosis noted in the proximal femoral and mid femoral.   All other right sided veins appeared normal.     CT Lumbar Spine Without Contrast    Result Date: 1/19/2025  CT LUMBAR SPINE WO CONTRAST Date of Exam: 1/19/2025 10:57 PM EST Indication: Back pain status post fall. Comparison: None available. Technique: Axial CT images were obtained of the lumbar spine without contrast administration.  Reconstructed coronal and sagittal images were also obtained. Automated exposure control and iterative construction methods were used. Findings: There are 5 typical lumbar spine vertebral bodies in anatomic alignment.  There is no evidence of fracture or compression deformity.  No focal lesions identified.  No evidence of spondylolysis. No significant spondylolisthesis. No significant focal soft tissue abnormalities. At least moderate multilevel degenerative changes are present throughout the spine. There is a levoscoliotic curvature present. Surrounding osseous structures appear intact. There is no evidence of bony canal stenosis. No sizable disc bulge or protrusion identified. Multilevel facet disease is present with mild to moderate multilevel neuroforaminal stenosis present bilaterally, most pronounced on the right at L2-L3 and L3-L4 and on the left at L3-L4 and L5-S1.     Impression: No acute osseous abnormality. At least moderate multilevel degenerative changes are present throughout the spine with a levoscoliotic curvature. Electronically Signed: Deana Caballero MD  1/19/2025 11:11 PM EST  Workstation ID: [id number]    CT Thoracic Spine Without Contrast    Result Date: 1/19/2025  CT THORACIC SPINE WO CONTRAST Date of Exam: 1/19/2025 6:24 PM EST Indication: Back and neck pain status post fall. Comparison: CT chest from June 19, 2015 Technique: Axial CT images were obtained of the thoracic spine without contrast administration.  Reconstructed coronal and sagittal images were also obtained. Automated exposure control and iterative construction methods were used. Findings: No acute fracture is identified. There is dextroscoliosis of the lower thoracic spine. The vertebral body heights are normal. There are multiple bridging marginal osteophytes compatible with diffuse idiopathic skeletal hyperostosis. There are moderate discogenic changes throughout. The spinal canal is widely patent throughout. There are scattered areas of mild to moderate neural foraminal stenosis in the upper and mid thoracic spine secondary to prominent facet arthropathy. The posterior paravertebral  soft tissues are unremarkable.     Impression: 1.No acute  fracture identified. 2.Degenerative changes as described above. Electronically Signed: Rodríguez Mckeon MD  1/19/2025 6:58 PM EST  Workstation ID: [id number]    CT Cervical Spine Without Contrast    Result Date: 1/19/2025  CT CERVICAL SPINE WO CONTRAST Date of Exam: 1/19/2025 6:24 PM EST Indication: Upper neck and back pain status post fall. Comparison: February 15, 2022 Technique: Axial CT images were obtained of the cervical spine without contrast administration.  Reconstructed coronal and sagittal images were also obtained. Automated exposure control and iterative construction methods were used. Findings: There is a nondisplaced oblique fracture involving the C5 and C6 vertebral bodies, depicted on image 18 of series 4. The craniocervical junction appears intact. The alignment is anatomic. The vertebral body heights appear normal. There is osseous fusion across C5-6 and C6-7. Prominent anterior marginal bridging osteophytes are present. No high-grade spinal canal stenosis is identified. There is uncovertebral hypertrophy and facet arthropathy at several levels, resulting in up to severe neural foraminal stenosis on the right at C3-4 and bilaterally at C4-5. The prevertebral soft tissues are unremarkable.     Impression: Nondisplaced oblique fracture involving C5 and C6 vertebral bodies. Electronically Signed: Rodríguez Mckeon MD  1/19/2025 6:54 PM EST  Workstation ID: [id number]    CT Head Without Contrast    Result Date: 1/19/2025  CT HEAD WO CONTRAST Date of Exam: 1/19/2025 6:24 PM EST Indication: Head trauma status post fall. Comparison: February 15, 2022 Technique: Axial CT images were obtained of the head without contrast administration.  Automated exposure control and iterative construction methods were used. Findings: No acute intracranial hemorrhage or extra-axial collection is identified. The ventricles appear normal in caliber, with no evidence of mass effect or midline shift. The basal cisterns appear  patent. The gray-white differentiation appears preserved. The calvarium appear intact. The paranasal sinuses are clear. The mastoid air cells are well-aerated. Scattered foci of periventricular and subcortical white matter hypodensities are nonspecific, but likely the sequela of moderate chronic small vessel ischemic disease. There is mild generalized parenchymal atrophy.     Impression: 1.No acute intracranial process or calvarial fracture identified. 2.Findings suggestive of moderate chronic small vessel ischemic disease. Electronically Signed: Rodríguez Mckeon MD  1/19/2025 6:47 PM EST  Workstation ID: [id number]    XR Chest 1 View    Result Date: 1/19/2025  XR CHEST 1 VW Date of Exam: 1/19/2025 5:59 PM EST Indication: AMS Protocol AMS Protocol Comparison: February 21, 2022 Findings: Median sternotomy wires appear intact. There are coarse bilateral sacral markings. There is no focal consolidation. The heart and mediastinal contours appear stable.     Impression: Coarse bilateral interstitial markings, which could reflect interstitial pulmonary edema or atypical pneumonia. Electronically Signed: Rodríguez Mckeon MD  1/19/2025 6:30 PM EST  Workstation ID: [id number]     Results for orders placed during the hospital encounter of 01/19/25    Duplex Venous Lower Extremity - Right CAR    Interpretation Summary    Chronic right lower extremity deep vein thrombosis noted in the proximal femoral and mid femoral.    All other right sided veins appeared normal.    Results for orders placed during the hospital encounter of 01/19/25    Adult Transthoracic Echo Complete W/ Cont if Necessary Per Protocol    Interpretation Summary    Left ventricular systolic function is normal. Estimated left ventricular EF = 50%    Left ventricular wall thickness is consistent with borderline concentric hypertrophy.    Mild mitral valve regurgitation is present.    Discharge Details        Discharge Medications        New Medications         Instructions Start Date   amLODIPine 5 MG tablet  Commonly known as: NORVASC   5 mg, Oral, Every 24 Hours Scheduled   Start Date: January 29, 2025     aspirin 81 MG chewable tablet   81 mg, Oral, Daily   Start Date: January 29, 2025     folic acid 1 MG tablet  Commonly known as: FOLVITE   1 mg, Oral, Daily   Start Date: January 29, 2025     multivitamin tablet tablet   1 tablet, Oral, Daily   Start Date: January 29, 2025     thiamine 100 MG tablet  Commonly known as: VITAMIN B1   100 mg, Oral, Daily   Start Date: January 29, 2025            Changes to Medications        Instructions Start Date   acetaminophen 325 MG tablet  Commonly known as: TYLENOL  What changed: reasons to take this   650 mg, Oral, As Needed      carvedilol 3.125 MG tablet  Commonly known as: COREG  What changed:   medication strength  how much to take  when to take this   3.125 mg, Oral, Every 12 Hours Scheduled      omeprazole 20 MG capsule  Commonly known as: priLOSEC  What changed: when to take this   20 mg, Oral, Every Morning Before Breakfast             Continue These Medications        Instructions Start Date   bumetanide 1 MG tablet  Commonly known as: BUMEX   1 mg, As Needed      glucose blood test strip   Use to check blood sugar twice daily      Microlet Lancets misc   Use to check blood sugar twice daily      rosuvastatin 20 MG tablet  Commonly known as: CRESTOR   20 mg, Oral, Nightly      sildenafil 100 MG tablet  Commonly known as: Viagra   100 mg, Oral, Daily PRN             Stop These Medications      HYDROcodone-acetaminophen 5-325 MG per tablet  Commonly known as: NORCO     warfarin 5 MG tablet  Commonly known as: COUMADIN            Allergies   Allergen Reactions    Penicillins Hives and Swelling     Per patient, has tolerated Keflex     Discharge Disposition:Skilled Nursing Facility (DC - External)    Diet:  Diet Instructions       Diet: Regular/House Diet, Cardiac Diets; Healthy Heart (2-3 Na+); Regular (IDDSI 7); Nectar  Thick; No Mixed Consistencies      Discharge Diet:  Regular/House Diet  Cardiac Diets       Cardiac Diet: Healthy Heart (2-3 Na+)    Texture: Regular (IDDSI 7)    Fluid Consistency: Nectar Thick    Diet Modifiers / Additional Diets: No Mixed Consistencies           Activity:  Activity Instructions       Activity as Tolerated      Other Activity Instructions      C-collar on at all times except for eating/bathing x 6-8 weeks    Up WIth Assist            CODE STATUS:    Code Status and Medical Interventions: CPR (Attempt to Resuscitate); Full Support   Ordered at: 01/19/25 1465     Level Of Support Discussed With:    Patient     Code Status (Patient has no pulse and is not breathing):    CPR (Attempt to Resuscitate)     Medical Interventions (Patient has pulse or is breathing):    Full Support     Future Appointments   Date Time Provider Department Center   2/17/2025 10:30 AM Heather Blake PA-C [facility]   2/28/2025  1:30 PM Alesha Parra APRN [facility]   3/17/2025  2:30 PM Jaya Renteria MD [facility]   7/7/2025 11:45 AM Avelino Hernandez MD [facility]   7/15/2025 11:15 AM Radu Francis MD [facility]     Additional Instructions for the Follow-ups that You Need to Schedule       Ambulatory Referral to Neurology   As directed      4 weeks    Discharge Follow-up with Specified Provider: David in 1-2 months   As directed      To: David in 1-2 months        Discharge Follow-up with Specified Provider: Carson in 3 weeks (around 2/20)   As directed      To: Carson in 3 weeks (around 2/20)        XR Spine Cervical 2 or 3 View   Feb 20, 2025      Exam reason: cervical fracture f/u   Release to patient: Routine Release              Sheba Martinez PA-C  01/28/25    Time Spent on Discharge:  I spent 35 minutes on this discharge activity which included: face-to-face encounter with the patient, reviewing the data in the system, coordination of the care with the nursing staff  as well as consultants, documentation, and entering orders.               Electronically signed by Sheba Martinez PA-C at 01/28/25 0957

## 2025-01-28 NOTE — MBS/VFSS/FEES
Acute Care - Speech Language Pathology   Swallow Re-Evaluation and Treatment Note  Jacob  Fiberoptic Endoscopic Evaluation of Swallowing (FEES)     Patient Name: Toño Alcala  : 1940  MRN: [id number]  Today's Date: 2025               Admit Date: 2025    Visit Dx:     ICD-10-CM ICD-9-CM   1. Generalized weakness  R53.1 780.79   2. Fall, initial encounter  W19.XXXA E888.9   3. Injury of head, initial encounter  S09.90XA 959.01   4. Acute congestive heart failure, unspecified heart failure type  I50.9 428.0   5. Elevated troponin  R79.89 790.6   6. Closed nondisplaced fracture of fifth cervical vertebra, unspecified fracture morphology, initial encounter  S12.401A 805.05   7. Closed nondisplaced fracture of sixth cervical vertebra, unspecified fracture morphology, initial encounter  S12.501A 805.06   8. Elevated CK  R74.8 790.5   9. Oropharyngeal dysphagia  R13.12 787.22   10. Other right-sided nontraumatic intracerebral hemorrhage  I61.8 431   11. Cognitive communication deficit  R41.841 799.52     Patient Active Problem List   Diagnosis    CAD (coronary artery disease)    DVT (deep venous thrombosis)    Diabetes mellitus    Dyslipidemia    GERD (gastroesophageal reflux disease)    Hyperlipidemia LDL goal <70    Diabetic nephropathy associated with type 2 diabetes mellitus    Diabetic polyneuropathy associated with type 2 diabetes mellitus    Chronic kidney disease, stage IV (severe)    Vitamin D deficiency    Disc disorder of cervical region    Lumbosacral spondylosis without myelopathy    Arthritis of elbow    Acute right-sided low back pain without sciatica    Unifocal PVCs    Essential hypertension    Stage 3 chronic kidney disease due to benign hypertension    BMI 30.0-30.9,adult    Fall    C5 cervical fracture    C6 cervical fracture    Multiple fractures of cervical spine    NSTEMI, initial episode of care    History of pulmonary embolus (PE)/DVT    Acute on chronic diastolic CHF  (congestive heart failure)    ICH (intracerebral hemorrhage)     Past Medical History:   Diagnosis Date    Acute diverticulitis 01/29/2020    Allergic 60 yrs ago    Arthritis     Arthritis of neck Fusion 1992    Bilateral radial fractures 1962    fracture bilateral radial heads    CAD (coronary artery disease)     Calculus of gallbladder without cholecystitis without obstruction 01/29/2020    Cataract     Cervical disc disorder Fusion 1992    Cholelithiasis     Chronic kidney disease 2020    Clotting disorder     CVD (cardiovascular disease)     History of TIA 1989    Diabetes mellitus     DVT (deep venous thrombosis)     Right lower extremity DVT and pulmonary embolism in 06/2015, idiopathic    DVT of proximal lower limb 06/22/2015    proximal DVT right leg, small PE- anticoagulation    Dyslipidemia     Erectile dysfunction     Fracture 1952    H/o pf left forearm fracture    Fracture of right hand 1980    Fracture of wrist 1980    GERD (gastroesophageal reflux disease)     with hiatal hernia    HL (hearing loss)     Hx of angina pectoris     Hypertension     Normal renal angiography 02/16/11    Knee swelling Several years ago    Left wrist fracture 1953    Lumbosacral disc disease 1996    Osgood-Schlatter's disease 1955    Osteoarthritis     Right wrist fracture     Vertigo     Wears glasses      Past Surgical History:   Procedure Laterality Date    APPENDECTOMY  1957    BACK SURGERY      CARDIAC CATHETERIZATION  2011    with vein graft    CATARACT EXTRACTION Left     CERVICAL FUSION      CERVICAL FUSION  1992    C3-4    COLONOSCOPY  2013    CORONARY ARTERY BYPASS GRAFT  1994    HERNIA REPAIR Right 2011    inguinal    INGUINAL HERNIA REPAIR Left 10/29/2019    Procedure: INGUINAL HERNIA REPAIR LEFT;  Surgeon: Charisma Raines MD;  Location:  [facility];  Service: General    INTERVENTIONAL RADIOLOGY PROCEDURE N/A 1/23/2025    Procedure: IVC Filter Placement;  Surgeon: Avelino Hernandez MD;  Location:  [facility];  Service: Cardiovascular;  Laterality: N/A;    LUMBAR LAMINECTOMY  1996    laminectomy, lumbar, L3    NECK SURGERY  1992    OTHER SURGICAL HISTORY      wrist surgery    SPINE SURGERY  1996    TONSILLECTOMY AND ADENOIDECTOMY  1945    TRIGGER POINT INJECTION  2001 - 2007    VASECTOMY  1972       SLP Recommendation and Plan  SLP Swallowing Diagnosis: mild, pharyngeal dysphagia (01/28/25 0800)  SLP Diet Recommendation: regular textures, thin liquids (01/28/25 0800)  Recommended Precautions and Strategies: upright posture during/after eating, other (see comments) (cue pt to clear throat a few times at end of meal/intake as additional aspiration precaution.) (01/28/25 0800)  SLP Rec. for Method of Medication Administration: meds whole, with thin liquids, with puree, as tolerated (01/28/25 0800)     Monitor for Signs of Aspiration: notify SLP if any concerns (01/28/25 0800)     Swallow Criteria for Skilled Therapeutic Interventions Met: demonstrates skilled criteria (01/28/25 0800)  Anticipated Discharge Disposition (SLP): inpatient rehabilitation facility (01/28/25 0800)  Rehab Potential/Prognosis, Swallowing: good, to achieve stated therapy goals (01/28/25 0800)  Therapy Frequency (Swallow): 5 days per week (01/28/25 0800)  Predicted Duration Therapy Intervention (Days): 1 week (01/28/25 0800)  Oral Care Recommendations: Oral Care BID/PRN, Toothbrush (01/28/25 0800)        Daily Summary of Progress (SLP): progress toward functional goals is good (01/28/25 0800)               Treatment Assessment (SLP): continued, cognitive-linguistic disorder (01/28/25 0800)     Plan for Continued Treatment (SLP): continue treatment per plan of care (01/28/25 0800)         Progress: improving      SWALLOW EVALUATION (Last 72 Hours)       SLP Adult Swallow Evaluation       Row Name 01/28/25 0800 01/27/25 9804                Rehab Evaluation    Document Type re-evaluation;therapy note (daily note)  - therapy note  (daily note)  -       Subjective Information no complaints  -SM --       Patient Observations alert;cooperative  -SM --       Patient Effort good  -SM --          General Information    Current Method of Nutrition regular textures;no mixed consistencies;nectar/syrup-thick liquids  -SM --          Pain    Pretreatment Pain Rating 5/10  -SM --       Posttreatment Pain Rating 5/10  -SM --       Pain Location abdomen  -SM --       Pain Side/Orientation right  -SM --       Pre/Posttreatment Pain Comment RN aware and managing  -SM --          Pain Scale: FACES Pre/Post-Treatment    Pain: FACES Scale, Pretreatment -- 4-->hurts little more  -AC       Posttreatment Pain Rating -- 2-->hurts little bit  -       Pre/Posttreatment Pain Comment -- Reported L neck pain  -          Fiberoptic Endoscopic Evaluation of Swallowing (FEES)    Risks/Benefits Reviewed risks/benefits explained;patient;agreed to eval  -SM --       Nasal Entry right:  -SM --       Scope serial number/identification [id number]  -SM --          Anatomy and Physiology    Velopharyngeal WFL  -SM --       Base of Tongue symmetrical;range adequate  -SM --       Epiglottis WFL  -SM --       Laryngeal Function Breathing symmetrical  -SM --       Laryngeal Function Phonation symmetrical  -SM --       Laryngeal Function to Breath Hold TVF/FVF/Arytenoid;sustained closure  -SM --       Secretion Rating Scale (Russell et al. 1996) 0- normal, no visible secretions  -SM --       Secretion Description thin;clear  -SM --       Ice Chips DNA  -SM --       Spontaneous Swallow frequency adequate  -SM --       Sensory sensed scope  -SM --       Utensils Used Spoon;Straw  C-collar limiting ease with cup  -SM --       Consistencies Trialed thin liquids;pudding/puree;mixed consistency;regular textures  -SM --          FEES Interpretation    Oral Phase WFL  -SM --          Initiation of Pharyngeal Swallow    Initiation of Pharyngeal Swallow bolus in pyriform sinuses  -SM --        Pharyngeal Phase impaired pharyngeal phase of swallowing  -SM --       Penetration After the Swallow thin liquids;secondary to residue;in pyriform sinuses  -SM --       Depth of Penetration deep  -SM --       Response to Penetration Yes  -SM --       Responsed to penetration with throat clear;effective;other (see comments)  -SM --       Residue thin liquids;pyriform sinuses;secondary to reduced hyolaryngeal excursion;other (see comments)  mild  -SM --       Attempted Compensatory Maneuvers throat clear after swallow  -SM --       Successful Compensatory Maneuver Competency patient able to;demonstrate compensations;with cues  -SM --       Pharyngeal Phase, Comment No aspiration. Penetration after the swallow with thin liquids that pt spontaneously cleared with throat clear (if material neared vocal folds) of subsequent swallow. Swallow nearing functional state though suspect placement of C-collar at least partially impeding full function.  -SM --          SLP Evaluation Clinical Impression    SLP Swallowing Diagnosis mild;pharyngeal dysphagia  -SM --       Functional Impact risk of aspiration/pneumonia  -SM --       Rehab Potential/Prognosis, Swallowing good, to achieve stated therapy goals  -SM --       Swallow Criteria for Skilled Therapeutic Interventions Met demonstrates skilled criteria  -SM --          SLP Treatment Clinical Impressions    Treatment Assessment (SLP) continued;cognitive-linguistic disorder  -SM improved;oral dysphagia  -AC       Treatment Assessment Comments (SLP) -- Overall, seemingly improved. Upgraded diet texture to regular. Continue no mixed consistencies and nectar-thick liquids. Will tentatively plan for repeat FEES prior to d/c to determine if safe for further diet/liquid upgrade.  -AC       Daily Summary of Progress (SLP) progress toward functional goals is good  -SM progress toward functional goals as expected  -AC       Plan for Continued Treatment (SLP) continue treatment per plan of  care  - continue treatment per plan of care;further assessment needed (see comments)  FEES  -       Care Plan Review evaluation/treatment results reviewed;patient/other agree to care plan  - evaluation/treatment results reviewed;risks/benefits reviewed;care plan/treatment goals reviewed;current/potential barriers reviewed;patient/other agree to care plan  -       Care Plan Review, Other Participant(s) -- son  -          Recommendations    Therapy Frequency (Swallow) 5 days per week  - --       Predicted Duration Therapy Intervention (Days) 1 week  - --       SLP Diet Recommendation regular textures;thin liquids  - regular textures;no mixed consistencies;nectar thick liquids  -       Recommended Precautions and Strategies upright posture during/after eating;other (see comments)  cue pt to clear throat a few times at end of meal/intake as additional aspiration precaution.  - --       Oral Care Recommendations Oral Care BID/PRN;Toothbrush  - --       SLP Rec. for Method of Medication Administration meds whole;with thin liquids;with puree;as tolerated  - --       Monitor for Signs of Aspiration notify SLP if any concerns  - --       Anticipated Discharge Disposition (SLP) inpatient rehabilitation facility  - inpatient rehabilitation facility  -                 User Key  (r) = Recorded By, (t) = Taken By, (c) = Cosigned By      Initials Name Effective Dates     Yulia Harris, MS CCC-SLP 01/20/25 -      Lauryn Martinez, MS CCC-SLP 02/03/23 -                     EDUCATION  The patient has been educated in the following areas:   Cognitive Impairment Communication Impairment Dysphagia (Swallowing Impairment) Oral Care/Hydration Modified Diet Instruction.        SLP GOALS       Row Name 01/28/25 0800 01/27/25 1430          (LTG) Patient will demonstrate functional swallow for    Diet Texture (Demonstrate functional swallow) regular textures  - regular textures  -     Liquid  viscosity (Demonstrate functional swallow) thin liquids  -SM thin liquids  -AC     Eagle (Demonstrate functional swallow) independently (over 90% accuracy)  -SM independently (over 90% accuracy)  -AC     Time Frame (Demonstrate functional swallow) 1 week  -SM 1 week  -AC     Progress/Outcomes (Demonstrate functional swallow) -- goal revised this date  -AC        (STG) Patient will tolerate trials of    Consistencies Trialed (Tolerate trials) regular textures;thin liquids  -SM soft to chew (chopped) textures;nectar/ mildly thick liquids  -AC     Desired Outcome (Tolerate trials) without signs/symptoms of aspiration;with use of compensatory strategies (see comments)  throat clear end of meal/intake  -SM without signs/symptoms of aspiration;with adequate oral prep/transit/clearance  -AC     Eagle (Tolerate trials) with minimal cues (75-90% accuracy)  -SM with minimal cues (75-90% accuracy)  -AC     Time Frame (Tolerate trials) 1 week  -SM 1 week  -AC     Progress/Outcomes (Tolerate trials) goal revised this date  -SM goal met  -AC     Comment (Tolerate trials) -- Trialed nectar-thick liquid via straw and regular solids. Oral prep/transit/clearance seemingly WFL. No overt clinical s/sxs aspiration w/ any consistency.  -AC        (STG) Patient will tolerate therapeutic trials of    Consistencies Trialed (Tolerate therapeutic trials) -- thin liquids  -AC     Desired Outcome (Tolerate therapeutic trials) -- without signs/symptoms of aspiration;with adequate oral prep/transit/clearance  -AC     Eagle (Tolerate therapeutic trials) -- with minimal cues (75-90% accuracy)  -AC     Time Frame (Tolerate therapeutic trials) -- 1 week  -AC     Progress/Outcomes (Tolerate therapeutic trials) goal no longer appropriate  -SM goal revised this date  -AC     Comment (Tolerate therapeutic trials) -- Trialed thin liquid via ice/tsp/straw. Throat clear x1 following sequential straw drinks; otherwise, no concerns.  -AC         (STG) Lingual Strengthening Goal 1 (SLP)    Increase Tongue Back Strength -- lingual resistance exercises  -AC     Bremer/Accuracy (Lingual Strengthening Goal 1, SLP) -- with minimal cues (75-90% accuracy)  -AC     Time Frame (Lingual Strengthening Goal 1, SLP) -- 1 week  -AC     Progress/Outcomes (Lingual Strengthening Goal 1, SLP) goal met  -SM goal ongoing  -AC        (STG) Pharyngeal Strengthening Exercise Goal 1 (SLP)    Increase Timing -- prepping - 3 second prep or suck swallow or 3-step swallow  -AC     Increase Superior Movement of the Hyolaryngeal Complex -- Mendelsohn  -AC     Increase Anterior Movement of the Hyolaryngeal Complex EMST  - chin tuck against resistance (CTAR)  -AC     Increase Squeeze/Positive Pressure Generation hard effortful swallow  + tonuge press  -SM hard effortful swallow  -AC     Increase Tongue Base Retraction -- wang  -AC     Bremer/Accuracy (Pharyngeal Strengthening Goal 1, SLP) -- with minimal cues (75-90% accuracy)  -AC     Time Frame (Pharyngeal Strengthening Goal 1, SLP) -- 1 week  -AC     Progress/Outcomes (Pharyngeal Strengthening Goal 1, SLP) -- goal ongoing  -AC        Patient will demonstrate functional cognitive-linguistic skills for return to discharge environment    Bremer with minimal cues  -SM with minimal cues  -AC     Time frame 1 week  -SM 1 week  -AC     Progress/Outcomes continuing progress toward goal  - new goal  -        Memory Skills Goal 1 (SLP)    Improve Memory Skills Through Goal 1 (SLP) recalling unrelated word lists with an imposed delay;use memory strategies;80%;with minimal cues (75-90%)  - recalling unrelated word lists with an imposed delay;use memory strategies;80%;with minimal cues (75-90%)  -AC     Time Frame (Memory Skills Goal 1, SLP) 1 week  -SM 1 week  -AC     Progress/Outcomes (Memory Skills Goal 1, SLP) goal ongoing  - new goal  -        Organizational Skills Goal 1 (SLP)    Improve Thought  "Organization Through Goal 1 (SLP) completing a divergent naming task;completing mental manipulation task;80%;with minimal cues (75-90%)  -SM completing a divergent naming task;completing mental manipulation task;80%;with minimal cues (75-90%)  -AC     Time Frame (Thought Organization Skills Goal 1, SLP) 1 week  -SM 1 week  -AC     Progress (Thought Organization Skills Goal 1, SLP) 50%;with moderate cues (50-74%)  -SM --     Progress/Outcomes (Thought Organization Skills Goal 1, SLP) continuing progress toward goal  -SM new goal  -AC     Comment (Thought Organization Skills Goal 1, SLP) concrete mental manipulation  -SM --        Functional Problem Solving Skills Goal 1 (SLP)    Improve Problem Solving Through Goal 1 (SLP) determine solutions to simple ADL/safety problems;answer \"what if\" questions;sequence steps in a task;90%;with minimal cues (75-90%)  -SM --     Time Frame (Problem Solving Goal 1, SLP) 1 week  -SM --     Progress (Problem Solving Goal 1, SLP) 80%;with moderate cues (50-74%)  -SM --     Progress/Outcomes (Problem Solving Goal 1, SLP) new goal;continuing progress toward goal  - --        Executive Functional Skills Goal 1 (SLP)    Improve Executive Function Skills Goal 1 (SLP) demonstrate awareness of deficit;identify strategies, strengths, limitations;identify anticipated needs;80%;with moderate cues (50-74%)  -SM --     Time Frame (Executive Function Skills Goal 1, SLP) 1 week  -SM --     Progress/Outcomes (Executive Function Skills Goal 1, SLP) new goal  -SM --        Right Hemisphere Function Goal 1 (SLP)    Improve Right Hemisphere Function Through Goal 1 (SLP) demonstrate awareness of communication partner in left visual field;complete visuo-spatial activities (visual closure, trail making, mazes;70%;with moderate cues (50-74%)  -SM demonstrate awareness of communication partner in left visual field;complete visuo-spatial activities (visual closure, trail making, mazes;70%;with moderate cues " (50-74%)  -AC     Time Frame (Right Hemisphere Function Goal 1, SLP) 1 week  -SM 1 week  -AC     Progress (Right Hemisphere Function Goal 1, SLP) 50%;with moderate cues (50-74%)  -SM --     Progress/Outcomes (Right Hemisphere Function Goal 1, SLP) continuing progress toward goal  -SM new goal  -AC               User Key  (r) = Recorded By, (t) = Taken By, (c) = Cosigned By      Initials Name Provider Type    Yulia Haider MS CCC-SLP Speech and Language Pathologist    Lauryn Cazares MS CCC-SLP Speech and Language Pathologist                         Time Calculation:    Time Calculation- SLP       Row Name 01/28/25 1014             Time Calculation- SLP    SLP Start Time 0800  -SM      SLP Received On 01/28/25  -SM         Untimed Charges    42724-DH Fiberoptic Endo Eval Swallow Minutes 72  -SM      51000-BQ Treatment/ST Modification Prosth Aug Alter  24  -SM         Total Minutes    Untimed Charges Total Minutes 96  -SM       Total Minutes 96  -SM                User Key  (r) = Recorded By, (t) = Taken By, (c) = Cosigned By      Initials Name Provider Type    Yulia Haider MS CCC-SLP Speech and Language Pathologist                    Therapy Charges for Today       Code Description Service Date Service Provider Modifiers Qty    02329535235 HC ST FIBEROPTIC ENDO EVAL SWALL 5 1/28/2025 Yulia Harris MS CCC-SLP GN 1    52518296401 HC ST TREATMENT SPEECH 2 1/28/2025 Yulia Harris MS CCC-SLP GN 1                 Yulia Harris MS CCC-SLP  1/28/2025

## 2025-01-28 NOTE — PLAN OF CARE
Goal Outcome Evaluation:  Plan of Care Reviewed With: patient        Progress: no change  Outcome Evaluation: Pt alert, Ox4 and follows and reports R flank pain. He was educated on cervical precautions and incorporation into ADL routine, and was unable to complete LB dressing task via cross-leg technique d/t ROM limitations BLE. OT issued AE, will trial at upcoming session. He completed toileting with min assist x2 xfer and dependence post-toilet hygiene, oral care sinkside with min assist. He completed seated level visual scanning task and required verbal and tactile cues to locate any items drawn by OT on paper that were L of midline. Pt's visual perceptual are limiting his ability to self-feed, apply toothpaste on toothbruch, locate rail to left of toilet during transfer training. Pt limited with significant visual deficit, cervical precautions, decreased safety awareness, impaired balance and is performing below baseline. Pt is excellent candidate for IPR.    Anticipated Discharge Disposition (OT): inpatient rehabilitation facility

## 2025-01-28 NOTE — THERAPY TREATMENT NOTE
Patient Name: Toño Alcala  : 1940    MRN: [id number]                              Today's Date: 2025       Admit Date: 2025    Visit Dx:     ICD-10-CM ICD-9-CM   1. Generalized weakness  R53.1 780.79   2. Fall, initial encounter  W19.XXXA E888.9   3. Injury of head, initial encounter  S09.90XA 959.01   4. Acute congestive heart failure, unspecified heart failure type  I50.9 428.0   5. Elevated troponin  R79.89 790.6   6. Closed nondisplaced fracture of fifth cervical vertebra, unspecified fracture morphology, initial encounter  S12.401A 805.05   7. Closed nondisplaced fracture of sixth cervical vertebra, unspecified fracture morphology, initial encounter  S12.501A 805.06   8. Elevated CK  R74.8 790.5   9. Oropharyngeal dysphagia  R13.12 787.22   10. Other right-sided nontraumatic intracerebral hemorrhage  I61.8 431   11. Cognitive communication deficit  R41.841 799.52     Patient Active Problem List   Diagnosis    CAD (coronary artery disease)    DVT (deep venous thrombosis)    Diabetes mellitus    Dyslipidemia    GERD (gastroesophageal reflux disease)    Hyperlipidemia LDL goal <70    Diabetic nephropathy associated with type 2 diabetes mellitus    Diabetic polyneuropathy associated with type 2 diabetes mellitus    Chronic kidney disease, stage IV (severe)    Vitamin D deficiency    Disc disorder of cervical region    Lumbosacral spondylosis without myelopathy    Arthritis of elbow    Acute right-sided low back pain without sciatica    Unifocal PVCs    Essential hypertension    Stage 3 chronic kidney disease due to benign hypertension    BMI 30.0-30.9,adult    Fall    C5 cervical fracture    C6 cervical fracture    Multiple fractures of cervical spine    NSTEMI, initial episode of care    History of pulmonary embolus (PE)/DVT    Acute on chronic diastolic CHF (congestive heart failure)    ICH (intracerebral hemorrhage)     Past Medical History:   Diagnosis Date    Acute diverticulitis  01/29/2020    Allergic 60 yrs ago    Arthritis     Arthritis of neck Fusion 1992    Bilateral radial fractures 1962    fracture bilateral radial heads    CAD (coronary artery disease)     Calculus of gallbladder without cholecystitis without obstruction 01/29/2020    Cataract     Cervical disc disorder Fusion 1992    Cholelithiasis     Chronic kidney disease 2020    Clotting disorder     CVD (cardiovascular disease)     History of TIA 1989    Diabetes mellitus     DVT (deep venous thrombosis)     Right lower extremity DVT and pulmonary embolism in 06/2015, idiopathic    DVT of proximal lower limb 06/22/2015    proximal DVT right leg, small PE- anticoagulation    Dyslipidemia     Erectile dysfunction     Fracture 1952    H/o pf left forearm fracture    Fracture of right hand 1980    Fracture of wrist 1980    GERD (gastroesophageal reflux disease)     with hiatal hernia    HL (hearing loss)     Hx of angina pectoris     Hypertension     Normal renal angiography 02/16/11    Knee swelling Several years ago    Left wrist fracture 1953    Lumbosacral disc disease 1996    Osgood-Schlatter's disease 1955    Osteoarthritis     Right wrist fracture     Vertigo     Wears glasses      Past Surgical History:   Procedure Laterality Date    APPENDECTOMY  1957    BACK SURGERY      CARDIAC CATHETERIZATION  2011    with vein graft    CATARACT EXTRACTION Left     CERVICAL FUSION      CERVICAL FUSION  1992    C3-4    COLONOSCOPY  2013    CORONARY ARTERY BYPASS GRAFT  1994    HERNIA REPAIR Right 2011    inguinal    INGUINAL HERNIA REPAIR Left 10/29/2019    Procedure: INGUINAL HERNIA REPAIR LEFT;  Surgeon: Charisma Raines MD;  Location:  [facility];  Service: General    INTERVENTIONAL RADIOLOGY PROCEDURE N/A 1/23/2025    Procedure: IVC Filter Placement;  Surgeon: Avelino Hernandez MD;  Location:  [facility];  Service: Cardiovascular;  Laterality: N/A;    LUMBAR LAMINECTOMY  1996    laminectomy, lumbar, L3    NECK  SURGERY  1992    OTHER SURGICAL HISTORY      wrist surgery    SPINE SURGERY  1996    TONSILLECTOMY AND ADENOIDECTOMY  1945    TRIGGER POINT INJECTION  2001 - 2007    VASECTOMY  1972      General Information       Row Name 01/28/25 1105          Physical Therapy Time and Intention    Document Type therapy note (daily note)  -AB     Mode of Treatment physical therapy  -AB       Row Name 01/28/25 1105          General Information    Patient Profile Reviewed yes  -AB     Existing Precautions/Restrictions fall;cervical collar;brace on at all times;other (see comments)  L visual field cut  -AB     Barriers to Rehab visual deficit;medically complex  -AB       Row Name 01/28/25 1105          Cognition    Orientation Status (Cognition) oriented x 4  -AB       Row Name 01/28/25 1105          Safety Issues/Impairments Affecting Functional Mobility    Safety Issues Affecting Function (Mobility) awareness of need for assistance;insight into deficits/self-awareness;safety precaution awareness;safety precautions follow-through/compliance  -AB     Impairments Affecting Function (Mobility) balance;cognition;coordination;endurance/activity tolerance;postural/trunk control;pain;strength;visual/perceptual;range of motion (ROM)  -AB     Cognitive Impairments, Mobility Safety/Performance awareness, need for assistance;insight into deficits/self-awareness;safety precaution awareness;safety precaution follow-through  -AB               User Key  (r) = Recorded By, (t) = Taken By, (c) = Cosigned By      Initials Name Provider Type    AB Libra Flores PT Physical Therapist                   Mobility       Row Name 01/28/25 1107          Bed Mobility    Comment, (Bed Mobility) received and left UI  -AB       Row Name 01/28/25 1107          Transfers    Comment, (Transfers) Cues for attention to L side while navigating room.  -AB       Row Name 01/28/25 1107          Sit-Stand Transfer    Sit-Stand [place] (Transfers) contact guard;1  person assist;verbal cues  -AB     Assistive Device (Sit-Stand Transfers) walker, front-wheeled  -AB     Comment, (Sit-Stand Transfer) uses momentum for transfer into standing. Appropriate hand placement noted.  -AB       Row Name 01/28/25 1107          Gait/Stairs (Locomotion)    [place] (Gait) contact guard;1 person assist;verbal cues  -AB     Assistive Device (Gait) walker, front-wheeled  -AB     Patient was able to Ambulate yes  -AB     Distance in Feet (Gait) 150  -AB     Deviations/Abnormal Patterns (Gait) bilateral deviations;maddison decreased;gait speed decreased;festinating/shuffling;stride length decreased  -AB     Bilateral Gait Deviations forward flexed posture;heel strike decreased  -AB     Comment, (Gait/Stairs) Pt ambulated with step through gait pattern at slowed pace. Cues provided for environmental scanning, upright posture, and improved heel strike. No overt LOB or knee buckling. Gait mechanics improved with cues. Further activity limited by weakness and fatigue.  -AB               User Key  (r) = Recorded By, (t) = Taken By, (c) = Cosigned By      Initials Name Provider Type    AB Libra Flores, PT Physical Therapist                   Obj/Interventions       Row Name 01/28/25 1114          Motor Skills    Therapeutic Exercise hip;knee;ankle  -AB       Row Name 01/28/25 1114          Hip (Therapeutic Exercise)    Hip (Therapeutic Exercise) isometric exercises  -AB     Hip Isometrics (Therapeutic Exercise) bilateral;gluteal sets;10 repetitions  -AB       Row Name 01/28/25 1114          Knee (Therapeutic Exercise)    Knee (Therapeutic Exercise) isometric exercises;strengthening exercise  -AB     Knee Isometrics (Therapeutic Exercise) bilateral;quad sets;10 repetitions  -AB     Knee Strengthening (Therapeutic Exercise) bilateral;LAQ (long arc quad);SLR (straight leg raise);10 repetitions  -AB       Row Name 01/28/25 1114          Ankle (Therapeutic Exercise)    Ankle AROM (Therapeutic  Exercise) bilateral;dorsiflexion;plantarflexion;10 repetitions  -AB       Row Name 01/28/25 1114          Balance    Balance Assessment sitting static balance;sitting dynamic balance;standing static balance;standing dynamic balance  -AB     Static Sitting Balance supervision  -AB     Dynamic Sitting Balance supervision  -AB     Position, Sitting Balance unsupported;sitting in chair  -AB     Static Standing Balance contact guard  -AB     Dynamic Standing Balance contact guard;1-person assist;verbal cues  -AB     Position/Device Used, Standing Balance supported;walker, front-wheeled  -AB     Balance Interventions sitting;standing;sit to stand;supported;static;dynamic;occupation based/functional task  -AB     Comment, Balance No overt LOB. At risk of running into objects.  -AB               User Key  (r) = Recorded By, (t) = Taken By, (c) = Cosigned By      Initials Name Provider Type    AB Libra Flores, PT Physical Therapist                   Goals/Plan    No documentation.                  Clinical Impression       Row Name 01/28/25 1122          Pain    Pretreatment Pain Rating 5/10  -AB     Posttreatment Pain Rating 5/10  -AB     Pain Location flank  -AB     Pain Side/Orientation right  -AB     Pain Management Interventions activity modification encouraged;exercise or physical activity utilized;positioning techniques utilized  -AB     Response to Pain Interventions activity participation with tolerable pain  -AB       Row Name 01/28/25 1122          Plan of Care Review    Plan of Care Reviewed With patient  -AB     Progress no change  -AB     Outcome Evaluation Pt continues to present below baseline with L sided visual deficits, impaired endurance, decreased safety awareness, and instability. Pt ambulated in hallway with x1 assist and RW. Constant cues required for visual scanning towards L side. Further IPPT is warrented. PT will progress as able per POC.  -AB       Row Name 01/28/25 1122          Vital Signs     Pre Systolic BP Rehab 104  -AB     Pre Treatment Diastolic BP 52  -AB     Post Systolic BP Rehab 133  -AB     Post Treatment Diastolic BP 71  -AB     Pre SpO2 (%) 96  -AB     O2 Delivery Pre Treatment room air  -AB     O2 Delivery Intra Treatment room air  -AB     Post SpO2 (%) 96  -AB     O2 Delivery Post Treatment room air  -AB     Pre Patient Position Sitting  -AB     Intra Patient Position Standing  -AB     Post Patient Position Sitting  -AB       Row Name 01/28/25 1122          Positioning and Restraints    Pre-Treatment Position sitting in chair/recliner  -AB     Post Treatment Position chair  -AB     In Chair notified nsg;reclined;sitting;call light within reach;encouraged to call for assist;exit alarm on;legs elevated;waffle cushion  -AB               User Key  (r) = Recorded By, (t) = Taken By, (c) = Cosigned By      Initials Name Provider Type    AB Libra Flores, PT Physical Therapist                   Outcome Measures       Row Name 01/28/25 1130 01/28/25 0731       How much help from another person do you currently need...    Turning from your back to your side while in flat bed without using bedrails? 3  -AB 3  -LB    Moving from lying on back to sitting on the side of a flat bed without bedrails? 3  -AB 3  -LB    Moving to and from a bed to a chair (including a wheelchair)? 3  -AB 3  -LB    Standing up from a chair using your arms (e.g., wheelchair, bedside chair)? 3  -AB 3  -LB    Climbing 3-5 steps with a railing? 2  -AB 2  -LB    To walk in hospital room? 3  -AB 3  -LB    AM-PAC 6 Clicks Score (PT) 17  -AB 17  -LB    Highest Level of Mobility Goal 5 --> Static standing  -AB 5 --> Static standing  -LB      Row Name 01/28/25 1130 01/28/25 0928       Functional Assessment    Outcome Measure Options AM-PAC 6 Clicks Basic Mobility (PT)  -AB AM-PAC 6 Clicks Daily Activity (OT)  -AR              User Key  (r) = Recorded By, (t) = Taken By, (c) = Cosigned By      Initials Name Provider Type    AR  Zelda Mckeon, OT Occupational Therapist    Marsha Cerda RN Registered Nurse    Libra Dudley, PT Physical Therapist                                 Physical Therapy Education       Title: PT OT SLP Therapies (In Progress)       Topic: Physical Therapy (In Progress)       Point: Mobility training (In Progress)       Learning Progress Summary            Patient Acceptance, E,D, VU,NR by AB at 1/28/2025 1130    Acceptance, E, NR by CK at 1/27/2025 1607    Acceptance, E,D, NR by CT at 1/26/2025 1356    Acceptance, E, VU,NR by LS at 1/25/2025 1005    Acceptance, E, NR by KG at 1/23/2025 0913    Acceptance, E, NR by ND at 1/20/2025 1600   Family Acceptance, E, NR by CK at 1/27/2025 1607    Acceptance, E,D, NR by CT at 1/26/2025 1356                      Point: Home exercise program (In Progress)       Learning Progress Summary            Patient Acceptance, E,D, VU,NR by AB at 1/28/2025 1130    Acceptance, E, NR by CK at 1/27/2025 1607    Acceptance, E,D, NR by CT at 1/26/2025 1356    Acceptance, E, NR by KG at 1/23/2025 0913   Family Acceptance, E, NR by CK at 1/27/2025 1607    Acceptance, E,D, NR by CT at 1/26/2025 1356                      Point: Body mechanics (In Progress)       Learning Progress Summary            Patient Acceptance, E,D, VU,NR by AB at 1/28/2025 1130    Acceptance, E, NR by CK at 1/27/2025 1607    Acceptance, E,D, NR by CT at 1/26/2025 1356    Acceptance, E, NR by KG at 1/23/2025 0913    Acceptance, E, NR by ND at 1/20/2025 1600   Family Acceptance, E, NR by CK at 1/27/2025 1607    Acceptance, E,D, NR by CT at 1/26/2025 1356                      Point: Precautions (In Progress)       Learning Progress Summary            Patient Acceptance, E,D, VU,NR by AB at 1/28/2025 1130    Acceptance, E, NR by CK at 1/27/2025 1607    Acceptance, E,D, NR by CT at 1/26/2025 1356    Acceptance, E, NR by KG at 1/23/2025 0913    Acceptance, E, NR by ND at 1/20/2025 1600   Family Acceptance, E, NR  by CK at 1/27/2025 1607    Acceptance, E,D, NR by CT at 1/26/2025 1356                                      User Key       Initials Effective Dates Name Provider Type Discipline    LS 07/11/23 -  Marsha Rangel, PT Physical Therapist PT    CT 07/07/23 -  Paul Marin, PT Physical Therapist PT    KG 05/22/20 -  Emy Bates, PT Physical Therapist PT    AB 09/22/22 -  Libra Flores, PT Physical Therapist PT    CK 02/06/24 -  Gauri Amador, PT Physical Therapist PT    ND 11/16/23 -  Hannah Blanton, PT Physical Therapist PT                  PT Recommendation and Plan     Progress: no change  Outcome Evaluation: Pt continues to present below baseline with L sided visual deficits, impaired endurance, decreased safety awareness, and instability. Pt ambulated in hallway with x1 assist and RW. Constant cues required for visual scanning towards L side. Further IPPT is warrented. PT will progress as able per POC.     Time Calculation:         PT Charges       Row Name 01/28/25 1131             Time Calculation    Start Time 1032  -AB      PT Received On 01/28/25  -AB         Timed Charges    56005 - PT Therapeutic Exercise Minutes 10  -AB      52399 - Gait Training Minutes  8  -AB      70709 - PT Therapeutic Activity Minutes 5  -AB         Total Minutes    Timed Charges Total Minutes 23  -AB       Total Minutes 23  -AB                User Key  (r) = Recorded By, (t) = Taken By, (c) = Cosigned By      Initials Name Provider Type    AB Libra Flores, PT Physical Therapist                  Therapy Charges for Today       Code Description Service Date Service Provider Modifiers Qty    63258314309 HC PT THER PROC EA 15 MIN 1/28/2025 Libra Flores, PT GP 1    38381951785 HC GAIT TRAINING EA 15 MIN 1/28/2025 Libra Flores, PT GP 1            PT G-Codes  Outcome Measure Options: AM-PAC 6 Clicks Basic Mobility (PT)  AM-PAC 6 Clicks Score (PT): 17  AM-PAC 6 Clicks Score (OT): 13  Modified  Karval Scale: 4 - Moderately severe disability.  Unable to walk without assistance, and unable to attend to own bodily needs without assistance.  PT Discharge Summary  Anticipated Discharge Disposition (PT): inpatient rehabilitation facility    Libra Flores, PT  1/28/2025

## 2025-01-28 NOTE — PLAN OF CARE
Goal Outcome Evaluation:  Plan of Care Reviewed With: patient        Progress: improving       Anticipated Discharge Disposition (SLP): inpatient rehabilitation facility          SLP Swallowing Diagnosis: mild, pharyngeal dysphagia (01/28/25 0800)  Treatment Assessment (SLP): continued, cognitive-linguistic disorder (01/28/25 0800)     Plan for Continued Treatment (SLP): continue treatment per plan of care (01/28/25 0800)

## 2025-01-28 NOTE — THERAPY TREATMENT NOTE
Patient Name: Toño Alcala  : 1940    MRN: [id number]                              Today's Date: 2025       Admit Date: 2025    Visit Dx:     ICD-10-CM ICD-9-CM   1. Generalized weakness  R53.1 780.79   2. Fall, initial encounter  W19.XXXA E888.9   3. Injury of head, initial encounter  S09.90XA 959.01   4. Acute congestive heart failure, unspecified heart failure type  I50.9 428.0   5. Elevated troponin  R79.89 790.6   6. Closed nondisplaced fracture of fifth cervical vertebra, unspecified fracture morphology, initial encounter  S12.401A 805.05   7. Closed nondisplaced fracture of sixth cervical vertebra, unspecified fracture morphology, initial encounter  S12.501A 805.06   8. Elevated CK  R74.8 790.5   9. Oropharyngeal dysphagia  R13.12 787.22   10. Other right-sided nontraumatic intracerebral hemorrhage  I61.8 431   11. Cognitive communication deficit  R41.841 799.52     Patient Active Problem List   Diagnosis    CAD (coronary artery disease)    DVT (deep venous thrombosis)    Diabetes mellitus    Dyslipidemia    GERD (gastroesophageal reflux disease)    Hyperlipidemia LDL goal <70    Diabetic nephropathy associated with type 2 diabetes mellitus    Diabetic polyneuropathy associated with type 2 diabetes mellitus    Chronic kidney disease, stage IV (severe)    Vitamin D deficiency    Disc disorder of cervical region    Lumbosacral spondylosis without myelopathy    Arthritis of elbow    Acute right-sided low back pain without sciatica    Unifocal PVCs    Essential hypertension    Stage 3 chronic kidney disease due to benign hypertension    BMI 30.0-30.9,adult    Fall    Alcohol dependence with unspecified alcohol-induced disorder    C5 cervical fracture    C6 cervical fracture    Multiple fractures of cervical spine    NSTEMI, initial episode of care    History of pulmonary embolus (PE)/DVT    Acute on chronic diastolic CHF (congestive heart failure)    Syncope and collapse    ICH (intracerebral  hemorrhage)     Past Medical History:   Diagnosis Date    Acute diverticulitis 01/29/2020    Allergic 60 yrs ago    Arthritis     Arthritis of neck Fusion 1992    Bilateral radial fractures 1962    fracture bilateral radial heads    CAD (coronary artery disease)     Calculus of gallbladder without cholecystitis without obstruction 01/29/2020    Cataract     Cervical disc disorder Fusion 1992    Cholelithiasis     Chronic kidney disease 2020    Clotting disorder     CVD (cardiovascular disease)     History of TIA 1989    Diabetes mellitus     DVT (deep venous thrombosis)     Right lower extremity DVT and pulmonary embolism in 06/2015, idiopathic    DVT of proximal lower limb 06/22/2015    proximal DVT right leg, small PE- anticoagulation    Dyslipidemia     Erectile dysfunction     Fracture 1952    H/o pf left forearm fracture    Fracture of right hand 1980    Fracture of wrist 1980    GERD (gastroesophageal reflux disease)     with hiatal hernia    HL (hearing loss)     Hx of angina pectoris     Hypertension     Normal renal angiography 02/16/11    Knee swelling Several years ago    Left wrist fracture 1953    Lumbosacral disc disease 1996    Osgood-Schlatter's disease 1955    Osteoarthritis     Right wrist fracture     Vertigo     Wears glasses      Past Surgical History:   Procedure Laterality Date    APPENDECTOMY  1957    BACK SURGERY      CARDIAC CATHETERIZATION  2011    with vein graft    CATARACT EXTRACTION Left     CERVICAL FUSION      CERVICAL FUSION  1992    C3-4    COLONOSCOPY  2013    CORONARY ARTERY BYPASS GRAFT  1994    HERNIA REPAIR Right 2011    inguinal    INGUINAL HERNIA REPAIR Left 10/29/2019    Procedure: INGUINAL HERNIA REPAIR LEFT;  Surgeon: Charisma Raines MD;  Location:  [facility];  Service: General    INTERVENTIONAL RADIOLOGY PROCEDURE N/A 1/23/2025    Procedure: IVC Filter Placement;  Surgeon: Avelino Hernandez MD;  Location:  [facility];  Service: Cardiovascular;   Laterality: N/A;    LUMBAR LAMINECTOMY  1996    laminectomy, lumbar, L3    NECK SURGERY  1992    OTHER SURGICAL HISTORY      wrist surgery    SPINE SURGERY  1996    TONSILLECTOMY AND ADENOIDECTOMY  1945    TRIGGER POINT INJECTION  2001 - 2007    VASECTOMY  1972      General Information       Row Name 01/28/25 0913          OT Time and Intention    Document Type therapy note (daily note)  -AR     Mode of Treatment individual therapy;occupational therapy  -AR       Row Name 01/28/25 0913          General Information    Existing Precautions/Restrictions fall;cervical collar;brace on at all times;other (see comments)  L visual field cut  -AR     Barriers to Rehab visual deficit  -AR       Row Name 01/28/25 0913          Cognition    Orientation Status (Cognition) oriented x 4  -AR       Row Name 01/28/25 0913          Safety Issues/Impairments Affecting Functional Mobility    Safety Issues Affecting Function (Mobility) awareness of need for assistance;insight into deficits/self-awareness;safety precaution awareness;safety precautions follow-through/compliance  -AR     Impairments Affecting Function (Mobility) balance;cognition;coordination;endurance/activity tolerance;postural/trunk control;pain;strength;visual/perceptual;range of motion (ROM)  -AR               User Key  (r) = Recorded By, (t) = Taken By, (c) = Cosigned By      Initials Name Provider Type    AR Zelda Mckeon, OT Occupational Therapist                     Mobility/ADL's       Row Name 01/28/25 0914          Transfers    Transfers sit-stand transfer;stand-sit transfer;toilet transfer  -AR     Comment, (Transfers) Pt cues for hand placement on rail to L of midline while toileting and to B handrails on chair. Pt unable to locate despite verbal and tactile cues d/t visual deficit.  -AR       Row Name 01/28/25 0914          Sit-Stand Transfer    Sit-Stand [place] (Transfers) verbal cues;contact guard  -AR     Assistive Device (Sit-Stand Transfers)  walker, front-wheeled  -AR       Row Name 01/28/25 0914          Stand-Sit Transfer    Stand-Sit [place] (Transfers) minimum assist (75% patient effort);2 person assist;verbal cues  -AR     Assistive Device (Stand-Sit Transfers) walker, front-wheeled  -AR       Row Name 01/28/25 0914          Toilet Transfer    Type (Toilet Transfer) sit-stand;stand-sit  -AR     [place] Level (Toilet Transfer) minimum assist (75% patient effort);2 person assist;verbal cues  -AR     Assistive Device (Toilet Transfer) walker, front-wheeled  -AR       Row Name 01/28/25 0914          Functional Mobility    Functional Mobility- Ind. Level contact guard assist;verbal cues required  -AR     Functional Mobility- Device walker, front-wheeled  -AR       Row Name 01/28/25 0914          Activities of Daily Living    BADL Assessment/Intervention upper body dressing;lower body dressing;grooming;toileting;bathing  -AR       Row Name 01/28/25 0914          Upper Body Dressing Assessment/Training    [place] Level (Upper Body Dressing) doff;pajama/robe;moderate assist (50% patient effort)  -AR     Position (Upper Body Dressing) unsupported sitting  -AR       Row Name 01/28/25 0914          Toileting Assessment/Training    [place] Level (Toileting) adjust/manage clothing;maximum assist (25% patient effort);perform perineal hygiene;dependent (less than 25% patient effort)  -AR     Assistive Devices (Toileting) raised toilet seat;grab bar/safety frame  -AR     Position (Toileting) supported standing  -AR       Row Name 01/28/25 0914          Lower Body Dressing Assessment/Training    Comment, (Lower Body Dressing) Educated pt on cervical precautios and use of cross-leg technique. Pt required max assist don sock using this technique. Issued reacher, shoe horn and sock aide to assist. Will trial at upcoming session.  -AR       Row Name 01/28/25 0914          Grooming Assessment/Training    [place] Level (Grooming) wash face,  hands;contact guard assist;oral care regimen;minimum assist (75% patient effort)  -AR     Position (Grooming) sink side;supported standing  -AR     Comment, (Grooming) Pt required verbal cues for safe placement of walker at sinkside. Mild LOB instances noted when pt had BUE off walker for integration during oral care. He needed physical assist to place toothpaste on brush d/t visual deficit.  -AR       Row Name 01/28/25 0914          Bathing Assessment/Intervention    Comment, (Bathing) Issued LH sponge to assist  -AR               User Key  (r) = Recorded By, (t) = Taken By, (c) = Cosigned By      Initials Name Provider Type    Zelda Fabian OT Occupational Therapist                   Obj/Interventions       Row Name 01/28/25 0919          Sensory Assessment (Somatosensory)    Sensory Assessment (Somatosensory) UE sensation intact  -AR       Row Name 01/28/25 0919          Vision Assessment/Intervention    Visual Impairment/Limitations peripheral vision impaired left  -AR     Visual Processing Deficit jacob-inattention/neglect, left  -AR       Row Name 01/28/25 0919          Balance    Balance Assessment sitting static balance;sitting dynamic balance;standing static balance;standing dynamic balance  -AR     Static Sitting Balance supervision  -AR     Dynamic Sitting Balance supervision  -AR     Position, Sitting Balance supported;sitting in chair  -AR     Static Standing Balance contact guard  -AR     Dynamic Standing Balance minimal assist;2-person assist  -AR     Position/Device Used, Standing Balance supported;walker, rolling  -AR               User Key  (r) = Recorded By, (t) = Taken By, (c) = Cosigned By      Initials Name Provider Type    Zelda Fabian OT Occupational Therapist                   Goals/Plan       Row Name 01/28/25 0927          Transfer Goal 1 (OT)    Progress/Outcome (Transfer Goal 1, OT) goal ongoing  -AR       Row Name 01/28/25 0927          Dressing Goal 1 (OT)     Progress/Outcome (Dressing Goal 1, OT) goal ongoing  -AR       Row Name 01/28/25 0927          Grooming Goal 1 (OT)    Progress/Outcome (Grooming Goal 1, OT) goal ongoing  -AR               User Key  (r) = Recorded By, (t) = Taken By, (c) = Cosigned By      Initials Name Provider Type    AR Zelda Mckeon, OT Occupational Therapist                   Clinical Impression       Row Name 01/28/25 0920          Pain Assessment    Pain Location flank  -AR     Pain Side/Orientation right  -AR     Pain Management Interventions activity modification encouraged;nursing notified  -AR     Response to Pain Interventions activity participation with tolerable pain  -AR       Row Name 01/28/25 0920          Pain Scale: FACES Pre/Post-Treatment    Pain: FACES Scale, Pretreatment 4-->hurts little more  -AR     Posttreatment Pain Rating 4-->hurts little more  -AR       Row Name 01/28/25 0920          Plan of Care Review    Plan of Care Reviewed With patient  -AR     Progress no change  -AR     Outcome Evaluation Pt alert, Ox4 and follows and reports R flank pain. He was educated on cervical precautions and incorporation into ADL routine, and was unable to complete LB dressing task via cross-leg technique d/t ROM limitations BLE. OT issued AE, will trial at upcoming session. He completed toileting with min assist x2 xfer and dependence post-toilet hygiene, oral care sinkside with min assist. He completed seated level visual scanning task and required verbal and tactile cues to locate any items drawn by OT on paper that were L of midline. Pt's visual perceptual are limiting his ability to self-feed, apply toothpaste on toothbruch, locate rail to left of toilet during transfer training. Pt limited with significant visual deficit, cervical precautions, decreased safety awareness, impaired balance and is performing below baseline. Pt is excellent candidate for IPR.  -AR       Row Name 01/28/25 0920          Therapy Plan  Review/Discharge Plan (OT)    Anticipated Discharge Disposition (OT) inpatient rehabilitation facility  -AR       Row Name 01/28/25 0920          Vital Signs    Pre Patient Position Sitting  -AR     Intra Patient Position Standing  -AR     Post Patient Position Sitting  -AR       Row Name 01/28/25 0920          Positioning and Restraints    Pre-Treatment Position sitting in chair/recliner  -AR     Post Treatment Position chair  -AR     In Chair reclined;call light within reach;encouraged to call for assist;exit alarm on;with SLP;waffle cushion;legs elevated;with brace  -AR               User Key  (r) = Recorded By, (t) = Taken By, (c) = Cosigned By      Initials Name Provider Type    Zelda Fabian OT Occupational Therapist                   Outcome Measures       Row Name 01/28/25 0928          How much help from another is currently needed...    Putting on and taking off regular lower body clothing? 2  -AR     Bathing (including washing, rinsing, and drying) 2  -AR     Toileting (which includes using toilet bed pan or urinal) 1  -AR     Putting on and taking off regular upper body clothing 2  -AR     Taking care of personal grooming (such as brushing teeth) 3  -AR     Eating meals 3  -AR     AM-PAC 6 Clicks Score (OT) 13  -AR       Row Name 01/28/25 0928          Functional Assessment    Outcome Measure Options AM-PAC 6 Clicks Daily Activity (OT)  -AR               User Key  (r) = Recorded By, (t) = Taken By, (c) = Cosigned By      Initials Name Provider Type    Zelda Fabian OT Occupational Therapist                    Occupational Therapy Education       Title: PT OT SLP Therapies (In Progress)       Topic: Occupational Therapy (Done)       Point: ADL training (Done)       Description:   Instruct learner(s) on proper safety adaptation and remediation techniques during self care or transfers.   Instruct in proper use of assistive devices.                  Learning Progress Summary             Patient Eager, E,D, VU,NR by AR at 1/28/2025 0928    Acceptance, E,TB, NR by  at 1/24/2025 0856    Acceptance, E, NR by  at 1/23/2025 1035    Acceptance, E,TB, NR by KF at 1/21/2025 1002    Acceptance, E,TB, NR by KF at 1/20/2025 1402                      Point: Home exercise program (Done)       Description:   Instruct learner(s) on appropriate technique for monitoring, assisting and/or progressing therapeutic exercises/activities.                  Learning Progress Summary            Patient Eager, E,D, VU,NR by AR at 1/28/2025 0928                      Point: Precautions (Done)       Description:   Instruct learner(s) on prescribed precautions during self-care and functional transfers.                  Learning Progress Summary            Patient Eager, E,D, VU,NR by AR at 1/28/2025 0928    Acceptance, E,TB, NR by  at 1/24/2025 0856    Acceptance, E, NR by  at 1/23/2025 1035    Acceptance, E,TB, NR by KF at 1/21/2025 1002    Acceptance, E,TB, NR by KF at 1/20/2025 1402                      Point: Body mechanics (Done)       Description:   Instruct learner(s) on proper positioning and spine alignment during self-care, functional mobility activities and/or exercises.                  Learning Progress Summary            Patient Eager, E,D, VU,NR by AR at 1/28/2025 0928    Acceptance, E,TB, NR by  at 1/24/2025 0856    Acceptance, E, NR by  at 1/23/2025 1035    Acceptance, E,TB, NR by  at 1/21/2025 1002    Acceptance, E,TB, NR by KF at 1/20/2025 1402                                      User Key       Initials Effective Dates Name Provider Type Discipline    AR 07/11/23 -  Zelda Mckeon OT Occupational Therapist OT     10/14/22 -  Emy Junior OT Occupational Therapist OT     08/09/23 -  Apple Renteria OT Occupational Therapist OT                  OT Recommendation and Plan     Plan of Care Review  Plan of Care Reviewed With: patient  Progress: no change  Outcome Evaluation: Pt  alert, Ox4 and follows and reports R flank pain. He was educated on cervical precautions and incorporation into ADL routine, and was unable to complete LB dressing task via cross-leg technique d/t ROM limitations BLE. OT issued AE, will trial at upcoming session. He completed toileting with min assist x2 xfer and dependence post-toilet hygiene, oral care sinkside with min assist. He completed seated level visual scanning task and required verbal and tactile cues to locate any items drawn by OT on paper that were L of midline. Pt's visual perceptual are limiting his ability to self-feed, apply toothpaste on toothbruch, locate rail to left of toilet during transfer training. Pt limited with significant visual deficit, cervical precautions, decreased safety awareness, impaired balance and is performing below baseline. Pt is excellent candidate for IPR.     Time Calculation:         Time Calculation- OT       Row Name 01/28/25 0929             Time Calculation- OT    OT Start Time 0800  -AR      OT Received On 01/28/25  -AR      OT Goal Re-Cert Due Date 02/03/25  -AR         Timed Charges    58055 - OT Therapeutic Activity Minutes 8  -AR      00705 - OT Self Care/Mgmt Minutes 33  -AR         Total Minutes    Timed Charges Total Minutes 41  -AR       Total Minutes 41  -AR                User Key  (r) = Recorded By, (t) = Taken By, (c) = Cosigned By      Initials Name Provider Type    Zelda Fabian OT Occupational Therapist                  Therapy Charges for Today       Code Description Service Date Service Provider Modifiers Qty    76648795488 HC OT SELF CARE/MGMT/TRAIN EA 15 MIN 1/28/2025 Zelda Mckeon OT GO 2    81299088058 HC OT THERAPEUTIC ACT EA 15 MIN 1/28/2025 Zelda Mckeon OT GO 1    17469855748 HC OT THER SUPP EA 15 MIN 1/28/2025 Zelda Mckeon OT GO 3                 Zelda Mckeon OT  1/28/2025

## 2025-01-28 NOTE — PLAN OF CARE
Goal Outcome Evaluation:  Plan of Care Reviewed With: patient        Progress: no change  Outcome Evaluation: Pt continues to present below baseline with L sided visual deficits, impaired endurance, decreased safety awareness, and instability. Pt ambulated in hallway with x1 assist and RW. Constant cues required for visual scanning towards L side. Further IPPT is warrented. PT will progress as able per POC.    Anticipated Discharge Disposition (PT): inpatient rehabilitation facility

## 2025-01-31 ENCOUNTER — APPOINTMENT (OUTPATIENT)
Dept: PHYSICAL THERAPY | Facility: HOSPITAL | Age: 85
End: 2025-01-31
Payer: MEDICARE

## 2025-02-04 ENCOUNTER — TELEPHONE (OUTPATIENT)
Dept: INTERNAL MEDICINE | Facility: CLINIC | Age: 85
End: 2025-02-04

## 2025-02-04 ENCOUNTER — READMISSION MANAGEMENT (OUTPATIENT)
Dept: CALL CENTER | Facility: HOSPITAL | Age: 85
End: 2025-02-04
Payer: MEDICARE

## 2025-02-04 NOTE — TELEPHONE ENCOUNTER
Caller: [facility]    Relationship to patient: Other    Best call back number:     New or established patient?  [] New  [x] Established    Date of discharge: [id number]    Facility discharged from: [facility]    Diagnosis/Symptoms: STROKE    Length of stay (If applicable): SINCE JAN 28 2025

## 2025-02-04 NOTE — OUTREACH NOTE
Prep Survey      Flowsheet Row Responses   [facility] facility patient discharged from? Non-[facility]   Is LACE score < 7 ? Non-BH Discharge   Eligibility [facility]   Date of Discharge 02/07/25   Discharge Disposition Home or Self Care   Discharge diagnosis STROKE   Does the patient have one of the following disease processes/diagnoses(primary or secondary)? Stroke   Prep survey completed? Yes            Nargis STROUD - Registered Nurse

## 2025-02-08 ENCOUNTER — TRANSITIONAL CARE MANAGEMENT TELEPHONE ENCOUNTER (OUTPATIENT)
Dept: CALL CENTER | Facility: HOSPITAL | Age: 85
End: 2025-02-08
Payer: MEDICARE

## 2025-02-08 NOTE — OUTREACH NOTE
Call Center TCM Note      Flowsheet Row Responses   [facility] patient discharged from? Non-  [[facility]]   Does the patient have one of the following disease processes/diagnoses(primary or secondary)? Stroke   TCM attempt successful? Yes   Call start time 1118   Call end time 1120   Discharge diagnosis STROKE   Person spoke with today (if not patient) and relationship Son- Maury   Comments 2/14/2025 12:45 PM  HOSPITAL FOLLOW UP 30 min [facility] Radu Francis MD   Does the patient have an appointment with their PCP within 7-14 days of discharge? Yes   What is the patient's perception of their health status since discharge? Improving   Is the patient/caregiver able to teach back signs and symptoms related to disease process for when to call PCP? Yes   Is the patient/caregiver able to teach back signs and symptoms related to disease process for when to call 911? Yes   Is the patient/caregiver able to teach back the hierarchy of who to call/visit for symptoms/problems? PCP, Specialist, Home health nurse, Urgent Care, ED, 911 Yes   TCM call completed? Yes   Wrap up additional comments Patient at [facility]isted [facility] facility[facility] [place] uses walker with assist. However has 24/7 care. Son states patient has to have assistance to get from chair to bed. Having therapy 3 times a week starting Monday.   Call end time 1120   Would this patient benefit from a Referral to Amb Social Work? No   Is the patient interested in additional calls from an ambulatory ? No            Fina Crowder, RN    2/8/2025, 11:21 EST

## 2025-02-19 ENCOUNTER — DOCUMENTATION (OUTPATIENT)
Dept: PHYSICAL THERAPY | Facility: HOSPITAL | Age: 85
End: 2025-02-19
Payer: MEDICARE

## 2025-02-19 DIAGNOSIS — S80.11XA HEMATOMA OF RIGHT LOWER LEG: ICD-10-CM

## 2025-02-19 DIAGNOSIS — I87.2 VENOUS STASIS DERMATITIS OF RIGHT LOWER EXTREMITY: Primary | ICD-10-CM

## 2025-02-19 DIAGNOSIS — Z87.2 HISTORY OF CELLULITIS: ICD-10-CM

## 2025-02-19 DIAGNOSIS — R60.0 EDEMA OF RIGHT LOWER LEG: ICD-10-CM

## 2025-02-19 DIAGNOSIS — S81.801D OPEN WOUND OF RIGHT LOWER LEG, SUBSEQUENT ENCOUNTER: ICD-10-CM

## 2025-02-19 NOTE — THERAPY DISCHARGE NOTE
Outpatient Rehabilitation - Wound/Debridement Discharge Summary       Patient Name: Toño Alcala  : 1940  MRN: [id number]  Today's Date: 2025                  Admit Date: (Not on file)    Visit Dx:    ICD-10-CM ICD-9-CM   1. Venous stasis dermatitis of right lower extremity  I87.2 454.1   2. Open wound of right lower leg, subsequent encounter  S81.801D V58.89     891.0   3. History of cellulitis  Z87.2 V13.3   4. Edema of right lower leg  R60.0 782.3   5. Hematoma of right lower leg  S80.11XA 924.10       Patient Active Problem List   Diagnosis    CAD (coronary artery disease)    DVT (deep venous thrombosis)    Diabetes mellitus    Dyslipidemia    GERD (gastroesophageal reflux disease)    Hyperlipidemia LDL goal <70    Diabetic nephropathy associated with type 2 diabetes mellitus    Diabetic polyneuropathy associated with type 2 diabetes mellitus    Chronic kidney disease, stage IV (severe)    Vitamin D deficiency    Disc disorder of cervical region    Lumbosacral spondylosis without myelopathy    Arthritis of elbow    Acute right-sided low back pain without sciatica    Unifocal PVCs    Essential hypertension    Stage 3 chronic kidney disease due to benign hypertension    BMI 30.0-30.9,adult    Fall    C5 cervical fracture    C6 cervical fracture    Multiple fractures of cervical spine    NSTEMI, initial episode of care    History of pulmonary embolus (PE)/DVT    Acute on chronic diastolic CHF (congestive heart failure)    ICH (intracerebral hemorrhage)        Past Medical History:   Diagnosis Date    Acute diverticulitis 2020    Allergic 60 yrs ago    Arthritis     Arthritis of neck Fusion     Bilateral radial fractures 1962    fracture bilateral radial heads    CAD (coronary artery disease)     Calculus of gallbladder without cholecystitis without obstruction 2020    Cataract     Cervical disc disorder Fusion 1992    Cholelithiasis     Chronic kidney disease 2020    Clotting  disorder     CVD (cardiovascular disease)     History of TIA 1989    Diabetes mellitus     DVT (deep venous thrombosis)     Right lower extremity DVT and pulmonary embolism in 06/2015, idiopathic    DVT of proximal lower limb 06/22/2015    proximal DVT right leg, small PE- anticoagulation    Dyslipidemia     Erectile dysfunction     Fracture 1952    H/o pf left forearm fracture    Fracture of right hand 1980    Fracture of wrist 1980    GERD (gastroesophageal reflux disease)     with hiatal hernia    HL (hearing loss)     Hx of angina pectoris     Hypertension     Normal renal angiography 02/16/11    Knee swelling Several years ago    Left wrist fracture 1953    Lumbosacral disc disease 1996    Osgood-Schlatter's disease 1955    Osteoarthritis     Right wrist fracture     Vertigo     Wears glasses         Past Surgical History:   Procedure Laterality Date    APPENDECTOMY  1957    BACK SURGERY      CARDIAC CATHETERIZATION  2011    with vein graft    CATARACT EXTRACTION Left     CERVICAL FUSION      CERVICAL FUSION  1992    C3-4    COLONOSCOPY  2013    CORONARY ARTERY BYPASS GRAFT  1994    HERNIA REPAIR Right 2011    inguinal    INGUINAL HERNIA REPAIR Left 10/29/2019    Procedure: INGUINAL HERNIA REPAIR LEFT;  Surgeon: Charisma Raines MD;  Location: [email or web address] OR;  Service: General    INTERVENTIONAL RADIOLOGY PROCEDURE N/A 1/23/2025    Procedure: IVC Filter Placement;  Surgeon: Avelino Hernandez MD;  Location:  [facility] CATH INVASIVE LOCATION;  Service: Cardiovascular;  Laterality: N/A;    LUMBAR LAMINECTOMY  1996    laminectomy, lumbar, L3    NECK SURGERY  1992    OTHER SURGICAL HISTORY      wrist surgery    SPINE SURGERY  1996    TONSILLECTOMY AND ADENOIDECTOMY  1945    TRIGGER POINT INJECTION  2001 - 2007    VASECTOMY  1972         Goals   PT OP Goals       Row Name 02/19/25 1000          PT Short Term Goals    STG Date to Achieve 10/27/24  -     STG 1 Pt to have no hematoma material to wound base to improve  healing potential.  -     STG 1 Progress Met  -     STG 2 Pt to have no significant undermining to allow for faster wound healing.  -     STG 2 Progress Met  -     STG 3 Pt will demonstrate only mild RLE edema to indicate appropriate edema management.  -     STG 3 Progress Met  -        Long Term Goals    LTG Date to Achieve 12/11/24  -     LTG 1 Pt to have no RLE skin breakdown to allow for transition to compression stockings for long term edema management  -     LTG 1 Progress Ongoing;Not Met  -     LTG 2 Decrease RLE wound size by 75% as evidence of wound healing.  -     LTG 2 Progress Met  -     LTG 3 Pt will demonstrate only trace edema to the RLE to allow for transition to long term compression system.  -     LTG 3 Progress Met  -               User Key  (r) = Recorded By, (t) = Taken By, (c) = Cosigned By      Initials Name Provider Type    Alesha Jason, PT Physical Therapist                     OP Discharge Summary       Row Name 02/19/25 1017             OP PT Discharge Summary    Date of Discharge 01/19/25  -      Reason for Discharge Transfer to other facility/level of care;Change in medical status  -      Outcomes Achieved Patient able to partially acheive established goals  -      Discharge Destination Hospital admission  -                User Key  (r) = Recorded By, (t) = Taken By, (c) = Cosigned By      Initials Name Provider Type    Alesha Jason, PT Physical Therapist                    Alesha Nunez, PT  2/19/2025

## 2025-02-28 ENCOUNTER — HOSPITAL ENCOUNTER (OUTPATIENT)
Dept: GENERAL RADIOLOGY | Facility: HOSPITAL | Age: 85
Discharge: HOME | End: 2025-02-28
Payer: MEDICARE

## 2025-02-28 DIAGNOSIS — S12.401A CLOSED NONDISPLACED FRACTURE OF FIFTH CERVICAL VERTEBRA, UNSPECIFIED FRACTURE MORPHOLOGY, INITIAL ENCOUNTER: ICD-10-CM

## 2025-02-28 DIAGNOSIS — S12.501A CLOSED NONDISPLACED FRACTURE OF SIXTH CERVICAL VERTEBRA, UNSPECIFIED FRACTURE MORPHOLOGY, INITIAL ENCOUNTER: ICD-10-CM

## 2025-02-28 PROCEDURE — 72040 X-RAY EXAM NECK SPINE 2-3 VW: CPT

## 2025-03-06 ENCOUNTER — OFFICE VISIT (OUTPATIENT)
Dept: NEUROSURGERY | Facility: CLINIC | Age: 85
End: 2025-03-06
Payer: MEDICARE

## 2025-03-06 VITALS — TEMPERATURE: 97.8 F | HEIGHT: 72 IN | WEIGHT: 182.6 LBS | BODY MASS INDEX: 24.73 KG/M2

## 2025-03-06 DIAGNOSIS — S12.491D OTHER CLOSED NONDISPLACED FRACTURE OF FIFTH CERVICAL VERTEBRA WITH ROUTINE HEALING, SUBSEQUENT ENCOUNTER: Primary | ICD-10-CM

## 2025-03-06 NOTE — PROGRESS NOTES
Name: Toño Alcala    : 1940     MRN: [id number]     Primary Care Provider: Radu Francis MD    Chief Complaint  Follow-up (F/U from ED. RT temporal and occipital CVA.)      History of Present Illness:  Toño Alcala is a 84 y.o. male who presents to the clinic today for hospital follow-up.  Patient was evaluated in the hospital roughly 6 weeks ago after a fall.  He was found to have an oblique fracture through C5 and C6 vertebral bodies.  He was placed in cervical collar and has been continue to wear it appropriately.  He denies any current pain today.  Denies any pain radiating down his arms, numbness, tingling, weakness.  He is overall feeling well.    PMHX  Allergies:  Allergies   Allergen Reactions    Penicillins Hives and Swelling     Per patient, has tolerated Keflex     Medications    Current Outpatient Medications:     acetaminophen (TYLENOL) 325 MG tablet, Take 2 tablets by mouth As Needed for Mild Pain, Moderate Pain, Headache or Fever., Disp: , Rfl:     bumetanide (BUMEX) 1 MG tablet, Take 1 tablet by mouth As Needed (edema / weight gain (>3 lbs/day))., Disp: , Rfl:     multivitamin (THERAGRAN) tablet tablet, Take 1 tablet by mouth Daily., Disp: , Rfl:     omeprazole (priLOSEC) 20 MG capsule, Take 1 capsule by mouth Every Morning Before Breakfast., Disp: , Rfl:     rosuvastatin (CRESTOR) 20 MG tablet, Take 1 tablet by mouth Every Night., Disp: , Rfl:     thiamine (VITAMIN B1) 100 MG tablet, Take 1 tablet by mouth Daily., Disp: , Rfl:     sildenafil (Viagra) 100 MG tablet, Take 1 tablet by mouth Daily As Needed for Erectile Dysfunction. (Patient not taking: Reported on 3/6/2025), Disp: 10 tablet, Rfl: 5  Past Medical History:  Past Medical History:   Diagnosis Date    Acute diverticulitis 2020    Allergic 60 yrs ago    Arthritis     Arthritis of neck Fusion     Bilateral radial fractures     fracture bilateral radial heads    CAD (coronary artery disease)     Calculus  of gallbladder without cholecystitis without obstruction 01/29/2020    Cataract     Cervical disc disorder Fusion 1992    Cholelithiasis     Chronic kidney disease 2020    Clotting disorder     CVD (cardiovascular disease)     History of TIA 1989    Diabetes mellitus     DVT (deep venous thrombosis)     Right lower extremity DVT and pulmonary embolism in 06/2015, idiopathic    DVT of proximal lower limb 06/22/2015    proximal DVT right leg, small PE- anticoagulation    Dyslipidemia     Erectile dysfunction     Fracture 1952    H/o pf left forearm fracture    Fracture of right hand 1980    Fracture of wrist 1980    GERD (gastroesophageal reflux disease)     with hiatal hernia    HL (hearing loss)     Hx of angina pectoris     Hypertension     Normal renal angiography 02/16/11    Knee swelling Several years ago    Left wrist fracture 1953    Lumbosacral disc disease 1996    Osgood-Schlatter's disease 1955    Osteoarthritis     Right wrist fracture     Stroke     Vertigo     Wears glasses      Past Surgical History:  Past Surgical History:   Procedure Laterality Date    APPENDECTOMY  1957    BACK SURGERY      CARDIAC CATHETERIZATION  2011    with vein graft    CATARACT EXTRACTION Left     CERVICAL FUSION      CERVICAL FUSION  1992    C3-4    COLONOSCOPY  2013    CORONARY ARTERY BYPASS GRAFT  1994    HERNIA REPAIR Right 2011    inguinal    INGUINAL HERNIA REPAIR Left 10/29/2019    Procedure: INGUINAL HERNIA REPAIR LEFT;  Surgeon: Charisma Raines MD;  Location:  [facility];  Service: General    INTERVENTIONAL RADIOLOGY PROCEDURE N/A 1/23/2025    Procedure: IVC Filter Placement;  Surgeon: Avelino Hernandez MD;  Location:  [facility] INVASIVE [facility];  Service: Cardiovascular;  Laterality: N/A;    LUMBAR LAMINECTOMY  1996    laminectomy, lumbar, L3    NECK SURGERY  1992    OTHER SURGICAL HISTORY      wrist surgery    SPINE SURGERY  1996    TONSILLECTOMY AND ADENOIDECTOMY  1945    TRIGGER POINT INJECTION  2001 - 2007     VASECTOMY       Social Hx:  Social History     Tobacco Use    Smoking status: Former     Current packs/day: 0.00     Average packs/day: 1.5 packs/day for 34.8 years (52.2 ttl pk-yrs)     Types: Cigarettes, Pipe, Cigars     Start date: 1962     Quit date: 1997     Years since quittin.8     Passive exposure: Past    Smokeless tobacco: Never    Tobacco comments:     QUIT DATE 1992   Vaping Use    Vaping status: Never Used   Substance Use Topics    Alcohol use: Yes     Alcohol/week: 11.0 - 13.0 standard drinks of alcohol     Types: 7 Glasses of wine, 2 Cans of beer, 2 - 4 Shots of liquor per week     Comment: glass of wine everyday     Drug use: No     Family Hx:  Family History   Problem Relation Age of Onset    Arthritis Mother         OA    Heart disease Father     Diabetes Father     Heart attack Father     Heart disease Brother     Heart attack Brother     Diabetes Brother     Diabetes Son     Hypertension Other     Cancer Other      Review of Systems:        Review of Systems   Constitutional:  Negative for activity change, appetite change, chills, diaphoresis, fatigue, fever and unexpected weight change.   HENT:  Negative for congestion, dental problem, drooling, ear discharge, ear pain, facial swelling, hearing loss, mouth sores, nosebleeds, postnasal drip, rhinorrhea, sinus pressure, sinus pain, sneezing, sore throat, tinnitus, trouble swallowing and voice change.    Eyes:  Negative for photophobia, pain, discharge, redness, itching and visual disturbance.   Respiratory:  Negative for apnea, cough, choking, chest tightness, shortness of breath, wheezing and stridor.    Cardiovascular:  Negative for chest pain, palpitations and leg swelling.   Gastrointestinal:  Negative for abdominal distention, abdominal pain, anal bleeding, blood in stool, constipation, diarrhea, nausea, rectal pain and vomiting.   Endocrine: Negative for cold intolerance, heat intolerance, polydipsia, polyphagia and  "polyuria.   Genitourinary:  Negative for decreased urine volume, difficulty urinating, dysuria, enuresis, flank pain, frequency, genital sores, hematuria, penile discharge, penile pain, penile swelling, scrotal swelling, testicular pain and urgency.   Musculoskeletal:  Positive for gait problem. Negative for arthralgias, back pain, joint swelling, myalgias, neck pain and neck stiffness.   Skin:  Negative for color change, pallor, rash and wound.   Allergic/Immunologic: Negative for environmental allergies, food allergies and immunocompromised state.   Neurological:  Negative for dizziness, tremors, seizures, syncope, facial asymmetry, speech difficulty, weakness, light-headedness, numbness and headaches.   Hematological:  Negative for adenopathy. Does not bruise/bleed easily.   Psychiatric/Behavioral:  Negative for agitation, behavioral problems, confusion, decreased concentration, dysphoric mood, hallucinations, self-injury, sleep disturbance and suicidal ideas. The patient is not nervous/anxious and is not hyperactive.    All other systems reviewed and are negative.         Vital Signs: Temp 97.8 °F (36.6 °C) (Temporal)   Ht 182.9 cm (72\")   Wt 82.8 kg (182 lb 9.6 oz)   BMI 24.77 kg/m²      Physical Exam  Awake, alert and oriented x 3  Speech f/c  Opens eyes spontaneously  Pupils 3 mm rx bilaterally  Extraocular muscles intact bilaterally  Face symmetric bilaterally  Tongue midline  5/5 in all 4 extremities  Normal sensation to light touch in upper and lower extremities  [place]cido is negative bilaterally.       Social History    Tobacco Use      Smoking status: Former        Packs/day: 0.00        Years: 1.5 packs/day for 34.8 years (52.2 ttl pk-yrs)        Types: Cigarettes, Pipe, Cigars        Start date: 1962        Quit date: 1997        Years since quittin.8        Passive exposure: Past      Smokeless tobacco: Never      Tobacco comments: QUIT DATE 1992       Tobacco Use: Medium Risk " (3/6/2025)    Patient History     Smoking Tobacco Use: Former     Smokeless Tobacco Use: Never     Passive Exposure: Past           STEADI Fall Risk Assessment was completed, and patient is at HIGH risk for falls. Assessment completed on:3/6/2025      Diagnostic Studies: (All imaging is independently reviewed unless stated otherwise.)  X-rays of cervical spine show no new malalignment or displacement      Assessment/Plan    Diagnoses and all orders for this visit:    1. Other closed nondisplaced fracture of fifth cervical vertebra with routine healing, subsequent encounter (Primary)  -     CT Cervical Spine Without Contrast; Future       This is a 84-year-old male who presents today for follow-up regarding oblique C5-C6 vertebral body fracture after a fall roughly 6 weeks ago.  Patient denies any new complaints today.  He has been wearing his cervical collar appropriately.  Denies any signs or symptoms of radiculopathy or myelopathy.  Recommend continue with cervical collar for another 2 weeks and will follow-up in the neurosurgery office with CT cervical spine.  Patient encouraged to contact us if he has any changes in his condition or any concerns.    Any copied data from previous notes included in the (1) HPI, (2) PE, (3) MDM and/or Assessment and Plan has been reviewed and accurate as of 03/06/25.    Heather Blake PA-C  03/06/25    I spent at least 30 minutes caring for Toño Alcala on 03/06/25.  This time includes activities preparing for the visit, reviewing test, reviewing history and performing an appropriate examination.  Discussing with the patient and reviewing and independently interpreting the diagnostic studies, communicating that information to the patient along with discussing care coordination and referrals if needed and documenting information in the medical records.

## 2025-03-20 ENCOUNTER — HOSPITAL ENCOUNTER (OUTPATIENT)
Dept: GENERAL RADIOLOGY | Facility: HOSPITAL | Age: 85
Discharge: HOME | End: 2025-03-20
Payer: MEDICARE

## 2025-03-20 ENCOUNTER — OFFICE VISIT (OUTPATIENT)
Dept: NEUROSURGERY | Facility: CLINIC | Age: 85
End: 2025-03-20
Payer: MEDICARE

## 2025-03-20 ENCOUNTER — HOSPITAL ENCOUNTER (OUTPATIENT)
Facility: HOSPITAL | Age: 85
Discharge: HOME | End: 2025-03-20
Payer: MEDICARE

## 2025-03-20 VITALS — BODY MASS INDEX: 24.92 KG/M2 | WEIGHT: 184 LBS | TEMPERATURE: 97.5 F | HEIGHT: 72 IN

## 2025-03-20 DIAGNOSIS — S12.491D OTHER CLOSED NONDISPLACED FRACTURE OF FIFTH CERVICAL VERTEBRA WITH ROUTINE HEALING, SUBSEQUENT ENCOUNTER: Primary | ICD-10-CM

## 2025-03-20 DIAGNOSIS — S12.491D OTHER CLOSED NONDISPLACED FRACTURE OF FIFTH CERVICAL VERTEBRA WITH ROUTINE HEALING, SUBSEQUENT ENCOUNTER: ICD-10-CM

## 2025-03-20 PROCEDURE — 72050 X-RAY EXAM NECK SPINE 4/5VWS: CPT

## 2025-03-20 PROCEDURE — 72125 CT NECK SPINE W/O DYE: CPT

## 2025-03-20 NOTE — PROGRESS NOTES
Name: Toño Alcala    : 1940     MRN: [id number]     Primary Care Provider: Radu Francis MD    Chief Complaint  cervical fracture (Follow up CT )      History of Present Illness:  Toño Alcala is a 84 y.o. male who presents to the clinic today for routine follow-up.  Patient was initially evaluated in the hospital roughly 8 weeks ago after a fall.  He was found to have an oblique fracture through C5 and C6 vertebral bodies.  He is a previous C5-6 fusion.  He was placed in a cervical collar and has been wearing it appropriately.  Denies any current pain today.  Denies any pain radiating down his arms, numbness, tingling, weakness.  He is overall feeling well.    PMHX  Allergies:  Allergies   Allergen Reactions    Penicillins Hives and Swelling     Per patient, has tolerated Keflex     Medications    Current Outpatient Medications:     acetaminophen (TYLENOL) 325 MG tablet, Take 2 tablets by mouth As Needed for Mild Pain, Moderate Pain, Headache or Fever., Disp: , Rfl:     bumetanide (BUMEX) 1 MG tablet, Take 1 tablet by mouth As Needed (edema / weight gain (>3 lbs/day))., Disp: , Rfl:     multivitamin (THERAGRAN) tablet tablet, Take 1 tablet by mouth Daily., Disp: , Rfl:     omeprazole (priLOSEC) 20 MG capsule, Take 1 capsule by mouth Every Morning Before Breakfast., Disp: , Rfl:     rosuvastatin (CRESTOR) 20 MG tablet, Take 1 tablet by mouth Every Night., Disp: , Rfl:     thiamine (VITAMIN B1) 100 MG tablet, Take 1 tablet by mouth Daily., Disp: , Rfl:   Past Medical History:  Past Medical History:   Diagnosis Date    Acute diverticulitis 2020    Allergic 60 yrs ago    Arthritis     Arthritis of neck Fusion     Bilateral radial fractures     fracture bilateral radial heads    CAD (coronary artery disease)     Calculus of gallbladder without cholecystitis without obstruction 2020    Cataract     Cervical disc disorder Fusion     Cholelithiasis     Chronic kidney disease  2020    Clotting disorder     CVD (cardiovascular disease)     History of TIA 1989    Diabetes mellitus     DVT (deep venous thrombosis)     Right lower extremity DVT and pulmonary embolism in 06/2015, idiopathic    DVT of proximal lower limb 06/22/2015    proximal DVT right leg, small PE- anticoagulation    Dyslipidemia     Erectile dysfunction     Fracture 1952    H/o pf left forearm fracture    Fracture of right hand 1980    Fracture of wrist 1980    GERD (gastroesophageal reflux disease)     with hiatal hernia    HL (hearing loss)     Hx of angina pectoris     Hypertension     Normal renal angiography 02/16/11    Knee swelling Several years ago    Left wrist fracture 1953    Lumbosacral disc disease 1996    Osgood-Schlatter's disease 1955    Osteoarthritis     Right wrist fracture     Stroke     Vertigo     Wears glasses      Past Surgical History:  Past Surgical History:   Procedure Laterality Date    APPENDECTOMY  1957    BACK SURGERY      CARDIAC CATHETERIZATION  2011    with vein graft    CATARACT EXTRACTION Left     CERVICAL FUSION      CERVICAL FUSION  1992    C3-4    COLONOSCOPY  2013    CORONARY ARTERY BYPASS GRAFT  1994    HERNIA REPAIR Right 2011    inguinal    INGUINAL HERNIA REPAIR Left 10/29/2019    Procedure: INGUINAL HERNIA REPAIR LEFT;  Surgeon: Charisma Raines MD;  Location: [facility] OR;  Service: General    INTERVENTIONAL RADIOLOGY PROCEDURE N/A 1/23/2025    Procedure: IVC Filter Placement;  Surgeon: Avelino Hernandez MD;  Location: [facility] CATH INVASIVE LOCATION;  Service: Cardiovascular;  Laterality: N/A;    LUMBAR LAMINECTOMY  1996    laminectomy, lumbar, L3    NECK SURGERY  1992    OTHER SURGICAL HISTORY      wrist surgery    SPINE SURGERY  1996    TONSILLECTOMY AND ADENOIDECTOMY  1945    TRIGGER POINT INJECTION  2001 - 2007    VASECTOMY  1972     Social Hx:  Social History     Tobacco Use    Smoking status: Former     Current packs/day: 0.00     Average packs/day: 1.5 packs/day for 34.8  years (52.2 ttl pk-yrs)     Types: Cigarettes, Pipe, Cigars     Start date: 1962     Quit date: 1997     Years since quittin.9     Passive exposure: Past    Smokeless tobacco: Never    Tobacco comments:     QUIT DATE 1992   Vaping Use    Vaping status: Never Used   Substance Use Topics    Alcohol use: Yes     Alcohol/week: 11.0 - 13.0 standard drinks of alcohol     Types: 7 Glasses of wine, 2 Cans of beer, 2 - 4 Shots of liquor per week     Comment: glass of wine everyday     Drug use: No     Family Hx:  Family History   Problem Relation Age of Onset    Arthritis Mother         OA    Heart disease Father     Diabetes Father     Heart attack Father     Heart disease Brother     Heart attack Brother     Diabetes Brother     Diabetes Son     Hypertension Other     Cancer Other      Review of Systems:        Review of Systems   Constitutional:  Negative for activity change, appetite change, chills, diaphoresis, fatigue, fever and unexpected weight change.   HENT:  Negative for congestion, dental problem, drooling, ear discharge, ear pain, facial swelling, hearing loss, mouth sores, nosebleeds, postnasal drip, rhinorrhea, sinus pressure, sinus pain, sneezing, sore throat, tinnitus, trouble swallowing and voice change.    Eyes:  Negative for photophobia, pain, discharge, redness, itching and visual disturbance.   Respiratory:  Negative for apnea, cough, choking, chest tightness, shortness of breath, wheezing and stridor.    Cardiovascular:  Negative for chest pain, palpitations and leg swelling.   Gastrointestinal:  Negative for abdominal distention, abdominal pain, anal bleeding, blood in stool, constipation, diarrhea, nausea, rectal pain and vomiting.   Endocrine: Negative for cold intolerance, heat intolerance, polydipsia, polyphagia and polyuria.   Genitourinary:  Negative for decreased urine volume, difficulty urinating, dysuria, enuresis, flank pain, frequency, genital sores, hematuria and  "urgency.   Musculoskeletal:  Negative for arthralgias, back pain, gait problem, joint swelling, myalgias, neck pain and neck stiffness.   Skin:  Negative for color change, pallor, rash and wound.   Allergic/Immunologic: Negative for environmental allergies, food allergies and immunocompromised state.   Neurological:  Negative for dizziness, tremors, seizures, syncope, facial asymmetry, speech difficulty, weakness, light-headedness, numbness and headaches.   Hematological:  Negative for adenopathy. Does not bruise/bleed easily.   Psychiatric/Behavioral:  Negative for agitation, behavioral problems, confusion, decreased concentration, dysphoric mood, hallucinations, self-injury, sleep disturbance and suicidal ideas. The patient is not nervous/anxious and is not hyperactive.    All other systems reviewed and are negative.         Vital Signs: Temp 97.5 °F (36.4 °C) (Infrared)   Ht 182.9 cm (72\")   Wt 83.5 kg (184 lb)   BMI 24.95 kg/m²      Physical Exam  Awake, alert and oriented x 3  Speech f/c  Opens eyes spontaneously  Pupils 3 mm rx bilaterally  Extraocular muscles intact bilaterally  Face symmetric bilaterally  Tongue midline  5/5 in all 4 extremities  Normal sensation to light touch in upper and lower extremities  Range of motion of cervical neck does not produce any pain  Cervical spine nontender to palpation    Social History    Tobacco Use      Smoking status: Former        Packs/day: 0.00        Years: 1.5 packs/day for 34.8 years (52.2 ttl pk-yrs)        Types: Cigarettes, Pipe, Cigars        Start date: 1962        Quit date: 1997        Years since quittin.9        Passive exposure: Past      Smokeless tobacco: Never      Tobacco comments: QUIT DATE 1992       Tobacco Use: Medium Risk (3/20/2025)    Patient History     Smoking Tobacco Use: Former     Smokeless Tobacco Use: Never     Passive Exposure: Past           STEADI Fall Risk Assessment was completed, and patient is at HIGH " risk for falls. Assessment completed on:3/6/2025      Diagnostic Studies: (All imaging is independently reviewed unless stated otherwise.)      Assessment/Plan    Diagnoses and all orders for this visit:    1. Other closed nondisplaced fracture of fifth cervical vertebra with routine healing, subsequent encounter (Primary)  -     XR spine cervical ap and lat w flex and ext; Future         This is an 84-year-old male who I been following for an oblique fracture through C5 and C6 vertebral bodies after a fall about 8 weeks ago.  He has been wearing his cervical collar appropriately and denies any neck pain or radicular symptoms.  He is neurologically intact on exam.  Patient had CT cervical spine today which has not yet been read by radiologist.  In my review, fracture looks to be stable.  Patient went through a series of range of motion movements out of the cervical collar which did not produce any neck pain.  I would like to get flexion-extension x-rays of his neck today.  If these are stable and show no evidence of instability, I think he can come out of the cervical collar.  I will call the patient with the results. Patient encouraged to contact us if he has any changes in his condition or any concerns.      Addendum 3/20/2025: Cervical x-rays show more angulation at the anterior C5 vertebral body on flexion.  I discussed this with patient's son and recommend he continue with cervical collar until I can discuss with Dr. Byrd on Monday.    Any copied data from previous notes included in the (1) HPI, (2) PE, (3) MDM and/or Assessment and Plan has been reviewed and accurate as of 03/20/25.    Heather Blake PA-C  03/20/25    I spent at least 30 minutes caring for Toño Alcala on 03/20/25.  This time includes activities preparing for the visit, reviewing test, reviewing history and performing an appropriate examination.  Discussing with the patient and reviewing and independently interpreting the diagnostic  studies, communicating that information to the patient along with discussing care coordination and referrals if needed and documenting information in the medical records.

## 2025-03-24 ENCOUNTER — DOCUMENTATION (OUTPATIENT)
Dept: NEUROSURGERY | Facility: CLINIC | Age: 85
End: 2025-03-24
Payer: MEDICARE

## 2025-03-24 DIAGNOSIS — S12.491D OTHER CLOSED NONDISPLACED FRACTURE OF FIFTH CERVICAL VERTEBRA WITH ROUTINE HEALING, SUBSEQUENT ENCOUNTER: Primary | ICD-10-CM

## 2025-03-24 NOTE — PROGRESS NOTES
Spoke with Dr. Byrd regarding this patient.  He would like to keep patient in cervical collar for 3 total months for proper healing.  We will set up a follow-up appointment in 4 weeks with AP/lateral cervical x-rays.  If at that point these are stable and he is not experiencing any pain, we will likely get him out of the cervical collar.  He will continue it for now.  Spoke with his son Maury over the phone.  He voiced understanding.    Heather Blake PA-C  03/24/2025

## 2025-04-11 ENCOUNTER — TELEPHONE (OUTPATIENT)
Dept: CARDIOLOGY | Facility: CLINIC | Age: 85
End: 2025-04-11
Payer: MEDICARE

## 2025-04-11 ENCOUNTER — APPOINTMENT (OUTPATIENT)
Dept: CT IMAGING | Facility: HOSPITAL | Age: 85
End: 2025-04-11
Payer: MEDICARE

## 2025-04-11 ENCOUNTER — APPOINTMENT (OUTPATIENT)
Dept: CARDIOLOGY | Facility: HOSPITAL | Age: 85
End: 2025-04-11
Payer: MEDICARE

## 2025-04-11 ENCOUNTER — HOSPITAL ENCOUNTER (EMERGENCY)
Facility: HOSPITAL | Age: 85
Discharge: HOME | End: 2025-04-11
Attending: STUDENT IN AN ORGANIZED HEALTH CARE EDUCATION/TRAINING PROGRAM
Payer: MEDICARE

## 2025-04-11 VITALS
SYSTOLIC BLOOD PRESSURE: 153 MMHG | HEIGHT: 72 IN | RESPIRATION RATE: 16 BRPM | BODY MASS INDEX: 24.1 KG/M2 | OXYGEN SATURATION: 97 % | TEMPERATURE: 98.3 F | DIASTOLIC BLOOD PRESSURE: 81 MMHG | WEIGHT: 177.91 LBS | HEART RATE: 63 BPM

## 2025-04-11 DIAGNOSIS — L03.115 CELLULITIS OF RIGHT LEG: ICD-10-CM

## 2025-04-11 DIAGNOSIS — I82.511 CHRONIC DEEP VEIN THROMBOSIS (DVT) OF FEMORAL VEIN OF RIGHT LOWER EXTREMITY: Primary | ICD-10-CM

## 2025-04-11 DIAGNOSIS — M79.89 LEG SWELLING: ICD-10-CM

## 2025-04-11 LAB
ALBUMIN SERPL-MCNC: 3.9 G/DL (ref 3.5–5.2)
ALBUMIN/GLOB SERPL: 1.3 G/DL
ALP SERPL-CCNC: 246 U/L (ref 39–117)
ALT SERPL W P-5'-P-CCNC: 36 U/L (ref 1–41)
ANION GAP SERPL CALCULATED.3IONS-SCNC: 11 MMOL/L (ref 5–15)
APTT PPP: 30.7 SECONDS (ref 60–90)
AST SERPL-CCNC: 36 U/L (ref 1–40)
BASOPHILS # BLD AUTO: 0.03 10*3/MM3 (ref 0–0.2)
BASOPHILS NFR BLD AUTO: 0.5 % (ref 0–1.5)
BILIRUB SERPL-MCNC: 0.4 MG/DL (ref 0–1.2)
BUN SERPL-MCNC: 29 MG/DL (ref 8–23)
BUN/CREAT SERPL: 11 (ref 7–25)
CALCIUM SPEC-SCNC: 9.4 MG/DL (ref 8.6–10.5)
CHLORIDE SERPL-SCNC: 103 MMOL/L (ref 98–107)
CO2 SERPL-SCNC: 26 MMOL/L (ref 22–29)
CREAT SERPL-MCNC: 2.64 MG/DL (ref 0.76–1.27)
D-LACTATE SERPL-SCNC: 1.5 MMOL/L (ref 0.5–2)
DEPRECATED RDW RBC AUTO: 55.4 FL (ref 37–54)
EGFRCR SERPLBLD CKD-EPI 2021: 23.2 ML/MIN/1.73
EOSINOPHIL # BLD AUTO: 0.58 10*3/MM3 (ref 0–0.4)
EOSINOPHIL NFR BLD AUTO: 9.2 % (ref 0.3–6.2)
ERYTHROCYTE [DISTWIDTH] IN BLOOD BY AUTOMATED COUNT: 17.2 % (ref 12.3–15.4)
GLOBULIN UR ELPH-MCNC: 2.9 GM/DL
GLUCOSE SERPL-MCNC: 99 MG/DL (ref 65–99)
HCT VFR BLD AUTO: 36.4 % (ref 37.5–51)
HGB BLD-MCNC: 11.8 G/DL (ref 13–17.7)
IMM GRANULOCYTES # BLD AUTO: 0.01 10*3/MM3 (ref 0–0.05)
IMM GRANULOCYTES NFR BLD AUTO: 0.2 % (ref 0–0.5)
INR PPP: 1.06 (ref 0.89–1.12)
LYMPHOCYTES # BLD AUTO: 1.42 10*3/MM3 (ref 0.7–3.1)
LYMPHOCYTES NFR BLD AUTO: 22.5 % (ref 19.6–45.3)
MCH RBC QN AUTO: 28.5 PG (ref 26.6–33)
MCHC RBC AUTO-ENTMCNC: 32.4 G/DL (ref 31.5–35.7)
MCV RBC AUTO: 87.9 FL (ref 79–97)
MONOCYTES # BLD AUTO: 0.78 10*3/MM3 (ref 0.1–0.9)
MONOCYTES NFR BLD AUTO: 12.4 % (ref 5–12)
NEUTROPHILS NFR BLD AUTO: 3.49 10*3/MM3 (ref 1.7–7)
NEUTROPHILS NFR BLD AUTO: 55.2 % (ref 42.7–76)
NRBC BLD AUTO-RTO: 0 /100 WBC (ref 0–0.2)
PLATELET # BLD AUTO: 195 10*3/MM3 (ref 140–450)
PMV BLD AUTO: 11.2 FL (ref 6–12)
POTASSIUM SERPL-SCNC: 4.6 MMOL/L (ref 3.5–5.2)
PROT SERPL-MCNC: 6.8 G/DL (ref 6–8.5)
PROTHROMBIN TIME: 14.4 SECONDS (ref 12.2–15.3)
RBC # BLD AUTO: 4.14 10*6/MM3 (ref 4.14–5.8)
SODIUM SERPL-SCNC: 140 MMOL/L (ref 136–145)
TROPONIN T SERPL HS-MCNC: 19 NG/L
WBC NRBC COR # BLD AUTO: 6.31 10*3/MM3 (ref 3.4–10.8)

## 2025-04-11 PROCEDURE — 83605 ASSAY OF LACTIC ACID: CPT | Performed by: STUDENT IN AN ORGANIZED HEALTH CARE EDUCATION/TRAINING PROGRAM

## 2025-04-11 PROCEDURE — 85730 THROMBOPLASTIN TIME PARTIAL: CPT | Performed by: STUDENT IN AN ORGANIZED HEALTH CARE EDUCATION/TRAINING PROGRAM

## 2025-04-11 PROCEDURE — 87040 BLOOD CULTURE FOR BACTERIA: CPT | Performed by: STUDENT IN AN ORGANIZED HEALTH CARE EDUCATION/TRAINING PROGRAM

## 2025-04-11 PROCEDURE — 93970 EXTREMITY STUDY: CPT | Performed by: INTERNAL MEDICINE

## 2025-04-11 PROCEDURE — 93970 EXTREMITY STUDY: CPT

## 2025-04-11 PROCEDURE — 85025 COMPLETE CBC W/AUTO DIFF WBC: CPT | Performed by: STUDENT IN AN ORGANIZED HEALTH CARE EDUCATION/TRAINING PROGRAM

## 2025-04-11 PROCEDURE — 85610 PROTHROMBIN TIME: CPT | Performed by: STUDENT IN AN ORGANIZED HEALTH CARE EDUCATION/TRAINING PROGRAM

## 2025-04-11 PROCEDURE — 36415 COLL VENOUS BLD VENIPUNCTURE: CPT

## 2025-04-11 PROCEDURE — 70450 CT HEAD/BRAIN W/O DYE: CPT

## 2025-04-11 PROCEDURE — 84484 ASSAY OF TROPONIN QUANT: CPT | Performed by: STUDENT IN AN ORGANIZED HEALTH CARE EDUCATION/TRAINING PROGRAM

## 2025-04-11 PROCEDURE — 80053 COMPREHEN METABOLIC PANEL: CPT | Performed by: STUDENT IN AN ORGANIZED HEALTH CARE EDUCATION/TRAINING PROGRAM

## 2025-04-11 PROCEDURE — 99284 EMERGENCY DEPT VISIT MOD MDM: CPT

## 2025-04-11 RX ORDER — BUMETANIDE 1 MG/1
1 TABLET ORAL AS NEEDED
Qty: 30 TABLET | Refills: 0 | Status: SHIPPED | OUTPATIENT
Start: 2025-04-11

## 2025-04-11 RX ORDER — DOXYCYCLINE 100 MG/1
100 CAPSULE ORAL 2 TIMES DAILY
Qty: 14 CAPSULE | Refills: 0 | Status: SHIPPED | OUTPATIENT
Start: 2025-04-11 | End: 2025-04-18

## 2025-04-11 NOTE — TELEPHONE ENCOUNTER
Caller: Salo Alcala    Relationship to patient: Emergency Contact    Best call back number: [phone]    Chief complaint:  DVT IN RIGHT LEG, SWELLING IN LEFT LEG. LEGS ARE RED AND THROBBING     Type of visit:  FOLLOW UP     Requested date:  ASAP     Additional notes: PT'S SON WANTED TO DISCUSS THE DVT IN RIGHT LEG, AND SIMILAR SWELLING IN LEFT LEG WITH . PLEASE CALL PTS SON SALO, THANK YOU   PT IS AT THE [facility] IN [place], ROOM [address]

## 2025-04-11 NOTE — TELEPHONE ENCOUNTER
Returned pt son's call. Stated pt was dx with DVT 2 days ago. No anticoagulation started yet. Advised pt to go to ER. Verbalized understanding and agreeable to plan of care.

## 2025-04-11 NOTE — DISCHARGE INSTRUCTIONS
Take the provided antibiotic as directed you can also use your diuretic medicine that you were prescribed if you have significant weight gain or swelling increase.  Follow-up with your cardiologist.  While today's workup was reassuring if symptoms change or worsen please return to the ED or seek other medical care.

## 2025-04-12 NOTE — ED PROVIDER NOTES
EMERGENCY DEPARTMENT ENCOUNTER    Pt Name: Toño Alcala  MRN: [id number]  Pt :   1940  Room Number:    Date of encounter:  2025  PCP: Radu Francis MD  ED Provider: Jose Bernal MD    Historian: Patient, son      HPI:  Chief Complaint: Leg swelling, concern for DVT        Context: Toño Alcala is a 84-year-old man with chronic right lower extremity DVT, IVC filter previously on Coumadin and hemorrhagic stroke in January who presents for worsening leg swelling and itching.  He is currently being treated for cellulitis in that leg with doxycycline but the treatment was stopped on Wednesday when he had a duplex ultrasound that showed blood clot on the right he spoke with Dr. Hernandez his cardiologist today saying he was having leg swelling on the left as well and he was advised to come the emergency department for bilateral lower extremity duplex.  He denies fevers or systemic symptoms.  He denies headache or neurologic symptoms.  No other complaints at this time.      PAST MEDICAL HISTORY  Past Medical History:   Diagnosis Date    Acute diverticulitis 2020    Allergic 60 yrs ago    Arthritis     Arthritis of neck Fusion     Bilateral radial fractures     fracture bilateral radial heads    CAD (coronary artery disease)     Calculus of gallbladder without cholecystitis without obstruction 2020    Cataract     Cervical disc disorder Fusion     Cholelithiasis     Chronic kidney disease     Clotting disorder     CVD (cardiovascular disease)     History of TIA     Diabetes mellitus     DVT (deep venous thrombosis)     Right lower extremity DVT and pulmonary embolism in 2015, idiopathic    DVT of proximal lower limb 2015    proximal DVT right leg, small PE- anticoagulation    Dyslipidemia     Erectile dysfunction     Fracture     H/o pf left forearm fracture    Fracture of right hand     Fracture of wrist     GERD (gastroesophageal  reflux disease)     with hiatal hernia    HL (hearing loss)     Hx of angina pectoris     Hypertension     Normal renal angiography 02/16/11    Knee swelling Several years ago    Left wrist fracture 1953    Lumbosacral disc disease 1996    Osgood-Schlatter's disease 1955    Osteoarthritis     Right wrist fracture     Stroke     Vertigo     Wears glasses          PAST SURGICAL HISTORY  Past Surgical History:   Procedure Laterality Date    APPENDECTOMY  1957    BACK SURGERY      CARDIAC CATHETERIZATION  2011    with vein graft    CATARACT EXTRACTION Left     CERVICAL FUSION      CERVICAL FUSION  1992    C3-4    COLONOSCOPY  2013    CORONARY ARTERY BYPASS GRAFT  1994    HERNIA REPAIR Right 2011    inguinal    INGUINAL HERNIA REPAIR Left 10/29/2019    Procedure: INGUINAL HERNIA REPAIR LEFT;  Surgeon: Charisma Raines MD;  Location: [facility] OR;  Service: General    INTERVENTIONAL RADIOLOGY PROCEDURE N/A 1/23/2025    Procedure: IVC Filter Placement;  Surgeon: Avelino Hernandez MD;  Location: [facility] CATH INVASIVE LOCATION;  Service: Cardiovascular;  Laterality: N/A;    LUMBAR LAMINECTOMY  1996    laminectomy, lumbar, L3    NECK SURGERY  1992    OTHER SURGICAL HISTORY      wrist surgery    SPINE SURGERY  1996    TONSILLECTOMY AND ADENOIDECTOMY  1945    TRIGGER POINT INJECTION  2001 - 2007    VASECTOMY  1972         FAMILY HISTORY  Family History   Problem Relation Age of Onset    Arthritis Mother         OA    Heart disease Father     Diabetes Father     Heart attack Father     Heart disease Brother     Heart attack Brother     Diabetes Brother     Diabetes Son     Hypertension Other     Cancer Other          SOCIAL HISTORY  Social History     Socioeconomic History    Marital status:    Tobacco Use    Smoking status: Former     Current packs/day: 0.00     Average packs/day: 1.5 packs/day for 34.8 years (52.2 ttl pk-yrs)     Types: Cigarettes, Pipe, Cigars     Start date: 7/5/1962     Quit date: 4/24/1997     Years  since quittin.9     Passive exposure: Past    Smokeless tobacco: Never    Tobacco comments:     QUIT DATE 1992   Vaping Use    Vaping status: Never Used   Substance and Sexual Activity    Alcohol use: Yes     Alcohol/week: 11.0 - 13.0 standard drinks of alcohol     Types: 7 Glasses of wine, 2 Cans of beer, 2 - 4 Shots of liquor per week     Comment: glass of wine everyday     Drug use: No    Sexual activity: Not Currently     Partners: Female     Birth control/protection: Vasectomy, Surgical         ALLERGIES  Penicillins        REVIEW OF SYSTEMS  Review of Systems       All systems reviewed and negative except for those discussed in HPI.       PHYSICAL EXAM    I have reviewed the triage vital signs and nursing notes.    ED Triage Vitals [25 1606]   Temp Heart Rate Resp BP SpO2   98.3 °F (36.8 °C) 67 16 134/76 94 %      Temp src Heart Rate Source Patient Position BP Location FiO2 (%)   Oral Monitor Sitting Left arm --       Physical Exam  GENERAL:   Appears in no acute distress.   HENT: Nares patent.  EYES: No scleral icterus.  CV: Regular rhythm, regular rate.  RESPIRATORY: Normal effort.  No audible wheezes, rales or rhonchi.  ABDOMEN: Soft, nontender  MUSCULOSKELETAL: No deformities.  Mild symmetric pitting lower extremity edema, negative Homans' sign  NEURO: Alert, moves all extremities, follows commands.  SKIN: Warm, dry, erythema and warmth extending from the right foot to just distal to the right knee concerning for cellulitis      LAB RESULTS  Recent Results (from the past 24 hours)   Duplex Venous Lower Extremity - Bilateral CAR    Collection Time: 25  5:01 PM   Result Value Ref Range    Right Common Femoral Spont Y     Right Common Femoral Phasic Y     Right Common Femoral Compress C     Right Common Femoral Augment Y     Right Saphenofemoral Junction Compress C     Right Proximal Femoral Compress C     Right Mid Femoral Spont Y     Right Mid Femoral Phasic Y     Right Mid Femoral  Compress P     Right Mid Femoral Augment Y     Right Distal Femoral Compress C     Right Popliteal Spont Y     Right Popliteal Phasic Y     Right Popliteal Compress C     Right Popliteal Augment Y     Right Posterior Tibial Compress C     Right Peroneal Compress C     Right Gastronemius Compress C     Right Greater Saph AK Compress C     Right Greater Saph BK Compress C     Right Lesser Saph Compress C     Right Saphenofemoral Junction Spont Y     Right Saphenofemoral Junction Phasic Y     Right Saphenofemoral Junction Augment Y     Right Profunda Femoral Spont Y     Right Profunda Femoral Phasic Y     Right Proximal Femoral Spont Y     Right Proximal Femoral Phasic Y     Right Proximal Femoral Augment Y     Right Distal Femoral Spont Y     Right Distal Femoral Phasic Y     Right Distal Femoral Augment Y     Left Common Femoral Spont Y     Left Common Femoral Phasic Y     Left Common Femoral Compress C     Left Common Femoral Augment Y     Left Saphenofemoral Junction Spont Y     Left Saphenofemoral Junction Phasic Y     Left Saphenofemoral Junction Compress C     Left Saphenofemoral Junction Augment Y     Left Profunda Femoral Spont Y     Left Profunda Femoral Phasic Y     Left Proximal Femoral Spont Y     Left Proximal Femoral Phasic Y     Left Proximal Femoral Compress C     Left Proximal Femoral Augment Y     Left Mid Femoral Spont Y     Left Mid Femoral Phasic Y     Left Mid Femoral Compress C     Left Mid Femoral Augment Y     Left Distal Femoral Spont Y     Left Distal Femoral Phasic Y     Left Distal Femoral Compress C     Left Distal Femoral Augment Y     Left Popliteal Spont Y     Left Popliteal Phasic Y     Left Popliteal Compress C     Left Popliteal Augment Y     Left Posterior Tibial Compress C     Left Peroneal Compress C     Left Gastronemius Compress C     Left Greater Saph AK Compress C     Left Greater Saph BK Compress C     Left Lesser Saph Compress C     BH CV VAS PRELIMINARY FINDINGS SCRIPTING  1.0    Protime-INR    Collection Time: 04/11/25  5:25 PM    Specimen: Blood   Result Value Ref Range    Protime 14.4 12.2 - 15.3 Seconds    INR 1.06 0.89 - 1.12   aPTT    Collection Time: 04/11/25  5:25 PM    Specimen: Blood   Result Value Ref Range    PTT 30.7 (L) 60.0 - 90.0 seconds   Comprehensive Metabolic Panel    Collection Time: 04/11/25  5:25 PM    Specimen: Blood   Result Value Ref Range    Glucose 99 65 - 99 mg/dL    BUN 29 (H) 8 - 23 mg/dL    Creatinine 2.64 (H) 0.76 - 1.27 mg/dL    Sodium 140 136 - 145 mmol/L    Potassium 4.6 3.5 - 5.2 mmol/L    Chloride 103 98 - 107 mmol/L    CO2 26.0 22.0 - 29.0 mmol/L    Calcium 9.4 8.6 - 10.5 mg/dL    Total Protein 6.8 6.0 - 8.5 g/dL    Albumin 3.9 3.5 - 5.2 g/dL    ALT (SGPT) 36 1 - 41 U/L    AST (SGOT) 36 1 - 40 U/L    Alkaline Phosphatase 246 (H) 39 - 117 U/L    Total Bilirubin 0.4 0.0 - 1.2 mg/dL    Globulin 2.9 gm/dL    A/G Ratio 1.3 g/dL    BUN/Creatinine Ratio 11.0 7.0 - 25.0    Anion Gap 11.0 5.0 - 15.0 mmol/L    eGFR 23.2 (L) >60.0 mL/min/1.73   High Sensitivity Troponin T    Collection Time: 04/11/25  5:25 PM    Specimen: Blood   Result Value Ref Range    HS Troponin T 19 <22 ng/L   CBC Auto Differential    Collection Time: 04/11/25  5:25 PM    Specimen: Blood   Result Value Ref Range    WBC 6.31 3.40 - 10.80 10*3/mm3    RBC 4.14 4.14 - 5.80 10*6/mm3    Hemoglobin 11.8 (L) 13.0 - 17.7 g/dL    Hematocrit 36.4 (L) 37.5 - 51.0 %    MCV 87.9 79.0 - 97.0 fL    MCH 28.5 26.6 - 33.0 pg    MCHC 32.4 31.5 - 35.7 g/dL    RDW 17.2 (H) 12.3 - 15.4 %    RDW-SD 55.4 (H) 37.0 - 54.0 fl    MPV 11.2 6.0 - 12.0 fL    Platelets 195 140 - 450 10*3/mm3    Neutrophil % 55.2 42.7 - 76.0 %    Lymphocyte % 22.5 19.6 - 45.3 %    Monocyte % 12.4 (H) 5.0 - 12.0 %    Eosinophil % 9.2 (H) 0.3 - 6.2 %    Basophil % 0.5 0.0 - 1.5 %    Immature Grans % 0.2 0.0 - 0.5 %    Neutrophils, Absolute 3.49 1.70 - 7.00 10*3/mm3    Lymphocytes, Absolute 1.42 0.70 - 3.10 10*3/mm3    Monocytes,  Absolute 0.78 0.10 - 0.90 10*3/mm3    Eosinophils, Absolute 0.58 (H) 0.00 - 0.40 10*3/mm3    Basophils, Absolute 0.03 0.00 - 0.20 10*3/mm3    Immature Grans, Absolute 0.01 0.00 - 0.05 10*3/mm3    nRBC 0.0 0.0 - 0.2 /100 WBC   Lactic Acid, Plasma    Collection Time: 04/11/25  5:25 PM    Specimen: Blood   Result Value Ref Range    Lactate 1.5 0.5 - 2.0 mmol/L       If labs were ordered, I independently reviewed the results and considered them in treating the patient.        RADIOLOGY  CT Head Without Contrast  Result Date: 4/11/2025  CT HEAD WO CONTRAST Date of Exam: 4/11/2025 5:47 PM EDT Indication: DVT with hx of recent ICH, scan prior to initiating anticoagulation. Comparison: Head CT 1/24/2025 Technique: Axial CT images were obtained of the head without contrast administration.  Automated exposure control and iterative construction methods were used. Findings: Encephalomalacia in the right occipital and temporal lobes representing sequelae of prior infarct and prior hemorrhage. No acute intracranial hemorrhage. Chronic left occipital lobe and left cerebellar infarcts. . Intact appearing gray-white differentiation otherwise. No extra-axial fluid collection.No significant mass effect. No hydrocephalus. Mild generalized parenchymal volume loss. Moderate periventricular and subcortical white matter hypoattenuation, nonspecific, perhaps from small vessel ischemic/hypertensive changes in a patient of this age.There are intracranial atherosclerotic calcifications. Mild scattered mucosal thickening in the paranasal sinuses.Mastoid air cells are essentially clear.. Bilateral lens replacements No acute or aggressive appearing bony or extracranial soft tissue process.     Impression: No acute intracranial findings. Encephalomalacia in the right occipital and temporal lobes representing sequelae of prior infarct and prior hemorrhage. Chronic left occipital lobe and left cerebellar infarcts. Electronically Signed: Tab ZAYAS  Raine  4/11/2025 6:09 PM EDT  Workstation ID: [id number]      I ordered and independently reviewed the above noted radiographic studies.      I viewed images of head CT which showed no active bleeding or acute pathology per my independent interpretation.    See radiologist's dictation for official interpretation.        PROCEDURES    Procedures    No orders to display       MEDICATIONS GIVEN IN ER    Medications - No data to display      MEDICAL DECISION MAKING, PROGRESS, and CONSULTS    All labs, if obtained, have been independently reviewed by me.  All radiology studies, if obtained, have been reviewed by me and the radiologist dictating the report.  All EKGs, if obtained, have been independently viewed and interpreted by me/my attending physician.      Discussion below represents my analysis of pertinent findings related to patient's condition, differential diagnosis, treatment plan and final disposition.                                          Differential diagnosis:    DVT, hypercoagulability, cellulitis, abscess, sepsis, anemia, electrolyte abnormality      Additional sources:    - Discussed/ obtained information from independent historians: Son    - External (non-ED) record review: Hospitalization for ischemic stroke with hemorrhagic conversion in January his Coumadin was stopped and IVC filter was placed for chronic right lower extremity DVT.    - Chronic or social conditions impacting care: History of recent hemorrhagic stroke.  DVT        Orders placed during this visit:  Orders Placed This Encounter   Procedures    Blood Culture - Blood,    Blood Culture - Blood,    CT Head Without Contrast    Protime-INR    aPTT    Comprehensive Metabolic Panel    High Sensitivity Troponin T    CBC Auto Differential    Lactic Acid, Plasma    CBC & Differential         Additional orders considered but not ordered:      ED Course:    Consultants:      ED Course as of 04/11/25 2240   Fri Apr 11, 2025   1620 This is an  84-year-old man with chronic right lower extremity DVT, IVC filter previously on Coumadin and hemorrhagic stroke in January who presents for worsening leg swelling and itching.  He is currently being treated for cellulitis in that leg with doxycycline but the treatment was stopped on Wednesday when he had a duplex ultrasound that showed blood clot on the right he spoke with Dr. Hernandez his cardiologist today saying he was having leg swelling on the left as well and he was advised to come the emergency department for bilateral lower extremity duplex.  He denies fevers or systemic symptoms.  He denies headache or neurologic symptoms.  No other complaints at this time. [CC]   8749 He arrived awake and alert vitals within normal limits and generally well-appearing but he does appear to have cellulitis of the right lower extremity with palpable warmth and erythema.  Mild symmetric pitting lower extremity edema.  Stat duplex ultrasound ordered.  Chart review from his previous hospitalization when he had the hemorrhagic stroke in January shows that he has a known chronic right lower extremity DVT his anticoagulation was stopped and IVC filter was placed cardiology has been managing this.  Patient and his son were unaware of the DVT and when the duplex was done at his nursing facility earlier this week I thought it was a new diagnosis.  Labs generally reassuring CBC not showing significant leukocytosis likewise no elevation in lactate.  He did have a brief course of doxycycline but was only on this for 2 days prior to being stopped and his redness and swelling being attributed to the DVT.  Metabolic panel shows significantly elevated creatinine at 2.64 however this is relatively stable with prior values.  Duplex ultrasound shows no left-sided blood clot and his right-sided DVT is unchanged from prior.  He also has some lymphadenopathy that is potentially pointing towards infection/cellulitis.  With his IVC filter in place  and his blood clot unchanged I think he can safely follow-up with his cardiologist on this.  I have restarted the doxycycline.  Counseled on return precautions verbally expressed understanding of these. [CC]      ED Course User Index  [CC] Jose Bernal MD              Shared Decision Making:  After my consideration of clinical presentation and any laboratory/radiology studies obtained, I discussed the findings with the patient/patient representative who is in agreement with the treatment plan and the final disposition.   Risks and benefits of discharge and/or observation/admission were discussed.       AS OF 22:40 EDT VITALS:    BP - 153/81  HR - 63  TEMP - 98.3 °F (36.8 °C) (Oral)  O2 SATS - 97%                  DIAGNOSIS  Final diagnoses:   Chronic deep vein thrombosis (DVT) of femoral vein of right lower extremity   Cellulitis of right leg   Leg swelling         DISPOSITION  DISCHARGE    Patient discharged in stable condition.    Reviewed implications of results, diagnosis, meds, responsibility to follow up, warning signs and symptoms of possible worsening, potential complications and reasons to return to ER.    Patient/Family voiced understanding of above instructions.    Discussed plan for discharge, as there is no emergent indication for admission.  Pt/family is agreeable and understands need for follow up and possible repeat testing.  Pt/family is aware that discharge does not mean that nothing is wrong but that it indicates no emergency is currently present that requires admission and they must continue care with follow-up as given below or with a physician of their choice.     FOLLOW-UP  Avelino Hernandez MD  [address]  [address] [place] [id number] [phone]    Call            Medication List        New Prescriptions      doxycycline 100 MG capsule  Commonly known as: MONODOX  Take 1 capsule by mouth 2 (Two) Times a Day for 7 days.               Where to Get Your Medications         These medications were sent to [facility] [id number] - [place], [place] - [address] AT [facility] [address] - [phone] PH - [phone] [facility] [address] [place] , [facility] [address]      Phone: [phone]   bumetanide 1 MG tablet  doxycycline 100 MG capsule             Please note that portions of this document were completed with voice recognition software.        Jose Bernal MD  04/11/25 2485

## 2025-04-14 LAB
BH CV LOWER VASCULAR LEFT COMMON FEMORAL AUGMENT: NORMAL
BH CV LOWER VASCULAR LEFT COMMON FEMORAL COMPRESS: NORMAL
BH CV LOWER VASCULAR LEFT COMMON FEMORAL PHASIC: NORMAL
BH CV LOWER VASCULAR LEFT COMMON FEMORAL SPONT: NORMAL
BH CV LOWER VASCULAR LEFT DISTAL FEMORAL AUGMENT: NORMAL
BH CV LOWER VASCULAR LEFT DISTAL FEMORAL COMPRESS: NORMAL
BH CV LOWER VASCULAR LEFT DISTAL FEMORAL PHASIC: NORMAL
BH CV LOWER VASCULAR LEFT DISTAL FEMORAL SPONT: NORMAL
BH CV LOWER VASCULAR LEFT GASTRONEMIUS COMPRESS: NORMAL
BH CV LOWER VASCULAR LEFT GREATER SAPH AK COMPRESS: NORMAL
BH CV LOWER VASCULAR LEFT GREATER SAPH BK COMPRESS: NORMAL
BH CV LOWER VASCULAR LEFT LESSER SAPH COMPRESS: NORMAL
BH CV LOWER VASCULAR LEFT MID FEMORAL AUGMENT: NORMAL
BH CV LOWER VASCULAR LEFT MID FEMORAL COMPRESS: NORMAL
BH CV LOWER VASCULAR LEFT MID FEMORAL PHASIC: NORMAL
BH CV LOWER VASCULAR LEFT MID FEMORAL SPONT: NORMAL
BH CV LOWER VASCULAR LEFT PERONEAL COMPRESS: NORMAL
BH CV LOWER VASCULAR LEFT POPLITEAL AUGMENT: NORMAL
BH CV LOWER VASCULAR LEFT POPLITEAL COMPRESS: NORMAL
BH CV LOWER VASCULAR LEFT POPLITEAL PHASIC: NORMAL
BH CV LOWER VASCULAR LEFT POPLITEAL SPONT: NORMAL
BH CV LOWER VASCULAR LEFT POSTERIOR TIBIAL COMPRESS: NORMAL
BH CV LOWER VASCULAR LEFT PROFUNDA FEMORAL PHASIC: NORMAL
BH CV LOWER VASCULAR LEFT PROFUNDA FEMORAL SPONT: NORMAL
BH CV LOWER VASCULAR LEFT PROXIMAL FEMORAL AUGMENT: NORMAL
BH CV LOWER VASCULAR LEFT PROXIMAL FEMORAL COMPRESS: NORMAL
BH CV LOWER VASCULAR LEFT PROXIMAL FEMORAL PHASIC: NORMAL
BH CV LOWER VASCULAR LEFT PROXIMAL FEMORAL SPONT: NORMAL
BH CV LOWER VASCULAR LEFT SAPHENOFEMORAL JUNCTION AUGMENT: NORMAL
BH CV LOWER VASCULAR LEFT SAPHENOFEMORAL JUNCTION COMPRESS: NORMAL
BH CV LOWER VASCULAR LEFT SAPHENOFEMORAL JUNCTION PHASIC: NORMAL
BH CV LOWER VASCULAR LEFT SAPHENOFEMORAL JUNCTION SPONT: NORMAL
BH CV LOWER VASCULAR RIGHT COMMON FEMORAL AUGMENT: NORMAL
BH CV LOWER VASCULAR RIGHT COMMON FEMORAL COMPRESS: NORMAL
BH CV LOWER VASCULAR RIGHT COMMON FEMORAL PHASIC: NORMAL
BH CV LOWER VASCULAR RIGHT COMMON FEMORAL SPONT: NORMAL
BH CV LOWER VASCULAR RIGHT DISTAL FEMORAL AUGMENT: NORMAL
BH CV LOWER VASCULAR RIGHT DISTAL FEMORAL COMPRESS: NORMAL
BH CV LOWER VASCULAR RIGHT DISTAL FEMORAL PHASIC: NORMAL
BH CV LOWER VASCULAR RIGHT DISTAL FEMORAL SPONT: NORMAL
BH CV LOWER VASCULAR RIGHT GASTRONEMIUS COMPRESS: NORMAL
BH CV LOWER VASCULAR RIGHT GREATER SAPH AK COMPRESS: NORMAL
BH CV LOWER VASCULAR RIGHT GREATER SAPH BK COMPRESS: NORMAL
BH CV LOWER VASCULAR RIGHT LESSER SAPH COMPRESS: NORMAL
BH CV LOWER VASCULAR RIGHT MID FEMORAL AUGMENT: NORMAL
BH CV LOWER VASCULAR RIGHT MID FEMORAL COMPRESS: NORMAL
BH CV LOWER VASCULAR RIGHT MID FEMORAL PHASIC: NORMAL
BH CV LOWER VASCULAR RIGHT MID FEMORAL SPONT: NORMAL
BH CV LOWER VASCULAR RIGHT PERONEAL COMPRESS: NORMAL
BH CV LOWER VASCULAR RIGHT POPLITEAL AUGMENT: NORMAL
BH CV LOWER VASCULAR RIGHT POPLITEAL COMPRESS: NORMAL
BH CV LOWER VASCULAR RIGHT POPLITEAL PHASIC: NORMAL
BH CV LOWER VASCULAR RIGHT POPLITEAL SPONT: NORMAL
BH CV LOWER VASCULAR RIGHT POSTERIOR TIBIAL COMPRESS: NORMAL
BH CV LOWER VASCULAR RIGHT PROFUNDA FEMORAL PHASIC: NORMAL
BH CV LOWER VASCULAR RIGHT PROFUNDA FEMORAL SPONT: NORMAL
BH CV LOWER VASCULAR RIGHT PROXIMAL FEMORAL AUGMENT: NORMAL
BH CV LOWER VASCULAR RIGHT PROXIMAL FEMORAL COMPRESS: NORMAL
BH CV LOWER VASCULAR RIGHT PROXIMAL FEMORAL PHASIC: NORMAL
BH CV LOWER VASCULAR RIGHT PROXIMAL FEMORAL SPONT: NORMAL
BH CV LOWER VASCULAR RIGHT SAPHENOFEMORAL JUNCTION AUGMENT: NORMAL
BH CV LOWER VASCULAR RIGHT SAPHENOFEMORAL JUNCTION COMPRESS: NORMAL
BH CV LOWER VASCULAR RIGHT SAPHENOFEMORAL JUNCTION PHASIC: NORMAL
BH CV LOWER VASCULAR RIGHT SAPHENOFEMORAL JUNCTION SPONT: NORMAL
BH CV VAS PRELIMINARY FINDINGS SCRIPTING: 1

## 2025-04-16 LAB
BACTERIA SPEC AEROBE CULT: NORMAL
BACTERIA SPEC AEROBE CULT: NORMAL

## 2025-04-22 ENCOUNTER — OFFICE VISIT (OUTPATIENT)
Dept: NEUROSURGERY | Facility: CLINIC | Age: 85
End: 2025-04-22
Payer: MEDICARE

## 2025-04-22 ENCOUNTER — HOSPITAL ENCOUNTER (OUTPATIENT)
Dept: GENERAL RADIOLOGY | Facility: HOSPITAL | Age: 85
Discharge: HOME | End: 2025-04-22
Payer: MEDICARE

## 2025-04-22 ENCOUNTER — HOSPITAL ENCOUNTER (OUTPATIENT)
Dept: CT IMAGING | Facility: HOSPITAL | Age: 85
Discharge: HOME | End: 2025-04-22
Payer: MEDICARE

## 2025-04-22 VITALS — WEIGHT: 178 LBS | BODY MASS INDEX: 24.11 KG/M2 | HEIGHT: 72 IN | TEMPERATURE: 97.3 F

## 2025-04-22 DIAGNOSIS — S12.491D OTHER CLOSED NONDISPLACED FRACTURE OF FIFTH CERVICAL VERTEBRA WITH ROUTINE HEALING, SUBSEQUENT ENCOUNTER: Primary | ICD-10-CM

## 2025-04-22 DIAGNOSIS — S12.491D OTHER CLOSED NONDISPLACED FRACTURE OF FIFTH CERVICAL VERTEBRA WITH ROUTINE HEALING, SUBSEQUENT ENCOUNTER: ICD-10-CM

## 2025-04-22 PROCEDURE — 99213 OFFICE O/P EST LOW 20 MIN: CPT

## 2025-04-22 PROCEDURE — 1160F RVW MEDS BY RX/DR IN RCRD: CPT

## 2025-04-22 PROCEDURE — 1159F MED LIST DOCD IN RCRD: CPT

## 2025-04-22 PROCEDURE — 72040 X-RAY EXAM NECK SPINE 2-3 VW: CPT

## 2025-04-22 PROCEDURE — 72125 CT NECK SPINE W/O DYE: CPT

## 2025-04-22 RX ORDER — DOXYCYCLINE 100 MG/1
100 CAPSULE ORAL DAILY
COMMUNITY
Start: 2025-04-07

## 2025-04-22 RX ORDER — AMLODIPINE BESYLATE 2.5 MG/1
2.5 TABLET ORAL DAILY
COMMUNITY
Start: 2025-03-10

## 2025-04-22 RX ORDER — PANTOPRAZOLE SODIUM 40 MG/1
40 TABLET, DELAYED RELEASE ORAL DAILY
COMMUNITY
Start: 2025-03-10

## 2025-04-22 RX ORDER — ATORVASTATIN CALCIUM 80 MG/1
80 TABLET, FILM COATED ORAL DAILY
COMMUNITY
Start: 2025-03-10

## 2025-04-22 RX ORDER — CARVEDILOL 6.25 MG/1
6.25 TABLET ORAL 2 TIMES DAILY WITH MEALS
COMMUNITY
Start: 2025-03-10

## 2025-04-22 RX ORDER — SERTRALINE HYDROCHLORIDE 25 MG/1
25 TABLET, FILM COATED ORAL DAILY
COMMUNITY
Start: 2025-03-10

## 2025-04-22 NOTE — PROGRESS NOTES
Name: Toño Alcala    : 1940     MRN: [id number]     Primary Care Provider: Radu Francis MD    Chief Complaint  Follow-up      History of Present Illness:  Toño Alcala is a 84 y.o. male who presents to the clinic today for follow-up regarding oblique fracture through C5 and C6 vertebral bodies sustained after a fall roughly 3 months ago.  He has been in a cervical collar since and been wearing it appropriately.  Denies any current pain today.  Denies any pain radiating down his arms, numbness, tingling, weakness.  He is overall feeling well.     PMHX  Allergies:  Allergies   Allergen Reactions    Penicillins Hives and Swelling     Per patient, has tolerated Keflex     Medications    Current Outpatient Medications:     acetaminophen (TYLENOL) 325 MG tablet, Take 2 tablets by mouth As Needed for Mild Pain, Moderate Pain, Headache or Fever., Disp: , Rfl:     amLODIPine (NORVASC) 2.5 MG tablet, Take 1 tablet by mouth Daily., Disp: , Rfl:     atorvastatin (LIPITOR) 80 MG tablet, Take 1 tablet by mouth Daily., Disp: , Rfl:     bumetanide (BUMEX) 1 MG tablet, Take 1 tablet by mouth As Needed (edema / weight gain (>3 lbs/day))., Disp: 30 tablet, Rfl: 0    carvedilol (COREG) 6.25 MG tablet, Take 1 tablet by mouth 2 (Two) Times a Day With Meals., Disp: , Rfl:     doxycycline (VIBRAMYCIN) 100 MG capsule, Take 1 capsule by mouth Daily., Disp: , Rfl:     multivitamin (THERAGRAN) tablet tablet, Take 1 tablet by mouth Daily., Disp: , Rfl:     omeprazole (priLOSEC) 20 MG capsule, Take 1 capsule by mouth Every Morning Before Breakfast., Disp: , Rfl:     pantoprazole (PROTONIX) 40 MG EC tablet, Take 1 tablet by mouth Daily., Disp: , Rfl:     rosuvastatin (CRESTOR) 20 MG tablet, Take 1 tablet by mouth Every Night., Disp: , Rfl:     sertraline (ZOLOFT) 25 MG tablet, Take 1 tablet by mouth Daily., Disp: , Rfl:     thiamine (VITAMIN B1) 100 MG tablet, Take 1 tablet by mouth Daily., Disp: , Rfl:   Past Medical  History:  Past Medical History:   Diagnosis Date    Acute diverticulitis 01/29/2020    Allergic 60 yrs ago    Arthritis     Arthritis of neck Fusion 1992    Bilateral radial fractures 1962    fracture bilateral radial heads    CAD (coronary artery disease)     Calculus of gallbladder without cholecystitis without obstruction 01/29/2020    Cataract     Cervical disc disorder Fusion 1992    Cholelithiasis     Chronic kidney disease 2020    Clotting disorder     CVD (cardiovascular disease)     History of TIA 1989    Diabetes mellitus     DVT (deep venous thrombosis)     Right lower extremity DVT and pulmonary embolism in 06/2015, idiopathic    DVT of proximal lower limb 06/22/2015    proximal DVT right leg, small PE- anticoagulation    Dyslipidemia     Erectile dysfunction     Fracture 1952    H/o pf left forearm fracture    Fracture of right hand 1980    Fracture of wrist 1980    GERD (gastroesophageal reflux disease)     with hiatal hernia    HL (hearing loss)     Hx of angina pectoris     Hypertension     Normal renal angiography 02/16/11    Knee swelling Several years ago    Left wrist fracture 1953    Lumbosacral disc disease 1996    Osgood-Schlatter's disease 1955    Osteoarthritis     Right wrist fracture     Stroke     Vertigo     Wears glasses      Past Surgical History:  Past Surgical History:   Procedure Laterality Date    APPENDECTOMY  1957    BACK SURGERY      CARDIAC CATHETERIZATION  2011    with vein graft    CATARACT EXTRACTION Left     CERVICAL FUSION      CERVICAL FUSION  1992    C3-4    COLONOSCOPY  2013    CORONARY ARTERY BYPASS GRAFT  1994    HERNIA REPAIR Right 2011    inguinal    INGUINAL HERNIA REPAIR Left 10/29/2019    Procedure: INGUINAL HERNIA REPAIR LEFT;  Surgeon: Charisma Raines MD;  Location: [facility] OR;  Service: General    INTERVENTIONAL RADIOLOGY PROCEDURE N/A 1/23/2025    Procedure: IVC Filter Placement;  Surgeon: Avelino Hernandez MD;  Location:  [facility];   Service: Cardiovascular;  Laterality: N/A;    LUMBAR LAMINECTOMY      laminectomy, lumbar, L3    NECK SURGERY      OTHER SURGICAL HISTORY      wrist surgery    SPINE SURGERY  1996    TONSILLECTOMY AND ADENOIDECTOMY  194    TRIGGER POINT INJECTION   -     VASECTOMY  1972     Social Hx:  Social History     Tobacco Use    Smoking status: Former     Current packs/day: 0.00     Average packs/day: 1.5 packs/day for 34.8 years (52.2 ttl pk-yrs)     Types: Cigarettes, Pipe, Cigars     Start date: 1962     Quit date: 1997     Years since quittin.0     Passive exposure: Past    Smokeless tobacco: Never    Tobacco comments:     QUIT DATE 1992   Vaping Use    Vaping status: Never Used   Substance Use Topics    Alcohol use: Yes     Alcohol/week: 11.0 - 13.0 standard drinks of alcohol     Types: 7 Glasses of wine, 2 Cans of beer, 2 - 4 Shots of liquor per week     Comment: glass of wine everyday     Drug use: No     Family Hx:  Family History   Problem Relation Age of Onset    Arthritis Mother         OA    Heart disease Father     Diabetes Father     Heart attack Father     Heart disease Brother     Heart attack Brother     Diabetes Brother     Diabetes Son     Hypertension Other     Cancer Other      Review of Systems:        Review of Systems   Constitutional:  Negative for activity change, appetite change, chills, diaphoresis, fatigue, fever and unexpected weight change.   HENT:  Negative for congestion, dental problem, drooling, ear discharge, ear pain, facial swelling, hearing loss, mouth sores, nosebleeds, postnasal drip, rhinorrhea, sinus pressure, sinus pain, sneezing, sore throat, tinnitus, trouble swallowing and voice change.    Eyes:  Negative for photophobia, pain, discharge, redness, itching and visual disturbance.   Respiratory:  Negative for apnea, cough, choking, chest tightness, shortness of breath, wheezing and stridor.    Cardiovascular:  Negative for chest pain,  "palpitations and leg swelling.   Gastrointestinal:  Negative for abdominal distention, abdominal pain, anal bleeding, blood in stool, constipation, diarrhea, nausea, rectal pain and vomiting.   Endocrine: Negative for cold intolerance, heat intolerance, polydipsia, polyphagia and polyuria.   Genitourinary:  Negative for decreased urine volume, difficulty urinating, dysuria, enuresis, flank pain, frequency, genital sores, hematuria, penile discharge, penile pain, penile swelling, scrotal swelling, testicular pain and urgency.   Musculoskeletal:  Negative for arthralgias, back pain, gait problem, joint swelling, myalgias, neck pain and neck stiffness.   Skin:  Negative for color change, pallor, rash and wound.   Allergic/Immunologic: Negative for environmental allergies, food allergies and immunocompromised state.   Neurological:  Negative for dizziness, tremors, seizures, syncope, facial asymmetry, speech difficulty, weakness, light-headedness, numbness and headaches.   Hematological:  Negative for adenopathy. Does not bruise/bleed easily.   Psychiatric/Behavioral:  Negative for agitation, behavioral problems, confusion, decreased concentration, dysphoric mood, hallucinations, self-injury, sleep disturbance and suicidal ideas. The patient is not nervous/anxious and is not hyperactive.           Vital Signs: Temp 97.3 °F (36.3 °C) (Infrared)   Ht 182.9 cm (72.01\")   Wt 80.7 kg (178 lb)   BMI 24.14 kg/m²      Physical Exam  Awake, alert and oriented x 3  Speech f/c  Opens eyes spontaneously  Pupils 3 mm rx bilaterally  Extraocular muscles intact bilaterally  Face symmetric bilaterally  Tongue midline  5/5 in all 4 extremities      Social History    Tobacco Use      Smoking status: Former        Packs/day: 0.00        Years: 1.5 packs/day for 34.8 years (52.2 ttl pk-yrs)        Types: Cigarettes, Pipe, Cigars        Start date: 1962        Quit date: 1997        Years since quittin.0        Passive " exposure: Past      Smokeless tobacco: Never      Tobacco comments: QUIT DATE 01/01/1992       Tobacco Use: Medium Risk (4/22/2025)    Patient History     Smoking Tobacco Use: Former     Smokeless Tobacco Use: Never     Passive Exposure: Past         STEADI Fall Risk Assessment was completed, and patient is at MODERATE risk for falls. Assessment completed on:4/22/2025      Diagnostic Studies: (All imaging is independently reviewed unless stated otherwise.)        Assessment/Plan    Diagnoses and all orders for this visit:    1. Other closed nondisplaced fracture of fifth cervical vertebra with routine healing, subsequent encounter (Primary)  -     CT Cervical Spine Without Contrast; Future         This is an 84-year-old male who I been following for an oblique fracture through C5 and C6 vertebral bodies after a fall about 3 months ago. He has been wearing his cervical collar appropriately and denies any neck pain or radicular symptoms. He is neurologically intact on exam.  I have discussed patient case and imaging with Dr. Byrd who would like patient to obtain new CT cervical spine.  He will follow-up with Dr. Byrd in the office after imaging is completed.  He will continue wearing cervical collar at all times. Patient encouraged to contact us if he has any changes in his condition or any concerns.    Any copied data from previous notes included in the (1) HPI, (2) PE, (3) MDM and/or Assessment and Plan has been reviewed and accurate as of 04/22/25.    Heather Blake PA-C  04/22/25

## 2025-04-23 ENCOUNTER — PATIENT OUTREACH (OUTPATIENT)
Dept: CASE MANAGEMENT | Facility: OTHER | Age: 85
End: 2025-04-23
Payer: MEDICARE

## 2025-04-23 DIAGNOSIS — I61.8 OTHER RIGHT-SIDED NONTRAUMATIC INTRACEREBRAL HEMORRHAGE: ICD-10-CM

## 2025-04-23 DIAGNOSIS — I25.10 CORONARY ARTERY DISEASE INVOLVING NATIVE CORONARY ARTERY OF NATIVE HEART WITHOUT ANGINA PECTORIS: Primary | ICD-10-CM

## 2025-04-23 DIAGNOSIS — E11.9 TYPE 2 DIABETES MELLITUS WITHOUT COMPLICATION, WITHOUT LONG-TERM CURRENT USE OF INSULIN: ICD-10-CM

## 2025-04-23 DIAGNOSIS — I10 ESSENTIAL HYPERTENSION: ICD-10-CM
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
## 2025-04-23 DIAGNOSIS — I82.5Y1 CHRONIC DEEP VEIN THROMBOSIS (DVT) OF PROXIMAL VEIN OF RIGHT LOWER EXTREMITY: ICD-10-CM

## 2025-04-23 NOTE — PLAN OF CARE
Problem: General Plan of Care  Goal: Make and Keep All Appointments  Outcome: Progressing     Problem: Fall Risk  Goal: Prevent Falls and Injury  Outcome: Progressing     Problem: Diabetes Type 2  Goal: Monitor My Blood Sugar  Outcome: Progressing     Problem: Hypertension  Goal: Track and Manage My Blood Pressure  Outcome: Progressing

## 2025-04-23 NOTE — OUTREACH NOTE
"[facility] Interim Update    RN-ACM made outreach to patient regarding recent ED visit. Patient was pleasant and appreciative of call. Role and reason for call explained. Patient currently resides at the [facility], but is moving to [place] in 4 weeks. Patient had f/u with neurosurgery yesterday, and was instructed to continue to wear the neck brace until the the CT scan results come back. Patient ambulates with walker, no residual swallowing issues noted from his stroke in January. Patient does report that the stroke impacted his depth perception and visual acuity.    F/u outreach scheduled.    Care Coordination        Adult Patient Profile  Questions/Answers      Flowsheet Row Most Recent Value   Symptoms/Conditions Managed at Home cardiovascular, chronic pain, diabetes, type 2, hematologic, musculoskeletal, neurological   Cardiovascular Symptoms/Conditions coronary artery disease, hypertension   Hematologic Symptoms/Conditions clotting disorder   Musculoskeletal Symptoms/Conditions back pain, fracture, mobility limited   Neurological Symptoms/Conditions stroke, hemorrhagic        Send Education  AMBULATORY CASE MANAGEMENT NOTE    Names and Relationships of Patient/Support Persons: Contact: Toño Alcala \"Adama\"; Relationship: Self -         Education Documentation  Safety, taught by Thuy Calles RN at 4/23/2025 12:42 PM.  Learner: Patient  Readiness: Acceptance  Method: Explanation  Response: Verbalizes Understanding          Thuy STROUD  Ambulatory Case Management    4/23/2025, 12:44 EDT  "

## 2025-04-24 ENCOUNTER — OFFICE VISIT (OUTPATIENT)
Dept: NEUROSURGERY | Facility: CLINIC | Age: 85
End: 2025-04-24
Payer: MEDICARE

## 2025-04-24 VITALS — TEMPERATURE: 97.8 F | WEIGHT: 178 LBS | BODY MASS INDEX: 24.11 KG/M2 | HEIGHT: 72 IN

## 2025-04-24 DIAGNOSIS — S12.491D OTHER CLOSED NONDISPLACED FRACTURE OF FIFTH CERVICAL VERTEBRA WITH ROUTINE HEALING, SUBSEQUENT ENCOUNTER: Primary | ICD-10-CM

## 2025-04-24 NOTE — PROGRESS NOTES
Patient: Toño Alcala  : 1940    Primary Care Provider: Radu Francis MD    Requesting Provider: As above      Chief Complaint: Cervical fracture    History of Present Illness: This is a 84 y.o. male who presents for follow-up regarding a oblique fracture through fused to C5-6 vertebral body sustained after a fall 3 months ago.  He is here today for follow-up.  He has been wearing his collar appropriately and denies any neck pain.  He has not had any numbness or tingling in the arms and denies any extremity weakness.    PMHX  Allergies:  Allergies   Allergen Reactions    Penicillins Hives and Swelling     Per patient, has tolerated Keflex     Medications    Current Outpatient Medications:     acetaminophen (TYLENOL) 325 MG tablet, Take 2 tablets by mouth As Needed for Mild Pain, Moderate Pain, Headache or Fever., Disp: , Rfl:     amLODIPine (NORVASC) 2.5 MG tablet, Take 1 tablet by mouth Daily., Disp: , Rfl:     atorvastatin (LIPITOR) 80 MG tablet, Take 1 tablet by mouth Daily., Disp: , Rfl:     bumetanide (BUMEX) 1 MG tablet, Take 1 tablet by mouth As Needed (edema / weight gain (>3 lbs/day))., Disp: 30 tablet, Rfl: 0    carvedilol (COREG) 6.25 MG tablet, Take 1 tablet by mouth 2 (Two) Times a Day With Meals., Disp: , Rfl:     doxycycline (VIBRAMYCIN) 100 MG capsule, Take 1 capsule by mouth Daily., Disp: , Rfl:     multivitamin (THERAGRAN) tablet tablet, Take 1 tablet by mouth Daily., Disp: , Rfl:     omeprazole (priLOSEC) 20 MG capsule, Take 1 capsule by mouth Every Morning Before Breakfast., Disp: , Rfl:     pantoprazole (PROTONIX) 40 MG EC tablet, Take 1 tablet by mouth Daily., Disp: , Rfl:     rosuvastatin (CRESTOR) 20 MG tablet, Take 1 tablet by mouth Every Night., Disp: , Rfl:     sertraline (ZOLOFT) 25 MG tablet, Take 1 tablet by mouth Daily., Disp: , Rfl:     thiamine (VITAMIN B1) 100 MG tablet, Take 1 tablet by mouth Daily., Disp: , Rfl:   Past Medical History:  Past Medical History:    Diagnosis Date    Acute diverticulitis 01/29/2020    Allergic 60 yrs ago    Arthritis     Arthritis of neck Fusion 1992    Bilateral radial fractures 1962    fracture bilateral radial heads    CAD (coronary artery disease)     Calculus of gallbladder without cholecystitis without obstruction 01/29/2020    Cataract     Cervical disc disorder Fusion 1992    Cholelithiasis     Chronic kidney disease 2020    Clotting disorder     CVD (cardiovascular disease)     History of TIA 1989    Diabetes mellitus     DVT (deep venous thrombosis)     Right lower extremity DVT and pulmonary embolism in 06/2015, idiopathic    DVT of proximal lower limb 06/22/2015    proximal DVT right leg, small PE- anticoagulation    Dyslipidemia     Erectile dysfunction     Fracture 1952    H/o pf left forearm fracture    Fracture of right hand 1980    Fracture of wrist 1980    GERD (gastroesophageal reflux disease)     with hiatal hernia    HL (hearing loss)     Hx of angina pectoris     Hypertension     Normal renal angiography 02/16/11    Knee swelling Several years ago    Left wrist fracture 1953    Lumbosacral disc disease 1996    Osgood-Schlatter's disease 1955    Osteoarthritis     Right wrist fracture     Stroke     Vertigo     Wears glasses      Past Surgical History:  Past Surgical History:   Procedure Laterality Date    APPENDECTOMY  1957    BACK SURGERY      CARDIAC CATHETERIZATION  2011    with vein graft    CATARACT EXTRACTION Left     CERVICAL FUSION      CERVICAL FUSION  1992    C3-4    COLONOSCOPY  2013    CORONARY ARTERY BYPASS GRAFT  1994    HERNIA REPAIR Right 2011    inguinal    INGUINAL HERNIA REPAIR Left 10/29/2019    Procedure: INGUINAL HERNIA REPAIR LEFT;  Surgeon: Charisma Raines MD;  Location:  [facility] OR;  Service: General    INTERVENTIONAL RADIOLOGY PROCEDURE N/A 1/23/2025    Procedure: IVC Filter Placement;  Surgeon: Avelino Hernandez MD;  Location:  [facility];  Service: Cardiovascular;   Laterality: N/A;    LUMBAR LAMINECTOMY      laminectomy, lumbar, L3    NECK SURGERY      OTHER SURGICAL HISTORY      wrist surgery    SPINE SURGERY  1996    TONSILLECTOMY AND ADENOIDECTOMY  194    TRIGGER POINT INJECTION   -     VASECTOMY  1972     Social Hx:  Social History     Tobacco Use    Smoking status: Former     Current packs/day: 0.00     Average packs/day: 1.5 packs/day for 34.8 years (52.2 ttl pk-yrs)     Types: Cigarettes, Pipe, Cigars     Start date: 1962     Quit date: 1997     Years since quittin.0     Passive exposure: Past    Smokeless tobacco: Never    Tobacco comments:     QUIT DATE 1992   Vaping Use    Vaping status: Never Used   Substance Use Topics    Alcohol use: Yes     Alcohol/week: 11.0 - 13.0 standard drinks of alcohol     Types: 7 Glasses of wine, 2 Cans of beer, 2 - 4 Shots of liquor per week     Comment: glass of wine everyday     Drug use: No     Family Hx:  Family History   Problem Relation Age of Onset    Arthritis Mother         OA    Heart disease Father     Diabetes Father     Heart attack Father     Heart disease Brother     Heart attack Brother     Diabetes Brother     Diabetes Son     Hypertension Other     Cancer Other      Review of Systems:        Review of Systems   Constitutional:  Negative for activity change, appetite change, chills, diaphoresis, fatigue, fever and unexpected weight change.   HENT:  Negative for congestion, dental problem, drooling, ear discharge, ear pain, facial swelling, hearing loss, mouth sores, nosebleeds, postnasal drip, rhinorrhea, sinus pressure, sinus pain, sneezing, sore throat, tinnitus, trouble swallowing and voice change.    Eyes:  Negative for photophobia, pain, discharge, redness, itching and visual disturbance.   Respiratory:  Negative for apnea, cough, choking, chest tightness, shortness of breath, wheezing and stridor.    Cardiovascular:  Negative for chest pain, palpitations and leg swelling.  "  Gastrointestinal:  Negative for abdominal distention, abdominal pain, anal bleeding, blood in stool, constipation, diarrhea, nausea, rectal pain and vomiting.   Endocrine: Negative for cold intolerance, heat intolerance, polydipsia, polyphagia and polyuria.   Genitourinary:  Negative for decreased urine volume, difficulty urinating, dysuria, enuresis, flank pain, frequency, genital sores, hematuria and urgency.   Musculoskeletal:  Negative for arthralgias, back pain, gait problem, joint swelling, myalgias, neck pain and neck stiffness.   Skin:  Negative for color change, pallor, rash and wound.   Allergic/Immunologic: Negative for environmental allergies, food allergies and immunocompromised state.   Neurological:  Negative for dizziness, tremors, seizures, syncope, facial asymmetry, speech difficulty, weakness, light-headedness, numbness and headaches.   Hematological:  Negative for adenopathy. Does not bruise/bleed easily.   Psychiatric/Behavioral:  Negative for agitation, behavioral problems, confusion, decreased concentration, dysphoric mood, hallucinations, self-injury, sleep disturbance and suicidal ideas. The patient is not nervous/anxious and is not hyperactive.    All other systems reviewed and are negative.       Physical Exam:   Temp 97.8 °F (36.6 °C) (Infrared)   Ht 182.9 cm (72\")   Wt 80.7 kg (178 lb)   BMI 24.14 kg/m²   Awake, alert and oriented x 3  Speech f/c  Opens eyes spont  Pupils 3 mm rx bilaterally  Extraocular muscles intact bilaterally  Normal sensation to light touch in all 3 distributions of CN V bilaterally  Face symmetric bilaterally  Tongue midline  5/5 in all 4 ext  There is no neck pain with palpation or rotation of the neck.    Diagnostic Studies:  All neurological imaging studies were independently reviewed unless stated otherwise    Assessment/Plan:  This is a 84 y.o. male presenting for follow-up regarding an oblique fracture through the fused C5-6 vertebral bodies sustained " from a fall 3 months ago.  Clinically, the patient is doing well.  He is not having any neck pain or upper extremity symptoms.  He has been wearing his c-collar appropriately.  His new CT scan shows significant healing of the previously noted oblique fracture without any new fractures noted.  Given that we have visualization of the healed fracture on imaging and he is not having any symptoms, I told the patient that he no longer needs to wear his c-collar.  I recommended he slowly wean the collar off over the next week or two.  I told the patient to be very cautious of any falls as he is high risk developing a new fracture in his neck or worsening of the previously noted C5-6 fracture, which could result in a spinal cord injury.  He understood and was in agreement with the plan.  At this point, I do not think he requires scheduled follow-up, but we will be available for for any further questions or concerns    Diagnoses and all orders for this visit:    1. Other closed nondisplaced fracture of fifth cervical vertebra with routine healing, subsequent encounter (Primary)      Toño Alcala  reports that he quit smoking about 28 years ago. His smoking use included cigarettes, pipe, and cigars. He started smoking about 62 years ago. He has a 52.2 pack-year smoking history. He has been exposed to tobacco smoke. He has never used smokeless tobacco.         Juan Jose Byrd MD  04/24/25  15:19 EDT

## 2025-04-30 ENCOUNTER — TELEPHONE (OUTPATIENT)
Dept: CARDIOLOGY | Facility: CLINIC | Age: 85
End: 2025-04-30

## 2025-04-30 ENCOUNTER — PATIENT OUTREACH (OUTPATIENT)
Dept: CASE MANAGEMENT | Facility: OTHER | Age: 85
End: 2025-04-30
Payer: MEDICARE

## 2025-04-30 DIAGNOSIS — E11.9 TYPE 2 DIABETES MELLITUS WITHOUT COMPLICATION, WITHOUT LONG-TERM CURRENT USE OF INSULIN: ICD-10-CM

## 2025-04-30 DIAGNOSIS — I10 ESSENTIAL HYPERTENSION: ICD-10-CM

## 2025-04-30 DIAGNOSIS — I25.10 CORONARY ARTERY DISEASE INVOLVING NATIVE CORONARY ARTERY OF NATIVE HEART WITHOUT ANGINA PECTORIS: Primary | ICD-10-CM

## 2025-04-30 DIAGNOSIS — I82.5Y1 CHRONIC DEEP VEIN THROMBOSIS (DVT) OF PROXIMAL VEIN OF RIGHT LOWER EXTREMITY: ICD-10-CM

## 2025-04-30 NOTE — OUTREACH NOTE
[facility] End of Month Documentation    This Chronic Medical Management Care Plan for Toño Alcala, 84 y.o. male, has been a new plan of care implemented; established and a new plan of care implemented for the month of April.  A cumulative time of 38  minutes was spent on this patient record this month, including phone call with patient; electronic communication with primary care provider; chart review.    Regarding the patient's problems: has CAD (coronary artery disease); DVT (deep venous thrombosis); Diabetes mellitus; Dyslipidemia; GERD (gastroesophageal reflux disease); Hyperlipidemia LDL goal <70; Diabetic nephropathy associated with type 2 diabetes mellitus; Diabetic polyneuropathy associated with type 2 diabetes mellitus; Chronic kidney disease, stage IV (severe); Vitamin D deficiency; Disc disorder of cervical region; Lumbosacral spondylosis without myelopathy; Arthritis of elbow; Acute right-sided low back pain without sciatica; Unifocal PVCs; Essential hypertension; Stage 3 chronic kidney disease due to benign hypertension; BMI 30.0-30.9,adult; Fall; C5 cervical fracture; C6 cervical fracture; Multiple fractures of cervical spine; NSTEMI, initial episode of care; History of pulmonary embolus (PE)/DVT; Acute on chronic diastolic CHF (congestive heart failure); and ICH (intracerebral hemorrhage) on their problem list., the following items were addressed: medications and any changes can be found within the plan section of the note.  A detailed listing of time spent for chronic care management is tracked within each outreach encounter.  Current medications include:  has a current medication list which includes the following prescription(s): acetaminophen, amlodipine, atorvastatin, bumetanide, carvedilol, doxycycline, multivitamin, omeprazole, pantoprazole, rosuvastatin, sertraline, and thiamine. and the patient is reported to be patient is compliant with medication protocol,  Medications are reported to be  effective.  Regarding these diagnoses, referrals were made to the following provider(s):  .  All notes on chart for PCP to review.    The patient was monitored remotely for medications.    The patient's physical needs include:  medication education.     The patient's mental support needs include:  continued support    The patient's cognitive support needs include:  medication; continued support    The patient's psychosocial support needs include:  continued support    The patient's functional needs include: DME    The patient's environmental needs include:  not applicable    Care Plan overall comments:  No data recorded    Refer to previous outreach notes for more information on the areas listed above.    Monthly Billing Diagnoses  (I25.10) Coronary artery disease involving native coronary artery of native heart without angina pectoris    (I82.5Y1) Chronic deep vein thrombosis (DVT) of proximal vein of right lower extremity    (E11.9) Type 2 diabetes mellitus without complication, without long-term current use of insulin    (I10) Essential hypertension    Medications   Medications have been reconciled    Care Plan progress this month:      Recently Modified Care Plans Updates made since 3/30/2025 12:00 AM      No recently modified care plans.             Diabetes Type 2       Monitor My Blood Sugar (Progressing)       Start:  04/23/25    Expected End:  04/22/27         My Blood Sugar Management To Do List       Start:  04/23/25       Why is this important?Checking your blood sugar (glucose) at home helps to keep it from getting very high or very low.Writing the results in a diary or log, or using an alexandria, helps your diabetes care provider know how to care for you.Your blood sugar (glucose) log should have the time, date and results.Also write down the amount of insulin or other medicine that you take.           Fall Risk       Prevent Falls and Injury (Progressing)       Start:  04/23/25    Expected End:  04/22/27          My Fall Prevention To Do List       Start:  04/23/25       Why is this important?Most falls happen when it is hard for you to walk safely. Your balance may be off because of an illness. You may have pain in your knees, hip or other joints.You may be overly tired or taking medicines that make you sleepy. You may not be able to see or hear clearly.Falls can lead to broken bones, bruises or other injuries.There are things you can do to help prevent falling.           General Plan of Care       Make and Keep All Appointments (Progressing)       Start:  04/23/25    Expected End:  04/22/27         My Appointments To Do List       Start:  04/23/25       Why is this important?Part of staying healthy is seeing the doctor for follow-up care.If you forget your appointments, there are some things you can do to stay on track.           Hypertension       Track and Manage My Blood Pressure (Progressing)       Start:  04/23/25    Expected End:  04/22/27         My Blood Pressure Management To Do List       Start:  04/23/25       Why is this important?You may not be able to feel high blood pressure, but it can still hurt your body.High blood pressure can cause heart or kidney problems. It can also cause a stroke.Checking your blood pressure at home and at different times of the day can help to control blood pressure.             Current Specialty Plan of Care Status signed by both patient and provider    Instructions   Patient was provided an electronic copy of care plan  CCM services were explained and offered and patient has accepted these services.  Patient has given their written consent to receive CCM services and understands that this includes the authorization of electronic communication of medical information with the other treating providers.  Patient understands that they may stop CCM services at any time and these changes will be effective at the end of the calendar month and will effectively revocate the agreement  of CCM services.  Patient understands that only one practitioner can furnish and be paid for CCM services during one calendar month.  Patient also understands that there may be co-payment or deductible fees in association with CCM services.  Patient will continue with at least monthly follow-up calls with the Ambulatory .    Thuy STROUD  Ambulatory Case Management    4/30/2025, 09:15 EDT

## 2025-04-30 NOTE — TELEPHONE ENCOUNTER
Caller: ERINN WALKER    Relationship:     Best call back number: [phone]    What is the best time to reach you: ANY    Who are you requesting to speak with (clinical staff, provider,  specific staff member): CLINICAL    What was the call regarding: ERINN WALKER CALLED TO INQUIRE WHAT DIRUETIC THE PATIENT HAS BEEN ON IN THE PAST. THE FACILITY DOCTOR IS INQUIRING. PLEASE CONTACT AT YOUR EARLIEST CONVENIENCE.    Is it okay if the provider responds through MyChart: PLEASE CALL

## 2025-05-02 NOTE — PROGRESS NOTES
[facility]  Subjective:     Encounter Date: 05/05/2025      Patient ID: Toño Alcala is a 84 y.o. male.    Chief Complaint: Coronary artery disease involving coronary bypass graft of       PROBLEM LIST:  Coronary artery disease:  History of CABG x4, 1994 - incomplete database.   [facility], 02/16/2011: 100% native RCA, and severe proximal LAD and LCx disease. Patent SVG to the right PDA. Patent LIMA to the LAD. Patent SVG to the OM.    Stress echo, 03/27/2014: Normal study.   Echo, 01/20/2025: EF 50%. Borderline concentric hypertrophy. Mild MR.   IVC filter placement, 01/23/2025.   Cerebrovascular disease:  History of TIA, 1989.  Intracerebral hemorrhage 1/25 traumatic  Hypertension:  Normal renal angiography, 02/16/2011.  Right lower extremity DVT and pulmonary embolism in 06/2015, idiopathic:  Recurrent idiopathic DVT, 2017.  Started on warfarin per Dr. [place] and [place].  Venous duplex, 07/08/2022: Normal left lower extremity venous duplex scan. Chronic right lower extremity deep vein thrombosis noted in the common femoral.  Venous duplex, 07/24/2022: Same as previous.   Venous duplex, 09/05/2024: Chronic RLE DVT noted in proximal, mid, distal femoral.   Venous duplex, 01/20/2025 Chronic RLE DVT noted in proximal, mid femoral.   Venous duplex, 04/11/2025:  Chronic RLE DVT noted in mid femoral.   DM.  Chronic kidney disease, stage 4:  Bilateral renal US, 05/25/2018: Enlargement of the prostate with calcifications.   Acute on chronic HFpEF  Dyslipidemia.  Arthritis.   GERD/hiatal hernia.   History of a left forearm fracture.    Right wrist fracture.   History of cervical fusion.  History of lumbar laminectomy.  Arthritis.  BPPV.  Shingles.  Surgical history:  T and A.  Appendectomy.      History of Present Illness  Toño Alcala returns today for a follow up with a history of CAD and cardiac risk factors. Since last visit, patient is transitioning to his long-term care  "facility.  He has now walking without assistance.  Overall significant improvement in his functional capacity walks with a walker.  No orthopnea PND does have increasing peripheral edema.  No chest discomfort.    Allergies   Allergen Reactions    Penicillins Hives and Swelling     Per patient, has tolerated Keflex         Current Outpatient Medications:     acetaminophen (TYLENOL) 325 MG tablet, Take 2 tablets by mouth As Needed for Mild Pain, Moderate Pain, Headache or Fever., Disp: , Rfl:     amLODIPine (NORVASC) 2.5 MG tablet, Take 1 tablet by mouth Daily., Disp: , Rfl:     atorvastatin (LIPITOR) 80 MG tablet, Take 1 tablet by mouth Daily., Disp: , Rfl:     bumetanide (BUMEX) 1 MG tablet, Take 1 tablet by mouth As Needed (edema / weight gain (>3 lbs/day))., Disp: 30 tablet, Rfl: 0    carvedilol (COREG) 6.25 MG tablet, Take 1 tablet by mouth 2 (Two) Times a Day With Meals., Disp: , Rfl:     multivitamin (THERAGRAN) tablet tablet, Take 1 tablet by mouth Daily., Disp: , Rfl:     omeprazole (priLOSEC) 20 MG capsule, Take 1 capsule by mouth Every Morning Before Breakfast., Disp: , Rfl:     pantoprazole (PROTONIX) 40 MG EC tablet, Take 1 tablet by mouth Daily., Disp: , Rfl:     rosuvastatin (CRESTOR) 20 MG tablet, Take 1 tablet by mouth Every Night., Disp: , Rfl:     sertraline (ZOLOFT) 25 MG tablet, Take 1 tablet by mouth Daily., Disp: , Rfl:     thiamine (VITAMIN B1) 100 MG tablet, Take 1 tablet by mouth Daily., Disp: , Rfl:     The following portions of the patient's history were reviewed and updated as appropriate: allergies, current medications, past family history, past medical history, past social history, past surgical history and problem list.    ROS       Objective:     Vitals:    05/05/25 1531   BP: 122/56   BP Location: Right arm   Patient Position: Sitting   Cuff Size: Adult   Pulse: 50   SpO2: 92%   Weight: 85.3 kg (188 lb)   Height: 182.9 cm (72\")         Vitals reviewed.   Constitutional:       " Appearance: Well-developed and not in distress.   Neck:      Thyroid: No thyromegaly.      Vascular: No carotid bruit or JVD.   Pulmonary:      Breath sounds: Examination of the right-middle field reveals rales. Examination of the left-middle field reveals rales. Examination of the right-lower field reveals rales. Examination of the left-lower field reveals rales. Rales present.   Cardiovascular:      Regular rhythm.      No gallop. No S3 and S4 gallop.   Pulses:     Intact distal pulses.      Carotid: 2+ bilaterally.     Radial: 2+ bilaterally.  Edema:     Peripheral edema present.     Pretibial: bilateral 3+ edema of the pretibial area.  Abdominal:      General: Bowel sounds are normal.      Palpations: Abdomen is soft. There is no abdominal mass.      Tenderness: There is no abdominal tenderness.   Musculoskeletal:         General: No deformity.      Extremities: No clubbing present.Skin:     General: Skin is warm and dry.      Findings: No rash.   Neurological:      Mental Status: Alert and oriented to person, place, and time.         Lab Review:  Lab Results   Component Value Date    GLUCOSE 99 04/11/2025    BUN 29 (H) 04/11/2025    CREATININE 2.64 (H) 04/11/2025    BCR 11.0 04/11/2025    K 4.6 04/11/2025    CO2 26.0 04/11/2025    CALCIUM 9.4 04/11/2025    ALBUMIN 3.9 04/11/2025    ALKPHOS 246 (H) 04/11/2025    AST 36 04/11/2025    ALT 36 04/11/2025     Lab Results   Component Value Date    CHOL 111 01/21/2025    TRIG 84 01/21/2025    HDL 35 (L) 01/21/2025    LDL 59 01/21/2025      Lab Results   Component Value Date    WBC 6.31 04/11/2025    RBC 4.14 04/11/2025    HGB 11.8 (L) 04/11/2025    HCT 36.4 (L) 04/11/2025    MCV 87.9 04/11/2025     04/11/2025     Lab Results   Component Value Date    TSH 1.080 01/19/2025     Lab Results   Component Value Date    HGBA1C 5.70 (H) 01/21/2025        Procedures      Advance Care Planning   ACP discussion was held with the patient during this visit. Patient has an  advance directive in EMR which is still valid.            Assessment:   Diagnoses and all orders for this visit:    1. Coronary artery disease involving coronary bypass graft of native heart without angina pectoris (Primary)    2. Essential hypertension    3. Mixed hyperlipidemia        Impression:  1. Coronary artery disease.  Status post remote CABG.  Stable no angina    2. Essential hypertension.  Well-controlled    3. Mixed hyperlipidemia.  On high intensity statin    4.  Sinus bradycardia asymptomatic    5.  Chronic HFpEF.  Exacerbated by CKD stage IV.  Volume overloaded somewhat today.    Plan:  Appears mildly volume overloaded today.  Would recommend Bumex 1 mg twice weekly as a standing order, may take additional Bumex as it is ordered on a as needed weight gain basis  Decrease carvedilol to 3.125 mg twice daily.  Continue other current medications.  Revisit in 3 MO, or sooner as needed.          Avelino Hernandez MD      Please note that portions of this note may have been completed with a voice recognition program. Efforts were made to edit the dictations, but occasionally words are mistranscribed.

## 2025-05-05 ENCOUNTER — OFFICE VISIT (OUTPATIENT)
Dept: CARDIOLOGY | Facility: CLINIC | Age: 85
End: 2025-05-05
Payer: MEDICARE

## 2025-05-05 VITALS
SYSTOLIC BLOOD PRESSURE: 122 MMHG | HEART RATE: 50 BPM | HEIGHT: 72 IN | WEIGHT: 188 LBS | DIASTOLIC BLOOD PRESSURE: 56 MMHG | BODY MASS INDEX: 25.47 KG/M2 | OXYGEN SATURATION: 92 %

## 2025-05-05 DIAGNOSIS — E78.2 MIXED HYPERLIPIDEMIA: ICD-10-CM

## 2025-05-05 DIAGNOSIS — I25.810 CORONARY ARTERY DISEASE INVOLVING CORONARY BYPASS GRAFT OF NATIVE HEART WITHOUT ANGINA PECTORIS: Primary | ICD-10-CM

## 2025-05-05 DIAGNOSIS — I10 ESSENTIAL HYPERTENSION: ICD-10-CM

## 2025-05-05 PROCEDURE — G2211 COMPLEX E/M VISIT ADD ON: HCPCS | Performed by: INTERNAL MEDICINE

## 2025-05-05 PROCEDURE — 1160F RVW MEDS BY RX/DR IN RCRD: CPT | Performed by: INTERNAL MEDICINE

## 2025-05-05 PROCEDURE — 1159F MED LIST DOCD IN RCRD: CPT | Performed by: INTERNAL MEDICINE

## 2025-05-05 PROCEDURE — 3078F DIAST BP <80 MM HG: CPT | Performed by: INTERNAL MEDICINE

## 2025-05-05 PROCEDURE — 3074F SYST BP LT 130 MM HG: CPT | Performed by: INTERNAL MEDICINE

## 2025-05-05 PROCEDURE — 99214 OFFICE O/P EST MOD 30 MIN: CPT | Performed by: INTERNAL MEDICINE

## 2025-05-05 RX ORDER — CARVEDILOL 3.12 MG/1
3.12 TABLET ORAL 2 TIMES DAILY
Qty: 60 TABLET | Refills: 11 | Status: SHIPPED | OUTPATIENT
Start: 2025-05-05

## 2025-05-05 RX ORDER — BUMETANIDE 1 MG/1
1 TABLET ORAL 2 TIMES WEEKLY
Qty: 30 TABLET | Refills: 2 | Status: SHIPPED | OUTPATIENT
Start: 2025-05-05

## 2025-05-28 ENCOUNTER — TELEPHONE (OUTPATIENT)
Dept: CASE MANAGEMENT | Facility: OTHER | Age: 85
End: 2025-05-28
Payer: MEDICARE

## 2025-06-07 ENCOUNTER — HOSPITAL ENCOUNTER (EMERGENCY)
Facility: HOSPITAL | Age: 85
Discharge: HOME | End: 2025-06-07
Attending: EMERGENCY MEDICINE
Payer: MEDICARE

## 2025-06-07 VITALS
HEART RATE: 72 BPM | HEIGHT: 72 IN | SYSTOLIC BLOOD PRESSURE: 145 MMHG | BODY MASS INDEX: 24.65 KG/M2 | DIASTOLIC BLOOD PRESSURE: 74 MMHG | OXYGEN SATURATION: 95 % | WEIGHT: 182 LBS | TEMPERATURE: 98.6 F | RESPIRATION RATE: 18 BRPM

## 2025-06-07 DIAGNOSIS — R04.0 EPISTAXIS: Primary | ICD-10-CM

## 2025-06-07 DIAGNOSIS — Z86.79 HISTORY OF CORONARY ARTERY DISEASE: ICD-10-CM

## 2025-06-07 DIAGNOSIS — Z86.39 HISTORY OF DIABETES MELLITUS: ICD-10-CM

## 2025-06-07 PROCEDURE — 99283 EMERGENCY DEPT VISIT LOW MDM: CPT

## 2025-06-08 ENCOUNTER — APPOINTMENT (OUTPATIENT)
Facility: HOSPITAL | Age: 85
DRG: 481 | End: 2025-06-08
Payer: MEDICARE

## 2025-06-08 ENCOUNTER — HOSPITAL ENCOUNTER (EMERGENCY)
Facility: HOSPITAL | Age: 85
Discharge: NURSING, REHAB OR LONG-TERM CARE | DRG: 481 | End: 2025-06-08
Attending: EMERGENCY MEDICINE | Admitting: EMERGENCY MEDICINE
Payer: MEDICARE

## 2025-06-08 VITALS
WEIGHT: 182 LBS | HEART RATE: 72 BPM | BODY MASS INDEX: 26.96 KG/M2 | SYSTOLIC BLOOD PRESSURE: 121 MMHG | OXYGEN SATURATION: 92 % | HEIGHT: 69 IN | DIASTOLIC BLOOD PRESSURE: 73 MMHG | RESPIRATION RATE: 16 BRPM | TEMPERATURE: 98 F

## 2025-06-08 DIAGNOSIS — S72.115A CLOSED NONDISPLACED FRACTURE OF GREATER TROCHANTER OF LEFT FEMUR, INITIAL ENCOUNTER: Primary | ICD-10-CM

## 2025-06-08 PROCEDURE — 73502 X-RAY EXAM HIP UNI 2-3 VIEWS: CPT

## 2025-06-08 PROCEDURE — 96374 THER/PROPH/DIAG INJ IV PUSH: CPT

## 2025-06-08 PROCEDURE — 72192 CT PELVIS W/O DYE: CPT

## 2025-06-08 PROCEDURE — 99284 EMERGENCY DEPT VISIT MOD MDM: CPT | Performed by: EMERGENCY MEDICINE

## 2025-06-08 PROCEDURE — 25010000002 FENTANYL CITRATE (PF) 50 MCG/ML SOLUTION: Performed by: EMERGENCY MEDICINE

## 2025-06-08 PROCEDURE — 72125 CT NECK SPINE W/O DYE: CPT

## 2025-06-08 PROCEDURE — 70450 CT HEAD/BRAIN W/O DYE: CPT

## 2025-06-08 RX ORDER — FENTANYL CITRATE 50 UG/ML
50 INJECTION, SOLUTION INTRAMUSCULAR; INTRAVENOUS ONCE
Refills: 0 | Status: COMPLETED | OUTPATIENT
Start: 2025-06-08 | End: 2025-06-08

## 2025-06-08 RX ADMIN — FENTANYL CITRATE 50 MCG: 50 INJECTION INTRAMUSCULAR; INTRAVENOUS at 07:45

## 2025-06-08 NOTE — ED PROVIDER NOTES
[facility]    EMERGENCY DEPARTMENT ENCOUNTER      Pt Name: Toño Alcala  MRN: [id number]  YOB: 1940  Date of evaluation: 6/7/2025  Provider: Nain Orellana MD    CHIEF COMPLAINT       Chief Complaint   Patient presents with    Nose Bleed         HISTORY OF PRESENT ILLNESS   Toño Alcala is a 84 y.o. male who presents to the emergency department ***       Nursing notes were reviewed.    REVIEW OF SYSTEMS     ROS:  A chief complaint appropriate review of systems was completed and is negative except as noted in the HPI.      PAST MEDICAL HISTORY     Past Medical History:   Diagnosis Date    Acute diverticulitis 01/29/2020    Allergic 60 yrs ago    Arthritis     Arthritis of neck Fusion 1992    Bilateral radial fractures 1962    fracture bilateral radial heads    CAD (coronary artery disease)     Calculus of gallbladder without cholecystitis without obstruction 01/29/2020    Cataract     Cervical disc disorder Fusion 1992    Cholelithiasis     Chronic kidney disease 2020    Clotting disorder     CVD (cardiovascular disease)     History of TIA 1989    Diabetes mellitus     DVT (deep venous thrombosis)     Right lower extremity DVT and pulmonary embolism in 06/2015, idiopathic    DVT of proximal lower limb 06/22/2015    proximal DVT right leg, small PE- anticoagulation    Dyslipidemia     Erectile dysfunction     Fracture 1952    H/o pf left forearm fracture    Fracture of right hand 1980    Fracture of wrist 1980    GERD (gastroesophageal reflux disease)     with hiatal hernia    HL (hearing loss)     Hx of angina pectoris     Hypertension     Normal renal angiography 02/16/11    Knee swelling Several years ago    Left wrist fracture 1953    Lumbosacral disc disease 1996    Osgood-Schlatter's disease 1955    Osteoarthritis     Right wrist fracture     Stroke     Vertigo     Wears glasses          SURGICAL HISTORY       Past Surgical History:   Procedure Laterality Date    APPENDECTOMY  1957    BACK  SURGERY      CARDIAC CATHETERIZATION  2011    with vein graft    CATARACT EXTRACTION Left     CERVICAL FUSION      CERVICAL FUSION  1992    C3-4    COLONOSCOPY  2013    CORONARY ARTERY BYPASS GRAFT  1994    HERNIA REPAIR Right 2011    inguinal    INGUINAL HERNIA REPAIR Left 10/29/2019    Procedure: INGUINAL HERNIA REPAIR LEFT;  Surgeon: Charisma Raines MD;  Location: [facility] OR;  Service: General    INTERVENTIONAL RADIOLOGY PROCEDURE N/A 1/23/2025    Procedure: IVC Filter Placement;  Surgeon: Avelino Hernandez MD;  Location: [facility] CATH INVASIVE LOCATION;  Service: Cardiovascular;  Laterality: N/A;    LUMBAR LAMINECTOMY  1996    laminectomy, lumbar, L3    NECK SURGERY  1992    OTHER SURGICAL HISTORY      wrist surgery    SPINE SURGERY  1996    TONSILLECTOMY AND ADENOIDECTOMY  1945    TRIGGER POINT INJECTION  2001 - 2007    VASECTOMY  1972         CURRENT MEDICATIONS     No current facility-administered medications for this encounter.    Current Outpatient Medications:     acetaminophen (TYLENOL) 325 MG tablet, Take 2 tablets by mouth As Needed for Mild Pain, Moderate Pain, Headache or Fever., Disp: , Rfl:     amLODIPine (NORVASC) 2.5 MG tablet, Take 1 tablet by mouth Daily., Disp: , Rfl:     atorvastatin (LIPITOR) 80 MG tablet, Take 1 tablet by mouth Daily., Disp: , Rfl:     bumetanide (BUMEX) 1 MG tablet, Take 1 tablet by mouth 2 (Two) Times a Week. Take on Mondays and Thursdays, May take one tablet as needed daily for weight gain of 3-5 pounds, Disp: 30 tablet, Rfl: 2    carvedilol (COREG) 3.125 MG tablet, Take 1 tablet by mouth 2 (Two) Times a Day., Disp: 60 tablet, Rfl: 11    multivitamin (THERAGRAN) tablet tablet, Take 1 tablet by mouth Daily., Disp: , Rfl:     omeprazole (priLOSEC) 20 MG capsule, Take 1 capsule by mouth Every Morning Before Breakfast., Disp: , Rfl:     pantoprazole (PROTONIX) 40 MG EC tablet, Take 1 tablet by mouth Daily., Disp: , Rfl:     rosuvastatin (CRESTOR) 20 MG tablet, Take 1 tablet  "by mouth Every Night., Disp: , Rfl:     sertraline (ZOLOFT) 25 MG tablet, Take 1 tablet by mouth Daily., Disp: , Rfl:     thiamine (VITAMIN B1) 100 MG tablet, Take 1 tablet by mouth Daily., Disp: , Rfl:     ALLERGIES     Penicillins    FAMILY HISTORY       Family History   Problem Relation Age of Onset    Arthritis Mother         OA    Heart disease Father     Diabetes Father     Heart attack Father     Heart disease Brother     Heart attack Brother     Diabetes Brother     Diabetes Son     Hypertension Other     Cancer Other           SOCIAL HISTORY       Social History     Socioeconomic History    Marital status:    Tobacco Use    Smoking status: Former     Current packs/day: 0.00     Average packs/day: 1.5 packs/day for 34.8 years (52.2 ttl pk-yrs)     Types: Cigarettes, Pipe, Cigars     Start date: 1962     Quit date: 1997     Years since quittin.1     Passive exposure: Past    Smokeless tobacco: Never    Tobacco comments:     QUIT DATE 1992   Vaping Use    Vaping status: Never Used   Substance and Sexual Activity    Alcohol use: Yes     Alcohol/week: 11.0 - 13.0 standard drinks of alcohol     Types: 7 Glasses of wine, 2 Cans of beer, 2 - 4 Shots of liquor per week     Comment: glass of wine everyday     Drug use: No    Sexual activity: Not Currently     Partners: Female     Birth control/protection: Vasectomy, Surgical         PHYSICAL EXAM    (up to 7 for level 4, 8 or more for level 5)     Vitals:    25 2123 25 2130   BP: 145/74    Pulse: 90 82   Resp: 18    Temp: 98.6 °F (37 °C)    SpO2: 94% 95%   Weight: 82.6 kg (182 lb)    Height: 182.9 cm (72\")        ***      DIAGNOSTIC RESULTS     EKG: All EKGs are interpreted by the Emergency Department Physician who either signs or Co-signs this chart in the absence of a cardiologist.    No orders to display         RADIOLOGY:   [x] Radiologist's Report Reviewed:  No orders to display       I ordered and independently reviewed " the above noted radiographic studies.        LABS:    I have reviewed and interpreted all of the currently available lab results from this visit (if applicable):  Results for orders placed or performed during the hospital encounter of 04/11/25   Duplex Venous Lower Extremity - Bilateral CAR    Collection Time: 04/11/25  5:01 PM   Result Value Ref Range    Right Common Femoral Spont Y     Right Common Femoral Phasic Y     Right Common Femoral Compress C     Right Common Femoral Augment Y     Right Saphenofemoral Junction Compress C     Right Proximal Femoral Compress C     Right Mid Femoral Spont Y     Right Mid Femoral Phasic Y     Right Mid Femoral Compress P     Right Mid Femoral Augment Y     Right Distal Femoral Compress C     Right Popliteal Spont Y     Right Popliteal Phasic Y     Right Popliteal Compress C     Right Popliteal Augment Y     Right Posterior Tibial Compress C     Right Peroneal Compress C     Right Gastronemius Compress C     Right Greater Saph AK Compress C     Right Greater Saph BK Compress C     Right Lesser Saph Compress C     Right Saphenofemoral Junction Spont Y     Right Saphenofemoral Junction Phasic Y     Right Saphenofemoral Junction Augment Y     Right Profunda Femoral Spont Y     Right Profunda Femoral Phasic Y     Right Proximal Femoral Spont Y     Right Proximal Femoral Phasic Y     Right Proximal Femoral Augment Y     Right Distal Femoral Spont Y     Right Distal Femoral Phasic Y     Right Distal Femoral Augment Y     Left Common Femoral Spont Y     Left Common Femoral Phasic Y     Left Common Femoral Compress C     Left Common Femoral Augment Y     Left Saphenofemoral Junction Spont Y     Left Saphenofemoral Junction Phasic Y     Left Saphenofemoral Junction Compress C     Left Saphenofemoral Junction Augment Y     Left Profunda Femoral Spont Y     Left Profunda Femoral Phasic Y     Left Proximal Femoral Spont Y     Left Proximal Femoral Phasic Y     Left Proximal Femoral  Compress C     Left Proximal Femoral Augment Y     Left Mid Femoral Spont Y     Left Mid Femoral Phasic Y     Left Mid Femoral Compress C     Left Mid Femoral Augment Y     Left Distal Femoral Spont Y     Left Distal Femoral Phasic Y     Left Distal Femoral Compress C     Left Distal Femoral Augment Y     Left Popliteal Spont Y     Left Popliteal Phasic Y     Left Popliteal Compress C     Left Popliteal Augment Y     Left Posterior Tibial Compress C     Left Peroneal Compress C     Left Gastronemius Compress C     Left Greater Saph AK Compress C     Left Greater Saph BK Compress C     Left Lesser Saph Compress C     BH CV VAS PRELIMINARY FINDINGS SCRIPTING 1.0    Blood Culture - Blood, Arm, Left    Collection Time: 04/11/25  5:14 PM    Specimen: Arm, Left; Blood   Result Value Ref Range    Blood Culture No growth at 5 days    Blood Culture - Blood, Arm, Right    Collection Time: 04/11/25  5:23 PM    Specimen: Arm, Right; Blood   Result Value Ref Range    Blood Culture No growth at 5 days    Protime-INR    Collection Time: 04/11/25  5:25 PM    Specimen: Blood   Result Value Ref Range    Protime 14.4 12.2 - 15.3 Seconds    INR 1.06 0.89 - 1.12   aPTT    Collection Time: 04/11/25  5:25 PM    Specimen: Blood   Result Value Ref Range    PTT 30.7 (L) 60.0 - 90.0 seconds   Comprehensive Metabolic Panel    Collection Time: 04/11/25  5:25 PM    Specimen: Blood   Result Value Ref Range    Glucose 99 65 - 99 mg/dL    BUN 29 (H) 8 - 23 mg/dL    Creatinine 2.64 (H) 0.76 - 1.27 mg/dL    Sodium 140 136 - 145 mmol/L    Potassium 4.6 3.5 - 5.2 mmol/L    Chloride 103 98 - 107 mmol/L    CO2 26.0 22.0 - 29.0 mmol/L    Calcium 9.4 8.6 - 10.5 mg/dL    Total Protein 6.8 6.0 - 8.5 g/dL    Albumin 3.9 3.5 - 5.2 g/dL    ALT (SGPT) 36 1 - 41 U/L    AST (SGOT) 36 1 - 40 U/L    Alkaline Phosphatase 246 (H) 39 - 117 U/L    Total Bilirubin 0.4 0.0 - 1.2 mg/dL    Globulin 2.9 gm/dL    A/G Ratio 1.3 g/dL    BUN/Creatinine Ratio 11.0 7.0 - 25.0     Anion Gap 11.0 5.0 - 15.0 mmol/L    eGFR 23.2 (L) >60.0 mL/min/1.73   High Sensitivity Troponin T    Collection Time: 04/11/25  5:25 PM    Specimen: Blood   Result Value Ref Range    HS Troponin T 19 <22 ng/L   CBC Auto Differential    Collection Time: 04/11/25  5:25 PM    Specimen: Blood   Result Value Ref Range    WBC 6.31 3.40 - 10.80 10*3/mm3    RBC 4.14 4.14 - 5.80 10*6/mm3    Hemoglobin 11.8 (L) 13.0 - 17.7 g/dL    Hematocrit 36.4 (L) 37.5 - 51.0 %    MCV 87.9 79.0 - 97.0 fL    MCH 28.5 26.6 - 33.0 pg    MCHC 32.4 31.5 - 35.7 g/dL    RDW 17.2 (H) 12.3 - 15.4 %    RDW-SD 55.4 (H) 37.0 - 54.0 fl    MPV 11.2 6.0 - 12.0 fL    Platelets 195 140 - 450 10*3/mm3    Neutrophil % 55.2 42.7 - 76.0 %    Lymphocyte % 22.5 19.6 - 45.3 %    Monocyte % 12.4 (H) 5.0 - 12.0 %    Eosinophil % 9.2 (H) 0.3 - 6.2 %    Basophil % 0.5 0.0 - 1.5 %    Immature Grans % 0.2 0.0 - 0.5 %    Neutrophils, Absolute 3.49 1.70 - 7.00 10*3/mm3    Lymphocytes, Absolute 1.42 0.70 - 3.10 10*3/mm3    Monocytes, Absolute 0.78 0.10 - 0.90 10*3/mm3    Eosinophils, Absolute 0.58 (H) 0.00 - 0.40 10*3/mm3    Basophils, Absolute 0.03 0.00 - 0.20 10*3/mm3    Immature Grans, Absolute 0.01 0.00 - 0.05 10*3/mm3    nRBC 0.0 0.0 - 0.2 /100 WBC   Lactic Acid, Plasma    Collection Time: 04/11/25  5:25 PM    Specimen: Blood   Result Value Ref Range    Lactate 1.5 0.5 - 2.0 mmol/L        If labs were ordered, I independently reviewed the results and considered them in treating the patient.      EMERGENCY DEPARTMENT COURSE and DIFFERENTIAL DIAGNOSIS/MDM:   Vitals:  AS OF 21:58 EDT    BP - 145/74  HR - 82  TEMP - 98.6 °F (37 °C)  O2 SATS - 95%        Discussion below represents my analysis of pertinent findings related to patient's condition, differential diagnosis, treatment plan and final disposition.      Differential diagnosis:  The differential diagnosis associated with the patient's presentation includes: ***      Independent interpretations (ECG/rhythm  strip/X-ray/US/CT scan): ***      Additional sources:  Discussed/obtained information from independent historians:   [] Spouse:   [] Parent:   [] Friend:   [] EMS:   [] Other:  External (non-ED) record review:   [] Inpatient record:   [] Office record:   [] Outpatient record:   [] Prior Outpatient labs:   [] Prior Outpatient radiology:   [] Primary Care record:   [] Outside ED record:   [] Other:       Patient's care impacted by:   [] Diabetes   [] Hypertension   [] Coronary Artery Disease   [] Cancer   [] Other:     Care significantly affected by Social Determinants of Health (housing and economic circumstances, unemployment)    [] Yes     [] No   If yes, Patient's care significantly limited by  Social Determinants of Health including:    [] Inadequate housing    [] Low income    [] Alcoholism and drug addiction in family    [] Problems related to primary support group    [] Unemployment    [] Problems related to employment    [] Other Social Determinants of Health:       Consideration of admission/observation vs discharge: ***      I considered prescription management with: ***   [] Pain medication:   [] Antiviral:   [] Antibiotic:   [] Other:    Additional orders considered but not ordered:  The following testing was considered but ultimately not selected after discussion with patient/family: ***    ED Course:           ***    I had a discussion with the patient/family regarding diagnosis, diagnostic results, treatment plan, and medications.  The patient/family indicated understanding of these instructions.  I spent adequate time at the bedside preceding discharge necessary to personally discuss the aftercare instructions, giving patient education, providing explanations of the results of our evaluations/findings, and my decision making to assure that the patient/family understand the plan of care.  Time was allotted to answer questions at that time and throughout the ED course.  Emphasis was placed on timely  follow-up after discharge.  I also discussed the potential for the development of an acute emergent condition requiring further evaluation, admission, or even surgical intervention. I discussed that we found nothing during the visit today indicating the need for further workup, admission, or the presence of an unstable medical condition.  I encouraged the patient to return to the emergency department immediately for ANY concerns, worsening, new complaints, or if symptoms persist and unable to seek follow-up in a timely fashion.  The patient/family expressed understanding and agreement with this plan.  The patient will follow-up with their PCP in 1-2 days for reevaluation.           PROCEDURES:  Procedures    CRITICAL CARE TIME        FINAL IMPRESSION    No diagnosis found.      DISPOSITION/PLAN     ED Disposition       None              Comment: Please note this report has been produced using speech recognition software.      Nain Orellana MD  Attending Emergency Physician           Physical examination unremarkable without any evidence of active bleeding or blood in the oropharynx.  No trauma. [NS]      ED Course User Index  [NS] Nain Orellana MD           I had a discussion with the patient/family regarding diagnosis, diagnostic results, treatment plan, and medications.  The patient/family indicated understanding of these instructions.  I spent adequate time at the bedside preceding discharge necessary to personally discuss the aftercare instructions, giving patient education, providing explanations of the results of our evaluations/findings, and my decision making to assure that the patient/family understand the plan of care.  Time was allotted to answer questions at that time and throughout the ED course.  Emphasis was placed on timely follow-up after discharge.  I also discussed the potential for the development of an acute emergent condition requiring further evaluation, admission, or even surgical intervention. I discussed that we found nothing during the visit today indicating the need for further workup, admission, or the presence of an unstable medical condition.  I encouraged the patient to return to the emergency department immediately for ANY concerns, worsening, new complaints, or if symptoms persist and unable to seek follow-up in a timely fashion.  The patient/family expressed understanding and agreement with this plan.  The patient will follow-up with their PCP in 1-2 days for reevaluation.           PROCEDURES:  Procedures    CRITICAL CARE TIME        FINAL IMPRESSION      1. Epistaxis    2. History of diabetes mellitus    3. History of coronary artery disease          DISPOSITION/PLAN     ED Disposition       ED Disposition   Discharge    Condition   Stable    Comment   --                 Comment: Please note this report has been produced using speech recognition software.      Nain Orellana MD  Attending Emergency Physician             Nain Orellana,  MD  06/10/25 [id number]

## 2025-06-08 NOTE — FSED PROVIDER NOTE
Subjective  History of Present Illness:    Patient presents to the emergency department after reportedly falling at his nursing care facility.  He was complaining of left hip pain after being found.  The patient states that he was attempting to use a walker and tripped and fell.  Denies hitting his head.  Has a history of dementia.  Denies any neck pain, back pain, chest pain or abdominal pain.  States that he only has left hip pain at this time.      Nurses Notes reviewed and agree, including vitals, allergies, social history and prior medical history.     REVIEW OF SYSTEMS: All systems reviewed and not pertinent unless noted.  Review of Systems   Musculoskeletal:  Positive for arthralgias.       Past Medical History:   Diagnosis Date    Acute diverticulitis 01/29/2020    Allergic 60 yrs ago    Arthritis     Arthritis of neck Fusion 1992    Bilateral radial fractures 1962    fracture bilateral radial heads    CAD (coronary artery disease)     Calculus of gallbladder without cholecystitis without obstruction 01/29/2020    Cataract     Cervical disc disorder Fusion 1992    Cholelithiasis     Chronic kidney disease 2020    Clotting disorder     CVD (cardiovascular disease)     History of TIA 1989    Diabetes mellitus     DVT (deep venous thrombosis)     Right lower extremity DVT and pulmonary embolism in 06/2015, idiopathic    DVT of proximal lower limb 06/22/2015    proximal DVT right leg, small PE- anticoagulation    Dyslipidemia     Erectile dysfunction     Fracture 1952    H/o pf left forearm fracture    Fracture of right hand 1980    Fracture of wrist 1980    GERD (gastroesophageal reflux disease)     with hiatal hernia    HL (hearing loss)     Hx of angina pectoris     Hypertension     Normal renal angiography 02/16/11    Knee swelling Several years ago    Left wrist fracture 1953    Lumbosacral disc disease 1996    Osgood-Schlatter's disease 1955    Osteoarthritis     Right wrist fracture     Stroke     Vertigo      Wears glasses        Allergies:    Penicillins      Past Surgical History:   Procedure Laterality Date    APPENDECTOMY      BACK SURGERY      CARDIAC CATHETERIZATION      with vein graft    CATARACT EXTRACTION Left     CERVICAL FUSION      CERVICAL FUSION  1992    C3-4    COLONOSCOPY  2013    CORONARY ARTERY BYPASS GRAFT  1994    HERNIA REPAIR Right 2011    inguinal    INGUINAL HERNIA REPAIR Left 10/29/2019    Procedure: INGUINAL HERNIA REPAIR LEFT;  Surgeon: Charisma Raines MD;  Location: [facility] OR;  Service: General    INTERVENTIONAL RADIOLOGY PROCEDURE N/A 2025    Procedure: IVC Filter Placement;  Surgeon: Avelino Hernandez MD;  Location:  [facility];  Service: Cardiovascular;  Laterality: N/A;    LUMBAR LAMINECTOMY      laminectomy, lumbar, L3    NECK SURGERY      OTHER SURGICAL HISTORY      wrist surgery    SPINE SURGERY      TONSILLECTOMY AND ADENOIDECTOMY      TRIGGER POINT INJECTION   -     VASECTOMY  1972         Social History     Socioeconomic History    Marital status:    Tobacco Use    Smoking status: Former     Current packs/day: 0.00     Average packs/day: 1.5 packs/day for 34.8 years (52.2 ttl pk-yrs)     Types: Cigarettes, Pipe, Cigars     Start date: 1962     Quit date: 1997     Years since quittin.1     Passive exposure: Past    Smokeless tobacco: Never    Tobacco comments:     QUIT DATE 1992   Vaping Use    Vaping status: Never Used   Substance and Sexual Activity    Alcohol use: Yes     Alcohol/week: 11.0 - 13.0 standard drinks of alcohol     Types: 7 Glasses of wine, 2 Cans of beer, 2 - 4 Shots of liquor per week     Comment: glass of wine everyday     Drug use: No    Sexual activity: Not Currently     Partners: Female     Birth control/protection: Vasectomy, Surgical         Family History   Problem Relation Age of Onset    Arthritis Mother         OA    Heart disease Father     Diabetes Father     Heart  "attack Father     Heart disease Brother     Heart attack Brother     Diabetes Brother     Diabetes Son     Hypertension Other     Cancer Other        Objective  Physical Exam:  /53   Pulse 65   Temp 98 °F (36.7 °C) (Oral)   Resp 16   Ht 175.3 cm (69\")   Wt 82.6 kg (182 lb)   SpO2 92%   BMI 26.88 kg/m²      Physical Exam  Vitals and nursing note reviewed.   Constitutional:       General: He is not in acute distress.     Appearance: Normal appearance. He is normal weight. He is not ill-appearing, toxic-appearing or diaphoretic.      Comments: Pleasantly demented   HENT:      Head: Normocephalic and atraumatic.      Mouth/Throat:      Mouth: Mucous membranes are moist.   Eyes:      Extraocular Movements: Extraocular movements intact.      Conjunctiva/sclera: Conjunctivae normal.      Pupils: Pupils are equal, round, and reactive to light.   Cardiovascular:      Rate and Rhythm: Normal rate and regular rhythm.      Pulses: Normal pulses.      Heart sounds: Normal heart sounds.   Pulmonary:      Effort: Pulmonary effort is normal.      Breath sounds: Normal breath sounds. No wheezing, rhonchi or rales.   Abdominal:      General: Abdomen is flat. Bowel sounds are normal.      Palpations: Abdomen is soft.      Tenderness: There is no abdominal tenderness. There is no guarding.   Musculoskeletal:      Cervical back: Normal range of motion. No rigidity or tenderness.      Comments: Significant hesitation with minimal movement of the left hip.  Slightly shortened.  Pulses 2+ distally.   Skin:     General: Skin is warm.      Capillary Refill: Capillary refill takes less than 2 seconds.   Neurological:      General: No focal deficit present.      Mental Status: He is alert. Mental status is at baseline.   Psychiatric:         Mood and Affect: Mood normal.         Behavior: Behavior normal.         Procedures    ED Course:         Lab Results (last 24 hours)       ** No results found for the last 24 hours. **      "        CT Pelvis Without Contrast  Result Date: 6/8/2025  CT PELVIS WO CONTRAST Date of Exam: 6/8/2025 8:47 AM EDT Indication: poss left hip fx.  abnormal xray. Comparison: 6/8/2025 Technique: Axial CT images were obtained of the pelvis without contrast administration.  Reconstructed coronal and sagittal images were also obtained. Automated exposure control and iterative construction methods were used. Findings: There is a transverse fracture involving the left greater trochanter which extends to the intertrochanteric femur. No fracture at the right proximal femur. No significant angulation or displacement. The superior and inferior pubic rami are intact. Sacrum and coccyx intact. Moderate bilateral hip osteoarthritis. There are areas of avascular necrosis of the left and right femoral heads without collapse. Severe disc narrowing in the visualized lower lumbar spine at L4-5 and L5-S1 with facet arthritis. Left and right iliac wings are intact. No acetabular fracture. Extensive aortoiliac vascular calcifications. Prostatomegaly prostate measuring up to 5.8 x 6 cm. Bladder without acute abnormality. No fluid or hemorrhage in the pelvis. Colonic diverticulosis. Partial ankylosis of the bilateral sacroiliac joints.     Impression: Impression: 1. Nondisplaced fracture involving left greater trochanter extending to the intertrochanteric femur. 2. 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 3. Moderate bilateral hip osteoarthritis. 4. Prostatomegaly. 5. Additional chronic findings above. Electronically Signed: Charanjit Crane MD  6/8/2025 9:24 AM EDT  Workstation ID: [id number]    XR Hip With or Without Pelvis 2 - 3 View Left  Result Date: 6/8/2025  XR HIP W OR WO PELVIS 2-3 VIEW LEFT Date of Exam: 6/8/2025 8:01 AM EDT Indication: fall, left hip/pelvis pain Comparison: 11/1/2024 Findings: There is subtle area of linear lucency overlying the left greater trochanter which may relate to nondisplaced  fracture. Mild to moderate bilateral hip osteoarthritis. The iliopectineal and ilioischial lines are intact. Disc narrowing in the lower lumbar spine.     Impression: Impression: Subtle area of linear lucency overlying the left greater trochanter may relate to nondisplaced fracture, consider CT pelvis for further assessment. Electronically Signed: Charanjit Crane MD  6/8/2025 8:23 AM EDT  Workstation ID: [id number]    CT Head Without Contrast  Result Date: 6/8/2025  CT HEAD WO CONTRAST, CT CERVICAL SPINE WO CONTRAST Date of Exam: 6/8/2025 7:52 AM EDT Indication: fall, unknown head trauma, dementia. Comparison: None available. Technique: Axial CT images were obtained of the head and cervical spine without contrast administration.  Automated exposure control and iterative construction methods were used. Findings: CT head: Generalized volume loss is present and there is also evidence of prior infarcts within the right and left occipital lobes as well as left cerebellum. There is no evidence of hemorrhage, mass or mass effect. There is ex vacuo prominence of the ventricles. The orbits are normal. The calvarium is intact. CT cervical spine: Redemonstrated contiguous bridging ventral osteophytes with the appearance of DISH. There is no evidence of cortical disruption or height loss to suggest acute fracture. There is redemonstrated straightening, without traumatic listhesis or subluxation. The dens is intact. The paraspinal soft tissues demonstrate no acute or suspicious findings.     Impression: Impression: Chronic and age-related changes of the brain are present. No acute intracranial abnormality. Stable CT of the cervical spine including multilevel spondylosis with the appearance of DISH. No acute fracture or traumatic malalignment. Electronically Signed: Indra Hill MD  6/8/2025 8:08 AM EDT  Workstation ID: [id number]    CT Cervical Spine Without Contrast  Result Date: 6/8/2025  CT HEAD WO CONTRAST, CT CERVICAL SPINE WO  CONTRAST Date of Exam: 6/8/2025 7:52 AM EDT Indication: fall, unknown head trauma, dementia. Comparison: None available. Technique: Axial CT images were obtained of the head and cervical spine without contrast administration.  Automated exposure control and iterative construction methods were used. Findings: CT head: Generalized volume loss is present and there is also evidence of prior infarcts within the right and left occipital lobes as well as left cerebellum. There is no evidence of hemorrhage, mass or mass effect. There is ex vacuo prominence of the ventricles. The orbits are normal. The calvarium is intact. CT cervical spine: Redemonstrated contiguous bridging ventral osteophytes with the appearance of DISH. There is no evidence of cortical disruption or height loss to suggest acute fracture. There is redemonstrated straightening, without traumatic listhesis or subluxation. The dens is intact. The paraspinal soft tissues demonstrate no acute or suspicious findings.     Impression: Impression: Chronic and age-related changes of the brain are present. No acute intracranial abnormality. Stable CT of the cervical spine including multilevel spondylosis with the appearance of DISH. No acute fracture or traumatic malalignment. Electronically Signed: Indra Hill MD  6/8/2025 8:08 AM EDT  Workstation ID: [id number]         [facility]  Number of Diagnoses or Management Options  Diagnosis management comments: Patient was evaluated x-ray was concerning for possible left hip fracture.  CT scan was conclusive for nondisplaced left greater trochanteric hip fracture which does not extend into the femoral neck.  This fracture pattern is somewhat odd as it is through the greater trochanteric region extending posteriorly at an oblique angle.  I spoke with Dr. Ronak Alejandra and we agree if this patient is able to ambulate can go back to his nursing care facility for physical therapy and Occupational Therapy.  The patient was ambulated in  the emergency department and discharged back to his nursing home.  There is no surgical intervention needed for this type of fracture at this time.       Amount and/or Complexity of Data Reviewed  Tests in the radiology section of CPT®: reviewed        Medications   fentaNYL citrate (PF) (SUBLIMAZE) injection 50 mcg (50 mcg Intravenous Given 6/8/25 0745)       Data interpreted: Nursing notes reviewed, vital signs reviewed.  Labs independently interpreted by me (CBC, CMP, lipase, UA, troponin, ABG, lactic acid, procalcitonin).  Imaging independently interpreted by me (x-ray, CT scan).  EKG independently interpreted by me.  O2 saturation:    Counseling: Discussed the results above with the patient regarding need for admission or discharge.  Patient understands and agrees plan of care.      -----  ED Disposition       ED Disposition   Discharge    Condition   Stable    Comment   --             Final diagnoses:   Closed nondisplaced fracture of greater trochanter of left femur, initial encounter      Your Follow-Up Providers       Radu Francis MD. Call in 2 days.    Specialty: Internal Medicine  [address]  [address]  [place] [facility] [place] [address]  [phone]               [facility] EMERGENCY DEPARTMENT [place].    Specialty: Emergency Medicine  Follow up details: As needed  [address]  [facility] [address] [phone]                     Contact information for after-discharge care    Follow-up information has not been specified.                    Your medication list        CONTINUE taking these medications        Instructions Last Dose Given Next Dose Due   acetaminophen 325 MG tablet  Commonly known as: TYLENOL      Take 2 tablets by mouth As Needed for Mild Pain, Moderate Pain, Headache or Fever.       amLODIPine 2.5 MG tablet  Commonly known as: NORVASC      Take 1 tablet by mouth Daily.       atorvastatin 80 MG tablet  Commonly known as: LIPITOR      Take  1 tablet by mouth Daily.       bumetanide 1 MG tablet  Commonly known as: BUMEX      Take 1 tablet by mouth 2 (Two) Times a Week. Take on Mondays and Thursdays, May take one tablet as needed daily for weight gain of 3-5 pounds       carvedilol 3.125 MG tablet  Commonly known as: COREG      Take 1 tablet by mouth 2 (Two) Times a Day.       multivitamin tablet tablet      Take 1 tablet by mouth Daily.       omeprazole 20 MG capsule  Commonly known as: priLOSEC      Take 1 capsule by mouth Every Morning Before Breakfast.       pantoprazole 40 MG EC tablet  Commonly known as: PROTONIX      Take 1 tablet by mouth Daily.       rosuvastatin 20 MG tablet  Commonly known as: CRESTOR      Take 1 tablet by mouth Every Night.       sertraline 25 MG tablet  Commonly known as: ZOLOFT      Take 1 tablet by mouth Daily.       thiamine 100 MG tablet  Commonly known as: VITAMIN B1      Take 1 tablet by mouth Daily.

## 2025-06-09 ENCOUNTER — ANESTHESIA EVENT (OUTPATIENT)
Dept: EMERGENCY DEPT | Facility: HOSPITAL | Age: 85
End: 2025-06-09
Payer: MEDICARE

## 2025-06-09 ENCOUNTER — APPOINTMENT (OUTPATIENT)
Dept: GENERAL RADIOLOGY | Facility: HOSPITAL | Age: 85
DRG: 481 | End: 2025-06-09
Payer: MEDICARE

## 2025-06-09 ENCOUNTER — APPOINTMENT (OUTPATIENT)
Dept: CT IMAGING | Facility: HOSPITAL | Age: 85
DRG: 481 | End: 2025-06-09
Payer: MEDICARE

## 2025-06-09 ENCOUNTER — HOSPITAL ENCOUNTER (INPATIENT)
Facility: HOSPITAL | Age: 85
LOS: 5 days | Discharge: NURSING, REHAB OR LONG-TERM CARE | DRG: 481 | End: 2025-06-14
Attending: EMERGENCY MEDICINE | Admitting: HOSPITALIST
Payer: MEDICARE

## 2025-06-09 ENCOUNTER — ANESTHESIA (OUTPATIENT)
Dept: EMERGENCY DEPT | Facility: HOSPITAL | Age: 85
End: 2025-06-09
Payer: MEDICARE

## 2025-06-09 ENCOUNTER — ANESTHESIA EVENT CONVERTED (OUTPATIENT)
Dept: ANESTHESIOLOGY | Facility: HOSPITAL | Age: 85
DRG: 481 | End: 2025-06-09
Payer: MEDICARE

## 2025-06-09 DIAGNOSIS — S72.142A CLOSED COMMINUTED INTERTROCHANTERIC FRACTURE OF LEFT FEMUR, INITIAL ENCOUNTER: Primary | ICD-10-CM

## 2025-06-09 DIAGNOSIS — N18.9 CHRONIC KIDNEY DISEASE, UNSPECIFIED CKD STAGE: ICD-10-CM

## 2025-06-09 DIAGNOSIS — S72.002D CLOSED FRACTURE OF LEFT HIP WITH ROUTINE HEALING, SUBSEQUENT ENCOUNTER: ICD-10-CM

## 2025-06-09 DIAGNOSIS — S72.002A CLOSED FRACTURE OF LEFT HIP, INITIAL ENCOUNTER: ICD-10-CM
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
## 2025-06-09 PROBLEM — S72.009A HIP FRACTURE: Status: RESOLVED | Noted: 2025-06-09 | Resolved: 2025-06-09

## 2025-06-09 PROBLEM — S72.009A HIP FRACTURE: Status: ACTIVE | Noted: 2025-06-09

## 2025-06-09 LAB
ABO GROUP BLD: NORMAL
ABO GROUP BLD: NORMAL
ALBUMIN SERPL-MCNC: 3.4 G/DL (ref 3.5–5.2)
ALBUMIN/GLOB SERPL: 1.1 G/DL
ALP SERPL-CCNC: 245 U/L (ref 39–117)
ALT SERPL W P-5'-P-CCNC: 21 U/L (ref 1–41)
ANION GAP SERPL CALCULATED.3IONS-SCNC: 11 MMOL/L (ref 5–15)
AST SERPL-CCNC: 30 U/L (ref 1–40)
BASOPHILS # BLD AUTO: 0.06 10*3/MM3 (ref 0–0.2)
BASOPHILS NFR BLD AUTO: 0.6 % (ref 0–1.5)
BILIRUB SERPL-MCNC: 0.7 MG/DL (ref 0–1.2)
BILIRUB UR QL STRIP: NEGATIVE
BLD GP AB SCN SERPL QL: NEGATIVE
BUN SERPL-MCNC: 35.1 MG/DL (ref 8–23)
BUN/CREAT SERPL: 12.1 (ref 7–25)
CALCIUM SPEC-SCNC: 9.1 MG/DL (ref 8.6–10.5)
CHLORIDE SERPL-SCNC: 102 MMOL/L (ref 98–107)
CLARITY UR: CLEAR
CO2 SERPL-SCNC: 23 MMOL/L (ref 22–29)
COLOR UR: YELLOW
CREAT SERPL-MCNC: 2.89 MG/DL (ref 0.76–1.27)
DEPRECATED RDW RBC AUTO: 50.6 FL (ref 37–54)
EGFRCR SERPLBLD CKD-EPI 2021: 20.8 ML/MIN/1.73
EOSINOPHIL # BLD AUTO: 0.57 10*3/MM3 (ref 0–0.4)
EOSINOPHIL NFR BLD AUTO: 6 % (ref 0.3–6.2)
ERYTHROCYTE [DISTWIDTH] IN BLOOD BY AUTOMATED COUNT: 16.2 % (ref 12.3–15.4)
GLOBULIN UR ELPH-MCNC: 3.1 GM/DL
GLUCOSE BLDC GLUCOMTR-MCNC: 133 MG/DL (ref 70–130)
GLUCOSE SERPL-MCNC: 142 MG/DL (ref 65–99)
GLUCOSE UR STRIP-MCNC: NEGATIVE MG/DL
HBA1C MFR BLD: 5.9 % (ref 4.8–5.6)
HCT VFR BLD AUTO: 32.2 % (ref 37.5–51)
HGB BLD-MCNC: 10.5 G/DL (ref 13–17.7)
HGB UR QL STRIP.AUTO: NEGATIVE
IMM GRANULOCYTES # BLD AUTO: 0.04 10*3/MM3 (ref 0–0.05)
IMM GRANULOCYTES NFR BLD AUTO: 0.4 % (ref 0–0.5)
INR PPP: 1.14 (ref 0.89–1.12)
KETONES UR QL STRIP: NEGATIVE
LEUKOCYTE ESTERASE UR QL STRIP.AUTO: NEGATIVE
LYMPHOCYTES # BLD AUTO: 0.82 10*3/MM3 (ref 0.7–3.1)
LYMPHOCYTES NFR BLD AUTO: 8.6 % (ref 19.6–45.3)
MCH RBC QN AUTO: 28.1 PG (ref 26.6–33)
MCHC RBC AUTO-ENTMCNC: 32.6 G/DL (ref 31.5–35.7)
MCV RBC AUTO: 86.1 FL (ref 79–97)
MONOCYTES # BLD AUTO: 1.12 10*3/MM3 (ref 0.1–0.9)
MONOCYTES NFR BLD AUTO: 11.8 % (ref 5–12)
NEUTROPHILS NFR BLD AUTO: 6.88 10*3/MM3 (ref 1.7–7)
NEUTROPHILS NFR BLD AUTO: 72.6 % (ref 42.7–76)
NITRITE UR QL STRIP: NEGATIVE
NRBC BLD AUTO-RTO: 0 /100 WBC (ref 0–0.2)
PH UR STRIP.AUTO: 7.5 [PH] (ref 5–8)
PLATELET # BLD AUTO: 198 10*3/MM3 (ref 140–450)
PMV BLD AUTO: 10.8 FL (ref 6–12)
POTASSIUM SERPL-SCNC: 4.2 MMOL/L (ref 3.5–5.2)
PROT SERPL-MCNC: 6.5 G/DL (ref 6–8.5)
PROT UR QL STRIP: ABNORMAL
PROTHROMBIN TIME: 15.3 SECONDS (ref 12.2–15.3)
RBC # BLD AUTO: 3.74 10*6/MM3 (ref 4.14–5.8)
RH BLD: POSITIVE
RH BLD: POSITIVE
SODIUM SERPL-SCNC: 136 MMOL/L (ref 136–145)
SP GR UR STRIP: 1.02 (ref 1–1.03)
T&S EXPIRATION DATE: NORMAL
UROBILINOGEN UR QL STRIP: ABNORMAL
WBC NRBC COR # BLD AUTO: 9.49 10*3/MM3 (ref 3.4–10.8)

## 2025-06-09 PROCEDURE — 86900 BLOOD TYPING SEROLOGIC ABO: CPT | Performed by: NURSE PRACTITIONER

## 2025-06-09 PROCEDURE — 86850 RBC ANTIBODY SCREEN: CPT | Performed by: NURSE PRACTITIONER

## 2025-06-09 PROCEDURE — 83036 HEMOGLOBIN GLYCOSYLATED A1C: CPT | Performed by: INTERNAL MEDICINE

## 2025-06-09 PROCEDURE — 85610 PROTHROMBIN TIME: CPT | Performed by: NURSE PRACTITIONER

## 2025-06-09 PROCEDURE — 86900 BLOOD TYPING SEROLOGIC ABO: CPT

## 2025-06-09 PROCEDURE — 25010000002 FENTANYL CITRATE (PF) 50 MCG/ML SOLUTION: Performed by: EMERGENCY MEDICINE

## 2025-06-09 PROCEDURE — 99223 1ST HOSP IP/OBS HIGH 75: CPT | Performed by: INTERNAL MEDICINE

## 2025-06-09 PROCEDURE — 81003 URINALYSIS AUTO W/O SCOPE: CPT | Performed by: NURSE PRACTITIONER

## 2025-06-09 PROCEDURE — 72192 CT PELVIS W/O DYE: CPT

## 2025-06-09 PROCEDURE — 99222 1ST HOSP IP/OBS MODERATE 55: CPT | Performed by: ORTHOPAEDIC SURGERY

## 2025-06-09 PROCEDURE — 70450 CT HEAD/BRAIN W/O DYE: CPT

## 2025-06-09 PROCEDURE — 36415 COLL VENOUS BLD VENIPUNCTURE: CPT

## 2025-06-09 PROCEDURE — 25810000003 SODIUM CHLORIDE 0.9 % SOLUTION: Performed by: NURSE PRACTITIONER

## 2025-06-09 PROCEDURE — 99285 EMERGENCY DEPT VISIT HI MDM: CPT

## 2025-06-09 PROCEDURE — 86901 BLOOD TYPING SEROLOGIC RH(D): CPT | Performed by: NURSE PRACTITIONER

## 2025-06-09 PROCEDURE — 25010000002 ROPIVACAINE PER 1 MG: Performed by: NURSE ANESTHETIST, CERTIFIED REGISTERED

## 2025-06-09 PROCEDURE — 82948 REAGENT STRIP/BLOOD GLUCOSE: CPT

## 2025-06-09 PROCEDURE — 73502 X-RAY EXAM HIP UNI 2-3 VIEWS: CPT

## 2025-06-09 PROCEDURE — 85025 COMPLETE CBC W/AUTO DIFF WBC: CPT | Performed by: NURSE PRACTITIONER

## 2025-06-09 PROCEDURE — 25010000002 HEPARIN (PORCINE) PER 1000 UNITS: Performed by: INTERNAL MEDICINE

## 2025-06-09 PROCEDURE — 73552 X-RAY EXAM OF FEMUR 2/>: CPT

## 2025-06-09 PROCEDURE — 93005 ELECTROCARDIOGRAM TRACING: CPT | Performed by: NURSE PRACTITIONER

## 2025-06-09 PROCEDURE — 72125 CT NECK SPINE W/O DYE: CPT

## 2025-06-09 PROCEDURE — 86901 BLOOD TYPING SEROLOGIC RH(D): CPT

## 2025-06-09 PROCEDURE — 80053 COMPREHEN METABOLIC PANEL: CPT | Performed by: NURSE PRACTITIONER

## 2025-06-09 RX ORDER — SODIUM CHLORIDE 0.9 % (FLUSH) 0.9 %
10 SYRINGE (ML) INJECTION AS NEEDED
Status: DISCONTINUED | OUTPATIENT
Start: 2025-06-09 | End: 2025-06-14 | Stop reason: HOSPADM

## 2025-06-09 RX ORDER — BISACODYL 5 MG/1
5 TABLET, DELAYED RELEASE ORAL DAILY PRN
Status: DISCONTINUED | OUTPATIENT
Start: 2025-06-09 | End: 2025-06-14 | Stop reason: HOSPADM

## 2025-06-09 RX ORDER — ACETAMINOPHEN 650 MG/1
650 SUPPOSITORY RECTAL EVERY 4 HOURS PRN
Status: DISCONTINUED | OUTPATIENT
Start: 2025-06-09 | End: 2025-06-11 | Stop reason: SDUPTHER

## 2025-06-09 RX ORDER — SULFAMETHOXAZOLE AND TRIMETHOPRIM 800; 160 MG/1; MG/1
1 TABLET ORAL 2 TIMES DAILY
COMMUNITY
End: 2025-06-14 | Stop reason: HOSPADM

## 2025-06-09 RX ORDER — NITROGLYCERIN 0.4 MG/1
0.4 TABLET SUBLINGUAL
Status: DISCONTINUED | OUTPATIENT
Start: 2025-06-09 | End: 2025-06-14 | Stop reason: HOSPADM

## 2025-06-09 RX ORDER — ASPIRIN 81 MG/1
81 TABLET ORAL DAILY
Status: DISCONTINUED | OUTPATIENT
Start: 2025-06-10 | End: 2025-06-14 | Stop reason: HOSPADM

## 2025-06-09 RX ORDER — ROPIVACAINE HYDROCHLORIDE 2 MG/ML
INJECTION, SOLUTION EPIDURAL; INFILTRATION; PERINEURAL
Status: COMPLETED | OUTPATIENT
Start: 2025-06-09 | End: 2025-06-09

## 2025-06-09 RX ORDER — HYDROCODONE BITARTRATE AND ACETAMINOPHEN 5; 325 MG/1; MG/1
0.5 TABLET ORAL EVERY 6 HOURS PRN
Refills: 0 | Status: DISCONTINUED | OUTPATIENT
Start: 2025-06-09 | End: 2025-06-14 | Stop reason: HOSPADM

## 2025-06-09 RX ORDER — MORPHINE SULFATE 2 MG/ML
2 INJECTION, SOLUTION INTRAMUSCULAR; INTRAVENOUS EVERY 4 HOURS PRN
Status: DISCONTINUED | OUTPATIENT
Start: 2025-06-09 | End: 2025-06-14 | Stop reason: HOSPADM

## 2025-06-09 RX ORDER — POLYETHYLENE GLYCOL 3350 17 G/17G
17 POWDER, FOR SOLUTION ORAL DAILY PRN
Status: DISCONTINUED | OUTPATIENT
Start: 2025-06-09 | End: 2025-06-14 | Stop reason: HOSPADM

## 2025-06-09 RX ORDER — AMLODIPINE BESYLATE 2.5 MG/1
2.5 TABLET ORAL DAILY
Status: DISCONTINUED | OUTPATIENT
Start: 2025-06-10 | End: 2025-06-14 | Stop reason: HOSPADM

## 2025-06-09 RX ORDER — FENTANYL CITRATE 50 UG/ML
50 INJECTION, SOLUTION INTRAMUSCULAR; INTRAVENOUS ONCE
Refills: 0 | Status: COMPLETED | OUTPATIENT
Start: 2025-06-09 | End: 2025-06-09

## 2025-06-09 RX ORDER — FOLIC ACID 1 MG/1
1 TABLET ORAL DAILY
COMMUNITY

## 2025-06-09 RX ORDER — ASPIRIN 81 MG/1
81 TABLET ORAL DAILY
Status: ON HOLD | COMMUNITY
End: 2025-06-14

## 2025-06-09 RX ORDER — LIDOCAINE 4 G/G
1 PATCH TOPICAL EVERY 24 HOURS
Status: DISCONTINUED | OUTPATIENT
Start: 2025-06-09 | End: 2025-06-14 | Stop reason: HOSPADM

## 2025-06-09 RX ORDER — SERTRALINE HYDROCHLORIDE 25 MG/1
25 TABLET, FILM COATED ORAL DAILY
Status: DISCONTINUED | OUTPATIENT
Start: 2025-06-10 | End: 2025-06-14 | Stop reason: HOSPADM

## 2025-06-09 RX ORDER — LIDOCAINE 4 G/G
1 PATCH TOPICAL EVERY 24 HOURS
COMMUNITY

## 2025-06-09 RX ORDER — ATORVASTATIN CALCIUM 40 MG/1
80 TABLET, FILM COATED ORAL NIGHTLY
Status: DISCONTINUED | OUTPATIENT
Start: 2025-06-09 | End: 2025-06-14 | Stop reason: HOSPADM

## 2025-06-09 RX ORDER — ONDANSETRON 4 MG/1
4 TABLET, ORALLY DISINTEGRATING ORAL EVERY 6 HOURS PRN
Status: ON HOLD | COMMUNITY
End: 2025-06-14

## 2025-06-09 RX ORDER — IPRATROPIUM BROMIDE AND ALBUTEROL SULFATE 2.5; .5 MG/3ML; MG/3ML
3 SOLUTION RESPIRATORY (INHALATION) EVERY 4 HOURS PRN
Status: DISCONTINUED | OUTPATIENT
Start: 2025-06-09 | End: 2025-06-14 | Stop reason: HOSPADM

## 2025-06-09 RX ORDER — SODIUM CHLORIDE 0.9 % (FLUSH) 0.9 %
10 SYRINGE (ML) INJECTION EVERY 12 HOURS SCHEDULED
Status: DISCONTINUED | OUTPATIENT
Start: 2025-06-09 | End: 2025-06-14 | Stop reason: HOSPADM

## 2025-06-09 RX ORDER — ONDANSETRON 2 MG/ML
4 INJECTION INTRAMUSCULAR; INTRAVENOUS EVERY 6 HOURS PRN
Status: DISCONTINUED | OUTPATIENT
Start: 2025-06-09 | End: 2025-06-14 | Stop reason: HOSPADM

## 2025-06-09 RX ORDER — PANTOPRAZOLE SODIUM 40 MG/1
40 TABLET, DELAYED RELEASE ORAL
Status: DISCONTINUED | OUTPATIENT
Start: 2025-06-10 | End: 2025-06-14 | Stop reason: HOSPADM

## 2025-06-09 RX ORDER — SODIUM CHLORIDE 9 MG/ML
40 INJECTION, SOLUTION INTRAVENOUS AS NEEDED
Status: DISCONTINUED | OUTPATIENT
Start: 2025-06-09 | End: 2025-06-14 | Stop reason: HOSPADM

## 2025-06-09 RX ORDER — CARVEDILOL 6.25 MG/1
6.25 TABLET ORAL 2 TIMES DAILY
Status: DISCONTINUED | OUTPATIENT
Start: 2025-06-09 | End: 2025-06-14 | Stop reason: HOSPADM

## 2025-06-09 RX ORDER — BISACODYL 10 MG
10 SUPPOSITORY, RECTAL RECTAL DAILY PRN
Status: DISCONTINUED | OUTPATIENT
Start: 2025-06-09 | End: 2025-06-14 | Stop reason: HOSPADM

## 2025-06-09 RX ORDER — HEPARIN SODIUM 5000 [USP'U]/ML
5000 INJECTION, SOLUTION INTRAVENOUS; SUBCUTANEOUS EVERY 12 HOURS SCHEDULED
Status: DISCONTINUED | OUTPATIENT
Start: 2025-06-09 | End: 2025-06-14 | Stop reason: HOSPADM

## 2025-06-09 RX ORDER — AMMONIUM LACTATE 12 G/100G
LOTION TOPICAL
COMMUNITY

## 2025-06-09 RX ORDER — SODIUM CHLORIDE, SODIUM LACTATE, POTASSIUM CHLORIDE, CALCIUM CHLORIDE 600; 310; 30; 20 MG/100ML; MG/100ML; MG/100ML; MG/100ML
50 INJECTION, SOLUTION INTRAVENOUS CONTINUOUS
Status: CANCELLED | OUTPATIENT
Start: 2025-06-09 | End: 2025-06-10

## 2025-06-09 RX ORDER — FOLIC ACID 1 MG/1
1 TABLET ORAL DAILY
Status: DISCONTINUED | OUTPATIENT
Start: 2025-06-10 | End: 2025-06-14 | Stop reason: HOSPADM

## 2025-06-09 RX ORDER — ROPIVACAINE HYDROCHLORIDE 2 MG/ML
INJECTION, SOLUTION EPIDURAL; INFILTRATION; PERINEURAL CONTINUOUS
Status: DISCONTINUED | OUTPATIENT
Start: 2025-06-09 | End: 2025-06-14 | Stop reason: HOSPADM

## 2025-06-09 RX ORDER — AMOXICILLIN 250 MG
2 CAPSULE ORAL 2 TIMES DAILY PRN
Status: DISCONTINUED | OUTPATIENT
Start: 2025-06-09 | End: 2025-06-14 | Stop reason: HOSPADM

## 2025-06-09 RX ORDER — ACETAMINOPHEN 325 MG/1
650 TABLET ORAL EVERY 6 HOURS PRN
Status: DISCONTINUED | OUTPATIENT
Start: 2025-06-09 | End: 2025-06-11 | Stop reason: SDUPTHER

## 2025-06-09 RX ADMIN — LIDOCAINE 1 PATCH: 4 PATCH TOPICAL at 20:55

## 2025-06-09 RX ADMIN — CARVEDILOL 6.25 MG: 6.25 TABLET, FILM COATED ORAL at 20:57

## 2025-06-09 RX ADMIN — SODIUM CHLORIDE 500 ML: 9 INJECTION, SOLUTION INTRAVENOUS at 11:44

## 2025-06-09 RX ADMIN — ROPIVACAINE HYDROCHLORIDE 60 ML: 2 INJECTION, SOLUTION EPIDURAL; INFILTRATION at 14:07

## 2025-06-09 RX ADMIN — Medication 5 MG: at 20:56

## 2025-06-09 RX ADMIN — Medication 10 ML: at 20:57

## 2025-06-09 RX ADMIN — HYDROCODONE BITARTRATE AND ACETAMINOPHEN 0.5 TABLET: 5; 325 TABLET ORAL at 15:24

## 2025-06-09 RX ADMIN — FENTANYL CITRATE 50 MCG: 50 INJECTION, SOLUTION INTRAMUSCULAR; INTRAVENOUS at 10:11

## 2025-06-09 RX ADMIN — HEPARIN SODIUM 5000 UNITS: 5000 INJECTION INTRAVENOUS; SUBCUTANEOUS at 17:44

## 2025-06-09 RX ADMIN — ATORVASTATIN CALCIUM 80 MG: 40 TABLET, FILM COATED ORAL at 20:56

## 2025-06-09 NOTE — PLAN OF CARE
Problem: Adult Inpatient Plan of Care  Goal: Plan of Care Review  6/9/2025 1505 by Isis Brown RN  Outcome: Progressing  Flowsheets (Taken 6/9/2025 1505)  Progress: no change  Outcome Evaluation: ARRIVED IN RM S221. ROPIVICAINE PUMP IN LEFT HIP. LEG WRAPS REMOVED. VSS. A&O X4.  Plan of Care Reviewed With: patient  6/9/2025 1504 by Isis Brown RN  Outcome: Progressing  Goal: Patient-Specific Goal (Individualized)  6/9/2025 1505 by Isis Brown RN  Outcome: Progressing  6/9/2025 1504 by Isis Brown RN  Outcome: Progressing  Goal: Absence of Hospital-Acquired Illness or Injury  6/9/2025 1505 by Isis Brown RN  Outcome: Progressing  6/9/2025 1504 by Isis Brown RN  Outcome: Progressing  Goal: Optimal Comfort and Wellbeing  6/9/2025 1505 by Isis Brown RN  Outcome: Progressing  6/9/2025 1504 by Isis Brown RN  Outcome: Progressing  Goal: Readiness for Transition of Care  6/9/2025 1505 by Isis Brown RN  Outcome: Progressing  6/9/2025 1504 by Isis Brown RN  Outcome: Progressing     Problem: Fall Injury Risk  Goal: Absence of Fall and Fall-Related Injury  Outcome: Progressing     Problem: Pain Acute  Goal: Optimal Pain Control and Function  Outcome: Progressing   Goal Outcome Evaluation:  Plan of Care Reviewed With: patient        Progress: no change  Outcome Evaluation: ARRIVED IN RM S221. ROPIVICAINE PUMP IN LEFT HIP. LEG WRAPS REMOVED. VSS. A&O X4.

## 2025-06-09 NOTE — CONSULTS
Referring Provider: No ref. provider found   Reason for Consultation: Left hip fracture    Patient Care Team:  System, Provider Not In as PCP - General  Thuy Calles, RN as Ambulatory  ([facility] in [place])    Chief complaint: left hip pain    Subjective .     History of present illness:      Patient seen this afternoon for evaluation of left hip pain secondary to intertrochanteric fracture.  He has had multiple falls in the last 2 days on the left hip at his nursing care facility.  Initially taken to the [facility] ED yesterday and discharged back to his facility with nondisplaced greater trochanteric fracture.    He then recalls a subsequent fall today onto the left hip with which pain worsened. He was taken to [facility] ED for evaluation. Left hip intertrochanteric fracture identified.     Reports severe left hip pain with inability to bear weight. He had recent left hip nerve block catheter inserted by the anesthesia pain team.    LOCATION: left hip  QUALITY: severe   TIMING: constant  WORSENED WITH: movement   IMPROVED WITH: rest, medication  PAIN RATING (OUT OF 10): 10    Son lives in [place] and is a principal at [employer].    Review of Systems  Pertinent items are noted in HPI, all other systems reviewed and negative    History  Family History   Problem Relation Age of Onset    Arthritis Mother         OA    Heart disease Father     Diabetes Father     Heart attack Father     Heart disease Brother     Heart attack Brother     Diabetes Brother     Diabetes Son     Hypertension Other     Cancer Other      Social History     Socioeconomic History    Marital status:    Tobacco Use    Smoking status: Former     Current packs/day: 0.00     Average packs/day: 1.5 packs/day for 34.8 years (52.2 ttl pk-yrs)     Types: Cigarettes, Pipe, Cigars     Start date: 1962     Quit date: 1997     Years since quittin.1     Passive exposure: Past    Smokeless  tobacco: Never    Tobacco comments:     QUIT DATE 01/01/1992   Vaping Use    Vaping status: Never Used   Substance and Sexual Activity    Alcohol use: Yes     Alcohol/week: 11.0 - 13.0 standard drinks of alcohol     Types: 7 Glasses of wine, 2 Cans of beer, 2 - 4 Shots of liquor per week     Comment: glass of wine everyday     Drug use: No    Sexual activity: Not Currently     Partners: Female     Birth control/protection: Vasectomy, Surgical     Past Surgical History:   Procedure Laterality Date    APPENDECTOMY  1957    BACK SURGERY      CARDIAC CATHETERIZATION  2011    with vein graft    CATARACT EXTRACTION Left     CERVICAL FUSION      CERVICAL FUSION  1992    C3-4    COLONOSCOPY  2013    CORONARY ARTERY BYPASS GRAFT  1994    HERNIA REPAIR Right 2011    inguinal    INGUINAL HERNIA REPAIR Left 10/29/2019    Procedure: INGUINAL HERNIA REPAIR LEFT;  Surgeon: Charisma Raines MD;  Location: [facility] OR;  Service: General    INTERVENTIONAL RADIOLOGY PROCEDURE N/A 1/23/2025    Procedure: IVC Filter Placement;  Surgeon: Avelino Hernandez MD;  Location: [facility] CATH INVASIVE LOCATION;  Service: Cardiovascular;  Laterality: N/A;    LUMBAR LAMINECTOMY  1996    laminectomy, lumbar, L3    NECK SURGERY  1992    OTHER SURGICAL HISTORY      wrist surgery    SPINE SURGERY  1996    TONSILLECTOMY AND ADENOIDECTOMY  1945    TRIGGER POINT INJECTION  2001 - 2007    VASECTOMY  1972     Past Medical History:   Diagnosis Date    Acute diverticulitis 01/29/2020    Allergic 60 yrs ago    Arthritis     Arthritis of neck Fusion 1992    Bilateral radial fractures 1962    fracture bilateral radial heads    CAD (coronary artery disease)     Calculus of gallbladder without cholecystitis without obstruction 01/29/2020    Cataract     Cervical disc disorder Fusion 1992    Cholelithiasis     Chronic kidney disease 2020    Clotting disorder     CVD (cardiovascular disease)     History of TIA 1989    Diabetes mellitus     DVT (deep venous  thrombosis)     Right lower extremity DVT and pulmonary embolism in 06/2015, idiopathic    DVT of proximal lower limb 06/22/2015    proximal DVT right leg, small PE- anticoagulation    Dyslipidemia     Erectile dysfunction     Fracture 1952    H/o pf left forearm fracture    Fracture of right hand 1980    Fracture of wrist 1980    GERD (gastroesophageal reflux disease)     with hiatal hernia    HL (hearing loss)     Hx of angina pectoris     Hypertension     Normal renal angiography 02/16/11    Knee swelling Several years ago    Left wrist fracture 1953    Lumbosacral disc disease 1996    Osgood-Schlatter's disease 1955    Osteoarthritis     Right wrist fracture     Stroke     Vertigo     Wears glasses      Allergies   Allergen Reactions    Penicillins Hives and Swelling     Per patient, has tolerated Keflex       Current Facility-Administered Medications:     acetaminophen (TYLENOL) tablet 650 mg, 650 mg, Oral, Q6H PRN **OR** acetaminophen (TYLENOL) suppository 650 mg, 650 mg, Rectal, Q4H PRN, Jose Cota MD    heparin (porcine) 5000 UNIT/ML injection 5,000 Units, 5,000 Units, Subcutaneous, Q12H, Jose Cota MD    HYDROcodone-acetaminophen (NORCO) 5-325 MG per tablet 0.5 tablet, 0.5 tablet, Oral, Q6H PRN, Jose Cota MD, 0.5 tablet at 06/09/25 1524    ipratropium-albuterol (DUO-NEB) nebulizer solution 3 mL, 3 mL, Nebulization, Q4H PRN, Jose Cota MD    Magnesium Low Dose Replacement - Follow Nurse / BPA Driven Protocol, , Not Applicable, PRN, Jose Cota MD    melatonin tablet 5 mg, 5 mg, Oral, Nightly, Jose Cota MD    morphine injection 2 mg, 2 mg, Intravenous, Q4H PRN, Jose Cota MD    ondansetron (ZOFRAN) injection 4 mg, 4 mg, Intravenous, Q6H PRN, Jose Cota MD    ropivacaine (NAROPIN) 0.2 % infusion (INFUSYSTEM), , Peripheral Nerve, Continuous, London Sethi CRNA    [COMPLETED] Insert Peripheral IV, , , Once **AND** sodium chloride  0.9 % flush 10 mL, 10 mL, Intravenous, PRN, Schuyler, Maty V, APRN      Objective     Vital Signs   Temp:  [98.2 °F (36.8 °C)-98.3 °F (36.8 °C)] 98.3 °F (36.8 °C)  Heart Rate:  [74-92] 78  Resp:  [18] 18  BP: (133-144)/(69-80) 133/76    Physical Exam:     General Appearance:    Alert, cooperative   Head:    Normocephalic, without obvious abnormality, atraumatic   Ears:    Ears intact with no obvious external abnormalities noted   Throat:   No external oral excretions   Neck:   supple, trachea midline   Lungs:     respirations regular, even and unlabored       Chest Wall:    No abnormalities observed, no intercostal retractions noted       Pulses:   Pulses palpable and equal bilaterally, well perfused   Skin:   No bleeding or rash   Neurologic:   sensation intact distally     Orthopedic/MSK:   Left hip examination reveals no open skin wounds or abrasions. Soft compartments. Shortened appearance of left lower extremity. Severe tenderness upon palpation. Pain with gentle log roll. Able to dorsiflex and plantarflex. EHL intact. Sensation intact distally.        Results Review:   I reviewed the patient's new clinical results.  Results from last 7 days   Lab Units 06/09/25  1020   WBC 10*3/mm3 9.49   HEMOGLOBIN g/dL 10.5*   HEMATOCRIT % 32.2*   PLATELETS 10*3/mm3 198     Results from last 7 days   Lab Units 06/09/25  1020   SODIUM mmol/L 136   POTASSIUM mmol/L 4.2   CHLORIDE mmol/L 102   CO2 mmol/L 23.0   BUN mg/dL 35.1*   CREATININE mg/dL 2.89*   GLUCOSE mg/dL 142*   CALCIUM mg/dL 9.1     Results from last 7 days   Lab Units 06/09/25  1020   INR  1.14*     Results from last 7 days   Lab Units 06/09/25  1020   SODIUM mmol/L 136   POTASSIUM mmol/L 4.2   CHLORIDE mmol/L 102   CO2 mmol/L 23.0   BUN mg/dL 35.1*   CREATININE mg/dL 2.89*   CALCIUM mg/dL 9.1   ALK PHOS U/L 245*   ALT (SGPT) U/L 21   AST (SGOT) U/L 30   GLUCOSE mg/dL 142*       XR Femur 2 View Left  Result Date: 6/9/2025  XR FEMUR 2 VW LEFT Date of Exam: 6/9/2025  12:51 PM EDT Indication: AP and lateral left femur, known proximal fracture/intertrochanteric fracture?need to visualize the rest of the femur bone Comparison: CT scan of the pelvis 6/9/2025 Findings: CT scan of the pelvis shows acute intertrochanteric fracture of the left hip and sclerotic changes in the femoral heads consistent with changes of AVN. The mid and distal femur appear to be intact. Knee joint appears anatomically aligned. 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 There is no evidence of knee joint effusion     Impression: Acute intertrochanteric fracture of the left hip. Remainder of the femur appears intact. Electronically Signed: Chandu Santiago MD  6/9/2025 1:35 PM EDT  Workstation ID: [id number]    CT Pelvis Without Contrast  Result Date: 6/9/2025  CT PELVIS WO CONTRAST Date of Exam: 6/9/2025 10:42 AM EDT Indication: Hip Pain. Comparison: Pelvis CT 6/8/2025. Technique: Axial CT images were obtained of the pelvis without contrast administration.  Reconstructed coronal and sagittal images were also obtained. Automated exposure control and iterative construction methods were used. Findings: Acute fracture of the left femur with significantly increased comminution, displacement, and angulation compared to yesterday's CT, now with near 90 degree femoral neck-shaft angle. Femoral head remains within the acetabulum. 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 Degenerative changes of the hips, pubic symphysis, SI joints, and lumbosacral spine. Partial ankylosis of the SI joints noted. Bones appear demineralized. Subcutaneous hematoma surrounding the left hip fracture. Atherosclerosis. Prostatomegaly. Colonic diverticulosis. No pelvic free fluid.     Impression: Acute fracture of the left femoral neck with comminution, displacement, and angulation, all of which is worsened compared to yesterday's CT. 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 Additional  chronic/degenerative findings as above. Electronically Signed: Vic Braga MD  6/9/2025 11:26 AM EDT  Workstation ID: [id number]      XR Hip With or Without Pelvis 2 - 3 View Left  Result Date: 6/9/2025  XR HIP W OR WO PELVIS 2-3 VIEW LEFT Date of Exam: 6/9/2025 10:13 AM EDT Indication: fall Comparison: 6/8/2025 Findings: Comminuted intertrochanteric fracture of the left femur. The trochanters are separate fragments. There is exaggerated varus angulation. Femoral head is not dislocated. No fracture of the pelvis is demonstrated     Impression: Intertrochanteric left femur fracture Electronically Signed: Moises Jerry  6/9/2025 10:56 AM EDT  Workstation ID: [id number]    CT Pelvis Without Contrast  Result Date: 6/8/2025  CT PELVIS WO CONTRAST Date of Exam: 6/8/2025 8:47 AM EDT Indication: poss left hip fx.  abnormal xray. Comparison: 6/8/2025 Technique: Axial CT images were obtained of the pelvis without contrast administration.  Reconstructed coronal and sagittal images were also obtained. Automated exposure control and iterative construction methods were used. Findings: There is a transverse fracture involving the left greater trochanter which extends to the intertrochanteric femur. No fracture at the right proximal femur. No significant angulation or displacement. The superior and inferior pubic rami are intact. Sacrum and coccyx intact. Moderate bilateral hip osteoarthritis. There are areas of avascular necrosis of the left and right femoral heads without collapse. Severe disc narrowing in the visualized lower lumbar spine at L4-5 and L5-S1 with facet arthritis. Left and right iliac wings are intact. No acetabular fracture. Extensive aortoiliac vascular calcifications. Prostatomegaly prostate measuring up to 5.8 x 6 cm. Bladder without acute abnormality. No fluid or hemorrhage in the pelvis. Colonic diverticulosis. Partial ankylosis of the bilateral sacroiliac joints.     Impression: 1. Nondisplaced fracture  involving left greater trochanter extending to the intertrochanteric femur. 2. 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 3. Moderate bilateral hip osteoarthritis. 4. Prostatomegaly. 5. Additional chronic findings above. Electronically Signed: Charanjit Crane MD  6/8/2025 9:24 AM EDT  Workstation ID: [id number]    XR Hip With or Without Pelvis 2 - 3 View Left  Result Date: 6/8/2025  XR HIP W OR WO PELVIS 2-3 VIEW LEFT Date of Exam: 6/8/2025 8:01 AM EDT Indication: fall, left hip/pelvis pain Comparison: 11/1/2024 Findings: There is subtle area of linear lucency overlying the left greater trochanter which may relate to nondisplaced fracture. Mild to moderate bilateral hip osteoarthritis. The iliopectineal and ilioischial lines are intact. Disc narrowing in the lower lumbar spine.     Impression: Subtle area of linear lucency overlying the left greater trochanter may relate to nondisplaced fracture, consider CT pelvis for further assessment. 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  Discussed with Dr. Badillo as well as the patient.  Evidence of intertrochanteric fracture of left femur. We discussed treatment options. Surgical intervention is recommended.    I discussed at length with the patient the risks of hip surgery.  Non-operative measures would be fraught with inability to walk and concern for development of pressure  sores/UTI/pneumonia and bed bound.      Operative measures for this hip fracture would be a cephalomedullary nail to fix the fracture.  There is a known high risk of mortality and morbidity given this age and injury type.  Risks include infection, nonunion, malunion, additional fracture, revision surgery, hardware failure, DVT/PE, MI, stroke, death, anesthesia complications, and medical complications.      The patient understand the risks and benefits and do desire to proceed with surgery.  They understand possibility of prolonged recovery and possible stay in rehabilitation at the discretion of the admitting medical team.    Procedure: Hip operative fixation with cephalomedullary nail    Plan: Left hip cephalomedullary nail with Dr. Badillo on Wednesday 6/11/25    DVT prophylaxis: subq heparin to be given today and tomorrow, hold on Wednesday for surgery. Takes asa 81mg daily. Encouraged ankle pumps.    Diet: may resume regular diet for now, NPO on Wednesday      History, diagnosis and treatment plan discussed with Dr. Badillo.    I discussed the patient's findings and my recommendations with patient        Dulce Friend PA-C  [facility] Orthopedic Surgery  06/09/25  15:41 EDT

## 2025-06-09 NOTE — ED NOTES
[facility]cala    Nursing Report ED to Floor:  Mental status: A/Ox3  Ambulatory status: Unable to ambulate due to hip fracture   Oxygen Therapy:  RA  Cardiac Rhythm: Junctional Rhythm   Admitted from: [facility]   Safety Concerns:  Fall risk   Precautions: NA  Social Issues: [facility]  ED Room #:  30    ED Nurse Phone Extension - [phone] or may call [phone].      HPI:   Chief Complaint   Patient presents with    Fall       Past Medical History:  Past Medical History:   Diagnosis Date    Acute diverticulitis 01/29/2020    Allergic 60 yrs ago    Arthritis     Arthritis of neck Fusion 1992    Bilateral radial fractures 1962    fracture bilateral radial heads    CAD (coronary artery disease)     Calculus of gallbladder without cholecystitis without obstruction 01/29/2020    Cataract     Cervical disc disorder Fusion 1992    Cholelithiasis     Chronic kidney disease 2020    Clotting disorder     CVD (cardiovascular disease)     History of TIA 1989    Diabetes mellitus     DVT (deep venous thrombosis)     Right lower extremity DVT and pulmonary embolism in 06/2015, idiopathic    DVT of proximal lower limb 06/22/2015    proximal DVT right leg, small PE- anticoagulation    Dyslipidemia     Erectile dysfunction     Fracture 1952    H/o pf left forearm fracture    Fracture of right hand 1980    Fracture of wrist 1980    GERD (gastroesophageal reflux disease)     with hiatal hernia    HL (hearing loss)     Hx of angina pectoris     Hypertension     Normal renal angiography 02/16/11    Knee swelling Several years ago    Left wrist fracture 1953    Lumbosacral disc disease 1996    Osgood-Schlatter's disease 1955    Osteoarthritis     Right wrist fracture     Stroke     Vertigo     Wears glasses         Past Surgical History:  Past Surgical History:   Procedure Laterality Date    APPENDECTOMY  1957    BACK SURGERY      CARDIAC CATHETERIZATION  2011    with vein graft    CATARACT EXTRACTION Left     CERVICAL FUSION      CERVICAL FUSION   1992    C3-4    COLONOSCOPY  2013    CORONARY ARTERY BYPASS GRAFT  1994    HERNIA REPAIR Right 2011    inguinal    INGUINAL HERNIA REPAIR Left 10/29/2019    Procedure: INGUINAL HERNIA REPAIR LEFT;  Surgeon: Charisma Raines MD;  Location: [facility] OR;  Service: General    INTERVENTIONAL RADIOLOGY PROCEDURE N/A 1/23/2025    Procedure: IVC Filter Placement;  Surgeon: Avelino Hernandez MD;  Location:  [facility] CATH INVASIVE LOCATION;  Service: Cardiovascular;  Laterality: N/A;    LUMBAR LAMINECTOMY  1996    laminectomy, lumbar, L3    NECK SURGERY  1992    OTHER SURGICAL HISTORY      wrist surgery    SPINE SURGERY  1996    TONSILLECTOMY AND ADENOIDECTOMY  1945    TRIGGER POINT INJECTION  2001 - 2007    VASECTOMY  1972        Admitting Doctor:   Jose Cota MD    Consulting Provider(s):  Consults       Date and Time Order Name Status Description    6/9/2025  9:59 AM Inpatient Orthopedic Surgery Consult               Admitting Diagnosis:   The encounter diagnosis was Closed comminuted intertrochanteric fracture of left femur, initial encounter.    Most Recent Vitals:   Vitals:    06/09/25 1100 06/09/25 1130 06/09/25 1200 06/09/25 1230   BP:  135/72 143/72 133/69   Pulse: 79 76 79 75   Resp:       Temp:       TempSrc:       SpO2: 94% 91% 92% 94%   Weight:       Height:           Active LDAs/IV Access:   Lines, Drains & Airways       Active LDAs       Name Placement date Placement time Site Days    Peripheral IV 06/09/25 0952 18 G Anterior;Distal;Right Forearm 06/09/25  0952  Forearm  less than 1                    Labs (abnormal labs have a star):   Labs Reviewed   COMPREHENSIVE METABOLIC PANEL - Abnormal; Notable for the following components:       Result Value    Glucose 142 (*)     BUN 35.1 (*)     Creatinine 2.89 (*)     Albumin 3.4 (*)     Alkaline Phosphatase 245 (*)     eGFR 20.8 (*)     All other components within normal limits    Narrative:     GFR Categories in Chronic Kidney Disease (CKD)              GFR  Category          GFR (mL/min/1.73)    Interpretation  G1                    90 or greater        Normal or high (1)  G2                    60-89                Mild decrease (1)  G3a                   45-59                Mild to moderate decrease  G3b                   30-44                Moderate to severe decrease  G4                    15-29                Severe decrease  G5                    14 or less           Kidney failure    (1)In the absence of evidence of kidney disease, neither GFR category G1 or G2 fulfill the criteria for CKD.    eGFR calculation 2021 CKD-EPI creatinine equation, which does not include race as a factor   PROTIME-INR - Abnormal; Notable for the following components:    INR 1.14 (*)     All other components within normal limits   CBC WITH AUTO DIFFERENTIAL - Abnormal; Notable for the following components:    RBC 3.74 (*)     Hemoglobin 10.5 (*)     Hematocrit 32.2 (*)     RDW 16.2 (*)     Lymphocyte % 8.6 (*)     Monocytes, Absolute 1.12 (*)     Eosinophils, Absolute 0.57 (*)     All other components within normal limits   URINALYSIS W/ CULTURE IF INDICATED   TYPE AND SCREEN   [facility] 2ND SPECIMEN VERIFICATION   CBC AND DIFFERENTIAL    Narrative:     The following orders were created for panel order CBC & Differential.  Procedure                               Abnormality         Status                     ---------                               -----------         ------                     CBC Auto Differential[[id number]]        Abnormal            Final result                 Please view results for these tests on the individual orders.       Meds Given in ED:   Medications   sodium chloride 0.9 % flush 10 mL (has no administration in time range)   fentaNYL citrate (PF) (SUBLIMAZE) injection 50 mcg (50 mcg Intravenous Given 6/9/25 1011)   sodium chloride 0.9 % bolus 500 mL (0 mL Intravenous Stopped 6/9/25 1214)           Last NIH score:                                                           Dysphagia screening results:  Patient Factors Component (Dysphagia:Stroke or Rule-out)  Best Eye Response: 4-->(E4) spontaneous (06/09/25 1012)  Best Motor Response: 6-->(M6) obeys commands (06/09/25 1012)  Best Verbal Response: 4-->(V4) confused (06/09/25 1012)  [place] Coma Scale Score: 14 (06/09/25 1012)     [place] Coma Scale:  No data recorded     CIWA:        Restraint Type:            Isolation Status:  No active isolations

## 2025-06-09 NOTE — ANESTHESIA PROCEDURE NOTES
Peripheral Block      Patient reassessed immediately prior to procedure    Reason for block: at surgeon's request and post-op pain management  Performed by  CRNA/CAA: London Sethi CRNA  Preanesthetic Checklist  Completed: patient identified, IV checked, site marked, risks and benefits discussed, surgical consent, monitors and equipment checked, pre-op evaluation and timeout performed  Prep:  Pt Position: supine  Sterile barriers:cap, gloves, mask and washed/disinfected hands  Prep: ChloraPrep  Patient monitoring: blood pressure monitoring, continuous pulse oximetry and EKG  Procedure  Performed under: local infiltration  Guidance:ultrasound guided    ULTRASOUND INTERPRETATION.  Using ultrasound guidance a 20 G gauge needle was placed in close proximity to the nerve, at which point, under ultrasound guidance anesthetic was injected in the area of the nerve and spread of the anesthesia was seen on ultrasound in close proximity thereto.  There were no abnormalities seen on ultrasound; a digital image was taken; and the patient tolerated the procedure with no complications. Images:still images obtained, printed/placed on chart    Laterality:left  Block Type:fascia iliaca compartment  Injection Technique:catheter  Needle Type:echogenic and Tuohy  Needle Gauge:18 G  Resistance on Injection: none  Catheter Size:20 G (20g)  Cath Depth at skin: 10 cm    Medications Used: ropivacaine (NAROPIN) 0.2 % injection - Peripheral Nerve   60 mL - 6/9/2025 2:07:00 PM      Post Assessment  Injection Assessment: negative aspiration for heme, no paresthesia on injection and incremental injection  Patient Tolerance:comfortable throughout block  Complications:no  Additional Notes  CKAFASCIAILIACA: CATHETER  A high-frequency linear transducer, with sterile cover, was placed in parasagittal plane on top of the Anterior Superior Iliac Spine (ASIS) and moved medially to identify the Internal Oblique muscle, Sartorius muscle, Iliacus Muscle,  "Fascia Iliaca (FI) and Fascia Latae. The insertion site was prepped and draped in sterile fashion. Skin and cutaneous tissue was infiltrated with 2-5 ml of 1% Lidocaine. Using ultrasound-guidance, an 18-gauge Contiplex Ultra 360 Touhy needle was advanced in plane from caudad to cephalad. Preservative-free normal saline was utilized for hydro-dissection of tissue, advancement of Touhy, and to confirm final needle placement below FI. Local anesthetic in incremental 3-5 ml injections. Aspiration every 5 ml to prevent intravascular injection. Injection was completed with negative aspiration of blood and negative intravascular injection. Injection pressures were normal with minimal resistance. A 20-gauge Contiplex Echo catheter was placed through the needle and advance out the tip of the Touhy 3-5 cm. The Touhy needle was then removed, and final catheter position verified below the FI. The catheter was secured in the usual fashion with skin glue, benzoin, steri-strips, CHG tegaderm and Label noting \"Nerve Block Catheter\". Jerk tape applied at yellow connector and catheter connection.   Performed by: London Sethi CRNA            "

## 2025-06-09 NOTE — ED PROVIDER NOTES
EMERGENCY DEPARTMENT ENCOUNTER    Pt Name: Toño Alcala  MRN: [id number]  Pt :   1940  Room Number:  S221/1  Date of encounter:  2025  PCP: System, Provider Not In  ED Provider: YARED Alcantar    Historian: Patient      HPI:  Chief Complaint: Fall, left hip pain        Context: Toño Alcala is a 84 y.o. male who presents to the ED c/o fall, left hip pain.  Patient arrived from independent living facility after the fall this morning.  He was able to tell me that he was walking expecting his socks to have the creeps, he slipped and fell.  He hit head without loss of consciousness.  Reports pain to the left hip that is worse with movement or palpation.  Reports no preceding chest pain or dizziness.      PAST MEDICAL HISTORY  Past Medical History:   Diagnosis Date    Acute diverticulitis 2020    Allergic 60 yrs ago    Arthritis     Arthritis of neck Fusion     Bilateral radial fractures     fracture bilateral radial heads    CAD (coronary artery disease)     Calculus of gallbladder without cholecystitis without obstruction 2020    Cataract     Cervical disc disorder Fusion     Cholelithiasis     Chronic kidney disease     Clotting disorder     CVD (cardiovascular disease)     History of TIA     Diabetes mellitus     DVT (deep venous thrombosis)     Right lower extremity DVT and pulmonary embolism in 2015, idiopathic    DVT of proximal lower limb 2015    proximal DVT right leg, small PE- anticoagulation    Dyslipidemia     Erectile dysfunction     Fracture     H/o pf left forearm fracture    Fracture of right hand     Fracture of wrist     GERD (gastroesophageal reflux disease)     with hiatal hernia    HL (hearing loss)     Hx of angina pectoris     Hypertension     Normal renal angiography 11    Knee swelling Several years ago    Left wrist fracture     Lumbosacral disc disease     Osgood-Schlatter's disease     Osteoarthritis      Right wrist fracture     Stroke     Vertigo     Wears glasses          PAST SURGICAL HISTORY  Past Surgical History:   Procedure Laterality Date    APPENDECTOMY      BACK SURGERY      CARDIAC CATHETERIZATION      with vein graft    CATARACT EXTRACTION Left     CERVICAL FUSION      CERVICAL FUSION  1992    C3-4    COLONOSCOPY  2013    CORONARY ARTERY BYPASS GRAFT  1994    HERNIA REPAIR Right 2011    inguinal    INGUINAL HERNIA REPAIR Left 10/29/2019    Procedure: INGUINAL HERNIA REPAIR LEFT;  Surgeon: Charisma Raines MD;  Location: [facility] OR;  Service: General    INTERVENTIONAL RADIOLOGY PROCEDURE N/A 2025    Procedure: IVC Filter Placement;  Surgeon: Avelino Hernandez MD;  Location:  [facility];  Service: Cardiovascular;  Laterality: N/A;    LUMBAR LAMINECTOMY      laminectomy, lumbar, L3    NECK SURGERY      OTHER SURGICAL HISTORY      wrist surgery    SPINE SURGERY      TONSILLECTOMY AND ADENOIDECTOMY      TRIGGER POINT INJECTION   -     VASECTOMY  1972         FAMILY HISTORY  Family History   Problem Relation Age of Onset    Arthritis Mother         OA    Heart disease Father     Diabetes Father     Heart attack Father     Heart disease Brother     Heart attack Brother     Diabetes Brother     Diabetes Son     Hypertension Other     Cancer Other          SOCIAL HISTORY  Social History     Socioeconomic History    Marital status:    Tobacco Use    Smoking status: Former     Current packs/day: 0.00     Average packs/day: 1.5 packs/day for 34.8 years (52.2 ttl pk-yrs)     Types: Cigarettes, Pipe, Cigars     Start date: 1962     Quit date: 1997     Years since quittin.1     Passive exposure: Past    Smokeless tobacco: Never    Tobacco comments:     QUIT DATE 1992   Vaping Use    Vaping status: Never Used   Substance and Sexual Activity    Alcohol use: Yes     Alcohol/week: 11.0 - 13.0 standard drinks of alcohol     Types: 7  Glasses of wine, 2 Cans of beer, 2 - 4 Shots of liquor per week     Comment: glass of wine everyday     Drug use: No    Sexual activity: Not Currently     Partners: Female     Birth control/protection: Vasectomy, Surgical         ALLERGIES  Penicillins        REVIEW OF SYSTEMS  Review of Systems       All systems reviewed and negative except for those discussed in HPI.       PHYSICAL EXAM    I have reviewed the triage vital signs and nursing notes.    ED Triage Vitals [06/09/25 0953]   Temp Heart Rate Resp BP SpO2   98.2 °F (36.8 °C) 75 18 144/75 94 %      Temp src Heart Rate Source Patient Position BP Location FiO2 (%)   Oral Monitor -- -- --       Physical Exam  GENERAL:   Appears in no acute distress.   HENT: Nares patent.  EYES: No scleral icterus.  CV: Regular rhythm, regular rate.  RESPIRATORY: Normal effort.  No audible wheezes, rales or rhonchi.  ABDOMEN: Soft, nontender  MUSCULOSKELETAL: Left lower extremity shortening and external rotation  NEURO: Alert, moves all extremities, follows commands.  SKIN: Warm, dry, no rash visualized.  Small abrasion to the top of the head      LAB RESULTS  Recent Results (from the past 24 hours)   ECG 12 Lead Pre-Op / Pre-Procedure    Collection Time: 06/09/25 10:12 AM   Result Value Ref Range    QT Interval 436 ms    QTC Interval 499 ms   Comprehensive Metabolic Panel    Collection Time: 06/09/25 10:20 AM    Specimen: Blood   Result Value Ref Range    Glucose 142 (H) 65 - 99 mg/dL    BUN 35.1 (H) 8.0 - 23.0 mg/dL    Creatinine 2.89 (H) 0.76 - 1.27 mg/dL    Sodium 136 136 - 145 mmol/L    Potassium 4.2 3.5 - 5.2 mmol/L    Chloride 102 98 - 107 mmol/L    CO2 23.0 22.0 - 29.0 mmol/L    Calcium 9.1 8.6 - 10.5 mg/dL    Total Protein 6.5 6.0 - 8.5 g/dL    Albumin 3.4 (L) 3.5 - 5.2 g/dL    ALT (SGPT) 21 1 - 41 U/L    AST (SGOT) 30 1 - 40 U/L    Alkaline Phosphatase 245 (H) 39 - 117 U/L    Total Bilirubin 0.7 0.0 - 1.2 mg/dL    Globulin 3.1 gm/dL    A/G Ratio 1.1 g/dL     BUN/Creatinine Ratio 12.1 7.0 - 25.0    Anion Gap 11.0 5.0 - 15.0 mmol/L    eGFR 20.8 (L) >60.0 mL/min/1.73   Protime-INR    Collection Time: 06/09/25 10:20 AM    Specimen: Blood   Result Value Ref Range    Protime 15.3 12.2 - 15.3 Seconds    INR 1.14 (H) 0.89 - 1.12   CBC Auto Differential    Collection Time: 06/09/25 10:20 AM    Specimen: Blood   Result Value Ref Range    WBC 9.49 3.40 - 10.80 10*3/mm3    RBC 3.74 (L) 4.14 - 5.80 10*6/mm3    Hemoglobin 10.5 (L) 13.0 - 17.7 g/dL    Hematocrit 32.2 (L) 37.5 - 51.0 %    MCV 86.1 79.0 - 97.0 fL    MCH 28.1 26.6 - 33.0 pg    MCHC 32.6 31.5 - 35.7 g/dL    RDW 16.2 (H) 12.3 - 15.4 %    RDW-SD 50.6 37.0 - 54.0 fl    MPV 10.8 6.0 - 12.0 fL    Platelets 198 140 - 450 10*3/mm3    Neutrophil % 72.6 42.7 - 76.0 %    Lymphocyte % 8.6 (L) 19.6 - 45.3 %    Monocyte % 11.8 5.0 - 12.0 %    Eosinophil % 6.0 0.3 - 6.2 %    Basophil % 0.6 0.0 - 1.5 %    Immature Grans % 0.4 0.0 - 0.5 %    Neutrophils, Absolute 6.88 1.70 - 7.00 10*3/mm3    Lymphocytes, Absolute 0.82 0.70 - 3.10 10*3/mm3    Monocytes, Absolute 1.12 (H) 0.10 - 0.90 10*3/mm3    Eosinophils, Absolute 0.57 (H) 0.00 - 0.40 10*3/mm3    Basophils, Absolute 0.06 0.00 - 0.20 10*3/mm3    Immature Grans, Absolute 0.04 0.00 - 0.05 10*3/mm3    nRBC 0.0 0.0 - 0.2 /100 WBC   Hemoglobin A1c    Collection Time: 06/09/25 10:20 AM    Specimen: Blood   Result Value Ref Range    Hemoglobin A1C 5.90 (H) 4.80 - 5.60 %   Type & Screen    Collection Time: 06/09/25 10:22 AM    Specimen: Blood   Result Value Ref Range    ABO Type O     RH type Positive     Antibody Screen Negative     T&S Expiration Date 6/10/2025 11:59:00 PM    ABO RH Specimen Verification    Collection Time: 06/09/25 11:55 AM    Specimen: Blood   Result Value Ref Range    ABO Type O     RH type Positive    Urinalysis With Culture If Indicated - Urine, Clean Catch    Collection Time: 06/09/25  1:00 PM    Specimen: Urine, Clean Catch   Result Value Ref Range    Color, UA Yellow  Yellow, Straw    Appearance, UA Clear Clear    pH, UA 7.5 5.0 - 8.0    Specific Gravity, UA 1.017 1.001 - 1.030    Glucose, UA Negative Negative    Ketones, UA Negative Negative    Bilirubin, UA Negative Negative    Blood, UA Negative Negative    Protein, UA Trace (A) Negative    Leuk Esterase, UA Negative Negative    Nitrite, UA Negative Negative    Urobilinogen, UA 1.0 E.U./dL 0.2 - 1.0 E.U./dL       If labs were ordered, I independently reviewed the results and considered them in treating the patient.        RADIOLOGY  XR Femur 2 View Left  Result Date: 6/9/2025  XR FEMUR 2 VW LEFT Date of Exam: 6/9/2025 12:51 PM EDT Indication: AP and lateral left femur, known proximal fracture/intertrochanteric fracture?need to visualize the rest of the femur bone Comparison: CT scan of the pelvis 6/9/2025 Findings: CT scan of the pelvis shows acute intertrochanteric fracture of the left hip and sclerotic changes in the femoral heads consistent with changes of AVN. The mid and distal femur appear to be intact. Knee joint appears anatomically aligned. 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 There is no evidence of knee joint effusion     Impression: Acute intertrochanteric fracture of the left hip. Remainder of the femur appears intact. Electronically Signed: Chandu Santiago MD  6/9/2025 1:35 PM EDT  Workstation ID: [id number]    CT Pelvis Without Contrast  Result Date: 6/9/2025  CT PELVIS WO CONTRAST Date of Exam: 6/9/2025 10:42 AM EDT Indication: Hip Pain. Comparison: Pelvis CT 6/8/2025. Technique: Axial CT images were obtained of the pelvis without contrast administration.  Reconstructed coronal and sagittal images were also obtained. Automated exposure control and iterative construction methods were used. Findings: Acute fracture of the left femur with significantly increased comminution, displacement, and angulation compared to yesterday's CT, now with near 90 degree femoral neck-shaft angle. Femoral head remains within the acetabulum.  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 Degenerative changes of the hips, pubic symphysis, SI joints, and lumbosacral spine. Partial ankylosis of the SI joints noted. Bones appear demineralized. Subcutaneous hematoma surrounding the left hip fracture. Atherosclerosis. Prostatomegaly. Colonic diverticulosis. No pelvic free fluid.     Impression: Acute fracture of the left femoral neck with comminution, displacement, and angulation, all of which is worsened compared to yesterday's CT. 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 Additional chronic/degenerative findings as above. Electronically Signed: Vic Braga MD  6/9/2025 11:26 AM EDT  Workstation ID: [id number]    CT Cervical Spine Without Contrast  Result Date: 6/9/2025  CT CERVICAL SPINE WO CONTRAST Date of Exam: 6/9/2025 10:42 AM EDT Indication: fall. Comparison: 6/8/2025, 4/22/2025 Technique: Axial CT images were obtained of the cervical spine without contrast administration.  Reconstructed coronal and sagittal images were also obtained. Automated exposure control and iterative construction methods were used. FINDINGS: Healing/healed oblique fracture involving the fused C5 and C6 vertebrae again noted. No new fracture is identified. Cervical spondylosis with contiguous anterior osteophytes and multilevel fusion across posterior elements, compatible with diffuse idiopathic skeletal hyperostosis.. Vertebral body heights are maintained. The craniocervical and atlantoaxial junctions appear within normal limits. The prevertebral and paraspinal soft tissues are without focal abnormality. Vascular calcifications are present. The visualized intracranial structures appear unremarkable. The visualized lung apices are clear.     1.Healing/healed oblique fracture involving the fused C5 and C6 vertebrae again noted. 2.No new fracture is identified. 3.Cervical spondylosis with concomitant diffuse idiopathic  skeletal hyperostosis. Electronically Signed: Russell Montes De Oca MD  6/9/2025 11:21 AM EDT  Workstation ID: [id number]    CT Head Without Contrast  Result Date: 6/9/2025  CT HEAD WO CONTRAST Date of Exam: 6/9/2025 10:42 AM EDT Indication: fall. Comparison: Head CT dated 6/8/2025 Technique: Axial CT images were obtained of the head without contrast administration.  Automated exposure control and iterative construction methods were used. FINDINGS:  Foci of periventricular and subcortical white matter hypoattenuation are consistent with chronic, microvascular ischemic change. There is age-related loss of brain parenchymal volume with prominence of sulcation and ventriculomegaly. There is encephalomalacia within the bilateral occipital lobes, right greater than left. Probable small remote infarcts within the left cerebellum. No significant mass effect, midline shift, intracranial hemorrhage, or hydrocephalus is identified. No extra-axial fluid collection is identified.   The calvarium and overlying soft tissues are unremarkable. The paranasal sinuses and bilateral mastoid air cells are adequately aerated. Bilateral lens prostheses are noted. Orbital structures are otherwise unremarkable.     1.No acute intracranial abnormality is identified. 2.Chronic ischemic changes and diffuse cortical atrophy. Evidence of remote infarct involving the bilateral occipital lobes and left cerebellum. Electronically Signed: Russell Montes De Oca MD  6/9/2025 11:01 AM EDT  Workstation ID: [id number]    XR Hip With or Without Pelvis 2 - 3 View Left  Result Date: 6/9/2025  XR HIP W OR WO PELVIS 2-3 VIEW LEFT Date of Exam: 6/9/2025 10:13 AM EDT Indication: fall Comparison: 6/8/2025 Findings: Comminuted intertrochanteric fracture of the left femur. The trochanters are separate fragments. There is exaggerated varus angulation. Femoral head is not dislocated. No fracture of the pelvis is demonstrated     Impression: Intertrochanteric left femur fracture  Electronically Signed: Moises Edwards  6/9/2025 10:56 AM EDT  Workstation ID: [id number]      I ordered and independently reviewed the above noted radiographic studies.      I viewed images of x-ray of left hip which showed intertrochanteric left femur fracture per my independent interpretation.    See radiologist's dictation for official interpretation.        PROCEDURES    Procedures    ECG 12 Lead Pre-Op / Pre-Procedure   Preliminary Result   Test Reason : Pre-Op / Pre-Procedure   Blood Pressure :   */*   mmHG   Vent. Rate :  79 BPM     Atrial Rate :  78 BPM      P-R Int :   * ms          QRS Dur :  98 ms       QT Int : 436 ms       P-R-T Axes :   *  43  47 degrees     QTcB Int : 499 ms      Accelerated Junctional rhythm   Nonspecific ST abnormality   Prolonged QT   Abnormal ECG   When compared with ECG of 21-Jan-2025 06:09,   Junctional rhythm has replaced Sinus rhythm      Referred By: EDMD           Confirmed By:           MEDICATIONS GIVEN IN ER    Medications   sodium chloride 0.9 % flush 10 mL (has no administration in time range)   morphine injection 2 mg (has no administration in time range)   HYDROcodone-acetaminophen (NORCO) 5-325 MG per tablet 0.5 tablet (0.5 tablets Oral Given 6/9/25 1524)   Magnesium Low Dose Replacement - Follow Nurse / BPA Driven Protocol (has no administration in time range)   ipratropium-albuterol (DUO-NEB) nebulizer solution 3 mL (has no administration in time range)   heparin (porcine) 5000 UNIT/ML injection 5,000 Units (has no administration in time range)   ropivacaine (NAROPIN) 0.2 % infusion (INFUSYSTEM) (has no administration in time range)   melatonin tablet 5 mg (has no administration in time range)   ondansetron (ZOFRAN) injection 4 mg (has no administration in time range)   acetaminophen (TYLENOL) tablet 650 mg (has no administration in time range)     Or   acetaminophen (TYLENOL) suppository 650 mg (has no administration in time range)   fentaNYL citrate (PF) (SUBLIMAZE)  injection 50 mcg (50 mcg Intravenous Given 6/9/25 1011)   sodium chloride 0.9 % bolus 500 mL (0 mL Intravenous Stopped 6/9/25 1214)         MEDICAL DECISION MAKING, PROGRESS, and CONSULTS    All labs, if obtained, have been independently reviewed by me.  All radiology studies, if obtained, have been reviewed by me and the radiologist dictating the report.  All EKG's, if obtained, have been independently viewed and interpreted by me/my attending physician.      Discussion below represents my analysis of pertinent findings related to patient's condition, differential diagnosis, treatment plan and final disposition.  Patient is 84-year-old male with history of coronary artery disease, CHF, diabetes, history of PE and DVT, CVA presents for evaluation of left hip injury after the fall.  This is his second fall in 2 days, he was seen yesterday at [facility], was diagnosed with nondisplaced left greater trochanter fracture and was discharged to assisted living facility.  If he fell again this morning slipping on his socks.  Upon arrival shortening to the left lower extremity noted, x-ray showed acute intertrochanteric fracture of the left hip.  Dr. Badillo with orthopedic services consulted, anesthesia consulted for hip block, hospitalist services consulted for admission                         Differential diagnosis:    Hip fracture, pelvic fracture, femur fracture, hip contusion      Additional sources:    - Discussed/ obtained information from independent historians: EMT    - External (non-ED) record review: ER visit on 6/8/2025 for fall    - Chronic or social conditions impacting care: Advanced age, frequent fall    - Shared decision making:        Orders placed during this visit:  Orders Placed This Encounter   Procedures    XR Hip With or Without Pelvis 2 - 3 View Left    CT Pelvis Without Contrast    CT Head Without Contrast    CT Cervical Spine Without Contrast    XR Femur 2 View Left    Comprehensive  Metabolic Panel    Protime-INR    CBC Auto Differential    Urinalysis With Culture If Indicated - Urine, Clean Catch    Hemoglobin A1c    CBC (No Diff)    Basic Metabolic Panel    Magnesium    Diet: Regular/House, Diabetic; Consistent Carbohydrate; Fluid Consistency: Thin (IDDSI 0)    Has diet controlled diabetes. Checking A1c and ac/hs fsbs. If A1c >6.5 or fsbs >200 initiate low dose sliding scale humalog w/ meals only  Nursing Communication    Code Status and Medical Interventions: No CPR (Do Not Attempt to Resuscitate); Limited Support; No intubation (DNI)    Inpatient Orthopedic Surgery Consult    Inpatient Orthopedic Surgery Consult    POC Glucose Finger 4x Daily Before Meals & at Bedtime    ECG 12 Lead Pre-Op / Pre-Procedure    Type & Screen    ABO RH Specimen Verification    Insert Peripheral IV    Inpatient Admission    ED Bed Request    CBC & Differential         Additional orders considered but not ordered:      ED Course:    Consultants:      ED Course as of 06/09/25 1604   Mon Jun 09, 2025   1101 Spoke with Dr. Badillo orthopedic, advised hospital admission, surgery most likely on Wednesday [IR]   1101 Spoke with anesthesia, will see patient for hip block [IR]   1132 Creatinine(!): 2.89  Chronic renal insufficiency [IR]      ED Course User Index  [IR] Maty Son, APRN              Shared Decision Making:  After my consideration of clinical presentation and any laboratory/radiology studies obtained, I discussed the findings with the patient/patient representative who is in agreement with the treatment plan and the final disposition.   Risks and benefits of discharge and/or observation/admission were discussed.       AS OF 16:04 EDT VITALS:    BP - 133/76  HR - 78  TEMP - 98.3 °F (36.8 °C) (Oral)  O2 SATS - 92%                  DIAGNOSIS  Final diagnoses:   Closed fracture of left hip, initial encounter   Fall, initial encounter   Chronic kidney disease, unspecified CKD stage          DISPOSITION  admit      Please note that portions of this document were completed with voice recognition software.     YARED Alcantar   06/09/25   16:04 EDT        Maty Son, YARED  06/09/25 1602

## 2025-06-09 NOTE — H&P
[facility] Medicine Services  HISTORY AND PHYSICAL    Patient Name: Toño Alcala  : 1940  MRN: [id number]  Primary Care Physician: System, Provider Not In  Date of admission: 2025      Subjective   Subjective     Chief Complaint:  Left hip pain    HPI:  Toño Alcala is a 84 y.o. male w/ hx CAD (previous cabg ), chronic HFpEF, ckd 4 (baseline cr ~2.2-2.6), diet controlled DM, previous PE & DVT (no longer anticoagulated). 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 Is now residing at assisted living facility, son (via phone) tells me that memory is gradually worsening and continues to sustain falls. In early May 2025 saw Dr. Hernandez & placed on twice weekly bumex 1mg (w/ plans to use more prn).    Presented to [facility] after falls w/ left hip pain, imaging w/ left femoral neck fracture. Patient denies chest pain, dizziness prior to falls, states slipped in his socks. ED contacted Dr. Badillo w/ orthopedic surgery who tentatively plans take to OR on 25 for trochanteric nail.  Anesthesia to see for block.      Personal History     Past Medical History:   Diagnosis Date    Acute diverticulitis 2020    Allergic 60 yrs ago    Arthritis     Arthritis of neck Fusion     Bilateral radial fractures     fracture bilateral radial heads    CAD (coronary artery disease)     Calculus of gallbladder without cholecystitis without obstruction 2020    Cataract     Cervical disc disorder Fusion     Cholelithiasis     Chronic kidney disease     Clotting disorder     CVD (cardiovascular disease)     History of TIA     Diabetes mellitus     DVT  (deep venous thrombosis)     Right lower extremity DVT and pulmonary embolism in 06/2015, idiopathic    DVT of proximal lower limb 06/22/2015    proximal DVT right leg, small PE- anticoagulation    Dyslipidemia     Erectile dysfunction     Fracture 1952    H/o pf left forearm fracture    Fracture of right hand 1980    Fracture of wrist 1980    GERD (gastroesophageal reflux disease)     with hiatal hernia    HL (hearing loss)     Hx of angina pectoris     Hypertension     Normal renal angiography 02/16/11    Knee swelling Several years ago    Left wrist fracture 1953    Lumbosacral disc disease 1996    Osgood-Schlatter's disease 1955    Osteoarthritis     Right wrist fracture     Stroke     Vertigo     Wears glasses            Past Surgical History:   Procedure Laterality Date    APPENDECTOMY  1957    BACK SURGERY      CARDIAC CATHETERIZATION  2011    with vein graft    CATARACT EXTRACTION Left     CERVICAL FUSION      CERVICAL FUSION  1992    C3-4    COLONOSCOPY  2013    CORONARY ARTERY BYPASS GRAFT  1994    HERNIA REPAIR Right 2011    inguinal    INGUINAL HERNIA REPAIR Left 10/29/2019    Procedure: INGUINAL HERNIA REPAIR LEFT;  Surgeon: Charisma Raines MD;  Location:  [facility];  Service: General    INTERVENTIONAL RADIOLOGY PROCEDURE N/A 1/23/2025    Procedure: IVC Filter Placement;  Surgeon: Avelino Hernandez MD;  Location:  [facility];  Service: Cardiovascular;  Laterality: N/A;    LUMBAR LAMINECTOMY  1996    laminectomy, lumbar, L3    NECK SURGERY  1992    OTHER SURGICAL HISTORY      wrist surgery    SPINE SURGERY  1996    TONSILLECTOMY AND ADENOIDECTOMY  1945    TRIGGER POINT INJECTION  2001 - 2007    VASECTOMY  1972       Family History: family history includes Arthritis in his mother; Cancer in an other family member; Diabetes in his brother, father, and son; Heart attack in his brother and father; Heart disease in his brother and father; Hypertension in an other family member.      Social History:  reports that he quit smoking about 28 years ago. His smoking use included cigarettes, pipe, and cigars. He started smoking about 62 years ago. He has a 52.2 pack-year smoking history. He has been exposed to tobacco smoke. He has never used smokeless tobacco. He reports current alcohol use of about 11.0 - 13.0 standard drinks of alcohol per week. He reports that he does not use drugs.  Social History     Social History Narrative    Not on file       Medications:  Available home medication information reviewed.  Lidocaine, acetaminophen, amLODIPine, ammonium lactate, aspirin, atorvastatin, carvedilol, folic acid, ondansetron ODT, pantoprazole, sertraline, sulfamethoxazole-trimethoprim, and thiamine    Allergies   Allergen Reactions    Penicillins Hives and Swelling     Per patient, has tolerated Keflex       Objective   Objective     Vital Signs:   Temp:  [98.2 °F (36.8 °C)] 98.2 °F (36.8 °C)  Heart Rate:  [74-92] 74  Resp:  [18] 18  BP: (133-144)/(69-80) 143/71       Physical Exam   Elderly, frail but nontoxic, alert, interactive, confused to details but answers most questions appropriately, no distress, comfortable appearing, normal work of breathing at rest    Result Review:  I have personally reviewed the results from the time of this admission to 6/9/2025 13:51 EDT and agree with these findings:  [x]  Laboratory list / accordion  []  Microbiology  [x]  Radiology  [x]  EKG/Telemetry   []  Cardiology/Vascular   []  Pathology  [x]  Old records  []  Other:  Most notable findings include: ct w/ left hip fracture      LAB RESULTS:      Lab 06/09/25  1020   WBC 9.49   HEMOGLOBIN 10.5*   HEMATOCRIT 32.2*   PLATELETS 198   NEUTROS ABS 6.88   IMMATURE GRANS (ABS) 0.04   LYMPHS ABS 0.82   MONOS ABS 1.12*   EOS ABS 0.57*   MCV 86.1   PROTIME 15.3   INR 1.14*         Lab 06/09/25  1020   SODIUM 136   POTASSIUM 4.2   CHLORIDE 102   CO2 23.0   ANION GAP 11.0   BUN 35.1*   CREATININE 2.89*   EGFR 20.8*    GLUCOSE 142*   CALCIUM 9.1         Lab 06/09/25  1020   TOTAL PROTEIN 6.5   ALBUMIN 3.4*   GLOBULIN 3.1   ALT (SGPT) 21   AST (SGOT) 30   BILIRUBIN 0.7   ALK PHOS 245*                 Lab 06/09/25  1155 06/09/25  1022   ABO TYPING O O   RH TYPING Positive Positive   ANTIBODY SCREEN  --  Negative         UA          11/12/2024    13:58 1/19/2025    19:32 6/9/2025    13:00   Urinalysis   Squamous Epithelial Cells, UA 0-2  0-2     Specific Gravity, UA 1.015  1.016  1.017    Ketones, UA Negative  Trace  Negative    Blood, UA Negative  Small (1+)  Negative    Leukocytes, UA Negative  Negative  Negative    Nitrite, UA Negative  Negative  Negative    RBC, UA 0-2  0-2     WBC, UA 0-2  0-2     Bacteria, UA None Seen  Trace         Microbiology Results (last 10 days)       ** No results found for the last 240 hours. **            XR Femur 2 View Left  Result Date: 6/9/2025  XR FEMUR 2 VW LEFT Date of Exam: 6/9/2025 12:51 PM EDT Indication: AP and lateral left femur, known proximal fracture/intertrochanteric fracture?need to visualize the rest of the femur bone Comparison: CT scan of the pelvis 6/9/2025 Findings: CT scan of the pelvis shows acute intertrochanteric fracture of the left hip and sclerotic changes in the femoral heads consistent with changes of AVN. The mid and distal femur appear to be intact. Knee joint appears anatomically aligned. 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 There is no evidence of knee joint effusion     Impression: Impression: Acute intertrochanteric fracture of the left hip. Remainder of the femur appears intact. Electronically Signed: Chandu Santiago MD  6/9/2025 1:35 PM EDT  Workstation ID: [id number]    CT Pelvis Without Contrast  Result Date: 6/9/2025  CT PELVIS WO CONTRAST Date of Exam: 6/9/2025 10:42 AM EDT Indication: Hip Pain. Comparison: Pelvis CT 6/8/2025. Technique: Axial CT images were obtained of the pelvis without contrast administration.  Reconstructed coronal and sagittal images were also  obtained. Automated exposure control and iterative construction methods were used. Findings: Acute fracture of the left femur with significantly increased comminution, displacement, and angulation compared to yesterday's CT, now with near 90 degree femoral neck-shaft angle. Femoral head remains within the acetabulum. 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 Degenerative changes of the hips, pubic symphysis, SI joints, and lumbosacral spine. Partial ankylosis of the SI joints noted. Bones appear demineralized. Subcutaneous hematoma surrounding the left hip fracture. Atherosclerosis. Prostatomegaly. Colonic diverticulosis. No pelvic free fluid.     Impression: Impression: Acute fracture of the left femoral neck with comminution, displacement, and angulation, all of which is worsened compared to yesterday's CT. 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 Additional chronic/degenerative findings as above. Electronically Signed: Vic Braga MD  6/9/2025 11:26 AM EDT  Workstation ID: [id number]    CT Cervical Spine Without Contrast  Result Date: 6/9/2025  CT CERVICAL SPINE WO CONTRAST Date of Exam: 6/9/2025 10:42 AM EDT Indication: fall. Comparison: 6/8/2025, 4/22/2025 Technique: Axial CT images were obtained of the cervical spine without contrast administration.  Reconstructed coronal and sagittal images were also obtained. Automated exposure control and iterative construction methods were used. FINDINGS: Healing/healed oblique fracture involving the fused C5 and C6 vertebrae again noted. No new fracture is identified. Cervical spondylosis with contiguous anterior osteophytes and multilevel fusion across posterior elements, compatible with diffuse idiopathic skeletal hyperostosis.. Vertebral body heights are maintained. The craniocervical and atlantoaxial junctions appear within normal limits. The prevertebral and paraspinal soft tissues are without focal  abnormality. Vascular calcifications are present. The visualized intracranial structures appear unremarkable. The visualized lung apices are clear.     Impression: 1.Healing/healed oblique fracture involving the fused C5 and C6 vertebrae again noted. 2.No new fracture is identified. 3.Cervical spondylosis with concomitant diffuse idiopathic skeletal hyperostosis. Electronically Signed: Russell Montes De Oca MD  6/9/2025 11:21 AM EDT  Workstation ID: [id number]    CT Head Without Contrast  Result Date: 6/9/2025  CT HEAD WO CONTRAST Date of Exam: 6/9/2025 10:42 AM EDT Indication: fall. Comparison: Head CT dated 6/8/2025 Technique: Axial CT images were obtained of the head without contrast administration.  Automated exposure control and iterative construction methods were used. FINDINGS:  Foci of periventricular and subcortical white matter hypoattenuation are consistent with chronic, microvascular ischemic change. There is age-related loss of brain parenchymal volume with prominence of sulcation and ventriculomegaly. There is encephalomalacia within the bilateral occipital lobes, right greater than left. Probable small remote infarcts within the left cerebellum. No significant mass effect, midline shift, intracranial hemorrhage, or hydrocephalus is identified. No extra-axial fluid collection is identified.   The calvarium and overlying soft tissues are unremarkable. The paranasal sinuses and bilateral mastoid air cells are adequately aerated. Bilateral lens prostheses are noted. Orbital structures are otherwise unremarkable.     Impression: 1.No acute intracranial abnormality is identified. 2.Chronic ischemic changes and diffuse cortical atrophy. Evidence of remote infarct involving the bilateral occipital lobes and left cerebellum. Electronically Signed: Russell Montes De Oca MD  6/9/2025 11:01 AM EDT  Workstation ID: [id number]    XR Hip With or Without Pelvis 2 - 3 View Left  Result Date: 6/9/2025  XR HIP W OR WO PELVIS 2-3 VIEW  LEFT Date of Exam: 6/9/2025 10:13 AM EDT Indication: fall Comparison: 6/8/2025 Findings: Comminuted intertrochanteric fracture of the left femur. The trochanters are separate fragments. There is exaggerated varus angulation. Femoral head is not dislocated. No fracture of the pelvis is demonstrated     Impression: Impression: Intertrochanteric left femur fracture Electronically Signed: Moises Edwards  6/9/2025 10:56 AM EDT  Workstation ID: [id number]    CT Pelvis Without Contrast  Result Date: 6/8/2025  CT PELVIS WO CONTRAST Date of Exam: 6/8/2025 8:47 AM EDT Indication: poss left hip fx.  abnormal xray. Comparison: 6/8/2025 Technique: Axial CT images were obtained of the pelvis without contrast administration.  Reconstructed coronal and sagittal images were also obtained. Automated exposure control and iterative construction methods were used. Findings: There is a transverse fracture involving the left greater trochanter which extends to the intertrochanteric femur. No fracture at the right proximal femur. No significant angulation or displacement. The superior and inferior pubic rami are intact. Sacrum and coccyx intact. Moderate bilateral hip osteoarthritis. There are areas of avascular necrosis of the left and right femoral heads without collapse. Severe disc narrowing in the visualized lower lumbar spine at L4-5 and L5-S1 with facet arthritis. Left and right iliac wings are intact. No acetabular fracture. Extensive aortoiliac vascular calcifications. Prostatomegaly prostate measuring up to 5.8 x 6 cm. Bladder without acute abnormality. No fluid or hemorrhage in the pelvis. Colonic diverticulosis. Partial ankylosis of the bilateral sacroiliac joints.     Impression: Impression: 1. Nondisplaced fracture involving left greater trochanter extending to the intertrochanteric femur. 2. 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 3. Moderate bilateral hip osteoarthritis. 4. Prostatomegaly. 5.  Additional chronic findings above. Electronically Signed: Charanjit Crane MD  6/8/2025 9:24 AM EDT  Workstation ID: [id number]    XR Hip With or Without Pelvis 2 - 3 View Left  Result Date: 6/8/2025  XR HIP W OR WO PELVIS 2-3 VIEW LEFT Date of Exam: 6/8/2025 8:01 AM EDT Indication: fall, left hip/pelvis pain Comparison: 11/1/2024 Findings: There is subtle area of linear lucency overlying the left greater trochanter which may relate to nondisplaced fracture. Mild to moderate bilateral hip osteoarthritis. The iliopectineal and ilioischial lines are intact. Disc narrowing in the lower lumbar spine.     Impression: Impression: Subtle area of linear lucency overlying the left greater trochanter may relate to nondisplaced fracture, consider CT pelvis for further assessment. Electronically Signed: Charanjit Crane MD  6/8/2025 8:23 AM EDT  Workstation ID: [id number]    CT Head Without Contrast  Result Date: 6/8/2025  CT HEAD WO CONTRAST, CT CERVICAL SPINE WO CONTRAST Date of Exam: 6/8/2025 7:52 AM EDT Indication: fall, unknown head trauma, dementia. Comparison: None available. Technique: Axial CT images were obtained of the head and cervical spine without contrast administration.  Automated exposure control and iterative construction methods were used. Findings: CT head: Generalized volume loss is present and there is also evidence of prior infarcts within the right and left occipital lobes as well as left cerebellum. There is no evidence of hemorrhage, mass or mass effect. There is ex vacuo prominence of the ventricles. The orbits are normal. The calvarium is intact. CT cervical spine: Redemonstrated contiguous bridging ventral osteophytes with the appearance of DISH. There is no evidence of cortical disruption or height loss to suggest acute fracture. There is redemonstrated straightening, without traumatic listhesis or subluxation. The dens is intact. The paraspinal soft tissues demonstrate no acute or suspicious findings.      Impression: Impression: Chronic and age-related changes of the brain are present. No acute intracranial abnormality. Stable CT of the cervical spine including multilevel spondylosis with the appearance of DISH. No acute fracture or traumatic malalignment. Electronically Signed: Indra Hill MD  6/8/2025 8:08 AM EDT  Workstation ID: [id number]    CT Cervical Spine Without Contrast  Result Date: 6/8/2025  CT HEAD WO CONTRAST, CT CERVICAL SPINE WO CONTRAST Date of Exam: 6/8/2025 7:52 AM EDT Indication: fall, unknown head trauma, dementia. Comparison: None available. Technique: Axial CT images were obtained of the head and cervical spine without contrast administration.  Automated exposure control and iterative construction methods were used. Findings: CT head: Generalized volume loss is present and there is also evidence of prior infarcts within the right and left occipital lobes as well as left cerebellum. There is no evidence of hemorrhage, mass or mass effect. There is ex vacuo prominence of the ventricles. The orbits are normal. The calvarium is intact. CT cervical spine: Redemonstrated contiguous bridging ventral osteophytes with the appearance of DISH. There is no evidence of cortical disruption or height loss to suggest acute fracture. There is redemonstrated straightening, without traumatic listhesis or subluxation. The dens is intact. The paraspinal soft tissues demonstrate no acute or suspicious findings.     Impression: Impression: Chronic and age-related changes of the brain are present. No acute intracranial abnormality. Stable CT of the cervical spine including multilevel spondylosis with the appearance of DISH. No acute fracture or traumatic malalignment. Electronically Signed: Indra Hill MD  6/8/2025 8:08 AM EDT  Workstation ID: [id number]      Results for orders placed during the hospital encounter of 01/19/25    Adult Transthoracic Echo Complete W/ Cont if Necessary Per  Protocol    Interpretation Summary    Left ventricular systolic function is normal. Estimated left ventricular EF = 50%    Left ventricular wall thickness is consistent with borderline concentric hypertrophy.    Mild mitral valve regurgitation is present.      Assessment & Plan   Assessment & Plan       Closed comminuted intertrochanteric fracture of left femur    CAD (coronary artery disease)    Hx RLE DVT ( w/ IVC filter)    Diabetes mellitus, diet controlled    Dyslipidemia    Chronic kidney disease, stage IV (severe)    Essential hypertension    Hx of previous C5 & C6 fractures non-op)    History of pulmonary embolus (PE)/DVT    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)    Patient is a 85 yo m w hx CAD (previous cabg 1994), chronic HFpEF, ckd 4 (baseline cr ~2.2-2.6), diet controlled DM, previous PE & DVT (no longer anticoagulated). 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 Is now residing at assisted living facility, son (via phone) tells me that memory is gradually worsening and continues to sustain falls. In early May 2025 saw Dr. Hernandez & placed on twice weekly bumex 1mg (w/ plans to use more prn).    Presented to [facility] after falls w/ left hip pain, imaging w/ left femoral neck fracture. Patient denies chest pain, dizziness prior to falls, states slipped in his socks. ED contacted Dr. Badillo w/ orthopedic surgery who tentatively plans take to OR on 6/11/25 for trochanteric nail.  Anesthesia to see for block.        Left intertrochanteric fracture (s/p mechanical fall)  -anesthesia to perform block  -Dr. 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 Hernandez    CKD 4  -baseline cr ~2.2-2.6, gradually worsening  -monitor, consult nephrology prn      Diet controlled dm  -previous A1c 5.7 January 2025  -check A1c, accuchecks. Initiate ssi prn    Gerd  -ppi      Am labs ordered      VTE Prophylaxis:  Pharmacologic VTE prophylaxis orders are present.          CODE STATUS:    Code Status and Medical Interventions: No CPR (Do Not Attempt to Resuscitate); Limited Support; No intubation (DNI)   Ordered at: 06/09/25 1336     Code Status (Patient has no pulse and is not breathing):    No CPR (Do Not Attempt to Resuscitate)     Medical Interventions (Patient has pulse or is breathing):    Limited Support     Medical Intervention Limits:    No intubation (DNI)       Expected Discharge   Expected discharge date/ time has not been documented.     Jose Cota MD  06/09/25

## 2025-06-09 NOTE — Clinical Note
Level of Care: Telemetry [5]   Diagnosis: Hip fracture [197979]   Admitting Physician: IRISH JONES [[id number]]   Certification: I Certify That Inpatient Hospital Services Are Medically Necessary For Greater Than 2 Midnights

## 2025-06-10 PROBLEM — E44.0 MODERATE PROTEIN-CALORIE MALNUTRITION: Status: ACTIVE | Noted: 2025-06-10

## 2025-06-10 LAB
ANION GAP SERPL CALCULATED.3IONS-SCNC: 10 MMOL/L (ref 5–15)
BUN SERPL-MCNC: 32.2 MG/DL (ref 8–23)
BUN/CREAT SERPL: 13.4 (ref 7–25)
CALCIUM SPEC-SCNC: 8.6 MG/DL (ref 8.6–10.5)
CHLORIDE SERPL-SCNC: 104 MMOL/L (ref 98–107)
CO2 SERPL-SCNC: 22 MMOL/L (ref 22–29)
CREAT SERPL-MCNC: 2.41 MG/DL (ref 0.76–1.27)
DEPRECATED RDW RBC AUTO: 53.1 FL (ref 37–54)
EGFRCR SERPLBLD CKD-EPI 2021: 25.8 ML/MIN/1.73
ERYTHROCYTE [DISTWIDTH] IN BLOOD BY AUTOMATED COUNT: 16.7 % (ref 12.3–15.4)
GLUCOSE BLDC GLUCOMTR-MCNC: 108 MG/DL (ref 70–130)
GLUCOSE BLDC GLUCOMTR-MCNC: 112 MG/DL (ref 70–130)
GLUCOSE BLDC GLUCOMTR-MCNC: 120 MG/DL (ref 70–130)
GLUCOSE BLDC GLUCOMTR-MCNC: 141 MG/DL (ref 70–130)
GLUCOSE SERPL-MCNC: 112 MG/DL (ref 65–99)
HCT VFR BLD AUTO: 30.5 % (ref 37.5–51)
HGB BLD-MCNC: 9.8 G/DL (ref 13–17.7)
MAGNESIUM SERPL-MCNC: 1.8 MG/DL (ref 1.6–2.4)
MCH RBC QN AUTO: 28.3 PG (ref 26.6–33)
MCHC RBC AUTO-ENTMCNC: 32.1 G/DL (ref 31.5–35.7)
MCV RBC AUTO: 88.2 FL (ref 79–97)
PLATELET # BLD AUTO: 190 10*3/MM3 (ref 140–450)
PMV BLD AUTO: 10.9 FL (ref 6–12)
POTASSIUM SERPL-SCNC: 4.7 MMOL/L (ref 3.5–5.2)
QT INTERVAL: 436 MS
QT INTERVAL: 482 MS
QTC INTERVAL: 489 MS
QTC INTERVAL: 499 MS
RBC # BLD AUTO: 3.46 10*6/MM3 (ref 4.14–5.8)
SODIUM SERPL-SCNC: 136 MMOL/L (ref 136–145)
WBC NRBC COR # BLD AUTO: 10.3 10*3/MM3 (ref 3.4–10.8)

## 2025-06-10 PROCEDURE — 99232 SBSQ HOSP IP/OBS MODERATE 35: CPT | Performed by: INTERNAL MEDICINE

## 2025-06-10 PROCEDURE — 93005 ELECTROCARDIOGRAM TRACING: CPT | Performed by: INTERNAL MEDICINE

## 2025-06-10 PROCEDURE — 25010000002 HEPARIN (PORCINE) PER 1000 UNITS: Performed by: INTERNAL MEDICINE

## 2025-06-10 PROCEDURE — 93010 ELECTROCARDIOGRAM REPORT: CPT | Performed by: INTERNAL MEDICINE

## 2025-06-10 PROCEDURE — 99024 POSTOP FOLLOW-UP VISIT: CPT | Performed by: ORTHOPAEDIC SURGERY

## 2025-06-10 PROCEDURE — 25010000002 MORPHINE PER 10 MG: Performed by: INTERNAL MEDICINE

## 2025-06-10 PROCEDURE — 85027 COMPLETE CBC AUTOMATED: CPT | Performed by: INTERNAL MEDICINE

## 2025-06-10 PROCEDURE — 80048 BASIC METABOLIC PNL TOTAL CA: CPT | Performed by: INTERNAL MEDICINE

## 2025-06-10 PROCEDURE — 83735 ASSAY OF MAGNESIUM: CPT | Performed by: INTERNAL MEDICINE

## 2025-06-10 PROCEDURE — 82948 REAGENT STRIP/BLOOD GLUCOSE: CPT

## 2025-06-10 RX ADMIN — ACETAMINOPHEN 650 MG: 325 TABLET ORAL at 09:36

## 2025-06-10 RX ADMIN — HEPARIN SODIUM 5000 UNITS: 5000 INJECTION INTRAVENOUS; SUBCUTANEOUS at 09:36

## 2025-06-10 RX ADMIN — ATORVASTATIN CALCIUM 80 MG: 40 TABLET, FILM COATED ORAL at 21:04

## 2025-06-10 RX ADMIN — THIAMINE HCL TAB 100 MG 100 MG: 100 TAB at 09:36

## 2025-06-10 RX ADMIN — HEPARIN SODIUM 5000 UNITS: 5000 INJECTION INTRAVENOUS; SUBCUTANEOUS at 21:04

## 2025-06-10 RX ADMIN — FOLIC ACID 1 MG: 1 TABLET ORAL at 09:37

## 2025-06-10 RX ADMIN — SERTRALINE 25 MG: 25 TABLET, FILM COATED ORAL at 09:36

## 2025-06-10 RX ADMIN — Medication 10 ML: at 09:40

## 2025-06-10 RX ADMIN — MORPHINE SULFATE 2 MG: 2 INJECTION, SOLUTION INTRAMUSCULAR; INTRAVENOUS at 04:14

## 2025-06-10 RX ADMIN — POLYETHYLENE GLYCOL 3350 17 G: 17 POWDER, FOR SOLUTION ORAL at 09:36

## 2025-06-10 RX ADMIN — Medication 5 MG: at 21:04

## 2025-06-10 RX ADMIN — HYDROCODONE BITARTRATE AND ACETAMINOPHEN 0.5 TABLET: 5; 325 TABLET ORAL at 09:40

## 2025-06-10 RX ADMIN — Medication 10 ML: at 21:05

## 2025-06-10 RX ADMIN — LIDOCAINE 1 PATCH: 4 PATCH TOPICAL at 21:04

## 2025-06-10 RX ADMIN — DOCUSATE SODIUM 50 MG AND SENNOSIDES 8.6 MG 2 TABLET: 8.6; 5 TABLET, FILM COATED ORAL at 09:36

## 2025-06-10 RX ADMIN — ASPIRIN 81 MG: 81 TABLET, COATED ORAL at 09:37

## 2025-06-10 RX ADMIN — PANTOPRAZOLE SODIUM 40 MG: 40 TABLET, DELAYED RELEASE ORAL at 06:12

## 2025-06-10 NOTE — PROGRESS NOTES
[facility] Medicine Services  PROGRESS NOTE    Patient Name: Toño Alcala  : 1940  MRN: [id number]    Date of Admission: 2025  Primary Care Physician: System, Provider Not In    Subjective   Subjective     CC:  S/p fall and hip fracture    HPI:  Resting in bed in no acute distress, asleep but arousable.  Patient is very confused but tells me that he has lower back pain.  No other complaint      Objective   Objective     Vital Signs:   Temp:  [97.8 °F (36.6 °C)-98.3 °F (36.8 °C)] 98 °F (36.7 °C)  Heart Rate:  [62-92] 69  Resp:  [18] 18  BP: (110-145)/(53-80) 113/53     Physical Exam:  Constitutional: No acute distress  HENT: NCAT, mucous membranes moist  Respiratory: Clear to auscultation bilaterally, respiratory effort normal   Cardiovascular: RRR, systolic murmurs, no rubs or gallops  Gastrointestinal: Positive bowel sounds, soft, nontender, nondistended  Musculoskeletal:  bilateral ankle edema, left lower extremity pain on motion.  Psychiatric: Appropriate affect, cooperative  Neurologic: A sleep arousable, very confused but follows very simple commands, speech clear  Skin: No rashes     Results Reviewed:  LAB RESULTS:      Lab 06/10/25  0740 25  1020   WBC 10.30 9.49   HEMOGLOBIN 9.8* 10.5*   HEMATOCRIT 30.5* 32.2*   PLATELETS 190 198   NEUTROS ABS  --  6.88   IMMATURE GRANS (ABS)  --  0.04   LYMPHS ABS  --  0.82   MONOS ABS  --  1.12*   EOS ABS  --  0.57*   MCV 88.2 86.1   PROTIME  --  15.3         Lab 06/10/25  0740 25  1020   SODIUM 136 136   POTASSIUM 4.7 4.2   CHLORIDE 104 102   CO2 22.0 23.0   ANION GAP 10.0 11.0   BUN 32.2* 35.1*   CREATININE 2.41* 2.89*   EGFR 25.8* 20.8*   GLUCOSE 112* 142*   CALCIUM 8.6 9.1   MAGNESIUM 1.8  --    HEMOGLOBIN A1C  --  5.90*         Lab 25  1020   TOTAL PROTEIN 6.5   ALBUMIN 3.4*   GLOBULIN 3.1   ALT (SGPT) 21   AST (SGOT) 30   BILIRUBIN 0.7   ALK PHOS 245*         Lab 25  1020   PROTIME 15.3   INR 1.14*              Lab 06/09/25  1155 06/09/25  1022   ABO TYPING O O   RH TYPING Positive Positive   ANTIBODY SCREEN  --  Negative         Brief Urine Lab Results  (Last result in the past 365 days)        Color   Clarity   Blood   Leuk Est   Nitrite   Protein   CREAT   Urine HCG        06/09/25 1300 Yellow   Clear   Negative   Negative   Negative   Trace                   Microbiology Results Abnormal       None            XR Femur 2 View Left  Result Date: 6/9/2025  XR FEMUR 2 VW LEFT Date of Exam: 6/9/2025 12:51 PM EDT Indication: AP and lateral left femur, known proximal fracture/intertrochanteric fracture?need to visualize the rest of the femur bone Comparison: CT scan of the pelvis 6/9/2025 Findings: CT scan of the pelvis shows acute intertrochanteric fracture of the left hip and sclerotic changes in the femoral heads consistent with changes of AVN. The mid and distal femur appear to be intact. Knee joint appears anatomically aligned. 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 There is no evidence of knee joint effusion     Impression: Impression: Acute intertrochanteric fracture of the left hip. Remainder of the femur appears intact. Electronically Signed: Chandu Santiago MD  6/9/2025 1:35 PM EDT  Workstation ID: [id number]    CT Pelvis Without Contrast  Result Date: 6/9/2025  CT PELVIS WO CONTRAST Date of Exam: 6/9/2025 10:42 AM EDT Indication: Hip Pain. Comparison: Pelvis CT 6/8/2025. Technique: Axial CT images were obtained of the pelvis without contrast administration.  Reconstructed coronal and sagittal images were also obtained. Automated exposure control and iterative construction methods were used. Findings: Acute fracture of the left femur with significantly increased comminution, displacement, and angulation compared to yesterday's CT, now with near 90 degree femoral neck-shaft angle. Femoral head remains within the acetabulum. 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 Degenerative changes  of the hips, pubic symphysis, SI joints, and lumbosacral spine. Partial ankylosis of the SI joints noted. Bones appear demineralized. Subcutaneous hematoma surrounding the left hip fracture. Atherosclerosis. Prostatomegaly. Colonic diverticulosis. No pelvic free fluid.     Impression: Impression: Acute fracture of the left femoral neck with comminution, displacement, and angulation, all of which is worsened compared to yesterday's CT. 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 Additional chronic/degenerative findings as above. Electronically Signed: Vic Braga MD  6/9/2025 11:26 AM EDT  Workstation ID: [id number]    CT Cervical Spine Without Contrast  Result Date: 6/9/2025  CT CERVICAL SPINE WO CONTRAST Date of Exam: 6/9/2025 10:42 AM EDT Indication: fall. Comparison: 6/8/2025, 4/22/2025 Technique: Axial CT images were obtained of the cervical spine without contrast administration.  Reconstructed coronal and sagittal images were also obtained. Automated exposure control and iterative construction methods were used. FINDINGS: Healing/healed oblique fracture involving the fused C5 and C6 vertebrae again noted. No new fracture is identified. Cervical spondylosis with contiguous anterior osteophytes and multilevel fusion across posterior elements, compatible with diffuse idiopathic skeletal hyperostosis.. Vertebral body heights are maintained. The craniocervical and atlantoaxial junctions appear within normal limits. The prevertebral and paraspinal soft tissues are without focal abnormality. Vascular calcifications are present. The visualized intracranial structures appear unremarkable. The visualized lung apices are clear.     Impression: 1.Healing/healed oblique fracture involving the fused C5 and C6 vertebrae again noted. 2.No new fracture is identified. 3.Cervical spondylosis with concomitant diffuse idiopathic skeletal hyperostosis. Electronically Signed: Russell Montes De Oca MD  6/9/2025 11:21  AM EDT  Workstation ID: [id number]    CT Head Without Contrast  Result Date: 6/9/2025  CT HEAD WO CONTRAST Date of Exam: 6/9/2025 10:42 AM EDT Indication: fall. Comparison: Head CT dated 6/8/2025 Technique: Axial CT images were obtained of the head without contrast administration.  Automated exposure control and iterative construction methods were used. FINDINGS:  Foci of periventricular and subcortical white matter hypoattenuation are consistent with chronic, microvascular ischemic change. There is age-related loss of brain parenchymal volume with prominence of sulcation and ventriculomegaly. There is encephalomalacia within the bilateral occipital lobes, right greater than left. Probable small remote infarcts within the left cerebellum. No significant mass effect, midline shift, intracranial hemorrhage, or hydrocephalus is identified. No extra-axial fluid collection is identified.   The calvarium and overlying soft tissues are unremarkable. The paranasal sinuses and bilateral mastoid air cells are adequately aerated. Bilateral lens prostheses are noted. Orbital structures are otherwise unremarkable.     Impression: 1.No acute intracranial abnormality is identified. 2.Chronic ischemic changes and diffuse cortical atrophy. Evidence of remote infarct involving the bilateral occipital lobes and left cerebellum. Electronically Signed: Rusesll Montes De Oca MD  6/9/2025 11:01 AM EDT  Workstation ID: [id number]    XR Hip With or Without Pelvis 2 - 3 View Left  Result Date: 6/9/2025  XR HIP W OR WO PELVIS 2-3 VIEW LEFT Date of Exam: 6/9/2025 10:13 AM EDT Indication: fall Comparison: 6/8/2025 Findings: Comminuted intertrochanteric fracture of the left femur. The trochanters are separate fragments. There is exaggerated varus angulation. Femoral head is not dislocated. No fracture of the pelvis is demonstrated     Impression: Impression: Intertrochanteric left femur fracture Electronically Signed: Moises Edwards  6/9/2025 10:56 AM EDT   Workstation ID: [id number]      Results for orders placed during the hospital encounter of 01/19/25    Adult Transthoracic Echo Complete W/ Cont if Necessary Per Protocol    Interpretation Summary    Left ventricular systolic function is normal. Estimated left ventricular EF = 50%    Left ventricular wall thickness is consistent with borderline concentric hypertrophy.    Mild mitral valve regurgitation is present.      Current medications:  Scheduled Meds:amLODIPine, 2.5 mg, Oral, Daily  aspirin, 81 mg, Oral, Daily  atorvastatin, 80 mg, Oral, Nightly  carvedilol, 6.25 mg, Oral, BID  folic acid, 1 mg, Oral, Daily  heparin (porcine), 5,000 Units, Subcutaneous, Q12H  Lidocaine, 1 patch, Transdermal, Q24H  melatonin, 5 mg, Oral, Nightly  pantoprazole, 40 mg, Oral, Q AM  sertraline, 25 mg, Oral, Daily  sodium chloride, 10 mL, Intravenous, Q12H  thiamine, 100 mg, Oral, Daily      Continuous Infusions:ropivacaine, , Last Rate: Stopped (06/10/25 0916)      PRN Meds:.  acetaminophen **OR** acetaminophen    senna-docusate sodium **AND** polyethylene glycol **AND** bisacodyl **AND** bisacodyl    HYDROcodone-acetaminophen    ipratropium-albuterol    Magnesium Low Dose Replacement - Follow Nurse / BPA Driven Protocol    Morphine    nitroglycerin    ondansetron    [COMPLETED] Insert Peripheral IV **AND** sodium chloride    sodium chloride    sodium chloride    Assessment & Plan   Assessment & Plan     Active Hospital Problems    Diagnosis  POA    **Closed comminuted intertrochanteric fracture of left femur [S72.142A]  Yes    History of pulmonary embolus (PE)/DVT [Z86.711]  Yes    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 male  w hx CAD (previous cabg 1994), chronic HFpEF, ckd 4 (baseline cr ~2.2-2.6), diet controlled DM, previous PE & DVT (no longer anticoagulated). 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 Is now residing at assisted living facility.  Presented to [facility] after falls w/ left hip pain, imaging w/ left femoral neck fracture.          Left intertrochanteric fracture (s/p mechanical fall)  -Currently pain is well-controlled  - Dr. Badillo is going to do surgery tomorrow.  He has already seen and evaluated the patient.     Hx right hemispheric CVA w/ hemorrhagic transformation (January 2025)  -asa, statin  -worsening memory over past months (per son Salo)     Hx RLE DVT (IVC filter placed January 2025)  Hx PE  -no longer anticoagulated since his stroke w/ hemorrhagic transformation January 2025 (due to the hemorrhagic stroke and also recurrent falls)  -s/p IVC filter placement January 2025 admission  -on asa 81mg     Hx previous non-op C5 & C6 fractures  -January 2025     CAD  Chronic HFpEF  HTN  -asa 81, coreg, amlodipine  -on twice weekly bumex (per recent cards note); will hold this for now & restart prn  -follows w/ Dr. Hernandez     CKD 4  -baseline cr ~2.2-2.6, gradually worsening  -monitor, consult nephrology if necessary.     Diet controlled dm  -previous A1c 5.7 January 2025.  Currently A1c is 5.90     Gerd  -ppi           Expected Discharge Location and Transportation: Most likely  to rehab  Expected Discharge to be determined  Expected Discharge Date: 6/13/2025; Expected Discharge Time:      VTE Prophylaxis:  Pharmacologic VTE prophylaxis orders are present.         AM-PAC 6 Clicks Score (PT): 6 (06/10/25 6237)    CODE STATUS:   Code Status and Medical Interventions: No CPR (Do Not Attempt to Resuscitate); Limited Support; No intubation (DNI)   Ordered at: 06/09/25 1336     Code Status (Patient has no pulse and is not breathing):    No CPR (Do Not Attempt to Resuscitate)     Medical Interventions (Patient has pulse or is breathing):    Limited Support     Medical Intervention Limits:    No intubation (DNI)       Tim Tinsley MD  06/10/25

## 2025-06-10 NOTE — CASE MANAGEMENT/SOCIAL WORK
Discharge Planning Assessment  [facility]     Patient Name: Toño Alcala  MRN: [id number]  Today's Date: 6/10/2025    Admit Date: 6/9/2025    Plan: ongoing but I think he will need rehab   Discharge Needs Assessment       Row Name 06/10/25 1138       Living Environment    People in Home facility resident    Name(s) of People in Home Lives at [facility] bed on [address]    Current Living Arrangements assisted living facility    Potentially Unsafe Housing Conditions none    Primary Care Provided by other (see comments)    Provides Primary Care For no one, unable/limited ability to care for self    Family Caregiver if Needed child(adalid), adult    Family Caregiver Names zena Mendiola and Salo    Quality of Family Relationships helpful;involved;supportive    Able to Return to Prior Arrangements yes    Living Arrangement Comments Return to AL apt at [facility] but will need rehab       Resource/Environmental Concerns    Resource/Environmental Concerns none    Transportation Concerns none       Transition Planning    Patient/Family Anticipates Transition to inpatient rehabilitation facility    Patient/Family Anticipated Services at Transition     Transportation Anticipated family or friend will provide       Discharge Needs Assessment    Current Outpatient/Agency/Support Group inpatient rehabilitation facility    Equipment Currently Used at Home walker, rolling    Concerns to be Addressed discharge planning    Anticipated Changes Related to Illness none    Equipment Needed After Discharge walker, rolling    Outpatient/Agency/Support Group Needs inpatient rehabilitation facility    Discharge Coordination/Progress TBD but will need rehab somewhere                   Discharge Plan       Row Name 06/10/25 1142       Plan    Plan ongoing but I think he will need rehab    Patient/Family in Agreement with Plan yes    Plan Comments I called patient's son Maury to complete IDP. Patient lives at [facility]/AL  off [address]. Patient was able to get up with assist of one and RW. Meals provided and he helped with a bath. Maury said patient has PT/OT and speech thru facility or  agency. No 02. Plan for surgery on 6-11 and once PT/OT evaluates after surgery then CM will work on rehab plans. MP #[phone] and son told me that he talks with Libra. I tried calling and she wasn't there.    Final Discharge Disposition Code 62 - inpatient rehab facility                  Selected Continued Care - Episodes Includes continued care and service providers with selected services from the active episodes listed below          Expected Discharge Date and Time       Expected Discharge Date Expected Discharge Time    Jun 13, 2025            Demographic Summary       Row Name 06/10/25 1137       General Information    Admission Type inpatient    Referral Source admission list    Reason for Consult discharge planning    Preferred Language English       Contact Information    Permission Granted to Share Info With     Contact Information Obtained for     Contact Information Comments PCP: Osmin Francis                   Functional Status       Row Name 06/10/25 1137       Functional Status    Usual Activity Tolerance fair    Current Activity Tolerance poor       Functional Status, IADL    Medications completely dependent    Meal Preparation completely dependent    Housekeeping completely dependent    Laundry completely dependent    Shopping completely dependent    IADL Comments Patient has coverage for meds       Mental Status Summary    Recent Changes in Mental Status/Cognitive Functioning unable to assess       Employment/    Employment Status retired                   Psychosocial    No documentation.                  Abuse/Neglect    No documentation.                  Legal    No documentation.                  Substance Abuse    No documentation.                  Patient Forms    No documentation.                      Krystyna Austin, RN

## 2025-06-10 NOTE — PLAN OF CARE
Goal Outcome Evaluation:  Plan of Care Reviewed With: patient, child (Maury (YULIA))        Progress: no change     AOx3, confused to situation and place, NPO at midnight, consent signed    Patient will need new IV access before surgery in the am        Problem: Adult Inpatient Plan of Care  Goal: Absence of Hospital-Acquired Illness or Injury  Intervention: Identify and Manage Fall Risk  Description: Perform standard risk assessment on admission using a validated tool or comprehensive approach appropriate to the patient; reassess fall risk frequently, with change in status or transfer to another level of care.Communicate risk to interprofessional healthcare team; ensure fall risk visible cue.Determine need for increased observation, equipment and environmental modification, as well as use of supportive, nonskid footwear.Adjust safety measures to individual needs and identified risk factors.Reinforce the importance of active participation with fall risk prevention, safety, and physical activity with the patient and family.Perform regular intentional rounding to assess need for position change, pain assessment and personal needs, including assistance with toileting.  Recent Flowsheet Documentation  Taken 6/10/2025 1751 by Marsha Duenas, RN  Safety Promotion/Fall Prevention:   safety round/check completed   room organization consistent   nonskid shoes/slippers when out of bed   mobility aid in reach   lighting adjusted   gait belt   fall prevention program maintained   elopement precautions   clutter free environment maintained   assistive device/personal items within reach   activity supervised  Taken 6/10/2025 1615 by Marsha Duenas, RN  Safety Promotion/Fall Prevention:   safety round/check completed   toileting scheduled   room organization consistent   muscle strengthening facilitated   nonskid shoes/slippers when out of bed   lighting adjusted   mobility aid in reach   gait belt   elopement precautions   fall  prevention program maintained   clutter free environment maintained   assistive device/personal items within reach   activity supervised  Taken 6/10/2025 1400 by Marsha Duenas, RN  Safety Promotion/Fall Prevention:   safety round/check completed   room organization consistent   nonskid shoes/slippers when out of bed   muscle strengthening facilitated   gait belt   lighting adjusted   mobility aid in reach   fall prevention program maintained   elopement precautions   assistive device/personal items within reach   clutter free environment maintained   activity supervised  Taken 6/10/2025 1211 by Marsha Duenas, RN  Safety Promotion/Fall Prevention:   safety round/check completed   room organization consistent   nonskid shoes/slippers when out of bed   mobility aid in reach   lighting adjusted   gait belt   elopement precautions   clutter free environment maintained   activity supervised   assistive device/personal items within reach   fall prevention program maintained  Taken 6/10/2025 1030 by Marsha Duenas, RN  Safety Promotion/Fall Prevention:   safety round/check completed   room organization consistent   toileting scheduled   nonskid shoes/slippers when out of bed   mobility aid in reach   lighting adjusted   fall prevention program maintained   clutter free environment maintained   assistive device/personal items within reach  Taken 6/10/2025 0940 by Marsha Duenas, RN  Safety Promotion/Fall Prevention:   safety round/check completed   toileting scheduled   room organization consistent   nonskid shoes/slippers when out of bed   mobility aid in reach   muscle strengthening facilitated   lighting adjusted   gait belt   fall prevention program maintained   elopement precautions   clutter free environment maintained   assistive device/personal items within reach   activity supervised

## 2025-06-10 NOTE — PROGRESS NOTES
Malnutrition Severity Assessment    Patient Name:  Toño Alcala  YOB: 1940  MRN: [id number]  Admit Date:  6/9/2025    Patient meets criteria for : Moderate (non-severe) Malnutrition chronic/non-severe     Malnutrition Severity Assessment  Malnutrition Type: Chronic Disease - Related Malnutrition  Malnutrition Type (Last 8 Hours)       Malnutrition Severity Assessment       Row Name 06/10/25 1348       Malnutrition Severity Assessment    Malnutrition Type Chronic Disease - Related Malnutrition      Row Name 06/10/25 1348       Insufficient Energy Intake     Insufficient Energy Intake Findings Moderate    Insufficient Energy Intake  <75% of est. energy requirement for > or equal to 1 month      Row Name 06/10/25 1348       Muscle Loss    Loss of Muscle Mass Findings Moderate    [place] Region Moderate - slight depression    Clavicle Bone Region Moderate - some protrusion in females, visible in males    Acromion Bone Region Moderate - acromion may slightly protrude    Scapular Bone Region Moderate - mild depression, bones may show slightly    Dorsal Hand Region Moderate - slight depression      Row Name 06/10/25 1348       Fat Loss    Subcutaneous Fat Loss Findings Moderate    Orbital Region  Moderate -  somewhat hollowness, slightly dark circles    Upper Arm Region Moderate - some fat tissue, not ample    Thoracic & Lumbar Region Moderate - ribs visible with mild depressions, iliac crest somewhat prominent      Row Name 06/10/25 1348       Criteria Met (Must meet criteria for severity in at least 2 of these categories: M Wasting, Fat Loss, Fluid, Secondary Signs, Wt. Status, Intake)    Patient meets criteria for  Moderate (non-severe) Malnutrition                    Electronically signed by:  Jessica Hastings RD  06/10/25 13:49 EDT

## 2025-06-10 NOTE — PROGRESS NOTES
Clinical Nutrition Assessment     Patient Name: Toño Alcala  YOB: 1940  MRN: [id number]  Date of Encounter: 06/10/25 13:26 EDT  Admission date: 6/9/2025  Reason for Visit: Identified at risk by screening criteria, MST score 2+, Nonhealing wound or pressure ulcer    Assessment   Nutrition Assessment   Admission Diagnosis:  Hip fracture [S72.009A]    Problem List:    Closed comminuted intertrochanteric fracture of left femur    CAD (coronary artery disease)    Hx RLE DVT ( w/ IVC filter)    Diabetes mellitus, diet controlled    Dyslipidemia    Chronic kidney disease, stage IV (severe)    Essential hypertension    Hx of previous C5 & C6 fractures non-op)    History of pulmonary embolus (PE)/DVT    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)      PMH:   He  has a past medical history of Acute diverticulitis (01/29/2020), Allergic (60 yrs ago), Arthritis, Arthritis of neck (Fusion 1992), Bilateral radial fractures (1962), CAD (coronary artery disease), Calculus of gallbladder without cholecystitis without obstruction (01/29/2020), Cataract, Cervical disc disorder (Fusion 1992), Cholelithiasis, Chronic kidney disease (2020), Clotting disorder, CVD (cardiovascular disease), Diabetes mellitus, DVT (deep venous thrombosis), DVT of proximal lower limb (06/22/2015), Dyslipidemia, Erectile dysfunction, Fracture (1952), Fracture of right hand (1980), Fracture of wrist (1980), GERD (gastroesophageal reflux disease), HL (hearing loss), angina pectoris, Hypertension, Knee swelling (Several years ago), Left wrist fracture (1953), Lumbosacral disc disease (1996), Osgood-Schlatter's disease (1955), Osteoarthritis, Right wrist fracture, Stroke, Vertigo, and Wears glasses.    PSH:  He  has a past surgical history that includes Cardiac catheterization (2011); Cervical fusion; Lumbar laminectomy (1996); Tonsillectomy and adenoidectomy (1945); Cervical fusion (1992); Coronary artery bypass  "graft (1994); Appendectomy (1957); Hernia repair (Right, 2011); Other surgical history; Back surgery; Colonoscopy (2013); Inguinal Hernia Repair (Left, 10/29/2019); Spine surgery (1996); Vasectomy (1972); Neck surgery (1992); Trigger point injection (2001 - 2007); Cataract extraction (Left); and Interventional radiology procedure (N/A, 1/23/2025).    Applicable Nutrition History:       Anthropometrics     Height: Height: 182.9 cm (72\")  Last Filed Weight: Weight: 85.3 kg (188 lb) (06/09/25 0953)  Method: Weight Method: Stated  BMI: BMI (Calculated): 25.5    UBW:   Weight       Weight (kg) Weight (lbs) Weight Method Visit Report   4/10/2024 91.717 kg  202 lb 3.2 oz   --    7/1/2024 84.369 kg  186 lb   --    1/14/2025 88.633 kg  195 lb 6.4 oz   --    1/19/2025 88.451 kg  195 lb  Stated     1/20/2025 82.691 kg  182 lb 4.8 oz  Bed scale     3/6/2025 82.827 kg  182 lb 9.6 oz   --    3/20/2025 83.462 kg  184 lb   --    4/11/2025 80.74 kg  178 lb  Stated     5/5/2025 85.276 kg  188 lb   --    6/7/2025 82.555 kg  182 lb      6/8/2025 82.555 kg  182 lb  Bed scale     6/9/2025 85.276 kg  188 lb  Stated       Bed weight at time of visit (6/10): 180lb      Weight change: 8lb weight loss x 1 month (4.3%), 7.7% x 6 months, 9.9% x 1 year     Nutrition Focused Physical Exam    Date: 6/10    Patient meets criteria for malnutrition diagnosis, see MSA note.     Subjective   Reported/Observed/Food/Nutrition Related History:   6/10  Patient triggered for nutrition assessment with MST 2 for weight loss and large nonhealing wound. Patient admitted after a fall resulting in hip fx. Plan for surgery tomorrow.  Weight history reviewed, gradual weight loss over the past year.  Intake of 0% for lunch and dinner. No pressure injury stage 2 or greater noted per documentation at this time. No WOC consult for any wounds.      Patient confused at time of visit. Lunch tray at bedside, unconsumed, reports feeling too tired to eat. Not able to " contribute to nutrition history.     Current Nutrition Prescription   PO: Diet: Regular/House, Diabetic; Consistent Carbohydrate; Fluid Consistency: Thin (IDDSI 0)  NPO Diet NPO Type: Strict NPO  Oral Nutrition Supplement:   Intake: Insufficient data 0% x 2 meals     Assessment & Plan   Nutrition Diagnosis   Date:  6/10 Updated:  Problem Malnutrition, chronic moderate   Etiology Intake < needs (Advanced age)    Signs/Symptoms </= 75% of EEN x >/= 1 month, mild/moderate muscle wasting, and mild/moderate subcutaneous fat loss   Status: New      Goal / Objectives:   Nutrition to support treatment and Establish PO      Nutrition Intervention      Follow treatment progress, Care plan reviewed, Interview for preferences, Supplement provided    Patient meets criteria for MSA   Boost with breakfast meal; monitor acceptance; increased frequency if drinking well  Poor intake so far, ? Due to lethargy.     Monitoring/Evaluation:   Per protocol, I&O, PO intake, Supplement intake, Pertinent labs, Symptoms    Jessica Hastings RD  Time Spent: 25min

## 2025-06-10 NOTE — PLAN OF CARE
Problem: Adult Inpatient Plan of Care  Goal: Absence of Hospital-Acquired Illness or Injury  Intervention: Identify and Manage Fall Risk  Recent Flowsheet Documentation  Taken 6/10/2025 0414 by Nidia Iyer RN  Safety Promotion/Fall Prevention:   activity supervised   assistive device/personal items within reach   clutter free environment maintained   fall prevention program maintained   gait belt   lighting adjusted   mobility aid in reach   muscle strengthening facilitated   nonskid shoes/slippers when out of bed   room organization consistent   safety round/check completed  Taken 6/10/2025 0200 by Nidia Iyer RN  Safety Promotion/Fall Prevention:   activity supervised   assistive device/personal items within reach   clutter free environment maintained   fall prevention program maintained   lighting adjusted   gait belt   mobility aid in reach   muscle strengthening facilitated   nonskid shoes/slippers when out of bed   room organization consistent   safety round/check completed  Taken 6/10/2025 0000 by Nidia Iyer RN  Safety Promotion/Fall Prevention:   activity supervised   assistive device/personal items within reach   clutter free environment maintained   fall prevention program maintained   gait belt   mobility aid in reach   lighting adjusted   muscle strengthening facilitated   nonskid shoes/slippers when out of bed   room organization consistent   safety round/check completed  Taken 6/9/2025 2200 by Nidia Iyer RN  Safety Promotion/Fall Prevention:   activity supervised   assistive device/personal items within reach   clutter free environment maintained   fall prevention program maintained   gait belt   lighting adjusted   mobility aid in reach   muscle strengthening facilitated   nonskid shoes/slippers when out of bed   room organization consistent   safety round/check completed  Taken 6/9/2025 2000 by Nidia Iyer, RN  Safety Promotion/Fall Prevention:   activity supervised   assistive  device/personal items within reach   clutter free environment maintained   fall prevention program maintained   gait belt   lighting adjusted   mobility aid in reach   muscle strengthening facilitated   nonskid shoes/slippers when out of bed   room organization consistent   safety round/check completed  Intervention: Prevent Skin Injury  Recent Flowsheet Documentation  Taken 6/10/2025 0414 by Nidia Iyer RN  Body Position: position maintained  Skin Protection: incontinence pads utilized  Taken 6/10/2025 0200 by Nidia Iyer RN  Body Position: position maintained  Skin Protection: incontinence pads utilized  Taken 6/10/2025 0000 by Nidia Iyer RN  Body Position: position maintained  Skin Protection: incontinence pads utilized  Taken 6/9/2025 2200 by Nidia Iyer RN  Body Position: position maintained  Skin Protection: incontinence pads utilized  Taken 6/9/2025 2000 by Nidia Iyer RN  Body Position: position maintained  Skin Protection: incontinence pads utilized  Intervention: Prevent and Manage VTE (Venous Thromboembolism) Risk  Recent Flowsheet Documentation  Taken 6/10/2025 0414 by Nidia Iyer RN  VTE Prevention/Management: (heparin) other (see comments)  Taken 6/10/2025 0200 by Nidia Iyer RN  VTE Prevention/Management: (heparin) other (see comments)  Taken 6/10/2025 0000 by Nidia Iyer RN  VTE Prevention/Management: (heparin) other (see comments)  Taken 6/9/2025 2200 by Nidia Iyer RN  VTE Prevention/Management: (heparin) other (see comments)  Taken 6/9/2025 2000 by Nidia Iyer RN  VTE Prevention/Management: (heparin) other (see comments)  Intervention: Prevent Infection  Recent Flowsheet Documentation  Taken 6/10/2025 0414 by Nidia Iyer RN  Infection Prevention:   environmental surveillance performed   equipment surfaces disinfected   hand hygiene promoted   rest/sleep promoted   single patient room provided   visitors restricted/screened  Taken 6/10/2025 0200 by Nidia Iyer RN  Infection  Prevention:   equipment surfaces disinfected   environmental surveillance performed   hand hygiene promoted   rest/sleep promoted   single patient room provided   visitors restricted/screened  Taken 6/10/2025 0000 by Nidia Iyer RN  Infection Prevention:   environmental surveillance performed   equipment surfaces disinfected   hand hygiene promoted   rest/sleep promoted   single patient room provided   visitors restricted/screened  Taken 6/9/2025 2200 by Nidia Iyer RN  Infection Prevention:   environmental surveillance performed   equipment surfaces disinfected   hand hygiene promoted   rest/sleep promoted   single patient room provided   visitors restricted/screened  Taken 6/9/2025 2000 by Nidia Iyer RN  Infection Prevention:   environmental surveillance performed   equipment surfaces disinfected   hand hygiene promoted   rest/sleep promoted   single patient room provided   visitors restricted/screened  Goal: Optimal Comfort and Wellbeing  Intervention: Monitor Pain and Promote Comfort  Recent Flowsheet Documentation  Taken 6/10/2025 0414 by Nidia Iyer RN  Pain Management Interventions:   pain management plan reviewed with patient/caregiver   pain medication given   pillow support provided   position adjusted   quiet environment facilitated   relaxation techniques promoted  Taken 6/10/2025 0200 by Nidia Iyer RN  Pain Management Interventions:   pillow support provided   position adjusted   quiet environment facilitated   relaxation techniques promoted  Taken 6/10/2025 0000 by Nidia Iyer RN  Pain Management Interventions:   pillow support provided   position adjusted   quiet environment facilitated   relaxation techniques promoted  Taken 6/9/2025 2200 by Nidia Iyer RN  Pain Management Interventions:   pillow support provided   pain pump in use   position adjusted   quiet environment facilitated   relaxation techniques promoted  Taken 6/9/2025 2000 by Nidia Iyer RN  Pain Management Interventions:    pain pump in use   pillow support provided   position adjusted   relaxation techniques promoted   quiet environment facilitated  Intervention: Provide Person-Centered Care  Recent Flowsheet Documentation  Taken 6/9/2025 2000 by Nidia Iyer RN  Trust Relationship/Rapport:   care explained   choices provided   questions answered   questions encouraged     Problem: Fall Injury Risk  Goal: Absence of Fall and Fall-Related Injury  Intervention: Identify and Manage Contributors  Recent Flowsheet Documentation  Taken 6/9/2025 2000 by Nidia Iyer RN  Medication Review/Management: medications reviewed  Intervention: Promote Injury-Free Environment  Recent Flowsheet Documentation  Taken 6/10/2025 0414 by Nidia Iyer, RN  Safety Promotion/Fall Prevention:   activity supervised   assistive device/personal items within reach   clutter free environment maintained   fall prevention program maintained   gait belt   lighting adjusted   mobility aid in reach   muscle strengthening facilitated   nonskid shoes/slippers when out of bed   room organization consistent   safety round/check completed  Taken 6/10/2025 0200 by Nidia Iyer, MIRIAM  Safety Promotion/Fall Prevention:   activity supervised   assistive device/personal items within reach   clutter free environment maintained   fall prevention program maintained   lighting adjusted   gait belt   mobility aid in reach   muscle strengthening facilitated   nonskid shoes/slippers when out of bed   room organization consistent   safety round/check completed  Taken 6/10/2025 0000 by Nidia Iyer, RN  Safety Promotion/Fall Prevention:   activity supervised   assistive device/personal items within reach   clutter free environment maintained   fall prevention program maintained   gait belt   mobility aid in reach   lighting adjusted   muscle strengthening facilitated   nonskid shoes/slippers when out of bed   room organization consistent   safety round/check completed  Taken 6/9/2025 2200 by  Moudy, Nidia, RN  Safety Promotion/Fall Prevention:   activity supervised   assistive device/personal items within reach   clutter free environment maintained   fall prevention program maintained   gait belt   lighting adjusted   mobility aid in reach   muscle strengthening facilitated   nonskid shoes/slippers when out of bed   room organization consistent   safety round/check completed  Taken 6/9/2025 2000 by Nidia Iyer RN  Safety Promotion/Fall Prevention:   activity supervised   assistive device/personal items within reach   clutter free environment maintained   fall prevention program maintained   gait belt   lighting adjusted   mobility aid in reach   muscle strengthening facilitated   nonskid shoes/slippers when out of bed   room organization consistent   safety round/check completed     Problem: Pain Acute  Goal: Optimal Pain Control and Function  Intervention: Optimize Psychosocial Wellbeing  Recent Flowsheet Documentation  Taken 6/9/2025 2000 by Nidia Iyer RN  Supportive Measures: active listening utilized  Diversional Activities: television  Intervention: Develop Pain Management Plan  Recent Flowsheet Documentation  Taken 6/10/2025 0414 by Nidia Iyer, RN  Pain Management Interventions:   pain management plan reviewed with patient/caregiver   pain medication given   pillow support provided   position adjusted   quiet environment facilitated   relaxation techniques promoted  Taken 6/10/2025 0200 by Nidia Iyer RN  Pain Management Interventions:   pillow support provided   position adjusted   quiet environment facilitated   relaxation techniques promoted  Taken 6/10/2025 0000 by Nidia Iyer RN  Pain Management Interventions:   pillow support provided   position adjusted   quiet environment facilitated   relaxation techniques promoted  Taken 6/9/2025 2200 by Nidia Iyer RN  Pain Management Interventions:   pillow support provided   pain pump in use   position adjusted   quiet environment facilitated    relaxation techniques promoted  Taken 6/9/2025 2000 by Nidia Iyer RN  Pain Management Interventions:   pain pump in use   pillow support provided   position adjusted   relaxation techniques promoted   quiet environment facilitated  Intervention: Prevent or Manage Pain  Recent Flowsheet Documentation  Taken 6/9/2025 2000 by Nidia Iyer RN  Medication Review/Management: medications reviewed     Problem: Skin Injury Risk Increased  Goal: Skin Health and Integrity  Intervention: Optimize Skin Protection  Recent Flowsheet Documentation  Taken 6/10/2025 0414 by Nidia Iyer RN  Activity Management: bedrest  Pressure Reduction Techniques:   frequent weight shift encouraged   weight shift assistance provided  Head of Bed (HOB) Positioning: \[facility] [place] [facility]\"" elevated  Pressure Reduction Devices:   pressure-redistributing mattress utilized   positioning supports utilized  Skin Protection: incontinence pads utilized  Taken 6/10/2025 0200 by Nidia Iyer RN  Activity Management: bedrest  Pressure Reduction Techniques:   frequent weight shift encouraged   weight shift assistance provided  Head of Bed (HOB) Positioning: \[facility]\"" elevated  Pressure Reduction Devices:   pressure-redistributing mattress utilized   positioning supports utilized  Skin Protection: incontinence pads utilized  Taken 6/10/2025 0000 by Nidia Iyer RN  Activity Management: bedrest  Pressure Reduction Techniques:   frequent weight shift encouraged   weight shift assistance provided  Head of Bed (HOB) Positioning: HOB elevated  Pressure Reduction Devices:   pressure-redistributing mattress utilized   positioning supports utilized  Skin Protection: incontinence pads utilized  Taken 6/9/2025 2200 by Nidia Iyer RN  Activity Management: bedrest  Pressure Reduction Techniques:   frequent weight shift encouraged   weight shift assistance provided  Head of Bed (HOB) Positioning: \[facility]\"" flat  Pressure Reduction Devices:   pressure-redistributing mattress utilized   positioning  supports utilized  Skin Protection: incontinence pads utilized  Taken 6/9/2025 2000 by Nidia Iyer RN  Activity Management: bedrest  Pressure Reduction Techniques:   frequent weight shift encouraged   positioned off wounds   weight shift assistance provided  Head of Bed (HOB) Positioning: HOB elevated  Pressure Reduction Devices:   pressure-redistributing mattress utilized   positioning supports utilized  Skin Protection: incontinence pads utilized     Problem: Comorbidity Management  Goal: Maintenance of Behavioral Health Symptom Control  Intervention: Maintain Behavioral Health Symptom Control  Recent Flowsheet Documentation  Taken 6/9/2025 2000 by Nidia Iyer RN  Medication Review/Management: medications reviewed  Goal: Blood Pressure in Desired Range  Intervention: Maintain Blood Pressure Management  Recent Flowsheet Documentation  Taken 6/9/2025 2000 by Nidia Iyer RN  Medication Review/Management: medications reviewed     Problem: Sepsis/Septic Shock  Goal: Optimal Coping  Intervention: Support Patient and Family Response  Recent Flowsheet Documentation  Taken 6/9/2025 2000 by Nidia Iyer RN  Supportive Measures: active listening utilized  Goal: Absence of Infection Signs and Symptoms  Intervention: Initiate Sepsis Management  Recent Flowsheet Documentation  Taken 6/10/2025 0414 by Nidia Iyer RN  Infection Prevention:   environmental surveillance performed   equipment surfaces disinfected   hand hygiene promoted   rest/sleep promoted   single patient room provided   visitors restricted/screened  Taken 6/10/2025 0200 by Nidia Iyer RN  Infection Prevention:   equipment surfaces disinfected   environmental surveillance performed   hand hygiene promoted   rest/sleep promoted   single patient room provided   visitors restricted/screened  Taken 6/10/2025 0000 by Nidia Iyer RN  Infection Prevention:   environmental surveillance performed   equipment surfaces disinfected   hand hygiene promoted   rest/sleep  promoted   single patient room provided   visitors restricted/screened  Taken 6/9/2025 2200 by Nidia Iyer, MIRIAM  Infection Prevention:   environmental surveillance performed   equipment surfaces disinfected   hand hygiene promoted   rest/sleep promoted   single patient room provided   visitors restricted/screened  Taken 6/9/2025 2000 by Nidia Iyer RN  Infection Prevention:   environmental surveillance performed   equipment surfaces disinfected   hand hygiene promoted   rest/sleep promoted   single patient room provided   visitors restricted/screened  Intervention: Promote Recovery  Recent Flowsheet Documentation  Taken 6/10/2025 0414 by Nidia Iyer RN  Activity Management: bedrest  Taken 6/10/2025 0200 by Nidia Iyer RN  Activity Management: bedrest  Taken 6/10/2025 0000 by Nidia Iyer RN  Activity Management: bedrest  Taken 6/9/2025 2200 by Nidia Iyer RN  Activity Management: bedrest  Taken 6/9/2025 2000 by Nidia Iyer RN  Activity Management: bedrest   Goal Outcome Evaluation:   Pt alert and oriented only to self on room air. 18 gauge IV in right forearm, saline locked. Infu block disconnected by patient in the night. Pain controlled with IV morphine, given as needed throughout shift. Pt voids spontaneously. Call light within reach.

## 2025-06-10 NOTE — PROGRESS NOTES
Chief complaint left displaced comminuted complex intertrochanteric hip fracture.    Left displaced intertrochanteric hip fracture    Spoke with the bedside nurse patient is at his baseline significantly advanced dementia unable to achieve additional history this a.m.  Not following commands this a.m.    Shortened externally rotated left lower extremity  Baseline swelling bilateral lower extremities and suspect chronic vascular findings.  Unable to obtain motor status distally is not following commands    Plan:  Plan for operative fixation left complex comminuted intertrochanteric hip fracture plan for surgery 6/11/2025  Please keep the patient n.p.o. after midnight  Hold anticoagulation on 6/11/2025    Surgery planned for 6/11/2025

## 2025-06-11 ENCOUNTER — ANESTHESIA EVENT (OUTPATIENT)
Dept: PERIOP | Facility: HOSPITAL | Age: 85
End: 2025-06-11
Payer: MEDICARE

## 2025-06-11 ENCOUNTER — APPOINTMENT (OUTPATIENT)
Dept: GENERAL RADIOLOGY | Facility: HOSPITAL | Age: 85
DRG: 481 | End: 2025-06-11
Payer: MEDICARE

## 2025-06-11 ENCOUNTER — ANESTHESIA EVENT CONVERTED (OUTPATIENT)
Dept: ANESTHESIOLOGY | Facility: HOSPITAL | Age: 85
DRG: 481 | End: 2025-06-11
Payer: MEDICARE

## 2025-06-11 ENCOUNTER — ANESTHESIA (OUTPATIENT)
Dept: PERIOP | Facility: HOSPITAL | Age: 85
End: 2025-06-11
Payer: MEDICARE

## 2025-06-11 LAB
ANION GAP SERPL CALCULATED.3IONS-SCNC: 10 MMOL/L (ref 5–15)
BUN SERPL-MCNC: 40.7 MG/DL (ref 8–23)
BUN/CREAT SERPL: 16.5 (ref 7–25)
CALCIUM SPEC-SCNC: 8.2 MG/DL (ref 8.6–10.5)
CHLORIDE SERPL-SCNC: 106 MMOL/L (ref 98–107)
CO2 SERPL-SCNC: 19 MMOL/L (ref 22–29)
CREAT SERPL-MCNC: 2.47 MG/DL (ref 0.76–1.27)
EGFRCR SERPLBLD CKD-EPI 2021: 25.1 ML/MIN/1.73
FOLATE SERPL-MCNC: 19.5 NG/ML (ref 4.78–24.2)
GLUCOSE BLDC GLUCOMTR-MCNC: 113 MG/DL (ref 70–130)
GLUCOSE BLDC GLUCOMTR-MCNC: 127 MG/DL (ref 70–130)
GLUCOSE BLDC GLUCOMTR-MCNC: 156 MG/DL (ref 70–130)
GLUCOSE BLDC GLUCOMTR-MCNC: 96 MG/DL (ref 70–130)
GLUCOSE SERPL-MCNC: 139 MG/DL (ref 65–99)
POTASSIUM SERPL-SCNC: 5.3 MMOL/L (ref 3.5–5.2)
SODIUM SERPL-SCNC: 135 MMOL/L (ref 136–145)
VIT B12 BLD-MCNC: 456 PG/ML (ref 211–946)

## 2025-06-11 PROCEDURE — 99232 SBSQ HOSP IP/OBS MODERATE 35: CPT | Performed by: HOSPITALIST

## 2025-06-11 PROCEDURE — 76000 FLUOROSCOPY <1 HR PHYS/QHP: CPT

## 2025-06-11 PROCEDURE — 82746 ASSAY OF FOLIC ACID SERUM: CPT | Performed by: ORTHOPAEDIC SURGERY

## 2025-06-11 PROCEDURE — 25010000002 CEFAZOLIN PER 500 MG: Performed by: ORTHOPAEDIC SURGERY

## 2025-06-11 PROCEDURE — 80048 BASIC METABOLIC PNL TOTAL CA: CPT | Performed by: ORTHOPAEDIC SURGERY

## 2025-06-11 PROCEDURE — 25010000002 DEXAMETHASONE SODIUM PHOSPHATE 10 MG/ML SOLUTION: Performed by: NURSE ANESTHETIST, CERTIFIED REGISTERED

## 2025-06-11 PROCEDURE — 25010000002 ROPIVACAINE PER 1 MG: Performed by: NURSE ANESTHETIST, CERTIFIED REGISTERED

## 2025-06-11 PROCEDURE — C1713 ANCHOR/SCREW BN/BN,TIS/BN: HCPCS | Performed by: ORTHOPAEDIC SURGERY

## 2025-06-11 PROCEDURE — 25010000002 HEPARIN (PORCINE) PER 1000 UNITS: Performed by: ORTHOPAEDIC SURGERY

## 2025-06-11 PROCEDURE — 27245 TREAT THIGH FRACTURE: CPT

## 2025-06-11 PROCEDURE — 25010000002 SUGAMMADEX 200 MG/2ML SOLUTION: Performed by: ANESTHESIOLOGY

## 2025-06-11 PROCEDURE — 27245 TREAT THIGH FRACTURE: CPT | Performed by: ORTHOPAEDIC SURGERY

## 2025-06-11 PROCEDURE — 25810000003 SODIUM CHLORIDE 0.9 % SOLUTION: Performed by: HOSPITALIST

## 2025-06-11 PROCEDURE — 25010000002 FENTANYL CITRATE (PF) 100 MCG/2ML SOLUTION: Performed by: ANESTHESIOLOGY

## 2025-06-11 PROCEDURE — 25810000003 LACTATED RINGERS PER 1000 ML: Performed by: ANESTHESIOLOGY

## 2025-06-11 PROCEDURE — 82607 VITAMIN B-12: CPT | Performed by: ORTHOPAEDIC SURGERY

## 2025-06-11 PROCEDURE — 73502 X-RAY EXAM HIP UNI 2-3 VIEWS: CPT

## 2025-06-11 PROCEDURE — 25010000002 ONDANSETRON PER 1 MG: Performed by: ANESTHESIOLOGY

## 2025-06-11 PROCEDURE — 82948 REAGENT STRIP/BLOOD GLUCOSE: CPT

## 2025-06-11 PROCEDURE — 0QS706Z REPOSITION LEFT UPPER FEMUR WITH INTRAMEDULLARY INTERNAL FIXATION DEVICE, OPEN APPROACH: ICD-10-PCS | Performed by: ORTHOPAEDIC SURGERY

## 2025-06-11 PROCEDURE — 25810000003 SODIUM CHLORIDE 0.9 % SOLUTION: Performed by: ANESTHESIOLOGY

## 2025-06-11 PROCEDURE — 25010000002 PROPOFOL 10 MG/ML EMULSION: Performed by: ANESTHESIOLOGY

## 2025-06-11 PROCEDURE — C1769 GUIDE WIRE: HCPCS | Performed by: ORTHOPAEDIC SURGERY

## 2025-06-11 PROCEDURE — 25010000002 LIDOCAINE PF 1% 1 % SOLUTION: Performed by: ANESTHESIOLOGY

## 2025-06-11 PROCEDURE — 25010000002 CLINDAMYCIN 900 MG/50ML SOLUTION: Performed by: ORTHOPAEDIC SURGERY

## 2025-06-11 DEVICE — NAIL FEM TFN ADV PROX 130D 11X235MM LT STRL: Type: IMPLANTABLE DEVICE | Site: HIP | Status: FUNCTIONAL

## 2025-06-11 DEVICE — IMPLANTABLE DEVICE: Type: IMPLANTABLE DEVICE | Site: HIP | Status: FUNCTIONAL

## 2025-06-11 DEVICE — BLD FEM FIX HELI TFN ADV PERF 100MM STRL: Type: IMPLANTABLE DEVICE | Site: HIP | Status: FUNCTIONAL

## 2025-06-11 RX ORDER — ONDANSETRON 2 MG/ML
INJECTION INTRAMUSCULAR; INTRAVENOUS AS NEEDED
Status: DISCONTINUED | OUTPATIENT
Start: 2025-06-11 | End: 2025-06-11 | Stop reason: SURG

## 2025-06-11 RX ORDER — SODIUM CHLORIDE 9 MG/ML
9 INJECTION, SOLUTION INTRAVENOUS ONCE
Status: COMPLETED | OUTPATIENT
Start: 2025-06-11 | End: 2025-06-11

## 2025-06-11 RX ORDER — BUPIVACAINE HCL/0.9 % NACL/PF 0.125 %
PLASTIC BAG, INJECTION (ML) EPIDURAL AS NEEDED
Status: DISCONTINUED | OUTPATIENT
Start: 2025-06-11 | End: 2025-06-11 | Stop reason: SURG

## 2025-06-11 RX ORDER — SODIUM CHLORIDE 0.9 % (FLUSH) 0.9 %
10 SYRINGE (ML) INJECTION EVERY 12 HOURS SCHEDULED
Status: DISCONTINUED | OUTPATIENT
Start: 2025-06-11 | End: 2025-06-11 | Stop reason: HOSPADM

## 2025-06-11 RX ORDER — SODIUM CHLORIDE 0.9 % (FLUSH) 0.9 %
10 SYRINGE (ML) INJECTION AS NEEDED
Status: DISCONTINUED | OUTPATIENT
Start: 2025-06-11 | End: 2025-06-11 | Stop reason: HOSPADM

## 2025-06-11 RX ORDER — EPHEDRINE SULFATE 50 MG/ML
INJECTION INTRAVENOUS AS NEEDED
Status: DISCONTINUED | OUTPATIENT
Start: 2025-06-11 | End: 2025-06-11 | Stop reason: SURG

## 2025-06-11 RX ORDER — TRANEXAMIC ACID 10 MG/ML
1000 INJECTION, SOLUTION INTRAVENOUS ONCE
Status: COMPLETED | OUTPATIENT
Start: 2025-06-11 | End: 2025-06-11

## 2025-06-11 RX ORDER — CLINDAMYCIN PHOSPHATE 900 MG/50ML
900 INJECTION, SOLUTION INTRAVENOUS ONCE
Status: COMPLETED | OUTPATIENT
Start: 2025-06-11 | End: 2025-06-11

## 2025-06-11 RX ORDER — FAMOTIDINE 20 MG/1
20 TABLET, FILM COATED ORAL ONCE
Status: DISCONTINUED | OUTPATIENT
Start: 2025-06-11 | End: 2025-06-11 | Stop reason: SDUPTHER

## 2025-06-11 RX ORDER — ROCURONIUM BROMIDE 10 MG/ML
INJECTION, SOLUTION INTRAVENOUS AS NEEDED
Status: DISCONTINUED | OUTPATIENT
Start: 2025-06-11 | End: 2025-06-11 | Stop reason: SURG

## 2025-06-11 RX ORDER — PROPOFOL 10 MG/ML
VIAL (ML) INTRAVENOUS AS NEEDED
Status: DISCONTINUED | OUTPATIENT
Start: 2025-06-11 | End: 2025-06-11 | Stop reason: SURG

## 2025-06-11 RX ORDER — LIDOCAINE HYDROCHLORIDE 10 MG/ML
INJECTION, SOLUTION EPIDURAL; INFILTRATION; INTRACAUDAL; PERINEURAL AS NEEDED
Status: DISCONTINUED | OUTPATIENT
Start: 2025-06-11 | End: 2025-06-11 | Stop reason: SURG

## 2025-06-11 RX ORDER — ROPIVACAINE HYDROCHLORIDE 5 MG/ML
INJECTION, SOLUTION EPIDURAL; INFILTRATION; PERINEURAL
Status: COMPLETED | OUTPATIENT
Start: 2025-06-11 | End: 2025-06-11

## 2025-06-11 RX ORDER — SODIUM CHLORIDE, SODIUM LACTATE, POTASSIUM CHLORIDE, CALCIUM CHLORIDE 600; 310; 30; 20 MG/100ML; MG/100ML; MG/100ML; MG/100ML
9 INJECTION, SOLUTION INTRAVENOUS CONTINUOUS
Status: ACTIVE | OUTPATIENT
Start: 2025-06-12 | End: 2025-06-12

## 2025-06-11 RX ORDER — SODIUM CHLORIDE 9 MG/ML
10 INJECTION, SOLUTION INTRAMUSCULAR; INTRAVENOUS; SUBCUTANEOUS EVERY 12 HOURS SCHEDULED
Status: DISCONTINUED | OUTPATIENT
Start: 2025-06-11 | End: 2025-06-11 | Stop reason: HOSPADM

## 2025-06-11 RX ORDER — LIDOCAINE HYDROCHLORIDE 10 MG/ML
0.5 INJECTION, SOLUTION EPIDURAL; INFILTRATION; INTRACAUDAL; PERINEURAL ONCE AS NEEDED
Status: DISCONTINUED | OUTPATIENT
Start: 2025-06-11 | End: 2025-06-11 | Stop reason: HOSPADM

## 2025-06-11 RX ORDER — ACETAMINOPHEN 325 MG/1
650 TABLET ORAL EVERY 6 HOURS
Status: DISCONTINUED | OUTPATIENT
Start: 2025-06-11 | End: 2025-06-14 | Stop reason: HOSPADM

## 2025-06-11 RX ORDER — FENTANYL CITRATE 50 UG/ML
INJECTION, SOLUTION INTRAMUSCULAR; INTRAVENOUS AS NEEDED
Status: DISCONTINUED | OUTPATIENT
Start: 2025-06-11 | End: 2025-06-11 | Stop reason: SURG

## 2025-06-11 RX ORDER — FAMOTIDINE 10 MG/ML
20 INJECTION, SOLUTION INTRAVENOUS ONCE
Status: DISCONTINUED | OUTPATIENT
Start: 2025-06-11 | End: 2025-06-11 | Stop reason: HOSPADM

## 2025-06-11 RX ORDER — FAMOTIDINE 20 MG/1
20 TABLET, FILM COATED ORAL ONCE
Status: COMPLETED | OUTPATIENT
Start: 2025-06-11 | End: 2025-06-11

## 2025-06-11 RX ORDER — SODIUM CHLORIDE 9 MG/ML
75 INJECTION, SOLUTION INTRAVENOUS CONTINUOUS
Status: ACTIVE | OUTPATIENT
Start: 2025-06-11 | End: 2025-06-12

## 2025-06-11 RX ORDER — DEXAMETHASONE SODIUM PHOSPHATE 10 MG/ML
INJECTION, SOLUTION INTRAMUSCULAR; INTRAVENOUS
Status: COMPLETED | OUTPATIENT
Start: 2025-06-11 | End: 2025-06-11

## 2025-06-11 RX ADMIN — FAMOTIDINE 20 MG: 20 TABLET, FILM COATED ORAL at 07:02

## 2025-06-11 RX ADMIN — FENTANYL CITRATE 25 MCG: 50 INJECTION, SOLUTION INTRAMUSCULAR; INTRAVENOUS at 09:13

## 2025-06-11 RX ADMIN — DEXAMETHASONE SODIUM PHOSPHATE 2 MG: 10 INJECTION INTRAMUSCULAR; INTRAVENOUS at 07:08

## 2025-06-11 RX ADMIN — FENTANYL CITRATE 50 MCG: 50 INJECTION, SOLUTION INTRAMUSCULAR; INTRAVENOUS at 07:40

## 2025-06-11 RX ADMIN — ROCURONIUM BROMIDE 50 MG: 10 INJECTION INTRAVENOUS at 07:40

## 2025-06-11 RX ADMIN — PROPOFOL 130 MG: 10 INJECTION, EMULSION INTRAVENOUS at 07:40

## 2025-06-11 RX ADMIN — DEXAMETHASONE SODIUM PHOSPHATE 4 MG: 10 INJECTION INTRAMUSCULAR; INTRAVENOUS at 08:30

## 2025-06-11 RX ADMIN — TRANEXAMIC ACID 1000 MG: 10 INJECTION, SOLUTION INTRAVENOUS at 07:55

## 2025-06-11 RX ADMIN — SUGAMMADEX 200 MG: 100 INJECTION, SOLUTION INTRAVENOUS at 09:07

## 2025-06-11 RX ADMIN — SODIUM CHLORIDE 75 ML/HR: 9 INJECTION, SOLUTION INTRAVENOUS at 12:44

## 2025-06-11 RX ADMIN — ROCURONIUM BROMIDE 20 MG: 10 INJECTION INTRAVENOUS at 08:31

## 2025-06-11 RX ADMIN — LIDOCAINE HYDROCHLORIDE 50 MG: 10 INJECTION, SOLUTION EPIDURAL; INFILTRATION; INTRACAUDAL; PERINEURAL at 07:40

## 2025-06-11 RX ADMIN — ONDANSETRON 4 MG: 2 INJECTION INTRAMUSCULAR; INTRAVENOUS at 09:07

## 2025-06-11 RX ADMIN — TRANEXAMIC ACID 1000 MG: 10 INJECTION, SOLUTION INTRAVENOUS at 09:05

## 2025-06-11 RX ADMIN — SODIUM CHLORIDE 2000 MG: 900 INJECTION INTRAVENOUS at 12:44

## 2025-06-11 RX ADMIN — CLINDAMYCIN PHOSPHATE 900 MG: 900 INJECTION, SOLUTION INTRAVENOUS at 07:44

## 2025-06-11 RX ADMIN — HEPARIN SODIUM 5000 UNITS: 5000 INJECTION INTRAVENOUS; SUBCUTANEOUS at 21:16

## 2025-06-11 RX ADMIN — FENTANYL CITRATE 25 MCG: 50 INJECTION, SOLUTION INTRAMUSCULAR; INTRAVENOUS at 08:35

## 2025-06-11 RX ADMIN — ACETAMINOPHEN 650 MG: 325 TABLET ORAL at 17:37

## 2025-06-11 RX ADMIN — ATORVASTATIN CALCIUM 80 MG: 40 TABLET, FILM COATED ORAL at 21:16

## 2025-06-11 RX ADMIN — EPHEDRINE SULFATE 15 MG: 50 INJECTION INTRAVENOUS at 07:56

## 2025-06-11 RX ADMIN — SODIUM CHLORIDE, POTASSIUM CHLORIDE, SODIUM LACTATE AND CALCIUM CHLORIDE: 600; 310; 30; 20 INJECTION, SOLUTION INTRAVENOUS at 07:40

## 2025-06-11 RX ADMIN — PROPOFOL 30 MG: 10 INJECTION, EMULSION INTRAVENOUS at 09:08

## 2025-06-11 RX ADMIN — Medication 5 MG: at 21:16

## 2025-06-11 RX ADMIN — Medication 50 MCG: at 07:56

## 2025-06-11 RX ADMIN — LIDOCAINE 1 PATCH: 4 PATCH TOPICAL at 21:16

## 2025-06-11 RX ADMIN — ROPIVACAINE HYDROCHLORIDE 30 ML: 5 INJECTION, SOLUTION EPIDURAL; INFILTRATION; PERINEURAL at 07:08

## 2025-06-11 RX ADMIN — ACETAMINOPHEN 650 MG: 325 TABLET ORAL at 21:16

## 2025-06-11 RX ADMIN — SODIUM CHLORIDE 9 ML/HR: 9 INJECTION, SOLUTION INTRAVENOUS at 06:30

## 2025-06-11 NOTE — PROGRESS NOTES
[facility] Medicine Services  PROGRESS NOTE    Patient Name: Toño Alcala  : 1940  MRN: [id number]    Date of Admission: 2025  Primary Care Physician: System, Provider Not In    Subjective   Subjective     CC:  F/U fall, hip fracture    HPI:  Patient seen late this morning, just returned from OR. Complains of pain in hip when you move him.      Objective   Objective     Vital Signs:   Temp:  [97 °F (36.1 °C)-98.7 °F (37.1 °C)] 97.7 °F (36.5 °C)  Heart Rate:  [52-74] 63  Resp:  [15-18] 18  BP: ()/(51-72) 111/59  Flow (L/min) (Oxygen Therapy):  [2] 2     Physical Exam:  Gen-no acute distress  HENT-NCAT, mucous membranes moist  CV-RRR, S1 S2 normal  Resp-CTAB, no wheezes or rales  Abd-soft, NT, ND, +BS  Ext-no edema  Neuro-A&Ox1, no focal deficits  Skin-no rashes  Psych-appropriate mood      Results Reviewed:  LAB RESULTS:      Lab 06/10/25  0740 25  1020   WBC 10.30 9.49   HEMOGLOBIN 9.8* 10.5*   HEMATOCRIT 30.5* 32.2*   PLATELETS 190 198   NEUTROS ABS  --  6.88   IMMATURE GRANS (ABS)  --  0.04   LYMPHS ABS  --  0.82   MONOS ABS  --  1.12*   EOS ABS  --  0.57*   MCV 88.2 86.1   PROTIME  --  15.3         Lab 06/10/25  0740 25  1020   SODIUM 136 136   POTASSIUM 4.7 4.2   CHLORIDE 104 102   CO2 22.0 23.0   ANION GAP 10.0 11.0   BUN 32.2* 35.1*   CREATININE 2.41* 2.89*   EGFR 25.8* 20.8*   GLUCOSE 112* 142*   CALCIUM 8.6 9.1   MAGNESIUM 1.8  --    HEMOGLOBIN A1C  --  5.90*         Lab 25  1020   TOTAL PROTEIN 6.5   ALBUMIN 3.4*   GLOBULIN 3.1   ALT (SGPT) 21   AST (SGOT) 30   BILIRUBIN 0.7   ALK PHOS 245*         Lab 25  1020   PROTIME 15.3   INR 1.14*             Lab 25  1155 25  1022   ABO TYPING O O   RH TYPING Positive Positive   ANTIBODY SCREEN  --  Negative         Brief Urine Lab Results  (Last result in the past 365 days)        Color   Clarity   Blood   Leuk Est   Nitrite   Protein   CREAT   Urine HCG        25 1300 Yellow    Clear   Negative   Negative   Negative   Trace                   Microbiology Results Abnormal       None            FL C Arm During Surgery  Result Date: 6/11/2025  This procedure was auto-finalized with no dictation required.    XR Femur 2 View Left  Result Date: 6/9/2025  XR FEMUR 2 VW LEFT Date of Exam: 6/9/2025 12:51 PM EDT Indication: AP and lateral left femur, known proximal fracture/intertrochanteric fracture?need to visualize the rest of the femur bone Comparison: CT scan of the pelvis 6/9/2025 Findings: CT scan of the pelvis shows acute intertrochanteric fracture of the left hip and sclerotic changes in the femoral heads consistent with changes of AVN. The mid and distal femur appear to be intact. Knee joint appears anatomically aligned. 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 There is no evidence of knee joint effusion     Impression: Impression: Acute intertrochanteric fracture of the left hip. Remainder of the femur appears intact. Electronically Signed: Chandu Santiago MD  6/9/2025 1:35 PM EDT  Workstation ID: [id number]      Results for orders placed during the hospital encounter of 01/19/25    Adult Transthoracic Echo Complete W/ Cont if Necessary Per Protocol    Interpretation Summary    Left ventricular systolic function is normal. Estimated left ventricular EF = 50%    Left ventricular wall thickness is consistent with borderline concentric hypertrophy.    Mild mitral valve regurgitation is present.      Current medications:  Scheduled Meds:amLODIPine, 2.5 mg, Oral, Daily  aspirin, 81 mg, Oral, Daily  atorvastatin, 80 mg, Oral, Nightly  carvedilol, 6.25 mg, Oral, BID  ceFAZolin, 2,000 mg, Intravenous, Once  folic acid, 1 mg, Oral, Daily  heparin (porcine), 5,000 Units, Subcutaneous, Q12H  Lidocaine, 1 patch, Transdermal, Q24H  melatonin, 5 mg, Oral, Nightly  pantoprazole, 40 mg, Oral, Q AM  sertraline, 25 mg, Oral, Daily  sodium chloride, 10 mL, Intravenous, Q12H  thiamine, 100 mg, Oral, Daily      Continuous  Infusions:[START ON 6/12/2025] lactated ringers, 9 mL/hr, Last Rate: Stopped (06/11/25 0927)  ropivacaine, , Last Rate: Stopped (06/10/25 0916)  sodium chloride, 75 mL/hr      PRN Meds:.  acetaminophen **OR** acetaminophen    senna-docusate sodium **AND** polyethylene glycol **AND** bisacodyl **AND** bisacodyl    HYDROcodone-acetaminophen    ipratropium-albuterol    Magnesium Low Dose Replacement - Follow Nurse / BPA Driven Protocol    Morphine    nitroglycerin    ondansetron    [COMPLETED] Insert Peripheral IV **AND** sodium chloride    sodium chloride    sodium chloride    Assessment & Plan   Assessment & Plan     Active Hospital Problems    Diagnosis  POA    **Closed comminuted intertrochanteric fracture of left femur [S72.142A]  Yes    Moderate protein-calorie malnutrition [E44.0]  Yes    History of pulmonary embolus (PE)/DVT [Z86.711]  Yes    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 male with hx of CAD (previous CABG 1994), chronic HFpEF, CKD 4 (baseline Cr ~2.2-2.6), diet controlled DM, and previous PE & DVT (no longer anticoagulated). Was admitted January 2025 with right temporal & occipital CVA with hemorrhagic transformation, non-operative C5 and C6 fracture after a fall, RLE DVT (proximal and mid femoral) - during that hospitalization anticoagulation was held indefinitely (short term due to hemorrhagic stroke, long term due to recurrent falls) placed on ASA 81 mg indefinitely and  also had IVC filter placed. He is now residing at an assisted living facility.  Presented to [facility] on 6/9/25 due to worsening falls with left hip pain. Imaging showed a left femoral neck fracture.       This patient's problems and plans were partially entered by my partner and updated as appropriate by me 06/11/25. Copied text in this note has been reviewed and is accurate as of today's date.      Left intertrochanteric fracture (s/p mechanical fall)  -s/p left hip cephalomedullary nail by Dr. Badillo 6/11/25  -WBAT LLE  -DVT PPx: SQH while inpatient, ASA 81 mg BID x 30 days at discharge  -F/U in 3 weeks for incision check and x-rays  -PT/OT     Hx right hemispheric CVA with hemorrhagic transformation (January 2025)  -ASA, statin  -Worsening memory over past months (per son Salo)     Hx RLE DVT (IVC filter placed January 2025)  Hx PE  -No longer anticoagulated since his stroke with hemorrhagic transformation January 2025 (due to the hemorrhagic stroke and also recurrent falls)  -s/p IVC filter placement during January 2025 admission  -On ASA 81 mg     Hx previous non-operative C5 and C6 fractures  -January 2025  -s/p C-collar (last visit with Dr. Byrd 4/24/25 at which time he was instructed to wean the collar off over 1-2 weeks)     CAD  Chronic HFpEF  HTN  -ASA, Coreg, Amlodipine  -On twice weekly Bumex (per recent Cardiology note); will hold this for now and restart as needed  -Follows with Dr. Hernandez     CKD 4  -Baseline Cr ~2.2-2.6, gradually worsening  -Monitor    Diet controlled DM2  -Currently HbA1c is 5.9%     GERD  -PPI    Expected Discharge Location and Transportation: rehab  Expected Discharge   Expected Discharge Date: 6/13/2025; Expected Discharge Time:      VTE Prophylaxis:  Pharmacologic VTE prophylaxis orders are present.         AM-PAC 6 Clicks Score (PT): 6 (06/10/25 9222)    CODE STATUS:   Code Status and Medical Interventions: No CPR (Do Not Attempt to Resuscitate); Limited Support; No  intubation (DNI)   Ordered at: 06/09/25 1336     Code Status (Patient has no pulse and is not breathing):    No CPR (Do Not Attempt to Resuscitate)     Medical Interventions (Patient has pulse or is breathing):    Limited Support     Medical Intervention Limits:    No intubation (DNI)       Lilly Waldron MD  06/11/25

## 2025-06-11 NOTE — ANESTHESIA PREPROCEDURE EVALUATION
Anesthesia Evaluation     Patient summary reviewed and Nursing notes reviewed   no history of anesthetic complications:   NPO Solid Status: > 8 hours  NPO Liquid Status: > 8 hours           Airway   Mallampati: II  TM distance: >3 FB  Neck ROM: full  No difficulty expected  Dental      Pulmonary - negative pulmonary ROS and normal exam   Cardiovascular - normal exam    (+) hypertension, past MI , CAD, CABG, CHF , DVT, hyperlipidemia      Neuro/Psych  (+) CVA, dizziness/light headedness, numbness  GI/Hepatic/Renal/Endo    (+) GERD, renal disease-, diabetes mellitus    Musculoskeletal     Abdominal    Substance History      OB/GYN          Other   arthritis,                 Anesthesia Plan    ASA 3     general     intravenous induction     Anesthetic plan, risks, benefits, and alternatives have been provided, discussed and informed consent has been obtained with: patient.    Plan discussed with CRNA.    CODE STATUS:    While under anesthesia and immediate perioperative period:  Code Status: CPR - Full Support

## 2025-06-11 NOTE — ANESTHESIA PROCEDURE NOTES
Airway  Reason: elective    Date/Time: 6/11/2025 7:43 AM  Airway not difficult    General Information and Staff    Patient location during procedure: OR  SRNA: Angela Gill SRNA  Indications and Patient Condition  Indications for airway management: airway protection    Preoxygenated: yes    Mask difficulty assessment: 2 - vent by mask + OA or adjuvant +/- NMBA    Final Airway Details    Final airway type: endotracheal airway      Successful airway: ETT   Successful intubation technique: direct laryngoscopy  Endotracheal tube insertion site: oral  Blade: Albert  Blade size: 4  ETT size (mm): 7.5  Cormack-Lehane Classification: grade I - full view of glottis  Placement verified by: capnometry   Measured from: gums  ETT/EBT to gums (cm): 23  Number of attempts at approach: 1  Assessment: lips, teeth, and gum same as pre-op and atraumatic intubation

## 2025-06-11 NOTE — PLAN OF CARE
Goal Outcome Evaluation:  Plan of Care Reviewed With: patient        Progress: no change     AOx1, alert to name and , confused to time, situation and place, patient appears comfortable and has been sleeping since arriving back from PACU at this time, fluids started and tylenol scheduled, son at bedside opted to let him rest for now, scds placed, abx given as ordered, voiding spontaneously, dressing x2 is CDI        Problem: Adult Inpatient Plan of Care  Goal: Absence of Hospital-Acquired Illness or Injury  Intervention: Identify and Manage Fall Risk  Description: Perform standard risk assessment on admission using a validated tool or comprehensive approach appropriate to the patient; reassess fall risk frequently, with change in status or transfer to another level of care.Communicate risk to interprofessional healthcare team; ensure fall risk visible cue.Determine need for increased observation, equipment and environmental modification, as well as use of supportive, nonskid footwear.Adjust safety measures to individual needs and identified risk factors.Reinforce the importance of active participation with fall risk prevention, safety, and physical activity with the patient and family.Perform regular intentional rounding to assess need for position change, pain assessment and personal needs, including assistance with toileting.  Recent Flowsheet Documentation  Taken 2025 1618 by Marsha Duenas, RN  Safety Promotion/Fall Prevention:   safety round/check completed   room organization consistent   nonskid shoes/slippers when out of bed   lighting adjusted   elopement precautions   fall prevention program maintained   clutter free environment maintained   assistive device/personal items within reach   activity supervised  Taken 2025 1400 by Marsha Duenas, RN  Safety Promotion/Fall Prevention:   safety round/check completed   room organization consistent   toileting scheduled   nonskid shoes/slippers when  out of bed   mobility aid in reach   lighting adjusted   fall prevention program maintained   clutter free environment maintained   assistive device/personal items within reach   activity supervised  Taken 6/11/2025 1110 by Marsha Duenas, RN  Safety Promotion/Fall Prevention:   safety round/check completed   toileting scheduled   room organization consistent   nonskid shoes/slippers when out of bed   mobility aid in reach   lighting adjusted   gait belt   fall prevention program maintained   elopement precautions   clutter free environment maintained   assistive device/personal items within reach   activity supervised  Taken 6/11/2025 1101 by Marsha Duenas, RN  Safety Promotion/Fall Prevention: patient off unit  Taken 6/11/2025 0700 by Marsha Duenas, RN  Safety Promotion/Fall Prevention: patient off unit

## 2025-06-11 NOTE — OP NOTE
OPERATIVE REPORT  SIDE: Left hip    Date of Surgery: 6/11/2025    Location: [facility], [place], [place]; operating room #13    Surgeon: Rey Badillo M.D.       Assistant: DENYS Marie    The skilled assistance of the above noted first assistant was necessary during this complex surgical procedure.  The surgical assistant assisted with every aspect of the operation including, but not limited to, proper and safe positioning of the patient, obtaining adequate surgical exposure, manipulation of surgical instruments, suture management, surgical knot tying when necessary, the continual process of hemostasis during the procedure itself in addition to surgical wound closure and removal of the patient from the operating table and returning the patient back to the [facility].  The assistance of the surgical assistant allowed me to perform the most sensitive and technical potions of this operation using 2 hands, thus enhancing efficiency and patient safety.  This would not be possible without the help of a skilled assistant familiar with the procedure and capable of safely performing the aforementioned tasks.      Anesthesia:  General endotracheal anesthesia    Block: Single shot Iliofascial block by anesthesia team    Left significant displaced intertrochanteric hip fracture   Date of surgery 6/11/2025    Pre-operative Diagnosis                          1. Acute, displaced, closed intertrochanteric hip fracture --significantly comminuted and displaced intertrochanteric fracture with multiple comminuted fragments, separate greater trochanteric piece, lesser trochanteric fracture as well      Post-operative Diagnosis  1.  Same    Operative Procedure         1.  Left hip cephalomedullary nail for displaced intertrochanteric hip fracture, intermediate length nail- CPT code 53859    Intraoperative Complications: None    Estimated Blood Loss:100 mL    Drain: NO    Specimen: none    Antibiotics:    IV cefazolin infused prior to incision    Fixation Technique  Cephalomedullary nail  Traction with [place] Table    Intraoperative findings:   Fracture hematoma, hemorrhagic bursal tissue, significantly comminuted and displaced intertrochanteric hip fracture    Components:  Cephalomedullary Nail Type  Synthes TFNA  Nail Diameter:  11 mm  Neck angle:  130 degrees  Nail Length:  235 mm (intermediate nail - given complex fracture pattern and comminution)  TFNA Helical Blade Length: 100 mm  Distal Screw Length: 40 mm      Time out: Time out was called and the patient, side, site, and intended procedure was confirmed.    Counts: Needle, lap sponge, and Ray-Marlee sponge counts were correct prior to closure.    Marked: The patient was marked with an indelible marker in the preoperative holding area confirming the correct site and side.    History & Physical:   A history and physical examination was completed at the time of consultation.     Consent: The patient signed the consent form for surgery with an understanding of the risks and benefits as outlined at the time of consultation and in the preoperative holding area.    Risks and Benefits:   Need for blood transfusion; medical complications with anesthesia/surgery including deep venous thrombosis, pulmonary embolus, stroke, myocardial infarction, and death; potential block complications if block performed; infection; revision surgery; fracture; failure of hardware; nerve or blood vessel injury; incomplete pain relief; incomplete restoration of hip range of motion; limp     Indications for surgery: The patient suffered an acute hip intertrochanteric fracture with complex fracture pattern and comminution, significant displacement.  Case was additionally discussed with multiple colleagues/hip specialists given complex fracture pattern.    Baseline medical comorbidities and advanced dementia.    Operative Findings     Displaced intertrochanteric hip fracture significant  displacement and comminution    Procedure in Detail:   The patient was seen and identified in the preoperative area.  The operative extremity was marked with an indelible marker.  The patient was examined and a consult note was signed in the chart.  The patient/son-power of  understood the risks and benefits of the surgery and elected to proceed with surgery. The patient was taken to the operating room, placed on the operating table in the supine position where general anesthesia was administered.  All bony prominences were well-padded.  The upper extremities were padded and secured.    The well leg was placed and well-padded in the well leg cantu.  The operative leg was placed in the traction boot. C-arm confirmed the fracture.  Traction was applied and rotation dialed in to reduce the fracture prior to sterile prep and drape.  Additional time was spent for satisfactory reduction with multiple reduction maneuvers performed prior to prepping and draping and intraoperatively.    Using the preoperative planning guide and a guide pin, I determined the appropriate neck angle and appropriate femoral canal sizing for the nail.    Based on the complex fracture pattern the plan was to go longer than the standard nail length and go with an intermediate length.    The operative extremity was cleaned with isopropyl alcohol then sterile prepping and draping. Iodine-impregnated drape was placed to ensure no skin edges were exposed.  A fresh pair of surgical gloves was placed following placement of the iodine-impregnated drape.  The surgical incision was planned using a skin marker extending from approximately 2 finger breadths proximal to the superior portion of the greater trochanter.    The guidepin was able to be placed percutaneously to minimize the proximal incision.  Skin was opened and the fascial split was completed sharply and then bluntly down to the greater trochanter.  The greater trochanter was floating and  significantly displaced.  Ultimately the guide pin was placed and confirmed on AP and lateral fluoroscopy views confirming appropriate positioning.  The guide pin was advanced just beyond the lesser trochanter.    The large entry reamer was advanced while making sure to keep it sufficiently medial given the significantly displaced greater trochanteric portion and followed on fluoroscopy down to the level of the lesser trochanter.  The reamer and guide pin were removed.    The nail was seated and confirmed on fluoroscopy.  The outrigger was placed, incision to bone using guided knife blade, and then placement of guide pin.  The AP and lateral guide pin placement were confirmed with fluoroscopy - slightly inferior to the center point on the AP and centered on the lateral.  The guide pin was advanced using both views to confirm the length and extra-articular placement of the pin.  Satisfactory tip to apex distance was achieved.    The helical blade screw length was measured twice and confirmed.  Measured at 110 but the pin was very close to the articular surface so drilled to a 100 and placed a 100 mm helical blade.  The lateral drill was used to open the cortex.  The helical blade was advanced and confirmed on AP and lateral fluoroscopy views.  The length was satisfactory and extra-articular.    The nail was locked into place according to the technique guide and statically fixed given the complex fracture pattern.  Acceptable alignment was noted.    The outrigger was partially disassembled and then the knife was used for the distal screw placement.  Triple trocar was placed, drilled for distal locking bolt and confirmed length with fluoroscopy.  Thick diaphyseal cortex-40 mm screw show proper length.  The screw was placed with good bite.  The screw was confirmed on AP and lateral fluoroscopy views.    The entire outrigger was removed.  Final fluoroscopy views were completed with good fracture reduction and compression  on AP and lateral views.  No periprosthetic fracture was identified.    The incisions were copiously irrigated and the closed deep layer with 0-Vicryl, 2-0 Vicryl, and then skin glue for skin fixation.  Sterile dressing was applied.    The patient tolerated the procedure well and was transferred to the recovery room in satisfactory condition.        Fluoroscopy was critical for this case for the reasons as noted above for fracture reduction, fracture fixation, determination of implant size and fixation method.      Postoperative Plan:  Left hip complex intertrochanteric fracture status post intramedullary nail    Radiographs: AP pelvis and left hip lateral in recovery room    Activity: Weight bearing as tolerated letting pain be the patient's guide and assistance needed along with walker and wheelchair.    PT consultation: weight bearing as tolerated with assistance and walker/wheelchair    Antibiotics: postoperative IV antibiotics for 24hr postoperatively    DVT prophylaxis: Perioperative DVT prophylaxis with sequential compression devices (SCDs) on both lower extremities.  Patient will be weight bearing as tolerated on bilateral lower extremities postoperatively with additional chemical DVT prophylaxis: Lovenox 40 mg or subcutaneous heparin (deferred to internal medicine admitting team) per day as an inpatient followed by aspirin 81 mg p.o. twice daily for 30 days    Diet: advance as tolerated    Follow-up in clinic: 3 weeks for incision check, no sutures to remove, x-rays on arrival at clinic AP pelvis and hip lateral-left hip

## 2025-06-11 NOTE — BRIEF OP NOTE
HIP TROCHANTERIC NAILING WITH INTRAMEDULLARY HIP SCREW  Progress Note    Toño Alcala  6/11/2025    Pre-op Diagnosis:   Closed comminuted intertrochanteric fracture of left femur, initial encounter [S72.142A]       Post-Op Diagnosis Codes:     * Closed comminuted intertrochanteric fracture of left femur, initial encounter [S72.142A]    Procedure(s):      Procedure(s):  Left Hip fracture fixation              Surgeon(s):  Rey Badillo MD    Anesthesia: General with Block    Staff:   Circulator: Krystal Catherine RN  Physician Assistant: Dulce Friend PA-C  Radiology Technologist: Dayo Pulido  Scrdarryl Person: Rc Koch  Vendor Representative: Tonio Anthony II (Charlie)  Nursing Assistant: Neena Dye  Other: Wendie Steiner RN       Estimated Blood Loss: 100ml    Urine Voided: * No values recorded between 6/11/2025  7:34 AM and 6/11/2025  9:06 AM *    Specimens:                None      Drains: * No LDAs found *    Findings: Complex comminuted significantly displaced left intertrochanteric fracture with significantly displaced greater trochanteric fracture, lesser trochanteric fracture as well      Complications: None          Rey Badillo MD     Date: 6/11/2025  Time: 09:24 EDT

## 2025-06-11 NOTE — ANESTHESIA POSTPROCEDURE EVALUATION
Patient: Toño Alcala    Procedure Summary       Date: 06/11/25 Room / Location:  [facility] [address] [place] [address] [facility]    Anesthesia Start: 0734 Anesthesia Stop: 0940    Procedure: Left Hip fracture fixation (Left: Hip) Diagnosis:       Closed comminuted intertrochanteric fracture of left femur, initial encounter      (Closed comminuted intertrochanteric fracture of left femur, initial encounter [S72.142A])    Surgeons: Rey Badillo MD Provider: Moises Rueda MD    Anesthesia Type: general ASA Status: 3            Anesthesia Type: general    Vitals  Vitals Value Taken Time   /55 06/11/25 10:45   Temp 98.1 °F (36.7 °C) 06/11/25 10:45   Pulse 60 06/11/25 10:57   Resp 17 06/11/25 10:45   SpO2 91 % 06/11/25 10:57   Vitals shown include unfiled device data.        Post Anesthesia Care and Evaluation    Patient location during evaluation: PACU  Patient participation: complete - patient participated  Level of consciousness: awake  Pain management: adequate    Airway patency: patent  Anesthetic complications: No anesthetic complications  PONV Status: none  Cardiovascular status: hemodynamically stable and acceptable  Respiratory status: nonlabored ventilation, acceptable and nasal cannula  Hydration status: acceptable

## 2025-06-11 NOTE — ANESTHESIA PROCEDURE NOTES
Peripheral Block      Patient reassessed immediately prior to procedure    Start time: 6/11/2025 7:08 AM  Reason for block: at surgeon's request and post-op pain management  Performed by  CRNA/CAA: Nam Novak CRNA  Assisted by: Libra Gonzalez RN  Preanesthetic Checklist  Completed: patient identified, IV checked, site marked, risks and benefits discussed, surgical consent, monitors and equipment checked, pre-op evaluation and timeout performed  Prep:  Pt Position: supine  Sterile barriers:cap, gloves, mask and washed/disinfected hands  Prep: ChloraPrep  Patient monitoring: blood pressure monitoring, continuous pulse oximetry and EKG  Procedure    Sedation: no  Performed under: local infiltration  Guidance:ultrasound guided    ULTRASOUND INTERPRETATION.  Using ultrasound guidance a 20 G gauge needle was placed in close proximity to the nerve, at which point, under ultrasound guidance anesthetic was injected in the area of the nerve and spread of the anesthesia was seen on ultrasound in close proximity thereto.  There were no abnormalities seen on ultrasound; a digital image was taken; and the patient tolerated the procedure with no complications. Images:still images obtained, printed/placed on chart    Laterality:left  Block Type:fascia iliaca compartment  Injection Technique:single-shot  Needle Type:echogenic and Tuohy  Needle Gauge:18 G  Resistance on Injection: none  Catheter Size:20 G (20g)    Medications Used: dexamethasone sodium phosphate injection - Injection   2 mg - 6/11/2025 7:08:00 AM  ropivacaine (NAROPIN) 0.5 % injection - Injection   30 mL - 6/11/2025 7:08:00 AM      Medications  Preservative Free Saline:30ml    Post Assessment  Injection Assessment: negative aspiration for heme, no paresthesia on injection and incremental injection  Patient Tolerance:comfortable throughout block  Complications:no  Additional Notes  CKAFASCIAILIACA: SINGLE shot   A high-frequency linear transducer, with sterile  "cover, was placed in parasagittal plane on top of the Anterior Superior Iliac Spine (ASIS) and moved medially to identify the Internal Oblique muscle, Sartorius muscle, Iliacus Muscle, Fascia Iliaca (FI) and Fascia Latae. The insertion site was prepped and draped in sterile fashion. Skin and cutaneous tissue was infiltrated with 2-5 ml of 1% Lidocaine. Using ultrasound-guidance, a 20-gauge B-Childers 4\" Ultraplex 360 non-stimulating echogenic needle was advanced in plane from caudad to cephalad. Preservative-free normal saline was utilized for hydro-dissection of tissue, advancement of needle, and to confirm final needle placement below FI. Local anesthetic in incremental 3-5 ml injections. Aspiration every 5 ml to prevent intravascular injection. Injection was completed with negative aspiration of blood and negative intravascular injection. Injection pressures were normal with minimal resistance.   Performed by: Nam Novak, LAVINIA            "

## 2025-06-11 NOTE — PROGRESS NOTES
CC: Left complex displaced IT hip fracture    S: more alert this AM, responsive    Complex injury    Exam:   LLE deformity, baseline chronic changes distally ble    A/P:  LEFT hip complex IT hip fracture   NPO for surgery this morning  Consent signed in the chart - supportive son  Severe dementia

## 2025-06-11 NOTE — PLAN OF CARE
Goal Outcome Evaluation:         No significant events overnight. Patient slept well. Disoriented to time. RA. VSS. No c/o pain. Turned Q2. CHG bath given. NPO at midnight

## 2025-06-12 LAB
ANION GAP SERPL CALCULATED.3IONS-SCNC: 8 MMOL/L (ref 5–15)
BUN SERPL-MCNC: 44.4 MG/DL (ref 8–23)
BUN/CREAT SERPL: 17 (ref 7–25)
CALCIUM SPEC-SCNC: 8.1 MG/DL (ref 8.6–10.5)
CHLORIDE SERPL-SCNC: 107 MMOL/L (ref 98–107)
CO2 SERPL-SCNC: 23 MMOL/L (ref 22–29)
CREAT SERPL-MCNC: 2.61 MG/DL (ref 0.76–1.27)
EGFRCR SERPLBLD CKD-EPI 2021: 23.5 ML/MIN/1.73
GLUCOSE BLDC GLUCOMTR-MCNC: 124 MG/DL (ref 70–130)
GLUCOSE BLDC GLUCOMTR-MCNC: 135 MG/DL (ref 70–130)
GLUCOSE BLDC GLUCOMTR-MCNC: 164 MG/DL (ref 70–130)
GLUCOSE BLDC GLUCOMTR-MCNC: 213 MG/DL (ref 70–130)
GLUCOSE SERPL-MCNC: 112 MG/DL (ref 65–99)
HCT VFR BLD AUTO: 26.2 % (ref 37.5–51)
HGB BLD-MCNC: 8.3 G/DL (ref 13–17.7)
POTASSIUM SERPL-SCNC: 4.9 MMOL/L (ref 3.5–5.2)
SODIUM SERPL-SCNC: 138 MMOL/L (ref 136–145)

## 2025-06-12 PROCEDURE — 97166 OT EVAL MOD COMPLEX 45 MIN: CPT

## 2025-06-12 PROCEDURE — 85014 HEMATOCRIT: CPT | Performed by: ORTHOPAEDIC SURGERY

## 2025-06-12 PROCEDURE — 97162 PT EVAL MOD COMPLEX 30 MIN: CPT

## 2025-06-12 PROCEDURE — 99232 SBSQ HOSP IP/OBS MODERATE 35: CPT | Performed by: HOSPITALIST

## 2025-06-12 PROCEDURE — 82948 REAGENT STRIP/BLOOD GLUCOSE: CPT

## 2025-06-12 PROCEDURE — 80048 BASIC METABOLIC PNL TOTAL CA: CPT | Performed by: ORTHOPAEDIC SURGERY

## 2025-06-12 PROCEDURE — 25010000002 MORPHINE PER 10 MG: Performed by: ORTHOPAEDIC SURGERY

## 2025-06-12 PROCEDURE — 85018 HEMOGLOBIN: CPT | Performed by: ORTHOPAEDIC SURGERY

## 2025-06-12 PROCEDURE — 25010000002 HEPARIN (PORCINE) PER 1000 UNITS: Performed by: ORTHOPAEDIC SURGERY

## 2025-06-12 PROCEDURE — 99024 POSTOP FOLLOW-UP VISIT: CPT

## 2025-06-12 RX ORDER — CALCIUM CARBONATE 500 MG/1
2 TABLET, CHEWABLE ORAL 3 TIMES DAILY PRN
Status: DISCONTINUED | OUTPATIENT
Start: 2025-06-12 | End: 2025-06-14 | Stop reason: HOSPADM

## 2025-06-12 RX ADMIN — ATORVASTATIN CALCIUM 80 MG: 40 TABLET, FILM COATED ORAL at 20:46

## 2025-06-12 RX ADMIN — THIAMINE HCL TAB 100 MG 100 MG: 100 TAB at 09:01

## 2025-06-12 RX ADMIN — PANTOPRAZOLE SODIUM 40 MG: 40 TABLET, DELAYED RELEASE ORAL at 05:04

## 2025-06-12 RX ADMIN — ACETAMINOPHEN 650 MG: 325 TABLET ORAL at 16:13

## 2025-06-12 RX ADMIN — AMLODIPINE BESYLATE 2.5 MG: 2.5 TABLET ORAL at 09:00

## 2025-06-12 RX ADMIN — ASPIRIN 81 MG: 81 TABLET, COATED ORAL at 09:00

## 2025-06-12 RX ADMIN — HEPARIN SODIUM 5000 UNITS: 5000 INJECTION INTRAVENOUS; SUBCUTANEOUS at 09:01

## 2025-06-12 RX ADMIN — DOCUSATE SODIUM 50 MG AND SENNOSIDES 8.6 MG 2 TABLET: 8.6; 5 TABLET, FILM COATED ORAL at 10:47

## 2025-06-12 RX ADMIN — FOLIC ACID 1 MG: 1 TABLET ORAL at 09:00

## 2025-06-12 RX ADMIN — CARVEDILOL 6.25 MG: 6.25 TABLET, FILM COATED ORAL at 20:47

## 2025-06-12 RX ADMIN — HYDROCODONE BITARTRATE AND ACETAMINOPHEN 0.5 TABLET: 5; 325 TABLET ORAL at 17:46

## 2025-06-12 RX ADMIN — ACETAMINOPHEN 650 MG: 325 TABLET ORAL at 20:45

## 2025-06-12 RX ADMIN — MORPHINE SULFATE 2 MG: 2 INJECTION, SOLUTION INTRAMUSCULAR; INTRAVENOUS at 05:04

## 2025-06-12 RX ADMIN — Medication 5 MG: at 20:46

## 2025-06-12 RX ADMIN — LIDOCAINE 1 PATCH: 4 PATCH TOPICAL at 20:45

## 2025-06-12 RX ADMIN — CALCIUM CARBONATE (ANTACID) CHEW TAB 500 MG 2 TABLET: 500 CHEW TAB at 10:48

## 2025-06-12 RX ADMIN — HEPARIN SODIUM 5000 UNITS: 5000 INJECTION INTRAVENOUS; SUBCUTANEOUS at 20:45

## 2025-06-12 RX ADMIN — ACETAMINOPHEN 650 MG: 325 TABLET ORAL at 05:04

## 2025-06-12 RX ADMIN — SERTRALINE 25 MG: 25 TABLET, FILM COATED ORAL at 09:00

## 2025-06-12 RX ADMIN — ACETAMINOPHEN 650 MG: 325 TABLET ORAL at 09:00

## 2025-06-12 NOTE — PROGRESS NOTES
[facility] Medicine Services  PROGRESS NOTE    Patient Name: Toño Alcala  : 1940  MRN: [id number]    Date of Admission: 2025  Primary Care Physician: System, Provider Not In    Subjective   Subjective     CC:  F/U fall, hip fracture    HPI:  Patient seen this morning. No issues overnight. Complains of some indigestion currently, just ate his breakfast.       Objective   Objective     Vital Signs:   Temp:  [97.5 °F (36.4 °C)-97.9 °F (36.6 °C)] 97.7 °F (36.5 °C)  Heart Rate:  [51-91] 91  Resp:  [18] 18  BP: (103-119)/(53-61) 107/61  Flow (L/min) (Oxygen Therapy):  [2] 2     Physical Exam:  Gen-no acute distress  HENT-NCAT, mucous membranes moist  CV-RRR, S1 S2 normal  Resp-CTAB, no wheezes or rales  Abd-soft, NT, ND, +BS  Ext-no edema  Neuro-A&Ox1, answers simple questions appropriately, no focal deficits  Skin-no rashes  Psych-appropriate mood      Results Reviewed:  LAB RESULTS:      Lab 25  0645 06/10/25  0740 25  1020   WBC  --  10.30 9.49   HEMOGLOBIN 8.3* 9.8* 10.5*   HEMATOCRIT 26.2* 30.5* 32.2*   PLATELETS  --  190 198   NEUTROS ABS  --   --  6.88   IMMATURE GRANS (ABS)  --   --  0.04   LYMPHS ABS  --   --  0.82   MONOS ABS  --   --  1.12*   EOS ABS  --   --  0.57*   MCV  --  88.2 86.1   PROTIME  --   --  15.3         Lab 25  0645 25  1219 06/10/25  0740 25  1020   SODIUM 138 135* 136 136   POTASSIUM 4.9 5.3* 4.7 4.2   CHLORIDE 107 106 104 102   CO2 23.0 19.0* 22.0 23.0   ANION GAP 8.0 10.0 10.0 11.0   BUN 44.4* 40.7* 32.2* 35.1*   CREATININE 2.61* 2.47* 2.41* 2.89*   EGFR 23.5* 25.1* 25.8* 20.8*   GLUCOSE 112* 139* 112* 142*   CALCIUM 8.1* 8.2* 8.6 9.1   MAGNESIUM  --   --  1.8  --    HEMOGLOBIN A1C  --   --   --  5.90*         Lab 25  1020   TOTAL PROTEIN 6.5   ALBUMIN 3.4*   GLOBULIN 3.1   ALT (SGPT) 21   AST (SGOT) 30   BILIRUBIN 0.7   ALK PHOS 245*         Lab 25  1020   PROTIME 15.3   INR 1.14*             Lab  06/11/25  1219 06/09/25  1155 06/09/25  1022   FOLATE 19.50  --   --    VITAMIN B 12 456  --   --    ABO TYPING  --  O O   RH TYPING  --  Positive Positive   ANTIBODY SCREEN  --   --  Negative         Brief Urine Lab Results  (Last result in the past 365 days)        Color   Clarity   Blood   Leuk Est   Nitrite   Protein   CREAT   Urine HCG        06/09/25 1300 Yellow   Clear   Negative   Negative   Negative   Trace                   Microbiology Results Abnormal       None            XR Hip With or Without Pelvis 2 - 3 View Left  Result Date: 6/11/2025  XR HIP W OR WO PELVIS 2-3 VIEW LEFT Date of Exam: 6/11/2025 11:06 AM EDT Indication: s/p left IMN -- AP pelvis and crosstable left hip; please perform in pacu postop Comparison: Left femur x-rays 6/9/2025 Findings: Interval surgical change of left hip ORIF with cephalomedullary construct. Alignment appears satisfactory. There is some medial displacement of the lesser trochanteric fracture fragment. The left hip joint is normally aligned. No hardware fracture or perihardware lucency. There are expected surgical changes of the left hip soft tissues.     Impression: Impression: Expected surgical changes of left hip ORIF. Electronically Signed: Broderick Jacome MD  6/11/2025 12:04 PM EDT  Workstation ID: [id number]    FL C Arm During Surgery  Result Date: 6/11/2025  This procedure was auto-finalized with no dictation required.      Results for orders placed during the hospital encounter of 01/19/25    Adult Transthoracic Echo Complete W/ Cont if Necessary Per Protocol    Interpretation Summary    Left ventricular systolic function is normal. Estimated left ventricular EF = 50%    Left ventricular wall thickness is consistent with borderline concentric hypertrophy.    Mild mitral valve regurgitation is present.      Current medications:  Scheduled Meds:acetaminophen, 650 mg, Oral, Q6H  amLODIPine, 2.5 mg, Oral, Daily  aspirin, 81 mg, Oral, Daily  atorvastatin, 80 mg, Oral,  Nightly  carvedilol, 6.25 mg, Oral, BID  folic acid, 1 mg, Oral, Daily  heparin (porcine), 5,000 Units, Subcutaneous, Q12H  Lidocaine, 1 patch, Transdermal, Q24H  melatonin, 5 mg, Oral, Nightly  pantoprazole, 40 mg, Oral, Q AM  sertraline, 25 mg, Oral, Daily  sodium chloride, 10 mL, Intravenous, Q12H  thiamine, 100 mg, Oral, Daily      Continuous Infusions:ropivacaine, , Last Rate: Stopped (06/10/25 0916)      PRN Meds:.  senna-docusate sodium **AND** polyethylene glycol **AND** bisacodyl **AND** bisacodyl    calcium carbonate    HYDROcodone-acetaminophen    ipratropium-albuterol    Magnesium Low Dose Replacement - Follow Nurse / BPA Driven Protocol    Morphine    nitroglycerin    ondansetron    [COMPLETED] Insert Peripheral IV **AND** sodium chloride    sodium chloride    sodium chloride    Assessment & Plan   Assessment & Plan     Active Hospital Problems    Diagnosis  POA    **Closed comminuted intertrochanteric fracture of left femur [S72.142A]  Yes    Moderate protein-calorie malnutrition [E44.0]  Yes    History of pulmonary embolus (PE)/DVT [Z86.711]  Yes    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 male with hx of CAD (previous CABG 1994), chronic HFpEF, CKD 4 (baseline Cr ~2.2-2.6), diet controlled DM, and previous PE & DVT (no longer anticoagulated). Was admitted January 2025 with right temporal & occipital CVA with hemorrhagic transformation, non-operative C5 and C6 fracture after  a fall, RLE DVT (proximal and mid femoral) - during that hospitalization anticoagulation was held indefinitely (short term due to hemorrhagic stroke, long term due to recurrent falls) placed on ASA 81 mg indefinitely and also had IVC filter placed. He is now residing at an assisted living facility.  Presented to [facility] on 6/9/25 due to worsening falls with left hip pain. Imaging showed a left femoral neck fracture.       This patient's problems and plans were partially entered by my partner and updated as appropriate by me 06/12/25. Copied text in this note has been reviewed and is accurate as of today's date.      Left intertrochanteric fracture (s/p mechanical fall)  -s/p left hip cephalomedullary nail by Dr. Badillo 6/11/25  -WBAT LLE  -DVT PPx: SQH while inpatient, ASA 81 mg BID x 30 days at discharge  -F/U in 3 weeks for incision check and x-rays  -PT/OT    Post-operative anemia  -Monitor closely     Hx right hemispheric CVA with hemorrhagic transformation (January 2025)  -ASA, statin  -Worsening memory over past months (per son Salo)     Hx RLE DVT (IVC filter placed January 2025)  Hx PE  -No longer anticoagulated since his stroke with hemorrhagic transformation January 2025 (due to the hemorrhagic stroke and also recurrent falls)  -s/p IVC filter placement during January 2025 admission  -On ASA 81 mg     Hx previous non-operative C5 and C6 fractures  -January 2025  -s/p C-collar (last visit with Dr. Byrd 4/24/25 at which time he was instructed to wean the collar off over 1-2 weeks)     CAD  Chronic HFpEF  HTN  -ASA, Coreg, Amlodipine  -On twice weekly Bumex (per recent Cardiology note); will hold this for now and restart as needed  -Follows with Dr. Hernandez     CKD 4  -Baseline Cr ~2.2-2.6, gradually worsening  -Stable, monitor    Diet controlled DM2  -Currently HbA1c is 5.9%     GERD  -PPI    Expected Discharge Location and Transportation: rehab  Expected Discharge   Expected Discharge Date: 6/13/2025;  Expected Discharge Time:      VTE Prophylaxis:  Pharmacologic VTE prophylaxis orders are present.         AM-PAC 6 Clicks Score (PT): 11 (06/12/25 1124)    CODE STATUS:   Code Status and Medical Interventions: No CPR (Do Not Attempt to Resuscitate); Limited Support; No intubation (DNI)   Ordered at: 06/09/25 1228     Code Status (Patient has no pulse and is not breathing):    No CPR (Do Not Attempt to Resuscitate)     Medical Interventions (Patient has pulse or is breathing):    Limited Support     Medical Intervention Limits:    No intubation (DNI)       Lilly Waldron MD  06/12/25

## 2025-06-12 NOTE — PLAN OF CARE
Goal Outcome Evaluation:  Plan of Care Reviewed With: patient           Outcome Evaluation: PT evaluation completed. Pt takes steps with mod-A x2 and chair follow for safety, pt is limited by pain and fatigue. Pt would benefit from IP PT services to address functional deficits and facilitate increased strength and endurance. Recommend IRF following d/c for best functional outcome given independent PLOF.    Anticipated Discharge Disposition (PT): inpatient rehabilitation facility

## 2025-06-12 NOTE — PROGRESS NOTES
"ORTHOPEDIC SURGERY NOTE    SUBJECTIVE  Patient doing well this morning postop day 1 s/p left hip intramedullary nail for complex fracture with Dr. Badillo.  Sitting in bed eating breakfast.  He reports improved pain compared to before surgery.  No complaints.      OBJECTIVE  Temp (24hrs), Av.6 °F (36.4 °C), Min:97 °F (36.1 °C), Max:98.1 °F (36.7 °C)    Blood pressure 112/54, pulse 79, temperature 97.5 °F (36.4 °C), temperature source Axillary, resp. rate 18, height 182.9 cm (72\"), weight 82.1 kg (181 lb), SpO2 97%.    Lab Results (last 24 hours)       Procedure Component Value Units Date/Time    Basic Metabolic Panel [[id number]]  (Abnormal) Collected: 25    Specimen: Blood Updated: 25     Glucose 112 mg/dL      BUN 44.4 mg/dL      Creatinine 2.61 mg/dL      Sodium 138 mmol/L      Potassium 4.9 mmol/L      Chloride 107 mmol/L      CO2 23.0 mmol/L      Calcium 8.1 mg/dL      BUN/Creatinine Ratio 17.0     Anion Gap 8.0 mmol/L      eGFR 23.5 mL/min/1.73     Narrative:      GFR Categories in Chronic Kidney Disease (CKD)              GFR Category          GFR (mL/min/1.73)    Interpretation  G1                    90 or greater        Normal or high (1)  G2                    60-89                Mild decrease (1)  G3a                   45-59                Mild to moderate decrease  G3b                   30-44                Moderate to severe decrease  G4                    15-29                Severe decrease  G5                    14 or less           Kidney failure    (1)In the absence of evidence of kidney disease, neither GFR category G1 or G2 fulfill the criteria for CKD.    eGFR calculation  CKD-EPI creatinine equation, which does not include race as a factor    Hemoglobin & Hematocrit, Blood [[id number]]  (Abnormal) Collected: 25    Specimen: Blood Updated: 25 0800     Hemoglobin 8.3 g/dL      Hematocrit 26.2 %     POC Glucose Once [[id number]]  (Normal) Collected: " 06/12/25 0716    Specimen: Blood Updated: 06/12/25 0717     Glucose 124 mg/dL     Vitamin B12 [[id number]]  (Normal) Collected: 06/11/25 1219    Specimen: Blood Updated: 06/11/25 2012     Vitamin B-12 456 pg/mL     Narrative:      Results may be falsely increased if patient taking Biotin.      Folate [[id number]]  (Normal) Collected: 06/11/25 1219    Specimen: Blood Updated: 06/11/25 2012     Folate 19.50 ng/mL     Narrative:      Results may be falsely increased if patient taking Biotin.      POC Glucose Once [[id number]]  (Abnormal) Collected: 06/11/25 1945    Specimen: Blood Updated: 06/11/25 1946     Glucose 156 mg/dL     POC Glucose Once [[id number]]  (Normal) Collected: 06/11/25 1603    Specimen: Blood Updated: 06/11/25 1604     Glucose 127 mg/dL     Basic Metabolic Panel [[id number]]  (Abnormal) Collected: 06/11/25 1219    Specimen: Blood Updated: 06/11/25 1253     Glucose 139 mg/dL      BUN 40.7 mg/dL      Creatinine 2.47 mg/dL      Sodium 135 mmol/L      Potassium 5.3 mmol/L      Comment: Slight hemolysis detected by analyzer. Result may be falsely elevated.        Chloride 106 mmol/L      CO2 19.0 mmol/L      Calcium 8.2 mg/dL      BUN/Creatinine Ratio 16.5     Anion Gap 10.0 mmol/L      eGFR 25.1 mL/min/1.73     Narrative:      GFR Categories in Chronic Kidney Disease (CKD)              GFR Category          GFR (mL/min/1.73)    Interpretation  G1                    90 or greater        Normal or high (1)  G2                    60-89                Mild decrease (1)  G3a                   45-59                Mild to moderate decrease  G3b                   30-44                Moderate to severe decrease  G4                    15-29                Severe decrease  G5                    14 or less           Kidney failure    (1)In the absence of evidence of kidney disease, neither GFR category G1 or G2 fulfill the criteria for CKD.    eGFR calculation 2021 CKD-EPI creatinine equation, which does not include  race as a factor    POC Glucose Once [[id number]]  (Normal) Collected: 06/11/25 1108    Specimen: Blood Updated: 06/11/25 1109     Glucose 113 mg/dL               PHYSICAL EXAM  General: comfortable  Vascular: 2+ pedal pulse  Neurologic: sensation to light touch is intact distally, able to fire EHL, dorsiflexion/plantarflexion intact  Dermatologic: surgical incisions are well appearing, with no drainage or surrounding erythema; no signs or symptoms of infection or DVT        Closed comminuted intertrochanteric fracture of left femur    CAD (coronary artery disease)    Hx RLE DVT ( w/ IVC filter)    Diabetes mellitus, diet controlled    Dyslipidemia    Chronic kidney disease, stage IV (severe)    Essential hypertension    Hx of previous C5 & C6 fractures non-op)    History of pulmonary embolus (PE)/DVT    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 Defer to medical team if any intervention is needed.    Dressing: clean, dry and intact; may replace covaderm as needed    DVT prophylaxis: Lovenox 40 mg or subcutaneous heparin (deferred to internal medicine admitting team) per day as an inpatient followed by aspirin 81 mg p.o. twice daily for 30 days      PT consultation: weight bearing as tolerated with assistance and walker/wheelchair    Discharge: Anticipate transfer to inpatient rehab    Follow up: Please schedule for follow-up visit in 3 weeks at [facility] office with x-rays AP pelvis and hip lateral-left hip     Future Appointments   Date Time Provider Department Center   6/27/2025  8:00 AM Radu Francis MD [facility]   7/7/2025 11:45 AM Avelino Hernandez MD [facility]        Dulce Friend PA-C  [facility]  06/12/25  09:10 EDT

## 2025-06-12 NOTE — THERAPY EVALUATION
Patient Name: Toño Alcala  : 1940    MRN: [id number]                              Today's Date: 2025       Admit Date: 2025    Visit Dx:     ICD-10-CM ICD-9-CM   1. Closed comminuted intertrochanteric fracture of left femur, initial encounter  S72.142A 820.21   2. Closed fracture of left hip, initial encounter  S72.002A 820.8   3. Fall, initial encounter  W19.XXXA E888.9   4. Chronic kidney disease, unspecified CKD stage  N18.9 585.9     Patient Active Problem List   Diagnosis    CAD (coronary artery disease)    Hx RLE DVT ( w/ IVC filter)    Diabetes mellitus, diet controlled    Dyslipidemia    GERD (gastroesophageal reflux disease)    Mixed hyperlipidemia    Diabetic nephropathy associated with type 2 diabetes mellitus    Diabetic polyneuropathy associated with type 2 diabetes mellitus    Chronic kidney disease, stage IV (severe)    Vitamin D deficiency    Disc disorder of cervical region    Lumbosacral spondylosis without myelopathy    Arthritis of elbow    Acute right-sided low back pain without sciatica    Unifocal PVCs    Essential hypertension    Stage 3 chronic kidney disease due to benign hypertension    BMI 30.0-30.9,adult    Fall    C5 cervical fracture    C6 cervical fracture    Hx of previous C5 & C6 fractures non-op)    NSTEMI, initial episode of care    History of pulmonary embolus (PE)/DVT    Acute on chronic diastolic CHF (congestive heart failure)    Hx right hemispheric CVA w/ hemorrhagic transformation (2025)    Closed comminuted intertrochanteric fracture of left femur    Moderate protein-calorie malnutrition     Past Medical History:   Diagnosis Date    Acute diverticulitis 2020    Allergic 60 yrs ago    Arthritis     Arthritis of neck Fusion 1992    Bilateral radial fractures     fracture bilateral radial heads    CAD (coronary artery disease)     Calculus of gallbladder without cholecystitis without obstruction 2020    Cataract     Cervical disc disorder  Fusion 1992    Cholelithiasis     Chronic kidney disease 2020    Clotting disorder     CVD (cardiovascular disease)     History of TIA 1989    Diabetes mellitus     DVT of proximal lower limb 06/22/2015    proximal DVT right leg, small PE- anticoagulation    Dyslipidemia     Erectile dysfunction     Fracture 1952    H/o pf left forearm fracture    Fracture of right hand 1980    GERD (gastroesophageal reflux disease)     with hiatal hernia    HL (hearing loss)     Hx of angina pectoris     Hypertension     Normal renal angiography 02/16/11    Knee swelling Several years ago    Left wrist fracture 1953    Lumbosacral disc disease 1996    Osgood-Schlatter's disease 1955    Osteoarthritis     Right wrist fracture     Stroke     Pt states no residual weakness though has urinary incontinence.    Vertigo     Wears glasses      Past Surgical History:   Procedure Laterality Date    APPENDECTOMY  1957    CARDIAC CATHETERIZATION  2011    with vein graft    CATARACT EXTRACTION Left     CERVICAL FUSION  1992    C3-4    COLONOSCOPY  2013    CORONARY ARTERY BYPASS GRAFT  1994    HERNIA REPAIR Right 2011    inguinal    HIP TROCHANTERIC NAILING WITH INTRAMEDULLARY HIP SCREW Left 6/11/2025    Procedure: CEPHALOMEDULLARY NAIL FIXATION;  Surgeon: Rey Badillo MD;  Location:  [facility];  Service: Orthopedics;  Laterality: Left;    INGUINAL HERNIA REPAIR Left 10/29/2019    Procedure: INGUINAL HERNIA REPAIR LEFT;  Surgeon: Charisma Raines MD;  Location:  [facility];  Service: General    INTERVENTIONAL RADIOLOGY PROCEDURE N/A 01/23/2025    Procedure: IVC Filter Placement;  Surgeon: Avelino Hernandez MD;  Location:  [facility];  Service: Cardiovascular;  Laterality: N/A;    LUMBAR LAMINECTOMY  1996    laminectomy, lumbar, L3    OTHER SURGICAL HISTORY      wrist surgery    TONSILLECTOMY AND ADENOIDECTOMY  1945    TRIGGER POINT INJECTION  2001 - 2007    VASECTOMY  1972      General Information       Row Name  06/12/25 1047          OT Time and Intention    Document Type evaluation  -SA     Mode of Treatment occupational therapy  -       Row Name 06/12/25 1047          General Information    Patient Profile Reviewed yes  -SA     Prior Level of Function independent:;all household mobility;gait;transfer;bed mobility;min assist:;ADL's  Pt reports I with all fxnl mobility with use of RWx; A with LBD and bathing; Reports 2-3 falls last 4-5 months, contributing falls to impaired depth perception after CVA  -SA     Existing Precautions/Restrictions fall;other (see comments)  [facility]  -     Barriers to Rehab medically complex;previous functional deficit  -       Row Name 06/12/25 1047          Living Environment    Current Living Arrangements assisted living facility  -     People in Home facility resident  -       Row Name 06/12/25 1047          Home Main Entrance    Number of Stairs, Main Entrance none  -       Row Name 06/12/25 1047          Stairs Within Home, Primary    Number of Stairs, Within Home, Primary none  -       Row Name 06/12/25 1047          Cognition    Orientation Status (Cognition) oriented to;person;place;situation;disoriented to;time;other (see comments)  Oriented to current year, however, stated current month was May  -       Row Name 06/12/25 1047          Safety Issues/Impairments Affecting Functional Mobility    Safety Issues Affecting Function (Mobility) awareness of need for assistance;impulsivity;insight into deficits/self-awareness;positioning of assistive device;safety precaution awareness;safety precautions follow-through/compliance;sequencing abilities  -SA     Impairments Affecting Function (Mobility) balance;endurance/activity tolerance;pain;range of motion (ROM);strength;coordination  -     Comment, Safety Issues/Impairments (Mobility) Required continuous verbal and tactile cues for command following and sequencing  -SA               User Key  (r) = Recorded By, (t) = Taken By,  (c) = Cosigned By      Initials Name Provider Type    Cortney Miller OT Occupational Therapist                     Mobility/ADL's       Row Name 06/12/25 1051          Bed Mobility    Bed Mobility scooting/bridging;supine-sit  -SA     Scooting/Bridging [place] (Bed Mobility) verbal cues;nonverbal cues (demo/gesture);maximum assist (25% patient effort);1 person assist  -SA     Supine-Sit [place] (Bed Mobility) verbal cues;nonverbal cues (demo/gesture);maximum assist (25% patient effort);2 person assist;other (see comments)  Increased time/cues, vc for sequencing; assist to initiate/advance LLE, assist with trunk  -     Bed Mobility, Safety Issues decreased use of legs for bridging/pushing  -     Assistive Device (Bed Mobility) bed rails;head of bed elevated;repositioning sheet  -       Row Name 06/12/25 1051          Transfers    Transfers sit-stand transfer;stand-sit transfer  -     Comment, (Transfers) Increased time/effort, vc for hand placement and sequencing; bed elevated  -       Row Name 06/12/25 1051          Sit-Stand Transfer    Sit-Stand [place] (Transfers) verbal cues;nonverbal cues (demo/gesture);moderate assist (50% patient effort);2 person assist  -     Assistive Device (Sit-Stand Transfers) walker, front-wheeled  -     Comment, (Sit-Stand Transfer) 1x from EOB, posterior lean upon standing initially  -       Row Name 06/12/25 1051          Stand-Sit Transfer    Stand-Sit [place] (Transfers) verbal cues;moderate assist (50% patient effort);2 person assist  -     Assistive Device (Stand-Sit Transfers) walker, front-wheeled  -     Comment, (Stand-Sit Transfer) Increased time, assist for controlled descent, vc for hand placement  -       Row Name 06/12/25 1051          Functional Mobility    Functional Mobility- Ind. Level verbal cues required;moderate assist (50% patient effort);2 person assist required;other (see comments)  Close chair follow  -      Functional Mobility- Device walker, front-wheeled  -     Functional Mobility-Distance (Feet) 5  -SA     Functional Mobility- Safety Issues balance decreased during turns;sequencing ability decreased  -     Patient was able to Ambulate yes  -[facility] Name 06/12/25 1051          Activities of Daily Living    BADL Assessment/Intervention lower body dressing  -SA       Row Name 06/12/25 1051          Mobility    Extremity Weight-bearing Status left lower extremity  -     Left Lower Extremity (Weight-bearing Status) weight-bearing as tolerated (WBAT)  -SA       Row Name 06/12/25 1051          Lower Body Dressing Assessment/Training    [place] Level (Lower Body Dressing) dependent (less than 25% patient effort)  -     Position (Lower Body Dressing) supine  -               User Key  (r) = Recorded By, (t) = Taken By, (c) = Cosigned By      Initials Name Provider Type    Cortney Miller OT Occupational Therapist                   Obj/Interventions       [facility] Name 06/12/25 1055          Sensory Assessment (Somatosensory)    Sensory Assessment (Somatosensory) UE sensation intact  -SA       Row Name 06/12/25 1055          Vision Assessment/Intervention    Visual Impairment/Limitations WFL  -SA       Row Name 06/12/25 1055          Range of Motion Comprehensive    General Range of Motion bilateral upper extremity ROM WNL  -SA       Row Name 06/12/25 1055          Strength Comprehensive (MMT)    Comment, General Manual Muscle Testing (MMT) Assessment BUE MMT grossly 4/5 throughout  -[facility] Name 06/12/25 1055          Balance    Balance Assessment sitting static balance;sitting dynamic balance;sit to stand dynamic balance;standing static balance;standing dynamic balance  -     Static Sitting Balance standby assist  -     Dynamic Sitting Balance verbal cues;contact guard  -     Position, Sitting Balance unsupported  -     Static Standing Balance moderate assist;2-person assist  -      Dynamic Standing Balance moderate assist;2-person assist  -SA     Position/Device Used, Standing Balance supported;walker, front-wheeled  -SA     Balance Interventions sitting;standing;sit to stand;supported;static;dynamic;minimal challenge;occupation based/functional task  -SA     Comment, Balance Posterior lean initially while sitting EOB, able to correct with increased time and cues; posterior lean initially upon standing, corrected with verbal and tactile cues; no LOB at this time  -SA               User Key  (r) = Recorded By, (t) = Taken By, (c) = Cosigned By      Initials Name Provider Type    SA Cortney Smith, OT Occupational Therapist                   Goals/Plan       Row Name 06/12/25 1100          Bed Mobility Goal 1 (OT)    Activity/Assistive Device (Bed Mobility Goal 1, OT) supine to sit  -SA     [place] Level/Cues Needed (Bed Mobility Goal 1, OT) minimum assist (75% or more patient effort)  -SA     Time Frame (Bed Mobility Goal 1, OT) long term goal (LTG);10 days  -SA     Progress/Outcomes (Bed Mobility Goal 1, OT) new goal  -SA       Row Name 06/12/25 1100          Transfer Goal 1 (OT)    Activity/Assistive Device (Transfer Goal 1, OT) bed-to-chair/chair-to-bed  -SA     [place] Level/Cues Needed (Transfer Goal 1, OT) minimum assist (75% or more patient effort)  -SA     Time Frame (Transfer Goal 1, OT) long term goal (LTG);10 days  -SA     Progress/Outcome (Transfer Goal 1, OT) new goal  -SA       Row Name 06/12/25 1100          Bathing Goal 1 (OT)    Activity/Device (Bathing Goal 1, OT) lower body bathing;long-handled sponge  -SA     [place] Level/Cues Needed (Bathing Goal 1, OT) minimum assist (75% or more patient effort)  -SA     Time Frame (Bathing Goal 1, OT) long term goal (LTG);10 days  -SA     Progress/Outcomes (Bathing Goal 1, OT) new goal  -SA       Row Name 06/12/25 1100          Dressing Goal 1 (OT)    Activity/Device (Dressing Goal 1, OT) lower body dressing  -SA      [place]/Cues Needed (Dressing Goal 1, OT) minimum assist (75% or more patient effort);other (see comments)  AE as needed  -SA     Time Frame (Dressing Goal 1, OT) short term goal (STG);5 days  -SA     Progress/Outcome (Dressing Goal 1, OT) new goal  -SA       Row Name 06/12/25 1100          Therapy Assessment/Plan (OT)    Planned Therapy Interventions (OT) activity tolerance training;BADL retraining;functional balance retraining;occupation/activity based interventions;patient/caregiver education/training;ROM/therapeutic exercise;strengthening exercise;transfer/mobility retraining  -SA               User Key  (r) = Recorded By, (t) = Taken By, (c) = Cosigned By      Initials Name Provider Type    SA Cortney Smith OT Occupational Therapist                   Clinical Impression       Row Name 06/12/25 1057          Pain Assessment    Pretreatment Pain Rating 2/10  -SA     Posttreatment Pain Rating 4/10  -SA     Pain Location extremity;hip  -SA     Pain Side/Orientation left  -SA     Pain Management Interventions activity modification encouraged;cold applied;exercise or physical activity utilized;positioning techniques utilized  -     Response to Pain Interventions activity participation with tolerable pain  -SA       Row Name 06/12/25 1057          Plan of Care Review    Plan of Care Reviewed With patient  -SA     Progress no change  -SA     Outcome Evaluation OT evaluation completed. Pt presents below functional baseline secondary to LLE weakness, impaired ROM, decreased endurance, impaired balance, acute pain, and decreased independence with ADLs and fxnl mobility. Pt required Max Ax2 for bed  mobility and Mod Ax2 for fxnl mobility with RWx and close chair follow. Pt with posterior lean sitting and standing. Pt would benefit from skilled IPOT services to improve fxnl status. OT recommends IRF at discharge.  -SA       Row Name 06/12/25 105          Therapy Assessment/Plan (OT)    Rehab Potential (OT)  good  -SA     Criteria for Skilled Therapeutic Interventions Met (OT) yes;meets criteria;skilled treatment is necessary  -SA     Therapy Frequency (OT) daily  -SA     Predicted Duration of Therapy Intervention (OT) 10 days  -SA       Row Name 06/12/25 1057          Therapy Plan Review/Discharge Plan (OT)    Anticipated Discharge Disposition (OT) inpatient rehabilitation facility  -       Row Name 06/12/25 1057          Vital Signs    Pre Systolic BP Rehab 110  -SA     Pre Treatment Diastolic BP 55  -SA     Post Systolic BP Rehab 107  -SA     Post Treatment Diastolic BP 61  -SA     Pretreatment Heart Rate (beats/min) 78  -SA     Posttreatment Heart Rate (beats/min) 81  -SA     O2 Delivery Pre Treatment nasal cannula  -SA     O2 Delivery Post Treatment nasal cannula  -SA     Pre Patient Position Supine  -SA     Post Patient Position Sitting  -SA       Row Name 06/12/25 1057          Positioning and Restraints    Pre-Treatment Position in bed  -SA     Post Treatment Position chair  -SA     In Chair notified nsg;reclined;sitting;call light within reach;encouraged to call for assist;exit alarm on;waffle cushion;on mechanical lift sling;legs elevated  -SA               User Key  (r) = Recorded By, (t) = Taken By, (c) = Cosigned By      Initials Name Provider Type    SA Cortney Smith, OT Occupational Therapist                   Outcome Measures       Row Name 06/12/25 1101          How much help from another is currently needed...    Putting on and taking off regular lower body clothing? 1  -SA     Bathing (including washing, rinsing, and drying) 2  -SA     Toileting (which includes using toilet bed pan or urinal) 2  -SA     Putting on and taking off regular upper body clothing 3  -SA     Taking care of personal grooming (such as brushing teeth) 3  -SA     Eating meals 3  -SA     AM-PAC 6 Clicks Score (OT) 14  -SA       Row Name 06/12/25 1101          Functional Assessment    Outcome Measure Options AM-PAC 6 Clicks  Daily Activity (OT)  -               User Key  (r) = Recorded By, (t) = Taken By, (c) = Cosigned By      Initials Name Provider Type     Cortney Smith OT Occupational Therapist                    Occupational Therapy Education       Title: PT OT SLP Therapies (In Progress)       Topic: Occupational Therapy (In Progress)       Point: ADL training (In Progress)       Learning Progress Summary            Patient Acceptance, E, NR by  at 6/12/2025 1102                      Point: Body mechanics (In Progress)       Learning Progress Summary            Patient Acceptance, E, NR by  at 6/12/2025 1102                                      User Key       Initials Effective Dates Name Provider Type [facility] 04/16/25 -  Cortney Smith OT Occupational Therapist OT                  OT Recommendation and Plan  Planned Therapy Interventions (OT): activity tolerance training, BADL retraining, functional balance retraining, occupation/activity based interventions, patient/caregiver education/training, ROM/therapeutic exercise, strengthening exercise, transfer/mobility retraining  Therapy Frequency (OT): daily  Plan of Care Review  Plan of Care Reviewed With: patient  Progress: no change  Outcome Evaluation: OT evaluation completed. Pt presents below functional baseline secondary to LLE weakness, impaired ROM, decreased endurance, impaired balance, acute pain, and decreased independence with ADLs and fxnl mobility. Pt required Max Ax2 for bed  mobility and Mod Ax2 for fxnl mobility with RWx and close chair follow. Pt with posterior lean sitting and standing. Pt would benefit from skilled IPOT services to improve fxnl status. OT recommends IRF at discharge.     Time Calculation:   Evaluation Complexity (OT)  Review Occupational Profile/Medical/Therapy History Complexity: brief/low complexity  Assessment, Occupational Performance/Identification of Deficit Complexity: 3-5 performance deficits  Clinical Decision  Making Complexity (OT): detailed assessment/moderate complexity  Overall Complexity of Evaluation (OT): moderate complexity     Time Calculation- OT       Row Name 06/12/25 1102             Time Calculation- OT    OT Start Time 1017  -SA      OT Received On 06/12/25  -SA      OT Goal Re-Cert Due Date 06/22/25  -         Untimed Charges    OT Eval/Re-eval Minutes 46  -SA         Total Minutes    Untimed Charges Total Minutes 46  -SA       Total Minutes 46  -SA                User Key  (r) = Recorded By, (t) = Taken By, (c) = Cosigned By      Initials Name Provider Type    SA Cortney Smith OT Occupational Therapist                  Therapy Charges for Today       Code Description Service Date Service Provider Modifiers Qty    [id number] HC OT EVAL MOD COMPLEXITY 4 6/12/2025 Cortney Smith OT GO 1                 Cortney Smith OT  6/12/2025

## 2025-06-12 NOTE — PLAN OF CARE
Goal Outcome Evaluation:  Plan of Care Reviewed With: patient        Progress: no change  Outcome Evaluation: OT evaluation completed. Pt presents below functional baseline secondary to LLE weakness, impaired ROM, decreased endurance, impaired balance, acute pain, and decreased independence with ADLs and fxnl mobility. Pt required Max Ax2 for bed  mobility and Mod Ax2 for fxnl mobility with RWx and close chair follow. Pt with posterior lean sitting and standing. Pt would benefit from skilled IPOT services to improve fxnl status. OT recommends IRF at discharge.    Anticipated Discharge Disposition (OT): inpatient rehabilitation facility

## 2025-06-12 NOTE — THERAPY EVALUATION
Patient Name: Toño Alcala  : 1940    MRN: [id number]                              Today's Date: 2025       Admit Date: 2025    Visit Dx:     ICD-10-CM ICD-9-CM   1. Closed comminuted intertrochanteric fracture of left femur, initial encounter  S72.142A 820.21   2. Closed fracture of left hip, initial encounter  S72.002A 820.8   3. Fall, initial encounter  W19.XXXA E888.9   4. Chronic kidney disease, unspecified CKD stage  N18.9 585.9     Patient Active Problem List   Diagnosis    CAD (coronary artery disease)    Hx RLE DVT ( w/ IVC filter)    Diabetes mellitus, diet controlled    Dyslipidemia    GERD (gastroesophageal reflux disease)    Mixed hyperlipidemia    Diabetic nephropathy associated with type 2 diabetes mellitus    Diabetic polyneuropathy associated with type 2 diabetes mellitus    Chronic kidney disease, stage IV (severe)    Vitamin D deficiency    Disc disorder of cervical region    Lumbosacral spondylosis without myelopathy    Arthritis of elbow    Acute right-sided low back pain without sciatica    Unifocal PVCs    Essential hypertension    Stage 3 chronic kidney disease due to benign hypertension    BMI 30.0-30.9,adult    Fall    C5 cervical fracture    C6 cervical fracture    Hx of previous C5 & C6 fractures non-op)    NSTEMI, initial episode of care    History of pulmonary embolus (PE)/DVT    Acute on chronic diastolic CHF (congestive heart failure)    Hx right hemispheric CVA w/ hemorrhagic transformation (2025)    Closed comminuted intertrochanteric fracture of left femur    Moderate protein-calorie malnutrition     Past Medical History:   Diagnosis Date    Acute diverticulitis 2020    Allergic 60 yrs ago    Arthritis     Arthritis of neck Fusion 1992    Bilateral radial fractures     fracture bilateral radial heads    CAD (coronary artery disease)     Calculus of gallbladder without cholecystitis without obstruction 2020    Cataract     Cervical disc disorder  Fusion 1992    Cholelithiasis     Chronic kidney disease 2020    Clotting disorder     CVD (cardiovascular disease)     History of TIA 1989    Diabetes mellitus     DVT of proximal lower limb 06/22/2015    proximal DVT right leg, small PE- anticoagulation    Dyslipidemia     Erectile dysfunction     Fracture 1952    H/o pf left forearm fracture    Fracture of right hand 1980    GERD (gastroesophageal reflux disease)     with hiatal hernia    HL (hearing loss)     Hx of angina pectoris     Hypertension     Normal renal angiography 02/16/11    Knee swelling Several years ago    Left wrist fracture 1953    Lumbosacral disc disease 1996    Osgood-Schlatter's disease 1955    Osteoarthritis     Right wrist fracture     Stroke     Pt states no residual weakness though has urinary incontinence.    Vertigo     Wears glasses      Past Surgical History:   Procedure Laterality Date    APPENDECTOMY  1957    CARDIAC CATHETERIZATION  2011    with vein graft    CATARACT EXTRACTION Left     CERVICAL FUSION  1992    C3-4    COLONOSCOPY  2013    CORONARY ARTERY BYPASS GRAFT  1994    HERNIA REPAIR Right 2011    inguinal    HIP TROCHANTERIC NAILING WITH INTRAMEDULLARY HIP SCREW Left 6/11/2025    Procedure: CEPHALOMEDULLARY NAIL FIXATION;  Surgeon: Rey Badillo MD;  Location:  [facility];  Service: Orthopedics;  Laterality: Left;    INGUINAL HERNIA REPAIR Left 10/29/2019    Procedure: INGUINAL HERNIA REPAIR LEFT;  Surgeon: Charisma Raines MD;  Location:  [facility];  Service: General    INTERVENTIONAL RADIOLOGY PROCEDURE N/A 01/23/2025    Procedure: IVC Filter Placement;  Surgeon: Avelino Hernandez MD;  Location:  [facility];  Service: Cardiovascular;  Laterality: N/A;    LUMBAR LAMINECTOMY  1996    laminectomy, lumbar, L3    OTHER SURGICAL HISTORY      wrist surgery    TONSILLECTOMY AND ADENOIDECTOMY  1945    TRIGGER POINT INJECTION  2001 - 2007    VASECTOMY  1972      General Information       Row Name  06/12/25 1112          Physical Therapy Time and Intention    Document Type evaluation  -ND     Mode of Treatment physical therapy  -ND       Row Name 06/12/25 1112          General Information    Patient Profile Reviewed yes  -ND     Prior Level of Function independent:;all household mobility;gait;transfer;bed mobility  Pt reports independent mobility with RWx. Pt stated he has had multiple falls due to poor balance following remote CVA.  -ND     Existing Precautions/Restrictions fall;left;hip;other (see comments)  s/p IMN. [place].  -ND     Barriers to Rehab medically complex;previous functional deficit  -ND       Row Name 06/12/25 1112          Living Environment    Current Living Arrangements assisted living facility;other (see comments)  [place]  -ND     People in Home facility resident  -ND       Row Name 06/12/25 1112          Home Main Entrance    Number of Stairs, Main Entrance none  -ND       Row Name 06/12/25 1112          Stairs Within Home, Primary    Number of Stairs, Within Home, Primary none  -ND       Row Name 06/12/25 1112          Cognition    Orientation Status (Cognition) oriented to;person;place;disoriented to;time  -ND       Row Name 06/12/25 1112          Safety Issues/Impairments Affecting Functional Mobility    Safety Issues Affecting Function (Mobility) awareness of need for assistance;friction/shear risk;insight into deficits/self-awareness;positioning of assistive device;safety precaution awareness;safety precautions follow-through/compliance;sequencing abilities  -ND     Impairments Affecting Function (Mobility) balance;endurance/activity tolerance;pain;range of motion (ROM);strength;coordination;motor planning  -ND               User Key  (r) = Recorded By, (t) = Taken By, (c) = Cosigned By      Initials Name Provider Type    ND Hannah Blanton, PT Physical Therapist                   Mobility       Row Name 06/12/25 1108          Bed Mobility    Bed Mobility supine-sit  -ND      Supine-Sit [place] (Bed Mobility) maximum assist (25% patient effort);2 person assist;verbal cues;nonverbal cues (demo/gesture)  -ND     Assistive Device (Bed Mobility) bed rails;head of bed elevated  -ND     Comment, (Bed Mobility) Cues for improved sequencing with minimal carryover.  -ND       Row Name 06/12/25 1108          Sit-Stand Transfer    Sit-Stand [place] (Transfers) moderate assist (50% patient effort);2 person assist;verbal cues;nonverbal cues (demo/gesture)  -ND     Assistive Device (Sit-Stand Transfers) walker, front-wheeled  -ND     Comment, (Sit-Stand Transfer) x1 from elevated bed height with increased time for upright posture. Posterior lean initially that pt slowly corrects with cueing.  -ND       Row Name 06/12/25 1108          Gait/Stairs (Locomotion)    [place] Level (Gait) moderate assist (50% patient effort);2 person assist;verbal cues;nonverbal cues (demo/gesture)  -ND     Assistive Device (Gait) walker, front-wheeled  -ND     Patient was able to Ambulate yes  -ND     Distance in Feet (Gait) 5  -ND     Deviations/Abnormal Patterns (Gait) scissoring;antalgic;base of support, narrow;maddison decreased;gait speed decreased;stride length decreased  -ND     Bilateral Gait Deviations forward flexed posture;heel strike decreased  -ND     Left Sided Gait Deviations weight shift ability decreased  -ND     Comment, (Gait/Stairs) Pt takes forward steps with increased time, step-through gait pattern, and decreased stride length with intermittent scissoring of feet. Pt requires cues for forward gaze and increased stride length with variable carryover. Distance limited by pain and fatigue.  -ND       Row Name 06/12/25 1108          Mobility    Extremity Weight-bearing Status left lower extremity  -ND     Left Lower Extremity (Weight-bearing Status) weight-bearing as tolerated (WBAT)  -ND               User Key  (r) = Recorded By, (t) = Taken By, (c) = Cosigned By      Initials Name Provider  Type    ND Hannah Blanton, MYRIAM Physical Therapist                   Obj/Interventions       Row Name 06/12/25 1114          Range of Motion Comprehensive    General Range of Motion lower extremity range of motion deficits identified  -ND     Comment, General Range of Motion RLE AROM WFL, LLE AROM limited by pain and weakness.  -ND       Row Name 06/12/25 1114          Strength Comprehensive (MMT)    General Manual Muscle Testing (MMT) Assessment lower extremity strength deficits identified  -ND     Comment, General Manual Muscle Testing (MMT) Assessment RLE AROM grossly 4-/5, LLE 3-/5.  -ND       Row Name 06/12/25 1114          Motor Skills    Therapeutic Exercise hip;knee;ankle  -[facility] Name 06/12/25 1114          Hip (Therapeutic Exercise)    Hip (Therapeutic Exercise) AAROM (active assistive range of motion)  -ND     Hip AAROM (Therapeutic Exercise) left;flexion;extension;10 repetitions  -[facility] Name 06/12/25 [facility]          Knee (Therapeutic Exercise)    Knee (Therapeutic Exercise) AAROM (active assistive range of motion)  -ND     Knee AAROM (Therapeutic Exercise) bilateral;flexion;extension;10 repetitions  -[facility] Name 06/12/25 [facility]          Ankle (Therapeutic Exercise)    Ankle (Therapeutic Exercise) AROM (active range of motion)  -ND     Ankle AROM (Therapeutic Exercise) bilateral;dorsiflexion;plantarflexion;10 repetitions  -[facility] Name 06/12/25 1114          Balance    Balance Assessment sitting static balance;sitting dynamic balance;standing static balance;standing dynamic balance  -ND     Static Sitting Balance standby assist  -ND     Dynamic Sitting Balance contact guard  -ND     Position, Sitting Balance unsupported;sitting edge of bed  -ND     Static Standing Balance moderate assist;2-person assist;verbal cues;non-verbal cues (demo/gesture)  -ND     Dynamic Standing Balance moderate assist;2-person assist;verbal cues;non-verbal cues (demo/gesture)  -ND     Position/Device Used,  Standing Balance supported;walker, front-wheeled  -ND     Balance Interventions sitting;standing;sit to stand;supported;dynamic;static  -ND     Comment, Balance Posterior lean initially with sitting EOB and again in standing but pt is able to correct for it with cueing and increased time. No overt knee buckling or LOB noted.  -ND       Row Name 06/12/25 1114          Sensory Assessment (Somatosensory)    Sensory Assessment (Somatosensory) LE sensation intact  -ND               User Key  (r) = Recorded By, (t) = Taken By, (c) = Cosigned By      Initials Name Provider Type    ND Hannah Blanton, PT Physical Therapist                   Goals/Plan       Row Name 06/12/25 1123          Bed Mobility Goal 1 (PT)    Activity/Assistive Device (Bed Mobility Goal 1, PT) sit to supine/supine to sit  -ND     [place] Level/Cues Needed (Bed Mobility Goal 1, PT) modified independence  -ND     Time Frame (Bed Mobility Goal 1, PT) short term goal (STG);3 days  -ND       Row Name 06/12/25 1123          Transfer Goal 1 (PT)    Activity/Assistive Device (Transfer Goal 1, PT) sit-to-stand/stand-to-sit;bed-to-chair/chair-to-bed  -ND     [place] Level/Cues Needed (Transfer Goal 1, PT) modified independence  -ND     Time Frame (Transfer Goal 1, PT) long term goal (LTG);5 days  -ND       Row Name 06/12/25 1123          Gait Training Goal 1 (PT)    Activity/Assistive Device (Gait Training Goal 1, PT) gait (walking locomotion);increase endurance/gait distance;decrease fall risk  -ND     [place] Level (Gait Training Goal 1, PT) modified independence  -ND     Distance (Gait Training Goal 1, PT) 100  -ND     Time Frame (Gait Training Goal 1, PT) long term goal (LTG);5 days  -ND       Row Name 06/12/25 1123          Therapy Assessment/Plan (PT)    Planned Therapy Interventions (PT) balance training;bed mobility training;gait training;home exercise program;patient/family education;transfer training;postural re-education;ROM (range  of motion);strengthening  -ND               User Key  (r) = Recorded By, (t) = Taken By, (c) = Cosigned By      Initials Name Provider Type    ND Hannah Blanton, PT Physical Therapist                   Clinical Impression       Row Name 06/12/25 1116          Pain    Pretreatment Pain Rating 2/10  -ND     Posttreatment Pain Rating 4/10  -ND     Pain Location hip  -ND     Pain Side/Orientation left  -ND     Pain Management Interventions positioning techniques utilized;exercise or physical activity utilized;activity modification encouraged;cold applied  -ND     Response to Pain Interventions activity participation with increased pain  -ND       Row Name 06/12/25 1116          Plan of Care Review    Plan of Care Reviewed With patient  -ND     Outcome Evaluation PT evaluation completed. Pt takes steps with mod-A x2 and chair follow for safety, pt is limited by pain and fatigue. Pt would benefit from IP PT services to address functional deficits and facilitate increased strength and endurance. Recommend IRF following d/c for best functional outcome given independent PLOF.  -ND       Row Name 06/12/25 1116          Therapy Assessment/Plan (PT)    Patient/Family Therapy Goals Statement (PT) Return to baseline.  -ND     Rehab Potential (PT) fair  -ND     Criteria for Skilled Interventions Met (PT) yes;meets criteria;skilled treatment is necessary  -ND     Therapy Frequency (PT) daily  -ND     Predicted Duration of Therapy Intervention (PT) 5 days  -ND       Row Name 06/12/25 1116          Vital Signs    Pre Systolic BP Rehab 11  -ND     Pre Treatment Diastolic BP 55  -ND     Post Systolic BP Rehab 107  -ND     Post Treatment Diastolic BP 61  -ND     Pretreatment Heart Rate (beats/min) 78  -ND     Posttreatment Heart Rate (beats/min) 81  -ND     O2 Delivery Pre Treatment nasal cannula  -ND     O2 Delivery Intra Treatment nasal cannula  -ND     O2 Delivery Post Treatment nasal cannula  -ND     Pre Patient Position Supine   -ND     Intra Patient Position Standing  -ND     Post Patient Position Sitting  -ND       Row Name 06/12/25 1116          Positioning and Restraints    Pre-Treatment Position in bed  -ND     Post Treatment Position chair  -ND     In Chair notified nsg;reclined;legs elevated;call light within reach;encouraged to call for assist;exit alarm on;waffle cushion;on mechanical lift sling;heels elevated  -ND               User Key  (r) = Recorded By, (t) = Taken By, (c) = Cosigned By      Initials Name Provider Type    Hannah Cervantes, PT Physical Therapist                   Outcome Measures       Row Name 06/12/25 1124          How much help from another person do you currently need...    Turning from your back to your side while in flat bed without using bedrails? 2  -ND     Moving from lying on back to sitting on the side of a flat bed without bedrails? 2  -ND     Moving to and from a bed to a chair (including a wheelchair)? 2  -ND     Standing up from a chair using your arms (e.g., wheelchair, bedside chair)? 2  -ND     Climbing 3-5 steps with a railing? 1  -ND     To walk in hospital room? 2  -ND     AM-PAC 6 Clicks Score (PT) 11  -ND     Highest Level of Mobility Goal Move to Chair/Commode-4  -ND       Row Name 06/12/25 1124 06/12/25 1101       Functional Assessment    Outcome Measure Options AM-PAC 6 Clicks Basic Mobility (PT)  -ND AM-PAC 6 Clicks Daily Activity (OT)  -SA              User Key  (r) = Recorded By, (t) = Taken By, (c) = Cosigned By      Initials Name Provider Type    Hannah Cervantes, MYRIAM Physical Therapist    Cortney Miller OT Occupational Therapist                                 Physical Therapy Education       Title: PT OT SLP Therapies (In Progress)       Topic: Physical Therapy (Done)       Point: Mobility training (Done)       Learning Progress Summary            Patient Acceptance, E, VU,NR by ND at 6/12/2025 1125                      Point: Home exercise program (Done)        Learning Progress Summary            Patient Acceptance, E, VU,NR by ND at 6/12/2025 1125                      Point: Body mechanics (Done)       Learning Progress Summary            Patient Acceptance, E, VU,NR by ND at 6/12/2025 1125                      Point: Precautions (Done)       Learning Progress Summary            Patient Acceptance, E, VU,NR by ND at 6/12/2025 1125                                      User Key       Initials Effective Dates Name Provider Type Discipline    ND 11/16/23 -  Hannah Blanton, PT Physical Therapist PT                  PT Recommendation and Plan  Planned Therapy Interventions (PT): balance training, bed mobility training, gait training, home exercise program, patient/family education, transfer training, postural re-education, ROM (range of motion), strengthening  Outcome Evaluation: PT evaluation completed. Pt takes steps with mod-A x2 and chair follow for safety, pt is limited by pain and fatigue. Pt would benefit from IP PT services to address functional deficits and facilitate increased strength and endurance. Recommend IRF following d/c for best functional outcome given independent PLOF.     Time Calculation:   PT Evaluation Complexity  History, PT Evaluation Complexity: 3 or more personal factors and/or comorbidities  Examination of Body Systems (PT Eval Complexity): total of 4 or more elements  Clinical Presentation (PT Evaluation Complexity): evolving  Clinical Decision Making (PT Evaluation Complexity): moderate complexity  Overall Complexity (PT Evaluation Complexity): moderate complexity     PT Charges       Row Name 06/12/25 1125             Time Calculation    Start Time 1015  -ND      PT Received On 06/12/25  -ND      PT Goal Re-Cert Due Date 06/22/25  -ND         Untimed Charges    PT Eval/Re-eval Minutes 46  -ND         Total Minutes    Untimed Charges Total Minutes 46  -ND       Total Minutes 46  -ND                User Key  (r) = Recorded By, (t) = Taken By,  (c) = Cosigned By      Initials Name Provider Type    Hannah Cervantes, PT Physical Therapist                  Therapy Charges for Today       Code Description Service Date Service Provider Modifiers Qty    61166899155 HC PT EVAL MOD COMPLEXITY 4 6/12/2025 Hannah Blanton, PT GP 1            PT G-Codes  Outcome Measure Options: AM-PAC 6 Clicks Basic Mobility (PT)  AM-PAC 6 Clicks Score (PT): 11  AM-PAC 6 Clicks Score (OT): 14  PT Discharge Summary  Anticipated Discharge Disposition (PT): inpatient rehabilitation facility    Hannah Blanton PT  6/12/2025

## 2025-06-12 NOTE — PLAN OF CARE
Goal Outcome Evaluation:           Progress: no change     No significant events overnight. Patient confused to time, place and situation. VSS on 2lt NC. Voiding spontaneously. Turned Q2. Skin interventions in place. Dressing to incision CDI

## 2025-06-13 LAB
ANION GAP SERPL CALCULATED.3IONS-SCNC: 8 MMOL/L (ref 5–15)
BUN SERPL-MCNC: 44 MG/DL (ref 8–23)
BUN/CREAT SERPL: 19.6 (ref 7–25)
CALCIUM SPEC-SCNC: 8.4 MG/DL (ref 8.6–10.5)
CHLORIDE SERPL-SCNC: 109 MMOL/L (ref 98–107)
CO2 SERPL-SCNC: 24 MMOL/L (ref 22–29)
CREAT SERPL-MCNC: 2.24 MG/DL (ref 0.76–1.27)
DEPRECATED RDW RBC AUTO: 54.4 FL (ref 37–54)
EGFRCR SERPLBLD CKD-EPI 2021: 28.2 ML/MIN/1.73
ERYTHROCYTE [DISTWIDTH] IN BLOOD BY AUTOMATED COUNT: 17.1 % (ref 12.3–15.4)
GLUCOSE BLDC GLUCOMTR-MCNC: 109 MG/DL (ref 70–130)
GLUCOSE BLDC GLUCOMTR-MCNC: 116 MG/DL (ref 70–130)
GLUCOSE BLDC GLUCOMTR-MCNC: 134 MG/DL (ref 70–130)
GLUCOSE BLDC GLUCOMTR-MCNC: 201 MG/DL (ref 70–130)
GLUCOSE SERPL-MCNC: 129 MG/DL (ref 65–99)
HCT VFR BLD AUTO: 27 % (ref 37.5–51)
HGB BLD-MCNC: 8.6 G/DL (ref 13–17.7)
MCH RBC QN AUTO: 28.1 PG (ref 26.6–33)
MCHC RBC AUTO-ENTMCNC: 31.9 G/DL (ref 31.5–35.7)
MCV RBC AUTO: 88.2 FL (ref 79–97)
PLATELET # BLD AUTO: 220 10*3/MM3 (ref 140–450)
PMV BLD AUTO: 10.6 FL (ref 6–12)
POTASSIUM SERPL-SCNC: 4.9 MMOL/L (ref 3.5–5.2)
RBC # BLD AUTO: 3.06 10*6/MM3 (ref 4.14–5.8)
SODIUM SERPL-SCNC: 141 MMOL/L (ref 136–145)
WBC NRBC COR # BLD AUTO: 7.44 10*3/MM3 (ref 3.4–10.8)

## 2025-06-13 PROCEDURE — 99024 POSTOP FOLLOW-UP VISIT: CPT

## 2025-06-13 PROCEDURE — 97116 GAIT TRAINING THERAPY: CPT

## 2025-06-13 PROCEDURE — 85027 COMPLETE CBC AUTOMATED: CPT | Performed by: HOSPITALIST

## 2025-06-13 PROCEDURE — 93005 ELECTROCARDIOGRAM TRACING: CPT | Performed by: HOSPITALIST

## 2025-06-13 PROCEDURE — 80048 BASIC METABOLIC PNL TOTAL CA: CPT | Performed by: HOSPITALIST

## 2025-06-13 PROCEDURE — 99232 SBSQ HOSP IP/OBS MODERATE 35: CPT | Performed by: HOSPITALIST

## 2025-06-13 PROCEDURE — 97110 THERAPEUTIC EXERCISES: CPT

## 2025-06-13 PROCEDURE — 97530 THERAPEUTIC ACTIVITIES: CPT

## 2025-06-13 PROCEDURE — 93010 ELECTROCARDIOGRAM REPORT: CPT | Performed by: INTERNAL MEDICINE

## 2025-06-13 PROCEDURE — 82948 REAGENT STRIP/BLOOD GLUCOSE: CPT

## 2025-06-13 PROCEDURE — 25010000002 HEPARIN (PORCINE) PER 1000 UNITS: Performed by: ORTHOPAEDIC SURGERY

## 2025-06-13 RX ADMIN — CARVEDILOL 6.25 MG: 6.25 TABLET, FILM COATED ORAL at 09:32

## 2025-06-13 RX ADMIN — HEPARIN SODIUM 5000 UNITS: 5000 INJECTION INTRAVENOUS; SUBCUTANEOUS at 20:10

## 2025-06-13 RX ADMIN — ACETAMINOPHEN 650 MG: 325 TABLET ORAL at 15:17

## 2025-06-13 RX ADMIN — THIAMINE HCL TAB 100 MG 100 MG: 100 TAB at 10:43

## 2025-06-13 RX ADMIN — HYDROCODONE BITARTRATE AND ACETAMINOPHEN 0.5 TABLET: 5; 325 TABLET ORAL at 04:33

## 2025-06-13 RX ADMIN — AMLODIPINE BESYLATE 2.5 MG: 2.5 TABLET ORAL at 09:32

## 2025-06-13 RX ADMIN — POLYETHYLENE GLYCOL 3350 17 G: 17 POWDER, FOR SOLUTION ORAL at 09:32

## 2025-06-13 RX ADMIN — FOLIC ACID 1 MG: 1 TABLET ORAL at 09:32

## 2025-06-13 RX ADMIN — SERTRALINE 25 MG: 25 TABLET, FILM COATED ORAL at 09:32

## 2025-06-13 RX ADMIN — Medication 5 MG: at 20:08

## 2025-06-13 RX ADMIN — BISACODYL 5 MG: 5 TABLET, COATED ORAL at 09:32

## 2025-06-13 RX ADMIN — ACETAMINOPHEN 650 MG: 325 TABLET ORAL at 04:32

## 2025-06-13 RX ADMIN — CARVEDILOL 6.25 MG: 6.25 TABLET, FILM COATED ORAL at 20:08

## 2025-06-13 RX ADMIN — HYDROCODONE BITARTRATE AND ACETAMINOPHEN 0.5 TABLET: 5; 325 TABLET ORAL at 20:09

## 2025-06-13 RX ADMIN — PANTOPRAZOLE SODIUM 40 MG: 40 TABLET, DELAYED RELEASE ORAL at 05:40

## 2025-06-13 RX ADMIN — ASPIRIN 81 MG: 81 TABLET, COATED ORAL at 09:32

## 2025-06-13 RX ADMIN — HEPARIN SODIUM 5000 UNITS: 5000 INJECTION INTRAVENOUS; SUBCUTANEOUS at 09:33

## 2025-06-13 RX ADMIN — ATORVASTATIN CALCIUM 80 MG: 40 TABLET, FILM COATED ORAL at 20:08

## 2025-06-13 RX ADMIN — ACETAMINOPHEN 650 MG: 325 TABLET ORAL at 09:32

## 2025-06-13 RX ADMIN — LIDOCAINE 1 PATCH: 4 PATCH TOPICAL at 20:09

## 2025-06-13 NOTE — PLAN OF CARE
Goal Outcome Evaluation:           Progress: no change        No significant events overnight. Patient is Disoriented to place. VSS on 2lt NC. Restless at times. Skin interventions in place

## 2025-06-13 NOTE — THERAPY TREATMENT NOTE
Patient Name: Toño Alcala  : 1940    MRN: [id number]                              Today's Date: 2025       Admit Date: 2025    Visit Dx:     ICD-10-CM ICD-9-CM   1. Closed comminuted intertrochanteric fracture of left femur, initial encounter  S72.142A 820.21   2. Closed fracture of left hip, initial encounter  S72.002A 820.8   3. Fall, initial encounter  W19.XXXA E888.9   4. Chronic kidney disease, unspecified CKD stage  N18.9 585.9     Patient Active Problem List   Diagnosis    CAD (coronary artery disease)    Hx RLE DVT ( w/ IVC filter)    Diabetes mellitus, diet controlled    Dyslipidemia    GERD (gastroesophageal reflux disease)    Mixed hyperlipidemia    Diabetic nephropathy associated with type 2 diabetes mellitus    Diabetic polyneuropathy associated with type 2 diabetes mellitus    Chronic kidney disease, stage IV (severe)    Vitamin D deficiency    Disc disorder of cervical region    Lumbosacral spondylosis without myelopathy    Arthritis of elbow    Acute right-sided low back pain without sciatica    Unifocal PVCs    Essential hypertension    Stage 3 chronic kidney disease due to benign hypertension    BMI 30.0-30.9,adult    Fall    C5 cervical fracture    C6 cervical fracture    Hx of previous C5 & C6 fractures non-op)    NSTEMI, initial episode of care    History of pulmonary embolus (PE)/DVT    Acute on chronic diastolic CHF (congestive heart failure)    Hx right hemispheric CVA w/ hemorrhagic transformation (2025)    Closed comminuted intertrochanteric fracture of left femur    Moderate protein-calorie malnutrition     Past Medical History:   Diagnosis Date    Acute diverticulitis 2020    Allergic 60 yrs ago    Arthritis     Arthritis of neck Fusion 1992    Bilateral radial fractures     fracture bilateral radial heads    CAD (coronary artery disease)     Calculus of gallbladder without cholecystitis without obstruction 2020    Cataract     Cervical disc disorder  Fusion 1992    Cholelithiasis     Chronic kidney disease 2020    Clotting disorder     CVD (cardiovascular disease)     History of TIA 1989    Diabetes mellitus     DVT of proximal lower limb 06/22/2015    proximal DVT right leg, small PE- anticoagulation    Dyslipidemia     Erectile dysfunction     Fracture 1952    H/o pf left forearm fracture    Fracture of right hand 1980    GERD (gastroesophageal reflux disease)     with hiatal hernia    HL (hearing loss)     Hx of angina pectoris     Hypertension     Normal renal angiography 02/16/11    Knee swelling Several years ago    Left wrist fracture 1953    Lumbosacral disc disease 1996    Osgood-Schlatter's disease 1955    Osteoarthritis     Right wrist fracture     Stroke     Pt states no residual weakness though has urinary incontinence.    Vertigo     Wears glasses      Past Surgical History:   Procedure Laterality Date    APPENDECTOMY  1957    CARDIAC CATHETERIZATION  2011    with vein graft    CATARACT EXTRACTION Left     CERVICAL FUSION  1992    C3-4    COLONOSCOPY  2013    CORONARY ARTERY BYPASS GRAFT  1994    HERNIA REPAIR Right 2011    inguinal    HIP TROCHANTERIC NAILING WITH INTRAMEDULLARY HIP SCREW Left 6/11/2025    Procedure: CEPHALOMEDULLARY NAIL FIXATION;  Surgeon: Rey Badlilo MD;  Location:  [facility];  Service: Orthopedics;  Laterality: Left;    INGUINAL HERNIA REPAIR Left 10/29/2019    Procedure: INGUINAL HERNIA REPAIR LEFT;  Surgeon: Charisma Raines MD;  Location:  [facility];  Service: General    INTERVENTIONAL RADIOLOGY PROCEDURE N/A 01/23/2025    Procedure: IVC Filter Placement;  Surgeon: Avelino Hernandez MD;  Location:  [facility];  Service: Cardiovascular;  Laterality: N/A;    LUMBAR LAMINECTOMY  1996    laminectomy, lumbar, L3    OTHER SURGICAL HISTORY      wrist surgery    TONSILLECTOMY AND ADENOIDECTOMY  1945    TRIGGER POINT INJECTION  2001 - 2007    VASECTOMY  1972      General Information       Row Name  06/13/25 1025          Physical Therapy Time and Intention    Document Type therapy note (daily note)  -CK (r) ES (t) CK (c)     Mode of Treatment physical therapy;individual therapy  -CK (r) ES (t) CK (c)       Row Name 06/13/25 1025          General Information    Patient Profile Reviewed yes  -CK (r) ES (t) CK (c)     Existing Precautions/Restrictions fall;left;hip;other (see comments)  LLE WBAT s/p L IM nail; [place]  -CK (r) ES (t) CK (c)     Barriers to Rehab medically complex;previous functional deficit;hearing deficit  -CK (r) ES (t) CK (c)       Row Name 06/13/25 1025          Cognition    Orientation Status (Cognition) oriented to;person;situation;disoriented to;place;verbal cues/prompts needed for orientation  -CK (r) ES (t) CK (c)       Row Name 06/13/25 1025          Safety Issues/Impairments Affecting Functional Mobility    Safety Issues Affecting Function (Mobility) awareness of need for assistance;insight into deficits/self-awareness;judgment;problem-solving;safety precaution awareness;safety precautions follow-through/compliance;sequencing abilities  -CK (r) ES (t) CK (c)     Impairments Affecting Function (Mobility) balance;endurance/activity tolerance;pain;range of motion (ROM);strength;coordination;motor planning  -CK (r) ES (t) CK (c)               User Key  (r) = Recorded By, (t) = Taken By, (c) = Cosigned By      Initials Name Provider Type    CK Gauri Amador, PT Physical Therapist    ES Coreen Wright, PT Student PT Student                   Mobility       Row Name 06/13/25 1027          Bed Mobility    Bed Mobility supine-sit  -CK (r) ES (t) CK (c)     Supine-Sit [place] (Bed Mobility) maximum assist (25% patient effort);2 person assist;verbal cues;nonverbal cues (demo/gesture)  -CK (r) ES (t) CK (c)     Assistive Device (Bed Mobility) bed rails;head of bed elevated;repositioning sheet  -CK (r) ES (t) CK (c)     Comment, (Bed Mobility) Pt required multiple verbal and tactile cues  to attempt to inc IND to with transfer. Pt unable to demonstrate req maxAx2 to complete to sit EOB. Pt with sig posterior lean upon initial sitting but was able to self correct with cues.  -CK (r) ES (t) CK (c)       Row Name 06/13/25 1027          Bed-Chair Transfer    Bed-Chair [place] (Transfers) moderate assist (50% patient effort);2 person assist;verbal cues;nonverbal cues (demo/gesture)  -CK (r) ES (t) CK (c)     Assistive Device (Bed-Chair Transfers) walker, front-wheeled  -CK (r) ES (t) CK (c)     Comment, (Bed-Chair Transfer) Pt took 3 side-steps to chair req extended time to complete STS and turning. Pt req many verbal and tactile cues.  -CK (r) ES (t) CK (c)       Row Name 06/13/25 1027          Sit-Stand Transfer    Sit-Stand [place] (Transfers) moderate assist (50% patient effort);2 person assist;verbal cues;nonverbal cues (demo/gesture)  -CK (r) ES (t) CK (c)     Assistive Device (Sit-Stand Transfers) walker, front-wheeled  -CK (r) ES (t) CK (c)     Comment, (Sit-Stand Transfer) x1 from EOB with increased height of bed; x1 from chair. Pt req increased time to complete STS with VC and TC for BUE placement and sequencing, as well as placement of the RLE on the floor prior to stepping  -CK (r) ES (t) CK (c)       Row Name 06/13/25 1027          Gait/Stairs (Locomotion)    [place] Level (Gait) moderate assist (50% patient effort);2 person assist;verbal cues;nonverbal cues (demo/gesture)  -CK (r) ES (t) CK (c)     Assistive Device (Gait) walker, front-wheeled  -CK (r) ES (t) CK (c)     Patient was able to Ambulate yes  -CK (r) ES (t) CK (c)     Distance in Feet (Gait) 2  + 10'  -CK (r) ES (t) CK (c)     Deviations/Abnormal Patterns (Gait) scissoring;antalgic;base of support, narrow;maddison decreased;gait speed decreased;stride length decreased  -CK (r) ES (t) CK (c)     Bilateral Gait Deviations forward flexed posture;heel strike decreased  -CK (r) ES (t) CK (c)     Left Sided Gait Deviations  weight shift ability decreased;decreased knee extension  -CK (r) ES (t) CK (c)     Right Sided Gait Deviations decreased knee extension  -CK (r) ES (t) CK (c)     Maintains Weight-bearing Status (Gait) able to maintain  -CK (r) ES (t) CK (c)     Comment, (Gait/Stairs) Pt req increased time to takes steps with a short step-through gait pattern. He demonstrates bilateral scissoring, with sig RLE crossover of midline. Pt requires multiple VC to continue looking up and to continue taking steps. Pt reports inc pain with increased distance  -CK (r) ES (t) CK (c)       Row Name 06/13/25 1027          Mobility    Extremity Weight-bearing Status left lower extremity  -CK (r) ES (t) CK (c)     Left Lower Extremity (Weight-bearing Status) weight-bearing as tolerated (WBAT)  -CK (r) ES (t) CK (c)               User Key  (r) = Recorded By, (t) = Taken By, (c) = Cosigned By      Initials Name Provider Type    CK Gauri Amador, PT Physical Therapist    ES Coreen Wright, PT Student PT Student                   Obj/Interventions       Row Name 06/13/25 1034          Motor Skills    Therapeutic Exercise ankle;hip  -CK (r) ES (t) CK (c)       Row Name 06/13/25 1034          Hip (Therapeutic Exercise)    Hip (Therapeutic Exercise) isometric exercises;AAROM (active assistive range of motion)  -CK (r) ES (t) CK (c)     Hip AAROM (Therapeutic Exercise) sitting;left;flexion;10 repetitions  -CK (r) ES (t) CK (c)     Hip Isometrics (Therapeutic Exercise) sitting;bilateral;gluteal sets;10 repetitions  -CK (r) ES (t) CK (c)       Row Name 06/13/25 1034          Ankle (Therapeutic Exercise)    Ankle AROM (Therapeutic Exercise) sitting;bilateral;dorsiflexion;plantarflexion;10 repetitions  cues to slow speed  -CK (r) ES (t) CK (c)       Row Name 06/13/25 1034          Balance    Balance Assessment sitting static balance;sitting dynamic balance;standing static balance;standing dynamic balance  -CK (r) ES (t) CK (c)     Static Sitting  Balance minimal assist  -CK (r) ES (t) CK (c)     Dynamic Sitting Balance minimal assist;1-person assist  -CK (r) ES (t) CK (c)     Position, Sitting Balance unsupported;sitting edge of bed;supported;sitting in chair  -CK (r) ES (t) CK (c)     Static Standing Balance moderate assist;2-person assist;verbal cues;non-verbal cues (demo/gesture)  -CK (r) ES (t) CK (c)     Dynamic Standing Balance moderate assist;2-person assist;verbal cues;non-verbal cues (demo/gesture)  -CK (r) ES (t) CK (c)     Position/Device Used, Standing Balance supported;walker, front-wheeled  -CK (r) ES (t) CK (c)     Balance Interventions supported;sit to stand;static;dynamic;standing;sitting  -CK (r) ES (t) CK (c)     Comment, Balance Pt with initial posterior lean while sitting EOB but able to self-correct. Pt with no over LOB during gait or transfers  -CK (r) ES (t) CK (c)               User Key  (r) = Recorded By, (t) = Taken By, (c) = Cosigned By      Initials Name Provider Type    CK Gauri Amador, PT Physical Therapist    ES Coreen Wright, PT Student PT Student                   Goals/Plan    No documentation.                  Clinical Impression       Row Name 06/13/25 1037          Pain    Pretreatment Pain Rating 2/10  -CK (r) ES (t) CK (c)     Posttreatment Pain Rating 6/10  -CK (r) ES (t) CK (c)     Pain Location hip  -CK (r) ES (t) CK (c)     Pain Side/Orientation left  -CK (r) ES (t) CK (c)     Pain Management Interventions exercise or physical activity utilized;movement retraining implemented;premedicated for activity;positioning techniques utilized;nursing notified  -CK (r) ES (t) CK (c)     Response to Pain Interventions activity participation with increased pain  -CK (r) ES (t) CK (c)     Pre/Posttreatment Pain Comment Pt reports pain increases during ambulation and transfers.  -CK (r) ES (t) CK (c)       Row Name 06/13/25 1037          Plan of Care Review    Plan of Care Reviewed With patient  -CK (r) ES (t) CK (c)      Progress improving  -CK (r) ES (t) CK (c)     Outcome Evaluation Pt demonstrated improved ability to ambulate a longer distance this session- 10' with modAx2 w/ increased time due to pt's pain and fatigue. Pt continues to demonstrate deficits d/t pain, decreased strength, decreased balance and decreased endurance- indicating need for continued IPPT. PT rec IPR at d/c.  -CK (r) ES (t) CK (c)       Row Name 06/13/25 1037          Therapy Assessment/Plan (PT)    Rehab Potential (PT) fair  -CK (r) ES (t) CK (c)     Criteria for Skilled Interventions Met (PT) yes;meets criteria;skilled treatment is necessary  -CK (r) ES (t) CK (c)     Therapy Frequency (PT) daily  -CK (r) ES (t) CK (c)       Row Name 06/13/25 1037          Vital Signs    Pre Systolic BP Rehab 130  -CK (r) ES (t) CK (c)     Pre Treatment Diastolic BP 69  -CK (r) ES (t) CK (c)     Pretreatment Heart Rate (beats/min) 73  -CK (r) ES (t) CK (c)     Posttreatment Heart Rate (beats/min) 76  -CK (r) ES (t) CK (c)     O2 Delivery Pre Treatment room air  -CK (r) ES (t) CK (c)     O2 Delivery Post Treatment room air  -CK (r) ES (t) CK (c)     Pre Patient Position Supine  -CK (r) ES (t) CK (c)     Intra Patient Position Standing  -CK (r) ES (t) CK (c)     Post Patient Position Sitting  -CK (r) ES (t) CK (c)       Row Name 06/13/25 1037          Positioning and Restraints    Pre-Treatment Position in bed  -CK (r) ES (t) CK (c)     Post Treatment Position bsc  -CK (r) ES (t) CK (c)     In Chair --  -CK (r) ES (t) CK (c)     On BS commode sitting;call light within reach;encouraged to call for assist;with other staff;notified nsg  -CK (r) ES (t) CK (c)               User Key  (r) = Recorded By, (t) = Taken By, (c) = Cosigned By      Initials Name Provider Type    CK Gauri Amador, PT Physical Therapist    Coreen Severino, PT Student PT Student                   Outcome Measures       Row Name 06/13/25 1041          How much help from another person do you  currently need...    Turning from your back to your side while in flat bed without using bedrails? 2  -CK (r) ES (t) CK (c)     Moving from lying on back to sitting on the side of a flat bed without bedrails? 2  -CK (r) ES (t) CK (c)     Moving to and from a bed to a chair (including a wheelchair)? 2  -CK (r) ES (t) CK (c)     Standing up from a chair using your arms (e.g., wheelchair, bedside chair)? 2  -CK (r) ES (t) CK (c)     Climbing 3-5 steps with a railing? 1  -CK (r) ES (t) CK (c)     To walk in hospital room? 2  -CK (r) ES (t) CK (c)     AM-[facility] 6 Clicks Score (PT) 11  -CK (r) ES (t)     Highest Level of Mobility Goal Move to Chair/Commode-4  -CK (r) ES (t)       Row Name 06/13/25 1041          Functional Assessment    Outcome Measure Options AM-PAC 6 Clicks Basic Mobility (PT)  -CK (r) ES (t) CK (c)               User Key  (r) = Recorded By, (t) = Taken By, (c) = Cosigned By      Initials Name Provider Type    CK Gauri Amador, PT Physical Therapist    Coreen Severino, PT Student PT Student                                 Physical Therapy Education       Title: PT OT SLP Therapies (In Progress)       Topic: Physical Therapy (In Progress)       Point: Mobility training (In Progress)       Learning Progress Summary            Patient Acceptance, E,D, NR by ES at 6/13/2025 1041    Comment: Hip ROM and strengthening exercises.    Acceptance, E, VU,NR by ND at 6/12/2025 1125                      Point: Home exercise program (In Progress)       Learning Progress Summary            Patient Acceptance, E,D, NR by ES at 6/13/2025 1041    Comment: Hip ROM and strengthening exercises.    Acceptance, E, VU,NR by ND at 6/12/2025 1125                      Point: Body mechanics (In Progress)       Learning Progress Summary            Patient Acceptance, E,D, NR by ES at 6/13/2025 1041    Comment: Hip ROM and strengthening exercises.    Acceptance, E, VU,NR by ND at 6/12/2025 1125                      Point:  Precautions (In Progress)       Learning Progress Summary            Patient Acceptance, E,D, NR by ES at 6/13/2025 1041    Comment: Hip ROM and strengthening exercises.    Acceptance, E, VU,NR by ND at 6/12/2025 1125                                      User Key       Initials Effective Dates Name Provider Type Discipline    ND 11/16/23 -  Hannah Blanton, PT Physical Therapist PT    ES 05/05/25 -  Coreen Wright, PT Student PT Student PT                  PT Recommendation and Plan     Progress: improving  Outcome Evaluation: Pt demonstrated improved ability to ambulate a longer distance this session- 10' with modAx2 w/ increased time due to pt's pain and fatigue. Pt continues to demonstrate deficits d/t pain, decreased strength, decreased balance and decreased endurance- indicating need for continued IPPT. PT rec IPR at d/c.     Time Calculation:         PT Charges       Row Name 06/13/25 1042             Time Calculation    Start Time 0932  -CK (r) ES (t) CK (c)      PT Received On 06/13/25  -CK (r) ES (t) CK (c)         Timed Charges    95616 - PT Therapeutic Exercise Minutes 8  -CK (r) ES (t) CK (c)      72508 - Gait Training Minutes  15  -CK (r) ES (t) CK (c)      66206 - PT Therapeutic Activity Minutes 15  -CK (r) ES (t) CK (c)         Total Minutes    Timed Charges Total Minutes 38  -CK (r) ES (t)       Total Minutes 38  -CK (r) ES (t)                User Key  (r) = Recorded By, (t) = Taken By, (c) = Cosigned By      Initials Name Provider Type    CK Gauri Amador, PT Physical Therapist    ES Coreen Wright, PT Student PT Student                  Therapy Charges for Today       Code Description Service Date Service Provider Modifiers Qty    67574638704 HC PT THER PROC EA 15 MIN 6/13/2025 Coreen Wright, PT Student GP 1    47114946942 HC GAIT TRAINING EA 15 MIN 6/13/2025 Coreen Wright, PT Student GP 1    89246473425 HC PT THERAPEUTIC ACT EA 15 MIN 6/13/2025 Coreen Wright, PT  Student GP 1            PT G-Codes  Outcome Measure Options: AM-PAC 6 Clicks Basic Mobility (PT)  AM-PAC 6 Clicks Score (PT): 11  AM-PAC 6 Clicks Score (OT): 14  PT Discharge Summary  Anticipated Discharge Disposition (PT): inpatient rehabilitation facility    Coreen Wright, PT Student  6/13/2025

## 2025-06-13 NOTE — CASE MANAGEMENT/SOCIAL WORK
Continued Stay Note  [facility]     Patient Name: Toño Alcala  MRN: [id number]  Today's Date: 6/13/2025    Admit Date: 6/9/2025    Plan: rehab   Discharge Plan       Row Name 06/13/25 1102       Plan    Plan rehab    Patient/Family in Agreement with Plan yes    Plan Comments Spoke with patient at the bedside to discuss discharge plan. Patient is agreeable to therapy's recommendation for inpatient rehab. Attemped to call patient's son and POA, Maury, but was unable to reach by phone and voicemail was made. Referral given to April with [facility]. CM will continue to follow and assist with discharge planning as recommendations become available.    11:20 EDT   Spoke with patient's son, Maury, by phone. Maury is agreeable to therapy's recommendation for inpatient rehab.     Final Discharge Disposition Code 62 - inpatient rehab facility                   Discharge Codes    No documentation.                 Expected Discharge Date and Time       Expected Discharge Date Expected Discharge Time    Jun 13, 2025               Shan Rodriguez RN

## 2025-06-13 NOTE — PROGRESS NOTES
"ORTHOPEDIC SURGERY NOTE    SUBJECTIVE  Patient seen resting in bed this morning postop day 2 s/p left hip intramedullary nail for fracture.  No acute events overnight.      OBJECTIVE  Temp (24hrs), Av.8 °F (36.6 °C), Min:97.6 °F (36.4 °C), Max:97.9 °F (36.6 °C)    Blood pressure 130/69, pulse 67, temperature 97.9 °F (36.6 °C), temperature source Oral, resp. rate 18, height 182.9 cm (72\"), weight 82.1 kg (181 lb), SpO2 97%.    Lab Results (last 24 hours)       Procedure Component Value Units Date/Time    POC Glucose Once [[id number]]  (Normal) Collected: 25 0755    Specimen: Blood Updated: 25 075     Glucose 116 mg/dL     POC Glucose Once [[id number]]  (Abnormal) Collected: 25    Specimen: Blood Updated: 25 204     Glucose 135 mg/dL     POC Glucose Once [[id number]]  (Abnormal) Collected: 25 1608    Specimen: Blood Updated: 25 1609     Glucose 213 mg/dL     POC Glucose Once [[id number]]  (Abnormal) Collected: 25 1105    Specimen: Blood Updated: 25 1108     Glucose 164 mg/dL               PHYSICAL EXAM  General: Resting in bed  Vascular: 2+ pedal pulse  Neurologic: sensation to light touch is intact distally  Dermatologic: surgical incisions are well appearing, with no drainage or surrounding erythema; no signs or symptoms of infection or DVT        Closed comminuted intertrochanteric fracture of left femur    CAD (coronary artery disease)    Hx RLE DVT ( w/ IVC filter)    Diabetes mellitus, diet controlled    Dyslipidemia    Chronic kidney disease, stage IV (severe)    Essential hypertension    Hx of previous C5 & C6 fractures non-op)    History of pulmonary embolus (PE)/DVT    Hx right hemispheric CVA w/ hemorrhagic transformation (2025)    Moderate protein-calorie malnutrition      PLAN / DISPOSITION:  2 Days Post-Op    S/p left hip intertrochanteric nail for complex fracture    Pain: Controlled    Anemia: acute on chronic anemia secondary to trauma and " surgery; he is asymptomatic this morning.    Dressing: clean, dry and intact; may replace covaderm as needed    DVT prophylaxis: Lovenox 40 mg or subcutaneous heparin (deferred to internal medicine admitting team) per day as an inpatient followed by aspirin 81 mg p.o. twice daily for 30 days      PT consultation: weight bearing as tolerated with assistance and walker/wheelchair    Discharge: Anticipate transfer to inpatient rehab    Follow up: Please schedule for follow-up visit in 3 weeks at [facility] Orthopedic Surgery office with x-rays AP pelvis and hip lateral-left hip     Future Appointments   Date Time Provider Department Center   6/27/2025  8:00 AM Radu Francis MD [facility]   7/7/2025 11:45 AM Avelino Hernandez MD [facility]       Dulce Friend PA-C  [facility]  06/13/25  08:21 EDT

## 2025-06-13 NOTE — PROGRESS NOTES
[facility] Medicine Services  PROGRESS NOTE    Patient Name: Toño Alcala  : 1940  MRN: [id number]    Date of Admission: 2025  Primary Care Physician: System, Provider Not In    Subjective   Subjective     CC:  F/U fall, hip fracture    HPI:  Patient seen this morning. No current complaints. No significant pain at this time.       Objective   Objective     Vital Signs:   Temp:  [97.6 °F (36.4 °C)-98 °F (36.7 °C)] 98 °F (36.7 °C)  Heart Rate:  [61-70] 65  Resp:  [18] 18  BP: ()/(53-89) 100/53  Flow (L/min) (Oxygen Therapy):  [2] 2     Physical Exam:  Gen-no acute distress  HENT-NCAT, mucous membranes moist  CV-RRR, S1 S2 normal  Resp-CTAB, no wheezes or rales  Abd-soft, NT, ND, +BS  Ext-no edema  Neuro-A&Ox1, answers simple questions appropriately, no focal deficits  Skin-no rashes  Psych-appropriate mood  No change from 25      Results Reviewed:  LAB RESULTS:      Lab 25  0645 06/10/25  0740 25  1020   WBC  --  10.30 9.49   HEMOGLOBIN 8.3* 9.8* 10.5*   HEMATOCRIT 26.2* 30.5* 32.2*   PLATELETS  --  190 198   NEUTROS ABS  --   --  6.88   IMMATURE GRANS (ABS)  --   --  0.04   LYMPHS ABS  --   --  0.82   MONOS ABS  --   --  1.12*   EOS ABS  --   --  0.57*   MCV  --  88.2 86.1   PROTIME  --   --  15.3         Lab 25  0645 25  1219 06/10/25  0740 25  1020   SODIUM 138 135* 136 136   POTASSIUM 4.9 5.3* 4.7 4.2   CHLORIDE 107 106 104 102   CO2 23.0 19.0* 22.0 23.0   ANION GAP 8.0 10.0 10.0 11.0   BUN 44.4* 40.7* 32.2* 35.1*   CREATININE 2.61* 2.47* 2.41* 2.89*   EGFR 23.5* 25.1* 25.8* 20.8*   GLUCOSE 112* 139* 112* 142*   CALCIUM 8.1* 8.2* 8.6 9.1   MAGNESIUM  --   --  1.8  --    HEMOGLOBIN A1C  --   --   --  5.90*         Lab 25  1020   TOTAL PROTEIN 6.5   ALBUMIN 3.4*   GLOBULIN 3.1   ALT (SGPT) 21   AST (SGOT) 30   BILIRUBIN 0.7   ALK PHOS 245*         Lab 25  1020   PROTIME 15.3   INR 1.14*             Lab 25  1219  06/09/25  1155 06/09/25  1022   FOLATE 19.50  --   --    VITAMIN B 12 456  --   --    ABO TYPING  --  O O   RH TYPING  --  Positive Positive   ANTIBODY SCREEN  --   --  Negative         Brief Urine Lab Results  (Last result in the past 365 days)        Color   Clarity   Blood   Leuk Est   Nitrite   Protein   CREAT   Urine HCG        06/09/25 1300 Yellow   Clear   Negative   Negative   Negative   Trace                   Microbiology Results Abnormal       None            No radiology results from the last 24 hrs      Results for orders placed during the hospital encounter of 01/19/25    Adult Transthoracic Echo Complete W/ Cont if Necessary Per Protocol    Interpretation Summary    Left ventricular systolic function is normal. Estimated left ventricular EF = 50%    Left ventricular wall thickness is consistent with borderline concentric hypertrophy.    Mild mitral valve regurgitation is present.      Current medications:  Scheduled Meds:acetaminophen, 650 mg, Oral, Q6H  amLODIPine, 2.5 mg, Oral, Daily  aspirin, 81 mg, Oral, Daily  atorvastatin, 80 mg, Oral, Nightly  carvedilol, 6.25 mg, Oral, BID  folic acid, 1 mg, Oral, Daily  heparin (porcine), 5,000 Units, Subcutaneous, Q12H  Lidocaine, 1 patch, Transdermal, Q24H  melatonin, 5 mg, Oral, Nightly  pantoprazole, 40 mg, Oral, Q AM  sertraline, 25 mg, Oral, Daily  sodium chloride, 10 mL, Intravenous, Q12H  thiamine, 100 mg, Oral, Daily      Continuous Infusions:ropivacaine, , Last Rate: Stopped (06/10/25 0916)      PRN Meds:.  senna-docusate sodium **AND** polyethylene glycol **AND** bisacodyl **AND** bisacodyl    calcium carbonate    HYDROcodone-acetaminophen    ipratropium-albuterol    Magnesium Low Dose Replacement - Follow Nurse / BPA Driven Protocol    Morphine    nitroglycerin    ondansetron    [COMPLETED] Insert Peripheral IV **AND** sodium chloride    sodium chloride    sodium chloride    Assessment & Plan   Assessment & Plan     Active Hospital Problems     Diagnosis  POA    **Closed comminuted intertrochanteric fracture of left femur [S72.142A]  Yes    Moderate protein-calorie malnutrition [E44.0]  Yes    History of pulmonary embolus (PE)/DVT [Z86.711]  Yes    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 male with hx of CAD (previous CABG 1994), chronic HFpEF, CKD 4 (baseline Cr ~2.2-2.6), diet controlled DM, and previous PE & DVT (no longer anticoagulated). Was admitted January 2025 with right temporal & occipital CVA with hemorrhagic transformation, non-operative C5 and C6 fracture after a fall, RLE DVT (proximal and mid femoral) - during that hospitalization anticoagulation was held indefinitely (short term due to hemorrhagic stroke, long term due to recurrent falls) placed on ASA 81 mg indefinitely and also had IVC filter placed. He is now residing at an assisted living facility.  Presented to [facility] on 6/9/25 due to worsening falls with left hip pain. Imaging showed a left femoral neck fracture.       This patient's problems and plans were partially entered by my partner and updated as appropriate by me 06/13/25. Copied text in this note has been reviewed and is accurate as of today's date.      Left intertrochanteric fracture (s/p mechanical fall)  -s/p left hip cephalomedullary nail by Dr. Badillo 6/11/25  -WBAT LLE  -DVT PPx: SQH while inpatient, ASA 81 mg BID x 30 days at discharge  -F/U in 3 weeks for incision check and x-rays  -PT/OT  recommend rehab    Post-operative anemia  -Monitor closely     Hx right hemispheric CVA with hemorrhagic transformation (January 2025)  Dementia?  -ASA, statin  -Worsening memory over past months (per son Salo)  -Delirium precautions     Hx RLE DVT (IVC filter placed January 2025)  Hx PE  -No longer anticoagulated since his stroke with hemorrhagic transformation January 2025 (due to the hemorrhagic stroke and also recurrent falls)  -s/p IVC filter placement during January 2025 admission  -On ASA 81 mg     Hx previous non-operative C5 and C6 fractures  -January 2025  -s/p C-collar (last visit with Dr. Byrd 4/24/25 at which time he was instructed to wean the collar off over 1-2 weeks)     CAD  Chronic HFpEF  HTN  -ASA, Coreg, Amlodipine  -On twice weekly Bumex (per recent Cardiology note); will hold this for now and restart as needed  -Follows with Dr. Hernandez     CKD 4  -Baseline Cr ~2.2-2.6, gradually worsening  -Stable, monitor    Diet controlled DM2  -Currently HbA1c is 5.9%     GERD  -PPI    Expected Discharge Location and Transportation: rehab  Expected Discharge anticipate rehab tomorrow if labs stable today  Expected Discharge Date: 6/14/2025; Expected Discharge Time:      VTE Prophylaxis:  Pharmacologic VTE prophylaxis orders are present.         AM-PAC 6 Clicks Score (PT): 11 (06/13/25 1041)    CODE STATUS:   Code Status and Medical Interventions: No CPR (Do Not Attempt to Resuscitate); Limited Support; No intubation (DNI)   Ordered at: 06/09/25 9251     Code Status (Patient has no pulse and is not breathing):    No CPR (Do Not Attempt to Resuscitate)     Medical Interventions (Patient has pulse or is breathing):    Limited Support     Medical Intervention Limits:    No intubation (DNI)       Lilly Waldron MD  06/13/25

## 2025-06-13 NOTE — PLAN OF CARE
Goal Outcome Evaluation:  Plan of Care Reviewed With: patient        Progress: improving  Outcome Evaluation: Pt demonstrated improved ability to ambulate a longer distance this session- 10' with modAx2 w/ increased time due to pt's pain and fatigue. Pt continues to demonstrate deficits d/t pain, decreased strength, decreased balance and decreased endurance- indicating need for continued IPPT. PT rec IPR at d/c.    Anticipated Discharge Disposition (PT): inpatient rehabilitation facility

## 2025-06-14 VITALS
TEMPERATURE: 97.8 F | OXYGEN SATURATION: 97 % | HEART RATE: 60 BPM | DIASTOLIC BLOOD PRESSURE: 67 MMHG | SYSTOLIC BLOOD PRESSURE: 136 MMHG | HEIGHT: 72 IN | BODY MASS INDEX: 24.52 KG/M2 | RESPIRATION RATE: 16 BRPM | WEIGHT: 181 LBS

## 2025-06-14 LAB
GLUCOSE BLDC GLUCOMTR-MCNC: 142 MG/DL (ref 70–130)
QT INTERVAL: 472 MS
QTC INTERVAL: 459 MS

## 2025-06-14 PROCEDURE — 99239 HOSP IP/OBS DSCHRG MGMT >30: CPT | Performed by: NURSE PRACTITIONER

## 2025-06-14 PROCEDURE — 99024 POSTOP FOLLOW-UP VISIT: CPT | Performed by: ORTHOPAEDIC SURGERY

## 2025-06-14 PROCEDURE — 25010000002 HEPARIN (PORCINE) PER 1000 UNITS: Performed by: ORTHOPAEDIC SURGERY

## 2025-06-14 PROCEDURE — 82948 REAGENT STRIP/BLOOD GLUCOSE: CPT

## 2025-06-14 RX ORDER — ONDANSETRON 4 MG/1
4 TABLET, ORALLY DISINTEGRATING ORAL EVERY 8 HOURS PRN
Start: 2025-06-14

## 2025-06-14 RX ORDER — ACETAMINOPHEN 325 MG/1
650 TABLET ORAL EVERY 6 HOURS PRN
Start: 2025-06-14

## 2025-06-14 RX ORDER — CARVEDILOL 6.25 MG/1
6.25 TABLET ORAL 2 TIMES DAILY
Start: 2025-06-14

## 2025-06-14 RX ORDER — ASPIRIN 81 MG/1
81 TABLET ORAL DAILY
Start: 2025-07-12

## 2025-06-14 RX ORDER — ASPIRIN 81 MG/1
81 TABLET ORAL 2 TIMES DAILY
Start: 2025-06-14 | End: 2025-07-11

## 2025-06-14 RX ORDER — HYDROCODONE BITARTRATE AND ACETAMINOPHEN 5; 325 MG/1; MG/1
0.5 TABLET ORAL EVERY 6 HOURS PRN
Start: 2025-06-14

## 2025-06-14 RX ADMIN — PANTOPRAZOLE SODIUM 40 MG: 40 TABLET, DELAYED RELEASE ORAL at 05:12

## 2025-06-14 RX ADMIN — AMLODIPINE BESYLATE 2.5 MG: 2.5 TABLET ORAL at 09:06

## 2025-06-14 RX ADMIN — SERTRALINE 25 MG: 25 TABLET, FILM COATED ORAL at 09:07

## 2025-06-14 RX ADMIN — THIAMINE HCL TAB 100 MG 100 MG: 100 TAB at 09:07

## 2025-06-14 RX ADMIN — ACETAMINOPHEN 650 MG: 325 TABLET ORAL at 09:07

## 2025-06-14 RX ADMIN — FOLIC ACID 1 MG: 1 TABLET ORAL at 09:06

## 2025-06-14 RX ADMIN — ASPIRIN 81 MG: 81 TABLET, COATED ORAL at 09:06

## 2025-06-14 RX ADMIN — HYDROCODONE BITARTRATE AND ACETAMINOPHEN 0.5 TABLET: 5; 325 TABLET ORAL at 11:25

## 2025-06-14 RX ADMIN — ACETAMINOPHEN 650 MG: 325 TABLET ORAL at 05:12

## 2025-06-14 RX ADMIN — CARVEDILOL 6.25 MG: 6.25 TABLET, FILM COATED ORAL at 09:06

## 2025-06-14 RX ADMIN — HEPARIN SODIUM 5000 UNITS: 5000 INJECTION INTRAVENOUS; SUBCUTANEOUS at 09:07

## 2025-06-14 NOTE — DISCHARGE INSTR - ACTIVITY
PRAVEEN - HALLPIKE MANEUVER TO ASSESS VESTIBULAR CAUSE OF BALANCE ISSUE    CHANGE COVERDERM DRESSING AS NEEDED    WEIGHT BEARING AS TOLERATED TO LEFT LOWER EXTREMITY

## 2025-06-14 NOTE — PLAN OF CARE
Problem: Adult Inpatient Plan of Care  Goal: Plan of Care Review  Outcome: Progressing  Goal: Patient-Specific Goal (Individualized)  Outcome: Progressing  Goal: Absence of Hospital-Acquired Illness or Injury  Outcome: Progressing  Intervention: Identify and Manage Fall Risk  Recent Flowsheet Documentation  Taken 6/13/2025 1930 by Ursula Bustos RN  Safety Promotion/Fall Prevention:   fall prevention program maintained   room organization consistent  Intervention: Prevent and Manage VTE (Venous Thromboembolism) Risk  Recent Flowsheet Documentation  Taken 6/13/2025 1930 by Ursula Bustos RN  VTE Prevention/Management:   bilateral   SCDs (sequential compression devices) on  Intervention: Prevent Infection  Recent Flowsheet Documentation  Taken 6/13/2025 1930 by Ursula Bustos RN  Infection Prevention: environmental surveillance performed  Goal: Optimal Comfort and Wellbeing  Outcome: Progressing  Goal: Readiness for Transition of Care  Outcome: Progressing     Problem: Fall Injury Risk  Goal: Absence of Fall and Fall-Related Injury  Outcome: Progressing  Intervention: Promote Injury-Free Environment  Recent Flowsheet Documentation  Taken 6/13/2025 1930 by Ursula Bustos RN  Safety Promotion/Fall Prevention:   fall prevention program maintained   room organization consistent     Problem: Pain Acute  Goal: Optimal Pain Control and Function  Outcome: Progressing  Goal: Optimal Pain Control and Function  Outcome: Progressing     Problem: Skin Injury Risk Increased  Goal: Skin Health and Integrity  Outcome: Progressing  Intervention: Optimize Skin Protection  Recent Flowsheet Documentation  Taken 6/13/2025 1930 by Ursula Bustos RN  Head of Bed (HOB) Positioning: HOB elevated     Problem: Comorbidity Management  Goal: Maintenance of Behavioral Health Symptom Control  Outcome: Progressing  Goal: Blood Pressure in Desired Range  Outcome: Progressing     Problem: Sepsis/Septic Shock  Goal: Optimal Coping  Outcome:  Progressing  Goal: Absence of Bleeding  Outcome: Progressing  Goal: Blood Glucose Level Within Target Range  Outcome: Progressing  Goal: Absence of Infection Signs and Symptoms  Outcome: Progressing  Intervention: Initiate Sepsis Management  Recent Flowsheet Documentation  Taken 6/13/2025 1930 by Ursula Bustos, RN  Infection Prevention: environmental surveillance performed  Goal: Optimal Nutrition Delivery  Outcome: Progressing   Goal Outcome Evaluation:

## 2025-06-14 NOTE — PROGRESS NOTES
Clinical Nutrition Assessment     Patient Name: Toño Alcala  YOB: 1940  MRN: [id number]  Date of Encounter: 06/13/25 22:29 EDT  Admission date: 6/9/2025  Reason for Visit: Follow-up protocol    Assessment   Nutrition Assessment   Admission Diagnosis:  Hip fracture [S72.009A]    Problem List:    Closed comminuted intertrochanteric fracture of left femur    CAD (coronary artery disease)    Hx RLE DVT ( w/ IVC filter)    Diabetes mellitus, diet controlled    Dyslipidemia    Chronic kidney disease, stage IV (severe)    Essential hypertension    Hx of previous C5 & C6 fractures non-op)    History of pulmonary embolus (PE)/DVT    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)    Moderate protein-calorie malnutrition      PMH:   He  has a past medical history of Acute diverticulitis (01/29/2020), Allergic (60 yrs ago), Arthritis, Arthritis of neck (Fusion 1992), Bilateral radial fractures (1962), CAD (coronary artery disease), Calculus of gallbladder without cholecystitis without obstruction (01/29/2020), Cataract, Cervical disc disorder (Fusion 1992), Cholelithiasis, Chronic kidney disease (2020), Clotting disorder, CVD (cardiovascular disease), Diabetes mellitus, DVT of proximal lower limb (06/22/2015), Dyslipidemia, Erectile dysfunction, Fracture (1952), Fracture of right hand (1980), GERD (gastroesophageal reflux disease), HL (hearing loss), angina pectoris, Hypertension, Knee swelling (Several years ago), Left wrist fracture (1953), Lumbosacral disc disease (1996), Osgood-Schlatter's disease (1955), Osteoarthritis, Right wrist fracture, Stroke, Vertigo, and Wears glasses.    PSH:  He  has a past surgical history that includes Cardiac catheterization (2011); Lumbar laminectomy (1996); Tonsillectomy and adenoidectomy (1945); Cervical fusion (1992); Coronary artery bypass graft (1994); Appendectomy (1957); Hernia repair (Right, 2011); Other surgical history; Colonoscopy  "(2013); Inguinal Hernia Repair (Left, 10/29/2019); Vasectomy (1972); Trigger point injection (2001 - 2007); Cataract extraction (Left); Interventional radiology procedure (N/A, 01/23/2025); and Hip Trochanteric Nailing (Left, 6/11/2025).    Applicable Nutrition History:       Anthropometrics     Height: Height: 182.9 cm (72\")  Last Filed Weight: Weight: 82.1 kg (181 lb) (06/11/25 0648)  Method: Weight Method: Standing scale  BMI: BMI (Calculated): 24.5    UBW:   Weight       Weight (kg) Weight (lbs) Weight Method Visit Report   4/10/2024 91.717 kg  202 lb 3.2 oz   --    7/1/2024 84.369 kg  186 lb   --    1/14/2025 88.633 kg  195 lb 6.4 oz   --    1/19/2025 88.451 kg  195 lb  Stated     1/20/2025 82.691 kg  182 lb 4.8 oz  Bed scale     3/6/2025 82.827 kg  182 lb 9.6 oz   --    3/20/2025 83.462 kg  184 lb   --    4/11/2025 80.74 kg  178 lb  Stated     5/5/2025 85.276 kg  188 lb   --    6/7/2025 82.555 kg  182 lb      6/8/2025 82.555 kg  182 lb  Bed scale     6/9/2025 85.276 kg  188 lb  Stated       Bed weight at time of visit (6/10): 180lb      Weight change: 8lb weight loss x 1 month (4.3%), 7.7% x 6 months, 9.9% x 1 year     Nutrition Focused Physical Exam    Date: 6/10    Patient meets criteria for malnutrition diagnosis, see MSA note.     Subjective   Reported/Observed/Food/Nutrition Related History:     6/13  Pt not alert at time of visit. Per family pt loves Magic Cup,- would eat it all the time    6/10  Patient triggered for nutrition assessment with MST 2 for weight loss and large nonhealing wound. Patient admitted after a fall resulting in hip fx. Plan for surgery tomorrow.  Weight history reviewed, gradual weight loss over the past year.  Intake of 0% for lunch and dinner. No pressure injury stage 2 or greater noted per documentation at this time. No WOC consult for any wounds.      Patient confused at time of visit. Lunch tray at bedside, unconsumed, reports feeling too tired to eat. Not able to contribute " to nutrition history.     Current Nutrition Prescription   PO: Diet: Regular/House; Fluid Consistency: Thin (IDDSI 0)  Oral Nutrition Supplement: Magic Cup daily per MD  Intake: 3 Days: 25% x 3 meals recorded     Assessment & Plan   Nutrition Diagnosis   Date:  6/10 Updated:6/13  Problem Malnutrition, chronic moderate   Etiology Intake < needs (Advanced age)    Signs/Symptoms </= 75% of EEN x >/= 1 month, mild/moderate muscle wasting, and mild/moderate subcutaneous fat loss   Status: Active      Goal / Objectives:   Nutrition to support treatment and Establish PO      Nutrition Intervention      Follow treatment progress, Care plan reviewed, Menu provided    Patient meets criteria for Moderate Chronic Malnutrition  Menu provided to family for use w pt.  Magic Cup order incr to 2x/da 2/2 family report of use, Boost Glucose Control added daily   Follow for intake progression as able.    Monitoring/Evaluation:   Per protocol, I&O, PO intake, Supplement intake, Pertinent labs, Symptoms    Aleida Maurice RD  Time Spent: 20 min

## 2025-06-14 NOTE — PROGRESS NOTES
"ORTHOPEDIC SURGERY NOTE    SUBJECTIVE  Resting in bed.  Pain well-controlled.  No new complaints.  Has been up with therapy.  No acute events.      OBJECTIVE  Temp (24hrs), Av.9 °F (36.6 °C), Min:97.5 °F (36.4 °C), Max:98.3 °F (36.8 °C)    Blood pressure 136/67, pulse 60, temperature 97.8 °F (36.6 °C), temperature source Oral, resp. rate 16, height 182.9 cm (72\"), weight 82.1 kg (181 lb), SpO2 97%.    Lab Results (last 24 hours)       Procedure Component Value Units Date/Time    POC Glucose Once [[id number]]  (Abnormal) Collected: 25 0800    Specimen: Blood Updated: 25 0802     Glucose 142 mg/dL     POC Glucose Once [[id number]]  (Normal) Collected: 25    Specimen: Blood Updated: 25     Glucose 109 mg/dL     POC Glucose Once [[id number]]  (Abnormal) Collected: 25 1611    Specimen: Blood Updated: 25 1618     Glucose 134 mg/dL     Basic Metabolic Panel [[id number]]  (Abnormal) Collected: 25 1326    Specimen: Blood Updated: 25 1426     Glucose 129 mg/dL      BUN 44.0 mg/dL      Creatinine 2.24 mg/dL      Sodium 141 mmol/L      Potassium 4.9 mmol/L      Chloride 109 mmol/L      CO2 24.0 mmol/L      Calcium 8.4 mg/dL      BUN/Creatinine Ratio 19.6     Anion Gap 8.0 mmol/L      eGFR 28.2 mL/min/1.73     Narrative:      GFR Categories in Chronic Kidney Disease (CKD)              GFR Category          GFR (mL/min/1.73)    Interpretation  G1                    90 or greater        Normal or high (1)  G2                    60-89                Mild decrease (1)  G3a                   45-59                Mild to moderate decrease  G3b                   30-44                Moderate to severe decrease  G4                    15-29                Severe decrease  G5                    14 or less           Kidney failure    (1)In the absence of evidence of kidney disease, neither GFR category G1 or G2 fulfill the criteria for CKD.    eGFR calculation  CKD-EPI " creatinine equation, which does not include race as a factor    CBC (No Diff) [[id number]]  (Abnormal) Collected: 06/13/25 1326    Specimen: Blood Updated: 06/13/25 1346     WBC 7.44 10*3/mm3      RBC 3.06 10*6/mm3      Hemoglobin 8.6 g/dL      Hematocrit 27.0 %      MCV 88.2 fL      MCH 28.1 pg      MCHC 31.9 g/dL      RDW 17.1 %      RDW-SD 54.4 fl      MPV 10.6 fL      Platelets 220 10*3/mm3     POC Glucose Once [[id number]]  (Abnormal) Collected: 06/13/25 1110    Specimen: Blood Updated: 06/13/25 1132     Glucose 201 mg/dL               PHYSICAL EXAM  Physical Exam:  left LE: dressing CDI hip, no spotting, compartments soft/compressible thigh and leg              Actively extends knee, wiggles toes, dorsiflexes and plantarflex his ankle, reports silt in all distributions in foot              Foot WWP, palpable DP pulse        Closed comminuted intertrochanteric fracture of left femur    CAD (coronary artery disease)    Hx RLE DVT ( w/ IVC filter)    Diabetes mellitus, diet controlled    Dyslipidemia    Chronic kidney disease, stage IV (severe)    Essential hypertension    Hx of previous C5 & C6 fractures non-op)    History of pulmonary embolus (PE)/DVT    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    Dressing: clean, dry and intact; may replace covaderm as needed    DVT prophylaxis: Lovenox 40 mg or subcutaneous heparin (deferred to internal medicine admitting team) per day as an inpatient followed by aspirin 81 mg p.o. twice daily for 30 days      PT consultation: weight bearing as tolerated with assistance and walker/wheelchair    Discharge: Anticipate transfer to inpatient rehab    Follow up: Please schedule for follow-up visit in 3 weeks at [facility]  [facility] Orthopedic Surgery office with x-rays AP pelvis and hip lateral-left hip     Future Appointments   Date Time Provider Department Center   6/27/2025  8:00 AM Radu Francis MD [facility]   7/7/2025 11:45 AM Avelino Hernandez MD [facility]   7/8/2025 11:40 AM Dulce Friend PA-C [facility]      rounding for Dr. Justino Huber MD  [facility] Orthopedic Surgery  06/14/25  09:58 EDT

## 2025-06-14 NOTE — CASE MANAGEMENT/SOCIAL WORK
Case Management Discharge Note      Final Note: Per Arun at , patient has a bed today on the [facility]. RN will need to call report to [phone]. Arun will pull the discharge summary out of Epic. It can also be faxed to  [facility] at [phone]. No need to change the pharmacy for . Patient update and in agreement. Son Maury also updated and in agreement. Patient has a 1245 spot on the [facility] van. RN will need to have the patient at the [address] no later than 1230 to prevent  delays.         Selected Continued Care - Admitted Since 6/9/2025       Destination Coordination complete.      Service Provider Services Address Phone Fax Patient Preferred    [facility] [address] [address] [phone] --              Durable Medical Equipment    No services have been selected for the patient.                Dialysis/Infusion    No services have been selected for the patient.                Home Medical Care    No services have been selected for the patient.                Therapy    No services have been selected for the patient.                Community Resources    No services have been selected for the patient.                Community & DME    No services have been selected for the patient.                    Selected Continued Care - Episodes Includes continued care and service providers with selected services from the active episodes listed below          Transportation Services  W/C Van: Other ([facility] van)    Final Discharge Disposition Code: 62 - inpatient rehab facility

## 2025-06-14 NOTE — DISCHARGE SUMMARY
[facility] Medicine Services  DISCHARGE SUMMARY    Patient Name: Toño Alcala  : 1940  MRN: [id number]    Date of Admission: 2025  9:51 AM  Date of Discharge:  2025  Primary Care Physician: System, Provider Not In    Consults       Date and Time Order Name Status Description    2025  1:36 PM Inpatient Orthopedic Surgery Consult Completed     2025  9:59 AM Inpatient Orthopedic Surgery Consult              Hospital Course     Presenting Problem: Fall, left hip pain    Active Hospital Problems    Diagnosis  POA    **Closed comminuted intertrochanteric fracture of left femur [S72.142A]  Yes    Moderate protein-calorie malnutrition [E44.0]  Yes    History of pulmonary embolus (PE)/DVT [Z86.711]  Yes    Hx right hemispheric CVA w/ hemorrhagic transformation (2025) [I61.9]  Yes    Hx of previous C5 & C6 fractures non-op) [S12.9XXA]  Yes    Essential hypertension [I10]  Yes    Chronic kidney disease, stage IV (severe) [N18.4]  Yes    CAD (coronary artery disease) [I25.10]  Yes    Dyslipidemia [E78.5]  Yes    Hx RLE DVT ( w/ IVC filter) [I82.409]  Yes    Diabetes mellitus, diet controlled [E11.9]  Yes      Resolved Hospital Problems    Diagnosis Date Resolved POA    Hip fracture [S72.009A] 2025 Yes          Hospital Course:  Toño Alcala is a 84 y.o. male with hx of CAD (previous CABG ), chronic HFpEF, CKD 4 (baseline Cr ~2.2-2.6), diet controlled DM, and previous PE & DVT (no longer anticoagulated). Was admitted 2025 with right temporal & occipital CVA with hemorrhagic transformation, non-operative C5 and C6 fracture after a fall, RLE DVT (proximal and mid femoral). During that hospitalization, anticoagulation was held indefinitely (short term due to hemorrhagic stroke, long term due to recurrent falls). He had an IVC filter placed and he was started on ASA 81 mg indefinitely. He is now residing at an assisted living facility.  He presented to [facility]  on 6/9/25 due to worsening falls with left hip pain. Imaging showed a left femoral neck fracture and he underwent left hip repair on 6/11/2025.      This patient's problems and plans were partially entered by my partner and updated as appropriate by me 06/14/25.        Left intertrochanteric fracture (s/p mechanical fall)  Frequent falls  -s/p left hip cephalomedullary nail by Dr. Badillo 6/11/25  -WBAT LLE  -DVT PPx: SQH while inpatient, ASA 81 mg BID x 30 days at discharge  -F/U with ortho in 3 weeks for incision check and x-rays  -Dressing change q 3 days and as needed  -PT/OT, recommend Won-Hallpike maneuver at [facility] as pt reports he falls d/t balance issues, describes that he feels like he keeps going forward when stops walking; balance likely d/t residual effect of stroke but want to r/o vestibular cause     Post-operative anemia  -Monitor closely, Hgb stable at 8.6     Hx right hemispheric CVA with hemorrhagic transformation (January 2025)  Dementia?  -ASA as below, statin  -Worsening memory over past months (per son Salo)  -Delirium precautions     Hx RLE DVT (IVC filter placed January 2025)  Hx PE  -No longer anticoagulated since his stroke with hemorrhagic transformation January 2025 (due to the hemorrhagic stroke and also recurrent falls)  -s/p IVC filter placement during January 2025 admission  -On ASA 81 mg, will continue BID for VTE ppx s/p hip repair and resume daily ASA on 7/12  -Lotion to RLE for chronic erythema, dry skin     CAD  Chronic HFpEF  HTN  -ASA, Coreg, amlodipine  -On twice weekly Bumex (per recent Cardiology note); will hold this for now and defer to cardiology to resume, last fill was in April  - Follow up with Dr. Hernandez as scheduled 7/7/2025     CKD 4  -Baseline Cr ~2.2-2.6  -Stable and currently within baseline, Cr 2.24 on 6/13, monitor     Diet controlled DM2  -Currently HbA1c is 5.9%     GERD  -PPI    Hx previous non-operative C5 and C6 fractures  - January 2025  - s/p C-collar  (last visit with Dr. Byrd 4/24/25 at which time he was instructed to wean the collar off over 1-2 weeks)    Discharge Follow Up Recommendations for outpatient labs/diagnostics:  --Check CBC, BMP in 3 days; monitor H&H with BID ASA  --Bellaire-Hallpike maneuver to assess vestibular cause of balance issue  --Follow up with Dr. Badillo with orthopedic surgery 3 weeks for wound check and x-rays  --Follow up with PCP one week after discharge from [facility]    Day of Discharge     HPI:   Patient resting in bed.  Tells me that he knows he is confused and that it has gotten worse over the past several months.  Is able to answer orientation questions appropriately.  Says he falls because he feels like he keeps going forward after he stops walking, so recommend to check Bellaire-Hallpike.  Does note the balance has gotten worse since the stroke.  Says pain is controlled.      Vital Signs:   Temp:  [97.5 °F (36.4 °C)-98.3 °F (36.8 °C)] 97.8 °F (36.6 °C)  Heart Rate:  [60-65] 60  Resp:  [16-18] 16  BP: (100-148)/(53-73) 136/67      Physical Exam:  Constitutional: No acute distress, awake, alert  HENT: NCAT, mucous membranes moist  Respiratory: Clear to auscultation bilaterally, respiratory effort normal, room air  Cardiovascular: RRR, no murmurs, rubs, or gallops  Gastrointestinal: Positive bowel sounds, soft, nontender, nondistended  Musculoskeletal: Trace bilateral ankle edema  Psychiatric: Appropriate affect, cooperative  Neurologic: Oriented x 3, moves all extremities, speech clear  Skin: No rashes, RLE dry, scaly, erythematous      Pertinent  and/or Most Recent Results     LAB RESULTS:      Lab 06/13/25  1326 06/12/25  0645 06/10/25  0740 06/09/25  1020   WBC 7.44  --  10.30 9.49   HEMOGLOBIN 8.6* 8.3* 9.8* 10.5*   HEMATOCRIT 27.0* 26.2* 30.5* 32.2*   PLATELETS 220  --  190 198   NEUTROS ABS  --   --   --  6.88   IMMATURE GRANS (ABS)  --   --   --  0.04   LYMPHS ABS  --   --   --  0.82   MONOS ABS  --   --   --  1.12*   EOS  ABS  --   --   --  0.57*   MCV 88.2  --  88.2 86.1   PROTIME  --   --   --  15.3         Lab 06/13/25  1326 06/12/25  0645 06/11/25  1219 06/10/25  0740 06/09/25  1020   SODIUM 141 138 135* 136 136   POTASSIUM 4.9 4.9 5.3* 4.7 4.2   CHLORIDE 109* 107 106 104 102   CO2 24.0 23.0 19.0* 22.0 23.0   ANION GAP 8.0 8.0 10.0 10.0 11.0   BUN 44.0* 44.4* 40.7* 32.2* 35.1*   CREATININE 2.24* 2.61* 2.47* 2.41* 2.89*   EGFR 28.2* 23.5* 25.1* 25.8* 20.8*   GLUCOSE 129* 112* 139* 112* 142*   CALCIUM 8.4* 8.1* 8.2* 8.6 9.1   MAGNESIUM  --   --   --  1.8  --    HEMOGLOBIN A1C  --   --   --   --  5.90*         Lab 06/09/25  1020   TOTAL PROTEIN 6.5   ALBUMIN 3.4*   GLOBULIN 3.1   ALT (SGPT) 21   AST (SGOT) 30   BILIRUBIN 0.7   ALK PHOS 245*         Lab 06/09/25  1020   PROTIME 15.3   INR 1.14*             Lab 06/11/25  1219 06/09/25  1155 06/09/25  1022   FOLATE 19.50  --   --    VITAMIN B 12 456  --   --    ABO TYPING  --  O O   RH TYPING  --  Positive Positive   ANTIBODY SCREEN  --   --  Negative         Brief Urine Lab Results  (Last result in the past 365 days)        Color   Clarity   Blood   Leuk Est   Nitrite   Protein   CREAT   Urine HCG        06/09/25 1300 Yellow   Clear   Negative   Negative   Negative   Trace                 Microbiology Results (last 10 days)       ** No results found for the last 240 hours. **            XR Hip With or Without Pelvis 2 - 3 View Left  Result Date: 6/11/2025  XR HIP W OR WO PELVIS 2-3 VIEW LEFT Date of Exam: 6/11/2025 11:06 AM EDT Indication: s/p left IMN -- AP pelvis and crosstable left hip; please perform in pacu postop Comparison: Left femur x-rays 6/9/2025 Findings: Interval surgical change of left hip ORIF with cephalomedullary construct. Alignment appears satisfactory. There is some medial displacement of the lesser trochanteric fracture fragment. The left hip joint is normally aligned. No hardware fracture or perihardware lucency. There are expected surgical changes of the left  hip soft tissues.     Impression: Expected surgical changes of left hip ORIF. Electronically Signed: Broderick Jacome MD  6/11/2025 12:04 PM EDT  Workstation ID: [id number]    FL C Arm During Surgery  Result Date: 6/11/2025  This procedure was auto-finalized with no dictation required.    XR Femur 2 View Left  Result Date: 6/9/2025  XR FEMUR 2 VW LEFT Date of Exam: 6/9/2025 12:51 PM EDT Indication: AP and lateral left femur, known proximal fracture/intertrochanteric fracture?need to visualize the rest of the femur bone Comparison: CT scan of the pelvis 6/9/2025 Findings: CT scan of the pelvis shows acute intertrochanteric fracture of the left hip and sclerotic changes in the femoral heads consistent with changes of AVN. The mid and distal femur appear to be intact. Knee joint appears anatomically aligned. 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 There is no evidence of knee joint effusion     Impression: Acute intertrochanteric fracture of the left hip. Remainder of the femur appears intact. Electronically Signed: Chandu Santiago MD  6/9/2025 1:35 PM EDT  Workstation ID: [id number]    CT Pelvis Without Contrast  Result Date: 6/9/2025  CT PELVIS WO CONTRAST Date of Exam: 6/9/2025 10:42 AM EDT Indication: Hip Pain. Comparison: Pelvis CT 6/8/2025. Technique: Axial CT images were obtained of the pelvis without contrast administration.  Reconstructed coronal and sagittal images were also obtained. Automated exposure control and iterative construction methods were used. Findings: Acute fracture of the left femur with significantly increased comminution, displacement, and angulation compared to yesterday's CT, now with near 90 degree femoral neck-shaft angle. Femoral head remains within the acetabulum. 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 Degenerative changes of the hips, pubic symphysis, SI joints, and lumbosacral spine. Partial ankylosis of the SI joints noted. Bones appear demineralized.  Subcutaneous hematoma surrounding the left hip fracture. Atherosclerosis. Prostatomegaly. Colonic diverticulosis. No pelvic free fluid.     Impression: Acute fracture of the left femoral neck with comminution, displacement, and angulation, all of which is worsened compared to yesterday's CT. 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 Additional chronic/degenerative findings as above. Electronically Signed: Vic Braga MD  6/9/2025 11:26 AM EDT  Workstation ID: [id number]    CT Cervical Spine Without Contrast  Result Date: 6/9/2025  CT CERVICAL SPINE WO CONTRAST Date of Exam: 6/9/2025 10:42 AM EDT Indication: fall. Comparison: 6/8/2025, 4/22/2025 Technique: Axial CT images were obtained of the cervical spine without contrast administration.  Reconstructed coronal and sagittal images were also obtained. Automated exposure control and iterative construction methods were used. FINDINGS: Healing/healed oblique fracture involving the fused C5 and C6 vertebrae again noted. No new fracture is identified. Cervical spondylosis with contiguous anterior osteophytes and multilevel fusion across posterior elements, compatible with diffuse idiopathic skeletal hyperostosis.. Vertebral body heights are maintained. The craniocervical and atlantoaxial junctions appear within normal limits. The prevertebral and paraspinal soft tissues are without focal abnormality. Vascular calcifications are present. The visualized intracranial structures appear unremarkable. The visualized lung apices are clear.     1.Healing/healed oblique fracture involving the fused C5 and C6 vertebrae again noted. 2.No new fracture is identified. 3.Cervical spondylosis with concomitant diffuse idiopathic skeletal hyperostosis. Electronically Signed: Russell Montes De Oca MD  6/9/2025 11:21 AM EDT  Workstation ID: [id number]    CT Head Without Contrast  Result Date: 6/9/2025  CT HEAD WO CONTRAST Date of Exam: 6/9/2025 10:42 AM EDT Indication:  fall. Comparison: Head CT dated 6/8/2025 Technique: Axial CT images were obtained of the head without contrast administration.  Automated exposure control and iterative construction methods were used. FINDINGS:  Foci of periventricular and subcortical white matter hypoattenuation are consistent with chronic, microvascular ischemic change. There is age-related loss of brain parenchymal volume with prominence of sulcation and ventriculomegaly. There is encephalomalacia within the bilateral occipital lobes, right greater than left. Probable small remote infarcts within the left cerebellum. No significant mass effect, midline shift, intracranial hemorrhage, or hydrocephalus is identified. No extra-axial fluid collection is identified.   The calvarium and overlying soft tissues are unremarkable. The paranasal sinuses and bilateral mastoid air cells are adequately aerated. Bilateral lens prostheses are noted. Orbital structures are otherwise unremarkable.     1.No acute intracranial abnormality is identified. 2.Chronic ischemic changes and diffuse cortical atrophy. Evidence of remote infarct involving the bilateral occipital lobes and left cerebellum. Electronically Signed: Russell Montes De Oca MD  6/9/2025 11:01 AM EDT  Workstation ID: [id number]    XR Hip With or Without Pelvis 2 - 3 View Left  Result Date: 6/9/2025  XR HIP W OR WO PELVIS 2-3 VIEW LEFT Date of Exam: 6/9/2025 10:13 AM EDT Indication: fall Comparison: 6/8/2025 Findings: Comminuted intertrochanteric fracture of the left femur. The trochanters are separate fragments. There is exaggerated varus angulation. Femoral head is not dislocated. No fracture of the pelvis is demonstrated     Impression: Intertrochanteric left femur fracture Electronically Signed: Moises Edwards  6/9/2025 10:56 AM EDT  Workstation ID: [id number]    CT Pelvis Without Contrast  Result Date: 6/8/2025  CT PELVIS WO CONTRAST Date of Exam: 6/8/2025 8:47 AM EDT Indication: poss left hip fx.  abnormal  xray. Comparison: 6/8/2025 Technique: Axial CT images were obtained of the pelvis without contrast administration.  Reconstructed coronal and sagittal images were also obtained. Automated exposure control and iterative construction methods were used. Findings: There is a transverse fracture involving the left greater trochanter which extends to the intertrochanteric femur. No fracture at the right proximal femur. No significant angulation or displacement. The superior and inferior pubic rami are intact. Sacrum and coccyx intact. Moderate bilateral hip osteoarthritis. There are areas of avascular necrosis of the left and right femoral heads without collapse. Severe disc narrowing in the visualized lower lumbar spine at L4-5 and L5-S1 with facet arthritis. Left and right iliac wings are intact. No acetabular fracture. Extensive aortoiliac vascular calcifications. Prostatomegaly prostate measuring up to 5.8 x 6 cm. Bladder without acute abnormality. No fluid or hemorrhage in the pelvis. Colonic diverticulosis. Partial ankylosis of the bilateral sacroiliac joints.     Impression: 1. Nondisplaced fracture involving left greater trochanter extending to the intertrochanteric femur. 2. 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 3. Moderate bilateral hip osteoarthritis. 4. Prostatomegaly. 5. Additional chronic findings above. Electronically Signed: Charanjit Crane MD  6/8/2025 9:24 AM EDT  Workstation ID: [id number]    XR Hip With or Without Pelvis 2 - 3 View Left  Result Date: 6/8/2025  XR HIP W OR WO PELVIS 2-3 VIEW LEFT Date of Exam: 6/8/2025 8:01 AM EDT Indication: fall, left hip/pelvis pain Comparison: 11/1/2024 Findings: There is subtle area of linear lucency overlying the left greater trochanter which may relate to nondisplaced fracture. Mild to moderate bilateral hip osteoarthritis. The iliopectineal and ilioischial lines are intact. Disc narrowing in the lower lumbar spine.     Impression:  Subtle area of linear lucency overlying the left greater trochanter may relate to nondisplaced fracture, consider CT pelvis for further assessment. Electronically Signed: Charanjit Crane MD  6/8/2025 8:23 AM EDT  Workstation ID: [id number]    CT Head Without Contrast  Result Date: 6/8/2025  CT HEAD WO CONTRAST, CT CERVICAL SPINE WO CONTRAST Date of Exam: 6/8/2025 7:52 AM EDT Indication: fall, unknown head trauma, dementia. Comparison: None available. Technique: Axial CT images were obtained of the head and cervical spine without contrast administration.  Automated exposure control and iterative construction methods were used. Findings: CT head: Generalized volume loss is present and there is also evidence of prior infarcts within the right and left occipital lobes as well as left cerebellum. There is no evidence of hemorrhage, mass or mass effect. There is ex vacuo prominence of the ventricles. The orbits are normal. The calvarium is intact. CT cervical spine: Redemonstrated contiguous bridging ventral osteophytes with the appearance of DISH. There is no evidence of cortical disruption or height loss to suggest acute fracture. There is redemonstrated straightening, without traumatic listhesis or subluxation. The dens is intact. The paraspinal soft tissues demonstrate no acute or suspicious findings.     Impression: Chronic and age-related changes of the brain are present. No acute intracranial abnormality. Stable CT of the cervical spine including multilevel spondylosis with the appearance of DISH. No acute fracture or traumatic malalignment. Electronically Signed: Indra Hill MD  6/8/2025 8:08 AM EDT  Workstation ID: [id number]    CT Cervical Spine Without Contrast  Result Date: 6/8/2025  CT HEAD WO CONTRAST, CT CERVICAL SPINE WO CONTRAST Date of Exam: 6/8/2025 7:52 AM EDT Indication: fall, unknown head trauma, dementia. Comparison: None available. Technique: Axial CT images were obtained of the head and cervical spine  without contrast administration.  Automated exposure control and iterative construction methods were used. Findings: CT head: Generalized volume loss is present and there is also evidence of prior infarcts within the right and left occipital lobes as well as left cerebellum. There is no evidence of hemorrhage, mass or mass effect. There is ex vacuo prominence of the ventricles. The orbits are normal. The calvarium is intact. CT cervical spine: Redemonstrated contiguous bridging ventral osteophytes with the appearance of DISH. There is no evidence of cortical disruption or height loss to suggest acute fracture. There is redemonstrated straightening, without traumatic listhesis or subluxation. The dens is intact. The paraspinal soft tissues demonstrate no acute or suspicious findings.     Impression: Chronic and age-related changes of the brain are present. No acute intracranial abnormality. Stable CT of the cervical spine including multilevel spondylosis with the appearance of DISH. No acute fracture or traumatic malalignment. Electronically Signed: Indra Hill MD  6/8/2025 8:08 AM EDT  Workstation ID: [id number]      Results for orders placed during the hospital encounter of 04/11/25    Duplex Venous Lower Extremity - Bilateral CAR    Interpretation Summary    Chronic right lower extremity deep vein thrombosis noted in the mid femoral.    All other veins appeared normal bilaterally.      Results for orders placed during the hospital encounter of 04/11/25    Duplex Venous Lower Extremity - Bilateral CAR    Interpretation Summary    Chronic right lower extremity deep vein thrombosis noted in the mid femoral.    All other veins appeared normal bilaterally.      Results for orders placed during the hospital encounter of 01/19/25    Adult Transthoracic Echo Complete W/ Cont if Necessary Per Protocol    Interpretation Summary    Left ventricular systolic function is normal. Estimated left ventricular EF = 50%    Left  ventricular wall thickness is consistent with borderline concentric hypertrophy.    Mild mitral valve regurgitation is present.      Plan for Follow-up of Pending Labs/Results:     Discharge Details        Discharge Medications        New Medications        Instructions Start Date   HYDROcodone-acetaminophen 5-325 MG per tablet  Commonly known as: NORCO   0.5 tablets, Oral, Every 6 Hours PRN             Changes to Medications        Instructions Start Date   aspirin 81 MG EC tablet  Commonly known as: ASPIR  What changed: when to take this   81 mg, Oral, 2 Times Daily      aspirin 81 MG EC tablet  Commonly known as: ASPIR  What changed: You were already taking a medication with the same name, and this prescription was added. Make sure you understand how and when to take each.   81 mg, Oral, Daily   Start Date: July 12, 2025     carvedilol 6.25 MG tablet  Commonly known as: COREG  What changed:   medication strength  how much to take   6.25 mg, Oral, 2 Times Daily      ondansetron ODT 4 MG disintegrating tablet  Commonly known as: ZOFRAN-ODT  What changed: when to take this   4 mg, Translingual, Every 8 Hours PRN             Continue These Medications        Instructions Start Date   acetaminophen 325 MG tablet  Commonly known as: TYLENOL   650 mg, Oral, Every 6 Hours PRN      amLODIPine 2.5 MG tablet  Commonly known as: NORVASC   2.5 mg, Daily      ammonium lactate 12 % lotion  Commonly known as: LAC-HYDRIN   Apply a small amount to the affected area twice a day.      atorvastatin 80 MG tablet  Commonly known as: LIPITOR   80 mg, Nightly      folic acid 1 MG tablet  Commonly known as: FOLVITE   1 mg, Daily      Lidocaine 4 %   1 patch, Every 24 Hours      pantoprazole 40 MG EC tablet  Commonly known as: PROTONIX   40 mg, Daily      sertraline 25 MG tablet  Commonly known as: ZOLOFT   25 mg, Daily      thiamine 100 MG tablet  Commonly known as: VITAMIN B1   100 mg, Oral, Daily             Stop These Medications       sulfamethoxazole-trimethoprim 800-160 MG per tablet  Commonly known as: BACTRIM DS,SEPTRA DS              Allergies   Allergen Reactions    Penicillins Hives and Swelling     Per patient, has tolerated Keflex         Discharge Disposition:  Rehab Facility or Unit (DC - External)    Diet:  Hospital:  Diet Order   Procedures    Diet: Regular/House; Fluid Consistency: Thin (IDDSI 0)       Diet Instructions       Diet: Regular/House Diet; Regular (IDDSI 7); Thin (IDDSI 0)      Discharge Diet: Regular/House Diet    Texture: Regular (IDDSI 7)    Fluid Consistency: Thin (IDDSI 0)             Activity:  Activity Instructions       Activity as Tolerated      Weight-bearing as tolerated left leg    Up WIth Assist              Restrictions or Other Recommendations:         CODE STATUS:    Code Status and Medical Interventions: No CPR (Do Not Attempt to Resuscitate); Limited Support; No intubation (DNI)   Ordered at: 06/09/25 1336     Code Status (Patient has no pulse and is not breathing):    No CPR (Do Not Attempt to Resuscitate)     Medical Interventions (Patient has pulse or is breathing):    Limited Support     Medical Intervention Limits:    No intubation (DNI)       Future Appointments   Date Time Provider Department Center   6/27/2025  8:00 AM Radu Francis MD [facility]   7/7/2025 11:45 AM Avelino Hernandez MD [facility]   7/8/2025 11:40 AM Dulce Friend PA-C [facility]       Additional Instructions for the Follow-ups that You Need to Schedule       Discharge Follow-up with PCP   As directed       Currently Documented PCP:    System, Provider Not In    PCP Phone Number:    None     Follow Up Details: 1 week        Discharge Follow-up with PCP   As directed       Currently Documented PCP:    System, Provider Not In    PCP Phone Number:    None     Follow Up Details: One [facility]        Discharge Follow-up with Specified Provider: Dr. Badillo or PA with  orthopedic surgery; 3 Weeks   As directed      To: Dr. Badillo or PA with orthopedic surgery   Follow Up: 3 Weeks        Discharge Follow-up with Specified Provider: Dr. Justino Aiken in 3 weeks with repeat x-rays   As directed      To: Dr. Justino Aiken in 3 weeks with repeat x-rays                      Marlene Rubalcava, YARED  06/14/25      Time Spent on Discharge:  I spent  45  minutes on this discharge activity which included: face-to-face encounter with the patient, reviewing the data in the system, coordination of the care with the nursing staff as well as consultants, documentation, and entering orders.

## 2025-06-24 ENCOUNTER — TELEPHONE (OUTPATIENT)
Dept: CASE MANAGEMENT | Facility: OTHER | Age: 85
End: 2025-06-24
Payer: MEDICARE

## 2025-06-30 ENCOUNTER — TELEPHONE (OUTPATIENT)
Dept: CASE MANAGEMENT | Facility: OTHER | Age: 85
End: 2025-06-30
Payer: MEDICARE

## 2025-06-30 PROBLEM — R00.1 BRADYCARDIA, SINUS: Status: ACTIVE | Noted: 2025-06-30

## 2025-06-30 PROBLEM — I50.32 CHRONIC HEART FAILURE WITH PRESERVED EJECTION FRACTION (HFPEF): Status: ACTIVE | Noted: 2025-06-30

## 2025-06-30 NOTE — TELEPHONE ENCOUNTER
RN-ACM has made 3 unsuccessful attempts to reach patient. ACM will will monitor patient for the  next 180 days.

## 2025-07-08 ENCOUNTER — APPOINTMENT (OUTPATIENT)
Dept: CT IMAGING | Facility: HOSPITAL | Age: 85
End: 2025-07-08
Payer: MEDICARE

## 2025-07-08 ENCOUNTER — HOSPITAL ENCOUNTER (OUTPATIENT)
Facility: HOSPITAL | Age: 85
Setting detail: OBSERVATION
Discharge: NURSING, REHAB OR LONG-TERM CARE | End: 2025-07-10
Attending: EMERGENCY MEDICINE | Admitting: INTERNAL MEDICINE
Payer: MEDICARE

## 2025-07-08 ENCOUNTER — APPOINTMENT (OUTPATIENT)
Dept: GENERAL RADIOLOGY | Facility: HOSPITAL | Age: 85
End: 2025-07-08
Payer: MEDICARE

## 2025-07-08 ENCOUNTER — TELEPHONE (OUTPATIENT)
Age: 85
End: 2025-07-08

## 2025-07-08 DIAGNOSIS — E78.2 MIXED HYPERLIPIDEMIA: ICD-10-CM

## 2025-07-08 DIAGNOSIS — N18.4 CHRONIC KIDNEY DISEASE, STAGE IV (SEVERE): ICD-10-CM

## 2025-07-08 DIAGNOSIS — S02.2XXB OPEN FRACTURE OF NASAL BONE, INITIAL ENCOUNTER: ICD-10-CM

## 2025-07-08 DIAGNOSIS — S01.01XA LACERATION OF SCALP, INITIAL ENCOUNTER: ICD-10-CM

## 2025-07-08 DIAGNOSIS — I10 ESSENTIAL HYPERTENSION: ICD-10-CM

## 2025-07-08 DIAGNOSIS — S02.85XB OPEN FRACTURE OF ORBIT, INITIAL ENCOUNTER: ICD-10-CM

## 2025-07-08 DIAGNOSIS — S02.19XB OPEN FRACTURE OF FRONTAL SINUS, INITIAL ENCOUNTER: Primary | ICD-10-CM

## 2025-07-08 LAB
ALBUMIN SERPL-MCNC: 3.3 G/DL (ref 3.5–5.2)
ALBUMIN/GLOB SERPL: 1 G/DL
ALP SERPL-CCNC: 325 U/L (ref 39–117)
ALT SERPL W P-5'-P-CCNC: 20 U/L (ref 1–41)
ANION GAP SERPL CALCULATED.3IONS-SCNC: 9.8 MMOL/L (ref 5–15)
APTT PPP: 37.3 SECONDS (ref 60–90)
AST SERPL-CCNC: 36 U/L (ref 1–40)
BACTERIA UR QL AUTO: NORMAL /HPF
BASOPHILS # BLD AUTO: 0.04 10*3/MM3 (ref 0–0.2)
BASOPHILS NFR BLD AUTO: 0.4 % (ref 0–1.5)
BILIRUB SERPL-MCNC: 0.5 MG/DL (ref 0–1.2)
BILIRUB UR QL STRIP: NEGATIVE
BUN SERPL-MCNC: 36.5 MG/DL (ref 8–23)
BUN/CREAT SERPL: 18.5 (ref 7–25)
CALCIUM SPEC-SCNC: 9.1 MG/DL (ref 8.6–10.5)
CHLORIDE SERPL-SCNC: 104 MMOL/L (ref 98–107)
CLARITY UR: ABNORMAL
CO2 SERPL-SCNC: 24.2 MMOL/L (ref 22–29)
COLOR UR: YELLOW
CREAT SERPL-MCNC: 1.97 MG/DL (ref 0.76–1.27)
DEPRECATED RDW RBC AUTO: 58.3 FL (ref 37–54)
EGFRCR SERPLBLD CKD-EPI 2021: 32.7 ML/MIN/1.73
EOSINOPHIL # BLD AUTO: 0.4 10*3/MM3 (ref 0–0.4)
EOSINOPHIL NFR BLD AUTO: 4.4 % (ref 0.3–6.2)
ERYTHROCYTE [DISTWIDTH] IN BLOOD BY AUTOMATED COUNT: 17.6 % (ref 12.3–15.4)
GLOBULIN UR ELPH-MCNC: 3.3 GM/DL
GLUCOSE SERPL-MCNC: 116 MG/DL (ref 65–99)
GLUCOSE UR STRIP-MCNC: NEGATIVE MG/DL
HCT VFR BLD AUTO: 29.2 % (ref 37.5–51)
HGB BLD-MCNC: 9.4 G/DL (ref 13–17.7)
HGB UR QL STRIP.AUTO: NEGATIVE
HYALINE CASTS UR QL AUTO: NORMAL /LPF
IMM GRANULOCYTES # BLD AUTO: 0.04 10*3/MM3 (ref 0–0.05)
IMM GRANULOCYTES NFR BLD AUTO: 0.4 % (ref 0–0.5)
INR PPP: 1.22 (ref 0.89–1.12)
KETONES UR QL STRIP: ABNORMAL
LEUKOCYTE ESTERASE UR QL STRIP.AUTO: NEGATIVE
LYMPHOCYTES # BLD AUTO: 1.11 10*3/MM3 (ref 0.7–3.1)
LYMPHOCYTES NFR BLD AUTO: 12.3 % (ref 19.6–45.3)
MCH RBC QN AUTO: 28.9 PG (ref 26.6–33)
MCHC RBC AUTO-ENTMCNC: 32.2 G/DL (ref 31.5–35.7)
MCV RBC AUTO: 89.8 FL (ref 79–97)
MONOCYTES # BLD AUTO: 1 10*3/MM3 (ref 0.1–0.9)
MONOCYTES NFR BLD AUTO: 11.1 % (ref 5–12)
NEUTROPHILS NFR BLD AUTO: 6.4 10*3/MM3 (ref 1.7–7)
NEUTROPHILS NFR BLD AUTO: 71.4 % (ref 42.7–76)
NITRITE UR QL STRIP: NEGATIVE
NRBC BLD AUTO-RTO: 0 /100 WBC (ref 0–0.2)
PH UR STRIP.AUTO: 5.5 [PH] (ref 5–8)
PLATELET # BLD AUTO: 225 10*3/MM3 (ref 140–450)
PMV BLD AUTO: 10.3 FL (ref 6–12)
POTASSIUM SERPL-SCNC: 3.6 MMOL/L (ref 3.5–5.2)
PROT SERPL-MCNC: 6.6 G/DL (ref 6–8.5)
PROT UR QL STRIP: ABNORMAL
PROTHROMBIN TIME: 16.2 SECONDS (ref 12.2–15.3)
RBC # BLD AUTO: 3.25 10*6/MM3 (ref 4.14–5.8)
RBC # UR STRIP: NORMAL /HPF
REF LAB TEST METHOD: NORMAL
SODIUM SERPL-SCNC: 138 MMOL/L (ref 136–145)
SP GR UR STRIP: 1.02 (ref 1–1.03)
SQUAMOUS #/AREA URNS HPF: NORMAL /HPF
UROBILINOGEN UR QL STRIP: ABNORMAL
WBC # UR STRIP: NORMAL /HPF
WBC NRBC COR # BLD AUTO: 8.99 10*3/MM3 (ref 3.4–10.8)

## 2025-07-08 PROCEDURE — 25010000002 ONDANSETRON PER 1 MG: Performed by: EMERGENCY MEDICINE

## 2025-07-08 PROCEDURE — 71045 X-RAY EXAM CHEST 1 VIEW: CPT

## 2025-07-08 PROCEDURE — 96375 TX/PRO/DX INJ NEW DRUG ADDON: CPT

## 2025-07-08 PROCEDURE — 70486 CT MAXILLOFACIAL W/O DYE: CPT

## 2025-07-08 PROCEDURE — 85610 PROTHROMBIN TIME: CPT | Performed by: EMERGENCY MEDICINE

## 2025-07-08 PROCEDURE — P9612 CATHETERIZE FOR URINE SPEC: HCPCS

## 2025-07-08 PROCEDURE — 25010000002 MORPHINE PER 10 MG: Performed by: EMERGENCY MEDICINE

## 2025-07-08 PROCEDURE — 36415 COLL VENOUS BLD VENIPUNCTURE: CPT

## 2025-07-08 PROCEDURE — 72192 CT PELVIS W/O DYE: CPT

## 2025-07-08 PROCEDURE — 72125 CT NECK SPINE W/O DYE: CPT

## 2025-07-08 PROCEDURE — 93005 ELECTROCARDIOGRAM TRACING: CPT | Performed by: EMERGENCY MEDICINE

## 2025-07-08 PROCEDURE — 99285 EMERGENCY DEPT VISIT HI MDM: CPT

## 2025-07-08 PROCEDURE — 80053 COMPREHEN METABOLIC PANEL: CPT | Performed by: EMERGENCY MEDICINE

## 2025-07-08 PROCEDURE — 70450 CT HEAD/BRAIN W/O DYE: CPT

## 2025-07-08 PROCEDURE — 85730 THROMBOPLASTIN TIME PARTIAL: CPT | Performed by: EMERGENCY MEDICINE

## 2025-07-08 PROCEDURE — 85025 COMPLETE CBC W/AUTO DIFF WBC: CPT | Performed by: EMERGENCY MEDICINE

## 2025-07-08 PROCEDURE — 81001 URINALYSIS AUTO W/SCOPE: CPT | Performed by: EMERGENCY MEDICINE

## 2025-07-08 RX ORDER — ONDANSETRON 2 MG/ML
4 INJECTION INTRAMUSCULAR; INTRAVENOUS ONCE
Status: COMPLETED | OUTPATIENT
Start: 2025-07-08 | End: 2025-07-08

## 2025-07-08 RX ORDER — SODIUM CHLORIDE 0.9 % (FLUSH) 0.9 %
10 SYRINGE (ML) INJECTION AS NEEDED
Status: DISCONTINUED | OUTPATIENT
Start: 2025-07-08 | End: 2025-07-10 | Stop reason: HOSPADM

## 2025-07-08 RX ORDER — MORPHINE SULFATE 4 MG/ML
4 INJECTION, SOLUTION INTRAMUSCULAR; INTRAVENOUS ONCE
Status: COMPLETED | OUTPATIENT
Start: 2025-07-08 | End: 2025-07-08

## 2025-07-08 RX ADMIN — ONDANSETRON 4 MG: 2 INJECTION, SOLUTION INTRAMUSCULAR; INTRAVENOUS at 22:51

## 2025-07-08 RX ADMIN — MORPHINE SULFATE 4 MG: 4 INJECTION, SOLUTION INTRAMUSCULAR; INTRAVENOUS at 22:51

## 2025-07-08 NOTE — TELEPHONE ENCOUNTER
Called patient and left a message for him to call and reschedule his post op appointment with Dulce PRESCOTT. [facility]

## 2025-07-08 NOTE — Clinical Note
Level of Care: Telemetry [5]   Diagnosis: Facial fracture [179724]   Admitting Physician: WILLIAM BETHEA [[id number]]   Attending Physician: WILLIAM BETHEA [[id number]]   Is patient appropriate for Inpatient Observation Unit?: Yes [1]

## 2025-07-09 ENCOUNTER — APPOINTMENT (OUTPATIENT)
Dept: CT IMAGING | Facility: HOSPITAL | Age: 85
End: 2025-07-09
Payer: MEDICARE

## 2025-07-09 PROBLEM — S02.92XA FACIAL FRACTURE: Status: ACTIVE | Noted: 2025-07-09

## 2025-07-09 LAB
BASOPHILS # BLD AUTO: 0.03 10*3/MM3 (ref 0–0.2)
BASOPHILS NFR BLD AUTO: 0.4 % (ref 0–1.5)
DEPRECATED RDW RBC AUTO: 59.4 FL (ref 37–54)
EOSINOPHIL # BLD AUTO: 0.3 10*3/MM3 (ref 0–0.4)
EOSINOPHIL NFR BLD AUTO: 4.3 % (ref 0.3–6.2)
ERYTHROCYTE [DISTWIDTH] IN BLOOD BY AUTOMATED COUNT: 17.6 % (ref 12.3–15.4)
HCT VFR BLD AUTO: 27.7 % (ref 37.5–51)
HGB BLD-MCNC: 8.9 G/DL (ref 13–17.7)
IMM GRANULOCYTES # BLD AUTO: 0.02 10*3/MM3 (ref 0–0.05)
IMM GRANULOCYTES NFR BLD AUTO: 0.3 % (ref 0–0.5)
LYMPHOCYTES # BLD AUTO: 1.21 10*3/MM3 (ref 0.7–3.1)
LYMPHOCYTES NFR BLD AUTO: 17.2 % (ref 19.6–45.3)
MCH RBC QN AUTO: 29.1 PG (ref 26.6–33)
MCHC RBC AUTO-ENTMCNC: 32.1 G/DL (ref 31.5–35.7)
MCV RBC AUTO: 90.5 FL (ref 79–97)
MONOCYTES # BLD AUTO: 0.93 10*3/MM3 (ref 0.1–0.9)
MONOCYTES NFR BLD AUTO: 13.2 % (ref 5–12)
NEUTROPHILS NFR BLD AUTO: 4.53 10*3/MM3 (ref 1.7–7)
NEUTROPHILS NFR BLD AUTO: 64.6 % (ref 42.7–76)
NRBC BLD AUTO-RTO: 0 /100 WBC (ref 0–0.2)
PLATELET # BLD AUTO: 191 10*3/MM3 (ref 140–450)
PMV BLD AUTO: 10 FL (ref 6–12)
RBC # BLD AUTO: 3.06 10*6/MM3 (ref 4.14–5.8)
WBC NRBC COR # BLD AUTO: 7.02 10*3/MM3 (ref 3.4–10.8)

## 2025-07-09 PROCEDURE — 97165 OT EVAL LOW COMPLEX 30 MIN: CPT

## 2025-07-09 PROCEDURE — 96365 THER/PROPH/DIAG IV INF INIT: CPT

## 2025-07-09 PROCEDURE — 99213 OFFICE O/P EST LOW 20 MIN: CPT | Performed by: PHYSICIAN ASSISTANT

## 2025-07-09 PROCEDURE — 70450 CT HEAD/BRAIN W/O DYE: CPT

## 2025-07-09 PROCEDURE — 85025 COMPLETE CBC W/AUTO DIFF WBC: CPT | Performed by: STUDENT IN AN ORGANIZED HEALTH CARE EDUCATION/TRAINING PROGRAM

## 2025-07-09 PROCEDURE — G0378 HOSPITAL OBSERVATION PER HR: HCPCS

## 2025-07-09 PROCEDURE — 70486 CT MAXILLOFACIAL W/O DYE: CPT

## 2025-07-09 PROCEDURE — 25010000002 POTASSIUM CHLORIDE 10 MEQ/100ML SOLUTION: Performed by: INTERNAL MEDICINE

## 2025-07-09 PROCEDURE — 97162 PT EVAL MOD COMPLEX 30 MIN: CPT

## 2025-07-09 PROCEDURE — 99222 1ST HOSP IP/OBS MODERATE 55: CPT | Performed by: STUDENT IN AN ORGANIZED HEALTH CARE EDUCATION/TRAINING PROGRAM

## 2025-07-09 RX ORDER — ATORVASTATIN CALCIUM 40 MG/1
80 TABLET, FILM COATED ORAL NIGHTLY
Status: DISCONTINUED | OUTPATIENT
Start: 2025-07-09 | End: 2025-07-10 | Stop reason: HOSPADM

## 2025-07-09 RX ORDER — ACETAMINOPHEN 160 MG/5ML
650 SOLUTION ORAL EVERY 4 HOURS PRN
Status: DISCONTINUED | OUTPATIENT
Start: 2025-07-09 | End: 2025-07-10 | Stop reason: HOSPADM

## 2025-07-09 RX ORDER — BISACODYL 10 MG
10 SUPPOSITORY, RECTAL RECTAL DAILY PRN
Status: DISCONTINUED | OUTPATIENT
Start: 2025-07-09 | End: 2025-07-10 | Stop reason: HOSPADM

## 2025-07-09 RX ORDER — DOCUSATE SODIUM 100 MG/1
100 CAPSULE, LIQUID FILLED ORAL EVERY MORNING
COMMUNITY

## 2025-07-09 RX ORDER — FOLIC ACID 1 MG/1
1 TABLET ORAL DAILY
Status: DISCONTINUED | OUTPATIENT
Start: 2025-07-09 | End: 2025-07-10 | Stop reason: HOSPADM

## 2025-07-09 RX ORDER — POTASSIUM CHLORIDE 7.45 MG/ML
10 INJECTION INTRAVENOUS
Status: COMPLETED | OUTPATIENT
Start: 2025-07-09 | End: 2025-07-09

## 2025-07-09 RX ORDER — SODIUM CHLORIDE 9 MG/ML
40 INJECTION, SOLUTION INTRAVENOUS AS NEEDED
Status: DISCONTINUED | OUTPATIENT
Start: 2025-07-09 | End: 2025-07-10 | Stop reason: HOSPADM

## 2025-07-09 RX ORDER — ACETAMINOPHEN 325 MG/1
650 TABLET ORAL EVERY 4 HOURS PRN
Status: DISCONTINUED | OUTPATIENT
Start: 2025-07-09 | End: 2025-07-10 | Stop reason: HOSPADM

## 2025-07-09 RX ORDER — CLONIDINE HYDROCHLORIDE 0.1 MG/1
0.1 TABLET ORAL EVERY 6 HOURS PRN
COMMUNITY

## 2025-07-09 RX ORDER — ASPIRIN 81 MG/1
81 TABLET ORAL DAILY
Status: DISCONTINUED | OUTPATIENT
Start: 2025-07-10 | End: 2025-07-10 | Stop reason: HOSPADM

## 2025-07-09 RX ORDER — NITROGLYCERIN 0.4 MG/1
0.4 TABLET SUBLINGUAL
Status: DISCONTINUED | OUTPATIENT
Start: 2025-07-09 | End: 2025-07-10 | Stop reason: HOSPADM

## 2025-07-09 RX ORDER — POTASSIUM CHLORIDE 29.8 MG/ML
20 INJECTION INTRAVENOUS ONCE
Status: DISCONTINUED | OUTPATIENT
Start: 2025-07-09 | End: 2025-07-09

## 2025-07-09 RX ORDER — POLYETHYLENE GLYCOL 3350 17 G/17G
17 POWDER, FOR SOLUTION ORAL DAILY PRN
Status: DISCONTINUED | OUTPATIENT
Start: 2025-07-09 | End: 2025-07-10 | Stop reason: HOSPADM

## 2025-07-09 RX ORDER — SODIUM CHLORIDE 0.9 % (FLUSH) 0.9 %
10 SYRINGE (ML) INJECTION EVERY 12 HOURS SCHEDULED
Status: DISCONTINUED | OUTPATIENT
Start: 2025-07-09 | End: 2025-07-10 | Stop reason: HOSPADM

## 2025-07-09 RX ORDER — SODIUM CHLORIDE 0.9 % (FLUSH) 0.9 %
10 SYRINGE (ML) INJECTION AS NEEDED
Status: DISCONTINUED | OUTPATIENT
Start: 2025-07-09 | End: 2025-07-10 | Stop reason: HOSPADM

## 2025-07-09 RX ORDER — BISACODYL 5 MG/1
5 TABLET, DELAYED RELEASE ORAL DAILY PRN
Status: DISCONTINUED | OUTPATIENT
Start: 2025-07-09 | End: 2025-07-10 | Stop reason: HOSPADM

## 2025-07-09 RX ORDER — POLYETHYLENE GLYCOL 3350 17 G/17G
17 POWDER, FOR SOLUTION ORAL DAILY
COMMUNITY

## 2025-07-09 RX ORDER — PANTOPRAZOLE SODIUM 40 MG/1
40 TABLET, DELAYED RELEASE ORAL DAILY
Status: DISCONTINUED | OUTPATIENT
Start: 2025-07-09 | End: 2025-07-10 | Stop reason: HOSPADM

## 2025-07-09 RX ORDER — AMOXICILLIN 250 MG
2 CAPSULE ORAL 2 TIMES DAILY PRN
Status: DISCONTINUED | OUTPATIENT
Start: 2025-07-09 | End: 2025-07-10 | Stop reason: HOSPADM

## 2025-07-09 RX ORDER — ACETAMINOPHEN 650 MG/1
650 SUPPOSITORY RECTAL EVERY 4 HOURS PRN
Status: DISCONTINUED | OUTPATIENT
Start: 2025-07-09 | End: 2025-07-10 | Stop reason: HOSPADM

## 2025-07-09 RX ORDER — ASPIRIN 81 MG/1
81 TABLET ORAL 2 TIMES DAILY
Status: DISCONTINUED | OUTPATIENT
Start: 2025-07-09 | End: 2025-07-09

## 2025-07-09 RX ORDER — CARVEDILOL 6.25 MG/1
6.25 TABLET ORAL 2 TIMES DAILY
Status: DISCONTINUED | OUTPATIENT
Start: 2025-07-09 | End: 2025-07-10 | Stop reason: HOSPADM

## 2025-07-09 RX ORDER — TRAMADOL HYDROCHLORIDE 50 MG/1
25 TABLET ORAL ONCE
Status: COMPLETED | OUTPATIENT
Start: 2025-07-10 | End: 2025-07-09

## 2025-07-09 RX ORDER — AMLODIPINE BESYLATE 5 MG/1
2.5 TABLET ORAL DAILY
Status: DISCONTINUED | OUTPATIENT
Start: 2025-07-09 | End: 2025-07-10 | Stop reason: HOSPADM

## 2025-07-09 RX ORDER — SERTRALINE HYDROCHLORIDE 25 MG/1
25 TABLET, FILM COATED ORAL DAILY
Status: DISCONTINUED | OUTPATIENT
Start: 2025-07-09 | End: 2025-07-10 | Stop reason: HOSPADM

## 2025-07-09 RX ADMIN — TRAMADOL HYDROCHLORIDE 25 MG: 50 TABLET, COATED ORAL at 23:50

## 2025-07-09 RX ADMIN — SERTRALINE 25 MG: 25 TABLET, FILM COATED ORAL at 12:18

## 2025-07-09 RX ADMIN — FOLIC ACID 1 MG: 1 TABLET ORAL at 12:18

## 2025-07-09 RX ADMIN — POTASSIUM CHLORIDE 10 MEQ: 7.46 INJECTION, SOLUTION INTRAVENOUS at 09:22

## 2025-07-09 RX ADMIN — ATORVASTATIN CALCIUM 80 MG: 40 TABLET, FILM COATED ORAL at 22:00

## 2025-07-09 RX ADMIN — PANTOPRAZOLE SODIUM 40 MG: 40 TABLET, DELAYED RELEASE ORAL at 12:18

## 2025-07-09 RX ADMIN — CARVEDILOL 6.25 MG: 6.25 TABLET, FILM COATED ORAL at 22:00

## 2025-07-09 RX ADMIN — POTASSIUM CHLORIDE 10 MEQ: 7.46 INJECTION, SOLUTION INTRAVENOUS at 10:12

## 2025-07-09 RX ADMIN — ACETAMINOPHEN 650 MG: 325 TABLET ORAL at 21:59

## 2025-07-09 RX ADMIN — Medication 10 ML: at 10:12

## 2025-07-09 RX ADMIN — THIAMINE HCL TAB 100 MG 100 MG: 100 TAB at 12:18

## 2025-07-09 NOTE — CONSULTS
[facility] Neurosurgery Services  CONSULT NOTE    Patient Name: Toño Alcala  : 1940  MRN: [id number]    Primary Care Physician: System, Provider Not In  Referring Physician: No ref. provider found     Reason for Consultation: facial fractures    Subjective     CC: unwitnessed fall with facial fractures    Admission diagnosis: Facial fracture [S02.92XA]     HPI: Toño Alcala is a 85 y.o. male with a history of an unwitnessed fall in assisted living in which he tripped and fell forward onto his face. He was brought to our ED and underwent several scans which demonstrated multiple facial fractures,     ROS:  A 12 point review of systems was performed.  All systems reviewed were negative with the exception of those mentioned in the history of present illness.    Past medical history:  Past Medical History:   Diagnosis Date    Acute diverticulitis 2020    Allergic 60 yrs ago    Arthritis     Arthritis of neck Fusion 1992    Bilateral radial fractures     fracture bilateral radial heads    CAD (coronary artery disease)     Calculus of gallbladder without cholecystitis without obstruction 2020    Cataract     Cervical disc disorder Fusion     Cholelithiasis     Chronic kidney disease     Clotting disorder     CVD (cardiovascular disease)     History of TIA     Diabetes mellitus     DVT of proximal lower limb 2015    proximal DVT right leg, small PE- anticoagulation    Dyslipidemia     Erectile dysfunction     Fracture     H/o pf left forearm fracture    Fracture of right hand     GERD (gastroesophageal reflux disease)     with hiatal hernia    HL (hearing loss)     Hx of angina pectoris     Hypertension     Normal renal angiography 11    Knee swelling Several years ago    Left wrist fracture     Lumbosacral disc disease     Osgood-Schlatter's disease     Osteoarthritis     Right wrist fracture     Stroke     Pt states  no residual weakness though has urinary incontinence.    Vertigo     Wears glasses        Past surgical history:  Past Surgical History:   Procedure Laterality Date    APPENDECTOMY      CARDIAC CATHETERIZATION      with vein graft    CATARACT EXTRACTION Left     CERVICAL FUSION      C3-4    COLONOSCOPY  2013    CORONARY ARTERY BYPASS GRAFT  1994    HERNIA REPAIR Right 2011    inguinal    HIP TROCHANTERIC NAILING WITH INTRAMEDULLARY HIP SCREW Left 2025    Procedure: CEPHALOMEDULLARY NAIL FIXATION;  Surgeon: Rey Badillo MD;  Location:  [facility];  Service: Orthopedics;  Laterality: Left;    INGUINAL HERNIA REPAIR Left 10/29/2019    Procedure: INGUINAL HERNIA REPAIR LEFT;  Surgeon: Charisma Raines MD;  Location:  [facility];  Service: General    INTERVENTIONAL RADIOLOGY PROCEDURE N/A 2025    Procedure: IVC Filter Placement;  Surgeon: Avelino Hernandez MD;  Location:  [facility];  Service: Cardiovascular;  Laterality: N/A;    LUMBAR LAMINECTOMY      laminectomy, lumbar, L3    OTHER SURGICAL HISTORY      wrist surgery    TONSILLECTOMY AND ADENOIDECTOMY      TRIGGER POINT INJECTION   -     VASECTOMY  1972       Social history:  Social History     Socioeconomic History    Marital status:    Tobacco Use    Smoking status: Former     Current packs/day: 0.00     Average packs/day: 1.5 packs/day for 34.8 years (52.2 ttl pk-yrs)     Types: Cigarettes, Pipe, Cigars     Start date: 1962     Quit date: 1997     Years since quittin.2     Passive exposure: Past    Smokeless tobacco: Never    Tobacco comments:     QUIT DATE 1992   Vaping Use    Vaping status: Never Used   Substance and Sexual Activity    Alcohol use: Yes     Alcohol/week: 11.0 - 13.0 standard drinks of alcohol     Types: 7 Glasses of wine, 2 Cans of beer, 2 - 4 Shots of liquor per week     Comment: glass of wine everyday     Drug use: No    Sexual activity: Not Currently      Partners: Female     Birth control/protection: Vasectomy, Surgical       Family history:  Family History   Problem Relation Age of Onset    Arthritis Mother         OA    Heart disease Father     Diabetes Father     Heart attack Father     Heart disease Brother     Heart attack Brother     Diabetes Brother     Diabetes Son     Hypertension Other     Cancer Other        Allergies:  Allergies   Allergen Reactions    Penicillins Hives and Swelling     Per patient, has tolerated Keflex       Outpatient medications:  Scheduled Meds:amLODIPine, 2.5 mg, Oral, Daily  [Held by provider] aspirin, 81 mg, Oral, BID  atorvastatin, 80 mg, Oral, Nightly  carvedilol, 6.25 mg, Oral, BID  folic acid, 1 mg, Oral, Daily  pantoprazole, 40 mg, Oral, Daily  sertraline, 25 mg, Oral, Daily  sodium chloride, 10 mL, Intravenous, Q12H  thiamine, 100 mg, Oral, Daily      Continuous Infusions:   PRN Meds:.  acetaminophen **OR** acetaminophen **OR** acetaminophen    senna-docusate sodium **AND** polyethylene glycol **AND** bisacodyl **AND** bisacodyl    Calcium Replacement - Follow Nurse / BPA Driven Protocol    Magnesium Standard Dose Replacement - Follow Nurse / BPA Driven Protocol    nitroglycerin    Phosphorus Replacement - Follow Nurse / BPA Driven Protocol    Potassium Replacement - Follow Nurse / BPA Driven Protocol    [COMPLETED] Insert Peripheral IV **AND** sodium chloride    sodium chloride    sodium chloride      Objective     Vital Signs:   Vitals:    25 1218   BP: 119/56   Pulse: 64   Resp:    Temp:    SpO2:      Systolic (24hrs), Av , Min:119 , Max:154     Diastolic (24hrs), Av, Min:56, Max:82    Pulse  Av  Min: 64  Max: 96  Temp (24hrs), Av °F (36.7 °C), Min:98 °F (36.7 °C), Max:98 °F (36.7 °C)      Results Reviewed:  I have personally reviewed current lab, radiology, and data and agree.    Results from last 7 days   Lab Units 25  0733 25  2258   WBC 10*3/mm3 7.02 8.99   HEMOGLOBIN g/dL 8.9*  9.4*   HEMATOCRIT % 27.7* 29.2*   PLATELETS 10*3/mm3 191 225   INR   --  1.22*     Results from last 7 days   Lab Units 07/08/25  2258   SODIUM mmol/L 138   POTASSIUM mmol/L 3.6   CHLORIDE mmol/L 104   CO2 mmol/L 24.2   BUN mg/dL 36.5*   CREATININE mg/dL 1.97*   GLUCOSE mg/dL 116*   CALCIUM mg/dL 9.1   ALK PHOS U/L 325*   ALT (SGPT) U/L 20   AST (SGOT) U/L 36     Estimated Creatinine Clearance: 29.9 mL/min (A) (by C-G formula based on SCr of 1.97 mg/dL (H)).    Imaging Results (Last 24 Hours)       Procedure Component Value Units Date/Time    CT Head Without Contrast [[id number]] Collected: 07/09/25 0953     Updated: 07/09/25 1017    Narrative:      CT MAXILLOFACIAL WO CONT, CT HEAD WO CONTRAST    Date of Exam: 7/9/2025 8:53 AM EDT    Indication: follow up.    Comparison: 7/8/2025 CT scan of the head and face    Technique: Axial CT images were obtained of the head, and subsequently from the inferior aspect of the mandible through the frontal sinuses without contrast administration.  Reconstructed coronal and sagittal images were also obtained. Automated exposure   control and iterative construction methods were used.      Findings:  Scans performed yesterday by report showed no acute intracranial process. Mildly comminuted fracture of the anterior wall of the right frontal sinus. Medial frontal sinus fracture bilaterally, posterior wall right frontal sinus fracture. Mildly displaced   superior right orbit fracture, and medial and lateral right orbital wall fracture. Mildly displaced right nasal bone fracture.    CT SCAN OF THE HEAD WITHOUT CONTRAST: Included portions of the facial bones show similar fracture pattern. Complete facial bone CT scan follows this report. Images of the brain again show advanced generalized cerebral atrophy, bilateral occipital   infarcts right larger than left, and left cerebral infarct. There appears to be an old medial right temporal lobe infarct as well, adjacent the temporal horn of  the lateral ventricle. There is no evidence of new infarct, no evidence of edema, hemorrhage,   mass or mass effect or abnormal extra-axial collection. Ventriculomegaly likely represents ventricular dilatation due to advanced parenchymal loss, although NPH could appear similar.      Impression:      Stable appearance of the brain including advanced generalized cerebral atrophy, and multiple old infarcts. No evidence of intracranial hemorrhage or other new intracranial disease is seen.        CT SCAN OF THE FACIAL BONES WITHOUT CONTRAST: As on the prior study, there is fluid opacification of the frontal sinuses, anterior ethmoid air cells, trace left sphenoid sinus fluid and small fluid levels in the maxillary sinuses, which appear unchanged,   suggesting no ongoing hemorrhage. Fluid density in the frontal and ethmoid sinuses is consistent with acute/recent blood.    Fracture pattern of the frontal sinuses, right more extensive than left, nasal bone, superior orbit,, medial orbit, and subtle fracture of the lateral maxillary sinus wall all appear stable. No further displacement of the superior orbital fracture is   seen.    Soft tissue window images show decreased diffuse edema/hematoma in the frontal subcutaneous tissues, and mildly increased preseptal edema or hematoma. The optic globes, extraocular muscles, and optic nerves appear symmetric and normal. No evidence of   extraocular muscle entrapment is seen. There is no apparent fracture of the bony nasal septum, and no nasal septal hematoma.    Impression:    1. Extensive, predominately right-sided facial trauma, with stable fracture pattern compared yesterday's 11:04 p.m. exam. No new or increasing displacement is identified.    2. Stable hyperdense fluid in the paranasal sinuses, consistent with some blood products. No evidence to suggest continued hemorrhage.    3. Decreased subcutaneous edema or diffuse bruising of the frontal scalp, and mildly increased right  preseptal edema. No evidence of soft tissue trauma to the contents of the right orbit.        Electronically Signed: Chandu Santiago MD    7/9/2025 10:14 AM EDT    Workstation ID: [id number]    CT Maxillofacial Without Contrast [[id number]] Collected: 07/09/25 0953     Updated: 07/09/25 1017    Narrative:      CT MAXILLOFACIAL WO CONT, CT HEAD WO CONTRAST    Date of Exam: 7/9/2025 8:53 AM EDT    Indication: follow up.    Comparison: 7/8/2025 CT scan of the head and face    Technique: Axial CT images were obtained of the head, and subsequently from the inferior aspect of the mandible through the frontal sinuses without contrast administration.  Reconstructed coronal and sagittal images were also obtained. Automated exposure   control and iterative construction methods were used.      Findings:  Scans performed yesterday by report showed no acute intracranial process. Mildly comminuted fracture of the anterior wall of the right frontal sinus. Medial frontal sinus fracture bilaterally, posterior wall right frontal sinus fracture. Mildly displaced   superior right orbit fracture, and medial and lateral right orbital wall fracture. Mildly displaced right nasal bone fracture.    CT SCAN OF THE HEAD WITHOUT CONTRAST: Included portions of the facial bones show similar fracture pattern. Complete facial bone CT scan follows this report. Images of the brain again show advanced generalized cerebral atrophy, bilateral occipital   infarcts right larger than left, and left cerebral infarct. There appears to be an old medial right temporal lobe infarct as well, adjacent the temporal horn of the lateral ventricle. There is no evidence of new infarct, no evidence of edema, hemorrhage,   mass or mass effect or abnormal extra-axial collection. Ventriculomegaly likely represents ventricular dilatation due to advanced parenchymal loss, although NPH could appear similar.      Impression:      Stable appearance of the brain including advanced  generalized cerebral atrophy, and multiple old infarcts. No evidence of intracranial hemorrhage or other new intracranial disease is seen.        CT SCAN OF THE FACIAL BONES WITHOUT CONTRAST: As on the prior study, there is fluid opacification of the frontal sinuses, anterior ethmoid air cells, trace left sphenoid sinus fluid and small fluid levels in the maxillary sinuses, which appear unchanged,   suggesting no ongoing hemorrhage. Fluid density in the frontal and ethmoid sinuses is consistent with acute/recent blood.    Fracture pattern of the frontal sinuses, right more extensive than left, nasal bone, superior orbit,, medial orbit, and subtle fracture of the lateral maxillary sinus wall all appear stable. No further displacement of the superior orbital fracture is   seen.    Soft tissue window images show decreased diffuse edema/hematoma in the frontal subcutaneous tissues, and mildly increased preseptal edema or hematoma. The optic globes, extraocular muscles, and optic nerves appear symmetric and normal. No evidence of   extraocular muscle entrapment is seen. There is no apparent fracture of the bony nasal septum, and no nasal septal hematoma.    Impression:    1. Extensive, predominately right-sided facial trauma, with stable fracture pattern compared yesterday's 11:04 p.m. exam. No new or increasing displacement is identified.    2. Stable hyperdense fluid in the paranasal sinuses, consistent with some blood products. No evidence to suggest continued hemorrhage.    3. Decreased subcutaneous edema or diffuse bruising of the frontal scalp, and mildly increased right preseptal edema. No evidence of soft tissue trauma to the contents of the right orbit.        Electronically Signed: Chandu Santiago MD    7/9/2025 10:14 AM EDT    Workstation ID: [id number]    CT Head Without Contrast [[id number]] Collected: 07/08/25 2322     Updated: 07/08/25 2334    Narrative:      CT HEAD WO CONTRAST, CT CERVICAL SPINE WO CONTRAST, CT  PELVIS WO CONTRAST, CT MAXILLOFACIAL WO CONT    Date of Exam: 7/8/2025 10:56 PM EDT    Indication: fall, trauma.    Comparison: 6/9/2025, 6/8/2025, 6/11/2025.    Technique: Axial CT images were obtained of the head, face, cervical spine and pelvis without contrast administration.  Automated exposure control and iterative construction methods were used.      Findings:  Head: There is no evidence of hemorrhage. There is no mass effect or midline shift. Encephalomalacia is present within the right occipital lobe related to previous infarct.    There is no extracerebral collection.    Ventricles are normal in size and configuration for patient's stated age.      Posterior fossa is within normal limits.    Calvarium and skull base appear intact.   Soft tissue swelling is present within the right forehead region.    Face: There is a comminuted mildly displaced fracture of the anterior wall of the right frontal sinus (series 2 image 88). There is a fracture of the medial wall of the frontal sinuses bilaterally with a small fracture seen within the posterior wall of   the right frontal sinus (series 2 image 84). There is a mildly displaced fracture of the right nasal bone. There is a mildly displaced comminuted fracture of the superior wall of the right orbit (series 900 image 21) as well as the medial wall (series   900 image 28). The inferior wall appears intact. There does hypertrophy a subtle fracture of the lateral wall of the right orbit (series 2 image 82). Small air-fluid levels are present within the bilateral maxillary sinuses. The globes appear intact.   Retroconal fat appears within normal limits. Right periorbital soft tissue swelling is present. Temporomandibular joints appear intact.    Cervical: No evidence of fracture or compression deformity. No evidence of spondylolysis.  No significant spondylolisthesis. No evidence of focal lesions. The prevertebral soft tissues appear unremarkable. Surrounding soft  tissues appear within normal   limits. Moderate multilevel degenerative changes are present throughout the spine. Findings are present consistent with DISH. The lung apices appear clear.    Pelvis: Postsurgical changes are seen related to left femoral fixation. There is a comminuted intertrochanteric fracture of the left proximal femur which appears similar as compared to the previous study. A small amount of callus formation is present   related to healing. The fracture lines are still present. No new fracture identified. Intrapelvic contents demonstrate no acute process. Moderate to large amount of stool present within the rectum. No significant free fluid identified. No sizable soft   tissue hematoma identified.        Impression:      Impression:  1.No acute intracranial process.  2.Comminuted mildly displaced fracture of the anterior wall of the right frontal sinus. There is a fracture of the medial wall of the frontal sinuses bilaterally with a small fracture seen within the posterior wall of the right frontal sinus.  3.Mildly displaced comminuted fracture of the superior wall of the right orbit as well as fractures of the medial and lateral walls of the right orbit.  4.Mildly displaced fracture of the right nasal bone.  5.No acute osseous abnormality of the cervical spine.  6.No acute osseous abnormality of the pelvis. Stable comminuted intertrochanteric fracture of the left proximal femur with a small amount of callus formation related to healing. The fracture lines are still present.        Electronically Signed: Deana Caballero MD    7/8/2025 11:31 PM EDT    Workstation ID: [id number]    CT Cervical Spine Without Contrast [[id number]] Collected: 07/08/25 2322     Updated: 07/08/25 2334    Narrative:      CT HEAD WO CONTRAST, CT CERVICAL SPINE WO CONTRAST, CT PELVIS WO CONTRAST, CT MAXILLOFACIAL WO CONT    Date of Exam: 7/8/2025 10:56 PM EDT    Indication: fall, trauma.    Comparison: 6/9/2025, 6/8/2025,  6/11/2025.    Technique: Axial CT images were obtained of the head, face, cervical spine and pelvis without contrast administration.  Automated exposure control and iterative construction methods were used.      Findings:  Head: There is no evidence of hemorrhage. There is no mass effect or midline shift. Encephalomalacia is present within the right occipital lobe related to previous infarct.    There is no extracerebral collection.    Ventricles are normal in size and configuration for patient's stated age.      Posterior fossa is within normal limits.    Calvarium and skull base appear intact.   Soft tissue swelling is present within the right forehead region.    Face: There is a comminuted mildly displaced fracture of the anterior wall of the right frontal sinus (series 2 image 88). There is a fracture of the medial wall of the frontal sinuses bilaterally with a small fracture seen within the posterior wall of   the right frontal sinus (series 2 image 84). There is a mildly displaced fracture of the right nasal bone. There is a mildly displaced comminuted fracture of the superior wall of the right orbit (series 900 image 21) as well as the medial wall (series   900 image 28). The inferior wall appears intact. There does hypertrophy a subtle fracture of the lateral wall of the right orbit (series 2 image 82). Small air-fluid levels are present within the bilateral maxillary sinuses. The globes appear intact.   Retroconal fat appears within normal limits. Right periorbital soft tissue swelling is present. Temporomandibular joints appear intact.    Cervical: No evidence of fracture or compression deformity. No evidence of spondylolysis.  No significant spondylolisthesis. No evidence of focal lesions. The prevertebral soft tissues appear unremarkable. Surrounding soft tissues appear within normal   limits. Moderate multilevel degenerative changes are present throughout the spine. Findings are present consistent with  DISH. The lung apices appear clear.    Pelvis: Postsurgical changes are seen related to left femoral fixation. There is a comminuted intertrochanteric fracture of the left proximal femur which appears similar as compared to the previous study. A small amount of callus formation is present   related to healing. The fracture lines are still present. No new fracture identified. Intrapelvic contents demonstrate no acute process. Moderate to large amount of stool present within the rectum. No significant free fluid identified. No sizable soft   tissue hematoma identified.        Impression:      Impression:  1.No acute intracranial process.  2.Comminuted mildly displaced fracture of the anterior wall of the right frontal sinus. There is a fracture of the medial wall of the frontal sinuses bilaterally with a small fracture seen within the posterior wall of the right frontal sinus.  3.Mildly displaced comminuted fracture of the superior wall of the right orbit as well as fractures of the medial and lateral walls of the right orbit.  4.Mildly displaced fracture of the right nasal bone.  5.No acute osseous abnormality of the cervical spine.  6.No acute osseous abnormality of the pelvis. Stable comminuted intertrochanteric fracture of the left proximal femur with a small amount of callus formation related to healing. The fracture lines are still present.        Electronically Signed: Deana Cabalelro MD    7/8/2025 11:31 PM EDT    Workstation ID: [id number]    CT Maxillofacial Without Contrast [[id number]] Collected: 07/08/25 2322     Updated: 07/08/25 2334    Narrative:      CT HEAD WO CONTRAST, CT CERVICAL SPINE WO CONTRAST, CT PELVIS WO CONTRAST, CT MAXILLOFACIAL WO CONT    Date of Exam: 7/8/2025 10:56 PM EDT    Indication: fall, trauma.    Comparison: 6/9/2025, 6/8/2025, 6/11/2025.    Technique: Axial CT images were obtained of the head, face, cervical spine and pelvis without contrast administration.  Automated exposure control  and iterative construction methods were used.      Findings:  Head: There is no evidence of hemorrhage. There is no mass effect or midline shift. Encephalomalacia is present within the right occipital lobe related to previous infarct.    There is no extracerebral collection.    Ventricles are normal in size and configuration for patient's stated age.      Posterior fossa is within normal limits.    Calvarium and skull base appear intact.   Soft tissue swelling is present within the right forehead region.    Face: There is a comminuted mildly displaced fracture of the anterior wall of the right frontal sinus (series 2 image 88). There is a fracture of the medial wall of the frontal sinuses bilaterally with a small fracture seen within the posterior wall of   the right frontal sinus (series 2 image 84). There is a mildly displaced fracture of the right nasal bone. There is a mildly displaced comminuted fracture of the superior wall of the right orbit (series 900 image 21) as well as the medial wall (series   900 image 28). The inferior wall appears intact. There does hypertrophy a subtle fracture of the lateral wall of the right orbit (series 2 image 82). Small air-fluid levels are present within the bilateral maxillary sinuses. The globes appear intact.   Retroconal fat appears within normal limits. Right periorbital soft tissue swelling is present. Temporomandibular joints appear intact.    Cervical: No evidence of fracture or compression deformity. No evidence of spondylolysis.  No significant spondylolisthesis. No evidence of focal lesions. The prevertebral soft tissues appear unremarkable. Surrounding soft tissues appear within normal   limits. Moderate multilevel degenerative changes are present throughout the spine. Findings are present consistent with DISH. The lung apices appear clear.    Pelvis: Postsurgical changes are seen related to left femoral fixation. There is a comminuted intertrochanteric fracture  of the left proximal femur which appears similar as compared to the previous study. A small amount of callus formation is present   related to healing. The fracture lines are still present. No new fracture identified. Intrapelvic contents demonstrate no acute process. Moderate to large amount of stool present within the rectum. No significant free fluid identified. No sizable soft   tissue hematoma identified.        Impression:      Impression:  1.No acute intracranial process.  2.Comminuted mildly displaced fracture of the anterior wall of the right frontal sinus. There is a fracture of the medial wall of the frontal sinuses bilaterally with a small fracture seen within the posterior wall of the right frontal sinus.  3.Mildly displaced comminuted fracture of the superior wall of the right orbit as well as fractures of the medial and lateral walls of the right orbit.  4.Mildly displaced fracture of the right nasal bone.  5.No acute osseous abnormality of the cervical spine.  6.No acute osseous abnormality of the pelvis. Stable comminuted intertrochanteric fracture of the left proximal femur with a small amount of callus formation related to healing. The fracture lines are still present.        Electronically Signed: Deana Caballero MD    7/8/2025 11:31 PM EDT    Workstation ID: [id number]    CT Pelvis Without Contrast [[id number]] Collected: 07/08/25 2322     Updated: 07/08/25 2334    Narrative:      CT HEAD WO CONTRAST, CT CERVICAL SPINE WO CONTRAST, CT PELVIS WO CONTRAST, CT MAXILLOFACIAL WO CONT    Date of Exam: 7/8/2025 10:56 PM EDT    Indication: fall, trauma.    Comparison: 6/9/2025, 6/8/2025, 6/11/2025.    Technique: Axial CT images were obtained of the head, face, cervical spine and pelvis without contrast administration.  Automated exposure control and iterative construction methods were used.      Findings:  Head: There is no evidence of hemorrhage. There is no mass effect or midline shift. Encephalomalacia is  present within the right occipital lobe related to previous infarct.    There is no extracerebral collection.    Ventricles are normal in size and configuration for patient's stated age.      Posterior fossa is within normal limits.    Calvarium and skull base appear intact.   Soft tissue swelling is present within the right forehead region.    Face: There is a comminuted mildly displaced fracture of the anterior wall of the right frontal sinus (series 2 image 88). There is a fracture of the medial wall of the frontal sinuses bilaterally with a small fracture seen within the posterior wall of   the right frontal sinus (series 2 image 84). There is a mildly displaced fracture of the right nasal bone. There is a mildly displaced comminuted fracture of the superior wall of the right orbit (series 900 image 21) as well as the medial wall (series   900 image 28). The inferior wall appears intact. There does hypertrophy a subtle fracture of the lateral wall of the right orbit (series 2 image 82). Small air-fluid levels are present within the bilateral maxillary sinuses. The globes appear intact.   Retroconal fat appears within normal limits. Right periorbital soft tissue swelling is present. Temporomandibular joints appear intact.    Cervical: No evidence of fracture or compression deformity. No evidence of spondylolysis.  No significant spondylolisthesis. No evidence of focal lesions. The prevertebral soft tissues appear unremarkable. Surrounding soft tissues appear within normal   limits. Moderate multilevel degenerative changes are present throughout the spine. Findings are present consistent with DISH. The lung apices appear clear.    Pelvis: Postsurgical changes are seen related to left femoral fixation. There is a comminuted intertrochanteric fracture of the left proximal femur which appears similar as compared to the previous study. A small amount of callus formation is present   related to healing. The fracture  lines are still present. No new fracture identified. Intrapelvic contents demonstrate no acute process. Moderate to large amount of stool present within the rectum. No significant free fluid identified. No sizable soft   tissue hematoma identified.        Impression:      Impression:  1.No acute intracranial process.  2.Comminuted mildly displaced fracture of the anterior wall of the right frontal sinus. There is a fracture of the medial wall of the frontal sinuses bilaterally with a small fracture seen within the posterior wall of the right frontal sinus.  3.Mildly displaced comminuted fracture of the superior wall of the right orbit as well as fractures of the medial and lateral walls of the right orbit.  4.Mildly displaced fracture of the right nasal bone.  5.No acute osseous abnormality of the cervical spine.  6.No acute osseous abnormality of the pelvis. Stable comminuted intertrochanteric fracture of the left proximal femur with a small amount of callus formation related to healing. The fracture lines are still present.        Electronically Signed: Deana Caballero MD    7/8/2025 11:31 PM EDT    Workstation ID: [id number]    XR Chest 1 View [[id number]] Collected: 07/08/25 2318     Updated: 07/08/25 2322    Narrative:      XR CHEST 1 VW    Date of Exam: 7/8/2025 11:11 PM EDT    Indication: fall    Comparison: 1/19/2025.    Findings:  There are no airspace consolidations. No pleural fluid. No pneumothorax. The pulmonary vasculature appears within normal limits. The cardiac and mediastinal silhouette appear unremarkable. No acute osseous abnormality identified.      Impression:      Impression:  No acute cardiopulmonary process.    Electronically Signed: Deana Caballero MD    7/8/2025 11:18 PM EDT    Workstation ID: [id number]          Results for orders placed during the hospital encounter of 01/19/25    Adult Transthoracic Echo Complete W/ Cont if Necessary Per Protocol 01/20/2025  9:06 AM    Interpretation Summary     Left ventricular systolic function is normal. Estimated left ventricular EF = 50%    Left ventricular wall thickness is consistent with borderline concentric hypertrophy.    Mild mitral valve regurgitation is present.      Physical Exam:    A&Ox3. No acute distress. Conversant. Pulmonary effort is normal. Skin warm and dry. Significant echymosis about right eye, cheek, nose and forehead with hematoma of upper lid.  CDI dressing over scalp. Able to move all four extremities with sensation intact and no neurological deficits noted.          Assessment / Plan         Facial fracture    This is a very nice 85 year old gentleman with multiple facial fractures which appear stable on repeat scan as noted, although these injuries are not within neurosurgical scope of practice. Fortunately he did not experience intracranial hemorrhage with this fall and as such there is no indication for neurosurgical follow up nor contraindication from our standpoint to resuming his previous medications including aspirin and anticoagulation given previous history of CAD, VTE and stroke. Would recommend f/u OMFS or ENT regarding recommendations for the facial fractures and any ongoing bleeding risks associated.    Appreciate the consult.     Electronically signed by Elvia Mitchell PA-C, 07/09/25, 15:27 EDT.

## 2025-07-09 NOTE — PLAN OF CARE
Problem: Adult Inpatient Plan of Care  Goal: Plan of Care Review  Outcome: Not Progressing  Goal: Patient-Specific Goal (Individualized)  Outcome: Not Progressing  Goal: Absence of Hospital-Acquired Illness or Injury  Outcome: Not Progressing  Intervention: Identify and Manage Fall Risk  Recent Flowsheet Documentation  Taken 7/9/2025 1600 by Amber Scott RN  Safety Promotion/Fall Prevention:   activity supervised   assistive device/personal items within reach   clutter free environment maintained   lighting adjusted   nonskid shoes/slippers when out of bed  Taken 7/9/2025 1400 by Amber Scott RN  Safety Promotion/Fall Prevention:   activity supervised   assistive device/personal items within reach   clutter free environment maintained   lighting adjusted   nonskid shoes/slippers when out of bed  Taken 7/9/2025 1200 by Amber Scott RN  Safety Promotion/Fall Prevention:   activity supervised   assistive device/personal items within reach   clutter free environment maintained   lighting adjusted   nonskid shoes/slippers when out of bed  Taken 7/9/2025 1000 by Amber Scott RN  Safety Promotion/Fall Prevention: safety round/check completed  Taken 7/9/2025 0800 by Amber Scott RN  Safety Promotion/Fall Prevention: safety round/check completed  Intervention: Prevent Skin Injury  Recent Flowsheet Documentation  Taken 7/9/2025 1600 by Amber Scott RN  Skin Protection:   incontinence pads utilized   skin sealant/moisture barrier applied   transparent dressing maintained  Taken 7/9/2025 1400 by Amber Scott RN  Skin Protection:   incontinence pads utilized   transparent dressing maintained  Taken 7/9/2025 1200 by Amber Scott RN  Skin Protection:   incontinence pads utilized   transparent dressing maintained  Taken 7/9/2025 1000 by Amber Scott RN  Skin Protection:   incontinence pads utilized   transparent dressing maintained  Taken 7/9/2025 0800 by Amber Scott  RN  Body Position:   turned   left  Skin Protection:   incontinence pads utilized   skin sealant/moisture barrier applied  Intervention: Prevent Infection  Recent Flowsheet Documentation  Taken 7/9/2025 1600 by Amber Scott RN  Infection Prevention:   environmental surveillance performed   single patient room provided  Taken 7/9/2025 1400 by Amber Scott RN  Infection Prevention:   environmental surveillance performed   single patient room provided  Taken 7/9/2025 1200 by Amber Scott RN  Infection Prevention:   environmental surveillance performed   single patient room provided  Taken 7/9/2025 1000 by Amber Scott RN  Infection Prevention:   single patient room provided   environmental surveillance performed  Taken 7/9/2025 0800 by Amber Scott RN  Infection Prevention:   single patient room provided   environmental surveillance performed  Goal: Optimal Comfort and Wellbeing  Outcome: Not Progressing  Goal: Readiness for Transition of Care  Outcome: Not Progressing  Intervention: Mutually Develop Transition Plan  Recent Flowsheet Documentation  Taken 7/9/2025 0827 by Amber Scott RN  Equipment Currently Used at Home: walker, standard   Goal Outcome Evaluation:   RA, NSR, VSS. A&O x1. Uncooperative and agitated. Call bell within reach.

## 2025-07-09 NOTE — ED NOTES
[facility]    Nursing Report ED to Floor:  Mental status: aox2-3  Ambulatory status: walker and assist x 2  Oxygen Therapy:  RA  Cardiac Rhythm: SR  Admitted from: [facility]  Safety Concerns:  confusion  Precautions: falls  Social Issues: none  ED Room #:  11    ED Nurse Phone Extension - [phone] or may call [phone].      HPI:   Chief Complaint   Patient presents with    Weakness - Generalized       Past Medical History:  Past Medical History:   Diagnosis Date    Acute diverticulitis 01/29/2020    Allergic 60 yrs ago    Arthritis     Arthritis of neck Fusion 1992    Bilateral radial fractures 1962    fracture bilateral radial heads    CAD (coronary artery disease)     Calculus of gallbladder without cholecystitis without obstruction 01/29/2020    Cataract     Cervical disc disorder Fusion 1992    Cholelithiasis     Chronic kidney disease 2020    Clotting disorder     CVD (cardiovascular disease)     History of TIA 1989    Diabetes mellitus     DVT of proximal lower limb 06/22/2015    proximal DVT right leg, small PE- anticoagulation    Dyslipidemia     Erectile dysfunction     Fracture 1952    H/o pf left forearm fracture    Fracture of right hand 1980    GERD (gastroesophageal reflux disease)     with hiatal hernia    HL (hearing loss)     Hx of angina pectoris     Hypertension     Normal renal angiography 02/16/11    Knee swelling Several years ago    Left wrist fracture 1953    Lumbosacral disc disease 1996    Osgood-Schlatter's disease 1955    Osteoarthritis     Right wrist fracture     Stroke     Pt states no residual weakness though has urinary incontinence.    Vertigo     Wears glasses         Past Surgical History:  Past Surgical History:   Procedure Laterality Date    APPENDECTOMY  1957    CARDIAC CATHETERIZATION  2011    with vein graft    CATARACT EXTRACTION Left     CERVICAL FUSION  1992    C3-4    COLONOSCOPY  2013    CORONARY ARTERY BYPASS GRAFT  1994    HERNIA REPAIR Right 2011    inguinal    HIP  TROCHANTERIC NAILING WITH INTRAMEDULLARY HIP SCREW Left 6/11/2025    Procedure: CEPHALOMEDULLARY NAIL FIXATION;  Surgeon: Rey Badillo MD;  [facility];  Service: Orthopedics;  Laterality: Left;    INGUINAL HERNIA REPAIR Left 10/29/2019    Procedure: INGUINAL HERNIA REPAIR LEFT;  Surgeon: Charisma Raines MD;  Location:  [facility] OR;  Service: General    INTERVENTIONAL RADIOLOGY PROCEDURE N/A 01/23/2025    Procedure: IVC Filter Placement;  Surgeon: Avelino Hernandez MD;  Location:  [facility]ATH INVASIVE [facility]ATION;  Service: Cardiovascular;  Laterality: N/A;    LUMBAR LAMINECTOMY  1996    laminectomy, lumbar, L3    OTHER SURGICAL HISTORY      wrist surgery    TONSILLECTOMY AND ADENOIDECTOMY  1945    TRIGGER POINT INJECTION  2001 - 2007    VASECTOMY  1972        Admitting Doctor:   Luc Chang DO    Consulting Provider(s):  Consults       Date and Time Order Name Status Description    7/9/2025 12:42 AM Inpatient Neurosurgery Consult      6/9/2025  1:36 PM Inpatient Orthopedic Surgery Consult Completed              Admitting Diagnosis:   The primary encounter diagnosis was Open fracture of frontal sinus, initial encounter. Diagnoses of Open fracture of orbit, initial encounter, Open fracture of nasal bone, initial encounter, Laceration of scalp, initial encounter, Mixed hyperlipidemia, Chronic kidney disease, stage IV (severe), and Essential hypertension were also pertinent to this visit.    Most Recent Vitals:   Vitals:    07/09/25 0200 07/09/25 0230 07/09/25 0300 07/09/25 0330   BP:  140/72 145/78 132/70   Pulse: 77 74 80 74   Resp:       Temp:       TempSrc:       SpO2: 94% 94% 94% 94%   Weight:       Height:           Active LDAs/IV Access:   Lines, Drains & Airways       Active LDAs       Name Placement date Placement time Site Days    Peripheral IV 07/08/25 2128 18 G Anterior;Distal;Left;Upper Arm 07/08/25 2128  Arm  less than 1                    Labs (abnormal labs have a star):   Labs Reviewed    COMPREHENSIVE METABOLIC PANEL - Abnormal; Notable for the following components:       Result Value    Glucose 116 (*)     BUN 36.5 (*)     Creatinine 1.97 (*)     Albumin 3.3 (*)     Alkaline Phosphatase 325 (*)     eGFR 32.7 (*)     All other components within normal limits    Narrative:     GFR Categories in Chronic Kidney Disease (CKD)              GFR Category          GFR (mL/min/1.73)    Interpretation  G1                    90 or greater        Normal or high (1)  G2                    60-89                Mild decrease (1)  G3a                   45-59                Mild to moderate decrease  G3b                   30-44                Moderate to severe decrease  G4                    15-29                Severe decrease  G5                    14 or less           Kidney failure    (1)In the absence of evidence of kidney disease, neither GFR category G1 or G2 fulfill the criteria for CKD.    eGFR calculation 2021 CKD-EPI creatinine equation, which does not include race as a factor   PROTIME-INR - Abnormal; Notable for the following components:    Protime 16.2 (*)     INR 1.22 (*)     All other components within normal limits   APTT - Abnormal; Notable for the following components:    PTT 37.3 (*)     All other components within normal limits    Narrative:     PTT = The equivalent PTT values for the therapeutic range of heparin levels at 0.3 to 0.5 U/ml are 60 to 70 seconds.   URINALYSIS W/ CULTURE IF INDICATED - Abnormal; Notable for the following components:    Appearance, UA Cloudy (*)     Ketones, UA Trace (*)     Protein, UA Trace (*)     All other components within normal limits    Narrative:     In absence of clinical symptoms, the presence of pyuria, bacteria, and/or nitrites on the urinalysis result does not correlate with infection.   CBC WITH AUTO DIFFERENTIAL - Abnormal; Notable for the following components:    RBC 3.25 (*)     Hemoglobin 9.4 (*)     Hematocrit 29.2 (*)     RDW 17.6 (*)      RDW-SD 58.3 (*)     Lymphocyte % 12.3 (*)     Monocytes, Absolute 1.00 (*)     All other components within normal limits   URINALYSIS, MICROSCOPIC ONLY   CBC AND DIFFERENTIAL    Narrative:     The following orders were created for panel order CBC & Differential.  Procedure                               Abnormality         Status                     ---------                               -----------         ------                     CBC Auto Differential[[id number]]        Abnormal            Final result                 Please view results for these tests on the individual orders.       Meds Given in ED:   Medications   sodium chloride 0.9 % flush 10 mL (has no administration in time range)   ondansetron (ZOFRAN) injection 4 mg (4 mg Intravenous Given 7/8/25 2251)   Morphine sulfate (PF) injection 4 mg (4 mg Intravenous Given 7/8/25 2251)           Last NIH score:                                                          Dysphagia screening results:        Ibeth Coma Scale:  No data recorded     CIWA:        Restraint Type:            Isolation Status:  No active isolations

## 2025-07-09 NOTE — PLAN OF CARE
Goal Outcome Evaluation:  Plan of Care Reviewed With: patient        Progress: no change  Outcome Evaluation: Pt. presents below baseline with ADLs and functional mobility. Limited by decreased vision, activity tolerance, generalized weakness, pain, and balance. Skilled OT services warranted to promote return to PLOF. Recommend SNF at discharge.    Anticipated Discharge Disposition (OT): skilled nursing facility

## 2025-07-09 NOTE — NURSING NOTE
"Pt attempted to get up from chair and set off chair alarm. Stated he \"wanted to use the bathroom.\" Pt has been incontinent throughout shift and typically uses briefs. We have tried the purewick but pt pulls it off. Pt also removed his own IV this AM. He is confused and impulsive. RN and two nurse aides at bedside to assist pt go to the restroom using a walker and gait belt. Pt began refusing to move and getting very agitated. When we tried to help him he swatted at one of the nursing aides. Pt stated he didn't want to \"lose this sandoval\" and that it was a \"stand off.\" Pt refused to move and cooperate, causing us to  room for around 10-15 mins. Charge RN called into room for assistance and security called. Eventually security got pt back to bed.   "

## 2025-07-09 NOTE — PROGRESS NOTES
[facility] Medicine Services  ADMISSION FOLLOW-UP NOTE          Patient admitted after midnight, H&P by my partner performed earlier on today's date reviewed.  Interim findings, labs, and charting also reviewed.        [facility] Hospital Problem List has been managed and updated to include any new diagnoses:  Active Hospital Problems    Diagnosis  POA    **Facial fracture [S02.92XA]  Yes      Resolved Hospital Problems   No resolved problems to display.     In summary, this is a 86 yo male w recent left hip fracture (6/2025) w h/o PE/DVT s/p IVC who presents from assisted living after unwitnessed fall with significant facial trauma. Found to have multiple nondisplaced facial fractures (frontal sinus, nasal bone, orbit)  Repeat CT face/head appears stable on repeat radiology read 7/9, formal nsgy consult pending.     ADDITIONAL PLAN:  - hope to restart aspirin +/- DVT ppx soon given patient's significant past clotting history  - full ROM right eye without pain w subconjunctival hemorrhage appreciated laterally  - follow-up nsgy final recs. No intracranial findings  - in regards to facial fractures, by report ED d/w [facility]. Do need to figure out who will formally follow these whether that is ENT v [facility] OP  - SNF recs in place, [facility] could take as early as tmrw PM  - s/p 2 sutures to right forehead by ED on 7/9. Removal ~7/16  - need to re-schedule Dr Badillo f/u regarding hip    Expected Discharge 7/10  Expected Discharge Date: 7/10/2025; Expected Discharge Time:      Nidia Wick MD  07/09/25

## 2025-07-09 NOTE — THERAPY EVALUATION
Patient Name: Toño Alcala  : 1940    MRN: [id number]                              Today's Date: 2025       Admit Date: 2025    Visit Dx:     ICD-10-CM ICD-9-CM   1. Open fracture of frontal sinus, initial encounter  S02.19XB 801.50   2. Open fracture of orbit, initial encounter  S02.85XB 802.9   3. Open fracture of nasal bone, initial encounter  S02.2XXB 802.1   4. Laceration of scalp, initial encounter  S01.01XA 873.0   5. Mixed hyperlipidemia  E78.2 272.2   6. Chronic kidney disease, stage IV (severe)  N18.4 585.4   7. Essential hypertension  I10 401.9     Patient Active Problem List   Diagnosis    CAD (coronary artery disease)    Hx RLE DVT ( w/ IVC filter)    Diabetes mellitus, diet controlled    Dyslipidemia    GERD (gastroesophageal reflux disease)    Mixed hyperlipidemia    Diabetic nephropathy associated with type 2 diabetes mellitus    Diabetic polyneuropathy associated with type 2 diabetes mellitus    Chronic kidney disease, stage IV (severe)    Vitamin D deficiency    Disc disorder of cervical region    Lumbosacral spondylosis without myelopathy    Arthritis of elbow    Acute right-sided low back pain without sciatica    Unifocal PVCs    Essential hypertension    Stage 3 chronic kidney disease due to benign hypertension    BMI 30.0-30.9,adult    Fall    C5 cervical fracture    C6 cervical fracture    Hx of previous C5 & C6 fractures non-op)    NSTEMI, initial episode of care    History of pulmonary embolus (PE)/DVT    Acute on chronic diastolic CHF (congestive heart failure)    Hx right hemispheric CVA w/ hemorrhagic transformation (2025)    Closed comminuted intertrochanteric fracture of left femur    Moderate protein-calorie malnutrition    Chronic heart failure with preserved ejection fraction (HFpEF)    Bradycardia, sinus    Facial fracture     Past Medical History:   Diagnosis Date    Acute diverticulitis 2020    Allergic 60 yrs ago    Arthritis     Arthritis of neck Fusion  1992    Bilateral radial fractures 1962    fracture bilateral radial heads    CAD (coronary artery disease)     Calculus of gallbladder without cholecystitis without obstruction 01/29/2020    Cataract     Cervical disc disorder Fusion 1992    Cholelithiasis     Chronic kidney disease 2020    Clotting disorder     CVD (cardiovascular disease)     History of TIA 1989    Diabetes mellitus     DVT of proximal lower limb 06/22/2015    proximal DVT right leg, small PE- anticoagulation    Dyslipidemia     Erectile dysfunction     Fracture 1952    H/o pf left forearm fracture    Fracture of right hand 1980    GERD (gastroesophageal reflux disease)     with hiatal hernia    HL (hearing loss)     Hx of angina pectoris     Hypertension     Normal renal angiography 02/16/11    Knee swelling Several years ago    Left wrist fracture 1953    Lumbosacral disc disease 1996    Osgood-Schlatter's disease 1955    Osteoarthritis     Right wrist fracture     Stroke     Pt states no residual weakness though has urinary incontinence.    Vertigo     Wears glasses      Past Surgical History:   Procedure Laterality Date    APPENDECTOMY  1957    CARDIAC CATHETERIZATION  2011    with vein graft    CATARACT EXTRACTION Left     CERVICAL FUSION  1992    C3-4    COLONOSCOPY  2013    CORONARY ARTERY BYPASS GRAFT  1994    HERNIA REPAIR Right 2011    inguinal    HIP TROCHANTERIC NAILING WITH INTRAMEDULLARY HIP SCREW Left 6/11/2025    Procedure: CEPHALOMEDULLARY NAIL FIXATION;  Surgeon: Rey Badillo MD;  Location:  [facility];  Service: Orthopedics;  Laterality: Left;    INGUINAL HERNIA REPAIR Left 10/29/2019    Procedure: INGUINAL HERNIA REPAIR LEFT;  Surgeon: Charisma Raines MD;  Location:  [facility];  Service: General    INTERVENTIONAL RADIOLOGY PROCEDURE N/A 01/23/2025    Procedure: IVC Filter Placement;  Surgeon: Avelino Hernandez MD;  Location:  [facility];  Service: Cardiovascular;  Laterality: N/A;    LUMBAR  LAMINECTOMY  1996    laminectomy, lumbar, L3    OTHER SURGICAL HISTORY      wrist surgery    TONSILLECTOMY AND ADENOIDECTOMY  1945    TRIGGER POINT INJECTION  2001 - 2007    VASECTOMY  1972      General Information       Row Name 07/09/25 1418          Physical Therapy Time and Intention    Document Type evaluation  -AE     Mode of Treatment physical therapy  -AE       Row Name 07/09/25 1418          General Information    Patient Profile Reviewed yes  -AE     Prior Level of Function independent:;all household mobility;transfer;ADL's  hip nailing ~1 month ago  -AE     Existing Precautions/Restrictions fall;other (see comments)  Limited vision, Brief with OOB activity; facial fractures  -AE     Barriers to Rehab medically complex;previous functional deficit;visual deficit  -AE       Row Name 07/09/25 1418          Living Environment    Current Living Arrangements assisted living facility  -AE     People in Home facility resident  -AE       Row Name 07/09/25 1418          Home Main Entrance    Number of Stairs, Main Entrance none  -AE     Stair Railings, Main Entrance none  -AE       Row Name 07/09/25 1418          Stairs Within Home, Primary    Number of Stairs, Within Home, Primary none  -AE     Stair Railings, Within Home, Primary none  -AE       Row Name 07/09/25 1418          Cognition    Orientation Status (Cognition) oriented x 3  -AE       Row Name 07/09/25 1418          Safety Issues/Impairments Affecting Functional Mobility    Safety Issues Affecting Function (Mobility) awareness of need for assistance;insight into deficits/self-awareness;safety precaution awareness;safety precautions follow-through/compliance;sequencing abilities  -AE     Impairments Affecting Function (Mobility) balance;endurance/activity tolerance;pain;postural/trunk control;strength;visual/perceptual  -AE               User Key  (r) = Recorded By, (t) = Taken By, (c) = Cosigned By      Initials Name Provider Type    AE Benji Patino  PT Physical Therapist                   Mobility       Row Name 07/09/25 1422          Bed Mobility    Bed Mobility scooting/bridging;supine-sit  -AE     Scooting/Bridging [place] (Bed Mobility) maximum assist (25% patient effort);2 person assist;verbal cues  -AE     Supine-Sit [place] (Bed Mobility) 2 person assist;verbal cues;moderate assist (50% patient effort)  -AE     Assistive Device (Bed Mobility) bed rails;head of bed elevated;repositioning sheet  -AE     Comment, (Bed Mobility) VCs for hand placement and sequencing. Pt required increased cues to improve BLE advancement to EOB.  -AE       Row Name 07/09/25 1422          Transfers    Comment, (Transfers) VCs for hand placement and sequencing. Pt required increased cues to improve upright posture.  -AE       Row Name 07/09/25 1422          Bed-Chair Transfer    Bed-Chair [place] (Transfers) 2 person assist;verbal cues;minimum assist (75% patient effort)  -AE     Assistive Device (Bed-Chair Transfers) walker, front-wheeled  -AE       Row Name 07/09/25 1422          Sit-Stand Transfer    Sit-Stand [place] (Transfers) minimum assist (75% patient effort);2 person assist;verbal cues  -AE     Assistive Device (Sit-Stand Transfers) walker, front-wheeled  -AE       Row Name 07/09/25 1422          Gait/Stairs (Locomotion)    Comment, (Gait/Stairs) Pt only able to complete SPT from bed to chair this session d/t weakness.  -AE               User Key  (r) = Recorded By, (t) = Taken By, (c) = Cosigned By      Initials Name Provider Type    AE Benji Patino PT Physical Therapist                   Obj/Interventions       Row Name 07/09/25 1425          Range of Motion Comprehensive    General Range of Motion bilateral lower extremity ROM WFL  -AE       Row Name 07/09/25 1425          Strength Comprehensive (MMT)    General Manual Muscle Testing (MMT) Assessment lower extremity strength deficits identified  -AE     Comment, General Manual Muscle  Testing (MMT) Assessment BLE grossly 4/5  -AE       Row Name 07/09/25 1425          Balance    Balance Assessment sitting static balance;sitting dynamic balance;standing static balance;standing dynamic balance  -AE     Static Sitting Balance contact guard  -AE     Dynamic Sitting Balance contact guard;verbal cues  -AE     Position, Sitting Balance unsupported;sitting edge of bed  -AE     Static Standing Balance minimal assist;2-person assist  -AE     Dynamic Standing Balance minimal assist;2-person assist;verbal cues  -AE     Position/Device Used, Standing Balance supported;walker, front-wheeled  -AE       Row Name 07/09/25 1425          Sensory Assessment (Somatosensory)    Sensory Assessment (Somatosensory) LE sensation intact  -AE               User Key  (r) = Recorded By, (t) = Taken By, (c) = Cosigned By      Initials Name Provider Type    AE Benji Patino, PT Physical Therapist                   Goals/Plan       Row Name 07/09/25 1434          Bed Mobility Goal 1 (PT)    Activity/Assistive Device (Bed Mobility Goal 1, PT) sit to supine/supine to sit  -AE     [place] Level/Cues Needed (Bed Mobility Goal 1, PT) standby assist  -AE     Time Frame (Bed Mobility Goal 1, PT) short term goal (STG);5 days  -AE     Progress/Outcomes (Bed Mobility Goal 1, PT) new goal  -AE       Row Name 07/09/25 1434          Transfer Goal 1 (PT)    Activity/Assistive Device (Transfer Goal 1, PT) sit-to-stand/stand-to-sit;bed-to-chair/chair-to-bed  -AE     [place] Level/Cues Needed (Transfer Goal 1, PT) standby assist  -AE     Time Frame (Transfer Goal 1, PT) long term goal (LTG);10 days  -AE     Progress/Outcome (Transfer Goal 1, PT) new goal  -AE       Row Name 07/09/25 1434          Gait Training Goal 1 (PT)    Activity/Assistive Device (Gait Training Goal 1, PT) gait (walking locomotion);assistive device use  -AE     [place] Level (Gait Training Goal 1, PT) contact guard required  -AE     Distance (Gait Training  Goal 1, PT) 50ft  -AE     Time Frame (Gait Training Goal 1, PT) long term goal (LTG);10 days  -AE     Progress/Outcome (Gait Training Goal 1, PT) new goal  -AE       Row Name 07/09/25 1434          Therapy Assessment/Plan (PT)    Planned Therapy Interventions (PT) balance training;bed mobility training;gait training;home exercise program;patient/family education;postural re-education;ROM (range of motion);stair training;strengthening;transfer training  -AE               User Key  (r) = Recorded By, (t) = Taken By, (c) = Cosigned By      Initials Name Provider Type    AE Benji Patino, PT Physical Therapist                   Clinical Impression       Row Name 07/09/25 1428          Pain    Pretreatment Pain Rating 3/10  -AE     Posttreatment Pain Rating 3/10  -AE     Pain Location flank  -AE     Pain Side/Orientation bilateral  -AE     Pain Management Interventions activity modification encouraged;exercise or physical activity utilized;positioning techniques utilized  -AE     Response to Pain Interventions activity participation with tolerable pain  -AE       Row Name 07/09/25 1424          Plan of Care Review    Plan of Care Reviewed With patient  -AE     Progress no change  -AE     Outcome Evaluation Pt presents with decreased functional mobility and decreased activity tolerance compared to baseline. Pt completed SPT from bed to chair with min A x2 and RW for support. Recommend continued skilled IP PT interventions. Recommend D/C to SNF.  -AE       Row Name 07/09/25 1427          Therapy Assessment/Plan (PT)    Patient/Family Therapy Goals Statement (PT) Get better  -AE     Rehab Potential (PT) good  -AE     Criteria for Skilled Interventions Met (PT) yes  -AE     Therapy Frequency (PT) daily  -AE     Predicted Duration of Therapy Intervention (PT) 2 weeks  -AE       Row Name 07/09/25 1428          Vital Signs    Pre Systolic BP Rehab 106  -AE     Pre Treatment Diastolic BP 60  -AE     Pretreatment Heart Rate  (beats/min) 72  -AE     Posttreatment Heart Rate (beats/min) 68  -AE     O2 Delivery Pre Treatment room air  -AE     O2 Delivery Intra Treatment room air  -AE     O2 Delivery Post Treatment room air  -AE     Pre Patient Position Supine  -AE     Intra Patient Position Standing  -AE     Post Patient Position Sitting  -AE       Row Name 07/09/25 1428          Positioning and Restraints    Pre-Treatment Position in bed  -AE     Post Treatment Position chair  -AE     In Chair notified nsg;reclined;call light within reach;exit alarm on;encouraged to call for assist;waffle cushion;legs elevated;with family/caregiver  -AE               User Key  (r) = Recorded By, (t) = Taken By, (c) = Cosigned By      Initials Name Provider Type    AE Benji Patino, PT Physical Therapist                   Outcome Measures       Row Name 07/09/25 1437 07/09/25 0800       How much help from another person do you currently need...    Turning from your back to your side while in flat bed without using bedrails? 3  -AE 2  -KM    Moving from lying on back to sitting on the side of a flat bed without bedrails? 2  -AE 2  -KM    Moving to and from a bed to a chair (including a wheelchair)? 3  -AE 2  -KM    Standing up from a chair using your arms (e.g., wheelchair, bedside chair)? 2  -AE 2  -KM    Climbing 3-5 steps with a railing? 2  -AE 2  -KM    To walk in hospital room? 2  -AE 2  -KM    AM-PAC 6 Clicks Score (PT) 14  -AE 12  -KM    Highest Level of Mobility Goal Move to Chair/Commode-4  -AE Move to Chair/Commode-4  -KM      Row Name 07/09/25 0550          How much help from another person do you currently need...    Turning from your back to your side while in flat bed without using bedrails? 2  -KS     Moving from lying on back to sitting on the side of a flat bed without bedrails? 2  -KS     Moving to and from a bed to a chair (including a wheelchair)? 2  -KS     Standing up from a chair using your arms (e.g., wheelchair, bedside chair)? 2   -KS     Climbing 3-5 steps with a railing? 2  -KS     To walk in hospital room? 2  -KS     AM-PAC 6 Clicks Score (PT) 12  -KS     Highest Level of Mobility Goal Move to Chair/Commode-4  -KS       Row Name 07/09/25 1437 07/09/25 1156       Functional Assessment    Outcome Measure Options AM-PAC 6 Clicks Basic Mobility (PT)  -AE AM-PAC 6 Clicks Daily Activity (OT)  -              User Key  (r) = Recorded By, (t) = Taken By, (c) = Cosigned By      Initials Name Provider Type    LC Jory Linder OT Occupational Therapist    Benji Rivas, PT Physical Therapist    Manda Garza, RN Registered Nurse    Amber Harding RN Registered Nurse                                 Physical Therapy Education       Title: PT OT SLP Therapies (In Progress)       Topic: Physical Therapy (In Progress)       Point: Mobility training (In Progress)       Learning Progress Summary            Patient Acceptance, E, NR by AE at 7/9/2025 1055                      Point: Home exercise program (Not Started)       Learner Progress:  Not documented in this visit.              Point: Body mechanics (In Progress)       Learning Progress Summary            Patient Acceptance, E, NR by AE at 7/9/2025 1055                      Point: Precautions (In Progress)       Learning Progress Summary            Patient Acceptance, E, NR by AE at 7/9/2025 1055                                      User Key       Initials Effective Dates Name Provider Type Discipline    AE 09/21/21 -  Benji Patino, PT Physical Therapist PT                  PT Recommendation and Plan  Planned Therapy Interventions (PT): balance training, bed mobility training, gait training, home exercise program, patient/family education, postural re-education, ROM (range of motion), stair training, strengthening, transfer training  Progress: no change  Outcome Evaluation: Pt presents with decreased functional mobility and decreased activity tolerance compared to baseline.  Pt completed SPT from bed to chair with min A x2 and RW for support. Recommend continued skilled IP PT interventions. Recommend D/C to SNF.     Time Calculation:   PT Evaluation Complexity  History, PT Evaluation Complexity: 3 or more personal factors and/or comorbidities  Examination of Body Systems (PT Eval Complexity): total of 3 or more elements  Clinical Presentation (PT Evaluation Complexity): evolving  Clinical Decision Making (PT Evaluation Complexity): moderate complexity  Overall Complexity (PT Evaluation Complexity): moderate complexity     PT Charges       Row Name 07/09/25 1438             Time Calculation    Start Time 1055  -AE      PT Received On 07/09/25  -AE      PT Goal Re-Cert Due Date 07/19/25  -AE         Untimed Charges    PT Eval/Re-eval Minutes 48  -AE         Total Minutes    Untimed Charges Total Minutes 48  -AE       Total Minutes 48  -AE                User Key  (r) = Recorded By, (t) = Taken By, (c) = Cosigned By      Initials Name Provider Type    AE Benji Patino, PT Physical Therapist                  Therapy Charges for Today       Code Description Service Date Service Provider Modifiers Qty    [id number] HC PT EVAL MOD COMPLEXITY 4 7/9/2025 Benji Patino, PT GP 1            PT G-Codes  Outcome Measure Options: AM-PAC 6 Clicks Basic Mobility (PT)  AM-PAC 6 Clicks Score (PT): 14  AM-PAC 6 Clicks Score (OT): 11  PT Discharge Summary  Anticipated Discharge Disposition (PT): skilled nursing facility    Benji Patino PT  7/9/2025

## 2025-07-09 NOTE — H&P
[facility] Medicine Services  HISTORY AND PHYSICAL    Patient Name: Toño Alcala  : 1940  MRN: [id number]  Primary Care Physician: System, Provider Not In  Date of admission: 2025      Subjective   Subjective     Chief Complaint:  Mechanical fall, facial fracture    HPI:  Toño Alcala is a 85 y.o. male with past medical history of CAD s/p CABG, HFpEF, CKD 4, diet-controlled diabetes, history of PE and DVT status post IVC filter/no longer on anticoagulation, hypertension, GERD, recent left hip fracture in 2025 presenting after mechanical fall resulting in facial fractures.    Patient states he had mechanical fall today after his foot became caught on a seat/chair resulting in him to fall forward onto his face.  Patient denies any loss of multiple fractures including consciousness or any other symptoms prior to the fall.  CT imaging revealing fractures of the right frontal sinus, right orbit, and right nasal bone.  Neurosurgery consulted, no indication for neurosurgical intervention at this time.  Recommended repeat CT head in 6 hours to evaluate for any progression.  ED provider able to speak with OMFS at , with reportedly nothing to do in regards to orbital fractures.  Of note, patient with recent left hip fracture and discharged on aspirin 81 mg twice daily for 30 days, completing therapy on .  However, patient states he has been taking aspirin 81 mg daily and not twice daily.      Personal History     Past Medical History:   Diagnosis Date    Acute diverticulitis 2020    Allergic 60 yrs ago    Arthritis     Arthritis of neck Fusion     Bilateral radial fractures     fracture bilateral radial heads    CAD (coronary artery disease)     Calculus of gallbladder without cholecystitis without obstruction 2020    Cataract     Cervical disc disorder Fusion     Cholelithiasis     Chronic kidney disease     Clotting disorder     CVD  (cardiovascular disease)     History of TIA 1989    Diabetes mellitus     DVT of proximal lower limb 06/22/2015    proximal DVT right leg, small PE- anticoagulation    Dyslipidemia     Erectile dysfunction     Fracture 1952    H/o pf left forearm fracture    Fracture of right hand 1980    GERD (gastroesophageal reflux disease)     with hiatal hernia    HL (hearing loss)     Hx of angina pectoris     Hypertension     Normal renal angiography 02/16/11    Knee swelling Several years ago    Left wrist fracture 1953    Lumbosacral disc disease 1996    Osgood-Schlatter's disease 1955    Osteoarthritis     Right wrist fracture     Stroke     Pt states no residual weakness though has urinary incontinence.    Vertigo     Wears glasses            Past Surgical History:   Procedure Laterality Date    APPENDECTOMY  1957    CARDIAC CATHETERIZATION  2011    with vein graft    CATARACT EXTRACTION Left     CERVICAL FUSION  1992    C3-4    COLONOSCOPY  2013    CORONARY ARTERY BYPASS GRAFT  1994    HERNIA REPAIR Right 2011    inguinal    HIP TROCHANTERIC NAILING WITH INTRAMEDULLARY HIP SCREW Left 6/11/2025    Procedure: CEPHALOMEDULLARY NAIL FIXATION;  Surgeon: Rey Badillo MD;  Location: [facility] OR;  Service: Orthopedics;  Laterality: Left;    INGUINAL HERNIA REPAIR Left 10/29/2019    Procedure: INGUINAL HERNIA REPAIR LEFT;  Surgeon: Charisma Raines MD;  Location:  [facility] OR;  Service: General    INTERVENTIONAL RADIOLOGY PROCEDURE N/A 01/23/2025    Procedure: IVC Filter Placement;  Surgeon: Avelino Hernandez MD;  Location: [facility] CATH INVASIVE LOCATION;  Service: Cardiovascular;  Laterality: N/A;    LUMBAR LAMINECTOMY  1996    laminectomy, lumbar, L3    OTHER SURGICAL HISTORY      wrist surgery    TONSILLECTOMY AND ADENOIDECTOMY  1945    TRIGGER POINT INJECTION  2001 - 2007    VASECTOMY  1972       Family History: family history includes Arthritis in his mother; Cancer in an other family member; Diabetes in his brother,  father, and son; Heart attack in his brother and father; Heart disease in his brother and father; Hypertension in an other family member.     Social History:  reports that he quit smoking about 28 years ago. His smoking use included cigarettes, pipe, and cigars. He started smoking about 63 years ago. He has a 52.2 pack-year smoking history. He has been exposed to tobacco smoke. He has never used smokeless tobacco. He reports current alcohol use of about 11.0 - 13.0 standard drinks of alcohol per week. He reports that he does not use drugs.  Social History     Social History Narrative    Not on file       Medications:  Available home medication information reviewed.  HYDROcodone-acetaminophen, Lidocaine, acetaminophen, amLODIPine, ammonium lactate, aspirin, atorvastatin, carvedilol, folic acid, ondansetron ODT, pantoprazole, sertraline, and thiamine    Allergies   Allergen Reactions    Penicillins Hives and Swelling     Per patient, has tolerated Keflex       Objective   Objective     Vital Signs:   Temp:  [98 °F (36.7 °C)] 98 °F (36.7 °C)  Heart Rate:  [74-96] 74  Resp:  [20] 20  BP: (128-154)/(65-82) 140/72       Physical Exam  Constitutional:       General: He is not in acute distress.  Cardiovascular:      Rate and Rhythm: Normal rate.      Pulses: Normal pulses.   Pulmonary:      Effort: Pulmonary effort is normal. No respiratory distress.   Abdominal:      General: There is no distension.      Palpations: Abdomen is soft.      Tenderness: There is no abdominal tenderness. There is no guarding or rebound.   Musculoskeletal:      Right lower leg: No edema.      Left lower leg: No edema.   Skin:     General: Skin is warm.      Findings: Bruising present.      Comments: Laceration of right scalp.  Hematoma of right eyelid.  Significant bruising and dried blood of right cheek and nasal passages.   Neurological:      Mental Status: He is alert.   Psychiatric:         Mood and Affect: Mood normal.         Behavior:  Behavior normal.            Result Review:  I have personally reviewed the results from the time of this admission to 7/9/2025 03:02 EDT and agree with these findings:  [x]  Laboratory list / accordion  []  Microbiology  [x]  Radiology  []  EKG/Telemetry   []  Cardiology/Vascular   []  Pathology  []  Old records  []  Other:  Most notable findings include: CT imaging revealing multiple facial fractures      LAB RESULTS:      Lab 07/08/25  2258   WBC 8.99   HEMOGLOBIN 9.4*   HEMATOCRIT 29.2*   PLATELETS 225   NEUTROS ABS 6.40   IMMATURE GRANS (ABS) 0.04   LYMPHS ABS 1.11   MONOS ABS 1.00*   EOS ABS 0.40   MCV 89.8   PROTIME 16.2*   INR 1.22*   APTT 37.3*         Lab 07/08/25  2258   SODIUM 138   POTASSIUM 3.6   CHLORIDE 104   CO2 24.2   ANION GAP 9.8   BUN 36.5*   CREATININE 1.97*   EGFR 32.7*   GLUCOSE 116*   CALCIUM 9.1         Lab 07/08/25  2258   TOTAL PROTEIN 6.6   ALBUMIN 3.3*   GLOBULIN 3.3   ALT (SGPT) 20   AST (SGOT) 36   BILIRUBIN 0.5   ALK PHOS 325*                     UA          1/19/2025    19:32 6/9/2025    13:00 7/8/2025    22:49   Urinalysis   Squamous Epithelial Cells, UA 0-2   0-2    Specific Gravity, UA 1.016  1.017  1.020    Ketones, UA Trace  Negative  Trace    Blood, UA Small (1+)  Negative  Negative    Leukocytes, UA Negative  Negative  Negative    Nitrite, UA Negative  Negative  Negative    RBC, UA 0-2   0-2    WBC, UA 0-2   0-2    Bacteria, UA Trace   None Seen        Microbiology Results (last 10 days)       ** No results found for the last 240 hours. **            CT Head Without Contrast  Result Date: 7/8/2025  CT HEAD WO CONTRAST, CT CERVICAL SPINE WO CONTRAST, CT PELVIS WO CONTRAST, CT MAXILLOFACIAL WO CONT Date of Exam: 7/8/2025 10:56 PM EDT Indication: fall, trauma. Comparison: 6/9/2025, 6/8/2025, 6/11/2025. Technique: Axial CT images were obtained of the head, face, cervical spine and pelvis without contrast administration.  Automated exposure control and iterative construction  methods were used. Findings: Head: There is no evidence of hemorrhage. There is no mass effect or midline shift. Encephalomalacia is present within the right occipital lobe related to previous infarct. There is no extracerebral collection. Ventricles are normal in size and configuration for patient's stated age.  Posterior fossa is within normal limits. Calvarium and skull base appear intact.   Soft tissue swelling is present within the right forehead region. Face: There is a comminuted mildly displaced fracture of the anterior wall of the right frontal sinus (series 2 image 88). There is a fracture of the medial wall of the frontal sinuses bilaterally with a small fracture seen within the posterior wall of the right frontal sinus (series 2 image 84). There is a mildly displaced fracture of the right nasal bone. There is a mildly displaced comminuted fracture of the superior wall of the right orbit (series 900 image 21) as well as the medial wall (series 900 image 28). The inferior wall appears intact. There does hypertrophy a subtle fracture of the lateral wall of the right orbit (series 2 image 82). Small air-fluid levels are present within the bilateral maxillary sinuses. The globes appear intact. Retroconal fat appears within normal limits. Right periorbital soft tissue swelling is present. Temporomandibular joints appear intact. Cervical: No evidence of fracture or compression deformity. No evidence of spondylolysis.  No significant spondylolisthesis. No evidence of focal lesions. The prevertebral soft tissues appear unremarkable. Surrounding soft tissues appear within normal limits. Moderate multilevel degenerative changes are present throughout the spine. Findings are present consistent with DISH. The lung apices appear clear. Pelvis: Postsurgical changes are seen related to left femoral fixation. There is a comminuted intertrochanteric fracture of the left proximal femur which appears similar as compared to  the previous study. A small amount of callus formation is present related to healing. The fracture lines are still present. No new fracture identified. Intrapelvic contents demonstrate no acute process. Moderate to large amount of stool present within the rectum. No significant free fluid identified. No sizable soft tissue hematoma identified.     Impression: Impression: 1.No acute intracranial process. 2.Comminuted mildly displaced fracture of the anterior wall of the right frontal sinus. There is a fracture of the medial wall of the frontal sinuses bilaterally with a small fracture seen within the posterior wall of the right frontal sinus. 3.Mildly displaced comminuted fracture of the superior wall of the right orbit as well as fractures of the medial and lateral walls of the right orbit. 4.Mildly displaced fracture of the right nasal bone. 5.No acute osseous abnormality of the cervical spine. 6.No acute osseous abnormality of the pelvis. Stable comminuted intertrochanteric fracture of the left proximal femur with a small amount of callus formation related to healing. The fracture lines are still present. Electronically Signed: Deana Caballero MD  7/8/2025 11:31 PM EDT  Workstation ID: [id number]    CT Cervical Spine Without Contrast  Result Date: 7/8/2025  CT HEAD WO CONTRAST, CT CERVICAL SPINE WO CONTRAST, CT PELVIS WO CONTRAST, CT MAXILLOFACIAL WO CONT Date of Exam: 7/8/2025 10:56 PM EDT Indication: fall, trauma. Comparison: 6/9/2025, 6/8/2025, 6/11/2025. Technique: Axial CT images were obtained of the head, face, cervical spine and pelvis without contrast administration.  Automated exposure control and iterative construction methods were used. Findings: Head: There is no evidence of hemorrhage. There is no mass effect or midline shift. Encephalomalacia is present within the right occipital lobe related to previous infarct. There is no extracerebral collection. Ventricles are normal in size and configuration for  patient's stated age.  Posterior fossa is within normal limits. Calvarium and skull base appear intact.   Soft tissue swelling is present within the right forehead region. Face: There is a comminuted mildly displaced fracture of the anterior wall of the right frontal sinus (series 2 image 88). There is a fracture of the medial wall of the frontal sinuses bilaterally with a small fracture seen within the posterior wall of the right frontal sinus (series 2 image 84). There is a mildly displaced fracture of the right nasal bone. There is a mildly displaced comminuted fracture of the superior wall of the right orbit (series 900 image 21) as well as the medial wall (series 900 image 28). The inferior wall appears intact. There does hypertrophy a subtle fracture of the lateral wall of the right orbit (series 2 image 82). Small air-fluid levels are present within the bilateral maxillary sinuses. The globes appear intact. Retroconal fat appears within normal limits. Right periorbital soft tissue swelling is present. Temporomandibular joints appear intact. Cervical: No evidence of fracture or compression deformity. No evidence of spondylolysis.  No significant spondylolisthesis. No evidence of focal lesions. The prevertebral soft tissues appear unremarkable. Surrounding soft tissues appear within normal limits. Moderate multilevel degenerative changes are present throughout the spine. Findings are present consistent with DISH. The lung apices appear clear. Pelvis: Postsurgical changes are seen related to left femoral fixation. There is a comminuted intertrochanteric fracture of the left proximal femur which appears similar as compared to the previous study. A small amount of callus formation is present related to healing. The fracture lines are still present. No new fracture identified. Intrapelvic contents demonstrate no acute process. Moderate to large amount of stool present within the rectum. No significant free fluid  identified. No sizable soft tissue hematoma identified.     Impression: Impression: 1.No acute intracranial process. 2.Comminuted mildly displaced fracture of the anterior wall of the right frontal sinus. There is a fracture of the medial wall of the frontal sinuses bilaterally with a small fracture seen within the posterior wall of the right frontal sinus. 3.Mildly displaced comminuted fracture of the superior wall of the right orbit as well as fractures of the medial and lateral walls of the right orbit. 4.Mildly displaced fracture of the right nasal bone. 5.No acute osseous abnormality of the cervical spine. 6.No acute osseous abnormality of the pelvis. Stable comminuted intertrochanteric fracture of the left proximal femur with a small amount of callus formation related to healing. The fracture lines are still present. Electronically Signed: Deana Caballero MD  7/8/2025 11:31 PM EDT  Workstation ID: [id number]    CT Maxillofacial Without Contrast  Result Date: 7/8/2025  CT HEAD WO CONTRAST, CT CERVICAL SPINE WO CONTRAST, CT PELVIS WO CONTRAST, CT MAXILLOFACIAL WO CONT Date of Exam: 7/8/2025 10:56 PM EDT Indication: fall, trauma. Comparison: 6/9/2025, 6/8/2025, 6/11/2025. Technique: Axial CT images were obtained of the head, face, cervical spine and pelvis without contrast administration.  Automated exposure control and iterative construction methods were used. Findings: Head: There is no evidence of hemorrhage. There is no mass effect or midline shift. Encephalomalacia is present within the right occipital lobe related to previous infarct. There is no extracerebral collection. Ventricles are normal in size and configuration for patient's stated age.  Posterior fossa is within normal limits. Calvarium and skull base appear intact.   Soft tissue swelling is present within the right forehead region. Face: There is a comminuted mildly displaced fracture of the anterior wall of the right frontal sinus (series 2 image 88).  There is a fracture of the medial wall of the frontal sinuses bilaterally with a small fracture seen within the posterior wall of the right frontal sinus (series 2 image 84). There is a mildly displaced fracture of the right nasal bone. There is a mildly displaced comminuted fracture of the superior wall of the right orbit (series 900 image 21) as well as the medial wall (series 900 image 28). The inferior wall appears intact. There does hypertrophy a subtle fracture of the lateral wall of the right orbit (series 2 image 82). Small air-fluid levels are present within the bilateral maxillary sinuses. The globes appear intact. Retroconal fat appears within normal limits. Right periorbital soft tissue swelling is present. Temporomandibular joints appear intact. Cervical: No evidence of fracture or compression deformity. No evidence of spondylolysis.  No significant spondylolisthesis. No evidence of focal lesions. The prevertebral soft tissues appear unremarkable. Surrounding soft tissues appear within normal limits. Moderate multilevel degenerative changes are present throughout the spine. Findings are present consistent with DISH. The lung apices appear clear. Pelvis: Postsurgical changes are seen related to left femoral fixation. There is a comminuted intertrochanteric fracture of the left proximal femur which appears similar as compared to the previous study. A small amount of callus formation is present related to healing. The fracture lines are still present. No new fracture identified. Intrapelvic contents demonstrate no acute process. Moderate to large amount of stool present within the rectum. No significant free fluid identified. No sizable soft tissue hematoma identified.     Impression: Impression: 1.No acute intracranial process. 2.Comminuted mildly displaced fracture of the anterior wall of the right frontal sinus. There is a fracture of the medial wall of the frontal sinuses bilaterally with a small  fracture seen within the posterior wall of the right frontal sinus. 3.Mildly displaced comminuted fracture of the superior wall of the right orbit as well as fractures of the medial and lateral walls of the right orbit. 4.Mildly displaced fracture of the right nasal bone. 5.No acute osseous abnormality of the cervical spine. 6.No acute osseous abnormality of the pelvis. Stable comminuted intertrochanteric fracture of the left proximal femur with a small amount of callus formation related to healing. The fracture lines are still present. Electronically Signed: Deana Caballero MD  7/8/2025 11:31 PM EDT  Workstation ID: [id number]    CT Pelvis Without Contrast  Result Date: 7/8/2025  CT HEAD WO CONTRAST, CT CERVICAL SPINE WO CONTRAST, CT PELVIS WO CONTRAST, CT MAXILLOFACIAL WO CONT Date of Exam: 7/8/2025 10:56 PM EDT Indication: fall, trauma. Comparison: 6/9/2025, 6/8/2025, 6/11/2025. Technique: Axial CT images were obtained of the head, face, cervical spine and pelvis without contrast administration.  Automated exposure control and iterative construction methods were used. Findings: Head: There is no evidence of hemorrhage. There is no mass effect or midline shift. Encephalomalacia is present within the right occipital lobe related to previous infarct. There is no extracerebral collection. Ventricles are normal in size and configuration for patient's stated age.  Posterior fossa is within normal limits. Calvarium and skull base appear intact.   Soft tissue swelling is present within the right forehead region. Face: There is a comminuted mildly displaced fracture of the anterior wall of the right frontal sinus (series 2 image 88). There is a fracture of the medial wall of the frontal sinuses bilaterally with a small fracture seen within the posterior wall of the right frontal sinus (series 2 image 84). There is a mildly displaced fracture of the right nasal bone. There is a mildly displaced comminuted fracture of the  superior wall of the right orbit (series 900 image 21) as well as the medial wall (series 900 image 28). The inferior wall appears intact. There does hypertrophy a subtle fracture of the lateral wall of the right orbit (series 2 image 82). Small air-fluid levels are present within the bilateral maxillary sinuses. The globes appear intact. Retroconal fat appears within normal limits. Right periorbital soft tissue swelling is present. Temporomandibular joints appear intact. Cervical: No evidence of fracture or compression deformity. No evidence of spondylolysis.  No significant spondylolisthesis. No evidence of focal lesions. The prevertebral soft tissues appear unremarkable. Surrounding soft tissues appear within normal limits. Moderate multilevel degenerative changes are present throughout the spine. Findings are present consistent with DISH. The lung apices appear clear. Pelvis: Postsurgical changes are seen related to left femoral fixation. There is a comminuted intertrochanteric fracture of the left proximal femur which appears similar as compared to the previous study. A small amount of callus formation is present related to healing. The fracture lines are still present. No new fracture identified. Intrapelvic contents demonstrate no acute process. Moderate to large amount of stool present within the rectum. No significant free fluid identified. No sizable soft tissue hematoma identified.     Impression: Impression: 1.No acute intracranial process. 2.Comminuted mildly displaced fracture of the anterior wall of the right frontal sinus. There is a fracture of the medial wall of the frontal sinuses bilaterally with a small fracture seen within the posterior wall of the right frontal sinus. 3.Mildly displaced comminuted fracture of the superior wall of the right orbit as well as fractures of the medial and lateral walls of the right orbit. 4.Mildly displaced fracture of the right nasal bone. 5.No acute osseous  abnormality of the cervical spine. 6.No acute osseous abnormality of the pelvis. Stable comminuted intertrochanteric fracture of the left proximal femur with a small amount of callus formation related to healing. The fracture lines are still present. Electronically Signed: Deana Caballero MD  7/8/2025 11:31 PM EDT  Workstation ID: [id number]    XR Chest 1 View  Result Date: 7/8/2025  XR CHEST 1 VW Date of Exam: 7/8/2025 11:11 PM EDT Indication: fall Comparison: 1/19/2025. Findings: There are no airspace consolidations. No pleural fluid. No pneumothorax. The pulmonary vasculature appears within normal limits. The cardiac and mediastinal silhouette appear unremarkable. No acute osseous abnormality identified.     Impression: Impression: No acute cardiopulmonary process. Electronically Signed: Deana Caballero MD  7/8/2025 11:18 PM EDT  Workstation ID: [id number]      Results for orders placed during the hospital encounter of 01/19/25    Adult Transthoracic Echo Complete W/ Cont if Necessary Per Protocol 01/20/2025  9:06 AM    Interpretation Summary    Left ventricular systolic function is normal. Estimated left ventricular EF = 50%    Left ventricular wall thickness is consistent with borderline concentric hypertrophy.    Mild mitral valve regurgitation is present.      Assessment & Plan   Assessment & Plan       Facial fracture    Toño Alcala is a 85 y.o. male with past medical history of CAD s/p CABG, HFpEF, CKD 4, diet-controlled diabetes, history of PE and DVT status post IVC filter/no longer on anticoagulation, hypertension, GERD, recent left hip fracture in June 2025 presenting after mechanical fall resulting in facial fractures.    Multiple facial fractures secondary to mechanical fall  - Patient reports tripping over seat/chair resulting in fall on face  - CT imaging revealing fracture of right frontal sinus, right orbit, and right nasal bone  - Neurosurgery consulted, no indication for surgical intervention at this  time.  Recommend repeat CT imaging in 6 hours  - ED provider spoke to OMFS at , no intervention indicated for orbital fractures  - Will continue to hold aspirin for now    Left intertrochanteric fracture (s/p mechanical fall)  Frequent falls  - s/p left hip cephalomedullary nail by Dr. Badillo 6/11/25  - CT pelvis with stable communicated intertrochanteric fracture of left proximal femur  - Patient discharged on aspirin 81 mg twice daily x 30 days, completing therapy on 7/12.  However, patient states he has been taking aspirin 81 mg daily and not twice daily.  Holding aspirin per above   - Patient scheduled to follow-up with Dr. Badillo yesterday for incision check and xrays, however appointment was canceled.  May benefit from Ortho follow-up while inpatient.    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  Holding aspirin per above  - Delirium precautions     Hx RLE DVT (IVC filter placed January 2025)  Hx PE  - No longer anticoagulated since his stroke with hemorrhagic transformation January 2025 (due to the hemorrhagic stroke and also recurrent falls)  - s/p IVC filter placement during January 2025 admission  - Holding aspirin per above     CAD  Chronic HFpEF  HTN  -Continue aspirin, Coreg, and amlodipine  -Follow-up with cardiology as scheduled     CKD 4  - Baseline Cr ~2.2-2.6  - Current baseline at 1.97  - Avoid nephrotoxins     Diet controlled DM2  - Current HbA1c is 5.9%     GERD  - PPI       VTE Prophylaxis:  Mechanical VTE prophylaxis orders are signed & held.            CODE STATUS:    Code Status and Medical Interventions: No CPR (Do Not Attempt to Resuscitate); Limited Support; No intubation (DNI)   Ordered at: 07/09/25 0301     Code Status (Patient has no pulse and is not breathing):    No CPR (Do Not Attempt to Resuscitate)     Medical Interventions (Patient has pulse or is breathing):    Limited Support     Medical Intervention Limits:    No intubation  (DNI)     Level Of Support Discussed With:    Patient       Expected Discharge   Expected discharge date/ time has not been documented.     Luc Chang DO  07/09/25

## 2025-07-09 NOTE — PLAN OF CARE
Goal Outcome Evaluation:  Plan of Care Reviewed With: patient        Progress: no change  Outcome Evaluation: Pt presents with decreased functional mobility and decreased activity tolerance compared to baseline. Pt completed SPT from bed to chair with min A x2 and RW for support. Recommend continued skilled IP PT interventions. Recommend D/C to SNF.    Anticipated Discharge Disposition (PT): skilled nursing facility

## 2025-07-09 NOTE — PROGRESS NOTES
Continued Stay Note  [facility]     Patient Name: Toño Alcala  MRN: [id number]  Today's Date: 7/9/2025    Admit Date: 7/8/2025        Discharge Plan       Row Name 07/09/25 1238       Plan    Plan Comments Met with Mr. Alcala and he is agreeable to  a referall to [facility] and he has been to [facility] before and seels like he would live to return.      Row Name 07/09/25 1006       Plan    Plan Comments Mr. Tani Alcala lives at  [facility] located at [address] [place] [id number] [phone] . Case management called to speak with [facility] regarding Mr. Alcala.                   Discharge Codes    No documentation.                       LYNN Briones

## 2025-07-09 NOTE — PROGRESS NOTES
Discharge Planning Assessment  [facility]     Patient Name: Toño Alcala  MRN: [id number]  Today's Date: 7/9/2025    Admit Date: 7/8/2025        Discharge Needs Assessment    No documentation.                  Discharge Plan       Row Name 07/09/25 1006       Plan    Plan Comments Mr. Tani Alcala lives at  [facility] located at [address] [place] [address] [place] [address] [phone] . Case management called to speak with [facility] regarding Mr. Alcala.                  Continued Care and Services - Admitted Since 7/8/2025    No active coordination exists.       Selected Continued Care - Episodes Includes continued care and service providers with selected services from the active episodes listed below      Chronic Care Management Episode start date: 4/14/2025   There are no active outsourced providers for this episode.                             Demographic Summary    No documentation.                  Functional Status    No documentation.                  Psychosocial    No documentation.                  Abuse/Neglect    No documentation.                  Legal    No documentation.                  Substance Abuse    No documentation.                  Patient Forms    No documentation.                     LYNN Briones

## 2025-07-09 NOTE — PLAN OF CARE
Received call from Dr Nguyen regarding this patient who had an unwitnessed fall and was brought from assisted living for evaluation. I personally reviewed his cervical, head, and maxillofacial CT scans and agree there are no acute Intracranial hemorrhages, although there are multiple nondisplaced facial fractures as noted in R frontal sinus, nasal bone, orbit.  Although no family is present at this time, pt is believed to be at his baseline mentation without formal diagnosis of dementia but has had increasing confusion over the past several months noted on previous ED notes. He has history of DVT/PE but is only taking 81mg asa for anticoagulation and has IVC filter. I see no indication for neurosurgical intervention at this time and would recommend admission to hospitalist for pain management and monitoring of the facial fractures, with a repeat head CT in about 6 hours out of an abundance of caution.  Will provide formal consult and review repeat head CT in the morning.

## 2025-07-09 NOTE — ED PROVIDER NOTES
Subjective   History of Present Illness  85-year-old male with a history of hypertension and dyslipidemia presenting to the emergency department after an accidental fall.  Nursing home staff states that the patient has been increasingly confused over the last day or so.  He had an unwitnessed fall earlier today.  They found the patient on the ground.  He did have some lacerations and abrasions to the forehead with some bleeding throughout the face.  Patient is complaining of some left hip pain.  He is unsure how he fell.  Complaining of some headache as well.  He is slightly confused on exam, however continues to complain of pain in his left hip.  Denies chest pain or shortness of breath.  No abdominal pain or vomiting    History provided by:  EMS personnel and nursing home   used: No        Review of Systems   Constitutional:  Negative for chills and fever.   HENT:  Negative for congestion, ear pain and sore throat.    Eyes:  Negative for visual disturbance.   Respiratory:  Negative for shortness of breath.    Cardiovascular:  Negative for chest pain.   Gastrointestinal:  Negative for abdominal pain.   Genitourinary:  Negative for difficulty urinating.   Musculoskeletal:  Positive for arthralgias and myalgias.   Skin:  Negative for rash.   Neurological:  Positive for headaches. Negative for dizziness, weakness and numbness.   Psychiatric/Behavioral:  Negative for agitation.        Past Medical History:   Diagnosis Date    Acute diverticulitis 01/29/2020    Allergic 60 yrs ago    Arthritis     Arthritis of neck Fusion 1992    Bilateral radial fractures 1962    fracture bilateral radial heads    CAD (coronary artery disease)     Calculus of gallbladder without cholecystitis without obstruction 01/29/2020    Cataract     Cervical disc disorder Fusion 1992    Cholelithiasis     Chronic kidney disease 2020    Clotting disorder     CVD (cardiovascular disease)     History of TIA 1989    Diabetes  mellitus     DVT of proximal lower limb 06/22/2015    proximal DVT right leg, small PE- anticoagulation    Dyslipidemia     Erectile dysfunction     Fracture 1952    H/o pf left forearm fracture    Fracture of right hand 1980    GERD (gastroesophageal reflux disease)     with hiatal hernia    HL (hearing loss)     Hx of angina pectoris     Hypertension     Normal renal angiography 02/16/11    Knee swelling Several years ago    Left wrist fracture 1953    Lumbosacral disc disease 1996    Osgood-Schlatter's disease 1955    Osteoarthritis     Right wrist fracture     Stroke     Pt states no residual weakness though has urinary incontinence.    Vertigo     Wears glasses        Allergies   Allergen Reactions    Penicillins Hives and Swelling     Per patient, has tolerated Keflex       Past Surgical History:   Procedure Laterality Date    APPENDECTOMY  1957    CARDIAC CATHETERIZATION  2011    with vein graft    CATARACT EXTRACTION Left     CERVICAL FUSION  1992    C3-4    COLONOSCOPY  2013    CORONARY ARTERY BYPASS GRAFT  1994    HERNIA REPAIR Right 2011    inguinal    HIP TROCHANTERIC NAILING WITH INTRAMEDULLARY HIP SCREW Left 6/11/2025    Procedure: CEPHALOMEDULLARY NAIL FIXATION;  Surgeon: Rey Badillo MD;  Location:  [facility];  Service: Orthopedics;  Laterality: Left;    INGUINAL HERNIA REPAIR Left 10/29/2019    Procedure: INGUINAL HERNIA REPAIR LEFT;  Surgeon: Charisma Raines MD;  Location:  [facility];  Service: General    INTERVENTIONAL RADIOLOGY PROCEDURE N/A 01/23/2025    Procedure: IVC Filter Placement;  Surgeon: Avelino Hernandez MD;  Location:  [facility];  Service: Cardiovascular;  Laterality: N/A;    LUMBAR LAMINECTOMY  1996    laminectomy, lumbar, L3    OTHER SURGICAL HISTORY      wrist surgery    TONSILLECTOMY AND ADENOIDECTOMY  1945    TRIGGER POINT INJECTION  2001 - 2007    VASECTOMY  1972       Family History   Problem Relation Age of Onset    Arthritis Mother         OA     Heart disease Father     Diabetes Father     Heart attack Father     Heart disease Brother     Heart attack Brother     Diabetes Brother     Diabetes Son     Hypertension Other     Cancer Other        Social History     Socioeconomic History    Marital status:    Tobacco Use    Smoking status: Former     Current packs/day: 0.00     Average packs/day: 1.5 packs/day for 34.8 years (52.2 ttl pk-yrs)     Types: Cigarettes, Pipe, Cigars     Start date: 1962     Quit date: 1997     Years since quittin.2     Passive exposure: Past    Smokeless tobacco: Never    Tobacco comments:     QUIT DATE 1992   Vaping Use    Vaping status: Never Used   Substance and Sexual Activity    Alcohol use: Yes     Alcohol/week: 11.0 - 13.0 standard drinks of alcohol     Types: 7 Glasses of wine, 2 Cans of beer, 2 - 4 Shots of liquor per week     Comment: glass of wine everyday     Drug use: No    Sexual activity: Not Currently     Partners: Female     Birth control/protection: Vasectomy, Surgical           Objective   Physical Exam  Vitals and nursing note reviewed.   Constitutional:       General: He is not in acute distress.     Appearance: He is not ill-appearing or toxic-appearing.   HENT:      Head:      Comments: Patient is laceration and abrasion to the right frontal area.  No active bleeding at this time.  There is contusion over the right orbit and significant swelling.  No crepitus or evidence of basilar skull fracture     Right Ear: Tympanic membrane normal.      Left Ear: Tympanic membrane normal.      Nose:      Comments: No septal hematoma     Mouth/Throat:      Pharynx: Oropharynx is clear.   Eyes:      Conjunctiva/sclera: Conjunctivae normal.   Cardiovascular:      Rate and Rhythm: Normal rate and regular rhythm.   Pulmonary:      Effort: Pulmonary effort is normal. No respiratory distress.   Abdominal:      General: Abdomen is flat. There is no distension.      Palpations: There is no mass.       Tenderness: There is no abdominal tenderness. There is no guarding or rebound.   Musculoskeletal:         General: Swelling and tenderness (Left hip) present.   Neurological:      Mental Status: He is alert. He is disoriented.      Sensory: No sensory deficit.      Motor: No weakness.         Laceration Repair    Date/Time: 7/9/2025 3:04 AM    Performed by: Boo Nguyen MD  Authorized by: Luc Chang DO    Consent:     Consent obtained:  Verbal    Consent given by:  Patient    Risks, benefits, and alternatives were discussed: yes      Risks discussed:  Infection, pain, poor cosmetic result and poor wound healing    Alternatives discussed:  Delayed treatment  Universal protocol:     Procedure explained and questions answered to patient or proxy's satisfaction: yes      Relevant documents present and verified: yes      Imaging studies available: yes      Immediately prior to procedure, a time out was called: yes      Patient identity confirmed:  Verbally with patient and arm band  Anesthesia:     Anesthesia method:  Local infiltration    Local anesthetic:  Lidocaine 1% w/o epi  Laceration details:     Location:  Scalp    Scalp location:  Frontal    Length (cm):  2    Depth (mm):  2  Pre-procedure details:     Preparation:  Patient was prepped and draped in usual sterile fashion and imaging obtained to evaluate for foreign bodies  Exploration:     Limited defect created (wound extended): no      Hemostasis achieved with:  Direct pressure    Imaging obtained: x-ray      Imaging outcome: foreign body not noted      Wound exploration: entire depth of wound visualized      Wound extent: no areolar tissue violation noted and no muscle damage noted      Contaminated: no    Treatment:     Area cleansed with:  Chlorhexidine    Amount of cleaning:  Extensive    Irrigation solution:  Sterile saline    Irrigation volume:  Copious    Irrigation method:  Pressure wash    Visualized foreign bodies/material removed: no       Debridement:  None    Undermining:  None    Scar revision: no    Skin repair:     Repair method:  Sutures    Suture size:  4-0    Wound skin closure material used: Vicryl.    Suture technique:  Simple interrupted    Number of sutures:  2  Approximation:     Approximation:  Close  Repair type:     Repair type:  Simple  Post-procedure details:     Dressing:  Sterile dressing and adhesive bandage    Procedure completion:  Tolerated well, no immediate complications             ED Course  ED Course as of 07/09/25 0306   Tue Jul 08, 2025 2230 BP: 154/82 [JK]   2230 Temp: 98 °F (36.7 °C) [JK]   2230 Temp src: Oral [JK]   2230 Heart Rate: 88 [JK]   2230 Resp: 20 [JK]   2230 SpO2: 98 % [JK]   2230 Device (Oxygen Therapy): room air  Interpretation:  Patient's repeat vitals, telemetry tracing, and pulse oximetry tracing were directly viewed and interpreted by myself.   O2 sat 98% on room air, interpreted as normal.  Telemetry rhythm strip revealed a rate of 88 bpm, interpreted as normal sinus rhythm [JK]   Wed Jul 09, 2025 0018 CBC & Differential(!) [JK]   0019 Protime-INR(!) [JK]   0019 aPTT(!) [JK]   0019 Comprehensive Metabolic Panel(!) [JK]   0019 Urinalysis With Culture If Indicated - Urine, Catheter(!)  Interpretation:  Laboratory studies were reviewed and interpreted directly by myself.  CBC shows chronic anemia with a hemoglobin 9.4, INR slightly elevated 1.22, CMP shows chronic kidney disease with a BUN of 36.5 and creatinine 1.97 [JK]   0019 CT Head Without Contrast [JK]   0019 CT Cervical Spine Without Contrast [JK]   0019 CT Maxillofacial Without Contrast [JK]   0019 CT Pelvis Without Contrast [JK]   0019 XR Chest 1 View  Interpretation:  Imaging was directly visualized by myself, per my interpretations, chest x-ray did not show any acute process.  CT of the pelvis showed a healing comminuted fracture of the left femur.  CT of the head, cervical spine did not show any acute process.  CT maxillofacial did show a  fracture of the frontal sinus as well as the right orbit and nasal fracture.. [JK]   0020 Given the severe comminuted fractures in the frontal sinus, I did speak with neurosurgery.  They are reviewing the images and will provide further disposition [JK]   0122 Neurosurgery recommends admission for further neurologic evaluation.  They request repeat imaging of the head and face in 6 hours [JK]   0305 Based on the patient's presentation, history and diffuse work-up in the emergency department, the patient is deemed appropriate for admission to the hospital for further evaluation and treatment.  This was discussed with the patient at bedside.  They are in agreement with the current medical management.    Admitting physician: Dr. Chang    Discussion was had with admitting physician regarding the laboratory and imaging findings.  We did discuss current therapeutics in the emergency department and progression of the patient.  Working diagnosis was conveyed to the admitting physician, as well as current status and prognosis for the patient.  They are in agreement with these findings and have accepted admission.    Shared decision making:   After full review of the patient's clinical presentation, review of any work-up including but not limited to laboratory studies and radiology obtained, I had a discussion with the patient.  Treatment options were discussed as well as the risks, benefits and consequences.  I discussed all findings with the patient and family members if available.  During the discussion, treatment goals were understood by all as well as any misconceptions which were addressed with the patient.  Ample time was given for any questions they may have had.  They are in agreement with the treatment plan as well as final disposition. [JK]      ED Course User Index  [JK] Boo Nguyen MD                                                       Medical Decision Making  This is a 85-year-old male with a history of  hypertension presenting with some confusion and a fall.  Patient had unwitnessed fall in his living facility.  Complains of left hip pain.  I am concerned for left hip fracture.  Patient also has significant swelling lacerations to the forehead and face.  Patient meets imaging criteria for the head and cervical spine based on [employer] guidelines.  Overall, the patient is nontoxic.  Afebrile.  IV access was established in the patient.  Placed on continuous telemetry monitoring.  Given the patient's presentation, differential is broad and will require further evaluation.  Workup initiated.      Differential diagnosis: Accidental fall, contusion, intracranial bleeding, facial fracture, laceration, left hip fracture, contusion, UTI, acute kidney injury, electrolyte abnormality      Amount and/or Complexity of Data Reviewed  Independent Historian: EMS  External Data Reviewed: labs, radiology, ECG and notes.     Details: External laboratories, imaging as well as notes were reviewed personally by myself.  All relevant studies were used to guide decision making.     Date of previous record: 6/9/2025    Source of note: Admission Record    Summary:  Patient was seen and admitted for left hip fracture.  I did review basic laboratory studies on file as well as a previous chest x-ray and EKG.  Records reviewed      Labs: ordered. Decision-making details documented in ED Course.  Radiology: ordered and independent interpretation performed. Decision-making details documented in ED Course.  ECG/medicine tests: ordered and independent interpretation performed. Decision-making details documented in ED Course.    Risk  Prescription drug management.  Decision regarding hospitalization.        Final diagnoses:   Open fracture of frontal sinus, initial encounter   Open fracture of orbit, initial encounter   Open fracture of nasal bone, initial encounter   Laceration of scalp, initial encounter   Mixed hyperlipidemia   Chronic kidney  disease, stage IV (severe)   Essential hypertension       ED Disposition  ED Disposition       ED Disposition   Decision to Admit    Condition   --    Comment   Level of Care: Telemetry [5]   Diagnosis: Facial fracture [728277]                 No follow-up provider specified.       Medication List      No changes were made to your prescriptions during this visit.            Boo Nguyen MD  07/09/25 0306

## 2025-07-10 VITALS
SYSTOLIC BLOOD PRESSURE: 97 MMHG | DIASTOLIC BLOOD PRESSURE: 51 MMHG | HEIGHT: 72 IN | TEMPERATURE: 98.4 F | HEART RATE: 65 BPM | RESPIRATION RATE: 16 BRPM | OXYGEN SATURATION: 92 % | WEIGHT: 170.1 LBS | BODY MASS INDEX: 23.04 KG/M2

## 2025-07-10 PROBLEM — S01.81XA FOREHEAD LACERATION: Status: ACTIVE | Noted: 2025-07-10

## 2025-07-10 PROBLEM — S02.85XA ORBITAL FRACTURE: Status: ACTIVE | Noted: 2025-07-10

## 2025-07-10 PROCEDURE — 99239 HOSP IP/OBS DSCHRG MGMT >30: CPT | Performed by: INTERNAL MEDICINE

## 2025-07-10 PROCEDURE — G0378 HOSPITAL OBSERVATION PER HR: HCPCS

## 2025-07-10 RX ORDER — CEFUROXIME AXETIL 500 MG/1
500 TABLET ORAL 2 TIMES DAILY
Start: 2025-07-10 | End: 2025-07-15

## 2025-07-10 RX ORDER — CLINDAMYCIN HYDROCHLORIDE 150 MG/1
450 CAPSULE ORAL EVERY 8 HOURS SCHEDULED
Status: DISCONTINUED | OUTPATIENT
Start: 2025-07-10 | End: 2025-07-10

## 2025-07-10 RX ORDER — METRONIDAZOLE 500 MG/1
500 TABLET ORAL 3 TIMES DAILY
Start: 2025-07-10 | End: 2025-07-15

## 2025-07-10 RX ORDER — CLINDAMYCIN HYDROCHLORIDE 150 MG/1
450 CAPSULE ORAL EVERY 8 HOURS SCHEDULED
Qty: 45 CAPSULE | Refills: 0 | Status: CANCELLED | OUTPATIENT
Start: 2025-07-10 | End: 2025-07-15

## 2025-07-10 RX ADMIN — CARVEDILOL 6.25 MG: 6.25 TABLET, FILM COATED ORAL at 09:43

## 2025-07-10 RX ADMIN — ACETAMINOPHEN 650 MG: 325 TABLET ORAL at 09:44

## 2025-07-10 RX ADMIN — Medication 10 ML: at 01:26

## 2025-07-10 RX ADMIN — PANTOPRAZOLE SODIUM 40 MG: 40 TABLET, DELAYED RELEASE ORAL at 09:43

## 2025-07-10 RX ADMIN — THIAMINE HCL TAB 100 MG 100 MG: 100 TAB at 09:43

## 2025-07-10 RX ADMIN — FOLIC ACID 1 MG: 1 TABLET ORAL at 09:43

## 2025-07-10 RX ADMIN — SERTRALINE 25 MG: 25 TABLET, FILM COATED ORAL at 09:43

## 2025-07-10 RX ADMIN — Medication 10 ML: at 09:47

## 2025-07-10 NOTE — PLAN OF CARE
Problem: Adult Inpatient Plan of Care  Goal: Plan of Care Review  Outcome: Progressing  Goal: Patient-Specific Goal (Individualized)  Outcome: Progressing  Goal: Absence of Hospital-Acquired Illness or Injury  Outcome: Progressing  Intervention: Identify and Manage Fall Risk  Recent Flowsheet Documentation  Taken 7/10/2025 0600 by Vijaya Kaur RN  Safety Promotion/Fall Prevention: safety round/check completed  Taken 7/10/2025 0400 by Vijaya Kaur RN  Safety Promotion/Fall Prevention: safety round/check completed  Taken 7/10/2025 0200 by Vijaya Kaur RN  Safety Promotion/Fall Prevention: safety round/check completed  Taken 7/10/2025 0000 by Vijaya Kaur RN  Safety Promotion/Fall Prevention: safety round/check completed  Taken 7/9/2025 2200 by Vijaya Kaur RN  Safety Promotion/Fall Prevention: safety round/check completed  Taken 7/9/2025 2000 by Vijaya Kaur RN  Safety Promotion/Fall Prevention: safety round/check completed  Intervention: Prevent Skin Injury  Recent Flowsheet Documentation  Taken 7/10/2025 0600 by Vijaya Kaur RN  Body Position: position changed independently  Skin Protection: incontinence pads utilized  Taken 7/10/2025 0400 by Vijaya Kaur RN  Body Position: position changed independently  Skin Protection:   incontinence pads utilized   transparent dressing maintained  Taken 7/10/2025 0200 by Vijaya Kaur RN  Body Position: position changed independently  Skin Protection:   incontinence pads utilized   transparent dressing maintained  Taken 7/10/2025 0000 by Vijaya Kaur RN  Body Position: position changed independently  Skin Protection:   transparent dressing maintained   incontinence pads utilized  Taken 7/9/2025 2200 by Vijaya Kaur RN  Body Position: position changed independently  Skin Protection:   transparent dressing maintained   incontinence pads utilized  Taken 7/9/2025 2000 by Vijaya Kaur RN  Body Position: position changed independently  Skin Protection:    incontinence pads utilized   transparent dressing maintained  Intervention: Prevent Infection  Recent Flowsheet Documentation  Taken 7/10/2025 0600 by Vijaya Kaur RN  Infection Prevention:   environmental surveillance performed   rest/sleep promoted   single patient room provided  Taken 7/10/2025 0400 by Vijaya Kaur RN  Infection Prevention:   environmental surveillance performed   rest/sleep promoted   single patient room provided  Taken 7/10/2025 0200 by Vijaya Kaur RN  Infection Prevention:   environmental surveillance performed   rest/sleep promoted   single patient room provided  Taken 7/10/2025 0000 by Vijaya Kaur RN  Infection Prevention:   environmental surveillance performed   rest/sleep promoted   single patient room provided  Taken 7/9/2025 2200 by Vijaya Kaur RN  Infection Prevention:   environmental surveillance performed   rest/sleep promoted   single patient room provided  Taken 7/9/2025 2000 by Vijaya Kaur RN  Infection Prevention:   environmental surveillance performed   rest/sleep promoted   single patient room provided  Goal: Optimal Comfort and Wellbeing  Outcome: Progressing  Intervention: Monitor Pain and Promote Comfort  Recent Flowsheet Documentation  Taken 7/10/2025 0600 by Vijaya Kaur RN  Pain Management Interventions: pain management plan reviewed with patient/caregiver  Taken 7/10/2025 0200 by Vijaya Kaur RN  Pain Management Interventions: pain medication given  Taken 7/10/2025 0000 by Vijaya Kaur RN  Pain Management Interventions: pain medication given  Taken 7/9/2025 2200 by Vijaya Kaur RN  Pain Management Interventions: pain medication given  Taken 7/9/2025 2000 by Vijaya Kaur RN  Pain Management Interventions: pain medication given  Intervention: Provide Person-Centered Care  Recent Flowsheet Documentation  Taken 7/10/2025 0200 by Vijaya Kaur RN  Trust Relationship/Rapport:   care explained   choices provided  Taken 7/10/2025 0000 by Vijaya Kaur  RN  Trust Relationship/Rapport:   care explained   choices provided  Taken 7/9/2025 2000 by Vijaya Kaur RN  Trust Relationship/Rapport:   care explained   choices provided  Goal: Readiness for Transition of Care  Outcome: Progressing     Problem: Fall Injury Risk  Goal: Absence of Fall and Fall-Related Injury  Outcome: Progressing  Intervention: Identify and Manage Contributors  Recent Flowsheet Documentation  Taken 7/10/2025 0600 by Vijaya Kaur RN  Medication Review/Management: medications reviewed  Taken 7/10/2025 0400 by Vijaya Kaur RN  Medication Review/Management: medications reviewed  Taken 7/10/2025 0200 by Vijaya Kaur RN  Medication Review/Management: medications reviewed  Taken 7/10/2025 0000 by Vijaya Kaur RN  Medication Review/Management: medications reviewed  Self-Care Promotion: BADL personal objects within reach  Taken 7/9/2025 2300 by Vijaya Kaur RN  Medication Review/Management: medications reviewed  Taken 7/9/2025 2200 by Vijaya Kaur RN  Medication Review/Management: medications reviewed  Self-Care Promotion: BADL personal objects within reach  Taken 7/9/2025 2000 by Vijaya Kaur RN  Medication Review/Management: medications reviewed  Self-Care Promotion: BADL personal objects within reach  Intervention: Promote Injury-Free Environment  Recent Flowsheet Documentation  Taken 7/10/2025 0600 by Vijaay Kaur RN  Safety Promotion/Fall Prevention: safety round/check completed  Taken 7/10/2025 0400 by Vijaya Kaur RN  Safety Promotion/Fall Prevention: safety round/check completed  Taken 7/10/2025 0200 by Vijaya Kaur RN  Safety Promotion/Fall Prevention: safety round/check completed  Taken 7/10/2025 0000 by Vijaya Kaur RN  Safety Promotion/Fall Prevention: safety round/check completed  Taken 7/9/2025 2200 by Vijaya Kaur RN  Safety Promotion/Fall Prevention: safety round/check completed  Taken 7/9/2025 2000 by Vijaya Kaur RN  Safety Promotion/Fall Prevention: safety  round/check completed     Problem: Skin Injury Risk Increased  Goal: Skin Health and Integrity  Outcome: Progressing  Intervention: Optimize Skin Protection  Recent Flowsheet Documentation  Taken 7/10/2025 0600 by Vijaya Kaur RN  Activity Management: activity minimized  Pressure Reduction Techniques: frequent weight shift encouraged  Head of Bed (HOB) Positioning: HOB elevated  Pressure Reduction Devices: pressure-redistributing mattress utilized  Skin Protection: incontinence pads utilized  Taken 7/10/2025 0400 by Vijaya Kaur RN  Activity Management: activity minimized  Pressure Reduction Techniques: frequent weight shift encouraged  Head of Bed (HOB) Positioning: HOB elevated  Pressure Reduction Devices: pressure-redistributing mattress utilized  Skin Protection:   incontinence pads utilized   transparent dressing maintained  Taken 7/10/2025 0200 by Vijaya Kaur RN  Activity Management:   sitting, edge of bed   back to bed  Pressure Reduction Techniques:   frequent weight shift encouraged   weight shift assistance provided  Head of Bed (HOB) Positioning: HOB elevated  Pressure Reduction Devices: pressure-redistributing mattress utilized  Skin Protection:   incontinence pads utilized   transparent dressing maintained  Taken 7/10/2025 0000 by Vijaya Kaur RN  Activity Management: activity minimized  Pressure Reduction Techniques:   frequent weight shift encouraged   weight shift assistance provided  Head of Bed (HOB) Positioning: HOB elevated  Pressure Reduction Devices: pressure-redistributing mattress utilized  Skin Protection:   transparent dressing maintained   incontinence pads utilized  Taken 7/9/2025 2200 by Vijaya Kaur RN  Activity Management: activity minimized  Pressure Reduction Techniques: frequent weight shift encouraged  Head of Bed (HOB) Positioning: HOB elevated  Pressure Reduction Devices: pressure-redistributing mattress utilized  Skin Protection:   transparent dressing maintained    incontinence pads utilized  Taken 7/9/2025 2000 by Vijaya Kaur, RN  Activity Management: activity minimized  Pressure Reduction Techniques: frequent weight shift encouraged  Head of Bed (HOB) Positioning: HOB elevated  Pressure Reduction Devices: pressure-redistributing mattress utilized  Skin Protection:   incontinence pads utilized   transparent dressing maintained

## 2025-07-10 NOTE — PROGRESS NOTES
Case Management Discharge Note      Final Note: [facility] will accept Mr. Alcala to the general rehab unit and the nurse report number is [phone] Case management will fax the discharge summary to [employer] [facility] fax [phone].         Selected Continued Care - Admitted Since 7/8/2025       Destination Coordination complete.      Service Provider Services Address Phone Fax Patient Preferred    [facility] [address] [address] [phone] --              Durable Medical Equipment    No services have been selected for the patient.                Dialysis/Infusion    No services have been selected for the patient.                Home Medical Care    No services have been selected for the patient.                Therapy    No services have been selected for the patient.                Community Resources    No services have been selected for the patient.                Community & DME    No services have been selected for the patient.                    Selected Continued Care - Episodes Includes continued care and service providers with selected services from the active episodes listed below      Chronic Care Management Episode start date: 4/14/2025   There are no active outsourced providers for this episode.                 Selected Continued Care - Prior Encounters Includes continued care and service providers with selected services from prior encounters from 4/9/2025 to 7/10/2025      Discharged on 6/14/2025 Admission date: 6/9/2025 - Discharge disposition: Rehab Facility or Unit (DC - External)      Destination       Service Provider Services Address Phone Fax Patient Preferred    [facility] [address] [facility] [address] [phone] --                          Transportation Services  Transportation: Ambulance  Ambulance: [facility]  Ambulance Service  [facility] Ambulance Status: Accepted    Final Discharge Disposition Code: 62 - inpatient rehab facility

## 2025-07-10 NOTE — DISCHARGE SUMMARY
[facility] Medicine Services  DISCHARGE SUMMARY    Patient Name: Toño Alcala  : 1940  MRN: [id number]    Date of Admission: 2025  9:28 PM  Date of Discharge:  7/10/2025  Primary Care Physician: System, Provider Not In    Consults       Date and Time Order Name Status Description    2025 12:42 AM Inpatient Neurosurgery Consult Completed     2025  1:36 PM Inpatient Orthopedic Surgery Consult Completed             Hospital Course     Presenting Problem: facial fracture    Active Hospital Problems    Diagnosis  POA    **Facial fracture [S02.92XA]  Yes    Forehead laceration [S01.81XA]  Yes    Orbital fracture [S02.85XA]  Yes    Fall [W19.XXXA]  Yes      Resolved Hospital Problems   No resolved problems to display.          Hospital Course:  Toño Alcala is a 85 y.o. male w recent left hip fracture (2025) w h/o PE/DVT s/p IVC who presents from assisted living after unwitnessed fall with significant facial trauma. Found to have multiple nondisplaced facial fractures (frontal sinus, nasal bone, orbit)    Repeat CT face/head appears stable on repeat radiology read . Neurosurgery evaluated and no intracranial trauma, no CSF leak. I d/w [facility] transfer [place] patient's exam - no eye entrapment -  who coordinated phone review by Dr Riggs, facial trauma , of patient's imaging. He recommends short course abx, sinus precautions, and follow-up in his clinic in 1 week which  will arrange with [facility]. Will hold aspirin until that follow-up given multiple fractures + right subconjunctival hemorrhage.    To [facility] on 7/10. S/p 2 sutures right forehead placed in our ED on , removal should be ~    I d/w son all dc instructions as well on 7/10    Discharge Follow Up Recommendations for outpatient labs/diagnostics:   Suture removal ~    facial trauma surgery f/u 1 week -  has contact for son and [facility]   Dr Badillo orthopedic surgery (reschedule 1-2)    Day of  Discharge     HPI:   Feels well  Full ROM right eye without pain    Vital Signs:   Temp:  [97.6 °F (36.4 °C)-98.4 °F (36.9 °C)] 98.4 °F (36.9 °C)  Heart Rate:  [63-71] 65  Resp:  [16-18] 16  BP: ()/(51-71) 97/51      Physical Exam:  Constitutional: No acute distress, awake, alert  HEENT: +right eye subconjunctival hemorrhage laterally. Bruising significant to face. Right foreahead sutures currently covered w bandage. Full ROM right eye without pain/impediment  Respiratory: Clear to auscultation bilaterally, respiratory effort normal   Cardiovascular: RRR, no murmurs, rubs, or gallops  Gastrointestinal: Soft, nontender, nondistended  Musculoskeletal: Muscle tone decreased throughout  Psychiatric: Appropriate affect, cooperative  Neurologic: Alert and oriented, facial movements symmetric and spontaneous movement of all 4 extremities grossly equal bilaterally, speech clear  Skin: No rashes    Pertinent  and/or Most Recent Results     LAB RESULTS:      Lab 07/09/25  0733 07/08/25 2258   WBC 7.02 8.99   HEMOGLOBIN 8.9* 9.4*   HEMATOCRIT 27.7* 29.2*   PLATELETS 191 225   NEUTROS ABS 4.53 6.40   IMMATURE GRANS (ABS) 0.02 0.04   LYMPHS ABS 1.21 1.11   MONOS ABS 0.93* 1.00*   EOS ABS 0.30 0.40   MCV 90.5 89.8   PROTIME  --  16.2*   APTT  --  37.3*         Lab 07/08/25 2258   SODIUM 138   POTASSIUM 3.6   CHLORIDE 104   CO2 24.2   ANION GAP 9.8   BUN 36.5*   CREATININE 1.97*   EGFR 32.7*   GLUCOSE 116*   CALCIUM 9.1         Lab 07/08/25 2258   TOTAL PROTEIN 6.6   ALBUMIN 3.3*   GLOBULIN 3.3   ALT (SGPT) 20   AST (SGOT) 36   BILIRUBIN 0.5   ALK PHOS 325*         Lab 07/08/25 2258   PROTIME 16.2*   INR 1.22*                 Brief Urine Lab Results  (Last result in the past 365 days)        Color   Clarity   Blood   Leuk Est   Nitrite   Protein   CREAT   Urine HCG        07/08/25 2249 Yellow   Cloudy   Negative   Negative   Negative   Trace                 Microbiology Results (last 10 days)       ** No results found  for the last 240 hours. **            CT Head Without Contrast  Result Date: 7/9/2025  CT MAXILLOFACIAL WO CONT, CT HEAD WO CONTRAST Date of Exam: 7/9/2025 8:53 AM EDT Indication: follow up. Comparison: 7/8/2025 CT scan of the head and face Technique: Axial CT images were obtained of the head, and subsequently from the inferior aspect of the mandible through the frontal sinuses without contrast administration.  Reconstructed coronal and sagittal images were also obtained. Automated exposure  control and iterative construction methods were used. Findings: Scans performed yesterday by report showed no acute intracranial process. Mildly comminuted fracture of the anterior wall of the right frontal sinus. Medial frontal sinus fracture bilaterally, posterior wall right frontal sinus fracture. Mildly displaced  superior right orbit fracture, and medial and lateral right orbital wall fracture. Mildly displaced right nasal bone fracture. CT SCAN OF THE HEAD WITHOUT CONTRAST: Included portions of the facial bones show similar fracture pattern. Complete facial bone CT scan follows this report. Images of the brain again show advanced generalized cerebral atrophy, bilateral occipital infarcts right larger than left, and left cerebral infarct. There appears to be an old medial right temporal lobe infarct as well, adjacent the temporal horn of the lateral ventricle. There is no evidence of new infarct, no evidence of edema, hemorrhage,  mass or mass effect or abnormal extra-axial collection. Ventriculomegaly likely represents ventricular dilatation due to advanced parenchymal loss, although NPH could appear similar.     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 No evidence of intracranial hemorrhage or other new intracranial disease is seen. CT SCAN OF THE FACIAL BONES WITHOUT CONTRAST: As on the prior study, there is fluid opacification of the frontal sinuses,  anterior ethmoid air cells, trace left sphenoid sinus fluid and small fluid levels in the maxillary sinuses, which appear unchanged,  suggesting no ongoing hemorrhage. Fluid density in the frontal and ethmoid sinuses is consistent with acute/recent blood. Fracture pattern of the frontal sinuses, right more extensive than left, nasal bone, superior orbit,, medial orbit, and subtle fracture of the lateral maxillary sinus wall all appear stable. No further displacement of the superior orbital fracture is seen. Soft tissue window images show decreased diffuse edema/hematoma in the frontal subcutaneous tissues, and mildly increased preseptal edema or hematoma. The optic globes, extraocular muscles, and optic nerves appear symmetric and normal. No evidence of extraocular muscle entrapment is seen. There is no apparent fracture of the bony nasal septum, and no nasal septal hematoma. Impression: 1. Extensive, predominately right-sided facial trauma, with stable fracture pattern compared yesterday's 11:04 p.m. exam. No new or increasing displacement is identified. 2. Stable hyperdense fluid in the paranasal sinuses, consistent with some blood products. No evidence to suggest continued hemorrhage. 3. Decreased subcutaneous edema or diffuse bruising of the frontal scalp, and mildly increased right preseptal edema. No evidence of soft tissue trauma to the contents of the right orbit. Electronically Signed: Chandu Santiago MD  7/9/2025 10:14 AM EDT  Workstation ID: [id number]    CT Maxillofacial Without Contrast  Result Date: 7/9/2025  CT MAXILLOFACIAL WO CONT, CT HEAD WO CONTRAST Date of Exam: 7/9/2025 8:53 AM EDT Indication: follow up. Comparison: 7/8/2025 CT scan of the head and face Technique: Axial CT images were obtained of the head, and subsequently from the inferior aspect of the mandible through the frontal sinuses without contrast administration.  Reconstructed coronal and sagittal images were also obtained. Automated  exposure  control and iterative construction methods were used. Findings: Scans performed yesterday by report showed no acute intracranial process. Mildly comminuted fracture of the anterior wall of the right frontal sinus. Medial frontal sinus fracture bilaterally, posterior wall right frontal sinus fracture. Mildly displaced  superior right orbit fracture, and medial and lateral right orbital wall fracture. Mildly displaced right nasal bone fracture. CT SCAN OF THE HEAD WITHOUT CONTRAST: Included portions of the facial bones show similar fracture pattern. Complete facial bone CT scan follows this report. Images of the brain again show advanced generalized cerebral atrophy, bilateral occipital infarcts right larger than left, and left cerebral infarct. There appears to be an old medial right temporal lobe infarct as well, adjacent the temporal horn of the lateral ventricle. There is no evidence of new infarct, no evidence of edema, hemorrhage,  mass or mass effect or abnormal extra-axial collection. Ventriculomegaly likely represents ventricular dilatation due to advanced parenchymal loss, although NPH could appear similar.     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 No evidence of intracranial hemorrhage or other new intracranial disease is seen. CT SCAN OF THE FACIAL BONES WITHOUT CONTRAST: As on the prior study, there is fluid opacification of the frontal sinuses, anterior ethmoid air cells, trace left sphenoid sinus fluid and small fluid levels in the maxillary sinuses, which appear unchanged,  suggesting no ongoing hemorrhage. Fluid density in the frontal and ethmoid sinuses is consistent with acute/recent blood. Fracture pattern of the frontal sinuses, right more extensive than left, nasal bone, superior orbit,, medial orbit, and subtle fracture of the lateral maxillary sinus wall all appear stable. No further displacement of the superior  orbital fracture is seen. Soft tissue window images show decreased diffuse edema/hematoma in the frontal subcutaneous tissues, and mildly increased preseptal edema or hematoma. The optic globes, extraocular muscles, and optic nerves appear symmetric and normal. No evidence of extraocular muscle entrapment is seen. There is no apparent fracture of the bony nasal septum, and no nasal septal hematoma. Impression: 1. Extensive, predominately right-sided facial trauma, with stable fracture pattern compared yesterday's 11:04 p.m. exam. No new or increasing displacement is identified. 2. Stable hyperdense fluid in the paranasal sinuses, consistent with some blood products. No evidence to suggest continued hemorrhage. 3. Decreased subcutaneous edema or diffuse bruising of the frontal scalp, and mildly increased right preseptal edema. No evidence of soft tissue trauma to the contents of the right orbit. Electronically Signed: Chandu Santiago MD  7/9/2025 10:14 AM EDT  Workstation ID: [id number]    CT Head Without Contrast  Result Date: 7/8/2025  CT HEAD WO CONTRAST, CT CERVICAL SPINE WO CONTRAST, CT PELVIS WO CONTRAST, CT MAXILLOFACIAL WO CONT Date of Exam: 7/8/2025 10:56 PM EDT Indication: fall, trauma. Comparison: 6/9/2025, 6/8/2025, 6/11/2025. Technique: Axial CT images were obtained of the head, face, cervical spine and pelvis without contrast administration.  Automated exposure control and iterative construction methods were used. Findings: Head: There is no evidence of hemorrhage. There is no mass effect or midline shift. Encephalomalacia is present within the right occipital lobe related to previous infarct. There is no extracerebral collection. Ventricles are normal in size and configuration for patient's stated age.  Posterior fossa is within normal limits. Calvarium and skull base appear intact.   Soft tissue swelling is present within the right forehead region. Face: There is a comminuted mildly displaced fracture of the  anterior wall of the right frontal sinus (series 2 image 88). There is a fracture of the medial wall of the frontal sinuses bilaterally with a small fracture seen within the posterior wall of the right frontal sinus (series 2 image 84). There is a mildly displaced fracture of the right nasal bone. There is a mildly displaced comminuted fracture of the superior wall of the right orbit (series 900 image 21) as well as the medial wall (series 900 image 28). The inferior wall appears intact. There does hypertrophy a subtle fracture of the lateral wall of the right orbit (series 2 image 82). Small air-fluid levels are present within the bilateral maxillary sinuses. The globes appear intact. Retroconal fat appears within normal limits. Right periorbital soft tissue swelling is present. Temporomandibular joints appear intact. Cervical: No evidence of fracture or compression deformity. No evidence of spondylolysis.  No significant spondylolisthesis. No evidence of focal lesions. The prevertebral soft tissues appear unremarkable. Surrounding soft tissues appear within normal limits. Moderate multilevel degenerative changes are present throughout the spine. Findings are present consistent with DISH. The lung apices appear clear. Pelvis: Postsurgical changes are seen related to left femoral fixation. There is a comminuted intertrochanteric fracture of the left proximal femur which appears similar as compared to the previous study. A small amount of callus formation is present related to healing. The fracture lines are still present. No new fracture identified. Intrapelvic contents demonstrate no acute process. Moderate to large amount of stool present within the rectum. No significant free fluid identified. No sizable soft tissue hematoma identified.     Impression: 1.No acute intracranial process. 2.Comminuted mildly displaced fracture of the anterior wall of the right frontal sinus. There is a fracture of the medial wall of  the frontal sinuses bilaterally with a small fracture seen within the posterior wall of the right frontal sinus. 3.Mildly displaced comminuted fracture of the superior wall of the right orbit as well as fractures of the medial and lateral walls of the right orbit. 4.Mildly displaced fracture of the right nasal bone. 5.No acute osseous abnormality of the cervical spine. 6.No acute osseous abnormality of the pelvis. Stable comminuted intertrochanteric fracture of the left proximal femur with a small amount of callus formation related to healing. The fracture lines are still present. Electronically Signed: Deana Caballero MD  7/8/2025 11:31 PM EDT  Workstation ID: [id number]    CT Cervical Spine Without Contrast  Result Date: 7/8/2025  CT HEAD WO CONTRAST, CT CERVICAL SPINE WO CONTRAST, CT PELVIS WO CONTRAST, CT MAXILLOFACIAL WO CONT Date of Exam: 7/8/2025 10:56 PM EDT Indication: fall, trauma. Comparison: 6/9/2025, 6/8/2025, 6/11/2025. Technique: Axial CT images were obtained of the head, face, cervical spine and pelvis without contrast administration.  Automated exposure control and iterative construction methods were used. Findings: Head: There is no evidence of hemorrhage. There is no mass effect or midline shift. Encephalomalacia is present within the right occipital lobe related to previous infarct. There is no extracerebral collection. Ventricles are normal in size and configuration for patient's stated age.  Posterior fossa is within normal limits. Calvarium and skull base appear intact.   Soft tissue swelling is present within the right forehead region. Face: There is a comminuted mildly displaced fracture of the anterior wall of the right frontal sinus (series 2 image 88). There is a fracture of the medial wall of the frontal sinuses bilaterally with a small fracture seen within the posterior wall of the right frontal sinus (series 2 image 84). There is a mildly displaced fracture of the right nasal bone. There is  a mildly displaced comminuted fracture of the superior wall of the right orbit (series 900 image 21) as well as the medial wall (series 900 image 28). The inferior wall appears intact. There does hypertrophy a subtle fracture of the lateral wall of the right orbit (series 2 image 82). Small air-fluid levels are present within the bilateral maxillary sinuses. The globes appear intact. Retroconal fat appears within normal limits. Right periorbital soft tissue swelling is present. Temporomandibular joints appear intact. Cervical: No evidence of fracture or compression deformity. No evidence of spondylolysis.  No significant spondylolisthesis. No evidence of focal lesions. The prevertebral soft tissues appear unremarkable. Surrounding soft tissues appear within normal limits. Moderate multilevel degenerative changes are present throughout the spine. Findings are present consistent with DISH. The lung apices appear clear. Pelvis: Postsurgical changes are seen related to left femoral fixation. There is a comminuted intertrochanteric fracture of the left proximal femur which appears similar as compared to the previous study. A small amount of callus formation is present related to healing. The fracture lines are still present. No new fracture identified. Intrapelvic contents demonstrate no acute process. Moderate to large amount of stool present within the rectum. No significant free fluid identified. No sizable soft tissue hematoma identified.     Impression: 1.No acute intracranial process. 2.Comminuted mildly displaced fracture of the anterior wall of the right frontal sinus. There is a fracture of the medial wall of the frontal sinuses bilaterally with a small fracture seen within the posterior wall of the right frontal sinus. 3.Mildly displaced comminuted fracture of the superior wall of the right orbit as well as fractures of the medial and lateral walls of the right orbit. 4.Mildly displaced fracture of the right  nasal bone. 5.No acute osseous abnormality of the cervical spine. 6.No acute osseous abnormality of the pelvis. Stable comminuted intertrochanteric fracture of the left proximal femur with a small amount of callus formation related to healing. The fracture lines are still present. Electronically Signed: Deana Caballero MD  7/8/2025 11:31 PM EDT  Workstation ID: [id number]    CT Maxillofacial Without Contrast  Result Date: 7/8/2025  CT HEAD WO CONTRAST, CT CERVICAL SPINE WO CONTRAST, CT PELVIS WO CONTRAST, CT MAXILLOFACIAL WO CONT Date of Exam: 7/8/2025 10:56 PM EDT Indication: fall, trauma. Comparison: 6/9/2025, 6/8/2025, 6/11/2025. Technique: Axial CT images were obtained of the head, face, cervical spine and pelvis without contrast administration.  Automated exposure control and iterative construction methods were used. Findings: Head: There is no evidence of hemorrhage. There is no mass effect or midline shift. Encephalomalacia is present within the right occipital lobe related to previous infarct. There is no extracerebral collection. Ventricles are normal in size and configuration for patient's stated age.  Posterior fossa is within normal limits. Calvarium and skull base appear intact.   Soft tissue swelling is present within the right forehead region. Face: There is a comminuted mildly displaced fracture of the anterior wall of the right frontal sinus (series 2 image 88). There is a fracture of the medial wall of the frontal sinuses bilaterally with a small fracture seen within the posterior wall of the right frontal sinus (series 2 image 84). There is a mildly displaced fracture of the right nasal bone. There is a mildly displaced comminuted fracture of the superior wall of the right orbit (series 900 image 21) as well as the medial wall (series 900 image 28). The inferior wall appears intact. There does hypertrophy a subtle fracture of the lateral wall of the right orbit (series 2 image 82). Small air-fluid  levels are present within the bilateral maxillary sinuses. The globes appear intact. Retroconal fat appears within normal limits. Right periorbital soft tissue swelling is present. Temporomandibular joints appear intact. Cervical: No evidence of fracture or compression deformity. No evidence of spondylolysis.  No significant spondylolisthesis. No evidence of focal lesions. The prevertebral soft tissues appear unremarkable. Surrounding soft tissues appear within normal limits. Moderate multilevel degenerative changes are present throughout the spine. Findings are present consistent with DISH. The lung apices appear clear. Pelvis: Postsurgical changes are seen related to left femoral fixation. There is a comminuted intertrochanteric fracture of the left proximal femur which appears similar as compared to the previous study. A small amount of callus formation is present related to healing. The fracture lines are still present. No new fracture identified. Intrapelvic contents demonstrate no acute process. Moderate to large amount of stool present within the rectum. No significant free fluid identified. No sizable soft tissue hematoma identified.     Impression: 1.No acute intracranial process. 2.Comminuted mildly displaced fracture of the anterior wall of the right frontal sinus. There is a fracture of the medial wall of the frontal sinuses bilaterally with a small fracture seen within the posterior wall of the right frontal sinus. 3.Mildly displaced comminuted fracture of the superior wall of the right orbit as well as fractures of the medial and lateral walls of the right orbit. 4.Mildly displaced fracture of the right nasal bone. 5.No acute osseous abnormality of the cervical spine. 6.No acute osseous abnormality of the pelvis. Stable comminuted intertrochanteric fracture of the left proximal femur with a small amount of callus formation related to healing. The fracture lines are still present. Electronically Signed:  Deana Caballero MD  7/8/2025 11:31 PM EDT  Workstation ID: [id number]    CT Pelvis Without Contrast  Result Date: 7/8/2025  CT HEAD WO CONTRAST, CT CERVICAL SPINE WO CONTRAST, CT PELVIS WO CONTRAST, CT MAXILLOFACIAL WO CONT Date of Exam: 7/8/2025 10:56 PM EDT Indication: fall, trauma. Comparison: 6/9/2025, 6/8/2025, 6/11/2025. Technique: Axial CT images were obtained of the head, face, cervical spine and pelvis without contrast administration.  Automated exposure control and iterative construction methods were used. Findings: Head: There is no evidence of hemorrhage. There is no mass effect or midline shift. Encephalomalacia is present within the right occipital lobe related to previous infarct. There is no extracerebral collection. Ventricles are normal in size and configuration for patient's stated age.  Posterior fossa is within normal limits. Calvarium and skull base appear intact.   Soft tissue swelling is present within the right forehead region. Face: There is a comminuted mildly displaced fracture of the anterior wall of the right frontal sinus (series 2 image 88). There is a fracture of the medial wall of the frontal sinuses bilaterally with a small fracture seen within the posterior wall of the right frontal sinus (series 2 image 84). There is a mildly displaced fracture of the right nasal bone. There is a mildly displaced comminuted fracture of the superior wall of the right orbit (series 900 image 21) as well as the medial wall (series 900 image 28). The inferior wall appears intact. There does hypertrophy a subtle fracture of the lateral wall of the right orbit (series 2 image 82). Small air-fluid levels are present within the bilateral maxillary sinuses. The globes appear intact. Retroconal fat appears within normal limits. Right periorbital soft tissue swelling is present. Temporomandibular joints appear intact. Cervical: No evidence of fracture or compression deformity. No evidence of spondylolysis.  No  significant spondylolisthesis. No evidence of focal lesions. The prevertebral soft tissues appear unremarkable. Surrounding soft tissues appear within normal limits. Moderate multilevel degenerative changes are present throughout the spine. Findings are present consistent with DISH. The lung apices appear clear. Pelvis: Postsurgical changes are seen related to left femoral fixation. There is a comminuted intertrochanteric fracture of the left proximal femur which appears similar as compared to the previous study. A small amount of callus formation is present related to healing. The fracture lines are still present. No new fracture identified. Intrapelvic contents demonstrate no acute process. Moderate to large amount of stool present within the rectum. No significant free fluid identified. No sizable soft tissue hematoma identified.     Impression: 1.No acute intracranial process. 2.Comminuted mildly displaced fracture of the anterior wall of the right frontal sinus. There is a fracture of the medial wall of the frontal sinuses bilaterally with a small fracture seen within the posterior wall of the right frontal sinus. 3.Mildly displaced comminuted fracture of the superior wall of the right orbit as well as fractures of the medial and lateral walls of the right orbit. 4.Mildly displaced fracture of the right nasal bone. 5.No acute osseous abnormality of the cervical spine. 6.No acute osseous abnormality of the pelvis. Stable comminuted intertrochanteric fracture of the left proximal femur with a small amount of callus formation related to healing. The fracture lines are still present. Electronically Signed: Deana Caballero MD  7/8/2025 11:31 PM EDT  Workstation ID: [id number]    XR Chest 1 View  Result Date: 7/8/2025  XR CHEST 1 VW Date of Exam: 7/8/2025 11:11 PM EDT Indication: fall Comparison: 1/19/2025. Findings: There are no airspace consolidations. No pleural fluid. No pneumothorax. The pulmonary vasculature appears  within normal limits. The cardiac and mediastinal silhouette appear unremarkable. No acute osseous abnormality identified.     Impression: No acute cardiopulmonary process. Electronically Signed: Deana Caballero MD  7/8/2025 11:18 PM EDT  Workstation ID: [id number]      Results for orders placed during the hospital encounter of 04/11/25    Duplex Venous Lower Extremity - Bilateral CAR 04/14/2025  8:56 AM    Interpretation Summary    Chronic right lower extremity deep vein thrombosis noted in the mid femoral.    All other veins appeared normal bilaterally.      Results for orders placed during the hospital encounter of 04/11/25    Duplex Venous Lower Extremity - Bilateral CAR 04/14/2025  8:56 AM    Interpretation Summary    Chronic right lower extremity deep vein thrombosis noted in the mid femoral.    All other veins appeared normal bilaterally.      Results for orders placed during the hospital encounter of 01/19/25    Adult Transthoracic Echo Complete W/ Cont if Necessary Per Protocol 01/20/2025  9:06 AM    Interpretation Summary    Left ventricular systolic function is normal. Estimated left ventricular EF = 50%    Left ventricular wall thickness is consistent with borderline concentric hypertrophy.    Mild mitral valve regurgitation is present.      Plan for Follow-up of Pending Labs/Results:     Discharge Details        Discharge Medications        PAUSE taking these medications        Instructions Start Date   aspirin 81 MG EC tablet  Wait to take this until your doctor or other care provider tells you to start again.  Ask at facial surgery follow up- hold until then  Commonly known as: ASPIR   81 mg, Oral, 2 Times Daily             New Medications        Instructions Start Date   cefuroxime 500 MG tablet  Commonly known as: CEFTIN   500 mg, Oral, 2 Times Daily      metroNIDAZOLE 500 MG tablet  Commonly known as: FLAGYL   500 mg, Oral, 3 Times Daily             Continue These Medications        Instructions Start  Date   acetaminophen 325 MG tablet  Commonly known as: TYLENOL   650 mg, Oral, Every 6 Hours PRN      amLODIPine 2.5 MG tablet  Commonly known as: NORVASC   2.5 mg, Daily      ammonium lactate 12 % lotion  Commonly known as: LAC-HYDRIN   Apply a small amount to the affected area twice a day.      atorvastatin 80 MG tablet  Commonly known as: LIPITOR   80 mg, Nightly      carvedilol 6.25 MG tablet  Commonly known as: COREG   6.25 mg, Oral, 2 Times Daily      cloNIDine 0.1 MG tablet  Commonly known as: CATAPRES   0.1 mg, Oral, Every 6 Hours PRN      docusate sodium 100 MG capsule  Commonly known as: COLACE   100 mg, Every Morning      folic acid 1 MG tablet  Commonly known as: FOLVITE   1 mg, Daily      HYDROcodone-acetaminophen 5-325 MG per tablet  Commonly known as: NORCO   0.5 tablets, Oral, Every 6 Hours PRN      Lidocaine 4 %   1 patch, Every 24 Hours      melatonin 1 MG tablet   3 mg, Oral, Nightly PRN      MiraLax 17 GM/SCOOP powder  Generic drug: polyethylene glycol   17 g, Oral, Daily      ondansetron ODT 4 MG disintegrating tablet  Commonly known as: ZOFRAN-ODT   4 mg, Translingual, Every 8 Hours PRN      pantoprazole 40 MG EC tablet  Commonly known as: PROTONIX   40 mg, Daily      sertraline 25 MG tablet  Commonly known as: ZOLOFT   25 mg, Daily      thiamine 100 MG tablet  Commonly known as: VITAMIN B1   100 mg, Oral, Daily               Allergies   Allergen Reactions    Penicillins Hives and Swelling     Per patient, has tolerated Keflex         Discharge Disposition:  Rehab Facility or Unit (DC - External)    Diet:  Hospital:  Diet Order   Procedures    Diet: Cardiac; Healthy Heart (2-3 Na+); No Straw; Fluid Consistency: Thin (IDDSI 0)       Diet Instructions        Cardiac Heart Healthy Diet- 2-3 gram sodium restriction daily.            Activity:  Activity Instructions        To Rehab at [facility] who will oversee his care.           Restrictions or Other Recommendations:         CODE STATUS:     Code Status and Medical Interventions: No CPR (Do Not Attempt to Resuscitate); Limited Support; No intubation (DNI)   Ordered at: 07/09/25 0301     Code Status (Patient has no pulse and is not breathing):    No CPR (Do Not Attempt to Resuscitate)     Medical Interventions (Patient has pulse or is breathing):    Limited Support     Medical Intervention Limits:    No intubation (DNI)     Level Of Support Discussed With:    Patient       Future Appointments   Date Time Provider Department Center   7/28/2025 11:15 AM Radu Francis MD [facility]   9/8/2025  3:30 PM Avelino Hernandez MD [facility]       Additional Instructions for the Follow-ups that You Need to Schedule       Discharge Follow-up with PCP   As directed       Currently Documented PCP:    System, Provider Not In    PCP Phone Number:    None     Follow Up Details: 1 week from [facility] discharge        Discharge Follow-up with Specified Provider: 1 week follow-up Dr Keely SUAZO   As directed      To: 1 week follow-up Dr Keely SUAZO   Follow Up Details: *[place] to call [facility] + son with appointment        Discharge Follow-up with Specified Provider: Dr Badillo - orthopedics - reschedule follow-up that was during this stay for 1-2 weeks   As directed      To: Dr Badillo - orthopedics - reschedule follow-up that was during this stay for 1-2 weeks                      Nidia Wick MD  07/10/25      Time Spent on Discharge:  I spent  40  minutes on this discharge activity which included: face-to-face encounter with the patient, reviewing the data in the system, coordination of the care with the nursing staff as well as consultants, documentation, and entering orders.

## 2025-07-10 NOTE — DISCHARGE PLACEMENT REQUEST
Nidia Wick MD   Physician  Hospitalist     Discharge Summary     Signed     Date of Service: 07/10/25 0956  Creation Time: 07/10/25 0956     Signed       Expand All Collapse All[]Expand All by Default         [facility] Medicine Services  DISCHARGE SUMMARY     Patient Name: Toño Alcala  : 1940  MRN: [id number]     Date of Admission: 2025  9:28 PM  Date of Discharge:  7/10/2025  Primary Care Physician: System, Provider Not In     Consults         Date and Time Order Name Status Description     2025 12:42 AM Inpatient Neurosurgery Consult Completed       2025  1:36 PM Inpatient Orthopedic Surgery Consult Completed                  Hospital Course      Presenting Problem: facial fracture           Active Hospital Problems     Diagnosis   POA    **Facial fracture [S02.92XA]   Yes    Forehead laceration [S01.81XA]   Yes    Orbital fracture [S02.85XA]   Yes    Fall [W19.XXXA]   Yes       Resolved Hospital Problems   No resolved problems to display.            Hospital Course:  Toño Alcala is a 85 y.o. male w recent left hip fracture (2025) w h/o PE/DVT s/p IVC who presents from assisted living after unwitnessed fall with significant facial trauma. Found to have multiple nondisplaced facial fractures (frontal sinus, nasal bone, orbit)     Repeat CT face/head appears stable on repeat radiology read . Neurosurgery evaluated and no intracranial trauma, no CSF leak. I d/w [facility] transfer [place] patient's exam - no eye entrapment -  who coordinated phone review by Dr Riggs, facial trauma , of patient's imaging. He recommends short course abx, sinus precautions, and follow-up in his clinic in 1 week which  will arrange with [facility]. Will hold aspirin until that follow-up given multiple fractures + right subconjunctival hemorrhage.     To [facility] on 7/10. S/p 2 sutures right forehead placed in our ED on , removal should be ~     I d/w son all dc  instructions as well on 7/10     Discharge Follow Up Recommendations for outpatient labs/diagnostics:   Suture removal ~7/16    facial trauma surgery f/u 1 week - [facility] has contact for son and [facility]   Dr Badillo orthopedic surgery (reschedule 1-2)     Day of Discharge      HPI:   Feels well  Full ROM right eye without pain     Vital Signs:   Temp:  [97.6 °F (36.4 °C)-98.4 °F (36.9 °C)] 98.4 °F (36.9 °C)  Heart Rate:  [63-71] 65  Resp:  [16-18] 16  BP: ()/(51-71) 97/51        Physical Exam:  Constitutional: No acute distress, awake, alert  HEENT: +right eye subconjunctival hemorrhage laterally. Bruising significant to face. Right foreahead sutures currently covered w bandage. Full ROM right eye without pain/impediment  Respiratory: Clear to auscultation bilaterally, respiratory effort normal   Cardiovascular: RRR, no murmurs, rubs, or gallops  Gastrointestinal: Soft, nontender, nondistended  Musculoskeletal: Muscle tone decreased throughout  Psychiatric: Appropriate affect, cooperative  Neurologic: Alert and oriented, facial movements symmetric and spontaneous movement of all 4 extremities grossly equal bilaterally, speech clear  Skin: No rashes     Pertinent  and/or Most Recent Results      LAB RESULTS:           Lab 07/09/25 0733 07/08/25 2258   WBC 7.02 8.99   HEMOGLOBIN 8.9* 9.4*   HEMATOCRIT 27.7* 29.2*   PLATELETS 191 225   NEUTROS ABS 4.53 6.40   IMMATURE GRANS (ABS) 0.02 0.04   LYMPHS ABS 1.21 1.11   MONOS ABS 0.93* 1.00*   EOS ABS 0.30 0.40   MCV 90.5 89.8   PROTIME  --  16.2*   APTT  --  37.3*              Lab 07/08/25 2258   SODIUM 138   POTASSIUM 3.6   CHLORIDE 104   CO2 24.2   ANION GAP 9.8   BUN 36.5*   CREATININE 1.97*   EGFR 32.7*   GLUCOSE 116*   CALCIUM 9.1              Lab 07/08/25 2258   TOTAL PROTEIN 6.6   ALBUMIN 3.3*   GLOBULIN 3.3   ALT (SGPT) 20   AST (SGOT) 36   BILIRUBIN 0.5   ALK PHOS 325*              Lab 07/08/25 2258   PROTIME 16.2*   INR 1.22*                  Brief Urine Lab  Results  (Last result in the past 365 days)          Color   Clarity   Blood   Leuk Est   Nitrite   Protein   CREAT   Urine HCG         07/08/25 2249 Yellow    Cloudy    Negative    Negative    Negative    Trace                          Microbiology Results (last 10 days)         ** No results found for the last 240 hours. **                  Radiology results from the last 10 days:   CT Head Without Contrast  Result Date: 7/9/2025  CT MAXILLOFACIAL WO CONT, CT HEAD WO CONTRAST Date of Exam: 7/9/2025 8:53 AM EDT Indication: follow up. Comparison: 7/8/2025 CT scan of the head and face Technique: Axial CT images were obtained of the head, and subsequently from the inferior aspect of the mandible through the frontal sinuses without contrast administration.  Reconstructed coronal and sagittal images were also obtained. Automated exposure  control and iterative construction methods were used. Findings: Scans performed yesterday by report showed no acute intracranial process. Mildly comminuted fracture of the anterior wall of the right frontal sinus. Medial frontal sinus fracture bilaterally, posterior wall right frontal sinus fracture. Mildly displaced  superior right orbit fracture, and medial and lateral right orbital wall fracture. Mildly displaced right nasal bone fracture. CT SCAN OF THE HEAD WITHOUT CONTRAST: Included portions of the facial bones show similar fracture pattern. Complete facial bone CT scan follows this report. Images of the brain again show advanced generalized cerebral atrophy, bilateral occipital infarcts right larger than left, and left cerebral infarct. There appears to be an old medial right temporal lobe infarct as well, adjacent the temporal horn of the lateral ventricle. There is no evidence of new infarct, no evidence of edema, hemorrhage,  mass or mass effect or abnormal extra-axial collection. Ventriculomegaly likely represents ventricular dilatation due to advanced parenchymal loss,  although NPH could appear similar.      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 No evidence of intracranial hemorrhage or other new intracranial disease is seen. CT SCAN OF THE FACIAL BONES WITHOUT CONTRAST: As on the prior study, there is fluid opacification of the frontal sinuses, anterior ethmoid air cells, trace left sphenoid sinus fluid and small fluid levels in the maxillary sinuses, which appear unchanged,  suggesting no ongoing hemorrhage. Fluid density in the frontal and ethmoid sinuses is consistent with acute/recent blood. Fracture pattern of the frontal sinuses, right more extensive than left, nasal bone, superior orbit,, medial orbit, and subtle fracture of the lateral maxillary sinus wall all appear stable. No further displacement of the superior orbital fracture is seen. Soft tissue window images show decreased diffuse edema/hematoma in the frontal subcutaneous tissues, and mildly increased preseptal edema or hematoma. The optic globes, extraocular muscles, and optic nerves appear symmetric and normal. No evidence of extraocular muscle entrapment is seen. There is no apparent fracture of the bony nasal septum, and no nasal septal hematoma. Impression: 1. Extensive, predominately right-sided facial trauma, with stable fracture pattern compared yesterday's 11:04 p.m. exam. No new or increasing displacement is identified. 2. Stable hyperdense fluid in the paranasal sinuses, consistent with some blood products. No evidence to suggest continued hemorrhage. 3. Decreased subcutaneous edema or diffuse bruising of the frontal scalp, and mildly increased right preseptal edema. No evidence of soft tissue trauma to the contents of the right orbit. Electronically Signed: Chandu Santiago MD  7/9/2025 10:14 AM EDT  Workstation ID: [id number]     CT Maxillofacial Without Contrast  Result Date: 7/9/2025  CT MAXILLOFACIAL WO CONT, CT HEAD WO CONTRAST Date of  Exam: 7/9/2025 8:53 AM EDT Indication: follow up. Comparison: 7/8/2025 CT scan of the head and face Technique: Axial CT images were obtained of the head, and subsequently from the inferior aspect of the mandible through the frontal sinuses without contrast administration.  Reconstructed coronal and sagittal images were also obtained. Automated exposure  control and iterative construction methods were used. Findings: Scans performed yesterday by report showed no acute intracranial process. Mildly comminuted fracture of the anterior wall of the right frontal sinus. Medial frontal sinus fracture bilaterally, posterior wall right frontal sinus fracture. Mildly displaced  superior right orbit fracture, and medial and lateral right orbital wall fracture. Mildly displaced right nasal bone fracture. CT SCAN OF THE HEAD WITHOUT CONTRAST: Included portions of the facial bones show similar fracture pattern. Complete facial bone CT scan follows this report. Images of the brain again show advanced generalized cerebral atrophy, bilateral occipital infarcts right larger than left, and left cerebral infarct. There appears to be an old medial right temporal lobe infarct as well, adjacent the temporal horn of the lateral ventricle. There is no evidence of new infarct, no evidence of edema, hemorrhage,  mass or mass effect or abnormal extra-axial collection. Ventriculomegaly likely represents ventricular dilatation due to advanced parenchymal loss, although NPH could appear similar.      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 No evidence of intracranial hemorrhage or other new intracranial disease is seen. CT SCAN OF THE FACIAL BONES WITHOUT CONTRAST: As on the prior study, there is fluid opacification of the frontal sinuses, anterior ethmoid air cells, trace left sphenoid sinus fluid and small fluid levels in the maxillary sinuses, which appear unchanged,  suggesting  no ongoing hemorrhage. Fluid density in the frontal and ethmoid sinuses is consistent with acute/recent blood. Fracture pattern of the frontal sinuses, right more extensive than left, nasal bone, superior orbit,, medial orbit, and subtle fracture of the lateral maxillary sinus wall all appear stable. No further displacement of the superior orbital fracture is seen. Soft tissue window images show decreased diffuse edema/hematoma in the frontal subcutaneous tissues, and mildly increased preseptal edema or hematoma. The optic globes, extraocular muscles, and optic nerves appear symmetric and normal. No evidence of extraocular muscle entrapment is seen. There is no apparent fracture of the bony nasal septum, and no nasal septal hematoma. Impression: 1. Extensive, predominately right-sided facial trauma, with stable fracture pattern compared yesterday's 11:04 p.m. exam. No new or increasing displacement is identified. 2. Stable hyperdense fluid in the paranasal sinuses, consistent with some blood products. No evidence to suggest continued hemorrhage. 3. Decreased subcutaneous edema or diffuse bruising of the frontal scalp, and mildly increased right preseptal edema. No evidence of soft tissue trauma to the contents of the right orbit. Electronically Signed: Chandu Santiago MD  7/9/2025 10:14 AM EDT  Workstation ID: [id number]     CT Head Without Contrast  Result Date: 7/8/2025  CT HEAD WO CONTRAST, CT CERVICAL SPINE WO CONTRAST, CT PELVIS WO CONTRAST, CT MAXILLOFACIAL WO CONT Date of Exam: 7/8/2025 10:56 PM EDT Indication: fall, trauma. Comparison: 6/9/2025, 6/8/2025, 6/11/2025. Technique: Axial CT images were obtained of the head, face, cervical spine and pelvis without contrast administration.  Automated exposure control and iterative construction methods were used. Findings: Head: There is no evidence of hemorrhage. There is no mass effect or midline shift. Encephalomalacia is present within the right occipital lobe related  to previous infarct. There is no extracerebral collection. Ventricles are normal in size and configuration for patient's stated age.  Posterior fossa is within normal limits. Calvarium and skull base appear intact.   Soft tissue swelling is present within the right forehead region. Face: There is a comminuted mildly displaced fracture of the anterior wall of the right frontal sinus (series 2 image 88). There is a fracture of the medial wall of the frontal sinuses bilaterally with a small fracture seen within the posterior wall of the right frontal sinus (series 2 image 84). There is a mildly displaced fracture of the right nasal bone. There is a mildly displaced comminuted fracture of the superior wall of the right orbit (series 900 image 21) as well as the medial wall (series 900 image 28). The inferior wall appears intact. There does hypertrophy a subtle fracture of the lateral wall of the right orbit (series 2 image 82). Small air-fluid levels are present within the bilateral maxillary sinuses. The globes appear intact. Retroconal fat appears within normal limits. Right periorbital soft tissue swelling is present. Temporomandibular joints appear intact. Cervical: No evidence of fracture or compression deformity. No evidence of spondylolysis.  No significant spondylolisthesis. No evidence of focal lesions. The prevertebral soft tissues appear unremarkable. Surrounding soft tissues appear within normal limits. Moderate multilevel degenerative changes are present throughout the spine. Findings are present consistent with DISH. The lung apices appear clear. Pelvis: Postsurgical changes are seen related to left femoral fixation. There is a comminuted intertrochanteric fracture of the left proximal femur which appears similar as compared to the previous study. A small amount of callus formation is present related to healing. The fracture lines are still present. No new fracture identified. Intrapelvic contents  demonstrate no acute process. Moderate to large amount of stool present within the rectum. No significant free fluid identified. No sizable soft tissue hematoma identified.      Impression: 1.No acute intracranial process. 2.Comminuted mildly displaced fracture of the anterior wall of the right frontal sinus. There is a fracture of the medial wall of the frontal sinuses bilaterally with a small fracture seen within the posterior wall of the right frontal sinus. 3.Mildly displaced comminuted fracture of the superior wall of the right orbit as well as fractures of the medial and lateral walls of the right orbit. 4.Mildly displaced fracture of the right nasal bone. 5.No acute osseous abnormality of the cervical spine. 6.No acute osseous abnormality of the pelvis. Stable comminuted intertrochanteric fracture of the left proximal femur with a small amount of callus formation related to healing. The fracture lines are still present. Electronically Signed: Deana Caballero MD  7/8/2025 11:31 PM EDT  Workstation ID: [id number]     CT Cervical Spine Without Contrast  Result Date: 7/8/2025  CT HEAD WO CONTRAST, CT CERVICAL SPINE WO CONTRAST, CT PELVIS WO CONTRAST, CT MAXILLOFACIAL WO CONT Date of Exam: 7/8/2025 10:56 PM EDT Indication: fall, trauma. Comparison: 6/9/2025, 6/8/2025, 6/11/2025. Technique: Axial CT images were obtained of the head, face, cervical spine and pelvis without contrast administration.  Automated exposure control and iterative construction methods were used. Findings: Head: There is no evidence of hemorrhage. There is no mass effect or midline shift. Encephalomalacia is present within the right occipital lobe related to previous infarct. There is no extracerebral collection. Ventricles are normal in size and configuration for patient's stated age.  Posterior fossa is within normal limits. Calvarium and skull base appear intact.   Soft tissue swelling is present within the right forehead region. Face: There is  a comminuted mildly displaced fracture of the anterior wall of the right frontal sinus (series 2 image 88). There is a fracture of the medial wall of the frontal sinuses bilaterally with a small fracture seen within the posterior wall of the right frontal sinus (series 2 image 84). There is a mildly displaced fracture of the right nasal bone. There is a mildly displaced comminuted fracture of the superior wall of the right orbit (series 900 image 21) as well as the medial wall (series 900 image 28). The inferior wall appears intact. There does hypertrophy a subtle fracture of the lateral wall of the right orbit (series 2 image 82). Small air-fluid levels are present within the bilateral maxillary sinuses. The globes appear intact. Retroconal fat appears within normal limits. Right periorbital soft tissue swelling is present. Temporomandibular joints appear intact. Cervical: No evidence of fracture or compression deformity. No evidence of spondylolysis.  No significant spondylolisthesis. No evidence of focal lesions. The prevertebral soft tissues appear unremarkable. Surrounding soft tissues appear within normal limits. Moderate multilevel degenerative changes are present throughout the spine. Findings are present consistent with DISH. The lung apices appear clear. Pelvis: Postsurgical changes are seen related to left femoral fixation. There is a comminuted intertrochanteric fracture of the left proximal femur which appears similar as compared to the previous study. A small amount of callus formation is present related to healing. The fracture lines are still present. No new fracture identified. Intrapelvic contents demonstrate no acute process. Moderate to large amount of stool present within the rectum. No significant free fluid identified. No sizable soft tissue hematoma identified.      Impression: 1.No acute intracranial process. 2.Comminuted mildly displaced fracture of the anterior wall of the right frontal  sinus. There is a fracture of the medial wall of the frontal sinuses bilaterally with a small fracture seen within the posterior wall of the right frontal sinus. 3.Mildly displaced comminuted fracture of the superior wall of the right orbit as well as fractures of the medial and lateral walls of the right orbit. 4.Mildly displaced fracture of the right nasal bone. 5.No acute osseous abnormality of the cervical spine. 6.No acute osseous abnormality of the pelvis. Stable comminuted intertrochanteric fracture of the left proximal femur with a small amount of callus formation related to healing. The fracture lines are still present. Electronically Signed: Deana Caballero MD  7/8/2025 11:31 PM EDT  Workstation ID: [id number]     CT Maxillofacial Without Contrast  Result Date: 7/8/2025  CT HEAD WO CONTRAST, CT CERVICAL SPINE WO CONTRAST, CT PELVIS WO CONTRAST, CT MAXILLOFACIAL WO CONT Date of Exam: 7/8/2025 10:56 PM EDT Indication: fall, trauma. Comparison: 6/9/2025, 6/8/2025, 6/11/2025. Technique: Axial CT images were obtained of the head, face, cervical spine and pelvis without contrast administration.  Automated exposure control and iterative construction methods were used. Findings: Head: There is no evidence of hemorrhage. There is no mass effect or midline shift. Encephalomalacia is present within the right occipital lobe related to previous infarct. There is no extracerebral collection. Ventricles are normal in size and configuration for patient's stated age.  Posterior fossa is within normal limits. Calvarium and skull base appear intact.   Soft tissue swelling is present within the right forehead region. Face: There is a comminuted mildly displaced fracture of the anterior wall of the right frontal sinus (series 2 image 88). There is a fracture of the medial wall of the frontal sinuses bilaterally with a small fracture seen within the posterior wall of the right frontal sinus (series 2 image 84). There is a mildly  displaced fracture of the right nasal bone. There is a mildly displaced comminuted fracture of the superior wall of the right orbit (series 900 image 21) as well as the medial wall (series 900 image 28). The inferior wall appears intact. There does hypertrophy a subtle fracture of the lateral wall of the right orbit (series 2 image 82). Small air-fluid levels are present within the bilateral maxillary sinuses. The globes appear intact. Retroconal fat appears within normal limits. Right periorbital soft tissue swelling is present. Temporomandibular joints appear intact. Cervical: No evidence of fracture or compression deformity. No evidence of spondylolysis.  No significant spondylolisthesis. No evidence of focal lesions. The prevertebral soft tissues appear unremarkable. Surrounding soft tissues appear within normal limits. Moderate multilevel degenerative changes are present throughout the spine. Findings are present consistent with DISH. The lung apices appear clear. Pelvis: Postsurgical changes are seen related to left femoral fixation. There is a comminuted intertrochanteric fracture of the left proximal femur which appears similar as compared to the previous study. A small amount of callus formation is present related to healing. The fracture lines are still present. No new fracture identified. Intrapelvic contents demonstrate no acute process. Moderate to large amount of stool present within the rectum. No significant free fluid identified. No sizable soft tissue hematoma identified.      Impression: 1.No acute intracranial process. 2.Comminuted mildly displaced fracture of the anterior wall of the right frontal sinus. There is a fracture of the medial wall of the frontal sinuses bilaterally with a small fracture seen within the posterior wall of the right frontal sinus. 3.Mildly displaced comminuted fracture of the superior wall of the right orbit as well as fractures of the medial and lateral walls of the  right orbit. 4.Mildly displaced fracture of the right nasal bone. 5.No acute osseous abnormality of the cervical spine. 6.No acute osseous abnormality of the pelvis. Stable comminuted intertrochanteric fracture of the left proximal femur with a small amount of callus formation related to healing. The fracture lines are still present. Electronically Signed: Deana Caballero MD  7/8/2025 11:31 PM EDT  Workstation ID: [id number]     CT Pelvis Without Contrast  Result Date: 7/8/2025  CT HEAD WO CONTRAST, CT CERVICAL SPINE WO CONTRAST, CT PELVIS WO CONTRAST, CT MAXILLOFACIAL WO CONT Date of Exam: 7/8/2025 10:56 PM EDT Indication: fall, trauma. Comparison: 6/9/2025, 6/8/2025, 6/11/2025. Technique: Axial CT images were obtained of the head, face, cervical spine and pelvis without contrast administration.  Automated exposure control and iterative construction methods were used. Findings: Head: There is no evidence of hemorrhage. There is no mass effect or midline shift. Encephalomalacia is present within the right occipital lobe related to previous infarct. There is no extracerebral collection. Ventricles are normal in size and configuration for patient's stated age.  Posterior fossa is within normal limits. Calvarium and skull base appear intact.   Soft tissue swelling is present within the right forehead region. Face: There is a comminuted mildly displaced fracture of the anterior wall of the right frontal sinus (series 2 image 88). There is a fracture of the medial wall of the frontal sinuses bilaterally with a small fracture seen within the posterior wall of the right frontal sinus (series 2 image 84). There is a mildly displaced fracture of the right nasal bone. There is a mildly displaced comminuted fracture of the superior wall of the right orbit (series 900 image 21) as well as the medial wall (series 900 image 28). The inferior wall appears intact. There does hypertrophy a subtle fracture of the lateral wall of the right  orbit (series 2 image 82). Small air-fluid levels are present within the bilateral maxillary sinuses. The globes appear intact. Retroconal fat appears within normal limits. Right periorbital soft tissue swelling is present. Temporomandibular joints appear intact. Cervical: No evidence of fracture or compression deformity. No evidence of spondylolysis.  No significant spondylolisthesis. No evidence of focal lesions. The prevertebral soft tissues appear unremarkable. Surrounding soft tissues appear within normal limits. Moderate multilevel degenerative changes are present throughout the spine. Findings are present consistent with DISH. The lung apices appear clear. Pelvis: Postsurgical changes are seen related to left femoral fixation. There is a comminuted intertrochanteric fracture of the left proximal femur which appears similar as compared to the previous study. A small amount of callus formation is present related to healing. The fracture lines are still present. No new fracture identified. Intrapelvic contents demonstrate no acute process. Moderate to large amount of stool present within the rectum. No significant free fluid identified. No sizable soft tissue hematoma identified.      Impression: 1.No acute intracranial process. 2.Comminuted mildly displaced fracture of the anterior wall of the right frontal sinus. There is a fracture of the medial wall of the frontal sinuses bilaterally with a small fracture seen within the posterior wall of the right frontal sinus. 3.Mildly displaced comminuted fracture of the superior wall of the right orbit as well as fractures of the medial and lateral walls of the right orbit. 4.Mildly displaced fracture of the right nasal bone. 5.No acute osseous abnormality of the cervical spine. 6.No acute osseous abnormality of the pelvis. Stable comminuted intertrochanteric fracture of the left proximal femur with a small amount of callus formation related to healing. The fracture  lines are still present. Electronically Signed: Deana Caballero MD  7/8/2025 11:31 PM EDT  Workstation ID: [id number]     XR Chest 1 View  Result Date: 7/8/2025  XR CHEST 1 VW Date of Exam: 7/8/2025 11:11 PM EDT Indication: fall Comparison: 1/19/2025. Findings: There are no airspace consolidations. No pleural fluid. No pneumothorax. The pulmonary vasculature appears within normal limits. The cardiac and mediastinal silhouette appear unremarkable. No acute osseous abnormality identified.      Impression: No acute cardiopulmonary process. Electronically Signed: Deana Caballero MD  7/8/2025 11:18 PM EDT  Workstation ID: [id number]         Results for orders placed during the hospital encounter of 04/11/25     Duplex Venous Lower Extremity - Bilateral CAR 04/14/2025  8:56 AM     Interpretation Summary    Chronic right lower extremity deep vein thrombosis noted in the mid femoral.    All other veins appeared normal bilaterally.        Results for orders placed during the hospital encounter of 04/11/25     Duplex Venous Lower Extremity - Bilateral CAR 04/14/2025  8:56 AM     Interpretation Summary    Chronic right lower extremity deep vein thrombosis noted in the mid femoral.    All other veins appeared normal bilaterally.        Results for orders placed during the hospital encounter of 01/19/25     Adult Transthoracic Echo Complete W/ Cont if Necessary Per Protocol 01/20/2025  9:06 AM     Interpretation Summary    Left ventricular systolic function is normal. Estimated left ventricular EF = 50%    Left ventricular wall thickness is consistent with borderline concentric hypertrophy.    Mild mitral valve regurgitation is present.        Plan for Follow-up of Pending Labs/Results:      Discharge Details          Discharge Medications          PAUSE taking these medications         Instructions Start Date   aspirin 81 MG EC tablet  Wait to take this until your doctor or other care provider tells you to start again.  Ask at facial  surgery follow up- hold until then  Commonly known as: ASPIR    81 mg, Oral, 2 Times Daily                  New Medications         Instructions Start Date   cefuroxime 500 MG tablet  Commonly known as: CEFTIN    500 mg, Oral, 2 Times Daily        metroNIDAZOLE 500 MG tablet  Commonly known as: FLAGYL    500 mg, Oral, 3 Times Daily                  Continue These Medications         Instructions Start Date   acetaminophen 325 MG tablet  Commonly known as: TYLENOL    650 mg, Oral, Every 6 Hours PRN        amLODIPine 2.5 MG tablet  Commonly known as: NORVASC    2.5 mg, Daily        ammonium lactate 12 % lotion  Commonly known as: LAC-HYDRIN    Apply a small amount to the affected area twice a day.        atorvastatin 80 MG tablet  Commonly known as: LIPITOR    80 mg, Nightly        carvedilol 6.25 MG tablet  Commonly known as: COREG    6.25 mg, Oral, 2 Times Daily        cloNIDine 0.1 MG tablet  Commonly known as: CATAPRES    0.1 mg, Oral, Every 6 Hours PRN        docusate sodium 100 MG capsule  Commonly known as: COLACE    100 mg, Every Morning        folic acid 1 MG tablet  Commonly known as: FOLVITE    1 mg, Daily        HYDROcodone-acetaminophen 5-325 MG per tablet  Commonly known as: NORCO    0.5 tablets, Oral, Every 6 Hours PRN        Lidocaine 4 %    1 patch, Every 24 Hours        melatonin 1 MG tablet    3 mg, Oral, Nightly PRN        MiraLax 17 GM/SCOOP powder  Generic drug: polyethylene glycol    17 g, Oral, Daily        ondansetron ODT 4 MG disintegrating tablet  Commonly known as: ZOFRAN-ODT    4 mg, Translingual, Every 8 Hours PRN        pantoprazole 40 MG EC tablet  Commonly known as: PROTONIX    40 mg, Daily        sertraline 25 MG tablet  Commonly known as: ZOLOFT    25 mg, Daily        thiamine 100 MG tablet  Commonly known as: VITAMIN B1    100 mg, Oral, Daily                     Allergies         Allergies   Allergen Reactions    Penicillins Hives and Swelling       Per patient, has tolerated Keflex                Discharge Disposition:  Rehab Facility or Unit (DC - External)     Diet:  Hospital:      Diet Order   Procedures    Diet: Cardiac; Healthy Heart (2-3 Na+); No Straw; Fluid Consistency: Thin (IDDSI 0)         Diet Instructions          Cardiac Heart Healthy Diet- 2-3 gram sodium restriction daily.               Activity:  Activity Instructions          To Rehab at [facility] who will oversee his care.               Restrictions or Other Recommendations:          CODE STATUS:        Code Status and Medical Interventions: No CPR (Do Not Attempt to Resuscitate); Limited Support; No intubation (DNI)   Ordered at: 07/09/25 0301     Code Status (Patient has no pulse and is not breathing):     No CPR (Do Not Attempt to Resuscitate)     Medical Interventions (Patient has pulse or is breathing):     Limited Support     Medical Intervention Limits:     No intubation (DNI)     Level Of Support Discussed With:     Patient                Future Appointments   Date Time Provider Department Center   7/28/2025 11:15 AM Radu Francis MD [facility]   9/8/2025  3:30 PM Avelino Hernandez MD [facility]         Additional Instructions for the Follow-ups that You Need to Schedule         Discharge Follow-up with PCP   As directed         Currently Documented PCP:    System, Provider Not In    PCP Phone Number:    None      Follow Up Details: 1 week from [facility] discharge           Discharge Follow-up with Specified Provider: 1 week follow-up Dr Keely SUAZO   As directed        To: 1 week follow-up Dr Keely SUAZO   Follow Up Details: *UK to call [facility] + son with appointment           Discharge Follow-up with Specified Provider: Dr Justino Metcalf orthopedics - reschedule follow-up that was during this stay for 1-2 weeks   As directed        To: Dr Justino Metcalf orthopedics - reschedule follow-up that was during this stay for 1-2 weeks                               Nidia Wick MD  07/10/25        Time  Spent on Discharge:  I spent  40  minutes on this discharge activity which included: face-to-face encounter with the patient, reviewing the data in the system, coordination of the care with the nursing staff as well as consultants, documentation, and entering orders.

## 2025-07-11 LAB
QT INTERVAL: 380 MS
QTC INTERVAL: 472 MS

## 2025-07-22 ENCOUNTER — APPOINTMENT (OUTPATIENT)
Dept: CT IMAGING | Facility: HOSPITAL | Age: 85
End: 2025-07-22
Payer: MEDICARE

## 2025-07-22 ENCOUNTER — HOSPITAL ENCOUNTER (EMERGENCY)
Facility: HOSPITAL | Age: 85
Discharge: NURSING, REHAB OR LONG-TERM CARE | End: 2025-07-22
Attending: EMERGENCY MEDICINE | Admitting: EMERGENCY MEDICINE
Payer: MEDICARE

## 2025-07-22 ENCOUNTER — APPOINTMENT (OUTPATIENT)
Dept: GENERAL RADIOLOGY | Facility: HOSPITAL | Age: 85
End: 2025-07-22
Payer: MEDICARE

## 2025-07-22 VITALS
RESPIRATION RATE: 14 BRPM | HEART RATE: 51 BPM | DIASTOLIC BLOOD PRESSURE: 68 MMHG | WEIGHT: 170 LBS | TEMPERATURE: 98.4 F | HEIGHT: 72 IN | OXYGEN SATURATION: 96 % | BODY MASS INDEX: 23.03 KG/M2 | SYSTOLIC BLOOD PRESSURE: 128 MMHG

## 2025-07-22 DIAGNOSIS — S09.90XA MINOR HEAD INJURY WITHOUT LOSS OF CONSCIOUSNESS, INITIAL ENCOUNTER: Primary | ICD-10-CM

## 2025-07-22 DIAGNOSIS — S00.03XA SCALP HEMATOMA, INITIAL ENCOUNTER: ICD-10-CM

## 2025-07-22 DIAGNOSIS — Z87.81 HISTORY OF HIP FRACTURE: ICD-10-CM

## 2025-07-22 PROCEDURE — 70450 CT HEAD/BRAIN W/O DYE: CPT

## 2025-07-22 PROCEDURE — 73502 X-RAY EXAM HIP UNI 2-3 VIEWS: CPT

## 2025-07-22 PROCEDURE — 72125 CT NECK SPINE W/O DYE: CPT

## 2025-07-22 PROCEDURE — 99284 EMERGENCY DEPT VISIT MOD MDM: CPT

## 2025-07-22 NOTE — ED PROVIDER NOTES
Subjective   History of Present Illness  85-year-old male presents via EMS to be evaluated following a fall.  Of note, I did a thorough review the patient's records prior to evaluating him.  He was admitted to our facility following a fall from July 8 through July 10.  Additionally, the patient had a recent left hip fracture that was fixed surgically by Dr. Badillo of orthopedic surgery on June 11 of this year.  This morning, the patient had a witnessed mechanical trip and fall out of his wheelchair and fell back and hit his head on the ground.  He did not lose consciousness.  He denies a headache.  After the fall, he was sent here to our facility to be evaluated.  He notes chronic pain in his left hip that is unchanged when compared to his baseline.  He has no other complaints at this time and otherwise feels well.  He is not anticoagulated.      Review of Systems   Musculoskeletal:         Left hip pain   All other systems reviewed and are negative.      Past Medical History:   Diagnosis Date    Acute diverticulitis 01/29/2020    Allergic 60 yrs ago    Arthritis     Arthritis of neck Fusion 1992    Bilateral radial fractures 1962    fracture bilateral radial heads    CAD (coronary artery disease)     Calculus of gallbladder without cholecystitis without obstruction 01/29/2020    Cataract     Cervical disc disorder Fusion 1992    Cholelithiasis     Chronic kidney disease 2020    Clotting disorder     CVD (cardiovascular disease)     History of TIA 1989    Diabetes mellitus     DVT of proximal lower limb 06/22/2015    proximal DVT right leg, small PE- anticoagulation    Dyslipidemia     Erectile dysfunction     Fracture 1952    H/o pf left forearm fracture    Fracture of right hand 1980    GERD (gastroesophageal reflux disease)     with hiatal hernia    HL (hearing loss)     Hx of angina pectoris     Hypertension     Normal renal angiography 02/16/11    Knee swelling Several years ago    Left wrist fracture 1953     Lumbosacral disc disease 1996    Osgood-Schlatter's disease 1955    Osteoarthritis     Right wrist fracture     Stroke     Pt states no residual weakness though has urinary incontinence.    Vertigo     Wears glasses        Allergies   Allergen Reactions    Penicillins Hives and Swelling     Per patient, has tolerated Keflex       Past Surgical History:   Procedure Laterality Date    APPENDECTOMY  1957    CARDIAC CATHETERIZATION  2011    with vein graft    CATARACT EXTRACTION Left     CERVICAL FUSION  1992    C3-4    COLONOSCOPY  2013    CORONARY ARTERY BYPASS GRAFT  1994    HERNIA REPAIR Right 2011    inguinal    HIP TROCHANTERIC NAILING WITH INTRAMEDULLARY HIP SCREW Left 6/11/2025    Procedure: CEPHALOMEDULLARY NAIL FIXATION;  Surgeon: Rey Badillo MD;  Location:  [facility];  Service: Orthopedics;  Laterality: Left;    INGUINAL HERNIA REPAIR Left 10/29/2019    Procedure: INGUINAL HERNIA REPAIR LEFT;  Surgeon: Charisma Raines MD;  Location: [facility] OR;  Service: General    INTERVENTIONAL RADIOLOGY PROCEDURE N/A 01/23/2025    Procedure: IVC Filter Placement;  Surgeon: Aevlino Hernandez MD;  Location:  [facility];  Service: Cardiovascular;  Laterality: N/A;    LUMBAR LAMINECTOMY  1996    laminectomy, lumbar, L3    OTHER SURGICAL HISTORY      wrist surgery    TONSILLECTOMY AND ADENOIDECTOMY  1945    TRIGGER POINT INJECTION  2001 - 2007    VASECTOMY  1972       Family History   Problem Relation Age of Onset    Arthritis Mother         OA    Heart disease Father     Diabetes Father     Heart attack Father     Heart disease Brother     Heart attack Brother     Diabetes Brother     Diabetes Son     Hypertension Other     Cancer Other        Social History     Socioeconomic History    Marital status:    Tobacco Use    Smoking status: Former     Current packs/day: 0.00     Average packs/day: 1.5 packs/day for 34.8 years (52.2 ttl pk-yrs)     Types: Cigarettes, Pipe, Cigars     Start date:  1962     Quit date: 1997     Years since quittin.2     Passive exposure: Past    Smokeless tobacco: Never    Tobacco comments:     QUIT DATE 1992   Vaping Use    Vaping status: Never Used   Substance and Sexual Activity    Alcohol use: Yes     Alcohol/week: 11.0 - 13.0 standard drinks of alcohol     Types: 7 Glasses of wine, 2 Cans of beer, 2 - 4 Shots of liquor per week     Comment: glass of wine everyday     Drug use: No    Sexual activity: Not Currently     Partners: Female     Birth control/protection: Vasectomy, Surgical           Objective   Physical Exam  Vitals and nursing note reviewed.   Constitutional:       General: He is not in acute distress.     Appearance: Normal appearance. He is well-developed. He is not diaphoretic.      Comments: Nontoxic-appearing male   HENT:      Head: Normocephalic.   Eyes:      Pupils: Pupils are equal, round, and reactive to light.   Neck:      Vascular: No JVD.   Cardiovascular:      Rate and Rhythm: Normal rate and regular rhythm.      Heart sounds: Normal heart sounds. No murmur heard.     No friction rub. No gallop.   Pulmonary:      Effort: Pulmonary effort is normal. No respiratory distress.      Breath sounds: Normal breath sounds. No wheezing or rales.   Abdominal:      General: Bowel sounds are normal. There is no distension.      Palpations: Abdomen is soft. There is no mass.      Tenderness: There is no abdominal tenderness. There is no guarding.   Musculoskeletal:         General: Normal range of motion.      Comments: Range of motion of the left hip is within normal limits and mildly painful   Skin:     General: Skin is warm and dry.      Coloration: Skin is not pale.      Findings: No erythema or rash.      Comments: Small palpable hematoma noted to posterior aspect of right scalp   Neurological:      Mental Status: He is alert and oriented to person, place, and time.      Comments: Neurovascularly intact distally in all fours with bounding  distal pulses and normal sensation noted   Psychiatric:         Mood and Affect: Mood normal.         Thought Content: Thought content normal.         Judgment: Judgment normal.         Procedures           ED Course  ED Course as of 07/22/25 1415   Tue Jul 22, 2025   1022 85-year-old male presents via EMS to be evaluated following a fall.  Of note, I did a thorough review of the patient's records prior to evaluating him.  He was admitted to our facility following a fall from July 8 through July 10. [DD]   1022 This morning, the patient had a witnessed mechanical trip and fall at [facility] and fell back and hit his head on the ground.  He did not lose consciousness.  He was then sent here to our facility to be evaluated. [DD]   1024   On arrival, the patient is well-appearing.  No focal neurological deficits noted.  He has a small contusion noted to his posterior right scalp.  No focal weakness noted.  He notes chronic pain to his left hip that is unchanged when compared to baseline.  We will obtain imaging and will reassess following initial interventions. [DD]   1026 Of note, the patient has a recent left hip fracture that was fixed surgically by Dr. Badillo of orthopedic surgery on June 11 of this year. [DD]   1329 I personally and independently reviewed the patient's CT images and findings, and I am in agreement with the radiologist regarding CT interpretation--particularly there is no emergent intracranial process noted.  No cervical spine fracture noted.  Facial fractures are stable. [DD]   1329 Patient reassured and counseled regarding symptomatic management of minor head injury.  He will follow-up with his primary care physician within the next week.  Agreeable with plan and given appropriate strict return precautions. [DD]      ED Course User Index  [DD] Rodríguez Bach MD                                           No results found for this or any previous visit (from the past 24 hours).  Note: In  addition to lab results from this visit, the labs listed above may include labs taken at another facility or during a different encounter within the last 24 hours. Please correlate lab times with ED admission and discharge times for further clarification of the services performed during this visit.    CT Head Without Contrast   Final Result   Impression:   1.No acute intracranial abnormality.   2.No acute fracture or malalignment of the cervical spine.   3.Redemonstrated subacute fractures of the bilateral frontal sinuses with decreased blood products. Unchanged mildly displaced fracture of the superior, medial, and lateral walls of the right orbit.            Electronically Signed: Brandan Peralta MD     7/22/2025 1:25 PM EDT     Workstation ID: [id number]      CT Cervical Spine Without Contrast   Final Result   Impression:   1.No acute intracranial abnormality.   2.No acute fracture or malalignment of the cervical spine.   3.Redemonstrated subacute fractures of the bilateral frontal sinuses with decreased blood products. Unchanged mildly displaced fracture of the superior, medial, and lateral walls of the right orbit.            Electronically Signed: Brandan Peralta MD     7/22/2025 1:25 PM EDT     Workstation ID: [id number]      XR Hip With or Without Pelvis 2 - 3 View Left   Final Result   Impression:   Postoperative changes of open reduction internal fixation of a left proximal femur fracture without evidence of hardware complication. There is some periosteal new bone formation but no substantial bony bridging seen.      No evidence of displaced fracture or traumatic malalignment otherwise. Evaluation is somewhat limited due to osteopenia and overlying bowel.      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            Electronically Signed: Patric Joiner MD     7/22/2025 11:14 AM EDT     Workstation ID: [id number]        Vitals:    07/22/25 1200 07/22/25 1230 07/22/25 1330 07/22/25 1400   BP: 132/64 131/76  126/64 135/64   BP Location:       Patient Position:       Pulse: 56 54 52 50   Resp:       Temp:       TempSrc:       SpO2: 98% 98% 96% 98%   Weight:       Height:         Medications - No data to display  ECG/EMG Results (last 24 hours)       Procedure Component Value Units Date/Time    Telemetry Scan [[id number]] Resulted: 07/22/25 1016     Updated: 07/22/25 1039    Telemetry Scan [[id number]] Resulted: 07/22/25 1055     Updated: 07/22/25 1109          Telemetry Scan   Final Result      Telemetry Scan   Final Result                      Medical Decision Making  Problems Addressed:  History of hip fracture: complicated acute illness or injury  Minor head injury without loss of consciousness, initial encounter: complicated acute illness or injury  Scalp hematoma, initial encounter: complicated acute illness or injury    Amount and/or Complexity of Data Reviewed  Radiology: ordered.        Final diagnoses:   Minor head injury without loss of consciousness, initial encounter   Scalp hematoma, initial encounter   History of hip fracture       ED Disposition  ED Disposition       ED Disposition   Discharge    Condition   Stable    Comment   --               PATIENT CONNECTION - [place] [id number]  [phone]  In 1 week           Medication List        Changed      pantoprazole 40 MG EC tablet  Commonly known as: PROTONIX  What changed: how much to take                 Rodríguez Bach MD  07/22/25 8778

## 2025-08-30 ENCOUNTER — APPOINTMENT (OUTPATIENT)
Facility: HOSPITAL | Age: 85
End: 2025-08-30
Payer: MEDICARE

## 2025-08-30 ENCOUNTER — HOSPITAL ENCOUNTER (EMERGENCY)
Facility: HOSPITAL | Age: 85
Discharge: NURSING, REHAB OR LONG-TERM CARE | End: 2025-08-30
Attending: EMERGENCY MEDICINE
Payer: MEDICARE

## 2025-08-30 VITALS
TEMPERATURE: 98.7 F | SYSTOLIC BLOOD PRESSURE: 144 MMHG | BODY MASS INDEX: 22 KG/M2 | DIASTOLIC BLOOD PRESSURE: 67 MMHG | WEIGHT: 162.4 LBS | RESPIRATION RATE: 16 BRPM | OXYGEN SATURATION: 94 % | HEIGHT: 72 IN | HEART RATE: 74 BPM

## 2025-08-30 DIAGNOSIS — S01.112A EYEBROW LACERATION, LEFT, INITIAL ENCOUNTER: Primary | ICD-10-CM

## 2025-08-30 PROCEDURE — 72170 X-RAY EXAM OF PELVIS: CPT

## 2025-08-30 PROCEDURE — 99284 EMERGENCY DEPT VISIT MOD MDM: CPT | Performed by: EMERGENCY MEDICINE

## 2025-08-30 PROCEDURE — 70450 CT HEAD/BRAIN W/O DYE: CPT

## 2025-08-30 PROCEDURE — 25010000002 LIDOCAINE 1% - EPINEPHRINE 1:100000 1 %-1:100000 SOLUTION: Performed by: EMERGENCY MEDICINE

## 2025-08-30 RX ORDER — CALCIUM CARBONATE 500 MG/1
1 TABLET, CHEWABLE ORAL AS NEEDED
COMMUNITY

## 2025-08-30 RX ORDER — SODIUM CHLORIDE 0.9 % (FLUSH) 0.9 %
10 SYRINGE (ML) INJECTION AS NEEDED
Status: DISCONTINUED | OUTPATIENT
Start: 2025-08-30 | End: 2025-08-31 | Stop reason: HOSPADM

## 2025-08-30 RX ORDER — LIDOCAINE HYDROCHLORIDE AND EPINEPHRINE 10; 10 MG/ML; UG/ML
10 INJECTION, SOLUTION INFILTRATION; PERINEURAL ONCE
Status: COMPLETED | OUTPATIENT
Start: 2025-08-30 | End: 2025-08-30

## 2025-08-30 RX ORDER — TRAZODONE HYDROCHLORIDE 50 MG/1
TABLET ORAL
COMMUNITY
Start: 2025-08-26

## 2025-08-30 RX ORDER — ACETAMINOPHEN 325 MG/1
650 TABLET ORAL EVERY 4 HOURS PRN
COMMUNITY

## 2025-08-30 RX ORDER — IPRATROPIUM BROMIDE AND ALBUTEROL SULFATE 2.5; .5 MG/3ML; MG/3ML
3 SOLUTION RESPIRATORY (INHALATION) EVERY 6 HOURS PRN
COMMUNITY

## 2025-08-30 RX ORDER — BISACODYL 5 MG/1
10 TABLET, DELAYED RELEASE ORAL DAILY PRN
COMMUNITY

## 2025-08-30 RX ORDER — DOXYCYCLINE 100 MG/1
CAPSULE ORAL
COMMUNITY
Start: 2025-08-02

## 2025-08-30 RX ADMIN — LIDOCAINE HYDROCHLORIDE,EPINEPHRINE BITARTRATE 10 ML: 10; .01 INJECTION, SOLUTION INFILTRATION; PERINEURAL at 19:45

## (undated) DEVICE — COVER,LIGHT HANDLE,FLX,1/PK: Brand: MEDLINE INDUSTRIES, INC.

## (undated) DEVICE — CATH DIAG EXPO .056 FR4 6F 100CM

## (undated) DEVICE — BIT DRL 3FLUT QC CALIB 4.2X330X100MM

## (undated) DEVICE — PK MINOR SPLT 10

## (undated) DEVICE — STRIP,CLOSURE,WOUND,MEDI-STRIP,1/2X4: Brand: MEDLINE

## (undated) DEVICE — DRN PENRS 1/4X18IN LTX

## (undated) DEVICE — SUT MNCRYL PLS ANTIB UD 4/0 PS2 18IN

## (undated) DEVICE — GW PERIPH VASC ADX J/TP SS .035 150CM 3MM

## (undated) DEVICE — Device

## (undated) DEVICE — DRAPE,TOWEL,LARGE,INVISISHIELD: Brand: MEDLINE

## (undated) DEVICE — SNAP KOVER: Brand: UNBRANDED

## (undated) DEVICE — SUT MONOCRYL PLS ANTIB UND 3/0  PS1 27IN

## (undated) DEVICE — SPK10295 ORTHOPEDIC FRACTURE KIT: Brand: SPK10295 ORTHOPEDIC FRACTURE KIT

## (undated) DEVICE — NDL PERC 1PRT THNWALL W/BASEPLT 18G 7CM

## (undated) DEVICE — ANTIBACTERIAL UNDYED BRAIDED (POLYGLACTIN 910), SYNTHETIC ABSORBABLE SUTURE: Brand: COATED VICRYL

## (undated) DEVICE — PINNACLE INTRODUCER SHEATH: Brand: PINNACLE

## (undated) DEVICE — CVR HNDL LIGHT RIGID

## (undated) DEVICE — GOWN,NON-REINFORCED,SIRUS,SET IN SLV,XL: Brand: MEDLINE

## (undated) DEVICE — GLV SURG BIOGEL LTX PF 7 1/2

## (undated) DEVICE — PK CATH CARD 10

## (undated) DEVICE — GLV SURG SENSICARE PI ORTHO SZ7.5 LF STRL

## (undated) DEVICE — DRSNG TELFA ILND ADH 4X6IN

## (undated) DEVICE — GLV SURG TRIUMPH ORTHO W/ALOE PF LTX 7.5 STRL

## (undated) DEVICE — GW FOR TROCH NAIL 3.2X400MM

## (undated) DEVICE — MODEL AT P65, P/N 701554-001KIT CONTENTS: HAND CONTROLLER, 3-WAY HIGH-PRESSURE STOPCOCK WITH ROTATING END AND PREMIUM HIGH-PRESSURE TUBING: Brand: ANGIOTOUCH® KIT

## (undated) DEVICE — TAPE,CLOTH/SILK,CURAD,3"X10YD,LF,40/CS: Brand: CURAD

## (undated) DEVICE — 3M™ TEGADERM™ IV TRANSPARENT FILM DRESSING WITH BORDER 100 BAGS/CARTON 4 CARTONS/CASE 1633: Brand: 3M™ TEGADERM™

## (undated) DEVICE — PK MAJ FX HIP 10

## (undated) DEVICE — ST ACC MICROPUNCTURE .018 TRANSLSS/SS/TP 5F/10CM 21G/7CM

## (undated) DEVICE — KT PUMP INFUBLOCK MDL 2100 PMKITSOLIS

## (undated) DEVICE — 3M™ STERI-STRIP™ REINFORCED ADHESIVE SKIN CLOSURES, R1547, 1/2 IN X 4 IN (12 MM X 100 MM), 6 STRIPS/ENVELOPE: Brand: 3M™ STERI-STRIP™

## (undated) DEVICE — TRY SKINPREP DRYPREP

## (undated) DEVICE — HDRST POSTIN FM CRDL TRACH SLOT NONCOMRESS 9X8X4IN